# Patient Record
Sex: FEMALE | Race: WHITE | NOT HISPANIC OR LATINO | Employment: OTHER | URBAN - METROPOLITAN AREA
[De-identification: names, ages, dates, MRNs, and addresses within clinical notes are randomized per-mention and may not be internally consistent; named-entity substitution may affect disease eponyms.]

---

## 2017-07-12 ENCOUNTER — HOSPITAL ENCOUNTER (OUTPATIENT)
Dept: GENERAL RADIOLOGY | Facility: HOSPITAL | Age: 82
Discharge: HOME | End: 2017-07-12
Attending: INTERNAL MEDICINE | Admitting: INTERNAL MEDICINE

## 2017-07-12 ENCOUNTER — TRANSCRIBE ORDERS (OUTPATIENT)
Dept: ADMINISTRATIVE | Facility: HOSPITAL | Age: 82
End: 2017-07-12

## 2017-07-12 DIAGNOSIS — M70.61 TROCHANTERIC BURSITIS OF RIGHT HIP: Primary | ICD-10-CM

## 2017-07-12 DIAGNOSIS — M70.61 TROCHANTERIC BURSITIS OF RIGHT HIP: ICD-10-CM

## 2017-07-12 PROCEDURE — 73502 X-RAY EXAM HIP UNI 2-3 VIEWS: CPT

## 2017-11-09 ENCOUNTER — TRANSCRIBE ORDERS (OUTPATIENT)
Dept: ADMINISTRATIVE | Facility: HOSPITAL | Age: 82
End: 2017-11-09

## 2017-11-09 DIAGNOSIS — Z12.31 VISIT FOR SCREENING MAMMOGRAM: Primary | ICD-10-CM

## 2017-11-28 ENCOUNTER — OFFICE VISIT (OUTPATIENT)
Dept: NEUROLOGY | Facility: CLINIC | Age: 82
End: 2017-11-28

## 2017-11-28 VITALS
WEIGHT: 195 LBS | DIASTOLIC BLOOD PRESSURE: 78 MMHG | SYSTOLIC BLOOD PRESSURE: 124 MMHG | HEIGHT: 63 IN | BODY MASS INDEX: 34.55 KG/M2

## 2017-11-28 DIAGNOSIS — G25.0 BENIGN FAMILIAL TREMOR: Primary | ICD-10-CM

## 2017-11-28 DIAGNOSIS — G43.009 MIGRAINE WITHOUT AURA AND WITHOUT STATUS MIGRAINOSUS, NOT INTRACTABLE: ICD-10-CM

## 2017-11-28 PROCEDURE — 99213 OFFICE O/P EST LOW 20 MIN: CPT | Performed by: PSYCHIATRY & NEUROLOGY

## 2017-11-28 RX ORDER — SYRINGE-NEEDLE,INSULIN,0.5 ML 30 G X1/2"
SYRINGE, EMPTY DISPOSABLE MISCELLANEOUS
COMMUNITY
Start: 2017-09-29 | End: 2020-12-23

## 2017-11-28 RX ORDER — PROPRANOLOL HYDROCHLORIDE 40 MG/1
40 TABLET ORAL 2 TIMES DAILY
Qty: 180 TABLET | Refills: 3
Start: 2017-11-28 | End: 2018-11-20

## 2017-11-28 NOTE — PROGRESS NOTES
Subjective:     Patient ID: Chey Robertson is a 82 y.o. female.    CC:   Chief Complaint   Patient presents with   • Tremors       HPI:   History of Present Illness  The following portions of the patient's history were reviewed and updated as appropriate: allergies, current medications, past family history, past medical history, past social history, past surgical history and problem list.     Patient reports taking propranolol only once a day, feels that it does wear off, headaches still controlled, headaches on and off.    Past Medical History:   Diagnosis Date   • Tremors of nervous system        Past Surgical History:   Procedure Laterality Date   • HYSTERECTOMY     • NO PAST SURGERIES         Social History     Social History   • Marital status:      Spouse name: N/A   • Number of children: N/A   • Years of education: N/A     Occupational History   • Not on file.     Social History Main Topics   • Smoking status: Never Smoker   • Smokeless tobacco: Not on file   • Alcohol use No   • Drug use: No   • Sexual activity: Defer     Other Topics Concern   • Not on file     Social History Narrative       Family History   Problem Relation Age of Onset   • Breast cancer Sister 80   • Ovarian cancer Neg Hx         Review of Systems   Constitutional: Negative for chills, fatigue, fever and unexpected weight change.   HENT: Positive for rhinorrhea. Negative for ear pain, hearing loss and nosebleeds.    Eyes: Negative for photophobia, pain, discharge, itching and visual disturbance.   Respiratory: Positive for shortness of breath. Negative for cough, chest tightness and wheezing.    Cardiovascular: Positive for leg swelling. Negative for chest pain and palpitations.   Gastrointestinal: Positive for constipation. Negative for abdominal pain, blood in stool, diarrhea, nausea and vomiting.   Genitourinary: Negative for dysuria, frequency, hematuria and urgency.   Musculoskeletal: Negative for arthralgias, back pain, gait  problem, joint swelling, myalgias, neck pain and neck stiffness.   Skin: Negative for rash and wound.   Allergic/Immunologic: Negative for environmental allergies and food allergies.   Neurological: Positive for tremors. Negative for dizziness, seizures, syncope, speech difficulty, weakness, light-headedness, numbness and headaches.   Hematological: Negative for adenopathy. Does not bruise/bleed easily.   Psychiatric/Behavioral: Positive for sleep disturbance. Negative for agitation, confusion, decreased concentration, hallucinations and suicidal ideas. The patient is not nervous/anxious.         Objective:    Neurologic Exam     Mental Status   Oriented to person, place, and time.       Physical Exam   Constitutional: She is oriented to person, place, and time. She appears well-developed and well-nourished.   Cardiovascular: Normal rate and regular rhythm.    Pulmonary/Chest: Effort normal.   Neurological: She is alert and oriented to person, place, and time. She has normal reflexes.   Slight voice and kinetic hand tremor.   Psychiatric: She has a normal mood and affect. Her behavior is normal. Thought content normal.       Assessment/Plan:       Chey was seen today for tremors.    Diagnoses and all orders for this visit:    Benign familial tremor  -     propranolol (INDERAL) 40 MG tablet; Take 1 tablet by mouth 2 (Two) Times a Day.    Migraine without aura and without status migrainosus, not intractable  -     propranolol (INDERAL) 40 MG tablet; Take 1 tablet by mouth 2 (Two) Times a Day.           Abel Tejeda MD  11/28/2017

## 2018-04-10 ENCOUNTER — HOSPITAL ENCOUNTER (OUTPATIENT)
Dept: MAMMOGRAPHY | Facility: HOSPITAL | Age: 83
Discharge: HOME | End: 2018-04-10
Attending: FAMILY MEDICINE | Admitting: FAMILY MEDICINE

## 2018-04-10 DIAGNOSIS — Z12.31 VISIT FOR SCREENING MAMMOGRAM: ICD-10-CM

## 2018-04-10 PROCEDURE — 77067 SCR MAMMO BI INCL CAD: CPT

## 2018-04-10 PROCEDURE — 77063 BREAST TOMOSYNTHESIS BI: CPT

## 2018-04-10 PROCEDURE — 77067 SCR MAMMO BI INCL CAD: CPT | Performed by: RADIOLOGY

## 2018-04-10 PROCEDURE — 77063 BREAST TOMOSYNTHESIS BI: CPT | Performed by: RADIOLOGY

## 2018-11-20 ENCOUNTER — OFFICE VISIT (OUTPATIENT)
Dept: NEUROLOGY | Facility: CLINIC | Age: 83
End: 2018-11-20

## 2018-11-20 VITALS
HEIGHT: 63 IN | WEIGHT: 192 LBS | SYSTOLIC BLOOD PRESSURE: 134 MMHG | DIASTOLIC BLOOD PRESSURE: 80 MMHG | BODY MASS INDEX: 34.02 KG/M2

## 2018-11-20 DIAGNOSIS — G25.0 BENIGN FAMILIAL TREMOR: Primary | ICD-10-CM

## 2018-11-20 DIAGNOSIS — G43.009 MIGRAINE WITHOUT AURA AND WITHOUT STATUS MIGRAINOSUS, NOT INTRACTABLE: ICD-10-CM

## 2018-11-20 PROCEDURE — 99213 OFFICE O/P EST LOW 20 MIN: CPT | Performed by: NURSE PRACTITIONER

## 2018-11-20 RX ORDER — PROPRANOLOL HYDROCHLORIDE 40 MG/1
40 TABLET ORAL 3 TIMES DAILY
Qty: 270 TABLET | Refills: 3 | Status: SHIPPED | OUTPATIENT
Start: 2018-11-20 | End: 2019-11-19 | Stop reason: SDUPTHER

## 2019-03-07 ENCOUNTER — TRANSCRIBE ORDERS (OUTPATIENT)
Dept: ADMINISTRATIVE | Facility: HOSPITAL | Age: 84
End: 2019-03-07

## 2019-03-07 DIAGNOSIS — Z12.31 VISIT FOR SCREENING MAMMOGRAM: Primary | ICD-10-CM

## 2019-04-12 ENCOUNTER — HOSPITAL ENCOUNTER (OUTPATIENT)
Dept: MAMMOGRAPHY | Facility: HOSPITAL | Age: 84
Discharge: HOME | End: 2019-04-12
Admitting: FAMILY MEDICINE

## 2019-04-12 DIAGNOSIS — Z12.31 VISIT FOR SCREENING MAMMOGRAM: ICD-10-CM

## 2019-04-12 PROCEDURE — 77067 SCR MAMMO BI INCL CAD: CPT | Performed by: RADIOLOGY

## 2019-04-12 PROCEDURE — 77067 SCR MAMMO BI INCL CAD: CPT

## 2019-04-12 PROCEDURE — 77063 BREAST TOMOSYNTHESIS BI: CPT

## 2019-04-12 PROCEDURE — 77063 BREAST TOMOSYNTHESIS BI: CPT | Performed by: RADIOLOGY

## 2019-05-29 ENCOUNTER — TRANSCRIBE ORDERS (OUTPATIENT)
Dept: ADMINISTRATIVE | Facility: HOSPITAL | Age: 84
End: 2019-05-29

## 2019-05-29 DIAGNOSIS — R22.2 SUPRACLAVICULAR MASS: Primary | ICD-10-CM

## 2019-06-14 ENCOUNTER — HOSPITAL ENCOUNTER (OUTPATIENT)
Dept: ULTRASOUND IMAGING | Facility: HOSPITAL | Age: 84
Discharge: HOME | End: 2019-06-14
Admitting: NURSE PRACTITIONER

## 2019-06-14 DIAGNOSIS — R22.2 SUPRACLAVICULAR MASS: ICD-10-CM

## 2019-06-14 PROCEDURE — 76604 US EXAM CHEST: CPT

## 2019-11-19 ENCOUNTER — OFFICE VISIT (OUTPATIENT)
Dept: NEUROLOGY | Facility: CLINIC | Age: 84
End: 2019-11-19

## 2019-11-19 VITALS
BODY MASS INDEX: 34.38 KG/M2 | WEIGHT: 194 LBS | SYSTOLIC BLOOD PRESSURE: 150 MMHG | DIASTOLIC BLOOD PRESSURE: 60 MMHG | HEART RATE: 59 BPM | OXYGEN SATURATION: 96 % | HEIGHT: 63 IN

## 2019-11-19 DIAGNOSIS — G25.0 BENIGN FAMILIAL TREMOR: Primary | ICD-10-CM

## 2019-11-19 PROCEDURE — 99212 OFFICE O/P EST SF 10 MIN: CPT | Performed by: NURSE PRACTITIONER

## 2019-11-19 RX ORDER — PROPRANOLOL HYDROCHLORIDE 40 MG/1
40 TABLET ORAL 3 TIMES DAILY
Qty: 270 TABLET | Refills: 3 | Status: SHIPPED | OUTPATIENT
Start: 2019-11-19 | End: 2021-10-22 | Stop reason: SDUPTHER

## 2019-11-19 NOTE — PROGRESS NOTES
Subjective:     Patient ID: Chey Robertson is a 84 y.o. female.    CC:   Chief Complaint   Patient presents with   • Tremors       HPI:   History of Present Illness     Ms. Robertson is an 84-year-old patient here today for annual follow-up, refills on her propranolol.  She has a long history of benign essential tremor dating back many years.  She is been on current dose of propranolol since at least 2013.  She reports that tremors are stable at this time.  She continues to have voice tremor, mild kinetic hand tremor.  This does not seem to interfere too much with her daily activities.  She is having no adverse side effects from propranolol, blood pressure has been stable by report.  She denies any syncope, orthostatic dizziness etc.  She denies dysphasia, gait instability or any weakness in the lower extremities or falls.  Her blood sugars are reportedly doing well, she has had no problems with hypoglycemia.  She is been on insulin with her beta-blocker for many years.  The following portions of the patient's history were reviewed and updated as appropriate: allergies, current medications, past family history, past medical history, past social history, past surgical history and problem list.    Past Medical History:   Diagnosis Date   • Tremors of nervous system        Past Surgical History:   Procedure Laterality Date   • HYSTERECTOMY     • NO PAST SURGERIES         Social History     Socioeconomic History   • Marital status:      Spouse name: Not on file   • Number of children: Not on file   • Years of education: Not on file   • Highest education level: Not on file   Tobacco Use   • Smoking status: Never Smoker   Substance and Sexual Activity   • Alcohol use: No   • Drug use: No   • Sexual activity: Defer       Family History   Problem Relation Age of Onset   • Breast cancer Sister 84   • Ovarian cancer Neg Hx         Review of Systems   Constitutional: Negative.    HENT: Negative.    Eyes: Negative.   "  Respiratory: Negative.    Cardiovascular: Negative.    Gastrointestinal: Negative.    Endocrine: Negative.    Genitourinary: Negative.    Musculoskeletal: Negative.    Skin: Negative.    Allergic/Immunologic: Negative.    Neurological: Positive for tremors. Negative for dizziness, seizures, syncope, facial asymmetry, speech difficulty, weakness, light-headedness, numbness and headaches.   Hematological: Negative.    Psychiatric/Behavioral: Negative.         Objective:  /60   Pulse 59   Ht 160 cm (63\")   Wt 88 kg (194 lb)   LMP  (LMP Unknown)   SpO2 96%   BMI 34.37 kg/m²     Neurologic Exam     Mental Status   Oriented to person, place, and time.   Attention: normal. Concentration: normal.   Level of consciousness: alert  Knowledge: consistent with education.   Able to read. Able to write. Normal comprehension.     Cranial Nerves   Cranial nerves II through XII intact.     CN III, IV, VI   Extraocular motions are normal.     Motor Exam   Muscle bulk: normal  Right arm tone: normal  Left arm tone: normal  Right arm pronator drift: absent  Left arm pronator drift: absent  Right leg tone: normal  Left leg tone: normal    Strength   Strength 5/5 throughout.     Gait, Coordination, and Reflexes     Gait  Gait: normal    Coordination   Finger to nose coordination: normal    Reflexes   Right biceps: 1+  Left biceps: 1+  Right triceps: 1+  Left triceps: 1+  Right patellar: 1+  Left patellar: 1+  Right achilles: 1+  Left achilles: 1+Fluid gait with normal arm swing bilaterally, no resting tremor, no cogwheel rigidity.  She does have a slight voice tremor and very mild action tremor of the upper extremities       Physical Exam   Constitutional: She is oriented to person, place, and time. She appears well-developed and well-nourished. No distress.   HENT:   Head: Normocephalic and atraumatic.   Eyes: Conjunctivae and EOM are normal. No scleral icterus.   Neck: Normal range of motion. Neck supple. "   Pulmonary/Chest: Effort normal.   Neurological: She is alert and oriented to person, place, and time. She has normal strength. She has a normal Finger-Nose-Finger Test. Gait normal.   Reflex Scores:       Tricep reflexes are 1+ on the right side and 1+ on the left side.       Bicep reflexes are 1+ on the right side and 1+ on the left side.       Patellar reflexes are 1+ on the right side and 1+ on the left side.       Achilles reflexes are 1+ on the right side and 1+ on the left side.  Psychiatric: She has a normal mood and affect. Her behavior is normal. Judgment and thought content normal.   Vitals reviewed.      Assessment/Plan:       Chey was seen today for tremors.    Diagnoses and all orders for this visit:    Benign familial tremor  -     propranolol (INDERAL) 40 MG tablet; Take 1 tablet by mouth 3 (Three) Times a Day.    Ms. Robertson reports that she is doing well on current dose of propranolol.  She is having no adverse side effects from the medication, she is currently on insulin, has been for many years, she is had no problems with hypoglycemia or masking of hypoglycemic symptoms.  She has no new complaints today.  She remains very active, no problems with falls or weakness by report.  She would like to follow-up annually, I have advised her if any new symptoms or problems were to arise that we can work her in asap she can call our office anytime as well.  Reviewed medications, potential side effects and signs and symptoms to report. Discussed risk versus benefits of treatment plan with patient and/or family-including medications, labs and radiology that may be ordered. Addressed questions and concerns during visit. Patient and/or family verbalized understanding and agree with plan.  EMR Dragon/Transcription Disclaimer:  Much of this encounter note is an electronic transcription of spoken language to printed text. Electronic transcription of spoken language may permit erroneous words or phrases to be  inadvertently transcribed. Although I have reviewed the note for such errors, some may still exist in this documentation.           Samanta Quesada, APRN  11/19/2019

## 2020-05-20 ENCOUNTER — LAB REQUISITION (OUTPATIENT)
Dept: LAB | Facility: HOSPITAL | Age: 85
End: 2020-05-20

## 2020-05-20 ENCOUNTER — HOSPITAL ENCOUNTER (OUTPATIENT)
Dept: GENERAL RADIOLOGY | Facility: HOSPITAL | Age: 85
Discharge: HOME | End: 2020-05-20
Admitting: INTERNAL MEDICINE

## 2020-05-20 ENCOUNTER — TRANSCRIBE ORDERS (OUTPATIENT)
Dept: ADMINISTRATIVE | Facility: HOSPITAL | Age: 85
End: 2020-05-20

## 2020-05-20 DIAGNOSIS — R06.02 SOB (SHORTNESS OF BREATH) ON EXERTION: Primary | ICD-10-CM

## 2020-05-20 DIAGNOSIS — Z00.00 ROUTINE GENERAL MEDICAL EXAMINATION AT A HEALTH CARE FACILITY: ICD-10-CM

## 2020-05-20 LAB — D DIMER PPP FEU-MCNC: 0.44 MCGFEU/ML (ref 0–0.56)

## 2020-05-20 PROCEDURE — 71046 X-RAY EXAM CHEST 2 VIEWS: CPT

## 2020-05-20 PROCEDURE — 85379 FIBRIN DEGRADATION QUANT: CPT

## 2020-05-24 ENCOUNTER — APPOINTMENT (OUTPATIENT)
Dept: GENERAL RADIOLOGY | Facility: HOSPITAL | Age: 85
End: 2020-05-24

## 2020-05-24 ENCOUNTER — APPOINTMENT (OUTPATIENT)
Dept: CT IMAGING | Facility: HOSPITAL | Age: 85
End: 2020-05-24

## 2020-05-24 ENCOUNTER — HOSPITAL ENCOUNTER (INPATIENT)
Facility: HOSPITAL | Age: 85
LOS: 2 days | Discharge: HOME HEALTH | End: 2020-05-27
Attending: EMERGENCY MEDICINE | Admitting: INTERNAL MEDICINE

## 2020-05-24 DIAGNOSIS — R53.1 GENERALIZED WEAKNESS: ICD-10-CM

## 2020-05-24 DIAGNOSIS — R16.0 HEPATOMEGALY: ICD-10-CM

## 2020-05-24 DIAGNOSIS — D72.819 LEUKOPENIA, UNSPECIFIED TYPE: ICD-10-CM

## 2020-05-24 DIAGNOSIS — Z74.09 MOBILITY IMPAIRED: ICD-10-CM

## 2020-05-24 DIAGNOSIS — D69.6 THROMBOCYTOPENIA (HCC): ICD-10-CM

## 2020-05-24 DIAGNOSIS — R10.84 GENERALIZED ABDOMINAL PAIN: ICD-10-CM

## 2020-05-24 DIAGNOSIS — R16.1 SPLENOMEGALY: ICD-10-CM

## 2020-05-24 DIAGNOSIS — R41.0 CONFUSION: Primary | ICD-10-CM

## 2020-05-24 DIAGNOSIS — R06.02 SOB (SHORTNESS OF BREATH): ICD-10-CM

## 2020-05-24 PROBLEM — R41.82 AMS (ALTERED MENTAL STATUS): Status: ACTIVE | Noted: 2020-05-24

## 2020-05-24 PROBLEM — N17.9 ACUTE KIDNEY INJURY: Status: ACTIVE | Noted: 2020-05-24

## 2020-05-24 PROBLEM — E11.9 DIABETES MELLITUS: Status: ACTIVE | Noted: 2020-05-24

## 2020-05-24 PROBLEM — E03.9 HYPOTHYROIDISM (ACQUIRED): Status: ACTIVE | Noted: 2020-05-24

## 2020-05-24 LAB
ALBUMIN SERPL-MCNC: 4.3 G/DL (ref 3.5–5.2)
ALBUMIN/GLOB SERPL: 1.7 G/DL
ALP SERPL-CCNC: 76 U/L (ref 39–117)
ALT SERPL W P-5'-P-CCNC: 8 U/L (ref 1–33)
ANION GAP SERPL CALCULATED.3IONS-SCNC: 12 MMOL/L (ref 5–15)
AST SERPL-CCNC: 14 U/L (ref 1–32)
BASOPHILS # BLD AUTO: 0 10*3/MM3 (ref 0–0.2)
BASOPHILS NFR BLD AUTO: 0 % (ref 0–1.5)
BILIRUB SERPL-MCNC: 0.4 MG/DL (ref 0.2–1.2)
BILIRUB UR QL STRIP: NEGATIVE
BUN BLD-MCNC: 18 MG/DL (ref 8–23)
BUN/CREAT SERPL: 15.5 (ref 7–25)
CALCIUM SPEC-SCNC: 9 MG/DL (ref 8.6–10.5)
CHLORIDE SERPL-SCNC: 102 MMOL/L (ref 98–107)
CLARITY UR: CLEAR
CO2 SERPL-SCNC: 24 MMOL/L (ref 22–29)
COLOR UR: YELLOW
CREAT BLD-MCNC: 1.16 MG/DL (ref 0.57–1)
D-LACTATE SERPL-SCNC: 1.4 MMOL/L (ref 0.5–2)
DEPRECATED RDW RBC AUTO: 46.6 FL (ref 37–54)
EOSINOPHIL # BLD AUTO: 0 10*3/MM3 (ref 0–0.4)
EOSINOPHIL NFR BLD AUTO: 0 % (ref 0.3–6.2)
ERYTHROCYTE [DISTWIDTH] IN BLOOD BY AUTOMATED COUNT: 14.8 % (ref 12.3–15.4)
GFR SERPL CREATININE-BSD FRML MDRD: 45 ML/MIN/1.73
GLOBULIN UR ELPH-MCNC: 2.6 GM/DL
GLUCOSE BLD-MCNC: 86 MG/DL (ref 65–99)
GLUCOSE UR STRIP-MCNC: NEGATIVE MG/DL
HCT VFR BLD AUTO: 37.1 % (ref 34–46.6)
HGB BLD-MCNC: 12.1 G/DL (ref 12–15.9)
HGB UR QL STRIP.AUTO: NEGATIVE
HOLD SPECIMEN: NORMAL
HOLD SPECIMEN: NORMAL
IMM GRANULOCYTES # BLD AUTO: 0.01 10*3/MM3 (ref 0–0.05)
IMM GRANULOCYTES NFR BLD AUTO: 0.4 % (ref 0–0.5)
INR PPP: 1.6 (ref 0.85–1.16)
KETONES UR QL STRIP: NEGATIVE
LEUKOCYTE ESTERASE UR QL STRIP.AUTO: NEGATIVE
LIPASE SERPL-CCNC: 32 U/L (ref 13–60)
LYMPHOCYTES # BLD AUTO: 0.55 10*3/MM3 (ref 0.7–3.1)
LYMPHOCYTES NFR BLD AUTO: 21.7 % (ref 19.6–45.3)
MAGNESIUM SERPL-MCNC: 1.9 MG/DL (ref 1.6–2.4)
MCH RBC QN AUTO: 28.2 PG (ref 26.6–33)
MCHC RBC AUTO-ENTMCNC: 32.6 G/DL (ref 31.5–35.7)
MCV RBC AUTO: 86.5 FL (ref 79–97)
MONOCYTES # BLD AUTO: 0.29 10*3/MM3 (ref 0.1–0.9)
MONOCYTES NFR BLD AUTO: 11.4 % (ref 5–12)
NEUTROPHILS # BLD AUTO: 1.69 10*3/MM3 (ref 1.7–7)
NEUTROPHILS NFR BLD AUTO: 66.5 % (ref 42.7–76)
NITRITE UR QL STRIP: NEGATIVE
NRBC BLD AUTO-RTO: 0 /100 WBC (ref 0–0.2)
PH UR STRIP.AUTO: <=5 [PH] (ref 5–8)
PLAT MORPH BLD: NORMAL
PLATELET # BLD AUTO: 41 10*3/MM3 (ref 140–450)
PMV BLD AUTO: 9.1 FL (ref 6–12)
POTASSIUM BLD-SCNC: 4 MMOL/L (ref 3.5–5.2)
PROCALCITONIN SERPL-MCNC: 0.05 NG/ML (ref 0.1–0.25)
PROT SERPL-MCNC: 6.9 G/DL (ref 6–8.5)
PROT UR QL STRIP: NEGATIVE
PROTHROMBIN TIME: 18.7 SECONDS (ref 11.5–14)
RBC # BLD AUTO: 4.29 10*6/MM3 (ref 3.77–5.28)
RBC MORPH BLD: NORMAL
SODIUM BLD-SCNC: 138 MMOL/L (ref 136–145)
SP GR UR STRIP: 1.02 (ref 1–1.03)
T4 FREE SERPL-MCNC: 1.32 NG/DL (ref 0.93–1.7)
TROPONIN T SERPL-MCNC: <0.01 NG/ML (ref 0–0.03)
TSH SERPL DL<=0.05 MIU/L-ACNC: 5.27 UIU/ML (ref 0.27–4.2)
UROBILINOGEN UR QL STRIP: NORMAL
WBC MORPH BLD: NORMAL
WBC NRBC COR # BLD: 2.54 10*3/MM3 (ref 3.4–10.8)
WHOLE BLOOD HOLD SPECIMEN: NORMAL
WHOLE BLOOD HOLD SPECIMEN: NORMAL

## 2020-05-24 PROCEDURE — 82784 ASSAY IGA/IGD/IGG/IGM EACH: CPT | Performed by: INTERNAL MEDICINE

## 2020-05-24 PROCEDURE — 84443 ASSAY THYROID STIM HORMONE: CPT | Performed by: EMERGENCY MEDICINE

## 2020-05-24 PROCEDURE — 70450 CT HEAD/BRAIN W/O DYE: CPT

## 2020-05-24 PROCEDURE — 85007 BL SMEAR W/DIFF WBC COUNT: CPT | Performed by: EMERGENCY MEDICINE

## 2020-05-24 PROCEDURE — 99285 EMERGENCY DEPT VISIT HI MDM: CPT

## 2020-05-24 PROCEDURE — 85025 COMPLETE CBC W/AUTO DIFF WBC: CPT | Performed by: EMERGENCY MEDICINE

## 2020-05-24 PROCEDURE — 36415 COLL VENOUS BLD VENIPUNCTURE: CPT

## 2020-05-24 PROCEDURE — 0 IOPAMIDOL PER 1 ML: Performed by: EMERGENCY MEDICINE

## 2020-05-24 PROCEDURE — 83605 ASSAY OF LACTIC ACID: CPT | Performed by: PHYSICIAN ASSISTANT

## 2020-05-24 PROCEDURE — 85610 PROTHROMBIN TIME: CPT | Performed by: PHYSICIAN ASSISTANT

## 2020-05-24 PROCEDURE — 84439 ASSAY OF FREE THYROXINE: CPT | Performed by: EMERGENCY MEDICINE

## 2020-05-24 PROCEDURE — 84145 PROCALCITONIN (PCT): CPT | Performed by: PHYSICIAN ASSISTANT

## 2020-05-24 PROCEDURE — 83883 ASSAY NEPHELOMETRY NOT SPEC: CPT | Performed by: INTERNAL MEDICINE

## 2020-05-24 PROCEDURE — 81003 URINALYSIS AUTO W/O SCOPE: CPT | Performed by: PHYSICIAN ASSISTANT

## 2020-05-24 PROCEDURE — 93005 ELECTROCARDIOGRAM TRACING: CPT | Performed by: EMERGENCY MEDICINE

## 2020-05-24 PROCEDURE — 71275 CT ANGIOGRAPHY CHEST: CPT

## 2020-05-24 PROCEDURE — 84484 ASSAY OF TROPONIN QUANT: CPT | Performed by: EMERGENCY MEDICINE

## 2020-05-24 PROCEDURE — 80053 COMPREHEN METABOLIC PANEL: CPT | Performed by: EMERGENCY MEDICINE

## 2020-05-24 PROCEDURE — 83690 ASSAY OF LIPASE: CPT | Performed by: PHYSICIAN ASSISTANT

## 2020-05-24 PROCEDURE — 87040 BLOOD CULTURE FOR BACTERIA: CPT | Performed by: PHYSICIAN ASSISTANT

## 2020-05-24 PROCEDURE — 74176 CT ABD & PELVIS W/O CONTRAST: CPT

## 2020-05-24 PROCEDURE — 71045 X-RAY EXAM CHEST 1 VIEW: CPT

## 2020-05-24 PROCEDURE — 83735 ASSAY OF MAGNESIUM: CPT | Performed by: EMERGENCY MEDICINE

## 2020-05-24 PROCEDURE — 86334 IMMUNOFIX E-PHORESIS SERUM: CPT | Performed by: INTERNAL MEDICINE

## 2020-05-24 PROCEDURE — 84165 PROTEIN E-PHORESIS SERUM: CPT | Performed by: INTERNAL MEDICINE

## 2020-05-24 PROCEDURE — 25010000002 ONDANSETRON PER 1 MG: Performed by: PHYSICIAN ASSISTANT

## 2020-05-24 RX ORDER — ONDANSETRON 2 MG/ML
4 INJECTION INTRAMUSCULAR; INTRAVENOUS ONCE
Status: COMPLETED | OUTPATIENT
Start: 2020-05-24 | End: 2020-05-24

## 2020-05-24 RX ORDER — SODIUM CHLORIDE 0.9 % (FLUSH) 0.9 %
10 SYRINGE (ML) INJECTION AS NEEDED
Status: DISCONTINUED | OUTPATIENT
Start: 2020-05-24 | End: 2020-05-27 | Stop reason: HOSPADM

## 2020-05-24 RX ADMIN — IOPAMIDOL 70 ML: 755 INJECTION, SOLUTION INTRAVENOUS at 19:29

## 2020-05-24 RX ADMIN — SODIUM CHLORIDE 1000 ML: 9 INJECTION, SOLUTION INTRAVENOUS at 18:33

## 2020-05-24 RX ADMIN — ONDANSETRON 4 MG: 2 INJECTION INTRAMUSCULAR; INTRAVENOUS at 18:33

## 2020-05-25 ENCOUNTER — APPOINTMENT (OUTPATIENT)
Dept: MRI IMAGING | Facility: HOSPITAL | Age: 85
End: 2020-05-25

## 2020-05-25 PROBLEM — R41.0 CONFUSION: Status: ACTIVE | Noted: 2020-05-25

## 2020-05-25 LAB
ALBUMIN SERPL-MCNC: 3.9 G/DL (ref 3.5–5.2)
ALBUMIN/GLOB SERPL: 2 G/DL
ALP SERPL-CCNC: 61 U/L (ref 39–117)
ALT SERPL W P-5'-P-CCNC: 7 U/L (ref 1–33)
AMMONIA BLD-SCNC: 37 UMOL/L (ref 11–51)
ANION GAP SERPL CALCULATED.3IONS-SCNC: 13 MMOL/L (ref 5–15)
AST SERPL-CCNC: 13 U/L (ref 1–32)
BASOPHILS # BLD AUTO: 0 10*3/MM3 (ref 0–0.2)
BASOPHILS NFR BLD AUTO: 0 % (ref 0–1.5)
BILIRUB SERPL-MCNC: 0.6 MG/DL (ref 0.2–1.2)
BUN BLD-MCNC: 16 MG/DL (ref 8–23)
BUN/CREAT SERPL: 13.4 (ref 7–25)
CALCIUM SPEC-SCNC: 8.2 MG/DL (ref 8.6–10.5)
CHLORIDE SERPL-SCNC: 105 MMOL/L (ref 98–107)
CO2 SERPL-SCNC: 22 MMOL/L (ref 22–29)
CREAT BLD-MCNC: 1.19 MG/DL (ref 0.57–1)
DEPRECATED RDW RBC AUTO: 46.3 FL (ref 37–54)
EOSINOPHIL # BLD AUTO: 0 10*3/MM3 (ref 0–0.4)
EOSINOPHIL NFR BLD AUTO: 0 % (ref 0.3–6.2)
ERYTHROCYTE [DISTWIDTH] IN BLOOD BY AUTOMATED COUNT: 14.5 % (ref 12.3–15.4)
FERRITIN SERPL-MCNC: 20.14 NG/ML (ref 13–150)
FOLATE SERPL-MCNC: 9.72 NG/ML (ref 4.78–24.2)
GFR SERPL CREATININE-BSD FRML MDRD: 43 ML/MIN/1.73
GLOBULIN UR ELPH-MCNC: 2 GM/DL
GLUCOSE BLD-MCNC: 99 MG/DL (ref 65–99)
GLUCOSE BLDC GLUCOMTR-MCNC: 136 MG/DL (ref 70–130)
GLUCOSE BLDC GLUCOMTR-MCNC: 184 MG/DL (ref 70–130)
GLUCOSE BLDC GLUCOMTR-MCNC: 225 MG/DL (ref 70–130)
GLUCOSE BLDC GLUCOMTR-MCNC: 313 MG/DL (ref 70–130)
HAPTOGLOB SERPL-MCNC: 18 MG/DL (ref 30–200)
HBA1C MFR BLD: 5.7 % (ref 4.8–5.6)
HCT VFR BLD AUTO: 32.6 % (ref 34–46.6)
HGB BLD-MCNC: 10.6 G/DL (ref 12–15.9)
IMM GRANULOCYTES # BLD AUTO: 0 10*3/MM3 (ref 0–0.05)
IMM GRANULOCYTES NFR BLD AUTO: 0 % (ref 0–0.5)
IRON 24H UR-MRATE: 82 MCG/DL (ref 37–145)
IRON SATN MFR SERPL: 22 % (ref 20–50)
LDH SERPL-CCNC: 193 U/L (ref 135–214)
LYMPHOCYTES # BLD AUTO: 0.56 10*3/MM3 (ref 0.7–3.1)
LYMPHOCYTES NFR BLD AUTO: 22.7 % (ref 19.6–45.3)
MAGNESIUM SERPL-MCNC: 1.8 MG/DL (ref 1.6–2.4)
MCH RBC QN AUTO: 28.3 PG (ref 26.6–33)
MCHC RBC AUTO-ENTMCNC: 32.5 G/DL (ref 31.5–35.7)
MCV RBC AUTO: 87.2 FL (ref 79–97)
MONOCYTES # BLD AUTO: 0.24 10*3/MM3 (ref 0.1–0.9)
MONOCYTES NFR BLD AUTO: 9.7 % (ref 5–12)
NEUTROPHILS # BLD AUTO: 1.67 10*3/MM3 (ref 1.7–7)
NEUTROPHILS NFR BLD AUTO: 67.6 % (ref 42.7–76)
NRBC BLD AUTO-RTO: 0 /100 WBC (ref 0–0.2)
PLATELET # BLD AUTO: 36 10*3/MM3 (ref 140–450)
PMV BLD AUTO: 9.4 FL (ref 6–12)
POTASSIUM BLD-SCNC: 4 MMOL/L (ref 3.5–5.2)
PROT SERPL-MCNC: 5.9 G/DL (ref 6–8.5)
RBC # BLD AUTO: 3.74 10*6/MM3 (ref 3.77–5.28)
RETICS # AUTO: 0.08 10*6/MM3 (ref 0.02–0.13)
RETICS/RBC NFR AUTO: 2.22 % (ref 0.7–1.9)
SODIUM BLD-SCNC: 140 MMOL/L (ref 136–145)
TIBC SERPL-MCNC: 377 MCG/DL (ref 298–536)
TRANSFERRIN SERPL-MCNC: 253 MG/DL (ref 200–360)
VIT B12 BLD-MCNC: 155 PG/ML (ref 211–946)
WBC NRBC COR # BLD: 2.47 10*3/MM3 (ref 3.4–10.8)

## 2020-05-25 PROCEDURE — 83036 HEMOGLOBIN GLYCOSYLATED A1C: CPT | Performed by: NURSE PRACTITIONER

## 2020-05-25 PROCEDURE — 83540 ASSAY OF IRON: CPT | Performed by: NURSE PRACTITIONER

## 2020-05-25 PROCEDURE — 25010000002 MAGNESIUM SULFATE 2 GM/50ML SOLUTION: Performed by: INTERNAL MEDICINE

## 2020-05-25 PROCEDURE — 80053 COMPREHEN METABOLIC PANEL: CPT | Performed by: NURSE PRACTITIONER

## 2020-05-25 PROCEDURE — 85025 COMPLETE CBC W/AUTO DIFF WBC: CPT | Performed by: NURSE PRACTITIONER

## 2020-05-25 PROCEDURE — 83615 LACTATE (LD) (LDH) ENZYME: CPT | Performed by: INTERNAL MEDICINE

## 2020-05-25 PROCEDURE — 85045 AUTOMATED RETICULOCYTE COUNT: CPT | Performed by: NURSE PRACTITIONER

## 2020-05-25 PROCEDURE — 97162 PT EVAL MOD COMPLEX 30 MIN: CPT

## 2020-05-25 PROCEDURE — 82746 ASSAY OF FOLIC ACID SERUM: CPT | Performed by: NURSE PRACTITIONER

## 2020-05-25 PROCEDURE — 97166 OT EVAL MOD COMPLEX 45 MIN: CPT

## 2020-05-25 PROCEDURE — 82747 ASSAY OF FOLIC ACID RBC: CPT | Performed by: NURSE PRACTITIONER

## 2020-05-25 PROCEDURE — 85014 HEMATOCRIT: CPT | Performed by: NURSE PRACTITIONER

## 2020-05-25 PROCEDURE — 83010 ASSAY OF HAPTOGLOBIN QUANT: CPT | Performed by: NURSE PRACTITIONER

## 2020-05-25 PROCEDURE — 82962 GLUCOSE BLOOD TEST: CPT

## 2020-05-25 PROCEDURE — 82607 VITAMIN B-12: CPT | Performed by: NURSE PRACTITIONER

## 2020-05-25 PROCEDURE — 82728 ASSAY OF FERRITIN: CPT | Performed by: NURSE PRACTITIONER

## 2020-05-25 PROCEDURE — 99233 SBSQ HOSP IP/OBS HIGH 50: CPT | Performed by: INTERNAL MEDICINE

## 2020-05-25 PROCEDURE — 97535 SELF CARE MNGMENT TRAINING: CPT

## 2020-05-25 PROCEDURE — 25010000002 ONDANSETRON PER 1 MG: Performed by: PHYSICIAN ASSISTANT

## 2020-05-25 PROCEDURE — 63710000001 INSULIN LISPRO (HUMAN) PER 5 UNITS: Performed by: NURSE PRACTITIONER

## 2020-05-25 PROCEDURE — 82140 ASSAY OF AMMONIA: CPT | Performed by: NURSE PRACTITIONER

## 2020-05-25 PROCEDURE — 83735 ASSAY OF MAGNESIUM: CPT | Performed by: NURSE PRACTITIONER

## 2020-05-25 PROCEDURE — 84466 ASSAY OF TRANSFERRIN: CPT | Performed by: NURSE PRACTITIONER

## 2020-05-25 PROCEDURE — 99222 1ST HOSP IP/OBS MODERATE 55: CPT | Performed by: INTERNAL MEDICINE

## 2020-05-25 RX ORDER — HYDROXYZINE HYDROCHLORIDE 25 MG/1
25 TABLET, FILM COATED ORAL ONCE AS NEEDED
Status: COMPLETED | OUTPATIENT
Start: 2020-05-25 | End: 2020-05-26

## 2020-05-25 RX ORDER — ONDANSETRON 2 MG/ML
4 INJECTION INTRAMUSCULAR; INTRAVENOUS EVERY 6 HOURS PRN
Status: DISCONTINUED | OUTPATIENT
Start: 2020-05-25 | End: 2020-05-27 | Stop reason: HOSPADM

## 2020-05-25 RX ORDER — PROPRANOLOL HYDROCHLORIDE 20 MG/1
40 TABLET ORAL 3 TIMES DAILY
Status: DISCONTINUED | OUTPATIENT
Start: 2020-05-25 | End: 2020-05-27 | Stop reason: HOSPADM

## 2020-05-25 RX ORDER — MAGNESIUM SULFATE HEPTAHYDRATE 40 MG/ML
2 INJECTION, SOLUTION INTRAVENOUS ONCE
Status: COMPLETED | OUTPATIENT
Start: 2020-05-25 | End: 2020-05-25

## 2020-05-25 RX ORDER — LEVOTHYROXINE SODIUM 0.1 MG/1
100 TABLET ORAL
Status: DISCONTINUED | OUTPATIENT
Start: 2020-05-25 | End: 2020-05-27 | Stop reason: HOSPADM

## 2020-05-25 RX ORDER — DEXTROSE MONOHYDRATE 25 G/50ML
25 INJECTION, SOLUTION INTRAVENOUS
Status: DISCONTINUED | OUTPATIENT
Start: 2020-05-25 | End: 2020-05-27 | Stop reason: HOSPADM

## 2020-05-25 RX ORDER — SODIUM CHLORIDE 0.9 % (FLUSH) 0.9 %
10 SYRINGE (ML) INJECTION EVERY 12 HOURS SCHEDULED
Status: DISCONTINUED | OUTPATIENT
Start: 2020-05-25 | End: 2020-05-27 | Stop reason: HOSPADM

## 2020-05-25 RX ORDER — SODIUM CHLORIDE 9 MG/ML
75 INJECTION, SOLUTION INTRAVENOUS ONCE
Status: COMPLETED | OUTPATIENT
Start: 2020-05-25 | End: 2020-05-25

## 2020-05-25 RX ORDER — NICOTINE POLACRILEX 4 MG
15 LOZENGE BUCCAL
Status: DISCONTINUED | OUTPATIENT
Start: 2020-05-25 | End: 2020-05-27 | Stop reason: HOSPADM

## 2020-05-25 RX ORDER — SODIUM CHLORIDE 0.9 % (FLUSH) 0.9 %
10 SYRINGE (ML) INJECTION AS NEEDED
Status: DISCONTINUED | OUTPATIENT
Start: 2020-05-25 | End: 2020-05-27 | Stop reason: HOSPADM

## 2020-05-25 RX ORDER — DIAZEPAM 5 MG/ML
2.5 INJECTION, SOLUTION INTRAMUSCULAR; INTRAVENOUS ONCE
Status: DISCONTINUED | OUTPATIENT
Start: 2020-05-25 | End: 2020-05-26

## 2020-05-25 RX ADMIN — SODIUM CHLORIDE 75 ML/HR: 9 INJECTION, SOLUTION INTRAVENOUS at 01:31

## 2020-05-25 RX ADMIN — SODIUM CHLORIDE, PRESERVATIVE FREE 10 ML: 5 INJECTION INTRAVENOUS at 08:38

## 2020-05-25 RX ADMIN — INSULIN LISPRO 2 UNITS: 100 INJECTION, SOLUTION INTRAVENOUS; SUBCUTANEOUS at 17:47

## 2020-05-25 RX ADMIN — PROPRANOLOL HYDROCHLORIDE 40 MG: 20 TABLET ORAL at 20:15

## 2020-05-25 RX ADMIN — SODIUM CHLORIDE, PRESERVATIVE FREE 10 ML: 5 INJECTION INTRAVENOUS at 20:15

## 2020-05-25 RX ADMIN — LEVOTHYROXINE SODIUM 100 MCG: 100 TABLET ORAL at 05:46

## 2020-05-25 RX ADMIN — MAGNESIUM SULFATE 2 G: 2 INJECTION INTRAVENOUS at 12:36

## 2020-05-25 RX ADMIN — SODIUM CHLORIDE, PRESERVATIVE FREE 10 ML: 5 INJECTION INTRAVENOUS at 00:26

## 2020-05-25 RX ADMIN — ONDANSETRON 4 MG: 2 INJECTION INTRAMUSCULAR; INTRAVENOUS at 08:38

## 2020-05-25 RX ADMIN — ONDANSETRON 4 MG: 2 INJECTION INTRAMUSCULAR; INTRAVENOUS at 00:26

## 2020-05-25 RX ADMIN — PROPRANOLOL HYDROCHLORIDE 40 MG: 20 TABLET ORAL at 09:46

## 2020-05-25 RX ADMIN — INSULIN LISPRO 4 UNITS: 100 INJECTION, SOLUTION INTRAVENOUS; SUBCUTANEOUS at 12:36

## 2020-05-26 ENCOUNTER — APPOINTMENT (OUTPATIENT)
Dept: MRI IMAGING | Facility: HOSPITAL | Age: 85
End: 2020-05-26

## 2020-05-26 ENCOUNTER — TRANSCRIBE ORDERS (OUTPATIENT)
Dept: ADMINISTRATIVE | Facility: HOSPITAL | Age: 85
End: 2020-05-26

## 2020-05-26 DIAGNOSIS — J20.9 ACUTE BRONCHITIS, UNSPECIFIED ORGANISM: Primary | ICD-10-CM

## 2020-05-26 LAB
ANION GAP SERPL CALCULATED.3IONS-SCNC: 13 MMOL/L (ref 5–15)
BASOPHILS # BLD AUTO: 0 10*3/MM3 (ref 0–0.2)
BASOPHILS NFR BLD AUTO: 0 % (ref 0–1.5)
BUN BLD-MCNC: 15 MG/DL (ref 8–23)
BUN/CREAT SERPL: 13 (ref 7–25)
CALCIUM SPEC-SCNC: 8.2 MG/DL (ref 8.6–10.5)
CHLORIDE SERPL-SCNC: 104 MMOL/L (ref 98–107)
CO2 SERPL-SCNC: 22 MMOL/L (ref 22–29)
CREAT BLD-MCNC: 1.15 MG/DL (ref 0.57–1)
CYTOLOGIST CVX/VAG CYTO: NORMAL
DEPRECATED RDW RBC AUTO: 46.2 FL (ref 37–54)
EOSINOPHIL # BLD AUTO: 0 10*3/MM3 (ref 0–0.4)
EOSINOPHIL NFR BLD AUTO: 0 % (ref 0.3–6.2)
ERYTHROCYTE [DISTWIDTH] IN BLOOD BY AUTOMATED COUNT: 14.5 % (ref 12.3–15.4)
GFR SERPL CREATININE-BSD FRML MDRD: 45 ML/MIN/1.73
GLUCOSE BLD-MCNC: 205 MG/DL (ref 65–99)
GLUCOSE BLDC GLUCOMTR-MCNC: 197 MG/DL (ref 70–130)
GLUCOSE BLDC GLUCOMTR-MCNC: 234 MG/DL (ref 70–130)
GLUCOSE BLDC GLUCOMTR-MCNC: 253 MG/DL (ref 70–130)
HCT VFR BLD AUTO: 32.7 % (ref 34–46.6)
HGB BLD-MCNC: 10.5 G/DL (ref 12–15.9)
IMM GRANULOCYTES # BLD AUTO: 0.01 10*3/MM3 (ref 0–0.05)
IMM GRANULOCYTES NFR BLD AUTO: 0.5 % (ref 0–0.5)
LYMPHOCYTES # BLD AUTO: 0.45 10*3/MM3 (ref 0.7–3.1)
LYMPHOCYTES NFR BLD AUTO: 23.3 % (ref 19.6–45.3)
MAGNESIUM SERPL-MCNC: 2.1 MG/DL (ref 1.6–2.4)
MCH RBC QN AUTO: 28.5 PG (ref 26.6–33)
MCHC RBC AUTO-ENTMCNC: 32.1 G/DL (ref 31.5–35.7)
MCV RBC AUTO: 88.9 FL (ref 79–97)
MONOCYTES # BLD AUTO: 0.26 10*3/MM3 (ref 0.1–0.9)
MONOCYTES NFR BLD AUTO: 13.5 % (ref 5–12)
NEUTROPHILS # BLD AUTO: 1.21 10*3/MM3 (ref 1.7–7)
NEUTROPHILS NFR BLD AUTO: 62.7 % (ref 42.7–76)
NRBC BLD AUTO-RTO: 0 /100 WBC (ref 0–0.2)
PATH INTERP BLD-IMP: NORMAL
PLATELET # BLD AUTO: 35 10*3/MM3 (ref 140–450)
PMV BLD AUTO: 10.1 FL (ref 6–12)
POTASSIUM BLD-SCNC: 4.5 MMOL/L (ref 3.5–5.2)
RBC # BLD AUTO: 3.68 10*6/MM3 (ref 3.77–5.28)
SODIUM BLD-SCNC: 139 MMOL/L (ref 136–145)
WBC NRBC COR # BLD: 1.93 10*3/MM3 (ref 3.4–10.8)

## 2020-05-26 PROCEDURE — 97116 GAIT TRAINING THERAPY: CPT

## 2020-05-26 PROCEDURE — 83735 ASSAY OF MAGNESIUM: CPT | Performed by: INTERNAL MEDICINE

## 2020-05-26 PROCEDURE — 97110 THERAPEUTIC EXERCISES: CPT

## 2020-05-26 PROCEDURE — 85060 BLOOD SMEAR INTERPRETATION: CPT | Performed by: INTERNAL MEDICINE

## 2020-05-26 PROCEDURE — 63710000001 INSULIN DETEMIR PER 5 UNITS: Performed by: INTERNAL MEDICINE

## 2020-05-26 PROCEDURE — 99232 SBSQ HOSP IP/OBS MODERATE 35: CPT | Performed by: INTERNAL MEDICINE

## 2020-05-26 PROCEDURE — 88305 TISSUE EXAM BY PATHOLOGIST: CPT | Performed by: INTERNAL MEDICINE

## 2020-05-26 PROCEDURE — 88364 INSITU HYBRIDIZATION (FISH): CPT | Performed by: INTERNAL MEDICINE

## 2020-05-26 PROCEDURE — 80048 BASIC METABOLIC PNL TOTAL CA: CPT | Performed by: INTERNAL MEDICINE

## 2020-05-26 PROCEDURE — 63710000001 INSULIN LISPRO (HUMAN) PER 5 UNITS: Performed by: NURSE PRACTITIONER

## 2020-05-26 PROCEDURE — 82962 GLUCOSE BLOOD TEST: CPT

## 2020-05-26 PROCEDURE — 88184 FLOWCYTOMETRY/ TC 1 MARKER: CPT

## 2020-05-26 PROCEDURE — 25010000002 MORPHINE PER 10 MG: Performed by: INTERNAL MEDICINE

## 2020-05-26 PROCEDURE — 07DR3ZX EXTRACTION OF ILIAC BONE MARROW, PERCUTANEOUS APPROACH, DIAGNOSTIC: ICD-10-PCS | Performed by: INTERNAL MEDICINE

## 2020-05-26 PROCEDURE — 88237 TISSUE CULTURE BONE MARROW: CPT

## 2020-05-26 PROCEDURE — 88341 IMHCHEM/IMCYTCHM EA ADD ANTB: CPT | Performed by: INTERNAL MEDICINE

## 2020-05-26 PROCEDURE — 88264 CHROMOSOME ANALYSIS 20-25: CPT

## 2020-05-26 PROCEDURE — 88185 FLOWCYTOMETRY/TC ADD-ON: CPT

## 2020-05-26 PROCEDURE — 88313 SPECIAL STAINS GROUP 2: CPT | Performed by: INTERNAL MEDICINE

## 2020-05-26 PROCEDURE — 88342 IMHCHEM/IMCYTCHM 1ST ANTB: CPT | Performed by: INTERNAL MEDICINE

## 2020-05-26 PROCEDURE — 70551 MRI BRAIN STEM W/O DYE: CPT

## 2020-05-26 PROCEDURE — 88365 INSITU HYBRIDIZATION (FISH): CPT | Performed by: INTERNAL MEDICINE

## 2020-05-26 PROCEDURE — 25010000002 ONDANSETRON PER 1 MG: Performed by: PHYSICIAN ASSISTANT

## 2020-05-26 PROCEDURE — 85025 COMPLETE CBC W/AUTO DIFF WBC: CPT | Performed by: INTERNAL MEDICINE

## 2020-05-26 PROCEDURE — 88311 DECALCIFY TISSUE: CPT | Performed by: INTERNAL MEDICINE

## 2020-05-26 PROCEDURE — 85097 BONE MARROW INTERPRETATION: CPT | Performed by: INTERNAL MEDICINE

## 2020-05-26 RX ORDER — MORPHINE SULFATE 4 MG/ML
4 INJECTION, SOLUTION INTRAMUSCULAR; INTRAVENOUS
Status: COMPLETED | OUTPATIENT
Start: 2020-05-26 | End: 2020-05-26

## 2020-05-26 RX ADMIN — INSULIN LISPRO 6 UNITS: 100 INJECTION, SOLUTION INTRAVENOUS; SUBCUTANEOUS at 11:49

## 2020-05-26 RX ADMIN — PROPRANOLOL HYDROCHLORIDE 40 MG: 20 TABLET ORAL at 09:27

## 2020-05-26 RX ADMIN — INSULIN LISPRO 2 UNITS: 100 INJECTION, SOLUTION INTRAVENOUS; SUBCUTANEOUS at 09:27

## 2020-05-26 RX ADMIN — INSULIN DETEMIR 8 UNITS: 100 INJECTION, SOLUTION SUBCUTANEOUS at 11:48

## 2020-05-26 RX ADMIN — MORPHINE SULFATE 4 MG: 4 INJECTION, SOLUTION INTRAMUSCULAR; INTRAVENOUS at 10:26

## 2020-05-26 RX ADMIN — ONDANSETRON 4 MG: 2 INJECTION INTRAMUSCULAR; INTRAVENOUS at 02:45

## 2020-05-26 RX ADMIN — SODIUM CHLORIDE, PRESERVATIVE FREE 10 ML: 5 INJECTION INTRAVENOUS at 09:28

## 2020-05-26 RX ADMIN — SODIUM CHLORIDE, PRESERVATIVE FREE 10 ML: 5 INJECTION INTRAVENOUS at 20:36

## 2020-05-26 RX ADMIN — HYDROXYZINE HYDROCHLORIDE 25 MG: 25 TABLET, FILM COATED ORAL at 02:45

## 2020-05-26 RX ADMIN — INSULIN LISPRO 6 UNITS: 100 INJECTION, SOLUTION INTRAVENOUS; SUBCUTANEOUS at 18:12

## 2020-05-26 RX ADMIN — LEVOTHYROXINE SODIUM 100 MCG: 100 TABLET ORAL at 06:29

## 2020-05-27 VITALS
SYSTOLIC BLOOD PRESSURE: 162 MMHG | HEART RATE: 60 BPM | DIASTOLIC BLOOD PRESSURE: 73 MMHG | BODY MASS INDEX: 33.66 KG/M2 | TEMPERATURE: 97.8 F | HEIGHT: 63 IN | RESPIRATION RATE: 18 BRPM | WEIGHT: 190 LBS | OXYGEN SATURATION: 95 %

## 2020-05-27 LAB
ALBUMIN SERPL-MCNC: 3.9 G/DL (ref 2.9–4.4)
ALBUMIN/GLOB SERPL: 1.3 {RATIO} (ref 0.7–1.7)
ALPHA1 GLOB FLD ELPH-MCNC: 0.3 G/DL (ref 0–0.4)
ALPHA2 GLOB SERPL ELPH-MCNC: 0.7 G/DL (ref 0.4–1)
B-GLOBULIN SERPL ELPH-MCNC: 0.8 G/DL (ref 0.7–1.3)
BASOPHILS # BLD AUTO: 0 10*3/MM3 (ref 0–0.2)
BASOPHILS NFR BLD AUTO: 0 % (ref 0–1.5)
DEPRECATED RDW RBC AUTO: 46.9 FL (ref 37–54)
EOSINOPHIL # BLD AUTO: 0 10*3/MM3 (ref 0–0.4)
EOSINOPHIL NFR BLD AUTO: 0 % (ref 0.3–6.2)
ERYTHROCYTE [DISTWIDTH] IN BLOOD BY AUTOMATED COUNT: 14.6 % (ref 12.3–15.4)
FOLATE BLD-MCNC: 348 NG/ML
FOLATE RBC-MCNC: 1067 NG/ML
GAMMA GLOB SERPL ELPH-MCNC: 1.3 G/DL (ref 0.4–1.8)
GLOBULIN SER CALC-MCNC: 3.1 G/DL (ref 2.2–3.9)
GLUCOSE BLDC GLUCOMTR-MCNC: 201 MG/DL (ref 70–130)
GLUCOSE BLDC GLUCOMTR-MCNC: 236 MG/DL (ref 70–130)
GLUCOSE BLDC GLUCOMTR-MCNC: 259 MG/DL (ref 70–130)
HCT VFR BLD AUTO: 32.4 % (ref 34–46.6)
HCT VFR BLD AUTO: 32.6 % (ref 34–46.6)
HGB BLD-MCNC: 10.3 G/DL (ref 12–15.9)
IGA SERPL-MCNC: 111 MG/DL (ref 64–422)
IGG SERPL-MCNC: 1116 MG/DL (ref 586–1602)
IGM SERPL-MCNC: 274 MG/DL (ref 26–217)
IMM GRANULOCYTES # BLD AUTO: 0.02 10*3/MM3 (ref 0–0.05)
IMM GRANULOCYTES NFR BLD AUTO: 1 % (ref 0–0.5)
INTERPRETATION SERPL IEP-IMP: ABNORMAL
KAPPA LC SERPL-MCNC: 36.7 MG/L (ref 3.3–19.4)
KAPPA LC/LAMBDA SER: 1.37 {RATIO} (ref 0.26–1.65)
LAMBDA LC FREE SERPL-MCNC: 26.8 MG/L (ref 5.7–26.3)
LYMPHOCYTES # BLD AUTO: 0.6 10*3/MM3 (ref 0.7–3.1)
LYMPHOCYTES NFR BLD AUTO: 29.3 % (ref 19.6–45.3)
Lab: ABNORMAL
M-SPIKE: ABNORMAL G/DL
MCH RBC QN AUTO: 28.1 PG (ref 26.6–33)
MCHC RBC AUTO-ENTMCNC: 31.8 G/DL (ref 31.5–35.7)
MCV RBC AUTO: 88.3 FL (ref 79–97)
MONOCYTES # BLD AUTO: 0.27 10*3/MM3 (ref 0.1–0.9)
MONOCYTES NFR BLD AUTO: 13.2 % (ref 5–12)
NEUTROPHILS # BLD AUTO: 1.16 10*3/MM3 (ref 1.7–7)
NEUTROPHILS NFR BLD AUTO: 56.5 % (ref 42.7–76)
NRBC BLD AUTO-RTO: 0 /100 WBC (ref 0–0.2)
PLATELET # BLD AUTO: 33 10*3/MM3 (ref 140–450)
PMV BLD AUTO: 9.4 FL (ref 6–12)
PROT SERPL-MCNC: 7 G/DL (ref 6–8.5)
RBC # BLD AUTO: 3.67 10*6/MM3 (ref 3.77–5.28)
WBC NRBC COR # BLD: 2.05 10*3/MM3 (ref 3.4–10.8)

## 2020-05-27 PROCEDURE — 63710000001 INSULIN DETEMIR PER 5 UNITS: Performed by: INTERNAL MEDICINE

## 2020-05-27 PROCEDURE — 97116 GAIT TRAINING THERAPY: CPT

## 2020-05-27 PROCEDURE — 82962 GLUCOSE BLOOD TEST: CPT

## 2020-05-27 PROCEDURE — 99239 HOSP IP/OBS DSCHRG MGMT >30: CPT | Performed by: INTERNAL MEDICINE

## 2020-05-27 PROCEDURE — 85025 COMPLETE CBC W/AUTO DIFF WBC: CPT | Performed by: INTERNAL MEDICINE

## 2020-05-27 RX ADMIN — SODIUM CHLORIDE, PRESERVATIVE FREE 10 ML: 5 INJECTION INTRAVENOUS at 08:59

## 2020-05-27 RX ADMIN — INSULIN DETEMIR 8 UNITS: 100 INJECTION, SOLUTION SUBCUTANEOUS at 09:00

## 2020-05-27 RX ADMIN — INSULIN LISPRO 4 UNITS: 100 INJECTION, SOLUTION INTRAVENOUS; SUBCUTANEOUS at 08:59

## 2020-05-27 RX ADMIN — PROPRANOLOL HYDROCHLORIDE 40 MG: 20 TABLET ORAL at 08:59

## 2020-05-27 RX ADMIN — PROPRANOLOL HYDROCHLORIDE 40 MG: 20 TABLET ORAL at 17:16

## 2020-05-27 RX ADMIN — INSULIN LISPRO 6 UNITS: 100 INJECTION, SOLUTION INTRAVENOUS; SUBCUTANEOUS at 12:24

## 2020-05-27 RX ADMIN — LEVOTHYROXINE SODIUM 100 MCG: 100 TABLET ORAL at 06:49

## 2020-05-27 RX ADMIN — INSULIN LISPRO 4 UNITS: 100 INJECTION, SOLUTION INTRAVENOUS; SUBCUTANEOUS at 17:17

## 2020-05-28 ENCOUNTER — READMISSION MANAGEMENT (OUTPATIENT)
Dept: CALL CENTER | Facility: HOSPITAL | Age: 85
End: 2020-05-28

## 2020-05-28 NOTE — OUTREACH NOTE
Prep Survey      Responses   [facility] facility patient discharged from?  [place]   Is LACE score < 7 ?  No   Eligibility  Readm Mgmt   Discharge diagnosis  Thrombocytopenia leukopenia    COVID-19 Test Status  Not tested   Does the patient have one of the following disease processes/diagnoses(primary or secondary)?  Other   Does the patient have Home health ordered?  No   Is there a DME ordered?  No   Prep survey completed?  Yes          Frannie Barreto RN

## 2020-05-29 ENCOUNTER — READMISSION MANAGEMENT (OUTPATIENT)
Dept: CALL CENTER | Facility: HOSPITAL | Age: 85
End: 2020-05-29

## 2020-05-29 ENCOUNTER — HOSPITAL ENCOUNTER (OUTPATIENT)
Dept: CT IMAGING | Facility: HOSPITAL | Age: 85
Discharge: HOME | End: 2020-05-29
Admitting: INTERNAL MEDICINE

## 2020-05-29 DIAGNOSIS — J20.9 ACUTE BRONCHITIS, UNSPECIFIED ORGANISM: ICD-10-CM

## 2020-05-29 LAB
BACTERIA SPEC AEROBE CULT: NORMAL
BACTERIA SPEC AEROBE CULT: NORMAL

## 2020-05-29 PROCEDURE — 71250 CT THORAX DX C-: CPT

## 2020-05-29 NOTE — OUTREACH NOTE
Medical Week 1 Survey      Responses   [facility] [place] patient discharged from?  [place]   COVID-19 Test Status  Not tested   Does the patient have one of the following disease processes/diagnoses(primary or secondary)?  Other   Is there a successful TCM telephone encounter documented?  No   Week 1 attempt successful?  Yes   Call start time  1500   Call end time  1505   Discharge diagnosis  Thrombocytopenia leukopenia    Is patient permission given to speak with other caregiver?  Yes   List who call center can speak with  either [employer] or maria d   Meds reviewed with patient/caregiver?  Yes   Is the patient having any side effects they believe may be caused by any medication additions or changes?  No   Does the patient have all medications ordered at discharge?  N/A   Is the patient taking all medications as directed (includes completed medication regime)?  Yes   Does the patient have a primary care provider?   Yes   Does the patient have an appointment with their PCP within 7 days of discharge?  Yes   Has the patient kept scheduled appointments due by today?  N/A   Pulse Ox monitoring  None   Comments  She reports that she still feeling fatigue, decreased appetite and generally poorly. Having CT scan today.    Did the patient receive a copy of their discharge instructions?  Yes   Nursing interventions  Reviewed instructions with patient   What is the patient's perception of their health status since discharge?  Same   Is the patient/caregiver able to teach back signs and symptoms related to disease process for when to call PCP?  Yes   Is the patient/caregiver able to teach back signs and symptoms related to disease process for when to call 911?  Yes   Week 1 call completed?  Yes          Amirah Ocampo RN

## 2020-06-01 ENCOUNTER — TELEPHONE (OUTPATIENT)
Dept: ONCOLOGY | Facility: CLINIC | Age: 85
End: 2020-06-01

## 2020-06-02 ENCOUNTER — TELEPHONE (OUTPATIENT)
Dept: ONCOLOGY | Facility: CLINIC | Age: 85
End: 2020-06-02

## 2020-06-02 ENCOUNTER — HOSPITAL ENCOUNTER (OUTPATIENT)
Dept: ONCOLOGY | Facility: HOSPITAL | Age: 85
Setting detail: INFUSION SERIES
Discharge: HOME | End: 2020-06-02

## 2020-06-02 ENCOUNTER — LAB (OUTPATIENT)
Dept: LAB | Facility: HOSPITAL | Age: 85
End: 2020-06-02

## 2020-06-02 ENCOUNTER — CONSULT (OUTPATIENT)
Dept: ONCOLOGY | Facility: CLINIC | Age: 85
End: 2020-06-02

## 2020-06-02 VITALS
DIASTOLIC BLOOD PRESSURE: 63 MMHG | RESPIRATION RATE: 18 BRPM | TEMPERATURE: 97.7 F | BODY MASS INDEX: 37.11 KG/M2 | WEIGHT: 189 LBS | SYSTOLIC BLOOD PRESSURE: 173 MMHG | OXYGEN SATURATION: 99 % | HEIGHT: 60 IN | HEART RATE: 55 BPM

## 2020-06-02 DIAGNOSIS — R94.5 ABNORMAL RESULTS OF LIVER FUNCTION STUDIES: ICD-10-CM

## 2020-06-02 DIAGNOSIS — E53.8 B12 DEFICIENCY: Primary | ICD-10-CM

## 2020-06-02 DIAGNOSIS — E53.8 B12 DEFICIENCY: ICD-10-CM

## 2020-06-02 DIAGNOSIS — D61.818 PANCYTOPENIA (HCC): Primary | ICD-10-CM

## 2020-06-02 DIAGNOSIS — D61.818 PANCYTOPENIA (HCC): ICD-10-CM

## 2020-06-02 LAB
ERYTHROCYTE [SEDIMENTATION RATE] IN BLOOD: 4 MM/HR (ref 0–30)
HAV IGM SERPL QL IA: NORMAL
HBV CORE IGM SERPL QL IA: NORMAL
HBV SURFACE AG SERPL QL IA: NORMAL
HCV AB SER DONR QL: NORMAL

## 2020-06-02 PROCEDURE — 36415 COLL VENOUS BLD VENIPUNCTURE: CPT

## 2020-06-02 PROCEDURE — 85652 RBC SED RATE AUTOMATED: CPT

## 2020-06-02 PROCEDURE — 99214 OFFICE O/P EST MOD 30 MIN: CPT | Performed by: INTERNAL MEDICINE

## 2020-06-02 PROCEDURE — 25010000002 CYANOCOBALAMIN PER 1000 MCG: Performed by: INTERNAL MEDICINE

## 2020-06-02 PROCEDURE — 96372 THER/PROPH/DIAG INJ SC/IM: CPT

## 2020-06-02 PROCEDURE — 80074 ACUTE HEPATITIS PANEL: CPT

## 2020-06-02 RX ORDER — DOXYCYCLINE HYCLATE 100 MG/1
CAPSULE ORAL
COMMUNITY
Start: 2020-05-20 | End: 2020-06-02

## 2020-06-02 RX ORDER — CEPHALEXIN 500 MG/1
CAPSULE ORAL
COMMUNITY
Start: 2020-05-22 | End: 2020-12-23

## 2020-06-02 RX ORDER — BLOOD-GLUCOSE METER
EACH MISCELLANEOUS
COMMUNITY
End: 2020-12-23

## 2020-06-02 RX ORDER — CYANOCOBALAMIN 1000 UG/ML
1000 INJECTION, SOLUTION INTRAMUSCULAR; SUBCUTANEOUS ONCE
Status: COMPLETED | OUTPATIENT
Start: 2020-06-02 | End: 2020-06-02

## 2020-06-02 RX ORDER — WARFARIN SODIUM 6 MG/1
TABLET ORAL DAILY
COMMUNITY
Start: 2020-04-28 | End: 2020-12-23

## 2020-06-02 RX ORDER — SYRINGE W-NEEDLE,DISPOSAB,3 ML 25GX5/8"
1 SYRINGE, EMPTY DISPOSABLE MISCELLANEOUS TAKE AS DIRECTED
Qty: 10 EACH | Refills: 5 | Status: SHIPPED | OUTPATIENT
Start: 2020-06-02 | End: 2020-08-31 | Stop reason: SDUPTHER

## 2020-06-02 RX ORDER — CYANOCOBALAMIN 1000 UG/ML
1000 INJECTION, SOLUTION INTRAMUSCULAR; SUBCUTANEOUS TAKE AS DIRECTED
Qty: 10 ML | Refills: 2 | Status: SHIPPED | OUTPATIENT
Start: 2020-06-02 | End: 2020-08-31 | Stop reason: SDUPTHER

## 2020-06-02 RX ORDER — CYANOCOBALAMIN 1000 UG/ML
1000 INJECTION, SOLUTION INTRAMUSCULAR; SUBCUTANEOUS ONCE
Status: CANCELLED | OUTPATIENT
Start: 2020-06-02

## 2020-06-02 RX ORDER — ALBUTEROL SULFATE 90 UG/1
2 AEROSOL, METERED RESPIRATORY (INHALATION) EVERY 4 HOURS PRN
COMMUNITY
Start: 2020-05-20

## 2020-06-02 RX ADMIN — CYANOCOBALAMIN 1000 MCG: 1000 INJECTION, SOLUTION INTRAMUSCULAR; SUBCUTANEOUS at 15:08

## 2020-06-02 NOTE — TELEPHONE ENCOUNTER
On hold for extended period of time with PCP.  Order faxed to the number listed for Dr Loomis @ fax [phone]

## 2020-06-02 NOTE — TELEPHONE ENCOUNTER
----- Message from Demian Coughlin sent at 6/2/2020  3:11 PM EDT -----  Regarding: B-12  Contact: [phone]  Patient PCP stated that they can do B-12 tomorrow and forward as along as we send an order today asap so they can get her on the schedule. Can call them today with a verbal and send a standing order moving forward.     Dr Loomis is PCP    Thanks!

## 2020-06-02 NOTE — PROGRESS NOTES
DATE OF CONSULTATION: 6/2/2020    REFERRING PHYSICIAN: ADELE Loomis MD    Dear ADELE Abbasi MD  Thank you for asking for my medical advice on this patient. I saw her in the  [place] office on 6/2/2020    REASON FOR CONSULTATION: Pancytopenia    HISTORY OF PRESENT ILLNESS: The patient is a very pleasant 84 y.o.  female   who was in her usual state of health until May 2020.  Patient was admitted to [facility] on May 24, 2028 with complaint of fatigue weakness and confusion.  Her blood work revealed pancytopenia.  CT chest abdomen pelvis revealed mild splenomegaly spleen size 15 cm with mild hepatomegaly as well as pancytopenia.  Bone marrow biopsy done on May 26, 2020 revealed hypercellular marrow with mild dysplastic features could not rule out low-grade MDS.  Blood work confirmed vitamin B12 deficiency with serum B12 level of 150.  The patient was seen as an inpatient by Dr. Reeder.  She was referred to me for further recommendations.    SUBJECTIVE: When I saw the patient today she is here today with her daughter-in-law.  She is anxious with the bone marrow result.  She did not feel chills night sweats.  She has been complaining of mild fatigue.  Denies any neuropathy.    Review of Systems   Constitutional: Negative for activity change, appetite change, chills, fatigue, fever and unexpected weight change.   HENT: Negative for hearing loss, mouth sores, nosebleeds, sore throat and trouble swallowing.    Eyes: Negative for visual disturbance.   Respiratory: Negative for cough, chest tightness, shortness of breath and wheezing.    Cardiovascular: Negative for chest pain, palpitations and leg swelling.   Gastrointestinal: Negative for abdominal distention, abdominal pain, blood in stool, constipation, diarrhea, nausea, rectal pain and vomiting.   Endocrine: Negative for cold intolerance and heat intolerance.   Genitourinary: Negative for difficulty urinating, dysuria, frequency and urgency.  "  Musculoskeletal: Negative for arthralgias, back pain, gait problem, joint swelling and myalgias.   Skin: Negative for rash.   Neurological: Negative for dizziness, tremors, syncope, weakness, light-headedness, numbness and headaches.   Hematological: Negative for adenopathy. Does not bruise/bleed easily.   Psychiatric/Behavioral: Negative for confusion, sleep disturbance and suicidal ideas. The patient is not nervous/anxious.        Past Medical History:   Diagnosis Date   • Diabetes mellitus (CMS/[facility])    • Disease of thyroid gland    • Hypertension    • Migraine without aura and without status migrainosus, not intractable 12/30/2016   • Pulmonary emboli (CMS/[facility]) 2011    (after knee surgery)   • Tremors of nervous system        Social History     Socioeconomic History   • Marital status:      Spouse name: Not on file   • Number of children: Not on file   • Years of education: Not on file   • Highest education level: Not on file   Tobacco Use   • Smoking status: Never Smoker   • Smokeless tobacco: Never Used   Substance and Sexual Activity   • Alcohol use: No   • Drug use: No   • Sexual activity: Defer       Family History   Problem Relation Age of Onset   • Breast cancer Sister 84   • Stroke Mother    • Heart attack Father    • Ovarian cancer Neg Hx        Past Surgical History:   Procedure Laterality Date   • AUGMENTATION MAMMAPLASTY     • BLADDER SURGERY     • CATARACT EXTRACTION     • HYSTERECTOMY     • OOPHORECTOMY     • REDUCTION MAMMAPLASTY     • TONSILLECTOMY         Allergies   Allergen Reactions   • Aspirin           Current Outpatient Medications:   •  warfarin (Coumadin) 6 MG tablet, Take  by mouth Daily., Disp: , Rfl:   •  albuterol sulfate  (90 Base) MCG/ACT inhaler, INHALE 2 PUFFS PO QID PRN FOR SHORTNESS OF AIR, Disp: , Rfl:   •  B-D INS SYR ULTRAFINE .5CC/30G 30G X 1/2\" 0.5 ML misc, , Disp: , Rfl:   •  Blood Glucose Monitoring Suppl (TRUE METRIX AIR GLUCOSE METER) device, one, Disp: " ", Rfl:   •  cephalexin (KEFLEX) 500 MG capsule, TK 1 C PO BID, Disp: , Rfl:   •  furosemide (LASIX) 40 MG tablet, Take  by mouth Daily., Disp: , Rfl:   •  glucose blood (True Metrix Blood Glucose Test) test strip, one two times daily, Disp: , Rfl:   •  insulin NPH-insulin regular (HUMULIN 70/30) (70-30) 100 UNIT/ML injection, Inject  under the skin., Disp: , Rfl:   •  levothyroxine (SYNTHROID, LEVOTHROID) 100 MCG tablet, , Disp: , Rfl:   •  lisinopril (PRINIVIL,ZESTRIL) 20 MG tablet, Take  by mouth., Disp: , Rfl:   •  meclizine (ANTIVERT) 12.5 MG tablet, Take  by mouth., Disp: , Rfl:   •  metFORMIN (GLUCOPHAGE) 500 MG tablet, Take  by mouth Daily., Disp: , Rfl:   •  propranolol (INDERAL) 40 MG tablet, Take 1 tablet by mouth 3 (Three) Times a Day., Disp: 270 tablet, Rfl: 3    PHYSICAL EXAMINATION:   /63   Pulse 55   Temp 97.7 °F (36.5 °C) (Temporal)   Resp 18   Ht 152.4 cm (60\")   Wt 85.7 kg (189 lb)   LMP  (LMP Unknown)   SpO2 99%   BMI 36.91 kg/m²   Pain Score    06/02/20 1351   PainSc: 0-No pain       ECOG Performance Status: 1 - Symptomatic but completely ambulatory  General Appearance:  alert, cooperative, no apparent distress and appears stated age   Neurologic/Psychiatric: A&O x 3, gait steady, appropriate affect, strength 5/5 in all muscle groups   HEENT:  Normocephalic, without obvious abnormality, mucous membranes moist   Neck: Supple, symmetrical, trachea midline, no adenopathy;  No thyromegaly, masses, or tenderness   Lungs:   Clear to auscultation bilaterally; respirations regular, even, and unlabored bilaterally   Heart:  Regular rate and rhythm, no murmurs appreciated   Abdomen:   Soft, non-tender, non-distended and no organomegaly   Lymph nodes: No cervical, supraclavicular, inguinal or axillary adenopathy noted   Extremities: Normal, atraumatic; no clubbing, cyanosis, or edema    Skin: No rashes, ulcers, or suspicious lesions noted       No results displayed because visit has over 200 " results.      Lab Requisition on 05/20/2020   Component Date Value Ref Range Status   • D-Dimer, Quantitative 05/20/2020 0.44  0.00 - 0.56 MCGFEU/mL Final        Ct Abdomen Pelvis Without Contrast    Result Date: 5/24/2020  Narrative: CT Abdomen Pelvis WO INDICATION: ; abd pain, nausea, ams Additional Relevant clinical info: Confusion/delirium, altered LOC, nausea, SOB.  Pt unable to lay on back.;DB/TM/AE: n-95, sx mask, gloves, goggles, gown TECHNIQUE: CT of the abdomen and pelvis without IV contrast. Coronal and sagittal reconstructions were obtained.  Oral contrast not given.  Radiation dose reduction techniques included automated exposure control or exposure modulation based on body size. Count of known CT and cardiac nuc med studies performed in previous 12 months: 2. COMPARISON: No relevant comparison or correlation studies available at time of dictation. FINDINGS:  There is limited assessment of the solid viscera and the bowel wall without the use of any IV contrast. Limited assessment of bowel without enteric contrast. Study markedly limited as patient was also unable to lay supine and arms within the gantry. Anatomy is distorted. Liver suggested to be enlarged at just under 19 cm Significant splenomegaly noted at 15 cm craniocaudally and 16 cm maximal diameter transversely. Gallbladder absent. Left kidney displaced inferiorly due to the enlarged spleen. Uterus and adnexa are not seen. No gross bowel distention noted. Grossly, no worrisome contour abnormalities of solid viscera or gross low-attenuation lesion seen. No free air, free fluid or focal inflammatory change is present. Lung bases are clear.  Osseous structures are intact.     Impression: No acute intra-abdominal process. Incidental severe splenomegaly and mild hepatomegaly, correlate clinically to determine etiology.  Signer Name: Kiki Johnson MD  Signed: 5/24/2020 6:52 PM  Workstation Name: [id number]  [facility]  Head Without Contrast    Result Date: 5/24/2020  Narrative: CT Head WO HISTORY: Confusion and delirium with altered LOC. TECHNIQUE: Axial unenhanced head CT. Radiation dose reduction techniques included automated exposure control or exposure modulation based on body size. Count of known CT and cardiac nuc med studies performed in previous 12 months: 0. Time of scan: 1925 hours COMPARISON: None. FINDINGS: Wedge-shaped focus of decreased attenuation inferior right cerebellar hemisphere compatible with old infarct. Cortical-based focus of decreased attenuation inferior right occipital lobe also compatible with an old infarct. Generalized cerebral atrophy with decreased attenuation the periventricular white matter most likely reflecting chronic microvascular disease. No mass, mass effect or midline shift. No hemorrhage or abnormal extra-axial fluid collections. Bony calvarium, skull base and mastoids unremarkable.     Impression: Generalized cerebral atrophy with chronic white matter changes most likely reflecting microvascular disease. 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 Signer Name: SUSANA Burger MD  Signed: 5/24/2020 7:41 PM  Workstation Name: [facility]  [facility]    Ct Chest Without Contrast    Result Date: 6/1/2020  Narrative: EXAMINATION: CT CHEST WO CONTRAST-05/29/2020:  INDICATION: J20.9; J20.9-Acute bronchitis, unspecified.  TECHNIQUE: CT chest without intravenous contrast.  The radiation dose reduction device was turned on for each scan per the ALARA (As Low as Reasonably Achievable) protocol.  COMPARISON: CT angiogram chest dated 05/24/2020.  FINDINGS: The thyroid is asymmetrically enlarged left thyroid lobe may represent goitrous involvement without distinct nodule or calcification on limited evaluation. No bulky mediastinal adenopathy. Central airways are patent. Esophagus in normal course and caliber. Atherosclerotic  nonaneurysmal thoracic aorta. Cardiac size within normal limits and without pericardial effusion demonstrating coronary artery calcifications. Extended lung windows without focal opacification or consolidation. No dominant nodule or mass lesion with calcified right hilar lymph nodes and calcified granuloma from prior healed granulomatous involvement in the right lower lobe. No dominant nodule or mass lesion otherwise. No pleural effusion. Degenerative changes of the spine without aggressive osseous lesion. No soft tissue body wall findings of concern. The visualized portions of the upper abdomen are unremarkable status post cholecystectomy. The spleen is enlarged as seen on recent prior comparison.      Impression: No acute cardiopulmonary process specifically, no focal consolidation or overt edema. Incidental splenomegaly again noted.  D:  06/01/2020 E:  06/01/2020    This report was finalized on 6/1/2020 1:39 PM by Dr. Valentin Gar.      Mri Brain Without Contrast    Result Date: 5/26/2020  Narrative: EXAMINATION: MRI BRAIN WO CONTRAST- 05/26/2020  INDICATION: Confusion/delirium, altered LOC, unexplained; R41.0-Disorientation, unspecified; R53.1-Weakness; R06.02-Shortness of breath; R10.84-Generalized abdominal pain; D69.6-Thrombocytopenia, unspecified; R16.1-Splenomegaly, not elsewhere classified; R16.0-Hepatomegaly, not elsewhere classified; D72.819-Decreased white blood cell count, unspecified  TECHNIQUE: Multiplanar MRI of the brain without intravenous contrast  COMPARISON: CT head dated 05/24/2020  FINDINGS: No restriction on diffusion-weighted sequences. Midline structures are symmetric without evidence of mass, mass effect or midline shift. Ventricles and sulci within normal limits. Mild amount of increased signal findings in the periventricular and deep white matter suggesting chronic small vessel ischemic disease. Susceptibility weighted imaging performed without susceptibility artifact of hemorrhagic  component or irregularity identified. Pituitary and sella within normal limits. Cervicomedullary junction widely patent. Globes and orbits retain normal T2 signal characteristics. Visualized paranasal sinuses and mastoid air cells are grossly clear and well pneumatized with the exception of a small right mastoid effusion. No cerebellopontine angle mass lesion. Grossly normal signal flow voids of the distal internal carotid and vertebrobasilar arteries.      Impression: 1. No acute infarction. 2. Mild amount of increased signal findings in the periventricular and deep white matter suggesting chronic small vessel ischemic disease of minimal involvement. 3. Small right mastoid effusion.   D:  05/26/2020 E:  05/26/2020  This report was finalized on 5/26/2020 5:02 PM by Dr. Valentin Gar.      Xr Chest 1 View    Result Date: 5/24/2020  Narrative: EXAMINATION: XR CHEST 1 VW - 05/24/2020  INDICATION: Weakness, dizziness, altered mental status.  COMPARISON: 05/20/2020  FINDINGS: Heart is enlarged. Vasculature appears normal. Mild chronic appearing interstitial lung changes appear stable. No edema, effusion or pneumothorax is seen.      Impression: Mild heart shadow enlargement and chronic appearing interstitial lung changes. No clearly acute chest disease is identified.  DICTATED:   05/24/2020 EDITED/ls :   05/24/2020    This report was finalized on 5/24/2020 11:33 PM by Dr. Jason Li MD.      Ct Angiogram Chest    Result Date: 5/24/2020  Narrative: PROCEDURE: CTA Chest COMPARISON: No relevant comparison or correlation studies available at time of dictation. INDICATIONS: SOB, weakness, recent travel Confusion/delirium, altered LOC, nausea, SOB.  Pt unable to lay on back.;DB/TM/AE: n-95, sx mask, gloves, goggles, gown TECHNIQUE:   CTA of the chest is performed with IV contrast. Multiplanar MIP and 3-D reconstructions  images are performed on separate workstation under concurrent radiologist supervision per protocol and  reviewed. Radiation dose reduction techniques included automated exposure control or exposure modulation based on body size. Count of known CT and cardiac nuc med studies performed in previous 12 months: 0.  FINDINGS: Study optimized for evaluation of the pulmonary arteries for pulmonary embolus. However examination markedly hindered due to patient's condition and necessity to be scanned in the lateral position. Arms are in the gantry and extensive streak artifact present. No gross central pulmonary emboli seen. No gross peripheral filling defects seen. Lungs are grossly clear, no focal consolidation seen. No gross acute bony abnormality seen.      Impression:  No evidence of PE.  Signer Name: Kiki Johnson MD  Signed: 5/24/2020 6:58 PM  Workstation Name: [id number]  [facility]    Xr Chest Pa & Lateral    Result Date: 5/22/2020  Narrative: EXAMINATION: XR CHEST PA AND LATERAL- 05/20/2020  INDICATION: sob; R06.02-Shortness of breath  COMPARISON: 04/11/2011  FINDINGS: PA and lateral views of the chest reveal heart to be enlarged. Underlying chronic and emphysematous changes seen within the lung fields bilaterally. There are degenerative changes seen within the spine. Vascular calcifications seen within the thoracic aorta. There is elevation seen of the right hemidiaphragm. No pleural effusion or pneumothorax. Pulmonary vascularity is within normal limits. No pleural effusion or pneumothorax. Degenerative changes seen within the spine.         Impression: Changes seen throughout the lung fields bilaterally with no evidence of acute parenchymal disease.  D:  05/21/2020 E:  05/21/2020  This report was finalized on 5/22/2020 11:01 AM by Dr. Clare Wiley MD.          DIAGNOSTIC DATA:   1. Radiology: Above  2. Dr. Beckwith's note from May 25, 2020 reviewed by me and documented in the  chart.   3. Pathology report:   PERIPHERAL BLOOD SMEAR, BONE MARROW ASPIRATE, CLOT SECTION AND BIOPSY WITH FLOW  CYTOMETRY AND IMMUNOHISTOCHEMISTRY:  Pancytopenia without significant atypia.  Hypercellular marrow (66% cellularity) with expanded erythroid elements with mild megaloblastoid change and mildly atypical megakaryocytes with clustering (see Comment).  Numerous lymphoid aggregates appearing benign on immunohistochemical staining.  No flow cytometric evidence for aberrant T-cell population, clonal B-cell population or increased blasts.  Limited stainable iron present with no significant number of ring sideroblasts identified.     4. Laboratory data:    Results for ELISA VENEGAS (MRN [id number]) as of 6/2/2020 14:05   Ref. Range 5/27/2020 03:36   WBC Latest Ref Range: 3.40 - 10.80 10*3/mm3 2.05 (L)   RBC Latest Ref Range: 3.77 - 5.28 10*6/mm3 3.67 (L)   Hemoglobin Latest Ref Range: 12.0 - 15.9 g/dL 10.3 (L)   Hematocrit Latest Ref Range: 34.0 - 46.6 % 32.4 (L)   RDW Latest Ref Range: 12.3 - 15.4 % 14.6   MCV Latest Ref Range: 79.0 - 97.0 fL 88.3   MCH Latest Ref Range: 26.6 - 33.0 pg 28.1   MCHC Latest Ref Range: 31.5 - 35.7 g/dL 31.8   MPV Latest Ref Range: 6.0 - 12.0 fL 9.4   Platelets Latest Ref Range: 140 - 450 10*3/mm3 33 (C)       ASSESSMENT: The patient is a very pleasant 84 y.o.  female  with pancytopenia    PROBLEM LIST:   1.  Pancytopenia:  A.  Incidentally noted blood work done May 24, 2020  B.  CT abdomen pelvis revealed mild hepatosplenomegaly spleen size 15 cm with normal liver enzymes  C.  Bone marrow biopsy done on May 26, 2020 revealed hypercellular marrow 66% cellularity with mild megakaryocytes atypia suspicious for low-grade MDS  2.  Vitamin B12 deficiency:  A.  Vitamin B12 level 155 on May 25, 2020    PLAN:   1. I had a long discussion today with the patient and her daughter about her  new diagnosis of pancytopenia. I did go over the final pathology report in  detail with both of them. I reviewed the patient's documents including refereing provider's notes, lab results, imaging, and path report.    2.  I will start the patient on vitamin B12 replacement using 1000 mcg IM daily for 1 week then weekly for 1 month then monthly for lifelong.  3.  I will check peripheral blood flow cytometry rule out LGL.  4.  I will order liver and spleen ultrasound and check portal system pressures.  5.  The patient will follow-up with me in 3 months with repeat CBC.  Dinh Lyman MD  6/2/2020

## 2020-06-03 ENCOUNTER — TELEPHONE (OUTPATIENT)
Dept: ONCOLOGY | Facility: CLINIC | Age: 85
End: 2020-06-03

## 2020-06-03 ENCOUNTER — READMISSION MANAGEMENT (OUTPATIENT)
Dept: CALL CENTER | Facility: HOSPITAL | Age: 85
End: 2020-06-03

## 2020-06-03 ENCOUNTER — APPOINTMENT (OUTPATIENT)
Dept: ONCOLOGY | Facility: HOSPITAL | Age: 85
End: 2020-06-03

## 2020-06-03 DIAGNOSIS — E53.8 B12 DEFICIENCY: Primary | ICD-10-CM

## 2020-06-03 LAB — HOLD SPECIMEN: NORMAL

## 2020-06-03 NOTE — TELEPHONE ENCOUNTER
Order placed with orders to contact patient's daughter to have the injections done at home on the weekend

## 2020-06-03 NOTE — TELEPHONE ENCOUNTER
DR GARCÍA ORDERED THE PT TO HAVE B-12 INJECTIONS EVERY DAY THIS WEEK THEY ARE BEING DONE AT HER PCP'S OFFICE THEY ARE NOT THERE ON THE WEEKENDS TO GIVE HER INJECTIONS THE PT'S DAUGHTER HAS SPOKEN TO [facility] THEY CAN COME DO HER INJECTION ON Saturday AND Sunday THEY WILL NEED AN ORDER FROM DR GARCÍA   [facility]CT [facility] [phone]      DAUGHTER DORIS CONTACT # [phone]

## 2020-06-03 NOTE — OUTREACH NOTE
Medical Week 2 Survey      Responses   [facility] patient discharged from?  [place]   COVID-19 Test Status  Not tested   Does the patient have one of the following disease processes/diagnoses(primary or secondary)?  Other   Week 2 attempt successful?  Yes   Call start time  1314   Discharge diagnosis  Thrombocytopenia leukopenia    Call end time  1318   Meds reviewed with patient/caregiver?  Yes   Is the patient taking all medications as directed (includes completed medication regime)?  Yes   Medication comments  SN giving B12 shot   Has the patient kept scheduled appointments due by today?  Yes   Comments  PCP 06/01/2020   What is the Home health agency?   [facility]   Has home health visited the patient within 72 hours of discharge?  Yes   Pulse Ox monitoring  None   What is the patient's perception of their health status since discharge?  Improving   Week 2 Call Completed?  Yes          Audrey Childress RN

## 2020-06-04 ENCOUNTER — APPOINTMENT (OUTPATIENT)
Dept: ONCOLOGY | Facility: HOSPITAL | Age: 85
End: 2020-06-04

## 2020-06-05 ENCOUNTER — APPOINTMENT (OUTPATIENT)
Dept: ONCOLOGY | Facility: HOSPITAL | Age: 85
End: 2020-06-05

## 2020-06-05 ENCOUNTER — TRANSCRIBE ORDERS (OUTPATIENT)
Dept: ADMINISTRATIVE | Facility: HOSPITAL | Age: 85
End: 2020-06-05

## 2020-06-05 DIAGNOSIS — Z12.31 VISIT FOR SCREENING MAMMOGRAM: Primary | ICD-10-CM

## 2020-06-05 LAB — REF LAB TEST METHOD: NORMAL

## 2020-06-08 ENCOUNTER — APPOINTMENT (OUTPATIENT)
Dept: ONCOLOGY | Facility: HOSPITAL | Age: 85
End: 2020-06-08

## 2020-06-08 ENCOUNTER — NURSE TRIAGE (OUTPATIENT)
Dept: CALL CENTER | Facility: HOSPITAL | Age: 85
End: 2020-06-08

## 2020-06-08 NOTE — TELEPHONE ENCOUNTER
"She has been taking B-12 shots and is wanting to know when to go take weekly ones now, told to call PCP they can set them with times and dates.     Reason for Disposition  • [1] Caller requesting NON-URGENT health information AND [2] PCP's office is the best resource    Additional Information  • Negative: [1] Caller is not with the adult (patient) AND [2] reporting urgent symptoms  • Negative: Lab result questions  • Negative: Medication questions  • Negative: Caller can't be reached by phone  • Negative: Caller has already spoken to PCP or another triager  • Negative: RN needs further essential information from caller in order to complete triage  • Negative: Requesting regular office appointment  • Negative: Health Information question, no triage required and triager able to answer question  • Negative: General information question, no triage required and triager able to answer question  • Negative: Question about upcoming scheduled test, no triage required and triager able to answer question  • Negative: [1] Caller is not with the adult (patient) AND [2] probable NON-URGENT symptoms    Answer Assessment - Initial Assessment Questions  1. REASON FOR CALL or QUESTION: \"What is your reason for calling today?\" or \"How can I best help you?\" or \"What question do you have that I can help answer?\"      Injection questions    Protocols used: INFORMATION ONLY CALL-ADULT-      "

## 2020-06-09 ENCOUNTER — READMISSION MANAGEMENT (OUTPATIENT)
Dept: CALL CENTER | Facility: HOSPITAL | Age: 85
End: 2020-06-09

## 2020-06-09 LAB
LAB AP ASPIRATE SMEAR: NORMAL
LAB AP CASE REPORT: NORMAL
LAB AP CBC AND DIFFERENTIAL: NORMAL
LAB AP CLINICAL INFORMATION: NORMAL
LAB AP CLOT SECTION: NORMAL
LAB AP CORE BIOPSY: NORMAL
LAB AP CYTOGENETICS REPORT,ADDENDUM: NORMAL
LAB AP DIAGNOSIS COMMENT: NORMAL
LAB AP FLOW CYTOMETRY SUMMARY: NORMAL
PATH REPORT.FINAL DX SPEC: NORMAL

## 2020-06-09 NOTE — OUTREACH NOTE
Medical Week 3 Survey      Responses   [facility] patient discharged from?  [place]   COVID-19 Test Status  Not tested   Does the patient have one of the following disease processes/diagnoses(primary or secondary)?  Other   Week 3 attempt successful?  Yes   Call start time  1304   Call end time  1308   Discharge diagnosis  Thrombocytopenia leukopenia    Meds reviewed with patient/caregiver?  Yes   Is the patient taking all medications as directed (includes completed medication regime)?  Yes   Has the patient kept scheduled appointments due by today?  Yes   What is the Home health agency?   [facility]   Has home health visited the patient within 72 hours of discharge?  Yes   Home health comments  Nurse gives B12 injection   Pulse Ox monitoring  Intermittent   Pulse Ox device source  Other   O2 Sat comments  Sat stays in the high 90's   O2 Sat: education provided  Sat levels, When to seek care, Monitoring frequency   Psychosocial issues?  No   What is the patient's perception of their health status since discharge?  Improving   Week 3 Call Completed?  Yes   Wrap up additional comments  HH is giving her B12 injections, got one today.  Seeing cardiology today.  Slowly improving.            Hilda Borrero RN

## 2020-06-10 ENCOUNTER — TELEPHONE (OUTPATIENT)
Dept: ONCOLOGY | Facility: CLINIC | Age: 85
End: 2020-06-10

## 2020-06-11 ENCOUNTER — OUTSIDE FACILITY SERVICE (OUTPATIENT)
Dept: HOSPITALIST | Facility: HOSPITAL | Age: 85
End: 2020-06-11

## 2020-06-11 PROCEDURE — G0180 MD CERTIFICATION HHA PATIENT: HCPCS | Performed by: INTERNAL MEDICINE

## 2020-06-18 ENCOUNTER — READMISSION MANAGEMENT (OUTPATIENT)
Dept: CALL CENTER | Facility: HOSPITAL | Age: 85
End: 2020-06-18

## 2020-06-18 NOTE — OUTREACH NOTE
Medical Week 4 Survey      Responses   [facility] patient discharged from?  [place]   COVID-19 Test Status  Not tested   Does the patient have one of the following disease processes/diagnoses(primary or secondary)?  Other   Week 4 attempt successful?  Yes   Call start time  1417   Call end time  1420   Meds reviewed with patient/caregiver?  Yes   Is the patient having any side effects they believe may be caused by any medication additions or changes?  No   Is the patient taking all medications as directed (includes completed medication regime)?  Yes   Has the patient kept scheduled appointments due by today?  Yes   Is the patient still receiving Home Health Services?  No   Pulse Ox monitoring  None   Psychosocial issues?  No   What is the patient's perception of their health status since discharge?  Improving   Week 4 Call Completed?  Yes   Would the patient like one additional call?  No   Graduated  Yes   Did the patient feel the follow up calls were helpful during their recovery period?  Yes   Was the number of calls appropriate?  Yes   Does the patient have an Advance Directive or Living Will?  Yes   Is the patient/caregiver familiar with Advance Care Planning?  Yes   Would the patient like more information on Advance Care Planning?  No          Pau Miller RN

## 2020-08-17 ENCOUNTER — HOSPITAL ENCOUNTER (OUTPATIENT)
Dept: MAMMOGRAPHY | Facility: HOSPITAL | Age: 85
Discharge: HOME | End: 2020-08-17
Admitting: FAMILY MEDICINE

## 2020-08-17 DIAGNOSIS — Z12.31 VISIT FOR SCREENING MAMMOGRAM: ICD-10-CM

## 2020-08-17 PROCEDURE — 77063 BREAST TOMOSYNTHESIS BI: CPT | Performed by: RADIOLOGY

## 2020-08-17 PROCEDURE — 77067 SCR MAMMO BI INCL CAD: CPT

## 2020-08-17 PROCEDURE — 77067 SCR MAMMO BI INCL CAD: CPT | Performed by: RADIOLOGY

## 2020-08-17 PROCEDURE — 77063 BREAST TOMOSYNTHESIS BI: CPT

## 2020-08-21 ENCOUNTER — TELEPHONE (OUTPATIENT)
Dept: ONCOLOGY | Facility: CLINIC | Age: 85
End: 2020-08-21

## 2020-08-25 ENCOUNTER — HOSPITAL ENCOUNTER (OUTPATIENT)
Dept: ULTRASOUND IMAGING | Facility: HOSPITAL | Age: 85
Discharge: HOME | End: 2020-08-25

## 2020-08-25 ENCOUNTER — HOSPITAL ENCOUNTER (OUTPATIENT)
Dept: ULTRASOUND IMAGING | Facility: HOSPITAL | Age: 85
Discharge: HOME | End: 2020-08-25
Admitting: INTERNAL MEDICINE

## 2020-08-25 DIAGNOSIS — D61.818 PANCYTOPENIA (HCC): ICD-10-CM

## 2020-08-25 DIAGNOSIS — E53.8 B12 DEFICIENCY: ICD-10-CM

## 2020-08-25 PROCEDURE — 76705 ECHO EXAM OF ABDOMEN: CPT

## 2020-08-31 ENCOUNTER — OFFICE VISIT (OUTPATIENT)
Dept: ONCOLOGY | Facility: CLINIC | Age: 85
End: 2020-08-31

## 2020-08-31 ENCOUNTER — LAB (OUTPATIENT)
Dept: LAB | Facility: HOSPITAL | Age: 85
End: 2020-08-31

## 2020-08-31 VITALS
WEIGHT: 189 LBS | HEIGHT: 60 IN | TEMPERATURE: 97.7 F | BODY MASS INDEX: 37.11 KG/M2 | OXYGEN SATURATION: 94 % | DIASTOLIC BLOOD PRESSURE: 67 MMHG | SYSTOLIC BLOOD PRESSURE: 169 MMHG | RESPIRATION RATE: 18 BRPM | HEART RATE: 68 BPM

## 2020-08-31 DIAGNOSIS — D64.9 ANEMIA, UNSPECIFIED TYPE: ICD-10-CM

## 2020-08-31 DIAGNOSIS — D61.818 PANCYTOPENIA (HCC): ICD-10-CM

## 2020-08-31 DIAGNOSIS — D61.818 PANCYTOPENIA (HCC): Primary | ICD-10-CM

## 2020-08-31 LAB
ERYTHROCYTE [DISTWIDTH] IN BLOOD BY AUTOMATED COUNT: 14.6 % (ref 12.3–15.4)
FERRITIN SERPL-MCNC: 13.81 NG/ML (ref 13–150)
HCT VFR BLD AUTO: 33.9 % (ref 34–46.6)
HGB BLD-MCNC: 10.9 G/DL (ref 12–15.9)
IRON 24H UR-MRATE: 46 MCG/DL (ref 37–145)
IRON SATN MFR SERPL: 11 % (ref 20–50)
LYMPHOCYTES # BLD AUTO: 0.6 10*3/MM3 (ref 0.7–3.1)
LYMPHOCYTES NFR BLD AUTO: 21.3 % (ref 19.6–45.3)
MCH RBC QN AUTO: 27.1 PG (ref 26.6–33)
MCHC RBC AUTO-ENTMCNC: 32.3 G/DL (ref 31.5–35.7)
MCV RBC AUTO: 83.8 FL (ref 79–97)
MONOCYTES # BLD AUTO: 0.2 10*3/MM3 (ref 0.1–0.9)
MONOCYTES NFR BLD AUTO: 8.9 % (ref 5–12)
NEUTROPHILS NFR BLD AUTO: 1.8 10*3/MM3 (ref 1.7–7)
NEUTROPHILS NFR BLD AUTO: 69.8 % (ref 42.7–76)
PLATELET # BLD AUTO: 48 10*3/MM3 (ref 140–450)
PMV BLD AUTO: 6.1 FL (ref 6–12)
RBC # BLD AUTO: 4.04 10*6/MM3 (ref 3.77–5.28)
TIBC SERPL-MCNC: 431 MCG/DL (ref 298–536)
TRANSFERRIN SERPL-MCNC: 289 MG/DL (ref 200–360)
WBC # BLD AUTO: 2.6 10*3/MM3 (ref 3.4–10.8)

## 2020-08-31 PROCEDURE — 83540 ASSAY OF IRON: CPT

## 2020-08-31 PROCEDURE — 82728 ASSAY OF FERRITIN: CPT

## 2020-08-31 PROCEDURE — 82607 VITAMIN B-12: CPT

## 2020-08-31 PROCEDURE — 84466 ASSAY OF TRANSFERRIN: CPT

## 2020-08-31 PROCEDURE — 36415 COLL VENOUS BLD VENIPUNCTURE: CPT

## 2020-08-31 PROCEDURE — 99214 OFFICE O/P EST MOD 30 MIN: CPT | Performed by: NURSE PRACTITIONER

## 2020-08-31 PROCEDURE — 85025 COMPLETE CBC W/AUTO DIFF WBC: CPT

## 2020-08-31 RX ORDER — CIPROFLOXACIN 250 MG/1
250 TABLET, FILM COATED ORAL 2 TIMES DAILY
COMMUNITY
Start: 2020-08-07 | End: 2021-02-10

## 2020-08-31 RX ORDER — SYRINGE W-NEEDLE,DISPOSAB,3 ML 25GX5/8"
1 SYRINGE, EMPTY DISPOSABLE MISCELLANEOUS TAKE AS DIRECTED
Qty: 10 EACH | Refills: 5 | Status: SHIPPED | OUTPATIENT
Start: 2020-08-31 | End: 2020-12-23

## 2020-08-31 RX ORDER — CYANOCOBALAMIN 1000 UG/ML
1000 INJECTION, SOLUTION INTRAMUSCULAR; SUBCUTANEOUS TAKE AS DIRECTED
Qty: 10 ML | Refills: 2 | Status: SHIPPED | OUTPATIENT
Start: 2020-08-31 | End: 2020-12-23

## 2020-08-31 NOTE — PROGRESS NOTES
DATE OF VISIT: 8/31/2020    REASON FOR VISIT: Followup for pancytopenia.     HISTORY OF PRESENT ILLNESS: The patient is a very pleasant 85 y.o. female  with past medical history significant for pancytopenia diagnosed May 2020. Her blood work revealed pancytopenia.  CT chest abdomen pelvis revealed mild splenomegaly spleen size 15 cm with mild hepatomegaly as well as pancytopenia.  Bone marrow biopsy done on May 26, 2020 revealed hypercellular marrow with mild dysplastic features could not rule out low-grade MDS.  Blood work confirmed vitamin B12 deficiency with serum B12 level of 150.  She was started on vitamin B12 replacement monthly. The patient is here today for scheduled follow up visit.     SUBJECTIVE: The patient is here today with her son. She has been doing fairly well. She has continued to improve since being out of the hospital as well as since starting vitamin B12 injections. Her last dose was done at the end of July. She was not sure if she needed to continue this. She is eating and drinking well. She denies fevers, chills, or night sweats. She denies any bleeding issues. She has had no unexplained weight loss.     PAST MEDICAL HISTORY/SOCIAL HISTORY/FAMILY HISTORY: Reviewed by me and unchanged from my documentation done on 08/31/20.    Review of Systems   Constitutional: Positive for fatigue. Negative for activity change, appetite change, chills, fever and unexpected weight change.   HENT: Negative for hearing loss, mouth sores, nosebleeds, sore throat and trouble swallowing.    Eyes: Negative for visual disturbance.   Respiratory: Negative for cough, chest tightness, shortness of breath and wheezing.    Cardiovascular: Negative for chest pain, palpitations and leg swelling.   Gastrointestinal: Negative for abdominal distention, abdominal pain, blood in stool, constipation, diarrhea, nausea, rectal pain and vomiting.   Endocrine: Negative for cold intolerance and heat intolerance.   Genitourinary:  "Negative for difficulty urinating, dysuria, frequency and urgency.   Musculoskeletal: Positive for arthralgias. Negative for back pain, gait problem, joint swelling and myalgias.        Brursitis right hip   Skin: Negative for rash.   Neurological: Negative for dizziness, tremors, syncope, weakness, light-headedness, numbness and headaches.   Hematological: Negative for adenopathy. Does not bruise/bleed easily.   Psychiatric/Behavioral: Negative for confusion, sleep disturbance and suicidal ideas. The patient is not nervous/anxious.          Current Outpatient Medications:   •  albuterol sulfate  (90 Base) MCG/ACT inhaler, INHALE 2 PUFFS PO QID PRN FOR SHORTNESS OF AIR, Disp: , Rfl:   •  B-D INS SYR ULTRAFINE .5CC/30G 30G X 1/2\" 0.5 ML misc, , Disp: , Rfl:   •  Blood Glucose Monitoring Suppl (TRUE METRIX AIR GLUCOSE METER) device, one, Disp: , Rfl:   •  cephalexin (KEFLEX) 500 MG capsule, TK 1 C PO BID, Disp: , Rfl:   •  ciprofloxacin (CIPRO) 250 MG tablet, Take 250 mg by mouth 2 (Two) Times a Day., Disp: , Rfl:   •  cyanocobalamin 1000 MCG/ML injection, Inject 1 mL into the appropriate muscle as directed by prescriber Take As Directed. Daily starting on 6/3 x6 days, then weekly for 4 weeks, then once per month, Disp: 10 mL, Rfl: 2  •  furosemide (LASIX) 40 MG tablet, Take  by mouth Daily., Disp: , Rfl:   •  glucose blood (True Metrix Blood Glucose Test) test strip, one two times daily, Disp: , Rfl:   •  insulin NPH-insulin regular (HUMULIN 70/30) (70-30) 100 UNIT/ML injection, Inject  under the skin., Disp: , Rfl:   •  levothyroxine (SYNTHROID, LEVOTHROID) 100 MCG tablet, , Disp: , Rfl:   •  lisinopril (PRINIVIL,ZESTRIL) 20 MG tablet, Take  by mouth., Disp: , Rfl:   •  meclizine (ANTIVERT) 12.5 MG tablet, Take  by mouth., Disp: , Rfl:   •  metFORMIN (GLUCOPHAGE) 500 MG tablet, Take  by mouth Daily., Disp: , Rfl:   •  propranolol (INDERAL) 40 MG tablet, Take 1 tablet by mouth 3 (Three) Times a Day., Disp: 270 " "tablet, Rfl: 3  •  Syringe 25G X 1\" 3 ML misc, 1 syringe Take As Directed. AS DIRECTED FOR USE WITH CYANOCOBALAMIN, Disp: 10 each, Rfl: 5  •  warfarin (Coumadin) 6 MG tablet, Take  by mouth Daily., Disp: , Rfl:     PHYSICAL EXAMINATION:   /67   Pulse 68   Temp 97.7 °F (36.5 °C) (Temporal)   Resp 18   Ht 152.4 cm (60\")   Wt 85.7 kg (189 lb)   LMP  (LMP Unknown)   SpO2 94%   BMI 36.91 kg/m²    Pain Score    08/31/20 1434   PainSc: 0-No pain       ECOG Performance Status: 1 - Symptomatic but completely ambulatory  General Appearance:  alert, cooperative, no apparent distress and appears stated age   Neurologic/Psychiatric: A&O x 3, gait steady, appropriate affect, strength 5/5 in all muscle groups   HEENT:  Normocephalic, without obvious abnormality, mucous membranes moist   Neck: Supple, symmetrical, trachea midline, no adenopathy;  No thyromegaly, masses, or tenderness   Lungs:   Clear to auscultation bilaterally; respirations regular, even, and unlabored bilaterally   Heart:  Regular rate and rhythm, no murmurs appreciated   Abdomen:   Soft, non-tender, non-distended and no organomegaly   Lymph nodes: No cervical, supraclavicular, inguinal or axillary adenopathy noted   Extremities: Normal, atraumatic; no clubbing, cyanosis, or edema    Skin: No rashes, ulcers, or suspicious lesions noted     No visits with results within 2 Week(s) from this visit.   Latest known visit with results is:   Lab on 06/02/2020   Component Date Value Ref Range Status   • Sed Rate 06/02/2020 4  0 - 30 mm/hr Final   • Reference Lab Report 06/02/2020    Final    See scanned report     • Hepatitis B Surface Ag 06/02/2020 Non-Reactive  Non-Reactive Final   • Hep A IgM 06/02/2020 Non-Reactive  Non-Reactive Final   • Hep B C IgM 06/02/2020 Non-Reactive  Non-Reactive Final   • Hepatitis C Ab 06/02/2020 Non-Reactive  Non-Reactive Final   • Extra Tube 06/02/2020 Hold for add-ons.   Final    Auto resulted.        Us Spleen    Result " Date: 8/25/2020  Narrative: EXAMINATION: US SPLEEN- 08/25/2020  INDICATION: splenomegaly; D61.818-Other pancytopenia; E53.8-Deficiency of other specified B group vitamins  TECHNIQUE: Ultrasound left upper quadrant and spleen  COMPARISON: NONE  FINDINGS: Spleen is enlarged up to 20.3 cm in craniocaudal dimension without focal liver lesion other than echogenic foci with shadowing of prior healed granulomatous involvement. No perisplenic ascites.      Impression: 1. Splenomegaly measuring up to 20.3 cm. 2. No ascites.    D:  08/25/2020 E:  08/25/2020  This report was finalized on 8/25/2020 5:17 PM by Dr. Valentin Gar.      Us Liver    Result Date: 8/25/2020  Narrative: EXAMINATION: US LIVER-  INDICATION: Hepatomegaly; D61.818-Other pancytopenia; E53.8-Deficiency of other specified B group vitamins.  TECHNIQUE: Ultrasound right upper quadrant abdomen and liver.  COMPARISON: None.  FINDINGS: Visualized portions of the pancreas within normal limits.  Liver demonstrates no focal liver lesion with grossly homogeneous appearance of liver parenchyma signal.  Gallbladder is surgically absent without abnormality of the gallbladder fossa status post cholecystectomy.  Common bile duct measures 4 mm in diameter at the level of the jaime hepatis within normal limits.  Right kidney measures 9.7 cm in length without evidence of hydronephrosis, contour deforming mass or obvious calculi.      Impression: Liver is homogeneous and borderline enlarged without perihepatic ascites or focal liver lesion.   D:  08/25/2020 E:  08/25/2020  This report was finalized on 8/25/2020 5:17 PM by Dr. Valentin Gar.      Mammo Screening Digital Tomosynthesis Bilateral With Cad    Result Date: 8/19/2020  Narrative: ROUTINE DIGITAL SCREENING MAMMOGRAM WITH TOMOSYNTHESIS  HISTORY: Routine screening.  IMAGE COMPARISON:  4/12/2019, 4/10/2018, 12/22/2016.  TECHNIQUE:  Low dose full field digital breast tomosynthesis imaging was performed with 2D and 3D  acquisitions consisting of bilateral CC and MLO views.  FINDINGS: There are scattered areas of fibroglandular density. The fibroglandular pattern appears stable.  There is no mass, worrisome microcalcifications, or architectural distortion to suggest development of malignancy.      Impression: No findings suspicious for malignancy.  ACR BI-RADS CATEGORY:  1, NEGATIVE  RECOMMENDATION: Yearly mammogram, yearly clinical breast exam, and encourage self breast awareness.  CAD was used.  The standard false negative rate of mammography is between 10% and 25%. Complex patterns or increased breast density will markedly elevate the false negative rate of mammography.  A letter, in lay terminology, with the results of this exam will be mailed to the patient.  At our facility, a triangular marker is positioned over a palpable area of concern indicated by the patient. A [place] marker is placed over a visible skin lesion. A linear marker indicates a scar.   If there is a palpable area of concern, biopsy should be considered regardless of imaging findings.    This report was finalized on 8/19/2020 9:33 AM by Dr. Mariaelena Victoria MD.        ASSESSMENT: The patient is a very pleasant 85 y.o. female  with pancytopenia.     PROBLEM LIST:  1.  Pancytopenia:  A.  Incidentally noted blood work done May 24, 2020  B.  CT abdomen pelvis revealed mild hepatosplenomegaly spleen size 15 cm with normal liver enzymes  C.  Bone marrow biopsy done on May 26, 2020 revealed hypercellular marrow 66% cellularity with mild megakaryocytes atypia suspicious for low-grade MDS  D. Peripheral flow cytometry with nonspecific B-cell findings, no increase in blasts, and neutrophilic cells with decreased cellular integrity.   2.  Vitamin B12 deficiency:  A.  Vitamin B12 level 155 on May 25, 2020  3. Hypothyroidism  4. Diabetes  5. History of pulmonary embolism    PLAN:  1. I reviewed the lab results with the patient from 8/21/2020. I told the patient that her  blood counts are essentially stable with platelet count of 24,000 and WBC 2.2. Her hemoglobin has improved some to 11.2.   2. I reviewed the ultrasound results with the patient. She has splenomegaly without evidence of focal splenic or liver lesion and no ascites noted.   3. We will repeat vitamin B12 and iron levels today. I will notify her of results.   4. She will continue monthly vitamin B12 replacement 1000 mcg. This is being given through her primary care office, however we did refill her prescription today.   5. The patient had peripheral flow cytometry that was negative for LGL.   6. We will see the patient back in 3 months with repeat labs including CBC and vitamin  B12.   7. I asked her to call if she has any new symptoms such as evidence of bleeding or bruising, fevers, chills, or night sweats.   8. She will continue levothyroxine 100 mcg daily for hypothyroidism.       Jeanna Grande, APRN  8/31/2020

## 2020-09-01 LAB — VIT B12 BLD-MCNC: 629 PG/ML (ref 211–946)

## 2020-09-02 ENCOUNTER — TELEPHONE (OUTPATIENT)
Dept: ONCOLOGY | Facility: CLINIC | Age: 85
End: 2020-09-02

## 2020-09-02 DIAGNOSIS — K90.9 ABNORMAL INTESTINAL ABSORPTION: ICD-10-CM

## 2020-09-02 DIAGNOSIS — D50.0 IRON DEFICIENCY ANEMIA DUE TO CHRONIC BLOOD LOSS: ICD-10-CM

## 2020-09-02 RX ORDER — SODIUM CHLORIDE 9 MG/ML
250 INJECTION, SOLUTION INTRAVENOUS ONCE
Status: CANCELLED | OUTPATIENT
Start: 2020-09-09

## 2020-09-02 RX ORDER — SODIUM CHLORIDE 9 MG/ML
250 INJECTION, SOLUTION INTRAVENOUS ONCE
Status: CANCELLED | OUTPATIENT
Start: 2020-09-16

## 2020-09-02 NOTE — TELEPHONE ENCOUNTER
Called and informed patient of lab results. Her platelet count was some better to 48,000, hemoglobin of 10.9, WBC of 2.6 with normal ANC. Her iron levels showed deficiency. We will arrange for IV iron replacement as the patient has previously been intolerant to oral iron with worsening constipation and GI upset. We will get her scheduled for her first dose next week with plan to repeat dosing for day 8. We will also give her her B12 shot at the time of her infusion. Pt is agreeable to this plan. Message sent to Brennon regarding auth and scheduling.

## 2020-09-09 ENCOUNTER — HOSPITAL ENCOUNTER (OUTPATIENT)
Dept: ONCOLOGY | Facility: HOSPITAL | Age: 85
Setting detail: INFUSION SERIES
Discharge: HOME | End: 2020-09-09

## 2020-09-09 VITALS
WEIGHT: 188 LBS | RESPIRATION RATE: 18 BRPM | HEIGHT: 60 IN | HEART RATE: 68 BPM | SYSTOLIC BLOOD PRESSURE: 122 MMHG | TEMPERATURE: 97.2 F | DIASTOLIC BLOOD PRESSURE: 75 MMHG | BODY MASS INDEX: 36.91 KG/M2

## 2020-09-09 DIAGNOSIS — D50.0 IRON DEFICIENCY ANEMIA DUE TO CHRONIC BLOOD LOSS: ICD-10-CM

## 2020-09-09 DIAGNOSIS — K90.9 ABNORMAL INTESTINAL ABSORPTION: Primary | ICD-10-CM

## 2020-09-09 PROCEDURE — 25010000002 FERRIC CARBOXYMALTOSE 750 MG/15ML SOLUTION 15 ML VIAL: Performed by: NURSE PRACTITIONER

## 2020-09-09 PROCEDURE — 96375 TX/PRO/DX INJ NEW DRUG ADDON: CPT

## 2020-09-09 PROCEDURE — 96374 THER/PROPH/DIAG INJ IV PUSH: CPT

## 2020-09-09 RX ADMIN — FERRIC CARBOXYMALTOSE INJECTION 750 MG: 50 INJECTION, SOLUTION INTRAVENOUS at 13:56

## 2020-09-16 ENCOUNTER — HOSPITAL ENCOUNTER (OUTPATIENT)
Dept: ONCOLOGY | Facility: HOSPITAL | Age: 85
Setting detail: INFUSION SERIES
Discharge: HOME | End: 2020-09-16

## 2020-09-16 VITALS
HEIGHT: 60 IN | BODY MASS INDEX: 37.11 KG/M2 | SYSTOLIC BLOOD PRESSURE: 163 MMHG | DIASTOLIC BLOOD PRESSURE: 50 MMHG | RESPIRATION RATE: 18 BRPM | TEMPERATURE: 97.9 F | HEART RATE: 57 BPM | WEIGHT: 189 LBS

## 2020-09-16 DIAGNOSIS — D50.0 IRON DEFICIENCY ANEMIA DUE TO CHRONIC BLOOD LOSS: ICD-10-CM

## 2020-09-16 DIAGNOSIS — K90.9 ABNORMAL INTESTINAL ABSORPTION: Primary | ICD-10-CM

## 2020-09-16 PROCEDURE — 25010000002 FERRIC CARBOXYMALTOSE 750 MG/15ML SOLUTION 15 ML VIAL: Performed by: NURSE PRACTITIONER

## 2020-09-16 PROCEDURE — 96374 THER/PROPH/DIAG INJ IV PUSH: CPT

## 2020-09-16 RX ORDER — SODIUM CHLORIDE 9 MG/ML
250 INJECTION, SOLUTION INTRAVENOUS ONCE
Status: COMPLETED | OUTPATIENT
Start: 2020-09-16 | End: 2020-09-16

## 2020-09-16 RX ADMIN — FERRIC CARBOXYMALTOSE INJECTION 750 MG: 50 INJECTION, SOLUTION INTRAVENOUS at 14:33

## 2020-09-16 RX ADMIN — SODIUM CHLORIDE 250 ML: 9 INJECTION, SOLUTION INTRAVENOUS at 14:33

## 2020-11-10 ENCOUNTER — OFFICE VISIT (OUTPATIENT)
Dept: NEUROLOGY | Facility: CLINIC | Age: 85
End: 2020-11-10

## 2020-11-10 DIAGNOSIS — G25.0 BENIGN FAMILIAL TREMOR: ICD-10-CM

## 2020-11-10 PROCEDURE — 99441 PR PHYS/QHP TELEPHONE EVALUATION 5-10 MIN: CPT | Performed by: PSYCHIATRY & NEUROLOGY

## 2020-11-23 NOTE — PROGRESS NOTES
Subjective:     Patient ID: Chey Robertson is a 85 y.o. female.    CC:   You have chosen to receive care through a telephone visit. Do you consent to use a telephone visit for your medical care today? Yes    HPI:   History of Present Illness  The following portions of the patient's history were reviewed and updated as appropriate: allergies, current medications, past family history, past medical history, past social history, past surgical history and problem list.     Patient reports that her tremor is stable, continues on propranolol, denies new concerns.    Past Medical History:   Diagnosis Date   • Diabetes mellitus (CMS/[facility])    • Disease of thyroid gland    • Hypertension    • Migraine without aura and without status migrainosus, not intractable 12/30/2016   • Pulmonary emboli (CMS/[facility]) 2011    (after knee surgery)   • Tremors of nervous system        Past Surgical History:   Procedure Laterality Date   • AUGMENTATION MAMMAPLASTY     • BLADDER SURGERY     • CATARACT EXTRACTION     • HYSTERECTOMY     • OOPHORECTOMY     • REDUCTION MAMMAPLASTY     • TONSILLECTOMY         Social History     Socioeconomic History   • Marital status:      Spouse name: Not on file   • Number of children: Not on file   • Years of education: Not on file   • Highest education level: Not on file   Tobacco Use   • Smoking status: Never Smoker   • Smokeless tobacco: Never Used   Substance and Sexual Activity   • Alcohol use: No   • Drug use: No   • Sexual activity: Defer       Family History   Problem Relation Age of Onset   • Breast cancer Sister 84   • Stroke Mother    • Heart attack Father    • Ovarian cancer Neg Hx         Review of Systems   Neurological: Positive for tremors.   All other systems reviewed and are negative.       Objective:  LMP  (LMP Unknown)     Neurologic Exam    Physical Exam  Neurological:      Comments: Speech clear         Assessment/Plan:       Diagnoses and all orders for this visit:    Benign familial  tremor   - continue propranolol.       Time: 5 min.    Abel Tejeda MD  11/23/2020

## 2020-12-04 ENCOUNTER — TRANSCRIBE ORDERS (OUTPATIENT)
Dept: ADMINISTRATIVE | Facility: HOSPITAL | Age: 85
End: 2020-12-04

## 2020-12-04 ENCOUNTER — HOSPITAL ENCOUNTER (OUTPATIENT)
Dept: GENERAL RADIOLOGY | Facility: HOSPITAL | Age: 85
Discharge: HOME | End: 2020-12-04
Admitting: FAMILY MEDICINE

## 2020-12-04 DIAGNOSIS — M54.6 THORACIC BACK PAIN, UNSPECIFIED BACK PAIN LATERALITY, UNSPECIFIED CHRONICITY: Primary | ICD-10-CM

## 2020-12-04 DIAGNOSIS — M54.6 THORACIC BACK PAIN, UNSPECIFIED BACK PAIN LATERALITY, UNSPECIFIED CHRONICITY: ICD-10-CM

## 2020-12-04 PROCEDURE — 72072 X-RAY EXAM THORAC SPINE 3VWS: CPT

## 2020-12-23 ENCOUNTER — TELEPHONE (OUTPATIENT)
Dept: ONCOLOGY | Facility: CLINIC | Age: 85
End: 2020-12-23

## 2020-12-23 ENCOUNTER — APPOINTMENT (OUTPATIENT)
Dept: PREADMISSION TESTING | Facility: HOSPITAL | Age: 85
End: 2020-12-23

## 2020-12-23 VITALS — WEIGHT: 179.23 LBS | BODY MASS INDEX: 31.76 KG/M2 | HEIGHT: 63 IN

## 2020-12-23 LAB
DEPRECATED RDW RBC AUTO: 45.7 FL (ref 37–54)
ERYTHROCYTE [DISTWIDTH] IN BLOOD BY AUTOMATED COUNT: 14 % (ref 12.3–15.4)
HCT VFR BLD AUTO: 34.9 % (ref 34–46.6)
HGB BLD-MCNC: 11.4 G/DL (ref 12–15.9)
MCH RBC QN AUTO: 29.5 PG (ref 26.6–33)
MCHC RBC AUTO-ENTMCNC: 32.7 G/DL (ref 31.5–35.7)
MCV RBC AUTO: 90.2 FL (ref 79–97)
PLATELET # BLD AUTO: 26 10*3/MM3 (ref 140–450)
PMV BLD AUTO: 9.8 FL (ref 6–12)
RBC # BLD AUTO: 3.87 10*6/MM3 (ref 3.77–5.28)
WBC # BLD AUTO: 1.89 10*3/MM3 (ref 3.4–10.8)

## 2020-12-23 PROCEDURE — 93005 ELECTROCARDIOGRAM TRACING: CPT

## 2020-12-23 PROCEDURE — 36415 COLL VENOUS BLD VENIPUNCTURE: CPT

## 2020-12-23 PROCEDURE — 93010 ELECTROCARDIOGRAM REPORT: CPT | Performed by: INTERNAL MEDICINE

## 2020-12-23 PROCEDURE — 85027 COMPLETE CBC AUTOMATED: CPT

## 2020-12-23 NOTE — TELEPHONE ENCOUNTER
Called and spoke with Mary Kay Gleason and advised that Dr. Lyman suggested that she should get 2 units of platelets prior to surgery.  While speaking with Mary Kay she advised that Dr. baez requested patient have a f/u with us first since she missed her appt earlier this month with us.  Will send message to front office to get patient scheduled.

## 2020-12-23 NOTE — TELEPHONE ENCOUNTER
Mary Kay Clark APRN called to let us know that Dr. Hearn wants to do kyphoplasty on the pt (back pain) but her platelets are very low, sitting in the 20s. She wants to know what we need to do to be able to get the pt this procedure. Dr. Hearn can be reached at . Mary Kay Clark can be reached at , but pt typically sees Dr. Hadley at [facility].

## 2020-12-26 ENCOUNTER — APPOINTMENT (OUTPATIENT)
Dept: PREADMISSION TESTING | Facility: HOSPITAL | Age: 85
End: 2020-12-26

## 2021-01-04 ENCOUNTER — OFFICE VISIT (OUTPATIENT)
Dept: ONCOLOGY | Facility: CLINIC | Age: 86
End: 2021-01-04

## 2021-01-04 DIAGNOSIS — D61.818 PANCYTOPENIA (HCC): ICD-10-CM

## 2021-01-04 DIAGNOSIS — D61.818 PANCYTOPENIA (HCC): Primary | ICD-10-CM

## 2021-01-04 LAB
QT INTERVAL: 388 MS
QTC INTERVAL: 439 MS

## 2021-01-04 PROCEDURE — 99214 OFFICE O/P EST MOD 30 MIN: CPT | Performed by: INTERNAL MEDICINE

## 2021-01-04 RX ORDER — PREDNISONE 10 MG/1
TABLET ORAL
Qty: 70 TABLET | Refills: 0 | Status: SHIPPED | OUTPATIENT
Start: 2021-01-04 | End: 2021-02-05 | Stop reason: DRUGHIGH

## 2021-01-04 RX ORDER — OMEPRAZOLE 40 MG/1
40 CAPSULE, DELAYED RELEASE ORAL DAILY
Qty: 90 CAPSULE | OUTPATIENT
Start: 2021-01-04

## 2021-01-04 RX ORDER — OMEPRAZOLE 40 MG/1
40 CAPSULE, DELAYED RELEASE ORAL DAILY
Qty: 30 CAPSULE | Refills: 0 | Status: SHIPPED | OUTPATIENT
Start: 2021-01-04

## 2021-01-14 ENCOUNTER — TELEPHONE (OUTPATIENT)
Dept: ONCOLOGY | Facility: CLINIC | Age: 86
End: 2021-01-14

## 2021-01-14 NOTE — TELEPHONE ENCOUNTER
Pt called regarding some labs she recently had done w/ Dr. Loomis. She wants to know if it looks like her platelets are improving at all and get Dr. Lyman's opinion on her labs. A call back from the nurse or Dr. Lyman is requested.    Please reach out to pt at [phone]

## 2021-01-15 NOTE — TELEPHONE ENCOUNTER
Called and discussed with patient that I spoke with mauricio Faustin who advised that platelets are better right now after being on the steroids and are 97 as of last weeks labs from pcp.  Advised per Jeanna to keep getting labs checked and that Dr. Lyman would review her labs on the 25th and decide then about surgery/release, etc.

## 2021-01-22 ENCOUNTER — TRANSCRIBE ORDERS (OUTPATIENT)
Dept: ADMINISTRATIVE | Facility: HOSPITAL | Age: 86
End: 2021-01-22

## 2021-01-22 ENCOUNTER — HOSPITAL ENCOUNTER (OUTPATIENT)
Dept: GENERAL RADIOLOGY | Facility: HOSPITAL | Age: 86
Discharge: HOME | End: 2021-01-22
Admitting: FAMILY MEDICINE

## 2021-01-22 DIAGNOSIS — M54.40 BACK PAIN OF LUMBAR REGION WITH SCIATICA: Primary | ICD-10-CM

## 2021-01-22 DIAGNOSIS — M54.6 ACUTE BILATERAL THORACIC BACK PAIN: ICD-10-CM

## 2021-01-22 DIAGNOSIS — M54.40 BACK PAIN OF LUMBAR REGION WITH SCIATICA: ICD-10-CM

## 2021-01-22 PROCEDURE — 72072 X-RAY EXAM THORAC SPINE 3VWS: CPT

## 2021-01-22 PROCEDURE — 72114 X-RAY EXAM L-S SPINE BENDING: CPT

## 2021-01-23 NOTE — PROGRESS NOTES
This visit has been rescheduled as a phone visit to comply with patient safety concerns in accordance with CDC recommendations. Total time of discussion was 15 minutes.    You have chosen to receive care through a telephone visit. Do you consent to use a telephone visit for your medical care today? Yes    DATE OF VISIT: 1/25/2021    REASON FOR VISIT: Followup for pancytopenia.     HISTORY OF PRESENT ILLNESS: The patient is a very pleasant 85 y.o. female  with past medical history significant for pancytopenia diagnosed May 2020. Her blood work revealed pancytopenia.  CT chest abdomen pelvis revealed mild splenomegaly spleen size 15 cm with mild hepatomegaly as well as pancytopenia.  Bone marrow biopsy done on May 26, 2020 revealed hypercellular marrow with mild dysplastic features could not rule out low-grade MDS.  Blood work confirmed vitamin B12 deficiency with serum B12 level of 150.  She was started on vitamin B12 replacement monthly. The patient is here today for scheduled follow up visit.     SUBJECTIVE: The patient was interviewed today using telemedicine given the current pandemic.  She has been compliant with steroids.  Her  was sitting next to her.  She still complains of back pain.  She is scheduled to see her back surgeon on Wednesday.    PAST MEDICAL HISTORY/SOCIAL HISTORY/FAMILY HISTORY: Reviewed by me and unchanged from my documentation done on 01/25/21.    Review of Systems   Constitutional: Positive for fatigue. Negative for activity change, appetite change, chills, fever and unexpected weight change.   HENT: Negative for hearing loss, mouth sores, nosebleeds, sore throat and trouble swallowing.    Eyes: Negative for visual disturbance.   Respiratory: Negative for cough, chest tightness, shortness of breath and wheezing.    Cardiovascular: Negative for chest pain, palpitations and leg swelling.   Gastrointestinal: Negative for abdominal distention, abdominal pain, blood in stool, constipation,  diarrhea, nausea, rectal pain and vomiting.   Endocrine: Negative for cold intolerance and heat intolerance.   Genitourinary: Negative for difficulty urinating, dysuria, frequency and urgency.   Musculoskeletal: Positive for arthralgias. Negative for back pain, gait problem, joint swelling and myalgias.        Brursitis right hip   Skin: Negative for rash.   Neurological: Negative for dizziness, tremors, syncope, weakness, light-headedness, numbness and headaches.   Hematological: Negative for adenopathy. Does not bruise/bleed easily.   Psychiatric/Behavioral: Negative for confusion, sleep disturbance and suicidal ideas. The patient is not nervous/anxious.          Current Outpatient Medications:   •  albuterol sulfate  (90 Base) MCG/ACT inhaler, Every 4 (Four) Hours As Needed., Disp: , Rfl:   •  ciprofloxacin (CIPRO) 250 MG tablet, Take 250 mg by mouth 2 (Two) Times a Day., Disp: , Rfl:   •  furosemide (LASIX) 40 MG tablet, Take 40 mg by mouth Daily., Disp: , Rfl:   •  insulin NPH-insulin regular (HUMULIN 70/30) (70-30) 100 UNIT/ML injection, Inject  under the skin into the appropriate area as directed Every Morning., Disp: , Rfl:   •  levothyroxine (SYNTHROID, LEVOTHROID) 100 MCG tablet, Take 100 mcg by mouth Daily., Disp: , Rfl:   •  lisinopril (PRINIVIL,ZESTRIL) 20 MG tablet, Take 20 mg by mouth Daily., Disp: , Rfl:   •  meclizine (ANTIVERT) 12.5 MG tablet, Take 12.5 mg by mouth 3 (Three) Times a Day As Needed., Disp: , Rfl:   •  omeprazole (PrilOSEC) 40 MG capsule, Take 1 capsule by mouth Daily., Disp: 30 capsule, Rfl: 0  •  predniSONE (DELTASONE) 10 MG tablet, Take 40 mg daily for 1 week.  Taper by 10 mg every 7 days.  Take with breakfast., Disp: 70 tablet, Rfl: 0  •  propranolol (INDERAL) 40 MG tablet, Take 1 tablet by mouth 3 (Three) Times a Day., Disp: 270 tablet, Rfl: 3    PHYSICAL EXAMINATION:   LMP  (LMP Unknown)    There were no vitals filed for this visit.    ECOG Performance Status: 1 -  Symptomatic but completely ambulatory  General Appearance:  alert, cooperative, no apparent distress and appears stated age   Neurologic/Psychiatric: A&O x 3, gait steady, appropriate affect, strength 5/5 in all muscle groups   HEENT:     Neck:    Lungs:      Heart:     Abdomen:      Lymph nodes:    Extremities:    Skin:      No visits with results within 2 Week(s) from this visit.   Latest known visit with results is:   Appointment on 12/23/2020   Component Date Value Ref Range Status   • WBC 12/23/2020 1.89* 3.40 - 10.80 10*3/mm3 Final   • RBC 12/23/2020 3.87  3.77 - 5.28 10*6/mm3 Final   • Hemoglobin 12/23/2020 11.4* 12.0 - 15.9 g/dL Final   • Hematocrit 12/23/2020 34.9  34.0 - 46.6 % Final   • MCV 12/23/2020 90.2  79.0 - 97.0 fL Final   • MCH 12/23/2020 29.5  26.6 - 33.0 pg Final   • MCHC 12/23/2020 32.7  31.5 - 35.7 g/dL Final   • RDW 12/23/2020 14.0  12.3 - 15.4 % Final   • RDW-SD 12/23/2020 45.7  37.0 - 54.0 fl Final   • MPV 12/23/2020 9.8  6.0 - 12.0 fL Final   • Platelets 12/23/2020 26* 140 - 450 10*3/mm3 Final   • QT Interval 12/23/2020 388  ms Final   • QTC Interval 12/23/2020 439  ms Final        Xr Spine Thoracic 3 View    Result Date: 1/24/2021  Narrative: EXAMINATION: XR THORACIC SPINE, 3 VIEWS - 01/22/2021  INDICATION: M54.6-Pain in thoracic spine. Thoracic back pain.  COMPARISON: Thoracic spine series 12/04/2020  FINDINGS: Lower thoracic biconcave compression deformity at approximately T11 appears stable. Slight loss of height of T12 appears stable. Remaining vertebral body heights are well-maintained. There is moderate multilevel degenerative disc disease. No significant focal subluxation is seen. AP view again shows a mild s-shaped thoracolumbar scoliosis.      Impression: Stable apparently chronic compression deformity of T11 and minor loss of height of T12 compared to 12/04/2020 exam. No new or progressive thoracic spine pathology is seen.   DICTATED:   01/23/2021 EDITED/ls :   01/24/2021  This  report was finalized on 1/24/2021 11:34 PM by Dr. Jason Li MD.      Xr Spine Lumbar Complete With Flex & Ext    Result Date: 1/24/2021  Narrative: EXAMINATION: XR LUMBAR SPINE COMPLETE WITH FLEXION AND EXTENSION VIEWS - 01/22/2021  INDICATION: M54.40-Lumbago with sciatica, unspecified side. Low back pain.  COMPARISON: None.  FINDINGS: Lumbar vertebral body heights are generally well maintained. No significant abnormality of alignment is seen on the lateral view. Bones appear osteopenic but intact. Flexion and extension views show mild posterior subluxation of L2 on L3 and of L3 on L4, with up to 3-4 mm of movement at L3-4 and perhaps 2 mm at L2-3. No significant movement is seen elsewhere. There is a mild dextroconvex scoliosis. Oblique views are of limited detail due to patient's osteopenia but show no evidence of pars defect. Bony pelvis appears intact. Hip joints appear well-maintained with only minor degenerative changes.  T11 and mild T12 compression deformities are again noted as described on thoracic spine series.      Impression: L2-3 and L3-4 degenerative disc disease and mild associated subluxation, perhaps mild instability at L3-4. No evidence of acute or healing trauma is seen.   DICTATED:   01/23/2021 EDITED/ls :   01/24/2021  This report was finalized on 1/24/2021 11:34 PM by Dr. Jason Li MD.        ASSESSMENT: The patient is a very pleasant 85 y.o. female  with pancytopenia.     PROBLEM LIST:  1.  Pancytopenia:  A.  Incidentally noted blood work done May 24, 2020  B.  CT abdomen pelvis revealed mild hepatosplenomegaly spleen size 15 cm with normal liver enzymes  C.  Bone marrow biopsy done on May 26, 2020 revealed hypercellular marrow 66% cellularity with mild megakaryocytes atypia suspicious for low-grade MDS  D. Peripheral flow cytometry with nonspecific B-cell findings, no increase in blasts, and neutrophilic cells with decreased cellular integrity.  No evidence of PNH.  2.  Vitamin B12  deficiency:  A.  Vitamin B12 level 155 on May 25, 2020  3. Hypothyroidism  4. Diabetes  5. History of pulmonary embolism    PLAN:  1. I reviewed the lab results with the patient from January 22 , 2021 her platelets improved to 90 white blood cells 4.3 normal hemoglobin 13.3   2. I reviewed the ultrasound results with the patient. She has splenomegaly without evidence of focal splenic or liver lesion and no ascites noted.   3.  Her splenomegaly could be induced by chronic liver disease although her liver enzymes are perfectly normal and she never had documented liver disease.  This also could be a sign of splenic marginal zone lymphoma although her peripheral blood flow cytometry was essentially negative.  4.  I will wean her off prednisone completely.  She is currently on 10 mg daily which will continue for 1 week then will go down to 5 mg daily for 1 week then 5 mg every other week.  5.  The patient will follow-up with me in 3 months with repeated CBC.  6.  I will continue the patient on Prilosec 40 mg daily for GI prophylaxis.  7. I asked her to call if she has any new symptoms such as evidence of bleeding or bruising, fevers, chills, or night sweats.   8. She will continue levothyroxine 100 mcg daily for hypothyroidism.   9.  She will follow-up with her neurosurgeon Dr. Rodriguez next week.  I can increase her prednisone dose prior to procedure if needed.  10.  The patient platelet count is in a safe range and she can have her kyphoplasty done.    Dinh Lyman MD  1/25/2021

## 2021-01-25 ENCOUNTER — OFFICE VISIT (OUTPATIENT)
Dept: ONCOLOGY | Facility: CLINIC | Age: 86
End: 2021-01-25

## 2021-01-25 DIAGNOSIS — D69.6 THROMBOCYTOPENIA (HCC): Primary | ICD-10-CM

## 2021-01-25 PROCEDURE — 99442 PR PHYS/QHP TELEPHONE EVALUATION 11-20 MIN: CPT | Performed by: INTERNAL MEDICINE

## 2021-01-25 RX ORDER — PREDNISONE 1 MG/1
TABLET ORAL
Qty: 11 TABLET | Refills: 0 | Status: SHIPPED | OUTPATIENT
Start: 2021-01-25 | End: 2021-02-05 | Stop reason: DRUGHIGH

## 2021-02-03 ENCOUNTER — TELEPHONE (OUTPATIENT)
Dept: ONCOLOGY | Facility: CLINIC | Age: 86
End: 2021-02-03

## 2021-02-03 NOTE — TELEPHONE ENCOUNTER
Isabel from  office called to let  know patient is having surgery on 2/12/21. Wanted to make sure she did not need to see  prior to procedure.     [phone]

## 2021-02-03 NOTE — TELEPHONE ENCOUNTER
Discussed with mauricio Torres who advised we would have patient get her labs drawn again on Friday and see where her counts are and we may have to put her on higher dose of steroids.  Called and discussed with patient as well - advised her per Jeanna we need to get her cbc drawn again this Friday and patient is going to call me fRiday after its done so I can f/u on it.

## 2021-02-05 DIAGNOSIS — D61.818 PANCYTOPENIA (HCC): ICD-10-CM

## 2021-02-05 RX ORDER — PREDNISONE 10 MG/1
TABLET ORAL
Qty: 28 TABLET | Refills: 0 | Status: SHIPPED | OUTPATIENT
Start: 2021-02-05 | End: 2021-02-10 | Stop reason: SDUPTHER

## 2021-02-05 NOTE — TELEPHONE ENCOUNTER
Called and let patient know I spoke with mauricio Saucedo about her labs from today at PCP and that [place] at [id number].  Per mauricio Saucedo, put her back on prednisone 40mg daily (patient still has prilosec left as well) through next Thursday the 11th and have her recheck her labs that morning.   Gave patient instructions and will f/u with her next Thursday about labs, etc.

## 2021-02-05 NOTE — TELEPHONE ENCOUNTER
Isabel with Dr. Hearn called [facility] back to see what she found out about patient needing to be seen? Please call.

## 2021-02-08 NOTE — TELEPHONE ENCOUNTER
Discussed with Dr. Aldrich today what we instructed patient to do.  He agreed with our plan.  He advised starting the day after her surgery to decrease her prednisone dose by 10mg every 3 days.  Will discuss this with patient on Wednesday after we get her pre-op labs.

## 2021-02-10 ENCOUNTER — APPOINTMENT (OUTPATIENT)
Dept: PREADMISSION TESTING | Facility: HOSPITAL | Age: 86
End: 2021-02-10

## 2021-02-10 VITALS — BODY MASS INDEX: 30.39 KG/M2 | HEIGHT: 63 IN | WEIGHT: 171.52 LBS

## 2021-02-10 LAB
DEPRECATED RDW RBC AUTO: 50.6 FL (ref 37–54)
ERYTHROCYTE [DISTWIDTH] IN BLOOD BY AUTOMATED COUNT: 15.3 % (ref 12.3–15.4)
HCT VFR BLD AUTO: 36.5 % (ref 34–46.6)
HGB BLD-MCNC: 12.2 G/DL (ref 12–15.9)
MCH RBC QN AUTO: 30.5 PG (ref 26.6–33)
MCHC RBC AUTO-ENTMCNC: 33.4 G/DL (ref 31.5–35.7)
MCV RBC AUTO: 91.3 FL (ref 79–97)
PLATELET # BLD AUTO: 147 10*3/MM3 (ref 140–450)
PMV BLD AUTO: 9 FL (ref 6–12)
POTASSIUM SERPL-SCNC: 4.6 MMOL/L (ref 3.5–5.2)
RBC # BLD AUTO: 4 10*6/MM3 (ref 3.77–5.28)
WBC # BLD AUTO: 4.9 10*3/MM3 (ref 3.4–10.8)

## 2021-02-10 PROCEDURE — 84132 ASSAY OF SERUM POTASSIUM: CPT

## 2021-02-10 PROCEDURE — 85027 COMPLETE CBC AUTOMATED: CPT

## 2021-02-10 PROCEDURE — C9803 HOPD COVID-19 SPEC COLLECT: HCPCS

## 2021-02-10 PROCEDURE — U0004 COV-19 TEST NON-CDC HGH THRU: HCPCS

## 2021-02-10 PROCEDURE — 36415 COLL VENOUS BLD VENIPUNCTURE: CPT

## 2021-02-10 RX ORDER — PREDNISONE 10 MG/1
TABLET ORAL
Qty: 21 TABLET | Refills: 0 | Status: SHIPPED | OUTPATIENT
Start: 2021-02-10 | End: 2021-10-22

## 2021-02-10 NOTE — PAT
Informed julianna at dr baez office of cbc results and especially plt count. Julianna wants labs to be faxed- faxing at this time. No other new orders at this time.

## 2021-02-10 NOTE — TELEPHONE ENCOUNTER
Called and discussed with mauricio Saucedo patients platelets today were 147.  Called patient and gave her the steroid taper that Dr. Lyman had instructed me to have patient do starting Saturday.  Patient verbalized understanding and script sent in.

## 2021-02-11 ENCOUNTER — ANESTHESIA EVENT (OUTPATIENT)
Dept: PERIOP | Facility: HOSPITAL | Age: 86
End: 2021-02-11

## 2021-02-11 LAB — SARS-COV-2 RNA RESP QL NAA+PROBE: NOT DETECTED

## 2021-02-12 ENCOUNTER — HOSPITAL ENCOUNTER (OUTPATIENT)
Facility: HOSPITAL | Age: 86
Setting detail: HOSPITAL OUTPATIENT SURGERY
Discharge: HOME | End: 2021-02-12
Attending: ORTHOPAEDIC SURGERY | Admitting: ORTHOPAEDIC SURGERY

## 2021-02-12 ENCOUNTER — ANESTHESIA (OUTPATIENT)
Dept: PERIOP | Facility: HOSPITAL | Age: 86
End: 2021-02-12

## 2021-02-12 ENCOUNTER — APPOINTMENT (OUTPATIENT)
Dept: GENERAL RADIOLOGY | Facility: HOSPITAL | Age: 86
End: 2021-02-12

## 2021-02-12 VITALS
HEART RATE: 61 BPM | RESPIRATION RATE: 16 BRPM | DIASTOLIC BLOOD PRESSURE: 67 MMHG | WEIGHT: 171 LBS | SYSTOLIC BLOOD PRESSURE: 141 MMHG | OXYGEN SATURATION: 93 % | TEMPERATURE: 97.5 F | HEIGHT: 63 IN | BODY MASS INDEX: 30.3 KG/M2

## 2021-02-12 DIAGNOSIS — M80.08XA VERTEBRAL FRACTURE, OSTEOPOROTIC (HCC): ICD-10-CM

## 2021-02-12 LAB
GLUCOSE BLDC GLUCOMTR-MCNC: 105 MG/DL (ref 70–130)
GLUCOSE BLDC GLUCOMTR-MCNC: 192 MG/DL (ref 70–130)

## 2021-02-12 PROCEDURE — 88311 DECALCIFY TISSUE: CPT | Performed by: ORTHOPAEDIC SURGERY

## 2021-02-12 PROCEDURE — 25010000002 ONDANSETRON PER 1 MG: Performed by: NURSE ANESTHETIST, CERTIFIED REGISTERED

## 2021-02-12 PROCEDURE — 25010000002 HYDRALAZINE PER 20 MG: Performed by: ANESTHESIOLOGY

## 2021-02-12 PROCEDURE — 25010000002 PROMETHAZINE PER 50 MG: Performed by: ANESTHESIOLOGY

## 2021-02-12 PROCEDURE — 25010000003 CEFAZOLIN IN DEXTROSE 2-4 GM/100ML-% SOLUTION: Performed by: ORTHOPAEDIC SURGERY

## 2021-02-12 PROCEDURE — 82962 GLUCOSE BLOOD TEST: CPT

## 2021-02-12 PROCEDURE — C1713 ANCHOR/SCREW BN/BN,TIS/BN: HCPCS | Performed by: ORTHOPAEDIC SURGERY

## 2021-02-12 PROCEDURE — 0 IOPAMIDOL 41 % SOLUTION: Performed by: ORTHOPAEDIC SURGERY

## 2021-02-12 PROCEDURE — 25010000002 PROPOFOL 10 MG/ML EMULSION: Performed by: NURSE ANESTHETIST, CERTIFIED REGISTERED

## 2021-02-12 PROCEDURE — 25010000002 FENTANYL CITRATE (PF) 100 MCG/2ML SOLUTION: Performed by: NURSE ANESTHETIST, CERTIFIED REGISTERED

## 2021-02-12 PROCEDURE — 25010000002 NEOSTIGMINE 10 MG/10ML SOLUTION: Performed by: NURSE ANESTHETIST, CERTIFIED REGISTERED

## 2021-02-12 PROCEDURE — 25010000002 DEXAMETHASONE PER 1 MG: Performed by: NURSE ANESTHETIST, CERTIFIED REGISTERED

## 2021-02-12 PROCEDURE — 88307 TISSUE EXAM BY PATHOLOGIST: CPT | Performed by: ORTHOPAEDIC SURGERY

## 2021-02-12 PROCEDURE — 22510 PERQ CERVICOTHORACIC INJECT: CPT

## 2021-02-12 DEVICE — CMT BONE CONFIDENCE HI VISC: Type: IMPLANTABLE DEVICE | Site: SPINE THORACIC | Status: FUNCTIONAL

## 2021-02-12 DEVICE — CMT BONE CONFIDENCE/PLS DS KT 11CC: Type: IMPLANTABLE DEVICE | Site: SPINE THORACIC | Status: FUNCTIONAL

## 2021-02-12 RX ORDER — SODIUM CHLORIDE, SODIUM LACTATE, POTASSIUM CHLORIDE, CALCIUM CHLORIDE 600; 310; 30; 20 MG/100ML; MG/100ML; MG/100ML; MG/100ML
9 INJECTION, SOLUTION INTRAVENOUS CONTINUOUS
Status: DISCONTINUED | OUTPATIENT
Start: 2021-02-12 | End: 2021-02-12 | Stop reason: HOSPADM

## 2021-02-12 RX ORDER — ACETAMINOPHEN 500 MG
1000 TABLET ORAL ONCE
Status: COMPLETED | OUTPATIENT
Start: 2021-02-12 | End: 2021-02-12

## 2021-02-12 RX ORDER — HYDROCODONE BITARTRATE AND ACETAMINOPHEN 5; 325 MG/1; MG/1
1 TABLET ORAL ONCE AS NEEDED
Status: DISCONTINUED | OUTPATIENT
Start: 2021-02-12 | End: 2021-02-12 | Stop reason: HOSPADM

## 2021-02-12 RX ORDER — ROCURONIUM BROMIDE 10 MG/ML
INJECTION, SOLUTION INTRAVENOUS AS NEEDED
Status: DISCONTINUED | OUTPATIENT
Start: 2021-02-12 | End: 2021-02-12 | Stop reason: SURG

## 2021-02-12 RX ORDER — EPHEDRINE SULFATE 50 MG/ML
INJECTION, SOLUTION INTRAVENOUS AS NEEDED
Status: DISCONTINUED | OUTPATIENT
Start: 2021-02-12 | End: 2021-02-12 | Stop reason: SURG

## 2021-02-12 RX ORDER — LIDOCAINE HYDROCHLORIDE 10 MG/ML
0.5 INJECTION, SOLUTION EPIDURAL; INFILTRATION; INTRACAUDAL; PERINEURAL ONCE AS NEEDED
Status: DISCONTINUED | OUTPATIENT
Start: 2021-02-12 | End: 2021-02-12 | Stop reason: HOSPADM

## 2021-02-12 RX ORDER — SODIUM CHLORIDE, SODIUM LACTATE, POTASSIUM CHLORIDE, CALCIUM CHLORIDE 600; 310; 30; 20 MG/100ML; MG/100ML; MG/100ML; MG/100ML
9 INJECTION, SOLUTION INTRAVENOUS CONTINUOUS PRN
Status: DISCONTINUED | OUTPATIENT
Start: 2021-02-12 | End: 2021-02-12 | Stop reason: HOSPADM

## 2021-02-12 RX ORDER — ONDANSETRON 2 MG/ML
INJECTION INTRAMUSCULAR; INTRAVENOUS AS NEEDED
Status: DISCONTINUED | OUTPATIENT
Start: 2021-02-12 | End: 2021-02-12 | Stop reason: SURG

## 2021-02-12 RX ORDER — MIDAZOLAM HYDROCHLORIDE 1 MG/ML
0.5 INJECTION INTRAMUSCULAR; INTRAVENOUS
Status: DISCONTINUED | OUTPATIENT
Start: 2021-02-12 | End: 2021-02-12 | Stop reason: HOSPADM

## 2021-02-12 RX ORDER — SODIUM CHLORIDE 0.9 % (FLUSH) 0.9 %
10 SYRINGE (ML) INJECTION EVERY 12 HOURS SCHEDULED
Status: DISCONTINUED | OUTPATIENT
Start: 2021-02-12 | End: 2021-02-12 | Stop reason: HOSPADM

## 2021-02-12 RX ORDER — GLYCOPYRROLATE 0.2 MG/ML
INJECTION INTRAMUSCULAR; INTRAVENOUS AS NEEDED
Status: DISCONTINUED | OUTPATIENT
Start: 2021-02-12 | End: 2021-02-12 | Stop reason: SURG

## 2021-02-12 RX ORDER — MIDAZOLAM HYDROCHLORIDE 1 MG/ML
1 INJECTION INTRAMUSCULAR; INTRAVENOUS
Status: DISCONTINUED | OUTPATIENT
Start: 2021-02-12 | End: 2021-02-12 | Stop reason: HOSPADM

## 2021-02-12 RX ORDER — FAMOTIDINE 10 MG/ML
20 INJECTION, SOLUTION INTRAVENOUS ONCE
Status: DISCONTINUED | OUTPATIENT
Start: 2021-02-12 | End: 2021-02-12 | Stop reason: HOSPADM

## 2021-02-12 RX ORDER — FAMOTIDINE 20 MG/1
20 TABLET, FILM COATED ORAL
Status: COMPLETED | OUTPATIENT
Start: 2021-02-12 | End: 2021-02-12

## 2021-02-12 RX ORDER — HYDRALAZINE HYDROCHLORIDE 20 MG/ML
5 INJECTION INTRAMUSCULAR; INTRAVENOUS ONCE
Status: COMPLETED | OUTPATIENT
Start: 2021-02-12 | End: 2021-02-12

## 2021-02-12 RX ORDER — SODIUM CHLORIDE 0.9 % (FLUSH) 0.9 %
10 SYRINGE (ML) INJECTION AS NEEDED
Status: DISCONTINUED | OUTPATIENT
Start: 2021-02-12 | End: 2021-02-12 | Stop reason: HOSPADM

## 2021-02-12 RX ORDER — LIDOCAINE HYDROCHLORIDE 10 MG/ML
0.5 INJECTION, SOLUTION EPIDURAL; INFILTRATION; INTRACAUDAL; PERINEURAL ONCE AS NEEDED
Status: COMPLETED | OUTPATIENT
Start: 2021-02-12 | End: 2021-02-12

## 2021-02-12 RX ORDER — IPRATROPIUM BROMIDE AND ALBUTEROL SULFATE 2.5; .5 MG/3ML; MG/3ML
3 SOLUTION RESPIRATORY (INHALATION) ONCE AS NEEDED
Status: DISCONTINUED | OUTPATIENT
Start: 2021-02-12 | End: 2021-02-12 | Stop reason: HOSPADM

## 2021-02-12 RX ORDER — LIDOCAINE HYDROCHLORIDE 10 MG/ML
INJECTION, SOLUTION EPIDURAL; INFILTRATION; INTRACAUDAL; PERINEURAL AS NEEDED
Status: DISCONTINUED | OUTPATIENT
Start: 2021-02-12 | End: 2021-02-12 | Stop reason: SURG

## 2021-02-12 RX ORDER — FENTANYL CITRATE 50 UG/ML
50 INJECTION, SOLUTION INTRAMUSCULAR; INTRAVENOUS
Status: DISCONTINUED | OUTPATIENT
Start: 2021-02-12 | End: 2021-02-12 | Stop reason: HOSPADM

## 2021-02-12 RX ORDER — NEOSTIGMINE METHYLSULFATE 1 MG/ML
INJECTION, SOLUTION INTRAVENOUS AS NEEDED
Status: DISCONTINUED | OUTPATIENT
Start: 2021-02-12 | End: 2021-02-12 | Stop reason: SURG

## 2021-02-12 RX ORDER — FENTANYL CITRATE 50 UG/ML
INJECTION, SOLUTION INTRAMUSCULAR; INTRAVENOUS AS NEEDED
Status: DISCONTINUED | OUTPATIENT
Start: 2021-02-12 | End: 2021-02-12 | Stop reason: SURG

## 2021-02-12 RX ORDER — DEXAMETHASONE SODIUM PHOSPHATE 4 MG/ML
INJECTION, SOLUTION INTRA-ARTICULAR; INTRALESIONAL; INTRAMUSCULAR; INTRAVENOUS; SOFT TISSUE AS NEEDED
Status: DISCONTINUED | OUTPATIENT
Start: 2021-02-12 | End: 2021-02-12 | Stop reason: SURG

## 2021-02-12 RX ORDER — FAMOTIDINE 20 MG/1
20 TABLET, FILM COATED ORAL ONCE
Status: DISCONTINUED | OUTPATIENT
Start: 2021-02-12 | End: 2021-02-12 | Stop reason: HOSPADM

## 2021-02-12 RX ORDER — CEFAZOLIN SODIUM 2 G/100ML
2 INJECTION, SOLUTION INTRAVENOUS ONCE
Status: COMPLETED | OUTPATIENT
Start: 2021-02-12 | End: 2021-02-12

## 2021-02-12 RX ORDER — PROPOFOL 10 MG/ML
VIAL (ML) INTRAVENOUS AS NEEDED
Status: DISCONTINUED | OUTPATIENT
Start: 2021-02-12 | End: 2021-02-12 | Stop reason: SURG

## 2021-02-12 RX ORDER — ONDANSETRON 2 MG/ML
4 INJECTION INTRAMUSCULAR; INTRAVENOUS ONCE AS NEEDED
Status: COMPLETED | OUTPATIENT
Start: 2021-02-12 | End: 2021-02-12

## 2021-02-12 RX ADMIN — NEOSTIGMINE 3 MG: 1 INJECTION INTRAVENOUS at 09:04

## 2021-02-12 RX ADMIN — SODIUM CHLORIDE, POTASSIUM CHLORIDE, SODIUM LACTATE AND CALCIUM CHLORIDE 9 ML/HR: 600; 310; 30; 20 INJECTION, SOLUTION INTRAVENOUS at 06:21

## 2021-02-12 RX ADMIN — LIDOCAINE HYDROCHLORIDE 50 MG: 10 INJECTION, SOLUTION EPIDURAL; INFILTRATION; INTRACAUDAL; PERINEURAL at 07:49

## 2021-02-12 RX ADMIN — PROMETHAZINE HYDROCHLORIDE 12.5 MG: 25 INJECTION INTRAMUSCULAR; INTRAVENOUS at 10:24

## 2021-02-12 RX ADMIN — PROPOFOL 70 MG: 10 INJECTION, EMULSION INTRAVENOUS at 07:49

## 2021-02-12 RX ADMIN — DEXAMETHASONE SODIUM PHOSPHATE 4 MG: 4 INJECTION, SOLUTION INTRA-ARTICULAR; INTRALESIONAL; INTRAMUSCULAR; INTRAVENOUS; SOFT TISSUE at 07:55

## 2021-02-12 RX ADMIN — ONDANSETRON 4 MG: 2 INJECTION INTRAMUSCULAR; INTRAVENOUS at 09:32

## 2021-02-12 RX ADMIN — FENTANYL CITRATE 50 MCG: 0.05 INJECTION, SOLUTION INTRAMUSCULAR; INTRAVENOUS at 09:40

## 2021-02-12 RX ADMIN — ROCURONIUM BROMIDE 30 MG: 10 INJECTION INTRAVENOUS at 07:49

## 2021-02-12 RX ADMIN — MUPIROCIN: 20 OINTMENT TOPICAL at 06:21

## 2021-02-12 RX ADMIN — GLYCOPYRROLATE 0.4 MG: 0.4 INJECTION INTRAMUSCULAR; INTRAVENOUS at 09:04

## 2021-02-12 RX ADMIN — SODIUM CHLORIDE, POTASSIUM CHLORIDE, SODIUM LACTATE AND CALCIUM CHLORIDE: 600; 310; 30; 20 INJECTION, SOLUTION INTRAVENOUS at 07:44

## 2021-02-12 RX ADMIN — FAMOTIDINE 20 MG: 20 TABLET, FILM COATED ORAL at 06:21

## 2021-02-12 RX ADMIN — EPHEDRINE SULFATE 5 MG: 50 INJECTION, SOLUTION INTRAVENOUS at 07:56

## 2021-02-12 RX ADMIN — GLYCOPYRROLATE 0.1 MG: 0.4 INJECTION INTRAMUSCULAR; INTRAVENOUS at 08:07

## 2021-02-12 RX ADMIN — LIDOCAINE HYDROCHLORIDE 0.4 ML: 10 INJECTION, SOLUTION EPIDURAL; INFILTRATION; INTRACAUDAL; PERINEURAL at 06:21

## 2021-02-12 RX ADMIN — GLYCOPYRROLATE 0.1 MG: 0.4 INJECTION INTRAMUSCULAR; INTRAVENOUS at 07:59

## 2021-02-12 RX ADMIN — ONDANSETRON 4 MG: 2 INJECTION INTRAMUSCULAR; INTRAVENOUS at 09:04

## 2021-02-12 RX ADMIN — CEFAZOLIN SODIUM 2 G: 2 INJECTION, SOLUTION INTRAVENOUS at 07:55

## 2021-02-12 RX ADMIN — ACETAMINOPHEN 1000 MG: 500 TABLET ORAL at 06:21

## 2021-02-12 RX ADMIN — HYDRALAZINE HYDROCHLORIDE 5 MG: 20 INJECTION INTRAMUSCULAR; INTRAVENOUS at 10:07

## 2021-02-12 RX ADMIN — FENTANYL CITRATE 50 MCG: 50 INJECTION, SOLUTION INTRAMUSCULAR; INTRAVENOUS at 07:49

## 2021-02-12 RX ADMIN — FENTANYL CITRATE 50 MCG: 50 INJECTION, SOLUTION INTRAMUSCULAR; INTRAVENOUS at 09:11

## 2021-02-12 NOTE — ANESTHESIA PREPROCEDURE EVALUATION
Anesthesia Evaluation     Patient summary reviewed and Nursing notes reviewed   no history of anesthetic complications:  NPO Solid Status: > 8 hours  NPO Liquid Status: > 8 hours           Airway   Mallampati: II  TM distance: >3 FB  Neck ROM: full  No difficulty expected  Dental      Pulmonary - normal exam   (+) pulmonary embolism, shortness of breath,   Cardiovascular - normal exam    (+) hypertension,       Neuro/Psych  (+) headaches, dizziness/light headedness, tremors, psychiatric history,     GI/Hepatic/Renal/Endo    (+)   liver disease, diabetes mellitus,     Musculoskeletal     Abdominal    Substance History      OB/GYN          Other   arthritis,                      Anesthesia Plan    ASA 3     general     intravenous induction     Anesthetic plan, all risks, benefits, and alternatives have been provided, discussed and informed consent has been obtained with: patient.    Plan discussed with CRNA.

## 2021-02-12 NOTE — ANESTHESIA PROCEDURE NOTES
Airway  Urgency: elective    Date/Time: 2/12/2021 7:51 AM  Airway not difficult    General Information and Staff    Patient location during procedure: OR  CRNA: Royce Schofield CRNA    Indications and Patient Condition  Indications for airway management: airway protection    Preoxygenated: yes  MILS not maintained throughout  Mask difficulty assessment: 1 - vent by mask    Final Airway Details  Final airway type: endotracheal airway      Successful airway: ETT  Cuffed: yes   Successful intubation technique: direct laryngoscopy  Blade: Tamayo  Blade size: 2  ETT size (mm): 7.0  Cormack-Lehane Classification: grade I - full view of glottis  Placement verified by: chest auscultation and capnometry   Cuff volume (mL): 6  Measured from: lips  ETT/EBT  to lips (cm): 20  Number of attempts at approach: 1  Assessment: lips, teeth, and gum same as pre-op and atraumatic intubation    Additional Comments  Negative epigastric sounds, Breath sound equal bilaterally with symmetric chest rise and fall

## 2021-02-12 NOTE — H&P
Pre-Op H&P  Chey Robertson  [id number]  1935    Chief complaint: Back pain for the last 3 months.    HPI:    Patient is a 85 y.o.female who presents with 3-month history of low back pain.  She has failed conservative therapy.  X-rays show compression fracture at T11 with a superior endplate compression of T12. Recent MRIs show recent compression fractures T11, T12 and L4    Review of Systems:  General ROS: negative for chills, fever or skin lesions;  No changes since last office visit.  Neg for recent sick exposure  Cardiovascular ROS: no chest pain or dyspnea on exertion  Respiratory ROS: no cough, shortness of breath, or wheezing    Allergies:   Allergies   Allergen Reactions   • Aspirin Unknown - Low Severity     Mouth sores        Home Meds:    No current facility-administered medications on file prior to encounter.      Current Outpatient Medications on File Prior to Encounter   Medication Sig Dispense Refill   • insulin NPH-insulin regular (HUMULIN 70/30) (70-30) 100 UNIT/ML injection Inject 45 Units under the skin into the appropriate area as directed 2 (Two) Times a Day With Meals.     • levothyroxine (SYNTHROID, LEVOTHROID) 100 MCG tablet Take 100 mcg by mouth Daily.     • lisinopril (PRINIVIL,ZESTRIL) 20 MG tablet Take 20 mg by mouth Daily.     • meclizine (ANTIVERT) 12.5 MG tablet Take 12.5 mg by mouth 3 (Three) Times a Day As Needed (vertigo).     • omeprazole (PrilOSEC) 40 MG capsule Take 1 capsule by mouth Daily. 30 capsule 0   • propranolol (INDERAL) 40 MG tablet Take 1 tablet by mouth 3 (Three) Times a Day. 270 tablet 3   • albuterol sulfate  (90 Base) MCG/ACT inhaler Inhale 2 puffs Every 4 (Four) Hours As Needed for Shortness of Air.         PMH:   Past Medical History:   Diagnosis Date   • Anemia    • Arthritis    • Diabetes mellitus (CMS/HCC)     checks sugar only when pt wants to    • Disease of thyroid gland    • History of transfusion     with knee surgery-  no reaction recalled.   "  • [place] (hard of hearing)     no hearing aids   • Hypertension    • Migraine without aura and without status migrainosus, not intractable 12/30/2016   • Pulmonary emboli (CMS/HCC) 2011    (after knee surgery)   • SOBOE (shortness of breath on exertion)    • Tremors of nervous system    • Vertigo    • Wears dentures     upper    • Wears glasses      PSH:    Past Surgical History:   Procedure Laterality Date   • BLADDER SURGERY     • CATARACT EXTRACTION      bilat    • CHOLECYSTECTOMY     • COLONOSCOPY     • HYSTERECTOMY     • OOPHORECTOMY     • TONSILLECTOMY       Patient denies allergy to latex or contrast dye.  Immunization History:  Influenza: Yes  Pneumococcal: Yes  Tetanus: No    Social History:   Tobacco:   Social History     Tobacco Use   Smoking Status Never Smoker   Smokeless Tobacco Never Used      Alcohol:     Social History     Substance and Sexual Activity   Alcohol Use No       Vitals:           /67 (BP Location: Right arm, Patient Position: Lying)   Pulse 57   Temp 97.9 °F (36.6 °C) (Tympanic)   Resp 18   Ht 160 cm (63\")   Wt 77.6 kg (171 lb)   LMP  (LMP Unknown)   SpO2 97%   BMI 30.29 kg/m²     Physical Exam:  General Appearance:    Alert, cooperative, no distress, appears stated age   Head:    Normocephalic, without obvious abnormality, atraumatic   Lungs:     Clear to auscultation bilaterally, respirations unlabored    Heart:   Regular rate and rhythm, S1 and S2 normal, no murmur, rub    or gallop    Abdomen:    Soft, nontender.  +bowel sounds   Breast Exam:    deferred   Genitalia:    deferred   Extremities:   Extremities normal, atraumatic, no cyanosis or edema   Skin:   Skin color, texture, turgor normal, no rashes or lesions   Neurologic:   Grossly intact   Results Review  LABS:  Lab Results   Component Value Date    WBC 4.90 02/10/2021    HGB 12.2 02/10/2021    HCT 36.5 02/10/2021    MCV 91.3 02/10/2021     02/10/2021    NEUTROABS 1.80 08/31/2020    GLUCOSE 205 (H) " 05/26/2020    BUN 15 05/26/2020    CREATININE 1.15 (H) 05/26/2020    EGFRIFNONA 45 (L) 05/26/2020     05/26/2020    K 4.6 02/10/2021     05/26/2020    CO2 22.0 05/26/2020    MG 2.1 05/26/2020    CALCIUM 8.2 (L) 05/26/2020    ALBUMIN 3.90 05/25/2020    AST 13 05/25/2020    ALT 7 05/25/2020    BILITOT 0.6 05/25/2020    INR 1.60 (H) 05/24/2020       RADIOLOGY:  No radiology results for the last 3 days     I reviewed the patient's new clinical results.    Cancer Staging (if applicable)  Cancer Patient: __ yes __no __unknown; If yes, clinical stage T:__ N:__M:__, stage group or __N/A    Impression: Compression fracture T11, T12 and L4    Plan: Kyphoplasty T11, T12 and L4 and biopsy.    HUE Coronado   2/12/2021   06:42 EST

## 2021-02-12 NOTE — ANESTHESIA POSTPROCEDURE EVALUATION
Patient: Chey Robertson    Procedure Summary     Date: 02/12/21 Room / Location:  [facility] [address] [place] [address] [facility]    Anesthesia Start: 0744 Anesthesia Stop:     Procedure: KYPHOPLASTY T11, T12 AND L4 (N/A Spine Lumbar) Diagnosis:     Surgeon: Matty Hearn MD Provider: Jorje Majano MD    Anesthesia Type: general ASA Status: 3          Anesthesia Type: general    Vitals  Vitals Value Taken Time   BP     Temp     Pulse     Resp     SpO2 92 % 02/12/21 0929   Vitals shown include unvalidated device data.        Post Anesthesia Care and Evaluation    Patient location during evaluation: PACU  Patient participation: complete - patient participated  Level of consciousness: awake and alert  Pain management: adequate  Airway patency: patent  Anesthetic complications: No anesthetic complications  PONV Status: none  Cardiovascular status: hemodynamically stable and acceptable  Respiratory status: nonlabored ventilation, acceptable and nasal cannula  Hydration status: acceptable

## 2021-02-12 NOTE — OP NOTE
KYPHOPLASTY OPERATIVE NOTE    Pre-Operative Diagnosis:  Osteoporotic compression fractures T11, T12 and L4    Post Operative Diagnosis:  Same    Procedure:  Kyphoplasty of T11, T12 and L4    Surgeon:  Matty Hearn MD    Anesthesia:  General Endotracheal    EBL:  5cc    Indications:  Patient presents as a 84 yo female, initially presented with T11 fracture a few weeks ago. Surgery delayed due to low platelets. Will being treated by Heme-onc, had increased pain after initially improving. Further W/U showed T12 and L4 fractures in addition to T11 fracture. Due to persistent pain and after being treated to raise platelet count to a safe level, she presents for Kyphoplasty   Risks, benefits and both operative and non operative options were discussed with the patient and son preoperatively.  Specific risks including, but not limited to the risk of bleeding, infection, nerve injury, blood vessel injury, weakness, paralysis, possible need for additional surgery and possible worsening of pain were discussed.   After informed consent and antibiotic prophylaxis, patient brought to the operating room.  After general endotracheal anesthesia patient was positioned prone on padded rolls.  The bony prominences were well padded.  Back was prepped and draped in usual sterile fashion.    Description: C-arm was brought in and the fractured vertebras, T11 and T12, were visualized initially. The bed was slightly angled and the C-arm likewise was angled to put the spinous process in the midline between the pedicles and to line up the vertebral endplates.     Small stab incisions were made with a #11 scalpel approximately 1 cm lateral to the underlying pedicles both to the right and to the left for T11 and T12.    Trocars were placed through the stab incisions initially at T11, entering the bone at the lateral border of the pedicle, traversing down through the pedicle, entering the vertebral body at the medial border of the pedicle.  This was done under fluoroscopic guidance, initially on the right side, then on the left side. Next, I placed trocars to the right and to the left in T12 in similar fashion. Checked in lateral projections, they were appropriately placed at the posterior portion of the vertebral body. They were slightly further advanced. Biopsies were then taken and labeled separately through the outer cannulas after the inner sleeve was removed. Biopsies labeled separately and sent to pathology. Balloons were placed initially in T11. T11 was the older fracture. The bone was harder and partially healed. No significant correction was achieved with the balloons and very low volume 1 to 1.5 mL filling was achieved. Balloons were deflated, removed and placed into T12. Cement had been mixed on the back table and was placed sequentially into the T11 vertebral body to fill the small voids from the balloons. Again, this was the older fracture and no significant correction was achieved. When the small voids were filled, the cement introducer was removed and obturators placed to keep the cement from back out into the cannulas. Balloons were inflated in T12. Good correction of the fracture deformity was achieved to near-normal height. Balloons deflated and removed. Cement placed sequentially. Multiple images AP and lateral showing good cement fill. As the cement started to approach the posterior border of the vertebral body, this was taken as the endpoint. No significant extravasation occurred. The cement introducer was removed and obturators placed.     Attention turned to the L4 fracture. This was visualized laterally initially and then in the AP plane. C-arm was angled slightly to line up the nonfractured inferior endplate. Small stab incisions 1.5 to 2 cm lateral to the underlying pedicle were made. Trocars placed down through the incisions, entering the bone at the lateral border of the pedicle, traversing down through the pedicle,  entering the vertebral body at the medial border of the pedicle. This was done to the right and to the left under AP fluoroscopic image. Checked in the lateral projection, showed appropriate placement. Biopsies taken and sent to pathology labeled separately as L4. Balloons placed and sequentially inflated. There was minimal deformity in L4 and no significant correction was needed. Balloons were deflated. Cement had been mixed on the back table. It was placed sequentially with multiple AP and lateral images. She had very good cement fill in L4 that crossed the midline. As the cement started to go back towards the posterior border of the vertebra, this was taken as the endpoint. Cement introducer was removed. Obturators placed. When the cement was hard, all of the trocars were removed. Pressure was held on the small incisions temporarily and multiple AP and lateral images of all 3 fractures were taken and saved. The wounds were cleansed and then closed with Mastisol and Steri-Strip for each small incision and then Telfa and Tegaderm coverage. The patient was carefully rolled back on to the stretcher. There were no intraoperative complications.     POSTPROCEDURE PLAN: Discharge home after cleared by Anesthesia and ambulate in the recovery room. The patient already has hydrocodone at home and also tramadol if needed. No new prescriptions were written today. She was instructed to leave the Tegaderms in place until she returns to see me in 2 weeks. If they were to come off in the 1st week, family should place a Band-Aid on the sites as needed.       Complications: None    Plan:  Anticipate discharge home with family after cleared by anesthesia in the recovery room.  Recommend rapid mobilization. Leave Tagaderm in place. Pt has Hydrocodone already at home, no need for new prescription today.  Patient will need to return to see me, in the office in 2 weeks.    Matty Hearn MD  02/12/21  09:31 EST

## 2021-02-13 LAB
CYTO UR: NORMAL
LAB AP CASE REPORT: NORMAL
LAB AP CLINICAL INFORMATION: NORMAL
PATH REPORT.FINAL DX SPEC: NORMAL
PATH REPORT.GROSS SPEC: NORMAL

## 2021-02-19 ENCOUNTER — HOSPITAL ENCOUNTER (EMERGENCY)
Facility: HOSPITAL | Age: 86
Discharge: HOME | End: 2021-02-20
Attending: EMERGENCY MEDICINE | Admitting: EMERGENCY MEDICINE

## 2021-02-19 ENCOUNTER — APPOINTMENT (OUTPATIENT)
Dept: CT IMAGING | Facility: HOSPITAL | Age: 86
End: 2021-02-19

## 2021-02-19 DIAGNOSIS — N20.1 URETEROLITHIASIS: Primary | ICD-10-CM

## 2021-02-19 DIAGNOSIS — N39.0 ACUTE UTI: ICD-10-CM

## 2021-02-19 DIAGNOSIS — N13.39 OTHER HYDRONEPHROSIS: ICD-10-CM

## 2021-02-19 LAB
ALBUMIN SERPL-MCNC: 3.9 G/DL (ref 3.5–5.2)
ALBUMIN/GLOB SERPL: 1.7 G/DL
ALP SERPL-CCNC: 140 U/L (ref 39–117)
ALT SERPL W P-5'-P-CCNC: 17 U/L (ref 1–33)
ANION GAP SERPL CALCULATED.3IONS-SCNC: 10 MMOL/L (ref 5–15)
AST SERPL-CCNC: 13 U/L (ref 1–32)
BASOPHILS # BLD AUTO: 0 10*3/MM3 (ref 0–0.2)
BASOPHILS NFR BLD AUTO: 0 % (ref 0–1.5)
BILIRUB SERPL-MCNC: 0.7 MG/DL (ref 0–1.2)
BUN SERPL-MCNC: 30 MG/DL (ref 8–23)
BUN/CREAT SERPL: 27.3 (ref 7–25)
CALCIUM SPEC-SCNC: 8.9 MG/DL (ref 8.6–10.5)
CHLORIDE SERPL-SCNC: 105 MMOL/L (ref 98–107)
CO2 SERPL-SCNC: 24 MMOL/L (ref 22–29)
CREAT SERPL-MCNC: 1.1 MG/DL (ref 0.57–1)
D-LACTATE SERPL-SCNC: 2.1 MMOL/L (ref 0.5–2)
DEPRECATED RDW RBC AUTO: 50 FL (ref 37–54)
EOSINOPHIL # BLD AUTO: 0 10*3/MM3 (ref 0–0.4)
EOSINOPHIL NFR BLD AUTO: 0 % (ref 0.3–6.2)
ERYTHROCYTE [DISTWIDTH] IN BLOOD BY AUTOMATED COUNT: 15.2 % (ref 12.3–15.4)
GFR SERPL CREATININE-BSD FRML MDRD: 47 ML/MIN/1.73
GLOBULIN UR ELPH-MCNC: 2.3 GM/DL
GLUCOSE SERPL-MCNC: 179 MG/DL (ref 65–99)
HCT VFR BLD AUTO: 36.3 % (ref 34–46.6)
HGB BLD-MCNC: 12 G/DL (ref 12–15.9)
HOLD SPECIMEN: NORMAL
IMM GRANULOCYTES # BLD AUTO: 0.05 10*3/MM3 (ref 0–0.05)
IMM GRANULOCYTES NFR BLD AUTO: 0.9 % (ref 0–0.5)
LIPASE SERPL-CCNC: 39 U/L (ref 13–60)
LYMPHOCYTES # BLD AUTO: 0.49 10*3/MM3 (ref 0.7–3.1)
LYMPHOCYTES NFR BLD AUTO: 8.4 % (ref 19.6–45.3)
MCH RBC QN AUTO: 30.2 PG (ref 26.6–33)
MCHC RBC AUTO-ENTMCNC: 33.1 G/DL (ref 31.5–35.7)
MCV RBC AUTO: 91.2 FL (ref 79–97)
MONOCYTES # BLD AUTO: 0.53 10*3/MM3 (ref 0.1–0.9)
MONOCYTES NFR BLD AUTO: 9.1 % (ref 5–12)
NEUTROPHILS NFR BLD AUTO: 4.76 10*3/MM3 (ref 1.7–7)
NEUTROPHILS NFR BLD AUTO: 81.6 % (ref 42.7–76)
NRBC BLD AUTO-RTO: 0 /100 WBC (ref 0–0.2)
PLATELET # BLD AUTO: 145 10*3/MM3 (ref 140–450)
PMV BLD AUTO: 9.1 FL (ref 6–12)
POTASSIUM SERPL-SCNC: 4.4 MMOL/L (ref 3.5–5.2)
PROT SERPL-MCNC: 6.2 G/DL (ref 6–8.5)
RBC # BLD AUTO: 3.98 10*6/MM3 (ref 3.77–5.28)
SODIUM SERPL-SCNC: 139 MMOL/L (ref 136–145)
WBC # BLD AUTO: 5.83 10*3/MM3 (ref 3.4–10.8)
WHOLE BLOOD HOLD SPECIMEN: NORMAL
WHOLE BLOOD HOLD SPECIMEN: NORMAL

## 2021-02-19 PROCEDURE — 85025 COMPLETE CBC W/AUTO DIFF WBC: CPT

## 2021-02-19 PROCEDURE — 99284 EMERGENCY DEPT VISIT MOD MDM: CPT

## 2021-02-19 PROCEDURE — 83690 ASSAY OF LIPASE: CPT | Performed by: EMERGENCY MEDICINE

## 2021-02-19 PROCEDURE — 25010000002 HYDROMORPHONE PER 4 MG: Performed by: EMERGENCY MEDICINE

## 2021-02-19 PROCEDURE — 74177 CT ABD & PELVIS W/CONTRAST: CPT

## 2021-02-19 PROCEDURE — 25010000002 IOPAMIDOL 61 % SOLUTION: Performed by: EMERGENCY MEDICINE

## 2021-02-19 PROCEDURE — 80053 COMPREHEN METABOLIC PANEL: CPT | Performed by: EMERGENCY MEDICINE

## 2021-02-19 PROCEDURE — 96374 THER/PROPH/DIAG INJ IV PUSH: CPT

## 2021-02-19 PROCEDURE — 25010000002 ONDANSETRON PER 1 MG: Performed by: EMERGENCY MEDICINE

## 2021-02-19 PROCEDURE — 96375 TX/PRO/DX INJ NEW DRUG ADDON: CPT

## 2021-02-19 PROCEDURE — 83605 ASSAY OF LACTIC ACID: CPT | Performed by: EMERGENCY MEDICINE

## 2021-02-19 RX ORDER — HYDROMORPHONE HYDROCHLORIDE 1 MG/ML
0.5 INJECTION, SOLUTION INTRAMUSCULAR; INTRAVENOUS; SUBCUTANEOUS ONCE
Status: COMPLETED | OUTPATIENT
Start: 2021-02-19 | End: 2021-02-19

## 2021-02-19 RX ORDER — SODIUM CHLORIDE 9 MG/ML
10 INJECTION INTRAVENOUS AS NEEDED
Status: DISCONTINUED | OUTPATIENT
Start: 2021-02-19 | End: 2021-02-20 | Stop reason: HOSPADM

## 2021-02-19 RX ORDER — ONDANSETRON 2 MG/ML
4 INJECTION INTRAMUSCULAR; INTRAVENOUS ONCE
Status: COMPLETED | OUTPATIENT
Start: 2021-02-19 | End: 2021-02-19

## 2021-02-19 RX ADMIN — HYDROMORPHONE HYDROCHLORIDE 0.5 MG: 1 INJECTION, SOLUTION INTRAMUSCULAR; INTRAVENOUS; SUBCUTANEOUS at 23:10

## 2021-02-19 RX ADMIN — ONDANSETRON 4 MG: 2 INJECTION INTRAMUSCULAR; INTRAVENOUS at 23:10

## 2021-02-19 RX ADMIN — SODIUM CHLORIDE 500 ML: 9 INJECTION, SOLUTION INTRAVENOUS at 23:13

## 2021-02-19 RX ADMIN — IOPAMIDOL 90 ML: 612 INJECTION, SOLUTION INTRAVENOUS at 23:29

## 2021-02-20 VITALS
RESPIRATION RATE: 16 BRPM | OXYGEN SATURATION: 99 % | HEART RATE: 59 BPM | HEIGHT: 63 IN | TEMPERATURE: 98.6 F | DIASTOLIC BLOOD PRESSURE: 97 MMHG | WEIGHT: 170 LBS | BODY MASS INDEX: 30.12 KG/M2 | SYSTOLIC BLOOD PRESSURE: 188 MMHG

## 2021-02-20 LAB
BACTERIA UR QL AUTO: ABNORMAL /HPF
BILIRUB UR QL STRIP: NEGATIVE
CLARITY UR: CLEAR
COLOR UR: YELLOW
GLUCOSE UR STRIP-MCNC: NEGATIVE MG/DL
HGB UR QL STRIP.AUTO: ABNORMAL
HYALINE CASTS UR QL AUTO: ABNORMAL /LPF
KETONES UR QL STRIP: NEGATIVE
LACTATE HOLD SPECIMEN: NORMAL
LEUKOCYTE ESTERASE UR QL STRIP.AUTO: ABNORMAL
NITRITE UR QL STRIP: POSITIVE
PH UR STRIP.AUTO: <=5 [PH] (ref 5–8)
PROT UR QL STRIP: NEGATIVE
RBC # UR: ABNORMAL /HPF
REF LAB TEST METHOD: ABNORMAL
SP GR UR STRIP: 1.05 (ref 1–1.03)
SQUAMOUS #/AREA URNS HPF: ABNORMAL /HPF
UROBILINOGEN UR QL STRIP: ABNORMAL
WBC UR QL AUTO: ABNORMAL /HPF

## 2021-02-20 PROCEDURE — 96375 TX/PRO/DX INJ NEW DRUG ADDON: CPT

## 2021-02-20 PROCEDURE — 81001 URINALYSIS AUTO W/SCOPE: CPT | Performed by: EMERGENCY MEDICINE

## 2021-02-20 PROCEDURE — 25010000002 CEFTRIAXONE PER 250 MG: Performed by: EMERGENCY MEDICINE

## 2021-02-20 RX ORDER — HYDROCODONE BITARTRATE AND ACETAMINOPHEN 5; 325 MG/1; MG/1
1 TABLET ORAL EVERY 6 HOURS PRN
Qty: 12 TABLET | Refills: 0 | Status: SHIPPED | OUTPATIENT
Start: 2021-02-20

## 2021-02-20 RX ORDER — TAMSULOSIN HYDROCHLORIDE 0.4 MG/1
1 CAPSULE ORAL DAILY
Qty: 10 CAPSULE | Refills: 0 | Status: SHIPPED | OUTPATIENT
Start: 2021-02-20

## 2021-02-20 RX ORDER — ONDANSETRON 4 MG/1
4 TABLET, FILM COATED ORAL EVERY 8 HOURS PRN
Qty: 15 TABLET | Refills: 0 | Status: SHIPPED | OUTPATIENT
Start: 2021-02-20

## 2021-02-20 RX ORDER — CEFDINIR 300 MG/1
300 CAPSULE ORAL 2 TIMES DAILY
Qty: 14 CAPSULE | Refills: 0 | Status: SHIPPED | OUTPATIENT
Start: 2021-02-20 | End: 2023-03-31 | Stop reason: HOSPADM

## 2021-02-20 RX ADMIN — SODIUM CHLORIDE 1 G: 900 INJECTION INTRAVENOUS at 01:02

## 2021-02-20 NOTE — ED PROVIDER NOTES
"Subjective   85-year-old female presents for evaluation of lower abdominal pain and nausea.  She states that her symptoms started at approximately 5:30 PM.  She began experiencing lower abdominal pain that has persisted and worsened since that time.  She endorses accompanying nausea as well as \"dry heaves.\"  No fevers.  Pain is currently 8 out of 10 in severity and worse with palpation.  No sick contacts or recent dietary changes.  No urinary symptoms.          Review of Systems   Gastrointestinal: Positive for abdominal pain and nausea.   All other systems reviewed and are negative.      Past Medical History:   Diagnosis Date   • Anemia    • Arthritis    • Diabetes mellitus (CMS/HCC)     checks sugar only when pt wants to    • Disease of thyroid gland    • History of transfusion     with knee surgery-  no reaction recalled.    • [place] (hard of hearing)     no hearing aids   • Hypertension    • Migraine without aura and without status migrainosus, not intractable 12/30/2016   • Pulmonary emboli (CMS/HCC) 2011    (after knee surgery)   • SOBOE (shortness of breath on exertion)    • Tremors of nervous system    • Vertigo    • Wears dentures     upper    • Wears glasses        Allergies   Allergen Reactions   • Aspirin Unknown - Low Severity     Mouth sores        Past Surgical History:   Procedure Laterality Date   • BLADDER SURGERY     • CATARACT EXTRACTION      bilat    • CHOLECYSTECTOMY     • COLONOSCOPY     • HYSTERECTOMY     • KYPHOPLASTY N/A 2/12/2021    Procedure: KYPHOPLASTY T11, T12 AND L4;  Surgeon: Matty Hearn MD;  Location: [facility];  Service: Orthopedic Spine;  Laterality: N/A;   • OOPHORECTOMY     • TONSILLECTOMY         Family History   Problem Relation Age of Onset   • Breast cancer Sister 84   • Stroke Mother    • Heart attack Father    • Ovarian cancer Neg Hx        Social History     Socioeconomic History   • Marital status:      Spouse name: Not on file   • Number of children: Not on " file   • Years of education: Not on file   • Highest education level: Not on file   Tobacco Use   • Smoking status: Never Smoker   • Smokeless tobacco: Never Used   Substance and Sexual Activity   • Alcohol use: No   • Drug use: No   • Sexual activity: Defer           Objective   Physical Exam  Vitals signs and nursing note reviewed.   Constitutional:       General: She is not in acute distress.     Appearance: She is well-developed. She is not diaphoretic.      Comments: Nontoxic-appearing elderly female   HENT:      Head: Normocephalic and atraumatic.   Eyes:      Pupils: Pupils are equal, round, and reactive to light.   Neck:      Musculoskeletal: Normal range of motion and neck supple.   Cardiovascular:      Rate and Rhythm: Normal rate and regular rhythm.      Heart sounds: Normal heart sounds. No murmur. No friction rub. No gallop.    Pulmonary:      Effort: Pulmonary effort is normal. No respiratory distress.      Breath sounds: Normal breath sounds. No wheezing or rales.   Abdominal:      General: Abdomen is flat. Bowel sounds are normal. There is no distension.      Palpations: Abdomen is soft. There is no mass.      Tenderness: There is abdominal tenderness. There is guarding. There is no rebound.      Comments: Focal tenderness and voluntary guarding noted with palpation of right lower quadrant and suprapubic regions, no pain out of proportion to exam   Genitourinary:     Comments: No CVA tenderness noted  Musculoskeletal: Normal range of motion.   Skin:     General: Skin is warm and dry.      Findings: No erythema or rash.   Neurological:      General: No focal deficit present.      Mental Status: She is alert and oriented to person, place, and time.   Psychiatric:         Mood and Affect: Mood normal.         Thought Content: Thought content normal.         Judgment: Judgment normal.         Procedures           ED Course  ED Course as of Feb 20 0257 Fri Feb 19, 2021 2249 85-year-old female presents  "for evaluation of lower abdominal pain.  She states that she began experiencing pain in her lower abdomen tonight at approximately 5:30 PM that has worsened and persisted since that time.  She endorses accompanying nausea and \"dry heaves\" as well.  No fevers.  On arrival to the ED, the patient is nontoxic-appearing.  Exam remarkable for focal tenderness to suprapubic and right lower quadrant regions without pain on proportion to exam.  No CVA tenderness noted.  We will obtain labs and imaging, and we will reassess following initial interventions.    [DD]   Sat Feb 20, 2021   0001 Imaging revealed a 3 mm stone just proximal to the patient's right UVJ with accompanying hydronephrosis.    [DD]   0024 Upon reevaluation, the patient feels markedly improved.  Her pain is adequately controlled.  She is tolerating p.o.  She is requesting to go home.  I feel that the patient can be managed as an outpatient.  Scripts for Norco, Zofran, and Flomax.  She was referred to Dr. Martínez of urology and will follow-up within the next week.  Agreeable with plan and given appropriate strict return precautions.    [DD]   0057 In addition, urinalysis is suggestive of infection.  Urine culture obtained.  Rocephin given.  The patient is requesting to be treated as an outpatient (which seems reasonable given her well appearance, SIRS negative, etc.) so we will send her home with a prescription for Cefdinir as well.      [DD]      ED Course User Index  [DD] Yung Alfonso MD                                   Recent Results (from the past 24 hour(s))   Comprehensive Metabolic Panel    Collection Time: 02/19/21  9:42 PM    Specimen: Blood   Result Value Ref Range    Glucose 179 (H) 65 - 99 mg/dL    BUN 30 (H) 8 - 23 mg/dL    Creatinine 1.10 (H) 0.57 - 1.00 mg/dL    Sodium 139 136 - 145 mmol/L    Potassium 4.4 3.5 - 5.2 mmol/L    Chloride 105 98 - 107 mmol/L    CO2 24.0 22.0 - 29.0 mmol/L    Calcium 8.9 8.6 - 10.5 mg/dL    Total Protein " 6.2 6.0 - 8.5 g/dL    Albumin 3.90 3.50 - 5.20 g/dL    ALT (SGPT) 17 1 - 33 U/L    AST (SGOT) 13 1 - 32 U/L    Alkaline Phosphatase 140 (H) 39 - 117 U/L    Total Bilirubin 0.7 0.0 - 1.2 mg/dL    eGFR Non African Amer 47 (L) >60 mL/min/1.73    Globulin 2.3 gm/dL    A/G Ratio 1.7 g/dL    BUN/Creatinine Ratio 27.3 (H) 7.0 - 25.0    Anion Gap 10.0 5.0 - 15.0 mmol/L   Lipase    Collection Time: 02/19/21  9:42 PM    Specimen: Blood   Result Value Ref Range    Lipase 39 13 - 60 U/L   Lactic Acid, Plasma    Collection Time: 02/19/21  9:42 PM    Specimen: Blood   Result Value Ref Range    Lactate 2.1 (C) 0.5 - 2.0 mmol/L   Light Blue Top    Collection Time: 02/19/21  9:42 PM   Result Value Ref Range    Extra Tube hold for add-on    Green Top (Gel)    Collection Time: 02/19/21  9:42 PM   Result Value Ref Range    Extra Tube Hold for add-ons.    Lavender Top    Collection Time: 02/19/21  9:42 PM   Result Value Ref Range    Extra Tube hold for add-on    Gold Top - SST    Collection Time: 02/19/21  9:42 PM   Result Value Ref Range    Extra Tube Hold for add-ons.    Gray Top - Ice    Collection Time: 02/19/21  9:42 PM   Result Value Ref Range    Extra Tube Hold for add-ons.    CBC Auto Differential    Collection Time: 02/19/21  9:42 PM    Specimen: Blood   Result Value Ref Range    WBC 5.83 3.40 - 10.80 10*3/mm3    RBC 3.98 3.77 - 5.28 10*6/mm3    Hemoglobin 12.0 12.0 - 15.9 g/dL    Hematocrit 36.3 34.0 - 46.6 %    MCV 91.2 79.0 - 97.0 fL    MCH 30.2 26.6 - 33.0 pg    MCHC 33.1 31.5 - 35.7 g/dL    RDW 15.2 12.3 - 15.4 %    RDW-SD 50.0 37.0 - 54.0 fl    MPV 9.1 6.0 - 12.0 fL    Platelets 145 140 - 450 10*3/mm3    Neutrophil % 81.6 (H) 42.7 - 76.0 %    Lymphocyte % 8.4 (L) 19.6 - 45.3 %    Monocyte % 9.1 5.0 - 12.0 %    Eosinophil % 0.0 (L) 0.3 - 6.2 %    Basophil % 0.0 0.0 - 1.5 %    Immature Grans % 0.9 (H) 0.0 - 0.5 %    Neutrophils, Absolute 4.76 1.70 - 7.00 10*3/mm3    Lymphocytes, Absolute 0.49 (L) 0.70 - 3.10 10*3/mm3     Monocytes, Absolute 0.53 0.10 - 0.90 10*3/mm3    Eosinophils, Absolute 0.00 0.00 - 0.40 10*3/mm3    Basophils, Absolute 0.00 0.00 - 0.20 10*3/mm3    Immature Grans, Absolute 0.05 0.00 - 0.05 10*3/mm3    nRBC 0.0 0.0 - 0.2 /100 WBC   Lactic Acid, Reflex Timer (This will reflex a repeat order 3-3:15 hours after ordered.)    Collection Time: 02/19/21  9:42 PM    Specimen: Blood   Result Value Ref Range    Hold Tube Hold for add-ons.    Urinalysis With Microscopic If Indicated (No Culture) - Urine, Clean Catch    Collection Time: 02/20/21 12:35 AM    Specimen: Urine, Clean Catch   Result Value Ref Range    Color, UA Yellow Yellow, Straw    Appearance, UA Clear Clear    pH, UA <=5.0 5.0 - 8.0    Specific Gravity, UA 1.046 (H) 1.001 - 1.030    Glucose, UA Negative Negative    Ketones, UA Negative Negative    Bilirubin, UA Negative Negative    Blood, UA Large (3+) (A) Negative    Protein, UA Negative Negative    Leuk Esterase, UA Small (1+) (A) Negative    Nitrite, UA Positive (A) Negative    Urobilinogen, UA 0.2 E.U./dL 0.2 - 1.0 E.U./dL   Urinalysis, Microscopic Only - Urine, Clean Catch    Collection Time: 02/20/21 12:35 AM    Specimen: Urine, Clean Catch   Result Value Ref Range    RBC, UA 13-20 (A) None Seen, 0-2 /HPF    WBC, UA 13-20 (A) None Seen, 0-2 /HPF    Bacteria, UA 2+ (A) None Seen, Trace /HPF    Squamous Epithelial Cells, UA 0-2 None Seen, 0-2 /HPF    Hyaline Casts, UA 0-6 0 - 6 /LPF    Methodology Automated Microscopy      Note: In addition to lab results from this visit, the labs listed above may include labs taken at another facility or during a different encounter within the last 24 hours. Please correlate lab times with ED admission and discharge times for further clarification of the services performed during this visit.    CT Abdomen Pelvis With Contrast   Final Result      1. Mild right hydronephrosis and hydroureter due to a 3 mm stone just above the right UVJ.   2. Stable splenomegaly with chronic  changes suspected in the spleen.   3. Colonic diverticulosis without diverticulitis. No small bowel obstruction.   4. Thickening of the gastric antrum concerning for gastritis. This could be assessed with upper endoscopy if indicated.   5. Cholecystectomy and hysterectomy.   6. Additional findings as above.               Signer Name: Lul Soria MD    Signed: 2/19/2021 11:51 PM    Workstation Name: [facility]        Vitals:    02/20/21 0000 02/20/21 0015 02/20/21 0030 02/20/21 0045   BP: (!) 195/76  (!) 188/97    BP Location:       Patient Position:       Pulse:       Resp:       Temp:       TempSrc:       SpO2: 97% 97%  99%   Weight:       Height:         Medications   Sodium Chloride (PF) 0.9 % 10 mL (has no administration in time range)   sodium chloride 0.9 % bolus 500 mL (0 mL Intravenous Stopped 2/19/21 2343)   HYDROmorphone (DILAUDID) injection 0.5 mg (0.5 mg Intravenous Given 2/19/21 2310)   ondansetron (ZOFRAN) injection 4 mg (4 mg Intravenous Given 2/19/21 2310)   iopamidol (ISOVUE-300) 61 % injection 100 mL (90 mL Intravenous Given 2/19/21 2329)   cefTRIAXone (ROCEPHIN) 1 g/100 mL 0.9% NS (MBP) (0 g Intravenous Stopped 2/20/21 0111)     ECG/EMG Results (last 24 hours)     ** No results found for the last 24 hours. **        No orders to display               MDM    Final diagnoses:   Ureterolithiasis   Other hydronephrosis   Acute UTI            Yung Alfonso MD  02/20/21 5971

## 2021-02-23 ENCOUNTER — TRANSCRIBE ORDERS (OUTPATIENT)
Dept: ADMINISTRATIVE | Facility: HOSPITAL | Age: 86
End: 2021-02-23

## 2021-02-23 ENCOUNTER — HOSPITAL ENCOUNTER (OUTPATIENT)
Dept: GENERAL RADIOLOGY | Facility: HOSPITAL | Age: 86
Discharge: HOME | End: 2021-02-23
Admitting: FAMILY MEDICINE

## 2021-02-23 DIAGNOSIS — N20.0 RIGHT KIDNEY STONE: Primary | ICD-10-CM

## 2021-02-23 DIAGNOSIS — R07.81 RIB PAIN ON RIGHT SIDE: ICD-10-CM

## 2021-02-23 DIAGNOSIS — R07.89 RIGHT-SIDED CHEST WALL PAIN: ICD-10-CM

## 2021-02-23 PROCEDURE — 71100 X-RAY EXAM RIBS UNI 2 VIEWS: CPT

## 2021-02-23 PROCEDURE — 71046 X-RAY EXAM CHEST 2 VIEWS: CPT

## 2021-02-23 PROCEDURE — 74018 RADEX ABDOMEN 1 VIEW: CPT

## 2021-03-03 ENCOUNTER — TRANSCRIBE ORDERS (OUTPATIENT)
Dept: ADMINISTRATIVE | Facility: HOSPITAL | Age: 86
End: 2021-03-03

## 2021-03-03 DIAGNOSIS — N20.1 CALCULUS OF URETER: Primary | ICD-10-CM

## 2021-04-26 ENCOUNTER — OFFICE VISIT (OUTPATIENT)
Dept: ONCOLOGY | Facility: CLINIC | Age: 86
End: 2021-04-26

## 2021-04-26 ENCOUNTER — LAB (OUTPATIENT)
Dept: LAB | Facility: HOSPITAL | Age: 86
End: 2021-04-26

## 2021-04-26 VITALS
SYSTOLIC BLOOD PRESSURE: 184 MMHG | TEMPERATURE: 97.8 F | HEART RATE: 72 BPM | BODY MASS INDEX: 30.3 KG/M2 | HEIGHT: 63 IN | OXYGEN SATURATION: 97 % | RESPIRATION RATE: 16 BRPM | DIASTOLIC BLOOD PRESSURE: 79 MMHG | WEIGHT: 171 LBS

## 2021-04-26 DIAGNOSIS — D61.818 PANCYTOPENIA (HCC): ICD-10-CM

## 2021-04-26 DIAGNOSIS — D69.6 THROMBOCYTOPENIA (HCC): ICD-10-CM

## 2021-04-26 DIAGNOSIS — D61.818 PANCYTOPENIA (HCC): Primary | ICD-10-CM

## 2021-04-26 LAB
ERYTHROCYTE [DISTWIDTH] IN BLOOD BY AUTOMATED COUNT: 14.8 % (ref 12.3–15.4)
HCT VFR BLD AUTO: 32.7 % (ref 34–46.6)
HGB BLD-MCNC: 10.7 G/DL (ref 12–15.9)
LYMPHOCYTES # BLD AUTO: 0.8 10*3/MM3 (ref 0.7–3.1)
LYMPHOCYTES NFR BLD AUTO: 30.2 % (ref 19.6–45.3)
MCH RBC QN AUTO: 30 PG (ref 26.6–33)
MCHC RBC AUTO-ENTMCNC: 32.6 G/DL (ref 31.5–35.7)
MCV RBC AUTO: 92 FL (ref 79–97)
MONOCYTES # BLD AUTO: 0.2 10*3/MM3 (ref 0.1–0.9)
MONOCYTES NFR BLD AUTO: 7.3 % (ref 5–12)
NEUTROPHILS NFR BLD AUTO: 1.7 10*3/MM3 (ref 1.7–7)
NEUTROPHILS NFR BLD AUTO: 62.5 % (ref 42.7–76)
PLATELET # BLD AUTO: 89 10*3/MM3 (ref 140–450)
PMV BLD AUTO: 6.3 FL (ref 6–12)
RBC # BLD AUTO: 3.55 10*6/MM3 (ref 3.77–5.28)
WBC # BLD AUTO: 2.7 10*3/MM3 (ref 3.4–10.8)

## 2021-04-26 PROCEDURE — 82607 VITAMIN B-12: CPT

## 2021-04-26 PROCEDURE — 36415 COLL VENOUS BLD VENIPUNCTURE: CPT

## 2021-04-26 PROCEDURE — 85025 COMPLETE CBC W/AUTO DIFF WBC: CPT

## 2021-04-26 PROCEDURE — 99214 OFFICE O/P EST MOD 30 MIN: CPT | Performed by: INTERNAL MEDICINE

## 2021-04-26 RX ORDER — POTASSIUM CHLORIDE 750 MG/1
TABLET, FILM COATED, EXTENDED RELEASE ORAL
COMMUNITY
Start: 2021-02-27

## 2021-04-26 RX ORDER — CEFUROXIME AXETIL 500 MG/1
TABLET ORAL
COMMUNITY
Start: 2021-04-21 | End: 2023-03-31 | Stop reason: HOSPADM

## 2021-04-26 RX ORDER — WARFARIN SODIUM 2 MG/1
2 TABLET ORAL DAILY
COMMUNITY
Start: 2021-04-09

## 2021-04-26 RX ORDER — TRAMADOL HYDROCHLORIDE 50 MG/1
TABLET ORAL
COMMUNITY
Start: 2021-01-22

## 2021-04-26 RX ORDER — CIPROFLOXACIN 250 MG/1
250 TABLET, FILM COATED ORAL 2 TIMES DAILY
COMMUNITY
Start: 2021-02-27 | End: 2021-11-29

## 2021-04-26 RX ORDER — NYSTATIN 100000 U/G
CREAM TOPICAL 2 TIMES DAILY
COMMUNITY
Start: 2021-03-12

## 2021-04-26 RX ORDER — FUROSEMIDE 20 MG/1
TABLET ORAL
COMMUNITY
Start: 2021-02-27

## 2021-04-26 RX ORDER — SYRGE-NDL,INS 0.5 ML HALF MARK 30 G X1/2"
SYRINGE, EMPTY DISPOSABLE MISCELLANEOUS
COMMUNITY
Start: 2021-03-04

## 2021-04-26 NOTE — PROGRESS NOTES
DATE OF VISIT: 4/26/2021    REASON FOR VISIT: Followup for pancytopenia.     HISTORY OF PRESENT ILLNESS: The patient is a very pleasant 85 y.o. female  with past medical history significant for pancytopenia diagnosed May 2020. Her blood work revealed pancytopenia.  CT chest abdomen pelvis revealed mild splenomegaly spleen size 15 cm with mild hepatomegaly as well as pancytopenia.  Bone marrow biopsy done on May 26, 2020 revealed hypercellular marrow with mild dysplastic features could not rule out low-grade MDS.  Blood work confirmed vitamin B12 deficiency with serum B12 level of 150.  She was started on vitamin B12 replacement monthly. The patient is here today for scheduled follow up visit.     SUBJECTIVE: The patient is here today with her son.  She is doing fairly well.  She denies any fever chills night sweats or she had any bleeding no skin bruises.    PAST MEDICAL HISTORY/SOCIAL HISTORY/FAMILY HISTORY: Reviewed by me and unchanged from my documentation done on 04/26/21.    Review of Systems   Constitutional: Positive for fatigue. Negative for activity change, appetite change, chills, fever and unexpected weight change.   HENT: Negative for hearing loss, mouth sores, nosebleeds, sore throat and trouble swallowing.    Eyes: Negative for visual disturbance.   Respiratory: Negative for cough, chest tightness, shortness of breath and wheezing.    Cardiovascular: Negative for chest pain, palpitations and leg swelling.   Gastrointestinal: Negative for abdominal distention, abdominal pain, blood in stool, constipation, diarrhea, nausea, rectal pain and vomiting.   Endocrine: Negative for cold intolerance and heat intolerance.   Genitourinary: Negative for difficulty urinating, dysuria, frequency and urgency.   Musculoskeletal: Positive for arthralgias. Negative for back pain, gait problem, joint swelling and myalgias.        Brursitis right hip   Skin: Negative for rash.   Neurological: Negative for dizziness,  "tremors, syncope, weakness, light-headedness, numbness and headaches.   Hematological: Negative for adenopathy. Does not bruise/bleed easily.   Psychiatric/Behavioral: Negative for confusion, sleep disturbance and suicidal ideas. The patient is not nervous/anxious.          Current Outpatient Medications:   •  albuterol sulfate  (90 Base) MCG/ACT inhaler, Inhale 2 puffs Every 4 (Four) Hours As Needed for Shortness of Air., Disp: , Rfl:   •  cefdinir (OMNICEF) 300 MG capsule, Take 1 capsule by mouth 2 (Two) Times a Day., Disp: 14 capsule, Rfl: 0  •  cefuroxime (CEFTIN) 500 MG tablet, TAKE 1 TABLET BY MOUTH EVERY 12 HOURS FOR 4 DAYS, Disp: , Rfl:   •  ciprofloxacin (CIPRO) 250 MG tablet, Take 250 mg by mouth 2 (Two) Times a Day. for 7 days, Disp: , Rfl:   •  Droplet Insulin Syringe 30G X 1/2\" 0.5 ML misc, , Disp: , Rfl:   •  furosemide (LASIX) 20 MG tablet, TAKE 1 TABLET BY MOUTH DAILY FOR FLUID RETENTION, Disp: , Rfl:   •  HYDROcodone-acetaminophen (NORCO) 5-325 MG per tablet, Take 1 tablet by mouth Every 6 (Six) Hours As Needed for Moderate Pain ., Disp: 12 tablet, Rfl: 0  •  insulin NPH-insulin regular (HUMULIN 70/30) (70-30) 100 UNIT/ML injection, Inject 45 Units under the skin into the appropriate area as directed 2 (Two) Times a Day With Meals., Disp: , Rfl:   •  levothyroxine (SYNTHROID, LEVOTHROID) 100 MCG tablet, Take 100 mcg by mouth Daily., Disp: , Rfl:   •  lisinopril (PRINIVIL,ZESTRIL) 20 MG tablet, Take 20 mg by mouth Daily., Disp: , Rfl:   •  meclizine (ANTIVERT) 12.5 MG tablet, Take 12.5 mg by mouth 3 (Three) Times a Day As Needed (vertigo)., Disp: , Rfl:   •  nystatin (MYCOSTATIN) 452703 UNIT/GM cream, 2 (Two) Times a Day. to affected area, Disp: , Rfl:   •  omeprazole (PrilOSEC) 40 MG capsule, Take 1 capsule by mouth Daily., Disp: 30 capsule, Rfl: 0  •  ondansetron (ZOFRAN) 4 MG tablet, Take 1 tablet by mouth Every 8 (Eight) Hours As Needed for Nausea., Disp: 15 tablet, Rfl: 0  •  potassium " "chloride 10 MEQ CR tablet, TAKE 1 TABLET BY MOUTH EVERY DAY WITH FLUID PILL, Disp: , Rfl:   •  predniSONE (DELTASONE) 10 MG tablet, Take 30mg by mouth daily starting day after surgery, decrease by 10mg every 3 days until complete, then stop.  Take with breakfast., Disp: 21 tablet, Rfl: 0  •  propranolol (INDERAL) 40 MG tablet, Take 1 tablet by mouth 3 (Three) Times a Day., Disp: 270 tablet, Rfl: 3  •  tamsulosin (FLOMAX) 0.4 MG capsule 24 hr capsule, Take 1 capsule by mouth Daily., Disp: 10 capsule, Rfl: 0  •  traMADol (ULTRAM) 50 MG tablet, TAKE 1 TO 2 TABLETS BY MOUTH UP TO THREE TIMES DAILY AS NEEDED, Disp: , Rfl:     PHYSICAL EXAMINATION:   Ht 160 cm (62.99\")   LMP  (LMP Unknown) Comment: mammogram is up to date  BMI 30.12 kg/m²    There were no vitals filed for this visit.    ECOG Performance Status: 1 - Symptomatic but completely ambulatory  General Appearance:  alert, cooperative, no apparent distress and appears stated age   Neurologic/Psychiatric: A&O x 3, gait steady, appropriate affect, strength 5/5 in all muscle groups   HEENT:  Normocephalic, without obvious abnormality, mucous membranes moist   Neck: Supple, symmetrical, trachea midline, no adenopathy;  No thyromegaly, masses, or tenderness   Lungs:   Clear to auscultation bilaterally; respirations regular, even, and unlabored bilaterally   Heart:  Regular rate and rhythm, no murmurs appreciated   Abdomen:   Soft, non-tender, non-distended and no organomegaly   Lymph nodes: No cervical, supraclavicular, inguinal or axillary adenopathy noted   Extremities: Normal, atraumatic; no clubbing, cyanosis, or edema    Skin: No rashes, ulcers, or suspicious lesions noted     Lab on 04/26/2021   Component Date Value Ref Range Status   • WBC 04/26/2021 2.70* 3.40 - 10.80 10*3/mm3 Final   • RBC 04/26/2021 3.55* 3.77 - 5.28 10*6/mm3 Final   • Hemoglobin 04/26/2021 10.7* 12.0 - 15.9 g/dL Final   • Hematocrit 04/26/2021 32.7* 34.0 - 46.6 % Final   • RDW 04/26/2021 " 14.8  12.3 - 15.4 % Final   • MCV 04/26/2021 92.0  79.0 - 97.0 fL Final   • MCH 04/26/2021 30.0  26.6 - 33.0 pg Final   • MCHC 04/26/2021 32.6  31.5 - 35.7 g/dL Final   • MPV 04/26/2021 6.3  6.0 - 12.0 fL Final   • Platelets 04/26/2021 89* 140 - 450 10*3/mm3 Final    Verified by repeat analysis.    • Neutrophil % 04/26/2021 62.5  42.7 - 76.0 % Final   • Lymphocyte % 04/26/2021 30.2  19.6 - 45.3 % Final   • Monocyte % 04/26/2021 7.3  5.0 - 12.0 % Final   • Neutrophils, Absolute 04/26/2021 1.70  1.70 - 7.00 10*3/mm3 Final   • Lymphocytes, Absolute 04/26/2021 0.80  0.70 - 3.10 10*3/mm3 Final   • Monocytes, Absolute 04/26/2021 0.20  0.10 - 0.90 10*3/mm3 Final        No results found.    ASSESSMENT: The patient is a very pleasant 85 y.o. female  with pancytopenia.     PROBLEM LIST:  1.  Pancytopenia:  A.  Incidentally noted blood work done May 24, 2020  B.  CT abdomen pelvis revealed mild hepatosplenomegaly spleen size 15 cm with normal liver enzymes  C.  Bone marrow biopsy done on May 26, 2020 revealed hypercellular marrow 66% cellularity with mild megakaryocytes atypia suspicious for low-grade MDS  D. Peripheral flow cytometry with nonspecific B-cell findings, no increase in blasts, and neutrophilic cells with decreased cellular integrity.   2.  Vitamin B12 deficiency:  A.  Vitamin B12 level 155 on May 25, 2020  3. Hypothyroidism  4. Diabetes  5. History of pulmonary embolism    PLAN:  1. I reviewed the lab results with the patient from today.  Her white cells 2.7 hemoglobin 10.7 and platelet 89.  She can to have pancytopenia with mild progression since we stopped steroids.  2. I reviewed the ultrasound results with the patient. She has splenomegaly without evidence of focal splenic or liver lesion and no ascites noted.   3.  I will hold off on steroids at this point given the fact that her plates are in a safe range.  4. She will continue monthly vitamin B12 replacement 1000 mcg. This is being given through her  primary care office, however we did refill her prescription today.   5. The patient had peripheral flow cytometry that was negative for LGL.   6. We will see the patient back in 6 months with repeat labs including CBC and vitamin  B12.   7. I asked her to call if she has any new symptoms such as evidence of bleeding or bruising, fevers, chills, or night sweats.   8. She will continue levothyroxine 100 mcg daily for hypothyroidism.   9.  I asked her to get a CBC checked in 3 months with her primary care provider.    Dinh Lyman MD  4/26/2021

## 2021-04-27 LAB — VIT B12 BLD-MCNC: 532 PG/ML (ref 211–946)

## 2021-07-02 ENCOUNTER — TELEPHONE (OUTPATIENT)
Dept: ONCOLOGY | Facility: CLINIC | Age: 86
End: 2021-07-02

## 2021-07-02 NOTE — TELEPHONE ENCOUNTER
Caller: GRETCHEN    Relationship to patient: [facility] - DR. LAKESHA Hodges call back number: [phone]    Type of visit: FOLLOW UP     Requested date: ASAP    If rescheduling, when is the original appointment: 10/25    Additional notes: GRETCHEN SAYS THE PATIENT'S LABS CAME BACK ABNORMAL AND THEY THINK SHE NEEDS TO COME IN EARLIER. PLEASE CALL ONCE R/S.    .

## 2021-07-06 DIAGNOSIS — D69.6 THROMBOCYTOPENIA (HCC): ICD-10-CM

## 2021-07-06 DIAGNOSIS — D61.818 PANCYTOPENIA (HCC): Primary | ICD-10-CM

## 2021-07-06 RX ORDER — LORAZEPAM 2 MG/ML
1 INJECTION INTRAMUSCULAR ONCE
Status: CANCELLED | OUTPATIENT
Start: 2021-07-06

## 2021-07-06 RX ORDER — DIPHENHYDRAMINE HYDROCHLORIDE 50 MG/ML
25 INJECTION INTRAMUSCULAR; INTRAVENOUS EVERY 4 HOURS PRN
Status: CANCELLED | OUTPATIENT
Start: 2021-07-06

## 2021-07-06 NOTE — TELEPHONE ENCOUNTER
Spoke with patient, she is scheduled for Tuesday 7/13 for BMBX and Appt with Dr. Lyman on 7/20/21.

## 2021-07-06 NOTE — TELEPHONE ENCOUNTER
Received lab results from PCP office.  Had Dr. Lyman review.  Due to worsening cytopenias he wants to repeat BMB this Friday if possible and see her next week to go over results.  Called to discuss with patient.  She is in agreement with doing this.  Order entered.

## 2021-07-13 ENCOUNTER — HOSPITAL ENCOUNTER (OUTPATIENT)
Dept: ONCOLOGY | Facility: HOSPITAL | Age: 86
Discharge: HOME | End: 2021-07-13
Admitting: INTERNAL MEDICINE

## 2021-07-13 ENCOUNTER — PROCEDURE VISIT (OUTPATIENT)
Dept: ONCOLOGY | Facility: CLINIC | Age: 86
End: 2021-07-13

## 2021-07-13 ENCOUNTER — TELEPHONE (OUTPATIENT)
Dept: ONCOLOGY | Facility: CLINIC | Age: 86
End: 2021-07-13

## 2021-07-13 VITALS
BODY MASS INDEX: 27.68 KG/M2 | WEIGHT: 156.2 LBS | SYSTOLIC BLOOD PRESSURE: 130 MMHG | RESPIRATION RATE: 20 BRPM | TEMPERATURE: 97.7 F | HEART RATE: 74 BPM | OXYGEN SATURATION: 97 % | DIASTOLIC BLOOD PRESSURE: 48 MMHG

## 2021-07-13 DIAGNOSIS — D61.818 PANCYTOPENIA (HCC): ICD-10-CM

## 2021-07-13 DIAGNOSIS — D69.6 THROMBOCYTOPENIA (HCC): Primary | ICD-10-CM

## 2021-07-13 DIAGNOSIS — D69.6 THROMBOCYTOPENIA (HCC): ICD-10-CM

## 2021-07-13 LAB
BASOPHILS # BLD AUTO: 0 10*3/MM3 (ref 0–0.2)
BASOPHILS NFR BLD AUTO: 0 % (ref 0–1.5)
DEPRECATED RDW RBC AUTO: 46.6 FL (ref 37–54)
EOSINOPHIL # BLD AUTO: 0 10*3/MM3 (ref 0–0.4)
EOSINOPHIL NFR BLD AUTO: 0 % (ref 0.3–6.2)
ERYTHROCYTE [DISTWIDTH] IN BLOOD BY AUTOMATED COUNT: 14.4 % (ref 12.3–15.4)
HCT VFR BLD AUTO: 32 % (ref 34–46.6)
HGB BLD-MCNC: 10.5 G/DL (ref 12–15.9)
IMM GRANULOCYTES # BLD AUTO: 0.01 10*3/MM3 (ref 0–0.05)
IMM GRANULOCYTES NFR BLD AUTO: 0.7 % (ref 0–0.5)
LYMPHOCYTES # BLD AUTO: 0.41 10*3/MM3 (ref 0.7–3.1)
LYMPHOCYTES NFR BLD AUTO: 29.7 % (ref 19.6–45.3)
MCH RBC QN AUTO: 29.8 PG (ref 26.6–33)
MCHC RBC AUTO-ENTMCNC: 32.8 G/DL (ref 31.5–35.7)
MCV RBC AUTO: 90.9 FL (ref 79–97)
MONOCYTES # BLD AUTO: 0.16 10*3/MM3 (ref 0.1–0.9)
MONOCYTES NFR BLD AUTO: 11.6 % (ref 5–12)
NEUTROPHILS NFR BLD AUTO: 0.8 10*3/MM3 (ref 1.7–7)
NEUTROPHILS NFR BLD AUTO: 58 % (ref 42.7–76)
NRBC BLD AUTO-RTO: 0 /100 WBC (ref 0–0.2)
PLATELET # BLD AUTO: 47 10*3/MM3 (ref 140–450)
PMV BLD AUTO: 10.6 FL (ref 6–12)
RBC # BLD AUTO: 3.52 10*6/MM3 (ref 3.77–5.28)
WBC # BLD AUTO: 1.38 10*3/MM3 (ref 3.4–10.8)

## 2021-07-13 PROCEDURE — 88237 TISSUE CULTURE BONE MARROW: CPT

## 2021-07-13 PROCEDURE — 88341 IMHCHEM/IMCYTCHM EA ADD ANTB: CPT | Performed by: INTERNAL MEDICINE

## 2021-07-13 PROCEDURE — 25010000002 LORAZEPAM PER 2 MG: Performed by: INTERNAL MEDICINE

## 2021-07-13 PROCEDURE — 96375 TX/PRO/DX INJ NEW DRUG ADDON: CPT

## 2021-07-13 PROCEDURE — 88264 CHROMOSOME ANALYSIS 20-25: CPT

## 2021-07-13 PROCEDURE — 85097 BONE MARROW INTERPRETATION: CPT | Performed by: INTERNAL MEDICINE

## 2021-07-13 PROCEDURE — 88313 SPECIAL STAINS GROUP 2: CPT | Performed by: INTERNAL MEDICINE

## 2021-07-13 PROCEDURE — 38222 DX BONE MARROW BX & ASPIR: CPT | Performed by: INTERNAL MEDICINE

## 2021-07-13 PROCEDURE — 85025 COMPLETE CBC W/AUTO DIFF WBC: CPT | Performed by: INTERNAL MEDICINE

## 2021-07-13 PROCEDURE — 88342 IMHCHEM/IMCYTCHM 1ST ANTB: CPT | Performed by: INTERNAL MEDICINE

## 2021-07-13 PROCEDURE — 88311 DECALCIFY TISSUE: CPT | Performed by: INTERNAL MEDICINE

## 2021-07-13 PROCEDURE — 88185 FLOWCYTOMETRY/TC ADD-ON: CPT

## 2021-07-13 PROCEDURE — 88184 FLOWCYTOMETRY/ TC 1 MARKER: CPT

## 2021-07-13 PROCEDURE — 88305 TISSUE EXAM BY PATHOLOGIST: CPT | Performed by: INTERNAL MEDICINE

## 2021-07-13 PROCEDURE — 25010000002 DIPHENHYDRAMINE PER 50 MG: Performed by: INTERNAL MEDICINE

## 2021-07-13 RX ORDER — DIPHENHYDRAMINE HYDROCHLORIDE 50 MG/ML
25 INJECTION INTRAMUSCULAR; INTRAVENOUS EVERY 4 HOURS PRN
Status: DISCONTINUED | OUTPATIENT
Start: 2021-07-13 | End: 2021-07-14 | Stop reason: HOSPADM

## 2021-07-13 RX ORDER — LORAZEPAM 2 MG/ML
1 INJECTION INTRAMUSCULAR ONCE
Status: COMPLETED | OUTPATIENT
Start: 2021-07-13 | End: 2021-07-13

## 2021-07-13 RX ADMIN — LORAZEPAM 1 MG: 2 INJECTION INTRAMUSCULAR; INTRAVENOUS at 08:02

## 2021-07-13 RX ADMIN — DIPHENHYDRAMINE HYDROCHLORIDE 25 MG: 50 INJECTION INTRAMUSCULAR; INTRAVENOUS at 07:59

## 2021-07-13 NOTE — PROGRESS NOTES
============    BONE MARROW PROCEDURE      =========        INDICATIONS: Progressive pancytopenia    PROCEDURE:  A time out was performed Premedications was given with 1 mg of ativan IV and Phenergan 25 mg IV.  After informed consent was obtained, the skin overlying the right  posterior superior iliac spine was prepped and draped in sterile fashion.     1% lidocaine was infiltrated into the skin, subcutaneous tissue and periosteum overlying the right posterior iliac spine.   A  Jamshidi needle was introduced into the marrow cavity and suction was applied with a 20 cc syringe.  Aspirate material was obtained.  The needle was then reinserted into the marrow space and a core biopsy was obtained. The patient tolerated the procedure well.  No immediate complications.    The patient was observed in the procedure room for 30 minutes following the procedure and then discharged to home.        Dinh Lyman MD   7/13/2021

## 2021-07-13 NOTE — TELEPHONE ENCOUNTER
Critical Test Results      MD: Dinh Lyman MD    Date: 07/13/2021     Critical test result: WBC 1.38, PLT 47    Time results received:10:29          Name of Physician notified: Dinh Lyman MD    Time Physician Notified: 10:32    []  Orders received    []  Protocol/Standing orders followed    []  No new orders

## 2021-07-20 ENCOUNTER — OFFICE VISIT (OUTPATIENT)
Dept: ONCOLOGY | Facility: CLINIC | Age: 86
End: 2021-07-20

## 2021-07-20 VITALS
WEIGHT: 156 LBS | HEIGHT: 63 IN | BODY MASS INDEX: 27.64 KG/M2 | OXYGEN SATURATION: 93 % | SYSTOLIC BLOOD PRESSURE: 167 MMHG | RESPIRATION RATE: 18 BRPM | HEART RATE: 76 BPM | TEMPERATURE: 97.5 F | DIASTOLIC BLOOD PRESSURE: 83 MMHG

## 2021-07-20 DIAGNOSIS — D61.818 PANCYTOPENIA (HCC): Primary | ICD-10-CM

## 2021-07-20 PROCEDURE — 99214 OFFICE O/P EST MOD 30 MIN: CPT | Performed by: INTERNAL MEDICINE

## 2021-07-20 RX ORDER — PREDNISONE 10 MG/1
TABLET ORAL
Qty: 120 TABLET | Refills: 0 | Status: SHIPPED | OUTPATIENT
Start: 2021-07-20 | End: 2021-10-22

## 2021-07-20 RX ORDER — CLOTRIMAZOLE AND BETAMETHASONE DIPROPIONATE 10; .64 MG/G; MG/G
CREAM TOPICAL SEE ADMIN INSTRUCTIONS
COMMUNITY
Start: 2021-04-29

## 2021-07-20 NOTE — PROGRESS NOTES
DATE OF VISIT: 7/20/2021    REASON FOR VISIT: Followup for pancytopenia.     HISTORY OF PRESENT ILLNESS: The patient is a very pleasant 86 y.o. female  with past medical history significant for pancytopenia diagnosed May 2020. Her blood work revealed pancytopenia.  CT chest abdomen pelvis revealed mild splenomegaly spleen size 15 cm with mild hepatomegaly as well as pancytopenia.  Bone marrow biopsy done on May 26, 2020 revealed hypercellular marrow with mild dysplastic features could not rule out low-grade MDS.  Blood work confirmed vitamin B12 deficiency with serum B12 level of 150.  She was started on vitamin B12 replacement monthly. The patient is here today for scheduled follow up visit.     SUBJECTIVE: The patient is here today with her son.  She is complaining of mild fatigue.  She denies any fever chills night sweats.  She is anxious about the bone marrow biopsy.    PAST MEDICAL HISTORY/SOCIAL HISTORY/FAMILY HISTORY: Reviewed by me and unchanged from my documentation done on 07/20/21.    Review of Systems   Constitutional: Positive for fatigue. Negative for activity change, appetite change, chills, fever and unexpected weight change.   HENT: Negative for hearing loss, mouth sores, nosebleeds, sore throat and trouble swallowing.    Eyes: Negative for visual disturbance.   Respiratory: Negative for cough, chest tightness, shortness of breath and wheezing.    Cardiovascular: Negative for chest pain, palpitations and leg swelling.   Gastrointestinal: Negative for abdominal distention, abdominal pain, blood in stool, constipation, diarrhea, nausea, rectal pain and vomiting.   Endocrine: Negative for cold intolerance and heat intolerance.   Genitourinary: Negative for difficulty urinating, dysuria, frequency and urgency.   Musculoskeletal: Positive for arthralgias. Negative for back pain, gait problem, joint swelling and myalgias.        Brursitis right hip   Skin: Negative for rash.   Neurological: Negative for  "dizziness, tremors, syncope, weakness, light-headedness, numbness and headaches.   Hematological: Negative for adenopathy. Does not bruise/bleed easily.   Psychiatric/Behavioral: Negative for confusion, sleep disturbance and suicidal ideas. The patient is not nervous/anxious.          Current Outpatient Medications:   •  albuterol sulfate  (90 Base) MCG/ACT inhaler, Inhale 2 puffs Every 4 (Four) Hours As Needed for Shortness of Air., Disp: , Rfl:   •  cefdinir (OMNICEF) 300 MG capsule, Take 1 capsule by mouth 2 (Two) Times a Day., Disp: 14 capsule, Rfl: 0  •  cefuroxime (CEFTIN) 500 MG tablet, TAKE 1 TABLET BY MOUTH EVERY 12 HOURS FOR 4 DAYS, Disp: , Rfl:   •  ciprofloxacin (CIPRO) 250 MG tablet, Take 250 mg by mouth 2 (Two) Times a Day. for 7 days, Disp: , Rfl:   •  clotrimazole-betamethasone (LOTRISONE) 1-0.05 % cream, Apply  topically to the appropriate area as directed See Admin Instructions. Apply topically to the affected area twice daily, Disp: , Rfl:   •  Droplet Insulin Syringe 30G X 1/2\" 0.5 ML misc, , Disp: , Rfl:   •  furosemide (LASIX) 20 MG tablet, TAKE 1 TABLET BY MOUTH DAILY FOR FLUID RETENTION, Disp: , Rfl:   •  HYDROcodone-acetaminophen (NORCO) 5-325 MG per tablet, Take 1 tablet by mouth Every 6 (Six) Hours As Needed for Moderate Pain ., Disp: 12 tablet, Rfl: 0  •  insulin NPH-insulin regular (HUMULIN 70/30) (70-30) 100 UNIT/ML injection, Inject 45 Units under the skin into the appropriate area as directed 2 (Two) Times a Day With Meals., Disp: , Rfl:   •  levothyroxine (SYNTHROID, LEVOTHROID) 100 MCG tablet, Take 100 mcg by mouth Daily., Disp: , Rfl:   •  lisinopril (PRINIVIL,ZESTRIL) 20 MG tablet, Take 20 mg by mouth Daily., Disp: , Rfl:   •  meclizine (ANTIVERT) 12.5 MG tablet, Take 12.5 mg by mouth 3 (Three) Times a Day As Needed (vertigo)., Disp: , Rfl:   •  metFORMIN (GLUCOPHAGE) 500 MG tablet, Take 1,000 mg by mouth 2 (Two) Times a Day., Disp: , Rfl:   •  nystatin (MYCOSTATIN) 857404 " "UNIT/GM cream, 2 (Two) Times a Day. to affected area, Disp: , Rfl:   •  omeprazole (PrilOSEC) 40 MG capsule, Take 1 capsule by mouth Daily., Disp: 30 capsule, Rfl: 0  •  ondansetron (ZOFRAN) 4 MG tablet, Take 1 tablet by mouth Every 8 (Eight) Hours As Needed for Nausea., Disp: 15 tablet, Rfl: 0  •  potassium chloride 10 MEQ CR tablet, TAKE 1 TABLET BY MOUTH EVERY DAY WITH FLUID PILL, Disp: , Rfl:   •  predniSONE (DELTASONE) 10 MG tablet, Take 30mg by mouth daily starting day after surgery, decrease by 10mg every 3 days until complete, then stop.  Take with breakfast., Disp: 21 tablet, Rfl: 0  •  propranolol (INDERAL) 40 MG tablet, Take 1 tablet by mouth 3 (Three) Times a Day., Disp: 270 tablet, Rfl: 3  •  tamsulosin (FLOMAX) 0.4 MG capsule 24 hr capsule, Take 1 capsule by mouth Daily., Disp: 10 capsule, Rfl: 0  •  traMADol (ULTRAM) 50 MG tablet, TAKE 1 TO 2 TABLETS BY MOUTH UP TO THREE TIMES DAILY AS NEEDED, Disp: , Rfl:   •  warfarin (COUMADIN) 2 MG tablet, Take 2 mg by mouth Daily., Disp: , Rfl:     PHYSICAL EXAMINATION:   /83   Pulse 76   Temp 97.5 °F (36.4 °C) (Temporal)   Resp 18   Ht 160 cm (62.99\")   Wt 70.8 kg (156 lb)   LMP  (LMP Unknown) Comment: mammogram is up to date  SpO2 93%   BMI 27.64 kg/m²    Pain Score    07/20/21 1017   PainSc: 0-No pain       ECOG Performance Status: 1 - Symptomatic but completely ambulatory  General Appearance:  alert, cooperative, no apparent distress and appears stated age   Neurologic/Psychiatric: A&O x 3, gait steady, appropriate affect, strength 5/5 in all muscle groups   HEENT:  Normocephalic, without obvious abnormality, mucous membranes moist   Neck: Supple, symmetrical, trachea midline, no adenopathy;  No thyromegaly, masses, or tenderness   Lungs:   Clear to auscultation bilaterally; respirations regular, even, and unlabored bilaterally   Heart:  Regular rate and rhythm, no murmurs appreciated   Abdomen:   Soft, non-tender, non-distended and no " organomegaly   Lymph nodes: No cervical, supraclavicular, inguinal or axillary adenopathy noted   Extremities: Normal, atraumatic; no clubbing, cyanosis, or edema    Skin: No rashes, ulcers, or suspicious lesions noted     Hospital Outpatient Visit on 07/13/2021   Component Date Value Ref Range Status   • Case Report 07/13/2021    Final                    Value:Surgical Pathology Report                         Case: [id number]                                  Authorizing Provider:  Dinh Lyman MD         Collected:           07/13/2021 09:15 AM          Ordering Location:     [facility]     Received:            07/13/2021 10:41 AM                                 GROUP HEMATOLOGY AND                                                                                ONCOLOGY                                                                     Pathologist:           Kellie Hooks MD                                                        Specimens:   1) - Iliac Crest, Right - Aspirate                                                                  2) - Iliac Crest, Right - Biopsy                                                          • Clinical Information 07/13/2021    Final                    Value:This result contains rich text formatting which cannot be displayed here.   • Final Diagnosis 07/13/2021    Final                    Value:This result contains rich text formatting which cannot be displayed here.   • Comment 07/13/2021    Final                    Value:This result contains rich text formatting which cannot be displayed here.   • Gross Description 07/13/2021    Final                    Value:This result contains rich text formatting which cannot be displayed here.   • Microscopic Description 07/13/2021    Final                    Value:This result contains rich text formatting which cannot be displayed here.   • Flow Cytometry Summary 07/13/2021    Final                    Value:This  result contains rich text formatting which cannot be displayed here.   • WBC 07/13/2021 1.38* 3.40 - 10.80 10*3/mm3 Final   • RBC 07/13/2021 3.52* 3.77 - 5.28 10*6/mm3 Final   • Hemoglobin 07/13/2021 10.5* 12.0 - 15.9 g/dL Final   • Hematocrit 07/13/2021 32.0* 34.0 - 46.6 % Final   • MCV 07/13/2021 90.9  79.0 - 97.0 fL Final   • MCH 07/13/2021 29.8  26.6 - 33.0 pg Final   • MCHC 07/13/2021 32.8  31.5 - 35.7 g/dL Final   • RDW 07/13/2021 14.4  12.3 - 15.4 % Final   • RDW-SD 07/13/2021 46.6  37.0 - 54.0 fl Final   • MPV 07/13/2021 10.6  6.0 - 12.0 fL Final   • Platelets 07/13/2021 47* 140 - 450 10*3/mm3 Final   • Neutrophil % 07/13/2021 58.0  42.7 - 76.0 % Final   • Lymphocyte % 07/13/2021 29.7  19.6 - 45.3 % Final   • Monocyte % 07/13/2021 11.6  5.0 - 12.0 % Final   • Eosinophil % 07/13/2021 0.0* 0.3 - 6.2 % Final   • Basophil % 07/13/2021 0.0  0.0 - 1.5 % Final   • Immature Grans % 07/13/2021 0.7* 0.0 - 0.5 % Final   • Neutrophils, Absolute 07/13/2021 0.80* 1.70 - 7.00 10*3/mm3 Final   • Lymphocytes, Absolute 07/13/2021 0.41* 0.70 - 3.10 10*3/mm3 Final   • Monocytes, Absolute 07/13/2021 0.16  0.10 - 0.90 10*3/mm3 Final   • Eosinophils, Absolute 07/13/2021 0.00  0.00 - 0.40 10*3/mm3 Final   • Basophils, Absolute 07/13/2021 0.00  0.00 - 0.20 10*3/mm3 Final   • Immature Grans, Absolute 07/13/2021 0.01  0.00 - 0.05 10*3/mm3 Final   • nRBC 07/13/2021 0.0  0.0 - 0.2 /100 WBC Final        No results found.    ASSESSMENT: The patient is a very pleasant 86 y.o. female  with pancytopenia.     PROBLEM LIST:  1.  Pancytopenia:  A.  Incidentally noted blood work done May 24, 2020  B.  CT abdomen pelvis revealed mild hepatosplenomegaly spleen size 15 cm with normal liver enzymes  C.  Bone marrow biopsy done on May 26, 2020 revealed hypercellular marrow 66% cellularity with mild megakaryocytes atypia suspicious for low-grade MDS  D.  Repeat bone marrow biopsy done July 13, 2021 revealed hypercellular marrow with  dyserythropoiesis  2.  Vitamin B12 deficiency:  A.  Vitamin B12 level 155 on May 25, 2020  3. Hypothyroidism  4. Diabetes  5. History of pulmonary embolism    PLAN:  1. I reviewed the lab results with the patient from July 13, 2021.  She had progressive pancytopenia white cells down to 1.4 hemoglobin 10.5 and platelets 47.  2.  I did go over the bone marrow biopsy result.  I discussed the case with Dr. Diaz from pathology.  No evidence of leukemia or progressive dysplastic findings.  3.  I am still not clear about the etiology for pancytopenia although I am suspecting this induced by her splenomegaly and peripheral sequestration.  No clear etiology however for her splenomegaly.  The patient did respond nicely to steroids before.  3.  Given her progressive pancytopenia increased fatigue I restarted on prednisone we will do 60 mg daily with 10 mg taper every 3 days.  4. She will continue monthly vitamin B12 replacement 1000 mcg. This is being given through her primary care office, however we did refill her prescription today.   5. She will follow-up with me in 4 weeks with repeated CBC.   6. We will see the patient back in 6 months with repeat labs including CBC and vitamin  B12.   7. I asked her to call if she has any new symptoms such as evidence of bleeding or bruising, fevers, chills, or night sweats.   8. She will continue levothyroxine 100 mcg daily for hypothyroidism.       Dinh Lyman MD  7/20/2021

## 2021-07-29 ENCOUNTER — TELEPHONE (OUTPATIENT)
Dept: ONCOLOGY | Facility: CLINIC | Age: 86
End: 2021-07-29

## 2021-07-29 NOTE — TELEPHONE ENCOUNTER
Called and left message on patients mobile number for her to return call.  Home number was not working.

## 2021-07-29 NOTE — TELEPHONE ENCOUNTER
Patient called she said the prednisone is causing there blood sugar to go up. It is 345 today. Please call.

## 2021-07-29 NOTE — TELEPHONE ENCOUNTER
Called and let patient know she should ask her pcp if they should make any dose adjustments to her diabetes medications but that this should get better as she goes down on the steroids. Discussed with Dr. Lyman who agreed and she should continue taper as prescribed.

## 2021-08-09 ENCOUNTER — TELEPHONE (OUTPATIENT)
Dept: ONCOLOGY | Facility: CLINIC | Age: 86
End: 2021-08-09

## 2021-08-09 NOTE — TELEPHONE ENCOUNTER
Called to discuss with Cristela at [facility].  She advised she thinks patient son is confused about which medication as they had asked the pcp managing her diabetes about another medication and it was costly.  I advised her we have her on the steroid due to her pancytopenia and she is currently on a taper and we are seeing her on the 23rd.  Cristela advised she would make note of this.

## 2021-08-09 NOTE — TELEPHONE ENCOUNTER
Pt was admitted to Palliative services today. Patient ts son is asking for alternative medication instead of Prednisone. Pt was treated for a urinary infection today. [facility] number [phone].

## 2021-08-12 LAB
CYTO UR: NORMAL
LAB AP CASE REPORT: NORMAL
LAB AP CLINICAL INFORMATION: NORMAL
LAB AP CYTOGENETICS REPORT,ADDENDUM: NORMAL
LAB AP DIAGNOSIS COMMENT: NORMAL
LAB AP FLOW CYTOMETRY SUMMARY: NORMAL
LAB AP INTEGRATED ONCOLOGY, ADDENDUM: NORMAL
PATH REPORT.FINAL DX SPEC: NORMAL
PATH REPORT.GROSS SPEC: NORMAL

## 2021-08-23 ENCOUNTER — OFFICE VISIT (OUTPATIENT)
Dept: ONCOLOGY | Facility: CLINIC | Age: 86
End: 2021-08-23

## 2021-08-23 ENCOUNTER — LAB (OUTPATIENT)
Dept: LAB | Facility: HOSPITAL | Age: 86
End: 2021-08-23

## 2021-08-23 ENCOUNTER — TELEPHONE (OUTPATIENT)
Dept: ONCOLOGY | Facility: CLINIC | Age: 86
End: 2021-08-23

## 2021-08-23 VITALS
SYSTOLIC BLOOD PRESSURE: 139 MMHG | TEMPERATURE: 97.5 F | WEIGHT: 151 LBS | HEART RATE: 73 BPM | DIASTOLIC BLOOD PRESSURE: 63 MMHG | RESPIRATION RATE: 16 BRPM | OXYGEN SATURATION: 97 % | BODY MASS INDEX: 26.75 KG/M2 | HEIGHT: 63 IN

## 2021-08-23 DIAGNOSIS — D61.818 PANCYTOPENIA (HCC): ICD-10-CM

## 2021-08-23 DIAGNOSIS — D61.818 PANCYTOPENIA (HCC): Primary | ICD-10-CM

## 2021-08-23 LAB
ERYTHROCYTE [DISTWIDTH] IN BLOOD BY AUTOMATED COUNT: 14.3 % (ref 12.3–15.4)
HCT VFR BLD AUTO: 30.1 % (ref 34–46.6)
HGB BLD-MCNC: 9.7 G/DL (ref 12–15.9)
LYMPHOCYTES # BLD AUTO: 0.5 10*3/MM3 (ref 0.7–3.1)
LYMPHOCYTES NFR BLD AUTO: 34.8 % (ref 19.6–45.3)
MCH RBC QN AUTO: 28.8 PG (ref 26.6–33)
MCHC RBC AUTO-ENTMCNC: 32.3 G/DL (ref 31.5–35.7)
MCV RBC AUTO: 89.1 FL (ref 79–97)
MONOCYTES # BLD AUTO: 0.1 10*3/MM3 (ref 0.1–0.9)
MONOCYTES NFR BLD AUTO: 9.3 % (ref 5–12)
NEUTROPHILS NFR BLD AUTO: 0.8 10*3/MM3 (ref 1.7–7)
NEUTROPHILS NFR BLD AUTO: 55.9 % (ref 42.7–76)
PLATELET # BLD AUTO: 57 10*3/MM3 (ref 140–450)
PMV BLD AUTO: 5.9 FL (ref 6–12)
RBC # BLD AUTO: 3.38 10*6/MM3 (ref 3.77–5.28)
VIT B12 BLD-MCNC: 451 PG/ML (ref 211–946)
WBC # BLD AUTO: 1.4 10*3/MM3 (ref 3.4–10.8)

## 2021-08-23 PROCEDURE — 82607 VITAMIN B-12: CPT

## 2021-08-23 PROCEDURE — 85025 COMPLETE CBC W/AUTO DIFF WBC: CPT

## 2021-08-23 PROCEDURE — 36415 COLL VENOUS BLD VENIPUNCTURE: CPT

## 2021-08-23 PROCEDURE — 99214 OFFICE O/P EST MOD 30 MIN: CPT | Performed by: INTERNAL MEDICINE

## 2021-08-23 RX ORDER — GLIMEPIRIDE 2 MG/1
2 TABLET ORAL EVERY MORNING
COMMUNITY
Start: 2021-08-03

## 2021-08-23 NOTE — TELEPHONE ENCOUNTER
Name of Physician notified: Dinh Lyman MD    Time Physician Notified: 11:26      []  Orders received    []  Protocol/Standing orders followed    [x]  No new orders

## 2021-08-23 NOTE — TELEPHONE ENCOUNTER
----- Message from Mali Daley RN sent at 8/23/2021 10:33 AM EDT -----      Critical Test Results      MD: Nura    Date:  8/23/2021     Critical test result:  WBC 1.4    Time results received:  10:34

## 2021-08-23 NOTE — PROGRESS NOTES
DATE OF VISIT: 8/23/2021    REASON FOR VISIT: Followup for pancytopenia.     HISTORY OF PRESENT ILLNESS: The patient is a very pleasant 86 y.o. female  with past medical history significant for pancytopenia diagnosed May 2020. Her blood work revealed pancytopenia.  CT chest abdomen pelvis revealed mild splenomegaly spleen size 15 cm with mild hepatomegaly as well as pancytopenia.  Bone marrow biopsy done on May 26, 2020 revealed hypercellular marrow with mild dysplastic features could not rule out low-grade MDS.    Repeated bone marrow biopsy done July 13, 2021 revealed similar changes.  Blood work confirmed vitamin B12 deficiency with serum B12 level of 150.  She was started on vitamin B12 replacement monthly. The patient is here today for scheduled follow up visit.     SUBJECTIVE: The patient is here today by herself.  She could not tolerate prednisone secondary to multiple side effects including anxiety and palpitations.  No bleeding.    PAST MEDICAL HISTORY/SOCIAL HISTORY/FAMILY HISTORY: Reviewed by me and unchanged from my documentation done on 08/23/21.    Review of Systems   Constitutional: Positive for fatigue. Negative for activity change, appetite change, chills, fever and unexpected weight change.   HENT: Negative for hearing loss, mouth sores, nosebleeds, sore throat and trouble swallowing.    Eyes: Negative for visual disturbance.   Respiratory: Negative for cough, chest tightness, shortness of breath and wheezing.    Cardiovascular: Negative for chest pain, palpitations and leg swelling.   Gastrointestinal: Negative for abdominal distention, abdominal pain, blood in stool, constipation, diarrhea, nausea, rectal pain and vomiting.   Endocrine: Negative for cold intolerance and heat intolerance.   Genitourinary: Negative for difficulty urinating, dysuria, frequency and urgency.   Musculoskeletal: Positive for arthralgias. Negative for back pain, gait problem, joint swelling and myalgias.        Brursitis  "right hip   Skin: Negative for rash.   Neurological: Negative for dizziness, tremors, syncope, weakness, light-headedness, numbness and headaches.   Hematological: Negative for adenopathy. Does not bruise/bleed easily.   Psychiatric/Behavioral: Negative for confusion, sleep disturbance and suicidal ideas. The patient is not nervous/anxious.          Current Outpatient Medications:   •  albuterol sulfate  (90 Base) MCG/ACT inhaler, Inhale 2 puffs Every 4 (Four) Hours As Needed for Shortness of Air., Disp: , Rfl:   •  cefdinir (OMNICEF) 300 MG capsule, Take 1 capsule by mouth 2 (Two) Times a Day., Disp: 14 capsule, Rfl: 0  •  cefuroxime (CEFTIN) 500 MG tablet, TAKE 1 TABLET BY MOUTH EVERY 12 HOURS FOR 4 DAYS, Disp: , Rfl:   •  ciprofloxacin (CIPRO) 250 MG tablet, Take 250 mg by mouth 2 (Two) Times a Day. for 7 days, Disp: , Rfl:   •  clotrimazole-betamethasone (LOTRISONE) 1-0.05 % cream, Apply  topically to the appropriate area as directed See Admin Instructions. Apply topically to the affected area twice daily, Disp: , Rfl:   •  Droplet Insulin Syringe 30G X 1/2\" 0.5 ML misc, , Disp: , Rfl:   •  furosemide (LASIX) 20 MG tablet, TAKE 1 TABLET BY MOUTH DAILY FOR FLUID RETENTION, Disp: , Rfl:   •  glimepiride (AMARYL) 2 MG tablet, Take 2 mg by mouth Every Morning., Disp: , Rfl:   •  HYDROcodone-acetaminophen (NORCO) 5-325 MG per tablet, Take 1 tablet by mouth Every 6 (Six) Hours As Needed for Moderate Pain ., Disp: 12 tablet, Rfl: 0  •  insulin NPH-insulin regular (HUMULIN 70/30) (70-30) 100 UNIT/ML injection, Inject 45 Units under the skin into the appropriate area as directed 2 (Two) Times a Day With Meals., Disp: , Rfl:   •  levothyroxine (SYNTHROID, LEVOTHROID) 100 MCG tablet, Take 100 mcg by mouth Daily., Disp: , Rfl:   •  lisinopril (PRINIVIL,ZESTRIL) 20 MG tablet, Take 20 mg by mouth Daily., Disp: , Rfl:   •  meclizine (ANTIVERT) 12.5 MG tablet, Take 12.5 mg by mouth 3 (Three) Times a Day As Needed " "(vertigo)., Disp: , Rfl:   •  metFORMIN (GLUCOPHAGE) 500 MG tablet, Take 1,000 mg by mouth 2 (Two) Times a Day., Disp: , Rfl:   •  nystatin (MYCOSTATIN) 495178 UNIT/GM cream, 2 (Two) Times a Day. to affected area, Disp: , Rfl:   •  omeprazole (PrilOSEC) 40 MG capsule, Take 1 capsule by mouth Daily., Disp: 30 capsule, Rfl: 0  •  ondansetron (ZOFRAN) 4 MG tablet, Take 1 tablet by mouth Every 8 (Eight) Hours As Needed for Nausea., Disp: 15 tablet, Rfl: 0  •  potassium chloride 10 MEQ CR tablet, TAKE 1 TABLET BY MOUTH EVERY DAY WITH FLUID PILL, Disp: , Rfl:   •  predniSONE (DELTASONE) 10 MG tablet, Take 30mg by mouth daily starting day after surgery, decrease by 10mg every 3 days until complete, then stop.  Take with breakfast., Disp: 21 tablet, Rfl: 0  •  predniSONE (DELTASONE) 10 MG tablet, Take 60mg daily for 3 days.  Decrease by 10mg every 3 days until on 10mg daily., Disp: 120 tablet, Rfl: 0  •  propranolol (INDERAL) 40 MG tablet, Take 1 tablet by mouth 3 (Three) Times a Day., Disp: 270 tablet, Rfl: 3  •  tamsulosin (FLOMAX) 0.4 MG capsule 24 hr capsule, Take 1 capsule by mouth Daily., Disp: 10 capsule, Rfl: 0  •  traMADol (ULTRAM) 50 MG tablet, TAKE 1 TO 2 TABLETS BY MOUTH UP TO THREE TIMES DAILY AS NEEDED, Disp: , Rfl:   •  warfarin (COUMADIN) 2 MG tablet, Take 2 mg by mouth Daily., Disp: , Rfl:     PHYSICAL EXAMINATION:   /63   Pulse 73   Temp 97.5 °F (36.4 °C) (Temporal)   Resp 16   Ht 160 cm (62.99\")   Wt 68.5 kg (151 lb)   LMP  (LMP Unknown) Comment: mammogram is up to date  SpO2 97%   BMI 26.76 kg/m²    Pain Score    08/23/21 1118   PainSc: 0-No pain       ECOG Performance Status: 1 - Symptomatic but completely ambulatory  General Appearance:  alert, cooperative, no apparent distress and appears stated age   Neurologic/Psychiatric: A&O x 3, gait steady, appropriate affect, strength 5/5 in all muscle groups   HEENT:  Normocephalic, without obvious abnormality, mucous membranes moist   Neck: " Supple, symmetrical, trachea midline, no adenopathy;  No thyromegaly, masses, or tenderness   Lungs:   Clear to auscultation bilaterally; respirations regular, even, and unlabored bilaterally   Heart:  Regular rate and rhythm, no murmurs appreciated   Abdomen:   Soft, non-tender, non-distended and no organomegaly   Lymph nodes: No cervical, supraclavicular, inguinal or axillary adenopathy noted   Extremities: Normal, atraumatic; no clubbing, cyanosis, or edema    Skin: No rashes, ulcers, or suspicious lesions noted     Lab on 08/23/2021   Component Date Value Ref Range Status   • WBC 08/23/2021 1.40* 3.40 - 10.80 10*3/mm3 Final   • RBC 08/23/2021 3.38* 3.77 - 5.28 10*6/mm3 Final   • Hemoglobin 08/23/2021 9.7* 12.0 - 15.9 g/dL Final   • Hematocrit 08/23/2021 30.1* 34.0 - 46.6 % Final   • RDW 08/23/2021 14.3  12.3 - 15.4 % Final   • MCV 08/23/2021 89.1  79.0 - 97.0 fL Final   • MCH 08/23/2021 28.8  26.6 - 33.0 pg Final   • MCHC 08/23/2021 32.3  31.5 - 35.7 g/dL Final   • MPV 08/23/2021 5.9* 6.0 - 12.0 fL Final   • Platelets 08/23/2021 57* 140 - 450 10*3/mm3 Final   • Neutrophil % 08/23/2021 55.9  42.7 - 76.0 % Final   • Lymphocyte % 08/23/2021 34.8  19.6 - 45.3 % Final   • Monocyte % 08/23/2021 9.3  5.0 - 12.0 % Final   • Neutrophils, Absolute 08/23/2021 0.80* 1.70 - 7.00 10*3/mm3 Final   • Lymphocytes, Absolute 08/23/2021 0.50* 0.70 - 3.10 10*3/mm3 Final   • Monocytes, Absolute 08/23/2021 0.10  0.10 - 0.90 10*3/mm3 Final        No results found.    ASSESSMENT: The patient is a very pleasant 86 y.o. female  with pancytopenia.     PROBLEM LIST:  1.  Pancytopenia:  A.  Incidentally noted blood work done May 24, 2020  B.  CT abdomen pelvis revealed mild hepatosplenomegaly spleen size 15 cm with normal liver enzymes  C.  Bone marrow biopsy done on May 26, 2020 revealed hypercellular marrow 66% cellularity with mild megakaryocytes atypia suspicious for low-grade MDS  D.  Repeat bone marrow biopsy done July 13, 2021  revealed hypercellular marrow with dyserythropoiesis  2.  Vitamin B12 deficiency:  A.  Vitamin B12 level 155 on May 25, 2020  3. Hypothyroidism  4. Diabetes  5. History of pulmonary embolism    PLAN:  1. I reviewed the lab results with the patient from today.  Her pancytopenia is essentially stable white cells 1.4 hemoglobin 9.7 and platelets 57.  I explained the patient this is most likely induced by peripheral sequestration from splenomegaly.  2.  The patient has been off steroids for 3 weeks.  3. She will continue monthly vitamin B12 replacement 1000 mcg.  I gave her a new refill today.   4.  We will see the patient back in 3 months with repeat labs including CBC and vitamin  B12.   5. I asked her to call if she has any new symptoms such as evidence of bleeding or bruising, fevers, chills, or night sweats.   6. She will continue levothyroxine 100 mcg daily for hypothyroidism.       Dinh Lyman MD  8/23/2021

## 2021-10-19 ENCOUNTER — TRANSCRIBE ORDERS (OUTPATIENT)
Dept: ADMINISTRATIVE | Facility: HOSPITAL | Age: 86
End: 2021-10-19

## 2021-10-19 DIAGNOSIS — Z12.31 VISIT FOR SCREENING MAMMOGRAM: Primary | ICD-10-CM

## 2021-10-22 ENCOUNTER — TELEPHONE (OUTPATIENT)
Dept: ONCOLOGY | Facility: CLINIC | Age: 86
End: 2021-10-22

## 2021-10-22 ENCOUNTER — OFFICE VISIT (OUTPATIENT)
Dept: NEUROLOGY | Facility: CLINIC | Age: 86
End: 2021-10-22

## 2021-10-22 VITALS
BODY MASS INDEX: 27.79 KG/M2 | SYSTOLIC BLOOD PRESSURE: 130 MMHG | DIASTOLIC BLOOD PRESSURE: 60 MMHG | OXYGEN SATURATION: 98 % | HEIGHT: 62 IN | WEIGHT: 151 LBS | HEART RATE: 70 BPM | TEMPERATURE: 96.8 F

## 2021-10-22 DIAGNOSIS — G25.0 BENIGN FAMILIAL TREMOR: ICD-10-CM

## 2021-10-22 PROCEDURE — 99213 OFFICE O/P EST LOW 20 MIN: CPT | Performed by: NURSE PRACTITIONER

## 2021-10-22 RX ORDER — PROPRANOLOL HYDROCHLORIDE 40 MG/1
40 TABLET ORAL 3 TIMES DAILY
Qty: 270 TABLET | Refills: 3 | Status: ON HOLD | OUTPATIENT
Start: 2021-10-22 | End: 2023-03-31 | Stop reason: SDUPTHER

## 2021-10-22 NOTE — PROGRESS NOTES
Subjective:     Patient ID: Chey Robertson is a 86 y.o. female.    CC:   Chief Complaint   Patient presents with   • Tremors       HPI:   History of Present Illness     Ms. Robertson is here today for her annual follow-up.  She has been seeing Dr. Tejeda for many years for her benign essential tremor.  She reports her tremor is currently well controlled on propranolol 40 mg 3 times a day.  Her tremor is most notable in her hands and voice but she rates this is mild, she would like to continue current dose of medicines  She denies any new neurological symptoms.   Since she was here last she was found to have issues with low white blood cell count and she has been seeing hematology.  She is also had back surgery which she has done very well with  She has no complaints today  The following portions of the patient's history were reviewed and updated as appropriate: allergies, current medications, past family history, past medical history, past social history, past surgical history and problem list.    Past Medical History:   Diagnosis Date   • Anemia    • Arthritis    • Diabetes mellitus (HCC)     checks sugar only when pt wants to    • Disease of thyroid gland    • History of transfusion     with knee surgery-  no reaction recalled.    • [place] (hard of hearing)     no hearing aids   • Hypertension    • Migraine without aura and without status migrainosus, not intractable 12/30/2016   • Pulmonary emboli (HCC) 2011    (after knee surgery)   • SOBOE (shortness of breath on exertion)    • Tremors of nervous system    • Vertigo    • Wears dentures     upper    • Wears glasses        Past Surgical History:   Procedure Laterality Date   • BLADDER SURGERY     • CATARACT EXTRACTION      bilat    • CHOLECYSTECTOMY     • COLONOSCOPY     • HYSTERECTOMY     • KYPHOPLASTY N/A 2/12/2021    Procedure: KYPHOPLASTY T11, T12 AND L4;  Surgeon: Matty Hearn MD;  Location: [facility];  Service: Orthopedic Spine;  Laterality: N/A;   •  "OOPHORECTOMY     • TONSILLECTOMY         Social History     Socioeconomic History   • Marital status:    Tobacco Use   • Smoking status: Never Smoker   • Smokeless tobacco: Never Used   Vaping Use   • Vaping Use: Never used   Substance and Sexual Activity   • Alcohol use: No   • Drug use: No   • Sexual activity: Defer       Family History   Problem Relation Age of Onset   • Breast cancer Sister 84   • Stroke Mother    • Heart attack Father    • Ovarian cancer Neg Hx         Review of Systems   Constitutional: Negative.    HENT: Negative.    Eyes: Negative.    Respiratory: Negative.    Cardiovascular: Negative.    Gastrointestinal: Negative.    Endocrine: Negative.    Genitourinary: Negative.    Musculoskeletal: Negative.    Skin: Negative.    Allergic/Immunologic: Negative.    Neurological: Positive for tremors. Negative for dizziness, seizures, syncope, facial asymmetry, speech difficulty, weakness, light-headedness, numbness and headaches.   Hematological: Negative.    Psychiatric/Behavioral: Negative.         Objective:  /60   Pulse 70   Temp 96.8 °F (36 °C)   Ht 157.5 cm (62\")   Wt 68.5 kg (151 lb)   LMP  (LMP Unknown) Comment: mammogram is up to date  SpO2 98%   BMI 27.62 kg/m²     Neurologic Exam     Mental Status   Oriented to person, place, and time.   Attention: normal. Concentration: normal.   Speech: speech is normal   Level of consciousness: alert    Cranial Nerves   Cranial nerves II through XII intact.     CN III, IV, VI   Pupils are equal, round, and reactive to light.    Motor Exam   Muscle bulk: normal  Overall muscle tone: normal  Right arm pronator drift: absent  Left arm pronator drift: absent    Strength   Strength 5/5 throughout. She has a very slight kinetic upper extremity tremor, mild voice tremor     Gait, Coordination, and Reflexes     Gait  Gait: normal    Coordination   Finger to nose coordination: normal      Physical Exam  Vitals reviewed.   Constitutional:       " General: She is not in acute distress.     Appearance: Normal appearance. She is well-developed. She is not ill-appearing or toxic-appearing.   HENT:      Head: Normocephalic and atraumatic.      Mouth/Throat:      Mouth: Mucous membranes are moist.   Eyes:      General: No scleral icterus.     Extraocular Movements: Extraocular movements intact.      Conjunctiva/sclera: Conjunctivae normal.      Pupils: Pupils are equal, round, and reactive to light.   Pulmonary:      Effort: Pulmonary effort is normal. No respiratory distress.   Musculoskeletal:      Cervical back: Normal range of motion and neck supple.   Skin:     General: Skin is warm.      Capillary Refill: Capillary refill takes less than 2 seconds.   Neurological:      Mental Status: She is alert and oriented to person, place, and time.      Coordination: Finger-Nose-Finger Test normal.      Gait: Gait is intact.      Deep Tendon Reflexes: Strength normal.   Psychiatric:         Mood and Affect: Mood normal.         Speech: Speech normal.         Behavior: Behavior normal.         Thought Content: Thought content normal.         Judgment: Judgment normal.         Assessment/Plan:       Diagnoses and all orders for this visit:    1. Benign familial tremor  -     propranolol (INDERAL) 40 MG tablet; Take 1 tablet by mouth 3 (Three) Times a Day.  Dispense: 270 tablet; Refill: 3    She will continue current dose of propranolol as this has been working very well with her with no adverse side effects.  She will continue follow-up with her primary care as well as other specialist as scheduled  Follow-up here in 1 year or sooner if needed, she is encouraged to notify our office with any questions concerns or problems prior to follow-up appointment         Reviewed medications, potential side effects and signs and symptoms to report. Discussed risk versus benefits of treatment plan with patient and/or family-including medications, labs and radiology that may be  ordered. Addressed questions and concerns during visit. Patient and/or family verbalized understanding and agree with plan.    AS THE PROVIDER, I PERSONALLY WORE PPE DURING ENTIRE FACE TO FACE ENCOUNTER IN CLINIC WITH THE PATIENT. PATIENT ALSO WORE PPE DURING ENTIRE FACE TO FACE ENCOUNTER EXCEPT FOR A MAX OF 30 SECONDS DURING NEUROLOGICAL EVALUATION OF CRANIAL NERVES AND THEN MASK WAS PLACED BACK OVER PATIENT FACE FOR REMAINDER OF VISIT. I WASHED MY HANDS BEFORE AND AFTER VISIT.    During this visit the following were done:  Labs Reviewed []    Labs Ordered []    Radiology Reports Reviewed []    Radiology Ordered []    PCP Records Reviewed []    Referring Provider Records Reviewed []    ER Records Reviewed []    Hospital Records Reviewed []    History Obtained From Family []    Radiology Images Reviewed []    Other Reviewed []    Records Requested []      Samanta Quesada, CHANG  10/22/2021

## 2021-10-22 NOTE — TELEPHONE ENCOUNTER
Caller: Chey Robertson    Relationship: Self    Best call back number: [phone]    What is the best time to reach you:   IN AND OUT THROUGH THE DAY BUT WILL BE MOSTLY HOME LATER IN THE DAY     Who are you requesting to speak with (clinical staff, provider,  specific staff member):   DR GARCÍA OR NURSE    Do you know the name of the person who called: CHEY    What was the call regarding:   HAD CALLED REGARDING APPT FOR Monday 10/25 BUT IT WAS CANCELLED AND WAS NOT AWARE  WAS CANCELLED OR NEW APPT THAT WAS SCHEDULED FOR 03/14/2022 WOULD LIKE TO KNOW WHY WAS CANCELLED OR IF CANCELLED IN ERROR.     Do you require a callback: YES      CAN LEAVE VOICE MAIL IF MISSES THE CALL.

## 2021-11-23 ENCOUNTER — HOSPITAL ENCOUNTER (OUTPATIENT)
Dept: MAMMOGRAPHY | Facility: HOSPITAL | Age: 86
Discharge: HOME | End: 2021-11-23

## 2021-11-23 DIAGNOSIS — Z12.31 VISIT FOR SCREENING MAMMOGRAM: ICD-10-CM

## 2021-11-24 NOTE — PROGRESS NOTES
DATE OF VISIT: 11/29/2021    REASON FOR VISIT: Followup for pancytopenia.     HISTORY OF PRESENT ILLNESS: The patient is a very pleasant 86 y.o. female  with past medical history significant for pancytopenia diagnosed May 2020. Her blood work revealed pancytopenia.  CT chest abdomen pelvis revealed mild splenomegaly spleen size 15 cm with mild hepatomegaly as well as pancytopenia.  Bone marrow biopsy done on May 26, 2020 revealed hypercellular marrow with mild dysplastic features could not rule out low-grade MDS.    Repeated bone marrow biopsy done July 13, 2021 revealed similar changes.  Blood work confirmed vitamin B12 deficiency with serum B12 level of 150.  She was started on vitamin B12 replacement monthly. The patient is here today for scheduled follow up visit.     SUBJECTIVE: The patient is here today with her son.  She is doing fairly well no bleeding denies any fever chills night sweats.    PAST MEDICAL HISTORY/SOCIAL HISTORY/FAMILY HISTORY: Reviewed by me and unchanged from my documentation done on 11/29/21.    Review of Systems   Constitutional: Positive for fatigue. Negative for activity change, appetite change, chills, fever and unexpected weight change.   HENT: Negative for hearing loss, mouth sores, nosebleeds, sore throat and trouble swallowing.    Eyes: Negative for visual disturbance.   Respiratory: Negative for cough, chest tightness, shortness of breath and wheezing.    Cardiovascular: Negative for chest pain, palpitations and leg swelling.   Gastrointestinal: Negative for abdominal distention, abdominal pain, blood in stool, constipation, diarrhea, nausea, rectal pain and vomiting.   Endocrine: Negative for cold intolerance and heat intolerance.   Genitourinary: Negative for difficulty urinating, dysuria, frequency and urgency.   Musculoskeletal: Positive for arthralgias. Negative for back pain, gait problem, joint swelling and myalgias.        Brursitis right hip   Skin: Negative for rash.  "  Neurological: Negative for dizziness, tremors, syncope, weakness, light-headedness, numbness and headaches.   Hematological: Negative for adenopathy. Does not bruise/bleed easily.   Psychiatric/Behavioral: Negative for confusion, sleep disturbance and suicidal ideas. The patient is not nervous/anxious.          Current Outpatient Medications:   •  albuterol sulfate  (90 Base) MCG/ACT inhaler, Inhale 2 puffs Every 4 (Four) Hours As Needed for Shortness of Air., Disp: , Rfl:   •  cefdinir (OMNICEF) 300 MG capsule, Take 1 capsule by mouth 2 (Two) Times a Day., Disp: 14 capsule, Rfl: 0  •  cefuroxime (CEFTIN) 500 MG tablet, TAKE 1 TABLET BY MOUTH EVERY 12 HOURS FOR 4 DAYS, Disp: , Rfl:   •  clotrimazole-betamethasone (LOTRISONE) 1-0.05 % cream, Apply  topically to the appropriate area as directed See Admin Instructions. Apply topically to the affected area twice daily, Disp: , Rfl:   •  Droplet Insulin Syringe 30G X 1/2\" 0.5 ML misc, , Disp: , Rfl:   •  furosemide (LASIX) 20 MG tablet, TAKE 1 TABLET BY MOUTH DAILY FOR FLUID RETENTION, Disp: , Rfl:   •  glimepiride (AMARYL) 2 MG tablet, Take 2 mg by mouth Every Morning., Disp: , Rfl:   •  HYDROcodone-acetaminophen (NORCO) 5-325 MG per tablet, Take 1 tablet by mouth Every 6 (Six) Hours As Needed for Moderate Pain ., Disp: 12 tablet, Rfl: 0  •  insulin NPH-insulin regular (HUMULIN 70/30) (70-30) 100 UNIT/ML injection, Inject 45 Units under the skin into the appropriate area as directed 2 (Two) Times a Day With Meals., Disp: , Rfl:   •  levothyroxine (SYNTHROID, LEVOTHROID) 100 MCG tablet, Take 100 mcg by mouth Daily., Disp: , Rfl:   •  lisinopril (PRINIVIL,ZESTRIL) 20 MG tablet, Take 20 mg by mouth Daily., Disp: , Rfl:   •  meclizine (ANTIVERT) 12.5 MG tablet, Take 12.5 mg by mouth 3 (Three) Times a Day As Needed (vertigo)., Disp: , Rfl:   •  metFORMIN (GLUCOPHAGE) 500 MG tablet, Take 1,000 mg by mouth 2 (Two) Times a Day., Disp: , Rfl:   •  nystatin (MYCOSTATIN) " "490261 UNIT/GM cream, 2 (Two) Times a Day. to affected area, Disp: , Rfl:   •  omeprazole (PrilOSEC) 40 MG capsule, Take 1 capsule by mouth Daily., Disp: 30 capsule, Rfl: 0  •  ondansetron (ZOFRAN) 4 MG tablet, Take 1 tablet by mouth Every 8 (Eight) Hours As Needed for Nausea., Disp: 15 tablet, Rfl: 0  •  potassium chloride 10 MEQ CR tablet, TAKE 1 TABLET BY MOUTH EVERY DAY WITH FLUID PILL, Disp: , Rfl:   •  propranolol (INDERAL) 40 MG tablet, Take 1 tablet by mouth 3 (Three) Times a Day., Disp: 270 tablet, Rfl: 3  •  tamsulosin (FLOMAX) 0.4 MG capsule 24 hr capsule, Take 1 capsule by mouth Daily., Disp: 10 capsule, Rfl: 0  •  traMADol (ULTRAM) 50 MG tablet, TAKE 1 TO 2 TABLETS BY MOUTH UP TO THREE TIMES DAILY AS NEEDED, Disp: , Rfl:   •  warfarin (COUMADIN) 2 MG tablet, Take 2 mg by mouth Daily., Disp: , Rfl:     PHYSICAL EXAMINATION:   /74   Pulse 68   Temp 97.2 °F (36.2 °C) (Temporal)   Resp 16   Ht 157.5 cm (62.01\")   Wt 67.1 kg (148 lb)   LMP  (LMP Unknown) Comment: mammogram is up to date  SpO2 96%   BMI 27.06 kg/m²    Pain Score    11/29/21 1052   PainSc: 0-No pain       ECOG Performance Status: 1 - Symptomatic but completely ambulatory  General Appearance:  alert, cooperative, no apparent distress and appears stated age   Neurologic/Psychiatric: A&O x 3, gait steady, appropriate affect, strength 5/5 in all muscle groups   HEENT:  Normocephalic, without obvious abnormality, mucous membranes moist   Neck: Supple, symmetrical, trachea midline, no adenopathy;  No thyromegaly, masses, or tenderness   Lungs:   Clear to auscultation bilaterally; respirations regular, even, and unlabored bilaterally   Heart:  Regular rate and rhythm, no murmurs appreciated   Abdomen:   Soft, non-tender, non-distended and no organomegaly   Lymph nodes: No cervical, supraclavicular, inguinal or axillary adenopathy noted   Extremities: Normal, atraumatic; no clubbing, cyanosis, or edema    Skin: No rashes, ulcers, or " suspicious lesions noted     Lab on 11/29/2021   Component Date Value Ref Range Status   • WBC 11/29/2021 1.40* 3.40 - 10.80 10*3/mm3 Final    Verified by repeat analysis.    • RBC 11/29/2021 3.35* 3.77 - 5.28 10*6/mm3 Final   • Hemoglobin 11/29/2021 9.9* 12.0 - 15.9 g/dL Final   • Hematocrit 11/29/2021 29.6* 34.0 - 46.6 % Final   • RDW 11/29/2021 14.6  12.3 - 15.4 % Final   • MCV 11/29/2021 88.4  79.0 - 97.0 fL Final   • MCH 11/29/2021 29.5  26.6 - 33.0 pg Final   • MCHC 11/29/2021 33.3  31.5 - 35.7 g/dL Final   • MPV 11/29/2021 6.8  6.0 - 12.0 fL Final   • Platelets 11/29/2021 44* 140 - 450 10*3/mm3 Final    Verified by repeat analysis.    • Neutrophil % 11/29/2021 60.7  42.7 - 76.0 % Final   • Lymphocyte % 11/29/2021 34.4  19.6 - 45.3 % Final   • Monocyte % 11/29/2021 4.9* 5.0 - 12.0 % Final   • Neutrophils, Absolute 11/29/2021 0.80* 1.70 - 7.00 10*3/mm3 Final   • Lymphocytes, Absolute 11/29/2021 0.50* 0.70 - 3.10 10*3/mm3 Final   • Monocytes, Absolute 11/29/2021 0.10  0.10 - 0.90 10*3/mm3 Final        No results found.    ASSESSMENT: The patient is a very pleasant 86 y.o. female  with pancytopenia.     PROBLEM LIST:  1.  Pancytopenia:  A.  Incidentally noted blood work done May 24, 2020  B.  CT abdomen pelvis revealed mild hepatosplenomegaly spleen size 15 cm with normal liver enzymes  C.  Bone marrow biopsy done on May 26, 2020 revealed hypercellular marrow 66% cellularity with mild megakaryocytes atypia suspicious for low-grade MDS  D.  Repeat bone marrow biopsy done July 13, 2021 revealed hypercellular marrow with dyserythropoiesis  2.  Vitamin B12 deficiency:  A.  Vitamin B12 level 155 on May 25, 2020  3. Hypothyroidism  4. Diabetes  5. History of pulmonary embolism    PLAN:  1. I reviewed the lab results with the patient from today.  I explained the patient that her pancytopenia is essentially stable her white blood cells are down to 1.4 hemoglobin stable at 9.9 platelets stable at 44.  I explained the  patient this is most likely induced by peripheral sequestration from splenomegaly.  2.  The patient has been off steroids.  I would like to keep her off steroids at this point given the fact that her counts are essentially stable and try to avoid further immunosuppressive state during the pandemic.  3. She will continue monthly vitamin B12 replacement 1000 mcg.  I gave her a new refill today.   4.  We will see the patient back in 6 months with repeat labs including CBC and vitamin  B12.   5. I asked her to call if she has any new symptoms such as evidence of bleeding or bruising, fevers, chills, or night sweats.   6. She will continue levothyroxine 100 mcg daily for hypothyroidism.       Dinh Lyman MD  11/29/2021

## 2021-11-29 ENCOUNTER — LAB (OUTPATIENT)
Dept: LAB | Facility: HOSPITAL | Age: 86
End: 2021-11-29

## 2021-11-29 ENCOUNTER — OFFICE VISIT (OUTPATIENT)
Dept: ONCOLOGY | Facility: CLINIC | Age: 86
End: 2021-11-29

## 2021-11-29 ENCOUNTER — TELEPHONE (OUTPATIENT)
Dept: ONCOLOGY | Facility: CLINIC | Age: 86
End: 2021-11-29

## 2021-11-29 VITALS
WEIGHT: 148 LBS | BODY MASS INDEX: 27.23 KG/M2 | RESPIRATION RATE: 16 BRPM | HEART RATE: 68 BPM | OXYGEN SATURATION: 96 % | DIASTOLIC BLOOD PRESSURE: 74 MMHG | HEIGHT: 62 IN | SYSTOLIC BLOOD PRESSURE: 175 MMHG | TEMPERATURE: 97.2 F

## 2021-11-29 DIAGNOSIS — D61.818 PANCYTOPENIA (HCC): Primary | ICD-10-CM

## 2021-11-29 DIAGNOSIS — D61.818 PANCYTOPENIA (HCC): ICD-10-CM

## 2021-11-29 LAB
ERYTHROCYTE [DISTWIDTH] IN BLOOD BY AUTOMATED COUNT: 14.6 % (ref 12.3–15.4)
HCT VFR BLD AUTO: 29.6 % (ref 34–46.6)
HGB BLD-MCNC: 9.9 G/DL (ref 12–15.9)
LYMPHOCYTES # BLD AUTO: 0.5 10*3/MM3 (ref 0.7–3.1)
LYMPHOCYTES NFR BLD AUTO: 34.4 % (ref 19.6–45.3)
MCH RBC QN AUTO: 29.5 PG (ref 26.6–33)
MCHC RBC AUTO-ENTMCNC: 33.3 G/DL (ref 31.5–35.7)
MCV RBC AUTO: 88.4 FL (ref 79–97)
MONOCYTES # BLD AUTO: 0.1 10*3/MM3 (ref 0.1–0.9)
MONOCYTES NFR BLD AUTO: 4.9 % (ref 5–12)
NEUTROPHILS NFR BLD AUTO: 0.8 10*3/MM3 (ref 1.7–7)
NEUTROPHILS NFR BLD AUTO: 60.7 % (ref 42.7–76)
PLATELET # BLD AUTO: 44 10*3/MM3 (ref 140–450)
PMV BLD AUTO: 6.8 FL (ref 6–12)
RBC # BLD AUTO: 3.35 10*6/MM3 (ref 3.77–5.28)
VIT B12 BLD-MCNC: 465 PG/ML (ref 211–946)
WBC NRBC COR # BLD: 1.4 10*3/MM3 (ref 3.4–10.8)

## 2021-11-29 PROCEDURE — 85025 COMPLETE CBC W/AUTO DIFF WBC: CPT

## 2021-11-29 PROCEDURE — 99214 OFFICE O/P EST MOD 30 MIN: CPT | Performed by: INTERNAL MEDICINE

## 2021-11-29 PROCEDURE — 82607 VITAMIN B-12: CPT

## 2021-11-29 PROCEDURE — 36415 COLL VENOUS BLD VENIPUNCTURE: CPT

## 2021-11-29 NOTE — TELEPHONE ENCOUNTER
Name of Physician notified: Dinh Lyman    Time Physician Notified: 12:03      []  Orders received    []  Protocol/Standing orders followed    [x]  No new orders

## 2021-11-29 NOTE — TELEPHONE ENCOUNTER
----- Message from Mali Daley RN sent at 11/29/2021 11:00 AM EST -----      Critical Test Results      MD: Nura    Date: 11/29/2021     Critical test result:  WBC 1.4 they also notified that platelets are 44K - not critical    Time results received:  11:00

## 2022-03-01 ENCOUNTER — APPOINTMENT (OUTPATIENT)
Dept: MAMMOGRAPHY | Facility: HOSPITAL | Age: 87
End: 2022-03-01

## 2022-04-04 ENCOUNTER — HOSPITAL ENCOUNTER (OUTPATIENT)
Dept: MAMMOGRAPHY | Facility: HOSPITAL | Age: 87
Discharge: HOME | End: 2022-04-04
Admitting: INTERNAL MEDICINE

## 2022-04-04 PROCEDURE — 77067 SCR MAMMO BI INCL CAD: CPT | Performed by: RADIOLOGY

## 2022-04-04 PROCEDURE — 77067 SCR MAMMO BI INCL CAD: CPT

## 2022-04-04 PROCEDURE — 77063 BREAST TOMOSYNTHESIS BI: CPT | Performed by: RADIOLOGY

## 2022-04-04 PROCEDURE — 77063 BREAST TOMOSYNTHESIS BI: CPT

## 2022-06-06 DIAGNOSIS — D61.818 PANCYTOPENIA: Primary | ICD-10-CM

## 2022-06-14 ENCOUNTER — LAB (OUTPATIENT)
Dept: LAB | Facility: HOSPITAL | Age: 87
End: 2022-06-14

## 2022-06-14 ENCOUNTER — TELEPHONE (OUTPATIENT)
Dept: ONCOLOGY | Facility: CLINIC | Age: 87
End: 2022-06-14

## 2022-06-14 ENCOUNTER — OFFICE VISIT (OUTPATIENT)
Dept: ONCOLOGY | Facility: CLINIC | Age: 87
End: 2022-06-14

## 2022-06-14 VITALS
RESPIRATION RATE: 16 BRPM | SYSTOLIC BLOOD PRESSURE: 152 MMHG | WEIGHT: 137 LBS | TEMPERATURE: 97.1 F | HEIGHT: 62 IN | BODY MASS INDEX: 25.21 KG/M2 | HEART RATE: 63 BPM | OXYGEN SATURATION: 97 % | DIASTOLIC BLOOD PRESSURE: 69 MMHG

## 2022-06-14 DIAGNOSIS — D69.6 THROMBOCYTOPENIA: Primary | ICD-10-CM

## 2022-06-14 DIAGNOSIS — D61.818 PANCYTOPENIA: ICD-10-CM

## 2022-06-14 DIAGNOSIS — D50.0 IRON DEFICIENCY ANEMIA DUE TO CHRONIC BLOOD LOSS: ICD-10-CM

## 2022-06-14 LAB
ERYTHROCYTE [DISTWIDTH] IN BLOOD BY AUTOMATED COUNT: 15.8 % (ref 12.3–15.4)
HCT VFR BLD AUTO: 32 % (ref 34–46.6)
HGB BLD-MCNC: 10.2 G/DL (ref 12–15.9)
LYMPHOCYTES # BLD AUTO: 0.6 10*3/MM3 (ref 0.7–3.1)
LYMPHOCYTES NFR BLD AUTO: 36.7 % (ref 19.6–45.3)
MCH RBC QN AUTO: 28.3 PG (ref 26.6–33)
MCHC RBC AUTO-ENTMCNC: 32 G/DL (ref 31.5–35.7)
MCV RBC AUTO: 88.5 FL (ref 79–97)
MONOCYTES # BLD AUTO: 0.1 10*3/MM3 (ref 0.1–0.9)
MONOCYTES NFR BLD AUTO: 8 % (ref 5–12)
NEUTROPHILS NFR BLD AUTO: 0.9 10*3/MM3 (ref 1.7–7)
NEUTROPHILS NFR BLD AUTO: 55.3 % (ref 42.7–76)
PLATELET # BLD AUTO: 34 10*3/MM3 (ref 140–450)
PMV BLD AUTO: 6.1 FL (ref 6–12)
RBC # BLD AUTO: 3.61 10*6/MM3 (ref 3.77–5.28)
VIT B12 BLD-MCNC: 897 PG/ML (ref 211–946)
WBC NRBC COR # BLD: 1.6 10*3/MM3 (ref 3.4–10.8)

## 2022-06-14 PROCEDURE — 85025 COMPLETE CBC W/AUTO DIFF WBC: CPT

## 2022-06-14 PROCEDURE — 36415 COLL VENOUS BLD VENIPUNCTURE: CPT

## 2022-06-14 PROCEDURE — 82607 VITAMIN B-12: CPT

## 2022-06-14 PROCEDURE — 99213 OFFICE O/P EST LOW 20 MIN: CPT | Performed by: NURSE PRACTITIONER

## 2022-06-14 RX ORDER — DOXYCYCLINE HYCLATE 100 MG/1
CAPSULE ORAL
COMMUNITY
Start: 2022-03-15 | End: 2023-03-31 | Stop reason: HOSPADM

## 2022-06-14 RX ORDER — BENZONATATE 100 MG/1
100 CAPSULE ORAL 3 TIMES DAILY PRN
COMMUNITY
Start: 2022-05-14 | End: 2023-03-31 | Stop reason: HOSPADM

## 2022-06-14 RX ORDER — NITROFURANTOIN 25; 75 MG/1; MG/1
100 CAPSULE ORAL 2 TIMES DAILY
COMMUNITY
Start: 2022-06-04 | End: 2023-03-31 | Stop reason: HOSPADM

## 2022-06-14 RX ORDER — CALCIUM CITRATE/VITAMIN D3 200MG-6.25
TABLET ORAL SEE ADMIN INSTRUCTIONS
COMMUNITY
Start: 2022-04-25

## 2022-06-14 RX ORDER — CEPHALEXIN 500 MG/1
500 CAPSULE ORAL 2 TIMES DAILY
COMMUNITY
Start: 2022-05-14 | End: 2023-03-31 | Stop reason: HOSPADM

## 2022-06-14 NOTE — TELEPHONE ENCOUNTER
Critical Test Results      MD: Dinh Lyman MD    Date: 6/14/2022     Critical test result: WBC 1.6  Time results received: 13:35          Name of Physician notified: Jeanna Grande, APRN    Time Physician Notified: 13:37      []  Orders received    []  Protocol/Standing orders followed    [x]  No new orders

## 2022-06-14 NOTE — PROGRESS NOTES
DATE OF VISIT: 6/14/2022    REASON FOR VISIT: Followup for pancytopenia.    PROBLEM LIST:  1.  Pancytopenia:  A.  Incidentally noted blood work done May 24, 2020  B.  CT abdomen pelvis revealed mild hepatosplenomegaly spleen size 15 cm with normal liver enzymes  C.  Bone marrow biopsy done on May 26, 2020 revealed hypercellular marrow 66% cellularity with mild megakaryocytes atypia suspicious for low-grade MDS  D.  Repeat bone marrow biopsy done July 13, 2021 revealed hypercellular marrow with dyserythropoiesis  2.  Vitamin B12 deficiency:  A.  Vitamin B12 level 155 on May 25, 2020  3. Hypothyroidism  4. Diabetes  5. History of pulmonary embolism     HISTORY OF PRESENT ILLNESS: The patient is a very pleasant 86 y.o. female  with past medical history significant for pancytopenia diagnosed May 2020. Her blood work revealed pancytopenia.  CT chest abdomen pelvis revealed mild splenomegaly spleen size 15 cm with mild hepatomegaly as well as pancytopenia.  Bone marrow biopsy done on May 26, 2020 revealed hypercellular marrow with mild dysplastic features could not rule out low-grade MDS.    Repeated bone marrow biopsy done July 13, 2021 revealed similar changes.  Blood work confirmed vitamin B12 deficiency with serum B12 level of 150.  She was started on vitamin B12 replacement monthly. The patient is here today for scheduled follow up visit.     SUBJECTIVE: The patient is here today by herself. She has been doing fairly well since her last visit. She had COVID a few weeks ago. She was able to avoid hospitalization and feels like she has recovered completely. She lost quite a bit of weight with her illnesses, but is eating better now. She has not had any hospitalizations or surgeries since her last visit. She denies increased bruising or evidence of bleeding. She has had no fevers, chills, or night sweats. She still lives independently and is still driving.     Past History:  Medical History: has a past medical history  "of Anemia, Arthritis, Diabetes mellitus (HCC), Disease of thyroid gland, History of transfusion, [place] (hard of hearing), Hypertension, Migraine without aura and without status migrainosus, not intractable (12/30/2016), Pulmonary emboli (HCC) (2011), SOBOE (shortness of breath on exertion), Tremors of nervous system, Vertigo, Wears dentures, and Wears glasses.   Surgical History: has a past surgical history that includes Hysterectomy; Oophorectomy; Tonsillectomy; Bladder surgery; Cataract extraction; Cholecystectomy; Colonoscopy; and Kyphoplasty (N/A, 2/12/2021).   Family History: family history includes Breast cancer (age of onset: 84) in her sister; Heart attack in her father; Stroke in her mother.   Social History: reports that she has never smoked. She has never used smokeless tobacco. She reports that she does not drink alcohol and does not use drugs.    (Not in a hospital admission)     Allergies: Aspirin      Review of Systems   Constitutional: Positive for fatigue and unexpected weight change.   Musculoskeletal: Positive for arthralgias.   Neurological: Positive for weakness.         PHYSICAL EXAMINATION:   /69   Pulse 63   Temp 97.1 °F (36.2 °C) (Temporal)   Resp 16   Ht 157.5 cm (62.01\")   Wt 62.1 kg (137 lb)   LMP  (LMP Unknown) Comment: mammogram is up to date  SpO2 97%   BMI 25.05 kg/m²    Pain Score    06/14/22 1333   PainSc: 0-No pain       ECOG Performance Status: 2 - Symptomatic, <50% confined to bed  General Appearance:  alert, cooperative, no apparent distress, appears stated age and frail appearing   Lungs:   Clear to auscultation bilaterally; respirations regular, even, and unlabored bilaterally   Heart:  Regular rate and rhythm, no murmurs appreciated   Abdomen:   Soft, non-tender, non-distended and no organomegaly     Lab on 06/14/2022   Component Date Value Ref Range Status   • WBC 06/14/2022 1.60 (A) 3.40 - 10.80 10*3/mm3 Final   • RBC 06/14/2022 3.61 (A) 3.77 - 5.28 10*6/mm3 " Final   • Hemoglobin 06/14/2022 10.2 (A) 12.0 - 15.9 g/dL Final   • Hematocrit 06/14/2022 32.0 (A) 34.0 - 46.6 % Final   • RDW 06/14/2022 15.8 (A) 12.3 - 15.4 % Final   • MCV 06/14/2022 88.5  79.0 - 97.0 fL Final   • MCH 06/14/2022 28.3  26.6 - 33.0 pg Final   • MCHC 06/14/2022 32.0  31.5 - 35.7 g/dL Final   • MPV 06/14/2022 6.1  6.0 - 12.0 fL Final   • Platelets 06/14/2022 34 (A) 140 - 450 10*3/mm3 Final   • Neutrophil % 06/14/2022 55.3  42.7 - 76.0 % Final   • Lymphocyte % 06/14/2022 36.7  19.6 - 45.3 % Final   • Monocyte % 06/14/2022 8.0  5.0 - 12.0 % Final   • Neutrophils, Absolute 06/14/2022 0.90 (A) 1.70 - 7.00 10*3/mm3 Final   • Lymphocytes, Absolute 06/14/2022 0.60 (A) 0.70 - 3.10 10*3/mm3 Final   • Monocytes, Absolute 06/14/2022 0.10  0.10 - 0.90 10*3/mm3 Final        No results found.    ASSESSMENT: The patient is a very pleasant 86 y.o. female  with pancytopenia.       PLAN:  1. Pancytopenia:  A. I reviewed the lab results from today with the patient. Her WBC is stable at 1.6, with hemoglobin slightly improved to 10.2. Her MCV is stable at 88.5. Her platelet count is down slightly to 34,000.   B. We will consider adding steroids if her platelet count drops to 20,000 or below.   C. She will continue monthly vitamin B12 replacement orally. We will follow up on the vitamin B12 level from today.   D. I asked her to call if she has any new symptoms such as evidence of bleeding or bruising, fevers, chills, or night sweats.     2. Hypothyroidism:  A. She will continue levothyroxine 100 mcg daily for hypothyroidism.     3. Weight loss:  A. She will continue to monitor her weight at home. Her appetite has improved some since having COVID. We discussed using supplements such as Boost for adjunct to her oral intake.     4. Type 2 diabetes:  A. She has been taken off insulin due to weight loss and is on metformin and Amaryl. She only checks her blood sugars occasionally. This is being managed by her PCP Dr. Loomis.      FOLLOW UP: We will see her back in 6 months with repeat CBC and vitamin B12 level.     Jeanna Grande, APRN  6/14/2022

## 2022-07-26 ENCOUNTER — TRANSCRIBE ORDERS (OUTPATIENT)
Dept: ADMINISTRATIVE | Facility: HOSPITAL | Age: 87
End: 2022-07-26

## 2022-07-26 ENCOUNTER — HOSPITAL ENCOUNTER (OUTPATIENT)
Dept: GENERAL RADIOLOGY | Facility: HOSPITAL | Age: 87
Discharge: HOME | End: 2022-07-26
Admitting: INTERNAL MEDICINE

## 2022-07-26 DIAGNOSIS — R19.7 ACUTE DIARRHEA: ICD-10-CM

## 2022-07-26 DIAGNOSIS — R19.7 ACUTE DIARRHEA: Primary | ICD-10-CM

## 2022-07-26 PROCEDURE — 74018 RADEX ABDOMEN 1 VIEW: CPT

## 2022-10-07 ENCOUNTER — HOSPITAL ENCOUNTER (OUTPATIENT)
Dept: PHYSICAL THERAPY | Facility: HOSPITAL | Age: 87
Setting detail: THERAPIES SERIES
Discharge: HOME | End: 2022-10-07

## 2022-10-07 DIAGNOSIS — L89.312 PRESSURE INJURY OF RIGHT BUTTOCK, STAGE 2: Primary | ICD-10-CM

## 2022-10-07 DIAGNOSIS — L89.322 PRESSURE INJURY OF LEFT BUTTOCK, STAGE 2: ICD-10-CM

## 2022-10-07 PROCEDURE — 97161 PT EVAL LOW COMPLEX 20 MIN: CPT

## 2022-10-07 NOTE — THERAPY EVALUATION
Outpatient Rehabilitation - Wound/Debridement Initial Eval  [facility]     Patient Name: Chey Robertson  : 1935  MRN: [id number]  Today's Date: 10/7/2022                  Admit Date: 10/7/2022    Visit Dx:    ICD-10-CM ICD-9-CM   1. Pressure injury of right buttock, stage 2 (HCC)  L89.312 707.     707.22   2. Pressure injury of left buttock, stage 2 (HCC)  L89.322 707.     707.22       Patient Active Problem List   Diagnosis   • Benign familial tremor   • Diabetes mellitus (HCC)   • AMS (altered mental status)   • Thrombocytopenia (HCC)   • Pancytopenia (HCC)   • Shortness of breath   • Hypothyroidism (acquired)   • Acute kidney injury (HCC)   • Splenomegaly   • Hepatomegaly   • Confusion   • B12 deficiency   • Abnormal intestinal absorption   • Iron deficiency anemia due to chronic blood loss        Past Medical History:   Diagnosis Date   • Anemia    • Arthritis    • Diabetes mellitus (HCC)     checks sugar only when pt wants to    • Disease of thyroid gland    • History of transfusion     with knee surgery-  no reaction recalled.    • [place] (hard of hearing)     no hearing aids   • Hypertension    • Migraine without aura and without status migrainosus, not intractable 2016   • Pulmonary emboli (HCC)     (after knee surgery)   • SOBOE (shortness of breath on exertion)    • Tremors of nervous system    • Vertigo    • Wears dentures     upper    • Wears glasses         Past Surgical History:   Procedure Laterality Date   • BLADDER SURGERY     • CATARACT EXTRACTION      bilat    • CHOLECYSTECTOMY     • COLONOSCOPY     • HYSTERECTOMY     • KYPHOPLASTY N/A 2021    Procedure: KYPHOPLASTY T11, T12 AND L4;  Surgeon: Matty Hearn MD;  Location: [facility];  Service: Orthopedic Spine;  Laterality: N/A;   • OOPHORECTOMY     • TONSILLECTOMY          Patient History     Row Name 10/07/22 1000             History    Chief Complaint Ulcer, wound or other skin conditions  -      Brief  "Description of Current Complaint Pt reports several month history of pressure ulcers to the B buttocks. She lives alone, sleeps in a recliner due to discomfort, and has been applying a salve given to her by her PCP's office. She reports she does have a cushion for her recliner, but she's not sure if it's solid, egg crate, etc.  -      Patient/Caregiver Goals Relieve pain;Know what to do to help the symptoms;Heal wound  -      Patient seeing anyone else for problem(s)? PCP  -      How has patient tried to help current problem? \"salve\"  -      Related/Recent Hospitalizations No  -MC      Are you or can you be pregnant No  -              Pain     Pain Location Buttocks  -      [place] FACES Pain Rating  6  -              Services    Are you currently receiving Home Health services No  -      Do you plan to receive Home Health services in the near future No  -              Daily Activities    Primary Language English  -      Are you able to read Yes  -      Are you able to write Yes  -      How does patient learn best? Listening;Demonstration  -      Teaching needs identified Management of Condition  -      Patient is concerned about/has problems with Difficulty with self care (i.e. bathing, dressing, toileting:;Other (comment)  wound mgmt  -      Barriers to learning Hearing  -      Action taken for identified issues louder voice, lower tones  -      Pt Participated in POC and Goals Yes  -              Safety    Are you being hurt, hit, or frightened by anyone at home or in your life? No  -MC      Are you being neglected by a caregiver No  -            User Key  (r) = Recorded By, (t) = Taken By, (c) = Cosigned By    Initials Name Provider Type    Isabel Mcgarry PT Physical Therapist                EVALUATION   PT Ortho     Row Name 10/07/22 1000       Transfers    Sit-Stand [place] (Transfers) supervision  -    Stand-Sit [place] (Transfers) supervision  - " "   Transfers, Sit-Stand-Sit, Assist Device quad cane  -       Gait/Stairs (Locomotion)    [place] Level (Gait) modified independence  -    Assistive Device (Gait) cane, quad  -MC          User Key  (r) = Recorded By, (t) = Taken By, (c) = Cosigned By    Initials Name Provider Type     Isabel Pa PT Physical Therapist               LDA Wound     Row Name 10/07/22 1000             Wound 10/07/22 0950 Left medial gluteal Pressure Injury    Wound - Properties Group Placement Date: 10/07/22  - Placement Time: 0950  - Present on Hospital Admission: Y  -MC Side: Left  - Orientation: medial  -MC Location: gluteal  -MC Primary Wound Type: Pressure inj  -MC      Wound Image View All Images View Images  -      Dressing Appearance open to air  -      Base moist;yellow;pink  -      Periwound intact;dry;redness  -      Periwound Temperature warm  -      Periwound Skin Turgor soft  -      Edges irregular;open  -      Wound Length (cm) 1.1 cm  -      Wound Width (cm) 0.9 cm  -      Wound Depth (cm) 0.1 cm  -      Wound Surface Area (cm^2) 0.99 cm^2  -      Wound Volume (cm^3) 0.099 cm^3  -      Drainage Characteristics/Odor serous  -      Drainage Amount scant  -      Care, Wound cleansed with;wound cleanser  -      Dressing Care dressing applied;low-adherent;foam;border dressing  zguard, 6\" optifoam over both areas  -      Periwound Care cleansed with pH balanced cleanser;dry periwound area maintained  -      Retired Wound - Properties Group Placement Date: 10/07/22  - Placement Time: 0950  - Present on Hospital Admission: Y  -MC Side: Left  - Orientation: medial  -MC Location: gluteal  -MC Primary Wound Type: Pressure inj  -MC      Retired Wound - Properties Group Date first assessed: 10/07/22  - Time first assessed: 0950  - Present on Hospital Admission: Y  -MC Side: Left  - Location: gluteal  -MC Primary Wound Type: Pressure inj  -MC              Wound " "10/07/22 0950 Right medial gluteal Pressure Injury    Wound - Properties Group Placement Date: 10/07/22  - Placement Time: 0950  - Present on Hospital Admission: Y  -MC Side: Right  -MC Orientation: medial  -MC Location: gluteal  -MC Primary Wound Type: Pressure inj  -MC      Dressing Appearance open to air  -      Base moist;yellow;pink  -      Periwound intact;dry;redness  -      Periwound Temperature warm  -      Periwound Skin Turgor soft  -      Edges irregular;open  -      Wound Length (cm) 1 cm  measured at longest diagonal  -      Wound Width (cm) 0.6 cm  -      Wound Depth (cm) 0.1 cm  -      Wound Surface Area (cm^2) 0.6 cm^2  -      Wound Volume (cm^3) 0.06 cm^3  -      Drainage Characteristics/Odor serous  -      Drainage Amount scant  -      Care, Wound cleansed with;wound cleanser  -      Dressing Care dressing applied;low-adherent;foam;border dressing  zguard, 6\" optifoam over both areas  -      Periwound Care cleansed with pH balanced cleanser;barrier ointment applied  -      Retired Wound - Properties Group Placement Date: 10/07/22  - Placement Time: 0950  - Present on Hospital Admission: Y  -MC Side: Right  - Orientation: medial  - Location: gluteal  -MC Primary Wound Type: Pressure inj  -MC      Retired Wound - Properties Group Date first assessed: 10/07/22  - Time first assessed: 0950  - Present on Hospital Admission: Y  - Side: Right  - Location: gluteal  -MC Primary Wound Type: Pressure inj  -MC            User Key  (r) = Recorded By, (t) = Taken By, (c) = Cosigned By    Initials Name Provider Type    Isabel Mcgarry PT Physical Therapist                  WOUND DEBRIDEMENT                    Therapy Education     Row Name 10/07/22 1100             Therapy Education    Education Details Reviewed s/sx of infection and PT role in wound care. Use waffle cushion in recliner, but PT encouraged pt to try sleeping in her bed, switching from " side to side. Every 2-3 days or if soiled, change dressing with green wipes to clean, zguard to cover and optifoam to secure.  -      Given Bandaging/dressing change;Symptoms/condition management  -      Program New  -      How Provided Verbal;Demonstration  -      Provided to Patient  -      Level of Understanding Teach back education performed;Verbalized  -            User Key  (r) = Recorded By, (t) = Taken By, (c) = Cosigned By    Initials Name Provider Type    Isabel Mcgarry, PT Physical Therapist                Recommendation and Plan   PT Assessment/Plan     Row Name 10/07/22 1100          PT Assessment    Functional Limitations Performance in self-care ADL;Decreased safety during functional activities;Impaired gait;Other (comment)  wound mgmt  -     Impairments Integumentary integrity  -     Assessment Comments Pt presents with stage 2 pressure ulcers to the L and R medial buttocks, present for several months. The wound bases are moist, clean, and pink/yellow, with mild periwound redness. Pt lives alone and sleeps in a recliner, both situations that may complicate her course of care. Pt will benefit from skilled PT wound care to promote healing. Pt may benefit from MIST ultrasound to promote healing.  -     Rehab Potential Fair  -     Patient/caregiver participated in establishment of treatment plan and goals Yes  -     Patient would benefit from skilled therapy intervention Yes  -            PT Plan    PT Frequency 1x/week;2x/week  -     Predicted Duration of Therapy Intervention (PT) 20 visits  -     Planned CPT's? PT EVAL LOW COMPLEXITY: 21749;PT SELF CARE/MGMT/TRAIN 15 MIN: 85949;PT NONSELECT DEBRIDE 15 MIN: 87884;PT DANNIE DEBRIDE OPEN WOUND UP TO 20 CM: 74928;PT NLFU MIST: 84604  -     Physical Therapy Interventions (Optional Details) wound care;patient/family education  -     PT Plan Comments debridement, dressings, MIST if clinically warranted and pt able to  increase frequency  -           User Key  (r) = Recorded By, (t) = Taken By, (c) = Cosigned By    Initials Name Provider Type    Isabel Mcgarry PT Physical Therapist                  Goals   PT OP Goals     Row Name 10/07/22 1100          PT Short Term Goals    STG 1 Pt will verbalize s/sx of infection.  -     STG 2 Pt will demonstrate 25% reduction in wound areas to indicate healing progress.  -            Long Term Goals    LTG 1 Pt will demonstrate independence with clean home dressing changes.  -     LTG 2 Pt will demonstrate 75% reduction in wound areas to indicate healing progress.  -     LTG 3 Pt will verbalize understanding of appropriate offloading.  -            Time Calculation    PT Goal Re-Cert Due Date 01/05/23  -           User Key  (r) = Recorded By, (t) = Taken By, (c) = Cosigned By    Initials Name Provider Type    Isabel Mcgarry PT Physical Therapist                Time Calculation: Start Time: 0950  Untimed Charges  PT Eval/Re-eval Minutes: 45  Total Minutes  Untimed Charges Total Minutes: 45   Total Minutes: 45  Therapy Charges for Today     Code Description Service Date Service Provider Modifiers Qty    58630730673 HC PT EVAL LOW COMPLEXITY 3 10/7/2022 Isabel Pa PT GP 1                Isabel Pa PT  10/7/2022

## 2022-10-10 ENCOUNTER — APPOINTMENT (OUTPATIENT)
Dept: PHYSICAL THERAPY | Facility: HOSPITAL | Age: 87
End: 2022-10-10

## 2022-10-17 ENCOUNTER — TELEPHONE (OUTPATIENT)
Dept: NEUROLOGY | Facility: CLINIC | Age: 87
End: 2022-10-17

## 2022-10-17 NOTE — TELEPHONE ENCOUNTER
Caller: Chey Robertson    Relationship: Self    Best call back number: [phone]    What was the call regarding: PT CALLED TO CHECK THAT HER APPT W/ [facility] TOMORROW, 10/18/22, WAS CANCELLED ACCORDING TO HER DEPARTURE FROM THE PRACTICE. I ADVISED YES, APPT WAS CANCELLED AND ASKED PT IF SHE WOULD LIKE TO RESCHEDULE W/ A DIFFERENT PROVIDER. PT DECLINED RESCHEDULING W/ A DIFFERENT PROVIDER AND EXPRESSED THAT SHE DID NOT SEE THAT THE APPT IS NECESSARY.    Do you require a callback: NO    DOCUMENTING PER PROTOCOL.

## 2022-10-20 ENCOUNTER — HOSPITAL ENCOUNTER (OUTPATIENT)
Dept: PHYSICAL THERAPY | Facility: HOSPITAL | Age: 87
Setting detail: THERAPIES SERIES
Discharge: HOME | End: 2022-10-20

## 2022-10-20 DIAGNOSIS — L89.322 PRESSURE INJURY OF LEFT BUTTOCK, STAGE 2: ICD-10-CM

## 2022-10-20 DIAGNOSIS — L89.312 PRESSURE INJURY OF RIGHT BUTTOCK, STAGE 2: Primary | ICD-10-CM

## 2022-10-20 PROCEDURE — 97597 DBRDMT OPN WND 1ST 20 CM/<: CPT

## 2022-10-20 NOTE — THERAPY WOUND CARE TREATMENT
Outpatient Rehabilitation - Wound/Debridement Treatment Note   [place]     Patient Name: Chey Robertson  : 1935  MRN: [id number]  Today's Date: 10/20/2022                 Admit Date: 10/20/2022    Visit Dx:    ICD-10-CM ICD-9-CM   1. Pressure injury of right buttock, stage 2 (HCC)  L89.312 707.     707.22   2. Pressure injury of left buttock, stage 2 (HCC)  L89.322 707.     707.       Patient Active Problem List   Diagnosis   • Benign familial tremor   • Diabetes mellitus (HCC)   • AMS (altered mental status)   • Thrombocytopenia (HCC)   • Pancytopenia (HCC)   • Shortness of breath   • Hypothyroidism (acquired)   • Acute kidney injury (HCC)   • Splenomegaly   • Hepatomegaly   • Confusion   • B12 deficiency   • Abnormal intestinal absorption   • Iron deficiency anemia due to chronic blood loss        Past Medical History:   Diagnosis Date   • Anemia    • Arthritis    • Diabetes mellitus (HCC)     checks sugar only when pt wants to    • Disease of thyroid gland    • History of transfusion     with knee surgery-  no reaction recalled.    • [place] (hard of hearing)     no hearing aids   • Hypertension    • Migraine without aura and without status migrainosus, not intractable 2016   • Pulmonary emboli (HCC)     (after knee surgery)   • SOBOE (shortness of breath on exertion)    • Tremors of nervous system    • Vertigo    • Wears dentures     upper    • Wears glasses         Past Surgical History:   Procedure Laterality Date   • BLADDER SURGERY     • CATARACT EXTRACTION      bilat    • CHOLECYSTECTOMY     • COLONOSCOPY     • HYSTERECTOMY     • KYPHOPLASTY N/A 2021    Procedure: KYPHOPLASTY T11, T12 AND L4;  Surgeon: Matty Hearn MD;  Location: [facility];  Service: Orthopedic Spine;  Laterality: N/A;   • OOPHORECTOMY     • TONSILLECTOMY           EVALUATION   PT Ortho     Row Name 10/20/22 1030       Subjective Comments    Subjective Comments Pt states she felt like the wounds were  "much better the next day after coming here.  Has been using optifoams and waffle cusion, states she tried to sleep in her bed but hurts her hip to lay on her side.  -       Subjective Pain    Able to rate subjective pain? yes  -    Pre-Treatment Pain Level 0  -    Post-Treatment Pain Level 0  -       Transfers    Comment, (Transfers) stood with UE support on stretcher for tx  -       Gait/Stairs (Locomotion)    [place] Level (Gait) modified independence  -    Assistive Device (Gait) cane, quad  -JM          User Key  (r) = Recorded By, (t) = Taken By, (c) = Cosigned By    Initials Name Provider Type    Tara Kwong PT Physical Therapist                 [facility] Wound     Row Name 10/20/22 1030             Wound 10/07/22 0950 Left medial gluteal Pressure Injury    Wound - Properties Group Placement Date: 10/07/22  [facility] Placement Time: 0950  - Present on Hospital Admission: Y  - Side: Left  - Orientation: medial  - Location: gluteal  - Primary Wound Type: Pressure inj  -    Wound Image Images linked: 1  -      Dressing Appearance intact;dry;no drainage  -      Base yellow;pink;dry;epithelialization  -      Periwound intact;dry;redness  -      Periwound Temperature warm  -      Periwound Skin Turgor soft  -      Edges irregular;open  -      Wound Length (cm) 0.2 cm  -      Wound Width (cm) 0.2 cm  -      Wound Depth (cm) 0.1 cm  -      Wound Surface Area (cm^2) 0.04 cm^2  -      Wound Volume (cm^3) 0.004 cm^3  -      Drainage Characteristics/Odor serous  -      Drainage Amount scant  -      Care, Wound cleansed with;wound cleanser;debrided  -      Dressing Care dressing applied;foam;border dressing  1/2 4\" optifoam gentle  -      Periwound Care barrier ointment applied;cleansed with pH balanced cleanser;dry periwound area maintained  zguard  -      Retired Wound - Properties Group Placement Date: 10/07/22  - Placement Time: 0950  - Present on " "Hospital Admission: Y  -MC Side: Left  - Orientation: medial  - Location: gluteal  -MC Primary Wound Type: Pressure inj  -MC    Retired Wound - Properties Group Date first assessed: 10/07/22  - Time first assessed: 0950  - Present on Hospital Admission: Y  -MC Side: Left  - Location: gluteal  -MC Primary Wound Type: Pressure inj  -MC       Wound 10/07/22 0950 Right medial gluteal Pressure Injury    Wound - Properties Group Placement Date: 10/07/22  - Placement Time: 0950  - Present on Hospital Admission: Y  -MC Side: Right  - Orientation: medial  - Location: gluteal  -MC Primary Wound Type: Pressure inj  -MC    Dressing Appearance intact;dry  -      Base clean;closed/resurfaced;pink;scab;other (see comments)  small hypertrophic crust  -      Periwound intact;dry;redness;blanchable  -      Periwound Temperature warm  -      Periwound Skin Turgor soft  -      Edges irregular  -      Drainage Characteristics/Odor serous  -      Drainage Amount none  -      Care, Wound cleansed with;wound cleanser;debrided  -      Dressing Care dressing applied;foam;border dressing  1/2 4\" optifoam gentle  -      Periwound Care barrier ointment applied;cleansed with pH balanced cleanser;dry periwound area maintained  zguard  -      Retired Wound - Properties Group Placement Date: 10/07/22  - Placement Time: 0950  - Present on Hospital Admission: Y  -MC Side: Right  - Orientation: medial  - Location: gluteal  -MC Primary Wound Type: Pressure inj  -MC    Retired Wound - Properties Group Date first assessed: 10/07/22  - Time first assessed: 0950  - Present on Hospital Admission: Y  -MC Side: Right  - Location: gluteal  -MC Primary Wound Type: Pressure inj  -MC          User Key  (r) = Recorded By, (t) = Taken By, (c) = Cosigned By    Initials Name Provider Type    Isabel Mcgarry, PT Physical Therapist    Tara Kwong, PT Physical Therapist                  WOUND " "DEBRIDEMENT  Total area of Debridement: 1cmsq  Debridement Site 1  Location- Site 1: Buttock wounds  Selective Debridement- Site 1: Wound Surface <20cmsq  Instruments- Site 1: tweezers  Excised Tissue Description- Site 1: minimum, other (comment) (hypertrophic crusts)  Bleeding- Site 1: none              Therapy Education     Row Name 10/20/22 1030             Therapy Education    Education Details Continue using dressings for at least a week, may try using 4\" optifoam cut in half over each wound.  Continue using zguard on areas and waffle cushion when sitting in recliner.  Pt may call for next appt PRN.  -      Given Bandaging/dressing change;Symptoms/condition management  -      Program Reinforced;Modified  -      How Provided Verbal;Demonstration  -      Provided to Patient  -      Level of Understanding Teach back education performed;Verbalized  -            User Key  (r) = Recorded By, (t) = Taken By, (c) = Cosigned By    Initials Name Provider Type    Tara Kwong, PT Physical Therapist                Recommendation and Plan   PT Assessment/Plan     Row Name 10/20/22 1030          PT Assessment    Functional Limitations Performance in self-care ADL;Decreased safety during functional activities;Impaired gait;Other (comment)  wound mgmt  -     Impairments Integumentary integrity  -     Assessment Comments R buttock wound closed with small hypertrophic crust, L buttock area nearly closed with very small moist red area remaining.  Pt much improved since last tx, should have resolution of wounds with continued home dressing changes.  PT instructed pt to call for next appt PRN if areas do not resolve or if she feels wounds are worsening.  -        PT Plan    Physical Therapy Interventions (Optional Details) patient/family education;wound care  -     PT Plan Comments tentative d/c, pt to call PRN next couple weeks  -           User Key  (r) = Recorded By, (t) = Taken By, (c) = Cosigned " By    Initials Name Provider Type    Tara Kwong, PT Physical Therapist                Goals   PT OP Goals     Row Name 10/20/22 1030          Time Calculation    PT Goal Re-Cert Due Date 01/05/23  -EDWAR           User Key  (r) = Recorded By, (t) = Taken By, (c) = Cosigned By    Initials Name Provider Type    Tara Kwong, PT Physical Therapist                PT Goal Re-Cert Due Date: 01/05/23            Time Calculation: Start Time: 1030  Untimed Charges  Wound Care: 25991 Selective debridement  88612-Evyxoxfzz debridement: 15  Total Minutes  Untimed Charges Total Minutes: 15   Total Minutes: 15  Therapy Charges for Today     Code Description Service Date Service Provider Modifiers Qty    42820830325 HC DANNIE DEBRIDE OPEN WOUND UP TO 20CM 10/20/2022 Tara West, PT GP 1                  Tara West, PT  10/20/2022

## 2023-01-04 ENCOUNTER — DOCUMENTATION (OUTPATIENT)
Dept: PHYSICAL THERAPY | Facility: HOSPITAL | Age: 88
End: 2023-01-04
Payer: MEDICARE

## 2023-01-04 NOTE — THERAPY DISCHARGE NOTE
Outpatient Rehabilitation - Wound/Debridement Discharge Summary       Patient Name: Chey Robertson  : 1935  MRN: [id number]  Today's Date: 2023                  Admit Date: (Not on file)    Visit Dx:  No diagnosis found.    Patient Active Problem List   Diagnosis   • Benign familial tremor   • Diabetes mellitus (HCC)   • AMS (altered mental status)   • Thrombocytopenia (HCC)   • Pancytopenia (HCC)   • Shortness of breath   • Hypothyroidism (acquired)   • Acute kidney injury (HCC)   • Splenomegaly   • Hepatomegaly   • Confusion   • B12 deficiency   • Abnormal intestinal absorption   • Iron deficiency anemia due to chronic blood loss        Past Medical History:   Diagnosis Date   • Anemia    • Arthritis    • Diabetes mellitus (HCC)     checks sugar only when pt wants to    • Disease of thyroid gland    • History of transfusion     with knee surgery-  no reaction recalled.    • [place] (hard of hearing)     no hearing aids   • Hypertension    • Migraine without aura and without status migrainosus, not intractable 2016   • Pulmonary emboli (HCC)     (after knee surgery)   • SOBOE (shortness of breath on exertion)    • Tremors of nervous system    • Vertigo    • Wears dentures     upper    • Wears glasses         Past Surgical History:   Procedure Laterality Date   • BLADDER SURGERY     • CATARACT EXTRACTION      bilat    • CHOLECYSTECTOMY     • COLONOSCOPY     • HYSTERECTOMY     • KYPHOPLASTY N/A 2021    Procedure: KYPHOPLASTY T11, T12 AND L4;  Surgeon: Matty Hearn MD;  Location: [facility];  Service: Orthopedic Spine;  Laterality: N/A;   • OOPHORECTOMY     • TONSILLECTOMY           Goals   PT OP Goals     Row Name 23 0900          PT Short Term Goals    STG 1 Pt will verbalize s/sx of infection.  -     STG 1 Progress Met  -     STG 2 Pt will demonstrate 25% reduction in wound areas to indicate healing progress.  -     STG 2 Progress Met  -        Long Term Goals    LTG 1  Pt will demonstrate independence with clean home dressing changes.  -     LTG 1 Progress Met  -     LTG 2 Pt will demonstrate 75% reduction in wound areas to indicate healing progress.  -     LTG 2 Progress Met  -     LTG 3 Pt will verbalize understanding of appropriate offloading.  -     LTG 3 Progress Met  -           User Key  (r) = Recorded By, (t) = Taken By, (c) = Cosigned By    Initials Name Provider Type    Tara Kwong, PT Physical Therapist                 OP Discharge Summary     Row Name 01/04/23 0910             OP PT Discharge Summary    Date of Discharge 01/04/23  -      Reason for Discharge All goals achieved;Independent  -      Outcomes Achieved Able to achieve all goals within established timeline  -      Discharge Destination Home without follow-up  -            User Key  (r) = Recorded By, (t) = Taken By, (c) = Cosigned By    Initials Name Provider Type    Tara Kwong, PT Physical Therapist                Tara West, PT  1/4/2023

## 2023-01-18 ENCOUNTER — TRANSCRIBE ORDERS (OUTPATIENT)
Dept: PHYSICAL THERAPY | Facility: HOSPITAL | Age: 88
End: 2023-01-18
Payer: MEDICARE

## 2023-01-18 DIAGNOSIS — L89.302 PRESSURE INJURY OF BUTTOCK, STAGE 2, UNSPECIFIED LATERALITY: Primary | ICD-10-CM

## 2023-01-24 ENCOUNTER — HOSPITAL ENCOUNTER (OUTPATIENT)
Dept: PHYSICAL THERAPY | Facility: HOSPITAL | Age: 88
Setting detail: THERAPIES SERIES
Discharge: HOME | End: 2023-01-24
Payer: MEDICARE

## 2023-01-24 DIAGNOSIS — L89.322 PRESSURE INJURY OF LEFT BUTTOCK, STAGE 2: Primary | ICD-10-CM

## 2023-01-24 PROCEDURE — 97161 PT EVAL LOW COMPLEX 20 MIN: CPT

## 2023-01-24 NOTE — THERAPY EVALUATION
Outpatient Rehabilitation - Wound/Debridement Initial Eval  [facility]     Patient Name: Chey Robertson  : 1935  MRN: [id number]  Today's Date: 2023                  Admit Date: 2023    Visit Dx:    ICD-10-CM ICD-9-CM   1. Pressure injury of left buttock, stage 2 (HCC)  L89.322 707.05     707.22       Patient Active Problem List   Diagnosis   • Benign familial tremor   • Diabetes mellitus (HCC)   • AMS (altered mental status)   • Thrombocytopenia (HCC)   • Pancytopenia (HCC)   • Shortness of breath   • Hypothyroidism (acquired)   • Acute kidney injury (HCC)   • Splenomegaly   • Hepatomegaly   • Confusion   • B12 deficiency   • Abnormal intestinal absorption   • Iron deficiency anemia due to chronic blood loss        Past Medical History:   Diagnosis Date   • Anemia    • Arthritis    • Diabetes mellitus (HCC)     checks sugar only when pt wants to    • Disease of thyroid gland    • History of transfusion     with knee surgery-  no reaction recalled.    • [place] (hard of hearing)     no hearing aids   • Hypertension    • Migraine without aura and without status migrainosus, not intractable 2016   • Pulmonary emboli (HCC)     (after knee surgery)   • SOBOE (shortness of breath on exertion)    • Tremors of nervous system    • Vertigo    • Wears dentures     upper    • Wears glasses         Past Surgical History:   Procedure Laterality Date   • BLADDER SURGERY     • CATARACT EXTRACTION      bilat    • CHOLECYSTECTOMY     • COLONOSCOPY     • HYSTERECTOMY     • KYPHOPLASTY N/A 2021    Procedure: KYPHOPLASTY T11, T12 AND L4;  Surgeon: Matty Hearn MD;  Location: [facility];  Service: Orthopedic Spine;  Laterality: N/A;   • OOPHORECTOMY     • TONSILLECTOMY          Patient History     Row Name 23 0800             History    Chief Complaint Ulcer, wound or other skin conditions;Pain  -MC      Type of Pain Other pain  -MC      Other Type of Pain buttock  -MC      Brief Description  of Current Complaint Pt known from previous episode of care for bilateral stage 2 PIs to the B buttocks. Pt returns with recurrence of PI to the L buttock. She was seen at an OSH emergency room, where she was given foam bandages and encouraged to offload. The waffle cushion she was issued during her last episode of care has a leak, and has not been utilized in some time. She lives alone and does not have regular assistance with dressing changes, etc.  -      Patient/Caregiver Goals Heal wound  -      Occupation/sports/leisure activities retired  -      How has patient tried to help current problem? foam bandages only  -      Related/Recent Hospitalizations No  -MC      Are you or can you be pregnant No  -         Pain     Pain Location Buttocks  -      Pain at Present 0  -MC      What position do you sleep in? Other (comment)  reclined  -         Services    Are you currently receiving Home Health services No  -MC         Daily Activities    Primary Language English  -      Are you able to read Yes  -MC      Are you able to write Yes  -MC      How does patient learn best? Listening;Demonstration  -      Teaching needs identified Management of Condition  -      Patient is concerned about/has problems with Difficulty with self care (i.e. bathing, dressing, toileting:;Other (comment)  wound mgmt  -      Barriers to learning None  -MC      Pt Participated in POC and Goals Yes  -         Safety    Are you being hurt, hit, or frightened by anyone at home or in your life? No  -MC      Are you being neglected by a caregiver No  -            User Key  (r) = Recorded By, (t) = Taken By, (c) = Cosigned By    Initials Name Provider Type    Isabel Mcgarry PT Physical Therapist                EVALUATION   PT Ortho     Row Name 01/24/23 0800       Subjective Pain    Able to rate subjective pain? yes  -MC    Pre-Treatment Pain Level 0  -MC    Post-Treatment Pain Level 2  -MC       Transfers     "Sit-Stand [place] (Transfers) independent  -    Stand-Sit [place] (Transfers) independent  -    Comment, (Transfers) standing with UE support on elevated tx table  -       Gait/Stairs (Locomotion)    [place] Level (Gait) modified independence  -    Assistive Device (Gait) cane, quad  -          User Key  (r) = Recorded By, (t) = Taken By, (c) = Cosigned By    Initials Name Provider Type     Isabel Pa PT Physical Therapist               [facility] Wound     Row Name 01/24/23 0800             Wound 10/07/22 0950 Left medial gluteal Pressure Injury    Wound - Properties Group Placement Date: 10/07/22  - Placement Time: 0950  - Present on Hospital Admission: Y  - Side: Left  - Orientation: medial  -MC Location: gluteal  -MC Primary Wound Type: Pressure inj  -MC    Wound Image Images linked: 1  -MC      Pressure Injury Stage 2  -      Base moist;pink;red  -      Periwound intact;dry  -      Periwound Temperature warm  -      Periwound Skin Turgor soft  -      Edges irregular;open  -      Wound Length (cm) 1.1 cm  -      Wound Width (cm) 0.6 cm  -      Wound Depth (cm) 0.1 cm  -      Wound Surface Area (cm^2) 0.66 cm^2  -      Wound Volume (cm^3) 0.066 cm^3  -MC      Drainage Characteristics/Odor serosanguineous  -      Drainage Amount scant  -      Care, Wound cleansed with;wound cleanser  -      Dressing Care dressing applied;silver impregnated;collagen;low-adherent;foam;border dressing  mahesh, 4\" optifoam  -      Periwound Care cleansed with pH balanced cleanser;barrier ointment applied  zguard  -      Retired Wound - Properties Group Placement Date: 10/07/22  - Placement Time: 0950  - Present on Hospital Admission: Y  -MC Side: Left  - Orientation: medial  - Location: gluteal  -MC Primary Wound Type: Pressure inj  -MC    Retired Wound - Properties Group Date first assessed: 10/07/22  - Time first assessed: 0950  - Present on Hospital " Admission: Y  - Side: Left  - Location: gluteal  - Primary Wound Type: Pressure inj  -          User Key  (r) = Recorded By, (t) = Taken By, (c) = Cosigned By    Initials Name Provider Type    Isabel Mcgarry, PT Physical Therapist                  WOUND DEBRIDEMENT                    Therapy Education     Row Name 01/24/23 0800             Therapy Education    Education Details Reviewed s/sx of infection and PT role in wound care. Continue offloading with newly issued waffle cushion, and try to resume sleeping on your sides as much as able. Try to place mahesh over the wound area when changing the dressing.  -MC      Given Bandaging/dressing change;Symptoms/condition management  -      Program New  -      How Provided Verbal;Demonstration  -      Provided to Patient  -      Level of Understanding Teach back education performed;Verbalized  -            User Key  (r) = Recorded By, (t) = Taken By, (c) = Cosigned By    Initials Name Provider Type    Isabel Mcgarry PT Physical Therapist                Recommendation and Plan   PT Assessment/Plan     Row Name 01/24/23 0800          PT Assessment    Functional Limitations Performance in self-care ADL;Decreased safety during functional activities;Impaired gait;Other (comment)  wound mgmt  -     Impairments Integumentary integrity  -     Assessment Comments Pt presents with recurrence of stage 2 pressure injury to the L medial buttock. The wound is clean, moist, and red, with no debridement required today. Seated waffle cushion issued with education about proper offloading, including sleeping position. Added mahesh Ag and zguard to the foam dressings she was given at an OSH to promote further proliferation and epithelialization. Pt will benefit from skilled PT wound care for debridement prn, advanced dressings management, and appropriate education to promote healing and hopefully prevent recurrence in the future.  -     Rehab Potential  Fair  -     Patient/caregiver participated in establishment of treatment plan and goals Yes  -     Patient would benefit from skilled therapy intervention Yes  -        PT Plan    PT Frequency 1x/week  -     Predicted Duration of Therapy Intervention (PT) 4 visits  -     Planned CPT's? PT EVAL LOW COMPLEXITY: 23100;PT SELF CARE/MGMT/TRAIN 15 MIN: 69341;PT NONSELECT DEBRIDE 15 MIN: 10411;PT DANNIE DEBRIDE OPEN WOUND UP TO 20 CM: 95174  -     Physical Therapy Interventions (Optional Details) wound care;patient/family education  -     PT Plan Comments debridement prn, dressings  -           User Key  (r) = Recorded By, (t) = Taken By, (c) = Cosigned By    Initials Name Provider Type    Isabel Mcgarry, PT Physical Therapist                  Goals   PT OP Goals     Row Name 01/24/23 0800          PT Short Term Goals    STG 1 Pt will verbalize s/sx of infection.  -     STG 1 Progress New  -     STG 2 Pt will demonstrate 25% reduction in wound areas to indicate healing progress.  -     STG 2 Progress [facility]        Long Term Goals    LTG 1 Pt will demonstrate independence with clean home dressing changes.  -     LTG 1 Progress New  -     LTG 2 Pt will demonstrate 75% reduction in wound areas to indicate healing progress.  -     LTG 2 Progress New  [facility]     LTG 3 Pt will verbalize understanding of appropriate offloading.  -     LTG 3 Progress New  [facility]        Time Calculation    PT Goal Re-Cert Due Date 04/24/23  -           User Key  (r) = Recorded By, (t) = Taken By, (c) = Cosigned By    Initials Name Provider Type     Isabel Pa, PT Physical Therapist                Time Calculation: Start Time: 0815  Untimed Charges  PT Eval/Re-eval Minutes: 40  Total Minutes  Untimed Charges Total Minutes: 40   Total Minutes: 40  Therapy Charges for Today     Code Description Service Date Service Provider Modifiers Qty    44209305915  PT EVAL LOW COMPLEXITY 3 1/24/2023 Isabel Pa,  PT GP 1                Isabel Pa, PT  1/24/2023

## 2023-02-01 ENCOUNTER — HOSPITAL ENCOUNTER (OUTPATIENT)
Dept: PHYSICAL THERAPY | Facility: HOSPITAL | Age: 88
Setting detail: THERAPIES SERIES
Discharge: HOME | End: 2023-02-01
Payer: MEDICARE

## 2023-02-01 DIAGNOSIS — L89.322 PRESSURE INJURY OF LEFT BUTTOCK, STAGE 2: Primary | ICD-10-CM

## 2023-02-01 PROCEDURE — 97597 DBRDMT OPN WND 1ST 20 CM/<: CPT

## 2023-02-01 NOTE — THERAPY WOUND CARE TREATMENT
Outpatient Rehabilitation - Wound/Debridement Treatment Note   [facility]     Patient Name: Chey Robertson  : 1935  MRN: [id number]  Today's Date: 2023                 Admit Date: 2023    Visit Dx:    ICD-10-CM ICD-9-CM   1. Pressure injury of left buttock, stage 2 (HCC)  L89.322 707.05     707.22       Patient Active Problem List   Diagnosis   • Benign familial tremor   • Diabetes mellitus (HCC)   • AMS (altered mental status)   • Thrombocytopenia (HCC)   • Pancytopenia (HCC)   • Shortness of breath   • Hypothyroidism (acquired)   • Acute kidney injury (HCC)   • Splenomegaly   • Hepatomegaly   • Confusion   • B12 deficiency   • Abnormal intestinal absorption   • Iron deficiency anemia due to chronic blood loss        Past Medical History:   Diagnosis Date   • Anemia    • Arthritis    • Diabetes mellitus (HCC)     checks sugar only when pt wants to    • Disease of thyroid gland    • History of transfusion     with knee surgery-  no reaction recalled.    • [place] (hard of hearing)     no hearing aids   • Hypertension    • Migraine without aura and without status migrainosus, not intractable 2016   • Pulmonary emboli (HCC)     (after knee surgery)   • SOBOE (shortness of breath on exertion)    • Tremors of nervous system    • Vertigo    • Wears dentures     upper    • Wears glasses         Past Surgical History:   Procedure Laterality Date   • BLADDER SURGERY     • CATARACT EXTRACTION      bilat    • CHOLECYSTECTOMY     • COLONOSCOPY     • HYSTERECTOMY     • KYPHOPLASTY N/A 2021    Procedure: KYPHOPLASTY T11, T12 AND L4;  Surgeon: Matty Hearn MD;  Location: [facility];  Service: Orthopedic Spine;  Laterality: N/A;   • OOPHORECTOMY     • TONSILLECTOMY           EVALUATION   PT Ortho     Row Name 23 6875       Subjective Comments    Subjective Comments Pt states she thinks her wound is much better, did not bandage it today since it seemed closed.  -EDWAR       Subjective Pain     "Able to rate subjective pain? yes  -    Pre-Treatment Pain Level 0  -JM    Post-Treatment Pain Level 0  -JM       Transfers    Sit-Stand [place] (Transfers) independent  -    Stand-Sit [place] (Transfers) independent  -    Comment, (Transfers) standing with UE support on stretcher for tx  -JM       Gait/Stairs (Locomotion)    [place] Level (Gait) modified independence  -    Assistive Device (Gait) cane, quad  -JM          User Key  (r) = Recorded By, (t) = Taken By, (c) = Cosigned By    Initials Name Provider Type    Tara Kwong, PT Physical Therapist                 [facility]DA Wound     Row Name 02/01/23 1115             Wound 10/07/22 0950 Left medial gluteal Pressure Injury    Wound - Properties Group Placement Date: 10/07/22  - Placement Time: 0950  - Present on Hospital Admission: Y  - Side: Left  - Orientation: medial  - Location: gluteal  -MC Primary Wound Type: Pressure inj  -    Wound Image Images linked: 1  -      Base moist;pink;red;dry;yellow  dry hypertrohpic crust initially, pinpoint open area after debridement  -      Periwound intact;dry  -      Periwound Temperature warm  -      Periwound Skin Turgor soft  -      Edges irregular;open  -      Drainage Characteristics/Odor serosanguineous  -      Drainage Amount scant  -      Care, Wound cleansed with;wound cleanser;debrided  -      Dressing Care dressing applied;foam;border dressing  1/2 4\" optifoam gentle  -      Periwound Care cleansed with pH balanced cleanser;dry periwound area maintained  -      Retired Wound - Properties Group Placement Date: 10/07/22  - Placement Time: 0950  - Present on Hospital Admission: Y  - Side: Left  - Orientation: medial  - Location: gluteal  -MC Primary Wound Type: Pressure inj  -MC    Retired Wound - Properties Group Date first assessed: 10/07/22  - Time first assessed: 0950  - Present on Hospital Admission: Y  - Side: Left  - Location: " gluteal  - Primary Wound Type: Pressure inj  -          User Key  (r) = Recorded By, (t) = Taken By, (c) = Cosigned By    Initials Name Provider Type    Isabel Mcgarry PT Physical Therapist    Tara Kwong, PT Physical Therapist                  WOUND DEBRIDEMENT  Total area of Debridement: 1cmsq  Debridement Site 1  Location- Site 1: L buttock wound  Selective Debridement- Site 1: Wound Surface <20cmsq  Instruments- Site 1: tweezers, scissors  Excised Tissue Description- Site 1: minimum, other (comment) (hypertrophic crust)  Bleeding- Site 1: scant              Therapy Education     Row Name 02/01/23 1115             Therapy Education    Education Details Continue bandaging area until fully closed, plan to tentatively d/c today but may return PRN until 4/1/23.  -      Given Bandaging/dressing change;Symptoms/condition management  -      Program Progressed;Reinforced  -      How Provided Verbal;Demonstration  -      Provided to Patient  -      Level of Understanding Teach back education performed;Verbalized  -            User Key  (r) = Recorded By, (t) = Taken By, (c) = Cosigned By    Initials Name Provider Type    Tara Kwong, PT Physical Therapist                Recommendation and Plan   PT Assessment/Plan     Row Name 02/01/23 1115          PT Assessment    Functional Limitations Performance in self-care ADL;Decreased safety during functional activities;Impaired gait;Other (comment)  wound mgmt  -     Impairments Integumentary integrity  -     Assessment Comments Pt's L buttock wound nearly closed with only pinpoint open area after debridement today.  Pt has met all PT goals and will be tentatively d/c'd today with follow-up PRN due to high risk of skin breakdown.  -        PT Plan    PT Plan Comments tentative d/c until 4/1/23  -           User Key  (r) = Recorded By, (t) = Taken By, (c) = Cosigned By    Initials Name Provider Type    Tara Kwong, PT  Physical Therapist                Goals   PT OP Goals     Row Name 02/01/23 1115          PT Short Term Goals    STG 1 Pt will verbalize s/sx of infection.  -     STG 1 Progress Met  -     STG 2 Pt will demonstrate 25% reduction in wound areas to indicate healing progress.  -     STG 2 Progress Met  -        Long Term Goals    LTG 1 Pt will demonstrate independence with clean home dressing changes.  -     LTG 1 Progress Met  -     LTG 2 Pt will demonstrate 75% reduction in wound areas to indicate healing progress.  -     LTG 2 Progress Met  -     LTG 3 Pt will verbalize understanding of appropriate offloading.  -     LTG 3 Progress Met  -        Time Calculation    PT Goal Re-Cert Due Date 04/24/23  -           User Key  (r) = Recorded By, (t) = Taken By, (c) = Cosigned By    Initials Name Provider Type    Tara Kwong, PT Physical Therapist                PT Goal Re-Cert Due Date: 04/24/23  PT Short Term Goals  STG 1: Pt will verbalize s/sx of infection.  STG 1 Progress: Met  STG 2: Pt will demonstrate 25% reduction in wound areas to indicate healing progress.  STG 2 Progress: Met  Long Term Goals  LTG 1: Pt will demonstrate independence with clean home dressing changes.  LTG 1 Progress: Met  LTG 2: Pt will demonstrate 75% reduction in wound areas to indicate healing progress.  LTG 2 Progress: Met  LTG 3: Pt will verbalize understanding of appropriate offloading.  LTG 3 Progress: Met      Time Calculation: Start Time: 1115  Untimed Charges  Wound Care: 98601 Selective debridement  71202-Jodkhbkra debridement: 15  Total Minutes  Untimed Charges Total Minutes: 15   Total Minutes: 15  Therapy Charges for Today     Code Description Service Date Service Provider Modifiers Qty    06857501240  DANNIE DEBRIDE OPEN WOUND UP TO 20CM 2/1/2023 Tara West, PT GP 1                  Tara West, PT  2/1/2023

## 2023-02-06 ENCOUNTER — TRANSCRIBE ORDERS (OUTPATIENT)
Dept: ADMINISTRATIVE | Facility: HOSPITAL | Age: 88
End: 2023-02-06
Payer: MEDICARE

## 2023-02-06 ENCOUNTER — HOSPITAL ENCOUNTER (OUTPATIENT)
Dept: GENERAL RADIOLOGY | Facility: HOSPITAL | Age: 88
Discharge: HOME | End: 2023-02-06
Admitting: FAMILY MEDICINE
Payer: MEDICARE

## 2023-02-06 DIAGNOSIS — M25.511 RIGHT SHOULDER PAIN, UNSPECIFIED CHRONICITY: ICD-10-CM

## 2023-02-06 DIAGNOSIS — M25.511 RIGHT SHOULDER PAIN, UNSPECIFIED CHRONICITY: Primary | ICD-10-CM

## 2023-02-06 PROCEDURE — 73030 X-RAY EXAM OF SHOULDER: CPT

## 2023-02-13 ENCOUNTER — LAB (OUTPATIENT)
Dept: LAB | Facility: HOSPITAL | Age: 88
End: 2023-02-13
Payer: MEDICARE

## 2023-02-13 ENCOUNTER — OFFICE VISIT (OUTPATIENT)
Dept: ONCOLOGY | Facility: CLINIC | Age: 88
End: 2023-02-13
Payer: MEDICARE

## 2023-02-13 VITALS
TEMPERATURE: 97.1 F | BODY MASS INDEX: 25.03 KG/M2 | RESPIRATION RATE: 18 BRPM | WEIGHT: 136 LBS | OXYGEN SATURATION: 99 % | DIASTOLIC BLOOD PRESSURE: 74 MMHG | SYSTOLIC BLOOD PRESSURE: 187 MMHG | HEART RATE: 73 BPM | HEIGHT: 62 IN

## 2023-02-13 DIAGNOSIS — D50.0 IRON DEFICIENCY ANEMIA DUE TO CHRONIC BLOOD LOSS: ICD-10-CM

## 2023-02-13 DIAGNOSIS — D50.0 IRON DEFICIENCY ANEMIA DUE TO CHRONIC BLOOD LOSS: Primary | ICD-10-CM

## 2023-02-13 DIAGNOSIS — D69.6 THROMBOCYTOPENIA: ICD-10-CM

## 2023-02-13 LAB
BASOPHILS # BLD AUTO: 0 10*3/MM3 (ref 0–0.2)
BASOPHILS NFR BLD AUTO: 0 % (ref 0–1.5)
DEPRECATED RDW RBC AUTO: 47.1 FL (ref 37–54)
EOSINOPHIL # BLD AUTO: 0 10*3/MM3 (ref 0–0.4)
EOSINOPHIL NFR BLD AUTO: 0 % (ref 0.3–6.2)
ERYTHROCYTE [DISTWIDTH] IN BLOOD BY AUTOMATED COUNT: 14.5 % (ref 12.3–15.4)
FERRITIN SERPL-MCNC: 317.1 NG/ML (ref 13–150)
HCT VFR BLD AUTO: 32.1 % (ref 34–46.6)
HGB BLD-MCNC: 10.2 G/DL (ref 12–15.9)
IMM GRANULOCYTES # BLD AUTO: 0.03 10*3/MM3 (ref 0–0.05)
IMM GRANULOCYTES NFR BLD AUTO: 2.1 % (ref 0–0.5)
IRON 24H UR-MRATE: 42 MCG/DL (ref 37–145)
IRON SATN MFR SERPL: 14 % (ref 20–50)
LYMPHOCYTES # BLD AUTO: 0.33 10*3/MM3 (ref 0.7–3.1)
LYMPHOCYTES NFR BLD AUTO: 23.1 % (ref 19.6–45.3)
MCH RBC QN AUTO: 28.2 PG (ref 26.6–33)
MCHC RBC AUTO-ENTMCNC: 31.8 G/DL (ref 31.5–35.7)
MCV RBC AUTO: 88.7 FL (ref 79–97)
MONOCYTES # BLD AUTO: 0.23 10*3/MM3 (ref 0.1–0.9)
MONOCYTES NFR BLD AUTO: 16.1 % (ref 5–12)
NEUTROPHILS NFR BLD AUTO: 0.84 10*3/MM3 (ref 1.7–7)
NEUTROPHILS NFR BLD AUTO: 58.7 % (ref 42.7–76)
PLATELET # BLD AUTO: 77 10*3/MM3 (ref 140–450)
PMV BLD AUTO: 9 FL (ref 6–12)
RBC # BLD AUTO: 3.62 10*6/MM3 (ref 3.77–5.28)
TIBC SERPL-MCNC: 308 MCG/DL (ref 298–536)
TRANSFERRIN SERPL-MCNC: 207 MG/DL (ref 200–360)
WBC NRBC COR # BLD: 1.43 10*3/MM3 (ref 3.4–10.8)

## 2023-02-13 PROCEDURE — 84466 ASSAY OF TRANSFERRIN: CPT

## 2023-02-13 PROCEDURE — 83540 ASSAY OF IRON: CPT

## 2023-02-13 PROCEDURE — 36415 COLL VENOUS BLD VENIPUNCTURE: CPT

## 2023-02-13 PROCEDURE — 82607 VITAMIN B-12: CPT

## 2023-02-13 PROCEDURE — 99214 OFFICE O/P EST MOD 30 MIN: CPT | Performed by: INTERNAL MEDICINE

## 2023-02-13 PROCEDURE — 85025 COMPLETE CBC W/AUTO DIFF WBC: CPT

## 2023-02-13 PROCEDURE — 82728 ASSAY OF FERRITIN: CPT

## 2023-02-13 RX ORDER — ERGOCALCIFEROL 1.25 MG/1
CAPSULE ORAL
COMMUNITY
Start: 2023-02-10

## 2023-02-13 NOTE — PROGRESS NOTES
DATE OF VISIT: 2/13/2023    REASON FOR VISIT: Followup for pancytopenia.    PROBLEM LIST:  1.  Pancytopenia:  A.  Incidentally noted blood work done May 24, 2020  B.  CT abdomen pelvis revealed mild hepatosplenomegaly spleen size 15 cm with normal liver enzymes  C.  Bone marrow biopsy done on May 26, 2020 revealed hypercellular marrow 66% cellularity with mild megakaryocytes atypia suspicious for low-grade MDS  D.  Repeat bone marrow biopsy done July 13, 2021 revealed hypercellular marrow with dyserythropoiesis  2.  Vitamin B12 deficiency:  A.  Vitamin B12 level 155 on May 25, 2020  3. Hypothyroidism  4. Diabetes  5. History of pulmonary embolism     HISTORY OF PRESENT ILLNESS: The patient is a very pleasant 87 y.o. female  with past medical history significant for pancytopenia diagnosed May 2020. Her blood work revealed pancytopenia.  CT chest abdomen pelvis revealed mild splenomegaly spleen size 15 cm with mild hepatomegaly as well as pancytopenia.  Bone marrow biopsy done on May 26, 2020 revealed hypercellular marrow with mild dysplastic features could not rule out low-grade MDS.    Repeated bone marrow biopsy done July 13, 2021 revealed similar changes.  Blood work confirmed vitamin B12 deficiency with serum B12 level of 150.  She was started on vitamin B12 replacement monthly. The patient is here today for scheduled follow up visit.     SUBJECTIVE: The patient is here today with her son.  She denies any bleeding.  No fever chills night the sweats.    Past History:  Medical History: has a past medical history of Anemia, Arthritis, Diabetes mellitus (HCC), Disease of thyroid gland, History of transfusion, [place] (hard of hearing), Hypertension, Migraine without aura and without status migrainosus, not intractable (12/30/2016), Pulmonary emboli (HCC) (2011), SOBOE (shortness of breath on exertion), Tremors of nervous system, Vertigo, Wears dentures, and Wears glasses.   Surgical History: has a past surgical history  "that includes Hysterectomy; Oophorectomy; Tonsillectomy; Bladder surgery; Cataract extraction; Cholecystectomy; Colonoscopy; and Kyphoplasty (N/A, 2/12/2021).   Family History: family history includes Breast cancer (age of onset: 84) in her sister; Heart attack in her father; Stroke in her mother.   Social History: reports that she has never smoked. She has never used smokeless tobacco. She reports that she does not drink alcohol and does not use drugs.    (Not in a hospital admission)     Allergies: Aspirin      Review of Systems   Constitutional: Positive for fatigue.   Respiratory: Negative.    Musculoskeletal: Positive for arthralgias and back pain.   Hematological: Bruises/bleeds easily.         PHYSICAL EXAMINATION:   BP (!) 187/74 Comment: LUE  Pulse 73   Temp 97.1 °F (36.2 °C) (Infrared)   Resp 18   Ht 157.5 cm (62.01\")   Wt 61.7 kg (136 lb)   LMP  (LMP Unknown) Comment: mammogram is up to date  SpO2 99%   BMI 24.87 kg/m²    Pain Score    02/13/23 1307   PainSc: 0-No pain       ECOG Performance Status: 2 - Symptomatic, <50% confined to bed  General Appearance:  alert, cooperative, no apparent distress, appears stated age and frail appearing   Lungs:   Clear to auscultation bilaterally; respirations regular, even, and unlabored bilaterally   Heart:  Regular rate and rhythm, no murmurs appreciated   Abdomen:   Soft, non-tender, non-distended and no organomegaly     Lab on 02/13/2023   Component Date Value Ref Range Status   • WBC 02/13/2023 1.43 (L)  3.40 - 10.80 10*3/mm3 Final   • RBC 02/13/2023 3.62 (L)  3.77 - 5.28 10*6/mm3 Final   • Hemoglobin 02/13/2023 10.2 (L)  12.0 - 15.9 g/dL Final   • Hematocrit 02/13/2023 32.1 (L)  34.0 - 46.6 % Final   • MCV 02/13/2023 88.7  79.0 - 97.0 fL Final   • MCH 02/13/2023 28.2  26.6 - 33.0 pg Final   • MCHC 02/13/2023 31.8  31.5 - 35.7 g/dL Final   • RDW 02/13/2023 14.5  12.3 - 15.4 % Final   • RDW-SD 02/13/2023 47.1  37.0 - 54.0 fl Final   • MPV 02/13/2023 9.0  " 6.0 - 12.0 fL Final   • Platelets 02/13/2023 77 (L)  140 - 450 10*3/mm3 Final   • Neutrophil % 02/13/2023 58.7  42.7 - 76.0 % Final   • Lymphocyte % 02/13/2023 23.1  19.6 - 45.3 % Final   • Monocyte % 02/13/2023 16.1 (H)  5.0 - 12.0 % Final   • Eosinophil % 02/13/2023 0.0 (L)  0.3 - 6.2 % Final   • Basophil % 02/13/2023 0.0  0.0 - 1.5 % Final   • Immature Grans % 02/13/2023 2.1 (H)  0.0 - 0.5 % Final   • Neutrophils, Absolute 02/13/2023 0.84 (L)  1.70 - 7.00 10*3/mm3 Final   • Lymphocytes, Absolute 02/13/2023 0.33 (L)  0.70 - 3.10 10*3/mm3 Final   • Monocytes, Absolute 02/13/2023 0.23  0.10 - 0.90 10*3/mm3 Final   • Eosinophils, Absolute 02/13/2023 0.00  0.00 - 0.40 10*3/mm3 Final   • Basophils, Absolute 02/13/2023 0.00  0.00 - 0.20 10*3/mm3 Final   • Immature Grans, Absolute 02/13/2023 0.03  0.00 - 0.05 10*3/mm3 Final        XR Shoulder 2+ View Right    Result Date: 2/6/2023  Narrative: XR SHOULDER 2+ VW RIGHT Date of Exam: 2/6/2023 4:28 PM EST Indication: SHOULDER PAIN RIGHT. Comparison: None available. Findings: The cortical margins are intact without evidence of acute fracture or dislocation. Moderate to severe acromioclavicular and glenohumeral arthrosis changes are present with some narrowing, sclerosis and small osteophytes. Moderate irregularity is noted involving the humerus footprint, likely reflecting underlying rotator cuff pathology.     Impression: Impression: The cortical margins are intact without evidence of acute fracture or dislocation. Moderate to severe acromioclavicular and glenohumeral arthrosis changes are present with some narrowing, sclerosis and small osteophytes. Moderate irregularity is noted involving the humerus footprint, likely reflecting underlying rotator cuff pathology. Electronically Signed: Milton Bejarano  2/6/2023 4:57 PM EST  Workstation ID: [id number]      ASSESSMENT: The patient is a very pleasant 87 y.o. female  with pancytopenia.       PLAN:  1. Pancytopenia:  A.  I did go  over the blood work results with the patient from February 13, 2023.  The patient continued to have pancytopenia while the cells are 1.4 hemoglobin 10.2 and platelets 77,000.  B.  I explained to the patient while she continues to have pancytopenia her counts are essentially stable to slightly better from before.  C.  I am suspecting this induced by portal hypertension and liver and spleen sequestration.    2. Hypothyroidism:  A. She will continue levothyroxine 100 mcg daily for hypothyroidism.     3.  History of iron deficiency:  A.  I will repeat her labs and return.     4. Type 2 diabetes:  A. She has been taken off insulin due to weight loss and is on metformin and Amaryl. She only checks her blood sugars occasionally. This is being managed by her PCP Dr. Loomis.     FOLLOW UP: We will see her back in 6 months with repeat CBC, iron profile and vitamin B12 level.     Dinh Lyman MD  2/13/2023

## 2023-02-14 ENCOUNTER — TELEPHONE (OUTPATIENT)
Dept: ONCOLOGY | Facility: CLINIC | Age: 88
End: 2023-02-14
Payer: MEDICARE

## 2023-02-14 LAB — VIT B12 BLD-MCNC: 1161 PG/ML (ref 211–946)

## 2023-02-14 NOTE — TELEPHONE ENCOUNTER
Let patient know her iron storage came back ok and b12 levels adequate.  She verbalized understanding.

## 2023-02-27 ENCOUNTER — TRANSCRIBE ORDERS (OUTPATIENT)
Dept: HOME HEALTH SERVICES | Facility: HOME HEALTHCARE | Age: 88
End: 2023-02-27
Payer: MEDICARE

## 2023-02-27 ENCOUNTER — HOME HEALTH ADMISSION (OUTPATIENT)
Dept: HOME HEALTH SERVICES | Facility: HOME HEALTHCARE | Age: 88
End: 2023-02-27
Payer: MEDICARE

## 2023-02-27 DIAGNOSIS — Z51.89 ENCOUNTER FOR WOUND CARE: Primary | ICD-10-CM

## 2023-03-14 ENCOUNTER — DOCUMENTATION (OUTPATIENT)
Dept: FAMILY MEDICINE CLINIC | Facility: CLINIC | Age: 88
End: 2023-03-14
Payer: MEDICARE

## 2023-03-14 PROBLEM — D46.9 MYELODYSPLASIA (MYELODYSPLASTIC SYNDROME): Status: ACTIVE | Noted: 2023-03-14

## 2023-03-14 PROBLEM — R53.1 WEAKNESS: Status: ACTIVE | Noted: 2023-03-14

## 2023-03-14 PROBLEM — Z86.711 PERSONAL HISTORY OF PULMONARY EMBOLISM: Status: ACTIVE | Noted: 2023-03-14

## 2023-03-16 ENCOUNTER — NURSING HOME (OUTPATIENT)
Dept: INTERNAL MEDICINE | Facility: CLINIC | Age: 88
End: 2023-03-16
Payer: MEDICARE

## 2023-03-16 VITALS
WEIGHT: 134 LBS | OXYGEN SATURATION: 97 % | TEMPERATURE: 96.3 F | RESPIRATION RATE: 18 BRPM | BODY MASS INDEX: 24.5 KG/M2 | DIASTOLIC BLOOD PRESSURE: 59 MMHG | SYSTOLIC BLOOD PRESSURE: 168 MMHG | HEART RATE: 65 BPM

## 2023-03-16 DIAGNOSIS — G25.0 BENIGN FAMILIAL TREMOR: ICD-10-CM

## 2023-03-16 DIAGNOSIS — R16.1 SPLENOMEGALY: ICD-10-CM

## 2023-03-16 DIAGNOSIS — D61.818 PANCYTOPENIA: ICD-10-CM

## 2023-03-16 DIAGNOSIS — E03.9 HYPOTHYROIDISM (ACQUIRED): ICD-10-CM

## 2023-03-16 DIAGNOSIS — D46.9 MYELODYSPLASIA (MYELODYSPLASTIC SYNDROME): ICD-10-CM

## 2023-03-16 DIAGNOSIS — D69.6 THROMBOCYTOPENIA: ICD-10-CM

## 2023-03-16 DIAGNOSIS — Z86.711 PERSONAL HISTORY OF PULMONARY EMBOLISM: ICD-10-CM

## 2023-03-16 DIAGNOSIS — E11.65 TYPE 2 DIABETES MELLITUS WITH HYPERGLYCEMIA, WITHOUT LONG-TERM CURRENT USE OF INSULIN: Primary | ICD-10-CM

## 2023-03-16 PROCEDURE — 99305 1ST NF CARE MODERATE MDM 35: CPT | Performed by: INTERNAL MEDICINE

## 2023-03-16 NOTE — PROGRESS NOTES
Nursing Home History and Physical       Bboby Kelsey DO  [address] [place] [address] Phone: [phone]  Fax: [phone]     PATIENT NAME: Chey Robertson                                                                          YOB: 1935           DATE OF SERVICE: 03/16/2023  FACILITY:  [facility]    CHIEF COMPLAINT:  Nursing facility admission      HISTORY OF PRESENT ILLNESS:   Patient is an 87-year-old female with history of myelodysplastic syndrome, pancytopenia, history of PE on warfarin, and anemia recently admitted to [facility] for acute blood loss anemia.  Patient was transfused 1 unit PRBC and due to weakness and debility was then transferred to this facility for further rehab.    On exam today patient shared that she has been doing fairly well.  She has been working with therapy and from the amount of work she was doing yesterday, she felt exhausted today.  She otherwise feels that her strength is gradually improving.  Glucose levels in facility have been running a bit high between 215- 251 and patient has only been on sliding scale insulin since discharge from facility.  She recalls being on metformin prior to hospitalization.      PAST MEDICAL & SURGICAL HISTORY:   Past Medical History:   Diagnosis Date   • Anemia    • Arthritis    • Diabetes mellitus (HCC)     checks sugar only when pt wants to    • Disease of thyroid gland    • History of transfusion     with knee surgery-  no reaction recalled.    • [place] (hard of hearing)     no hearing aids   • Hypertension    • Migraine without aura and without status migrainosus, not intractable 12/30/2016   • Pulmonary emboli (HCC) 2011    (after knee surgery)   • SOBOE (shortness of breath on exertion)    • Tremors of nervous system    • Vertigo    • Wears dentures     upper    • Wears glasses       Past Surgical History:   Procedure Laterality Date   • BLADDER SURGERY     • CATARACT EXTRACTION      bilat    •  CHOLECYSTECTOMY     • COLONOSCOPY     • HYSTERECTOMY     • KYPHOPLASTY N/A 2/12/2021    Procedure: KYPHOPLASTY T11, T12 AND L4;  Surgeon: Matty Hearn MD;  Location: [facility];  Service: Orthopedic Spine;  Laterality: N/A;   • OOPHORECTOMY     • TONSILLECTOMY           MEDICATIONS:  I have reviewed and reconciled the patients medication list in the patients chart at the skilled nursing facility on 03/16/2023.      ALLERGIES:  Allergies   Allergen Reactions   • Aspirin Unknown - Low Severity     Mouth sores          SOCIAL HISTORY:  Social History     Socioeconomic History   • Marital status:    Tobacco Use   • Smoking status: Never   • Smokeless tobacco: Never   Vaping Use   • Vaping Use: Never used   Substance and Sexual Activity   • Alcohol use: No   • Drug use: No   • Sexual activity: Defer       FAMILY HISTORY:  Family History   Problem Relation Age of Onset   • Breast cancer Sister 84   • Stroke Mother    • Heart attack Father    • Ovarian cancer Neg Hx         REVIEW OF SYSTEMS:  Review of Systems   Constitutional: Positive for fatigue. Negative for chills and fever.   HENT: Negative for congestion, ear pain, rhinorrhea, sinus pressure and sore throat.    Eyes: Negative for visual disturbance.   Respiratory: Negative for cough, chest tightness, shortness of breath and wheezing.    Cardiovascular: Negative for chest pain, palpitations and leg swelling.   Gastrointestinal: Negative for abdominal pain, blood in stool, constipation, diarrhea, nausea and vomiting.   Endocrine: Negative for polydipsia and polyuria.   Genitourinary: Negative for dysuria and hematuria.   Musculoskeletal: Negative for arthralgias and back pain.   Skin: Negative for rash.   Neurological: Positive for weakness. Negative for dizziness, light-headedness, numbness and headaches.   Psychiatric/Behavioral: Negative for dysphoric mood and sleep disturbance. The patient is not nervous/anxious.          PHYSICAL EXAMINATION:   VITAL  SIGNS: /59   Pulse 65   Temp 96.3 °F (35.7 °C)   Resp 18   Wt 60.8 kg (134 lb)   LMP  (LMP Unknown) Comment: mammogram is up to date  SpO2 97%   BMI 24.50 kg/m²     Physical Exam  Vitals and nursing note reviewed.   Constitutional:       Appearance: Normal appearance. She is well-developed.      Comments: Frail elderly female laying comfortably in bed   HENT:      Head: Normocephalic and atraumatic.      Nose: Nose normal.      Mouth/Throat:      Mouth: Mucous membranes are moist.      Pharynx: No oropharyngeal exudate.   Eyes:      General: No scleral icterus.     Conjunctiva/sclera: Conjunctivae normal.      Pupils: Pupils are equal, round, and reactive to light.   Neck:      Thyroid: No thyromegaly.   Cardiovascular:      Rate and Rhythm: Normal rate and regular rhythm.      Heart sounds: Normal heart sounds. No murmur heard.    No friction rub. No gallop.   Pulmonary:      Effort: Pulmonary effort is normal. No respiratory distress.      Breath sounds: Normal breath sounds. No wheezing.   Abdominal:      General: Bowel sounds are normal. There is no distension.      Palpations: Abdomen is soft.      Tenderness: There is no abdominal tenderness.   Musculoskeletal:         General: No deformity or signs of injury.      Cervical back: Normal range of motion and neck supple.   Lymphadenopathy:      Cervical: No cervical adenopathy.   Skin:     General: Skin is warm and dry.      Findings: No rash.   Neurological:      Mental Status: She is alert and oriented to person, place, and time.   Psychiatric:         Mood and Affect: Mood normal.         Behavior: Behavior normal.         RECORDS REVIEW:   Discharge Summary from [facility] 3/13/2023    ASSESSMENT   Diagnoses and all orders for this visit:    1. Type 2 diabetes mellitus with hyperglycemia, without long-term current use of insulin (HCC) (Primary)    2. Thrombocytopenia ([facility])    3. Benign familial tremor    4. Pancytopenia ([facility])    5.  Hypothyroidism (acquired)    6. Myelodysplasia (myelodysplastic syndrome) (HCC)    7. Personal history of pulmonary embolism    8. Splenomegaly        PLAN  Blood loss anemia/chronic anemia  - Patient transfused 1 unit PRBC.  - Monitor CBC week from discharge.    Myelodysplastic syndrome  - Patient to follow-up with hematology (Dr. Lyman) as scheduled    History of pulmonary embolism  - Continue warfarin.  Monitor INR weekly.    Type 2 diabetes mellitus  - Start metformin 500 mg p.o. twice daily.  - Continue sliding scale insulin and consider weaning off of this by the time of discharge from this facility    Hypothyroidism  - Continue Synthroid        [x]  Discussed Patient in detail with nursing/staff, addressed all needs today.     [x]  Plan of Care Reviewed   [x]  PT/OT Reviewed   []  Order Changes  []  Discharge Plans Reviewed  [x]  Advance Directive on file with Nursing Home.   [x]  POA on file with Nursing Home.    [x]  Code Status listed and reviewed.     Bobby Kelsey DO.  3/16/2023      **Part of this note may be an electronic transcription/translation of spoken language to printed text using the Dragon Dictation System.**

## 2023-03-27 RX ORDER — WARFARIN SODIUM 3 MG/1
TABLET ORAL
Qty: 90 TABLET | OUTPATIENT
Start: 2023-03-27

## 2023-03-28 ENCOUNTER — APPOINTMENT (OUTPATIENT)
Dept: CT IMAGING | Facility: HOSPITAL | Age: 88
End: 2023-03-28
Payer: MEDICARE

## 2023-03-28 ENCOUNTER — HOSPITAL ENCOUNTER (OUTPATIENT)
Facility: HOSPITAL | Age: 88
Setting detail: OBSERVATION
Discharge: HOME | End: 2023-03-31
Attending: EMERGENCY MEDICINE | Admitting: INTERNAL MEDICINE
Payer: MEDICARE

## 2023-03-28 DIAGNOSIS — D46.9 MYELODYSPLASIA (MYELODYSPLASTIC SYNDROME): ICD-10-CM

## 2023-03-28 DIAGNOSIS — R53.1 WEAKNESS: ICD-10-CM

## 2023-03-28 DIAGNOSIS — E53.8 B12 DEFICIENCY: ICD-10-CM

## 2023-03-28 DIAGNOSIS — E86.0 ACUTE DEHYDRATION: ICD-10-CM

## 2023-03-28 DIAGNOSIS — E86.0 DEHYDRATION: ICD-10-CM

## 2023-03-28 DIAGNOSIS — R06.02 SHORTNESS OF BREATH: ICD-10-CM

## 2023-03-28 DIAGNOSIS — K90.9 ABNORMAL INTESTINAL ABSORPTION: ICD-10-CM

## 2023-03-28 DIAGNOSIS — N17.9 ACUTE KIDNEY INJURY: ICD-10-CM

## 2023-03-28 DIAGNOSIS — R16.1 SPLENOMEGALY: ICD-10-CM

## 2023-03-28 DIAGNOSIS — N39.0 ACUTE URINARY TRACT INFECTION: Primary | ICD-10-CM

## 2023-03-28 DIAGNOSIS — R16.0 HEPATOMEGALY: ICD-10-CM

## 2023-03-28 DIAGNOSIS — G25.0 BENIGN FAMILIAL TREMOR: ICD-10-CM

## 2023-03-28 DIAGNOSIS — E11.9 TYPE 2 DIABETES MELLITUS WITHOUT COMPLICATION, WITHOUT LONG-TERM CURRENT USE OF INSULIN: ICD-10-CM

## 2023-03-28 DIAGNOSIS — D50.0 IRON DEFICIENCY ANEMIA DUE TO CHRONIC BLOOD LOSS: ICD-10-CM

## 2023-03-28 DIAGNOSIS — D61.818 PANCYTOPENIA: ICD-10-CM

## 2023-03-28 DIAGNOSIS — D69.6 THROMBOCYTOPENIA: ICD-10-CM

## 2023-03-28 DIAGNOSIS — E03.9 HYPOTHYROIDISM (ACQUIRED): ICD-10-CM

## 2023-03-28 DIAGNOSIS — Z86.711 PERSONAL HISTORY OF PULMONARY EMBOLISM: ICD-10-CM

## 2023-03-28 DIAGNOSIS — R41.0 CONFUSION: ICD-10-CM

## 2023-03-28 LAB
ALBUMIN SERPL-MCNC: 4.4 G/DL (ref 3.5–5.2)
ALBUMIN/GLOB SERPL: 1.4 G/DL
ALP SERPL-CCNC: 93 U/L (ref 39–117)
ALT SERPL W P-5'-P-CCNC: 6 U/L (ref 1–33)
ANION GAP SERPL CALCULATED.3IONS-SCNC: 17 MMOL/L (ref 5–15)
AST SERPL-CCNC: 13 U/L (ref 1–32)
BACTERIA UR QL AUTO: ABNORMAL /HPF
BASOPHILS # BLD AUTO: 0 10*3/MM3 (ref 0–0.2)
BASOPHILS NFR BLD AUTO: 0 % (ref 0–1.5)
BILIRUB SERPL-MCNC: 0.5 MG/DL (ref 0–1.2)
BILIRUB UR QL STRIP: NEGATIVE
BUN SERPL-MCNC: 34 MG/DL (ref 8–23)
BUN/CREAT SERPL: 35.4 (ref 7–25)
CALCIUM SPEC-SCNC: 9.6 MG/DL (ref 8.6–10.5)
CHLORIDE SERPL-SCNC: 97 MMOL/L (ref 98–107)
CLARITY UR: CLEAR
CO2 SERPL-SCNC: 22 MMOL/L (ref 22–29)
COLOR UR: YELLOW
CREAT SERPL-MCNC: 0.96 MG/DL (ref 0.57–1)
D-LACTATE SERPL-SCNC: 2.6 MMOL/L (ref 0.5–2)
DEPRECATED RDW RBC AUTO: 47.7 FL (ref 37–54)
EGFRCR SERPLBLD CKD-EPI 2021: 57.7 ML/MIN/1.73
EOSINOPHIL # BLD AUTO: 0 10*3/MM3 (ref 0–0.4)
EOSINOPHIL NFR BLD AUTO: 0 % (ref 0.3–6.2)
ERYTHROCYTE [DISTWIDTH] IN BLOOD BY AUTOMATED COUNT: 15.4 % (ref 12.3–15.4)
GLOBULIN UR ELPH-MCNC: 3.2 GM/DL
GLUCOSE SERPL-MCNC: 189 MG/DL (ref 65–99)
GLUCOSE UR STRIP-MCNC: ABNORMAL MG/DL
HCT VFR BLD AUTO: 33.9 % (ref 34–46.6)
HGB BLD-MCNC: 10.6 G/DL (ref 12–15.9)
HGB UR QL STRIP.AUTO: NEGATIVE
HOLD SPECIMEN: NORMAL
HYALINE CASTS UR QL AUTO: ABNORMAL /LPF
IMM GRANULOCYTES # BLD AUTO: 0.03 10*3/MM3 (ref 0–0.05)
IMM GRANULOCYTES NFR BLD AUTO: 1.4 % (ref 0–0.5)
INR PPP: 1.51 (ref 0.84–1.13)
KETONES UR QL STRIP: ABNORMAL
LEUKOCYTE ESTERASE UR QL STRIP.AUTO: NEGATIVE
LIPASE SERPL-CCNC: 37 U/L (ref 13–60)
LYMPHOCYTES # BLD AUTO: 0.39 10*3/MM3 (ref 0.7–3.1)
LYMPHOCYTES NFR BLD AUTO: 18.1 % (ref 19.6–45.3)
MCH RBC QN AUTO: 26.8 PG (ref 26.6–33)
MCHC RBC AUTO-ENTMCNC: 31.3 G/DL (ref 31.5–35.7)
MCV RBC AUTO: 85.6 FL (ref 79–97)
MONOCYTES # BLD AUTO: 0.21 10*3/MM3 (ref 0.1–0.9)
MONOCYTES NFR BLD AUTO: 9.7 % (ref 5–12)
NEUTROPHILS NFR BLD AUTO: 1.53 10*3/MM3 (ref 1.7–7)
NEUTROPHILS NFR BLD AUTO: 70.8 % (ref 42.7–76)
NITRITE UR QL STRIP: NEGATIVE
NRBC BLD AUTO-RTO: 0 /100 WBC (ref 0–0.2)
PH UR STRIP.AUTO: <=5 [PH] (ref 5–8)
PLATELET # BLD AUTO: 77 10*3/MM3 (ref 140–450)
PMV BLD AUTO: 8.7 FL (ref 6–12)
POTASSIUM SERPL-SCNC: 4.3 MMOL/L (ref 3.5–5.2)
PROT SERPL-MCNC: 7.6 G/DL (ref 6–8.5)
PROT UR QL STRIP: ABNORMAL
PROTHROMBIN TIME: 18.2 SECONDS (ref 11.4–14.4)
RBC # BLD AUTO: 3.96 10*6/MM3 (ref 3.77–5.28)
RBC # UR STRIP: ABNORMAL /HPF
REF LAB TEST METHOD: ABNORMAL
SODIUM SERPL-SCNC: 136 MMOL/L (ref 136–145)
SP GR UR STRIP: 1.03 (ref 1–1.03)
SQUAMOUS #/AREA URNS HPF: ABNORMAL /HPF
UROBILINOGEN UR QL STRIP: ABNORMAL
WBC # UR STRIP: ABNORMAL /HPF
WBC NRBC COR # BLD: 2.16 10*3/MM3 (ref 3.4–10.8)
WHOLE BLOOD HOLD COAG: NORMAL
WHOLE BLOOD HOLD SPECIMEN: NORMAL

## 2023-03-28 PROCEDURE — 87186 SC STD MICRODIL/AGAR DIL: CPT | Performed by: EMERGENCY MEDICINE

## 2023-03-28 PROCEDURE — 85025 COMPLETE CBC W/AUTO DIFF WBC: CPT | Performed by: EMERGENCY MEDICINE

## 2023-03-28 PROCEDURE — 99284 EMERGENCY DEPT VISIT MOD MDM: CPT

## 2023-03-28 PROCEDURE — 87086 URINE CULTURE/COLONY COUNT: CPT | Performed by: EMERGENCY MEDICINE

## 2023-03-28 PROCEDURE — 87088 URINE BACTERIA CULTURE: CPT | Performed by: EMERGENCY MEDICINE

## 2023-03-28 PROCEDURE — 80053 COMPREHEN METABOLIC PANEL: CPT | Performed by: EMERGENCY MEDICINE

## 2023-03-28 PROCEDURE — 83690 ASSAY OF LIPASE: CPT | Performed by: EMERGENCY MEDICINE

## 2023-03-28 PROCEDURE — 83605 ASSAY OF LACTIC ACID: CPT | Performed by: EMERGENCY MEDICINE

## 2023-03-28 PROCEDURE — 25010000002 ONDANSETRON PER 1 MG: Performed by: EMERGENCY MEDICINE

## 2023-03-28 PROCEDURE — 36415 COLL VENOUS BLD VENIPUNCTURE: CPT

## 2023-03-28 PROCEDURE — 96375 TX/PRO/DX INJ NEW DRUG ADDON: CPT

## 2023-03-28 PROCEDURE — 81001 URINALYSIS AUTO W/SCOPE: CPT | Performed by: EMERGENCY MEDICINE

## 2023-03-28 PROCEDURE — 85610 PROTHROMBIN TIME: CPT | Performed by: EMERGENCY MEDICINE

## 2023-03-28 PROCEDURE — 74176 CT ABD & PELVIS W/O CONTRAST: CPT

## 2023-03-28 RX ORDER — SODIUM CHLORIDE 0.9 % (FLUSH) 0.9 %
10 SYRINGE (ML) INJECTION AS NEEDED
Status: DISCONTINUED | OUTPATIENT
Start: 2023-03-28 | End: 2023-03-28

## 2023-03-28 RX ORDER — ONDANSETRON 2 MG/ML
4 INJECTION INTRAMUSCULAR; INTRAVENOUS ONCE
Status: COMPLETED | OUTPATIENT
Start: 2023-03-28 | End: 2023-03-28

## 2023-03-28 RX ORDER — SODIUM CHLORIDE 9 MG/ML
10 INJECTION INTRAVENOUS AS NEEDED
Status: DISCONTINUED | OUTPATIENT
Start: 2023-03-28 | End: 2023-03-31 | Stop reason: HOSPADM

## 2023-03-28 RX ADMIN — SODIUM CHLORIDE 1000 ML: 9 INJECTION, SOLUTION INTRAVENOUS at 22:51

## 2023-03-28 RX ADMIN — ONDANSETRON 4 MG: 2 INJECTION INTRAMUSCULAR; INTRAVENOUS at 22:51

## 2023-03-28 NOTE — Clinical Note
Level of Care: Telemetry [5]   Diagnosis: Acute urinary tract infection [837460]   Admitting Physician: GISELE OQUENDO [[id number]]   Attending Physician: GISELE OQUENDO [[id number]]   Bed Request Comments: tele   4

## 2023-03-29 PROBLEM — E86.0 DEHYDRATION: Status: ACTIVE | Noted: 2023-03-29

## 2023-03-29 LAB
ANION GAP SERPL CALCULATED.3IONS-SCNC: 14 MMOL/L (ref 5–15)
BASOPHILS # BLD AUTO: 0 10*3/MM3 (ref 0–0.2)
BASOPHILS NFR BLD AUTO: 0 % (ref 0–1.5)
BUN SERPL-MCNC: 32 MG/DL (ref 8–23)
BUN/CREAT SERPL: 43.8 (ref 7–25)
CALCIUM SPEC-SCNC: 8.6 MG/DL (ref 8.6–10.5)
CHLORIDE SERPL-SCNC: 101 MMOL/L (ref 98–107)
CO2 SERPL-SCNC: 22 MMOL/L (ref 22–29)
CREAT SERPL-MCNC: 0.73 MG/DL (ref 0.57–1)
D-LACTATE SERPL-SCNC: 1.6 MMOL/L (ref 0.5–2)
DEPRECATED RDW RBC AUTO: 49.2 FL (ref 37–54)
EGFRCR SERPLBLD CKD-EPI 2021: 80.2 ML/MIN/1.73
EOSINOPHIL # BLD AUTO: 0 10*3/MM3 (ref 0–0.4)
EOSINOPHIL NFR BLD AUTO: 0 % (ref 0.3–6.2)
ERYTHROCYTE [DISTWIDTH] IN BLOOD BY AUTOMATED COUNT: 15.3 % (ref 12.3–15.4)
FLUAV RNA RESP QL NAA+PROBE: NOT DETECTED
FLUBV RNA RESP QL NAA+PROBE: NOT DETECTED
GLUCOSE BLDC GLUCOMTR-MCNC: 134 MG/DL (ref 70–130)
GLUCOSE BLDC GLUCOMTR-MCNC: 176 MG/DL (ref 70–130)
GLUCOSE BLDC GLUCOMTR-MCNC: 189 MG/DL (ref 70–130)
GLUCOSE SERPL-MCNC: 166 MG/DL (ref 65–99)
HBA1C MFR BLD: 6 % (ref 4.8–5.6)
HCT VFR BLD AUTO: 28.2 % (ref 34–46.6)
HGB BLD-MCNC: 8.7 G/DL (ref 12–15.9)
IMM GRANULOCYTES # BLD AUTO: 0.02 10*3/MM3 (ref 0–0.05)
IMM GRANULOCYTES NFR BLD AUTO: 1.4 % (ref 0–0.5)
INR PPP: 1.69 (ref 0.84–1.13)
LYMPHOCYTES # BLD AUTO: 0.4 10*3/MM3 (ref 0.7–3.1)
LYMPHOCYTES NFR BLD AUTO: 27.6 % (ref 19.6–45.3)
MAGNESIUM SERPL-MCNC: 1.7 MG/DL (ref 1.6–2.4)
MCH RBC QN AUTO: 27 PG (ref 26.6–33)
MCHC RBC AUTO-ENTMCNC: 30.9 G/DL (ref 31.5–35.7)
MCV RBC AUTO: 87.6 FL (ref 79–97)
MONOCYTES # BLD AUTO: 0.21 10*3/MM3 (ref 0.1–0.9)
MONOCYTES NFR BLD AUTO: 14.5 % (ref 5–12)
NEUTROPHILS NFR BLD AUTO: 0.82 10*3/MM3 (ref 1.7–7)
NEUTROPHILS NFR BLD AUTO: 56.5 % (ref 42.7–76)
NRBC BLD AUTO-RTO: 0 /100 WBC (ref 0–0.2)
PLATELET # BLD AUTO: 74 10*3/MM3 (ref 140–450)
PMV BLD AUTO: 10.3 FL (ref 6–12)
POTASSIUM SERPL-SCNC: 3.7 MMOL/L (ref 3.5–5.2)
PROTHROMBIN TIME: 19.8 SECONDS (ref 11.4–14.4)
RBC # BLD AUTO: 3.22 10*6/MM3 (ref 3.77–5.28)
SARS-COV-2 RNA RESP QL NAA+PROBE: NOT DETECTED
SODIUM SERPL-SCNC: 137 MMOL/L (ref 136–145)
WBC NRBC COR # BLD: 1.45 10*3/MM3 (ref 3.4–10.8)

## 2023-03-29 PROCEDURE — G0378 HOSPITAL OBSERVATION PER HR: HCPCS

## 2023-03-29 PROCEDURE — 96365 THER/PROPH/DIAG IV INF INIT: CPT

## 2023-03-29 PROCEDURE — 87040 BLOOD CULTURE FOR BACTERIA: CPT | Performed by: EMERGENCY MEDICINE

## 2023-03-29 PROCEDURE — 97162 PT EVAL MOD COMPLEX 30 MIN: CPT

## 2023-03-29 PROCEDURE — 83036 HEMOGLOBIN GLYCOSYLATED A1C: CPT | Performed by: NURSE PRACTITIONER

## 2023-03-29 PROCEDURE — 99222 1ST HOSP IP/OBS MODERATE 55: CPT | Performed by: NURSE PRACTITIONER

## 2023-03-29 PROCEDURE — 83735 ASSAY OF MAGNESIUM: CPT | Performed by: NURSE PRACTITIONER

## 2023-03-29 PROCEDURE — 96361 HYDRATE IV INFUSION ADD-ON: CPT

## 2023-03-29 PROCEDURE — 85610 PROTHROMBIN TIME: CPT | Performed by: NURSE PRACTITIONER

## 2023-03-29 PROCEDURE — 87076 CULTURE ANAEROBE IDENT EACH: CPT | Performed by: EMERGENCY MEDICINE

## 2023-03-29 PROCEDURE — 96366 THER/PROPH/DIAG IV INF ADDON: CPT

## 2023-03-29 PROCEDURE — 87636 SARSCOV2 & INF A&B AMP PRB: CPT | Performed by: NURSE PRACTITIONER

## 2023-03-29 PROCEDURE — 80048 BASIC METABOLIC PNL TOTAL CA: CPT | Performed by: NURSE PRACTITIONER

## 2023-03-29 PROCEDURE — 82962 GLUCOSE BLOOD TEST: CPT

## 2023-03-29 PROCEDURE — 83605 ASSAY OF LACTIC ACID: CPT | Performed by: EMERGENCY MEDICINE

## 2023-03-29 PROCEDURE — 25010000002 CEFTRIAXONE PER 250 MG: Performed by: EMERGENCY MEDICINE

## 2023-03-29 PROCEDURE — 25010000002 CEFTRIAXONE PER 250 MG: Performed by: NURSE PRACTITIONER

## 2023-03-29 PROCEDURE — 97165 OT EVAL LOW COMPLEX 30 MIN: CPT

## 2023-03-29 PROCEDURE — 85025 COMPLETE CBC W/AUTO DIFF WBC: CPT | Performed by: NURSE PRACTITIONER

## 2023-03-29 PROCEDURE — 63710000001 INSULIN LISPRO (HUMAN) PER 5 UNITS: Performed by: NURSE PRACTITIONER

## 2023-03-29 RX ORDER — WARFARIN SODIUM 5 MG/1
5 TABLET ORAL ONCE
Status: COMPLETED | OUTPATIENT
Start: 2023-03-29 | End: 2023-03-29

## 2023-03-29 RX ORDER — SODIUM CHLORIDE 9 MG/ML
75 INJECTION, SOLUTION INTRAVENOUS CONTINUOUS
Status: ACTIVE | OUTPATIENT
Start: 2023-03-29 | End: 2023-03-29

## 2023-03-29 RX ORDER — NICOTINE POLACRILEX 4 MG
15 LOZENGE BUCCAL
Status: DISCONTINUED | OUTPATIENT
Start: 2023-03-29 | End: 2023-03-31 | Stop reason: HOSPADM

## 2023-03-29 RX ORDER — ACETAMINOPHEN 160 MG/5ML
650 SOLUTION ORAL EVERY 4 HOURS PRN
Status: DISCONTINUED | OUTPATIENT
Start: 2023-03-29 | End: 2023-03-31 | Stop reason: HOSPADM

## 2023-03-29 RX ORDER — SODIUM CHLORIDE 0.9 % (FLUSH) 0.9 %
10 SYRINGE (ML) INJECTION EVERY 12 HOURS SCHEDULED
Status: DISCONTINUED | OUTPATIENT
Start: 2023-03-29 | End: 2023-03-31 | Stop reason: HOSPADM

## 2023-03-29 RX ORDER — DEXTROSE MONOHYDRATE 25 G/50ML
25 INJECTION, SOLUTION INTRAVENOUS
Status: DISCONTINUED | OUTPATIENT
Start: 2023-03-29 | End: 2023-03-31 | Stop reason: HOSPADM

## 2023-03-29 RX ORDER — ACETAMINOPHEN 650 MG/1
650 SUPPOSITORY RECTAL EVERY 4 HOURS PRN
Status: DISCONTINUED | OUTPATIENT
Start: 2023-03-29 | End: 2023-03-31 | Stop reason: HOSPADM

## 2023-03-29 RX ORDER — SODIUM CHLORIDE 9 MG/ML
40 INJECTION, SOLUTION INTRAVENOUS AS NEEDED
Status: DISCONTINUED | OUTPATIENT
Start: 2023-03-29 | End: 2023-03-31 | Stop reason: HOSPADM

## 2023-03-29 RX ORDER — ONDANSETRON 2 MG/ML
4 INJECTION INTRAMUSCULAR; INTRAVENOUS EVERY 6 HOURS PRN
Status: DISCONTINUED | OUTPATIENT
Start: 2023-03-29 | End: 2023-03-31 | Stop reason: HOSPADM

## 2023-03-29 RX ORDER — ACETAMINOPHEN 325 MG/1
650 TABLET ORAL EVERY 4 HOURS PRN
Status: DISCONTINUED | OUTPATIENT
Start: 2023-03-29 | End: 2023-03-31 | Stop reason: HOSPADM

## 2023-03-29 RX ORDER — WARFARIN SODIUM 2 MG/1
2 TABLET ORAL DAILY
Status: DISCONTINUED | OUTPATIENT
Start: 2023-03-29 | End: 2023-03-29

## 2023-03-29 RX ORDER — IBUPROFEN 600 MG/1
1 TABLET ORAL
Status: DISCONTINUED | OUTPATIENT
Start: 2023-03-29 | End: 2023-03-31 | Stop reason: HOSPADM

## 2023-03-29 RX ORDER — SODIUM CHLORIDE 0.9 % (FLUSH) 0.9 %
10 SYRINGE (ML) INJECTION AS NEEDED
Status: DISCONTINUED | OUTPATIENT
Start: 2023-03-29 | End: 2023-03-31 | Stop reason: HOSPADM

## 2023-03-29 RX ORDER — INSULIN LISPRO 100 [IU]/ML
0-9 INJECTION, SOLUTION INTRAVENOUS; SUBCUTANEOUS
Status: DISCONTINUED | OUTPATIENT
Start: 2023-03-29 | End: 2023-03-31 | Stop reason: HOSPADM

## 2023-03-29 RX ADMIN — SODIUM CHLORIDE 1 G: 900 INJECTION INTRAVENOUS at 20:16

## 2023-03-29 RX ADMIN — SODIUM CHLORIDE 2 G: 900 INJECTION INTRAVENOUS at 00:50

## 2023-03-29 RX ADMIN — INSULIN LISPRO 2 UNITS: 100 INJECTION, SOLUTION INTRAVENOUS; SUBCUTANEOUS at 16:47

## 2023-03-29 RX ADMIN — SODIUM CHLORIDE 75 ML/HR: 9 INJECTION, SOLUTION INTRAVENOUS at 03:00

## 2023-03-29 RX ADMIN — ACETAMINOPHEN 325MG 650 MG: 325 TABLET ORAL at 20:19

## 2023-03-29 RX ADMIN — INSULIN LISPRO 2 UNITS: 100 INJECTION, SOLUTION INTRAVENOUS; SUBCUTANEOUS at 11:37

## 2023-03-29 RX ADMIN — SODIUM CHLORIDE 75 ML/HR: 9 INJECTION, SOLUTION INTRAVENOUS at 16:49

## 2023-03-29 RX ADMIN — Medication 10 ML: at 20:17

## 2023-03-29 RX ADMIN — Medication 10 ML: at 03:00

## 2023-03-29 RX ADMIN — WARFARIN SODIUM 5 MG: 5 TABLET ORAL at 16:48

## 2023-03-29 NOTE — ED PROVIDER NOTES
Subjective   History of Present Illness  87-year-old female who was brought in for evaluation of weakness, nausea, decreased oral intake and suspected dehydration.  The patient was originally admitted to [facility] on the  of this month, roughly 19 days ago.  The patient then went to rehab and stayed in rehab until yesterday.  T Per the family's report the amount of time that she was allowed to stay in rehab had  and therefore she was discharged yesterday.  Since that given time the patient has had nausea, vomiting, decreased oral intake, and worsening fatigue.  She exhibits normal baseline mentation.  No definitive focal neurological deficits just generalized weakness.  She denies fever.  She does report some lower abdominal pain with associated dysuria and urinary frequency despite poor oral intake.  She denies chest pain, cough, shortness of breath.  No definitive reported sick contacts but the patient did just receive hospitalization and rehab.  No other acute complaints.        Review of Systems   Constitutional: Positive for activity change and appetite change. Negative for chills, fatigue and fever.   HENT: Negative for congestion, ear pain, postnasal drip, sinus pressure and sore throat.    Eyes: Negative for pain, redness and visual disturbance.   Respiratory: Negative for cough, chest tightness and shortness of breath.    Cardiovascular: Negative for chest pain, palpitations and leg swelling.   Gastrointestinal: Positive for abdominal pain, nausea and vomiting. Negative for anal bleeding, blood in stool and diarrhea.   Endocrine: Negative for polydipsia and polyuria.   Genitourinary: Positive for dysuria and frequency. Negative for difficulty urinating and urgency.   Musculoskeletal: Negative for arthralgias, back pain and neck pain.   Skin: Negative for pallor and rash.   Allergic/Immunologic: Negative for environmental allergies and immunocompromised state.   Neurological: Positive  for weakness (Generalized). Negative for dizziness and headaches.   Hematological: Negative for adenopathy.   Psychiatric/Behavioral: Negative for confusion, self-injury and suicidal ideas. The patient is not nervous/anxious.    All other systems reviewed and are negative.      Past Medical History:   Diagnosis Date   • Anemia    • Arthritis    • Diabetes mellitus (HCC)     checks sugar only when pt wants to    • Disease of thyroid gland    • History of transfusion     with knee surgery-  no reaction recalled.    • [place] (hard of hearing)     no hearing aids   • Hypertension    • Migraine without aura and without status migrainosus, not intractable 12/30/2016   • Pulmonary emboli (HCC) 2011    (after knee surgery)   • SOBOE (shortness of breath on exertion)    • Tremors of nervous system    • Vertigo    • Wears dentures     upper    • Wears glasses        Allergies   Allergen Reactions   • Aspirin Unknown - Low Severity     Mouth sores        Past Surgical History:   Procedure Laterality Date   • BLADDER SURGERY     • CATARACT EXTRACTION      bilat    • CHOLECYSTECTOMY     • COLONOSCOPY     • HYSTERECTOMY     • KYPHOPLASTY N/A 2/12/2021    Procedure: KYPHOPLASTY T11, T12 AND L4;  Surgeon: Matty Hearn MD;  [facility]: [facility];  Service: Orthopedic Spine;  Laterality: N/A;   • OOPHORECTOMY     • TONSILLECTOMY         Family History   Problem Relation Age of Onset   • Breast cancer Sister 84   • Stroke Mother    • Heart attack Father    • Ovarian cancer Neg Hx        Social History     Socioeconomic History   • Marital status:    Tobacco Use   • Smoking status: Never   • Smokeless tobacco: Never   Vaping Use   • Vaping Use: Never used   Substance and Sexual Activity   • Alcohol use: No   • Drug use: No   • Sexual activity: Defer           Objective   Physical Exam  Vitals and nursing note reviewed.   Constitutional:       General: She is not in acute distress.     Appearance: Normal appearance. She is  well-developed. She is ill-appearing. She is not toxic-appearing or diaphoretic.   HENT:      Head: Normocephalic and atraumatic.      Right Ear: External ear normal.      Left Ear: External ear normal.      Nose: Nose normal.   Eyes:      General: Lids are normal.      Pupils: Pupils are equal, round, and reactive to light.   Neck:      Trachea: No tracheal deviation.   Cardiovascular:      Rate and Rhythm: Normal rate and regular rhythm.      Pulses: No decreased pulses.      Heart sounds: Normal heart sounds. No murmur heard.    No friction rub. No gallop.   Pulmonary:      Effort: Pulmonary effort is normal. No respiratory distress.      Breath sounds: Normal breath sounds. No decreased breath sounds, wheezing, rhonchi or rales.   Abdominal:      General: Bowel sounds are normal.      Palpations: Abdomen is soft.      Tenderness: There is no abdominal tenderness. There is no guarding or rebound.   Musculoskeletal:         General: No deformity. Normal range of motion.      Cervical back: Normal range of motion and neck supple.      Comments: Generalized weakness, no focal weakness.   Lymphadenopathy:      Cervical: No cervical adenopathy.   Skin:     General: Skin is warm and dry.      Findings: No rash.   Neurological:      Mental Status: She is alert and oriented to person, place, and time.      Cranial Nerves: No cranial nerve deficit.      Sensory: No sensory deficit.   Psychiatric:         Speech: Speech normal.         Behavior: Behavior normal.         Thought Content: Thought content normal.         Judgment: Judgment normal.         Procedures           ED Course                                           Medical Decision Making  Differential diagnosis includes urinary tract infection, acute viral illness, pneumonia, dehydration, renal insufficiency, electrolyte abnormality, other unspecified etiology.    The patient does appear to be dehydrated with an elevated creatinine, elevated lactic acid level,  and elevated specific gravity on urinalysis.    Urine shows bacteria concerning for urinary tract infection which correlates well with the patient's current symptoms.    I have initiated IV fluids, blood cultures, lactic acid, and antibiotics.    I discussed the patient with the hospitalist.  The hospital service will consult the patient to determine status of admission    Acute dehydration: complicated acute illness or injury  Acute urinary tract infection: acute illness or injury  Amount and/or Complexity of Data Reviewed  Independent Historian: friend  External Data Reviewed: labs, radiology, ECG and notes.  Labs: ordered.  Radiology: ordered.      Risk  Prescription drug management.  Decision regarding hospitalization.          Final diagnoses:   Acute urinary tract infection   Acute dehydration       ED Disposition  ED Disposition     ED Disposition   Decision to Admit    Condition   --    Comment   Level of Care: Telemetry [5]   Diagnosis: Acute urinary tract infection [826688]   Admitting Physician: GISELE OQUENDO [[id number]]   Attending Physician: GISELE OQUENDO [[id number]]   Bed Request Comments: tele               No follow-up provider specified.       Medication List      No changes were made to your prescriptions during this visit.          Iva Pate MD  03/29/23 0150

## 2023-03-29 NOTE — CASE MANAGEMENT/SOCIAL WORK
Continued Stay Note  [facility]     Patient Name: Chey Robertson  MRN: [id number]  Today's Date: 3/29/2023    Admit Date: 3/28/2023    Plan: Home   Discharge Plan     Row Name 03/29/23 1319       Plan    Plan Home with [facility] transitions    Plan Comments I spoke with patient and her son Chava, present with her. She resides in [place], most recently out of [facility] after a 2 week stay under skilled care. KORT in home was arranged. I spoke with Temi from [facility] and they can pick her up. Patient has a cane, rolling walker, shower bench and grab bars. Her insurance is [employer]. She has no advanced directives.    Final Discharge Disposition Code 01 - home or self-care               Discharge Codes    No documentation.               Expected Discharge Date and Time     Expected Discharge Date Expected Discharge Time    Mar 31, 2023             Aminta William RN

## 2023-03-29 NOTE — PROGRESS NOTES
[facility] Medicine Services  ADMISSION FOLLOW-UP NOTE          Patient admitted after midnight, H&P by my partner performed earlier on today's date reviewed.  Interim findings, labs, and charting also reviewed.        The [facility] Problem List has been managed and updated to include any new diagnoses:  Active Hospital Problems    Diagnosis  POA   • **Acute urinary tract infection [N39.0]  Yes   • Dehydration [E86.0]  Yes   • Myelodysplasia (myelodysplastic syndrome) (HCC) [D46.9]  Yes   • Hypothyroidism (acquired) [E03.9]  Yes   • Type 2 diabetes mellitus without complication, without long-term current use of insulin (HCC) [E11.9]  Yes   • Splenomegaly [R16.1]  Yes   • Thrombocytopenia (HCC) [D69.6]  Yes   • Pancytopenia (HCC) [D61.818]  Yes      Resolved Hospital Problems   No resolved problems to display.     87-year-old female with history of type 2 diabetes, hypothyroidism, MDS, with splenomegaly, pancytopenia recent admission at [facility] due to pancytopenia who presented to [facility] with nausea vomiting and generalized weakness found to have UTI.  This morning she is resting comfortably.    Acute UTI  -Follow-up urine and blood cultures cultures  -Continue antibiotics with IV Rocephin    MDS with pancytopenia  -Counts remain low but stable  -Continue to closely monitor, follows with Dr. Lyman    History of PE  -Patient is on Coumadin-INR is subtherapeutic  -Pharmacy following    Type 2 diabetes, previously well controlled  -Follow-up repeat A1c, FSBG's reviewed and appropriate  -Continue SSI for now    Otherwise continue plan of care per admission H&P    Expected Discharge   Expected Discharge Date and Time     Expected Discharge Date Expected Discharge Time    Mar 31, 2023            Jess Duarte MD  03/29/23

## 2023-03-29 NOTE — PLAN OF CARE
Goal Outcome Evaluation:  Plan of Care Reviewed With: patient A&OX4 VSS SR on tele. Incontinent of bladder. Purewick placed. No C/O pain. Arrived with bilateral DTI to gluteus. Positioned for comfort using skin interventions per staff.

## 2023-03-29 NOTE — PROGRESS NOTES
"Pharmacy Consult  -  Warfarin  Chey Robertson is a  87 y.o. female   Height - 160 cm (63\")  Weight - 52.6 kg (116 lb)    Consulting Service - hospitalist  Indication - h/o PE  Goal INR - 2-3  Home Regimen:   -                - warfarin 3mg daily (recent change @ facility)     Bridge Therapy: No     Drug-Drug Interactions with current regimen:   No major drug-drug interactions    Warfarin Dosing During Admission:  Date  3/29           INR  1.69           Dose  5 mg              Education Provided:     Discharge Follow up:  Following Provider -  Follow up time range or appointment -    Labs:  Results from last 7 days   Lab Units 03/29/23  0316 03/28/23  1913   INR  1.69* 1.51*   HEMOGLOBIN g/dL 8.7* 10.6*   HEMATOCRIT % 28.2* 33.9*   PLATELETS 10*3/mm3 74* 77*     Results from last 7 days   Lab Units 03/29/23  0316 03/28/23  1913   SODIUM mmol/L 137 136   POTASSIUM mmol/L 3.7 4.3   CHLORIDE mmol/L 101 97*   CO2 mmol/L 22.0 22.0   BUN mg/dL 32* 34*   CREATININE mg/dL 0.73 0.96   CALCIUM mg/dL 8.6 9.6   BILIRUBIN mg/dL  --  0.5   ALK PHOS U/L  --  93   ALT (SGPT) U/L  --  6   AST (SGOT) U/L  --  13   GLUCOSE mg/dL 166* 189*     Current dietary intake:   Diet Order   Procedures    Diet: Cardiac Diets, Diabetic Diets; Healthy Heart (2-3 Na+); Consistent Carbohydrate; Texture: Regular Texture (IDDSI 7); Fluid Consistency: Thin (IDDSI 0)       Assessment/Plan:   Pharmacy to dose warfarin for hx PE, goal INR 2-3. Patient takes warfarin 2mg daily outpatient, with recent change to 3mg daily while at [facility].   INR today subtherapeutic at 1.69. Will give boosted dose of warfarin 5mg this evening.  Pharmacy will continue to monitor daily INR, signs/symptoms of bleeding, drug-drug interactions and dietary intake to adjust dose as necessary.    Rosa Rodríguez, [place]  Pharmacy Resident  03/29/23  13:53 EDT    "

## 2023-03-29 NOTE — THERAPY EVALUATION
Patient Name: Chey Robertson  : 1935    MRN: [id number]                              Today's Date: 3/29/2023       Admit Date: 3/28/2023    Visit Dx:     ICD-10-CM ICD-9-CM   1. Acute urinary tract infection  N39.0 599.0   2. Acute dehydration  E86.0 276.51     Patient Active Problem List   Diagnosis   • Benign familial tremor   • Type 2 diabetes mellitus without complication, without long-term current use of insulin (HCC)   • AMS (altered mental status)   • Thrombocytopenia (HCC)   • Pancytopenia (HCC)   • Shortness of breath   • Hypothyroidism (acquired)   • Acute kidney injury (HCC)   • Splenomegaly   • Hepatomegaly   • Confusion   • B12 deficiency   • Abnormal intestinal absorption   • Iron deficiency anemia due to chronic blood loss   • Myelodysplasia (myelodysplastic syndrome) (HCC)   • Weakness   • Personal history of pulmonary embolism   • Acute urinary tract infection   • Dehydration     Past Medical History:   Diagnosis Date   • Anemia    • Arthritis    • Diabetes mellitus (HCC)     checks sugar only when pt wants to    • Disease of thyroid gland    • History of transfusion     with knee surgery-  no reaction recalled.    • [place] (hard of hearing)     no hearing aids   • Hypertension    • Migraine without aura and without status migrainosus, not intractable 2016   • Pulmonary emboli (HCC)     (after knee surgery)   • SOBOE (shortness of breath on exertion)    • Tremors of nervous system    • Vertigo    • Wears dentures     upper    • Wears glasses      Past Surgical History:   Procedure Laterality Date   • BLADDER SURGERY     • CATARACT EXTRACTION      bilat    • CHOLECYSTECTOMY     • COLONOSCOPY     • HYSTERECTOMY     • KYPHOPLASTY N/A 2021    Procedure: KYPHOPLASTY T11, T12 AND L4;  Surgeon: Matty Hearn MD;  Location: [facility];  Service: Orthopedic Spine;  Laterality: N/A;   • OOPHORECTOMY     • TONSILLECTOMY        General Information     Row Name 23 1014          OT  Time and Intention    Document Type evaluation  -     Mode of Treatment occupational therapy  -     Row Name 03/29/23 1014          General Information    Patient Profile Reviewed yes  -     Prior Level of Function independent:;all household mobility;gait;transfer;bed mobility;ADL's  Pt just d/c from rehab reports she was home alone  -     Existing Precautions/Restrictions fall;other (see comments)  urinary incontinence. Brief on when up.  -     Barriers to Rehab medically complex;previous functional deficit  -     Row Name 03/29/23 1014          Occupational Profile    Environmental Supports and Barriers (Occupational Profile) Walk in shower  -     Row Name 03/29/23 1014          Living Environment    People in Home alone  -     Row Name 03/29/23 1014          Home Main Entrance    Number of Stairs, Main Entrance three  -     Stair Railings, Main Entrance railing on right side (ascending)  -     Row Name 03/29/23 1014          Stairs Within Home, Primary    Number of Stairs, Within Home, Primary none  -     Stair Railings, Within Home, Primary none  -     Row Name 03/29/23 1014          Cognition    Orientation Status (Cognition) oriented x 4  -     Row Name 03/29/23 1014          Safety Issues, Functional Mobility    Safety Issues Affecting Function (Mobility) safety precautions follow-through/compliance;safety precaution awareness;insight into deficits/self-awareness;awareness of need for assistance;judgment;problem-solving;sequencing abilities  -     Impairments Affecting Function (Mobility) balance;endurance/activity tolerance;postural/trunk control  -           User Key  (r) = Recorded By, (t) = Taken By, (c) = Cosigned By    Initials Name Provider Type     Niurka Guzman OT Occupational Therapist                 Mobility/ADL's     Row Name 03/29/23 1021          Bed Mobility    Bed Mobility scooting/bridging;supine-sit  -     Scooting/Bridging [place] (Bed Mobility)  supervision;verbal cues  -     Supine-Sit [place] (Bed Mobility) minimum assist (75% patient effort);1 person assist;verbal cues  -     Bed Mobility, Safety Issues decreased use of arms for pushing/pulling;decreased use of legs for bridging/pushing;impaired trunk control for bed mobility  -     Assistive Device (Bed Mobility) bed rails;head of bed elevated  -     Comment, (Bed Mobility) minAx1 to advance into sitting.  -     Row Name 03/29/23 1021          Transfers    Transfers sit-stand transfer;toilet transfer  -     Comment, (Transfers) Pt completed sit to stand ambulating to [facility] with CGA and RW. Noted urinary incontinence. Ambulated from BS to chair with CGA and RW.  -     Row Name 03/29/23 1021          Sit-Stand Transfer    Sit-Stand [place] (Transfers) contact guard;verbal cues  -     Assistive Device (Sit-Stand Transfers) walker, front-wheeled  -     Row Name 03/29/23 1021          Toilet Transfer    Type (Toilet Transfer) sit-stand;stand-sit  -     [place] Level (Toilet Transfer) contact guard;verbal cues  -     Assistive Device (Toilet Transfer) walker, front-wheeled  -HM     Row Name 03/29/23 1021          Functional Mobility    Functional Mobility- Ind. Level contact guard assist;verbal cues required  -     Functional Mobility- Device walker, front-wheeled  -HM     Functional Mobility-Distance (Feet) 15  -HM     Functional Mobility- Safety Issues sequencing ability decreased;step length decreased;weight-shifting ability decreased  -     Row Name 03/29/23 1021          Activities of Daily Living    BADL Assessment/Intervention lower body dressing;toileting  -     Row Name 03/29/23 1021          Lower Body Dressing Assessment/Training    [place] Level (Lower Body Dressing) doff;socks;supervision;dependent (less than 25% patient effort);don  -     Position (Lower Body Dressing) unsupported sitting  -     Comment, (Lower Body Dressing) Pt unable to don  socks this date. Reports no sock aid at home however reports she can normally complete.  -     Row Name 03/29/23 1021          Toileting Assessment/Training    [place] Level (Toileting) adjust/manage clothing;minimum assist (75% patient effort);perform perineal hygiene;supervision  -     Position (Toileting) unsupported sitting;supported standing  -           User Key  (r) = Recorded By, (t) = Taken By, (c) = Cosigned By    Initials Name Provider Type     Niurka Guzman OT Occupational Therapist               Obj/Interventions     Row Name 03/29/23 1028          Sensory Assessment (Somatosensory)    Sensory Assessment (Somatosensory) sensation intact  -     Row Name 03/29/23 1028          Vision Assessment/Intervention    Visual Impairment/Limitations WFL  -     Row Name 03/29/23 1028          Range of Motion Comprehensive    Comment, General Range of Motion Not formally assessed due to pts complaints of shoulder pain. WFL for eval.  -     Row Name 03/29/23 1028          Strength Comprehensive (MMT)    Comment, General Manual Muscle Testing (MMT) Assessment Not formally assessed due to pts complaints of shoulder pain. WFL for eval.  -     Row Name 03/29/23 1028          Motor Skills    Motor Skills coordination  -     Coordination WFL  -     Row Name 03/29/23 1028          Balance    Balance Assessment sitting static balance;sitting dynamic balance;standing static balance;standing dynamic balance  -     Static Sitting Balance supervision  -     Dynamic Sitting Balance supervision  -     Position, Sitting Balance unsupported;sitting edge of bed  -     Static Standing Balance contact guard  -     Dynamic Standing Balance contact guard  -     Position/Device Used, Standing Balance supported;walker, rolling  -     Balance Interventions sitting;standing;occupation based/functional task  -           User Key  (r) = Recorded By, (t) = Taken By, (c) = Cosigned By    Initials  Name Provider Type     Niurka Guzman, OT Occupational Therapist               Goals/Plan     Row Name 03/29/23 1031          Bed Mobility Goal 1 (OT)    Activity/Assistive Device (Bed Mobility Goal 1, OT) sit to supine/supine to sit;scooting  -     [place] Level/Cues Needed (Bed Mobility Goal 1, OT) contact guard required;verbal cues required  -HM     Time Frame (Bed Mobility Goal 1, OT) by discharge;long term goal (LTG)  -HM     Progress/Outcomes (Bed Mobility Goal 1, OT) goal ongoing  -     Row Name 03/29/23 1031          Transfer Goal 1 (OT)    Activity/Assistive Device (Transfer Goal 1, OT) sit-to-stand/stand-to-sit;toilet;bed-to-chair/chair-to-bed  -HM     [place] Level/Cues Needed (Transfer Goal 1, OT) contact guard required;verbal cues required  -HM     Time Frame (Transfer Goal 1, OT) by discharge;long term goal (LTG)  -HM     Progress/Outcome (Transfer Goal 1, OT) goal ongoing  -     Row Name 03/29/23 1031          Dressing Goal 1 (OT)    Activity/Device (Dressing Goal 1, OT) lower body dressing  -     [place]/Cues Needed (Dressing Goal 1, OT) moderate assist (50-74% patient effort);verbal cues required  -HM     Time Frame (Dressing Goal 1, OT) by discharge;long term goal (LTG)  -HM     Progress/Outcome (Dressing Goal 1, OT) goal ongoing  [facility]     Row Name 03/29/23 1031          Toileting Goal 1 (OT)    Activity/Device (Toileting Goal 1, OT) adjust/manage clothing;perform perineal hygiene  -HM     [place] Level/Cues Needed (Toileting Goal 1, OT) supervision required  -HM     Time Frame (Toileting Goal 1, OT) by discharge;long term goal (LTG)  -HM     Progress/Outcome (Toileting Goal 1, OT) goal ongoing  -     Row Name 03/29/23 1031          Grooming Goal 1 (OT)    Activity/Device (Grooming Goal 1, OT) hair care;oral care;wash face, hands  -     [place] (Grooming Goal 1, OT) minimum assist (75% or more patient effort);verbal cues required  -HM     Time Frame  (Grooming Goal 1, OT) by discharge;long term goal (LTG)  -     Progress/Outcome (Grooming Goal 1, OT) goal ongoing  -     Row Name 03/29/23 1031          Therapy Assessment/Plan (OT)    Planned Therapy Interventions (OT) adaptive equipment training;BADL retraining;functional balance retraining;occupation/activity based interventions;ROM/therapeutic exercise;transfer/mobility retraining  -           User Key  (r) = Recorded By, (t) = Taken By, (c) = Cosigned By    Initials Name Provider Type     Niurka Guzman, OT Occupational Therapist               Clinical Impression     Row Name 03/29/23 1029          Pain Assessment    Pretreatment Pain Rating 0/10 - no pain  -     Posttreatment Pain Rating 0/10 - no pain  -     Row Name 03/29/23 1029          Plan of Care Review    Plan of Care Reviewed With patient  -     Progress improving  -     Outcome Evaluation OT eval complete. Pt is pleasent and cooperative. Presents with decreased ROM and overall independence with ADLS. Dependent for all LBD this date. May benefit from AE if congition appropriate next session. Recommend d/c home with assist from family and HH OT.  -     Row Name 03/29/23 1029          Therapy Assessment/Plan (OT)    Patient/Family Therapy Goal Statement (OT) Pt would like to improve and return home.  -     Rehab Potential (OT) good, to achieve stated therapy goals  -     Criteria for Skilled Therapeutic Interventions Met (OT) yes;skilled treatment is necessary  -     Therapy Frequency (OT) daily  -     Row Name 03/29/23 1029          Therapy Plan Review/Discharge Plan (OT)    Anticipated Discharge Disposition (OT) home with assist;home with home health  -     Row Name 03/29/23 1029          Vital Signs    Pre Systolic BP Rehab --  RN cleared for tx; VSS  -     O2 Delivery Pre Treatment room air  -HM     O2 Delivery Intra Treatment room air  -HM     O2 Delivery Post Treatment room air  -HM     Pre Patient Position  Supine  -     Intra Patient Position Standing  -     Post Patient Position Sitting  -     Row Name 03/29/23 1029          Positioning and Restraints    Pre-Treatment Position in bed  -     Post Treatment Position chair  -HM     In Chair notified nsg;reclined;call light within reach;encouraged to call for assist;exit alarm on;waffle cushion  SCD pump donned  -           User Key  (r) = Recorded By, (t) = Taken By, (c) = Cosigned By    Initials Name Provider Type     Niurka Guzman OT Occupational Therapist               Outcome Measures     Row Name 03/29/23 1034          How much help from another is currently needed...    Putting on and taking off regular lower body clothing? 2  -HM     Bathing (including washing, rinsing, and drying) 3  -HM     Toileting (which includes using toilet bed pan or urinal) 4  -HM     Putting on and taking off regular upper body clothing 4  -HM     Taking care of personal grooming (such as brushing teeth) 3  -HM     Eating meals 4  -     AM-PAC 6 Clicks Score (OT) 20  -     Row Name 03/29/23 1034          Functional Assessment    Outcome Measure Options AM-PAC 6 Clicks Daily Activity (OT)  -           User Key  (r) = Recorded By, (t) = Taken By, (c) = Cosigned By    Initials Name Provider Type     Niurka Guzman OT Occupational Therapist                Occupational Therapy Education     Title: PT OT SLP Therapies (In Progress)     Topic: Occupational Therapy (In Progress)     Point: ADL training (Done)     Description:   Instruct learner(s) on proper safety adaptation and remediation techniques during self care or transfers.   Instruct in proper use of assistive devices.              Learning Progress Summary           Patient Acceptance, E,TB,D, VU,NR by  at 3/29/2023 1034                   Point: Home exercise program (Not Started)     Description:   Instruct learner(s) on appropriate technique for monitoring, assisting and/or progressing therapeutic  exercises/activities.              Learner Progress:  Not documented in this visit.          Point: Precautions (Done)     Description:   Instruct learner(s) on prescribed precautions during self-care and functional transfers.              Learning Progress Summary           Patient Acceptance, E,TB,D, VU,NR by  at 3/29/2023 1034                   Point: Body mechanics (Done)     Description:   Instruct learner(s) on proper positioning and spine alignment during self-care, functional mobility activities and/or exercises.              Learning Progress Summary           Patient Acceptance, E,TB,D, VU,NR by  at 3/29/2023 1034                               User Key     Initials Effective Dates Name Provider Type [facility] 10/25/22 -  Niurka Guzman OT Occupational Therapist OT              OT Recommendation and Plan  Planned Therapy Interventions (OT): adaptive equipment training, BADL retraining, functional balance retraining, occupation/activity based interventions, ROM/therapeutic exercise, transfer/mobility retraining  Therapy Frequency (OT): daily  Plan of Care Review  Plan of Care Reviewed With: patient  Progress: improving  Outcome Evaluation: OT eval complete. Pt is pleasent and cooperative. Presents with decreased ROM and overall independence with ADLS. Dependent for all LBD this date. May benefit from AE if congition appropriate next session. Recommend d/c home with assist from family and HH OT.     Time Calculation:    Time Calculation- OT     Row Name 03/29/23 0930             Time Calculation- OT    OT Start Time 0930  -      OT Received On 03/29/23  -      OT Goal Re-Cert Due Date 04/08/23  -         Untimed Charges    OT Eval/Re-eval Minutes 55  -HM         Total Minutes    Untimed Charges Total Minutes 55  -HM       Total Minutes 55  -HM            User Key  (r) = Recorded By, (t) = Taken By, (c) = Cosigned By    Initials Name Provider Type     Niurka Guzman OT Occupational  Therapist              Therapy Charges for Today     Code Description Service Date Service Provider Modifiers Qty    96691155149 HC OT EVAL LOW COMPLEXITY 4 3/29/2023 Niurka Guzman, OT GO 1               Niurka Guzman, JERRI  3/29/2023

## 2023-03-29 NOTE — PLAN OF CARE
Problem: Adult Inpatient Plan of Care  Goal: Plan of Care Review  Recent Flowsheet Documentation  Taken 3/29/2023 1120 by Lisa Cortes, PT  Progress: improving  Plan of Care Reviewed With: patient  Outcome Evaluation: Patient is close to her baseline function. She was able to control her walker with 200 feet. No c/o dizziness noted. Some irregular HR noted. Rn aware of this. Recommend home with home health at discharge   Goal Outcome Evaluation:  Plan of Care Reviewed With: patient        Progress: improving  Outcome Evaluation: Patient is close to her baseline function. She was able to control her walker with 200 feet. No c/o dizziness noted. Some irregular HR noted. Rn aware of this. Recommend home with home health at discharge

## 2023-03-29 NOTE — PROGRESS NOTES
"Pharmacy Consult  -  Warfarin  Chey KRISTI Robertson is a  87 y.o. female   Height - 160 cm (63\")  Weight - 52.6 kg (116 lb)    Consulting Service - hospitalist  Indication - h/o PE  Goal INR - 2-3  Home Regimen:   - warfarin 2 mg daily     Bridge Therapy: No     Drug-Drug Interactions with current regimen:   No major drug-drug interactions    Warfarin Dosing During Admission:  Date             INR             Dose                Education Provided:    Discharge Follow up:  Following Provider -  Follow up time range or appointment -    Labs:  Results from last 7 days   Lab Units 03/28/23 1913   INR  1.51*   HEMOGLOBIN g/dL 10.6*   HEMATOCRIT % 33.9*   PLATELETS 10*3/mm3 77*     Results from last 7 days   Lab Units 03/28/23 1913   SODIUM mmol/L 136   POTASSIUM mmol/L 4.3   CHLORIDE mmol/L 97*   CO2 mmol/L 22.0   BUN mg/dL 34*   CREATININE mg/dL 0.96   CALCIUM mg/dL 9.6   BILIRUBIN mg/dL 0.5   ALK PHOS U/L 93   ALT (SGPT) U/L 6   AST (SGOT) U/L 13   GLUCOSE mg/dL 189*     Current dietary intake:   Diet Order   Procedures    Diet: Cardiac Diets, Diabetic Diets; Healthy Heart (2-3 Na+); Consistent Carbohydrate; Texture: Regular Texture (IDDSI 7); Fluid Consistency: Thin (IDDSI 0)       Assessment/Plan:   1. Warfarin 2 mg daily ordered for now.  Ms. Robertson isn't sure of what dose of warfarin she takes at home.  We will attempt to clarify this with Ms. Robertson's son when he comes in.  2. Daily PT-INR labs have been ordered.  3. Pharmacy will adjust Ms. Robertson's warfarin dose as needed based on daily INR result.    Thank you  Horacio Alvarez, PharmD, BCPS  3/29/2023  04:30 EDT  "

## 2023-03-29 NOTE — PLAN OF CARE
Goal Outcome Evaluation:  Plan of Care Reviewed With: patient        Progress: improving  Outcome Evaluation: OT eval complete. Pt is pleasent and cooperative. Presents with decreased ROM and overall independence with ADLS. Dependent for all LBD this date. May benefit from AE if congition appropriate next session. Recommend d/c home with assist from family and HH OT.

## 2023-03-29 NOTE — H&P
[facility] Medicine Services  HISTORY AND PHYSICAL    Patient Name: Chey Robertson  : 1935  MRN: [id number]  Primary Care Physician: Corby Marie MD  Date of admission: 3/28/2023    Subjective   Subjective     Chief Complaint:  Weakness, vomiting    HPI:  Chey Robertson is a 87 y.o. female with PMH significant for chronic pancytopenia, DM 2, hypothyroid, HTN, PE on Coumadin, myelodysplasia, splenomegaly, who presents to the ED with complaint of weakness and vomiting.  She recently had admission to [facility] 3/10/2023 - 3/13/2023 secondary to pancytopenia.  During that admission she did have transfusion of 1 unit PRBC.  She was discharged to SNF for acute rehab.  She completed therapy and was discharged yesterday.  Per family, she has had decline with increased weakness and vomiting since arriving home yesterday.  She denies any fever, urinary symptoms, cough, shortness of breath.  Upon arrival to the ED, she is noted to have elevated lactic acid.  She appears to be dehydrated.  She continues to have pancytopenia which appears stable.  She has subtherapeutic INR.  CT abdomen/pelvis notes splenomegaly but otherwise no acute findings.  UA is concerning for UTI.  She was started on Rocephin while in the ED.  She will be admitted to hospital medicine for further evaluation.    Review of Systems   Constitutional: Positive for activity change and appetite change. Negative for chills, diaphoresis, fatigue, fever and unexpected weight change.   HENT: Negative.  Negative for congestion and sore throat.    Eyes: Negative.  Negative for visual disturbance.   Respiratory: Positive for cough and shortness of breath. Negative for chest tightness and wheezing.    Cardiovascular: Negative.  Negative for chest pain, palpitations and leg swelling.   Gastrointestinal: Positive for diarrhea, nausea and vomiting. Negative for abdominal distention, abdominal pain and constipation.    Endocrine: Negative.  Negative for polydipsia and polyuria.   Genitourinary: Negative.  Negative for difficulty urinating, dysuria, frequency and urgency.   Musculoskeletal: Negative.  Negative for arthralgias, back pain, gait problem and myalgias.   Skin: Negative.  Negative for color change, pallor, rash and wound.   Allergic/Immunologic: Negative.  Negative for immunocompromised state.   Neurological: Positive for weakness. Negative for dizziness, speech difficulty, light-headedness, numbness and headaches.   Hematological: Negative.  Does not bruise/bleed easily.   Psychiatric/Behavioral: Negative.  Negative for confusion. The patient is not nervous/anxious.         Personal History     Past Medical History:   Diagnosis Date   • Anemia    • Arthritis    • Diabetes mellitus (HCC)     checks sugar only when pt wants to    • Disease of thyroid gland    • History of transfusion     with knee surgery-  no reaction recalled.    • [place] (hard of hearing)     no hearing aids   • Hypertension    • Migraine without aura and without status migrainosus, not intractable 12/30/2016   • Pulmonary emboli (HCC) 2011    (after knee surgery)   • SOBOE (shortness of breath on exertion)    • Tremors of nervous system    • Vertigo    • Wears dentures     upper    • Wears glasses          Oncology Problem List:  Myelodysplasia (myelodysplastic syndrome) (HCC) (03/14/2023; Status:   Active)       Past Surgical History:   Procedure Laterality Date   • BLADDER SURGERY     • CATARACT EXTRACTION      bilat    • CHOLECYSTECTOMY     • COLONOSCOPY     • HYSTERECTOMY     • KYPHOPLASTY N/A 2/12/2021    Procedure: KYPHOPLASTY T11, T12 AND L4;  Surgeon: Matty Hearn MD;  [facility]: [facility];  Service: Orthopedic Spine;  Laterality: N/A;   • OOPHORECTOMY     • TONSILLECTOMY         Family History:  family history includes Breast cancer (age of onset: 84) in her sister; Heart attack in her father; Stroke in her mother.     Social History:   reports that she has never smoked. She has never used smokeless tobacco. She reports that she does not drink alcohol and does not use drugs.  Social History     Social History Narrative   • Not on file       Medications:  Diclofenac Sodium, HYDROcodone-acetaminophen, Insulin Syringe-Needle U-100, Molnupiravir, Nirmatrelvir&Ritonavir 300/100, albuterol sulfate HFA, benzonatate, cefdinir, cefuroxime, cephalexin, clotrimazole-betamethasone, doxycycline, furosemide, glimepiride, glucose blood, insulin NPH-insulin regular, levothyroxine, lisinopril, meclizine, metFORMIN, nitrofurantoin (macrocrystal-monohydrate), nystatin, omeprazole, ondansetron, potassium chloride, propranolol, tamsulosin, traMADol, vitamin D, and warfarin    Allergies   Allergen Reactions   • Aspirin Unknown - Low Severity     Mouth sores        Objective   Objective     Vital Signs:   Temp:  [98 °F (36.7 °C)] 98 °F (36.7 °C)  Heart Rate:  [81-97] 81  Resp:  [20] 20  BP: (128-141)/(67-76) 128/67    Physical Exam   Constitutional: Awake, alert, ill appearing   Eyes: PERRLA, sclerae anicteric, no conjunctival injection  HENT: NCAT, mucous membranes moist  Neck: Supple, no thyromegaly, no lymphadenopathy, trachea midline  Respiratory: Clear to auscultation bilaterally, nonlabored respirations   Cardiovascular: RRR, no murmurs, rubs, or gallops, palpable pedal pulses bilaterally  Gastrointestinal: Positive bowel sounds, soft, nontender, nondistended  Musculoskeletal: No bilateral ankle edema, no clubbing or cyanosis to extremities  Psychiatric: Appropriate affect, cooperative  Neurologic: Oriented x 3, strength symmetric in all extremities, Cranial Nerves grossly intact to confrontation, speech clear  Skin: No rashes      Result Review:  I have personally reviewed the results from the time of this admission to 3/29/2023 01:55 EDT and agree with these findings:  [x]  Laboratory list / accordion  []  Microbiology  [x]  Radiology  []  EKG/Telemetry   []   Cardiology/Vascular   []  Pathology  [x]  Old records  []  Other:  Most notable findings include:     LAB RESULTS:      Lab 03/29/23  0035 03/28/23 1913   WBC  --  2.16*   HEMOGLOBIN  --  10.6*   HEMATOCRIT  --  33.9*   PLATELETS  --  77*   NEUTROS ABS  --  1.53*   IMMATURE GRANS (ABS)  --  0.03   LYMPHS ABS  --  0.39*   MONOS ABS  --  0.21   EOS ABS  --  0.00   MCV  --  85.6   LACTATE 1.6 2.6*   PROTIME  --  18.2*         Lab 03/28/23 1913   SODIUM 136   POTASSIUM 4.3   CHLORIDE 97*   CO2 22.0   ANION GAP 17.0*   BUN 34*   CREATININE 0.96   EGFR 57.7*   GLUCOSE 189*   CALCIUM 9.6         Lab 03/28/23 1913   TOTAL PROTEIN 7.6   ALBUMIN 4.4   GLOBULIN 3.2   ALT (SGPT) 6   AST (SGOT) 13   BILIRUBIN 0.5   ALK PHOS 93   LIPASE 37         Lab 03/28/23 1913   PROTIME 18.2*   INR 1.51*                 Brief Urine Lab Results  (Last result in the past 365 days)      Color   Clarity   Blood   Leuk Est   Nitrite   Protein   CREAT   Urine HCG        03/28/23 2203 Yellow   Clear   Negative   Negative   Negative   30 mg/dL (1+)               Microbiology Results (last 10 days)     ** No results found for the last 240 hours. **          CT Abdomen Pelvis Without Contrast    Result Date: 3/28/2023  CT ABDOMEN PELVIS WO CONTRAST Date of Exam: 3/28/2023 10:18 PM EDT Indication: nausea/vomiting. Comparison: 7/26/2022, 2/19/2021 Technique: Axial CT images were obtained of the abdomen and pelvis without the administration of contrast. Reconstructed coronal and sagittal images were also obtained. Automated exposure control and iterative construction methods were used. Findings: Lung Bases:   The visualized lung bases and lower mediastinal structures are unremarkable. Limited evaluation of the solid organs due to lack of intravenous contrast. Liver: Liver is normal in size and CT density. No focal lesions. Biliary/Gallbladder:  The gallbladder has been resected. The biliary tree is nondilated. Spleen: There is significant enlargement  of the spleen. The spleen measures up to 17 cm in craniocaudal dimension, producing measuring up to 15 cm. No evidence of focal lesions. No evidence of significant surrounding inflammatory changes. There is mass effect on  the left kidney which otherwise appears unremarkable. Pancreas:  Pancreas is normal. There is no evidence of pancreatic mass or peripancreatic fluid. Kidneys:  Kidneys are normal in size. There are no stones or hydronephrosis. Adrenals:  Adrenal glands are unremarkable. Retroperitoneal/Lymph Nodes/Vasculature:  No retroperitoneal adenopathy is identified. Atherosclerotic calcifications are present. Gastrointestinal/Mesentery:  The bowel loops are non-dilated without wall thickening or mass. The appendix is not well-visualized. No secondary findings of appendicitis identified.. No evidence of obstruction. No free air. No mesenteric fluid collections identified. No significant stool burden identified. Bladder:  The bladder is normal. Genital:   Unremarkable      Bony Structures:   Visualized bony structures are consistent with the patient's age. Kyphoplasty changes present with remote compression deformities. No acute fracture or compression deformity. No significant spondylolisthesis. Moderate multilevel degenerative changes are present within the spine.     Impression: Impression: 1. Significant splenomegaly, progressed as compared to the previous CT from 2/19/2021. 2. Otherwise, no acute intra-abdominal or intrapelvic process. Ancillary findings as described above. Electronically Signed: Lorena Singer  3/28/2023 10:40 PM EDT  Workstation ID: [id number]          Assessment & Plan   Assessment & Plan       Acute urinary tract infection    Type 2 diabetes mellitus without complication, without long-term current use of insulin (HCC)    Thrombocytopenia (HCC)    Pancytopenia (HCC)    Hypothyroidism (acquired)    Splenomegaly    Myelodysplasia (myelodysplastic syndrome) (HCC)    Dehydration     87 y.o.  female with PMH significant for chronic pancytopenia, DM 2, hypothyroid, HTN, PE on Coumadin, myelodysplasia, splenomegaly, who presents to the ED with complaint of weakness and vomiting who was found to have concern for UTI.    UTI  - Rocephin started in the ED, continue for now  - Gentle IVF overnight  - BC x2 pending  - Urine culture pending  - CBC, BMP in the a.m.    Dehydration  - Likely secondary to above  - Gentle IVF overnight  - IVF bolus given in the ED    Pancytopenia  Myelodysplastic syndrome  - Currently stable  - Recent admission to [facility] requiring 1 unit PRBC  - Follows outpatient with Dr. Lyman, consider consult in the a.m.    Diabetes mellitus 2   - FSBG 3 times daily  - SS insulin  - Hemoglobin A1c in the a.m.    History of PE  - Subtherapeutic INR  - Pharmacy to dose Coumadin      DVT prophylaxis: On Coumadin    CODE STATUS:    Code Status (Patient has no pulse and is not breathing): CPR (Attempt to Resuscitate)  Medical Interventions (Patient has pulse or is breathing): Full Support      Expected Discharge*  Expected Discharge Date and Time     Expected Discharge Date Expected Discharge Time    Mar 31, 2023           Signature: Electronically signed by CHANG Tran, 03/29/23, 1:55 AM EDT.  Total time spent: 59 minutes   Time spent includes time reviewing chart, face-to-face time, counseling patient/family/caregiver, ordering medications/tests/procedures, communicating with other health care professionals, documenting clinical information in the electronic health record, and coordination of care.

## 2023-03-29 NOTE — THERAPY EVALUATION
Patient Name: Chey Robertson  : 1935    MRN: [id number]                              Today's Date: 3/29/2023       Admit Date: 3/28/2023    Visit Dx:     ICD-10-CM ICD-9-CM   1. Acute urinary tract infection  N39.0 599.0   2. Acute dehydration  E86.0 276.51     Patient Active Problem List   Diagnosis   • Benign familial tremor   • Type 2 diabetes mellitus without complication, without long-term current use of insulin (HCC)   • AMS (altered mental status)   • Thrombocytopenia (HCC)   • Pancytopenia (HCC)   • Shortness of breath   • Hypothyroidism (acquired)   • Acute kidney injury (HCC)   • Splenomegaly   • Hepatomegaly   • Confusion   • B12 deficiency   • Abnormal intestinal absorption   • Iron deficiency anemia due to chronic blood loss   • Myelodysplasia (myelodysplastic syndrome) (HCC)   • Weakness   • Personal history of pulmonary embolism   • Acute urinary tract infection   • Dehydration     Past Medical History:   Diagnosis Date   • Anemia    • Arthritis    • Diabetes mellitus (HCC)     checks sugar only when pt wants to    • Disease of thyroid gland    • History of transfusion     with knee surgery-  no reaction recalled.    • [place] (hard of hearing)     no hearing aids   • Hypertension    • Migraine without aura and without status migrainosus, not intractable 2016   • Pulmonary emboli (HCC)     (after knee surgery)   • SOBOE (shortness of breath on exertion)    • Tremors of nervous system    • Vertigo    • Wears dentures     upper    • Wears glasses      Past Surgical History:   Procedure Laterality Date   • BLADDER SURGERY     • CATARACT EXTRACTION      bilat    • CHOLECYSTECTOMY     • COLONOSCOPY     • HYSTERECTOMY     • KYPHOPLASTY N/A 2021    Procedure: KYPHOPLASTY T11, T12 AND L4;  Surgeon: Matty Hearn MD;  [facility];  Service: Orthopedic Spine;  Laterality: N/A;   • OOPHORECTOMY     • TONSILLECTOMY        General Information     Row Name 23 1117           Physical Therapy Time and Intention    Document Type evaluation  -SC     Mode of Treatment physical therapy  -SC     Row Name 03/29/23 1117          General Information    Patient Profile Reviewed yes  -SC     Existing Precautions/Restrictions fall;other (see comments)  some incontinence  -SC     Barriers to Rehab none identified  -[facility] Name 03/29/23 1117          Living Environment    People in Home alone  -[facility] Name 03/29/23 1117          Home Main Entrance    Number of Stairs, Main Entrance three  -SC     Stair Railings, Main Entrance railings safe and in good condition;railing on right side (ascending)  -[facility] Name 03/29/23 1117          Cognition    Orientation Status (Cognition) oriented x 4  -[facility] Name 03/29/23 1117          Safety Issues, Functional Mobility    Impairments Affecting Function (Mobility) balance;endurance/activity tolerance;postural/trunk control  -SC     Comment, Safety Issues/Impairments (Mobility) alert, following commands  -SC           User Key  (r) = Recorded By, (t) = Taken By, (c) = Cosigned By    Initials Name Provider Type    SC Lisa Cortes, PT Physical Therapist               Mobility     [facility] Name 03/29/23 1118          Bed Mobility    Comment, (Bed Mobility) UIC  -[facility] Name 03/29/23 1118          Transfers    Comment, (Transfers) cues for safety when sitting down to reach of hand rail  -[facility] Name 03/29/23 1118          Sit-Stand Transfer    Sit-Stand [place] (Transfers) contact guard;verbal cues  -SC     Assistive Device (Sit-Stand Transfers) walker, front-wheeled  -[facility] Name 03/29/23 1118          Gait/Stairs (Locomotion)    [place] Level (Gait) 1 person assist;contact guard;dependent (less than 25% patient effort)  -SC     Assistive Device (Gait) walker, front-wheeled  -SC     Distance in Feet (Gait) 200  -SC     Deviations/Abnormal Patterns (Gait) [place] decreased  -SC     Bilateral Gait Deviations forward flexed posture   -SC     Comment, (Gait/Stairs) Patient demonstrated good control of walker with ambulation. No sway or LOB noted. NO c/o dizziness noted. Some irrigular HR noted during ambulation.  -SC           User Key  (r) = Recorded By, (t) = Taken By, (c) = Cosigned By    Initials Name Provider Type    SC Lisa Cortes PT Physical Therapist               Obj/Interventions     Row Name 03/29/23 1120          Range of Motion Comprehensive    General Range of Motion no range of motion deficits identified  -[facility] 03/29/23 1120          Strength Comprehensive (MMT)    General Manual Muscle Testing (MMT) Assessment no strength deficits identified  -[facility] 03/29/23 [facility]0          Balance    Dynamic Standing Balance contact guard;verbal cues  -SC     Position/Device Used, Standing Balance supported;walker, rolling  -SC     Comment, Balance no lob  -SC     Row Name 03/29/23 1120          Sensory Assessment (Somatosensory)    Sensory Assessment (Somatosensory) sensation intact  -SC           User Key  (r) = Recorded By, (t) = Taken By, (c) = Cosigned By    Initials Name Provider Type    SC Lisa Cortes, PT Physical Therapist               Goals/Plan     Row Name 03/29/23 1123          Bed Mobility Goal 1 (PT)    Activity/Assistive Device (Bed Mobility Goal 1, PT) bed mobility activities, all  -SC     [place] Level/Cues Needed (Bed Mobility Goal 1, PT) independent  -SC     Time Frame (Bed Mobility Goal 1, PT) long term goal (LTG);10 days  -SC     Row Name 03/29/23 1123          Transfer Goal 1 (PT)    Activity/Assistive Device (Transfer Goal 1, PT) sit-to-stand/stand-to-sit;bed-to-chair/chair-to-bed  -SC     [place] Level/Cues Needed (Transfer Goal 1, PT) modified independence  -SC     Time Frame (Transfer Goal 1, PT) long term goal (LTG);10 days  -SC     Row Name 03/29/23 1123          Gait Training Goal 1 (PT)    Activity/Assistive Device (Gait Training Goal 1, PT) gait (walking locomotion);walker,  rolling  -SC     [place] Level (Gait Training Goal 1, PT) standby assist  -SC     Distance (Gait Training Goal 1, PT) 300  -SC     Time Frame (Gait Training Goal 1, PT) long term goal (LTG);10 days  -SC           User Key  (r) = Recorded By, (t) = Taken By, (c) = Cosigned By    Initials Name Provider Type    SC Lisa Cortes, PT Physical Therapist               Clinical Impression     Row Name 03/29/23 1120          Pain    Pretreatment Pain Rating 0/10 - no pain  -SC     Posttreatment Pain Rating 0/10 - no pain  -SC     Row Name 03/29/23 1120          Plan of Care Review    Plan of Care Reviewed With patient  -SC     Progress improving  -SC     Outcome Evaluation Patient is close to her baseline function. She was able to control her walker with 200 feet. No c/o dizziness noted. Some irregular HR noted. Rn aware of this. Recommend home with home health at discharge  -SC     Row Name 03/29/23 1120          Therapy Assessment/Plan (PT)    Patient/Family Therapy Goals Statement (PT) go home  -SC     Rehab Potential (PT) good, to achieve stated therapy goals  -SC     Criteria for Skilled Interventions Met (PT) yes;meets criteria  -SC     Therapy Frequency (PT) daily  -SC     Row Name 03/29/23 1120          Vital Signs    Pre Systolic BP Rehab 135  -SC     Pre Treatment Diastolic BP 89  -SC     Intra Systolic BP Rehab 124  -SC     Intra Treatment Diastolic BP 72  sitting - prior to walking  -SC     Intratreatment Heart Rate (beats/min) 135  135-113 during ambulation- irrigular  -SC     Posttreatment Heart Rate (beats/min) 89  -SC     Post SpO2 (%) 97  -SC     O2 Delivery Post Treatment room air  -SC     Row Name 03/29/23 1120          Positioning and Restraints    Pre-Treatment Position sitting in chair/recliner  -SC     Post Treatment Position chair  -SC     In Chair notified nsg;reclined;sitting;call light within reach;encouraged to call for assist;exit alarm on;with family/caregiver  -SC           User Key  (r)  = Recorded By, (t) = Taken By, (c) = Cosigned By    Initials Name Provider Type    SC Lisa Cortes, PT Physical Therapist               Outcome Measures     Row Name 03/29/23 1124          How much help from another person do you currently need...    Turning from your back to your side while in flat bed without using bedrails? 4  -SC     Moving from lying on back to sitting on the side of a flat bed without bedrails? 3  -SC     Moving to and from a bed to a chair (including a wheelchair)? 3  -SC     Standing up from a chair using your arms (e.g., wheelchair, bedside chair)? 3  -SC     Climbing 3-5 steps with a railing? 3  -SC     To walk in hospital room? 3  -SC     AM-PAC 6 Clicks Score (PT) 19  -SC     Highest level of mobility 6 --> Walked 10 steps or more  -SC     Row Name 03/29/23 1124 03/29/23 1034       Functional Assessment    Outcome Measure Options AM-PAC 6 Clicks Basic Mobility (PT)  -SC AM-PAC 6 Clicks Daily Activity (OT)  -          User Key  (r) = Recorded By, (t) = Taken By, (c) = Cosigned By    Initials Name Provider Type    SC Lisa Cortes, PT Physical Therapist     Niurka Guzman, OT Occupational Therapist                             Physical Therapy Education     Title: PT OT SLP Therapies (In Progress)     Topic: Physical Therapy (Done)     Point: Mobility training (Done)     Learning Progress Summary           Patient MIRELLA Hurtado VU by SC at 3/29/2023 1124    Comment: reviewed benefits of activity                   Point: Home exercise program (Done)     Learning Progress Summary           Patient MIRELLA Hurtado VU by SC at 3/29/2023 1124    Comment: reviewed benefits of activity                   Point: Body mechanics (Done)     Learning Progress Summary           Patient MIRELLA Hurtado VU by SC at 3/29/2023 1124    Comment: reviewed benefits of activity                   Point: Precautions (Done)     Learning Progress Summary           Patient MIRELLA Hurtado VU by SC at 3/29/2023 1124    Comment:  reviewed benefits of activity                               User Key     Initials Effective Dates Name Provider Type Discipline    SC 02/03/23 -  Lisa Cortes PT Physical Therapist PT              PT Recommendation and Plan     Plan of Care Reviewed With: patient  Progress: improving  Outcome Evaluation: Patient is close to her baseline function. She was able to control her walker with 200 feet. No c/o dizziness noted. Some irregular HR noted. Rn aware of this. Recommend home with home health at discharge     Time Calculation:    PT Charges     Row Name 03/29/23 1044             Time Calculation    Start Time 1044  -SC      PT Received On 03/29/23  -SC      PT Goal Re-Cert Due Date 04/08/23  -SC         Untimed Charges    PT Eval/Re-eval Minutes 47  -SC         Total Minutes    Untimed Charges Total Minutes 47  -SC       Total Minutes 47  -SC            User Key  (r) = Recorded By, (t) = Taken By, (c) = Cosigned By    Initials Name Provider Type    SC Lisa Cortes, PT Physical Therapist              Therapy Charges for Today     Code Description Service Date Service Provider Modifiers Qty    84695634998 HC PT EVAL MOD COMPLEXITY 4 3/29/2023 Lisa Cortes PT GP 1          PT G-Codes  Outcome Measure Options: AM-PAC 6 Clicks Basic Mobility (PT)  AM-PAC 6 Clicks Score (PT): 19  AM-PAC 6 Clicks Score (OT): 20  PT Discharge Summary  Anticipated Discharge Disposition (PT): home with home health    Lisa Cortes, PT  3/29/2023

## 2023-03-30 LAB
BASOPHILS # BLD AUTO: 0 10*3/MM3 (ref 0–0.2)
BASOPHILS NFR BLD AUTO: 0 % (ref 0–1.5)
DEPRECATED RDW RBC AUTO: 48.1 FL (ref 37–54)
EOSINOPHIL # BLD AUTO: 0 10*3/MM3 (ref 0–0.4)
EOSINOPHIL NFR BLD AUTO: 0 % (ref 0.3–6.2)
ERYTHROCYTE [DISTWIDTH] IN BLOOD BY AUTOMATED COUNT: 15.4 % (ref 12.3–15.4)
GLUCOSE BLDC GLUCOMTR-MCNC: 127 MG/DL (ref 70–130)
GLUCOSE BLDC GLUCOMTR-MCNC: 181 MG/DL (ref 70–130)
GLUCOSE BLDC GLUCOMTR-MCNC: 197 MG/DL (ref 70–130)
HCT VFR BLD AUTO: 25.3 % (ref 34–46.6)
HGB BLD-MCNC: 8 G/DL (ref 12–15.9)
IMM GRANULOCYTES # BLD AUTO: 0.01 10*3/MM3 (ref 0–0.05)
IMM GRANULOCYTES NFR BLD AUTO: 0.8 % (ref 0–0.5)
INR PPP: 1.68 (ref 0.84–1.13)
LYMPHOCYTES # BLD AUTO: 0.41 10*3/MM3 (ref 0.7–3.1)
LYMPHOCYTES NFR BLD AUTO: 32 % (ref 19.6–45.3)
MCH RBC QN AUTO: 27.4 PG (ref 26.6–33)
MCHC RBC AUTO-ENTMCNC: 31.6 G/DL (ref 31.5–35.7)
MCV RBC AUTO: 86.6 FL (ref 79–97)
MONOCYTES # BLD AUTO: 0.16 10*3/MM3 (ref 0.1–0.9)
MONOCYTES NFR BLD AUTO: 12.5 % (ref 5–12)
NEUTROPHILS NFR BLD AUTO: 0.7 10*3/MM3 (ref 1.7–7)
NEUTROPHILS NFR BLD AUTO: 54.7 % (ref 42.7–76)
NRBC BLD AUTO-RTO: 0 /100 WBC (ref 0–0.2)
PLATELET # BLD AUTO: 75 10*3/MM3 (ref 140–450)
PMV BLD AUTO: 9.1 FL (ref 6–12)
PROTHROMBIN TIME: 19.8 SECONDS (ref 11.4–14.4)
RBC # BLD AUTO: 2.92 10*6/MM3 (ref 3.77–5.28)
WBC NRBC COR # BLD: 1.28 10*3/MM3 (ref 3.4–10.8)

## 2023-03-30 PROCEDURE — 85610 PROTHROMBIN TIME: CPT | Performed by: NURSE PRACTITIONER

## 2023-03-30 PROCEDURE — 97610 LOW FREQUENCY NON-THERMAL US: CPT

## 2023-03-30 PROCEDURE — 25010000002 CEFTRIAXONE PER 250 MG: Performed by: NURSE PRACTITIONER

## 2023-03-30 PROCEDURE — G0378 HOSPITAL OBSERVATION PER HR: HCPCS

## 2023-03-30 PROCEDURE — 97164 PT RE-EVAL EST PLAN CARE: CPT

## 2023-03-30 PROCEDURE — 85025 COMPLETE CBC W/AUTO DIFF WBC: CPT | Performed by: INTERNAL MEDICINE

## 2023-03-30 PROCEDURE — 63710000001 INSULIN LISPRO (HUMAN) PER 5 UNITS: Performed by: NURSE PRACTITIONER

## 2023-03-30 PROCEDURE — 82962 GLUCOSE BLOOD TEST: CPT

## 2023-03-30 PROCEDURE — 99232 SBSQ HOSP IP/OBS MODERATE 35: CPT | Performed by: NURSE PRACTITIONER

## 2023-03-30 RX ORDER — WARFARIN SODIUM 5 MG/1
5 TABLET ORAL
Status: COMPLETED | OUTPATIENT
Start: 2023-03-30 | End: 2023-03-30

## 2023-03-30 RX ADMIN — WARFARIN SODIUM 5 MG: 5 TABLET ORAL at 17:04

## 2023-03-30 RX ADMIN — ACETAMINOPHEN 325MG 650 MG: 325 TABLET ORAL at 22:07

## 2023-03-30 RX ADMIN — INSULIN LISPRO 2 UNITS: 100 INJECTION, SOLUTION INTRAVENOUS; SUBCUTANEOUS at 17:04

## 2023-03-30 RX ADMIN — Medication 10 ML: at 22:07

## 2023-03-30 RX ADMIN — SODIUM CHLORIDE 1 G: 900 INJECTION INTRAVENOUS at 22:07

## 2023-03-30 RX ADMIN — INSULIN LISPRO 2 UNITS: 100 INJECTION, SOLUTION INTRAVENOUS; SUBCUTANEOUS at 11:30

## 2023-03-30 NOTE — PROGRESS NOTES
[facility] Medicine Services  PROGRESS NOTE    Patient Name: Chey Robertson  : 1935  MRN: [id number]    Date of Admission: 3/28/2023  Primary Care Physician: Corby Marie MD    Subjective   Subjective     CC:  UTI, weakness    HPI:  No issues overnight  Just complains of being weak/tired    ROS:  Gen- No fevers, chills  CV- No chest pain, palpitations  Resp- No cough, dyspnea  GI- No N/V/D, abd pain      Objective   Objective     Vital Signs:   Temp:  [97.8 °F (36.6 °C)-98.3 °F (36.8 °C)] 97.9 °F (36.6 °C)  Heart Rate:  [67-77] 77  Resp:  [16-17] 16  BP: (121-151)/(55-69) 128/62     Physical Exam:  Constitutional: No acute distress, awake, alert  HENT: NCAT, mucous membranes moist  Respiratory: Clear to auscultation bilaterally, respiratory effort normal   Cardiovascular: RRR, no murmurs, rubs, or gallops  Gastrointestinal: Positive bowel sounds, soft, nontender, nondistended  Musculoskeletal: No bilateral ankle edema  Psychiatric: Appropriate affect, cooperative  Neurologic: Oriented x 3, JOYCE, speech clear  Skin: No rashes noted      Results Reviewed:  LAB RESULTS:      Lab 23  0355 235 23   WBC 1.28* 1.45*  --  2.16*   HEMOGLOBIN 8.0* 8.7*  --  10.6*   HEMATOCRIT 25.3* 28.2*  --  33.9*   PLATELETS 75* 74*  --  77*   NEUTROS ABS 0.70* 0.82*  --  1.53*   IMMATURE GRANS (ABS) 0.01 0.02  --  0.03   LYMPHS ABS 0.41* 0.40*  --  0.39*   MONOS ABS 0.16 0.21  --  0.21   EOS ABS 0.00 0.00  --  0.00   MCV 86.6 87.6  --  85.6   LACTATE  --   --  1.6 2.6*   PROTIME 19.8* 19.8*  --  18.2*         Lab 23  0316 23   SODIUM 137 136   POTASSIUM 3.7 4.3   CHLORIDE 101 97*   CO2 22.0 22.0   ANION GAP 14.0 17.0*   BUN 32* 34*   CREATININE 0.73 0.96   EGFR 80.2 57.7*   GLUCOSE 166* 189*   CALCIUM 8.6 9.6   MAGNESIUM 1.7  --    HEMOGLOBIN A1C 6.00*  --          Lab 23   TOTAL PROTEIN 7.6   ALBUMIN 4.4   GLOBULIN 3.2    ALT (SGPT) 6   AST (SGOT) 13   BILIRUBIN 0.5   ALK PHOS 93   LIPASE 37         Lab 03/30/23  0355 03/29/23  0316 03/28/23  1913   PROTIME 19.8* 19.8* 18.2*   INR 1.68* 1.69* 1.51*                 Brief Urine Lab Results  (Last result in the past 365 days)      Color   Clarity   Blood   Leuk Est   Nitrite   Protein   CREAT   Urine HCG        03/28/23 2203 Yellow   Clear   Negative   Negative   Negative   30 mg/dL (1+)                 Microbiology Results Abnormal     Procedure Component Value - Date/Time    Blood Culture - Blood, Arm, Left [[id number]]  (Normal) Collected: 03/29/23 0035    Lab Status: Preliminary result Specimen: Blood from Arm, Left Updated: 03/30/23 0100     Blood Culture No growth at 24 hours    Blood Culture - Blood, Arm, Left [[id number]]  (Normal) Collected: 03/29/23 0035    Lab Status: Preliminary result Specimen: Blood from Arm, Left Updated: 03/30/23 0100     Blood Culture No growth at 24 hours    COVID PRE-OP / PRE-PROCEDURE SCREENING ORDER (NO ISOLATION) - Swab, Nasopharynx [[id number]]  (Normal) Collected: 03/29/23 0410    Lab Status: Final result Specimen: Swab from Nasopharynx Updated: 03/29/23 0430    Narrative:      The following orders were created for panel order COVID PRE-OP / PRE-PROCEDURE SCREENING ORDER (NO ISOLATION) - Swab, Nasopharynx.  Procedure                               Abnormality         Status                     ---------                               -----------         ------                     COVID-19 and FLU A/B PCR...[[id number]]  Normal              Final result                 Please view results for these tests on the individual orders.    COVID-19 and FLU A/B PCR - Swab, Nasopharynx [[id number]]  (Normal) Collected: 03/29/23 0410    Lab Status: Final result Specimen: Swab from Nasopharynx Updated: 03/29/23 0430     COVID19 Not Detected     Influenza A PCR Not Detected     Influenza B PCR Not Detected    Narrative:      Fact sheet for providers:  https://www.fda.gov/media/664250/download    Fact sheet for patients: https://www.fda.gov/media/147547/download    Test performed by [facility].          CT Abdomen Pelvis Without Contrast    Result Date: 3/28/2023  CT ABDOMEN PELVIS WO CONTRAST Date of Exam: 3/28/2023 10:18 PM EDT Indication: nausea/vomiting. Comparison: 7/26/2022, 2/19/2021 Technique: Axial CT images were obtained of the abdomen and pelvis without the administration of contrast. Reconstructed coronal and sagittal images were also obtained. Automated exposure control and iterative construction methods were used. Findings: Lung Bases:   The visualized lung bases and lower mediastinal structures are unremarkable. Limited evaluation of the solid organs due to lack of intravenous contrast. Liver: Liver is normal in size and CT density. No focal lesions. Biliary/Gallbladder:  The gallbladder has been resected. The biliary tree is nondilated. Spleen: There is significant enlargement of the spleen. The spleen measures up to 17 cm in craniocaudal dimension, producing measuring up to 15 cm. No evidence of focal lesions. No evidence of significant surrounding inflammatory changes. There is mass effect on  the left kidney which otherwise appears unremarkable. Pancreas:  Pancreas is normal. There is no evidence of pancreatic mass or peripancreatic fluid. Kidneys:  Kidneys are normal in size. There are no stones or hydronephrosis. Adrenals:  Adrenal glands are unremarkable. Retroperitoneal/Lymph Nodes/Vasculature:  No retroperitoneal adenopathy is identified. Atherosclerotic calcifications are present. Gastrointestinal/Mesentery:  The bowel loops are non-dilated without wall thickening or mass. The appendix is not well-visualized. No secondary findings of appendicitis identified.. No evidence of obstruction. No free air. No mesenteric fluid collections identified. No significant stool burden identified. Bladder:  The bladder is normal. Genital:   Unremarkable       Bony Structures:   Visualized bony structures are consistent with the patient's age. Kyphoplasty changes present with remote compression deformities. No acute fracture or compression deformity. No significant spondylolisthesis. Moderate multilevel degenerative changes are present within the spine.     Impression: Impression: 1. Significant splenomegaly, progressed as compared to the previous CT from 2/19/2021. 2. Otherwise, no acute intra-abdominal or intrapelvic process. Ancillary findings as described above. Electronically Signed: Lorena Bluntgan  3/28/2023 10:40 PM EDT  Workstation ID: [id number]          Current medications:  Scheduled Meds:cefTRIAXone, 1 g, Intravenous, Q24H  insulin lispro, 0-9 Units, Subcutaneous, TID AC  sodium chloride, 10 mL, Intravenous, Q12H  warfarin (COUMADIN) (dosing per levels), , Does not apply, Daily  warfarin, 5 mg, Oral, Once      Continuous Infusions:Pharmacy to dose warfarin,       PRN Meds:.•  acetaminophen **OR** acetaminophen **OR** acetaminophen  •  Calcium Replacement - Follow Nurse / BPA Driven Protocol  •  dextrose  •  dextrose  •  glucagon (human recombinant)  •  Magnesium Standard Dose Replacement - Follow Nurse / BPA Driven Protocol  •  ondansetron  •  Pharmacy to dose warfarin  •  Phosphorus Replacement - Follow Nurse / BPA Driven Protocol  •  Potassium Replacement - Follow Nurse / BPA Driven Protocol  •  Sodium Chloride (PF)  •  sodium chloride  •  sodium chloride    Assessment & Plan   Assessment & Plan     Active Hospital Problems    Diagnosis  POA   • **Acute urinary tract infection [N39.0]  Yes   • Dehydration [E86.0]  Yes   • Myelodysplasia (myelodysplastic syndrome) (HCC) [D46.9]  Yes   • Hypothyroidism (acquired) [E03.9]  Yes   • Type 2 diabetes mellitus without complication, without long-term current use of insulin (HCC) [E11.9]  Yes   • Splenomegaly [R16.1]  Yes   • Thrombocytopenia (HCC) [D69.6]  Yes   • Pancytopenia (HCC) [D61.818]  Yes      Resolved Hospital  Problems   No resolved problems to display.        Brief Hospital Course to date:  Chey Robertson is a 87 y.o. female with PMH of type 2 diabetes, hypothyroidism, MDS, with splenomegaly, pancytopenia recent admission at [facility] due to pancytopenia who presented to [facility] with nausea vomiting and generalized weakness found to have UTI.      E.coli UTI  -blood cultures NG x24h  -continue rocephin     MDS with pancytopenia  -Counts remain low but stable  -Continue to closely monitor, follows with Dr. Lyman     History of PE  -Patient is on Coumadin  -INR is subtherapeutic, pharmacy managing    Type 2 diabetes, previously well controlled  -A1c 6%  -Continue SSI  Component      Latest Ref Rng & Units 3/29/2023 3/29/2023 3/30/2023 3/30/2023          11:22 AM  4:38 PM  7:05 AM 11:20 AM   Glucose      70 - 130 mg/dL 176 (H) 189 (H) 127 197 (H)       Expected Discharge Location and Transportation: Home with outpatient PT  Expected Discharge   Expected Discharge Date and Time     Expected Discharge Date Expected Discharge Time    Mar 31, 2023            DVT prophylaxis:  Medical DVT prophylaxis orders are present.     AM-PAC 6 Clicks Score (PT): 19 (03/29/23 1124)    CODE STATUS:   Code Status and Medical Interventions:   Ordered at: 03/29/23 0107     Code Status (Patient has no pulse and is not breathing):    CPR (Attempt to Resuscitate)     Medical Interventions (Patient has pulse or is breathing):    Full Support       Lois Westfall, CHANG  03/30/23

## 2023-03-30 NOTE — PLAN OF CARE
Problem: Hypertension Comorbidity  Goal: Blood Pressure in Desired Range  Outcome: Ongoing, Progressing  Intervention: Maintain Blood Pressure Management  Description: Evaluate adherence to home antihypertensive regimen (e.g., exercise and activity, diet modification, medication).  Provide scheduled antihypertensive medication; consider administration time and effects (e.g., avoid giving diuretic prior to bedtime).  Monitor response to antihypertensive medication therapy (e.g., blood pressure, electrolyte levels, medication effects).  Minimize risk of orthostatic hypotension; encourage caution with position changes, particularly if elderly.     Problem: Skin Injury Risk Increased  Goal: Skin Health and Integrity  Outcome: Ongoing, Progressing  Intervention: Optimize Skin Protection  Description: Perform a full pressure injury risk assessment, as indicated by screening, upon admission to care unit.  Reassess skin (injury risk, full inspection) frequently (e.g., scheduled interval, with change in condition) to provide optimal early detection and prevention.  Maintain adequate tissue perfusion (e.g., encourage fluid balance; avoid crossing legs, constrictive clothing or devices) to promote tissue oxygenation.  Maintain head of bed at lowest degree of elevation tolerated, considering medical condition and other restrictions.  Avoid positioning onto an area that remains reddened.  Minimize incontinence and moisture (e.g., toileting schedule; moisture-wicking pad, diaper or incontinence collection device; skin moisture barrier).  Cleanse skin promptly and gently when soiled utilizing a pH-balanced cleanser.  Relieve and redistribute pressure (e.g., scheduled position changes, weight shifts, use of support surface, medical device repositioning, protective dressing application, use of positioning device, microclimate control, use of pressure-injury-monitor  Encourage increased activity, such as sitting in a chair at the  bedside or early mobilization, when able to tolerate.     Problem: Fall Injury Risk  Goal: Absence of Fall and Fall-Related Injury  Outcome: Ongoing, Progressing  Intervention: Identify and Manage Contributors  Description: Develop a fall prevention plan with the patient and caregiver/family.  Provide reorientation, appropriate sensory stimulation and routines with changes in mental status to decrease risk of fall.  Promote use of personal vision and auditory aids.  Assess assistance level required for safe and effective self-care; provide support as needed, such as toileting, mobilization. For age 65 and older, implement timed toileting with assistance.  Encourage physical activity, such as performance of mobility and self-care at highest level of patient ability, multicomponent exercise program and provision of appropriate assistive devices.  If fall occurs, assess the severity of injury; implement fall injury protocol. Determine the cause and revise fall injury prevention plan.  Regularly review medication contribution to fall risk; adjust medication administration times to minimize risk of falling.  Consider risk related to polypharmacy and age.  Balance adequate pain management with potential for oversedation.  Intervention: Promote Injury-Free Environment  Description: Provide a safe, barrier-free environment that encourages independent activity.  Keep care area uncluttered and well-lighted.  Determine need for increased observation or monitoring.  Avoid use of devices that minimize mobility, such as restraints or indwelling urinary catheter.   Goal Outcome Evaluation:      Repositioned, needs met no complaint voiced

## 2023-03-30 NOTE — THERAPY RE-EVALUATION
Acute Care - Wound/Debridement Initial Evaluation  [facility]     Patient Name: Chey Robertson  : 1935  MRN: [id number]  Today's Date: 3/30/2023                Admit Date: 3/28/2023    Visit Dx:    ICD-10-CM ICD-9-CM   1. Acute urinary tract infection  N39.0 599.0   2. Acute dehydration  E86.0 276.51     Bilateral glute        Patient Active Problem List   Diagnosis   • Benign familial tremor   • Type 2 diabetes mellitus without complication, without long-term current use of insulin (HCC)   • AMS (altered mental status)   • Thrombocytopenia (HCC)   • Pancytopenia (HCC)   • Shortness of breath   • Hypothyroidism (acquired)   • Acute kidney injury (HCC)   • Splenomegaly   • Hepatomegaly   • Confusion   • B12 deficiency   • Abnormal intestinal absorption   • Iron deficiency anemia due to chronic blood loss   • Myelodysplasia (myelodysplastic syndrome) (HCC)   • Weakness   • Personal history of pulmonary embolism   • Acute urinary tract infection   • Dehydration        Past Medical History:   Diagnosis Date   • Anemia    • Arthritis    • Diabetes mellitus (HCC)     checks sugar only when pt wants to    • Disease of thyroid gland    • History of transfusion     with knee surgery-  no reaction recalled.    • [place] (hard of hearing)     no hearing aids   • Hypertension    • Migraine without aura and without status migrainosus, not intractable 2016   • Pulmonary emboli (HCC)     (after knee surgery)   • SOBOE (shortness of breath on exertion)    • Tremors of nervous system    • Vertigo    • Wears dentures     upper    • Wears glasses         Past Surgical History:   Procedure Laterality Date   • BLADDER SURGERY     • CATARACT EXTRACTION      bilat    • CHOLECYSTECTOMY     • COLONOSCOPY     • HYSTERECTOMY     • KYPHOPLASTY N/A 2021    Procedure: KYPHOPLASTY T11, T12 AND L4;  Surgeon: Matty Hearn MD;  Location: [facility];  Service: Orthopedic Spine;  Laterality: N/A;   • OOPHORECTOMY     •  TONSILLECTOMY             Wound 10/07/22 0950 Left medial gluteal Pressure Injury (Active)   Wound Image   03/30/23 0819   Pressure Injury Stage 1 03/30/23 0819   Dressing Appearance open to air 03/30/23 0819   Closure None 03/30/23 0400   Base blanchable;red;purple;other (see comments) 03/30/23 0819   Periwound intact;blanchable;redness 03/30/23 0819   Periwound Temperature warm 03/30/23 0819   Periwound Skin Turgor soft 03/30/23 0819   Wound Length (cm) 3.5 cm 03/30/23 0819   Wound Width (cm) 2 cm 03/30/23 0819   Wound Depth (cm) 0 cm 03/30/23 0819   Wound Surface Area (cm^2) 7 cm^2 03/30/23 0819   Wound Volume (cm^3) 0 cm^3 03/30/23 0819   Drainage Amount none 03/30/23 0819   Care, Wound irrigated with;sterile normal saline;ultrasound therapy, non contact low frequency 03/30/23 0819   Dressing Care open to air 03/30/23 0819   Periwound Care cleansed with pH balanced cleanser;dry periwound area maintained;barrier ointment applied 03/30/23 0819       Wound 10/07/22 0950 Right medial gluteal Pressure Injury (Active)   Dressing Appearance dry 03/29/23 2017   Closure Open to air 03/30/23 0400         WOUND DEBRIDEMENT                     PT Assessment (last 12 hours)     PT Evaluation and Treatment     Row Name 03/30/23 0819          Physical Therapy Time and Intention    Subjective Information complains of;weakness;fatigue  -     Document Type evaluation;wound care  -     Mode of Treatment physical therapy;individual therapy  -     Row Name 03/30/23 0819          General Information    Patient Profile Reviewed yes  -     Patient Observations alert;cooperative;agree to therapy  -     Risks Reviewed patient:;increased discomfort  -     Benefits Reviewed patient:;increase knowledge;improve skin integrity  -     Barriers to Rehab none identified  -     Row Name 03/30/23 0819          Pain    Pretreatment Pain Rating 0/10 - no pain  -     Posttreatment Pain Rating 0/10 - no pain  -     Row Name  03/30/23 0819          Cognition    Affect/Mental Status (Cognition) WFL  -     Orientation Status (Cognition) oriented x 4  -     Row Name 03/30/23 0819          Wound 10/07/22 0950 Left medial gluteal Pressure Injury    Wound - Properties Group Placement Date: 10/07/22  - Placement Time: 0950  - Present on Hospital Admission: Y  - Side: Left  - Orientation: medial  -MC Location: gluteal  -MC Primary Wound Type: Pressure inj  -MC    Wound Image Images linked: 1  -LH     Pressure Injury Stage 1  Hyperpigmentation surrounded by likely stage 1 pressure injury  -     Dressing Appearance open to air  -     Base blanchable;red;purple;other (see comments)  Central purple hyperpigmentation with surrounding blanchable erythema  -     Periwound intact;blanchable;redness  -     Periwound Temperature warm  -     Periwound Skin Turgor soft  -     Wound Length (cm) 3.5 cm  hyperpigmented/purple area  -     Wound Width (cm) 2 cm  -     Wound Depth (cm) 0 cm  -     Wound Surface Area (cm^2) 7 cm^2  -     Wound Volume (cm^3) 0 cm^3  -     Drainage Amount none  -     Care, Wound irrigated with;sterile normal saline;ultrasound therapy, non contact low frequency  MIST 5min  -     Dressing Care open to air  -     Periwound Care cleansed with pH balanced cleanser;dry periwound area maintained;barrier ointment applied  zguard  -     Retired Wound - Properties Group Placement Date: 10/07/22  - Placement Time: 0950  - Present on Hospital Admission: Y  -MC Side: Left  - Orientation: medial  -MC Location: gluteal  -MC Primary Wound Type: Pressure inj  -MC    Retired Wound - Properties Group Date first assessed: 10/07/22  - Time first assessed: 0950  - Present on Hospital Admission: Y  -MC Side: Left  -MC Location: gluteal  -MC Primary Wound Type: Pressure inj  -MC    Row Name             Wound 10/07/22 0950 Right medial gluteal Pressure Injury    Wound - Properties Group Placement Date:  10/07/22  - Placement Time: 0950  -MC Present on Hospital Admission: Y  -MC Side: Right  -MC Orientation: medial  -MC Location: gluteal  -MC Primary Wound Type: Pressure inj  -MC    Retired Wound - Properties Group Placement Date: 10/07/22  - Placement Time: 0950  -MC Present on Hospital Admission: Y  -MC Side: Right  -MC Orientation: medial  -MC Location: gluteal  -MC Primary Wound Type: Pressure inj  -MC    Retired Wound - Properties Group Date first assessed: 10/07/22  - Time first assessed: 0950  -MC Present on Hospital Admission: Y  -MC Side: Right  -MC Location: gluteal  -MC Primary Wound Type: Pressure inj  -MC    Row Name 03/30/23 0819          Coping    Observed Emotional State calm;cooperative  -     Verbalized Emotional State acceptance  -     Trust Relationship/Rapport care explained;questions answered  -     Row Name 03/30/23 0819          Plan of Care Review    Plan of Care Reviewed With patient  -     Progress no change  -     Outcome Evaluation PT wound care initial evaluation complete. Pt is known to  OP wound care for recent treatment of her L medial gluteal pressure injury. Pt presents this session with full resurfacing of wound area. Pt with mild purple discoloration/hyperpigmentation of new epithelialization vs suspected DTPI along with mild surrounding blanchable erythema. PT performed MIST therapy to help promote improved blood flow and cellular activity in the area. PT plans to check back in 2-3 days to assess progress and determine if further MIST therapy is warranted as area is sluggishly blanchable and not likely an acute DTPI.  PT educated pt on importance of frequent turns/position changes to relieve pressure on the sacrum/bilateral gluteal area.  -     Row Name 03/30/23 0819          Positioning and Restraints    Pre-Treatment Position in bed  -     Post Treatment Position bed  -     In Bed supine;call light within reach;encouraged to call for assist  -     Adelina  Name 03/30/23 0819          Therapy Assessment/Plan (PT)    Patient/Family Therapy Goals Statement (PT) Heal wound  -     PT Diagnosis (PT) Suspected DTPI vs new epithelialization discoloration.  -     Rehab Potential (PT) good, to achieve stated therapy goals  -     Criteria for Skilled Interventions Met (PT) yes;meets criteria;skilled treatment is necessary  -     Row Name 03/30/23 0819          Therapy Plan Review/Discharge Plan (PT)    Therapy Plan Review (PT) evaluation/treatment results reviewed;care plan/treatment goals reviewed;risks/benefits reviewed;participants voiced agreement with care plan;participants included;patient;current/potential barriers reviewed  -     Row Name 03/30/23 0819          Physical Therapy Goals    Wound Care Goal Selection (PT) wound care, PT goal 1  -     Row Name 03/30/23 0819          Wound Care Goal 1 (PT)    Wound Care Goal 1 (PT) Demonstrate no remaining nonblanchable discoloration.  -     Time Frame (Wound Care Goal 1, PT) long term goal (LTG);10 days  -           User Key  (r) = Recorded By, (t) = Taken By, (c) = Cosigned By    Initials Name Provider Type    Isabel Mcgarry, PT Physical Therapist     Carl Weaver, PT Physical Therapist              Physical Therapy Education     Title: PT OT SLP Therapies (In Progress)     Topic: Physical Therapy (Done)     Point: Mobility training (Done)     Learning Progress Summary           Patient MIRELLA Hurtado VU by SC at 3/29/2023 1124    Comment: reviewed benefits of activity                   Point: Home exercise program (Done)     Learning Progress Summary           Patient Eager, E, VU by SC at 3/29/2023 1124    Comment: reviewed benefits of activity                   Point: Body mechanics (Done)     Learning Progress Summary           Patient Eager, E, VU by SC at 3/29/2023 1124    Comment: reviewed benefits of activity                   Point: Precautions (Done)     Learning Progress Summary            Patient MIRELLA Hurtado VU by SC at 3/29/2023 1124    Comment: reviewed benefits of activity                               User Key     Initials Effective Dates Name Provider Type Discipline    SC 02/03/23 -  Lisa Cortes PT Physical Therapist PT                Recommendation and Plan  Anticipated Discharge Disposition (PT): home with home health  Therapy Frequency (PT): daily  Plan of Care Reviewed With: patient   Progress: no change       Progress: no change  Outcome Evaluation: PT wound care initial evaluation complete. Pt is known to  OP wound care for recent treatment of her L medial gluteal pressure injury. Pt presents this session with full resurfacing of wound area. Pt with mild purple discoloration/hyperpigmentation of new epithelialization vs suspected DTPI along with mild surrounding blanchable erythema. PT performed MIST therapy to help promote improved blood flow and cellular activity in the area. PT plans to check back in 2-3 days to assess progress and determine if further MIST therapy is warranted as area is sluggishly blanchable and not likely an acute DTPI.  PT educated pt on importance of frequent turns/position changes to relieve pressure on the sacrum/bilateral gluteal area.  Plan of Care Reviewed With: patient            Time Calculation   PT Charges     Row Name 03/30/23 1042             Time Calculation    Start Time 0819  -      PT Goal Re-Cert Due Date 04/08/23  -         Untimed Charges    PT Eval/Re-eval Minutes 30  -LH      Wound Care 34095 NLFU Mist  -LH      71325-DXWX Mist 10  -LH         Total Minutes    Untimed Charges Total Minutes 40  -LH       Total Minutes 40  -LH            User Key  (r) = Recorded By, (t) = Taken By, (c) = Cosigned By    Initials Name Provider Type     Carl Weaver, PT Physical Therapist                  Therapy Charges for Today     Code Description Service Date Service Provider Modifiers Qty    [id number]  PT RE-EVAL ESTABLISHED PLAN 2  3/30/2023 Carl Weaver, PT GP 1    [id number] HC PT NLFU MIST 3/30/2023 Carl Weaver, PT GP 1            PT G-Codes  Outcome Measure Options: AM-PAC 6 Clicks Basic Mobility (PT)  AM-PAC 6 Clicks Score (PT): 19  AM-PAC 6 Clicks Score (OT): 20       Carl Weaver PT  3/30/2023

## 2023-03-30 NOTE — PROGRESS NOTES
"Pharmacy Consult  -  Warfarin  Chey Robertson is a  87 y.o. female   Height - 160 cm (63\")  Weight - 52.6 kg (116 lb)    Consulting Service - hospitalist  Indication - h/o PE  Goal INR - 2-3  Home Regimen:   -                - warfarin 3mg daily (recent change @ facility)     Bridge Therapy: No     Drug-Drug Interactions with current regimen:   No major drug-drug interactions    Warfarin Dosing During Admission:  Date  3/29 3/30          INR  1.69 1.68          Dose  5 mg (5 mg)             Education Provided:     Discharge Follow up:  Following Provider -  Follow up time range or appointment -    Labs:  Results from last 7 days   Lab Units 03/30/23  0355 03/29/23  0316 03/28/23 1913   INR  1.68* 1.69* 1.51*   HEMOGLOBIN g/dL 8.0* 8.7* 10.6*   HEMATOCRIT % 25.3* 28.2* 33.9*   PLATELETS 10*3/mm3 75* 74* 77*     Results from last 7 days   Lab Units 03/29/23 0316 03/28/23 1913   SODIUM mmol/L 137 136   POTASSIUM mmol/L 3.7 4.3   CHLORIDE mmol/L 101 97*   CO2 mmol/L 22.0 22.0   BUN mg/dL 32* 34*   CREATININE mg/dL 0.73 0.96   CALCIUM mg/dL 8.6 9.6   BILIRUBIN mg/dL  --  0.5   ALK PHOS U/L  --  93   ALT (SGPT) U/L  --  6   AST (SGOT) U/L  --  13   GLUCOSE mg/dL 166* 189*     Current dietary intake: 3/30 50% of meals documented in last 24 hrs  Diet Order   Procedures    Diet: Cardiac Diets, Diabetic Diets; Healthy Heart (2-3 Na+); Consistent Carbohydrate; Texture: Regular Texture (IDDSI 7); Fluid Consistency: Thin (IDDSI 0)       Assessment/Plan:   Pharmacy to dose warfarin for hx PE, goal INR 2-3. Patient takes warfarin 2mg daily outpatient, with recent change to 3mg daily while at [facility].   INR today subtherapeutic at 1.68. Will give another boosted dose of warfarin 5mg this evening.  Pharmacy will continue to monitor daily INR, signs/symptoms of bleeding, drug-drug interactions and dietary intake to adjust dose as necessary.    Rosa Rodríguez, PharmD  Pharmacy Resident  03/30/23  10:51 EDT    " PATIENT TO SEE DENTIST    PATIENT TO DO LABS    PATIENT TO CONTACT ME WITH ANY PROBLEMS    PATIENT TO FOLLOW UP IN 3 MONTHS

## 2023-03-30 NOTE — PLAN OF CARE
Goal Outcome Evaluation:  Plan of Care Reviewed With: patient        Progress: no change  Outcome Evaluation: PT wound care initial evaluation complete. Pt is known to  OP wound care for recent treatment of her L medial gluteal pressure injury. Pt presents this session with full resurfacing of wound area. Pt with mild purple discoloration/hyperpigmentation of new epithelialization vs suspected DTPI along with mild surrounding blanchable erythema. PT performed MIST therapy to help promote improved blood flow and cellular activity in the area. PT plans to check back in 2-3 days to assess progress and determine if further MIST therapy is warranted as area is sluggishly blanchable and not likely an acute DTPI.  PT educated pt on importance of frequent turns/position changes to relieve pressure on the sacrum/bilateral gluteal area.

## 2023-03-31 VITALS
TEMPERATURE: 98.3 F | BODY MASS INDEX: 20.55 KG/M2 | DIASTOLIC BLOOD PRESSURE: 57 MMHG | SYSTOLIC BLOOD PRESSURE: 135 MMHG | OXYGEN SATURATION: 95 % | HEIGHT: 63 IN | RESPIRATION RATE: 16 BRPM | HEART RATE: 73 BPM | WEIGHT: 116 LBS

## 2023-03-31 PROBLEM — E86.0 DEHYDRATION: Status: RESOLVED | Noted: 2023-03-29 | Resolved: 2023-03-31

## 2023-03-31 PROBLEM — N39.0 ACUTE URINARY TRACT INFECTION: Status: RESOLVED | Noted: 2023-03-28 | Resolved: 2023-03-31

## 2023-03-31 LAB
ANION GAP SERPL CALCULATED.3IONS-SCNC: 10 MMOL/L (ref 5–15)
BACTERIA SPEC AEROBE CULT: ABNORMAL
BASOPHILS # BLD AUTO: 0 10*3/MM3 (ref 0–0.2)
BASOPHILS NFR BLD AUTO: 0 % (ref 0–1.5)
BUN SERPL-MCNC: 12 MG/DL (ref 8–23)
BUN/CREAT SERPL: 20.7 (ref 7–25)
CALCIUM SPEC-SCNC: 8.4 MG/DL (ref 8.6–10.5)
CHLORIDE SERPL-SCNC: 106 MMOL/L (ref 98–107)
CO2 SERPL-SCNC: 25 MMOL/L (ref 22–29)
CREAT SERPL-MCNC: 0.58 MG/DL (ref 0.57–1)
DEPRECATED RDW RBC AUTO: 48.1 FL (ref 37–54)
EGFRCR SERPLBLD CKD-EPI 2021: 88.3 ML/MIN/1.73
EOSINOPHIL # BLD AUTO: 0 10*3/MM3 (ref 0–0.4)
EOSINOPHIL NFR BLD AUTO: 0 % (ref 0.3–6.2)
ERYTHROCYTE [DISTWIDTH] IN BLOOD BY AUTOMATED COUNT: 15.4 % (ref 12.3–15.4)
GLUCOSE BLDC GLUCOMTR-MCNC: 161 MG/DL (ref 70–130)
GLUCOSE SERPL-MCNC: 133 MG/DL (ref 65–99)
HCT VFR BLD AUTO: 26.6 % (ref 34–46.6)
HGB BLD-MCNC: 8.5 G/DL (ref 12–15.9)
IMM GRANULOCYTES # BLD AUTO: 0.01 10*3/MM3 (ref 0–0.05)
IMM GRANULOCYTES NFR BLD AUTO: 0.8 % (ref 0–0.5)
INR PPP: 1.87 (ref 0.84–1.13)
LYMPHOCYTES # BLD AUTO: 0.45 10*3/MM3 (ref 0.7–3.1)
LYMPHOCYTES NFR BLD AUTO: 37.8 % (ref 19.6–45.3)
MCH RBC QN AUTO: 27.7 PG (ref 26.6–33)
MCHC RBC AUTO-ENTMCNC: 32 G/DL (ref 31.5–35.7)
MCV RBC AUTO: 86.6 FL (ref 79–97)
MONOCYTES # BLD AUTO: 0.21 10*3/MM3 (ref 0.1–0.9)
MONOCYTES NFR BLD AUTO: 17.6 % (ref 5–12)
NEUTROPHILS NFR BLD AUTO: 0.52 10*3/MM3 (ref 1.7–7)
NEUTROPHILS NFR BLD AUTO: 43.8 % (ref 42.7–76)
NRBC BLD AUTO-RTO: 0 /100 WBC (ref 0–0.2)
PLATELET # BLD AUTO: 69 10*3/MM3 (ref 140–450)
PMV BLD AUTO: 9.1 FL (ref 6–12)
POTASSIUM SERPL-SCNC: 3.8 MMOL/L (ref 3.5–5.2)
PROTHROMBIN TIME: 21.6 SECONDS (ref 11.4–14.4)
RBC # BLD AUTO: 3.07 10*6/MM3 (ref 3.77–5.28)
SODIUM SERPL-SCNC: 141 MMOL/L (ref 136–145)
WBC NRBC COR # BLD: 1.19 10*3/MM3 (ref 3.4–10.8)

## 2023-03-31 PROCEDURE — 82962 GLUCOSE BLOOD TEST: CPT

## 2023-03-31 PROCEDURE — G0378 HOSPITAL OBSERVATION PER HR: HCPCS

## 2023-03-31 PROCEDURE — 85610 PROTHROMBIN TIME: CPT | Performed by: NURSE PRACTITIONER

## 2023-03-31 PROCEDURE — 80048 BASIC METABOLIC PNL TOTAL CA: CPT | Performed by: NURSE PRACTITIONER

## 2023-03-31 PROCEDURE — 85025 COMPLETE CBC W/AUTO DIFF WBC: CPT | Performed by: NURSE PRACTITIONER

## 2023-03-31 PROCEDURE — 99239 HOSP IP/OBS DSCHRG MGMT >30: CPT | Performed by: INTERNAL MEDICINE

## 2023-03-31 RX ORDER — PROPRANOLOL HYDROCHLORIDE 20 MG/1
20 TABLET ORAL 3 TIMES DAILY
Qty: 90 TABLET | Refills: 0 | Status: SHIPPED | OUTPATIENT
Start: 2023-03-31

## 2023-03-31 RX ORDER — LISINOPRIL 5 MG/1
5 TABLET ORAL DAILY
Qty: 30 TABLET | Refills: 0 | Status: SHIPPED | OUTPATIENT
Start: 2023-03-31

## 2023-03-31 RX ORDER — CEFUROXIME AXETIL 500 MG/1
500 TABLET ORAL 2 TIMES DAILY
Qty: 6 TABLET | Refills: 0 | Status: SHIPPED | OUTPATIENT
Start: 2023-03-31 | End: 2023-04-03

## 2023-03-31 NOTE — DISCHARGE SUMMARY
[facility] Medicine Services  DISCHARGE SUMMARY    Patient Name: Chey Robertson  : 1935  MRN: [id number]    Date of Admission: 3/28/2023 10:03 PM  Date of Discharge:  3/31/2023  Primary Care Physician: Corby Marie MD    Consults     No orders found from 2023 to 3/29/2023.          Hospital Course     Presenting Problem:   Acute urinary tract infection [N39.0]    Active Hospital Problems    Diagnosis  POA   • Myelodysplasia (myelodysplastic syndrome) (HCC) [D46.9]  Yes   • Hypothyroidism (acquired) [E03.9]  Yes   • Type 2 diabetes mellitus without complication, without long-term current use of insulin (HCC) [E11.9]  Yes   • Splenomegaly [R16.1]  Yes   • Thrombocytopenia (HCC) [D69.6]  Yes   • Pancytopenia (HCC) [D61.818]  Yes      Resolved Hospital Problems    Diagnosis Date Resolved POA   • **Acute urinary tract infection [N39.0] 2023 Yes   • Dehydration [E86.0] 2023 Yes          Hospital Course:  Chey Robertson is a 87 y.o. female with PMH of type 2 diabetes, hypothyroidism, MDS, with splenomegaly, pancytopenia recent admission at [facility] due to pancytopenia who presented to [facility] with nausea vomiting and generalized weakness found to have UTI.       E.coli UTI  -Patients blood cultures exhibited no growth. Urine culture with sensitivities as below. She was placed on Rocephin initially and will continue Ceftin x 3 days at d/c.    Discharge Follow Up Recommendations for outpatient labs/diagnostics:   PCP in 1-2 weeks    Day of Discharge     HPI: Up in bed with DIL at bedside. Overall feeling better. Wants to go home.    Review of Systems  Gen- No fevers, chills  CV- No chest pain, palpitations  Resp- No cough, dyspnea  GI- No N/V/D, abd pain    Vital Signs:   Temp:  [97.8 °F (36.6 °C)-98.5 °F (36.9 °C)] 98.3 °F (36.8 °C)  Heart Rate:  [70-81] 73  Resp:  [16] 16  BP: (119-140)/(57-71) 135/57  Flow (L/min):  [2] 2      Physical Exam:  Constitutional: No  acute distress, awake, alert, elderly female  HENT: NCAT, mucous membranes moist   Respiratory: Clear to auscultation bilaterally, respiratory effort normal   Cardiovascular: RRR, no murmurs, rubs, or gallops  Gastrointestinal: Positive bowel sounds, soft, nontender, nondistended  Musculoskeletal: No bilateral ankle edema  Psychiatric: Appropriate affect, cooperative  Neurologic: Oriented x 3, strength symmetric in all extremities, Cranial Nerves grossly intact to confrontation, speech clear  Skin: No rashes    Pertinent  and/or Most Recent Results     LAB RESULTS:      Lab 03/31/23  0315 03/30/23  0355 03/29/23 0316 03/29/23  0035 03/28/23 1913   WBC 1.19* 1.28* 1.45*  --  2.16*   HEMOGLOBIN 8.5* 8.0* 8.7*  --  10.6*   HEMATOCRIT 26.6* 25.3* 28.2*  --  33.9*   PLATELETS 69* 75* 74*  --  77*   NEUTROS ABS 0.52* 0.70* 0.82*  --  1.53*   IMMATURE GRANS (ABS) 0.01 0.01 0.02  --  0.03   LYMPHS ABS 0.45* 0.41* 0.40*  --  0.39*   MONOS ABS 0.21 0.16 0.21  --  0.21   EOS ABS 0.00 0.00 0.00  --  0.00   MCV 86.6 86.6 87.6  --  85.6   LACTATE  --   --   --  1.6 2.6*   PROTIME 21.6* 19.8* 19.8*  --  18.2*         Lab 03/31/23 0315 03/29/23 0316 03/28/23 1913   SODIUM 141 137 136   POTASSIUM 3.8 3.7 4.3   CHLORIDE 106 101 97*   CO2 25.0 22.0 22.0   ANION GAP 10.0 14.0 17.0*   BUN 12 32* 34*   CREATININE 0.58 0.73 0.96   EGFR 88.3 80.2 57.7*   GLUCOSE 133* 166* 189*   CALCIUM 8.4* 8.6 9.6   MAGNESIUM  --  1.7  --    HEMOGLOBIN A1C  --  6.00*  --          Lab 03/28/23 1913   TOTAL PROTEIN 7.6   ALBUMIN 4.4   GLOBULIN 3.2   ALT (SGPT) 6   AST (SGOT) 13   BILIRUBIN 0.5   ALK PHOS 93   LIPASE 37         Lab 03/31/23  0315 03/30/23  0355 03/29/23  0316 03/28/23  1913   PROTIME 21.6* 19.8* 19.8* 18.2*   INR 1.87* 1.68* 1.69* 1.51*                 Brief Urine Lab Results  (Last result in the past 365 days)      Color   Clarity   Blood   Leuk Est   Nitrite   Protein   CREAT   Urine HCG        03/28/23 2203 Yellow   Clear    Negative   Negative   Negative   30 mg/dL (1+)               Microbiology Results (last 10 days)     Procedure Component Value - Date/Time    COVID PRE-OP / PRE-PROCEDURE SCREENING ORDER (NO ISOLATION) - Swab, Nasopharynx [[id number]]  (Normal) Collected: 03/29/23 0410    Lab Status: Final result Specimen: Swab from Nasopharynx Updated: 03/29/23 0430    Narrative:      The following orders were created for panel order COVID PRE-OP / PRE-PROCEDURE SCREENING ORDER (NO ISOLATION) - Swab, Nasopharynx.  Procedure                               Abnormality         Status                     ---------                               -----------         ------                     COVID-19 and FLU A/B PCR...[[id number]]  Normal              Final result                 Please view results for these tests on the individual orders.    COVID-19 and FLU A/B PCR - Swab, Nasopharynx [[id number]]  (Normal) Collected: 03/29/23 0410    Lab Status: Final result Specimen: Swab from Nasopharynx Updated: 03/29/23 0430     COVID19 Not Detected     Influenza A PCR Not Detected     Influenza B PCR Not Detected    Narrative:      Fact sheet for providers: https://www.fda.gov/media/170157/download    Fact sheet for patients: https://www.fda.gov/media/455423/download    Test performed by PCR.    Blood Culture - Blood, Arm, Left [[id number]]  (Abnormal) Collected: 03/29/23 0035    Lab Status: Preliminary result Specimen: Blood from Arm, Left Updated: 03/30/23 2247     Blood Culture Abnormal Stain     Gram Stain Aerobic Bottle Gram positive bacilli    Narrative:      Blood culture does not meet the specified criteria for PCR identification.  If pregnant, immunocompromised, or clinical concern for meningitis, call lab to run BCID for Listeria monocytogenes.    Blood Culture - Blood, Arm, Left [[id number]]  (Abnormal) Collected: 03/29/23 0035    Lab Status: Preliminary result Specimen: Blood from Arm, Left Updated: 03/30/23 2247     Blood Culture Abnormal  Stain     Gram Stain Aerobic Bottle Gram positive bacilli    Narrative:      Blood culture does not meet the specified criteria for PCR identification.  If pregnant, immunocompromised, or clinical concern for meningitis, call lab to run BCID for Listeria monocytogenes.    Urine Culture - Urine, Urine, Clean Catch [[id number]]  (Abnormal)  (Susceptibility) Collected: 03/28/23 2203    Lab Status: Final result Specimen: Urine, Clean Catch Updated: 03/31/23 0229     Urine Culture >100,000 CFU/mL Escherichia coli    Narrative:      Colonization of the urinary tract without infection is common. Treatment is discouraged unless the patient is symptomatic, pregnant, or undergoing an invasive urologic procedure.    Susceptibility      Escherichia coli      JUAN F      Ampicillin Resistant     Ampicillin + Sulbactam Intermediate      Cefazolin Susceptible      Cefepime Susceptible      Ceftazidime Susceptible      Ceftriaxone Susceptible      Gentamicin Susceptible      Levofloxacin Intermediate      Nitrofurantoin Susceptible      Piperacillin + Tazobactam Susceptible      Trimethoprim + Sulfamethoxazole Resistant                                CT Abdomen Pelvis Without Contrast    Result Date: 3/28/2023  CT ABDOMEN PELVIS WO CONTRAST Date of Exam: 3/28/2023 10:18 PM EDT Indication: nausea/vomiting. Comparison: 7/26/2022, 2/19/2021 Technique: Axial CT images were obtained of the abdomen and pelvis without the administration of contrast. Reconstructed coronal and sagittal images were also obtained. Automated exposure control and iterative construction methods were used. Findings: Lung Bases:   The visualized lung bases and lower mediastinal structures are unremarkable. Limited evaluation of the solid organs due to lack of intravenous contrast. Liver: Liver is normal in size and CT density. No focal lesions. Biliary/Gallbladder:  The gallbladder has been resected. The biliary tree is nondilated. Spleen: There is significant  enlargement of the spleen. The spleen measures up to 17 cm in craniocaudal dimension, producing measuring up to 15 cm. No evidence of focal lesions. No evidence of significant surrounding inflammatory changes. There is mass effect on  the left kidney which otherwise appears unremarkable. Pancreas:  Pancreas is normal. There is no evidence of pancreatic mass or peripancreatic fluid. Kidneys:  Kidneys are normal in size. There are no stones or hydronephrosis. Adrenals:  Adrenal glands are unremarkable. Retroperitoneal/Lymph Nodes/Vasculature:  No retroperitoneal adenopathy is identified. Atherosclerotic calcifications are present. Gastrointestinal/Mesentery:  The bowel loops are non-dilated without wall thickening or mass. The appendix is not well-visualized. No secondary findings of appendicitis identified.. No evidence of obstruction. No free air. No mesenteric fluid collections identified. No significant stool burden identified. Bladder:  The bladder is normal. Genital:   Unremarkable      Bony Structures:   Visualized bony structures are consistent with the patient's age. Kyphoplasty changes present with remote compression deformities. No acute fracture or compression deformity. No significant spondylolisthesis. Moderate multilevel degenerative changes are present within the spine.     Impression: 1. Significant splenomegaly, progressed as compared to the previous CT from 2/19/2021. 2. Otherwise, no acute intra-abdominal or intrapelvic process. Ancillary findings as described above. Electronically Signed: Lorena Singer  3/28/2023 10:40 PM EDT  Workstation ID: [id number]                  Plan for Follow-up of Pending Labs/Results:   Pending Labs     Order Current Status    Blood Culture - Blood, Arm, Left Preliminary result    Blood Culture - Blood, Arm, Left Preliminary result        Discharge Details        Discharge Medications      Changes to Medications      Instructions Start Date   cefuroxime 500 MG  "tablet  Commonly known as: CEFTIN  What changed: See the new instructions.   500 mg, Oral, 2 Times Daily      lisinopril 5 MG tablet  Commonly known as: JAVIZESTRIL  What changed:   · medication strength  · how much to take   5 mg, Oral, Daily      propranolol 20 MG tablet  Commonly known as: INDERAL  What changed:   · medication strength  · how much to take   20 mg, Oral, 3 Times Daily         Continue These Medications      Instructions Start Date   albuterol sulfate  (90 Base) MCG/ACT inhaler  Commonly known as: PROVENTIL HFA;VENTOLIN HFA;PROAIR HFA   2 puffs, Inhalation, Every 4 Hours PRN      clotrimazole-betamethasone 1-0.05 % cream  Commonly known as: LOTRISONE   Topical, See Admin Instructions, Apply topically to the affected area twice daily      Diclofenac Sodium 1 % gel gel  Commonly known as: VOLTAREN   APPLY 4 GRAMS TO THE AFFECTED JOINT FOUR TIMES DAILY AS NEEDED.      Droplet Insulin Syringe 30G X 1/2\" 0.5 ML misc  Generic drug: Insulin Syringe-Needle U-100   No dose, route, or frequency recorded.      furosemide 20 MG tablet  Commonly known as: LASIX   TAKE 1 TABLET BY MOUTH DAILY FOR FLUID RETENTION      glimepiride 2 MG tablet  Commonly known as: AMARYL   2 mg, Oral, Every Morning      HumuLIN 70/30 (70-30) 100 UNIT/ML injection  Generic drug: insulin NPH-insulin regular   45 Units, Subcutaneous, 2 Times Daily With Meals      HYDROcodone-acetaminophen 5-325 MG per tablet  Commonly known as: NORCO   1 tablet, Oral, Every 6 Hours PRN      levothyroxine 100 MCG tablet  Commonly known as: SYNTHROID, LEVOTHROID   100 mcg, Oral, Daily      meclizine 12.5 MG tablet  Commonly known as: ANTIVERT   12.5 mg, Oral, 3 Times Daily PRN      metFORMIN 500 MG tablet  Commonly known as: GLUCOPHAGE   1,000 mg, Oral, 2 Times Daily      nystatin 182104 UNIT/GM cream  Commonly known as: MYCOSTATIN   2 Times Daily, to affected area       omeprazole 40 MG capsule  Commonly known as: PrilOSEC   40 mg, Oral, " Daily      ondansetron 4 MG tablet  Commonly known as: ZOFRAN   4 mg, Oral, Every 8 Hours PRN      potassium chloride 10 MEQ CR tablet   TAKE 1 TABLET BY MOUTH EVERY DAY WITH FLUID PILL      tamsulosin 0.4 MG capsule 24 hr capsule  Commonly known as: FLOMAX   0.4 mg, Oral, Daily      traMADol 50 MG tablet  Commonly known as: ULTRAM   TAKE 1 TO 2 TABLETS BY MOUTH UP TO THREE TIMES DAILY AS NEEDED      True Metrix Blood Glucose Test test strip  Generic drug: glucose blood   See Admin Instructions      vitamin D 1.25 MG (79139 UT) capsule capsule  Commonly known as: ERGOCALCIFEROL   No dose, route, or frequency recorded.      warfarin 2 MG tablet  Commonly known as: COUMADIN   2 mg, Oral, Daily         Stop These Medications    benzonatate 100 MG capsule  Commonly known as: TESSALON     cefdinir 300 MG capsule  Commonly known as: OMNICEF     cephalexin 500 MG capsule  Commonly known as: KEFLEX     doxycycline 100 MG capsule  Commonly known as: VIBRAMYCIN     Molnupiravir 200 MG capsule  Commonly known as: LAGEVRIO     nitrofurantoin (macrocrystal-monohydrate) 100 MG capsule  Commonly known as: MACROBID     Paxlovid 20 x 150 MG & 10 x 100MG tablet therapy pack tablet  Generic drug: Nirmatrelvir&Ritonavir 300/100            Allergies   Allergen Reactions   • Aspirin Unknown - Low Severity     Mouth sores          Discharge Disposition:  Home or Self Care    Diet:  Hospital:  Diet Order   Procedures   • Diet: Cardiac Diets, Diabetic Diets; Healthy Heart (2-3 Na+); Consistent Carbohydrate; Texture: Regular Texture (IDDSI 7); Fluid Consistency: Thin (IDDSI 0)       Activity:      Restrictions or Other Recommendations:         CODE STATUS:    Code Status and Medical Interventions:   Ordered at: 03/29/23 0107     Code Status (Patient has no pulse and is not breathing):    CPR (Attempt to Resuscitate)     Medical Interventions (Patient has pulse or is breathing):    Full Support       Future Appointments   Date Time Provider  Department Center   8/7/2023  1:00 PM Jeanna Grande APRN [facility]       Additional Instructions for the Follow-ups that You Need to Schedule     Ambulatory Referral to Physical Therapy Wound Care   As directed      Specialty needed: Wound Care    Follow-up needed: Yes         Discharge Follow-up with PCP   As directed       Currently Documented PCP:    Corby Marie MD    PCP Phone Number:    [phone]     Follow Up Details: 1-2 week hospital follow up                     Hilda Herrera II, DO  03/31/23      Time Spent on Discharge:  I spent  33  minutes on this discharge activity which included: face-to-face encounter with the patient, reviewing the data in the system, coordination of the care with the nursing staff as well as consultants, documentation, and entering orders.

## 2023-03-31 NOTE — CASE MANAGEMENT/SOCIAL WORK
Continued Stay Note  [facility]     Patient Name: Chey Robertson  MRN: [id number]  Today's Date: 3/31/2023    Admit Date: 3/28/2023    Plan: Home   Discharge Plan     Row Name 03/31/23 0926       Plan    Plan Home    Plan Comments Orders placed out outpatient at [facility] for wound and for [facility].   Plans are to home    Final Discharge Disposition Code 01 - home or self-care               Discharge Codes    No documentation.               Expected Discharge Date and Time     Expected Discharge Date Expected Discharge Time    Mar 31, 2023             Aminta William RN

## 2023-03-31 NOTE — PROGRESS NOTES
"Pharmacy Consult  -  Warfarin  Chey Robertson is a  87 y.o. female   Height - 160 cm (63\")  Weight - 52.6 kg (116 lb)    Consulting Service - hospitalist  Indication - h/o PE  Goal INR - 2-3  Home Regimen:   -                - warfarin 3mg daily (recent change @ facility)     Bridge Therapy: No     Drug-Drug Interactions with current regimen:   No major drug-drug interactions    Warfarin Dosing During Admission:  Date  3/29 3/30 3/31         INR  1.69 1.68 1.87         Dose  5 mg 5 mg 2 mg            Education Provided: Warfarin education provided on 3/31/2023 verbally and in writing.  Discussed effects of warfarin, importance of checking INR, drug-drug and drug-food interactions, and signs/symptoms of bleeding and clotting.  Patient verbalized understanding through teach back.  All pertinent questions were answered.        Discharge Follow up:  Following Provider - Corby Marie MD  Follow up time range or appointment - 1 week, per patient    Labs:  Results from last 7 days   Lab Units 03/31/23 0315 03/30/23 0355 03/29/23 0316 03/28/23  1913   INR  1.87* 1.68* 1.69* 1.51*   HEMOGLOBIN g/dL 8.5* 8.0* 8.7* 10.6*   HEMATOCRIT % 26.6* 25.3* 28.2* 33.9*   PLATELETS 10*3/mm3 69* 75* 74* 77*     Results from last 7 days   Lab Units 03/31/23 0315 03/29/23 0316 03/28/23 1913   SODIUM mmol/L 141 137 136   POTASSIUM mmol/L 3.8 3.7 4.3   CHLORIDE mmol/L 106 101 97*   CO2 mmol/L 25.0 22.0 22.0   BUN mg/dL 12 32* 34*   CREATININE mg/dL 0.58 0.73 0.96   CALCIUM mg/dL 8.4* 8.6 9.6   BILIRUBIN mg/dL  --   --  0.5   ALK PHOS U/L  --   --  93   ALT (SGPT) U/L  --   --  6   AST (SGOT) U/L  --   --  13   GLUCOSE mg/dL 133* 166* 189*     Current dietary intake: 3/30 50% of meals documented in last 24 hrs  Diet Order   Procedures    Diet: Cardiac Diets, Diabetic Diets; Healthy Heart (2-3 Na+); Consistent Carbohydrate; Texture: Regular Texture (IDDSI 7); Fluid Consistency: Thin (IDDSI 0)       Assessment/Plan:   Pharmacy to " dose warfarin for hx PE 2011, goal INR 2-3. Patient takes warfarin 2mg daily outpatient, with recent change to 3mg daily while at [facility].   INR today subtherapeutic at 1.87. Patient discharging today, will continue home dose of warfarin 3mg daily since patient has had x2 boosted doses and INR increasing.  Pharmacy will continue to monitor daily INR, signs/symptoms of bleeding, drug-drug interactions and dietary intake to adjust dose as necessary.    Rosa Rodríguez, PharmD  Pharmacy Resident  03/31/23  11:38 EDT

## 2023-03-31 NOTE — PLAN OF CARE
Problem: Hypertension Comorbidity  Goal: Blood Pressure in Desired Range  Outcome: Ongoing, Progressing  Intervention: Maintain Blood Pressure Management  Description: Evaluate adherence to home antihypertensive regimen (e.g., exercise and activity, diet modification, medication).  Provide scheduled antihypertensive medication; consider administration time and effects (e.g., avoid giving diuretic prior to bedtime).  Monitor response to antihypertensive medication therapy (e.g., blood pressure, electrolyte levels, medication effects).  Minimize risk of orthostatic hypotension; encourage caution with position changes, particularly if elderly.     Problem: Skin Injury Risk Increased  Goal: Skin Health and Integrity  Outcome: Ongoing, Progressing  Intervention: Optimize Skin Protection  Description: Perform a full pressure injury risk assessment, as indicated by screening, upon admission to care unit.  Reassess skin (injury risk, full inspection) frequently (e.g., scheduled interval, with change in condition) to provide optimal early detection and prevention.  Maintain adequate tissue perfusion (e.g., encourage fluid balance; avoid crossing legs, constrictive clothing or devices) to promote tissue oxygenation.  Maintain head of bed at lowest degree of elevation tolerated, considering medical condition and other restrictions.  Avoid positioning onto an area that remains reddened.  Minimize incontinence and moisture (e.g., toileting schedule; moisture-wicking pad, diaper or incontinence collection device; skin moisture barrier).  Cleanse skin promptly and gently when soiled utilizing a pH-balanced cleanser.  Relieve and redistribute pressure (e.g., scheduled position changes, weight shifts, use of support surface, medical device repositioning, protective dressing application, use of positioning device, microclimate control, use of pressure-injury-monitor  Encourage increased activity, such as sitting in a chair at the  bedside or early mobilization, when able to tolerate.     Problem: Fall Injury Risk  Goal: Absence of Fall and Fall-Related Injury  Outcome: Ongoing, Progressing  Intervention: Identify and Manage Contributors  Description: Develop a fall prevention plan with the patient and caregiver/family.  Provide reorientation, appropriate sensory stimulation and routines with changes in mental status to decrease risk of fall.  Promote use of personal vision and auditory aids.  Assess assistance level required for safe and effective self-care; provide support as needed, such as toileting, mobilization. For age 65 and older, implement timed toileting with assistance.  Encourage physical activity, such as performance of mobility and self-care at highest level of patient ability, multicomponent exercise program and provision of appropriate assistive devices.  If fall occurs, assess the severity of injury; implement fall injury protocol. Determine the cause and revise fall injury prevention plan.  Regularly review medication contribution to fall risk; adjust medication administration times to minimize risk of falling.  Consider risk related to polypharmacy and age.  Balance adequate pain management with potential for oversedation.  Intervention: Promote Injury-Free Environment  Description: Provide a safe, barrier-free environment that encourages independent activity.  Keep care area uncluttered and well-lighted.  Determine need for increased observation or monitoring.  Avoid use of devices that minimize mobility, such as restraints or indwelling urinary catheter.     Problem: UTI (Urinary Tract Infection)  Goal: Improved Infection Symptoms  Outcome: Ongoing, Progressing   Goal Outcome Evaluation:             Repositioned needs met, antibiotics given

## 2023-04-01 LAB
BACTERIA SPEC AEROBE CULT: ABNORMAL
BACTERIA SPEC AEROBE CULT: ABNORMAL
GRAM STN SPEC: ABNORMAL
GRAM STN SPEC: ABNORMAL
ISOLATED FROM: ABNORMAL
ISOLATED FROM: ABNORMAL

## 2023-04-03 ENCOUNTER — DOCUMENTATION (OUTPATIENT)
Dept: PHYSICAL THERAPY | Facility: HOSPITAL | Age: 88
End: 2023-04-03
Payer: MEDICARE

## 2023-04-03 NOTE — THERAPY DISCHARGE NOTE
Outpatient Rehabilitation - Wound/Debridement D/C Summary        Patient Name: Chey Robertson  : 1935  MRN: [id number]  Today's Date: 4/3/2023                  Admit Date: (Not on file)    Visit Dx:  No diagnosis found.    Patient Active Problem List   Diagnosis   • Benign familial tremor   • Type 2 diabetes mellitus without complication, without long-term current use of insulin   • AMS (altered mental status)   • Thrombocytopenia (HCC)   • Pancytopenia (HCC)   • Shortness of breath   • Hypothyroidism (acquired)   • Acute kidney injury   • Splenomegaly   • Hepatomegaly   • Confusion   • B12 deficiency   • Abnormal intestinal absorption   • Iron deficiency anemia due to chronic blood loss   • Myelodysplasia (myelodysplastic syndrome)   • Weakness   • Personal history of pulmonary embolism        Past Medical History:   Diagnosis Date   • Anemia    • Arthritis    • Diabetes mellitus     checks sugar only when pt wants to    • Disease of thyroid gland    • History of transfusion     with knee surgery-  no reaction recalled.    • [place] (hard of hearing)     no hearing aids   • Hypertension    • Migraine without aura and without status migrainosus, not intractable 2016   • Pulmonary emboli     (after knee surgery)   • SOBOE (shortness of breath on exertion)    • Tremors of nervous system    • Vertigo    • Wears dentures     upper    • Wears glasses         Past Surgical History:   Procedure Laterality Date   • BLADDER SURGERY     • CATARACT EXTRACTION      bilat    • CHOLECYSTECTOMY     • COLONOSCOPY     • HYSTERECTOMY     • KYPHOPLASTY N/A 2021    Procedure: KYPHOPLASTY T11, T12 AND L4;  Surgeon: Matty Hearn MD;  Location: [facility];  Service: Orthopedic Spine;  Laterality: N/A;   • OOPHORECTOMY     • TONSILLECTOMY           EVALUATION                WOUND DEBRIDEMENT                            Recommendation and Plan      Goals   PT OP Goals     Row Name 23 1000          PT Short  Term Goals    STG 1 Pt will verbalize s/sx of infection.  -     STG 1 Progress Met  -MC     STG 2 Pt will demonstrate 25% reduction in wound areas to indicate healing progress.  -     STG 2 Progress Met  -        Long Term Goals    LTG 1 Pt will demonstrate independence with clean home dressing changes.  -     LTG 1 Progress Met  -MC     LTG 2 Pt will demonstrate 75% reduction in wound areas to indicate healing progress.  -     LTG 2 Progress Met  -     LTG 3 Pt will verbalize understanding of appropriate offloading.  -     LTG 3 Progress Met  -           User Key  (r) = Recorded By, (t) = Taken By, (c) = Cosigned By    Initials Name Provider Type    Isabel Mcgarry, PT Physical Therapist                Time Calculation:               OP Discharge Summary     Row Name 04/03/23 1027             OP PT Discharge Summary    Date of Discharge 04/03/23  -      Reason for Discharge other (comment)  last seen 2/1/23, would now require new referral to resume OP wound care services if required  -      Outcomes Achieved Able to achieve all goals within established timeline  -      Discharge Destination Unknown  -            User Key  (r) = Recorded By, (t) = Taken By, (c) = Cosigned By    Initials Name Provider Type    Isabel Mcgarry, PT Physical Therapist                Isabel Pa, GURPREET  4/3/2023

## 2023-05-05 ENCOUNTER — APPOINTMENT (OUTPATIENT)
Dept: GENERAL RADIOLOGY | Facility: HOSPITAL | Age: 88
End: 2023-05-05
Payer: MEDICARE

## 2023-05-05 ENCOUNTER — APPOINTMENT (OUTPATIENT)
Dept: CT IMAGING | Facility: HOSPITAL | Age: 88
End: 2023-05-05
Payer: MEDICARE

## 2023-05-05 ENCOUNTER — HOSPITAL ENCOUNTER (INPATIENT)
Facility: HOSPITAL | Age: 88
LOS: 16 days | Discharge: NURSING, REHAB OR LONG-TERM CARE | End: 2023-05-24
Attending: EMERGENCY MEDICINE | Admitting: INTERNAL MEDICINE
Payer: MEDICARE

## 2023-05-05 DIAGNOSIS — E86.0 ACUTE DEHYDRATION: ICD-10-CM

## 2023-05-05 DIAGNOSIS — N39.0 URINARY TRACT INFECTION, ACUTE: Primary | ICD-10-CM

## 2023-05-05 PROBLEM — I10 ESSENTIAL HYPERTENSION: Status: ACTIVE | Noted: 2023-05-05

## 2023-05-05 PROBLEM — Z79.01 CHRONIC ANTICOAGULATION: Status: ACTIVE | Noted: 2023-05-05

## 2023-05-05 PROBLEM — Z79.4 TYPE 2 DIABETES MELLITUS WITHOUT COMPLICATION, WITH LONG-TERM CURRENT USE OF INSULIN: Status: ACTIVE | Noted: 2023-05-05

## 2023-05-05 PROBLEM — E11.9 TYPE 2 DIABETES MELLITUS WITHOUT COMPLICATION, WITH LONG-TERM CURRENT USE OF INSULIN: Status: ACTIVE | Noted: 2023-05-05

## 2023-05-05 LAB
ABO GROUP BLD: NORMAL
ABO GROUP BLD: NORMAL
ALBUMIN SERPL-MCNC: 4.5 G/DL (ref 3.5–5.2)
ALBUMIN/GLOB SERPL: 1.6 G/DL
ALP SERPL-CCNC: 75 U/L (ref 39–117)
ALT SERPL W P-5'-P-CCNC: 8 U/L (ref 1–33)
ANION GAP SERPL CALCULATED.3IONS-SCNC: 17 MMOL/L (ref 5–15)
ANTI-K: NORMAL
AST SERPL-CCNC: 16 U/L (ref 1–32)
BACTERIA UR QL AUTO: ABNORMAL /HPF
BASOPHILS # BLD AUTO: 0 10*3/MM3 (ref 0–0.2)
BASOPHILS NFR BLD AUTO: 0 % (ref 0–1.5)
BILIRUB SERPL-MCNC: 0.8 MG/DL (ref 0–1.2)
BILIRUB UR QL STRIP: NEGATIVE
BLD GP AB SCN SERPL QL: POSITIVE
BUN SERPL-MCNC: 15 MG/DL (ref 8–23)
BUN/CREAT SERPL: 20 (ref 7–25)
CALCIUM SPEC-SCNC: 9.1 MG/DL (ref 8.6–10.5)
CHLORIDE SERPL-SCNC: 98 MMOL/L (ref 98–107)
CLARITY UR: CLEAR
CO2 SERPL-SCNC: 26 MMOL/L (ref 22–29)
COLOR UR: YELLOW
CREAT SERPL-MCNC: 0.75 MG/DL (ref 0.57–1)
D-LACTATE SERPL-SCNC: 3.6 MMOL/L (ref 0.5–2)
D-LACTATE SERPL-SCNC: 4.2 MMOL/L (ref 0.5–2)
D-LACTATE SERPL-SCNC: 4.4 MMOL/L (ref 0.5–2)
DEPRECATED RDW RBC AUTO: 56.5 FL (ref 37–54)
EGFRCR SERPLBLD CKD-EPI 2021: 77.6 ML/MIN/1.73
EOSINOPHIL # BLD AUTO: 0 10*3/MM3 (ref 0–0.4)
EOSINOPHIL NFR BLD AUTO: 0 % (ref 0.3–6.2)
ERYTHROCYTE [DISTWIDTH] IN BLOOD BY AUTOMATED COUNT: 16.9 % (ref 12.3–15.4)
FLUAV RNA RESP QL NAA+PROBE: NOT DETECTED
FLUBV RNA RESP QL NAA+PROBE: NOT DETECTED
GEN 5 2HR TROPONIN T REFLEX: 12 NG/L
GLOBULIN UR ELPH-MCNC: 2.8 GM/DL
GLUCOSE SERPL-MCNC: 194 MG/DL (ref 65–99)
GLUCOSE UR STRIP-MCNC: NEGATIVE MG/DL
HCT VFR BLD AUTO: 33.3 % (ref 34–46.6)
HGB BLD-MCNC: 10.3 G/DL (ref 12–15.9)
HGB UR QL STRIP.AUTO: NEGATIVE
HYALINE CASTS UR QL AUTO: ABNORMAL /LPF
IMM GRANULOCYTES # BLD AUTO: 0.01 10*3/MM3 (ref 0–0.05)
IMM GRANULOCYTES NFR BLD AUTO: 0.6 % (ref 0–0.5)
INR PPP: 1.23 (ref 0.89–1.12)
KETONES UR QL STRIP: NEGATIVE
LEUKOCYTE ESTERASE UR QL STRIP.AUTO: NEGATIVE
LIPASE SERPL-CCNC: 42 U/L (ref 13–60)
LYMPHOCYTES # BLD AUTO: 0.29 10*3/MM3 (ref 0.7–3.1)
LYMPHOCYTES NFR BLD AUTO: 17.4 % (ref 19.6–45.3)
MCH RBC QN AUTO: 28.1 PG (ref 26.6–33)
MCHC RBC AUTO-ENTMCNC: 30.9 G/DL (ref 31.5–35.7)
MCV RBC AUTO: 90.7 FL (ref 79–97)
MONOCYTES # BLD AUTO: 0.23 10*3/MM3 (ref 0.1–0.9)
MONOCYTES NFR BLD AUTO: 13.8 % (ref 5–12)
NEUTROPHILS NFR BLD AUTO: 1.14 10*3/MM3 (ref 1.7–7)
NEUTROPHILS NFR BLD AUTO: 68.2 % (ref 42.7–76)
NITRITE UR QL STRIP: POSITIVE
NRBC BLD AUTO-RTO: 0 /100 WBC (ref 0–0.2)
PH UR STRIP.AUTO: <=5 [PH] (ref 5–8)
PLATELET # BLD AUTO: 72 10*3/MM3 (ref 140–450)
PMV BLD AUTO: 9.1 FL (ref 6–12)
POTASSIUM SERPL-SCNC: 3.6 MMOL/L (ref 3.5–5.2)
PROT SERPL-MCNC: 7.3 G/DL (ref 6–8.5)
PROT UR QL STRIP: NEGATIVE
PROTHROMBIN TIME: 15.6 SECONDS (ref 12.2–14.5)
RBC # BLD AUTO: 3.67 10*6/MM3 (ref 3.77–5.28)
RBC # UR STRIP: ABNORMAL /HPF
REF LAB TEST METHOD: ABNORMAL
RH BLD: NEGATIVE
RH BLD: NEGATIVE
SARS-COV-2 RNA RESP QL NAA+PROBE: NOT DETECTED
SODIUM SERPL-SCNC: 141 MMOL/L (ref 136–145)
SP GR UR STRIP: 1.01 (ref 1–1.03)
SQUAMOUS #/AREA URNS HPF: ABNORMAL /HPF
T&S EXPIRATION DATE: NORMAL
TROPONIN T DELTA: -3 NG/L
TROPONIN T SERPL HS-MCNC: 15 NG/L
UROBILINOGEN UR QL STRIP: ABNORMAL
WBC # UR STRIP: ABNORMAL /HPF
WBC NRBC COR # BLD: 1.67 10*3/MM3 (ref 3.4–10.8)

## 2023-05-05 PROCEDURE — 80053 COMPREHEN METABOLIC PANEL: CPT | Performed by: EMERGENCY MEDICINE

## 2023-05-05 PROCEDURE — 87636 SARSCOV2 & INF A&B AMP PRB: CPT | Performed by: EMERGENCY MEDICINE

## 2023-05-05 PROCEDURE — 83605 ASSAY OF LACTIC ACID: CPT | Performed by: EMERGENCY MEDICINE

## 2023-05-05 PROCEDURE — G0378 HOSPITAL OBSERVATION PER HR: HCPCS

## 2023-05-05 PROCEDURE — 87077 CULTURE AEROBIC IDENTIFY: CPT | Performed by: EMERGENCY MEDICINE

## 2023-05-05 PROCEDURE — 85025 COMPLETE CBC W/AUTO DIFF WBC: CPT | Performed by: EMERGENCY MEDICINE

## 2023-05-05 PROCEDURE — 84484 ASSAY OF TROPONIN QUANT: CPT | Performed by: EMERGENCY MEDICINE

## 2023-05-05 PROCEDURE — 83690 ASSAY OF LIPASE: CPT | Performed by: EMERGENCY MEDICINE

## 2023-05-05 PROCEDURE — 87186 SC STD MICRODIL/AGAR DIL: CPT | Performed by: EMERGENCY MEDICINE

## 2023-05-05 PROCEDURE — 36415 COLL VENOUS BLD VENIPUNCTURE: CPT

## 2023-05-05 PROCEDURE — 99284 EMERGENCY DEPT VISIT MOD MDM: CPT

## 2023-05-05 PROCEDURE — 86900 BLOOD TYPING SEROLOGIC ABO: CPT | Performed by: EMERGENCY MEDICINE

## 2023-05-05 PROCEDURE — 99222 1ST HOSP IP/OBS MODERATE 55: CPT | Performed by: INTERNAL MEDICINE

## 2023-05-05 PROCEDURE — 74176 CT ABD & PELVIS W/O CONTRAST: CPT

## 2023-05-05 PROCEDURE — 86901 BLOOD TYPING SEROLOGIC RH(D): CPT | Performed by: EMERGENCY MEDICINE

## 2023-05-05 PROCEDURE — 86900 BLOOD TYPING SEROLOGIC ABO: CPT

## 2023-05-05 PROCEDURE — 71045 X-RAY EXAM CHEST 1 VIEW: CPT

## 2023-05-05 PROCEDURE — 93005 ELECTROCARDIOGRAM TRACING: CPT | Performed by: EMERGENCY MEDICINE

## 2023-05-05 PROCEDURE — 86870 RBC ANTIBODY IDENTIFICATION: CPT | Performed by: EMERGENCY MEDICINE

## 2023-05-05 PROCEDURE — 85610 PROTHROMBIN TIME: CPT | Performed by: EMERGENCY MEDICINE

## 2023-05-05 PROCEDURE — 86850 RBC ANTIBODY SCREEN: CPT | Performed by: EMERGENCY MEDICINE

## 2023-05-05 PROCEDURE — 86901 BLOOD TYPING SEROLOGIC RH(D): CPT

## 2023-05-05 PROCEDURE — 25010000002 VANCOMYCIN PER 500 MG: Performed by: EMERGENCY MEDICINE

## 2023-05-05 PROCEDURE — 81001 URINALYSIS AUTO W/SCOPE: CPT | Performed by: EMERGENCY MEDICINE

## 2023-05-05 PROCEDURE — 25010000002 PIPERACILLIN SOD-TAZOBACTAM PER 1 G: Performed by: EMERGENCY MEDICINE

## 2023-05-05 PROCEDURE — 87086 URINE CULTURE/COLONY COUNT: CPT | Performed by: EMERGENCY MEDICINE

## 2023-05-05 PROCEDURE — 87040 BLOOD CULTURE FOR BACTERIA: CPT | Performed by: EMERGENCY MEDICINE

## 2023-05-05 RX ORDER — TAMSULOSIN HYDROCHLORIDE 0.4 MG/1
0.4 CAPSULE ORAL DAILY
Status: DISCONTINUED | OUTPATIENT
Start: 2023-05-06 | End: 2023-05-24 | Stop reason: HOSPADM

## 2023-05-05 RX ORDER — NICOTINE POLACRILEX 4 MG
15 LOZENGE BUCCAL
Status: DISCONTINUED | OUTPATIENT
Start: 2023-05-05 | End: 2023-05-24 | Stop reason: HOSPADM

## 2023-05-05 RX ORDER — SODIUM CHLORIDE 0.9 % (FLUSH) 0.9 %
10 SYRINGE (ML) INJECTION AS NEEDED
Status: DISCONTINUED | OUTPATIENT
Start: 2023-05-05 | End: 2023-05-18

## 2023-05-05 RX ORDER — ACETAMINOPHEN 325 MG/1
650 TABLET ORAL EVERY 4 HOURS PRN
Status: DISCONTINUED | OUTPATIENT
Start: 2023-05-05 | End: 2023-05-24 | Stop reason: HOSPADM

## 2023-05-05 RX ORDER — SODIUM CHLORIDE, SODIUM LACTATE, POTASSIUM CHLORIDE, CALCIUM CHLORIDE 600; 310; 30; 20 MG/100ML; MG/100ML; MG/100ML; MG/100ML
100 INJECTION, SOLUTION INTRAVENOUS CONTINUOUS
Status: DISCONTINUED | OUTPATIENT
Start: 2023-05-05 | End: 2023-05-14

## 2023-05-05 RX ORDER — IBUPROFEN 600 MG/1
1 TABLET ORAL
Status: DISCONTINUED | OUTPATIENT
Start: 2023-05-05 | End: 2023-05-24 | Stop reason: HOSPADM

## 2023-05-05 RX ORDER — PROPRANOLOL HYDROCHLORIDE 20 MG/1
20 TABLET ORAL 3 TIMES DAILY
Status: DISCONTINUED | OUTPATIENT
Start: 2023-05-05 | End: 2023-05-14

## 2023-05-05 RX ORDER — ONDANSETRON 2 MG/ML
4 INJECTION INTRAMUSCULAR; INTRAVENOUS EVERY 6 HOURS PRN
Status: DISCONTINUED | OUTPATIENT
Start: 2023-05-05 | End: 2023-05-24 | Stop reason: HOSPADM

## 2023-05-05 RX ORDER — CHOLECALCIFEROL (VITAMIN D3) 125 MCG
5 CAPSULE ORAL NIGHTLY PRN
Status: DISCONTINUED | OUTPATIENT
Start: 2023-05-05 | End: 2023-05-24 | Stop reason: HOSPADM

## 2023-05-05 RX ORDER — PANTOPRAZOLE SODIUM 40 MG/1
40 TABLET, DELAYED RELEASE ORAL
Status: DISCONTINUED | OUTPATIENT
Start: 2023-05-06 | End: 2023-05-07

## 2023-05-05 RX ORDER — DEXTROSE MONOHYDRATE 25 G/50ML
25 INJECTION, SOLUTION INTRAVENOUS
Status: DISCONTINUED | OUTPATIENT
Start: 2023-05-05 | End: 2023-05-24 | Stop reason: HOSPADM

## 2023-05-05 RX ORDER — INSULIN LISPRO 100 [IU]/ML
0-9 INJECTION, SOLUTION INTRAVENOUS; SUBCUTANEOUS
Status: DISCONTINUED | OUTPATIENT
Start: 2023-05-06 | End: 2023-05-24 | Stop reason: HOSPADM

## 2023-05-05 RX ORDER — LISINOPRIL 5 MG/1
5 TABLET ORAL DAILY
Status: DISCONTINUED | OUTPATIENT
Start: 2023-05-06 | End: 2023-05-15

## 2023-05-05 RX ORDER — ONDANSETRON 4 MG/1
4 TABLET, FILM COATED ORAL EVERY 6 HOURS PRN
Status: DISCONTINUED | OUTPATIENT
Start: 2023-05-05 | End: 2023-05-24 | Stop reason: HOSPADM

## 2023-05-05 RX ORDER — PANTOPRAZOLE SODIUM 40 MG/1
40 TABLET, DELAYED RELEASE ORAL
Status: DISCONTINUED | OUTPATIENT
Start: 2023-05-06 | End: 2023-05-05

## 2023-05-05 RX ORDER — VANCOMYCIN HYDROCHLORIDE 1 G/200ML
20 INJECTION, SOLUTION INTRAVENOUS ONCE
Status: COMPLETED | OUTPATIENT
Start: 2023-05-05 | End: 2023-05-05

## 2023-05-05 RX ORDER — SODIUM CHLORIDE 0.9 % (FLUSH) 0.9 %
10 SYRINGE (ML) INJECTION EVERY 12 HOURS SCHEDULED
Status: DISCONTINUED | OUTPATIENT
Start: 2023-05-05 | End: 2023-05-24 | Stop reason: HOSPADM

## 2023-05-05 RX ORDER — SODIUM CHLORIDE 0.9 % (FLUSH) 0.9 %
10 SYRINGE (ML) INJECTION AS NEEDED
Status: DISCONTINUED | OUTPATIENT
Start: 2023-05-05 | End: 2023-05-24 | Stop reason: HOSPADM

## 2023-05-05 RX ORDER — LEVOTHYROXINE SODIUM 0.1 MG/1
100 TABLET ORAL
Status: DISCONTINUED | OUTPATIENT
Start: 2023-05-06 | End: 2023-05-24 | Stop reason: HOSPADM

## 2023-05-05 RX ORDER — SODIUM CHLORIDE 9 MG/ML
40 INJECTION, SOLUTION INTRAVENOUS AS NEEDED
Status: DISCONTINUED | OUTPATIENT
Start: 2023-05-05 | End: 2023-05-24 | Stop reason: HOSPADM

## 2023-05-05 RX ADMIN — SODIUM CHLORIDE, POTASSIUM CHLORIDE, SODIUM LACTATE AND CALCIUM CHLORIDE 100 ML/HR: 600; 310; 30; 20 INJECTION, SOLUTION INTRAVENOUS at 22:26

## 2023-05-05 RX ADMIN — SODIUM CHLORIDE 1620 ML: 9 INJECTION, SOLUTION INTRAVENOUS at 19:34

## 2023-05-05 RX ADMIN — TAZOBACTAM SODIUM AND PIPERACILLIN SODIUM 3.38 G: 375; 3 INJECTION, SOLUTION INTRAVENOUS at 19:34

## 2023-05-05 RX ADMIN — Medication 10 ML: at 22:26

## 2023-05-05 RX ADMIN — VANCOMYCIN HYDROCHLORIDE 1000 MG: 1 INJECTION, SOLUTION INTRAVENOUS at 21:09

## 2023-05-05 RX ADMIN — SODIUM CHLORIDE 1000 ML: 9 INJECTION, SOLUTION INTRAVENOUS at 17:11

## 2023-05-05 NOTE — H&P
[facility] Medicine Services  HISTORY AND PHYSICAL    Patient Name: Chey Robertson  : 1935  MRN: [id number]  Primary Care Physician: Corby Marie MD  Date of admission: 2023    Subjective   Subjective     Chief Complaint:  Weakness, nausea/vomiting    HPI:  Chey Robertson is a 87 y.o. female with a history of Myelodysplasia (w/ splenomegaly), chronic pancytopenia, HTN, T2DM, hx PE (on Warfarin), and GERD who presents to [facility] ED for generalized weakness and nausea/vomiting. Daughter at bedside reports patient has been vomiting for a little over a week.    Of note, she was recently admitted here on 3/28/23 for similar complaint and was found to have a UTI, culture positive for E. Coli at that time. She was treated with IV abx and was discharged on 3/31 in stable condition.  She reports her symptoms tonight feel somewhat similar to her recent admission for UTI.  She denies fever, chills, chest pain, shortness of breath, cough, or diarrhea.   She has a history of Myelodysplastic syndrome and is followed by Dr. Lyman.She also has a history of PE, and as previously on Warfarin, however this was changed to Eliquis very recently.         Review of Systems   Constitutional: Positive for appetite change and fatigue. Negative for chills, fever and unexpected weight change.   HENT: Negative for nosebleeds, sore throat and trouble swallowing.    Eyes: Negative for photophobia and visual disturbance.   Respiratory: Negative for cough, shortness of breath and wheezing.    Cardiovascular: Negative for chest pain, palpitations and leg swelling.   Gastrointestinal: Positive for nausea and vomiting. Negative for abdominal pain and diarrhea.   Genitourinary: Negative for dysuria and hematuria.   Musculoskeletal: Negative.    Skin: Negative.    Neurological: Positive for weakness (generalized). Negative for tremors, syncope and speech difficulty.   Psychiatric/Behavioral: Negative  for confusion. The patient is not nervous/anxious.           Personal History     Past Medical History:   Diagnosis Date   • Anemia    • Arthritis    • Diabetes mellitus     checks sugar only when pt wants to    • Disease of thyroid gland    • History of transfusion     with knee surgery-  no reaction recalled.    • [place] (hard of hearing)     no hearing aids   • Hypertension    • Migraine without aura and without status migrainosus, not intractable 12/30/2016   • Pulmonary emboli 2011    (after knee surgery)   • SOBOE (shortness of breath on exertion)    • Tremors of nervous system    • Vertigo    • Wears dentures     upper    • Wears glasses          Oncology Problem List:  Myelodysplasia (myelodysplastic syndrome) (03/14/2023; Status: Active)       Past Surgical History:   Procedure Laterality Date   • BLADDER SURGERY     • CATARACT EXTRACTION      bilat    • CHOLECYSTECTOMY     • COLONOSCOPY     • HYSTERECTOMY     • KYPHOPLASTY N/A 2/12/2021    Procedure: KYPHOPLASTY T11, T12 AND L4;  Surgeon: Matty Hearn MD;  Location: [facility];  Service: Orthopedic Spine;  Laterality: N/A;   • OOPHORECTOMY     • TONSILLECTOMY         Family History:  family history includes Breast cancer (age of onset: 84) in her sister; Heart attack in her father; Stroke in her mother.     Social History:  reports that she has never smoked. She has never used smokeless tobacco. She reports that she does not drink alcohol and does not use drugs.  Social History     Social History Narrative   • Not on file       Medications:  Diclofenac Sodium, HYDROcodone-acetaminophen, Insulin Syringe-Needle U-100, albuterol sulfate HFA, clotrimazole-betamethasone, furosemide, glimepiride, glucose blood, insulin NPH-insulin regular, levothyroxine, lisinopril, meclizine, metFORMIN, nystatin, omeprazole, ondansetron, potassium chloride, propranolol, tamsulosin, traMADol, vitamin D, and warfarin    Allergies   Allergen Reactions   • Aspirin Unknown - Low  Severity     Mouth sores        Objective   Objective     Vital Signs:   Temp:  [98.8 °F (37.1 °C)] 98.8 °F (37.1 °C)  Heart Rate:  [58] 58  Resp:  [16] 16  BP: (177)/(70) 177/70    Physical Exam  Constitutional:       General: She is not in acute distress.     Appearance: Normal appearance.      Comments: Elderly appearing female in no acute distress   HENT:      Head: Atraumatic.      Right Ear: External ear normal.      Left Ear: External ear normal.      Nose: Nose normal.   Eyes:      Extraocular Movements: Extraocular movements intact.      Conjunctiva/sclera: Conjunctivae normal.      Pupils: Pupils are equal, round, and reactive to light.   Cardiovascular:      Rate and Rhythm: Normal rate and regular rhythm.      Pulses: Normal pulses.      Heart sounds: Normal heart sounds. No murmur heard.  Pulmonary:      Effort: Pulmonary effort is normal. No respiratory distress.      Breath sounds: Normal breath sounds. No wheezing, rhonchi or rales.   Abdominal:      General: Bowel sounds are normal. There is no distension.      Tenderness: There is no abdominal tenderness. There is no guarding or rebound.   Musculoskeletal:         General: Normal range of motion.      Cervical back: No rigidity.      Right lower leg: No edema.      Left lower leg: No edema.   Skin:     General: Skin is warm and dry.      Coloration: Skin is not jaundiced.      Findings: No lesion or rash.   Neurological:      General: No focal deficit present.      Mental Status: She is alert and oriented to person, place, and time.   Psychiatric:         Attention and Perception: Attention normal.         Mood and Affect: Mood normal.         Behavior: Behavior normal.         Thought Content: Thought content normal.          Result Review:  I have personally reviewed the results from the time of this admission to 5/5/2023 19:48 EDT and agree with these findings:  [x]  Laboratory list / accordion  [x]  Microbiology  [x]  Radiology  [x]   EKG/Telemetry   []  Cardiology/Vascular   []  Pathology  [x]  Old records  []  Other:    LAB RESULTS:      Lab 05/05/23  1705   WBC 1.67*   HEMOGLOBIN 10.3*   HEMATOCRIT 33.3*   PLATELETS 72*   NEUTROS ABS 1.14*   IMMATURE GRANS (ABS) 0.01   LYMPHS ABS 0.29*   MONOS ABS 0.23   EOS ABS 0.00   MCV 90.7   LACTATE 4.4*   PROTIME 15.6*         Lab 05/05/23  1705   SODIUM 141   POTASSIUM 3.6   CHLORIDE 98   CO2 26.0   ANION GAP 17.0*   BUN 15   CREATININE 0.75   EGFR 77.6   GLUCOSE 194*   CALCIUM 9.1         Lab 05/05/23  1705   TOTAL PROTEIN 7.3   ALBUMIN 4.5   GLOBULIN 2.8   ALT (SGPT) 8   AST (SGOT) 16   BILIRUBIN 0.8   ALK PHOS 75   LIPASE 42         Lab 05/05/23  1857 05/05/23  1705   HSTROP T 12* 15*   PROTIME  --  15.6*   INR  --  1.23*             Lab 05/05/23  1709   ABO TYPING O   RH TYPING Negative   ANTIBODY SCREEN Positive         Brief Urine Lab Results  (Last result in the past 365 days)      Color   Clarity   Blood   Leuk Est   Nitrite   Protein   CREAT   Urine HCG        05/05/23 1752 Yellow   Clear   Negative   Negative   Positive   Negative               Microbiology Results (last 10 days)     Procedure Component Value - Date/Time    COVID PRE-OP / PRE-PROCEDURE SCREENING ORDER (NO ISOLATION) - Swab, Nasopharynx [[id number]]  (Normal) Collected: 05/05/23 1704    Lab Status: Final result Specimen: Swab from Nasopharynx Updated: 05/05/23 1747    Narrative:      The following orders were created for panel order COVID PRE-OP / PRE-PROCEDURE SCREENING ORDER (NO ISOLATION) - Swab, Nasopharynx.  Procedure                               Abnormality         Status                     ---------                               -----------         ------                     COVID-19 and FLU A/B PCR...[[id number]]  Normal              Final result                 Please view results for these tests on the individual orders.    COVID-19 and FLU A/B PCR - Swab, Nasopharynx [[id number]]  (Normal) Collected: 05/05/23 1704     Lab Status: Final result Specimen: Swab from Nasopharynx Updated: 05/05/23 1747     COVID19 Not Detected     Influenza A PCR Not Detected     Influenza B PCR Not Detected    Narrative:      Fact sheet for providers: https://www.fda.gov/media/190024/download    Fact sheet for patients: https://www.fda.gov/media/648415/download    Test performed by PCR.          CT Abdomen Pelvis Without Contrast    Result Date: 5/5/2023  CT ABDOMEN PELVIS WO CONTRAST Date of Exam: 5/5/2023 7:08 PM EDT Indication: nausea/vomiting. Comparison: 3/28/2023 Technique: Axial CT images were obtained of the abdomen and pelvis without the administration of contrast. Reconstructed coronal and sagittal images were also obtained. Automated exposure control and iterative construction methods were used. Findings: Lower Chest: Scarring in the lung bases Organs: Prominent splenomegaly measuring 17 cm in length, similar to prior. Scarring of the upper and lower poles of the spleen. Inferomedial displacement of the left kidney by the enlarged spleen. Kidneys, adrenal glands, liver have an unremarkable noncontrast appearance. Pancreas is atrophic. Gallbladder is surgically absent Gastrointestinal: No acute abnormality. No intestinal distention or evident wall thickening. Colonic diverticula with no associated inflammation. Pelvis: No abnormal fluid collection. Uterus surgically absent. Bubble of gas in the urinary bladder with no bladder wall thickening, likely related to catheterization Peritoneum/Retroperitoneum: No ascites or pneumoperitoneum. Normal caliber aorta Bones/Soft Tissues: No acute bony abnormality. Stable T11, T12, and L4 compression fractures status post vertebroplasty     Impression: 1. No acute process demonstrated 2. Stable splenomegaly 3. Colonic diverticulosis Electronically Signed: Jorje Staples  5/5/2023 7:31 PM EDT  Workstation ID: [id number]    XR Chest 1 View    Result Date: 5/5/2023  XR CHEST 1 VW Date of Exam: 5/5/2023 6:16 PM  EDT Indication: nausea/vomiting Comparison: Chest x-ray 2/23/2021 Findings: Mildly elevated right diaphragm. Ectatic thoracic aorta. Scarring right lower lobe. Left lung is clear. No pneumothorax or pleural effusion. No focal pulmonary parenchymal opacity. Osteopenia.     Impression: Impression: No acute cardiopulmonary abnormality. Electronically Signed: Cristela Mckeon  5/5/2023 6:58 PM EDT  Workstation ID: [id number]          Assessment & Plan   Assessment & Plan       Urinary tract infection, acute    Type 2 diabetes mellitus without complication, without long-term current use of insulin    Pancytopenia (HCC)    Personal history of pulmonary embolism    Chronic anticoagulation    Essential hypertension    Hypothyroidism (acquired)      Chey Robertson is a 87 y.o. female with a history of Myelodysplasia (w/ splenomegaly), chronic pancytopenia, HTN, T2DM, hx PE (on Warfarin), and GERD who presents to [facility] ED for generalized weakness and nausea/vomiting.    Of note, she was recently admitted here on 3/28/23 for similar complaint and was found to have a UTI, culture positive for E. Coli at that time.    Acute UTI  Dehydration  N/V  -UA suspicious for UTI. Will send for culture  -Urine culture from recent admission was positive for E. Coli  -Received Vanco + Zosyn in the ED. Will change to Rocephin for now.  -Blood cultures pending  -IV fluids  -Zofran for nausea    History of PE  On Chronic Anticoagulation  -Was previously on Warfarin, however was reportedly changed to Eliquis very recently. Will continue.     Myelodysplastic syndrome  Pancytopenia  -Appears to be chronically pancytopenic. Is actually somewhat improved when compared to results from recent admission.   -Followed by Dr. Lyman, consider consult during admission.     T2DM  -Blood glucose 192.  -Last A1C on 3/29/23 was 6.00. Too soon to repeat.   -Fingerstick achs. Will cover with SSI for now. Will reassess in the am and then decide when to resume  basal insulin given poor PO intake from nausea/vomiting.     HTN  -Hold home Lasix for now given dehydration  -Continue home Lisinopril and propranolol    Hypothyroidism  -Continue home synthroid  -Check TSH, T4    GERD  -PPI        DVT prophylaxis:  Eliquis    CODE STATUS:  Full Code       Expected Discharge TBD    This note has been completed as part of a split-shared workflow.     Signature: Electronically signed by Chad Martins PA-C, 05/05/23, 8:26 PM EDT        Attending   Admission Attestation       I have performed an independent face-to-face diagnostic evaluation including performing an independent physical examination as documented here.  The documented plan of care above was reviewed and developed with the advanced practice clinician (APC).      Brief Summary Statement:   Chey Robertson is a 87 y.o. female with history of MDS, PE, DMII, hypothyroid and HTN presents with weakness and intermittent nausea and vomiting.  Denies fever or chills. No diarrhea, last BM yesterday.  Mild epigastric discomfort    Remainder of detailed HPI is as noted by APC and has been reviewed and/or edited by me for completeness.    Attending Physical Exam:  Temp:  [97.6 °F (36.4 °C)-98.8 °F (37.1 °C)] 97.6 °F (36.4 °C)  Heart Rate:  [52-59] 59  Resp:  [15-16] 15  BP: (137-177)/(57-70) 147/57  Flow (L/min):  [2] 2    Constitutional - no acute distress, nontoxic, in bed, sitting up and eating a turkey sandwich  HEENT-NCAT, mucous membranes moist  CV-RRR  Resp-CTAB  Abd-soft, nontender, nondistended, normoactive bowel sounds  Ext-No lower extremity cyanosis, clubbing or edema bilaterally  Neuro-alert, speech clear, moves all extremities   Psych-normal affect   Skin- No rash on exposed UE or LE bilaterally      Brief Assessment/Plan :    Nausea and vomiting  - no current nausea, eating without difficulty  - some epigastric discomfort, consider gastrititis  - PPI BID  - zofran PRN    UTI vs bacteriuria  - given complaints  of nausea and vomiting, will give 3 days rocephin     MDS    H/o PE  - family says patient now on eliquis rather than coumadin, however they are unsure of dosing -- family will check med bottle at home and call back with current dose.    See detailed assessment and plan developed with APC which I have reviewed and/or edited for completeness.            Maksim Freeman MD  05/05/23

## 2023-05-05 NOTE — ED PROVIDER NOTES
Subjective   History of Present Illness  87-year-old female who presents for evaluation of nausea vomiting and coffee-ground appearing emesis.  The patient has had vomiting intermittently for the last 2 weeks.  It seems to be more prominent in the day.  She also has past brown coffee-ground appearing emesis.  She does take a blood thinner for Eliquis.  She states that she used to be on Coumadin but she was changed off of Coumadin.  She denies any focal abdominal pain.  She denies chest pain, cough, shortness of breath.  No reported sick contacts.  No reported fever, body, chills.  She denies cough or shortness of breath.  No urinary changes include no dysuria, frequency, urgency.  She has had previous abdominal surgeries in form of cholecystectomy and hysterectomy.  She appears fatigued but provides appropriate answers and has intact baseline mentation.  No other acute complaints.        Review of Systems   Constitutional: Positive for fatigue. Negative for chills and fever.   HENT: Negative for congestion, ear pain, postnasal drip, sinus pressure and sore throat.    Eyes: Negative for pain, redness and visual disturbance.   Respiratory: Negative for cough, chest tightness and shortness of breath.    Cardiovascular: Negative for chest pain, palpitations and leg swelling.   Gastrointestinal: Positive for nausea and vomiting. Negative for abdominal pain, anal bleeding, blood in stool and diarrhea.   Endocrine: Negative for polydipsia and polyuria.   Genitourinary: Negative for difficulty urinating, dysuria, frequency and urgency.   Musculoskeletal: Negative for arthralgias, back pain and neck pain.   Skin: Negative for pallor and rash.   Allergic/Immunologic: Negative for environmental allergies and immunocompromised state.   Neurological: Negative for dizziness, weakness and headaches.   Hematological: Negative for adenopathy.   Psychiatric/Behavioral: Negative for confusion, self-injury and suicidal ideas. The  patient is not nervous/anxious.    All other systems reviewed and are negative.      Past Medical History:   Diagnosis Date   • Anemia    • Arthritis    • Diabetes mellitus     checks sugar only when pt wants to    • Disease of thyroid gland    • History of transfusion     with knee surgery-  no reaction recalled.    • [place] (hard of hearing)     no hearing aids   • Hypertension    • Migraine without aura and without status migrainosus, not intractable 12/30/2016   • Pulmonary emboli 2011    (after knee surgery)   • SOBOE (shortness of breath on exertion)    • Tremors of nervous system    • Vertigo    • Wears dentures     upper    • Wears glasses        Allergies   Allergen Reactions   • Aspirin Unknown - Low Severity     Mouth sores        Past Surgical History:   Procedure Laterality Date   • BLADDER SURGERY     • CATARACT EXTRACTION      bilat    • CHOLECYSTECTOMY     • COLONOSCOPY     • HYSTERECTOMY     • KYPHOPLASTY N/A 2/12/2021    Procedure: KYPHOPLASTY T11, T12 AND L4;  Surgeon: Matty Hearn MD;  Location: [facility];  Service: Orthopedic Spine;  Laterality: N/A;   • OOPHORECTOMY     • TONSILLECTOMY         Family History   Problem Relation Age of Onset   • Breast cancer Sister 84   • Stroke Mother    • Heart attack Father    • Ovarian cancer Neg Hx        Social History     Socioeconomic History   • Marital status:    Tobacco Use   • Smoking status: Never   • Smokeless tobacco: Never   Vaping Use   • Vaping Use: Never used   Substance and Sexual Activity   • Alcohol use: No   • Drug use: No   • Sexual activity: Defer           Objective   Physical Exam  Vitals and nursing note reviewed.   Constitutional:       General: She is not in acute distress.     Appearance: Normal appearance. She is well-developed. She is not toxic-appearing or diaphoretic.   HENT:      Head: Normocephalic and atraumatic.      Right Ear: External ear normal.      Left Ear: External ear normal.      Nose: Nose normal.   Eyes:       General: Lids are normal.      Pupils: Pupils are equal, round, and reactive to light.   Neck:      Trachea: No tracheal deviation.   Cardiovascular:      Rate and Rhythm: Regular rhythm. Bradycardia present.      Pulses: No decreased pulses.      Heart sounds: Normal heart sounds. No murmur heard.    No friction rub. No gallop.   Pulmonary:      Effort: Pulmonary effort is normal. No respiratory distress.      Breath sounds: Normal breath sounds. No decreased breath sounds, wheezing, rhonchi or rales.       Abdominal:      General: Bowel sounds are normal.      Palpations: Abdomen is soft.      Tenderness: There is no abdominal tenderness. There is no guarding or rebound.       Musculoskeletal:         General: No deformity. Normal range of motion.      Cervical back: Normal range of motion and neck supple.   Lymphadenopathy:      Cervical: No cervical adenopathy.   Skin:     General: Skin is warm and dry.      Findings: No rash.   Neurological:      Mental Status: She is alert and oriented to person, place, and time.      Cranial Nerves: No cranial nerve deficit.      Sensory: No sensory deficit.   Psychiatric:         Speech: Speech normal.         Behavior: Behavior normal.         Thought Content: Thought content normal.         Judgment: Judgment normal.         Procedures           ED Course                                           Medical Decision Making  Differential diagnosis includes dehydration, sepsis, urinary tract infection, viral illness, pneumonia, bowel obstruction, diverticulitis, other unspecified etiology.    Lab evaluation shows hyperglycemia, decreased white blood cell count consistent with baseline.  Urine shows positive nitrite and 4+ bacteria but no white cells.    Lab evaluation shows elevated lactic acid level.  COVID and influenza test are negative.    CT scan of the abdomen pelvis and chest x-ray show no acute abnormalities.    I have initiated IV fluids and broad-spectrum  antibiotics and desire to admit to the hospital for further evaluation.    Discussed the patient with the hospitalist, Dr. Freeman who will consult on the patient to determine status of admission.    Acute dehydration: acute illness or injury  Urinary tract infection, acute: acute illness or injury  Amount and/or Complexity of Data Reviewed  Independent Historian: friend  External Data Reviewed: labs, radiology, ECG and notes.  Labs: ordered. Decision-making details documented in ED Course.  Radiology: ordered and independent interpretation performed. Decision-making details documented in ED Course.  ECG/medicine tests: ordered and independent interpretation performed. Decision-making details documented in ED Course.      Risk  Prescription drug management.  Decision regarding hospitalization.          Final diagnoses:   Urinary tract infection, acute   Acute dehydration       ED Disposition  ED Disposition     ED Disposition   Decision to Admit    Condition   --    Comment   Level of Care: Telemetry [5]   Diagnosis: Urinary tract infection, acute [133764]               No follow-up provider specified.       Medication List      No changes were made to your prescriptions during this visit.          Iva Pate MD  05/05/23 9251

## 2023-05-06 LAB
ANION GAP SERPL CALCULATED.3IONS-SCNC: 13 MMOL/L (ref 5–15)
BASOPHILS # BLD AUTO: 0 10*3/MM3 (ref 0–0.2)
BASOPHILS NFR BLD AUTO: 0 % (ref 0–1.5)
BUN SERPL-MCNC: 13 MG/DL (ref 8–23)
BUN/CREAT SERPL: 19.4 (ref 7–25)
CALCIUM SPEC-SCNC: 8 MG/DL (ref 8.6–10.5)
CHLORIDE SERPL-SCNC: 102 MMOL/L (ref 98–107)
CO2 SERPL-SCNC: 25 MMOL/L (ref 22–29)
CREAT SERPL-MCNC: 0.67 MG/DL (ref 0.57–1)
D-LACTATE SERPL-SCNC: 2.6 MMOL/L (ref 0.5–2)
D-LACTATE SERPL-SCNC: 2.6 MMOL/L (ref 0.5–2)
D-LACTATE SERPL-SCNC: 3.4 MMOL/L (ref 0.5–2)
DEPRECATED RDW RBC AUTO: 55.6 FL (ref 37–54)
EGFRCR SERPLBLD CKD-EPI 2021: 85.2 ML/MIN/1.73
EOSINOPHIL # BLD AUTO: 0 10*3/MM3 (ref 0–0.4)
EOSINOPHIL NFR BLD AUTO: 0 % (ref 0.3–6.2)
ERYTHROCYTE [DISTWIDTH] IN BLOOD BY AUTOMATED COUNT: 16.9 % (ref 12.3–15.4)
GLUCOSE BLDC GLUCOMTR-MCNC: 123 MG/DL (ref 70–130)
GLUCOSE BLDC GLUCOMTR-MCNC: 168 MG/DL (ref 70–130)
GLUCOSE BLDC GLUCOMTR-MCNC: 243 MG/DL (ref 70–130)
GLUCOSE SERPL-MCNC: 122 MG/DL (ref 65–99)
HCT VFR BLD AUTO: 26.3 % (ref 34–46.6)
HGB BLD-MCNC: 8.3 G/DL (ref 12–15.9)
IMM GRANULOCYTES # BLD AUTO: 0.01 10*3/MM3 (ref 0–0.05)
IMM GRANULOCYTES NFR BLD AUTO: 1 % (ref 0–0.5)
LYMPHOCYTES # BLD AUTO: 0.4 10*3/MM3 (ref 0.7–3.1)
LYMPHOCYTES NFR BLD AUTO: 39.6 % (ref 19.6–45.3)
MCH RBC QN AUTO: 28.7 PG (ref 26.6–33)
MCHC RBC AUTO-ENTMCNC: 31.6 G/DL (ref 31.5–35.7)
MCV RBC AUTO: 91 FL (ref 79–97)
MONOCYTES # BLD AUTO: 0.17 10*3/MM3 (ref 0.1–0.9)
MONOCYTES NFR BLD AUTO: 16.8 % (ref 5–12)
NEUTROPHILS NFR BLD AUTO: 0.43 10*3/MM3 (ref 1.7–7)
NEUTROPHILS NFR BLD AUTO: 42.6 % (ref 42.7–76)
NRBC BLD AUTO-RTO: 0 /100 WBC (ref 0–0.2)
PLATELET # BLD AUTO: 57 10*3/MM3 (ref 140–450)
PMV BLD AUTO: 9.8 FL (ref 6–12)
POTASSIUM SERPL-SCNC: 3.2 MMOL/L (ref 3.5–5.2)
RBC # BLD AUTO: 2.89 10*6/MM3 (ref 3.77–5.28)
SODIUM SERPL-SCNC: 140 MMOL/L (ref 136–145)
WBC NRBC COR # BLD: 1.01 10*3/MM3 (ref 3.4–10.8)

## 2023-05-06 PROCEDURE — 63710000001 INSULIN LISPRO (HUMAN) PER 5 UNITS: Performed by: PHYSICIAN ASSISTANT

## 2023-05-06 PROCEDURE — 99232 SBSQ HOSP IP/OBS MODERATE 35: CPT | Performed by: INTERNAL MEDICINE

## 2023-05-06 PROCEDURE — G0378 HOSPITAL OBSERVATION PER HR: HCPCS

## 2023-05-06 PROCEDURE — 83605 ASSAY OF LACTIC ACID: CPT | Performed by: EMERGENCY MEDICINE

## 2023-05-06 PROCEDURE — 25010000002 CEFTRIAXONE PER 250 MG: Performed by: INTERNAL MEDICINE

## 2023-05-06 PROCEDURE — 85025 COMPLETE CBC W/AUTO DIFF WBC: CPT | Performed by: PHYSICIAN ASSISTANT

## 2023-05-06 PROCEDURE — 82948 REAGENT STRIP/BLOOD GLUCOSE: CPT

## 2023-05-06 PROCEDURE — 80048 BASIC METABOLIC PNL TOTAL CA: CPT | Performed by: PHYSICIAN ASSISTANT

## 2023-05-06 RX ADMIN — TAMSULOSIN HYDROCHLORIDE 0.4 MG: 0.4 CAPSULE ORAL at 08:41

## 2023-05-06 RX ADMIN — PROPRANOLOL HYDROCHLORIDE 20 MG: 20 TABLET ORAL at 00:53

## 2023-05-06 RX ADMIN — SODIUM CHLORIDE, POTASSIUM CHLORIDE, SODIUM LACTATE AND CALCIUM CHLORIDE 100 ML/HR: 600; 310; 30; 20 INJECTION, SOLUTION INTRAVENOUS at 08:45

## 2023-05-06 RX ADMIN — APIXABAN 5 MG: 5 TABLET, FILM COATED ORAL at 19:57

## 2023-05-06 RX ADMIN — PANTOPRAZOLE SODIUM 40 MG: 40 TABLET, DELAYED RELEASE ORAL at 08:41

## 2023-05-06 RX ADMIN — Medication 10 ML: at 08:42

## 2023-05-06 RX ADMIN — PROPRANOLOL HYDROCHLORIDE 20 MG: 20 TABLET ORAL at 17:06

## 2023-05-06 RX ADMIN — Medication 10 ML: at 19:59

## 2023-05-06 RX ADMIN — PANTOPRAZOLE SODIUM 40 MG: 40 TABLET, DELAYED RELEASE ORAL at 17:06

## 2023-05-06 RX ADMIN — INSULIN LISPRO 4 UNITS: 100 INJECTION, SOLUTION INTRAVENOUS; SUBCUTANEOUS at 12:50

## 2023-05-06 RX ADMIN — CEFTRIAXONE 1 G: 1 INJECTION, POWDER, FOR SOLUTION INTRAMUSCULAR; INTRAVENOUS at 05:34

## 2023-05-06 RX ADMIN — PROPRANOLOL HYDROCHLORIDE 20 MG: 20 TABLET ORAL at 08:40

## 2023-05-06 RX ADMIN — PROPRANOLOL HYDROCHLORIDE 20 MG: 20 TABLET ORAL at 21:18

## 2023-05-06 RX ADMIN — LISINOPRIL 5 MG: 5 TABLET ORAL at 08:41

## 2023-05-06 RX ADMIN — Medication 5 MG: at 19:57

## 2023-05-06 RX ADMIN — LEVOTHYROXINE SODIUM 100 MCG: 0.1 TABLET ORAL at 05:34

## 2023-05-06 RX ADMIN — APIXABAN 5 MG: 5 TABLET, FILM COATED ORAL at 12:50

## 2023-05-06 RX ADMIN — INSULIN LISPRO 2 UNITS: 100 INJECTION, SOLUTION INTRAVENOUS; SUBCUTANEOUS at 17:06

## 2023-05-06 NOTE — SIGNIFICANT NOTE
Significant Note    Patient and daughter both report patient being changed from warfarin to Eliquis, though patient is unsure of dose, and daughter has left before telling ED nursing staff of current home medications. Son, whom is listed as emergency contact, is unsure of dose, and will look in the morning. Will need to obtain this to be restarted in the am.

## 2023-05-06 NOTE — PLAN OF CARE
Goal Outcome Evaluation:         Patient A&Ox4, on RA stating at 96%. SB on tele monitor. Denies sob. No complaints of pain. Up with one assist. Fall and safety precautions in place and maintained, bed in lowest position, call light in reach.

## 2023-05-06 NOTE — PROGRESS NOTES
[facility] Medicine Services  PROGRESS NOTE    Patient Name: Chey Robertson  : 1935  MRN: [id number]    Date of Admission: 2023  Primary Care Physician: Corby Marie MD    Subjective   Subjective     CC:  F/U for UTI    HPI:   I have seen and evaluated the patient this morning.  Comfortable in bed.  Still feeling very weak and tired.  Slightly confused.  Denied any chest pain or shortness of breath    ROS:  General : no fevers, chills  CVS: No chest pain, palpitations  Respiratory: No cough, dyspnea  GI: No N/V/D, abd pain  10 point review of systems is negative except for what is mentioned in HPI    Objective   Objective     Vital Signs:   Temp:  [97.5 °F (36.4 °C)-98.8 °F (37.1 °C)] 97.5 °F (36.4 °C)  Heart Rate:  [52-64] 59  Resp:  [15-16] 16  BP: (126-177)/(57-96) 126/59  Flow (L/min):  [2] 2     Physical Exam:  General: Chronically ill looking, not in distress, conversant and cooperative  Head: Atraumatic and normocephalic  Eyes: No Icterus. No pallor  Ears:  Ears appear intact with no abnormalities noted  Throat: No oral lesions, no thrush  Neck: Supple, trachea midline  Lungs: Clear to auscultation bilaterally, equal air entry, no wheezing or crackles  Heart:  Normal S1 and S2, no murmur, no gallop, No JVD, no lower extremity swelling  Abdomen:  Soft, no tenderness, no organomegaly, normal bowel sounds, no organomegaly  Extremities: pulses equal bilaterally  Skin: No bleeding, bruising or rash, normal skin turgor and elasticity  Neurologic: Cranial nerves appear intact with no evidence of facial asymmetry, normal motor and sensory functions in all 4 extremities  Psych: Alert and oriented x 3, normal mood    Results Reviewed:  LAB RESULTS:      Lab 23  0840 23  0141 23  2231 23  1942 23  1705   WBC 1.01*  --   --   --  1.67*   HEMOGLOBIN 8.3*  --   --   --  10.3*   HEMATOCRIT 26.3*  --   --   --  33.3*   PLATELETS 57*  --   --   --   72*   NEUTROS ABS 0.43*  --   --   --  1.14*   IMMATURE GRANS (ABS) 0.01  --   --   --  0.01   LYMPHS ABS 0.40*  --   --   --  0.29*   MONOS ABS 0.17  --   --   --  0.23   EOS ABS 0.00  --   --   --  0.00   MCV 91.0  --   --   --  90.7   LACTATE 2.6* 3.4* 3.6* 4.2* 4.4*   PROTIME  --   --   --   --  15.6*         Lab 05/06/23  0840 05/05/23  1705   SODIUM 140 141   POTASSIUM 3.2* 3.6   CHLORIDE 102 98   CO2 25.0 26.0   ANION GAP 13.0 17.0*   BUN 13 15   CREATININE 0.67 0.75   EGFR 85.2 77.6   GLUCOSE 122* 194*   CALCIUM 8.0* 9.1         Lab 05/05/23  1705   TOTAL PROTEIN 7.3   ALBUMIN 4.5   GLOBULIN 2.8   ALT (SGPT) 8   AST (SGOT) 16   BILIRUBIN 0.8   ALK PHOS 75   LIPASE 42         Lab 05/05/23  1857 05/05/23  1705   HSTROP T 12* 15*   PROTIME  --  15.6*   INR  --  1.23*             Lab 05/05/23 2021 05/05/23  1709   ABO TYPING O O   RH TYPING Negative Negative   ANTIBODY SCREEN  --  Positive         Brief Urine Lab Results  (Last result in the past 365 days)      Color   Clarity   Blood   Leuk Est   Nitrite   Protein   CREAT   Urine HCG        05/05/23 1752 Yellow   Clear   Negative   Negative   Positive   Negative                 Microbiology Results Abnormal     Procedure Component Value - Date/Time    COVID PRE-OP / PRE-PROCEDURE SCREENING ORDER (NO ISOLATION) - Swab, Nasopharynx [[id number]]  (Normal) Collected: 05/05/23 1704    Lab Status: Final result Specimen: Swab from Nasopharynx Updated: 05/05/23 1747    Narrative:      The following orders were created for panel order COVID PRE-OP / PRE-PROCEDURE SCREENING ORDER (NO ISOLATION) - Swab, Nasopharynx.  Procedure                               Abnormality         Status                     ---------                               -----------         ------                     COVID-19 and FLU A/B PCR...[[id number]]  Normal              Final result                 Please view results for these tests on the individual orders.    COVID-19 and FLU A/B PCR - Swab,  Nasopharynx [[id number]]  (Normal) Collected: 05/05/23 1704    Lab Status: Final result Specimen: Swab from Nasopharynx Updated: 05/05/23 1747     COVID19 Not Detected     Influenza A PCR Not Detected     Influenza B PCR Not Detected    Narrative:      Fact sheet for providers: https://www.fda.gov/media/750692/download    Fact sheet for patients: https://www.fda.gov/media/760519/download    Test performed by PCR.          CT Abdomen Pelvis Without Contrast    Result Date: 5/5/2023  CT ABDOMEN PELVIS WO CONTRAST Date of Exam: 5/5/2023 7:08 PM EDT Indication: nausea/vomiting. Comparison: 3/28/2023 Technique: Axial CT images were obtained of the abdomen and pelvis without the administration of contrast. Reconstructed coronal and sagittal images were also obtained. Automated exposure control and iterative construction methods were used. Findings: Lower Chest: Scarring in the lung bases Organs: Prominent splenomegaly measuring 17 cm in length, similar to prior. Scarring of the upper and lower poles of the spleen. Inferomedial displacement of the left kidney by the enlarged spleen. Kidneys, adrenal glands, liver have an unremarkable noncontrast appearance. Pancreas is atrophic. Gallbladder is surgically absent Gastrointestinal: No acute abnormality. No intestinal distention or evident wall thickening. Colonic diverticula with no associated inflammation. Pelvis: No abnormal fluid collection. Uterus surgically absent. Bubble of gas in the urinary bladder with no bladder wall thickening, likely related to catheterization Peritoneum/Retroperitoneum: No ascites or pneumoperitoneum. Normal caliber aorta Bones/Soft Tissues: No acute bony abnormality. Stable T11, T12, and L4 compression fractures status post vertebroplasty     Impression: 1. No acute process demonstrated 2. Stable splenomegaly 3. Colonic diverticulosis Electronically Signed: Jorje Staples  5/5/2023 7:31 PM EDT  Workstation ID: [id number]    XR Chest 1 View    Result  Date: 5/5/2023  XR CHEST 1 VW Date of Exam: 5/5/2023 6:16 PM EDT Indication: nausea/vomiting Comparison: Chest x-ray 2/23/2021 Findings: Mildly elevated right diaphragm. Ectatic thoracic aorta. Scarring right lower lobe. Left lung is clear. No pneumothorax or pleural effusion. No focal pulmonary parenchymal opacity. Osteopenia.     Impression: Impression: No acute cardiopulmonary abnormality. Electronically Signed: Cristela Mike  5/5/2023 6:58 PM EDT  Workstation ID: [id number]          Current medications:  Scheduled Meds:apixaban, 5 mg, Oral, Q12H  cefTRIAXone, 1 g, Intravenous, Q24H  insulin lispro, 0-9 Units, Subcutaneous, TID AC  levothyroxine, 100 mcg, Oral, Q AM  lisinopril, 5 mg, Oral, Daily  pantoprazole, 40 mg, Oral, BID AC  propranolol, 20 mg, Oral, TID  sodium chloride, 10 mL, Intravenous, Q12H  tamsulosin, 0.4 mg, Oral, Daily      Continuous Infusions:lactated ringers, 100 mL/hr, Last Rate: 100 mL/hr (05/06/23 0845)      PRN Meds:.•  acetaminophen  •  dextrose  •  dextrose  •  glucagon (human recombinant)  •  melatonin  •  ondansetron **OR** ondansetron  •  [COMPLETED] Insert Peripheral IV **AND** sodium chloride  •  sodium chloride  •  sodium chloride    Assessment & Plan   Assessment & Plan     Active Hospital Problems    Diagnosis  POA   • **Urinary tract infection, acute [N39.0]  Yes   • Chronic anticoagulation [Z79.01]  Not Applicable   • Essential hypertension [I10]  Yes   • Type 2 diabetes mellitus without complication, with long-term current use of insulin [E11.9, Z79.4]  Not Applicable   • Personal history of pulmonary embolism [Z86.711]  Yes   • Hypothyroidism (acquired) [E03.9]  Yes   • Pancytopenia (HCC) [D61.818]  Yes      Resolved Hospital Problems   No resolved problems to display.        Brief Hospital Course to date:  Chey Robertson is a 87 y.o. female with past medical history of myelodysplasia, chronic pancytopenia, essential hypertension, type 2 diabetes, history of PE (recently switched  from warfarin to Eliquis per her cardiologist because of inability to achieve therapeutic INR), GERD who presented to the hospital with generalized weakness, nausea and vomiting found to have urinary tract infection    Assessment and plan:  Acute urinary tract infection, POA  Dehydration volume depletion  Acute on chronic debility  · Recent hospitalization end of March 2023 for acute UTI with pansensitive E. Coli  · Urinalysis positive for UTI, awaiting culture  · Continue IV Rocephin  · Continue gentle IV fluids  · Continue as needed Zofran  · Might need suppressive therapy with Macrobid upon discharge given her recurrent UTI  · PT/OT     History of PE  · Was  on warfarin but unable to achieve therapeutic INR  · Recently switched to Eliquis 2.5 mg twice daily per her cardiologist  · Eliquis 2.5 mg twice daily is acceptable for A-fib with her age and body weight, but would not be adequate for PE.  We will increase the dose to 5 mg twice daily    Myelodysplastic syndrome  Pancytopenia  · Chronic pancytopenia  · Neutropenic precautions   · Follows with Dr. Lyman    Type 2 diabetes  · A1c from end of March 2023 is 6%  · This is appropriate for age  · Continue sliding scale insulin     Essential hypertension  · Continue to hold home Lasix for now given dehydration  · Continue home Lisinopril and propranolol     Hypothyroidism  · Continue home synthroid    GERD  · PPI    Expected Discharge Location and Transportation: Home  Expected Discharge   Expected discharge date/ time has not been documented.     DVT prophylaxis:  Medical DVT prophylaxis orders are present.            Is good  CODE STATUS:   Code Status and Medical Interventions:   Ordered at: 05/05/23 2031     Code Status (Patient has no pulse and is not breathing):    CPR (Attempt to Resuscitate)     Medical Interventions (Patient has pulse or is breathing):    Full Support     Copied text in this note has been reviewed and is accurate as of 05/06/23.     Selena  Rosario Pulliam MD  05/06/23

## 2023-05-06 NOTE — CASE MANAGEMENT/SOCIAL WORK
Discharge Planning Assessment  [facility]     Patient Name: Chey Robertson  MRN: [id number]  Today's Date: 5/6/2023    Admit Date: 5/5/2023    Plan: Home   Discharge Needs Assessment     Row Name 05/06/23 1442       Living Environment    People in Home alone    Current Living Arrangements home    Potentially Unsafe Housing Conditions none    Primary Care Provided by self    Provides Primary Care For no one, unable/limited ability to care for self    Family Caregiver if Needed child(michael), adult    Family Caregiver Names Albin Robertson (Son)   [phone] (Home Phone)    Quality of Family Relationships helpful;involved;supportive    Able to Return to Prior Arrangements yes       Resource/Environmental Concerns    Resource/Environmental Concerns none    Transportation Concerns none       Food Insecurity    Within the past 12 months, you worried that your food would run out before you got the money to buy more. Never true    Within the past 12 months, the food you bought just didn't last and you didn't have money to get more. Never true       Transition Planning    Patient/Family Anticipates Transition to home    Patient/Family Anticipated Services at Transition none    Transportation Anticipated family or friend will provide       Discharge Needs Assessment    Readmission Within the Last 30 Days no previous admission in last 30 days    Equipment Currently Used at Home walker, rolling;cane, straight;shower chair;grab bar    Concerns to be Addressed denies needs/concerns at this time    Anticipated Changes Related to Illness none    Equipment Needed After Discharge none    Current Discharge Risk lives alone               Discharge Plan     Row Name 05/06/23 1444       Plan    Plan Home    Patient/Family in Agreement with Plan yes    Plan Comments I spoke with the patient at the bedside and her son, Albin, on the phone. She lives in [place] in a house alone. No safety concerns in the home noted by patient or her  son. She has a PCP, insurance, and access to food and medication. She has private caregivers that assist her 4 hours a day Monday through Friday. Per Albin, she is able to care for herself at home with assistance but that ability is slowly fading away. Albin and his brother are looking into [facility], but the patient is resistant. At this time, the patient  uses a cane, walker, shower chair, and safety bars around the house. She does not drive. Albin plans for her to discharge home and he will continue working on placement at [place]. Albin also plans to transport her home at discharge. Per chart notes, the patient was last discharged with outpatient PT through [facility]. This was not known to me at the time of interview and the patient did not mention any services. CM to follow and assist as needed.    Final Discharge Disposition Code 01 - home or self-care              Continued Care and Services - Admitted Since 5/5/2023    Coordination has not been started for this encounter.          Demographic Summary    No documentation.                Functional Status     Row Name 05/06/23 1432       Functional Status    Usual Activity Tolerance moderate    Current Activity Tolerance moderate       Physical Activity    On average, how many days per week do you engage in moderate to strenuous exercise (like a brisk walk)? 0 days    On average, how many minutes do you engage in exercise at this level? 0 min    Number of minutes of exercise per week 0       Assessment of Health Literacy    How often do you have someone help you read hospital materials? Occasionally    How often do you have problems learning about your medical condition because of difficulty understanding written information? Occasionally    How often do you have a problem understanding what is told to you about your medical condition? Occasionally    How confident are you filling out medical forms by yourself? Quite a bit    Health Literacy Good        Functional Status, IADL    Medications assistive person    Meal Preparation assistive person    Housekeeping assistive person    Laundry assistive person    Shopping assistive equipment and person       Mental Status    General Appearance WDL WDL       Mental Status Summary    Recent Changes in Mental Status/Cognitive Functioning no changes       Employment/    Employment Status retired               Psychosocial    No documentation.                Abuse/Neglect    No documentation.                Legal    No documentation.                Substance Abuse    No documentation.                Patient Forms    No documentation.                   Patricia Vora RN

## 2023-05-06 NOTE — CONSULTS
"                    Clinical Nutrition     Patient Name: Chey Robertson  YOB: 1935  MRN: [id number]  Date of Encounter: 05/06/23 10:16 EDT  Admission date: 5/5/2023    Reason for Visit   MST score 2+, \"Unsure\" unintentional weight loss, Reduced oral intake    EMR Reviewed: Yes     Diet Nutrition Related History:      Pt reports eating only \"bites\" at meals since [place]. She is unsure on UBW but reports having 60# wt loss over 2 years ago. Pt declines any difficulty chewing/swallowing. NKFA. Observed breakfast tray >50% consumed. Spoke with pt's son on the phone. Son is unsure on wt's but estimates pt has had a 5# wt loss over the last week d/t intakes and n/v 1 week. Son estimates that pt has been eating <50% of usual intakes over the last year.     Anthropometrics   Height: Height: 160 cm (63\")  Admission Weight:   Flowsheet Rows    Flowsheet Row First Filed Value   Admission Height 160 cm (63\") Documented at 05/05/2023 1645   Admission Weight 54 kg (119 lb) Documented at 05/05/2023 1645        Last Filed Weight:Weight: 56 kg (123 lb 6.4 oz) (05/05/23 2151)  Weight Method: Bed scale  BMI: BMI (Calculated): 21.9  BMI classification: Normal: 18.5-24.9kg/m2   IBW: Ideal Body Weight (IBW) (kg): 52.72  EMR wts:   Weight      Weight (kg) Weight (lbs) Weight Method   7/13/2021 70.852 kg  156 lb 3.2 oz     7/20/2021 70.761 kg  156 lb     8/23/2021 68.493 kg  151 lb     10/22/2021 68.493 kg  151 lb     11/29/2021 67.132 kg  148 lb     6/14/2022 62.143 kg  137 lb     2/13/2023 61.689 kg  136 lb     3/16/2023 60.782 kg  134 lb     3/28/2023 52.617 kg  116 lb  Stated    5/5/2023 53.978 kg  119 lb  Stated     55.974 kg  123 lb 6.4 oz  Bed scale    Care Everywhere wts:130# (3/10/23), 135# (1/19/23)  Weight change:  7# wt loss per EMR (3/10/23)  % change:  5.3%   Time frame:  ~ 2 months      Current Nutrition Prescription     Diet: Cardiac Diets, Diabetic Diets; Healthy Heart (2-3 Na+); Consistent Carbohydrate; " Texture: Regular Texture (IDDSI 7); Fluid Consistency: Thin (IDDSI 0)    ONS: Will order Boost Glucose Control BID     Average Intake from Charting: No intakes recorded at this time      Intake History:     Intake Category  <=50%    >= 1 MONTH    Actions   Appropriateness for MSA:  Per review patient may qualify for malnutrition diagnosis, RDN to assess/evaluate per protocol.     Interventions:   Follow treatment progress, Care plan reviewed, Interview for preferences, Encourage intake, Supplement provided    Aminta Aviles,   Time Spent: 25 min

## 2023-05-07 LAB
ALBUMIN SERPL-MCNC: 3.1 G/DL (ref 3.5–5.2)
ALBUMIN/GLOB SERPL: 1.8 G/DL
ALP SERPL-CCNC: 54 U/L (ref 39–117)
ALT SERPL W P-5'-P-CCNC: <5 U/L (ref 1–33)
ANION GAP SERPL CALCULATED.3IONS-SCNC: 9 MMOL/L (ref 5–15)
APTT PPP: 31.8 SECONDS (ref 22–39)
AST SERPL-CCNC: 7 U/L (ref 1–32)
BACTERIA SPEC AEROBE CULT: ABNORMAL
BASOPHILS # BLD AUTO: 0 10*3/MM3 (ref 0–0.2)
BASOPHILS # BLD AUTO: 0 10*3/MM3 (ref 0–0.2)
BASOPHILS NFR BLD AUTO: 0 % (ref 0–1.5)
BASOPHILS NFR BLD AUTO: 0 % (ref 0–1.5)
BILIRUB SERPL-MCNC: 0.5 MG/DL (ref 0–1.2)
BUN SERPL-MCNC: 10 MG/DL (ref 8–23)
BUN/CREAT SERPL: 12 (ref 7–25)
CALCIUM SPEC-SCNC: 7.8 MG/DL (ref 8.6–10.5)
CHLORIDE SERPL-SCNC: 105 MMOL/L (ref 98–107)
CO2 SERPL-SCNC: 27 MMOL/L (ref 22–29)
CREAT SERPL-MCNC: 0.83 MG/DL (ref 0.57–1)
D DIMER PPP FEU-MCNC: 0.83 MCGFEU/ML (ref 0–0.86)
D-LACTATE SERPL-SCNC: 2.7 MMOL/L (ref 0.5–2)
DEPRECATED RDW RBC AUTO: 55.8 FL (ref 37–54)
DEPRECATED RDW RBC AUTO: 56.5 FL (ref 37–54)
EGFRCR SERPLBLD CKD-EPI 2021: 68.8 ML/MIN/1.73
EOSINOPHIL # BLD AUTO: 0 10*3/MM3 (ref 0–0.4)
EOSINOPHIL # BLD AUTO: 0 10*3/MM3 (ref 0–0.4)
EOSINOPHIL NFR BLD AUTO: 0 % (ref 0.3–6.2)
EOSINOPHIL NFR BLD AUTO: 0 % (ref 0.3–6.2)
ERYTHROCYTE [DISTWIDTH] IN BLOOD BY AUTOMATED COUNT: 16.9 % (ref 12.3–15.4)
ERYTHROCYTE [DISTWIDTH] IN BLOOD BY AUTOMATED COUNT: 17.2 % (ref 12.3–15.4)
FIBRINOGEN PPP-MCNC: 233 MG/DL (ref 203–470)
FSP PPP LA-ACNC: NORMAL
GLOBULIN UR ELPH-MCNC: 1.7 GM/DL
GLUCOSE BLDC GLUCOMTR-MCNC: 119 MG/DL (ref 70–130)
GLUCOSE BLDC GLUCOMTR-MCNC: 183 MG/DL (ref 70–130)
GLUCOSE BLDC GLUCOMTR-MCNC: 213 MG/DL (ref 70–130)
GLUCOSE SERPL-MCNC: 124 MG/DL (ref 65–99)
HCT VFR BLD AUTO: 22.8 % (ref 34–46.6)
HCT VFR BLD AUTO: 24.8 % (ref 34–46.6)
HCT VFR BLD AUTO: 26.6 % (ref 34–46.6)
HGB BLD-MCNC: 7.2 G/DL (ref 12–15.9)
HGB BLD-MCNC: 7.7 G/DL (ref 12–15.9)
HGB BLD-MCNC: 8.4 G/DL (ref 12–15.9)
IMM GRANULOCYTES # BLD AUTO: 0.01 10*3/MM3 (ref 0–0.05)
IMM GRANULOCYTES # BLD AUTO: 0.01 10*3/MM3 (ref 0–0.05)
IMM GRANULOCYTES NFR BLD AUTO: 1.1 % (ref 0–0.5)
IMM GRANULOCYTES NFR BLD AUTO: 1.3 % (ref 0–0.5)
INR PPP: 1.22 (ref 0.89–1.12)
IRON 24H UR-MRATE: 32 MCG/DL (ref 37–145)
IRON SATN MFR SERPL: 16 % (ref 20–50)
LYMPHOCYTES # BLD AUTO: 0.24 10*3/MM3 (ref 0.7–3.1)
LYMPHOCYTES # BLD AUTO: 0.33 10*3/MM3 (ref 0.7–3.1)
LYMPHOCYTES NFR BLD AUTO: 27 % (ref 19.6–45.3)
LYMPHOCYTES NFR BLD AUTO: 41.3 % (ref 19.6–45.3)
MAGNESIUM SERPL-MCNC: 1 MG/DL (ref 1.6–2.4)
MCH RBC QN AUTO: 28.2 PG (ref 26.6–33)
MCH RBC QN AUTO: 28.7 PG (ref 26.6–33)
MCHC RBC AUTO-ENTMCNC: 31 G/DL (ref 31.5–35.7)
MCHC RBC AUTO-ENTMCNC: 31.6 G/DL (ref 31.5–35.7)
MCV RBC AUTO: 90.8 FL (ref 79–97)
MCV RBC AUTO: 90.8 FL (ref 79–97)
MONOCYTES # BLD AUTO: 0.11 10*3/MM3 (ref 0.1–0.9)
MONOCYTES # BLD AUTO: 0.15 10*3/MM3 (ref 0.1–0.9)
MONOCYTES NFR BLD AUTO: 13.8 % (ref 5–12)
MONOCYTES NFR BLD AUTO: 16.9 % (ref 5–12)
NEUTROPHILS NFR BLD AUTO: 0.35 10*3/MM3 (ref 1.7–7)
NEUTROPHILS NFR BLD AUTO: 0.49 10*3/MM3 (ref 1.7–7)
NEUTROPHILS NFR BLD AUTO: 43.6 % (ref 42.7–76)
NEUTROPHILS NFR BLD AUTO: 55 % (ref 42.7–76)
NRBC BLD AUTO-RTO: 0 /100 WBC (ref 0–0.2)
NRBC BLD AUTO-RTO: 0 /100 WBC (ref 0–0.2)
PHOSPHATE SERPL-MCNC: 3.5 MG/DL (ref 2.5–4.5)
PLATELET # BLD AUTO: 48 10*3/MM3 (ref 140–450)
PLATELET # BLD AUTO: 48 10*3/MM3 (ref 140–450)
PMV BLD AUTO: 9.4 FL (ref 6–12)
PMV BLD AUTO: 9.4 FL (ref 6–12)
POTASSIUM SERPL-SCNC: 3.4 MMOL/L (ref 3.5–5.2)
POTASSIUM SERPL-SCNC: 3.9 MMOL/L (ref 3.5–5.2)
PROT SERPL-MCNC: 4.8 G/DL (ref 6–8.5)
PROTHROMBIN TIME: 15.5 SECONDS (ref 12.2–14.5)
RBC # BLD AUTO: 2.51 10*6/MM3 (ref 3.77–5.28)
RBC # BLD AUTO: 2.73 10*6/MM3 (ref 3.77–5.28)
SODIUM SERPL-SCNC: 141 MMOL/L (ref 136–145)
TIBC SERPL-MCNC: 201 MCG/DL (ref 298–536)
TRANSFERRIN SERPL-MCNC: 135 MG/DL (ref 200–360)
VIT B12 BLD-MCNC: 493 PG/ML (ref 211–946)
WBC NRBC COR # BLD: 0.8 10*3/MM3 (ref 3.4–10.8)
WBC NRBC COR # BLD: 0.89 10*3/MM3 (ref 3.4–10.8)

## 2023-05-07 PROCEDURE — 85384 FIBRINOGEN ACTIVITY: CPT | Performed by: INTERNAL MEDICINE

## 2023-05-07 PROCEDURE — 82948 REAGENT STRIP/BLOOD GLUCOSE: CPT

## 2023-05-07 PROCEDURE — 0 CEFEPIME PER 500 MG: Performed by: INTERNAL MEDICINE

## 2023-05-07 PROCEDURE — 83540 ASSAY OF IRON: CPT | Performed by: INTERNAL MEDICINE

## 2023-05-07 PROCEDURE — 85014 HEMATOCRIT: CPT | Performed by: INTERNAL MEDICINE

## 2023-05-07 PROCEDURE — 80053 COMPREHEN METABOLIC PANEL: CPT | Performed by: INTERNAL MEDICINE

## 2023-05-07 PROCEDURE — 25010000002 CEFEPIME PER 500 MG: Performed by: INTERNAL MEDICINE

## 2023-05-07 PROCEDURE — 99232 SBSQ HOSP IP/OBS MODERATE 35: CPT | Performed by: INTERNAL MEDICINE

## 2023-05-07 PROCEDURE — 86022 PLATELET ANTIBODIES: CPT | Performed by: INTERNAL MEDICINE

## 2023-05-07 PROCEDURE — 85018 HEMOGLOBIN: CPT | Performed by: INTERNAL MEDICINE

## 2023-05-07 PROCEDURE — 85730 THROMBOPLASTIN TIME PARTIAL: CPT | Performed by: INTERNAL MEDICINE

## 2023-05-07 PROCEDURE — 85060 BLOOD SMEAR INTERPRETATION: CPT | Performed by: INTERNAL MEDICINE

## 2023-05-07 PROCEDURE — 83605 ASSAY OF LACTIC ACID: CPT | Performed by: INTERNAL MEDICINE

## 2023-05-07 PROCEDURE — 25010000002 MAGNESIUM SULFATE 2 GM/50ML SOLUTION: Performed by: INTERNAL MEDICINE

## 2023-05-07 PROCEDURE — 82607 VITAMIN B-12: CPT | Performed by: INTERNAL MEDICINE

## 2023-05-07 PROCEDURE — 84466 ASSAY OF TRANSFERRIN: CPT | Performed by: INTERNAL MEDICINE

## 2023-05-07 PROCEDURE — 85362 FIBRIN DEGRADATION PRODUCTS: CPT | Performed by: INTERNAL MEDICINE

## 2023-05-07 PROCEDURE — 85025 COMPLETE CBC W/AUTO DIFF WBC: CPT | Performed by: INTERNAL MEDICINE

## 2023-05-07 PROCEDURE — 25010000002 FILGRASTIM PER 300 MCG: Performed by: INTERNAL MEDICINE

## 2023-05-07 PROCEDURE — 84100 ASSAY OF PHOSPHORUS: CPT | Performed by: INTERNAL MEDICINE

## 2023-05-07 PROCEDURE — 85025 COMPLETE CBC W/AUTO DIFF WBC: CPT | Performed by: PHYSICIAN ASSISTANT

## 2023-05-07 PROCEDURE — G0378 HOSPITAL OBSERVATION PER HR: HCPCS

## 2023-05-07 PROCEDURE — 85610 PROTHROMBIN TIME: CPT | Performed by: INTERNAL MEDICINE

## 2023-05-07 PROCEDURE — 25010000002 CEFTRIAXONE PER 250 MG: Performed by: INTERNAL MEDICINE

## 2023-05-07 PROCEDURE — 85379 FIBRIN DEGRADATION QUANT: CPT | Performed by: INTERNAL MEDICINE

## 2023-05-07 PROCEDURE — 83735 ASSAY OF MAGNESIUM: CPT | Performed by: INTERNAL MEDICINE

## 2023-05-07 PROCEDURE — 84132 ASSAY OF SERUM POTASSIUM: CPT | Performed by: INTERNAL MEDICINE

## 2023-05-07 PROCEDURE — 63710000001 INSULIN LISPRO (HUMAN) PER 5 UNITS: Performed by: PHYSICIAN ASSISTANT

## 2023-05-07 RX ORDER — PANTOPRAZOLE SODIUM 40 MG/10ML
40 INJECTION, POWDER, LYOPHILIZED, FOR SOLUTION INTRAVENOUS EVERY 12 HOURS SCHEDULED
Status: DISCONTINUED | OUTPATIENT
Start: 2023-05-07 | End: 2023-05-09

## 2023-05-07 RX ORDER — MAGNESIUM SULFATE HEPTAHYDRATE 40 MG/ML
2 INJECTION, SOLUTION INTRAVENOUS
Status: COMPLETED | OUTPATIENT
Start: 2023-05-07 | End: 2023-05-07

## 2023-05-07 RX ORDER — POTASSIUM CHLORIDE 750 MG/1
40 CAPSULE, EXTENDED RELEASE ORAL EVERY 4 HOURS
Status: COMPLETED | OUTPATIENT
Start: 2023-05-07 | End: 2023-05-07

## 2023-05-07 RX ORDER — MAGNESIUM SULFATE HEPTAHYDRATE 40 MG/ML
2 INJECTION, SOLUTION INTRAVENOUS
Status: DISCONTINUED | OUTPATIENT
Start: 2023-05-07 | End: 2023-05-07

## 2023-05-07 RX ADMIN — TAMSULOSIN HYDROCHLORIDE 0.4 MG: 0.4 CAPSULE ORAL at 10:34

## 2023-05-07 RX ADMIN — MAGNESIUM SULFATE HEPTAHYDRATE 2 G: 2 INJECTION, SOLUTION INTRAVENOUS at 10:32

## 2023-05-07 RX ADMIN — MAGNESIUM SULFATE HEPTAHYDRATE 2 G: 2 INJECTION, SOLUTION INTRAVENOUS at 13:49

## 2023-05-07 RX ADMIN — LEVOTHYROXINE SODIUM 100 MCG: 0.1 TABLET ORAL at 05:37

## 2023-05-07 RX ADMIN — Medication 10 ML: at 10:35

## 2023-05-07 RX ADMIN — Medication 10 ML: at 21:23

## 2023-05-07 RX ADMIN — MAGNESIUM SULFATE HEPTAHYDRATE 2 G: 2 INJECTION, SOLUTION INTRAVENOUS at 15:47

## 2023-05-07 RX ADMIN — FILGRASTIM 300 MCG: 300 INJECTION, SOLUTION INTRAVENOUS; SUBCUTANEOUS at 13:51

## 2023-05-07 RX ADMIN — INSULIN LISPRO 2 UNITS: 100 INJECTION, SOLUTION INTRAVENOUS; SUBCUTANEOUS at 17:35

## 2023-05-07 RX ADMIN — CEFTRIAXONE 1 G: 1 INJECTION, POWDER, FOR SOLUTION INTRAMUSCULAR; INTRAVENOUS at 05:36

## 2023-05-07 RX ADMIN — SODIUM CHLORIDE, POTASSIUM CHLORIDE, SODIUM LACTATE AND CALCIUM CHLORIDE 100 ML/HR: 600; 310; 30; 20 INJECTION, SOLUTION INTRAVENOUS at 04:29

## 2023-05-07 RX ADMIN — CEFEPIME 2 G: 2 INJECTION, POWDER, FOR SOLUTION INTRAVENOUS at 10:31

## 2023-05-07 RX ADMIN — POTASSIUM CHLORIDE 40 MEQ: 750 CAPSULE, EXTENDED RELEASE ORAL at 07:15

## 2023-05-07 RX ADMIN — CEFEPIME HYDROCHLORIDE 1 G: 1 INJECTION, POWDER, FOR SOLUTION INTRAMUSCULAR; INTRAVENOUS at 21:18

## 2023-05-07 RX ADMIN — INSULIN LISPRO 4 UNITS: 100 INJECTION, SOLUTION INTRAVENOUS; SUBCUTANEOUS at 12:21

## 2023-05-07 RX ADMIN — PROPRANOLOL HYDROCHLORIDE 20 MG: 20 TABLET ORAL at 21:23

## 2023-05-07 RX ADMIN — MAGNESIUM SULFATE HEPTAHYDRATE 2 G: 2 INJECTION, SOLUTION INTRAVENOUS at 07:14

## 2023-05-07 RX ADMIN — Medication 5 MG: at 21:23

## 2023-05-07 RX ADMIN — ACETAMINOPHEN 325MG 650 MG: 325 TABLET ORAL at 21:30

## 2023-05-07 RX ADMIN — PANTOPRAZOLE SODIUM 40 MG: 40 TABLET, DELAYED RELEASE ORAL at 10:34

## 2023-05-07 RX ADMIN — SODIUM CHLORIDE, POTASSIUM CHLORIDE, SODIUM LACTATE AND CALCIUM CHLORIDE 100 ML/HR: 600; 310; 30; 20 INJECTION, SOLUTION INTRAVENOUS at 16:17

## 2023-05-07 RX ADMIN — POTASSIUM CHLORIDE 40 MEQ: 750 CAPSULE, EXTENDED RELEASE ORAL at 11:21

## 2023-05-07 RX ADMIN — PROPRANOLOL HYDROCHLORIDE 20 MG: 20 TABLET ORAL at 15:46

## 2023-05-07 RX ADMIN — PANTOPRAZOLE SODIUM 40 MG: 40 INJECTION, POWDER, LYOPHILIZED, FOR SOLUTION INTRAVENOUS at 21:23

## 2023-05-07 RX ADMIN — LISINOPRIL 5 MG: 5 TABLET ORAL at 10:34

## 2023-05-07 RX ADMIN — PROPRANOLOL HYDROCHLORIDE 20 MG: 20 TABLET ORAL at 10:32

## 2023-05-07 NOTE — CONSULTS
INFECTIOUS DISEASE CONSULT/INITIAL HOSPITAL VISIT    Chey Robertson  1935  [id number]    Date of Consult: 5/7/2023    Admission Date: 5/5/2023      Requesting Provider: No ref. provider found  Evaluating Physician: Kev Araya MD    Reason for Consultation: Sepsis/absolute neutropenia/UTI/Gastroenteritis    History of present illness:    Patient is a 87 y.o. female With myelodysplasia/chronic pancytopenia, type 2 diabetes mellitus, pulmonary emboli, and UTIs who is seen today for evaluation of nausea, vomiting, and pyuria/bacteriuria in the setting of absolute neutropenia. She was admitted approximately 1 month ago with a pansensitive E. Coli presenting with nausea and vomiting.  She complains of recurrent nausea and vomiting prior to her admission on 5/5.  She did not experience any fevers.  She noticed some urinary urgency and incontinence but denies dysuria. He remained afebrile. Presentation she had a white blood cell count of 1.67 with an absolute neutrophil count of 1140. She has 0-2 white blood cells on urinalysis and grew greater than 100,000 colonies of pansensitive E. Coli UTI on urine culture.  Blood cultures have been negative.She had lactic acidosis on presentation with a lactic acid level of 4.4. She has been treated with intravenous ceftriaxone but was switched to cefepime today. Today her white blood cell count is down 2.89  with an absolute neutrophil count of 490 and a platelet count of 48,000. An abdominal/pelvic CT scan revealed stable splenomegaly with colonic diverticulosis.  Her nausea and vomiting are better.    Past Medical History:   Diagnosis Date   • Anemia    • Arthritis    • Diabetes mellitus     checks sugar only when pt wants to    • Disease of thyroid gland    • History of transfusion     with knee surgery-  no reaction recalled.    • [place] (hard of hearing)     no hearing aids   • Hypertension    • Migraine without aura and without status migrainosus, not intractable  12/30/2016   • Pulmonary emboli 2011    (after knee surgery)   • SOBOE (shortness of breath on exertion)    • Tremors of nervous system    • Vertigo    • Wears dentures     upper    • Wears glasses        Past Surgical History:   Procedure Laterality Date   • BLADDER SURGERY     • CATARACT EXTRACTION      bilat    • CHOLECYSTECTOMY     • COLONOSCOPY     • HYSTERECTOMY     • KYPHOPLASTY N/A 2/12/2021    Procedure: KYPHOPLASTY T11, T12 AND L4;  Surgeon: Matty Hearn MD;  Location: [facility]ending sale to [facility];  Service: Orthopedic Spine;  Laterality: N/A;   • OOPHORECTOMY     • TONSILLECTOMY         Family History   Problem Relation Age of Onset   • Breast cancer Sister 84   • Stroke Mother    • Heart attack Father    • Ovarian cancer Neg Hx        Social History     Socioeconomic History   • Marital status:    Tobacco Use   • Smoking status: Never   • Smokeless tobacco: Never   Vaping Use   • Vaping Use: Never used   Substance and Sexual Activity   • Alcohol use: No   • Drug use: No   • Sexual activity: Defer       Allergies   Allergen Reactions   • Aspirin Unknown - Low Severity     Mouth sores          Medication:    Current Facility-Administered Medications:   •  acetaminophen (TYLENOL) tablet 650 mg, 650 mg, Oral, Q4H PRN, Chad Martins PA-C  •  Calcium Replacement - Follow Nurse / BPA Driven Protocol, , Does not apply, PRN, Raissa Saenz, APRN  •  cefepime (MAXIPIME) 1 g/100 mL 0.9% NS IVPB (mbp), 1 g, Intravenous, Q12H, Selena Pulliam MD  •  cefepime (MAXIPIME) 2 g/100 mL 0.9% NS (mbp), 2 g, Intravenous, Once, Selena Pulliam MD  •  dextrose (D50W) (25 g/50 mL) IV injection 25 g, 25 g, Intravenous, Q15 Min PRN, Chad Martins PA-C  •  dextrose (GLUTOSE) oral gel 15 g, 15 g, Oral, Q15 Min PRN, Chad Martins PA-C  •  glucagon (GLUCAGEN) injection 1 mg, 1 mg, Intramuscular, Q15 Min PRN, Chad Martins PA-C  •  Insulin Lispro (humaLOG) injection 0-9 Units, 0-9  Units, Subcutaneous, TID AC, Chad Martins PA-C, 2 Units at 05/06/23 1706  •  lactated ringers infusion, 100 mL/hr, Intravenous, Continuous, Chad Martins PA-C, Last Rate: 100 mL/hr at 05/07/23 0429, 100 mL/hr at 05/07/23 0429  •  levothyroxine (SYNTHROID, LEVOTHROID) tablet 100 mcg, 100 mcg, Oral, Q AM, Chad Martins PA-C, 100 mcg at 05/07/23 0537  •  lisinopril (PRINIVIL,ZESTRIL) tablet 5 mg, 5 mg, Oral, Daily, Chad Martins PA-C, 5 mg at 05/06/23 0841  •  Magnesium Standard Dose Replacement - Follow Nurse / BPA Driven Protocol, , Does not apply, PRN, Raissa Saenz, APRN  •  [COMPLETED] magnesium sulfate 2g/50 mL (PREMIX) infusion, 2 g, Intravenous, Q30 Min, 2 g at 05/07/23 0714 **FOLLOWED BY** magnesium sulfate 2g/50 mL (PREMIX) infusion, 2 g, Intravenous, Q2H, Selena Pulliam MD  •  melatonin tablet 5 mg, 5 mg, Oral, Nightly PRN, Chad Martins PA-C, 5 mg at 05/06/23 1957  •  ondansetron (ZOFRAN) tablet 4 mg, 4 mg, Oral, Q6H PRN **OR** ondansetron (ZOFRAN) injection 4 mg, 4 mg, Intravenous, Q6H PRN, Chad Martins PA-C  •  pantoprazole (PROTONIX) EC tablet 40 mg, 40 mg, Oral, BID AC, Maksim Freeman MD, 40 mg at 05/06/23 1706  •  Phosphorus Replacement - Follow Nurse / BPA Driven Protocol, , Does not apply, PRN, Raissa Saenz, APRN  •  potassium chloride (MICRO-K) CR capsule 40 mEq, 40 mEq, Oral, Q4H, Selena Pulliam MD, 40 mEq at 05/07/23 0715  •  Potassium Replacement - Follow Nurse / BPA Driven Protocol, , Does not apply, PRN, Raissa Saenz, APRN  •  propranolol (INDERAL) tablet 20 mg, 20 mg, Oral, TID, Chad Martins PA-C, 20 mg at 05/06/23 2118  •  [COMPLETED] Insert Peripheral IV, , , Once **AND** sodium chloride 0.9 % flush 10 mL, 10 mL, Intravenous, PRN, Iva Pate MD  •  sodium chloride 0.9 % flush 10 mL, 10 mL, Intravenous, Q12H, Chad Martins PA-C, 10 mL at 05/06/23 1959  •  sodium  chloride 0.9 % flush 10 mL, 10 mL, Intravenous, PRN, Chad Martins PA-C  •  sodium chloride 0.9 % infusion 40 mL, 40 mL, Intravenous, PRN, Chad Martins PA-C  •  tamsulosin (FLOMAX) 24 hr capsule 0.4 mg, 0.4 mg, Oral, Daily, Chad Martins PA-C, 0.4 mg at 05/06/23 0841    Antibiotics:  Anti-Infectives (From admission, onward)    Ordered     Dose/Rate Route Frequency Start Stop    05/07/23 0807  cefepime (MAXIPIME) 1 g/100 mL 0.9% NS IVPB (mbp)        Ordering Provider: Selena Pulliam MD    1 g  over 4 Hours Intravenous Every 12 Hours 05/07/23 2100 05/14/23 2059 05/07/23 0807  cefepime (MAXIPIME) 2 g/100 mL 0.9% NS (mbp)        Ordering Provider: Selena Pulliam MD    2 g  200 mL/hr over 30 Minutes Intravenous Once 05/07/23 0900      05/05/23 1840  piperacillin-tazobactam (ZOSYN) 3.375 g in iso-osmotic dextrose 50 ml (premix)        Ordering Provider: Iva Pate MD    3.375 g  over 30 Minutes Intravenous Once 05/05/23 1842 05/05/23 2037 05/05/23 1840  vancomycin (VANCOCIN) 1000 mg/200 mL dextrose 5% IVPB        Ordering Provider: Iva Pate MD    20 mg/kg × 54 kg Intravenous Once 05/05/23 1842 05/05/23 2109            Review of Systems:  Constitutional-- No Fever, chills or sweats.   HEENT-- No new vision, hearing or throat complaints.  No epistaxis or oral sores.  Denies odynophagia or dysphagia. No headache, photophobia or neck stiffness.  CV-- No chest pain, palpitation   Resp-- No SOB/cough/Hemoptysis  GI- Nausea and vomiting without diarrhea prior to admission    -- Urinary incontinence and urgency.  Heme- Myelodysplastic syndrome with pancytopenia. History of pulmonary emboli.  MS-- no swelling or pain in the bones or joints of arms/legs.   Neuro-- No acute focal weakness or numbness in the arms or legs.    Skin--No rashes or lesions  Endocrine:  history of type 2 diabetes mellitus      Physical Exam:   Vital Signs  Temp (24hrs),  Av °F (36.7 °C), Min:97.5 °F (36.4 °C), Max:98.5 °F (36.9 °C)    Temp  Min: 97.5 °F (36.4 °C)  Max: 98.5 °F (36.9 °C)  BP  Min: 126/59  Max: 144/60  Pulse  Min: 57  Max: 64  Resp  Min: 16  Max: 16  SpO2  Min: 95 %  Max: 98 %    GENERAL: Alert and in no acute stress  HEENT: Normocephalic, atraumatic.  PERRL. EOMI. No conjunctival injection. No icterus. Oropharynx clear without evidence of thrush or exudate.   NECK: Supple .  LYMPH: No cervical, axillary or inguinal lymphadenopathy.  HEART: RRR; No murmur, rubs, gallops.   LUNGS: Clear to auscultation bilaterally without wheezing, rales, rhonchi. Normal respiratory effort. Nonlabored. No dullness.  ABDOMEN: Soft, nontender, nondistended. No rebound or guarding. NO mass or HSM.  EXT:  No cyanosis, clubbing or edema. .  :  Without Hurd catheter.  MSK: No joint effusions or erythema  SKIN: Warm and dry without cutaneous eruptions on Inspection/palpation.    NEURO: Oriented to PPT.  Motor 5/5 strength  PSYCHIATRIC: Normal insight and judgment. Cooperative with PE    Laboratory Data    Results from last 7 days   Lab Units 23  0453 23  0840 23  1705   WBC 10*3/mm3 0.80* 1.01* 1.67*   HEMOGLOBIN g/dL 7.2* 8.3* 10.3*   HEMATOCRIT % 22.8* 26.3* 33.3*   PLATELETS 10*3/mm3 48* 57* 72*     Results from last 7 days   Lab Units 23  0453   SODIUM mmol/L 141   POTASSIUM mmol/L 3.4*   CHLORIDE mmol/L 105   CO2 mmol/L 27.0   BUN mg/dL 10   CREATININE mg/dL 0.83   GLUCOSE mg/dL 124*   CALCIUM mg/dL 7.8*     Results from last 7 days   Lab Units 23  0453   ALK PHOS U/L 54   BILIRUBIN mg/dL 0.5   ALT (SGPT) U/L <5   AST (SGOT) U/L 7             Results from last 7 days   Lab Units 23  1520   LACTATE mmol/L 2.6*             Estimated Creatinine Clearance: 43 mL/min (by C-G formula based on SCr of 0.83 mg/dL).      Microbiology:  Blood Culture   Date Value Ref Range Status   2023 No growth at 24 hours  Preliminary     No results found for:  BCIDPCR, CXREFLEX, CSFCX, CULTURETIS  No results found for: CULTURES, HSVCX, URCX  No results found for: EYECULTURE, GCCX, HSVCULTURE, LABHSV  No results found for: LEGIONELLA, MRSACX, MUMPSCX, MYCOPLASCX  No results found for: NOCARDIACX, STOOLCX  Urine Culture   Date Value Ref Range Status   05/05/2023 >100,000 CFU/mL Escherichia coli (A)  Final     No results found for: VIRALCULTU, WOUNDCX        Radiology:  Imaging Results (Last 72 Hours)     Procedure Component Value Units Date/Time    CT Abdomen Pelvis Without Contrast [[id number]] Collected: 05/05/23 1924     Updated: 05/05/23 1934    Narrative:      CT ABDOMEN PELVIS WO CONTRAST    Date of Exam: 5/5/2023 7:08 PM EDT    Indication: nausea/vomiting.    Comparison: 3/28/2023    Technique: Axial CT images were obtained of the abdomen and pelvis without the administration of contrast. Reconstructed coronal and sagittal images were also obtained. Automated exposure control and iterative construction methods were used.      Findings:    Lower Chest: Scarring in the lung bases    Organs: Prominent splenomegaly measuring 17 cm in length, similar to prior. Scarring of the upper and lower poles of the spleen. Inferomedial displacement of the left kidney by the enlarged spleen. Kidneys, adrenal glands, liver have an unremarkable   noncontrast appearance. Pancreas is atrophic. Gallbladder is surgically absent    Gastrointestinal: No acute abnormality. No intestinal distention or evident wall thickening. Colonic diverticula with no associated inflammation.    Pelvis: No abnormal fluid collection. Uterus surgically absent. Bubble of gas in the urinary bladder with no bladder wall thickening, likely related to catheterization    Peritoneum/Retroperitoneum: No ascites or pneumoperitoneum. Normal caliber aorta    Bones/Soft Tissues: No acute bony abnormality. Stable T11, T12, and L4 compression fractures status post vertebroplasty      Impression:        1. No acute process  demonstrated  2. Stable splenomegaly  3. Colonic diverticulosis              Electronically Signed: Jorje Staples    2023 7:31 PM EDT    Workstation ID: [id number]    XR Chest 1 View [[id number]] Collected: 23     Updated: 23    Narrative:      XR CHEST 1 VW    Date of Exam: 2023 6:16 PM EDT    Indication: nausea/vomiting    Comparison: Chest x-ray 2021    Findings:  Mildly elevated right diaphragm. Ectatic thoracic aorta. Scarring right lower lobe. Left lung is clear. No pneumothorax or pleural effusion. No focal pulmonary parenchymal opacity. Osteopenia.      Impression:      Impression:  No acute cardiopulmonary abnormality.      Electronically Signed: Cristela Mckeon    2023 6:58 PM EDT    Workstation ID: [id number]            Impression:   1.  Nausea/vomiting-consistent with gastroenteritis, now improved  2.  E. Coli bacteriuria-without pyuria but she does have absolute neutropenia.  This is difficult to assess since she has urinary incontinence/frequency without dysuria.  We have treated her bacteriuria aggressively with intravenous ceftriaxone and now cefepime.  3.  Myelodysplasia-with absolute neutropenia.  This places her at increased risk for recurrent infections  4.  Thrombocytopenia-this places her at increased risk for bleeding  5.  History of pulmonary embolus    PLAN/RECOMMENDATIONS:   Thank you for asking us to see Chey Robertson, I recommend the followin.  Blood cultures-pending  2.  Continue intravenous cefepime for the time being  3.  Status post Neupogen ×1    I discussed her complex situation with Dr. Pulliam today.       Kev Araya MD  2023  08:15 EDT

## 2023-05-07 NOTE — PROGRESS NOTES
[facility] Medicine Services  PROGRESS NOTE    Patient Name: Chey Robertson  : 1935  MRN: [id number]    Date of Admission: 2023  Primary Care Physician: Corby Marie MD    Subjective   Subjective     CC:  F/U for UTI    HPI:   I have seen and evaluated the patient this morning.  She still complaining of weakness but able to take few steps to the bathroom with the help of her nurse.  Denied any cough.  Denied chest pain.  No fever spike over the last 24 hours.  Hemodynamics has been stable.  Spoke to her daughter-in-law at bedside and explained to her the current medical condition in details    ROS:  General : no fevers, chills  CVS: No chest pain, palpitations  Respiratory: No cough, dyspnea  GI: No N/V/D, abd pain  10 point review of systems is negative except for what is mentioned in HPI    Objective   Objective     Vital Signs:   Temp:  [97.5 °F (36.4 °C)-98 °F (36.7 °C)] 97.8 °F (36.6 °C)  Heart Rate:  [59-64] 59  Resp:  [16] 16  BP: (126-137)/(52-75) 136/52     Physical Exam:  General: Chronically ill looking, not in distress, conversant and cooperative  Head: Atraumatic and normocephalic  Eyes: No Icterus. No pallor  Ears:  Ears appear intact with no abnormalities noted  Throat: No oral lesions, no thrush  Neck: Supple, trachea midline  Lungs: Clear to auscultation bilaterally, equal air entry, no wheezing or crackles  Heart:  Normal S1 and S2, no murmur, no gallop, No JVD, no lower extremity swelling  Abdomen:  Soft, no tenderness, no organomegaly, normal bowel sounds, no organomegaly  Extremities: pulses equal bilaterally  Skin: No bleeding, bruising or rash, normal skin turgor and elasticity  Neurologic: Cranial nerves appear intact with no evidence of facial asymmetry, normal motor and sensory functions in all 4 extremities  Psych: Alert and oriented x 3, normal mood    Results Reviewed:  LAB RESULTS:      Lab 23  0453 23  1520 23  0840  05/06/23  0141 05/05/23  2231 05/05/23  1942 05/05/23  1705   WBC 0.80*  --  1.01*  --   --   --  1.67*   HEMOGLOBIN 7.2*  --  8.3*  --   --   --  10.3*   HEMATOCRIT 22.8*  --  26.3*  --   --   --  33.3*   PLATELETS 48*  --  57*  --   --   --  72*   NEUTROS ABS 0.35*  --  0.43*  --   --   --  1.14*   IMMATURE GRANS (ABS) 0.01  --  0.01  --   --   --  0.01   LYMPHS ABS 0.33*  --  0.40*  --   --   --  0.29*   MONOS ABS 0.11  --  0.17  --   --   --  0.23   EOS ABS 0.00  --  0.00  --   --   --  0.00   MCV 90.8  --  91.0  --   --   --  90.7   LACTATE  --  2.6* 2.6* 3.4* 3.6* 4.2* 4.4*   PROTIME  --   --   --   --   --   --  15.6*         Lab 05/07/23  0453 05/06/23  0840 05/05/23  1705   SODIUM 141 140 141   POTASSIUM 3.4* 3.2* 3.6   CHLORIDE 105 102 98   CO2 27.0 25.0 26.0   ANION GAP 9.0 13.0 17.0*   BUN 10 13 15   CREATININE 0.83 0.67 0.75   EGFR 68.8 85.2 77.6   GLUCOSE 124* 122* 194*   CALCIUM 7.8* 8.0* 9.1   MAGNESIUM 1.0*  --   --    PHOSPHORUS 3.5  --   --          Lab 05/07/23  0453 05/05/23  1705   TOTAL PROTEIN 4.8* 7.3   ALBUMIN 3.1* 4.5   GLOBULIN 1.7 2.8   ALT (SGPT) <5 8   AST (SGOT) 7 16   BILIRUBIN 0.5 0.8   ALK PHOS 54 75   LIPASE  --  42         Lab 05/05/23  1857 05/05/23  1705   HSTROP T 12* 15*   PROTIME  --  15.6*   INR  --  1.23*             Lab 05/05/23  2021 05/05/23  1709   ABO TYPING O O   RH TYPING Negative Negative   ANTIBODY SCREEN  --  Positive         Brief Urine Lab Results  (Last result in the past 365 days)      Color   Clarity   Blood   Leuk Est   Nitrite   Protein   CREAT   Urine HCG        05/05/23 1752 Yellow   Clear   Negative   Negative   Positive   Negative                 Microbiology Results Abnormal     Procedure Component Value - Date/Time    Blood Culture - Blood, Wrist, Right [[id number]]  (Normal) Collected: 05/05/23 1855    Lab Status: Preliminary result Specimen: Blood from Wrist, Right Updated: 05/06/23 2016     Blood Culture No growth at 24 hours    Blood Culture -  Blood, Arm, Right [[id number]]  (Normal) Collected: 05/05/23 1850    Lab Status: Preliminary result Specimen: Blood from Arm, Right Updated: 05/06/23 2016     Blood Culture No growth at 24 hours    COVID PRE-OP / PRE-PROCEDURE SCREENING ORDER (NO ISOLATION) - Swab, Nasopharynx [[id number]]  (Normal) Collected: 05/05/23 1704    Lab Status: Final result Specimen: Swab from Nasopharynx Updated: 05/05/23 1747    Narrative:      The following orders were created for panel order COVID PRE-OP / PRE-PROCEDURE SCREENING ORDER (NO ISOLATION) - Swab, Nasopharynx.  Procedure                               Abnormality         Status                     ---------                               -----------         ------                     COVID-19 and FLU A/B PCR...[[id number]]  Normal              Final result                 Please view results for these tests on the individual orders.    COVID-19 and FLU A/B PCR - Swab, Nasopharynx [[id number]]  (Normal) Collected: 05/05/23 1704    Lab Status: Final result Specimen: Swab from Nasopharynx Updated: 05/05/23 1747     COVID19 Not Detected     Influenza A PCR Not Detected     Influenza B PCR Not Detected    Narrative:      Fact sheet for providers: https://www.fda.gov/media/300976/download    Fact sheet for patients: https://www.fda.gov/media/592571/download    Test performed by PCR.          CT Abdomen Pelvis Without Contrast    Result Date: 5/5/2023  CT ABDOMEN PELVIS WO CONTRAST Date of Exam: 5/5/2023 7:08 PM EDT Indication: nausea/vomiting. Comparison: 3/28/2023 Technique: Axial CT images were obtained of the abdomen and pelvis without the administration of contrast. Reconstructed coronal and sagittal images were also obtained. Automated exposure control and iterative construction methods were used. Findings: Lower Chest: Scarring in the lung bases Organs: Prominent splenomegaly measuring 17 cm in length, similar to prior. Scarring of the upper and lower poles of the spleen.  Inferomedial displacement of the left kidney by the enlarged spleen. Kidneys, adrenal glands, liver have an unremarkable noncontrast appearance. Pancreas is atrophic. Gallbladder is surgically absent Gastrointestinal: No acute abnormality. No intestinal distention or evident wall thickening. Colonic diverticula with no associated inflammation. Pelvis: No abnormal fluid collection. Uterus surgically absent. Bubble of gas in the urinary bladder with no bladder wall thickening, likely related to catheterization Peritoneum/Retroperitoneum: No ascites or pneumoperitoneum. Normal caliber aorta Bones/Soft Tissues: No acute bony abnormality. Stable T11, T12, and L4 compression fractures status post vertebroplasty     Impression: 1. No acute process demonstrated 2. Stable splenomegaly 3. Colonic diverticulosis Electronically Signed: Jorje Staples  5/5/2023 7:31 PM EDT  Workstation ID: [id number]    XR Chest 1 View    Result Date: 5/5/2023  XR CHEST 1 VW Date of Exam: 5/5/2023 6:16 PM EDT Indication: nausea/vomiting Comparison: Chest x-ray 2/23/2021 Findings: Mildly elevated right diaphragm. Ectatic thoracic aorta. Scarring right lower lobe. Left lung is clear. No pneumothorax or pleural effusion. No focal pulmonary parenchymal opacity. Osteopenia.     Impression: Impression: No acute cardiopulmonary abnormality. Electronically Signed: Cristela Mckeon  5/5/2023 6:58 PM EDT  Workstation ID: [id number]          Current medications:  Scheduled Meds:apixaban, 5 mg, Oral, Q12H  cefTRIAXone, 1 g, Intravenous, Q24H  insulin lispro, 0-9 Units, Subcutaneous, TID AC  levothyroxine, 100 mcg, Oral, Q AM  lisinopril, 5 mg, Oral, Daily  magnesium sulfate, 2 g, Intravenous, Q2H  pantoprazole, 40 mg, Oral, BID AC  potassium chloride, 40 mEq, Oral, Q4H  propranolol, 20 mg, Oral, TID  sodium chloride, 10 mL, Intravenous, Q12H  tamsulosin, 0.4 mg, Oral, Daily      Continuous Infusions:lactated ringers, 100 mL/hr, Last Rate: 100 mL/hr (05/07/23  0429)      PRN Meds:.•  acetaminophen  •  Calcium Replacement - Follow Nurse / BPA Driven Protocol  •  dextrose  •  dextrose  •  glucagon (human recombinant)  •  Magnesium Standard Dose Replacement - Follow Nurse / BPA Driven Protocol  •  melatonin  •  ondansetron **OR** ondansetron  •  Phosphorus Replacement - Follow Nurse / BPA Driven Protocol  •  Potassium Replacement - Follow Nurse / BPA Driven Protocol  •  [COMPLETED] Insert Peripheral IV **AND** sodium chloride  •  sodium chloride  •  sodium chloride    Assessment & Plan   Assessment & Plan     Active Hospital Problems    Diagnosis  POA   • **Urinary tract infection, acute [N39.0]  Yes   • Chronic anticoagulation [Z79.01]  Not Applicable   • Essential hypertension [I10]  Yes   • Type 2 diabetes mellitus without complication, with long-term current use of insulin [E11.9, Z79.4]  Not Applicable   • Personal history of pulmonary embolism [Z86.711]  Yes   • Hypothyroidism (acquired) [E03.9]  Yes   • Pancytopenia (HCC) [D61.818]  Yes      Resolved Hospital Problems   No resolved problems to display.        Brief Hospital Course to date:  Chey Robertson is a 87 y.o. female with past medical history of myelodysplasia, chronic pancytopenia, essential hypertension, type 2 diabetes, history of PE (recently switched from warfarin to Eliquis per her cardiologist because of inability to achieve therapeutic INR), GERD who presented to the hospital with generalized weakness, nausea and vomiting found to have urinary tract infection    Assessment and plan:  Acute urinary tract infection, POA  Dehydration volume depletion  Acute on chronic debility  · Recent hospitalization end of March 2023 for acute UTI with pansensitive E. Coli  · Urinalysis positive for UTI, urine culture growing pansensitive E. coli  · Started on IV Rocephin on admission.  Given her persistent and worsening neutropenia with ANC of 350 today, will implement neutropenic precautions and switch antibiotic  therapy to IV cefepime  · Consult ID  · Continue as needed Zofran  · Might need suppressive therapy with Macrobid upon discharge given her recurrent UTI  · PT/OT      Myelodysplastic syndrome  Worsening pancytopenia, likely secondary to sepsis  Worsening anemia, likely anemia of chronic disease  · Chronic pancytopenia  · Hemoglobin is trending down as well as platelet count.  No obvious source of bleeding to explain her downtrending hemoglobin down to 7.2 this morning  · DIC panel checked and negative but fibrinogen is borderline low  · Neutropenia is getting worse with ANC of 350.  Currently on neutropenic precautions, will give 1 dose of Neupogen 300 efe today  · Given her worsening anemia, will hold Eliquis for now and continue to monitor H&H closely with low threshold to transfuse blood to H&H less than 7 and 21  · Eliquis was being administered to prevent PE.  Okay to hold for few days.  Really not candidate for heparin or Lovenox because of her thrombocytopenia  · Start IV Protonix  · Follows with Dr. Lyman as outpatient.  Consider consulting him in the a.m.    History of PE  · Remote history of PE  · Was  on warfarin but unable to achieve therapeutic INR  · Recently switched to Eliquis 2.5 mg twice daily per her cardiologist  · Eliquis 2.5 mg twice daily is acceptable for A-fib with her age and body weight, but would not be adequate for PE.  Will need to be on Eliquis 5 mg twice daily instead    Type 2 diabetes  · A1c from end of March 2023 is 6%  · This is appropriate for age  · Continue sliding scale insulin     Essential hypertension  · Continue to hold home Lasix for now given dehydration  · Continue home Lisinopril and propranolol     Hypothyroidism  · Continue home synthroid    GERD  · PPI    Expected Discharge Location and Transportation: Home  Expected Discharge   Expected Discharge Date: 5/8/2023; Expected Discharge Time:      DVT prophylaxis:  Medical DVT prophylaxis orders are present.             CODE STATUS:   Code Status and Medical Interventions:   Ordered at: 05/05/23 2031     Code Status (Patient has no pulse and is not breathing):    CPR (Attempt to Resuscitate)     Medical Interventions (Patient has pulse or is breathing):    Full Support     Copied text in this note has been reviewed and is accurate as of 05/07/23.     Selena Pulliam MD  05/07/23

## 2023-05-08 LAB
ALBUMIN SERPL-MCNC: 3.2 G/DL (ref 3.5–5.2)
ALBUMIN/GLOB SERPL: 1.9 G/DL
ALP SERPL-CCNC: 62 U/L (ref 39–117)
ALT SERPL W P-5'-P-CCNC: 5 U/L (ref 1–33)
ANION GAP SERPL CALCULATED.3IONS-SCNC: 10 MMOL/L (ref 5–15)
AST SERPL-CCNC: 10 U/L (ref 1–32)
B PARAPERT DNA SPEC QL NAA+PROBE: NOT DETECTED
B PERT DNA SPEC QL NAA+PROBE: NOT DETECTED
BASOPHILS # BLD AUTO: 0 10*3/MM3 (ref 0–0.2)
BASOPHILS NFR BLD AUTO: 0 % (ref 0–1.5)
BILIRUB SERPL-MCNC: 0.8 MG/DL (ref 0–1.2)
BUN SERPL-MCNC: 8 MG/DL (ref 8–23)
BUN/CREAT SERPL: 10.3 (ref 7–25)
C PNEUM DNA NPH QL NAA+NON-PROBE: NOT DETECTED
CALCIUM SPEC-SCNC: 8.1 MG/DL (ref 8.6–10.5)
CHLORIDE SERPL-SCNC: 106 MMOL/L (ref 98–107)
CO2 SERPL-SCNC: 24 MMOL/L (ref 22–29)
CREAT SERPL-MCNC: 0.78 MG/DL (ref 0.57–1)
CYTOLOGIST CVX/VAG CYTO: NORMAL
DEPRECATED RDW RBC AUTO: 56.2 FL (ref 37–54)
EGFRCR SERPLBLD CKD-EPI 2021: 74.1 ML/MIN/1.73
EOSINOPHIL # BLD AUTO: 0 10*3/MM3 (ref 0–0.4)
EOSINOPHIL NFR BLD AUTO: 0 % (ref 0.3–6.2)
ERYTHROCYTE [DISTWIDTH] IN BLOOD BY AUTOMATED COUNT: 17.2 % (ref 12.3–15.4)
FLUAV SUBTYP SPEC NAA+PROBE: NOT DETECTED
FLUBV RNA ISLT QL NAA+PROBE: NOT DETECTED
GLOBULIN UR ELPH-MCNC: 1.7 GM/DL
GLUCOSE BLDC GLUCOMTR-MCNC: 135 MG/DL (ref 70–130)
GLUCOSE BLDC GLUCOMTR-MCNC: 175 MG/DL (ref 70–130)
GLUCOSE BLDC GLUCOMTR-MCNC: 224 MG/DL (ref 70–130)
GLUCOSE SERPL-MCNC: 126 MG/DL (ref 65–99)
HADV DNA SPEC NAA+PROBE: NOT DETECTED
HCOV 229E RNA SPEC QL NAA+PROBE: NOT DETECTED
HCOV HKU1 RNA SPEC QL NAA+PROBE: NOT DETECTED
HCOV NL63 RNA SPEC QL NAA+PROBE: NOT DETECTED
HCOV OC43 RNA SPEC QL NAA+PROBE: NOT DETECTED
HCT VFR BLD AUTO: 24.2 % (ref 34–46.6)
HCT VFR BLD AUTO: 24.9 % (ref 34–46.6)
HGB BLD-MCNC: 7.6 G/DL (ref 12–15.9)
HGB BLD-MCNC: 7.9 G/DL (ref 12–15.9)
HMPV RNA NPH QL NAA+NON-PROBE: NOT DETECTED
HPIV1 RNA ISLT QL NAA+PROBE: NOT DETECTED
HPIV2 RNA SPEC QL NAA+PROBE: NOT DETECTED
HPIV3 RNA NPH QL NAA+PROBE: NOT DETECTED
HPIV4 P GENE NPH QL NAA+PROBE: NOT DETECTED
IMM GRANULOCYTES # BLD AUTO: 0.14 10*3/MM3 (ref 0–0.05)
IMM GRANULOCYTES NFR BLD AUTO: 3.3 % (ref 0–0.5)
LYMPHOCYTES # BLD AUTO: 0.32 10*3/MM3 (ref 0.7–3.1)
LYMPHOCYTES NFR BLD AUTO: 7.7 % (ref 19.6–45.3)
M PNEUMO IGG SER IA-ACNC: NOT DETECTED
MAGNESIUM SERPL-MCNC: 2.1 MG/DL (ref 1.6–2.4)
MCH RBC QN AUTO: 28.6 PG (ref 26.6–33)
MCHC RBC AUTO-ENTMCNC: 31.7 G/DL (ref 31.5–35.7)
MCV RBC AUTO: 90.2 FL (ref 79–97)
MONOCYTES # BLD AUTO: 0.26 10*3/MM3 (ref 0.1–0.9)
MONOCYTES NFR BLD AUTO: 6.2 % (ref 5–12)
NEUTROPHILS NFR BLD AUTO: 3.46 10*3/MM3 (ref 1.7–7)
NEUTROPHILS NFR BLD AUTO: 82.8 % (ref 42.7–76)
NRBC BLD AUTO-RTO: 0 /100 WBC (ref 0–0.2)
PATH INTERP BLD-IMP: NORMAL
PLATELET # BLD AUTO: 40 10*3/MM3 (ref 140–450)
PMV BLD AUTO: 9.7 FL (ref 6–12)
POTASSIUM SERPL-SCNC: 4.3 MMOL/L (ref 3.5–5.2)
PROT SERPL-MCNC: 4.9 G/DL (ref 6–8.5)
QT INTERVAL: 498 MS
QTC INTERVAL: 476 MS
RBC # BLD AUTO: 2.76 10*6/MM3 (ref 3.77–5.28)
RHINOVIRUS RNA SPEC NAA+PROBE: NOT DETECTED
RSV RNA NPH QL NAA+NON-PROBE: NOT DETECTED
SARS-COV-2 RNA NPH QL NAA+NON-PROBE: DETECTED
SODIUM SERPL-SCNC: 140 MMOL/L (ref 136–145)
WBC NRBC COR # BLD: 4.18 10*3/MM3 (ref 3.4–10.8)

## 2023-05-08 PROCEDURE — XW033E5 INTRODUCTION OF REMDESIVIR ANTI-INFECTIVE INTO PERIPHERAL VEIN, PERCUTANEOUS APPROACH, NEW TECHNOLOGY GROUP 5: ICD-10-PCS | Performed by: INTERNAL MEDICINE

## 2023-05-08 PROCEDURE — 99222 1ST HOSP IP/OBS MODERATE 55: CPT | Performed by: INTERNAL MEDICINE

## 2023-05-08 PROCEDURE — 82948 REAGENT STRIP/BLOOD GLUCOSE: CPT

## 2023-05-08 PROCEDURE — 85014 HEMATOCRIT: CPT | Performed by: INTERNAL MEDICINE

## 2023-05-08 PROCEDURE — 83735 ASSAY OF MAGNESIUM: CPT | Performed by: INTERNAL MEDICINE

## 2023-05-08 PROCEDURE — 97165 OT EVAL LOW COMPLEX 30 MIN: CPT

## 2023-05-08 PROCEDURE — 97162 PT EVAL MOD COMPLEX 30 MIN: CPT

## 2023-05-08 PROCEDURE — 25010000002 CEFEPIME PER 500 MG: Performed by: INTERNAL MEDICINE

## 2023-05-08 PROCEDURE — 0202U NFCT DS 22 TRGT SARS-COV-2: CPT | Performed by: INTERNAL MEDICINE

## 2023-05-08 PROCEDURE — 99233 SBSQ HOSP IP/OBS HIGH 50: CPT | Performed by: INTERNAL MEDICINE

## 2023-05-08 PROCEDURE — 94799 UNLISTED PULMONARY SVC/PX: CPT

## 2023-05-08 PROCEDURE — 85025 COMPLETE CBC W/AUTO DIFF WBC: CPT | Performed by: PHYSICIAN ASSISTANT

## 2023-05-08 PROCEDURE — 80053 COMPREHEN METABOLIC PANEL: CPT | Performed by: INTERNAL MEDICINE

## 2023-05-08 PROCEDURE — 63710000001 INSULIN LISPRO (HUMAN) PER 5 UNITS: Performed by: PHYSICIAN ASSISTANT

## 2023-05-08 PROCEDURE — 25010000002 REMDESIVIR 100 MG/20ML SOLUTION 1 EACH VIAL

## 2023-05-08 PROCEDURE — 85018 HEMOGLOBIN: CPT | Performed by: INTERNAL MEDICINE

## 2023-05-08 PROCEDURE — 63710000001 DEXAMETHASONE PER 0.25 MG: Performed by: INTERNAL MEDICINE

## 2023-05-08 RX ORDER — L.ACID,PARA/B.BIFIDUM/S.THERM 8B CELL
1 CAPSULE ORAL DAILY
Status: DISCONTINUED | OUTPATIENT
Start: 2023-05-08 | End: 2023-05-24 | Stop reason: HOSPADM

## 2023-05-08 RX ORDER — HYDROXYZINE HYDROCHLORIDE 25 MG/1
25 TABLET, FILM COATED ORAL 3 TIMES DAILY PRN
Status: DISCONTINUED | OUTPATIENT
Start: 2023-05-08 | End: 2023-05-24 | Stop reason: HOSPADM

## 2023-05-08 RX ORDER — BENZONATATE 100 MG/1
200 CAPSULE ORAL 3 TIMES DAILY PRN
Status: DISCONTINUED | OUTPATIENT
Start: 2023-05-08 | End: 2023-05-24 | Stop reason: HOSPADM

## 2023-05-08 RX ORDER — GUAIFENESIN 200 MG/10ML
200 LIQUID ORAL EVERY 4 HOURS PRN
Status: DISCONTINUED | OUTPATIENT
Start: 2023-05-08 | End: 2023-05-24 | Stop reason: HOSPADM

## 2023-05-08 RX ORDER — EMPAGLIFLOZIN 10 MG/1
TABLET, FILM COATED ORAL
Qty: 30 TABLET | OUTPATIENT
Start: 2023-05-08

## 2023-05-08 RX ADMIN — ACETAMINOPHEN 325MG 650 MG: 325 TABLET ORAL at 12:05

## 2023-05-08 RX ADMIN — GUAIFENESIN 200 MG: 200 SOLUTION ORAL at 21:01

## 2023-05-08 RX ADMIN — PANTOPRAZOLE SODIUM 40 MG: 40 INJECTION, POWDER, LYOPHILIZED, FOR SOLUTION INTRAVENOUS at 08:01

## 2023-05-08 RX ADMIN — BENZONATATE 200 MG: 100 CAPSULE ORAL at 21:01

## 2023-05-08 RX ADMIN — SODIUM CHLORIDE, POTASSIUM CHLORIDE, SODIUM LACTATE AND CALCIUM CHLORIDE 100 ML/HR: 600; 310; 30; 20 INJECTION, SOLUTION INTRAVENOUS at 00:46

## 2023-05-08 RX ADMIN — Medication 10 ML: at 21:01

## 2023-05-08 RX ADMIN — GUAIFENESIN 200 MG: 200 SOLUTION ORAL at 13:50

## 2023-05-08 RX ADMIN — INSULIN LISPRO 2 UNITS: 100 INJECTION, SOLUTION INTRAVENOUS; SUBCUTANEOUS at 12:05

## 2023-05-08 RX ADMIN — Medication 1 CAPSULE: at 10:45

## 2023-05-08 RX ADMIN — TAMSULOSIN HYDROCHLORIDE 0.4 MG: 0.4 CAPSULE ORAL at 08:03

## 2023-05-08 RX ADMIN — Medication 5 MG: at 21:01

## 2023-05-08 RX ADMIN — PROPRANOLOL HYDROCHLORIDE 20 MG: 20 TABLET ORAL at 21:01

## 2023-05-08 RX ADMIN — DEXAMETHASONE 6 MG: 4 TABLET ORAL at 17:04

## 2023-05-08 RX ADMIN — PROPRANOLOL HYDROCHLORIDE 20 MG: 20 TABLET ORAL at 08:01

## 2023-05-08 RX ADMIN — PANTOPRAZOLE SODIUM 40 MG: 40 INJECTION, POWDER, LYOPHILIZED, FOR SOLUTION INTRAVENOUS at 21:01

## 2023-05-08 RX ADMIN — REMDESIVIR 200 MG: 100 INJECTION, POWDER, LYOPHILIZED, FOR SOLUTION INTRAVENOUS at 18:35

## 2023-05-08 RX ADMIN — LEVOTHYROXINE SODIUM 100 MCG: 0.1 TABLET ORAL at 05:37

## 2023-05-08 RX ADMIN — BENZONATATE 200 MG: 100 CAPSULE ORAL at 13:50

## 2023-05-08 RX ADMIN — HYDROXYZINE HYDROCHLORIDE 25 MG: 25 TABLET, FILM COATED ORAL at 21:09

## 2023-05-08 RX ADMIN — LISINOPRIL 5 MG: 5 TABLET ORAL at 08:03

## 2023-05-08 RX ADMIN — Medication 10 ML: at 08:02

## 2023-05-08 RX ADMIN — PROPRANOLOL HYDROCHLORIDE 20 MG: 20 TABLET ORAL at 17:04

## 2023-05-08 RX ADMIN — CEFEPIME HYDROCHLORIDE 1 G: 1 INJECTION, POWDER, FOR SOLUTION INTRAMUSCULAR; INTRAVENOUS at 08:01

## 2023-05-08 RX ADMIN — INSULIN LISPRO 4 UNITS: 100 INJECTION, SOLUTION INTRAVENOUS; SUBCUTANEOUS at 17:12

## 2023-05-08 NOTE — PLAN OF CARE
Goal Outcome Evaluation:  Plan of Care Reviewed With: patient        Progress: no change  Outcome Evaluation: Patient presents w/ generalized weakness, impaired balance, gait instability, decreased functional endurance, and is below her functional baseline. Pt. ambulated short household distances in room w/ RW and min A. Skilled IPPT indicated to promote return to PLOF. Recommend SNF rehab at D/C for best functional outcome.

## 2023-05-08 NOTE — PROGRESS NOTES
[facility] Medicine Services  PROGRESS NOTE    Patient Name: Chey Robertson  : 1935  MRN: [id number]    Date of Admission: 2023  Primary Care Physician: Corby Marie MD    Subjective   Subjective     CC:  F/U for UTI    HPI:  Pt complaining of cough and congestion this am. Denies any other issues overnight.     ROS:  General : no fevers, chills  CVS: No chest pain, palpitations  Respiratory: + cough, dyspnea only when coughing  GI: No N/V/D, abd pain  10 point review of systems is negative except for what is mentioned in HPI    Objective   Objective     Vital Signs:   Temp:  [98.2 °F (36.8 °C)-98.6 °F (37 °C)] 98.6 °F (37 °C)  Heart Rate:  [65-77] 76  Resp:  [12-18] 16  BP: (129-188)/(56-79) 188/79     Physical Exam:  General: Chronically ill looking, not in distress, conversant and cooperative  Head: Atraumatic and normocephalic  Eyes: No Icterus. No pallor  Ears:  Ears appear intact with no abnormalities noted  Throat: No oral lesions, no thrush  Neck: Supple, trachea midline  Lungs: Clear to auscultation bilaterally, equal air entry, no wheezing or crackles  Heart:  Normal S1 and S2, no murmur, no gallop, No JVD, no lower extremity swelling  Abdomen:  Soft, no tenderness, no organomegaly, normal bowel sounds, no organomegaly  Extremities: pulses equal bilaterally  Skin: No bleeding, bruising or rash, normal skin turgor and elasticity  Neurologic: Cranial nerves appear intact with no evidence of facial asymmetry, normal motor and sensory functions in all 4 extremities  Psych: Alert and oriented x 3, normal mood    Results Reviewed:  LAB RESULTS:      Lab 23  0616 23  0012 23  1728 23  1032 23  0453 23  1520 23  0840 23  0141 23  2231 23  1942 23  1705   WBC 4.18  --   --  0.89* 0.80*  --  1.01*  --   --   --  1.67*   HEMOGLOBIN 7.9* 7.6* 8.4* 7.7* 7.2*  --  8.3*  --   --   --  10.3*   HEMATOCRIT 24.9*  24.2* 26.6* 24.8* 22.8*  --  26.3*  --   --   --  33.3*   PLATELETS 40*  --   --  48* 48*  --  57*  --   --   --  72*   NEUTROS ABS 3.46  --   --  0.49* 0.35*  --  0.43*  --   --   --  1.14*   IMMATURE GRANS (ABS) 0.14*  --   --  0.01 0.01  --  0.01  --   --   --  0.01   LYMPHS ABS 0.32*  --   --  0.24* 0.33*  --  0.40*  --   --   --  0.29*   MONOS ABS 0.26  --   --  0.15 0.11  --  0.17  --   --   --  0.23   EOS ABS 0.00  --   --  0.00 0.00  --  0.00  --   --   --  0.00   MCV 90.2  --   --  90.8 90.8  --  91.0  --   --   --  90.7   LACTATE  --   --   --  2.7*  --  2.6* 2.6* 3.4* 3.6*   < > 4.4*   PROTIME  --   --   --  15.5*  --   --   --   --   --   --  15.6*   APTT  --   --   --  31.8  --   --   --   --   --   --   --    D DIMER QUANT  --   --   --  0.83  --   --   --   --   --   --   --     < > = values in this interval not displayed.         Lab 05/08/23  0616 05/07/23  1032 05/07/23  0453 05/06/23  0840 05/05/23  1705   SODIUM 140  --  141 140 141   POTASSIUM 4.3 3.9 3.4* 3.2* 3.6   CHLORIDE 106  --  105 102 98   CO2 24.0  --  27.0 25.0 26.0   ANION GAP 10.0  --  9.0 13.0 17.0*   BUN 8  --  10 13 15   CREATININE 0.78  --  0.83 0.67 0.75   EGFR 74.1  --  68.8 85.2 77.6   GLUCOSE 126*  --  124* 122* 194*   CALCIUM 8.1*  --  7.8* 8.0* 9.1   MAGNESIUM 2.1  --  1.0*  --   --    PHOSPHORUS  --   --  3.5  --   --          Lab 05/08/23  0616 05/07/23 0453 05/05/23  1705   TOTAL PROTEIN 4.9* 4.8* 7.3   ALBUMIN 3.2* 3.1* 4.5   GLOBULIN 1.7 1.7 2.8   ALT (SGPT) 5 <5 8   AST (SGOT) 10 7 16   BILIRUBIN 0.8 0.5 0.8   ALK PHOS 62 54 75   LIPASE  --   --  42         Lab 05/07/23  1032 05/05/23  1857 05/05/23  1705   HSTROP T  --  12* 15*   PROTIME 15.5*  --  15.6*   INR 1.22*  --  1.23*             Lab 05/07/23 0453 05/05/23 2021 05/05/23  1709   IRON 32*  --   --    IRON SATURATION 16*  --   --    TIBC 201*  --   --    TRANSFERRIN 135*  --   --    VITAMIN B 12 493  --   --    ABO TYPING  --  O O   RH TYPING  --  Negative  Negative   ANTIBODY SCREEN  --   --  Positive         Brief Urine Lab Results  (Last result in the past 365 days)      Color   Clarity   Blood   Leuk Est   Nitrite   Protein   CREAT   Urine HCG        05/05/23 1752 Yellow   Clear   Negative   Negative   Positive   Negative                 Microbiology Results Abnormal     Procedure Component Value - Date/Time    Blood Culture - Blood, Wrist, Right [[id number]]  (Normal) Collected: 05/05/23 1855    Lab Status: Preliminary result Specimen: Blood from Wrist, Right Updated: 05/07/23 2015     Blood Culture No growth at 2 days    Blood Culture - Blood, Arm, Right [[id number]]  (Normal) Collected: 05/05/23 1850    Lab Status: Preliminary result Specimen: Blood from Arm, Right Updated: 05/07/23 2015     Blood Culture No growth at 2 days    COVID PRE-OP / PRE-PROCEDURE SCREENING ORDER (NO ISOLATION) - Swab, Nasopharynx [[id number]]  (Normal) Collected: 05/05/23 1704    Lab Status: Final result Specimen: Swab from Nasopharynx Updated: 05/05/23 1747    Narrative:      The following orders were created for panel order COVID PRE-OP / PRE-PROCEDURE SCREENING ORDER (NO ISOLATION) - Swab, Nasopharynx.  Procedure                               Abnormality         Status                     ---------                               -----------         ------                     COVID-19 and FLU A/B PCR...[[id number]]  Normal              Final result                 Please view results for these tests on the individual orders.    COVID-19 and FLU A/B PCR - Swab, Nasopharynx [[id number]]  (Normal) Collected: 05/05/23 1704    Lab Status: Final result Specimen: Swab from Nasopharynx Updated: 05/05/23 1747     COVID19 Not Detected     Influenza A PCR Not Detected     Influenza B PCR Not Detected    Narrative:      Fact sheet for providers: https://www.fda.gov/media/686413/download    Fact sheet for patients: https://www.fda.gov/media/876414/download    Test performed by PCR.          No  radiology results from the last 24 hrs        Current medications:  Scheduled Meds:cefepime, 1 g, Intravenous, Q12H  insulin lispro, 0-9 Units, Subcutaneous, TID AC  lactobacillus acidophilus, 1 capsule, Oral, Daily  levothyroxine, 100 mcg, Oral, Q AM  lisinopril, 5 mg, Oral, Daily  pantoprazole, 40 mg, Intravenous, Q12H  propranolol, 20 mg, Oral, TID  sodium chloride, 10 mL, Intravenous, Q12H  tamsulosin, 0.4 mg, Oral, Daily      Continuous Infusions:lactated ringers, 100 mL/hr, Last Rate: 100 mL/hr (05/08/23 0537)      PRN Meds:.•  acetaminophen  •  benzonatate  •  Calcium Replacement - Follow Nurse / BPA Driven Protocol  •  dextrose  •  dextrose  •  glucagon (human recombinant)  •  guaifenesin  •  Magnesium Standard Dose Replacement - Follow Nurse / BPA Driven Protocol  •  melatonin  •  ondansetron **OR** ondansetron  •  Phosphorus Replacement - Follow Nurse / BPA Driven Protocol  •  Potassium Replacement - Follow Nurse / BPA Driven Protocol  •  [COMPLETED] Insert Peripheral IV **AND** sodium chloride  •  sodium chloride  •  sodium chloride    Assessment & Plan   Assessment & Plan     Active Hospital Problems    Diagnosis  POA   • **Urinary tract infection, acute [N39.0]  Yes   • Chronic anticoagulation [Z79.01]  Not Applicable   • Essential hypertension [I10]  Yes   • Type 2 diabetes mellitus without complication, with long-term current use of insulin [E11.9, Z79.4]  Not Applicable   • Personal history of pulmonary embolism [Z86.711]  Yes   • Hypothyroidism (acquired) [E03.9]  Yes   • Pancytopenia (HCC) [D61.818]  Yes      Resolved Hospital Problems   No resolved problems to display.        Brief Hospital Course to date:  Chey Robertson is a 87 y.o. female with past medical history of myelodysplasia, chronic pancytopenia, essential hypertension, type 2 diabetes, history of PE (recently switched from warfarin to Eliquis per her cardiologist because of inability to achieve therapeutic INR), GERD who presented to  the hospital with generalized weakness, nausea and vomiting found to have urinary tract infection    Assessment and plan:  Acute urinary tract infection, POA  Dehydration volume depletion  Acute on chronic debility  · Recent hospitalization end of March 2023 for acute UTI with pansensitive E. Coli  · Urinalysis positive for UTI, urine culture growing pansensitive E. coli  · Started on IV Rocephin on admission.  Given her persistent and worsening neutropenia with ANC of 350 on 5/7,  implemented neutropenic precautions and switched antibiotic therapy to IV cefepime  · ANC improved today to 3500  · Consulted ID  · Continue as needed Zofran  · Might need suppressive therapy with Macrobid upon discharge given her recurrent UTI  · PT/OT      Myelodysplastic syndrome  Worsening pancytopenia, likely secondary to sepsis  Worsening anemia, likely anemia of chronic disease  · Chronic pancytopenia  · Hemoglobin 7.9 this am, stable when compared to CBC HgB from yesterday (7.2)  · DIC panel checked and negative but fibrinogen is borderline low  · Neutropenia was getting worse with ANC of 350.  Currently on neutropenic precautions, s/p 1 dose of Neupogen 300 efe 5/7. ANC now 3500  · Given her worsening anemia, will hold Eliquis for now and continue to monitor H&H closely with low threshold to transfuse blood to H&H less than 7 and 21  · Eliquis was being administered to prevent PE.  Okay to hold for few days.  Really not candidate for heparin or Lovenox because of her thrombocytopenia  · Started IV Protonix  · Follows with Dr. Lyman as outpatient.  Courtesy consult today.     History of PE  · Remote history of PE  · Was  on warfarin but unable to achieve therapeutic INR  · Recently switched to Eliquis 2.5 mg twice daily per her cardiologist  · Eliquis 2.5 mg twice daily is acceptable for A-fib with her age and body weight, but would not be adequate for PE.  Will need to be on Eliquis 5 mg twice daily instead    Type 2  diabetes  · A1c from end of March 2023 is 6%  · This is appropriate for age  · Continue sliding scale insulin     Essential hypertension  · Continue to hold home Lasix for now given dehydration  · Continue home Lisinopril and propranolol     Hypothyroidism  · Continue home synthroid    GERD  · PPI    Cough  -- start PRNs  -- repeat respiratory viral panel  -- supportive care for now    Expected Discharge Location and Transportation: Home  Expected Discharge   Expected Discharge Date: 5/10/2023; Expected Discharge Time:      DVT prophylaxis:  Mechanical DVT prophylaxis orders are present.            CODE STATUS:   Code Status and Medical Interventions:   Ordered at: 05/05/23 2031     Code Status (Patient has no pulse and is not breathing):    CPR (Attempt to Resuscitate)     Medical Interventions (Patient has pulse or is breathing):    Full Support     Copied text in this note has been reviewed and is accurate as of 05/08/23.     Teetee Cesar MD  05/08/23

## 2023-05-08 NOTE — PROGRESS NOTES
INFECTIOUS DISEASE Progress Note    Chey Robertson  1935  [id number]      Admission Date: 5/5/2023      Requesting Provider: No ref. provider found  Evaluating Physician: Kev Araya MD    Reason for Consultation: Sepsis/absolute neutropenia/UTI/Gastroenteritis    History of present illness:    5/7/23: Patient is a 87 y.o. female With myelodysplasia/chronic pancytopenia, type 2 diabetes mellitus, pulmonary emboli, and UTIs who is seen today for evaluation of nausea, vomiting, and pyuria/bacteriuria in the setting of absolute neutropenia. She was admitted approximately 1 month ago with a pansensitive E. Coli presenting with nausea and vomiting.  She complains of recurrent nausea and vomiting prior to her admission on 5/5.  She did not experience any fevers.  She noticed some urinary urgency and incontinence but denies dysuria. He remained afebrile. Presentation she had a white blood cell count of 1.67 with an absolute neutrophil count of 1140. She has 0-2 white blood cells on urinalysis and grew greater than 100,000 colonies of pansensitive E. Coli UTI on urine culture.  Blood cultures have been negative.She had lactic acidosis on presentation with a lactic acid level of 4.4. She has been treated with intravenous ceftriaxone but was switched to cefepime today. Today her white blood cell count is down 2.89  with an absolute neutrophil count of 490 and a platelet count of 48,000. An abdominal/pelvic CT scan revealed stable splenomegaly with colonic diverticulosis.  Her nausea and vomiting are better.    5/8/23: She has remained afebrile. Her hemoglobin this morning is 7.6. Blood cultures were 5/5.  Urine culture from 5/5 grew greater than 100,000 E. Coli. Her nausea and vomiting is better.  She would like to go home. Her white blood cell count is now up to  4.2.  She complains of cough and coryza.She does not think she has Covid 19 infection. We ordered a respiratory virus panel PCR in the panel has now  turned positive for Covid 19.She is now requiring 2 L of oxygen with an O2 saturation of 91%.      Past Medical History:   Diagnosis Date   • Anemia    • Arthritis    • Diabetes mellitus     checks sugar only when pt wants to    • Disease of thyroid gland    • History of transfusion     with knee surgery-  no reaction recalled.    • [place] (hard of hearing)     no hearing aids   • Hypertension    • Migraine without aura and without status migrainosus, not intractable 12/30/2016   • Pulmonary emboli 2011    (after knee surgery)   • SOBOE (shortness of breath on exertion)    • Tremors of nervous system    • Vertigo    • Wears dentures     upper    • Wears glasses        Past Surgical History:   Procedure Laterality Date   • BLADDER SURGERY     • CATARACT EXTRACTION      bilat    • CHOLECYSTECTOMY     • COLONOSCOPY     • HYSTERECTOMY     • KYPHOPLASTY N/A 2/12/2021    Procedure: KYPHOPLASTY T11, T12 AND L4;  Surgeon: Matty Hearn MD;  Location: [facility];  Service: Orthopedic Spine;  Laterality: N/A;   • OOPHORECTOMY     • TONSILLECTOMY         Family History   Problem Relation Age of Onset   • Breast cancer Sister 84   • Stroke Mother    • Heart attack Father    • Ovarian cancer Neg Hx        Social History     Socioeconomic History   • Marital status:    Tobacco Use   • Smoking status: Never   • Smokeless tobacco: Never   Vaping Use   • Vaping Use: Never used   Substance and Sexual Activity   • Alcohol use: No   • Drug use: No   • Sexual activity: Defer       Allergies   Allergen Reactions   • Aspirin Unknown - Low Severity     Mouth sores          Medication:    Current Facility-Administered Medications:   •  acetaminophen (TYLENOL) tablet 650 mg, 650 mg, Oral, Q4H PRN, Chad Martins PA-C, 650 mg at 05/07/23 2130  •  Calcium Replacement - Follow Nurse / BPA Driven Protocol, , Does not apply, PRN, Raissa Saenz, APRN  •  cefepime (MAXIPIME) 1 g/100 mL 0.9% NS IVPB (mbp), 1 g, Intravenous, Q12H,  Selena Pulliam MD, 1 g at 05/07/23 2118  •  dextrose (D50W) (25 g/50 mL) IV injection 25 g, 25 g, Intravenous, Q15 Min PRN, Chad Martins PA-C  •  dextrose (GLUTOSE) oral gel 15 g, 15 g, Oral, Q15 Min PRN, Chad Martins PA-C  •  glucagon (GLUCAGEN) injection 1 mg, 1 mg, Intramuscular, Q15 Min PRN, Chad Martins PA-C  •  Insulin Lispro (humaLOG) injection 0-9 Units, 0-9 Units, Subcutaneous, TID AC, Chad Martins PA-C, 2 Units at 05/07/23 1735  •  lactated ringers infusion, 100 mL/hr, Intravenous, Continuous, Chad Martins PA-C, Last Rate: 100 mL/hr at 05/08/23 0537, 100 mL/hr at 05/08/23 0537  •  levothyroxine (SYNTHROID, LEVOTHROID) tablet 100 mcg, 100 mcg, Oral, Q AM, Chad Martins PA-C, 100 mcg at 05/08/23 0537  •  lisinopril (PRINIVIL,ZESTRIL) tablet 5 mg, 5 mg, Oral, Daily, Chad Martins PA-C, 5 mg at 05/07/23 1034  •  Magnesium Standard Dose Replacement - Follow Nurse / BPA Driven Protocol, , Does not apply, PRN, Raissa Saenz, APRN  •  melatonin tablet 5 mg, 5 mg, Oral, Nightly PRN, Chad Martins PA-C, 5 mg at 05/07/23 2123  •  ondansetron (ZOFRAN) tablet 4 mg, 4 mg, Oral, Q6H PRN **OR** ondansetron (ZOFRAN) injection 4 mg, 4 mg, Intravenous, Q6H PRN, Chad Martins PA-C  •  pantoprazole (PROTONIX) injection 40 mg, 40 mg, Intravenous, Q12H, Selena Pulliam MD, 40 mg at 05/07/23 2123  •  Phosphorus Replacement - Follow Nurse / BPA Driven Protocol, , Does not apply, PRN, Raissa Saenz, APRAUDREY  •  Potassium Replacement - Follow Nurse / BPA Driven Protocol, , Does not apply, PRN, Raissa Saenz, CHANG  •  propranolol (INDERAL) tablet 20 mg, 20 mg, Oral, TID, Chad Martins PA-C, 20 mg at 05/07/23 2123  •  [COMPLETED] Insert Peripheral IV, , , Once **AND** sodium chloride 0.9 % flush 10 mL, 10 mL, Intravenous, PRN, Iva Pate MD  •  sodium chloride 0.9 % flush 10 mL, 10 mL,  Intravenous, Q12H, Chad Martins PA-C, 10 mL at 233  •  sodium chloride 0.9 % flush 10 mL, 10 mL, Intravenous, PRN, Chad Martins PA-C  •  sodium chloride 0.9 % infusion 40 mL, 40 mL, Intravenous, PRN, Chad Martins PA-C  •  tamsulosin (FLOMAX) 24 hr capsule 0.4 mg, 0.4 mg, Oral, Daily, Chad Martins PA-C, 0.4 mg at 23 1034    Antibiotics:  Anti-Infectives (From admission, onward)    Ordered     Dose/Rate Route Frequency Start Stop    23 0807  cefepime (MAXIPIME) 1 g/100 mL 0.9% NS IVPB (mbp)        Ordering Provider: Selena Pulliam MD    1 g  over 4 Hours Intravenous Every 12 Hours 23 2100 23 0807  cefepime (MAXIPIME) 2 g/100 mL 0.9% NS (mbp)        Ordering Provider: Selena Pulliam MD    2 g  200 mL/hr over 30 Minutes Intravenous Once 23 0900 23 1101    23 1840  piperacillin-tazobactam (ZOSYN) 3.375 g in iso-osmotic dextrose 50 ml (premix)        Ordering Provider: Iva Pate MD    3.375 g  over 30 Minutes Intravenous Once 23 18423 1840  vancomycin (VANCOCIN) 1000 mg/200 mL dextrose 5% IVPB        Ordering Provider: Iva Pate MD    20 mg/kg × 54 kg Intravenous Once 23 18423            Review of Systems:  See HPI      Physical Exam:   Vital Signs  Temp (24hrs), Av.2 °F (36.8 °C), Min:97.5 °F (36.4 °C), Max:98.5 °F (36.9 °C)    Temp  Min: 97.5 °F (36.4 °C)  Max: 98.5 °F (36.9 °C)  BP  Min: 129/56  Max: 153/65  Pulse  Min: 57  Max: 71  Resp  Min: 12  Max: 18  SpO2  Min: 92 %  Max: 94 %    GENERAL: Alert and in no acute stress  HEENT: Normocephalic, atraumatic.  PERRL. EOMI. No conjunctival injection. No icterus. Oropharynx clear without evidence of thrush or exudate.   NECK: Supple   HEART: RRR; No murmur, rubs, gallops.   LUNGS: Clear to auscultation bilaterally without wheezing, rales, rhonchi. Normal respiratory  effort. Nonlabored.   ABDOMEN: Soft, nontender, nondistended. No rebound or guarding. NO mass or HSM.  EXT:  No cyanosis, clubbing or edema. .  :  Without Hurd catheter.  MSK: No joint effusions or erythema  SKIN: Warm and dry without cutaneous eruptions on Inspection/palpation.    NEURO: Oriented to PPT.  Motor 5/5 strength  PSYCHIATRIC: Normal insight and judgment. Cooperative with PE    Laboratory Data    Results from last 7 days   Lab Units 05/08/23  0012 05/07/23  1728 05/07/23  1032 05/07/23  0453 05/06/23  0840   WBC 10*3/mm3  --   --  0.89* 0.80* 1.01*   HEMOGLOBIN g/dL 7.6* 8.4* 7.7* 7.2* 8.3*   HEMATOCRIT % 24.2* 26.6* 24.8* 22.8* 26.3*   PLATELETS 10*3/mm3  --   --  48* 48* 57*     Results from last 7 days   Lab Units 05/07/23  1032 05/07/23  0453   SODIUM mmol/L  --  141   POTASSIUM mmol/L 3.9 3.4*   CHLORIDE mmol/L  --  105   CO2 mmol/L  --  27.0   BUN mg/dL  --  10   CREATININE mg/dL  --  0.83   GLUCOSE mg/dL  --  124*   CALCIUM mg/dL  --  7.8*     Results from last 7 days   Lab Units 05/07/23  0453   ALK PHOS U/L 54   BILIRUBIN mg/dL 0.5   ALT (SGPT) U/L <5   AST (SGOT) U/L 7             Results from last 7 days   Lab Units 05/07/23  1032   LACTATE mmol/L 2.7*             Estimated Creatinine Clearance: 43 mL/min (by C-G formula based on SCr of 0.83 mg/dL).      Microbiology:  Blood Culture   Date Value Ref Range Status   05/05/2023 No growth at 24 hours  Preliminary     No results found for: BCIDPCR, CXREFLEX, CSFCX, CULTURETIS  No results found for: CULTURES, HSVCX, URCX  No results found for: EYECULTURE, GCCX, HSVCULTURE, LABHSV  No results found for: LEGIONELLA, MRSACX, MUMPSCX, MYCOPLASCX  No results found for: NOCARDIACX, STOOLCX  Urine Culture   Date Value Ref Range Status   05/05/2023 >100,000 CFU/mL Escherichia coli (A)  Final     No results found for: VIRALCULTU, WOUNDCX        Radiology:  Imaging Results (Last 72 Hours)     Procedure Component Value Units Date/Time    CT Abdomen Pelvis  Without Contrast [[id number]] Collected: 05/05/23 1924     Updated: 05/05/23 1934    Narrative:      CT ABDOMEN PELVIS WO CONTRAST    Date of Exam: 5/5/2023 7:08 PM EDT    Indication: nausea/vomiting.    Comparison: 3/28/2023    Technique: Axial CT images were obtained of the abdomen and pelvis without the administration of contrast. Reconstructed coronal and sagittal images were also obtained. Automated exposure control and iterative construction methods were used.      Findings:    Lower Chest: Scarring in the lung bases    Organs: Prominent splenomegaly measuring 17 cm in length, similar to prior. Scarring of the upper and lower poles of the spleen. Inferomedial displacement of the left kidney by the enlarged spleen. Kidneys, adrenal glands, liver have an unremarkable   noncontrast appearance. Pancreas is atrophic. Gallbladder is surgically absent    Gastrointestinal: No acute abnormality. No intestinal distention or evident wall thickening. Colonic diverticula with no associated inflammation.    Pelvis: No abnormal fluid collection. Uterus surgically absent. Bubble of gas in the urinary bladder with no bladder wall thickening, likely related to catheterization    Peritoneum/Retroperitoneum: No ascites or pneumoperitoneum. Normal caliber aorta    Bones/Soft Tissues: No acute bony abnormality. Stable T11, T12, and L4 compression fractures status post vertebroplasty      Impression:        1. No acute process demonstrated  2. Stable splenomegaly  3. Colonic diverticulosis              Electronically Signed: Jorje Staples    5/5/2023 7:31 PM EDT    Workstation ID: [id number]    XR Chest 1 View [[id number]] Collected: 05/05/23 1857     Updated: 05/05/23 1901    Narrative:      XR CHEST 1 VW    Date of Exam: 5/5/2023 6:16 PM EDT    Indication: nausea/vomiting    Comparison: Chest x-ray 2/23/2021    Findings:  Mildly elevated right diaphragm. Ectatic thoracic aorta. Scarring right lower lobe. Left lung is clear. No  pneumothorax or pleural effusion. No focal pulmonary parenchymal opacity. Osteopenia.      Impression:      Impression:  No acute cardiopulmonary abnormality.      Electronically Signed: Cristela Mckeon    5/5/2023 6:58 PM EDT    Workstation ID: [id number]            Impression:   1. Covid 19 infection-her initial PCR on 5/5 was negative and she then developed respiratory symptoms with a cough and coryza yesterday.  Her PCR panel is now positive for Covid 19 infection. She is requiring supplemental.  She has been started on intravenous remdesivir along with Decadron.  2.  E. Coli bacteriuria-without pyuria but she does have absolute neutropenia.  This is difficult to assess since she has urinary incontinence/frequency without dysuria.  We have treated her bacteriuria aggressively with intravenous ceftriaxone and now cefepime.  3.  Myelodysplasia-with absolute neutropenia.  This places her at increased risk for recurrent infections  4.  Thrombocytopenia-this places her at increased risk for bleeding  5.  History of pulmonary embolus  6.  Nausea/vomiting-improved    PLAN/RECOMMENDATIONS:   1.  Discontinue cefepime  2.  Airborne/contact precautions for Covid 19 infection-She will need to isolate for 20 days from yesterday since she is an immunocompromised host.  3.  Intravenous remdesivir ×5 days  4.  Decadron 6 mg daily    I discussed her complex situation with clinical pharmacy today.    Kev Araya MD  5/8/2023  06:29 EDT

## 2023-05-08 NOTE — THERAPY EVALUATION
Patient Name: Chey Robertson  : 1935    MRN: [id number]                              Today's Date: 2023       Admit Date: 2023    Visit Dx:     ICD-10-CM ICD-9-CM   1. Urinary tract infection, acute  N39.0 599.0   2. Acute dehydration  E86.0 276.51     Patient Active Problem List   Diagnosis   • Benign familial tremor   • AMS (altered mental status)   • Thrombocytopenia (HCC)   • Pancytopenia (HCC)   • Shortness of breath   • Hypothyroidism (acquired)   • Acute kidney injury   • Splenomegaly   • Hepatomegaly   • Confusion   • B12 deficiency   • Abnormal intestinal absorption   • Iron deficiency anemia due to chronic blood loss   • Myelodysplasia (myelodysplastic syndrome)   • Weakness   • Personal history of pulmonary embolism   • Urinary tract infection, acute   • Chronic anticoagulation   • Essential hypertension   • Type 2 diabetes mellitus without complication, with long-term current use of insulin     Past Medical History:   Diagnosis Date   • Anemia    • Arthritis    • Diabetes mellitus     checks sugar only when pt wants to    • Disease of thyroid gland    • History of transfusion     with knee surgery-  no reaction recalled.    • [place] (hard of hearing)     no hearing aids   • Hypertension    • Migraine without aura and without status migrainosus, not intractable 2016   • Pulmonary emboli     (after knee surgery)   • SOBOE (shortness of breath on exertion)    • Tremors of nervous system    • Vertigo    • Wears dentures     upper    • Wears glasses      Past Surgical History:   Procedure Laterality Date   • BLADDER SURGERY     • CATARACT EXTRACTION      bilat    • CHOLECYSTECTOMY     • COLONOSCOPY     • HYSTERECTOMY     • KYPHOPLASTY N/A 2021    Procedure: KYPHOPLASTY T11, T12 AND L4;  Surgeon: Matty Hearn MD;  Location: [facility];  Service: Orthopedic Spine;  Laterality: N/A;   • OOPHORECTOMY     • TONSILLECTOMY        General Information     Row Name 23 1022           Physical Therapy Time and Intention    Document Type evaluation  -MB     Mode of Treatment physical therapy  -MB     Row Name 05/08/23 1022          General Information    Patient Profile Reviewed yes  -MB     Prior Level of Function independent:;bed mobility;transfer;gait;all household mobility;min assist:;ADL's;dependent:;driving;home management  -MB     Existing Precautions/Restrictions fall;oxygen therapy device and L/min  -MB     Barriers to Rehab medically complex;previous functional deficit  -MB     Row Name 05/08/23 1022          Living Environment    People in Home alone;other (see comments)  caregivers 4 hrs a day M-F  -MB     Row Name 05/08/23 1022          Home Main Entrance    Number of Stairs, Main Entrance one  -MB     Stair Railings, Main Entrance none  -MB     Row Name 05/08/23 1022          Stairs Within Home, Primary    Number of Stairs, Within Home, Primary none  -MB     Row Name 05/08/23 1022          Cognition    Orientation Status (Cognition) oriented x 4  -MB     Row Name 05/08/23 1022          Safety Issues, Functional Mobility    Safety Issues Affecting Function (Mobility) awareness of need for assistance;safety precaution awareness;safety precautions follow-through/compliance  -MB     Impairments Affecting Function (Mobility) balance;endurance/activity tolerance;shortness of breath;strength  -MB           User Key  (r) = Recorded By, (t) = Taken By, (c) = Cosigned By    Initials Name Provider Type    MB Brooke Marshall, PT Physical Therapist               Mobility     Row Name 05/08/23 1431          Bed Mobility    Bed Mobility supine-sit  -MB     Supine-Sit Hall (Bed Mobility) standby assist  -MB     Assistive Device (Bed Mobility) bed rails;head of bed elevated  -MB     Comment, (Bed Mobility) Pt. advanced to EOB w/ brief assist to support trunk and increased time/effort to complete.  -MB     Row Name 05/08/23 1431          Transfers    Comment, (Transfers) VCs for safe hand  placement (tends to attempt to pull up on RW).  -MB     Row Name 05/08/23 1431          Sit-Stand Transfer    Sit-Stand Gonzales (Transfers) contact guard;verbal cues  -MB     Assistive Device (Sit-Stand Transfers) walker, front-wheeled  -MB     Row Name 05/08/23 1431          Gait/Stairs (Locomotion)    Gonzales Level (Gait) minimum assist (75% patient effort);verbal cues  -MB     Assistive Device (Gait) walker, front-wheeled  -MB     Distance in Feet (Gait) 45  -MB     Deviations/Abnormal Patterns (Gait) carlton decreased;gait speed decreased;stride length decreased  -MB     Bilateral Gait Deviations forward flexed posture;heel strike decreased  -MB     Gonzales Level (Stairs) not tested  -MB     Comment, (Gait/Stairs) Pt. ambulated w/ step through gait pattern w/ slow carlton and increased forward flexion. Pt. required VCs/tactile cues for upright posture/forward gaze and increased B heelstrike. Gait distance limited by fatigue.  -MB           User Key  (r) = Recorded By, (t) = Taken By, (c) = Cosigned By    Initials Name Provider Type    Brooke Graves, PT Physical Therapist               Obj/Interventions     Row Name 05/08/23 1435          Range of Motion Comprehensive    General Range of Motion bilateral lower extremity ROM WFL  -MB     Comment, General Range of Motion BLE ROM WFL (B hamstring tightness noted)  -MB     Row Name 05/08/23 1435          Strength Comprehensive (MMT)    General Manual Muscle Testing (MMT) Assessment lower extremity strength deficits identified  -MB     Comment, General Manual Muscle Testing (MMT) Assessment BLEs grossly 4-/5  -MB     Row Name 05/08/23 1435          Balance    Balance Assessment sitting static balance;standing static balance;standing dynamic balance  -MB     Static Standing Balance contact guard  -MB     Dynamic Standing Balance minimal assist  -MB     Position/Device Used, Standing Balance supported;walker, rolling  -MB     Balance  Interventions sit to stand;weight shifting activity;dynamic reaching  -MB     Row Name 05/08/23 1435          Sensory Assessment (Somatosensory)    Sensory Assessment (Somatosensory) LE sensation intact  -MB           User Key  (r) = Recorded By, (t) = Taken By, (c) = Cosigned By    Initials Name Provider Type    Brooke Graves, PT Physical Therapist               Goals/Plan     Row Name 05/08/23 1439          Bed Mobility Goal 1 (PT)    Activity/Assistive Device (Bed Mobility Goal 1, PT) sit to supine/supine to sit  -MB     Blanchard Level/Cues Needed (Bed Mobility Goal 1, PT) independent  -MB     Time Frame (Bed Mobility Goal 1, PT) 10 days  -MB     Row Name 05/08/23 1439          Transfer Goal 1 (PT)    Activity/Assistive Device (Transfer Goal 1, PT) sit-to-stand/stand-to-sit;bed-to-chair/chair-to-bed;walker, rolling  -MB     Blanchard Level/Cues Needed (Transfer Goal 1, PT) modified independence  -MB     Time Frame (Transfer Goal 1, PT) 10 days  -MB     Row Name 05/08/23 1439          Gait Training Goal 1 (PT)    Activity/Assistive Device (Gait Training Goal 1, PT) gait (walking locomotion);walker, rolling  -MB     Blanchard Level (Gait Training Goal 1, PT) modified independence  -MB     Distance (Gait Training Goal 1, PT) 100  -MB     Time Frame (Gait Training Goal 1, PT) 10 days  -MB     Row Name 05/08/23 1439          Patient Education Goal (PT)    Activity (Patient Education Goal, PT) HEP  -MB     Blanchard/Cues/Accuracy (Memory Goal 2, PT) demonstrates adequately  -MB     Time Frame (Patient Education Goal, PT) 10 days  -MB     Row Name 05/08/23 1439          Therapy Assessment/Plan (PT)    Planned Therapy Interventions (PT) balance training;bed mobility training;gait training;home exercise program;patient/family education;postural re-education;stair training;strengthening;transfer training  -MB           User Key  (r) = Recorded By, (t) = Taken By, (c) = Cosigned By    Initials Name  Provider Type    Brooke Graves, PT Physical Therapist               Clinical Impression     Row Name 05/08/23 1436          Pain    Pretreatment Pain Rating 0/10 - no pain  -MB     Posttreatment Pain Rating 0/10 - no pain  -MB     Row Name 05/08/23 1436          Plan of Care Review    Plan of Care Reviewed With patient  -MB     Progress no change  -MB     Outcome Evaluation Patient presents w/ generalized weakness, impaired balance, gait instability, decreased functional endurance, and is below her functional baseline. Pt. ambulated short household distances in room w/ RW and min A. Skilled IPPT indicated to promote return to PLOF. Recommend SNF rehab at D/C for best functional outcome.  -MB     Row Name 05/08/23 1436          Therapy Assessment/Plan (PT)    Patient/Family Therapy Goals Statement (PT) Return to home w/ CG and PLOF.  -MB     Rehab Potential (PT) good, to achieve stated therapy goals  -MB     Criteria for Skilled Interventions Met (PT) yes;meets criteria;skilled treatment is necessary  -MB     Therapy Frequency (PT) daily  -MB     Row Name 05/08/23 1436          Vital Signs    Pre Systolic BP Rehab 188  -MB     Pre Treatment Diastolic BP 79  -MB     Intra Systolic BP Rehab 171  -MB     Intra Treatment Diastolic   -MB     Post Systolic BP Rehab 181  -MB     Post Treatment Diastolic BP 82  -MB     Pre SpO2 (%) 92  -MB     O2 Delivery Pre Treatment nasal cannula  -MB     O2 Delivery Intra Treatment nasal cannula  -MB     Post SpO2 (%) 92  -MB     O2 Delivery Post Treatment nasal cannula  -MB     Pre Patient Position Supine  -MB     Intra Patient Position Standing  -MB     Post Patient Position Sitting  -MB     Row Name 05/08/23 1436          Positioning and Restraints    Pre-Treatment Position in bed  -MB     Post Treatment Position chair  -MB     In Chair notified nsg;reclined;call light within reach;encouraged to call for assist;exit alarm on;waffle cushion;heels elevated  -MB            User Key  (r) = Recorded By, (t) = Taken By, (c) = Cosigned By    Initials Name Provider Type    Brooke Graves, PT Physical Therapist               Outcome Measures     Row Name 05/08/23 1440          How much help from another person do you currently need...    Turning from your back to your side while in flat bed without using bedrails? 3  -MB     Moving from lying on back to sitting on the side of a flat bed without bedrails? 3  -MB     Moving to and from a bed to a chair (including a wheelchair)? 3  -MB     Standing up from a chair using your arms (e.g., wheelchair, bedside chair)? 3  -MB     Climbing 3-5 steps with a railing? 2  -MB     To walk in hospital room? 3  -MB     AM-PAC 6 Clicks Score (PT) 17  -MB     Highest level of mobility 5 --> Static standing  -MB     Row Name 05/08/23 1440 05/08/23 1201       Functional Assessment    Outcome Measure Options AM-PAC 6 Clicks Basic Mobility (PT)  -MB AM-PAC 6 Clicks Daily Activity (OT)  -CHELSEA          User Key  (r) = Recorded By, (t) = Taken By, (c) = Cosigned By    Initials Name Provider Type    Kanika Bishop, OT Occupational Therapist    Brooke Graves, PT Physical Therapist                             Physical Therapy Education     Title: PT OT SLP Therapies (In Progress)     Topic: Physical Therapy (Done)     Point: Mobility training (Done)     Learning Progress Summary           Patient Acceptance, E,D, VU,NR by MB at 5/8/2023 1441                   Point: Home exercise program (Done)     Learning Progress Summary           Patient Acceptance, E,D, VU,NR by MB at 5/8/2023 1441                   Point: Body mechanics (Done)     Learning Progress Summary           Patient Acceptance, E,D, VU,NR by MB at 5/8/2023 1441                   Point: Precautions (Done)     Learning Progress Summary           Patient Acceptance, E,D, VU,NR by MB at 5/8/2023 1441                               User Key     Initials Effective Dates Name Provider Type  Discipline    MB 06/16/21 -  Brooke Marshall, PT Physical Therapist PT              PT Recommendation and Plan  Planned Therapy Interventions (PT): balance training, bed mobility training, gait training, home exercise program, patient/family education, postural re-education, stair training, strengthening, transfer training  Plan of Care Reviewed With: patient  Progress: no change  Outcome Evaluation: Patient presents w/ generalized weakness, impaired balance, gait instability, decreased functional endurance, and is below her functional baseline. Pt. ambulated short household distances in room w/ RW and min A. Skilled IPPT indicated to promote return to PLOF. Recommend SNF rehab at D/C for best functional outcome.     Time Calculation:    PT Charges     Row Name 05/08/23 1441             Time Calculation    Start Time 1022  -MB      PT Received On 05/08/23  -MB      PT Goal Re-Cert Due Date 05/18/23  -MB         Untimed Charges    PT Eval/Re-eval Minutes 48  -MB         Total Minutes    Untimed Charges Total Minutes 48  -MB       Total Minutes 48  -MB            User Key  (r) = Recorded By, (t) = Taken By, (c) = Cosigned By    Initials Name Provider Type    Brooke Graves, PT Physical Therapist              Therapy Charges for Today     Code Description Service Date Service Provider Modifiers Qty    34276762089 HC PT EVAL MOD COMPLEXITY 4 5/8/2023 Brooke Marshall, PT GP 1          PT G-Codes  Outcome Measure Options: AM-PAC 6 Clicks Basic Mobility (PT)  AM-PAC 6 Clicks Score (PT): 17  AM-PAC 6 Clicks Score (OT): 15  PT Discharge Summary  Anticipated Discharge Disposition (PT): skilled nursing facility    Brooke Marshall, GURPREET  5/8/2023

## 2023-05-08 NOTE — CASE MANAGEMENT/SOCIAL WORK
Continued Stay Note  [facility]     Patient Name: Chey Robertson  MRN: [id number]  Today's Date: 5/8/2023    Admit Date: 5/5/2023    Plan: TBD   Discharge Plan     Row Name 05/08/23 1552       Plan    Plan TBD    Patient/Family in Agreement with Plan yes    Plan Comments I have met with Ms. Robertson at the bedside today to discuss the discharge plan which includes PT/OT recommendations for skilled nursing/rehab at discharge.  Ms. Robertson states that she has already been to rehab and may not be interested at this time, however she also acknowledges that she is not able to be independent at home.  She is open to further discussion with her sons regarding possibility of rehab at [facility] [place].  I have called and left a message with Albin to discus this further.  CM will cont to follow the plan of care, assist with referrals to [facility] if needed and cont to assist with discharge needs as recommendations become available.    Final Discharge Disposition Code 30 - still a patient               Discharge Codes    No documentation.               Expected Discharge Date and Time     Expected Discharge Date Expected Discharge Time    May 10, 2023             Kellie He, RN

## 2023-05-08 NOTE — PLAN OF CARE
Goal Outcome Evaluation:  Plan of Care Reviewed With: patient        Progress: no change  Outcome Evaluation: Pt. present with impaired activity tolerance/endurance, strength, ROM and high level balance impacting prior ADL independence level.  Skilled OT services appropriate to address deficit areas and promote return to PLOF.  Recommend SNF at this time due to home alone much of time, but will follow progess and update recommendation as needed.

## 2023-05-08 NOTE — THERAPY EVALUATION
Patient Name: Chey Robertson  : 1935    MRN: [id number]                              Today's Date: 2023       Admit Date: 2023    Visit Dx:     ICD-10-CM ICD-9-CM   1. Urinary tract infection, acute  N39.0 599.0   2. Acute dehydration  E86.0 276.51     Patient Active Problem List   Diagnosis   • Benign familial tremor   • AMS (altered mental status)   • Thrombocytopenia (HCC)   • Pancytopenia (HCC)   • Shortness of breath   • Hypothyroidism (acquired)   • Acute kidney injury   • Splenomegaly   • Hepatomegaly   • Confusion   • B12 deficiency   • Abnormal intestinal absorption   • Iron deficiency anemia due to chronic blood loss   • Myelodysplasia (myelodysplastic syndrome)   • Weakness   • Personal history of pulmonary embolism   • Urinary tract infection, acute   • Chronic anticoagulation   • Essential hypertension   • Type 2 diabetes mellitus without complication, with long-term current use of insulin     Past Medical History:   Diagnosis Date   • Anemia    • Arthritis    • Diabetes mellitus     checks sugar only when pt wants to    • Disease of thyroid gland    • History of transfusion     with knee surgery-  no reaction recalled.    • [place] (hard of hearing)     no hearing aids   • Hypertension    • Migraine without aura and without status migrainosus, not intractable 2016   • Pulmonary emboli     (after knee surgery)   • SOBOE (shortness of breath on exertion)    • Tremors of nervous system    • Vertigo    • Wears dentures     upper    • Wears glasses      Past Surgical History:   Procedure Laterality Date   • BLADDER SURGERY     • CATARACT EXTRACTION      bilat    • CHOLECYSTECTOMY     • COLONOSCOPY     • HYSTERECTOMY     • KYPHOPLASTY N/A 2021    Procedure: KYPHOPLASTY T11, T12 AND L4;  Surgeon: Matty Hearn MD;  Location: [facility];  Service: Orthopedic Spine;  Laterality: N/A;   • OOPHORECTOMY     • TONSILLECTOMY        General Information     Row Name 23 1147           OT Time and Intention    Document Type evaluation  -CHELSEA     Mode of Treatment occupational therapy  -CHELSEA     Row Name 05/08/23 1147          General Information    Patient Profile Reviewed yes  -CHELSEA     Prior Level of Function independent:;all household mobility;gait;transfer;bed mobility;feeding;grooming;dressing;min assist:;bathing;max assist:;dependent:;cooking;cleaning;driving;shopping  pt. with caregivers 4 hrs a day M-F, who assist with meals and cleaning, per pt. stood in bedroom while she bathed, per pt. she could get a small meal or family would bring meals on WE  -CHELSEA     Existing Precautions/Restrictions fall;oxygen therapy device and L/min  -CHELSEA     Barriers to Rehab medically complex;previous functional deficit  -CHELSEA     Row Name 05/08/23 1147          Occupational Profile    Environmental Supports and Barriers (Occupational Profile) wx in shower with shower seat, but no safety bars, all toilets comfort height toilets, rollator use  -CHELSEA     Row Name 05/08/23 1147          Living Environment    People in Home alone;other (see comments)  caregivers 4 hrs a day M-F  -CHELSEA     Row Name 05/08/23 1147          Stairs Within Home, Primary    Stairs, Within Home, Primary see PT note for steps  -CHELSEA     Row Name 05/08/23 1147          Safety Issues, Functional Mobility    Safety Issues Affecting Function (Mobility) awareness of need for assistance;safety precaution awareness  -CHELSEA     Impairments Affecting Function (Mobility) balance;endurance/activity tolerance;shortness of breath;strength  -CHELSEA           User Key  (r) = Recorded By, (t) = Taken By, (c) = Cosigned By    Initials Name Provider Type    Kanika Bishop, JERRI Occupational Therapist                 Mobility/ADL's     Row Name 05/08/23 1151          Bed Mobility    Bed Mobility supine-sit  -CHELSEA     Supine-Sit [place] (Bed Mobility) standby assist  -CHELSEA     Assistive Device (Bed Mobility) bed rails;head of bed elevated  -CHELSEA     Comment, (Bed Mobility) per  pt. she sleeps in a standard bed  -     Row Name 05/08/23 1151          Transfers    Transfers sit-stand transfer;stand-sit transfer  -     Row Name 05/08/23 1151          Sit-Stand Transfer    Sit-Stand [place] (Transfers) standby assist;verbal cues  -     Assistive Device (Sit-Stand Transfers) walker, front-wheeled  -     Comment, (Sit-Stand Transfer) cues to wait for wx and gait belt  -     Row Name 05/08/23 1151          Stand-Sit Transfer    Stand-Sit [place] (Transfers) contact guard  -     Assistive Device (Stand-Sit Transfers) walker, front-wheeled  -CHELSEA     Row Name 05/08/23 1151          Functional Mobility    Functional Mobility- Ind. Level contact guard assist  -     Functional Mobility- Device walker, front-wheeled  -     Functional Mobility-Distance (Feet) --  in room movement  -     Functional Mobility- Safety Issues supplemental O2  -     Row Name 05/08/23 1151          Activities of Daily Living    BADL Assessment/Intervention lower body dressing  -[facility] Name 05/08/23 1151          Lower Body Dressing Assessment/Training    [place] Level (Lower Body Dressing) socks;doff;independent;don;maximum assist (25% patient effort)  -     Position (Lower Body Dressing) supported sitting  -           User Key  (r) = Recorded By, (t) = Taken By, (c) = Cosigned By    Initials Name Provider Type    Kanika Bishop, OT Occupational Therapist               Obj/Interventions     Row Name 05/08/23 1153          Sensory Assessment (Somatosensory)    Sensory Assessment (Somatosensory) UE sensation intact  -[facility] Name 05/08/23 1153          Vision Assessment/Intervention    Visual Impairment/Limitations corrective lenses full-time  -     Row Name 05/08/23 1153          Range of Motion Comprehensive    General Range of Motion upper extremity range of motion deficits identified  -     Comment, General Range of Motion B shoulder flexion and abd PROM limited grossly 50%   -CHELSEA     Row Name 05/08/23 1153          Strength Comprehensive (MMT)    General Manual Muscle Testing (MMT) Assessment upper extremity strength deficits identified  -CHELSEA     Comment, General Manual Muscle Testing (MMT) Assessment BUE grossly 4/5  -     Row Name 05/08/23 1153          Shoulder (Therapeutic Exercise)    Shoulder (Therapeutic Exercise) AROM (active range of motion)  -CHELSEA     Shoulder AROM (Therapeutic Exercise) bilateral;aBduction;aDduction;scapular retraction;3 repetitions;5 repetitions  cues for PLB, encouraged to complete throughout the day  -CHELSEA     Row Name 05/08/23 1153          Motor Skills    Motor Skills functional endurance  -     Functional Endurance low to mid 90 on 02 NC  -CHELSEA     Therapeutic Exercise shoulder  -CHELSEA     Row Name 05/08/23 1153          Balance    Balance Assessment sitting static balance;sitting dynamic balance;standing static balance;standing dynamic balance  -CHELSEA     Static Sitting Balance independent  -CHELSEA     Dynamic Sitting Balance standby assist  -CHELSEA     Position, Sitting Balance unsupported;sitting in chair;sitting edge of bed  -CHELSEA     Static Standing Balance standby assist  -CHELSEA     Dynamic Standing Balance contact guard  -CHELSEA     Position/Device Used, Standing Balance supported;unsupported;walker, rolling  -CHELSEA     Balance Interventions sit to stand;occupation based/functional task  -CHELSEA     Comment, Balance LBD  -CHELSEA           User Key  (r) = Recorded By, (t) = Taken By, (c) = Cosigned By    Initials Name Provider Type    Kanika Bishop, OT Occupational Therapist               Goals/Plan     Row Name 05/08/23 1200          Bed Mobility Goal 1 (OT)    Activity/Assistive Device (Bed Mobility Goal 1, OT) bed mobility activities, all  -CHELSEA     [place] Level/Cues Needed (Bed Mobility Goal 1, OT) independent  -CHELSEA     Time Frame (Bed Mobility Goal 1, OT) long term goal (LTG);10 days  -CHELSEA     Progress/Outcomes (Bed Mobility Goal 1, OT) new goal  -     Row Name 05/08/23  1200          Transfer Goal 1 (OT)    Activity/Assistive Device (Transfer Goal 1, OT) toilet;walker, rolling  -CHELSEA     [place] Level/Cues Needed (Transfer Goal 1, OT) supervision required  -CHELSEA     Time Frame (Transfer Goal 1, OT) long term goal (LTG);10 days  -CHELSEA     Progress/Outcome (Transfer Goal 1, OT) new goal  -CHELSEA     Row Name 05/08/23 1200          Dressing Goal 1 (OT)    Activity/Device (Dressing Goal 1, OT) upper body dressing;lower body dressing  -CHELSEA     [place]/Cues Needed (Dressing Goal 1, OT) standby assist;set-up required  -CHELSEA     Time Frame (Dressing Goal 1, OT) long term goal (LTG);10 days  -CHELSEA     Strategies/Barriers (Dressing Goal 1, OT) robe, socks, undergarment, AE prn  -CHELSEA     Progress/Outcome (Dressing Goal 1, OT) new goal  -CHELSEA     Row Name 05/08/23 1200          Toileting Goal 1 (OT)    Activity/Device (Toileting Goal 1, OT) toileting skills, all  -CHELSEA     [place] Level/Cues Needed (Toileting Goal 1, OT) supervision required  -CHELSEA     Time Frame (Toileting Goal 1, OT) long term goal (LTG);10 days  -CHELSEA     Progress/Outcome (Toileting Goal 1, OT) new goal  -CHELSEA     Row Name 05/08/23 1200          Grooming Goal 1 (OT)    Activity/Device (Grooming Goal 1, OT) hair care;oral care;wash face, hands  -CHELSEA     [place] (Grooming Goal 1, OT) supervision required  -CHELSEA     Time Frame (Grooming Goal 1, OT) long term goal (LTG);10 days  -CHELSEA     Strategies/Barriers (Grooming Goal 1, OT) sinkside  -CHELSEA     Progress/Outcome (Grooming Goal 1, OT) new goal  -CHELSEA     Row Name 05/08/23 1200          Therapy Assessment/Plan (OT)    Planned Therapy Interventions (OT) activity tolerance training;adaptive equipment training;BADL retraining;functional balance retraining;occupation/activity based interventions;ROM/therapeutic exercise;strengthening exercise;patient/caregiver education/training;transfer/mobility retraining  -CHELSEA           User Key  (r) = Recorded By, (t) = Taken By, (c) = Cosigned By     Initials Name Provider Type    Kanika Bishop, OT Occupational Therapist               Clinical Impression     Row Name 05/08/23 1156          Pain Assessment    Pretreatment Pain Rating 0/10 - no pain  -CHELSEA     Posttreatment Pain Rating 0/10 - no pain  -CHELSEA     Pre/Posttreatment Pain Comment per pt. no pain just very congested  -CHELSEA     Pain Intervention(s) Ambulation/increased activity;Repositioned  -     Row Name 05/08/23 1156          Plan of Care Review    Plan of Care Reviewed With patient  -CHELSEA     Progress no change  -     Outcome Evaluation Pt. present with impaired activity tolerance/endurance, strength, ROM and high level balance impacting prior ADL independence level.  Skilled OT services appropriate to address deficit areas and promote return to PLOF.  Recommend SNF at this time due to home alone much of time, but will follow progess and update recommendation as needed.  -     Row Name 05/08/23 1156          Therapy Assessment/Plan (OT)    Patient/Family Therapy Goal Statement (OT) return to PLOF to be able to return home  -     Rehab Potential (OT) good, to achieve stated therapy goals  -     Criteria for Skilled Therapeutic Interventions Met (OT) yes;meets criteria;skilled treatment is necessary  -     Therapy Frequency (OT) daily  -     Row Name 05/08/23 1156          Therapy Plan Review/Discharge Plan (OT)    Equipment Needs Upon Discharge (OT) --  grab bars shower, possible 02 NC need  -CHELSEA     Anticipated Discharge Disposition (OT) skilled nursing facility  -     Row Name 05/08/23 4054          Vital Signs    Pre Systolic BP Rehab --  /115, IGB702/100, question accuracy of BP reads, nurse cleared for tx  -CHELSEA     Post Systolic BP Rehab --  181/82 LUE, /124  -CHELSEA     Pretreatment Heart Rate (beats/min) 80  -CHELSEA     Posttreatment Heart Rate (beats/min) 76  -CHELSEA     Pre SpO2 (%) 91  -CHELSEA     O2 Delivery Pre Treatment nasal cannula  -CHELSEA     O2 Delivery Intra Treatment nasal  cannula  -CHELSEA     Post SpO2 (%) 92  -CHELSEA     O2 Delivery Post Treatment nasal cannula  -CHELSEA     Pre Patient Position Supine  -CHELSEA     Intra Patient Position Standing  -CHELSEA     Post Patient Position Sitting  -CHELSEA     Row Name 05/08/23 1156          Positioning and Restraints    Pre-Treatment Position in bed  -CHELSEA     Post Treatment Position chair  -CHELSEA     In Chair reclined;call light within reach;encouraged to call for assist;exit alarm on;notified nsg;waffle cushion;legs elevated  -CHELSEA           User Key  (r) = Recorded By, (t) = Taken By, (c) = Cosigned By    Initials Name Provider Type    Kanika Bishop OT Occupational Therapist               Outcome Measures     Row Name 05/08/23 1201          How much help from another is currently needed...    Putting on and taking off regular lower body clothing? 2  -CHELSEA     Bathing (including washing, rinsing, and drying) 2  -CHELSEA     Toileting (which includes using toilet bed pan or urinal) 2  -CHELSEA     Putting on and taking off regular upper body clothing 2  -CHELSEA     Taking care of personal grooming (such as brushing teeth) 3  -CHELSEA     Eating meals 4  -CHELSEA     AM-PAC 6 Clicks Score (OT) 15  -CHELSEA     Row Name 05/08/23 1201          Functional Assessment    Outcome Measure Options AM-PAC 6 Clicks Daily Activity (OT)  -CHELSEA           User Key  (r) = Recorded By, (t) = Taken By, (c) = Cosigned By    Initials Name Provider Type    Kanika Bishop OT Occupational Therapist                Occupational Therapy Education     Title: PT OT SLP Therapies (In Progress)     Topic: Occupational Therapy (In Progress)     Point: ADL training (Done)     Description:   Instruct learner(s) on proper safety adaptation and remediation techniques during self care or transfers.   Instruct in proper use of assistive devices.              Learning Progress Summary           Patient Acceptance, E, VU,NR by CHELSEA at 5/8/2023 1202    Comment: reason for consult, waiting for wx and gait belt before up for safety, UE  TE/PLB                   Point: Home exercise program (Not Started)     Description:   Instruct learner(s) on appropriate technique for monitoring, assisting and/or progressing therapeutic exercises/activities.              Learner Progress:  Not documented in this visit.          Point: Precautions (Done)     Description:   Instruct learner(s) on prescribed precautions during self-care and functional transfers.              Learning Progress Summary           Patient Acceptance, E, VU,NR by CHELSEA at 5/8/2023 1202    Comment: reason for consult, waiting for wx and gait belt before up for safety, UE TE/PLB                   Point: Body mechanics (Not Started)     Description:   Instruct learner(s) on proper positioning and spine alignment during self-care, functional mobility activities and/or exercises.              Learner Progress:  Not documented in this visit.                      User Key     Initials Effective Dates Name Provider Type Discipline    CHELSEA 02/03/23 -  Kanika Bowers, OT Occupational Therapist OT              OT Recommendation and Plan  Planned Therapy Interventions (OT): activity tolerance training, adaptive equipment training, BADL retraining, functional balance retraining, occupation/activity based interventions, ROM/therapeutic exercise, strengthening exercise, patient/caregiver education/training, transfer/mobility retraining  Therapy Frequency (OT): daily  Plan of Care Review  Plan of Care Reviewed With: patient  Progress: no change  Outcome Evaluation: Pt. present with impaired activity tolerance/endurance, strength, ROM and high level balance impacting prior ADL independence level.  Skilled OT services appropriate to address deficit areas and promote return to PLOF.  Recommend SNF at this time due to home alone much of time, but will follow progess and update recommendation as needed.     Time Calculation:    Time Calculation- OT     Row Name 05/08/23 1203             Time Calculation- OT     OT Start Time 1120  -CHELSEA      OT Received On 05/08/23  -CHELSEA      OT Goal Re-Cert Due Date 05/18/23  -CHELSEA         Untimed Charges    OT Eval/Re-eval Minutes 46  -CHELSEA         Total Minutes    Untimed Charges Total Minutes 46  -CHELSEA       Total Minutes 46  -CHELSEA            User Key  (r) = Recorded By, (t) = Taken By, (c) = Cosigned By    Initials Name Provider Type    Kanika Bishop, OT Occupational Therapist              Therapy Charges for Today     Code Description Service Date Service Provider Modifiers Qty    82538234526 HC OT EVAL LOW COMPLEXITY 4 5/8/2023 Kanika Bowers, OT GO 1               Kanika Bowers OT  5/8/2023

## 2023-05-08 NOTE — CONSULTS
"Subjective     CHIEF COMPLAINT: Nausea vomiting and weakness    HISTORY OF PRESENT ILLNESS:  The patient is a 87 y.o. female, referred by Teetee Cesar MD for MDS.  The patient was admitted to hospitalist service on May 5, 2023 with nausea vomiting.  She found to have urosepsis.  She has better with IV antibiotics.  Given her severe neutropenia, she was started on Neupogen.  I was consulted to further assist in her care.  When I saw the patient today, she is laying comfortable in bed.  She was just diagnosed with COVID.  She would like to go home.  She has mild shortness of breath.      REVIEW OF SYSTEMS:  A 14 point review of systems was performed and is negative except as noted above.    Past Medical History:   Diagnosis Date   • Anemia    • Arthritis    • Diabetes mellitus     checks sugar only when pt wants to    • Disease of thyroid gland    • History of transfusion     with knee surgery-  no reaction recalled.    • [place] (hard of hearing)     no hearing aids   • Hypertension    • Migraine without aura and without status migrainosus, not intractable 12/30/2016   • Pulmonary emboli 2011    (after knee surgery)   • SOBOE (shortness of breath on exertion)    • Tremors of nervous system    • Vertigo    • Wears dentures     upper    • Wears glasses        No current facility-administered medications on file prior to encounter.     Current Outpatient Medications on File Prior to Encounter   Medication Sig Dispense Refill   • Diclofenac Sodium (VOLTAREN) 1 % gel gel APPLY 4 GRAMS TO THE AFFECTED JOINT FOUR TIMES DAILY AS NEEDED.     • Droplet Insulin Syringe 30G X 1/2\" 0.5 ML misc      • furosemide (LASIX) 20 MG tablet TAKE 1 TABLET BY MOUTH DAILY FOR FLUID RETENTION     • glimepiride (AMARYL) 2 MG tablet Take 1 tablet by mouth Every Morning.     • HYDROcodone-acetaminophen (NORCO) 5-325 MG per tablet Take 1 tablet by mouth Every 6 (Six) Hours As Needed for Moderate Pain . 12 tablet 0   • levothyroxine " (SYNTHROID, LEVOTHROID) 100 MCG tablet Take 1 tablet by mouth Daily.     • lisinopril (PRINIVIL,ZESTRIL) 5 MG tablet Take 1 tablet by mouth Daily. 30 tablet 0   • metFORMIN (GLUCOPHAGE) 500 MG tablet Take 2 tablets by mouth 2 (Two) Times a Day.     • ondansetron (ZOFRAN) 4 MG tablet Take 1 tablet by mouth Every 8 (Eight) Hours As Needed for Nausea. 15 tablet 0   • propranolol (INDERAL) 20 MG tablet Take 1 tablet by mouth 3 (Three) Times a Day. 90 tablet 0   • tamsulosin (FLOMAX) 0.4 MG capsule 24 hr capsule Take 1 capsule by mouth Daily. 10 capsule 0   • True Metrix Blood Glucose Test test strip See Admin Instructions.     • vitamin D (ERGOCALCIFEROL) 1.25 MG (19138 UT) capsule capsule      • warfarin (COUMADIN) 2 MG tablet Take 1 tablet by mouth Daily.     • albuterol sulfate  (90 Base) MCG/ACT inhaler Inhale 2 puffs Every 4 (Four) Hours As Needed for Shortness of Air.     • clotrimazole-betamethasone (LOTRISONE) 1-0.05 % cream Apply  topically to the appropriate area as directed See Admin Instructions. Apply topically to the affected area twice daily     • insulin NPH-insulin regular (HUMULIN 70/30) (70-30) 100 UNIT/ML injection Inject 45 Units under the skin into the appropriate area as directed 2 (Two) Times a Day With Meals.     • meclizine (ANTIVERT) 12.5 MG tablet Take 12.5 mg by mouth 3 (Three) Times a Day As Needed (vertigo).     • nystatin (MYCOSTATIN) 326439 UNIT/GM cream 2 (Two) Times a Day. to affected area     • omeprazole (PrilOSEC) 40 MG capsule Take 1 capsule by mouth Daily. 30 capsule 0   • potassium chloride 10 MEQ CR tablet TAKE 1 TABLET BY MOUTH EVERY DAY WITH FLUID PILL     • traMADol (ULTRAM) 50 MG tablet TAKE 1 TO 2 TABLETS BY MOUTH UP TO THREE TIMES DAILY AS NEEDED         Allergies   Allergen Reactions   • Aspirin Unknown - Low Severity     Mouth sores        Past Surgical History:   Procedure Laterality Date   • BLADDER SURGERY     • CATARACT EXTRACTION      bilat    •  CHOLECYSTECTOMY     • COLONOSCOPY     • HYSTERECTOMY     • KYPHOPLASTY N/A 2021    Procedure: KYPHOPLASTY T11, T12 AND L4;  Surgeon: Matty Hearn MD;  Location: [facility];  Service: Orthopedic Spine;  Laterality: N/A;   • OOPHORECTOMY     • TONSILLECTOMY         OB History    Para Term  AB Living   2 2 2         SAB IAB Ectopic Molar Multiple Live Births                    # Outcome Date GA Lbr [place]/2nd Weight Sex Delivery Anes PTL Lv   2 Term            1 Term                Social History     Socioeconomic History   • Marital status:    Tobacco Use   • Smoking status: Never   • Smokeless tobacco: Never   Vaping Use   • Vaping Use: Never used   Substance and Sexual Activity   • Alcohol use: No   • Drug use: No   • Sexual activity: Defer       Family History   Problem Relation Age of Onset   • Breast cancer Sister 84   • Stroke Mother    • Heart attack Father    • Ovarian cancer Neg Hx        Objective     Vitals:    23 0535 23 0700 23 0803 23 1105   BP: 153/65 143/64  (!) 188/79   BP Location: Right arm Right arm     Patient Position: Lying Lying     Pulse: 71 67 77 76   Resp: 12 14  16   Temp: 98.2 °F (36.8 °C) 98.4 °F (36.9 °C)  98.6 °F (37 °C)   TempSrc: Oral Oral  Oral   SpO2: 94% 94%     Weight:       Height:                            ECOG Performance Status: 2 - Symptomatic, <50% confined to bed  General: well appearing female in no acute distress  Neuro/Psych: A&O x 3, gait steady, appropriate affect, strength 5/5 in all muscle groups  HEENT: sclerae anicteric, oropharynx clear  Lymphatics: no cervical, supraclavicular, or axillary adenopathy  Cardiovascular: regular rate and rhythm, no murmurs  Lungs: clear to auscultation bilaterally  Abdomen: soft, nontender, nondistended.  No palpable organomegaly  Extremities: no lower extremity edema  Skin: no rashes, lesions, bruising, or petechiae      No results displayed because visit has over 200 results.            No results found.    ASSESSMENT 87-year-old female with thrombocytopenia      PLAN  1.  Thrombocytopenia:  A.  I reviewed the patient's chart including admission note blood work results and imaging report  B.  I did go over her CBC from today and reassured the patient her platelets are stable at 40,000.    2.  Severe neutropenia:  A.  ANC has improved to 3460.  B.  I will stop the patient at this point.    3.  Normocytic anemia:  A.  Hemoglobin stable at 7.9.    4.  Atrial fibrillation:  A.  Continue anticoagulation with Eliquis 2.5 mg twice daily as long as platelets are above 40,000.    5.  COVID:  A.  Supportive care.  B.  Patient is on steroids daily.    Dinh Lyman MD    5/8/2023

## 2023-05-09 PROBLEM — U07.1 COVID-19 VIRUS INFECTION: Status: ACTIVE | Noted: 2023-05-09

## 2023-05-09 LAB
ALBUMIN SERPL-MCNC: 3.1 G/DL (ref 3.5–5.2)
ALBUMIN/GLOB SERPL: 1.5 G/DL
ALP SERPL-CCNC: 71 U/L (ref 39–117)
ALT SERPL W P-5'-P-CCNC: 12 U/L (ref 1–33)
ANION GAP SERPL CALCULATED.3IONS-SCNC: 9 MMOL/L (ref 5–15)
AST SERPL-CCNC: 18 U/L (ref 1–32)
BASOPHILS # BLD AUTO: 0 10*3/MM3 (ref 0–0.2)
BASOPHILS NFR BLD AUTO: 0 % (ref 0–1.5)
BILIRUB SERPL-MCNC: 0.4 MG/DL (ref 0–1.2)
BUN SERPL-MCNC: 11 MG/DL (ref 8–23)
BUN/CREAT SERPL: 14.9 (ref 7–25)
CALCIUM SPEC-SCNC: 8.2 MG/DL (ref 8.6–10.5)
CHLORIDE SERPL-SCNC: 105 MMOL/L (ref 98–107)
CO2 SERPL-SCNC: 26 MMOL/L (ref 22–29)
CREAT SERPL-MCNC: 0.74 MG/DL (ref 0.57–1)
DEPRECATED RDW RBC AUTO: 54.7 FL (ref 37–54)
EGFRCR SERPLBLD CKD-EPI 2021: 78.9 ML/MIN/1.73
EOSINOPHIL # BLD AUTO: 0 10*3/MM3 (ref 0–0.4)
EOSINOPHIL NFR BLD AUTO: 0 % (ref 0.3–6.2)
ERYTHROCYTE [DISTWIDTH] IN BLOOD BY AUTOMATED COUNT: 17 % (ref 12.3–15.4)
GLOBULIN UR ELPH-MCNC: 2.1 GM/DL
GLUCOSE BLDC GLUCOMTR-MCNC: 185 MG/DL (ref 70–130)
GLUCOSE BLDC GLUCOMTR-MCNC: 187 MG/DL (ref 70–130)
GLUCOSE BLDC GLUCOMTR-MCNC: 360 MG/DL (ref 70–130)
GLUCOSE SERPL-MCNC: 158 MG/DL (ref 65–99)
HCT VFR BLD AUTO: 24.1 % (ref 34–46.6)
HGB BLD-MCNC: 7.9 G/DL (ref 12–15.9)
IMM GRANULOCYTES # BLD AUTO: 0.05 10*3/MM3 (ref 0–0.05)
IMM GRANULOCYTES NFR BLD AUTO: 1.8 % (ref 0–0.5)
LYMPHOCYTES # BLD AUTO: 0.3 10*3/MM3 (ref 0.7–3.1)
LYMPHOCYTES NFR BLD AUTO: 11.1 % (ref 19.6–45.3)
MCH RBC QN AUTO: 29 PG (ref 26.6–33)
MCHC RBC AUTO-ENTMCNC: 32.8 G/DL (ref 31.5–35.7)
MCV RBC AUTO: 88.6 FL (ref 79–97)
MONOCYTES # BLD AUTO: 0.24 10*3/MM3 (ref 0.1–0.9)
MONOCYTES NFR BLD AUTO: 8.9 % (ref 5–12)
NEUTROPHILS NFR BLD AUTO: 2.12 10*3/MM3 (ref 1.7–7)
NEUTROPHILS NFR BLD AUTO: 78.2 % (ref 42.7–76)
NRBC BLD AUTO-RTO: 0 /100 WBC (ref 0–0.2)
PF4 HEPARIN CMPLX IGG SERPL IA: 0.09 OD (ref 0–0.4)
PLATELET # BLD AUTO: 32 10*3/MM3 (ref 140–450)
PMV BLD AUTO: 10.2 FL (ref 6–12)
POTASSIUM SERPL-SCNC: 4.2 MMOL/L (ref 3.5–5.2)
PROT SERPL-MCNC: 5.2 G/DL (ref 6–8.5)
RBC # BLD AUTO: 2.72 10*6/MM3 (ref 3.77–5.28)
SODIUM SERPL-SCNC: 140 MMOL/L (ref 136–145)
WBC NRBC COR # BLD: 2.71 10*3/MM3 (ref 3.4–10.8)

## 2023-05-09 PROCEDURE — 80053 COMPREHEN METABOLIC PANEL: CPT | Performed by: INTERNAL MEDICINE

## 2023-05-09 PROCEDURE — 82948 REAGENT STRIP/BLOOD GLUCOSE: CPT

## 2023-05-09 PROCEDURE — 63710000001 DEXAMETHASONE PER 0.25 MG: Performed by: INTERNAL MEDICINE

## 2023-05-09 PROCEDURE — 85025 COMPLETE CBC W/AUTO DIFF WBC: CPT | Performed by: INTERNAL MEDICINE

## 2023-05-09 PROCEDURE — 25010000002 REMDESIVIR 100 MG/20ML SOLUTION 1 EACH VIAL

## 2023-05-09 PROCEDURE — 63710000001 INSULIN LISPRO (HUMAN) PER 5 UNITS: Performed by: PHYSICIAN ASSISTANT

## 2023-05-09 PROCEDURE — 99233 SBSQ HOSP IP/OBS HIGH 50: CPT | Performed by: INTERNAL MEDICINE

## 2023-05-09 RX ADMIN — INSULIN LISPRO 2 UNITS: 100 INJECTION, SOLUTION INTRAVENOUS; SUBCUTANEOUS at 09:41

## 2023-05-09 RX ADMIN — INSULIN LISPRO 8 UNITS: 100 INJECTION, SOLUTION INTRAVENOUS; SUBCUTANEOUS at 17:21

## 2023-05-09 RX ADMIN — INSULIN LISPRO 2 UNITS: 100 INJECTION, SOLUTION INTRAVENOUS; SUBCUTANEOUS at 13:12

## 2023-05-09 RX ADMIN — Medication 10 ML: at 09:42

## 2023-05-09 RX ADMIN — REMDESIVIR 100 MG: 100 INJECTION, POWDER, LYOPHILIZED, FOR SOLUTION INTRAVENOUS at 17:21

## 2023-05-09 RX ADMIN — SODIUM CHLORIDE, POTASSIUM CHLORIDE, SODIUM LACTATE AND CALCIUM CHLORIDE 100 ML/HR: 600; 310; 30; 20 INJECTION, SOLUTION INTRAVENOUS at 21:20

## 2023-05-09 RX ADMIN — DEXAMETHASONE 6 MG: 4 TABLET ORAL at 09:35

## 2023-05-09 RX ADMIN — LEVOTHYROXINE SODIUM 100 MCG: 0.1 TABLET ORAL at 06:55

## 2023-05-09 RX ADMIN — PROPRANOLOL HYDROCHLORIDE 20 MG: 20 TABLET ORAL at 09:42

## 2023-05-09 RX ADMIN — Medication 10 ML: at 21:21

## 2023-05-09 RX ADMIN — HYDROXYZINE HYDROCHLORIDE 25 MG: 25 TABLET, FILM COATED ORAL at 06:54

## 2023-05-09 RX ADMIN — LISINOPRIL 5 MG: 5 TABLET ORAL at 09:35

## 2023-05-09 RX ADMIN — PROPRANOLOL HYDROCHLORIDE 20 MG: 20 TABLET ORAL at 21:20

## 2023-05-09 RX ADMIN — PANTOPRAZOLE SODIUM 40 MG: 40 INJECTION, POWDER, LYOPHILIZED, FOR SOLUTION INTRAVENOUS at 09:35

## 2023-05-09 RX ADMIN — GUAIFENESIN 200 MG: 200 SOLUTION ORAL at 21:20

## 2023-05-09 RX ADMIN — Medication 5 MG: at 21:21

## 2023-05-09 RX ADMIN — SODIUM CHLORIDE, POTASSIUM CHLORIDE, SODIUM LACTATE AND CALCIUM CHLORIDE 100 ML/HR: 600; 310; 30; 20 INJECTION, SOLUTION INTRAVENOUS at 09:46

## 2023-05-09 RX ADMIN — TAMSULOSIN HYDROCHLORIDE 0.4 MG: 0.4 CAPSULE ORAL at 09:35

## 2023-05-09 RX ADMIN — Medication 1 CAPSULE: at 09:35

## 2023-05-09 RX ADMIN — ACETAMINOPHEN 325MG 650 MG: 325 TABLET ORAL at 21:20

## 2023-05-09 NOTE — PROGRESS NOTES
[facility] Medicine Services  PROGRESS NOTE    Patient Name: Chey Robertson  : 1935  MRN: [id number]    Date of Admission: 2023  Primary Care Physician: Corby Marie MD    Subjective   Subjective     CC:  F/U for UTI    HPI:  Pt complaining of cough and congestion this am. Denies any other issues overnight.     ROS:  General : no fevers, chills  CVS: No chest pain, palpitations  Respiratory: + cough, dyspnea only when coughing  GI: No N/V/D, abd pain  10 point review of systems is negative except for what is mentioned in HPI    Objective   Objective     Vital Signs:   Temp:  [98 °F (36.7 °C)] 98 °F (36.7 °C)  Heart Rate:  [] 66  Resp:  [18-22] 18  BP: (107-188)/() 107/39  Flow (L/min):  [2-8] 2     Physical Exam:  General: Chronically ill looking, not in distress, conversant and cooperative  Head: Atraumatic and normocephalic  Eyes: No Icterus. No pallor  Ears:  Ears appear intact with no abnormalities noted  Throat: No oral lesions, no thrush  Neck: Supple, trachea midline  Lungs: Clear to auscultation bilaterally, equal air entry, no wheezing or crackles  Heart:  Normal S1 and S2, no murmur, no gallop, No JVD, no lower extremity swelling  Abdomen:  Soft, no tenderness, no organomegaly, normal bowel sounds, no organomegaly  Extremities: pulses equal bilaterally  Skin: No bleeding, bruising or rash, normal skin turgor and elasticity  Neurologic: Cranial nerves appear intact with no evidence of facial asymmetry, normal motor and sensory functions in all 4 extremities  Psych: Alert and oriented x 3, normal mood    Results Reviewed:  LAB RESULTS:      Lab 23  0816 23  0616 23  0012 23  1728 23  1032 23  0453 23  1520 23  0840 23  0141 23  2231 23  1942 23  1705   WBC 2.71* 4.18  --   --  0.89* 0.80*  --  1.01*  --   --   --  1.67*   HEMOGLOBIN 7.9* 7.9* 7.6* 8.4* 7.7* 7.2*  --  8.3*  --    --   --  10.3*   HEMATOCRIT 24.1* 24.9* 24.2* 26.6* 24.8* 22.8*  --  26.3*  --   --   --  33.3*   PLATELETS 32* 40*  --   --  48* 48*  --  57*  --   --   --  72*   NEUTROS ABS 2.12 3.46  --   --  0.49* 0.35*  --  0.43*  --   --   --  1.14*   IMMATURE GRANS (ABS) 0.05 0.14*  --   --  0.01 0.01  --  0.01  --   --   --  0.01   LYMPHS ABS 0.30* 0.32*  --   --  0.24* 0.33*  --  0.40*  --   --   --  0.29*   MONOS ABS 0.24 0.26  --   --  0.15 0.11  --  0.17  --   --   --  0.23   EOS ABS 0.00 0.00  --   --  0.00 0.00  --  0.00  --   --   --  0.00   MCV 88.6 90.2  --   --  90.8 90.8  --  91.0  --   --   --  90.7   LACTATE  --   --   --   --  2.7*  --  2.6* 2.6* 3.4* 3.6*   < > 4.4*   PROTIME  --   --   --   --  15.5*  --   --   --   --   --   --  15.6*   APTT  --   --   --   --  31.8  --   --   --   --   --   --   --    D DIMER QUANT  --   --   --   --  0.83  --   --   --   --   --   --   --     < > = values in this interval not displayed.         Lab 05/09/23  0816 05/08/23  0616 05/07/23  1032 05/07/23  0453 05/06/23  0840 05/05/23  1705   SODIUM 140 140  --  141 140 141   POTASSIUM 4.2 4.3 3.9 3.4* 3.2* 3.6   CHLORIDE 105 106  --  105 102 98   CO2 26.0 24.0  --  27.0 25.0 26.0   ANION GAP 9.0 10.0  --  9.0 13.0 17.0*   BUN 11 8  --  10 13 15   CREATININE 0.74 0.78  --  0.83 0.67 0.75   EGFR 78.9 74.1  --  68.8 85.2 77.6   GLUCOSE 158* 126*  --  124* 122* 194*   CALCIUM 8.2* 8.1*  --  7.8* 8.0* 9.1   MAGNESIUM  --  2.1  --  1.0*  --   --    PHOSPHORUS  --   --   --  3.5  --   --          Lab 05/09/23  0816 05/08/23  0616 05/07/23  0453 05/05/23  1705   TOTAL PROTEIN 5.2* 4.9* 4.8* 7.3   ALBUMIN 3.1* 3.2* 3.1* 4.5   GLOBULIN 2.1 1.7 1.7 2.8   ALT (SGPT) 12 5 <5 8   AST (SGOT) 18 10 7 16   BILIRUBIN 0.4 0.8 0.5 0.8   ALK PHOS 71 62 54 75   LIPASE  --   --   --  42         Lab 05/07/23  1032 05/05/23  1857 05/05/23  1705   HSTROP T  --  12* 15*   PROTIME 15.5*  --  15.6*   INR 1.22*  --  1.23*             Lab 05/07/23  0453  05/05/23 2021 05/05/23 1709   IRON 32*  --   --    IRON SATURATION 16*  --   --    TIBC 201*  --   --    TRANSFERRIN 135*  --   --    VITAMIN B 12 493  --   --    ABO TYPING  --  O O   RH TYPING  --  Negative Negative   ANTIBODY SCREEN  --   --  Positive         Brief Urine Lab Results  (Last result in the past 365 days)      Color   Clarity   Blood   Leuk Est   Nitrite   Protein   CREAT   Urine HCG        05/05/23 1752 Yellow   Clear   Negative   Negative   Positive   Negative                 Microbiology Results Abnormal     Procedure Component Value - Date/Time    Blood Culture - Blood, Wrist, Right [[id number]]  (Normal) Collected: 05/05/23 1855    Lab Status: Preliminary result Specimen: Blood from Wrist, Right Updated: 05/08/23 2015     Blood Culture No growth at 3 days    Blood Culture - Blood, Arm, Right [[id number]]  (Normal) Collected: 05/05/23 1850    Lab Status: Preliminary result Specimen: Blood from Arm, Right Updated: 05/08/23 2015     Blood Culture No growth at 3 days    COVID PRE-OP / PRE-PROCEDURE SCREENING ORDER (NO ISOLATION) - Swab, Nasopharynx [[id number]]  (Normal) Collected: 05/05/23 1704    Lab Status: Final result Specimen: Swab from Nasopharynx Updated: 05/05/23 1747    Narrative:      The following orders were created for panel order COVID PRE-OP / PRE-PROCEDURE SCREENING ORDER (NO ISOLATION) - Swab, Nasopharynx.  Procedure                               Abnormality         Status                     ---------                               -----------         ------                     COVID-19 and FLU A/B PCR...[[id number]]  Normal              Final result                 Please view results for these tests on the individual orders.    COVID-19 and FLU A/B PCR - Swab, Nasopharynx [[id number]]  (Normal) Collected: 05/05/23 1704    Lab Status: Final result Specimen: Swab from Nasopharynx Updated: 05/05/23 1747     COVID19 Not Detected     Influenza A PCR Not Detected     Influenza B PCR Not  Detected    Narrative:      Fact sheet for providers: https://www.fda.gov/media/956807/download    Fact sheet for patients: https://www.fda.gov/media/326900/download    Test performed by PCR.          No radiology results from the last 24 hrs        Current medications:  Scheduled Meds:dexamethasone, 6 mg, Oral, Daily With Breakfast  insulin lispro, 0-9 Units, Subcutaneous, TID AC  lactobacillus acidophilus, 1 capsule, Oral, Daily  levothyroxine, 100 mcg, Oral, Q AM  lisinopril, 5 mg, Oral, Daily  propranolol, 20 mg, Oral, TID  remdesivir, 100 mg, Intravenous, Q24H  sodium chloride, 10 mL, Intravenous, Q12H  tamsulosin, 0.4 mg, Oral, Daily      Continuous Infusions:lactated ringers, 100 mL/hr, Last Rate: 100 mL/hr (05/09/23 0946)  Pharmacy Consult - Pharmacy to dose,       PRN Meds:.•  acetaminophen  •  benzonatate  •  Calcium Replacement - Follow Nurse / BPA Driven Protocol  •  dextrose  •  dextrose  •  glucagon (human recombinant)  •  guaifenesin  •  hydrOXYzine  •  Magnesium Standard Dose Replacement - Follow Nurse / BPA Driven Protocol  •  melatonin  •  ondansetron **OR** ondansetron  •  Pharmacy Consult - Pharmacy to dose  •  Phosphorus Replacement - Follow Nurse / BPA Driven Protocol  •  Potassium Replacement - Follow Nurse / BPA Driven Protocol  •  [COMPLETED] Insert Peripheral IV **AND** sodium chloride  •  sodium chloride  •  sodium chloride    Assessment & Plan   Assessment & Plan     Active Hospital Problems    Diagnosis  POA   • **Urinary tract infection, acute [N39.0]  Yes     Priority: High   • COVID-19 virus infection [U07.1]  Unknown     Priority: High   • Chronic anticoagulation [Z79.01]  Not Applicable   • Essential hypertension [I10]  Yes   • Type 2 diabetes mellitus without complication, with long-term current use of insulin [E11.9, Z79.4]  Not Applicable   • Personal history of pulmonary embolism [Z86.711]  Yes   • Hypothyroidism (acquired) [E03.9]  Yes   • Pancytopenia (HCC) [D61.818]  Yes       Resolved Hospital Problems   No resolved problems to display.        Brief Hospital Course to date:  Chey Robertson is a 87 y.o. female with past medical history of myelodysplasia, chronic pancytopenia, essential hypertension, type 2 diabetes, history of PE (recently switched from warfarin to Eliquis per her cardiologist because of inability to achieve therapeutic INR), GERD who presented to the hospital with generalized weakness, nausea and vomiting found to have urinary tract infection. Since admission, pt now found to have COVID19.     Assessment and plan:    Acute urinary tract infection, POA  Dehydration volume depletion  Acute on chronic debility  · Recent hospitalization end of March 2023 for acute UTI with pansensitive E. Coli  · Urinalysis positive for UTI, urine culture growing pansensitive E. coli  · Started on IV Rocephin on admission.  Given her persistent and worsening neutropenia with ANC of 350 on 5/7,  implemented neutropenic precautions and switched antibiotic therapy to IV cefepime  · Completed ABX per ID  · ANC improved  · Consulted ID  · Continue as needed Zofran  · Might need suppressive therapy with Macrobid upon discharge given her recurrent UTI  · PT/OT     COVID19 infection  -- pt complained of SOA, cough on 5/8, checked RVP and was positive for COVID 19  -- started on Decadron 6 mg daily D2/10, IV Remdesivir D2/5  -- monitor CMP, CRP, CBC with diff daily  -- not hypoxic currently  -- resume Eliquis tomorrow if PLTs >40K     Myelodysplastic syndrome  Worsening pancytopenia, likely secondary to sepsis  Worsening anemia, likely anemia of chronic disease  · Chronic pancytopenia  · Hemoglobin 7.9 this am, stable   · DIC panel checked and negative but fibrinogen is borderline low  · Neutropenia was getting worse with ANC of 350.  Currently on neutropenic precautions, s/p 1 dose of Neupogen 300 efe 5/7. ANC now 2120  · Given her worsening anemia, will hold Eliquis for now and continue to  monitor H&H closely with low threshold to transfuse blood to H&H less than 7 and 21  · Eliquis was being administered to prevent PE.  Okay to hold for few days.  Really not candidate for heparin or Lovenox because of her thrombocytopenia  · PO PPI  · Follows with Dr. Lyman as outpatient.  Courtesy consulted    History of PE  · Remote history of PE  · Was  on warfarin but unable to achieve therapeutic INR  · Recently switched to Eliquis 2.5 mg twice daily per her cardiologist  · Eliquis 2.5 mg twice daily is acceptable for A-fib with her age and body weight, but would not be adequate for PE.  Will need to be on Eliquis 5 mg twice daily instead  · Holding Eliquis for today due to PLTs of 32K (okay to continue if PLTs >40K per Dr. Lyman)    Type 2 diabetes  · A1c from end of March 2023 is 6%  · This is appropriate for age  · Continue sliding scale insulin     Essential hypertension  · Continue to hold home Lasix for now given dehydration  · Continue home Lisinopril and propranolol     Hypothyroidism  · Continue home synthroid    GERD  · PPI      Expected Discharge Location and Transportation: Home  Expected Discharge   Expected Discharge Date: 5/15/2023; Expected Discharge Time:      DVT prophylaxis:  Mechanical DVT prophylaxis orders are present.     AM-PAC 6 Clicks Score (PT): 17 (05/08/23 2109)      CODE STATUS:   Code Status and Medical Interventions:   Ordered at: 05/05/23 2031     Code Status (Patient has no pulse and is not breathing):    CPR (Attempt to Resuscitate)     Medical Interventions (Patient has pulse or is breathing):    Full Support     Copied text in this note has been reviewed and is accurate as of 05/09/23.     Teetee Cesar MD  05/09/23

## 2023-05-09 NOTE — PROGRESS NOTES
INFECTIOUS DISEASE Progress Note    Chey Robertson  1935  [id number]      Admission Date: 5/5/2023      Requesting Provider: No ref. provider found  Evaluating Physician: Kev Araya MD    Reason for Consultation: Sepsis/absolute neutropenia/UTI/Gastroenteritis    History of present illness:    5/7/23: Patient is a 87 y.o. female With myelodysplasia/chronic pancytopenia, type 2 diabetes mellitus, pulmonary emboli, and UTIs who is seen today for evaluation of nausea, vomiting, and pyuria/bacteriuria in the setting of absolute neutropenia. She was admitted approximately 1 month ago with a pansensitive E. Coli presenting with nausea and vomiting.  She complains of recurrent nausea and vomiting prior to her admission on 5/5.  She did not experience any fevers.  She noticed some urinary urgency and incontinence but denies dysuria. He remained afebrile. Presentation she had a white blood cell count of 1.67 with an absolute neutrophil count of 1140. She has 0-2 white blood cells on urinalysis and grew greater than 100,000 colonies of pansensitive E. Coli UTI on urine culture.  Blood cultures have been negative.She had lactic acidosis on presentation with a lactic acid level of 4.4. She has been treated with intravenous ceftriaxone but was switched to cefepime today. Today her white blood cell count is down 2.89  with an absolute neutrophil count of 490 and a platelet count of 48,000. An abdominal/pelvic CT scan revealed stable splenomegaly with colonic diverticulosis.  Her nausea and vomiting are better.    5/8/23: She has remained afebrile. Her hemoglobin this morning is 7.6. Blood cultures were 5/5.  Urine culture from 5/5 grew greater than 100,000 E. Coli. Her nausea and vomiting is better.  She would like to go home. Her white blood cell count is now up to  4.2.  She complains of cough and coryza.She does not think she has Covid 19 infection. We ordered a respiratory virus panel PCR in the panel has now  turned positive for Covid 19.She is now requiring 2 L of oxygen with an O2 saturation of 91%.    5/9/23: She has remained afebrile overnight. She was requiring up to 6 L of oxygen overnight. She is on remdesivir and Decadron.  A supplemental oxygen has been decreased to 2 L this evening with an O2 sat of 96%.  She denies increased cough and dyspnea.  She has some residual coryza.    Past Medical History:   Diagnosis Date   • Anemia    • Arthritis    • Diabetes mellitus     checks sugar only when pt wants to    • Disease of thyroid gland    • History of transfusion     with knee surgery-  no reaction recalled.    • [place] (hard of hearing)     no hearing aids   • Hypertension    • Migraine without aura and without status migrainosus, not intractable 12/30/2016   • Pulmonary emboli 2011    (after knee surgery)   • SOBOE (shortness of breath on exertion)    • Tremors of nervous system    • Vertigo    • Wears dentures     upper    • Wears glasses        Past Surgical History:   Procedure Laterality Date   • BLADDER SURGERY     • CATARACT EXTRACTION      bilat    • CHOLECYSTECTOMY     • COLONOSCOPY     • HYSTERECTOMY     • KYPHOPLASTY N/A 2/12/2021    Procedure: KYPHOPLASTY T11, T12 AND L4;  Surgeon: Matty Hearn MD;  Location: [facility];  Service: Orthopedic Spine;  Laterality: N/A;   • OOPHORECTOMY     • TONSILLECTOMY         Family History   Problem Relation Age of Onset   • Breast cancer Sister 84   • Stroke Mother    • Heart attack Father    • Ovarian cancer Neg Hx        Social History     Socioeconomic History   • Marital status:    Tobacco Use   • Smoking status: Never     Passive exposure: Never   • Smokeless tobacco: Never   Vaping Use   • Vaping Use: Never used   Substance and Sexual Activity   • Alcohol use: No   • Drug use: No   • Sexual activity: Defer       Allergies   Allergen Reactions   • Aspirin Unknown - Low Severity     Mouth sores          Medication:    Current Facility-Administered  Medications:   •  acetaminophen (TYLENOL) tablet 650 mg, 650 mg, Oral, Q4H PRN, Chad Martins PA-C, 650 mg at 05/08/23 1205  •  benzonatate (TESSALON) capsule 200 mg, 200 mg, Oral, TID PRN, Teetee Cesar MD, 200 mg at 05/08/23 2101  •  Calcium Replacement - Follow Nurse / BPA Driven Protocol, , Does not apply, PRN, Raissa Saenz, APRN  •  dexamethasone (DECADRON) tablet 6 mg, 6 mg, Oral, Daily With Breakfast, Teetee Cesar MD, 6 mg at 05/08/23 1704  •  dextrose (D50W) (25 g/50 mL) IV injection 25 g, 25 g, Intravenous, Q15 Min PRN, Chad Martins PA-C  •  dextrose (GLUTOSE) oral gel 15 g, 15 g, Oral, Q15 Min PRN, Chad Martins PA-C  •  glucagon (GLUCAGEN) injection 1 mg, 1 mg, Intramuscular, Q15 Min PRN, Chad Martins PA-C  •  guaifenesin (ROBITUSSIN) 100 MG/5ML liquid 200 mg, 200 mg, Oral, Q4H PRN, Teetee Cesar MD, 200 mg at 05/08/23 2101  •  hydrOXYzine (ATARAX) tablet 25 mg, 25 mg, Oral, TID PRN, Raissa Saenz, APRN, 25 mg at 05/08/23 2109  •  Insulin Lispro (humaLOG) injection 0-9 Units, 0-9 Units, Subcutaneous, TID AC, Chad Martins PA-C, 4 Units at 05/08/23 1712  •  lactated ringers infusion, 100 mL/hr, Intravenous, Continuous, Chad Martins PA-C, Last Rate: 100 mL/hr at 05/08/23 0537, 100 mL/hr at 05/08/23 0537  •  lactobacillus acidophilus (RISAQUAD) capsule 1 capsule, 1 capsule, Oral, Daily, Teetee Cesar MD, 1 capsule at 05/08/23 1045  •  levothyroxine (SYNTHROID, LEVOTHROID) tablet 100 mcg, 100 mcg, Oral, Q AM, Chad Martins PA-C, 100 mcg at 05/08/23 0537  •  lisinopril (PRINIVIL,ZESTRIL) tablet 5 mg, 5 mg, Oral, Daily, Chad Martins PA-C, 5 mg at 05/08/23 0803  •  Magnesium Standard Dose Replacement - Follow Nurse / BPA Driven Protocol, , Does not apply, PRN, Raissa Saenz, APRN  •  melatonin tablet 5 mg, 5 mg, Oral, Nightly PRN, Chad Martins PA-C, 5 mg at 05/08/23  2101  •  ondansetron (ZOFRAN) tablet 4 mg, 4 mg, Oral, Q6H PRN **OR** ondansetron (ZOFRAN) injection 4 mg, 4 mg, Intravenous, Q6H PRN, Chad Martins PA-C  •  pantoprazole (PROTONIX) injection 40 mg, 40 mg, Intravenous, Q12H, Sleena Pulliam MD, 40 mg at 05/08/23 2101  •  Pharmacy Consult - Pharmacy to dose, , Does not apply, Continuous PRN, Teetee Cesar MD  •  Phosphorus Replacement - Follow Nurse / BPA Driven Protocol, , Does not apply, PRN, Raissa Saenz, APRN  •  Potassium Replacement - Follow Nurse / BPA Driven Protocol, , Does not apply, PRN, Raissa Saenz APRN  •  propranolol (INDERAL) tablet 20 mg, 20 mg, Oral, TID, Chad Martins PA-C, 20 mg at 05/08/23 2101  •  [COMPLETED] remdesivir 200 mg in sodium chloride 0.9 % 290 mL IVPB (powder vial), 200 mg, Intravenous, Once, 200 mg at 05/08/23 1835 **FOLLOWED BY** remdesivir 100 mg in sodium chloride 0.9 % 250 mL IVPB (powder vial), 100 mg, Intravenous, Q24H, Rosa Rodríguez, [facility]  •  [COMPLETED] Insert Peripheral IV, , , Once **AND** sodium chloride 0.9 % flush 10 mL, 10 mL, Intravenous, PRN, Iva Pate MD  •  sodium chloride 0.9 % flush 10 mL, 10 mL, Intravenous, Q12H, Chad Martins PA-C, 10 mL at 05/08/23 2101  •  sodium chloride 0.9 % flush 10 mL, 10 mL, Intravenous, PRN, Chad Martins PA-C  •  sodium chloride 0.9 % infusion 40 mL, 40 mL, Intravenous, PRN, Chad Martins PA-C  •  tamsulosin (FLOMAX) 24 hr capsule 0.4 mg, 0.4 mg, Oral, Daily, Chad Martins PA-C, 0.4 mg at 05/08/23 0803    Antibiotics:  Anti-Infectives (From admission, onward)    Ordered     Dose/Rate Route Frequency Start Stop    05/08/23 1614  remdesivir 100 mg in sodium chloride 0.9 % 250 mL IVPB (powder vial)        Ordering Provider: Rosa Rodríguez RPH   See Jerry for full Linked Orders Report.    100 mg  over 60 Minutes Intravenous Every 24 Hours 05/09/23 1700 05/13/23 1659    05/08/23 1614   remdesivir 200 mg in sodium chloride 0.9 % 290 mL IVPB (powder vial)        Ordering Provider: Rosa Rodríguez RPH   See [facility] for full Linked Orders Report.    200 mg  over 60 Minutes Intravenous Once 23 1700 23 1935    23 0807  cefepime (MAXIPIME) 2 g/100 mL 0.9% NS (mbp)        Ordering Provider: Selena Pulliam MD    2 g  200 mL/hr over 30 Minutes Intravenous Once 23 0900 23 1101    23 1840  piperacillin-tazobactam (ZOSYN) 3.375 g in iso-osmotic dextrose 50 ml (premix)        Ordering Provider: Iva Pate MD    3.375 g  over 30 Minutes Intravenous Once 23 1842 23 2037    23 1840  vancomycin (VANCOCIN) 1000 mg/200 mL dextrose 5% IVPB        Ordering Provider: Iva Pate MD    20 mg/kg × 54 kg Intravenous Once 23 18423 2109            Review of Systems:  See HPI      Physical Exam:   Vital Signs  Temp (24hrs), Av.3 °F (36.8 °C), Min:98 °F (36.7 °C), Max:98.6 °F (37 °C)    Temp  Min: 98 °F (36.7 °C)  Max: 98.6 °F (37 °C)  BP  Min: 124/57  Max: 188/102  Pulse  Min: 67  Max: 111  Resp  Min: 14  Max: 22  SpO2  Min: 82 %  Max: 99 %    GENERAL: Alert and in no acute stress  HEENT: Normocephalic, atraumatic.  PERRL. EOMI. No conjunctival injection. No icterus. Oropharynx clear without evidence of thrush or exudate.   NECK: Supple   HEART: RRR; No murmur  LUNGS: Clear to auscultation bilaterally without wheezing, rales, rhonchi. Normal respiratory effort.   ABDOMEN: Soft, nontender, nondistended. No rebound or guarding. NO mass or HSM.  EXT:  No cyanosis, clubbing or edema. .  :  Without Hurd catheter.  MSK: No joint effusions or erythema  SKIN: Warm and dry without cutaneous eruptions on Inspection/palpation.    NEURO: Oriented to PPT.  Motor 5/5 strength  PSYCHIATRIC: Normal insight and judgment. Cooperative with PE    Laboratory Data    Results from last 7 days   Lab Units 23  0616 23  0012  05/07/23  1728 05/07/23  1032 05/07/23  0453   WBC 10*3/mm3 4.18  --   --  0.89* 0.80*   HEMOGLOBIN g/dL 7.9* 7.6* 8.4* 7.7* 7.2*   HEMATOCRIT % 24.9* 24.2* 26.6* 24.8* 22.8*   PLATELETS 10*3/mm3 40*  --   --  48* 48*     Results from last 7 days   Lab Units 05/08/23  0616   SODIUM mmol/L 140   POTASSIUM mmol/L 4.3   CHLORIDE mmol/L 106   CO2 mmol/L 24.0   BUN mg/dL 8   CREATININE mg/dL 0.78   GLUCOSE mg/dL 126*   CALCIUM mg/dL 8.1*     Results from last 7 days   Lab Units 05/08/23  0616   ALK PHOS U/L 62   BILIRUBIN mg/dL 0.8   ALT (SGPT) U/L 5   AST (SGOT) U/L 10             Results from last 7 days   Lab Units 05/07/23  1032   LACTATE mmol/L 2.7*             Estimated Creatinine Clearance: 45.8 mL/min (by C-G formula based on SCr of 0.78 mg/dL).      Microbiology:  Blood Culture   Date Value Ref Range Status   05/05/2023 No growth at 24 hours  Preliminary     No results found for: BCIDPCR, CXREFLEX, CSFCX, CULTURETIS  No results found for: CULTURES, HSVCX, URCX  No results found for: EYECULTURE, GCCX, HSVCULTURE, LABHSV  No results found for: LEGIONELLA, MRSACX, MUMPSCX, MYCOPLASCX  No results found for: NOCARDIACX, STOOLCX  Urine Culture   Date Value Ref Range Status   05/05/2023 >100,000 CFU/mL Escherichia coli (A)  Final     No results found for: VIRALCULTU, WOUNDCX        Radiology:  Imaging Results (Last 72 Hours)     ** No results found for the last 72 hours. **            Impression:   1. Covid 19 infection-her initial PCR on 5/5 was negative and she then developed respiratory symptoms with a cough and coryza yesterday.  Her PCR panel is now positive for Covid 19 infection. She is requiring supplemental.  She has been started on intravenous remdesivir along with Decadron. She has developed some hypoxia associated with her Covid 19 infection.  Her time course suggests that she may have had Covid 19 infection at the time of her admission with a negative screening PCR test.  2.  E. Coli bacteriuria-without  pyuria but she does have absolute neutropenia.  This is difficult to assess since she has urinary incontinence/frequency without dysuria. I will stop her antibacterial antibiotic therapy.  3.  Myelodysplasia-with absolute neutropenia.  This places her at increased risk for recurrent infections  4.  Thrombocytopenia-this places her at increased risk for bleeding  5.  History of pulmonary embolus  6.  Nausea/vomiting-improved    PLAN/RECOMMENDATIONS:   1.  Continue Decadron  2.  Airborne/contact precautions for Covid 19 infection-She will need to isolate for 20 days since she is an immunocompromised host.  This will end on 5/27  3.  Intravenous remdesivir ×5 days  4.  Continue off of antibacterial antibiotic therapy      Kev Araya MD  5/9/2023  06:20 EDT

## 2023-05-09 NOTE — PLAN OF CARE
Goal Outcome Evaluation:         Patient is NSR on the monitor and between 2L and room air.  Patient will desat to 88% when asleep.  Alert and oriented times four.  Purewick in use.  I&D seen patient at bedside.  Plan of care, continue to monitor.  Bed in lowest position, phone and call light in reach.

## 2023-05-10 LAB
ALBUMIN SERPL-MCNC: 3.2 G/DL (ref 3.5–5.2)
ALBUMIN/GLOB SERPL: 1.7 G/DL
ALP SERPL-CCNC: 69 U/L (ref 39–117)
ALT SERPL W P-5'-P-CCNC: 10 U/L (ref 1–33)
ANION GAP SERPL CALCULATED.3IONS-SCNC: 8 MMOL/L (ref 5–15)
AST SERPL-CCNC: 13 U/L (ref 1–32)
BACTERIA SPEC AEROBE CULT: NORMAL
BACTERIA SPEC AEROBE CULT: NORMAL
BASOPHILS # BLD AUTO: 0 10*3/MM3 (ref 0–0.2)
BASOPHILS NFR BLD AUTO: 0 % (ref 0–1.5)
BILIRUB SERPL-MCNC: 0.3 MG/DL (ref 0–1.2)
BUN SERPL-MCNC: 18 MG/DL (ref 8–23)
BUN/CREAT SERPL: 23.4 (ref 7–25)
CALCIUM SPEC-SCNC: 8.3 MG/DL (ref 8.6–10.5)
CHLORIDE SERPL-SCNC: 105 MMOL/L (ref 98–107)
CO2 SERPL-SCNC: 26 MMOL/L (ref 22–29)
CREAT SERPL-MCNC: 0.77 MG/DL (ref 0.57–1)
CRP SERPL-MCNC: 3.76 MG/DL (ref 0–0.5)
DEPRECATED RDW RBC AUTO: 57.3 FL (ref 37–54)
EGFRCR SERPLBLD CKD-EPI 2021: 75.2 ML/MIN/1.73
EOSINOPHIL # BLD AUTO: 0 10*3/MM3 (ref 0–0.4)
EOSINOPHIL NFR BLD AUTO: 0 % (ref 0.3–6.2)
ERYTHROCYTE [DISTWIDTH] IN BLOOD BY AUTOMATED COUNT: 17 % (ref 12.3–15.4)
GLOBULIN UR ELPH-MCNC: 1.9 GM/DL
GLUCOSE BLDC GLUCOMTR-MCNC: 235 MG/DL (ref 70–130)
GLUCOSE BLDC GLUCOMTR-MCNC: 296 MG/DL (ref 70–130)
GLUCOSE BLDC GLUCOMTR-MCNC: 387 MG/DL (ref 70–130)
GLUCOSE BLDC GLUCOMTR-MCNC: 407 MG/DL (ref 70–130)
GLUCOSE SERPL-MCNC: 247 MG/DL (ref 65–99)
HCT VFR BLD AUTO: 25.5 % (ref 34–46.6)
HGB BLD-MCNC: 7.8 G/DL (ref 12–15.9)
IMM GRANULOCYTES # BLD AUTO: 0.02 10*3/MM3 (ref 0–0.05)
IMM GRANULOCYTES NFR BLD AUTO: 0.9 % (ref 0–0.5)
LYMPHOCYTES # BLD AUTO: 0.37 10*3/MM3 (ref 0.7–3.1)
LYMPHOCYTES NFR BLD AUTO: 17.2 % (ref 19.6–45.3)
MCH RBC QN AUTO: 28.4 PG (ref 26.6–33)
MCHC RBC AUTO-ENTMCNC: 30.6 G/DL (ref 31.5–35.7)
MCV RBC AUTO: 92.7 FL (ref 79–97)
MONOCYTES # BLD AUTO: 0.18 10*3/MM3 (ref 0.1–0.9)
MONOCYTES NFR BLD AUTO: 8.4 % (ref 5–12)
NEUTROPHILS NFR BLD AUTO: 1.58 10*3/MM3 (ref 1.7–7)
NEUTROPHILS NFR BLD AUTO: 73.5 % (ref 42.7–76)
NRBC BLD AUTO-RTO: 0 /100 WBC (ref 0–0.2)
PLATELET # BLD AUTO: 46 10*3/MM3 (ref 140–450)
PMV BLD AUTO: 10.3 FL (ref 6–12)
POTASSIUM SERPL-SCNC: 4.2 MMOL/L (ref 3.5–5.2)
PROT SERPL-MCNC: 5.1 G/DL (ref 6–8.5)
RBC # BLD AUTO: 2.75 10*6/MM3 (ref 3.77–5.28)
SODIUM SERPL-SCNC: 139 MMOL/L (ref 136–145)
WBC NRBC COR # BLD: 2.15 10*3/MM3 (ref 3.4–10.8)

## 2023-05-10 PROCEDURE — 63710000001 DEXAMETHASONE PER 0.25 MG: Performed by: INTERNAL MEDICINE

## 2023-05-10 PROCEDURE — 86140 C-REACTIVE PROTEIN: CPT | Performed by: INTERNAL MEDICINE

## 2023-05-10 PROCEDURE — 25010000002 REMDESIVIR 100 MG/20ML SOLUTION 1 EACH VIAL

## 2023-05-10 PROCEDURE — 99233 SBSQ HOSP IP/OBS HIGH 50: CPT | Performed by: INTERNAL MEDICINE

## 2023-05-10 PROCEDURE — 97530 THERAPEUTIC ACTIVITIES: CPT

## 2023-05-10 PROCEDURE — 63710000001 INSULIN LISPRO (HUMAN) PER 5 UNITS: Performed by: PHYSICIAN ASSISTANT

## 2023-05-10 PROCEDURE — 85025 COMPLETE CBC W/AUTO DIFF WBC: CPT | Performed by: INTERNAL MEDICINE

## 2023-05-10 PROCEDURE — 82948 REAGENT STRIP/BLOOD GLUCOSE: CPT

## 2023-05-10 PROCEDURE — 63710000001 INSULIN LISPRO (HUMAN) PER 5 UNITS: Performed by: INTERNAL MEDICINE

## 2023-05-10 PROCEDURE — 63710000001 INSULIN DETEMIR PER 5 UNITS: Performed by: INTERNAL MEDICINE

## 2023-05-10 PROCEDURE — 80053 COMPREHEN METABOLIC PANEL: CPT | Performed by: INTERNAL MEDICINE

## 2023-05-10 RX ORDER — INSULIN LISPRO 100 [IU]/ML
5 INJECTION, SOLUTION INTRAVENOUS; SUBCUTANEOUS
Status: DISCONTINUED | OUTPATIENT
Start: 2023-05-10 | End: 2023-05-12

## 2023-05-10 RX ADMIN — INSULIN LISPRO 5 UNITS: 100 INJECTION, SOLUTION INTRAVENOUS; SUBCUTANEOUS at 17:09

## 2023-05-10 RX ADMIN — APIXABAN 5 MG: 5 TABLET, FILM COATED ORAL at 21:03

## 2023-05-10 RX ADMIN — DEXAMETHASONE 6 MG: 4 TABLET ORAL at 08:53

## 2023-05-10 RX ADMIN — Medication 10 ML: at 08:54

## 2023-05-10 RX ADMIN — Medication 1 CAPSULE: at 08:50

## 2023-05-10 RX ADMIN — INSULIN LISPRO 5 UNITS: 100 INJECTION, SOLUTION INTRAVENOUS; SUBCUTANEOUS at 12:28

## 2023-05-10 RX ADMIN — APIXABAN 5 MG: 5 TABLET, FILM COATED ORAL at 09:08

## 2023-05-10 RX ADMIN — ACETAMINOPHEN 325MG 650 MG: 325 TABLET ORAL at 21:11

## 2023-05-10 RX ADMIN — REMDESIVIR 100 MG: 100 INJECTION, POWDER, LYOPHILIZED, FOR SOLUTION INTRAVENOUS at 16:25

## 2023-05-10 RX ADMIN — PROPRANOLOL HYDROCHLORIDE 20 MG: 20 TABLET ORAL at 08:50

## 2023-05-10 RX ADMIN — LISINOPRIL 5 MG: 5 TABLET ORAL at 08:53

## 2023-05-10 RX ADMIN — TAMSULOSIN HYDROCHLORIDE 0.4 MG: 0.4 CAPSULE ORAL at 08:53

## 2023-05-10 RX ADMIN — Medication 5 MG: at 21:11

## 2023-05-10 RX ADMIN — LEVOTHYROXINE SODIUM 100 MCG: 0.1 TABLET ORAL at 05:45

## 2023-05-10 RX ADMIN — INSULIN LISPRO 4 UNITS: 100 INJECTION, SOLUTION INTRAVENOUS; SUBCUTANEOUS at 12:28

## 2023-05-10 RX ADMIN — SODIUM CHLORIDE, POTASSIUM CHLORIDE, SODIUM LACTATE AND CALCIUM CHLORIDE 100 ML/HR: 600; 310; 30; 20 INJECTION, SOLUTION INTRAVENOUS at 21:15

## 2023-05-10 RX ADMIN — Medication 10 ML: at 21:03

## 2023-05-10 RX ADMIN — INSULIN LISPRO 6 UNITS: 100 INJECTION, SOLUTION INTRAVENOUS; SUBCUTANEOUS at 17:09

## 2023-05-10 RX ADMIN — INSULIN DETEMIR 10 UNITS: 100 INJECTION, SOLUTION SUBCUTANEOUS at 21:03

## 2023-05-10 RX ADMIN — INSULIN LISPRO 3 UNITS: 100 INJECTION, SOLUTION INTRAVENOUS; SUBCUTANEOUS at 08:52

## 2023-05-10 RX ADMIN — PROPRANOLOL HYDROCHLORIDE 20 MG: 20 TABLET ORAL at 16:19

## 2023-05-10 NOTE — THERAPY TREATMENT NOTE
Patient Name: Chey Robertson  : 1935    MRN: [id number]                              Today's Date: 5/10/2023       Admit Date: 2023    Visit Dx:     ICD-10-CM ICD-9-CM   1. Urinary tract infection, acute  N39.0 599.0   2. Acute dehydration  E86.0 276.51     Patient Active Problem List   Diagnosis   • Benign familial tremor   • AMS (altered mental status)   • Thrombocytopenia (HCC)   • Pancytopenia (HCC)   • Shortness of breath   • Hypothyroidism (acquired)   • Acute kidney injury   • Splenomegaly   • Hepatomegaly   • Confusion   • B12 deficiency   • Abnormal intestinal absorption   • Iron deficiency anemia due to chronic blood loss   • Myelodysplasia (myelodysplastic syndrome)   • Weakness   • Personal history of pulmonary embolism   • Urinary tract infection, acute   • Chronic anticoagulation   • Essential hypertension   • Type 2 diabetes mellitus without complication, with long-term current use of insulin   • COVID-19 virus infection     Past Medical History:   Diagnosis Date   • Anemia    • Arthritis    • Diabetes mellitus     checks sugar only when pt wants to    • Disease of thyroid gland    • History of transfusion     with knee surgery-  no reaction recalled.    • [place] (hard of hearing)     no hearing aids   • Hypertension    • Migraine without aura and without status migrainosus, not intractable 2016   • Pulmonary emboli     (after knee surgery)   • SOBOE (shortness of breath on exertion)    • Tremors of nervous system    • Vertigo    • Wears dentures     upper    • Wears glasses      Past Surgical History:   Procedure Laterality Date   • BLADDER SURGERY     • CATARACT EXTRACTION      bilat    • CHOLECYSTECTOMY     • COLONOSCOPY     • HYSTERECTOMY     • KYPHOPLASTY N/A 2021    Procedure: KYPHOPLASTY T11, T12 AND L4;  Surgeon: Matty Hearn MD;  Location: [facility];  Service: Orthopedic Spine;  Laterality: N/A;   • OOPHORECTOMY     • TONSILLECTOMY        General Information      Row Name 05/10/23 143          Physical Therapy Time and Intention    Document Type therapy note (daily note)  -ML     Mode of Treatment physical therapy  -ML     Row Name 05/10/23 1431          General Information    Patient Profile Reviewed yes  -ML     Existing Precautions/Restrictions fall;oxygen therapy device and L/min  -ML     Barriers to Rehab medically complex;previous functional deficit  -ML     Row Name 05/10/23 1431          Cognition    Orientation Status (Cognition) oriented x 4  -ML     Row Name 05/10/23 1431          Safety Issues, Functional Mobility    Safety Issues Affecting Function (Mobility) awareness of need for assistance;insight into deficits/self-awareness;safety precaution awareness;safety precautions follow-through/compliance  -ML     Impairments Affecting Function (Mobility) balance;endurance/activity tolerance;strength  -ML           User Key  (r) = Recorded By, (t) = Taken By, (c) = Cosigned By    Initials Name Provider Type    ML Marybeth Arias Physical Therapist               Mobility     Row Name 05/10/23 1431          Bed Mobility    Bed Mobility supine-sit  -ML     Supine-Sit [place] (Bed Mobility) standby assist  -ML     Assistive Device (Bed Mobility) bed rails;head of bed elevated  -ML     Row Name 05/10/23 1431          Sit-Stand Transfer    Sit-Stand [place] (Transfers) verbal cues;supervision  -ML     Assistive Device (Sit-Stand Transfers) walker, front-wheeled  -ML     Comment, (Sit-Stand Transfer) Cues for hand placement prior to sit to stand transfer  -ML     Row Name 05/10/23 1431          Gait/Stairs (Locomotion)    [place] Level (Gait) contact guard  -ML     Assistive Device (Gait) walker, front-wheeled  -ML     Distance in Feet (Gait) 60  -ML     Deviations/Abnormal Patterns (Gait) bilateral deviations;gait speed decreased  -ML     Bilateral Gait Deviations forward flexed posture;heel strike decreased  -ML     Comment, (Gait/Stairs) Patient ambulated  without rest breaks and no increased work of breathing.  -ML           User Key  (r) = Recorded By, (t) = Taken By, (c) = Cosigned By    Initials Name Provider Type    Marybeth Nichols Physical Therapist               Obj/Interventions     Row Name 05/10/23 1504          Motor Skills    Therapeutic Exercise hip;knee  -ML     Row Name 05/10/23 1504          Hip (Therapeutic Exercise)    Hip (Therapeutic Exercise) strengthening exercise  -ML     Hip Strengthening (Therapeutic Exercise) bilateral;aBduction;aDduction;marching while seated;20 repititions  -ML     Row Name 05/10/23 1504          Knee (Therapeutic Exercise)    Knee (Therapeutic Exercise) strengthening exercise  -ML     Knee Strengthening (Therapeutic Exercise) bilateral;LAQ (long arc quad);10 repetitions;2 sets  -ML     Row Name 05/10/23 1504          Balance    Balance Assessment sitting static balance;sitting dynamic balance;sit to stand dynamic balance;standing static balance;standing dynamic balance  -ML     Static Sitting Balance independent  -ML     Dynamic Sitting Balance standby assist  -ML     Position, Sitting Balance unsupported;sitting edge of bed;supported;sitting in chair  -ML     Sit to Stand Dynamic Balance supervision;verbal cues  -ML     Static Standing Balance supervision  -ML     Dynamic Standing Balance contact guard  -ML     Position/Device Used, Standing Balance supported;walker, rolling  -ML     Balance Interventions sitting;standing;sit to stand;supported;static;dynamic;occupation based/functional task  -ML           User Key  (r) = Recorded By, (t) = Taken By, (c) = Cosigned By    Initials Name Provider Type    Marybeth Nichols Physical Therapist               Goals/Plan    No documentation.                Clinical Impression     Row Name 05/10/23 1505          Pain    Pretreatment Pain Rating 0/10 - no pain  -ML     Posttreatment Pain Rating 0/10 - no pain  -ML     Row Name 05/10/23 1505          Plan of Care Review    Plan of Care  Reviewed With patient  -ML     Progress improving  -ML     Outcome Evaluation Physical therapy treatment complete. The patient increased ambulation distance compared to previous treatment session. Patient ambulated on room air without increased work of breathing or rest breaks. The patient continues to present below baseline for mobility and would benefit from skilled PT to address strength, balance and activity tolerance deficits. Continue to recommend SNF at discharge.  -ML     Row Name 05/10/23 1505          Vital Signs    Pre Patient Position Supine  -ML     Intra Patient Position Standing  -ML     Post Patient Position Sitting  -ML     Row Name 05/10/23 1505          Positioning and Restraints    Pre-Treatment Position in bed  -ML     Post Treatment Position chair  -ML     In Chair notified nsg;reclined;call light within reach;encouraged to call for assist;exit alarm on;waffle cushion;legs elevated  -ML           User Key  (r) = Recorded By, (t) = Taken By, (c) = Cosigned By    Initials Name Provider Type     Marybeth Arias Physical Therapist               Outcome Measures     Row Name 05/10/23 1508          How much help from another person do you currently need...    Turning from your back to your side while in flat bed without using bedrails? 4  -ML     Moving from lying on back to sitting on the side of a flat bed without bedrails? 3  -ML     Moving to and from a bed to a chair (including a wheelchair)? 3  -ML     Standing up from a chair using your arms (e.g., wheelchair, bedside chair)? 3  -ML     Climbing 3-5 steps with a railing? 3  -ML     To walk in hospital room? 3  -ML     AM-PAC 6 Clicks Score (PT) 19  -ML     Highest level of mobility 6 --> Walked 10 steps or more  -ML     Row Name 05/10/23 1508          Functional Assessment    Outcome Measure Options AM-PAC 6 Clicks Basic Mobility (PT)  -ML           User Key  (r) = Recorded By, (t) = Taken By, (c) = Cosigned By    Initials Name Provider Type     Marybeth Nichols Physical Therapist                             Physical Therapy Education     Title: PT OT SLP Therapies (Done)     Topic: Physical Therapy (Done)     Point: Mobility training (Done)     Learning Progress Summary           Patient Acceptance, E, VU,NR by ML at 5/10/2023 1508    Acceptance, E,TB, VU by BT at 5/9/2023 1542    Acceptance, E,D, VU,NR by MB at 5/8/2023 1441                   Point: Home exercise program (Done)     Learning Progress Summary           Patient Acceptance, E, VU,NR by ML at 5/10/2023 1508    Acceptance, E,TB, VU by BT at 5/9/2023 1542    Acceptance, E,D, VU,NR by MB at 5/8/2023 1441                   Point: Body mechanics (Done)     Learning Progress Summary           Patient Acceptance, E,TB, VU by BT at 5/9/2023 1542    Acceptance, E,D, VU,NR by MB at 5/8/2023 1441                   Point: Precautions (Done)     Learning Progress Summary           Patient Acceptance, E, VU,NR by  at 5/10/2023 1508    Acceptance, E,TB, VU by BT at 5/9/2023 1542    Acceptance, E,D, VU,NR by MB at 5/8/2023 1441                               User Key     Initials Effective Dates Name Provider Type Discipline    MB 06/16/21 -  Brooke Marshall, PT Physical Therapist PT     12/30/20 -  Marly Cordero, RN Registered Nurse Nurse     04/22/21 -  Marybeth Arias Physical Therapist PT              PT Recommendation and Plan     Plan of Care Reviewed With: patient  Progress: improving  Outcome Evaluation: Physical therapy treatment complete. The patient increased ambulation distance compared to previous treatment session. Patient ambulated on room air without increased work of breathing or rest breaks. The patient continues to present below baseline for mobility and would benefit from skilled PT to address strength, balance and activity tolerance deficits. Continue to recommend SNF at discharge.     Time Calculation:    PT Charges     Row Name 05/10/23 1509             Time Calculation    Start  Time 1114  -ML      Stop Time 1142  -ML      Time Calculation (min) 28 min  -ML      PT Received On 05/10/23  -ML         Timed Charges    40771 - PT Therapeutic Activity Minutes 28  -ML         Total Minutes    Timed Charges Total Minutes 28  -ML       Total Minutes 28  -ML            User Key  (r) = Recorded By, (t) = Taken By, (c) = Cosigned By    Initials Name Provider Type    Marybeth Nichols Physical Therapist              Therapy Charges for Today     Code Description Service Date Service Provider Modifiers Qty    62998748304 HC PT THERAPEUTIC ACT EA 15 MIN 5/10/2023 Marybeth Arias GP 2          PT G-Codes  Outcome Measure Options: AM-PAC 6 Clicks Basic Mobility (PT)  AM-PAC 6 Clicks Score (PT): 19  AM-PAC 6 Clicks Score (OT): 15       Marybeth Arias  5/10/2023

## 2023-05-10 NOTE — PROGRESS NOTES
[facility] Medicine Services  PROGRESS NOTE    Patient Name: Chey Robertson  : 1935  MRN: [id number]    Date of Admission: 2023  Primary Care Physician: Corby Marie MD    Subjective   Subjective     CC:  F/U for UTI    HPI:  Pt feeling much better today, denies any issues overnight. Still with some cough but much improved compared to two days ago.     ROS:  General : no fevers, chills  CVS: No chest pain, palpitations  Respiratory: + cough, no dyspnea  GI: No N/V/D, abd pain  10 point review of systems is negative except for what is mentioned in HPI    Objective   Objective     Vital Signs:   Temp:  [97.6 °F (36.4 °C)-97.9 °F (36.6 °C)] 97.8 °F (36.6 °C)  Heart Rate:  [53-73] 73  Resp:  [16-18] 16  BP: (107-130)/(39-69) 130/65  Flow (L/min):  [2] 2     Physical Exam:  General: Chronically ill looking, not in distress, conversant and cooperative  Head: Atraumatic and normocephalic  Eyes: No Icterus. No pallor  Ears:  Ears appear intact with no abnormalities noted  Throat: No oral lesions, no thrush  Neck: Supple, trachea midline  Lungs: Clear to auscultation bilaterally, equal air entry, no wheezing or crackles  Heart:  Normal S1 and S2, no murmur, no gallop, No JVD, no lower extremity swelling  Abdomen:  Soft, no tenderness, no organomegaly, normal bowel sounds, no organomegaly  Extremities: pulses equal bilaterally  Skin: No bleeding, bruising or rash, normal skin turgor and elasticity  Neurologic: Cranial nerves appear intact with no evidence of facial asymmetry, normal motor and sensory functions in all 4 extremities  Psych: Alert and oriented x 3, normal mood    Results Reviewed:  LAB RESULTS:      Lab 05/10/23  0556 23  0816 23  0616 23  0012 23  1728 23  1032 23  0453 23  1520 23  0840 23  0141 23  2231 23  1942 23  1705   WBC 2.15* 2.71* 4.18  --   --  0.89* 0.80*  --  1.01*  --   --   --   1.67*   HEMOGLOBIN 7.8* 7.9* 7.9* 7.6* 8.4* 7.7* 7.2*  --  8.3*  --   --   --  10.3*   HEMATOCRIT 25.5* 24.1* 24.9* 24.2* 26.6* 24.8* 22.8*  --  26.3*  --   --   --  33.3*   PLATELETS 46* 32* 40*  --   --  48* 48*  --  57*  --   --   --  72*   NEUTROS ABS 1.58* 2.12 3.46  --   --  0.49* 0.35*  --  0.43*  --   --   --  1.14*   IMMATURE GRANS (ABS) 0.02 0.05 0.14*  --   --  0.01 0.01  --  0.01  --   --   --  0.01   LYMPHS ABS 0.37* 0.30* 0.32*  --   --  0.24* 0.33*  --  0.40*  --   --   --  0.29*   MONOS ABS 0.18 0.24 0.26  --   --  0.15 0.11  --  0.17  --   --   --  0.23   EOS ABS 0.00 0.00 0.00  --   --  0.00 0.00  --  0.00  --   --   --  0.00   MCV 92.7 88.6 90.2  --   --  90.8 90.8  --  91.0  --   --   --  90.7   CRP 3.76*  --   --   --   --   --   --   --   --   --   --   --   --    LACTATE  --   --   --   --   --  2.7*  --  2.6* 2.6* 3.4* 3.6*   < > 4.4*   PROTIME  --   --   --   --   --  15.5*  --   --   --   --   --   --  15.6*   APTT  --   --   --   --   --  31.8  --   --   --   --   --   --   --    D DIMER QUANT  --   --   --   --   --  0.83  --   --   --   --   --   --   --     < > = values in this interval not displayed.         Lab 05/10/23  0556 05/09/23  0816 05/08/23  0616 05/07/23  1032 05/07/23  0453 05/06/23  0840   SODIUM 139 140 140  --  141 140   POTASSIUM 4.2 4.2 4.3 3.9 3.4* 3.2*   CHLORIDE 105 105 106  --  105 102   CO2 26.0 26.0 24.0  --  27.0 25.0   ANION GAP 8.0 9.0 10.0  --  9.0 13.0   BUN 18 11 8  --  10 13   CREATININE 0.77 0.74 0.78  --  0.83 0.67   EGFR 75.2 78.9 74.1  --  68.8 85.2   GLUCOSE 247* 158* 126*  --  124* 122*   CALCIUM 8.3* 8.2* 8.1*  --  7.8* 8.0*   MAGNESIUM  --   --  2.1  --  1.0*  --    PHOSPHORUS  --   --   --   --  3.5  --          Lab 05/10/23  0556 05/09/23  0816 05/08/23  0616 05/07/23  0453 05/05/23  1705   TOTAL PROTEIN 5.1* 5.2* 4.9* 4.8* 7.3   ALBUMIN 3.2* 3.1* 3.2* 3.1* 4.5   GLOBULIN 1.9 2.1 1.7 1.7 2.8   ALT (SGPT) 10 12 5 <5 8   AST (SGOT) 13 18 10 7 16    BILIRUBIN 0.3 0.4 0.8 0.5 0.8   ALK PHOS 69 71 62 54 75   LIPASE  --   --   --   --  42         Lab 05/07/23  1032 05/05/23 1857 05/05/23 1705   HSTROP T  --  12* 15*   PROTIME 15.5*  --  15.6*   INR 1.22*  --  1.23*             Lab 05/07/23  0453 05/05/23 2021 05/05/23 1709   IRON 32*  --   --    IRON SATURATION 16*  --   --    TIBC 201*  --   --    TRANSFERRIN 135*  --   --    VITAMIN B 12 493  --   --    ABO TYPING  --  O O   RH TYPING  --  Negative Negative   ANTIBODY SCREEN  --   --  Positive         Brief Urine Lab Results  (Last result in the past 365 days)      Color   Clarity   Blood   Leuk Est   Nitrite   Protein   CREAT   Urine HCG        05/05/23 1752 Yellow   Clear   Negative   Negative   Positive   Negative                 Microbiology Results Abnormal     Procedure Component Value - Date/Time    Blood Culture - Blood, Wrist, Right [[id number]]  (Normal) Collected: 05/05/23 1855    Lab Status: Preliminary result Specimen: Blood from Wrist, Right Updated: 05/09/23 2015     Blood Culture No growth at 4 days    Blood Culture - Blood, Arm, Right [[id number]]  (Normal) Collected: 05/05/23 1850    Lab Status: Preliminary result Specimen: Blood from Arm, Right Updated: 05/09/23 2015     Blood Culture No growth at 4 days    COVID PRE-OP / PRE-PROCEDURE SCREENING ORDER (NO ISOLATION) - Swab, Nasopharynx [[id number]]  (Normal) Collected: 05/05/23 1704    Lab Status: Final result Specimen: Swab from Nasopharynx Updated: 05/05/23 1017    Narrative:      The following orders were created for panel order COVID PRE-OP / PRE-PROCEDURE SCREENING ORDER (NO ISOLATION) - Swab, Nasopharynx.  Procedure                               Abnormality         Status                     ---------                               -----------         ------                     COVID-19 and FLU A/B PCR...[[id number]]  Normal              Final result                 Please view results for these tests on the individual orders.     COVID-19 and FLU A/B PCR - Swab, Nasopharynx [[id number]]  (Normal) Collected: 05/05/23 1704    Lab Status: Final result Specimen: Swab from Nasopharynx Updated: 05/05/23 1747     COVID19 Not Detected     Influenza A PCR Not Detected     Influenza B PCR Not Detected    Narrative:      Fact sheet for providers: https://www.fda.gov/media/592100/download    Fact sheet for patients: https://www.fda.gov/media/255828/download    Test performed by PCR.          No radiology results from the last 24 hrs        Current medications:  Scheduled Meds:apixaban, 5 mg, Oral, Q12H  dexamethasone, 6 mg, Oral, Daily With Breakfast  insulin detemir, 10 Units, Subcutaneous, Nightly  insulin lispro, 0-9 Units, Subcutaneous, TID AC  Insulin Lispro, 5 Units, Subcutaneous, TID With Meals  lactobacillus acidophilus, 1 capsule, Oral, Daily  levothyroxine, 100 mcg, Oral, Q AM  lisinopril, 5 mg, Oral, Daily  propranolol, 20 mg, Oral, TID  remdesivir, 100 mg, Intravenous, Q24H  sodium chloride, 10 mL, Intravenous, Q12H  tamsulosin, 0.4 mg, Oral, Daily      Continuous Infusions:lactated ringers, 100 mL/hr, Last Rate: 100 mL/hr (05/09/23 2120)  Pharmacy Consult - Pharmacy to dose,       PRN Meds:.•  acetaminophen  •  benzonatate  •  Calcium Replacement - Follow Nurse / BPA Driven Protocol  •  dextrose  •  dextrose  •  glucagon (human recombinant)  •  guaifenesin  •  hydrOXYzine  •  Magnesium Standard Dose Replacement - Follow Nurse / BPA Driven Protocol  •  melatonin  •  ondansetron **OR** ondansetron  •  Pharmacy Consult - Pharmacy to dose  •  Phosphorus Replacement - Follow Nurse / BPA Driven Protocol  •  Potassium Replacement - Follow Nurse / BPA Driven Protocol  •  [COMPLETED] Insert Peripheral IV **AND** sodium chloride  •  sodium chloride  •  sodium chloride    Assessment & Plan   Assessment & Plan     Active Hospital Problems    Diagnosis  POA   • **Urinary tract infection, acute [N39.0]  Yes     Priority: High   • COVID-19 virus infection  [U07.1]  Unknown     Priority: High   • Chronic anticoagulation [Z79.01]  Not Applicable   • Essential hypertension [I10]  Yes   • Type 2 diabetes mellitus without complication, with long-term current use of insulin [E11.9, Z79.4]  Not Applicable   • Personal history of pulmonary embolism [Z86.711]  Yes   • Hypothyroidism (acquired) [E03.9]  Yes   • Pancytopenia (HCC) [D61.818]  Yes      Resolved Hospital Problems   No resolved problems to display.        Brief Hospital Course to date:  Chey Robertson is a 87 y.o. female with past medical history of myelodysplasia, chronic pancytopenia, essential hypertension, type 2 diabetes, history of PE (recently switched from warfarin to Eliquis per her cardiologist because of inability to achieve therapeutic INR), GERD who presented to the hospital with generalized weakness, nausea and vomiting found to have urinary tract infection. Since admission, pt now found to have COVID19.     Assessment and plan:    Acute urinary tract infection, POA  Dehydration volume depletion  Acute on chronic debility  · Recent hospitalization end of March 2023 for acute UTI with pansensitive E. Coli  · Urinalysis positive for UTI, urine culture growing pansensitive E. coli  · Started on IV Rocephin on admission.  Given her persistent and worsening neutropenia with ANC of 350 on 5/7,  implemented neutropenic precautions and switched antibiotic therapy to IV cefepime  · Completed ABX per ID  · ANC improved  · Consulted ID  · Continue as needed Zofran  · PT/OT recommended rehab    COVID19 infection  -- pt complained of SOA, cough on 5/8, checked RVP and was positive for COVID 19  -- started on Decadron 6 mg daily D3/10, IV Remdesivir D3/5  -- monitor CMP, CRP, CBC with diff daily  -- not hypoxic currently  -- resume Eliquis today as PLTs >40K     Myelodysplastic syndrome  Worsening pancytopenia, likely secondary to sepsis  Worsening anemia, likely anemia of chronic disease  · Chronic  pancytopenia  · Hemoglobin 7.9 this am, stable   · DIC panel checked and negative but fibrinogen is borderline low  · Neutropenia was getting worse with ANC of 350.  Currently on neutropenic precautions, s/p 1 dose of Neupogen 300 efe 5/7. ANC improving  · Given her worsening anemia, held Eliquis, H&H relatively stable. Maintain low threshold to transfuse blood to H&H less than 7 and 21  · PO PPI  · Follows with Dr. Lyman as outpatient.  Courtesy consulted    History of PE  · Remote history of PE  · Was  on warfarin but unable to achieve therapeutic INR  · Recently switched to Eliquis 2.5 mg twice daily per her cardiologist  · Eliquis 2.5 mg twice daily is acceptable for A-fib with her age and body weight, but would not be adequate for PE.  Will need to be on Eliquis 5 mg twice daily instead  · Resume Eliquis today as PLTs >40K (okay per Dr. Lyman's note)    Type 2 diabetes  · A1c from end of March 2023 is 6%  · Hyperglycemia the last two days likely due to steroids.   · Add low dose basal/bolus insulin  · Continue sliding scale insulin     Essential hypertension  · Continue to hold home Lasix for now given dehydration  · Continue home Lisinopril and propranolol     Hypothyroidism  · Continue home synthroid    GERD  · PPI      Expected Discharge Location and Transportation:  rehab  Expected Discharge (if ten day COVID antigen test is negative)  Expected Discharge Date: 5/17/2023; Expected Discharge Time:      DVT prophylaxis:  Medical and mechanical DVT prophylaxis orders are present.     AM-PAC 6 Clicks Score (PT): 17 (05/08/23 2109)      CODE STATUS:   Code Status and Medical Interventions:   Ordered at: 05/05/23 2031     Code Status (Patient has no pulse and is not breathing):    CPR (Attempt to Resuscitate)     Medical Interventions (Patient has pulse or is breathing):    Full Support     Copied text in this note has been reviewed and is accurate as of 05/10/23.     Teetee Cesar MD  05/10/23

## 2023-05-10 NOTE — CASE MANAGEMENT/SOCIAL WORK
Continued Stay Note  [facility]     Patient Name: Chey Robertson  MRN: [id number]  Today's Date: 5/10/2023    Admit Date: 5/5/2023    Plan: discharge plan   Discharge Plan     Row Name 05/10/23 1655       Plan    Plan discharge plan    Plan Comments Pt is in covid isolation until 5/28 unless covid antigen on 5/18 is negative. Plan is to [facility] for short term rehab possibly transition to LTC.  I attempted to contact zackery Talamantes on phone to confirm that [facility] is till the discharge plan and had to leave a message. CM will make a referral to [facility] when closer to medical readiness. CM will cont to follow    Final Discharge Disposition Code 03 - skilled nursing facility (SNF)               Discharge Codes    No documentation.               Expected Discharge Date and Time     Expected Discharge Date Expected Discharge Time    May 17, 2023             Kellie Lauren RN

## 2023-05-10 NOTE — PLAN OF CARE
Goal Outcome Evaluation:              Outcome Evaluation: Patient still in isolation.  A & O x4.  Up in chair for the day today.  Platelets are improving and patient states that she is feeling jorge.

## 2023-05-10 NOTE — PROGRESS NOTES
INFECTIOUS DISEASE Progress Note    Chey Robertson  1935  [id number]      Admission Date: 5/5/2023      Requesting Provider: No ref. provider found  Evaluating Physician: Kev Araya MD    Reason for Consultation: Sepsis/absolute neutropenia/UTI/Gastroenteritis    History of present illness:    5/7/23: Patient is a 87 y.o. female With myelodysplasia/chronic pancytopenia, type 2 diabetes mellitus, pulmonary emboli, and UTIs who is seen today for evaluation of nausea, vomiting, and pyuria/bacteriuria in the setting of absolute neutropenia. She was admitted approximately 1 month ago with a pansensitive E. Coli presenting with nausea and vomiting.  She complains of recurrent nausea and vomiting prior to her admission on 5/5.  She did not experience any fevers.  She noticed some urinary urgency and incontinence but denies dysuria. He remained afebrile. Presentation she had a white blood cell count of 1.67 with an absolute neutrophil count of 1140. She has 0-2 white blood cells on urinalysis and grew greater than 100,000 colonies of pansensitive E. Coli UTI on urine culture.  Blood cultures have been negative.She had lactic acidosis on presentation with a lactic acid level of 4.4. She has been treated with intravenous ceftriaxone but was switched to cefepime today. Today her white blood cell count is down 2.89  with an absolute neutrophil count of 490 and a platelet count of 48,000. An abdominal/pelvic CT scan revealed stable splenomegaly with colonic diverticulosis.  Her nausea and vomiting are better.    5/8/23: She has remained afebrile. Her hemoglobin this morning is 7.6. Blood cultures were 5/5.  Urine culture from 5/5 grew greater than 100,000 E. Coli. Her nausea and vomiting is better.  She would like to go home. Her white blood cell count is now up to  4.2.  She complains of cough and coryza.She does not think she has Covid 19 infection. We ordered a respiratory virus panel PCR in the panel has now  turned positive for Covid 19.She is now requiring 2 L of oxygen with an O2 saturation of 91%.    5/9/23: She has remained afebrile overnight. She was requiring up to 6 L of oxygen overnight. She is on remdesivir and Decadron.  A supplemental oxygen has been decreased to 2 L this evening with an O2 sat of 96%.  She denies increased cough and dyspnea.  She has some residual coryza.    5/10/23: She remains afebrile. Her creatinine today is 0.77.  Her CRP is 3.76.  Her white blood cell count of strep down to 2.15. Her platelet count remains low at 46,000. She is on 2 L of oxygen with an O2 saturation of 93%.  She feels better today.  She has decreased cough and decreased coryza.      Past Medical History:   Diagnosis Date   • Anemia    • Arthritis    • Diabetes mellitus     checks sugar only when pt wants to    • Disease of thyroid gland    • History of transfusion     with knee surgery-  no reaction recalled.    • [place] (hard of hearing)     no hearing aids   • Hypertension    • Migraine without aura and without status migrainosus, not intractable 12/30/2016   • Pulmonary emboli 2011    (after knee surgery)   • SOBOE (shortness of breath on exertion)    • Tremors of nervous system    • Vertigo    • Wears dentures     upper    • Wears glasses        Past Surgical History:   Procedure Laterality Date   • BLADDER SURGERY     • CATARACT EXTRACTION      bilat    • CHOLECYSTECTOMY     • COLONOSCOPY     • HYSTERECTOMY     • KYPHOPLASTY N/A 2/12/2021    Procedure: KYPHOPLASTY T11, T12 AND L4;  Surgeon: Matty Hearn MD;  Location: [facility];  Service: Orthopedic Spine;  Laterality: N/A;   • OOPHORECTOMY     • TONSILLECTOMY         Family History   Problem Relation Age of Onset   • Breast cancer Sister 84   • Stroke Mother    • Heart attack Father    • Ovarian cancer Neg Hx        Social History     Socioeconomic History   • Marital status:    Tobacco Use   • Smoking status: Never     Passive exposure: Never   •  Smokeless tobacco: Never   Vaping Use   • Vaping Use: Never used   Substance and Sexual Activity   • Alcohol use: No   • Drug use: No   • Sexual activity: Defer       Allergies   Allergen Reactions   • Aspirin Unknown - Low Severity     Mouth sores          Medication:    Current Facility-Administered Medications:   •  acetaminophen (TYLENOL) tablet 650 mg, 650 mg, Oral, Q4H PRN, Chad Martins PA-C, 650 mg at 05/09/23 2120  •  benzonatate (TESSALON) capsule 200 mg, 200 mg, Oral, TID PRN, Teetee Cesar MD, 200 mg at 05/08/23 2101  •  Calcium Replacement - Follow Nurse / BPA Driven Protocol, , Does not apply, PRN, Raissa Saenz APRN  •  dexamethasone (DECADRON) tablet 6 mg, 6 mg, Oral, Daily With Breakfast, Teetee Cesar MD, 6 mg at 05/09/23 0935  •  dextrose (D50W) (25 g/50 mL) IV injection 25 g, 25 g, Intravenous, Q15 Min PRN, Chad Martins PA-C  •  dextrose (GLUTOSE) oral gel 15 g, 15 g, Oral, Q15 Min PRN, Chad Martins PA-C  •  glucagon (GLUCAGEN) injection 1 mg, 1 mg, Intramuscular, Q15 Min PRN, Chad Martins PA-C  •  guaifenesin (ROBITUSSIN) 100 MG/5ML liquid 200 mg, 200 mg, Oral, Q4H PRN, Teetee Cesar MD, 200 mg at 05/09/23 2120  •  hydrOXYzine (ATARAX) tablet 25 mg, 25 mg, Oral, TID PRN, Raissa Saenz APRN, 25 mg at 05/09/23 0654  •  Insulin Lispro (humaLOG) injection 0-9 Units, 0-9 Units, Subcutaneous, TID AC, Chad Martins PA-C, 8 Units at 05/09/23 1721  •  lactated ringers infusion, 100 mL/hr, Intravenous, Continuous, Chad Martins PA-C, Last Rate: 100 mL/hr at 05/09/23 2120, 100 mL/hr at 05/09/23 2120  •  lactobacillus acidophilus (RISAQUAD) capsule 1 capsule, 1 capsule, Oral, Daily, Teetee Cesar MD, 1 capsule at 05/09/23 0935  •  levothyroxine (SYNTHROID, LEVOTHROID) tablet 100 mcg, 100 mcg, Oral, Q AM, Chad Martins PA-C, 100 mcg at 05/10/23 0545  •  lisinopril (PRINIVIL,ZESTRIL) tablet  5 mg, 5 mg, Oral, Daily, Chad Martins PA-C, 5 mg at 05/09/23 0935  •  Magnesium Standard Dose Replacement - Follow Nurse / BPA Driven Protocol, , Does not apply, PRN, Raissa Saenz, APRN  •  melatonin tablet 5 mg, 5 mg, Oral, Nightly PRN, Chad Martins PA-C, 5 mg at 05/09/23 2121  •  ondansetron (ZOFRAN) tablet 4 mg, 4 mg, Oral, Q6H PRN **OR** ondansetron (ZOFRAN) injection 4 mg, 4 mg, Intravenous, Q6H PRN, Chad Martins PA-C  •  Pharmacy Consult - Pharmacy to dose, , Does not apply, Continuous PRN, Teetee Cesar MD  •  Phosphorus Replacement - Follow Nurse / BPA Driven Protocol, , Does not apply, PRN, Raissa Saenz, APRN  •  Potassium Replacement - Follow Nurse / BPA Driven Protocol, , Does not apply, PRN, Raissa Saenz, APRN  •  propranolol (INDERAL) tablet 20 mg, 20 mg, Oral, TID, Chad Martins PA-C, 20 mg at 05/09/23 2120  •  [COMPLETED] remdesivir 200 mg in sodium chloride 0.9 % 290 mL IVPB (powder vial), 200 mg, Intravenous, Once, 200 mg at 05/08/23 1835 **FOLLOWED BY** remdesivir 100 mg in sodium chloride 0.9 % 250 mL IVPB (powder vial), 100 mg, Intravenous, Q24H, Rosa Rodríguez, [facility], 100 mg at 05/09/23 1721  •  [COMPLETED] Insert Peripheral IV, , , Once **AND** sodium chloride 0.9 % flush 10 mL, 10 mL, Intravenous, PRN, Iva Pate MD  •  sodium chloride 0.9 % flush 10 mL, 10 mL, Intravenous, Q12H, Chad Martins PA-C, 10 mL at 05/09/23 2121  •  sodium chloride 0.9 % flush 10 mL, 10 mL, Intravenous, PRN, Chad Martins PA-C  •  sodium chloride 0.9 % infusion 40 mL, 40 mL, Intravenous, PRN, Chad Martins PA-C  •  tamsulosin (FLOMAX) 24 hr capsule 0.4 mg, 0.4 mg, Oral, Daily, Chad Martins PA-C, 0.4 mg at 05/09/23 0935    Antibiotics:  Anti-Infectives (From admission, onward)    Ordered     Dose/Rate Route Frequency Start Stop    05/08/23 1614  remdesivir 100 mg in sodium chloride 0.9 % 250 mL IVPB  (powder vial)        Ordering Provider: Rosa Rodríguez RPH   See Hyperspace for full Linked Orders Report.    100 mg  over 60 Minutes Intravenous Every 24 Hours 23 1700 23 1659    23 1614  remdesivir 200 mg in sodium chloride 0.9 % 290 mL IVPB (powder vial)        Ordering Provider: Rosa Rodríguez RPH   See Hyperspace for full Linked Orders Report.    200 mg  over 60 Minutes Intravenous Once 23 1700 23 1935    23 0807  cefepime (MAXIPIME) 2 g/100 mL 0.9% NS (mbp)        Ordering Provider: Selena Pulliam MD    2 g  200 mL/hr over 30 Minutes Intravenous Once 23 0900 23 1101    23 1840  piperacillin-tazobactam (ZOSYN) 3.375 g in iso-osmotic dextrose 50 ml (premix)        Ordering Provider: Iva Pate MD    3.375 g  over 30 Minutes Intravenous Once 23 1842 23 2037    23 1840  vancomycin (VANCOCIN) 1000 mg/200 mL dextrose 5% IVPB        Ordering Provider: Iva Pate MD    20 mg/kg × 54 kg Intravenous Once 23 1842 23 2109            Review of Systems:  See HPI      Physical Exam:   Vital Signs  Temp (24hrs), Av.8 °F (36.6 °C), Min:97.6 °F (36.4 °C), Max:97.9 °F (36.6 °C)    Temp  Min: 97.6 °F (36.4 °C)  Max: 97.9 °F (36.6 °C)  BP  Min: 107/39  Max: 126/61  Pulse  Min: 53  Max: 68  Resp  Min: 18  Max: 18  SpO2  Min: 95 %  Max: 97 %    GENERAL: Alert and in no acute stress  HEENT: Normocephalic, atraumatic.  PERRL. EOMI. No conjunctival injection. No icterus.  No pharyngeal ulcers  NECK: Supple   HEART: RRR; No murmur  LUNGS: Clear to auscultation bilaterally without wheezing, rales, rhonchi. Normal respiratory effort.   ABDOMEN: Soft, nontender, nondistended. No rebound or guarding.   EXT:  No cyanosis, clubbing or edema. .  :  Without Hurd catheter.  MSK: No joint effusions or erythema  SKIN: Warm and dry without cutaneous eruptions on Inspection/palpation.    NEURO: Oriented to PPT.  Motor 5/5  strength  PSYCHIATRIC: Normal insight and judgment.     Laboratory Data    Results from last 7 days   Lab Units 05/10/23  0556 05/09/23  0816 05/08/23  0616   WBC 10*3/mm3 2.15* 2.71* 4.18   HEMOGLOBIN g/dL 7.8* 7.9* 7.9*   HEMATOCRIT % 25.5* 24.1* 24.9*   PLATELETS 10*3/mm3 46* 32* 40*     Results from last 7 days   Lab Units 05/10/23  0556   SODIUM mmol/L 139   POTASSIUM mmol/L 4.2   CHLORIDE mmol/L 105   CO2 mmol/L 26.0   BUN mg/dL 18   CREATININE mg/dL 0.77   GLUCOSE mg/dL 247*   CALCIUM mg/dL 8.3*     Results from last 7 days   Lab Units 05/10/23  0556   ALK PHOS U/L 69   BILIRUBIN mg/dL 0.3   ALT (SGPT) U/L 10   AST (SGOT) U/L 13         Results from last 7 days   Lab Units 05/10/23  0556   CRP mg/dL 3.76*     Results from last 7 days   Lab Units 05/07/23  1032   LACTATE mmol/L 2.7*             Estimated Creatinine Clearance: 46.4 mL/min (by C-G formula based on SCr of 0.77 mg/dL).      Microbiology:  Blood Culture   Date Value Ref Range Status   05/05/2023 No growth at 24 hours  Preliminary     No results found for: BCIDPCR, CXREFLEX, CSFCX, CULTURETIS  No results found for: CULTURES, HSVCX, URCX  No results found for: EYECULTURE, GCCX, HSVCULTURE, LABHSV  No results found for: LEGIONELLA, MRSACX, MUMPSCX, MYCOPLASCX  No results found for: NOCARDIACX, STOOLCX  Urine Culture   Date Value Ref Range Status   05/05/2023 >100,000 CFU/mL Escherichia coli (A)  Final     No results found for: VIRALCULTU, WOUNDCX        Radiology:  Imaging Results (Last 72 Hours)     ** No results found for the last 72 hours. **            Impression:   1. Covid 19 infection-her initial PCR on 5/5 was negative and she then developed respiratory symptoms with a cough and coryza yesterday.  Her PCR panel is now positive for Covid 19 infection. She is requiring supplemental.  She has been started on intravenous remdesivir along with Decadron. She has developed some hypoxia associated with her Covid 19 infection.  Her time course suggests  that she may have had Covid 19 infection at the time of her admission with a negative screening PCR test.  2.  E. Coli bacteriuria-without pyuria but she does have absolute neutropenia.  This is difficult to assess since she has urinary incontinence/frequency without dysuria.  She is now off of antibacterial therapy  3.  Myelodysplasia-with absolute neutropenia.  This places her at increased risk for recurrent infections  4.  Thrombocytopenia  5.  History of pulmonary embolus  6.  Nausea/vomiting-improved    PLAN/RECOMMENDATIONS:   1.  Continue Decadron  2.  Airborne/contact precautions for Covid 19 infection-She will need to isolate for 20 days since she is an immunocompromised host.  This will end on 5/27  3.  Intravenous remdesivir ×5 days  4.  Continue off of antibacterial antibiotic therapy    I discussed her complex situation with Dr. Cesar today.      Kev Araya MD  5/10/2023  07:54 EDT

## 2023-05-10 NOTE — PLAN OF CARE
Goal Outcome Evaluation:  Plan of Care Reviewed With: patient        Progress: improving  Outcome Evaluation: Physical therapy treatment complete. The patient increased ambulation distance compared to previous treatment session. Patient ambulated on room air without increased work of breathing or rest breaks. The patient continues to present below baseline for mobility and would benefit from skilled PT to address strength, balance and activity tolerance deficits. Continue to recommend SNF at discharge.

## 2023-05-11 LAB
ALBUMIN SERPL-MCNC: 3.3 G/DL (ref 3.5–5.2)
ALBUMIN/GLOB SERPL: 1.8 G/DL
ALP SERPL-CCNC: 65 U/L (ref 39–117)
ALT SERPL W P-5'-P-CCNC: 9 U/L (ref 1–33)
ANION GAP SERPL CALCULATED.3IONS-SCNC: 8 MMOL/L (ref 5–15)
AST SERPL-CCNC: 11 U/L (ref 1–32)
BASOPHILS # BLD AUTO: 0 10*3/MM3 (ref 0–0.2)
BASOPHILS NFR BLD AUTO: 0 % (ref 0–1.5)
BILIRUB SERPL-MCNC: 0.3 MG/DL (ref 0–1.2)
BUN SERPL-MCNC: 17 MG/DL (ref 8–23)
BUN/CREAT SERPL: 21.5 (ref 7–25)
CALCIUM SPEC-SCNC: 8.7 MG/DL (ref 8.6–10.5)
CHLORIDE SERPL-SCNC: 104 MMOL/L (ref 98–107)
CO2 SERPL-SCNC: 27 MMOL/L (ref 22–29)
CREAT SERPL-MCNC: 0.79 MG/DL (ref 0.57–1)
CRP SERPL-MCNC: 1.96 MG/DL (ref 0–0.5)
DEPRECATED RDW RBC AUTO: 56.2 FL (ref 37–54)
EGFRCR SERPLBLD CKD-EPI 2021: 73 ML/MIN/1.73
EOSINOPHIL # BLD AUTO: 0 10*3/MM3 (ref 0–0.4)
EOSINOPHIL NFR BLD AUTO: 0 % (ref 0.3–6.2)
ERYTHROCYTE [DISTWIDTH] IN BLOOD BY AUTOMATED COUNT: 16.7 % (ref 12.3–15.4)
GLOBULIN UR ELPH-MCNC: 1.8 GM/DL
GLUCOSE BLDC GLUCOMTR-MCNC: 202 MG/DL (ref 70–130)
GLUCOSE BLDC GLUCOMTR-MCNC: 218 MG/DL (ref 70–130)
GLUCOSE BLDC GLUCOMTR-MCNC: 339 MG/DL (ref 70–130)
GLUCOSE BLDC GLUCOMTR-MCNC: 380 MG/DL (ref 70–130)
GLUCOSE SERPL-MCNC: 214 MG/DL (ref 65–99)
HCT VFR BLD AUTO: 25.7 % (ref 34–46.6)
HGB BLD-MCNC: 8 G/DL (ref 12–15.9)
IMM GRANULOCYTES # BLD AUTO: 0.05 10*3/MM3 (ref 0–0.05)
IMM GRANULOCYTES NFR BLD AUTO: 2.6 % (ref 0–0.5)
LYMPHOCYTES # BLD AUTO: 0.35 10*3/MM3 (ref 0.7–3.1)
LYMPHOCYTES NFR BLD AUTO: 18.5 % (ref 19.6–45.3)
MCH RBC QN AUTO: 28.3 PG (ref 26.6–33)
MCHC RBC AUTO-ENTMCNC: 31.1 G/DL (ref 31.5–35.7)
MCV RBC AUTO: 90.8 FL (ref 79–97)
MONOCYTES # BLD AUTO: 0.15 10*3/MM3 (ref 0.1–0.9)
MONOCYTES NFR BLD AUTO: 7.9 % (ref 5–12)
NEUTROPHILS NFR BLD AUTO: 1.34 10*3/MM3 (ref 1.7–7)
NEUTROPHILS NFR BLD AUTO: 71 % (ref 42.7–76)
NRBC BLD AUTO-RTO: 0 /100 WBC (ref 0–0.2)
PLATELET # BLD AUTO: 54 10*3/MM3 (ref 140–450)
PMV BLD AUTO: 10 FL (ref 6–12)
POTASSIUM SERPL-SCNC: 4.3 MMOL/L (ref 3.5–5.2)
PROT SERPL-MCNC: 5.1 G/DL (ref 6–8.5)
RBC # BLD AUTO: 2.83 10*6/MM3 (ref 3.77–5.28)
SODIUM SERPL-SCNC: 139 MMOL/L (ref 136–145)
WBC NRBC COR # BLD: 1.89 10*3/MM3 (ref 3.4–10.8)

## 2023-05-11 PROCEDURE — 63710000001 INSULIN LISPRO (HUMAN) PER 5 UNITS: Performed by: PHYSICIAN ASSISTANT

## 2023-05-11 PROCEDURE — 97535 SELF CARE MNGMENT TRAINING: CPT

## 2023-05-11 PROCEDURE — 82948 REAGENT STRIP/BLOOD GLUCOSE: CPT

## 2023-05-11 PROCEDURE — 86140 C-REACTIVE PROTEIN: CPT | Performed by: INTERNAL MEDICINE

## 2023-05-11 PROCEDURE — 63710000001 INSULIN LISPRO (HUMAN) PER 5 UNITS: Performed by: INTERNAL MEDICINE

## 2023-05-11 PROCEDURE — 25010000002 REMDESIVIR 100 MG/20ML SOLUTION 1 EACH VIAL

## 2023-05-11 PROCEDURE — 63710000001 DEXAMETHASONE PER 0.25 MG: Performed by: INTERNAL MEDICINE

## 2023-05-11 PROCEDURE — 97530 THERAPEUTIC ACTIVITIES: CPT

## 2023-05-11 PROCEDURE — 99232 SBSQ HOSP IP/OBS MODERATE 35: CPT | Performed by: INTERNAL MEDICINE

## 2023-05-11 PROCEDURE — 63710000001 INSULIN DETEMIR PER 5 UNITS: Performed by: INTERNAL MEDICINE

## 2023-05-11 PROCEDURE — 97116 GAIT TRAINING THERAPY: CPT

## 2023-05-11 PROCEDURE — 85025 COMPLETE CBC W/AUTO DIFF WBC: CPT | Performed by: INTERNAL MEDICINE

## 2023-05-11 PROCEDURE — 80053 COMPREHEN METABOLIC PANEL: CPT | Performed by: INTERNAL MEDICINE

## 2023-05-11 RX ADMIN — Medication 10 ML: at 09:00

## 2023-05-11 RX ADMIN — PROPRANOLOL HYDROCHLORIDE 20 MG: 20 TABLET ORAL at 21:26

## 2023-05-11 RX ADMIN — INSULIN LISPRO 4 UNITS: 100 INJECTION, SOLUTION INTRAVENOUS; SUBCUTANEOUS at 17:49

## 2023-05-11 RX ADMIN — LISINOPRIL 5 MG: 5 TABLET ORAL at 09:13

## 2023-05-11 RX ADMIN — PROPRANOLOL HYDROCHLORIDE 20 MG: 20 TABLET ORAL at 09:13

## 2023-05-11 RX ADMIN — LEVOTHYROXINE SODIUM 100 MCG: 0.1 TABLET ORAL at 05:46

## 2023-05-11 RX ADMIN — INSULIN LISPRO 5 UNITS: 100 INJECTION, SOLUTION INTRAVENOUS; SUBCUTANEOUS at 07:51

## 2023-05-11 RX ADMIN — Medication 10 ML: at 21:27

## 2023-05-11 RX ADMIN — INSULIN LISPRO 5 UNITS: 100 INJECTION, SOLUTION INTRAVENOUS; SUBCUTANEOUS at 12:33

## 2023-05-11 RX ADMIN — INSULIN LISPRO 4 UNITS: 100 INJECTION, SOLUTION INTRAVENOUS; SUBCUTANEOUS at 07:49

## 2023-05-11 RX ADMIN — SODIUM CHLORIDE, POTASSIUM CHLORIDE, SODIUM LACTATE AND CALCIUM CHLORIDE 100 ML/HR: 600; 310; 30; 20 INJECTION, SOLUTION INTRAVENOUS at 05:52

## 2023-05-11 RX ADMIN — INSULIN LISPRO 5 UNITS: 100 INJECTION, SOLUTION INTRAVENOUS; SUBCUTANEOUS at 17:49

## 2023-05-11 RX ADMIN — TAMSULOSIN HYDROCHLORIDE 0.4 MG: 0.4 CAPSULE ORAL at 09:12

## 2023-05-11 RX ADMIN — APIXABAN 5 MG: 5 TABLET, FILM COATED ORAL at 09:29

## 2023-05-11 RX ADMIN — Medication 5 MG: at 21:26

## 2023-05-11 RX ADMIN — INSULIN DETEMIR 10 UNITS: 100 INJECTION, SOLUTION SUBCUTANEOUS at 21:27

## 2023-05-11 RX ADMIN — APIXABAN 5 MG: 5 TABLET, FILM COATED ORAL at 21:26

## 2023-05-11 RX ADMIN — REMDESIVIR 100 MG: 100 INJECTION, POWDER, LYOPHILIZED, FOR SOLUTION INTRAVENOUS at 17:50

## 2023-05-11 RX ADMIN — DEXAMETHASONE 6 MG: 4 TABLET ORAL at 08:12

## 2023-05-11 RX ADMIN — Medication 1 CAPSULE: at 09:12

## 2023-05-11 RX ADMIN — INSULIN LISPRO 4 UNITS: 100 INJECTION, SOLUTION INTRAVENOUS; SUBCUTANEOUS at 11:30

## 2023-05-11 NOTE — PLAN OF CARE
Goal Outcome Evaluation:  Plan of Care Reviewed With: patient        Progress: improving  Outcome Evaluation: Pt demonstrates increased standing tolerance; able to perform oral care and face/hand washing standing sink side with SUP. Pt CGA to ambulate to bathroom using FWW, SUP for commode transfer, ModA for toileting hygiene. VSS on RA. Continue to recommend discharge to SNF at discharge to support return to PLOF.

## 2023-05-11 NOTE — PROGRESS NOTES
"                    Clinical Nutrition       Patient Name: Chey Robertson  YOB: 1935  MRN: [id number]  Date of Encounter: 05/11/23 11:20 EDT  Admission date: 5/5/2023    Pt noted to have adequate PO intake. RD will sign off at this time. If note that PO intake decrease to avg of <50%, please consult RD to re-evaluate interventions in place.     Reason for Visit   Follow-up protocol    EMR Reviewed     EMR Reviewed: yes     Admission Diagnosis:  Urinary tract infection, acute [N39.0]    Problem List:    Urinary tract infection, acute    Pancytopenia (HCC)    Hypothyroidism (acquired)    Personal history of pulmonary embolism    Chronic anticoagulation    Essential hypertension    Type 2 diabetes mellitus without complication, with long-term current use of insulin    COVID-19 virus infection    Anthropometric      Height: 160 cm (63\")  Last Filed Weight: Weight: 56 kg (123 lb 6.4 oz) (05/05/23 2151)  Weight Method: Bed scale  BMI: BMI (Calculated): 21.9  BMI classification: Normal: 18.5-24.9kg/m2   IBW:  115 lb    UBW:    Weight       Weight (kg) Weight (lbs) Weight Method Visit Report   6/14/2022 62.143 kg  137 lb   --    2/13/2023 61.689 kg  136 lb   --    3/16/2023 60.782 kg  134 lb      3/28/2023 52.617 kg  116 lb  Stated     5/5/2023 53.978 kg  119 lb  Stated      55.974 kg  123 lb 6.4 oz  Bed scale       Weight change: unable to determine due to lack of reliable previous weight data, all stated. Appears had significant loss in 2021 down from ~150 lb and again from ~135=>120 lb in 3/23, has remained stable since if those stated weights in 3/23 are correct.      Reported/Observed/Food/Nutrition Related - Comments     5/11  Per documentation pt eating 75% all meals documented. Pt now with COVID and in isolation, RD spoke with over phone. She tells me she is feeling better and confirms that she is eating well >50% all meals and drinking boost per her report. She denied further dietary " "needs/preferences or nutritional concerns at this time.     Per diet tech note :  Pt reports eating only \"bites\" at meals since [place]. She is unsure on UBW but reports having 60# wt loss over 2 years ago. Pt declines any difficulty chewing/swallowing. NKFA. Observed breakfast tray >50% consumed. Spoke with pt's son on the phone. Son is unsure on wt's but estimates pt has had a 5# wt loss over the last week d/t intakes and n/v 1 week. Son estimates that pt has been eating <50% of usual intakes over the last year.      Current Nutrition Prescription     Diet: Regular/House Diet; Neutropenic/Low Microbial; Texture: Regular Texture (IDDSI 7); Fluid Consistency: Thin (IDDSI 0)  Orders Placed This Encounter      Dietary Nutrition Supplements Boost Glucose Control      DIET MESSAGE Please send breakfast tray.  Patient did not receive.  Please tell nurse or tech so they can deliver to covid room. Thanks      Average Intake from [place]% X 6 meals documented    Nutrition Diagnosis     Problem No nutrition diagnosis at this time   Etiology    Signs/Symptoms    Status:    Actions     Follow treatment progress, Care plan reviewed, Advise alternate selection, Advised available snacks, Interview for preferences, Menu provided, Encourage intake   Continue Boost B/D    Monitor Per Protocol      Zonia Cui RD  Time Spent: 25m        "

## 2023-05-11 NOTE — THERAPY TREATMENT NOTE
Patient Name: Chey Robertson  : 1935    MRN: [id number]                              Today's Date: 2023       Admit Date: 2023    Visit Dx:     ICD-10-CM ICD-9-CM   1. Urinary tract infection, acute  N39.0 599.0   2. Acute dehydration  E86.0 276.51     Patient Active Problem List   Diagnosis   • Benign familial tremor   • AMS (altered mental status)   • Thrombocytopenia (HCC)   • Pancytopenia (HCC)   • Shortness of breath   • Hypothyroidism (acquired)   • Acute kidney injury   • Splenomegaly   • Hepatomegaly   • Confusion   • B12 deficiency   • Abnormal intestinal absorption   • Iron deficiency anemia due to chronic blood loss   • Myelodysplasia (myelodysplastic syndrome)   • Weakness   • Personal history of pulmonary embolism   • Urinary tract infection, acute   • Chronic anticoagulation   • Essential hypertension   • Type 2 diabetes mellitus without complication, with long-term current use of insulin   • COVID-19 virus infection     Past Medical History:   Diagnosis Date   • Anemia    • Arthritis    • Diabetes mellitus     checks sugar only when pt wants to    • Disease of thyroid gland    • History of transfusion     with knee surgery-  no reaction recalled.    • [place] (hard of hearing)     no hearing aids   • Hypertension    • Migraine without aura and without status migrainosus, not intractable 2016   • Pulmonary emboli     (after knee surgery)   • SOBOE (shortness of breath on exertion)    • Tremors of nervous system    • Vertigo    • Wears dentures     upper    • Wears glasses      Past Surgical History:   Procedure Laterality Date   • BLADDER SURGERY     • CATARACT EXTRACTION      bilat    • CHOLECYSTECTOMY     • COLONOSCOPY     • HYSTERECTOMY     • KYPHOPLASTY N/A 2021    Procedure: KYPHOPLASTY T11, T12 AND L4;  Surgeon: Matty Hearn MD;  Location: [facility];  Service: Orthopedic Spine;  Laterality: N/A;   • OOPHORECTOMY     • TONSILLECTOMY        General Information      Row Name 05/11/23 1313          Physical Therapy Time and Intention    Document Type therapy note (daily note)  -ES     Mode of Treatment physical therapy  -ES     Row Name 05/11/23 1313          General Information    Patient Profile Reviewed yes  -ES     Existing Precautions/Restrictions fall;oxygen therapy device and L/min  -ES     Barriers to Rehab medically complex;previous functional deficit  -ES     Row Name 05/11/23 1313          Cognition    Orientation Status (Cognition) oriented x 4  -ES     Row Name 05/11/23 1313          Safety Issues, Functional Mobility    Safety Issues Affecting Function (Mobility) awareness of need for assistance;safety precaution awareness;safety precautions follow-through/compliance  -ES     Impairments Affecting Function (Mobility) balance;endurance/activity tolerance;strength  -ES     Comment, Safety Issues/Impairments (Mobility) Up x1 assist  -ES           User Key  (r) = Recorded By, (t) = Taken By, (c) = Cosigned By    Initials Name Provider Type    ES Aviva Bashir PT Physical Therapist               Mobility     Row Name 05/11/23 1314          Bed Mobility    Bed Mobility supine-sit  -ES     Supine-Sit [place] (Bed Mobility) contact guard  -ES     Assistive Device (Bed Mobility) bed rails;head of bed elevated  -ES     Comment, (Bed Mobility) required increased time to complete mobility but demo'd good sequencing  -ES     Row Name 05/11/23 1314          Sit-Stand Transfer    Sit-Stand [place] (Transfers) verbal cues;supervision  -ES     Assistive Device (Sit-Stand Transfers) walker, front-wheeled  -ES     Comment, (Sit-Stand Transfer) v/c for hand placement  -ES     Row Name 05/11/23 1314          Gait/Stairs (Locomotion)    [place] Level (Gait) contact guard  progressed to SBA  -ES     Assistive Device (Gait) walker, front-wheeled  -ES     Distance in Feet (Gait) 85  -ES     Deviations/Abnormal Patterns (Gait) bilateral deviations;gait speed  decreased;stride length decreased  -ES     Bilateral Gait Deviations forward flexed posture;heel strike decreased  -ES     Comment, (Gait/Stairs) Pt amb 85' with CGA-SBA and FWW. Demo'd forward flexed posture with decreased stride length and carl[place] but demo'd good sequencing with AD. No LOB or c/o DUFFY. Further mobility limited by fatigue.  -ES           User Key  (r) = Recorded By, (t) = Taken By, (c) = Cosigned By    Initials Name Provider Type    Aviva Yuan PT Physical Therapist               Obj/Interventions     Row Name 05/11/23 1315          Balance    Balance Assessment sitting static balance;sitting dynamic balance;sit to stand dynamic balance;standing static balance;standing dynamic balance  -ES     Static Sitting Balance modified independence  -ES     Dynamic Sitting Balance supervision  -ES     Position, Sitting Balance unsupported;sitting in chair;sitting edge of bed  -ES     Static Standing Balance standby assist  -ES     Dynamic Standing Balance contact guard  -ES     Position/Device Used, Standing Balance supported;walker, front-wheeled  -ES     Balance Interventions sitting;standing;sit to stand;supported;static;dynamic;occupation based/functional task  -ES     Comment, Balance No LOB  -ES           User Key  (r) = Recorded By, (t) = Taken By, (c) = Cosigned By    Initials Name Provider Type    Aviva Yuan PT Physical Therapist               Goals/Plan    No documentation.                Clinical Impression     Row Name 05/11/23 1316          Pain    Pretreatment Pain Rating 0/10 - no pain  -ES     Posttreatment Pain Rating 0/10 - no pain  -ES     Pre/Posttreatment Pain Comment tolerated  -ES     Pain Intervention(s) Repositioned;Ambulation/increased activity  -ES     Row Name 05/11/23 1316          Plan of Care Review    Plan of Care Reviewed With patient  -ES     Progress improving  -ES     Outcome Evaluation Pt able to increase gait distance to 85' with CGA and FWW. Pt  continues to be limited by decreased activity tolerance and generalized weakness, preventing pt from performing at functional baseline. Will continue to progress as able. PT rec SNF at d/c but will monitor progress closely.  -ES     Row Name 05/11/23 1316          Therapy Assessment/Plan (PT)    Rehab Potential (PT) good, to achieve stated therapy goals  -ES     Criteria for Skilled Interventions Met (PT) yes;meets criteria;skilled treatment is necessary  -ES     Therapy Frequency (PT) daily  -ES     Row Name 05/11/23 1316          Vital Signs    Pre Systolic BP Rehab --  VSS  -ES     Pre Patient Position Supine  -ES     Intra Patient Position Standing  -ES     Post Patient Position Sitting  -ES     Row Name 05/11/23 1316          Positioning and Restraints    Pre-Treatment Position in bed  -ES     Post Treatment Position chair  -ES     In Chair notified nsg;reclined;sitting;call light within reach;encouraged to call for assist;exit alarm on;waffle cushion;legs elevated  -ES           User Key  (r) = Recorded By, (t) = Taken By, (c) = Cosigned By    Initials Name Provider Type    ES Aviva Bashir, PT Physical Therapist               Outcome Measures     Row Name 05/11/23 1318          How much help from another person do you currently need...    Turning from your back to your side while in flat bed without using bedrails? 4  -ES     Moving from lying on back to sitting on the side of a flat bed without bedrails? 3  -ES     Moving to and from a bed to a chair (including a wheelchair)? 3  -ES     Standing up from a chair using your arms (e.g., wheelchair, bedside chair)? 3  -ES     Climbing 3-5 steps with a railing? 3  -ES     To walk in hospital room? 3  -ES     AM-PAC 6 Clicks Score (PT) 19  -ES     Highest level of mobility 6 --> Walked 10 steps or more  -ES     Row Name 05/11/23 1318          Functional Assessment    Outcome Measure Options AM-PAC 6 Clicks Basic Mobility (PT)  -ES           User Key  (r) =  Recorded By, (t) = Taken By, (c) = Cosigned By    Initials Name Provider Type    ES Aviva Bashir, GURPREET Physical Therapist                             Physical Therapy Education     Title: PT OT SLP Therapies (Done)     Topic: Physical Therapy (Done)     Point: Mobility training (Done)     Learning Progress Summary           Patient Acceptance, E, VU,NR by ML at 5/10/2023 1508    Acceptance, E,TB, VU by BT at 5/9/2023 1542    Acceptance, E,D, VU,NR by MB at 5/8/2023 1441                   Point: Home exercise program (Done)     Learning Progress Summary           Patient Acceptance, E, VU,NR by ML at 5/10/2023 1508    Acceptance, E,TB, VU by BT at 5/9/2023 1542    Acceptance, E,D, VU,NR by MB at 5/8/2023 1441                   Point: Body mechanics (Done)     Learning Progress Summary           Patient Acceptance, E,TB, VU by BT at 5/9/2023 1542    Acceptance, E,D, VU,NR by MB at 5/8/2023 1441                   Point: Precautions (Done)     Learning Progress Summary           Patient Acceptance, E, VU,NR by ML at 5/10/2023 1508    Acceptance, E,TB, VU by BT at 5/9/2023 1542    Acceptance, E,D, VU,NR by MB at 5/8/2023 1441                               User Key     Initials Effective Dates Name Provider Type Discipline    MB 06/16/21 -  Brooke Marshall, PT Physical Therapist PT    BT 12/30/20 -  Marly Cordero, RN Registered Nurse Nurse     04/22/21 -  Marybeth Arias Physical Therapist PT              PT Recommendation and Plan     Plan of Care Reviewed With: patient  Progress: improving  Outcome Evaluation: Pt able to increase gait distance to 85' with CGA and FWW. Pt continues to be limited by decreased activity tolerance and generalized weakness, preventing pt from performing at functional baseline. Will continue to progress as able. PT rec SNF at d/c but will monitor progress closely.     Time Calculation:    PT Charges     Row Name 05/11/23 1318             Time Calculation    Start Time 1118  -      PT  Received On 05/11/23  -ES      PT Goal Re-Cert Due Date 05/18/23  -ES         Time Calculation- PT    Total Timed Code Minutes- PT 25 minute(s)  -ES         Timed Charges    40355 - Gait Training Minutes  10  -ES      83313 - PT Therapeutic Activity Minutes 15  -ES         Total Minutes    Timed Charges Total Minutes 25  -ES       Total Minutes 25  -ES            User Key  (r) = Recorded By, (t) = Taken By, (c) = Cosigned By    Initials Name Provider Type    Aviva Yuan PT Physical Therapist              Therapy Charges for Today     Code Description Service Date Service Provider Modifiers Qty    86583263136 HC GAIT TRAINING EA 15 MIN 5/11/2023 Aviva Bashir, PT GP 1    80975163260 HC PT THERAPEUTIC ACT EA 15 MIN 5/11/2023 Aviva Bashir, PT GP 1          PT G-Codes  Outcome Measure Options: AM-PAC 6 Clicks Basic Mobility (PT)  AM-PAC 6 Clicks Score (PT): 19  AM-PAC 6 Clicks Score (OT): 15  PT Discharge Summary  Anticipated Discharge Disposition (PT): skilled nursing facility    Aviva Bashir PT  5/11/2023

## 2023-05-11 NOTE — CASE MANAGEMENT/SOCIAL WORK
Continued Stay Note  [facility]     Patient Name: Chey Robertson  MRN: [id number]  Today's Date: 5/11/2023    Admit Date: 5/5/2023    Plan: discharge plan   Discharge Plan     Row Name 05/11/23 1641       Plan    Plan discharge plan    Plan Comments I spoke with Albin(son) on cell phone and he is aware that pt is in covid isolation until 5/28 unless antigen is neg on 5/18. Son's 1st choice for skilled nursing is [facility]. Son requests additonal referrals to be made to [facility]. CM will make referrals closer to medical readiness.    Final Discharge Disposition Code 03 - skilled nursing facility (SNF)               Discharge Codes    No documentation.               Expected Discharge Date and Time     Expected Discharge Date Expected Discharge Time    May 18, 2023             Kellie Lauren RN

## 2023-05-11 NOTE — THERAPY TREATMENT NOTE
Patient Name: Chey Robertson  : 1935    MRN: [id number]                              Today's Date: 2023       Admit Date: 2023    Visit Dx:     ICD-10-CM ICD-9-CM   1. Urinary tract infection, acute  N39.0 599.0   2. Acute dehydration  E86.0 276.51     Patient Active Problem List   Diagnosis   • Benign familial tremor   • AMS (altered mental status)   • Thrombocytopenia (HCC)   • Pancytopenia (HCC)   • Shortness of breath   • Hypothyroidism (acquired)   • Acute kidney injury   • Splenomegaly   • Hepatomegaly   • Confusion   • B12 deficiency   • Abnormal intestinal absorption   • Iron deficiency anemia due to chronic blood loss   • Myelodysplasia (myelodysplastic syndrome)   • Weakness   • Personal history of pulmonary embolism   • Urinary tract infection, acute   • Chronic anticoagulation   • Essential hypertension   • Type 2 diabetes mellitus without complication, with long-term current use of insulin   • COVID-19 virus infection     Past Medical History:   Diagnosis Date   • Anemia    • Arthritis    • Diabetes mellitus     checks sugar only when pt wants to    • Disease of thyroid gland    • History of transfusion     with knee surgery-  no reaction recalled.    • [place] (hard of hearing)     no hearing aids   • Hypertension    • Migraine without aura and without status migrainosus, not intractable 2016   • Pulmonary emboli     (after knee surgery)   • SOBOE (shortness of breath on exertion)    • Tremors of nervous system    • Vertigo    • Wears dentures     upper    • Wears glasses      Past Surgical History:   Procedure Laterality Date   • BLADDER SURGERY     • CATARACT EXTRACTION      bilat    • CHOLECYSTECTOMY     • COLONOSCOPY     • HYSTERECTOMY     • KYPHOPLASTY N/A 2021    Procedure: KYPHOPLASTY T11, T12 AND L4;  Surgeon: Matty Hearn MD;  [facility]: [facility];  Service: Orthopedic Spine;  Laterality: N/A;   • OOPHORECTOMY     • TONSILLECTOMY        General Information      Row Name 05/11/23 1545          OT Time and Intention    Document Type therapy note (daily note)  -     Mode of Treatment occupational therapy  -     Row Name 05/11/23 1545          General Information    Patient Profile Reviewed yes  -CHELSEA     Existing Precautions/Restrictions fall;other (see comments)  Contact and Airborne (COVID)  -CHELSEA     Barriers to Rehab medically complex;previous functional deficit  -CHELSEA     Row Name 05/11/23 1545          Cognition    Orientation Status (Cognition) oriented x 4  -     Row Name 05/11/23 1545          Safety Issues, Functional Mobility    Safety Issues Affecting Function (Mobility) awareness of need for assistance;insight into deficits/self-awareness;judgment;problem-solving;safety precaution awareness;safety precautions follow-through/compliance  -CHELSEA     Impairments Affecting Function (Mobility) balance;endurance/activity tolerance;strength  -CHELSEA           User Key  (r) = Recorded By, (t) = Taken By, (c) = Cosigned By    Initials Name Provider Type    CHELSEA Nicole Etienne, OT Occupational Therapist                 Mobility/ADL's     Row Name 05/11/23 1546          Bed Mobility    Bed Mobility supine-sit;sit-supine;scooting/bridging  -CHELSEA     Scooting/Bridging [place] (Bed Mobility) moderate assist (50% patient effort)  -CHELSEA     Supine-Sit [place] (Bed Mobility) contact guard  -CHELSEA     Sit-Supine [place] (Bed Mobility) contact guard  -CHELSEA     Assistive Device (Bed Mobility) bed rails;head of bed elevated  -CHELSEA     Comment, (Bed Mobility) ModA to reposition toward HOB in supine  -CHELSEA     Row Name 05/11/23 1546          Transfers    Transfers sit-stand transfer;toilet transfer;stand-sit transfer  -CHELSEA     Comment, (Transfers) cues for IV line awareness during transitions  -CHELSEA     Row Name 05/11/23 1546          Sit-Stand Transfer    Sit-Stand [place] (Transfers) supervision;verbal cues  -     Assistive Device (Sit-Stand Transfers) walker, front-wheeled  -CHELSEA     Row  Name 05/11/23 1546          Stand-Sit Transfer    Stand-Sit [place] (Transfers) supervision  -CHELSEA     Assistive Device (Stand-Sit Transfers) walker, front-wheeled  -CHELSEA     Row Name 05/11/23 1546          Toilet Transfer    Type (Toilet Transfer) stand pivot/stand step  -CHELSEA     [place] Level (Toilet Transfer) supervision;verbal cues  -     Assistive Device (Toilet Transfer) commode;walker, front-wheeled  -CHELSEA     Row Name 05/11/23 1546          Functional Mobility    Functional Mobility- Ind. Level contact guard assist  -CHELSEA     Functional Mobility- Device walker, 4-wheeled  -CHELSEA     Functional Mobility-Distance (Feet) 20  -CHELSEA     Functional Mobility- Comment Pt ambulated to/from bathroom using FWW with CGA.  -CHELSEA     Row Name 05/11/23 1546          Activities of Daily Living    BADL Assessment/Intervention grooming;toileting  -CHELSEA     Row Name 05/11/23 1546          Grooming Assessment/Training    [place] Level (Grooming) hair care, combing/brushing;oral care regimen;wash face, hands;supervision  -CHELSEA     Position (Grooming) sink side;supported standing;sitting up in bed  -CHELSEA     Comment, (Grooming) face/hand washing and oral care completed standing sink side; hair brushing completed upon return to bed d/t fatigue  -CHELSEA     Row Name 05/11/23 1546          Toileting Assessment/Training    [place] Level (Toileting) adjust/manage clothing;perform perineal hygiene;moderate assist (50% patient effort)  -CHELSEA     Assistive Devices (Toileting) commode  -CHELSEA     Position (Toileting) unsupported sitting  -CHELSEA     Comment, (Toileting) assist for hygiene following BM  -CHELSEA           User Key  (r) = Recorded By, (t) = Taken By, (c) = Cosigned By    Initials Name Provider Type    Nicole Major OT Occupational Therapist               Obj/Interventions     Row Name 05/11/23 1557          Balance    Balance Interventions standing;occupation based/functional task  -CHELSEA     Comment, Balance Pt performed face/hand washing  and oral care standing sink side with SUP, no LOB noted.  -CHELSEA           User Key  (r) = Recorded By, (t) = Taken By, (c) = Cosigned By    Initials Name Provider Type    Nicole Major OT Occupational Therapist               Goals/Plan    No documentation.                Clinical Impression     Row Name 05/11/23 1558          Pain Assessment    Pretreatment Pain Rating 0/10 - no pain  -CHELSEA     Posttreatment Pain Rating 0/10 - no pain  -CHELSEA     Row Name 05/11/23 155          Plan of Care Review    Plan of Care Reviewed With patient  -CHELSEA     Progress improving  -CHELSEA     Outcome Evaluation Pt demonstrates increased standing tolerance; able to perform oral care and face/hand washing standing sink side with SUP. Pt CGA to ambulate to bathroom using FWW, SUP for commode transfer, ModA for toileting hygiene. VSS on RA. Continue to recommend discharge to SNF at discharge to support return to PLOF.  -CHELSEA     Row Name 05/11/23 1557          Therapy Plan Review/Discharge Plan (OT)    Anticipated Discharge Disposition (OT) skilled nursing facility  -CHELSEA     Row Name 05/11/23 6865          Vital Signs    O2 Delivery Pre Treatment room air  -CHELSEA     O2 Delivery Intra Treatment room air  -CHELSEA     O2 Delivery Post Treatment room air  -CHELSEA     Pre Patient Position Supine  -CHELSEA     Intra Patient Position Standing  -CHELSEA     Post Patient Position Supine  -CHELSEA     Row Name 05/11/23 4826          Positioning and Restraints    Pre-Treatment Position in bed  -CHELSEA     Post Treatment Position bed  -CHELSEA     In Bed notified nsg;fowlers;call light within reach;encouraged to call for assist;exit alarm on  -CHELSEA           User Key  (r) = Recorded By, (t) = Taken By, (c) = Cosigned By    Initials Name Provider Type    Nicole Major OT Occupational Therapist               Outcome Measures     Row Name 05/11/23 1603          How much help from another is currently needed...    Putting on and taking off regular lower body clothing? 2  -CHELSEA     Bathing  (including washing, rinsing, and drying) 2  -CHELSEA     Toileting (which includes using toilet bed pan or urinal) 2  -CHELSEA     Putting on and taking off regular upper body clothing 3  -CHELSEA     Taking care of personal grooming (such as brushing teeth) 3  -CHELSEA     Eating meals 4  -CHELSEA     AM-PAC 6 Clicks Score (OT) 16  -CHELSEA     Row Name 05/11/23 1318          How much help from another person do you currently need...    Turning from your back to your side while in flat bed without using bedrails? 4  -ES     Moving from lying on back to sitting on the side of a flat bed without bedrails? 3  -ES     Moving to and from a bed to a chair (including a wheelchair)? 3  -ES     Standing up from a chair using your arms (e.g., wheelchair, bedside chair)? 3  -ES     Climbing 3-5 steps with a railing? 3  -ES     To walk in hospital room? 3  -ES     AM-PAC 6 Clicks Score (PT) 19  -ES     Highest level of mobility 6 --> Walked 10 steps or more  -ES     Row Name 05/11/23 1602 05/11/23 1318       Functional Assessment    Outcome Measure Options AM-PAC 6 Clicks Daily Activity (OT)  -CHELSEA AM-PAC 6 Clicks Basic Mobility (PT)  -ES          User Key  (r) = Recorded By, (t) = Taken By, (c) = Cosigned By    Initials Name Provider Type    Nicole Major OT Occupational Therapist    ES Aviva Bashir, PT Physical Therapist                Occupational Therapy Education     Title: PT OT SLP Therapies (Done)     Topic: Occupational Therapy (Done)     Point: ADL training (Done)     Description:   Instruct learner(s) on proper safety adaptation and remediation techniques during self care or transfers.   Instruct in proper use of assistive devices.              Learning Progress Summary           Patient Acceptance, E, VU,DU by CHELSEA at 5/11/2023 1602    Comment: OT POC; BADL's    Acceptance, E,TB, VU by BT at 5/9/2023 1542    Acceptance, E, VU,NR by JB1 at 5/8/2023 1202    Comment: reason for consult, waiting for wx and gait belt before up for safety, UE  TE/PLB                   Point: Home exercise program (Done)     Description:   Instruct learner(s) on appropriate technique for monitoring, assisting and/or progressing therapeutic exercises/activities.              Learning Progress Summary           Patient Acceptance, E,TB, VU by BT at 5/9/2023 1542                   Point: Precautions (Done)     Description:   Instruct learner(s) on prescribed precautions during self-care and functional transfers.              Learning Progress Summary           Patient Acceptance, E, VU,DU by CHELSEA at 5/11/2023 1602    Comment: OT POC; BADL's    Acceptance, E,TB, VU by BT at 5/9/2023 1542    Acceptance, E, VU,NR by JB1 at 5/8/2023 1202    Comment: reason for consult, waiting for wx and gait belt before up for safety, UE TE/PLB                   Point: Body mechanics (Done)     Description:   Instruct learner(s) on proper positioning and spine alignment during self-care, functional mobility activities and/or exercises.              Learning Progress Summary           Patient Acceptance, E,TB, VU by BT at 5/9/2023 1542                               User Key     Initials Effective Dates Name Provider Type Discipline    [facility] 02/03/23 -  Kanika Bowers, OT Occupational Therapist OT    BT 12/30/20 -  Marly Cordero, RN Registered Nurse Nurse    CHELSEA 06/16/21 -  Nicole Etienne OT Occupational Therapist OT              OT Recommendation and Plan     Plan of Care Review  Plan of Care Reviewed With: patient  Progress: improving  Outcome Evaluation: Pt demonstrates increased standing tolerance; able to perform oral care and face/hand washing standing sink side with SUP. Pt CGA to ambulate to bathroom using FWW, SUP for commode transfer, ModA for toileting hygiene. VSS on RA. Continue to recommend discharge to SNF at discharge to support return to PLOF.     Time Calculation:    Time Calculation- OT     Row Name 05/11/23 5954 05/11/23 2998          Time Calculation- OT    OT Start Time 8109   -CHELSEA --     OT Received On 05/11/23  -CHELSEA --        Timed Charges    06973 - Gait Training Minutes  -- 10  -ES     89864 - OT Self Care/Mgmt Minutes 49  -CHELSEA --        Total Minutes    Timed Charges Total Minutes 49  -CHELSEA 10  -ES      Total Minutes 49  -CHELSEA 10  -ES           User Key  (r) = Recorded By, (t) = Taken By, (c) = Cosigned By    Initials Name Provider Type    Nicole Major, OT Occupational Therapist    ES Aviva Bashir, GURPREET Physical Therapist              Therapy Charges for Today     Code Description Service Date Service Provider Modifiers Qty    [id number]  OT SELF CARE/MGMT/TRAIN EA 15 MIN 5/11/2023 Nicole Etienne OT GO 3               Nicole Etienne OT  5/11/2023

## 2023-05-11 NOTE — PLAN OF CARE
Goal Outcome Evaluation:  Plan of Care Reviewed With: patient        Progress: improving  Outcome Evaluation: Pt able to increase gait distance to 85' with CGA and FWW. Pt continues to be limited by decreased activity tolerance and generalized weakness, preventing pt from performing at functional baseline. Will continue to progress as able. PT rec SNF at d/c but will monitor progress closely.

## 2023-05-11 NOTE — PROGRESS NOTES
[facility] Medicine Services  PROGRESS NOTE    Patient Name: Chey Robertson  : 1935  MRN: [id number]    Date of Admission: 2023  Primary Care Physician: Corby Marie MD    Subjective   Subjective     CC:  F/U for UTI    HPI:  Patient resting in bed. Feels well. No complaints. Wants to know when she can go home.    ROS:  Gen- No fevers, chills  CV- No chest pain, palpitations  Resp- + cough, no dyspnea  GI- No N/V/D, abd pain        Objective   Objective     Vital Signs:   Temp:  [97.7 °F (36.5 °C)-98.4 °F (36.9 °C)] 98.3 °F (36.8 °C)  Heart Rate:  [51-90] 57  Resp:  [16-20] 16  BP: (137-161)/(66-91) 158/67  Flow (L/min):  [2] 2     Physical Exam:  Constitutional: No acute distress, awake, alert  HENT: NCAT, mucous membranes moist  Respiratory: Clear to auscultation bilaterally, respiratory effort normal on 2L, however maintained O2 sats on room air after removed O2.  Cardiovascular: RRR, no murmurs, rubs, or gallops  Gastrointestinal:  soft, nontender, nondistended  Musculoskeletal: No bilateral ankle edema  Psychiatric: Appropriate affect, cooperative  Neurologic: Oriented x 3, speech clear  Skin: No rashes      Results Reviewed:  LAB RESULTS:      Lab 23  0535 05/10/23  0556 23  0816 23  0616 23  0012 23  1728 23  1032 23  0453 23  1520 23  0840 23  0141 23  2231 23  1942 23  1705   WBC 1.89* 2.15* 2.71* 4.18  --   --  0.89*   < >  --  1.01*  --   --   --  1.67*   HEMOGLOBIN 8.0* 7.8* 7.9* 7.9* 7.6*   < > 7.7*   < >  --  8.3*  --   --   --  10.3*   HEMATOCRIT 25.7* 25.5* 24.1* 24.9* 24.2*   < > 24.8*   < >  --  26.3*  --   --   --  33.3*   PLATELETS 54* 46* 32* 40*  --   --  48*   < >  --  57*  --   --   --  72*   NEUTROS ABS 1.34* 1.58* 2.12 3.46  --   --  0.49*   < >  --  0.43*  --   --   --  1.14*   IMMATURE GRANS (ABS) 0.05 0.02 0.05 0.14*  --   --  0.01   < >  --  0.01  --   --   --   0.01   LYMPHS ABS 0.35* 0.37* 0.30* 0.32*  --   --  0.24*   < >  --  0.40*  --   --   --  0.29*   MONOS ABS 0.15 0.18 0.24 0.26  --   --  0.15   < >  --  0.17  --   --   --  0.23   EOS ABS 0.00 0.00 0.00 0.00  --   --  0.00   < >  --  0.00  --   --   --  0.00   MCV 90.8 92.7 88.6 90.2  --   --  90.8   < >  --  91.0  --   --   --  90.7   CRP 1.96* 3.76*  --   --   --   --   --   --   --   --   --   --   --   --    LACTATE  --   --   --   --   --   --  2.7*  --  2.6* 2.6* 3.4* 3.6*   < > 4.4*   PROTIME  --   --   --   --   --   --  15.5*  --   --   --   --   --   --  15.6*   APTT  --   --   --   --   --   --  31.8  --   --   --   --   --   --   --    D DIMER QUANT  --   --   --   --   --   --  0.83  --   --   --   --   --   --   --     < > = values in this interval not displayed.         Lab 05/11/23  0535 05/10/23  0556 05/09/23  0816 05/08/23  0616 05/07/23  1032 05/07/23  0453   SODIUM 139 139 140 140  --  141   POTASSIUM 4.3 4.2 4.2 4.3 3.9 3.4*   CHLORIDE 104 105 105 106  --  105   CO2 27.0 26.0 26.0 24.0  --  27.0   ANION GAP 8.0 8.0 9.0 10.0  --  9.0   BUN 17 18 11 8  --  10   CREATININE 0.79 0.77 0.74 0.78  --  0.83   EGFR 73.0 75.2 78.9 74.1  --  68.8   GLUCOSE 214* 247* 158* 126*  --  124*   CALCIUM 8.7 8.3* 8.2* 8.1*  --  7.8*   MAGNESIUM  --   --   --  2.1  --  1.0*   PHOSPHORUS  --   --   --   --   --  3.5         Lab 05/11/23  0535 05/10/23  0556 05/09/23  0816 05/08/23  0616 05/07/23 0453 05/05/23  1705   TOTAL PROTEIN 5.1* 5.1* 5.2* 4.9* 4.8* 7.3   ALBUMIN 3.3* 3.2* 3.1* 3.2* 3.1* 4.5   GLOBULIN 1.8 1.9 2.1 1.7 1.7 2.8   ALT (SGPT) 9 10 12 5 <5 8   AST (SGOT) 11 13 18 10 7 16   BILIRUBIN 0.3 0.3 0.4 0.8 0.5 0.8   ALK PHOS 65 69 71 62 54 75   LIPASE  --   --   --   --   --  42         Lab 05/07/23  1032 05/05/23  1857 05/05/23  1705   HSTROP T  --  12* 15*   PROTIME 15.5*  --  15.6*   INR 1.22*  --  1.23*             [facility] 05/07/23 0453 05/05/23 2021 05/05/23  1709   IRON 32*  --   --    IRON  SATURATION 16*  --   --    TIBC 201*  --   --    TRANSFERRIN 135*  --   --    VITAMIN B 12 493  --   --    ABO TYPING  --  O O   RH TYPING  --  Negative Negative   ANTIBODY SCREEN  --   --  Positive         Brief Urine Lab Results  (Last result in the past 365 days)      Color   Clarity   Blood   Leuk Est   Nitrite   Protein   CREAT   Urine HCG        05/05/23 1752 Yellow   Clear   Negative   Negative   Positive   Negative                 Microbiology Results Abnormal     Procedure Component Value - Date/Time    Blood Culture - Blood, Arm, Right [[id number]]  (Normal) Collected: 05/05/23 1850    Lab Status: Final result Specimen: Blood from Arm, Right Updated: 05/10/23 2015     Blood Culture No growth at 5 days    Blood Culture - Blood, Wrist, Right [[id number]]  (Normal) Collected: 05/05/23 1855    Lab Status: Final result Specimen: Blood from Wrist, Right Updated: 05/10/23 2015     Blood Culture No growth at 5 days    COVID PRE-OP / PRE-PROCEDURE SCREENING ORDER (NO ISOLATION) - Swab, Nasopharynx [[id number]]  (Normal) Collected: 05/05/23 1704    Lab Status: Final result Specimen: Swab from Nasopharynx Updated: 05/05/23 1747    Narrative:      The following orders were created for panel order COVID PRE-OP / PRE-PROCEDURE SCREENING ORDER (NO ISOLATION) - Swab, Nasopharynx.  Procedure                               Abnormality         Status                     ---------                               -----------         ------                     COVID-19 and FLU A/B PCR...[[id number]]  Normal              Final result                 Please view results for these tests on the individual orders.    COVID-19 and FLU A/B PCR - Swab, Nasopharynx [[id number]]  (Normal) Collected: 05/05/23 1704    Lab Status: Final result Specimen: Swab from Nasopharynx Updated: 05/05/23 1747     COVID19 Not Detected     Influenza A PCR Not Detected     Influenza B PCR Not Detected    Narrative:      Fact sheet for providers:  https://www.fda.gov/media/327831/download    Fact sheet for patients: https://www.fda.gov/media/817659/download    Test performed by PCR.          No radiology results from the last 24 hrs        Current medications:  Scheduled Meds:apixaban, 5 mg, Oral, Q12H  dexamethasone, 6 mg, Oral, Daily With Breakfast  insulin detemir, 10 Units, Subcutaneous, Nightly  insulin lispro, 0-9 Units, Subcutaneous, TID AC  Insulin Lispro, 5 Units, Subcutaneous, TID With Meals  lactobacillus acidophilus, 1 capsule, Oral, Daily  levothyroxine, 100 mcg, Oral, Q AM  lisinopril, 5 mg, Oral, Daily  propranolol, 20 mg, Oral, TID  remdesivir, 100 mg, Intravenous, Q24H  sodium chloride, 10 mL, Intravenous, Q12H  tamsulosin, 0.4 mg, Oral, Daily      Continuous Infusions:lactated ringers, 100 mL/hr, Last Rate: 100 mL/hr (05/11/23 0552)  Pharmacy Consult - Pharmacy to dose,       PRN Meds:.•  acetaminophen  •  benzonatate  •  Calcium Replacement - Follow Nurse / BPA Driven Protocol  •  dextrose  •  dextrose  •  glucagon (human recombinant)  •  guaifenesin  •  hydrOXYzine  •  Magnesium Standard Dose Replacement - Follow Nurse / BPA Driven Protocol  •  melatonin  •  ondansetron **OR** ondansetron  •  Pharmacy Consult - Pharmacy to dose  •  Phosphorus Replacement - Follow Nurse / BPA Driven Protocol  •  Potassium Replacement - Follow Nurse / BPA Driven Protocol  •  [COMPLETED] Insert Peripheral IV **AND** sodium chloride  •  sodium chloride  •  sodium chloride    Assessment & Plan   Assessment & Plan     Active Hospital Problems    Diagnosis  POA   • **Urinary tract infection, acute [N39.0]  Yes   • COVID-19 virus infection [U07.1]  Unknown   • Chronic anticoagulation [Z79.01]  Not Applicable   • Essential hypertension [I10]  Yes   • Type 2 diabetes mellitus without complication, with long-term current use of insulin [E11.9, Z79.4]  Not Applicable   • Personal history of pulmonary embolism [Z86.711]  Yes   • Hypothyroidism (acquired) [E03.9]  Yes    • Pancytopenia (HCC) [D61.818]  Yes      Resolved Hospital Problems   No resolved problems to display.        Brief Hospital Course to date:  Chey Robertson is a 87 y.o. female with past medical history of myelodysplasia, chronic pancytopenia, essential hypertension, type 2 diabetes, history of PE (recently switched from warfarin to Eliquis per her cardiologist because of inability to achieve therapeutic INR), GERD who presented to the hospital with generalized weakness, nausea and vomiting found to have urinary tract infection. Since admission, pt now found to have COVID19.       This patient's hospital course, problems, and plans entered by my partner were reviewed and updated as appropriate by me on 5/11/2023      Assessment and plan:    Acute urinary tract infection, POA  Dehydration volume depletion  Acute on chronic debility  - Urinalysis positive for UTI, urine culture growing pansensitive E. coli  - Started on IV Rocephin on admission.  Given her persistent and worsening neutropenia with ANC of 350 on 5/7,  implemented neutropenic precautions and switched antibiotic therapy to IV cefepime  - Completed ABX per ID  - ANC improved  - Continue as needed Zofran  - PT/OT recommended rehab    COVID19 infection  -- pt complained of SOA, cough on 5/8, checked RVP and was positive for COVID 19  -- started on Decadron 6 mg daily D4/10, IV Remdesivir D4/5  -- monitor CMP, CRP, CBC with diff daily  -- needing PRN O2, stable on room air this morning  -- resume Eliquis PLTs >40K     Myelodysplastic syndrome  Worsening pancytopenia, likely secondary to sepsis  Worsening anemia, likely anemia of chronic disease  - Chronic pancytopenia  - DIC panel checked and negative   - neutropenic precautions, s/p 1 dose of Neupogen with improvement in ANC  - Follows with Dr. Lyman     History of PE  - Remote history of PE  - Resume Eliquis as PLTs >40K (okay per Dr. Lyman's note)    Type 2 diabetes  - A1c from end of March 2023 is  6%  - Hyperglycemia the last two days likely due to steroids.   - Add low dose basal/bolus insulin  - Continue sliding scale insulin     Essential hypertension  - Continue to hold home Lasix for now given dehydration  - Continue home Lisinopril and propranolol     Hypothyroidism  - Continue home synthroid    GERD  - PPI    Expected Discharge Location and Transportation: Rehab?  Expected Discharge (if ten day COVID antigen test is negative)    Expected Discharge Date: 5/18/2023; Expected Discharge Time:      DVT prophylaxis:  Medical and mechanical DVT prophylaxis orders are present.     AM-PAC 6 Clicks Score (PT): 19 (05/10/23 1508)      CODE STATUS:   Code Status and Medical Interventions:   Ordered at: 05/05/23 2031     Code Status (Patient has no pulse and is not breathing):    CPR (Attempt to Resuscitate)     Medical Interventions (Patient has pulse or is breathing):    Full Support     Copied text in this note has been reviewed and is accurate as of 05/11/23.     Meena Younger, DO  05/11/23

## 2023-05-11 NOTE — PROGRESS NOTES
INFECTIOUS DISEASE Progress Note    Chey Robertson  1935  [id number]      Admission Date: 5/5/2023      Requesting Provider: No ref. provider found  Evaluating Physician: Kev Araya MD    Reason for Consultation: Sepsis/absolute neutropenia/UTI/Gastroenteritis    History of present illness:    5/7/23: Patient is a 87 y.o. female With myelodysplasia/chronic pancytopenia, type 2 diabetes mellitus, pulmonary emboli, and UTIs who is seen today for evaluation of nausea, vomiting, and pyuria/bacteriuria in the setting of absolute neutropenia. She was admitted approximately 1 month ago with a pansensitive E. Coli presenting with nausea and vomiting.  She complains of recurrent nausea and vomiting prior to her admission on 5/5.  She did not experience any fevers.  She noticed some urinary urgency and incontinence but denies dysuria. He remained afebrile. Presentation she had a white blood cell count of 1.67 with an absolute neutrophil count of 1140. She has 0-2 white blood cells on urinalysis and grew greater than 100,000 colonies of pansensitive E. Coli UTI on urine culture.  Blood cultures have been negative.She had lactic acidosis on presentation with a lactic acid level of 4.4. She has been treated with intravenous ceftriaxone but was switched to cefepime today. Today her white blood cell count is down 2.89  with an absolute neutrophil count of 490 and a platelet count of 48,000. An abdominal/pelvic CT scan revealed stable splenomegaly with colonic diverticulosis.  Her nausea and vomiting are better.    5/8/23: She has remained afebrile. Her hemoglobin this morning is 7.6. Blood cultures were 5/5.  Urine culture from 5/5 grew greater than 100,000 E. Coli. Her nausea and vomiting is better.  She would like to go home. Her white blood cell count is now up to  4.2.  She complains of cough and coryza.She does not think she has Covid 19 infection. We ordered a respiratory virus panel PCR in the panel has now  turned positive for Covid 19.She is now requiring 2 L of oxygen with an O2 saturation of 91%.    5/9/23: She has remained afebrile overnight. She was requiring up to 6 L of oxygen overnight. She is on remdesivir and Decadron.  A supplemental oxygen has been decreased to 2 L this evening with an O2 sat of 96%.  She denies increased cough and dyspnea.  She has some residual coryza.    5/10/23: She remains afebrile. Her creatinine today is 0.77.  Her CRP is 3.76.  Her white blood cell count of strep down to 2.15. Her platelet count remains low at 46,000. She is on 2 L of oxygen with an O2 saturation of 93%.  She feels better today.  She has decreased cough and decreased coryza.    5/11/23: She remains afebrile.Her heart rate dropped down to 51 transiently overnight. Her creatinine is 0.79.  C-reactive protein is 1.96. She is scheduled to complete a 5 day course of remdesivir tomorrow.She feels better today.  She denies cough and coryza.  She denies dyspnea.    Past Medical History:   Diagnosis Date   • Anemia    • Arthritis    • Diabetes mellitus     checks sugar only when pt wants to    • Disease of thyroid gland    • History of transfusion     with knee surgery-  no reaction recalled.    • [place] (hard of hearing)     no hearing aids   • Hypertension    • Migraine without aura and without status migrainosus, not intractable 12/30/2016   • Pulmonary emboli 2011    (after knee surgery)   • SOBOE (shortness of breath on exertion)    • Tremors of nervous system    • Vertigo    • Wears dentures     upper    • Wears glasses        Past Surgical History:   Procedure Laterality Date   • BLADDER SURGERY     • CATARACT EXTRACTION      bilat    • CHOLECYSTECTOMY     • COLONOSCOPY     • HYSTERECTOMY     • KYPHOPLASTY N/A 2/12/2021    Procedure: KYPHOPLASTY T11, T12 AND L4;  Surgeon: Matty Hearn MD;  Location: [facility];  Service: Orthopedic Spine;  Laterality: N/A;   • OOPHORECTOMY     • TONSILLECTOMY         Family History    Problem Relation Age of Onset   • Breast cancer Sister 84   • Stroke Mother    • Heart attack Father    • Ovarian cancer Neg Hx        Social History     Socioeconomic History   • Marital status:    Tobacco Use   • Smoking status: Never     Passive exposure: Never   • Smokeless tobacco: Never   Vaping Use   • Vaping Use: Never used   Substance and Sexual Activity   • Alcohol use: No   • Drug use: No   • Sexual activity: Defer       Allergies   Allergen Reactions   • Aspirin Unknown - Low Severity     Mouth sores          Medication:    Current Facility-Administered Medications:   •  acetaminophen (TYLENOL) tablet 650 mg, 650 mg, Oral, Q4H PRN, Chad Martins PA-C, 650 mg at 05/10/23 2111  •  apixaban (ELIQUIS) tablet 5 mg, 5 mg, Oral, Q12H, Teetee Cesar MD, 5 mg at 05/10/23 2103  •  benzonatate (TESSALON) capsule 200 mg, 200 mg, Oral, TID PRN, Teetee Cesar MD, 200 mg at 05/08/23 2101  •  Calcium Replacement - Follow Nurse / BPA Driven Protocol, , Does not apply, PRN, Raissa Saenz, APRN  •  dexamethasone (DECADRON) tablet 6 mg, 6 mg, Oral, Daily With Breakfast, Teetee Cesar MD, 6 mg at 05/10/23 0853  •  dextrose (D50W) (25 g/50 mL) IV injection 25 g, 25 g, Intravenous, Q15 Min PRN, Chad Martins PA-C  •  dextrose (GLUTOSE) oral gel 15 g, 15 g, Oral, Q15 Min PRN, Chad Martins PA-C  •  glucagon (GLUCAGEN) injection 1 mg, 1 mg, Intramuscular, Q15 Min PRN, Chad Martins PA-C  •  guaifenesin (ROBITUSSIN) 100 MG/5ML liquid 200 mg, 200 mg, Oral, Q4H PRN, Teetee Cesar MD, 200 mg at 05/09/23 2120  •  hydrOXYzine (ATARAX) tablet 25 mg, 25 mg, Oral, TID PRN, Raissa Saenz, APRN, 25 mg at 05/09/23 0654  •  insulin detemir (LEVEMIR) injection 10 Units, 10 Units, Subcutaneous, Nightly, Teetee Cesar MD, 10 Units at 05/10/23 2103  •  Insulin Lispro (humaLOG) injection 0-9 Units, 0-9 Units, Subcutaneous, TID Lakshmi CANCINO,  ULISES Bonilla, 6 Units at 05/10/23 1709  •  Insulin Lispro (humaLOG) injection 5 Units, 5 Units, Subcutaneous, TID With Meals, Teetee Cesar MD, 5 Units at 05/10/23 1709  •  lactated ringers infusion, 100 mL/hr, Intravenous, Continuous, Chad Martins PA-C, Last Rate: 100 mL/hr at 05/11/23 0552, 100 mL/hr at 05/11/23 0552  •  lactobacillus acidophilus (RISAQUAD) capsule 1 capsule, 1 capsule, Oral, Daily, Teetee Cesar MD, 1 capsule at 05/10/23 0850  •  levothyroxine (SYNTHROID, LEVOTHROID) tablet 100 mcg, 100 mcg, Oral, Q AM, Chad Martins PA-C, 100 mcg at 05/11/23 0546  •  lisinopril (PRINIVIL,ZESTRIL) tablet 5 mg, 5 mg, Oral, Daily, Chad Martins PA-C, 5 mg at 05/10/23 0853  •  Magnesium Standard Dose Replacement - Follow Nurse / BPA Driven Protocol, , Does not apply, PRN, Raissa Saenz APRN  •  melatonin tablet 5 mg, 5 mg, Oral, Nightly PRN, Chad Martins PA-C, 5 mg at 05/10/23 2111  •  ondansetron (ZOFRAN) tablet 4 mg, 4 mg, Oral, Q6H PRN **OR** ondansetron (ZOFRAN) injection 4 mg, 4 mg, Intravenous, Q6H PRN, Chad Martins PA-C  •  Pharmacy Consult - Pharmacy to dose, , Does not apply, Continuous PRN, Teetee Cesar MD  •  Phosphorus Replacement - Follow Nurse / BPA Driven Protocol, , Does not apply, PRN, Raissa Saenz, APRN  •  Potassium Replacement - Follow Nurse / BPA Driven Protocol, , Does not apply, PRN, Raissa Saenz, APRN  •  propranolol (INDERAL) tablet 20 mg, 20 mg, Oral, TID, Chad Martins PA-C, 20 mg at 05/10/23 1619  •  [COMPLETED] remdesivir 200 mg in sodium chloride 0.9 % 290 mL IVPB (powder vial), 200 mg, Intravenous, Once, 200 mg at 05/08/23 1835 **FOLLOWED BY** remdesivir 100 mg in sodium chloride 0.9 % 250 mL IVPB (powder vial), 100 mg, Intravenous, Q24H, Rosa Rodríguez, [facility], 100 mg at 05/10/23 1625  •  [COMPLETED] Insert Peripheral IV, , , Once **AND** sodium chloride 0.9 % flush 10 mL, 10 mL,  Intravenous, PRN, Iva Pate MD  •  sodium chloride 0.9 % flush 10 mL, 10 mL, Intravenous, Q12H, Chad Martins PA-C, 10 mL at 05/10/23 2103  •  sodium chloride 0.9 % flush 10 mL, 10 mL, Intravenous, PRN, Chad Martins PA-C  •  sodium chloride 0.9 % infusion 40 mL, 40 mL, Intravenous, PRN, Chad Martins PA-C  •  tamsulosin (FLOMAX) 24 hr capsule 0.4 mg, 0.4 mg, Oral, Daily, Chad Martins PA-C, 0.4 mg at 05/10/23 0853    Antibiotics:  Anti-Infectives (From admission, onward)    Ordered     Dose/Rate Route Frequency Start Stop    23 1614  remdesivir 100 mg in sodium chloride 0.9 % 250 mL IVPB (powder vial)        Ordering Provider: Rosa Rodríguez RPH   See Hyperspace for full Linked Orders Report.    100 mg  over 60 Minutes Intravenous Every 24 Hours 23 1700 23 1659    23 1614  remdesivir 200 mg in sodium chloride 0.9 % 290 mL IVPB (powder vial)        Ordering Provider: Rosa Rodríguez RPH   See Hyperspace for full Linked Orders Report.    200 mg  over 60 Minutes Intravenous Once 23 1700 23 1935    23 0807  cefepime (MAXIPIME) 2 g/100 mL 0.9% NS (mbp)        Ordering Provider: Selena Pulliam MD    2 g  200 mL/hr over 30 Minutes Intravenous Once 23 0900 23 1101    23 1840  piperacillin-tazobactam (ZOSYN) 3.375 g in iso-osmotic dextrose 50 ml (premix)        Ordering Provider: Iva Pate MD    3.375 g  over 30 Minutes Intravenous Once 23 1842 23 2037    23 1840  vancomycin (VANCOCIN) 1000 mg/200 mL dextrose 5% IVPB        Ordering Provider: Iva Pate MD    20 mg/kg × 54 kg Intravenous Once 23 18423            Review of Systems:  See HPI      Physical Exam:   Vital Signs  Temp (24hrs), Av.9 °F (36.6 °C), Min:97.7 °F (36.5 °C), Max:98.4 °F (36.9 °C)    Temp  Min: 97.7 °F (36.5 °C)  Max: 98.4 °F (36.9 °C)  BP  Min: 130/65  Max:  161/82  Pulse  Min: 51  Max: 90  Resp  Min: 16  Max: 20  SpO2  Min: 93 %  Max: 98 %    GENERAL: Alert and in no acute stress  HEENT: Normocephalic, atraumatic.  PERRL. EOMI. No conjunctival injection. No icterus.  No pharyngeal ulcers  NECK: Supple   HEART: RRR; No murmur  LUNGS: Clear to auscultation bilaterally without wheezing, rales, rhonchi. Normal respiratory effort.   ABDOMEN: Soft, nontender, nondistended. No rebound or guarding.   EXT:  No cyanosis, clubbing or edema. .  :  Without Hurd catheter.  MSK: No joint effusions or erythema  SKIN: Warm and dry without cutaneous eruptions on Inspection/palpation.    NEURO: Oriented to PPT.  Motor 5/5 strength  PSYCHIATRIC: Normal insight and judgment.     Laboratory Data    Results from last 7 days   Lab Units 05/10/23  0556 05/09/23  0816 05/08/23  0616   WBC 10*3/mm3 2.15* 2.71* 4.18   HEMOGLOBIN g/dL 7.8* 7.9* 7.9*   HEMATOCRIT % 25.5* 24.1* 24.9*   PLATELETS 10*3/mm3 46* 32* 40*     Results from last 7 days   Lab Units 05/11/23  0535   SODIUM mmol/L 139   POTASSIUM mmol/L 4.3   CHLORIDE mmol/L 104   CO2 mmol/L 27.0   BUN mg/dL 17   CREATININE mg/dL 0.79   GLUCOSE mg/dL 214*   CALCIUM mg/dL 8.7     Results from last 7 days   Lab Units 05/11/23  0535   ALK PHOS U/L 65   BILIRUBIN mg/dL 0.3   ALT (SGPT) U/L 9   AST (SGOT) U/L 11         Results from last 7 days   Lab Units 05/11/23  0535   CRP mg/dL 1.96*     Results from last 7 days   Lab Units 05/07/23  1032   LACTATE mmol/L 2.7*             Estimated Creatinine Clearance: 45.2 mL/min (by C-G formula based on SCr of 0.79 mg/dL).      Microbiology:  Blood Culture   Date Value Ref Range Status   05/05/2023 No growth at 24 hours  Preliminary     No results found for: BCIDPCR, CXREFLEX, CSFCX, CULTURETIS  No results found for: CULTURES, HSVCX, URCX  No results found for: EYECULTURE, GCCX, HSVCULTURE, LABHSV  No results found for: LEGIONELLA, MRSACX, MUMPSCX, MYCOPLASCX  No results found for: NOCARDIACX,  STOOLCX  Urine Culture   Date Value Ref Range Status   05/05/2023 >100,000 CFU/mL Escherichia coli (A)  Final     No results found for: VIRALCULTU, WOUNDCX        Radiology:  Imaging Results (Last 72 Hours)     ** No results found for the last 72 hours. **            Impression:   1. Covid 19 infection-her initial PCR on 5/5 was negative and she then developed respiratory symptoms with a cough and coryza yesterday.  Her PCR panel is now positive for Covid 19 infection. She is requiring supplemental.  She has been started on intravenous remdesivir along with Decadron. She has developed some hypoxia associated with her Covid 19 infection.  Her time course suggests that she may have had Covid 19 infection at the time of her admission with a negative screening PCR test.  She will complete her remdesivir therapy on 5/12.  I think we can discharge her tomorrow off of Decadron.  2.  E. Coli bacteriuria-without pyuria but she does have absolute neutropenia.  This is difficult to assess since she has urinary incontinence/frequency without dysuria.  She is now off of antibacterial therapy  3.  Myelodysplasia-with absolute neutropenia.  This places her at increased risk for recurrent infections  4.  Thrombocytopenia  5.  History of pulmonary embolus  6.  Nausea/vomiting-improved    PLAN/RECOMMENDATIONS:   1.  Discontinue Decadron after tomorrow's dose  2.  Airborne/contact precautions for Covid 19 infection-she will need to isolate for 20 days since she is an immunocompromised host.  This will end on 5/27. This could be ended earlier with a negative antigen test on 5/18.  3.  Intravenous remdesivir ×5 days-this will end on 5/12.  I have asked the nursing staff to administer her last dose in the morning instead of in the afternoon  4.  Continue off of antibacterial antibiotic therapy  5.  Discharge to home tomorrow    I will sign off        Kev Araya MD  5/11/2023  06:49 EDT

## 2023-05-12 LAB
GLUCOSE BLDC GLUCOMTR-MCNC: 162 MG/DL (ref 70–130)
GLUCOSE BLDC GLUCOMTR-MCNC: 200 MG/DL (ref 70–130)
GLUCOSE BLDC GLUCOMTR-MCNC: 290 MG/DL (ref 70–130)

## 2023-05-12 PROCEDURE — 82948 REAGENT STRIP/BLOOD GLUCOSE: CPT

## 2023-05-12 PROCEDURE — 63710000001 DEXAMETHASONE PER 0.25 MG: Performed by: INTERNAL MEDICINE

## 2023-05-12 PROCEDURE — 25010000002 REMDESIVIR 100 MG/20ML SOLUTION 1 EACH VIAL

## 2023-05-12 PROCEDURE — 63710000001 INSULIN LISPRO (HUMAN) PER 5 UNITS: Performed by: INTERNAL MEDICINE

## 2023-05-12 PROCEDURE — 63710000001 INSULIN DETEMIR PER 5 UNITS: Performed by: INTERNAL MEDICINE

## 2023-05-12 PROCEDURE — 63710000001 INSULIN LISPRO (HUMAN) PER 5 UNITS: Performed by: PHYSICIAN ASSISTANT

## 2023-05-12 PROCEDURE — 99232 SBSQ HOSP IP/OBS MODERATE 35: CPT | Performed by: INTERNAL MEDICINE

## 2023-05-12 RX ORDER — INSULIN LISPRO 100 [IU]/ML
7 INJECTION, SOLUTION INTRAVENOUS; SUBCUTANEOUS
Status: DISCONTINUED | OUTPATIENT
Start: 2023-05-12 | End: 2023-05-24 | Stop reason: HOSPADM

## 2023-05-12 RX ADMIN — LEVOTHYROXINE SODIUM 100 MCG: 0.1 TABLET ORAL at 05:45

## 2023-05-12 RX ADMIN — INSULIN LISPRO 6 UNITS: 100 INJECTION, SOLUTION INTRAVENOUS; SUBCUTANEOUS at 17:10

## 2023-05-12 RX ADMIN — DEXAMETHASONE 6 MG: 4 TABLET ORAL at 08:54

## 2023-05-12 RX ADMIN — Medication 10 ML: at 20:26

## 2023-05-12 RX ADMIN — INSULIN LISPRO 7 UNITS: 100 INJECTION, SOLUTION INTRAVENOUS; SUBCUTANEOUS at 13:25

## 2023-05-12 RX ADMIN — PROPRANOLOL HYDROCHLORIDE 20 MG: 20 TABLET ORAL at 08:54

## 2023-05-12 RX ADMIN — INSULIN LISPRO 7 UNITS: 100 INJECTION, SOLUTION INTRAVENOUS; SUBCUTANEOUS at 17:10

## 2023-05-12 RX ADMIN — INSULIN DETEMIR 10 UNITS: 100 INJECTION, SOLUTION SUBCUTANEOUS at 20:26

## 2023-05-12 RX ADMIN — Medication 1 CAPSULE: at 08:54

## 2023-05-12 RX ADMIN — INSULIN LISPRO 2 UNITS: 100 INJECTION, SOLUTION INTRAVENOUS; SUBCUTANEOUS at 13:24

## 2023-05-12 RX ADMIN — INSULIN LISPRO 4 UNITS: 100 INJECTION, SOLUTION INTRAVENOUS; SUBCUTANEOUS at 08:53

## 2023-05-12 RX ADMIN — APIXABAN 5 MG: 5 TABLET, FILM COATED ORAL at 08:54

## 2023-05-12 RX ADMIN — LISINOPRIL 5 MG: 5 TABLET ORAL at 08:54

## 2023-05-12 RX ADMIN — Medication 10 ML: at 08:54

## 2023-05-12 RX ADMIN — TAMSULOSIN HYDROCHLORIDE 0.4 MG: 0.4 CAPSULE ORAL at 08:54

## 2023-05-12 RX ADMIN — APIXABAN 5 MG: 5 TABLET, FILM COATED ORAL at 20:25

## 2023-05-12 RX ADMIN — REMDESIVIR 100 MG: 100 INJECTION, POWDER, LYOPHILIZED, FOR SOLUTION INTRAVENOUS at 17:10

## 2023-05-12 RX ADMIN — PROPRANOLOL HYDROCHLORIDE 20 MG: 20 TABLET ORAL at 17:09

## 2023-05-12 RX ADMIN — INSULIN LISPRO 5 UNITS: 100 INJECTION, SOLUTION INTRAVENOUS; SUBCUTANEOUS at 08:54

## 2023-05-12 RX ADMIN — Medication 5 MG: at 20:25

## 2023-05-12 NOTE — PLAN OF CARE
Goal Outcome Evaluation:  A&Ox4, VSS, O2 95% on RA, denies pain/discomfort. Appears to be resting comfortably in bed, no significant changes overnight. Eager to know if she will D/C today. Bed in lowest position, assistive devices within reach, hourly rounding preformed. Will continue to monitor.

## 2023-05-12 NOTE — PROGRESS NOTES
[facility] Medicine Services  PROGRESS NOTE    Patient Name: Chey Robertson  : 1935  MRN: [id number]    Date of Admission: 2023  Primary Care Physician: Corby Marie MD    Subjective   Subjective     CC:  F/U for UTI    HPI:  Patient resting in bed. No issues overnight. Wants to go home, although sons now prefer rehab.    ROS:  Gen- No fevers, chills  CV- No chest pain, palpitations  Resp- + cough, no dyspnea  GI- No N/V/D, abd pain    Objective   Objective     Vital Signs:   Temp:  [97.7 °F (36.5 °C)-98.7 °F (37.1 °C)] 98.7 °F (37.1 °C)  Heart Rate:  [60-71] 71  Resp:  [16-20] 20  BP: (137-162)/(70-87) 161/86  Flow (L/min):  [2] 2     Physical Exam:  Constitutional: No acute distress, awake, alert  HENT: NCAT, mucous membranes moist  Respiratory: Clear to auscultation bilaterally, respiratory effort normal on room air  Cardiovascular: RRR, no murmurs, rubs, or gallops  Gastrointestinal:  soft, nontender, nondistended  Musculoskeletal: No bilateral ankle edema  Psychiatric: Appropriate affect, cooperative  Neurologic: Oriented x 3, speech clear  Skin: No rashes      Results Reviewed:  LAB RESULTS:      Lab 23  0535 05/10/23  0556 23  0816 23  0616 23  0012 23  1728 23  1032 23  0453 23  1520 23  0840 23  0141 23  2231 23  1942 23  1705   WBC 1.89* 2.15* 2.71* 4.18  --   --  0.89*   < >  --  1.01*  --   --   --  1.67*   HEMOGLOBIN 8.0* 7.8* 7.9* 7.9* 7.6*   < > 7.7*   < >  --  8.3*  --   --   --  10.3*   HEMATOCRIT 25.7* 25.5* 24.1* 24.9* 24.2*   < > 24.8*   < >  --  26.3*  --   --   --  33.3*   PLATELETS 54* 46* 32* 40*  --   --  48*   < >  --  57*  --   --   --  72*   NEUTROS ABS 1.34* 1.58* 2.12 3.46  --   --  0.49*   < >  --  0.43*  --   --   --  1.14*   IMMATURE GRANS (ABS) 0.05 0.02 0.05 0.14*  --   --  0.01   < >  --  0.01  --   --   --  0.01   LYMPHS ABS 0.35* 0.37* 0.30* 0.32*  --    --  0.24*   < >  --  0.40*  --   --   --  0.29*   MONOS ABS 0.15 0.18 0.24 0.26  --   --  0.15   < >  --  0.17  --   --   --  0.23   EOS ABS 0.00 0.00 0.00 0.00  --   --  0.00   < >  --  0.00  --   --   --  0.00   MCV 90.8 92.7 88.6 90.2  --   --  90.8   < >  --  91.0  --   --   --  90.7   CRP 1.96* 3.76*  --   --   --   --   --   --   --   --   --   --   --   --    LACTATE  --   --   --   --   --   --  2.7*  --  2.6* 2.6* 3.4* 3.6*   < > 4.4*   PROTIME  --   --   --   --   --   --  15.5*  --   --   --   --   --   --  15.6*   APTT  --   --   --   --   --   --  31.8  --   --   --   --   --   --   --    D DIMER QUANT  --   --   --   --   --   --  0.83  --   --   --   --   --   --   --     < > = values in this interval not displayed.         Lab 05/11/23  0535 05/10/23  0556 05/09/23  0816 05/08/23  0616 05/07/23  1032 05/07/23  0453   SODIUM 139 139 140 140  --  141   POTASSIUM 4.3 4.2 4.2 4.3 3.9 3.4*   CHLORIDE 104 105 105 106  --  105   CO2 27.0 26.0 26.0 24.0  --  27.0   ANION GAP 8.0 8.0 9.0 10.0  --  9.0   BUN 17 18 11 8  --  10   CREATININE 0.79 0.77 0.74 0.78  --  0.83   EGFR 73.0 75.2 78.9 74.1  --  68.8   GLUCOSE 214* 247* 158* 126*  --  124*   CALCIUM 8.7 8.3* 8.2* 8.1*  --  7.8*   MAGNESIUM  --   --   --  2.1  --  1.0*   PHOSPHORUS  --   --   --   --   --  3.5         Lab 05/11/23  0535 05/10/23  0556 05/09/23  0816 05/08/23  0616 05/07/23 0453 05/05/23  1705   TOTAL PROTEIN 5.1* 5.1* 5.2* 4.9* 4.8* 7.3   ALBUMIN 3.3* 3.2* 3.1* 3.2* 3.1* 4.5   GLOBULIN 1.8 1.9 2.1 1.7 1.7 2.8   ALT (SGPT) 9 10 12 5 <5 8   AST (SGOT) 11 13 18 10 7 16   BILIRUBIN 0.3 0.3 0.4 0.8 0.5 0.8   ALK PHOS 65 69 71 62 54 75   LIPASE  --   --   --   --   --  42         Lab 05/07/23  1032 05/05/23  1857 05/05/23  1705   HSTROP T  --  12* 15*   PROTIME 15.5*  --  15.6*   INR 1.22*  --  1.23*             Lab 05/07/23 0453 05/05/23 2021 05/05/23  1709   IRON 32*  --   --    IRON SATURATION 16*  --   --    TIBC 201*  --   --     TRANSFERRIN 135*  --   --    VITAMIN B 12 493  --   --    ABO TYPING  --  O O   RH TYPING  --  Negative Negative   ANTIBODY SCREEN  --   --  Positive         Brief Urine Lab Results  (Last result in the past 365 days)      Color   Clarity   Blood   Leuk Est   Nitrite   Protein   CREAT   Urine HCG        05/05/23 1752 Yellow   Clear   Negative   Negative   Positive   Negative                 Microbiology Results Abnormal     Procedure Component Value - Date/Time    Blood Culture - Blood, Arm, Right [[id number]]  (Normal) Collected: 05/05/23 1850    Lab Status: Final result Specimen: Blood from Arm, Right Updated: 05/10/23 2015     Blood Culture No growth at 5 days    Blood Culture - Blood, Wrist, Right [[id number]]  (Normal) Collected: 05/05/23 1855    Lab Status: Final result Specimen: Blood from Wrist, Right Updated: 05/10/23 2015     Blood Culture No growth at 5 days    COVID PRE-OP / PRE-PROCEDURE SCREENING ORDER (NO ISOLATION) - Swab, Nasopharynx [[id number]]  (Normal) Collected: 05/05/23 1704    Lab Status: Final result Specimen: Swab from Nasopharynx Updated: 05/05/23 1747    Narrative:      The following orders were created for panel order COVID PRE-OP / PRE-PROCEDURE SCREENING ORDER (NO ISOLATION) - Swab, Nasopharynx.  Procedure                               Abnormality         Status                     ---------                               -----------         ------                     COVID-19 and FLU A/B PCR...[[id number]]  Normal              Final result                 Please view results for these tests on the individual orders.    COVID-19 and FLU A/B PCR - Swab, Nasopharynx [[id number]]  (Normal) Collected: 05/05/23 1704    Lab Status: Final result Specimen: Swab from Nasopharynx Updated: 05/05/23 1747     COVID19 Not Detected     Influenza A PCR Not Detected     Influenza B PCR Not Detected    Narrative:      Fact sheet for providers: https://www.fda.gov/media/981703/download    Fact sheet for  patients: https://www.[employer].gov/media/123895/download    Test performed by PCR.          No radiology results from the last 24 hrs        Current medications:  Scheduled Meds:apixaban, 5 mg, Oral, Q12H  dexamethasone, 6 mg, Oral, Daily With Breakfast  insulin detemir, 10 Units, Subcutaneous, Nightly  insulin lispro, 0-9 Units, Subcutaneous, TID AC  Insulin Lispro, 5 Units, Subcutaneous, TID With Meals  lactobacillus acidophilus, 1 capsule, Oral, Daily  levothyroxine, 100 mcg, Oral, Q AM  lisinopril, 5 mg, Oral, Daily  propranolol, 20 mg, Oral, TID  remdesivir, 100 mg, Intravenous, Q24H  sodium chloride, 10 mL, Intravenous, Q12H  tamsulosin, 0.4 mg, Oral, Daily      Continuous Infusions:lactated ringers, 100 mL/hr, Last Rate: 100 mL/hr (05/11/23 0552)  Pharmacy Consult - Pharmacy to dose,       PRN Meds:.•  acetaminophen  •  benzonatate  •  Calcium Replacement - Follow Nurse / BPA Driven Protocol  •  dextrose  •  dextrose  •  glucagon (human recombinant)  •  guaifenesin  •  hydrOXYzine  •  Magnesium Standard Dose Replacement - Follow Nurse / BPA Driven Protocol  •  melatonin  •  ondansetron **OR** ondansetron  •  Pharmacy Consult - Pharmacy to dose  •  Phosphorus Replacement - Follow Nurse / BPA Driven Protocol  •  Potassium Replacement - Follow Nurse / BPA Driven Protocol  •  [COMPLETED] Insert Peripheral IV **AND** sodium chloride  •  sodium chloride  •  sodium chloride    Assessment & Plan   Assessment & Plan     Active Hospital Problems    Diagnosis  POA   • **Urinary tract infection, acute [N39.0]  Yes   • COVID-19 virus infection [U07.1]  Unknown   • Chronic anticoagulation [Z79.01]  Not Applicable   • Essential hypertension [I10]  Yes   • Type 2 diabetes mellitus without complication, with long-term current use of insulin [E11.9, Z79.4]  Not Applicable   • Personal history of pulmonary embolism [Z86.711]  Yes   • Hypothyroidism (acquired) [E03.9]  Yes   • Pancytopenia (HCC) [D61.818]  Yes      Resolved Hospital  Problems   No resolved problems to display.        Brief Hospital Course to date:  Chey Robertson is a 87 y.o. female with past medical history of myelodysplasia, chronic pancytopenia, essential hypertension, type 2 diabetes, history of PE (recently switched from warfarin to Eliquis per her cardiologist because of inability to achieve therapeutic INR), GERD who presented to the hospital with generalized weakness, nausea and vomiting found to have urinary tract infection. Since admission, pt now found to have COVID19.     Assessment and plan:    Acute urinary tract infection, POA  Dehydration volume depletion  Acute on chronic debility  - Urinalysis positive for UTI, urine culture growing pansensitive E. coli  - Started on IV Rocephin on admission.  Given her persistent and worsening neutropenia with ANC of 350 on 5/7,  implemented neutropenic precautions and switched antibiotic therapy to IV cefepime  - Completed ABX per ID  - ANC improved  - Continue as needed Zofran  - PT/OT recommended rehab    COVID19 infection - on room air  -- pt complained of SOA, cough on 5/8, checked RVP and was positive for COVID 19  -- started on Decadron 6 mg daily D5/10, IV Remdesivir D5/5  -- monitor CMP, CRP, CBC with diff daily  -- needing PRN O2, stable on room air this morning  -- resume Eliquis PLTs >40K     Myelodysplastic syndrome  Worsening pancytopenia, likely secondary to sepsis  Worsening anemia, likely anemia of chronic disease  - Chronic pancytopenia  - DIC panel checked and negative   - neutropenic precautions, s/p 1 dose of Neupogen with improvement in ANC  - Follows with Dr. Lyman     History of PE  - Remote history of PE  - Resume Eliquis as PLTs >40K (okay per Dr. Lyman's note)    Type 2 diabetes  - A1c from end of March 2023 is 6%  - Hyperglycemia likely due to steroids.   - lncrease to mod dose basal/bolus insulin  - Continue sliding scale insulin     Essential hypertension  - Continue to hold home Lasix for now  given dehydration  - Continue home Lisinopril and propranolol     Hypothyroidism  - Continue home synthroid    GERD  - PPI    Expected Discharge Location and Transportation: Rehab, referrals pending  Expected Discharge (if ten day COVID antigen test is negative) on 5/18    Expected Discharge Date: 5/18/2023; Expected Discharge Time:      DVT prophylaxis:  Medical and mechanical DVT prophylaxis orders are present.     AM-PAC 6 Clicks Score (PT): 19 (05/11/23 2050)      CODE STATUS:   Code Status and Medical Interventions:   Ordered at: 05/05/23 2031     Code Status (Patient has no pulse and is not breathing):    CPR (Attempt to Resuscitate)     Medical Interventions (Patient has pulse or is breathing):    Full Support       Meena Younger,   05/12/23

## 2023-05-12 NOTE — CASE MANAGEMENT/SOCIAL WORK
Continued Stay Note  [facility]     Patient Name: Chey Robertson  MRN: [id number]  Today's Date: 5/12/2023    Admit Date: 5/5/2023    Plan: discharge plan   Discharge Plan     Row Name 05/12/23 1642       Plan    Plan discharge plan    Plan Comments Pt in covid isolation until 5/28 unless covid antigen negative on 5/18. Pt/son wanting referrals to [facility] term re[facility]. CM will make referrals next week when closer to being out of isolations.    Final Discharge Disposition Code 03 - skilled nursing facility (SNF)               Discharge Codes    No documentation.               Expected Discharge Date and Time     Expected Discharge Date Expected Discharge Time    May 18, 2023             Kellie Lauren RN

## 2023-05-13 LAB
ANION GAP SERPL CALCULATED.3IONS-SCNC: 8 MMOL/L (ref 5–15)
BASOPHILS # BLD AUTO: 0 10*3/MM3 (ref 0–0.2)
BASOPHILS NFR BLD AUTO: 0 % (ref 0–1.5)
BUN SERPL-MCNC: 14 MG/DL (ref 8–23)
BUN/CREAT SERPL: 19.7 (ref 7–25)
CALCIUM SPEC-SCNC: 8.8 MG/DL (ref 8.6–10.5)
CHLORIDE SERPL-SCNC: 106 MMOL/L (ref 98–107)
CO2 SERPL-SCNC: 31 MMOL/L (ref 22–29)
CREAT SERPL-MCNC: 0.71 MG/DL (ref 0.57–1)
DEPRECATED RDW RBC AUTO: 54.4 FL (ref 37–54)
EGFRCR SERPLBLD CKD-EPI 2021: 82.9 ML/MIN/1.73
EOSINOPHIL # BLD AUTO: 0 10*3/MM3 (ref 0–0.4)
EOSINOPHIL NFR BLD AUTO: 0 % (ref 0.3–6.2)
ERYTHROCYTE [DISTWIDTH] IN BLOOD BY AUTOMATED COUNT: 16.5 % (ref 12.3–15.4)
GLUCOSE BLDC GLUCOMTR-MCNC: 151 MG/DL (ref 70–130)
GLUCOSE BLDC GLUCOMTR-MCNC: 241 MG/DL (ref 70–130)
GLUCOSE BLDC GLUCOMTR-MCNC: 91 MG/DL (ref 70–130)
GLUCOSE SERPL-MCNC: 108 MG/DL (ref 65–99)
HCT VFR BLD AUTO: 28.2 % (ref 34–46.6)
HGB BLD-MCNC: 8.7 G/DL (ref 12–15.9)
IMM GRANULOCYTES # BLD AUTO: 0.1 10*3/MM3 (ref 0–0.05)
IMM GRANULOCYTES NFR BLD AUTO: 4.5 % (ref 0–0.5)
LYMPHOCYTES # BLD AUTO: 0.49 10*3/MM3 (ref 0.7–3.1)
LYMPHOCYTES NFR BLD AUTO: 22.2 % (ref 19.6–45.3)
MCH RBC QN AUTO: 27.9 PG (ref 26.6–33)
MCHC RBC AUTO-ENTMCNC: 30.9 G/DL (ref 31.5–35.7)
MCV RBC AUTO: 90.4 FL (ref 79–97)
MONOCYTES # BLD AUTO: 0.25 10*3/MM3 (ref 0.1–0.9)
MONOCYTES NFR BLD AUTO: 11.3 % (ref 5–12)
NEUTROPHILS NFR BLD AUTO: 1.37 10*3/MM3 (ref 1.7–7)
NEUTROPHILS NFR BLD AUTO: 62 % (ref 42.7–76)
NRBC BLD AUTO-RTO: 0 /100 WBC (ref 0–0.2)
PLATELET # BLD AUTO: 112 10*3/MM3 (ref 140–450)
PMV BLD AUTO: 9 FL (ref 6–12)
POTASSIUM SERPL-SCNC: 3.5 MMOL/L (ref 3.5–5.2)
POTASSIUM SERPL-SCNC: 5.3 MMOL/L (ref 3.5–5.2)
RBC # BLD AUTO: 3.12 10*6/MM3 (ref 3.77–5.28)
SODIUM SERPL-SCNC: 145 MMOL/L (ref 136–145)
WBC NRBC COR # BLD: 2.21 10*3/MM3 (ref 3.4–10.8)

## 2023-05-13 PROCEDURE — 80048 BASIC METABOLIC PNL TOTAL CA: CPT | Performed by: INTERNAL MEDICINE

## 2023-05-13 PROCEDURE — 63710000001 INSULIN DETEMIR PER 5 UNITS: Performed by: INTERNAL MEDICINE

## 2023-05-13 PROCEDURE — 63710000001 INSULIN LISPRO (HUMAN) PER 5 UNITS: Performed by: INTERNAL MEDICINE

## 2023-05-13 PROCEDURE — 85025 COMPLETE CBC W/AUTO DIFF WBC: CPT | Performed by: INTERNAL MEDICINE

## 2023-05-13 PROCEDURE — 97530 THERAPEUTIC ACTIVITIES: CPT

## 2023-05-13 PROCEDURE — 82948 REAGENT STRIP/BLOOD GLUCOSE: CPT

## 2023-05-13 PROCEDURE — 63710000001 DEXAMETHASONE PER 0.25 MG

## 2023-05-13 PROCEDURE — 97116 GAIT TRAINING THERAPY: CPT

## 2023-05-13 PROCEDURE — 99232 SBSQ HOSP IP/OBS MODERATE 35: CPT | Performed by: INTERNAL MEDICINE

## 2023-05-13 PROCEDURE — 84132 ASSAY OF SERUM POTASSIUM: CPT | Performed by: INTERNAL MEDICINE

## 2023-05-13 PROCEDURE — 63710000001 INSULIN LISPRO (HUMAN) PER 5 UNITS: Performed by: PHYSICIAN ASSISTANT

## 2023-05-13 RX ORDER — POTASSIUM CHLORIDE 750 MG/1
40 CAPSULE, EXTENDED RELEASE ORAL EVERY 4 HOURS
Status: COMPLETED | OUTPATIENT
Start: 2023-05-13 | End: 2023-05-13

## 2023-05-13 RX ADMIN — PROPRANOLOL HYDROCHLORIDE 20 MG: 20 TABLET ORAL at 09:18

## 2023-05-13 RX ADMIN — INSULIN LISPRO 4 UNITS: 100 INJECTION, SOLUTION INTRAVENOUS; SUBCUTANEOUS at 17:59

## 2023-05-13 RX ADMIN — APIXABAN 5 MG: 5 TABLET, FILM COATED ORAL at 09:18

## 2023-05-13 RX ADMIN — INSULIN DETEMIR 10 UNITS: 100 INJECTION, SOLUTION SUBCUTANEOUS at 21:21

## 2023-05-13 RX ADMIN — INSULIN LISPRO 7 UNITS: 100 INJECTION, SOLUTION INTRAVENOUS; SUBCUTANEOUS at 09:17

## 2023-05-13 RX ADMIN — INSULIN LISPRO 7 UNITS: 100 INJECTION, SOLUTION INTRAVENOUS; SUBCUTANEOUS at 17:59

## 2023-05-13 RX ADMIN — TAMSULOSIN HYDROCHLORIDE 0.4 MG: 0.4 CAPSULE ORAL at 09:19

## 2023-05-13 RX ADMIN — LEVOTHYROXINE SODIUM 100 MCG: 0.1 TABLET ORAL at 05:47

## 2023-05-13 RX ADMIN — LISINOPRIL 5 MG: 5 TABLET ORAL at 09:18

## 2023-05-13 RX ADMIN — Medication 5 MG: at 21:20

## 2023-05-13 RX ADMIN — DEXAMETHASONE 6 MG: 4 TABLET ORAL at 09:18

## 2023-05-13 RX ADMIN — Medication 10 ML: at 09:19

## 2023-05-13 RX ADMIN — Medication 10 ML: at 21:21

## 2023-05-13 RX ADMIN — INSULIN LISPRO 7 UNITS: 100 INJECTION, SOLUTION INTRAVENOUS; SUBCUTANEOUS at 12:56

## 2023-05-13 RX ADMIN — HYDROXYZINE HYDROCHLORIDE 25 MG: 25 TABLET, FILM COATED ORAL at 21:20

## 2023-05-13 RX ADMIN — Medication 1 CAPSULE: at 09:17

## 2023-05-13 RX ADMIN — POTASSIUM CHLORIDE 40 MEQ: 750 CAPSULE, EXTENDED RELEASE ORAL at 09:18

## 2023-05-13 RX ADMIN — PROPRANOLOL HYDROCHLORIDE 20 MG: 20 TABLET ORAL at 21:20

## 2023-05-13 RX ADMIN — APIXABAN 5 MG: 5 TABLET, FILM COATED ORAL at 21:20

## 2023-05-13 RX ADMIN — POTASSIUM CHLORIDE 40 MEQ: 750 CAPSULE, EXTENDED RELEASE ORAL at 12:56

## 2023-05-13 RX ADMIN — INSULIN LISPRO 2 UNITS: 100 INJECTION, SOLUTION INTRAVENOUS; SUBCUTANEOUS at 12:31

## 2023-05-13 RX ADMIN — PROPRANOLOL HYDROCHLORIDE 20 MG: 20 TABLET ORAL at 17:55

## 2023-05-13 NOTE — PLAN OF CARE
Goal Outcome Evaluation:  Plan of Care Reviewed With: patient        Progress: improving  Outcome Evaluation: Pt amb laps in room with RW and SBA. Pt dem good balance and safety during gait as well as ADL and balance activity at sink, no LOB or c/o DUFFY. VSS on RA. Continue POC.

## 2023-05-13 NOTE — PROGRESS NOTES
[facility] Medicine Services  PROGRESS NOTE    Patient Name: Chey Robertson  : 1935  MRN: [id number]    Date of Admission: 2023  Primary Care Physician: Corby Marie MD    Subjective   Subjective     CC:  F/U for UTI    HPI:  Patient resting in bed. No complaints other than wanted to get out of the hospital.     ROS:  Gen- No fevers, chills  CV- No chest pain, palpitations  Resp- + cough, no dyspnea  GI- No N/V/D, abd pain    Objective   Objective     Vital Signs:   Temp:  [97.8 °F (36.6 °C)-98.1 °F (36.7 °C)] 98.1 °F (36.7 °C)  Heart Rate:  [56-70] 70  Resp:  [18-22] 20  BP: (148-172)/(69-93) 164/69     Physical Exam:  Constitutional: No acute distress, awake, alert  HENT: NCAT, mucous membranes moist  Respiratory: Clear to auscultation bilaterally, respiratory effort normal on room air  Cardiovascular: RRR, no murmurs, rubs, or gallops  Gastrointestinal:  soft, nontender, nondistended  Musculoskeletal: No bilateral ankle edema  Psychiatric: Appropriate affect, cooperative  Neurologic: Oriented x 3, speech clear  Skin: No rashes      Results Reviewed:  LAB RESULTS:      Lab 23  0550 23  0535 05/10/23  0556 23  0816 23  0616 23  1728 23  1032 23  0453 23  1520   WBC 2.21* 1.89* 2.15* 2.71* 4.18  --  0.89*   < >  --    HEMOGLOBIN 8.7* 8.0* 7.8* 7.9* 7.9*   < > 7.7*   < >  --    HEMATOCRIT 28.2* 25.7* 25.5* 24.1* 24.9*   < > 24.8*   < >  --    PLATELETS 112* 54* 46* 32* 40*  --  48*   < >  --    NEUTROS ABS 1.37* 1.34* 1.58* 2.12 3.46  --  0.49*   < >  --    IMMATURE GRANS (ABS) 0.10* 0.05 0.02 0.05 0.14*  --  0.01   < >  --    LYMPHS ABS 0.49* 0.35* 0.37* 0.30* 0.32*  --  0.24*   < >  --    MONOS ABS 0.25 0.15 0.18 0.24 0.26  --  0.15   < >  --    EOS ABS 0.00 0.00 0.00 0.00 0.00  --  0.00   < >  --    MCV 90.4 90.8 92.7 88.6 90.2  --  90.8   < >  --    CRP  --  1.96* 3.76*  --   --   --   --   --   --    LACTATE  --   --    --   --   --   --  2.7*  --  2.6*   PROTIME  --   --   --   --   --   --  15.5*  --   --    APTT  --   --   --   --   --   --  31.8  --   --    D DIMER QUANT  --   --   --   --   --   --  0.83  --   --     < > = values in this interval not displayed.         Lab 05/13/23  0550 05/11/23  0535 05/10/23  0556 05/09/23  0816 05/08/23 0616 05/07/23  1032 05/07/23  0453   SODIUM 145 139 139 140 140  --  141   POTASSIUM 3.5 4.3 4.2 4.2 4.3   < > 3.4*   CHLORIDE 106 104 105 105 106  --  105   CO2 31.0* 27.0 26.0 26.0 24.0  --  27.0   ANION GAP 8.0 8.0 8.0 9.0 10.0  --  9.0   BUN 14 17 18 11 8  --  10   CREATININE 0.71 0.79 0.77 0.74 0.78  --  0.83   EGFR 82.9 73.0 75.2 78.9 74.1  --  68.8   GLUCOSE 108* 214* 247* 158* 126*  --  124*   CALCIUM 8.8 8.7 8.3* 8.2* 8.1*  --  7.8*   MAGNESIUM  --   --   --   --  2.1  --  1.0*   PHOSPHORUS  --   --   --   --   --   --  3.5    < > = values in this interval not displayed.         Lab 05/11/23  0535 05/10/23  0556 05/09/23  0816 05/08/23 06 05/07/23  0453   TOTAL PROTEIN 5.1* 5.1* 5.2* 4.9* 4.8*   ALBUMIN 3.3* 3.2* 3.1* 3.2* 3.1*   GLOBULIN 1.8 1.9 2.1 1.7 1.7   ALT (SGPT) 9 10 12 5 <5   AST (SGOT) 11 13 18 10 7   BILIRUBIN 0.3 0.3 0.4 0.8 0.5   ALK PHOS 65 69 71 62 54         Lab 05/07/23  1032   PROTIME 15.5*   INR 1.22*             Lab 05/07/23  0453   IRON 32*   IRON SATURATION 16*   TIBC 201*   TRANSFERRIN 135*   VITAMIN B 12 493         Brief Urine Lab Results  (Last result in the past 365 days)      Color   Clarity   Blood   Leuk Est   Nitrite   Protein   CREAT   Urine HCG        05/05/23 1752 Yellow   Clear   Negative   Negative   Positive   Negative                 Microbiology Results Abnormal     Procedure Component Value - Date/Time    Blood Culture - Blood, Arm, Right [[id number]]  (Normal) Collected: 05/05/23 1850    Lab Status: Final result Specimen: Blood from Arm, Right Updated: 05/10/23 2015     Blood Culture No growth at 5 days    Blood Culture - Blood, Wrist,  Right [[id number]]  (Normal) Collected: 05/05/23 1855    Lab Status: Final result Specimen: Blood from Wrist, Right Updated: 05/10/23 2015     Blood Culture No growth at 5 days    COVID PRE-OP / PRE-PROCEDURE SCREENING ORDER (NO ISOLATION) - Swab, Nasopharynx [[id number]]  (Normal) Collected: 05/05/23 1704    Lab Status: Final result Specimen: Swab from Nasopharynx Updated: 05/05/23 1747    Narrative:      The following orders were created for panel order COVID PRE-OP / PRE-PROCEDURE SCREENING ORDER (NO ISOLATION) - Swab, Nasopharynx.  Procedure                               Abnormality         Status                     ---------                               -----------         ------                     COVID-19 and FLU A/B PCR...[[id number]]  Normal              Final result                 Please view results for these tests on the individual orders.    COVID-19 and FLU A/B PCR - Swab, Nasopharynx [[id number]]  (Normal) Collected: 05/05/23 1704    Lab Status: Final result Specimen: Swab from Nasopharynx Updated: 05/05/23 1747     COVID19 Not Detected     Influenza A PCR Not Detected     Influenza B PCR Not Detected    Narrative:      Fact sheet for providers: https://www.fda.gov/media/707489/download    Fact sheet for patients: https://www.fda.gov/media/328488/download    Test performed by PCR.          No radiology results from the last 24 hrs        Current medications:  Scheduled Meds:apixaban, 5 mg, Oral, Q12H  dexamethasone, 6 mg, Oral, Daily With Breakfast  insulin detemir, 10 Units, Subcutaneous, Nightly  insulin lispro, 0-9 Units, Subcutaneous, TID AC  Insulin Lispro, 7 Units, Subcutaneous, TID With Meals  lactobacillus acidophilus, 1 capsule, Oral, Daily  levothyroxine, 100 mcg, Oral, Q AM  lisinopril, 5 mg, Oral, Daily  potassium chloride, 40 mEq, Oral, Q4H  propranolol, 20 mg, Oral, TID  sodium chloride, 10 mL, Intravenous, Q12H  tamsulosin, 0.4 mg, Oral, Daily      Continuous Infusions:lactated ringers,  100 mL/hr, Last Rate: 100 mL/hr (05/11/23 0552)      PRN Meds:.•  acetaminophen  •  benzonatate  •  Calcium Replacement - Follow Nurse / BPA Driven Protocol  •  dextrose  •  dextrose  •  glucagon (human recombinant)  •  guaifenesin  •  hydrOXYzine  •  Magnesium Standard Dose Replacement - Follow Nurse / BPA Driven Protocol  •  melatonin  •  ondansetron **OR** ondansetron  •  Phosphorus Replacement - Follow Nurse / BPA Driven Protocol  •  Potassium Replacement - Follow Nurse / BPA Driven Protocol  •  [COMPLETED] Insert Peripheral IV **AND** sodium chloride  •  sodium chloride  •  sodium chloride    Assessment & Plan   Assessment & Plan     Active Hospital Problems    Diagnosis  POA   • **Urinary tract infection, acute [N39.0]  Yes   • COVID-19 virus infection [U07.1]  Unknown   • Chronic anticoagulation [Z79.01]  Not Applicable   • Essential hypertension [I10]  Yes   • Type 2 diabetes mellitus without complication, with long-term current use of insulin [E11.9, Z79.4]  Not Applicable   • Personal history of pulmonary embolism [Z86.711]  Yes   • Hypothyroidism (acquired) [E03.9]  Yes   • Pancytopenia (HCC) [D61.818]  Yes      Resolved Hospital Problems   No resolved problems to display.        Brief Hospital Course to date:  Chey Robertson is a 87 y.o. female with past medical history of myelodysplasia, chronic pancytopenia, essential hypertension, type 2 diabetes, history of PE (recently switched from warfarin to Eliquis per her cardiologist because of inability to achieve therapeutic INR), GERD who presented to the hospital with generalized weakness, nausea and vomiting found to have urinary tract infection. Since admission, pt now found to have COVID19.     Assessment and plan:    Acute urinary tract infection, POA  Dehydration volume depletion  Acute on chronic debility  - Urinalysis positive for UTI, urine culture growing pansensitive E. coli  - Started on IV Rocephin on admission.  Given her persistent and  worsening neutropenia with ANC of 350 on 5/7,  implemented neutropenic precautions and switched antibiotic therapy to IV cefepime  - Completed ABX per ID  - ANC improved  - PT/OT recommended rehab    COVID19 infection - on room air  -- pt complained of SOA, cough on 5/8, checked RVP and was positive for COVID 19  -- started on Decadron 6 mg daily D6/10, completed full course of Remdesivir 5/12  -- needing PRN O2, stable on room air now  -- resume Eliquis PLTs >40K     Myelodysplastic syndrome  Worsening pancytopenia, likely secondary to sepsis  Worsening anemia, likely anemia of chronic disease  - Chronic pancytopenia  - DIC panel checked and negative   - neutropenic precautions, s/p 1 dose of Neupogen with improvement in ANC  - Follows with Dr. Lyman     History of PE  - Remote history of PE  - Resume Eliquis as PLTs >40K (okay per Dr. Lyman's note)    Type 2 diabetes  - A1c from end of March 2023 is 6%  - Hyperglycemia likely due to steroids.   - lncrease to mod dose basal/bolus insulin  - Continue sliding scale insulin     Essential hypertension  - Continue to hold home Lasix for now given dehydration  - Continue home Lisinopril and propranolol     Hypothyroidism  - Continue home synthroid    GERD  - PPI    Expected Discharge Location and Transportation: Rehab, referrals pending  Expected Discharge (if ten day COVID antigen test is negative) on 5/18    Expected Discharge Date: 5/18/2023; Expected Discharge Time:      DVT prophylaxis:  Medical and mechanical DVT prophylaxis orders are present.     AM-PAC 6 Clicks Score (PT): 19 (05/11/23 2050)      CODE STATUS:   Code Status and Medical Interventions:   Ordered at: 05/05/23 2031     Code Status (Patient has no pulse and is not breathing):    CPR (Attempt to Resuscitate)     Medical Interventions (Patient has pulse or is breathing):    Full Support       Meena Younger, DO  05/13/23

## 2023-05-13 NOTE — THERAPY TREATMENT NOTE
Patient Name: Chey Robertson  : 1935    MRN: [id number]                              Today's Date: 2023       Admit Date: 2023    Visit Dx:     ICD-10-CM ICD-9-CM   1. Urinary tract infection, acute  N39.0 599.0   2. Acute dehydration  E86.0 276.51     Patient Active Problem List   Diagnosis   • Benign familial tremor   • AMS (altered mental status)   • Thrombocytopenia (HCC)   • Pancytopenia (HCC)   • Shortness of breath   • Hypothyroidism (acquired)   • Acute kidney injury   • Splenomegaly   • Hepatomegaly   • Confusion   • B12 deficiency   • Abnormal intestinal absorption   • Iron deficiency anemia due to chronic blood loss   • Myelodysplasia (myelodysplastic syndrome)   • Weakness   • Personal history of pulmonary embolism   • Urinary tract infection, acute   • Chronic anticoagulation   • Essential hypertension   • Type 2 diabetes mellitus without complication, with long-term current use of insulin   • COVID-19 virus infection     Past Medical History:   Diagnosis Date   • Anemia    • Arthritis    • Diabetes mellitus     checks sugar only when pt wants to    • Disease of thyroid gland    • History of transfusion     with knee surgery-  no reaction recalled.    • [place] (hard of hearing)     no hearing aids   • Hypertension    • Migraine without aura and without status migrainosus, not intractable 2016   • Pulmonary emboli     (after knee surgery)   • SOBOE (shortness of breath on exertion)    • Tremors of nervous system    • Vertigo    • Wears dentures     upper    • Wears glasses      Past Surgical History:   Procedure Laterality Date   • BLADDER SURGERY     • CATARACT EXTRACTION      bilat    • CHOLECYSTECTOMY     • COLONOSCOPY     • HYSTERECTOMY     • KYPHOPLASTY N/A 2021    Procedure: KYPHOPLASTY T11, T12 AND L4;  Surgeon: Matty Hearn MD;  [facility]: [facility];  Service: Orthopedic Spine;  Laterality: N/A;   • OOPHORECTOMY     • TONSILLECTOMY        General Information      Row Name 05/13/23 1537          Physical Therapy Time and Intention    Document Type therapy note (daily note)  -SD     Mode of Treatment physical therapy  -SD     Row Name 05/13/23 1537          General Information    Existing Precautions/Restrictions fall;other (see comments)  airborne and contact  -SD     Row Name 05/13/23 1537          Cognition    Orientation Status (Cognition) oriented x 4  -SD     Row Name 05/13/23 1537          Safety Issues, Functional Mobility    Safety Issues Affecting Function (Mobility) awareness of need for assistance;insight into deficits/self-awareness;sequencing abilities;safety precaution awareness  -SD     Impairments Affecting Function (Mobility) balance;endurance/activity tolerance;strength  -SD           User Key  (r) = Recorded By, (t) = Taken By, (c) = Cosigned By    Initials Name Provider Type    SD Eloise Dutton, PT Physical Therapist               Mobility     Row Name 05/13/23 1630          Bed Mobility    Comment, (Bed Mobility) UIC  -SD     Row Name 05/13/23 1630          Transfers    Comment, (Transfers) pt dem good safety awareness with t/f's  -SD     Row Name 05/13/23 1630          Sit-Stand Transfer    Sit-Stand [place] (Transfers) supervision;verbal cues  -SD     Assistive Device (Sit-Stand Transfers) walker, front-wheeled  -SD     Row Name 05/13/23 1630          Gait/Stairs (Locomotion)    [place] Level (Gait) standby assist  -SD     Assistive Device (Gait) walker, front-wheeled  -SD     Distance in Feet (Gait) 90ft  -SD     Deviations/Abnormal Patterns (Gait) bilateral deviations;gait speed decreased;stride length decreased  -SD     Bilateral Gait Deviations forward flexed posture;heel strike decreased  -SD     Comment, (Gait/Stairs) Pt amb laps in room with RW and SBA. Pt dem good balance and safety, no LOB or c/o DUFFY. VSS on RA.  -SD           User Key  (r) = Recorded By, (t) = Taken By, (c) = Cosigned By    Initials Name Provider Type    SD  Eloise Dutton, GURPREET Physical Therapist               Obj/Interventions     Row Name 05/13/23 1632          Balance    Balance Assessment sitting static balance;standing static balance;standing dynamic balance  -SD     Static Sitting Balance independent  -SD     Position, Sitting Balance unsupported;sitting in chair  -SD     Static Standing Balance supervision  -SD     Dynamic Standing Balance contact guard  -SD     Position/Device Used, Standing Balance unsupported  -SD     Balance Interventions standing;static;dynamic;dynamic reaching;occupation based/functional task  -SD     Comment, Balance prolonged ADL at sink, no LOB, good activity xiomara  -SD           User Key  (r) = Recorded By, (t) = Taken By, (c) = Cosigned By    Initials Name Provider Type    SD Eloise Dutton, GURPREET Physical Therapist               Goals/Plan    No documentation.                Clinical Impression     Row Name 05/13/23 1633          Pain    Pretreatment Pain Rating 0/10 - no pain  -SD     Posttreatment Pain Rating 0/10 - no pain  -SD     Row Name 05/13/23 1633          Plan of Care Review    Plan of Care Reviewed With patient  -SD     Progress improving  -SD     Outcome Evaluation Pt amb laps in room with RW and SBA. Pt dem good balance and safety during gait as well as ADL and balance activity at sink, no LOB or c/o DUFFY. VSS on RA. Continue POC.  -SD     Row Name 05/13/23 1633          Vital Signs    Pre Systolic BP Rehab 174  -SD     Pre Treatment Diastolic BP 74  -SD     Posttreatment Heart Rate (beats/min) 70  -SD     Post SpO2 (%) 95  -SD     O2 Delivery Post Treatment room air  -SD     Pre Patient Position Sitting  -SD     Post Patient Position Sitting  -SD     Row Name 05/13/23 1633          Positioning and Restraints    Pre-Treatment Position sitting in chair/recliner  -SD     Post Treatment Position chair  -SD     In Chair notified nsg;reclined;call light within reach;encouraged to call for assist;exit alarm on;waffle  cushion;heels elevated  -SD           User Key  (r) = Recorded By, (t) = Taken By, (c) = Cosigned By    Initials Name Provider Type    Eloise Perez, GURPREET Physical Therapist               Outcome Measures     Row Name 05/13/23 1634 05/13/23 0800       How much help from another person do you currently need...    Turning from your back to your side while in flat bed without using bedrails? 4  -SD 4  -CR    Moving from lying on back to sitting on the side of a flat bed without bedrails? 4  -SD 3  -CR    Moving to and from a bed to a chair (including a wheelchair)? 4  -SD 3  -CR    Standing up from a chair using your arms (e.g., wheelchair, bedside chair)? 4  -SD 3  -CR    Climbing 3-5 steps with a railing? 3  -SD 3  -CR    To walk in hospital room? 4  -SD 3  -CR    AM-PAC 6 Clicks Score (PT) 23  -SD 19  -CR    Highest level of mobility 7 --> Walked 25 feet or more  -SD 6 --> Walked 10 steps or more  -CR    Row Name 05/13/23 1634          Functional Assessment    Outcome Measure Options AM-PAC 6 Clicks Basic Mobility (PT)  -SD           User Key  (r) = Recorded By, (t) = Taken By, (c) = Cosigned By    Initials Name Provider Type    Eloise Perez, GURPREET Physical Therapist    Keke Lo RN Registered Nurse                             Physical Therapy Education     Title: PT OT SLP Therapies (Done)     Topic: Physical Therapy (Done)     Point: Mobility training (Done)     Learning Progress Summary           Patient Eager, E, VU,DU by SD at 5/13/2023 1634    Acceptance, E, VU,NR by ML at 5/10/2023 1508    Acceptance, E,TB, VU by BT at 5/9/2023 1542    Acceptance, E,D, VU,NR by MB at 5/8/2023 1441                   Point: Home exercise program (Done)     Learning Progress Summary           Patient Eager, E, VU,DU by SD at 5/13/2023 1634    Acceptance, E, VU,NR by ML at 5/10/2023 1508    Acceptance, E,TB, VU by BT at 5/9/2023 1542    Acceptance, E,D, VU,NR by MB at 5/8/2023 3224                    Point: Body mechanics (Done)     Learning Progress Summary           Patient Eager, E, VU,DU by SD at 5/13/2023 1634    Acceptance, E,TB, VU by BT at 5/9/2023 1542    Acceptance, E,D, VU,NR by MB at 5/8/2023 1441                   Point: Precautions (Done)     Learning Progress Summary           Patient Eager, E, VU,DU by SD at 5/13/2023 1634    Acceptance, E, VU,NR by ML at 5/10/2023 1508    Acceptance, E,TB, VU by BT at 5/9/2023 1542    Acceptance, E,D, VU,NR by MB at 5/8/2023 1441                               User Key     Initials Effective Dates Name Provider Type Discipline    SD 03/13/23 -  Eloise Dutton, PT Physical Therapist PT    MB 06/16/21 -  Brooke Marshall PT Physical Therapist PT    BT 12/30/20 -  Marly Cordero, RN Registered Nurse Nurse     04/22/21 -  Marybeth Arias Physical Therapist PT              PT Recommendation and Plan     Plan of Care Reviewed With: patient  Progress: improving  Outcome Evaluation: Pt amb laps in room with RW and SBA. Pt dem good balance and safety during gait as well as ADL and balance activity at sink, no LOB or c/o DUFFY. VSS on RA. Continue POC.     Time Calculation:    PT Charges     Row Name 05/13/23 1634             Time Calculation    Start Time 1537  -SD      PT Received On 05/13/23  -SD      PT Goal Re-Cert Due Date 05/18/23  -SD         Time Calculation- PT    Total Timed Code Minutes- PT 24 minute(s)  -SD         Timed Charges    94828 - Gait Training Minutes  15  -SD      49930 - PT Therapeutic Activity Minutes 9  -SD         Total Minutes    Timed Charges Total Minutes 24  -SD       Total Minutes 24  -SD            User Key  (r) = Recorded By, (t) = Taken By, (c) = Cosigned By    Initials Name Provider Type    SD Eloise Dutton, PT Physical Therapist              Therapy Charges for Today     Code Description Service Date Service Provider Modifiers Qty    [id number] HC GAIT TRAINING EA 15 MIN 5/13/2023 Eloise Dutton, PT GP 1     [id number]  PT THERAPEUTIC ACT EA 15 MIN 5/13/2023 Eloise Dutton, PT GP 1          PT G-Codes  Outcome Measure Options: AM-PAC 6 Clicks Basic Mobility (PT)  AM-PAC 6 Clicks Score (PT): 23  AM-PAC 6 Clicks Score (OT): 16       Eloise Dutton, GURPREET  5/13/2023

## 2023-05-13 NOTE — PLAN OF CARE
Goal Outcome Evaluation:  Plan of Care Reviewed With: patient        Progress: no change   Patient vital signs stable and on room air. Patient denies pain, nausea, and shortness of air. Patient up with standby assist. Patient had a bowel movement today. Patient up to chair. No problems identified at this time. Fall and safety precautions maintained.

## 2023-05-14 PROBLEM — R07.9 CHEST PAIN: Status: ACTIVE | Noted: 2023-05-14

## 2023-05-14 PROBLEM — I48.91 ATRIAL FIBRILLATION WITH RVR: Status: ACTIVE | Noted: 2023-05-14

## 2023-05-14 PROBLEM — R77.8 ELEVATED TROPONIN: Status: ACTIVE | Noted: 2023-05-14

## 2023-05-14 PROBLEM — R79.89 ELEVATED TROPONIN: Status: ACTIVE | Noted: 2023-05-14

## 2023-05-14 LAB
GEN 5 2HR TROPONIN T REFLEX: 58 NG/L
GLUCOSE BLDC GLUCOMTR-MCNC: 217 MG/DL (ref 70–130)
GLUCOSE BLDC GLUCOMTR-MCNC: 346 MG/DL (ref 70–130)
GLUCOSE BLDC GLUCOMTR-MCNC: 99 MG/DL (ref 70–130)
QT INTERVAL: 308 MS
QTC INTERVAL: 466 MS
TROPONIN T DELTA: -14 NG/L
TROPONIN T SERPL HS-MCNC: 72 NG/L

## 2023-05-14 PROCEDURE — 93010 ELECTROCARDIOGRAM REPORT: CPT | Performed by: INTERNAL MEDICINE

## 2023-05-14 PROCEDURE — 25010000002 MORPHINE PER 10 MG: Performed by: NURSE PRACTITIONER

## 2023-05-14 PROCEDURE — 99233 SBSQ HOSP IP/OBS HIGH 50: CPT | Performed by: NURSE PRACTITIONER

## 2023-05-14 PROCEDURE — 93005 ELECTROCARDIOGRAM TRACING: CPT | Performed by: INTERNAL MEDICINE

## 2023-05-14 PROCEDURE — 63710000001 INSULIN LISPRO (HUMAN) PER 5 UNITS: Performed by: PHYSICIAN ASSISTANT

## 2023-05-14 PROCEDURE — 84484 ASSAY OF TROPONIN QUANT: CPT | Performed by: NURSE PRACTITIONER

## 2023-05-14 PROCEDURE — 63710000001 INSULIN LISPRO (HUMAN) PER 5 UNITS: Performed by: INTERNAL MEDICINE

## 2023-05-14 PROCEDURE — 63710000001 INSULIN DETEMIR PER 5 UNITS: Performed by: INTERNAL MEDICINE

## 2023-05-14 PROCEDURE — 63710000001 DEXAMETHASONE PER 0.25 MG

## 2023-05-14 PROCEDURE — 82948 REAGENT STRIP/BLOOD GLUCOSE: CPT

## 2023-05-14 RX ORDER — MORPHINE SULFATE 2 MG/ML
2 INJECTION, SOLUTION INTRAMUSCULAR; INTRAVENOUS ONCE
Status: COMPLETED | OUTPATIENT
Start: 2023-05-14 | End: 2023-05-14

## 2023-05-14 RX ORDER — DILTIAZEM HYDROCHLORIDE 5 MG/ML
5 INJECTION INTRAVENOUS ONCE
Status: COMPLETED | OUTPATIENT
Start: 2023-05-14 | End: 2023-05-14

## 2023-05-14 RX ORDER — OXYCODONE HYDROCHLORIDE 5 MG/1
5 TABLET ORAL EVERY 8 HOURS PRN
Status: DISPENSED | OUTPATIENT
Start: 2023-05-14 | End: 2023-05-17

## 2023-05-14 RX ORDER — METOPROLOL TARTRATE 5 MG/5ML
5 INJECTION INTRAVENOUS ONCE
Status: COMPLETED | OUTPATIENT
Start: 2023-05-14 | End: 2023-05-14

## 2023-05-14 RX ADMIN — DICLOFENAC SODIUM 2 G: 10 GEL TOPICAL at 12:55

## 2023-05-14 RX ADMIN — Medication 10 ML: at 09:25

## 2023-05-14 RX ADMIN — DICLOFENAC SODIUM 2 G: 10 GEL TOPICAL at 20:51

## 2023-05-14 RX ADMIN — LISINOPRIL 5 MG: 5 TABLET ORAL at 09:25

## 2023-05-14 RX ADMIN — INSULIN LISPRO 4 UNITS: 100 INJECTION, SOLUTION INTRAVENOUS; SUBCUTANEOUS at 12:55

## 2023-05-14 RX ADMIN — HYDROXYZINE HYDROCHLORIDE 25 MG: 25 TABLET, FILM COATED ORAL at 06:41

## 2023-05-14 RX ADMIN — LEVOTHYROXINE SODIUM 100 MCG: 0.1 TABLET ORAL at 06:41

## 2023-05-14 RX ADMIN — GUAIFENESIN 200 MG: 200 SOLUTION ORAL at 20:48

## 2023-05-14 RX ADMIN — INSULIN LISPRO 7 UNITS: 100 INJECTION, SOLUTION INTRAVENOUS; SUBCUTANEOUS at 12:54

## 2023-05-14 RX ADMIN — APIXABAN 5 MG: 5 TABLET, FILM COATED ORAL at 20:48

## 2023-05-14 RX ADMIN — APIXABAN 5 MG: 5 TABLET, FILM COATED ORAL at 09:25

## 2023-05-14 RX ADMIN — INSULIN DETEMIR 10 UNITS: 100 INJECTION, SOLUTION SUBCUTANEOUS at 20:50

## 2023-05-14 RX ADMIN — METOPROLOL TARTRATE 25 MG: 25 TABLET, FILM COATED ORAL at 14:33

## 2023-05-14 RX ADMIN — BENZONATATE 200 MG: 100 CAPSULE ORAL at 06:41

## 2023-05-14 RX ADMIN — DEXAMETHASONE 6 MG: 4 TABLET ORAL at 09:24

## 2023-05-14 RX ADMIN — Medication 5 MG: at 20:50

## 2023-05-14 RX ADMIN — INSULIN LISPRO 7 UNITS: 100 INJECTION, SOLUTION INTRAVENOUS; SUBCUTANEOUS at 18:12

## 2023-05-14 RX ADMIN — DILTIAZEM HYDROCHLORIDE 5 MG: 5 INJECTION INTRAVENOUS at 10:30

## 2023-05-14 RX ADMIN — METOPROLOL TARTRATE 5 MG: 5 INJECTION INTRAVENOUS at 10:19

## 2023-05-14 RX ADMIN — Medication 1 CAPSULE: at 09:25

## 2023-05-14 RX ADMIN — PROPRANOLOL HYDROCHLORIDE 20 MG: 20 TABLET ORAL at 09:26

## 2023-05-14 RX ADMIN — MORPHINE SULFATE 2 MG: 2 INJECTION, SOLUTION INTRAMUSCULAR; INTRAVENOUS at 10:13

## 2023-05-14 RX ADMIN — TAMSULOSIN HYDROCHLORIDE 0.4 MG: 0.4 CAPSULE ORAL at 09:24

## 2023-05-14 RX ADMIN — DICLOFENAC SODIUM 2 G: 10 GEL TOPICAL at 18:13

## 2023-05-14 RX ADMIN — BENZONATATE 200 MG: 100 CAPSULE ORAL at 14:55

## 2023-05-14 RX ADMIN — GUAIFENESIN 200 MG: 200 SOLUTION ORAL at 09:45

## 2023-05-14 RX ADMIN — ACETAMINOPHEN 325MG 650 MG: 325 TABLET ORAL at 09:44

## 2023-05-14 RX ADMIN — OXYCODONE HYDROCHLORIDE 5 MG: 5 TABLET ORAL at 14:55

## 2023-05-14 RX ADMIN — Medication 10 ML: at 20:50

## 2023-05-14 NOTE — PROGRESS NOTES
[facility] Medicine Services  PROGRESS NOTE    Patient Name: Chey Robertson  : 1935  MRN: [id number]    Date of Admission: 2023  Primary Care Physician: Corby Marie MD    Subjective   Subjective     CC:  F/U for UTI    HPI:  Paged this am (10am) for new onset afib rvr with rates up to 180s, elevated BP and chest pain  Patient EKG reviewed and noted afib RVR- rates maintained 130-170s. Metoprolol IV given with some improvement. IV cardizem and IV morphine given with improvement in symptoms. Reports isolated event of Afib years ago after knee surgery, but no other episodes known  Reports midsternal chest pain radiating to back, + palpitations and dyspnea- improved with medication    ROS:  Gen- No fevers, chills  CV- _+ chest pain, palpitations  Resp- nocough, + dyspnea  GI- No N/V/D, abd pain    Objective   Objective     Vital Signs:   Temp:  [98 °F (36.7 °C)-99 °F (37.2 °C)] 98.2 °F (36.8 °C)  Heart Rate:  [] 88  Resp:  [18-22] 18  BP: (126-181)/() 126/65  Flow (L/min):  [2] 2     Physical Exam:  Constitutional: No acute distress, awake, alert  HENT: NCAT, mucous membranes moist  Respiratory: Clear to auscultation bilaterally, respiratory effort normal on room air- applied 2Lnc with O2 sats 90% RA  Cardiovascular: rapid, irreg  Gastrointestinal:  soft, nontender, nondistended  Musculoskeletal: No bilateral ankle edema  Psychiatric: Appropriate affect, cooperative  Neurologic: Oriented x 3, speech clear  Skin: No rashes      Results Reviewed:  LAB RESULTS:      Lab 23  0550 23  0535 05/10/23  0556 23  0816 23  0616   WBC 2.21* 1.89* 2.15* 2.71* 4.18   HEMOGLOBIN 8.7* 8.0* 7.8* 7.9* 7.9*   HEMATOCRIT 28.2* 25.7* 25.5* 24.1* 24.9*   PLATELETS 112* 54* 46* 32* 40*   NEUTROS ABS 1.37* 1.34* 1.58* 2.12 3.46   IMMATURE GRANS (ABS) 0.10* 0.05 0.02 0.05 0.14*   LYMPHS ABS 0.49* 0.35* 0.37* 0.30* 0.32*   MONOS ABS 0.25 0.15 0.18 0.24 0.26    EOS ABS 0.00 0.00 0.00 0.00 0.00   MCV 90.4 90.8 92.7 88.6 90.2   CRP  --  1.96* 3.76*  --   --          Lab 05/13/23  1620 05/13/23  0550 05/11/23  0535 05/10/23  0556 05/09/23  0816 05/08/23  0616   SODIUM  --  145 139 139 140 140   POTASSIUM 5.3* 3.5 4.3 4.2 4.2 4.3   CHLORIDE  --  106 104 105 105 106   CO2  --  31.0* 27.0 26.0 26.0 24.0   ANION GAP  --  8.0 8.0 8.0 9.0 10.0   BUN  --  14 17 18 11 8   CREATININE  --  0.71 0.79 0.77 0.74 0.78   EGFR  --  82.9 73.0 75.2 78.9 74.1   GLUCOSE  --  108* 214* 247* 158* 126*   CALCIUM  --  8.8 8.7 8.3* 8.2* 8.1*   MAGNESIUM  --   --   --   --   --  2.1         Lab 05/11/23  0535 05/10/23  0556 05/09/23  0816 05/08/23  0616   TOTAL PROTEIN 5.1* 5.1* 5.2* 4.9*   ALBUMIN 3.3* 3.2* 3.1* 3.2*   GLOBULIN 1.8 1.9 2.1 1.7   ALT (SGPT) 9 10 12 5   AST (SGOT) 11 13 18 10   BILIRUBIN 0.3 0.3 0.4 0.8   ALK PHOS 65 69 71 62         Lab 05/14/23  1105   HSTROP T 72*                 Brief Urine Lab Results  (Last result in the past 365 days)      Color   Clarity   Blood   Leuk Est   Nitrite   Protein   CREAT   Urine HCG        05/05/23 1752 Yellow   Clear   Negative   Negative   Positive   Negative                 Microbiology Results Abnormal     Procedure Component Value - Date/Time    Blood Culture - Blood, Arm, Right [[id number]]  (Normal) Collected: 05/05/23 1850    Lab Status: Final result Specimen: Blood from Arm, Right Updated: 05/10/23 2015     Blood Culture No growth at 5 days    Blood Culture - Blood, Wrist, Right [[id number]]  (Normal) Collected: 05/05/23 1855    Lab Status: Final result Specimen: Blood from Wrist, Right Updated: 05/10/23 2015     Blood Culture No growth at 5 days    COVID PRE-OP / PRE-PROCEDURE SCREENING ORDER (NO ISOLATION) - Swab, Nasopharynx [[id number]]  (Normal) Collected: 05/05/23 1704    Lab Status: Final result Specimen: Swab from Nasopharynx Updated: 05/05/23 6575    Narrative:      The following orders were created for panel order COVID PRE-OP /  PRE-PROCEDURE SCREENING ORDER (NO ISOLATION) - Swab, Nasopharynx.  Procedure                               Abnormality         Status                     ---------                               -----------         ------                     COVID-19 and FLU A/B PCR...[[id number]]  Normal              Final result                 Please view results for these tests on the individual orders.    COVID-19 and FLU A/B PCR - Swab, Nasopharynx [[id number]]  (Normal) Collected: 05/05/23 1704    Lab Status: Final result Specimen: Swab from Nasopharynx Updated: 05/05/23 0386     COVID19 Not Detected     Influenza A PCR Not Detected     Influenza B PCR Not Detected    Narrative:      Fact sheet for providers: https://www.fda.gov/media/957490/download    Fact sheet for patients: https://www.fda.gov/media/166458/download    Test performed by PCR.          No radiology results from the last 24 hrs        Current medications:  Scheduled Meds:apixaban, 5 mg, Oral, Q12H  dexamethasone, 6 mg, Oral, Daily With Breakfast  Diclofenac Sodium, 2 g, Topical, 4x Daily  insulin detemir, 10 Units, Subcutaneous, Nightly  insulin lispro, 0-9 Units, Subcutaneous, TID AC  Insulin Lispro, 7 Units, Subcutaneous, TID With Meals  lactobacillus acidophilus, 1 capsule, Oral, Daily  levothyroxine, 100 mcg, Oral, Q AM  lisinopril, 5 mg, Oral, Daily  propranolol, 20 mg, Oral, TID  sodium chloride, 10 mL, Intravenous, Q12H  tamsulosin, 0.4 mg, Oral, Daily      Continuous Infusions:   PRN Meds:.•  acetaminophen  •  benzonatate  •  Calcium Replacement - Follow Nurse / BPA Driven Protocol  •  dextrose  •  dextrose  •  glucagon (human recombinant)  •  guaifenesin  •  hydrOXYzine  •  Magnesium Standard Dose Replacement - Follow Nurse / BPA Driven Protocol  •  melatonin  •  ondansetron **OR** ondansetron  •  oxyCODONE  •  phenol  •  Phosphorus Replacement - Follow Nurse / BPA Driven Protocol  •  Potassium Replacement - Follow Nurse / BPA Driven Protocol  •   [COMPLETED] Insert Peripheral IV **AND** sodium chloride  •  sodium chloride  •  sodium chloride    Assessment & Plan   Assessment & Plan     Active Hospital Problems    Diagnosis  POA   • **Urinary tract infection, acute [N39.0]  Yes   • Atrial fibrillation with RVR [I48.91]  Unknown   • Chest pain [R07.9]  Unknown   • Elevated troponin [R77.8]  Unknown   • COVID-19 virus infection [U07.1]  Unknown   • Chronic anticoagulation [Z79.01]  Not Applicable   • Essential hypertension [I10]  Yes   • Type 2 diabetes mellitus without complication, with long-term current use of insulin [E11.9, Z79.4]  Not Applicable   • Personal history of pulmonary embolism [Z86.711]  Yes   • Hypothyroidism (acquired) [E03.9]  Yes   • Pancytopenia (HCC) [D61.818]  Yes      Resolved Hospital Problems   No resolved problems to display.        Brief Hospital Course to date:  Chey Robertson is a 87 y.o. female with past medical history of myelodysplasia, chronic pancytopenia, essential hypertension, type 2 diabetes, history of PE (recently switched from warfarin to Eliquis per her cardiologist because of inability to achieve therapeutic INR), GERD who presented to the hospital with generalized weakness, nausea and vomiting found to have urinary tract infection. Since admission, pt found to have COVID19. She completed full course of remdesivir and continues on dexamethasone. 5/14 She had episode of chest pain with new A fib with RVR    Assessment and plan:    Afib RVR  Chest Pain  Elevated Troponin  -new onset CP this am. Troponin 72 (5 on admit). Repeat at 1300. EKG with AFib  -chest pain now resolved after Morphine 2mg IV and controlled rate Afib  -given IV metoprolol 5mg and cardizem IV 5mg with good response. HR controlled with rate . Still in Afib  -- cardiology consulted  --reports one known isolated episode of Afib years ago while hospitalized for knee replacement. Does not follow with cardiology    Acute urinary tract infection,  POA  Dehydration volume depletion  Acute on chronic debility  - Urinalysis positive for UTI, urine culture growing pansensitive E. coli  - Started on IV Rocephin on admission.  Given her persistent and worsening neutropenia with ANC of 350 on 5/7,  implemented neutropenic precautions and switched antibiotic therapy to IV cefepime for completion of therapy  - Completed ABX per ID  - ANC improved  - PT/OT recommended rehab    COVID19 infection - on room air  -- pt complained of SOA, cough on 5/8, checked RVP and was positive for COVID 19  -- started on Decadron 6 mg daily D7/10, completed full course of Remdesivir 5/12  -- has been stable on room air. Oxygen restarted this am  -- resume Eliquis PLTs >40K     Myelodysplastic syndrome  Worsening pancytopenia, likely secondary to sepsis  Worsening anemia, likely anemia of chronic disease  - Chronic pancytopenia  - DIC panel checked and negative   - neutropenic precautions, s/p 1 dose of Neupogen with improvement in ANC  - Follows with Dr. Lyman     History of PE  - Remote history of PE  - Restarted Eliquis as PLTs >40K on 5/10 (okay per Dr. Lyman's note)    Type 2 diabetes  - A1c from end of March 2023 is 6%  - Hyperglycemia likely due to steroids.   - continue basal/bolus insulin  - Continue sliding scale insulin     Essential hypertension  - Continue to hold home Lasix for now given dehydration  - Continue home Lisinopril and propranolol     Hypothyroidism  - Continue home synthroid    GERD  - PPI    Expected Discharge Location and Transportation: Rehab, referrals pending  Expected Discharge (if ten day COVID antigen test is negative) on 5/18    Expected Discharge Date: 5/18/2023; Expected Discharge Time:      DVT prophylaxis:  Medical and mechanical DVT prophylaxis orders are present.     AM-PAC 6 Clicks Score (PT): 23 (05/13/23 6954)      CODE STATUS:   Code Status and Medical Interventions:   Ordered at: 05/05/23 2031     Code Status (Patient has no pulse and is not  breathing):    CPR (Attempt to Resuscitate)     Medical Interventions (Patient has pulse or is breathing):    Full Support       Kimber March, APRN  05/14/23

## 2023-05-14 NOTE — CONSULTS
"          Cardiology Consult - [facility]    Chey Robertson     [id number]  1935  [address] [address]         Admission Date:  5/5/2023    Consultation Date:  05/14/23        PCP:  Corby Marie MD  Referring MD:  Hospitalist Service  Consulting MD:  Dr. Ball          CC:  Acute UTI    Reason for Consult: Atrial Fibrillation with RVR      Urinary tract infection, acute    Pancytopenia (HCC)    Hypothyroidism (acquired)    Personal history of pulmonary embolism    Chronic anticoagulation    Essential hypertension    Type 2 diabetes mellitus without complication, with long-term current use of insulin    COVID-19 virus infection    Atrial fibrillation with RVR    Chest pain    Elevated troponin      Allergies:  is allergic to aspirin.    Medications Prior to Admission   Medication Sig Dispense Refill Last Dose   • Diclofenac Sodium (VOLTAREN) 1 % gel gel APPLY 4 GRAMS TO THE AFFECTED JOINT FOUR TIMES DAILY AS NEEDED.   Past Month   • Droplet Insulin Syringe 30G X 1/2\" 0.5 ML misc    5/5/2023   • furosemide (LASIX) 20 MG tablet TAKE 1 TABLET BY MOUTH DAILY FOR FLUID RETENTION   Past Week   • glimepiride (AMARYL) 2 MG tablet Take 1 tablet by mouth Every Morning.   5/5/2023   • HYDROcodone-acetaminophen (NORCO) 5-325 MG per tablet Take 1 tablet by mouth Every 6 (Six) Hours As Needed for Moderate Pain . 12 tablet 0 Past Week   • levothyroxine (SYNTHROID, LEVOTHROID) 100 MCG tablet Take 1 tablet by mouth Daily.   5/5/2023   • lisinopril (PRINIVIL,ZESTRIL) 5 MG tablet Take 1 tablet by mouth Daily. 30 tablet 0 5/5/2023   • metFORMIN (GLUCOPHAGE) 500 MG tablet Take 2 tablets by mouth 2 (Two) Times a Day.   5/5/2023   • ondansetron (ZOFRAN) 4 MG tablet Take 1 tablet by mouth Every 8 (Eight) Hours As Needed for Nausea. 15 tablet 0 Past Week   • propranolol (INDERAL) 20 MG tablet Take 1 tablet by mouth 3 (Three) Times a Day. 90 tablet 0 5/5/2023   • tamsulosin (FLOMAX) 0.4 MG " capsule 24 hr capsule Take 1 capsule by mouth Daily. 10 capsule 0 5/5/2023   • True Metrix Blood Glucose Test test strip See Admin Instructions.   5/5/2023   • vitamin D (ERGOCALCIFEROL) 1.25 MG (52512 UT) capsule capsule    5/5/2023   • warfarin (COUMADIN) 2 MG tablet Take 1 tablet by mouth Daily.   5/5/2023   • albuterol sulfate  (90 Base) MCG/ACT inhaler Inhale 2 puffs Every 4 (Four) Hours As Needed for Shortness of Air.   Unknown   • clotrimazole-betamethasone (LOTRISONE) 1-0.05 % cream Apply  topically to the appropriate area as directed See Admin Instructions. Apply topically to the affected area twice daily      • insulin NPH-insulin regular (HUMULIN 70/30) (70-30) 100 UNIT/ML injection Inject 45 Units under the skin into the appropriate area as directed 2 (Two) Times a Day With Meals.      • meclizine (ANTIVERT) 12.5 MG tablet Take 12.5 mg by mouth 3 (Three) Times a Day As Needed (vertigo).      • nystatin (MYCOSTATIN) 139824 UNIT/GM cream 2 (Two) Times a Day. to affected area   Unknown   • omeprazole (PrilOSEC) 40 MG capsule Take 1 capsule by mouth Daily. 30 capsule 0 Unknown   • potassium chloride 10 MEQ CR tablet TAKE 1 TABLET BY MOUTH EVERY DAY WITH FLUID PILL   Unknown   • traMADol (ULTRAM) 50 MG tablet TAKE 1 TO 2 TABLETS BY MOUTH UP TO THREE TIMES DAILY AS NEEDED               HPI:  Chey Robertson is an 88 yo CF with PMHx Myelodysplasia (w/ splenomegaly), chronic pancytopenia, HTN, T2DM, hx PE (previously on Warfarin,recently switched to Eliquis), and GERD who [facility] ED for generalized weakness and nausea/vomiting.  She was hospitalized 3/2023 with EColi UTI and she reports her symptoms are similar to her presentation at that time.  Lab results indicate UTI and she was started on antibiotics .  She has also tested + COVID-19.  Today, she went into rapid AFib with associated chest pain and elevated BP.  She was started on IV Cardizem and given morphine for pain - both which  "have helped.    Cardiology has been asked to consult with patient for her AFib.  At time of consultation, patient is in isolation room and with neutropenic precautions.  She is sitting up in bed eating lunch, no family present.  RN just left the room.  She received a one-time dose of IV Cardizem and metoprolol this morning with improvement of her rates and resolution of symptoms.  She reports a transient event of AFib post knee surgery but denies further recurrence.  She states she is feeling much better at this time and overall feels quite well.  To her knowledge, she has had no palpitations or irregular heartbeats at home.  She is on Inderal at home.          Social History     Socioeconomic History   • Marital status:    Tobacco Use   • Smoking status: Never     Passive exposure: Never   • Smokeless tobacco: Never   Vaping Use   • Vaping Use: Never used   Substance and Sexual Activity   • Alcohol use: No   • Drug use: No   • Sexual activity: Defer     Family History   Problem Relation Age of Onset   • Breast cancer Sister 84   • Stroke Mother    • Heart attack Father    • Ovarian cancer Neg Hx      Past Surgical History:   Procedure Laterality Date   • BLADDER SURGERY     • CATARACT EXTRACTION      bilat    • CHOLECYSTECTOMY     • COLONOSCOPY     • HYSTERECTOMY     • KYPHOPLASTY N/A 2/12/2021    Procedure: KYPHOPLASTY T11, T12 AND L4;  Surgeon: Matty Hearn MD;  Location[facility];  Service: Orthopedic Spine;  Laterality: N/A;   • OOPHORECTOMY     • TONSILLECTOMY       ROS: Pertinent items are noted in HPI, all other systems reviewed and negative     Objective     height is 160 cm (63\") and weight is 56 kg (123 lb 6.4 oz). Her oral temperature is 98.4 °F (36.9 °C). Her blood pressure is 126/65 and her pulse is 88. Her respiration is 18 and oxygen saturation is 91%.      Intake/Output Summary (Last 24 hours) at 5/14/2023 1256  Last data filed at 5/14/2023 0400  Gross per 24 hour   Intake --   Output " 1500 ml   Net -1500 ml     Intake/Output                       05/12/23 0701 - 05/13/23 0700 05/13/23 0701 - 05/14/23 0700     [id number] Total [id number] 1108-7202 Total                 Intake    P.O.  600  -- 600  --  -- --    Total Intake 600 -- 600 -- -- --       Output    Urine  2100  -- 2100 2000  1000 3000    Total Output 2100 -- 2100 2000 1000 3000             05/05/23  1645 05/05/23  2151   Weight: 54 kg (119 lb) 56 kg (123 lb 6.4 oz)          Physical Exam:  General Appearance:    Alert, cooperative, in no acute distress   Head:    Normocephalic, without obvious abnormality, atraumatic   Eyes:            Lids and lashes normal, conjunctivae and sclerae normal, no   icterus, no pallor, corneas clear, PERRLA   Ears:    Ears appear intact with no abnormalities noted   Throat:   No oral lesions, no thrush, oral mucosa moist   Neck:   No adenopathy, supple, trachea midline, no thyromegaly, no carotid bruit, no JVD   Back:     No kyphosis present, no scoliosis present, no skin lesions,    erythema or scars, no tenderness to percussion or                   palpation, range of motion normal   Lungs:     Clear to auscultation,respirations regular, even and               unlabored    Heart:    Regular rhythm and normal rate, normal S1 and S2, no         murmur, no gallop, no rub, no click   Abdomen:     Normal bowel sounds, no masses, no organomegaly, soft     nontender, nondistended, no guarding, no rebound   tenderness   Extremities:   Moves all extremities well,  no cyanosis, no redness, no edema   Pulses:   Pulses palpable and equal bilaterally   Skin:   No bleeding, bruising or rash   Lymph nodes:   No palpable adenopathy   Neurologic:   Cranial nerves 2 - 12 grossly intact, sensation intact, DTR     present and equal bilaterally          Results Review:  I personally reviewed the patient's clinical results.  Results from last 7 days   Lab Units 05/13/23  0550   WBC 10*3/mm3 2.21*   HEMOGLOBIN g/dL  8.7*   HEMATOCRIT % 28.2*   PLATELETS 10*3/mm3 112*     Results from last 7 days   Lab Units 05/13/23  1620 05/13/23  0550 05/11/23  0535   SODIUM mmol/L  --  145 139   POTASSIUM mmol/L 5.3* 3.5 4.3   CHLORIDE mmol/L  --  106 104   CO2 mmol/L  --  31.0* 27.0   BUN mg/dL  --  14 17   CREATININE mg/dL  --  0.71 0.79   CALCIUM mg/dL  --  8.8 8.7   BILIRUBIN mg/dL  --   --  0.3   ALK PHOS U/L  --   --  65   ALT (SGPT) U/L  --   --  9   AST (SGOT) U/L  --   --  11   GLUCOSE mg/dL  --  108* 214*     Results from last 7 days   Lab Units 05/13/23  1620 05/13/23  0550   SODIUM mmol/L  --  145   POTASSIUM mmol/L 5.3* 3.5   CHLORIDE mmol/L  --  106   CO2 mmol/L  --  31.0*   BUN mg/dL  --  14   CREATININE mg/dL  --  0.71   GLUCOSE mg/dL  --  108*   CALCIUM mg/dL  --  8.8                             Radiology:  Imaging Results (Last 72 Hours)     ** No results found for the last 72 hours. **            Tele: Atrial fibrillation    Assessment:  -87-year-old  female admitted with 3 current/persistent E. coli UTI  -Atrial fibrillation with RVR, new onset.  Associated chest pain and elevated troponin, likely demand ischemia.  -COVID-19 infection  -Myelodysplastic syndrome with worsening pancytopenia  -History of PE.  Previously on warfarin now on Eliquis which can be restarted as platelets get above 40K      Plan:  -Admitted to hospitalist service.  Now getting IV cefepime  -Continue isolation per primary service.  Receiving Decadron, completed remdesivir.  -Eliquis restarted on 5/10  -She still needs better rate control.  We will add metoprolol tartrate 25 mg twice daily with parameters.  BP has been poorly controlled this admission.  We will titrate as necessary.  -Consider echocardiogram tomorrow once rates are better controlled.  -Cardiology will continue to follow.      I discussed the patient's findings and my recommendations with the patient, any present family members, and the nursing staff.  Charlie Ball MD  saw and examined patient, verified hx and PE, read all radiographic studies, reviewed labs and micro data, and formulated dx, plan for treatment and all medical decision making.      Lona May PA-C, working with Charlie Ball MD  05/14/23 12:56 EDT

## 2023-05-15 LAB
ALBUMIN SERPL-MCNC: 3.3 G/DL (ref 3.5–5.2)
ALBUMIN/GLOB SERPL: 1.4 G/DL
ALP SERPL-CCNC: 68 U/L (ref 39–117)
ALT SERPL W P-5'-P-CCNC: 7 U/L (ref 1–33)
ANION GAP SERPL CALCULATED.3IONS-SCNC: 8 MMOL/L (ref 5–15)
AST SERPL-CCNC: 8 U/L (ref 1–32)
BILIRUB SERPL-MCNC: 0.6 MG/DL (ref 0–1.2)
BUN SERPL-MCNC: 19 MG/DL (ref 8–23)
BUN/CREAT SERPL: 22.9 (ref 7–25)
CALCIUM SPEC-SCNC: 8.7 MG/DL (ref 8.6–10.5)
CHLORIDE SERPL-SCNC: 99 MMOL/L (ref 98–107)
CO2 SERPL-SCNC: 30 MMOL/L (ref 22–29)
CREAT SERPL-MCNC: 0.83 MG/DL (ref 0.57–1)
EGFRCR SERPLBLD CKD-EPI 2021: 68.8 ML/MIN/1.73
GLOBULIN UR ELPH-MCNC: 2.4 GM/DL
GLUCOSE BLDC GLUCOMTR-MCNC: 162 MG/DL (ref 70–130)
GLUCOSE BLDC GLUCOMTR-MCNC: 173 MG/DL (ref 70–130)
GLUCOSE BLDC GLUCOMTR-MCNC: 223 MG/DL (ref 70–130)
GLUCOSE SERPL-MCNC: 222 MG/DL (ref 65–99)
MAGNESIUM SERPL-MCNC: 1.6 MG/DL (ref 1.6–2.4)
POTASSIUM SERPL-SCNC: 4.7 MMOL/L (ref 3.5–5.2)
PROT SERPL-MCNC: 5.7 G/DL (ref 6–8.5)
QT INTERVAL: 260 MS
QT INTERVAL: 464 MS
QT INTERVAL: 466 MS
QTC INTERVAL: 391 MS
QTC INTERVAL: 469 MS
QTC INTERVAL: 486 MS
SODIUM SERPL-SCNC: 137 MMOL/L (ref 136–145)

## 2023-05-15 PROCEDURE — 93005 ELECTROCARDIOGRAM TRACING: CPT | Performed by: INTERNAL MEDICINE

## 2023-05-15 PROCEDURE — 80053 COMPREHEN METABOLIC PANEL: CPT | Performed by: NURSE PRACTITIONER

## 2023-05-15 PROCEDURE — 0 MAGNESIUM SULFATE 4 GM/100ML SOLUTION: Performed by: INTERNAL MEDICINE

## 2023-05-15 PROCEDURE — 63710000001 INSULIN LISPRO (HUMAN) PER 5 UNITS: Performed by: INTERNAL MEDICINE

## 2023-05-15 PROCEDURE — 83735 ASSAY OF MAGNESIUM: CPT | Performed by: NURSE PRACTITIONER

## 2023-05-15 PROCEDURE — 82948 REAGENT STRIP/BLOOD GLUCOSE: CPT

## 2023-05-15 PROCEDURE — 63710000001 INSULIN LISPRO (HUMAN) PER 5 UNITS: Performed by: PHYSICIAN ASSISTANT

## 2023-05-15 PROCEDURE — 93010 ELECTROCARDIOGRAM REPORT: CPT | Performed by: INTERNAL MEDICINE

## 2023-05-15 PROCEDURE — 99232 SBSQ HOSP IP/OBS MODERATE 35: CPT | Performed by: NURSE PRACTITIONER

## 2023-05-15 PROCEDURE — 93005 ELECTROCARDIOGRAM TRACING: CPT | Performed by: PHYSICIAN ASSISTANT

## 2023-05-15 PROCEDURE — 63710000001 INSULIN DETEMIR PER 5 UNITS: Performed by: INTERNAL MEDICINE

## 2023-05-15 PROCEDURE — 63710000001 DEXAMETHASONE PER 0.25 MG

## 2023-05-15 RX ORDER — METOPROLOL TARTRATE 50 MG/1
50 TABLET, FILM COATED ORAL EVERY 8 HOURS SCHEDULED
Status: DISCONTINUED | OUTPATIENT
Start: 2023-05-15 | End: 2023-05-24 | Stop reason: HOSPADM

## 2023-05-15 RX ORDER — MAGNESIUM SULFATE HEPTAHYDRATE 40 MG/ML
4 INJECTION, SOLUTION INTRAVENOUS ONCE
Status: COMPLETED | OUTPATIENT
Start: 2023-05-15 | End: 2023-05-15

## 2023-05-15 RX ADMIN — INSULIN LISPRO 4 UNITS: 100 INJECTION, SOLUTION INTRAVENOUS; SUBCUTANEOUS at 17:56

## 2023-05-15 RX ADMIN — DICLOFENAC SODIUM 2 G: 10 GEL TOPICAL at 21:17

## 2023-05-15 RX ADMIN — DILTIAZEM HYDROCHLORIDE 7.5 MG/HR: 5 INJECTION INTRAVENOUS at 19:40

## 2023-05-15 RX ADMIN — INSULIN LISPRO 7 UNITS: 100 INJECTION, SOLUTION INTRAVENOUS; SUBCUTANEOUS at 13:00

## 2023-05-15 RX ADMIN — INSULIN DETEMIR 10 UNITS: 100 INJECTION, SOLUTION SUBCUTANEOUS at 21:16

## 2023-05-15 RX ADMIN — INSULIN LISPRO 7 UNITS: 100 INJECTION, SOLUTION INTRAVENOUS; SUBCUTANEOUS at 08:55

## 2023-05-15 RX ADMIN — GUAIFENESIN 200 MG: 200 SOLUTION ORAL at 21:16

## 2023-05-15 RX ADMIN — APIXABAN 5 MG: 5 TABLET, FILM COATED ORAL at 08:51

## 2023-05-15 RX ADMIN — DICLOFENAC SODIUM 2 G: 10 GEL TOPICAL at 08:59

## 2023-05-15 RX ADMIN — METOPROLOL TARTRATE 25 MG: 25 TABLET, FILM COATED ORAL at 08:09

## 2023-05-15 RX ADMIN — OXYCODONE HYDROCHLORIDE 5 MG: 5 TABLET ORAL at 03:57

## 2023-05-15 RX ADMIN — Medication 10 ML: at 08:55

## 2023-05-15 RX ADMIN — DICLOFENAC SODIUM 2 G: 10 GEL TOPICAL at 17:56

## 2023-05-15 RX ADMIN — MAGNESIUM SULFATE HEPTAHYDRATE 4 G: 40 INJECTION, SOLUTION INTRAVENOUS at 15:47

## 2023-05-15 RX ADMIN — LISINOPRIL 5 MG: 5 TABLET ORAL at 08:52

## 2023-05-15 RX ADMIN — INSULIN LISPRO 2 UNITS: 100 INJECTION, SOLUTION INTRAVENOUS; SUBCUTANEOUS at 12:59

## 2023-05-15 RX ADMIN — Medication 1 CAPSULE: at 08:51

## 2023-05-15 RX ADMIN — APIXABAN 5 MG: 5 TABLET, FILM COATED ORAL at 21:16

## 2023-05-15 RX ADMIN — DILTIAZEM HYDROCHLORIDE 5 MG/HR: 5 INJECTION INTRAVENOUS at 08:37

## 2023-05-15 RX ADMIN — INSULIN LISPRO 2 UNITS: 100 INJECTION, SOLUTION INTRAVENOUS; SUBCUTANEOUS at 09:12

## 2023-05-15 RX ADMIN — LEVOTHYROXINE SODIUM 100 MCG: 0.1 TABLET ORAL at 06:28

## 2023-05-15 RX ADMIN — DEXAMETHASONE 6 MG: 4 TABLET ORAL at 08:51

## 2023-05-15 RX ADMIN — Medication 5 MG: at 21:15

## 2023-05-15 RX ADMIN — INSULIN LISPRO 7 UNITS: 100 INJECTION, SOLUTION INTRAVENOUS; SUBCUTANEOUS at 18:07

## 2023-05-15 RX ADMIN — METOPROLOL TARTRATE 50 MG: 50 TABLET ORAL at 21:15

## 2023-05-15 RX ADMIN — METOPROLOL TARTRATE 50 MG: 50 TABLET ORAL at 13:05

## 2023-05-15 RX ADMIN — OXYCODONE HYDROCHLORIDE 5 MG: 5 TABLET ORAL at 21:15

## 2023-05-15 RX ADMIN — DICLOFENAC SODIUM 2 G: 10 GEL TOPICAL at 13:00

## 2023-05-15 RX ADMIN — Medication 10 ML: at 21:17

## 2023-05-15 RX ADMIN — TAMSULOSIN HYDROCHLORIDE 0.4 MG: 0.4 CAPSULE ORAL at 08:51

## 2023-05-15 NOTE — PROGRESS NOTES
Psychiatric Medicine Services  PROGRESS NOTE    Patient Name: Chey Robertson  : 1935  MRN: [id number]    Date of Admission: 2023  Primary Care Physician: Corby Marie MD    Subjective   Subjective     CC:  F/U for UTI    HPI:  Had another episode of RVR this morning, started on Dilt drip. States she was having some chest pressure. Very tired.       ROS:  Gen- No fevers, chills  CV- No chest pain, palpitations; + pressure   Resp- No cough, dyspnea  GI- No N/V/D, abd pain       Objective   Objective     Vital Signs:   Temp:  [96.7 °F (35.9 °C)-98.5 °F (36.9 °C)] 97 °F (36.1 °C)  Heart Rate:  [] 141  Resp:  [16-22] 18  BP: (107-145)/() 115/86  Flow (L/min):  [2] 2     Physical Exam:  Constitutional: No acute distress, awake, alert   HENT: NCAT, mucous membranes moist  Respiratory: Clear to auscultation bilaterally, respiratory effort normal   Cardiovascular: Irreg/ irreg, no murmurs, rubs, or gallops  Gastrointestinal: Positive bowel sounds, soft, nontender, nondistended  Musculoskeletal: No bilateral ankle edema  Psychiatric: Appropriate affect, cooperative  Neurologic: Oriented x 3, strength symmetric in all extremities, Cranial Nerves grossly intact to confrontation, speech clear  Skin: No rashes       Results Reviewed:  LAB RESULTS:      Lab 23  0550 23  0535 05/10/23  0556 23  0816   WBC 2.21* 1.89* 2.15* 2.71*   HEMOGLOBIN 8.7* 8.0* 7.8* 7.9*   HEMATOCRIT 28.2* 25.7* 25.5* 24.1*   PLATELETS 112* 54* 46* 32*   NEUTROS ABS 1.37* 1.34* 1.58* 2.12   IMMATURE GRANS (ABS) 0.10* 0.05 0.02 0.05   LYMPHS ABS 0.49* 0.35* 0.37* 0.30*   MONOS ABS 0.25 0.15 0.18 0.24   EOS ABS 0.00 0.00 0.00 0.00   MCV 90.4 90.8 92.7 88.6   CRP  --  1.96* 3.76*  --          Lab 05/15/23  0402 23  1620 23  0550 23  0535 05/10/23  0556 23  0816   SODIUM 137  --  145 139 139 140   POTASSIUM 4.7 5.3* 3.5 4.3 4.2 4.2   CHLORIDE 99  --  106 104 105  105   CO2 30.0*  --  31.0* 27.0 26.0 26.0   ANION GAP 8.0  --  8.0 8.0 8.0 9.0   BUN 19  --  14 17 18 11   CREATININE 0.83  --  0.71 0.79 0.77 0.74   EGFR 68.8  --  82.9 73.0 75.2 78.9   GLUCOSE 222*  --  108* 214* 247* 158*   CALCIUM 8.7  --  8.8 8.7 8.3* 8.2*   MAGNESIUM 1.6  --   --   --   --   --          Lab 05/15/23  0402 05/11/23  0535 05/10/23  0556 05/09/23  0816   TOTAL PROTEIN 5.7* 5.1* 5.1* 5.2*   ALBUMIN 3.3* 3.3* 3.2* 3.1*   GLOBULIN 2.4 1.8 1.9 2.1   ALT (SGPT) 7 9 10 12   AST (SGOT) 8 11 13 18   BILIRUBIN 0.6 0.3 0.3 0.4   ALK PHOS 68 65 69 71         Lab 05/14/23  1411 05/14/23  1105   HSTROP T 58* 72*                 Brief Urine Lab Results  (Last result in the past 365 days)      Color   Clarity   Blood   Leuk Est   Nitrite   Protein   CREAT   Urine HCG        05/05/23 1752 Yellow   Clear   Negative   Negative   Positive   Negative                 Microbiology Results Abnormal     Procedure Component Value - Date/Time    Blood Culture - Blood, Arm, Right [[id number]]  (Normal) Collected: 05/05/23 1850    Lab Status: Final result Specimen: Blood from Arm, Right Updated: 05/10/23 2015     Blood Culture No growth at 5 days    Blood Culture - Blood, Wrist, Right [[id number]]  (Normal) Collected: 05/05/23 1855    Lab Status: Final result Specimen: Blood from Wrist, Right Updated: 05/10/23 2015     Blood Culture No growth at 5 days    COVID PRE-OP / PRE-PROCEDURE SCREENING ORDER (NO ISOLATION) - Swab, Nasopharynx [[id number]]  (Normal) Collected: 05/05/23 1704    Lab Status: Final result Specimen: Swab from Nasopharynx Updated: 05/05/23 0830    Narrative:      The following orders were created for panel order COVID PRE-OP / PRE-PROCEDURE SCREENING ORDER (NO ISOLATION) - Swab, Nasopharynx.  Procedure                               Abnormality         Status                     ---------                               -----------         ------                     COVID-19 and FLU A/B PCR...[[id number]]  Normal               Final result                 Please view results for these tests on the individual orders.    COVID-19 and FLU A/B PCR - Swab, Nasopharynx [[id number]]  (Normal) Collected: 05/05/23 1704    Lab Status: Final result Specimen: Swab from Nasopharynx Updated: 05/05/23 1747     COVID19 Not Detected     Influenza A PCR Not Detected     Influenza B PCR Not Detected    Narrative:      Fact sheet for providers: https://www.fda.gov/media/725285/download    Fact sheet for patients: https://www.fda.gov/media/621829/download    Test performed by PCR.          No radiology results from the last 24 hrs        Current medications:  Scheduled Meds:apixaban, 5 mg, Oral, Q12H  dexamethasone, 6 mg, Oral, Daily With Breakfast  Diclofenac Sodium, 2 g, Topical, 4x Daily  insulin detemir, 10 Units, Subcutaneous, Nightly  insulin lispro, 0-9 Units, Subcutaneous, TID AC  Insulin Lispro, 7 Units, Subcutaneous, TID With Meals  lactobacillus acidophilus, 1 capsule, Oral, Daily  levothyroxine, 100 mcg, Oral, Q AM  magnesium sulfate, 4 g, Intravenous, Once  metoprolol tartrate, 50 mg, Oral, Q8H  sodium chloride, 10 mL, Intravenous, Q12H  tamsulosin, 0.4 mg, Oral, Daily      Continuous Infusions:dilTIAZem, 5-15 mg/hr, Last Rate: 7 mg/hr (05/15/23 1306)      PRN Meds:.•  acetaminophen  •  benzonatate  •  Calcium Replacement - Follow Nurse / BPA Driven Protocol  •  dextrose  •  dextrose  •  glucagon (human recombinant)  •  guaifenesin  •  hydrOXYzine  •  Magnesium Standard Dose Replacement - Follow Nurse / BPA Driven Protocol  •  melatonin  •  ondansetron **OR** ondansetron  •  oxyCODONE  •  phenol  •  Phosphorus Replacement - Follow Nurse / BPA Driven Protocol  •  Potassium Replacement - Follow Nurse / BPA Driven Protocol  •  [COMPLETED] Insert Peripheral IV **AND** sodium chloride  •  sodium chloride  •  sodium chloride    Assessment & Plan   Assessment & Plan     Active Hospital Problems    Diagnosis  POA   • **Urinary tract  infection, acute [N39.0]  Yes   • Atrial fibrillation with RVR [I48.91]  Unknown   • Chest pain [R07.9]  Unknown   • Elevated troponin [R77.8]  Unknown   • COVID-19 virus infection [U07.1]  Unknown   • Chronic anticoagulation [Z79.01]  Not Applicable   • Essential hypertension [I10]  Yes   • Type 2 diabetes mellitus without complication, with long-term current use of insulin [E11.9, Z79.4]  Not Applicable   • Personal history of pulmonary embolism [Z86.711]  Yes   • Hypothyroidism (acquired) [E03.9]  Yes   • Pancytopenia (HCC) [D61.818]  Yes      Resolved Hospital Problems   No resolved problems to display.        Brief Hospital Course to date:  Chey Robertson is a 87 y.o. female with past medical history of myelodysplasia, chronic pancytopenia, essential hypertension, type 2 diabetes, history of PE (recently switched from warfarin to Eliquis per her cardiologist because of inability to achieve therapeutic INR), GERD who presented to the hospital with generalized weakness, nausea and vomiting found to have urinary tract infection. Since admission, pt found to have COVID19. She completed full course of remdesivir and continues on dexamethasone. 5/14 She had episode of chest pain with new A fib with RVR    Assessment and plan:    Afib RVR  Chest Pain  Elevated Troponin  -new onset CP this am. Troponin 72 (5 on admit). Repeat at 1300. EKG with AFib  -chest pain now resolved after Morphine 2mg IV and controlled rate Afib  -given IV metoprolol 5mg and cardizem IV 5mg with good response. HR controlled with rate . Still in Afib  -- cardiology consulted  --reports one known isolated episode of Afib years ago while hospitalized for knee replacement. Does not follow with cardiology  --started on Metoprolol BID. Started on IV dilt this am due to recurrent RVR, and increased BB dose     Acute urinary tract infection, POA  Dehydration volume depletion  Acute on chronic debility  - Urinalysis positive for UTI, urine  culture growing pansensitive E. coli  - Started on IV Rocephin on admission.  Given her persistent and worsening neutropenia with ANC of 350 on 5/7,  implemented neutropenic precautions and switched antibiotic therapy to IV cefepime for completion of therapy  - Completed ABX per ID  - ANC improved  - PT/OT recommended rehab    COVID19 infection - on room air  -- pt complained of SOA, cough on 5/8, checked RVP and was positive for COVID 19  -- started on Decadron 6 mg daily D7/10, completed full course of Remdesivir 5/12  -- has been stable on room air. Oxygen restarted this am  -- resume Eliquis PLTs >40K     Myelodysplastic syndrome  Worsening pancytopenia, likely secondary to sepsis  Worsening anemia, likely anemia of chronic disease  - Chronic pancytopenia  - DIC panel checked and negative   - neutropenic precautions, s/p 1 dose of Neupogen with improvement in ANC  - Follows with Dr. Lyman     History of PE  - Remote history of PE  - Restarted Eliquis as PLTs >40K on 5/10 (okay per Dr. Lyman's note)    Type 2 diabetes  - A1c from end of March 2023 is 6%  - Hyperglycemia likely due to steroids.   - continue basal/bolus insulin  - Continue sliding scale insulin     Essential hypertension  - Continue to hold home Lasix for now given dehydration  - Continue home Lisinopril and propranolol     Hypothyroidism  - Continue home synthroid    GERD  - PPI    Expected Discharge Location and Transportation: Rehab, referrals pending  Expected Discharge (if ten day COVID antigen test is negative) on 5/18    Expected Discharge Date: 5/18/2023; Expected Discharge Time:      DVT prophylaxis:  Medical and mechanical DVT prophylaxis orders are present.     AM-PAC 6 Clicks Score (PT): 23 (05/13/23 4264)      CODE STATUS:   Code Status and Medical Interventions:   Ordered at: 05/05/23 2031     Code Status (Patient has no pulse and is not breathing):    CPR (Attempt to Resuscitate)     Medical Interventions (Patient has pulse or is  breathing):    Full Support       Kenzie Mcguire, APRN  05/15/23

## 2023-05-15 NOTE — CASE MANAGEMENT/SOCIAL WORK
Continued Stay Note  [facility]     Patient Name: Chey Robertson  MRN: [id number]  Today's Date: 5/15/2023    Admit Date: 5/5/2023    Plan: discharge plan   Discharge Plan     Row Name 05/15/23 1629       Plan    Plan discharge plan    Plan Comments Discharge plan remains to a skilled nursing facility for short term rehab. Family wanting referrals made to [facility]. Pt is covid isolation until 5/28 unless covid antigen neg on 5/18.  CM will make referrals Wednesday.    Final Discharge Disposition Code 03 - skilled nursing facility (SNF)               Discharge Codes    No documentation.               Expected Discharge Date and Time     Expected Discharge Date Expected Discharge Time    May 18, 2023             Kellie Lauren RN

## 2023-05-15 NOTE — PROGRESS NOTES
Chey Robertson  1935  N612/1      Following for brief episode AFib in setting of UTI/COVID-19/neutropenia.    She received IV push Cardizem and beta blocker.  EKG reviewed today - NSR with PACs.    Cardiology recommends adding beta blocker to her daily medical regimen -  Lopressor 25mg BID.  We will continue to follow from periphery.    Vitals:    05/15/23 0837   BP: 129/96   Pulse: (!) 134   Resp:    Temp:    SpO2:      EKG:  NSR, PACs.    Lona May PA-C  Electronically signed by HUE Acevedo, 05/15/23, 9:49 AM EDT.

## 2023-05-15 NOTE — PLAN OF CARE
Goal Outcome Evaluation:      Pt A&O. VSS. Pt is on 2L NC. Pt requested pain medication. No more reports of CP. Pt rested well. Denied further needs at this time.

## 2023-05-16 LAB
GLUCOSE BLDC GLUCOMTR-MCNC: 283 MG/DL (ref 70–130)
GLUCOSE BLDC GLUCOMTR-MCNC: 343 MG/DL (ref 70–130)
GLUCOSE BLDC GLUCOMTR-MCNC: 362 MG/DL (ref 70–130)

## 2023-05-16 PROCEDURE — 97110 THERAPEUTIC EXERCISES: CPT

## 2023-05-16 PROCEDURE — 97116 GAIT TRAINING THERAPY: CPT

## 2023-05-16 PROCEDURE — 63710000001 INSULIN LISPRO (HUMAN) PER 5 UNITS: Performed by: INTERNAL MEDICINE

## 2023-05-16 PROCEDURE — 82948 REAGENT STRIP/BLOOD GLUCOSE: CPT

## 2023-05-16 PROCEDURE — 93005 ELECTROCARDIOGRAM TRACING: CPT | Performed by: PHYSICIAN ASSISTANT

## 2023-05-16 PROCEDURE — 99232 SBSQ HOSP IP/OBS MODERATE 35: CPT | Performed by: NURSE PRACTITIONER

## 2023-05-16 PROCEDURE — 63710000001 INSULIN DETEMIR PER 5 UNITS: Performed by: INTERNAL MEDICINE

## 2023-05-16 PROCEDURE — 63710000001 INSULIN LISPRO (HUMAN) PER 5 UNITS: Performed by: PHYSICIAN ASSISTANT

## 2023-05-16 PROCEDURE — 63710000001 DEXAMETHASONE PER 0.25 MG

## 2023-05-16 RX ADMIN — INSULIN LISPRO 7 UNITS: 100 INJECTION, SOLUTION INTRAVENOUS; SUBCUTANEOUS at 11:23

## 2023-05-16 RX ADMIN — APIXABAN 5 MG: 5 TABLET, FILM COATED ORAL at 20:44

## 2023-05-16 RX ADMIN — INSULIN DETEMIR 10 UNITS: 100 INJECTION, SOLUTION SUBCUTANEOUS at 20:43

## 2023-05-16 RX ADMIN — BENZONATATE 200 MG: 100 CAPSULE ORAL at 20:43

## 2023-05-16 RX ADMIN — INSULIN LISPRO 8 UNITS: 100 INJECTION, SOLUTION INTRAVENOUS; SUBCUTANEOUS at 16:57

## 2023-05-16 RX ADMIN — INSULIN LISPRO 7 UNITS: 100 INJECTION, SOLUTION INTRAVENOUS; SUBCUTANEOUS at 08:37

## 2023-05-16 RX ADMIN — DICLOFENAC SODIUM 2 G: 10 GEL TOPICAL at 20:45

## 2023-05-16 RX ADMIN — LEVOTHYROXINE SODIUM 100 MCG: 0.1 TABLET ORAL at 06:18

## 2023-05-16 RX ADMIN — GUAIFENESIN 200 MG: 200 SOLUTION ORAL at 06:18

## 2023-05-16 RX ADMIN — DEXAMETHASONE 6 MG: 4 TABLET ORAL at 08:36

## 2023-05-16 RX ADMIN — METOPROLOL TARTRATE 50 MG: 50 TABLET ORAL at 06:18

## 2023-05-16 RX ADMIN — Medication 1 CAPSULE: at 08:36

## 2023-05-16 RX ADMIN — Medication 10 ML: at 20:46

## 2023-05-16 RX ADMIN — METOPROLOL TARTRATE 50 MG: 50 TABLET ORAL at 20:45

## 2023-05-16 RX ADMIN — DICLOFENAC SODIUM 2 G: 10 GEL TOPICAL at 08:38

## 2023-05-16 RX ADMIN — TAMSULOSIN HYDROCHLORIDE 0.4 MG: 0.4 CAPSULE ORAL at 08:36

## 2023-05-16 RX ADMIN — INSULIN LISPRO 6 UNITS: 100 INJECTION, SOLUTION INTRAVENOUS; SUBCUTANEOUS at 08:37

## 2023-05-16 RX ADMIN — INSULIN LISPRO 7 UNITS: 100 INJECTION, SOLUTION INTRAVENOUS; SUBCUTANEOUS at 11:24

## 2023-05-16 RX ADMIN — Medication 5 MG: at 20:43

## 2023-05-16 RX ADMIN — METOPROLOL TARTRATE 50 MG: 50 TABLET ORAL at 15:08

## 2023-05-16 RX ADMIN — BENZONATATE 200 MG: 100 CAPSULE ORAL at 00:56

## 2023-05-16 RX ADMIN — INSULIN LISPRO 7 UNITS: 100 INJECTION, SOLUTION INTRAVENOUS; SUBCUTANEOUS at 17:00

## 2023-05-16 RX ADMIN — DICLOFENAC SODIUM 2 G: 10 GEL TOPICAL at 17:00

## 2023-05-16 RX ADMIN — APIXABAN 5 MG: 5 TABLET, FILM COATED ORAL at 08:36

## 2023-05-16 RX ADMIN — DICLOFENAC SODIUM 2 G: 10 GEL TOPICAL at 11:24

## 2023-05-16 RX ADMIN — Medication 10 ML: at 08:37

## 2023-05-16 NOTE — THERAPY TREATMENT NOTE
Patient Name: Chey Robertson  : 1935    MRN: [id number]                              Today's Date: 2023       Admit Date: 2023    Visit Dx:     ICD-10-CM ICD-9-CM   1. Urinary tract infection, acute  N39.0 599.0   2. Acute dehydration  E86.0 276.51     Patient Active Problem List   Diagnosis   • Benign familial tremor   • AMS (altered mental status)   • Thrombocytopenia (HCC)   • Pancytopenia (HCC)   • Shortness of breath   • Hypothyroidism (acquired)   • Acute kidney injury   • Splenomegaly   • Hepatomegaly   • Confusion   • B12 deficiency   • Abnormal intestinal absorption   • Iron deficiency anemia due to chronic blood loss   • Myelodysplasia (myelodysplastic syndrome)   • Weakness   • Personal history of pulmonary embolism   • Urinary tract infection, acute   • Chronic anticoagulation   • Essential hypertension   • Type 2 diabetes mellitus without complication, with long-term current use of insulin   • COVID-19 virus infection   • Atrial fibrillation with RVR   • Chest pain   • Elevated troponin     Past Medical History:   Diagnosis Date   • Anemia    • Arthritis    • Diabetes mellitus     checks sugar only when pt wants to    • Disease of thyroid gland    • History of transfusion     with knee surgery-  no reaction recalled.    • [place] (hard of hearing)     no hearing aids   • Hypertension    • Migraine without aura and without status migrainosus, not intractable 2016   • Pulmonary emboli     (after knee surgery)   • SOBOE (shortness of breath on exertion)    • Tremors of nervous system    • Vertigo    • Wears dentures     upper    • Wears glasses      Past Surgical History:   Procedure Laterality Date   • BLADDER SURGERY     • CATARACT EXTRACTION      bilat    • CHOLECYSTECTOMY     • COLONOSCOPY     • HYSTERECTOMY     • KYPHOPLASTY N/A 2021    Procedure: KYPHOPLASTY T11, T12 AND L4;  Surgeon: Matty Hearn MD;  Location: [facility];  Service: Orthopedic Spine;  Laterality: N/A;    • OOPHORECTOMY     • TONSILLECTOMY        General Information     Row Name 05/16/23 1606          Physical Therapy Time and Intention    Document Type therapy note (daily note)  -ES     Mode of Treatment physical therapy  -ES     Row Name 05/16/23 1606          General Information    Patient Profile Reviewed yes  -ES     Existing Precautions/Restrictions fall  -ES     Barriers to Rehab medically complex;previous functional deficit  -ES     Row Name 05/16/23 1606          Cognition    Orientation Status (Cognition) oriented x 4  -ES     Row Name 05/16/23 1606          Safety Issues, Functional Mobility    Safety Issues Affecting Function (Mobility) awareness of need for assistance;insight into deficits/self-awareness;safety precaution awareness;sequencing abilities  -ES     Impairments Affecting Function (Mobility) balance;endurance/activity tolerance;strength  -ES           User Key  (r) = Recorded By, (t) = Taken By, (c) = Cosigned By    Initials Name Provider Type    ES Aviva Bashir, PT Physical Therapist               Mobility     Row Name 05/16/23 1607          Bed Mobility    Bed Mobility supine-sit  -ES     Supine-Sit [place] (Bed Mobility) contact guard  -ES     Assistive Device (Bed Mobility) bed rails;head of bed elevated  -ES     Row Name 05/16/23 1607          Sit-Stand Transfer    Sit-Stand [place] (Transfers) verbal cues;standby assist  -ES     Assistive Device (Sit-Stand Transfers) walker, front-wheeled  -ES     Row Name 05/16/23 1607          Gait/Stairs (Locomotion)    [place] Level (Gait) contact guard  -ES     Assistive Device (Gait) walker, front-wheeled  -ES     Distance in Feet (Gait) 40+20+20  -ES     Deviations/Abnormal Patterns (Gait) bilateral deviations;gait speed decreased;stride length decreased  -ES     Bilateral Gait Deviations forward flexed posture;heel strike decreased  -ES     Comment, (Gait/Stairs) Pt amb 40+20+20 with CGA and FWW. Demo'd forward flexed posture  with decreased stride length and narrow CHARLEEN. No LOB. Further mobility limited by fatigue. SpO2 >90% on RA.  -ES           User Key  (r) = Recorded By, (t) = Taken By, (c) = Cosigned By    Initials Name Provider Type    ES Aviva Bashir PT Physical Therapist               Obj/Interventions     Row Name 05/16/23 1610          Motor Skills    Therapeutic Exercise hip;knee;ankle  -ES     Row Name 05/16/23 1610          Hip (Therapeutic Exercise)    Hip (Therapeutic Exercise) isometric exercises;strengthening exercise  -ES     Hip Isometrics (Therapeutic Exercise) bilateral;gluteal sets;10 repetitions  -ES     Hip Strengthening (Therapeutic Exercise) bilateral;aBduction;heel slides;marching while seated;10 repetitions  -ES     Row Name 05/16/23 1610          Knee (Therapeutic Exercise)    Knee (Therapeutic Exercise) strengthening exercise  -ES     Knee Strengthening (Therapeutic Exercise) bilateral;heel slides;marching while seated;SLR (straight leg raise);LAQ (long arc quad);10 repetitions  -ES     Row Name 05/16/23 1610          Ankle (Therapeutic Exercise)    Ankle (Therapeutic Exercise) AROM (active range of motion)  -ES     Ankle AROM (Therapeutic Exercise) bilateral;dorsiflexion;plantarflexion;10 repetitions  -ES     Row Name 05/16/23 1610          Balance    Balance Assessment sitting static balance;sitting dynamic balance;sit to stand dynamic balance;standing static balance;standing dynamic balance  -ES     Static Sitting Balance supervision  -ES     Dynamic Sitting Balance supervision  -ES     Position, Sitting Balance unsupported;sitting in chair;sitting edge of bed  -ES     Sit to Stand Dynamic Balance standby assist  -ES     Static Standing Balance standby assist  -ES     Dynamic Standing Balance contact guard;verbal cues  -ES     Position/Device Used, Standing Balance supported;walker, front-wheeled  -ES     Balance Interventions sitting;standing;sit to stand;supported;static;dynamic;occupation  based/functional task  -ES     Comment, Balance No LOB  -ES           User Key  (r) = Recorded By, (t) = Taken By, (c) = Cosigned By    Initials Name Provider Type    ES Aviva Bashir, PT Physical Therapist               Goals/Plan    No documentation.                Clinical Impression     Row Name 05/16/23 1611          Pain    Pretreatment Pain Rating 0/10 - no pain  -ES     Posttreatment Pain Rating 0/10 - no pain  -ES     Pre/Posttreatment Pain Comment tolerated  -ES     Pain Intervention(s) Repositioned;Ambulation/increased activity  -ES     Row Name 05/16/23 1611          Plan of Care Review    Plan of Care Reviewed With patient  -ES     Progress improving  -ES     Outcome Evaluation Pt continues to demonstrate good tolerance and participation throughout session. Able to ambulate multiple short bouts with CGA and FWW on RA with SpO2 >90%. Pt continues to be limited by generalized weakness and decreased functional activity tolerance, will continue to progress as able. PT rec SNF at d/c.  -ES     Row Name 05/16/23 1611          Therapy Assessment/Plan (PT)    Rehab Potential (PT) good, to achieve stated therapy goals  -ES     Criteria for Skilled Interventions Met (PT) yes;meets criteria;skilled treatment is necessary  -ES     Therapy Frequency (PT) daily  -ES     Row Name 05/16/23 1611          Vital Signs    Pre Systolic BP Rehab --  VSS  -ES     O2 Delivery Pre Treatment room air  -ES     O2 Delivery Intra Treatment room air  -ES     O2 Delivery Post Treatment room air  -ES     Pre Patient Position Supine  -ES     Intra Patient Position Standing  -ES     Post Patient Position Sitting  -ES     Row Name 05/16/23 1611          Positioning and Restraints    Pre-Treatment Position in bed  -ES     Post Treatment Position chair  -ES     In Chair notified nsg;reclined;sitting;call light within reach;exit alarm on;encouraged to call for assist;waffle cushion;legs elevated  -ES           User Key  (r) = Recorded  By, (t) = Taken By, (c) = Cosigned By    Initials Name Provider Type    Aviva Yuan PT Physical Therapist               Outcome Measures     Row Name 05/16/23 1613          How much help from another person do you currently need...    Turning from your back to your side while in flat bed without using bedrails? 4  -ES     Moving from lying on back to sitting on the side of a flat bed without bedrails? 4  -ES     Moving to and from a bed to a chair (including a wheelchair)? 3  -ES     Standing up from a chair using your arms (e.g., wheelchair, bedside chair)? 3  -ES     Climbing 3-5 steps with a railing? 3  -ES     To walk in hospital room? 3  -ES     AM-PAC 6 Clicks Score (PT) 20  -ES     Highest level of mobility 6 --> Walked 10 steps or more  -ES     Row Name 05/16/23 1613          Functional Assessment    Outcome Measure Options AM-PAC 6 Clicks Basic Mobility (PT)  -ES           User Key  (r) = Recorded By, (t) = Taken By, (c) = Cosigned By    Initials Name Provider Type    Aviva Yuan PT Physical Therapist                             Physical Therapy Education     Title: PT OT SLP Therapies (Done)     Topic: Physical Therapy (Done)     Point: Mobility training (Done)     Learning Progress Summary           Patient Eager, E, VU,DU by SD at 5/13/2023 1634    Acceptance, E, VU,NR by ML at 5/10/2023 1508    Acceptance, E,TB, VU by BT at 5/9/2023 1542    Acceptance, E,D, VU,NR by MB at 5/8/2023 1441                   Point: Home exercise program (Done)     Learning Progress Summary           Patient Eager, E, VU,DU by SD at 5/13/2023 1634    Acceptance, E, VU,NR by ML at 5/10/2023 1508    Acceptance, E,TB, VU by BT at 5/9/2023 1542    Acceptance, E,D, VU,NR by MB at 5/8/2023 1441                   Point: Body mechanics (Done)     Learning Progress Summary           Patient Eager, E, VU,DU by SD at 5/13/2023 1634    Acceptance, E,TB, VU by BT at 5/9/2023 1542    Acceptance, E,D, VU,NR by MB at  5/8/2023 1441                   Point: Precautions (Done)     Learning Progress Summary           Patient Eager, E, VU,DU by SD at 5/13/2023 1634    Acceptance, E, VU,NR by ML at 5/10/2023 1508    Acceptance, E,TB, VU by BT at 5/9/2023 1542    Acceptance, E,D, VU,NR by MB at 5/8/2023 1441                               User Key     Initials Effective Dates Name Provider Type Discipline    SD 03/13/23 -  Eloise Dutton, PT Physical Therapist PT    MB 06/16/21 -  Brooke Marshall PT Physical Therapist PT    BT 12/30/20 -  Marly Cordero, RN Registered Nurse Nurse    ML 04/22/21 -  Marybeth Arias Physical Therapist PT              PT Recommendation and Plan     Plan of Care Reviewed With: patient  Progress: improving  Outcome Evaluation: Pt continues to demonstrate good tolerance and participation throughout session. Able to ambulate multiple short bouts with CGA and FWW on RA with SpO2 >90%. Pt continues to be limited by generalized weakness and decreased functional activity tolerance, will continue to progress as able. PT rec SNF at d/c.     Time Calculation:    PT Charges     Row Name 05/16/23 1613             Time Calculation    Start Time 1417  -ES      PT Received On 05/16/23  -ES      PT Goal Re-Cert Due Date 05/18/23  -ES         Time Calculation- PT    Total Timed Code Minutes- PT 30 minute(s)  -ES         Timed Charges    29028 - PT Therapeutic Exercise Minutes 15  -ES      69474 - Gait Training Minutes  18  -ES         Total Minutes    Timed Charges Total Minutes 33  -ES       Total Minutes 33  -ES            User Key  (r) = Recorded By, (t) = Taken By, (c) = Cosigned By    Initials Name Provider Type    ES Aviva Bashir PT Physical Therapist              Therapy Charges for Today     Code Description Service Date Service Provider Modifiers Qty    89332233590 HC PT THER PROC EA 15 MIN 5/16/2023 Aviva Bashir PT GP 1    03896881683 HC GAIT TRAINING EA 15 MIN 5/16/2023 Aviva Bashir PT GP  1          PT G-Codes  Outcome Measure Options: AM-PAC 6 Clicks Basic Mobility (PT)  AM-PAC 6 Clicks Score (PT): 20  AM-PAC 6 Clicks Score (OT): 16  PT Discharge Summary  Anticipated Discharge Disposition (PT): skilled nursing facility    Aviva Bashir, PT  5/16/2023

## 2023-05-16 NOTE — PROGRESS NOTES
[facility] Medicine Services  PROGRESS NOTE    Patient Name: Chey Robertson  : 1935  MRN: [id number]    Date of Admission: 2023  Primary Care Physician: Corby Marie MD    Subjective   Subjective     CC:  F/U for UTI    HPI:  Feels better today, no pain. No palpitations or chest pain/ pressure. No soa.       ROS:  Gen- No fevers, chills  CV- No chest pain, palpitations  Resp- No cough, dyspnea  GI- No N/V/D, abd pain       Objective   Objective     Vital Signs:   Temp:  [97.2 °F (36.2 °C)-98.3 °F (36.8 °C)] 98.3 °F (36.8 °C)  Heart Rate:  [67-88] 67  Resp:  [14-18] 14  BP: ()/(52-58) 111/57  Flow (L/min):  [2] 2     Physical Exam:  Constitutional: No acute distress, awake, alert   HENT: NCAT, mucous membranes moist  Respiratory: Clear to auscultation bilaterally, respiratory effort normal   Cardiovascular: RRR, no murmurs, rubs, or gallops  Gastrointestinal: Positive bowel sounds, soft, nontender, nondistended  Musculoskeletal: No bilateral ankle edema  Psychiatric: Appropriate affect, cooperative  Neurologic: Oriented x 3, strength symmetric in all extremities, Cranial Nerves grossly intact to confrontation, speech clear  Skin: No rashes       Results Reviewed:  LAB RESULTS:      Lab 23  0550 23  0535 05/10/23  0556   WBC 2.21* 1.89* 2.15*   HEMOGLOBIN 8.7* 8.0* 7.8*   HEMATOCRIT 28.2* 25.7* 25.5*   PLATELETS 112* 54* 46*   NEUTROS ABS 1.37* 1.34* 1.58*   IMMATURE GRANS (ABS) 0.10* 0.05 0.02   LYMPHS ABS 0.49* 0.35* 0.37*   MONOS ABS 0.25 0.15 0.18   EOS ABS 0.00 0.00 0.00   MCV 90.4 90.8 92.7   CRP  --  1.96* 3.76*         Lab 05/15/23  0402 23  1620 23  0550 23  0535 05/10/23  0556   SODIUM 137  --  145 139 139   POTASSIUM 4.7 5.3* 3.5 4.3 4.2   CHLORIDE 99  --  106 104 105   CO2 30.0*  --  31.0* 27.0 26.0   ANION GAP 8.0  --  8.0 8.0 8.0   BUN 19  --  14 17 18   CREATININE 0.83  --  0.71 0.79 0.77   EGFR 68.8  --  82.9 73.0 75.2    GLUCOSE 222*  --  108* 214* 247*   CALCIUM 8.7  --  8.8 8.7 8.3*   MAGNESIUM 1.6  --   --   --   --          Lab 05/15/23  0402 05/11/23  0535 05/10/23  0556   TOTAL PROTEIN 5.7* 5.1* 5.1*   ALBUMIN 3.3* 3.3* 3.2*   GLOBULIN 2.4 1.8 1.9   ALT (SGPT) 7 9 10   AST (SGOT) 8 11 13   BILIRUBIN 0.6 0.3 0.3   ALK PHOS 68 65 69         Lab 05/14/23  1411 05/14/23  1105   HSTROP T 58* 72*                 Brief Urine Lab Results  (Last result in the past 365 days)      Color   Clarity   Blood   Leuk Est   Nitrite   Protein   CREAT   Urine HCG        05/05/23 1752 Yellow   Clear   Negative   Negative   Positive   Negative                 Microbiology Results Abnormal     Procedure Component Value - Date/Time    Blood Culture - Blood, Arm, Right [[id number]]  (Normal) Collected: 05/05/23 1850    Lab Status: Final result Specimen: Blood from Arm, Right Updated: 05/10/23 2015     Blood Culture No growth at 5 days    Blood Culture - Blood, Wrist, Right [[id number]]  (Normal) Collected: 05/05/23 1855    Lab Status: Final result Specimen: Blood from Wrist, Right Updated: 05/10/23 2015     Blood Culture No growth at 5 days    COVID PRE-OP / PRE-PROCEDURE SCREENING ORDER (NO ISOLATION) - Swab, Nasopharynx [[id number]]  (Normal) Collected: 05/05/23 1704    Lab Status: Final result Specimen: Swab from Nasopharynx Updated: 05/05/23 6353    Narrative:      The following orders were created for panel order COVID PRE-OP / PRE-PROCEDURE SCREENING ORDER (NO ISOLATION) - Swab, Nasopharynx.  Procedure                               Abnormality         Status                     ---------                               -----------         ------                     COVID-19 and FLU A/B PCR...[[id number]]  Normal              Final result                 Please view results for these tests on the individual orders.    COVID-19 and FLU A/B PCR - Swab, Nasopharynx [[id number]]  (Normal) Collected: 05/05/23 1704    Lab Status: Final result Specimen: Swab  from Nasopharynx Updated: 05/05/23 1747     COVID19 Not Detected     Influenza A PCR Not Detected     Influenza B PCR Not Detected    Narrative:      Fact sheet for providers: https://www.fda.gov/media/814315/download    Fact sheet for patients: https://www.fda.gov/media/862841/download    Test performed by PCR.          No radiology results from the last 24 hrs        Current medications:  Scheduled Meds:apixaban, 5 mg, Oral, Q12H  dexamethasone, 6 mg, Oral, Daily With Breakfast  Diclofenac Sodium, 2 g, Topical, 4x Daily  insulin detemir, 10 Units, Subcutaneous, Nightly  insulin lispro, 0-9 Units, Subcutaneous, TID AC  Insulin Lispro, 7 Units, Subcutaneous, TID With Meals  lactobacillus acidophilus, 1 capsule, Oral, Daily  levothyroxine, 100 mcg, Oral, Q AM  metoprolol tartrate, 50 mg, Oral, Q8H  sodium chloride, 10 mL, Intravenous, Q12H  tamsulosin, 0.4 mg, Oral, Daily      Continuous Infusions:   PRN Meds:.•  acetaminophen  •  benzonatate  •  Calcium Replacement - Follow Nurse / BPA Driven Protocol  •  dextrose  •  dextrose  •  glucagon (human recombinant)  •  guaifenesin  •  hydrOXYzine  •  Magnesium Standard Dose Replacement - Follow Nurse / BPA Driven Protocol  •  melatonin  •  ondansetron **OR** ondansetron  •  oxyCODONE  •  phenol  •  Phosphorus Replacement - Follow Nurse / BPA Driven Protocol  •  Potassium Replacement - Follow Nurse / BPA Driven Protocol  •  [COMPLETED] Insert Peripheral IV **AND** sodium chloride  •  sodium chloride  •  sodium chloride    Assessment & Plan   Assessment & Plan     Active Hospital Problems    Diagnosis  POA   • **Urinary tract infection, acute [N39.0]  Yes   • Atrial fibrillation with RVR [I48.91]  Unknown   • Chest pain [R07.9]  Unknown   • Elevated troponin [R77.8]  Unknown   • COVID-19 virus infection [U07.1]  Unknown   • Chronic anticoagulation [Z79.01]  Not Applicable   • Essential hypertension [I10]  Yes   • Type 2 diabetes mellitus without complication, with long-term  current use of insulin [E11.9, Z79.4]  Not Applicable   • Personal history of pulmonary embolism [Z86.711]  Yes   • Hypothyroidism (acquired) [E03.9]  Yes   • Pancytopenia (HCC) [D61.818]  Yes      Resolved Hospital Problems   No resolved problems to display.        Brief Hospital Course to date:  Chey Robertson is a 87 y.o. female with past medical history of myelodysplasia, chronic pancytopenia, essential hypertension, type 2 diabetes, history of PE (recently switched from warfarin to Eliquis per her cardiologist because of inability to achieve therapeutic INR), GERD who presented to the hospital with generalized weakness, nausea and vomiting found to have urinary tract infection. Since admission, pt found to have COVID19. She completed full course of remdesivir and continues on dexamethasone. 5/14 She had episode of chest pain with new A fib with RVR    Assessment and plan:    Afib RVR  Chest Pain  Elevated Troponin  -new onset CP this am. Troponin 72 (5 on admit). Repeat at 1300. EKG with AFib  -chest pain now resolved after Morphine 2mg IV and controlled rate Afib  -given IV metoprolol 5mg and cardizem IV 5mg with good response. HR controlled with rate . Still in Afib  -- cardiology consulted  --reports one known isolated episode of Afib years ago while hospitalized for knee replacement. Does not follow with cardiology  --started on Metoprolol BID. Started on IV dilt due to recurrent RVR, and increased BB dose to TID  --stopping IV Cardizem today and monitor rate, ECHO tomorrow once rates are controlled   --replacing magnesium today     Acute urinary tract infection, POA  Dehydration volume depletion  Acute on chronic debility  - Urinalysis positive for UTI, urine culture growing pansensitive E. coli  - Started on IV Rocephin on admission.  Given her persistent and worsening neutropenia with ANC of 350 on 5/7,  implemented neutropenic precautions and switched antibiotic therapy to IV cefepime for  completion of therapy  - Completed ABX per ID  - ANC improved  - PT/OT recommended rehab    COVID19 infection - on room air  -- pt complained of SOA, cough on 5/8, checked RVP and was positive for COVID 19  -- started on Decadron 6 mg daily D7/10, completed full course of Remdesivir 5/12  -- resumed Eliquis now that PLTs >40K; CBC in am to cont to monitor      Myelodysplastic syndrome  Worsening pancytopenia, likely secondary to sepsis  Worsening anemia, likely anemia of chronic disease  - Chronic pancytopenia  - DIC panel checked and negative   - neutropenic precautions, s/p 1 dose of Neupogen with improvement in ANC  - Follows with Dr. Lyman     History of PE  - Remote history of PE  - Restarted Eliquis as PLTs >40K on 5/10 (okay per Dr. Lyman's note)    Type 2 diabetes  - A1c from end of March 2023 is 6%  - Hyperglycemia likely due to steroids.   - continue basal/bolus insulin  - Continue sliding scale insulin     Essential hypertension  - Continue to hold home Lasix for now given dehydration  - Continue home Lisinopril and propranolol     Hypothyroidism  - Continue home synthroid    GERD  - PPI    Expected Discharge Location and Transportation: Rehab, referrals pending  Expected Discharge (if ten day COVID antigen test is negative) on 5/18    Expected Discharge Date: 5/18/2023; Expected Discharge Time:      DVT prophylaxis:  Medical and mechanical DVT prophylaxis orders are present.     AM-PAC 6 Clicks Score (PT): 19 (05/15/23 2000)      CODE STATUS:   Code Status and Medical Interventions:   Ordered at: 05/05/23 2031     Code Status (Patient has no pulse and is not breathing):    CPR (Attempt to Resuscitate)     Medical Interventions (Patient has pulse or is breathing):    Full Support       Kenzie Mcguire, APRN  05/16/23

## 2023-05-16 NOTE — PLAN OF CARE
Goal Outcome Evaluation:  Plan of Care Reviewed With: patient        Progress: improving  Outcome Evaluation: Pt continues to demonstrate good tolerance and participation throughout session. Able to ambulate multiple short bouts with CGA and FWW on RA with SpO2 >90%. Pt continues to be limited by generalized weakness and decreased functional activity tolerance, will continue to progress as able. PT rec SNF at d/c.

## 2023-05-16 NOTE — PROGRESS NOTES
" [facility] - Progress Note    Chey Robertson  1935  [id number]    05/16/23, 08:42 EDT      Chief Complaint: Following for AFib    Subjective:   Pt seen from joy window - in isolation for COVID19    Went into rapid Afib yesterday, IV Cardizem started.  We increased beta blocker.  Rates improved, remains on Cardizem gtt.  Still in AFib      Review of Systems:  Pertinent positives are listed above and in physical exam.  All others have been reviewed and are negative.    apixaban, 5 mg, Oral, Q12H  dexamethasone, 6 mg, Oral, Daily With Breakfast  Diclofenac Sodium, 2 g, Topical, 4x Daily  insulin detemir, 10 Units, Subcutaneous, Nightly  insulin lispro, 0-9 Units, Subcutaneous, TID AC  Insulin Lispro, 7 Units, Subcutaneous, TID With Meals  lactobacillus acidophilus, 1 capsule, Oral, Daily  levothyroxine, 100 mcg, Oral, Q AM  metoprolol tartrate, 50 mg, Oral, Q8H  sodium chloride, 10 mL, Intravenous, Q12H  tamsulosin, 0.4 mg, Oral, Daily        Objective:  Vitals:   height is 160 cm (63\") and weight is 56 kg (123 lb 6.4 oz). Her oral temperature is 97.6 °F (36.4 °C). Her blood pressure is 99/58 and her pulse is 67. Her respiration is 16 and oxygen saturation is 93%.       Intake/Output Summary (Last 24 hours) at 5/16/2023 0842  Last data filed at 5/16/2023 0447  Gross per 24 hour   Intake --   Output 1450 ml   Net -1450 ml       Physical Exam:  Gen:  Appears well nourished, sitting up in bed.  Cardio:  Appears to be in AFib by monitor, IV Cardizem hanging  Resp:  Breathing equal, non labored             Results from last 7 days   Lab Units 05/13/23  0550   WBC 10*3/mm3 2.21*   HEMOGLOBIN g/dL 8.7*   HEMATOCRIT % 28.2*   PLATELETS 10*3/mm3 112*     Results from last 7 days   Lab Units 05/15/23  0402   SODIUM mmol/L 137   POTASSIUM mmol/L 4.7   CHLORIDE mmol/L 99   CO2 mmol/L 30.0*   BUN mg/dL 19   CREATININE mg/dL 0.83   CALCIUM mg/dL 8.7   BILIRUBIN mg/dL 0.6   ALK PHOS U/L 68   ALT (SGPT) U/L 7   AST " (SGOT) U/L 8   GLUCOSE mg/dL 222*                       Tele:  Atrial Fibrillation    Assessment:  -87-year-old  female admitted with 3 current/persistent E. coli UTI  -Atrial fibrillation with RVR, new onset.  Associated chest pain and elevated troponin, likely demand ischemia.  -COVID-19 infection  -Myelodysplastic syndrome with worsening pancytopenia  -History of PE.  Previously on warfarin now on Eliquis which can be restarted as platelets get above 40K  -  DMII  -  HTN  -  Hypothyroidism  -  GERD        Plan:  -Admitted to hospitalist service.    -Continue isolation per primary service.  Receiving Decadron, completed remdesivir.  -Eliquis restarted on 5/10  -Continue Metoprolol 50mg TID with parameters.  DC IV Cardizem.  -Consider echocardiogram tomorrow once rates are better controlled.  -Cardiology will continue to follow from periphery.     I discussed the patient's findings and my recommendations with the patient, any present family members, and the nursing staff.  Jones Chand MD saw and examined patient, verified hx and PE, read all radiographic studies, reviewed labs and micro data, and formulated dx, plan for treatment and all medical decision making.      Lona May PA-C  05/16/23, 08:42 EDT

## 2023-05-16 NOTE — PLAN OF CARE
Goal Outcome Evaluation:         Pt is A&O. VSS. No reports of pain. Resting well, and in good mood. Cardizine drip titrated from 7mg down to 5mg. Pulse was . No further needs at this time.                 RE: Dr Cheko QUARLES, 11.17.20  Received: Today  Message Contents   Aimee Figueroa             Pt. cancelled for 11-17.  thanks    Previous Messages    ----- Message -----   From: Johnna Figueroa   Sent: 11/11/2020   2:47 PM CST   To: [facility] Scheduling Pool   Subject: Dr Cheko MONTES, 11.17.20                   Please cancel left cataract surgery for 11.17.20     Thank you.   Laure

## 2023-05-17 LAB
ANION GAP SERPL CALCULATED.3IONS-SCNC: 11 MMOL/L (ref 5–15)
BASOPHILS # BLD AUTO: 0 10*3/MM3 (ref 0–0.2)
BASOPHILS NFR BLD AUTO: 0 % (ref 0–1.5)
BUN SERPL-MCNC: 32 MG/DL (ref 8–23)
BUN/CREAT SERPL: 38.6 (ref 7–25)
CALCIUM SPEC-SCNC: 8.7 MG/DL (ref 8.6–10.5)
CHLORIDE SERPL-SCNC: 97 MMOL/L (ref 98–107)
CO2 SERPL-SCNC: 27 MMOL/L (ref 22–29)
CREAT SERPL-MCNC: 0.83 MG/DL (ref 0.57–1)
DEPRECATED RDW RBC AUTO: 55.9 FL (ref 37–54)
EGFRCR SERPLBLD CKD-EPI 2021: 68.8 ML/MIN/1.73
EOSINOPHIL # BLD AUTO: 0 10*3/MM3 (ref 0–0.4)
EOSINOPHIL NFR BLD AUTO: 0 % (ref 0.3–6.2)
ERYTHROCYTE [DISTWIDTH] IN BLOOD BY AUTOMATED COUNT: 16.4 % (ref 12.3–15.4)
GLUCOSE BLDC GLUCOMTR-MCNC: 296 MG/DL (ref 70–130)
GLUCOSE BLDC GLUCOMTR-MCNC: 345 MG/DL (ref 70–130)
GLUCOSE BLDC GLUCOMTR-MCNC: 387 MG/DL (ref 70–130)
GLUCOSE SERPL-MCNC: 308 MG/DL (ref 65–99)
HCT VFR BLD AUTO: 31.3 % (ref 34–46.6)
HGB BLD-MCNC: 9.5 G/DL (ref 12–15.9)
IMM GRANULOCYTES # BLD AUTO: 0.08 10*3/MM3 (ref 0–0.05)
IMM GRANULOCYTES NFR BLD AUTO: 2.5 % (ref 0–0.5)
LYMPHOCYTES # BLD AUTO: 0.2 10*3/MM3 (ref 0.7–3.1)
LYMPHOCYTES NFR BLD AUTO: 6.3 % (ref 19.6–45.3)
MAGNESIUM SERPL-MCNC: 2 MG/DL (ref 1.6–2.4)
MCH RBC QN AUTO: 28.3 PG (ref 26.6–33)
MCHC RBC AUTO-ENTMCNC: 30.4 G/DL (ref 31.5–35.7)
MCV RBC AUTO: 93.2 FL (ref 79–97)
MONOCYTES # BLD AUTO: 0.2 10*3/MM3 (ref 0.1–0.9)
MONOCYTES NFR BLD AUTO: 6.3 % (ref 5–12)
NEUTROPHILS NFR BLD AUTO: 2.7 10*3/MM3 (ref 1.7–7)
NEUTROPHILS NFR BLD AUTO: 84.9 % (ref 42.7–76)
NRBC BLD AUTO-RTO: 0 /100 WBC (ref 0–0.2)
PLATELET # BLD AUTO: 136 10*3/MM3 (ref 140–450)
PMV BLD AUTO: 9.3 FL (ref 6–12)
POTASSIUM SERPL-SCNC: 4.7 MMOL/L (ref 3.5–5.2)
QT INTERVAL: 366 MS
QT INTERVAL: 430 MS
QTC INTERVAL: 452 MS
QTC INTERVAL: 473 MS
RBC # BLD AUTO: 3.36 10*6/MM3 (ref 3.77–5.28)
SODIUM SERPL-SCNC: 135 MMOL/L (ref 136–145)
WBC NRBC COR # BLD: 3.18 10*3/MM3 (ref 3.4–10.8)

## 2023-05-17 PROCEDURE — 63710000001 INSULIN LISPRO (HUMAN) PER 5 UNITS: Performed by: PHYSICIAN ASSISTANT

## 2023-05-17 PROCEDURE — 63710000001 INSULIN LISPRO (HUMAN) PER 5 UNITS: Performed by: INTERNAL MEDICINE

## 2023-05-17 PROCEDURE — 63710000001 INSULIN DETEMIR PER 5 UNITS: Performed by: INTERNAL MEDICINE

## 2023-05-17 PROCEDURE — 63710000001 INSULIN ISOPHANE HUMAN PER 5 UNITS: Performed by: INTERNAL MEDICINE

## 2023-05-17 PROCEDURE — 99232 SBSQ HOSP IP/OBS MODERATE 35: CPT | Performed by: INTERNAL MEDICINE

## 2023-05-17 PROCEDURE — 82948 REAGENT STRIP/BLOOD GLUCOSE: CPT

## 2023-05-17 PROCEDURE — 25010000002 AMIODARONE IN DEXTROSE 5% 360-4.14 MG/200ML-% SOLUTION: Performed by: PHYSICIAN ASSISTANT

## 2023-05-17 PROCEDURE — 93005 ELECTROCARDIOGRAM TRACING: CPT | Performed by: PHYSICIAN ASSISTANT

## 2023-05-17 PROCEDURE — 25010000002 AMIODARONE IN DEXTROSE 5% 150-4.21 MG/100ML-% SOLUTION: Performed by: PHYSICIAN ASSISTANT

## 2023-05-17 PROCEDURE — 63710000001 DEXAMETHASONE PER 0.25 MG

## 2023-05-17 PROCEDURE — 97530 THERAPEUTIC ACTIVITIES: CPT

## 2023-05-17 PROCEDURE — 85025 COMPLETE CBC W/AUTO DIFF WBC: CPT | Performed by: NURSE PRACTITIONER

## 2023-05-17 PROCEDURE — 83735 ASSAY OF MAGNESIUM: CPT | Performed by: NURSE PRACTITIONER

## 2023-05-17 PROCEDURE — 97535 SELF CARE MNGMENT TRAINING: CPT

## 2023-05-17 PROCEDURE — 80048 BASIC METABOLIC PNL TOTAL CA: CPT | Performed by: NURSE PRACTITIONER

## 2023-05-17 RX ADMIN — METOPROLOL TARTRATE 50 MG: 50 TABLET ORAL at 20:49

## 2023-05-17 RX ADMIN — Medication 10 ML: at 09:04

## 2023-05-17 RX ADMIN — INSULIN HUMAN 12 UNITS: 100 INJECTION, SUSPENSION SUBCUTANEOUS at 14:57

## 2023-05-17 RX ADMIN — DEXAMETHASONE 6 MG: 4 TABLET ORAL at 09:03

## 2023-05-17 RX ADMIN — Medication 1 CAPSULE: at 09:03

## 2023-05-17 RX ADMIN — Medication 5 MG: at 20:57

## 2023-05-17 RX ADMIN — INSULIN LISPRO 7 UNITS: 100 INJECTION, SOLUTION INTRAVENOUS; SUBCUTANEOUS at 17:18

## 2023-05-17 RX ADMIN — METOPROLOL TARTRATE 50 MG: 50 TABLET ORAL at 14:40

## 2023-05-17 RX ADMIN — AMIODARONE HYDROCHLORIDE 150 MG: 1.5 INJECTION, SOLUTION INTRAVENOUS at 14:38

## 2023-05-17 RX ADMIN — INSULIN LISPRO 8 UNITS: 100 INJECTION, SOLUTION INTRAVENOUS; SUBCUTANEOUS at 09:04

## 2023-05-17 RX ADMIN — LEVOTHYROXINE SODIUM 100 MCG: 0.1 TABLET ORAL at 05:32

## 2023-05-17 RX ADMIN — DICLOFENAC SODIUM 2 G: 10 GEL TOPICAL at 12:29

## 2023-05-17 RX ADMIN — INSULIN LISPRO 7 UNITS: 100 INJECTION, SOLUTION INTRAVENOUS; SUBCUTANEOUS at 09:04

## 2023-05-17 RX ADMIN — AMIODARONE HYDROCHLORIDE 0.5 MG/MIN: 1.8 INJECTION, SOLUTION INTRAVENOUS at 14:59

## 2023-05-17 RX ADMIN — DICLOFENAC SODIUM 2 G: 10 GEL TOPICAL at 09:03

## 2023-05-17 RX ADMIN — Medication 10 ML: at 20:50

## 2023-05-17 RX ADMIN — INSULIN DETEMIR 10 UNITS: 100 INJECTION, SOLUTION SUBCUTANEOUS at 20:49

## 2023-05-17 RX ADMIN — INSULIN LISPRO 7 UNITS: 100 INJECTION, SOLUTION INTRAVENOUS; SUBCUTANEOUS at 12:29

## 2023-05-17 RX ADMIN — METOPROLOL TARTRATE 50 MG: 50 TABLET ORAL at 05:31

## 2023-05-17 RX ADMIN — APIXABAN 5 MG: 5 TABLET, FILM COATED ORAL at 09:04

## 2023-05-17 RX ADMIN — INSULIN LISPRO 6 UNITS: 100 INJECTION, SOLUTION INTRAVENOUS; SUBCUTANEOUS at 17:17

## 2023-05-17 RX ADMIN — DICLOFENAC SODIUM 2 G: 10 GEL TOPICAL at 20:49

## 2023-05-17 RX ADMIN — APIXABAN 5 MG: 5 TABLET, FILM COATED ORAL at 20:50

## 2023-05-17 RX ADMIN — TAMSULOSIN HYDROCHLORIDE 0.4 MG: 0.4 CAPSULE ORAL at 09:04

## 2023-05-17 NOTE — PLAN OF CARE
"Goal Outcome Evaluation:      Pt is Alert, was oriented x 4 until about 5 am. Pt then became confused. Does not know why she is at [facility]. She knows who, when, and where. Explained Plan of care but pt did not retain what was said and repeated again \"What are they doing with me.\" Pt is on RA - SATS remained above 90. Pt was cleaned and changed. Placed mepilex to protect healing coccyx.                   "

## 2023-05-17 NOTE — THERAPY TREATMENT NOTE
Patient Name: Chey Robertson  : 1935    MRN: [id number]                              Today's Date: 2023       Admit Date: 2023    Visit Dx:     ICD-10-CM ICD-9-CM   1. Urinary tract infection, acute  N39.0 599.0   2. Acute dehydration  E86.0 276.51     Patient Active Problem List   Diagnosis   • Benign familial tremor   • AMS (altered mental status)   • Thrombocytopenia (HCC)   • Pancytopenia (HCC)   • Shortness of breath   • Hypothyroidism (acquired)   • Acute kidney injury   • Splenomegaly   • Hepatomegaly   • Confusion   • B12 deficiency   • Abnormal intestinal absorption   • Iron deficiency anemia due to chronic blood loss   • Myelodysplasia (myelodysplastic syndrome)   • Weakness   • Personal history of pulmonary embolism   • Urinary tract infection, acute   • Chronic anticoagulation   • Essential hypertension   • Type 2 diabetes mellitus without complication, with long-term current use of insulin   • COVID-19 virus infection   • Atrial fibrillation with RVR   • Chest pain   • Elevated troponin     Past Medical History:   Diagnosis Date   • Anemia    • Arthritis    • Diabetes mellitus     checks sugar only when pt wants to    • Disease of thyroid gland    • History of transfusion     with knee surgery-  no reaction recalled.    • [place] (hard of hearing)     no hearing aids   • Hypertension    • Migraine without aura and without status migrainosus, not intractable 2016   • Pulmonary emboli     (after knee surgery)   • SOBOE (shortness of breath on exertion)    • Tremors of nervous system    • Vertigo    • Wears dentures     upper    • Wears glasses      Past Surgical History:   Procedure Laterality Date   • BLADDER SURGERY     • CATARACT EXTRACTION      bilat    • CHOLECYSTECTOMY     • COLONOSCOPY     • HYSTERECTOMY     • KYPHOPLASTY N/A 2021    Procedure: KYPHOPLASTY T11, T12 AND L4;  Surgeon: Matty Hearn MD;  Location: [facility];  Service: Orthopedic Spine;  Laterality: N/A;    • OOPHORECTOMY     • TONSILLECTOMY        General Information     Row Name 05/17/23 0926          OT Time and Intention    Document Type progress note/recertification  -     Mode of Treatment occupational therapy;individual therapy  -     Row Name 05/17/23 0926          General Information    Patient Profile Reviewed yes  -     Existing Precautions/Restrictions fall  -     Barriers to Rehab medically complex;previous functional deficit  -     Row Name 05/17/23 0926          Cognition    Orientation Status (Cognition) oriented x 3  -     Row Name 05/17/23 0926          Safety Issues, Functional Mobility    Safety Issues Affecting Function (Mobility) awareness of need for assistance;insight into deficits/self-awareness;safety precaution awareness;safety precautions follow-through/compliance;sequencing abilities  -     Impairments Affecting Function (Mobility) balance;endurance/activity tolerance;strength  -           User Key  (r) = Recorded By, (t) = Taken By, (c) = Cosigned By    Initials Name Provider Type     Yue Moses, JERRI Occupational Therapist                 Mobility/ADL's     Row Name 05/17/23 0927          Bed Mobility    Bed Mobility supine-sit  -     Supine-Sit [place] (Bed Mobility) contact guard  -     Assistive Device (Bed Mobility) bed rails;head of bed elevated  -     Row Name 05/17/23 0927          Transfers    Transfers sit-stand transfer;stand-sit transfer;toilet transfer  -     Row Name 05/17/23 0927          Sit-Stand Transfer    Sit-Stand [place] (Transfers) contact guard;verbal cues  -     Assistive Device (Sit-Stand Transfers) walker, front-wheeled  -     Row Name 05/17/23 0927          Stand-Sit Transfer    Stand-Sit [place] (Transfers) contact guard;verbal cues  -     Assistive Device (Stand-Sit Transfers) walker, front-wheeled  -[facility] Name 05/17/23 0927          Toilet Transfer    Type (Toilet Transfer) sit-stand;stand-sit  -      [place] Level (Toilet Transfer) contact guard;verbal cues  -     Assistive Device (Toilet Transfer) commode;grab bars/safety frame;walker, front-wheeled  -     Row Name 05/17/23 0927          Functional Mobility    Functional Mobility- Ind. Level contact guard assist;verbal cues required  -     Functional Mobility- Device walker, front-wheeled  -     Functional Mobility-Distance (Feet) 24  8+16 (to/from bathroom)  -     Functional Mobility- Safety Issues balance decreased during turns;sequencing ability decreased;step length decreased  -[facility] Name 05/17/23 0927          Activities of Daily Living    BADL Assessment/Intervention toileting;grooming  -[facility] Name 05/17/23 0927          Grooming Assessment/Training    [place] Level (Grooming) wash face, hands;hair care, combing/brushing;oral care regimen;standby assist  -     Position (Grooming) sink side;supported standing  -[facility] Name 05/17/23 0927          Toileting Assessment/Training    [place] Level (Toileting) adjust/manage clothing;perform perineal hygiene;standby assist  -     Assistive Devices (Toileting) commode, bedside with drop arms;grab bar/safety frame  -     Position (Toileting) supported sitting  -           User Key  (r) = Recorded By, (t) = Taken By, (c) = Cosigned By    Initials Name Provider Type     Yue Moses OT Occupational Therapist               Obj/Interventions     [facility] Name 05/17/23 0954          Balance    Balance Assessment sitting static balance;sitting dynamic balance;sit to stand dynamic balance;standing static balance;standing dynamic balance  -     Static Sitting Balance standby assist  -     Dynamic Sitting Balance standby assist  -     Position, Sitting Balance unsupported;sitting in chair;sitting edge of bed;other (see comments)  commode  -     Sit to Stand Dynamic Balance contact guard;verbal cues  -     Static Standing Balance contact guard  -     Dynamic  Standing Balance contact guard;verbal cues  -     Position/Device Used, Standing Balance supported;walker, front-wheeled  -     Balance Interventions sitting;sit to stand;occupation based/functional task  -           User Key  (r) = Recorded By, (t) = Taken By, (c) = Cosigned By    Initials Name Provider Type     Yue Moses OT Occupational Therapist               Goals/Plan     Row Name 05/17/23 0955          Bed Mobility Goal 1 (OT)    Progress/Outcomes (Bed Mobility Goal 1, OT) good progress toward goal  -     Row Name 05/17/23 0955          Transfer Goal 1 (OT)    Progress/Outcome (Transfer Goal 1, OT) good progress toward goal  -[facility] Name 05/17/23 0955          Dressing Goal 1 (OT)    Progress/Outcome (Dressing Goal 1, OT) goal ongoing  -     Row Name 05/17/23 0955          Toileting Goal 1 (OT)    Progress/Outcome (Toileting Goal 1, OT) good progress toward goal  -[facility] Name 05/17/23 0955          Grooming Goal 1 (OT)    Progress/Outcome (Grooming Goal 1, OT) good progress toward goal  -           User Key  (r) = Recorded By, (t) = Taken By, (c) = Cosigned By    Initials Name Provider Type     Yue Moses OT Occupational Therapist               Clinical Impression     Row Name 05/17/23 0954          Pain Assessment    Pretreatment Pain Rating 0/10 - no pain  -     Posttreatment Pain Rating 0/10 - no pain  -[facility] Name 05/17/23 0954          Plan of Care Review    Plan of Care Reviewed With patient  -     Progress improving  -     Outcome Evaluation Pt's ADL independence limited d/t strength, endurance, and balance deficits. Will continue to progress pt as tolerated per OT POC. Rec SNF at d/c.  -     Row Name 05/17/23 0954          Therapy Assessment/Plan (OT)    Rehab Potential (OT) good, to achieve stated therapy goals  -     Criteria for Skilled Therapeutic Interventions Met (OT) yes;meets criteria;skilled treatment is necessary  -     Therapy  Frequency (OT) daily  -     Row Name 05/17/23 0954          Therapy Plan Review/Discharge Plan (OT)    Anticipated Discharge Disposition (OT) skilled nursing facility  -     Row Name 05/17/23 0954          Vital Signs    Pre Systolic BP Rehab --  VSS - RN cleared OT  -     O2 Delivery Pre Treatment room air  -     O2 Delivery Intra Treatment room air  -     O2 Delivery Post Treatment room air  -     Pre Patient Position Supine  -     Intra Patient Position Standing  -     Post Patient Position Sitting  -     Row Name 05/17/23 0954          Positioning and Restraints    Pre-Treatment Position in bed  -     Post Treatment Position chair  -     In Chair reclined;call light within reach;encouraged to call for assist;exit alarm on;with nsg;waffle cushion;legs elevated;heels elevated  -           User Key  (r) = Recorded By, (t) = Taken By, (c) = Cosigned By    Initials Name Provider Type    Yue Murrell OT Occupational Therapist               Outcome Measures     Row Name 05/17/23 0955          How much help from another is currently needed...    Putting on and taking off regular lower body clothing? 3  -MC     Bathing (including washing, rinsing, and drying) 3  -MC     Toileting (which includes using toilet bed pan or urinal) 3  -MC     Putting on and taking off regular upper body clothing 4  -MC     Taking care of personal grooming (such as brushing teeth) 4  -MC     Eating meals 4  -     AM-PAC 6 Clicks Score (OT) 21  -     Row Name 05/17/23 0955          Functional Assessment    Outcome Measure Options AM-PAC 6 Clicks Daily Activity (OT)  -           User Key  (r) = Recorded By, (t) = Taken By, (c) = Cosigned By    Initials Name Provider Type    Yue Murrell OT Occupational Therapist                Occupational Therapy Education     Title: PT OT SLP Therapies (In Progress)     Topic: Occupational Therapy (In Progress)     Point: ADL training (In Progress)      Description:   Instruct learner(s) on proper safety adaptation and remediation techniques during self care or transfers.   Instruct in proper use of assistive devices.              Learning Progress Summary           Patient Acceptance, E, NR by  at 5/17/2023 0956    Acceptance, E, VU,DU by CHELSEA at 5/11/2023 1602    Comment: OT POC; BADL's    Acceptance, E,TB, VU by BT at 5/9/2023 1542    Acceptance, E, VU,NR by JB1 at 5/8/2023 1202    Comment: reason for consult, waiting for wx and gait belt before up for safety, UE TE/PLB                   Point: Home exercise program (Done)     Description:   Instruct learner(s) on appropriate technique for monitoring, assisting and/or progressing therapeutic exercises/activities.              Learning Progress Summary           Patient Acceptance, E,TB, VU by  at 5/9/2023 1542                   Point: Precautions (In Progress)     Description:   Instruct learner(s) on prescribed precautions during self-care and functional transfers.              Learning Progress Summary           Patient Acceptance, E, NR by  at 5/17/2023 0956    Acceptance, E, VU,DU by CHELSEA at 5/11/2023 1602    Comment: OT POC; BADL's    Acceptance, E,TB, VU by  at 5/9/2023 1542    Acceptance, E, VU,NR by JB at 5/8/2023 1202    Comment: reason for consult, waiting for wx and gait belt before up for safety, UE TE/PLB                   Point: Body mechanics (In Progress)     Description:   Instruct learner(s) on proper positioning and spine alignment during self-care, functional mobility activities and/or exercises.              Learning Progress Summary           Patient Acceptance, E, NR by  at 5/17/2023 0956    Acceptance, E,TB, VU by  at 5/9/2023 1542                               User Key     Initials Effective Dates Name Provider Type Discipline    [facility] 02/03/23 -  Kanika Bowers OT Occupational Therapist OT    BT 12/30/20 -  Marly Cordero, RN Registered Nurse Nurse    CHELSEA 06/16/21 -  [place]  Nicole OT Occupational Therapist OT     10/14/22 -  Yue Moses OT Occupational Therapist OT              OT Recommendation and Plan  Therapy Frequency (OT): daily  Plan of Care Review  Plan of Care Reviewed With: patient  Progress: improving  Outcome Evaluation: Pt's ADL independence limited d/t strength, endurance, and balance deficits. Will continue to progress pt as tolerated per OT POC. Rec SNF at d/c.     Time Calculation:    Time Calculation- OT     Row Name 05/17/23 0956             Time Calculation- OT    OT Start Time 0839  -      OT Received On 05/17/23  -      OT Goal Re-Cert Due Date 05/27/23  -         Timed Charges    88518 - OT Therapeutic Activity Minutes 8  -      05153 - OT Self Care/Mgmt Minutes 19  -         Total Minutes    Timed Charges Total Minutes 27  -       Total Minutes 27  -            User Key  (r) = Recorded By, (t) = Taken By, (c) = Cosigned By    Initials Name Provider Type     Yue Moses OT Occupational Therapist              Therapy Charges for Today     Code Description Service Date Service Provider Modifiers Qty    56301233885 HC OT THERAPEUTIC ACT EA 15 MIN 5/17/2023 Yue Moses OT GO 1    22607950961 HC OT SELF CARE/MGMT/TRAIN EA 15 MIN 5/17/2023 Yue Moses OT GO 1               Yue Moses OT  5/17/2023

## 2023-05-17 NOTE — CASE MANAGEMENT/SOCIAL WORK
Continued Stay Note  [facility]     Patient Name: Chey Robertson  MRN: [id number]  Today's Date: 5/17/2023    Admit Date: 5/5/2023    Plan: discharge plan   Discharge Plan     Row Name 05/17/23 1645       Plan    Plan discharge plan    Plan Comments Pt in covid isolation until 5/28 unless covid antigen on 5/18 neg. If antigen negative, CM will make referrals to [facility]. CM will follow up tomorrow    Final Discharge Disposition Code 03 - skilled nursing facility (SNF)               Discharge Codes    No documentation.               Expected Discharge Date and Time     Expected Discharge Date Expected Discharge Time    May 19, 2023             Kellie Lauren RN

## 2023-05-17 NOTE — PROGRESS NOTES
[facility] Medicine Services  PROGRESS NOTE    Patient Name: Chey Robertson  : 1935  MRN: [id number]    Date of Admission: 2023  Primary Care Physician: Corby Marie MD    Subjective   Subjective     CC:  F/U for UTI    HPI:  I have seen and evaluated the patient this morning.  Comfortable in bed.  Denied any chest pain or shortness of breath.  Heart rate still not well controlled ranging between 1 10-1 30.  On amiodarone drip currently      ROS:  Gen- No fevers, chills  CV- No chest pain, palpitations  Resp- No cough, dyspnea  GI- No N/V/D, abd pain       Objective   Objective     Vital Signs:   Temp:  [97.7 °F (36.5 °C)-98.5 °F (36.9 °C)] 97.7 °F (36.5 °C)  Heart Rate:  [67-92] 92  Resp:  [14-16] 16  BP: (111-139)/(57-87) 135/87  Flow (L/min):  [2] 2     Physical Exam:  Constitutional: No acute distress, awake, alert   HENT: NCAT, mucous membranes moist  Respiratory: Clear to auscultation bilaterally, respiratory effort normal   Cardiovascular: RRR, no murmurs, rubs, or gallops  Gastrointestinal: Positive bowel sounds, soft, nontender, nondistended  Musculoskeletal: No bilateral ankle edema  Psychiatric: Appropriate affect, cooperative  Neurologic: Oriented x 3, strength symmetric in all extremities, Cranial Nerves grossly intact to confrontation, speech clear  Skin: No rashes       Results Reviewed:  LAB RESULTS:      Lab 23  0404 23  0550 23  0535   WBC 3.18* 2.21* 1.89*   HEMOGLOBIN 9.5* 8.7* 8.0*   HEMATOCRIT 31.3* 28.2* 25.7*   PLATELETS 136* 112* 54*   NEUTROS ABS 2.70 1.37* 1.34*   IMMATURE GRANS (ABS) 0.08* 0.10* 0.05   LYMPHS ABS 0.20* 0.49* 0.35*   MONOS ABS 0.20 0.25 0.15   EOS ABS 0.00 0.00 0.00   MCV 93.2 90.4 90.8   CRP  --   --  1.96*         Lab 23  0404 05/15/23  0402 23  1620 23  0550 23  0535   SODIUM 135* 137  --  145 139   POTASSIUM 4.7 4.7 5.3* 3.5 4.3   CHLORIDE 97* 99  --  106 104   CO2 27.0 30.0*  --   31.0* 27.0   ANION GAP 11.0 8.0  --  8.0 8.0   BUN 32* 19  --  14 17   CREATININE 0.83 0.83  --  0.71 0.79   EGFR 68.8 68.8  --  82.9 73.0   GLUCOSE 308* 222*  --  108* 214*   CALCIUM 8.7 8.7  --  8.8 8.7   MAGNESIUM 2.0 1.6  --   --   --          Lab 05/15/23  0402 05/11/23  0535   TOTAL PROTEIN 5.7* 5.1*   ALBUMIN 3.3* 3.3*   GLOBULIN 2.4 1.8   ALT (SGPT) 7 9   AST (SGOT) 8 11   BILIRUBIN 0.6 0.3   ALK PHOS 68 65         Lab 05/14/23  1411 05/14/23  1105   HSTROP T 58* 72*                 Brief Urine Lab Results  (Last result in the past 365 days)      Color   Clarity   Blood   Leuk Est   Nitrite   Protein   CREAT   Urine HCG        05/05/23 1752 Yellow   Clear   Negative   Negative   Positive   Negative                 Microbiology Results Abnormal     Procedure Component Value - Date/Time    Blood Culture - Blood, Arm, Right [[id number]]  (Normal) Collected: 05/05/23 1850    Lab Status: Final result Specimen: Blood from Arm, Right Updated: 05/10/23 2015     Blood Culture No growth at 5 days    Blood Culture - Blood, Wrist, Right [[id number]]  (Normal) Collected: 05/05/23 1855    Lab Status: Final result Specimen: Blood from Wrist, Right Updated: 05/10/23 2015     Blood Culture No growth at 5 days    COVID PRE-OP / PRE-PROCEDURE SCREENING ORDER (NO ISOLATION) - Swab, Nasopharynx [[id number]]  (Normal) Collected: 05/05/23 1704    Lab Status: Final result Specimen: Swab from Nasopharynx Updated: 05/05/23 5853    Narrative:      The following orders were created for panel order COVID PRE-OP / PRE-PROCEDURE SCREENING ORDER (NO ISOLATION) - Swab, Nasopharynx.  Procedure                               Abnormality         Status                     ---------                               -----------         ------                     COVID-19 and FLU A/B PCR...[[id number]]  Normal              Final result                 Please view results for these tests on the individual orders.    COVID-19 and FLU A/B PCR - Swab,  Nasopharynx [[id number]]  (Normal) Collected: 05/05/23 1704    Lab Status: Final result Specimen: Swab from Nasopharynx Updated: 05/05/23 9807     COVID19 Not Detected     Influenza A PCR Not Detected     Influenza B PCR Not Detected    Narrative:      Fact sheet for providers: https://www.fda.gov/media/271748/download    Fact sheet for patients: https://www.fda.gov/media/648889/download    Test performed by PCR.          No radiology results from the last 24 hrs        Current medications:  Scheduled Meds:apixaban, 5 mg, Oral, Q12H  dexamethasone, 6 mg, Oral, Daily With Breakfast  Diclofenac Sodium, 2 g, Topical, 4x Daily  insulin detemir, 10 Units, Subcutaneous, Nightly  insulin lispro, 0-9 Units, Subcutaneous, TID AC  Insulin Lispro, 7 Units, Subcutaneous, TID With Meals  lactobacillus acidophilus, 1 capsule, Oral, Daily  levothyroxine, 100 mcg, Oral, Q AM  metoprolol tartrate, 50 mg, Oral, Q8H  sodium chloride, 10 mL, Intravenous, Q12H  tamsulosin, 0.4 mg, Oral, Daily      Continuous Infusions:   PRN Meds:.•  acetaminophen  •  benzonatate  •  Calcium Replacement - Follow Nurse / BPA Driven Protocol  •  dextrose  •  dextrose  •  glucagon (human recombinant)  •  guaifenesin  •  hydrOXYzine  •  Magnesium Standard Dose Replacement - Follow Nurse / BPA Driven Protocol  •  melatonin  •  ondansetron **OR** ondansetron  •  oxyCODONE  •  phenol  •  Phosphorus Replacement - Follow Nurse / BPA Driven Protocol  •  Potassium Replacement - Follow Nurse / BPA Driven Protocol  •  [COMPLETED] Insert Peripheral IV **AND** sodium chloride  •  sodium chloride  •  sodium chloride    Assessment & Plan   Assessment & Plan     Active Hospital Problems    Diagnosis  POA   • **Urinary tract infection, acute [N39.0]  Yes   • Atrial fibrillation with RVR [I48.91]  Unknown   • Chest pain [R07.9]  Unknown   • Elevated troponin [R77.8]  Unknown   • COVID-19 virus infection [U07.1]  Unknown   • Chronic anticoagulation [Z79.01]  Not Applicable    • Essential hypertension [I10]  Yes   • Type 2 diabetes mellitus without complication, with long-term current use of insulin [E11.9, Z79.4]  Not Applicable   • Personal history of pulmonary embolism [Z86.711]  Yes   • Hypothyroidism (acquired) [E03.9]  Yes   • Pancytopenia (HCC) [D61.818]  Yes      Resolved Hospital Problems   No resolved problems to display.        Brief Hospital Course to date:  Chey Robertson is a 87 y.o. female with past medical history of myelodysplasia, chronic pancytopenia, essential hypertension, type 2 diabetes, history of PE (recently switched from warfarin to Eliquis per her cardiologist because of inability to achieve therapeutic INR), GERD who presented to the hospital with generalized weakness, nausea and vomiting found to have urinary tract infection. Since admission, pt found to have COVID19. She completed full course of remdesivir and continues on dexamethasone. 5/14 She had episode of chest pain with new A fib with RVR    Assessment and plan:  Afib RVR  Chest Pain  Elevated Troponin  · Patient developed A-fib with RVR early morning 5/16/2023.  Associated chest pain and mildly elevated troponin at 72  · She is known with intermittent episodes of A-fib during previous hospitalization for knee replacement years ago  · Follow echo  · Currently on metoprolol and amiodarone drip   · Already on Eliquis  · Dr. Chand/cardiology is following.    Acute urinary tract infection, POA  Dehydration volume depletion  Acute on chronic debility  · Urinalysis positive for UTI, urine culture growing pansensitive E. Coli  · Initially started on IV Rocephin and with her worsening neutropenia, she was switched to IV cefepime and finish full course of treatment  · ID followed the patient during this hospitalization  · Neutropenia and ANC improved  · PT/OT recommended rehab    COVID19 infection - on room air  History of PE  · Differential diagnosis 5/8/2023  · Continue airborne isolation precautions  per hospital policy  · Status post full course of IV remdesivir and Decadron  · Continue Eliquis for her chemotherapy     Myelodysplastic syndrome  Worsening pancytopenia, likely secondary to sepsis  Worsening anemia, likely anemia of chronic disease  · Chronic pancytopenia  · DIC panel checked and negative   · Neutropenic precautions, s/p 1 dose of Neupogen with improvement in ANC  · Follows with Dr. Lyman     Type 2 diabetes  · A1c from end of March 2023 is 6%  · Continue     Essential hypertension  · Continue to hold home Lasix for now given dehydration  · Continue home Lisinopril and propranolol     Hypothyroidism  · Continue home synthroid    GERD  · PPI    Expected Discharge Location and Transportation: Rehab, referrals pending  Expected Discharge (if ten day COVID antigen test is negative) on 5/18    Expected Discharge Date: 5/18/2023; Expected Discharge Time:      DVT prophylaxis:  Medical and mechanical DVT prophylaxis orders are present.     AM-PAC 6 Clicks Score (PT): 20 (05/16/23 2000)      CODE STATUS:   Code Status and Medical Interventions:   Ordered at: 05/05/23 2031     Code Status (Patient has no pulse and is not breathing):    CPR (Attempt to Resuscitate)     Medical Interventions (Patient has pulse or is breathing):    Full Support       Selena Pulliam MD  05/17/23

## 2023-05-17 NOTE — PLAN OF CARE
Goal Outcome Evaluation:  Plan of Care Reviewed With: patient        Progress: improving  Outcome Evaluation: Pt's ADL independence limited d/t strength, endurance, and balance deficits. Will continue to progress pt as tolerated per OT POC. Rec SNF at d/c.

## 2023-05-18 LAB
ALBUMIN SERPL-MCNC: 3.1 G/DL (ref 3.5–5.2)
ALBUMIN/GLOB SERPL: 1.4 G/DL
ALP SERPL-CCNC: 65 U/L (ref 39–117)
ALT SERPL W P-5'-P-CCNC: 6 U/L (ref 1–33)
ANION GAP SERPL CALCULATED.3IONS-SCNC: 8 MMOL/L (ref 5–15)
AST SERPL-CCNC: 8 U/L (ref 1–32)
BASOPHILS # BLD AUTO: 0 10*3/MM3 (ref 0–0.2)
BASOPHILS NFR BLD AUTO: 0 % (ref 0–1.5)
BILIRUB SERPL-MCNC: 0.3 MG/DL (ref 0–1.2)
BUN SERPL-MCNC: 29 MG/DL (ref 8–23)
BUN/CREAT SERPL: 40.3 (ref 7–25)
CALCIUM SPEC-SCNC: 8.7 MG/DL (ref 8.6–10.5)
CHLORIDE SERPL-SCNC: 100 MMOL/L (ref 98–107)
CO2 SERPL-SCNC: 30 MMOL/L (ref 22–29)
CREAT SERPL-MCNC: 0.72 MG/DL (ref 0.57–1)
DEPRECATED RDW RBC AUTO: 53.9 FL (ref 37–54)
EGFRCR SERPLBLD CKD-EPI 2021: 81.5 ML/MIN/1.73
EOSINOPHIL # BLD AUTO: 0 10*3/MM3 (ref 0–0.4)
EOSINOPHIL NFR BLD AUTO: 0 % (ref 0.3–6.2)
ERYTHROCYTE [DISTWIDTH] IN BLOOD BY AUTOMATED COUNT: 16.3 % (ref 12.3–15.4)
GLOBULIN UR ELPH-MCNC: 2.2 GM/DL
GLUCOSE BLDC GLUCOMTR-MCNC: 146 MG/DL (ref 70–130)
GLUCOSE BLDC GLUCOMTR-MCNC: 152 MG/DL (ref 70–130)
GLUCOSE BLDC GLUCOMTR-MCNC: 152 MG/DL (ref 70–130)
GLUCOSE BLDC GLUCOMTR-MCNC: 188 MG/DL (ref 70–130)
GLUCOSE SERPL-MCNC: 176 MG/DL (ref 65–99)
HCT VFR BLD AUTO: 30.3 % (ref 34–46.6)
HGB BLD-MCNC: 9.4 G/DL (ref 12–15.9)
IMM GRANULOCYTES # BLD AUTO: 0.05 10*3/MM3 (ref 0–0.05)
IMM GRANULOCYTES NFR BLD AUTO: 1.4 % (ref 0–0.5)
LYMPHOCYTES # BLD AUTO: 0.36 10*3/MM3 (ref 0.7–3.1)
LYMPHOCYTES NFR BLD AUTO: 9.8 % (ref 19.6–45.3)
MAGNESIUM SERPL-MCNC: 1.7 MG/DL (ref 1.6–2.4)
MCH RBC QN AUTO: 28.3 PG (ref 26.6–33)
MCHC RBC AUTO-ENTMCNC: 31 G/DL (ref 31.5–35.7)
MCV RBC AUTO: 91.3 FL (ref 79–97)
MONOCYTES # BLD AUTO: 0.22 10*3/MM3 (ref 0.1–0.9)
MONOCYTES NFR BLD AUTO: 6 % (ref 5–12)
NEUTROPHILS NFR BLD AUTO: 3.04 10*3/MM3 (ref 1.7–7)
NEUTROPHILS NFR BLD AUTO: 82.8 % (ref 42.7–76)
NRBC BLD AUTO-RTO: 0 /100 WBC (ref 0–0.2)
PHOSPHATE SERPL-MCNC: 4.3 MG/DL (ref 2.5–4.5)
PLATELET # BLD AUTO: 156 10*3/MM3 (ref 140–450)
PMV BLD AUTO: 9.5 FL (ref 6–12)
POTASSIUM SERPL-SCNC: 4.6 MMOL/L (ref 3.5–5.2)
PROT SERPL-MCNC: 5.3 G/DL (ref 6–8.5)
RBC # BLD AUTO: 3.32 10*6/MM3 (ref 3.77–5.28)
SARS-COV-2 AG RESP QL IA.RAPID: NORMAL
SODIUM SERPL-SCNC: 138 MMOL/L (ref 136–145)
WBC NRBC COR # BLD: 3.67 10*3/MM3 (ref 3.4–10.8)

## 2023-05-18 PROCEDURE — 63710000001 INSULIN DETEMIR PER 5 UNITS: Performed by: INTERNAL MEDICINE

## 2023-05-18 PROCEDURE — 85025 COMPLETE CBC W/AUTO DIFF WBC: CPT | Performed by: INTERNAL MEDICINE

## 2023-05-18 PROCEDURE — 97530 THERAPEUTIC ACTIVITIES: CPT | Performed by: OCCUPATIONAL THERAPIST

## 2023-05-18 PROCEDURE — 97535 SELF CARE MNGMENT TRAINING: CPT | Performed by: OCCUPATIONAL THERAPIST

## 2023-05-18 PROCEDURE — 84100 ASSAY OF PHOSPHORUS: CPT | Performed by: INTERNAL MEDICINE

## 2023-05-18 PROCEDURE — 63710000001 INSULIN LISPRO (HUMAN) PER 5 UNITS: Performed by: INTERNAL MEDICINE

## 2023-05-18 PROCEDURE — 93005 ELECTROCARDIOGRAM TRACING: CPT | Performed by: PHYSICIAN ASSISTANT

## 2023-05-18 PROCEDURE — 80053 COMPREHEN METABOLIC PANEL: CPT | Performed by: INTERNAL MEDICINE

## 2023-05-18 PROCEDURE — 63710000001 INSULIN LISPRO (HUMAN) PER 5 UNITS: Performed by: PHYSICIAN ASSISTANT

## 2023-05-18 PROCEDURE — 25010000002 AMIODARONE IN DEXTROSE 5% 360-4.14 MG/200ML-% SOLUTION: Performed by: PHYSICIAN ASSISTANT

## 2023-05-18 PROCEDURE — 99232 SBSQ HOSP IP/OBS MODERATE 35: CPT | Performed by: INTERNAL MEDICINE

## 2023-05-18 PROCEDURE — 83735 ASSAY OF MAGNESIUM: CPT | Performed by: INTERNAL MEDICINE

## 2023-05-18 PROCEDURE — 87426 SARSCOV CORONAVIRUS AG IA: CPT | Performed by: INTERNAL MEDICINE

## 2023-05-18 PROCEDURE — 97116 GAIT TRAINING THERAPY: CPT

## 2023-05-18 PROCEDURE — 82948 REAGENT STRIP/BLOOD GLUCOSE: CPT

## 2023-05-18 RX ORDER — AMIODARONE HYDROCHLORIDE 200 MG/1
200 TABLET ORAL EVERY 12 HOURS SCHEDULED
Status: DISCONTINUED | OUTPATIENT
Start: 2023-05-18 | End: 2023-05-24 | Stop reason: HOSPADM

## 2023-05-18 RX ORDER — POVIDONE-IODINE 10 MG/ML
1 SOLUTION TOPICAL DAILY
Status: DISCONTINUED | OUTPATIENT
Start: 2023-05-18 | End: 2023-05-24 | Stop reason: HOSPADM

## 2023-05-18 RX ADMIN — INSULIN DETEMIR 10 UNITS: 100 INJECTION, SOLUTION SUBCUTANEOUS at 20:26

## 2023-05-18 RX ADMIN — HYDROXYZINE HYDROCHLORIDE 25 MG: 25 TABLET, FILM COATED ORAL at 02:25

## 2023-05-18 RX ADMIN — INSULIN LISPRO 7 UNITS: 100 INJECTION, SOLUTION INTRAVENOUS; SUBCUTANEOUS at 17:22

## 2023-05-18 RX ADMIN — INSULIN LISPRO 2 UNITS: 100 INJECTION, SOLUTION INTRAVENOUS; SUBCUTANEOUS at 12:37

## 2023-05-18 RX ADMIN — METOPROLOL TARTRATE 50 MG: 50 TABLET ORAL at 05:26

## 2023-05-18 RX ADMIN — AMIODARONE HYDROCHLORIDE 200 MG: 200 TABLET ORAL at 08:42

## 2023-05-18 RX ADMIN — AMIODARONE HYDROCHLORIDE 0.5 MG/MIN: 1.8 INJECTION, SOLUTION INTRAVENOUS at 02:05

## 2023-05-18 RX ADMIN — DICLOFENAC SODIUM 2 G: 10 GEL TOPICAL at 12:40

## 2023-05-18 RX ADMIN — POVIDONE-IODINE 1 EACH: 10 SOLUTION TOPICAL at 15:33

## 2023-05-18 RX ADMIN — METOPROLOL TARTRATE 50 MG: 50 TABLET ORAL at 20:26

## 2023-05-18 RX ADMIN — METOPROLOL TARTRATE 50 MG: 50 TABLET ORAL at 13:21

## 2023-05-18 RX ADMIN — APIXABAN 5 MG: 5 TABLET, FILM COATED ORAL at 20:26

## 2023-05-18 RX ADMIN — DICLOFENAC SODIUM 2 G: 10 GEL TOPICAL at 20:26

## 2023-05-18 RX ADMIN — INSULIN LISPRO 2 UNITS: 100 INJECTION, SOLUTION INTRAVENOUS; SUBCUTANEOUS at 17:21

## 2023-05-18 RX ADMIN — AMIODARONE HYDROCHLORIDE 200 MG: 200 TABLET ORAL at 20:26

## 2023-05-18 RX ADMIN — INSULIN LISPRO 7 UNITS: 100 INJECTION, SOLUTION INTRAVENOUS; SUBCUTANEOUS at 12:37

## 2023-05-18 RX ADMIN — ACETAMINOPHEN 325MG 650 MG: 325 TABLET ORAL at 20:29

## 2023-05-18 RX ADMIN — APIXABAN 5 MG: 5 TABLET, FILM COATED ORAL at 08:42

## 2023-05-18 RX ADMIN — Medication 1 CAPSULE: at 08:42

## 2023-05-18 RX ADMIN — Medication 10 ML: at 08:45

## 2023-05-18 RX ADMIN — Medication 5 MG: at 20:26

## 2023-05-18 RX ADMIN — LEVOTHYROXINE SODIUM 100 MCG: 0.1 TABLET ORAL at 05:26

## 2023-05-18 RX ADMIN — DICLOFENAC SODIUM 2 G: 10 GEL TOPICAL at 08:44

## 2023-05-18 RX ADMIN — INSULIN LISPRO 7 UNITS: 100 INJECTION, SOLUTION INTRAVENOUS; SUBCUTANEOUS at 08:43

## 2023-05-18 RX ADMIN — TAMSULOSIN HYDROCHLORIDE 0.4 MG: 0.4 CAPSULE ORAL at 08:42

## 2023-05-18 NOTE — PLAN OF CARE
Goal Outcome Evaluation:  Plan of Care Reviewed With: patient        Progress: improving  Outcome Evaluation: Patient continues to be limited by weakness and decreased activity tolerance affecting functional mobility. She will benefit from continued skilled IP PT services to support return to independence. Recommend SNF at discharge.

## 2023-05-18 NOTE — NURSING NOTE
Reason for Wound, Ostomy and Continence (WOC) Nursing Consult: Bilateral gluteal pressure injury    Patient in bed, awake, alert and oriented.  RN at bedside to assist with turning    Identified moisture associated skin damage to patient's bilateral gluteals and sacrococcygeal region.  Skin is red/maroon/hyperpigmented but blanchable.  Periwound skin is intact, blanchable.  Wound bed is dry, no drainage noted.  Cleansed with pH balance foaming cleanser and barrier wipes and applied barrier cream.    Bilateral gluteal        Identified healing abrasion versus pressure injury versus diabetic ulcer to patient's right heel.  Wound is closed/resurfaced with red, yellowish-brown crusty wound bed.  No drainage noted.  Wound measures 0.5 cm in diameter.  Patient confirmed that wound was present on admission.  Periwound skin is intact blanchable as is other heel.      Right heel        Sensory Perception: 4-->no impairment  Moisture: 3-->occasionally moist  Activity: 3-->walks occasionally  Mobility: 3-->slightly limited  Nutrition: 3-->adequate  Friction and Shear: 3-->no apparent problem  Rahul Score: 19 (05/18/23 0845)       Please implement pressure injury prevention interventions per protocol for patients with a Rauhl scale of 18 or less including inflating waffle topper.    See orders for recommendations.    Thank you for consulting the WOC Nurse.  WOC Team will sign off. If alteration to skin integrity or change in wound bed presentation, please consult WOC team.    Please note that parts of this note were completed with a voice recognition program. Review of the dictation was done, however, occasionally words are mistranscribed.

## 2023-05-18 NOTE — THERAPY PROGRESS REPORT/RE-CERT
Patient Name: Chey Robertson  : 1935    MRN: [id number]                              Today's Date: 2023       Admit Date: 2023    Visit Dx:     ICD-10-CM ICD-9-CM   1. Urinary tract infection, acute  N39.0 599.0   2. Acute dehydration  E86.0 276.51     Patient Active Problem List   Diagnosis   • Benign familial tremor   • AMS (altered mental status)   • Thrombocytopenia (HCC)   • Pancytopenia (HCC)   • Shortness of breath   • Hypothyroidism (acquired)   • Acute kidney injury   • Splenomegaly   • Hepatomegaly   • Confusion   • B12 deficiency   • Abnormal intestinal absorption   • Iron deficiency anemia due to chronic blood loss   • Myelodysplasia (myelodysplastic syndrome)   • Weakness   • Personal history of pulmonary embolism   • Urinary tract infection, acute   • Chronic anticoagulation   • Essential hypertension   • Type 2 diabetes mellitus without complication, with long-term current use of insulin   • COVID-19 virus infection   • Atrial fibrillation with RVR   • Chest pain   • Elevated troponin     Past Medical History:   Diagnosis Date   • Anemia    • Arthritis    • Diabetes mellitus     checks sugar only when pt wants to    • Disease of thyroid gland    • History of transfusion     with knee surgery-  no reaction recalled.    • [place] (hard of hearing)     no hearing aids   • Hypertension    • Migraine without aura and without status migrainosus, not intractable 2016   • Pulmonary emboli     (after knee surgery)   • SOBOE (shortness of breath on exertion)    • Tremors of nervous system    • Vertigo    • Wears dentures     upper    • Wears glasses      Past Surgical History:   Procedure Laterality Date   • BLADDER SURGERY     • CATARACT EXTRACTION      bilat    • CHOLECYSTECTOMY     • COLONOSCOPY     • HYSTERECTOMY     • KYPHOPLASTY N/A 2021    Procedure: KYPHOPLASTY T11, T12 AND L4;  Surgeon: Matty Hearn MD;  [facility];  Service: Orthopedic Spine;  Laterality: N/A;    • OOPHORECTOMY     • TONSILLECTOMY        General Information     Row Name 05/18/23 0839          Physical Therapy Time and Intention    Document Type progress note/recertification  -NS     Mode of Treatment individual therapy;physical therapy  -NS     Row Name 05/18/23 0839          General Information    Patient Profile Reviewed yes  -NS     Existing Precautions/Restrictions fall  -NS     Row Name 05/18/23 0839          Cognition    Orientation Status (Cognition) oriented x 3  -NS     Row Name 05/18/23 0839          Safety Issues, Functional Mobility    Safety Issues Affecting Function (Mobility) safety precaution awareness;safety precautions follow-through/compliance;sequencing abilities  -NS     Impairments Affecting Function (Mobility) balance;endurance/activity tolerance;strength  -NS           User Key  (r) = Recorded By, (t) = Taken By, (c) = Cosigned By    Initials Name Provider Type    NS Gianna Burger PT Physical Therapist               Mobility     Row Name 05/18/23 0839          Bed Mobility    Bed Mobility supine-sit  -NS     Supine-Sit Bee (Bed Mobility) minimum assist (75% patient effort);verbal cues  -NS     Assistive Device (Bed Mobility) bed rails;head of bed elevated  -NS     Comment, (Bed Mobility) Increased time and effort required  -NS     Row Name 05/18/23 0839          Sit-Stand Transfer    Sit-Stand Bee (Transfers) contact guard  -NS     Assistive Device (Sit-Stand Transfers) walker, front-wheeled  -NS     Row Name 05/18/23 0839          Gait/Stairs (Locomotion)    Bee Level (Gait) contact guard  -NS     Assistive Device (Gait) walker, front-wheeled  -NS     Distance in Feet (Gait) 75  -NS     Deviations/Abnormal Patterns (Gait) [place] decreased;gait speed decreased;stride length decreased  -NS     Bilateral Gait Deviations forward flexed posture;heel strike decreased  -NS     Comment, (Gait/Stairs) Patient ambulated at slow pace, demonstrating flexed posture  and step-through pattern. She requires cues for uprigth posture. Distance limited by fatigue and weakness.  -NS           User Key  (r) = Recorded By, (t) = Taken By, (c) = Cosigned By    Initials Name Provider Type    Gianna Cheek PT Physical Therapist               Obj/Interventions     Row Name 05/18/23 0839          Range of Motion Comprehensive    General Range of Motion bilateral lower extremity ROM WFL  -NS     [facility] Name 05/18/23 0839          Strength Comprehensive (MMT)    General Manual Muscle Testing (MMT) Assessment lower extremity strength deficits identified  -NS     Comment, General Manual Muscle Testing (MMT) Assessment BLEs: grossly 4-/5  -NS     Row Name 05/18/23 0839          Balance    Balance Assessment sitting static balance;sitting dynamic balance;standing static balance;standing dynamic balance  -NS     Static Sitting Balance standby assist  -NS     Dynamic Sitting Balance standby assist  -NS     Position, Sitting Balance unsupported;sitting edge of bed  -NS     Static Standing Balance contact guard  -NS     Dynamic Standing Balance contact guard  -NS     Position/Device Used, Standing Balance supported;walker, front-wheeled  -NS           User Key  (r) = Recorded By, (t) = Taken By, (c) = Cosigned By    Initials Name Provider Type    Gianna Cheek, GURPREET Physical Therapist               Goals/Plan     Row Name 05/18/23 0839          Bed Mobility Goal 1 (PT)    Activity/Assistive Device (Bed Mobility Goal 1, PT) sit to supine/supine to sit  -NS     [place] Level/Cues Needed (Bed Mobility Goal 1, PT) independent  -NS     Time Frame (Bed Mobility Goal 1, PT) 10 days  -NS     Progress/Outcomes (Bed Mobility Goal 1, PT) continuing progress toward goal  -NS     [facility] Name 05/18/23 0839          Transfer Goal 1 (PT)    Activity/Assistive Device (Transfer Goal 1, PT) sit-to-stand/stand-to-sit;bed-to-chair/chair-to-bed;walker, rolling  -NS     [place] Level/Cues Needed (Transfer Goal 1,  PT) modified independence  -NS     Time Frame (Transfer Goal 1, PT) 10 days  -NS     Progress/Outcome (Transfer Goal 1, PT) continuing progress toward goal  -NS     Row Name 05/18/23 0839          Gait Training Goal 1 (PT)    Activity/Assistive Device (Gait Training Goal 1, PT) gait (walking locomotion);walker, rolling  -NS     [place] Level (Gait Training Goal 1, PT) modified independence  -NS     Distance (Gait Training Goal 1, PT) 150  -NS     Time Frame (Gait Training Goal 1, PT) 10 days  -NS     Progress/Outcome (Gait Training Goal 1, PT) goal revised this date;continuing progress toward goal  -NS     Row Name 05/18/23 0839          Patient Education Goal (PT)    Activity (Patient Education Goal, PT) HEP  -NS     [place]/Cues/Accuracy (Memory Goal 2, PT) demonstrates adequately  -NS     Time Frame (Patient Education Goal, PT) 10 days  -NS     Progress/Outcome (Patient Education Goal, PT) continuing progress toward goal  -NS     Row Name 05/18/23 0839          Therapy Assessment/Plan (PT)    Planned Therapy Interventions (PT) balance training;bed mobility training;gait training;home exercise program;neuromuscular re-education;patient/family education;strengthening;transfer training  -NS           User Key  (r) = Recorded By, (t) = Taken By, (c) = Cosigned By    Initials Name Provider Type    Gianna Cheek, PT Physical Therapist               Clinical Impression     Row Name 05/18/23 0839          Pain    Pretreatment Pain Rating 2/10  -NS     Posttreatment Pain Rating 2/10  -NS     Pain Location generalized  -NS     Pre/Posttreatment Pain Comment RN present and aware of pt's generalized pain  -NS     Pain Intervention(s) Repositioned;Ambulation/increased activity  -NS     Row Name 05/18/23 0839          Plan of Care Review    Plan of Care Reviewed With patient  -NS     Progress improving  -NS     Outcome Evaluation Patient continues to be limited by weakness and decreased activity tolerance  affecting functional mobility. She will benefit from continued skilled IP PT services to support return to independence. Recommend SNF at discharge.  -NS     Row Name 05/18/23 0839          Vital Signs    Pre Systolic BP Rehab 130  -NS     Pre Treatment Diastolic BP 77  -NS     Post Systolic BP Rehab 166  -NS     Post Treatment Diastolic BP 92  -NS     Pretreatment Heart Rate (beats/min) 70  -NS     Posttreatment Heart Rate (beats/min) 71  -NS     Pre SpO2 (%) 93  -NS     O2 Delivery Pre Treatment room air  -NS     Post SpO2 (%) 98  -NS     O2 Delivery Post Treatment room air  -NS     Pre Patient Position Supine  -NS     Intra Patient Position Standing  -NS     Post Patient Position Sitting  -NS     Row Name 05/18/23 0839          Positioning and Restraints    Pre-Treatment Position in bed  -NS     Post Treatment Position chair  -NS     In Chair notified nsg;reclined;encouraged to call for assist;call light within reach;exit alarm on;legs elevated;waffle cushion  -NS           User Key  (r) = Recorded By, (t) = Taken By, (c) = Cosigned By    Initials Name Provider Type    Gianna Cheek, PT Physical Therapist               Outcome Measures     Row Name 05/18/23 0839          How much help from another person do you currently need...    Turning from your back to your side while in flat bed without using bedrails? 3  -NS     Moving from lying on back to sitting on the side of a flat bed without bedrails? 3  -NS     Moving to and from a bed to a chair (including a wheelchair)? 3  -NS     Standing up from a chair using your arms (e.g., wheelchair, bedside chair)? 3  -NS     Climbing 3-5 steps with a railing? 2  -NS     To walk in hospital room? 3  -NS     AM-PAC 6 Clicks Score (PT) 17  -NS     Highest level of mobility 5 --> Static standing  -NS     Row Name 05/18/23 0839          Functional Assessment    Outcome Measure Options AM-PAC 6 Clicks Basic Mobility (PT)  -NS           User Key  (r) = Recorded By, (t) =  Taken By, (c) = Cosigned By    Initials Name Provider Type    Gianna Cheek, PT Physical Therapist                             Physical Therapy Education     Title: PT OT SLP Therapies (In Progress)     Topic: Physical Therapy (Done)     Point: Mobility training (Done)     Learning Progress Summary           Patient Acceptance, E, VU,NR by NS at 5/18/2023 1009    Comment: benefits of OOB activity    Eager, E, VU,DU by SD at 5/13/2023 1634    Acceptance, E, VU,NR by ML at 5/10/2023 1508    Acceptance, E,TB, VU by BT at 5/9/2023 1542    Acceptance, E,D, VU,NR by MB at 5/8/2023 1441                   Point: Home exercise program (Done)     Learning Progress Summary           Patient Eager, E, VU,DU by SD at 5/13/2023 1634    Acceptance, E, VU,NR by ML at 5/10/2023 1508    Acceptance, E,TB, VU by BT at 5/9/2023 1542    Acceptance, E,D, VU,NR by MB at 5/8/2023 1441                   Point: Body mechanics (Done)     Learning Progress Summary           Patient Acceptance, E, VU,NR by NS at 5/18/2023 1009    Comment: benefits of OOB activity    Eager, E, VU,DU by SD at 5/13/2023 1634    Acceptance, E,TB, VU by BT at 5/9/2023 1542    Acceptance, E,D, VU,NR by MB at 5/8/2023 1441                   Point: Precautions (Done)     Learning Progress Summary           Patient Acceptance, E, VU,NR by NS at 5/18/2023 1009    Comment: benefits of OOB activity    Eager, E, VU,DU by SD at 5/13/2023 1634    Acceptance, E, VU,NR by ML at 5/10/2023 1508    Acceptance, E,TB, VU by BT at 5/9/2023 1542    Acceptance, E,D, VU,NR by MB at 5/8/2023 1441                               User Key     Initials Effective Dates Name Provider Type Discipline    SD 03/13/23 -  Eloise Dutton, PT Physical Therapist PT    MB 06/16/21 -  Brooke Marshall PT Physical Therapist PT    BT 12/30/20 -  Marly Cordero, RN Registered Nurse Nurse    NS 06/16/21 -  Gianna Burger, PT Physical Therapist PT     04/22/21 -  Marybeth Arias Physical Therapist  PT              PT Recommendation and Plan  Planned Therapy Interventions (PT): balance training, bed mobility training, gait training, home exercise program, neuromuscular re-education, patient/family education, strengthening, transfer training  Plan of Care Reviewed With: patient  Progress: improving  Outcome Evaluation: Patient continues to be limited by weakness and decreased activity tolerance affecting functional mobility. She will benefit from continued skilled IP PT services to support return to independence. Recommend SNF at discharge.     Time Calculation:    PT Charges     Row Name 05/18/23 0839             Time Calculation    Start Time 0839  -NS      PT Received On 05/18/23  -NS      PT Goal Re-Cert Due Date 05/28/23  -NS         Timed Charges    38342 - Gait Training Minutes  30  -NS         Total Minutes    Timed Charges Total Minutes 30  -NS       Total Minutes 30  -NS            User Key  (r) = Recorded By, (t) = Taken By, (c) = Cosigned By    Initials Name Provider Type    Gianna Cheek, PT Physical Therapist              Therapy Charges for Today     Code Description Service Date Service Provider Modifiers Qty    [id number] HC GAIT TRAINING EA 15 MIN 5/18/2023 Gianna Burger, PT GP 2          PT G-Codes  Outcome Measure Options: AM-PAC 6 Clicks Basic Mobility (PT)  AM-PAC 6 Clicks Score (PT): 17  AM-PAC 6 Clicks Score (OT): 21  PT Discharge Summary  Anticipated Discharge Disposition (PT): skilled nursing facility    Gianna Burger PT  5/18/2023

## 2023-05-18 NOTE — DISCHARGE PLACEMENT REQUEST
"Elisa Venegas (87 y.o. Female)     Diana Navarro, RN  [phone]      Date of Birth   1935    Social Security Number       Address   [address] [place], [place] [address]    Home Phone   [phone]    MRN   [id number]       Mu-ism   Sabianism    Marital Status                               Admission Date   23    Admission Type   Emergency    Admitting Provider   Diya Garcia DO    Attending Provider   Diya Garcia DO    Department, Room/Bed   [facility] 6B, N626/1       Discharge Date       Discharge Disposition       Discharge Destination                               Attending Provider: Diya Garcia DO    Allergies: Aspirin    Isolation: Contact Air   Infection: COVID (confirmed) (23)   Code Status: CPR    Ht: 160 cm (63\")   Wt: 56.2 kg (123 lb 12.8 oz)    Admission Cmt: None   Principal Problem: Urinary tract infection, acute [N39.0]                 Active Insurance as of 2023     Primary Coverage     Payor Plan Insurance Group Employer/Plan Group    HUMANA MEDICARE REPLACEMENT HUMANA MEDICARE REPLACEMENT [id number]     Payor Plan Address Payor Plan Phone Number Payor Plan Fax Number Effective Dates    [address] [address] [phone]  2023 - None Entered    [facility] [id number]       Subscriber Name Subscriber Birth Date Member ID       ELISA VENEGAS 1935 [id number]                 Emergency Contacts      (Rel.) Home Phone Work Phone Mobile Phone    Albin Venegas (Son) [phone] -- --    Chava Venegas (Son) -- -- [phone]               History & Physical      Maksim Freeman MD at 23 1946              [facility]  HISTORY AND PHYSICAL    Patient Name: Elisa Venegas  : 1935  MRN: [id number]  Primary Care Physician: Corby Marie MD  Date of admission: 2023    Subjective   Subjective     Chief Complaint:  Weakness, " nausea/vomiting    HPI:  Chey Robertson is a 87 y.o. female with a history of Myelodysplasia (w/ splenomegaly), chronic pancytopenia, HTN, T2DM, hx PE (on Warfarin), and GERD who presents to [facility] ED for generalized weakness and nausea/vomiting. Daughter at bedside reports patient has been vomiting for a little over a week.    Of note, she was recently admitted here on 3/28/23 for similar complaint and was found to have a UTI, culture positive for E. Coli at that time. She was treated with IV abx and was discharged on 3/31 in stable condition.  She reports her symptoms tonight feel somewhat similar to her recent admission for UTI.  She denies fever, chills, chest pain, shortness of breath, cough, or diarrhea.   She has a history of Myelodysplastic syndrome and is followed by Dr. Lyman.She also has a history of PE, and as previously on Warfarin, however this was changed to Eliquis very recently.         Review of Systems   Constitutional: Positive for appetite change and fatigue. Negative for chills, fever and unexpected weight change.   HENT: Negative for nosebleeds, sore throat and trouble swallowing.    Eyes: Negative for photophobia and visual disturbance.   Respiratory: Negative for cough, shortness of breath and wheezing.    Cardiovascular: Negative for chest pain, palpitations and leg swelling.   Gastrointestinal: Positive for nausea and vomiting. Negative for abdominal pain and diarrhea.   Genitourinary: Negative for dysuria and hematuria.   Musculoskeletal: Negative.    Skin: Negative.    Neurological: Positive for weakness (generalized). Negative for tremors, syncope and speech difficulty.   Psychiatric/Behavioral: Negative for confusion. The patient is not nervous/anxious.           Personal History     Past Medical History:   Diagnosis Date   • Anemia    • Arthritis    • Diabetes mellitus     checks sugar only when pt wants to    • Disease of thyroid gland    • History of transfusion     with knee  surgery-  no reaction recalled.    • [place] (hard of hearing)     no hearing aids   • Hypertension    • Migraine without aura and without status migrainosus, not intractable 12/30/2016   • Pulmonary emboli 2011    (after knee surgery)   • SOBOE (shortness of breath on exertion)    • Tremors of nervous system    • Vertigo    • Wears dentures     upper    • Wears glasses          Oncology Problem List:  Myelodysplasia (myelodysplastic syndrome) (03/14/2023; Status: Active)       Past Surgical History:   Procedure Laterality Date   • BLADDER SURGERY     • CATARACT EXTRACTION      bilat    • CHOLECYSTECTOMY     • COLONOSCOPY     • HYSTERECTOMY     • KYPHOPLASTY N/A 2/12/2021    Procedure: KYPHOPLASTY T11, T12 AND L4;  Surgeon: Matty Hearn MD;  Location: [facility];  Service: Orthopedic Spine;  Laterality: N/A;   • OOPHORECTOMY     • TONSILLECTOMY         Family History:  family history includes Breast cancer (age of onset: 84) in her sister; Heart attack in her father; Stroke in her mother.     Social History:  reports that she has never smoked. She has never used smokeless tobacco. She reports that she does not drink alcohol and does not use drugs.  Social History     Social History Narrative   • Not on file       Medications:  Diclofenac Sodium, HYDROcodone-acetaminophen, Insulin Syringe-Needle U-100, albuterol sulfate HFA, clotrimazole-betamethasone, furosemide, glimepiride, glucose blood, insulin NPH-insulin regular, levothyroxine, lisinopril, meclizine, metFORMIN, nystatin, omeprazole, ondansetron, potassium chloride, propranolol, tamsulosin, traMADol, vitamin D, and warfarin    Allergies   Allergen Reactions   • Aspirin Unknown - Low Severity     Mouth sores        Objective   Objective     Vital Signs:   Temp:  [98.8 °F (37.1 °C)] 98.8 °F (37.1 °C)  Heart Rate:  [58] 58  Resp:  [16] 16  BP: (177)/(70) 177/70    Physical Exam  Constitutional:       General: She is not in acute distress.     Appearance: Normal  appearance.      Comments: Elderly appearing female in no acute distress   HENT:      Head: Atraumatic.      Right Ear: External ear normal.      Left Ear: External ear normal.      Nose: Nose normal.   Eyes:      Extraocular Movements: Extraocular movements intact.      Conjunctiva/sclera: Conjunctivae normal.      Pupils: Pupils are equal, round, and reactive to light.   Cardiovascular:      Rate and Rhythm: Normal rate and regular rhythm.      Pulses: Normal pulses.      Heart sounds: Normal heart sounds. No murmur heard.  Pulmonary:      Effort: Pulmonary effort is normal. No respiratory distress.      Breath sounds: Normal breath sounds. No wheezing, rhonchi or rales.   Abdominal:      General: Bowel sounds are normal. There is no distension.      Tenderness: There is no abdominal tenderness. There is no guarding or rebound.   Musculoskeletal:         General: Normal range of motion.      Cervical back: No rigidity.      Right lower leg: No edema.      Left lower leg: No edema.   Skin:     General: Skin is warm and dry.      Coloration: Skin is not jaundiced.      Findings: No lesion or rash.   Neurological:      General: No focal deficit present.      Mental Status: She is alert and oriented to person, place, and time.   Psychiatric:         Attention and Perception: Attention normal.         Mood and Affect: Mood normal.         Behavior: Behavior normal.         Thought Content: Thought content normal.          Result Review:  I have personally reviewed the results from the time of this admission to 5/5/2023 19:48 EDT and agree with these findings:  [x]  Laboratory list / accordion  [x]  Microbiology  [x]  Radiology  [x]  EKG/Telemetry   []  Cardiology/Vascular   []  Pathology  [x]  Old records  []  Other:    LAB RESULTS:      Lab 05/05/23  1705   WBC 1.67*   HEMOGLOBIN 10.3*   HEMATOCRIT 33.3*   PLATELETS 72*   NEUTROS ABS 1.14*   IMMATURE GRANS (ABS) 0.01   LYMPHS ABS 0.29*   MONOS ABS 0.23   EOS ABS 0.00    MCV 90.7   LACTATE 4.4*   PROTIME 15.6*         Lab 05/05/23  1705   SODIUM 141   POTASSIUM 3.6   CHLORIDE 98   CO2 26.0   ANION GAP 17.0*   BUN 15   CREATININE 0.75   EGFR 77.6   GLUCOSE 194*   CALCIUM 9.1         Lab 05/05/23  1705   TOTAL PROTEIN 7.3   ALBUMIN 4.5   GLOBULIN 2.8   ALT (SGPT) 8   AST (SGOT) 16   BILIRUBIN 0.8   ALK PHOS 75   LIPASE 42         Lab 05/05/23  1857 05/05/23  1705   HSTROP T 12* 15*   PROTIME  --  15.6*   INR  --  1.23*             Lab 05/05/23  1709   ABO TYPING O   RH TYPING Negative   ANTIBODY SCREEN Positive         Brief Urine Lab Results  (Last result in the past 365 days)      Color   Clarity   Blood   Leuk Est   Nitrite   Protein   CREAT   Urine HCG        05/05/23 1752 Yellow   Clear   Negative   Negative   Positive   Negative               Microbiology Results (last 10 days)     Procedure Component Value - Date/Time    COVID PRE-OP / PRE-PROCEDURE SCREENING ORDER (NO ISOLATION) - Swab, Nasopharynx [[id number]]  (Normal) Collected: 05/05/23 1704    Lab Status: Final result Specimen: Swab from Nasopharynx Updated: 05/05/23 1747    Narrative:      The following orders were created for panel order COVID PRE-OP / PRE-PROCEDURE SCREENING ORDER (NO ISOLATION) - Swab, Nasopharynx.  Procedure                               Abnormality         Status                     ---------                               -----------         ------                     COVID-19 and FLU A/B PCR...[[id number]]  Normal              Final result                 Please view results for these tests on the individual orders.    COVID-19 and FLU A/B PCR - Swab, Nasopharynx [[id number]]  (Normal) Collected: 05/05/23 1704    Lab Status: Final result Specimen: Swab from Nasopharynx Updated: 05/05/23 1747     COVID19 Not Detected     Influenza A PCR Not Detected     Influenza B PCR Not Detected    Narrative:      Fact sheet for providers: https://www.fda.gov/media/572140/download    Fact sheet for patients:  https://www.fda.gov/media/844663/download    Test performed by [facility].          CT Abdomen Pelvis Without Contrast    Result Date: 5/5/2023  CT ABDOMEN PELVIS WO CONTRAST Date of Exam: 5/5/2023 7:08 PM EDT Indication: nausea/vomiting. Comparison: 3/28/2023 Technique: Axial CT images were obtained of the abdomen and pelvis without the administration of contrast. Reconstructed coronal and sagittal images were also obtained. Automated exposure control and iterative construction methods were used. Findings: Lower Chest: Scarring in the lung bases Organs: Prominent splenomegaly measuring 17 cm in length, similar to prior. Scarring of the upper and lower poles of the spleen. Inferomedial displacement of the left kidney by the enlarged spleen. Kidneys, adrenal glands, liver have an unremarkable noncontrast appearance. Pancreas is atrophic. Gallbladder is surgically absent Gastrointestinal: No acute abnormality. No intestinal distention or evident wall thickening. Colonic diverticula with no associated inflammation. Pelvis: No abnormal fluid collection. Uterus surgically absent. Bubble of gas in the urinary bladder with no bladder wall thickening, likely related to catheterization Peritoneum/Retroperitoneum: No ascites or pneumoperitoneum. Normal caliber aorta Bones/Soft Tissues: No acute bony abnormality. Stable T11, T12, and L4 compression fractures status post vertebroplasty     Impression: 1. No acute process demonstrated 2. Stable splenomegaly 3. Colonic diverticulosis Electronically Signed: Jorje Staples  5/5/2023 7:31 PM EDT  Workstation ID: [id number]    XR Chest 1 View    Result Date: 5/5/2023  XR CHEST 1 VW Date of Exam: 5/5/2023 6:16 PM EDT Indication: nausea/vomiting Comparison: Chest x-ray 2/23/2021 Findings: Mildly elevated right diaphragm. Ectatic thoracic aorta. Scarring right lower lobe. Left lung is clear. No pneumothorax or pleural effusion. No focal pulmonary parenchymal opacity. Osteopenia.     Impression:  Impression: No acute cardiopulmonary abnormality. Electronically Signed: Cristela Mckeon  5/5/2023 6:58 PM EDT  Workstation ID: [id number]          Assessment & Plan   Assessment & Plan       Urinary tract infection, acute    Type 2 diabetes mellitus without complication, without long-term current use of insulin    Pancytopenia (HCC)    Personal history of pulmonary embolism    Chronic anticoagulation    Essential hypertension    Hypothyroidism (acquired)      Chey Robertson is a 87 y.o. female with a history of Myelodysplasia (w/ splenomegaly), chronic pancytopenia, HTN, T2DM, hx PE (on Warfarin), and GERD who presents to [facility] ED for generalized weakness and nausea/vomiting.    Of note, she was recently admitted here on 3/28/23 for similar complaint and was found to have a UTI, culture positive for E. Coli at that time.    Acute UTI  Dehydration  N/V  -UA suspicious for UTI. Will send for culture  -Urine culture from recent admission was positive for E. Coli  -Received Vanco + Zosyn in the ED. Will change to Rocephin for now.  -Blood cultures pending  -IV fluids  -Zofran for nausea    History of PE  On Chronic Anticoagulation  -Was previously on Warfarin, however was reportedly changed to Eliquis very recently. Will continue.     Myelodysplastic syndrome  Pancytopenia  -Appears to be chronically pancytopenic. Is actually somewhat improved when compared to results from recent admission.   -Followed by Dr. Lyman, consider consult during admission.     T2DM  -Blood glucose 192.  -Last A1C on 3/29/23 was 6.00. Too soon to repeat.   -Fingerstick achs. Will cover with SSI for now. Will reassess in the am and then decide when to resume basal insulin given poor PO intake from nausea/vomiting.     HTN  -Hold home Lasix for now given dehydration  -Continue home Lisinopril and propranolol    Hypothyroidism  -Continue home synthroid  -Check TSH, T4    GERD  -PPI        DVT prophylaxis:  Eliquis    CODE STATUS:  Full Code        Expected Discharge TBD    This note has been completed as part of a split-shared workflow.     Signature: Electronically signed by Chad Martins PA-C, 05/05/23, 8:26 PM EDT        Attending   Admission Attestation       I have performed an independent face-to-face diagnostic evaluation including performing an independent physical examination as documented here.  The documented plan of care above was reviewed and developed with the advanced practice clinician (APC).      Brief Summary Statement:   Chey Robertson is a 87 y.o. female with history of MDS, PE, DMII, hypothyroid and HTN presents with weakness and intermittent nausea and vomiting.  Denies fever or chills. No diarrhea, last BM yesterday.  Mild epigastric discomfort    Remainder of detailed HPI is as noted by APC and has been reviewed and/or edited by me for completeness.    Attending Physical Exam:  Temp:  [97.6 °F (36.4 °C)-98.8 °F (37.1 °C)] 97.6 °F (36.4 °C)  Heart Rate:  [52-59] 59  Resp:  [15-16] 15  BP: (137-177)/(57-70) 147/57  Flow (L/min):  [2] 2    Constitutional - no acute distress, nontoxic, in bed, sitting up and eating a turkey sandwich  HEENT-NCAT, mucous membranes moist  CV-RRR  Resp-CTAB  Abd-soft, nontender, nondistended, normoactive bowel sounds  Ext-No lower extremity cyanosis, clubbing or edema bilaterally  Neuro-alert, speech clear, moves all extremities   Psych-normal affect   Skin- No rash on exposed UE or LE bilaterally      Brief Assessment/Plan :    Nausea and vomiting  - no current nausea, eating without difficulty  - some epigastric discomfort, consider gastrititis  - PPI BID  - zofran PRN    UTI vs bacteriuria  - given complaints of nausea and vomiting, will give 3 days rocephin     MDS    H/o PE  - family says patient now on eliquis rather than coumadin, however they are unsure of dosing -- family will check med bottle at home and call back with current dose.    See detailed assessment and plan developed with  APC which I have reviewed and/or edited for completeness.            Maksim Freeman MD  05/05/23                  Electronically signed by Maksim Freeman MD at 05/05/23 2303         Current Facility-Administered Medications   Medication Dose Route Frequency Provider Last Rate Last Admin   • acetaminophen (TYLENOL) tablet 650 mg  650 mg Oral Q4H PRN Chad Martins PA-C   650 mg at 05/14/23 0944   • amiodarone (PACERONE) tablet 200 mg  200 mg Oral Q12H Jones Chand MD   200 mg at 05/18/23 0842   • amiodarone 360 mg in 200 mL D5W infusion  0.5 mg/min Intravenous Continuous Lona May PA 16.67 mL/hr at 05/18/23 0205 0.5 mg/min at 05/18/23 0205   • apixaban (ELIQUIS) tablet 5 mg  5 mg Oral Q12H Teetee Cesar MD   5 mg at 05/18/23 0842   • benzonatate (TESSALON) capsule 200 mg  200 mg Oral TID PRN Teetee Cesar MD   200 mg at 05/16/23 2043   • Calcium Replacement - Follow Nurse / BPA Driven Protocol   Does not apply PRN Raissa Saenz APRN       • dextrose (D50W) (25 g/50 mL) IV injection 25 g  25 g Intravenous Q15 Min PRN Chad Martins PA-C       • dextrose (GLUTOSE) oral gel 15 g  15 g Oral Q15 Min PRN Chad Martins PA-C       • Diclofenac Sodium (VOLTAREN) 1 % gel 2 g  2 g Topical 4x Daily Kimber March APRN   2 g at 05/18/23 1240   • glucagon (GLUCAGEN) injection 1 mg  1 mg Intramuscular Q15 Min PRN Chad Martins PA-C       • guaifenesin (ROBITUSSIN) 100 MG/5ML liquid 200 mg  200 mg Oral Q4H PRN Teetee Cesar MD   200 mg at 05/16/23 0618   • hydrOXYzine (ATARAX) tablet 25 mg  25 mg Oral TID PRN Raissa Saenz APRN   25 mg at 05/18/23 0225   • insulin detemir (LEVEMIR) injection 10 Units  10 Units Subcutaneous Nightly Teetee Cesar MD   10 Units at 05/17/23 2049   • Insulin Lispro (humaLOG) injection 0-9 Units  0-9 Units Subcutaneous TID AC Chad Martins PA-C   2 Units at 05/18/23 1237   • Insulin Lispro  (humaLOG) injection 7 Units  7 Units Subcutaneous TID With Meals Meena Younger DO   7 Units at 05/18/23 1237   • lactobacillus acidophilus (RISAQUAD) capsule 1 capsule  1 capsule Oral Daily Teetee Cesar MD   1 capsule at 05/18/23 0842   • levothyroxine (SYNTHROID, LEVOTHROID) tablet 100 mcg  100 mcg Oral Q AM Chad Martins PA-C   100 mcg at 05/18/23 0526   • Magnesium Standard Dose Replacement - Follow Nurse / BPA Driven Protocol   Does not apply PRN Raissa Saenz APRN       • melatonin tablet 5 mg  5 mg Oral Nightly PRN Chad Martins PA-C   5 mg at 05/17/23 2057   • metoprolol tartrate (LOPRESSOR) tablet 50 mg  50 mg Oral Q8H Lona May PA   50 mg at 05/18/23 1321   • ondansetron (ZOFRAN) tablet 4 mg  4 mg Oral Q6H PRN Chad Martins PA-C        Or   • ondansetron (ZOFRAN) injection 4 mg  4 mg Intravenous Q6H PRN Chad Martins PA-C       • phenol (CHLORASEPTIC) 1.4 % liquid 1 spray  1 spray Mouth/Throat Q2H PRN Chad Martins PA-C       • Phosphorus Replacement - Follow Nurse / BPA Driven Protocol   Does not apply PRN Raissa Saenz APRN       • Potassium Replacement - Follow Nurse / BPA Driven Protocol   Does not apply PRN Raissa Saenz APRN       • povidone-iodine 10 % swab 1 each  1 application Topical Daily Diya Garcia,        • sodium chloride 0.9 % flush 10 mL  10 mL Intravenous Q12H Chad Martins PA-C   10 mL at 05/18/23 0845   • sodium chloride 0.9 % flush 10 mL  10 mL Intravenous PRN Chad Martins PA-C       • sodium chloride 0.9 % infusion 40 mL  40 mL Intravenous PRN Chad Martins PA-C       • tamsulosin (FLOMAX) 24 hr capsule 0.4 mg  0.4 mg Oral Daily Chad Martins PA-C   0.4 mg at 05/18/23 0842        Physician Progress Notes (most recent note)      Jones Chand MD at 05/18/23 0805           [facility] - Progress Note    Chey BERRY  "Ailyn  1935  N626/1    05/18/23, 08:42 EDT      Chief Complaint: Following for AFib    Subjective: Weak but no complaints of chest pain or dyspnea      Went into rapid Afib yesterday, IV Cardizem started.  We increased beta blocker.  Rates improved, remains on Cardizem gtt.  Still in AFib      Review of Systems:  Pertinent positives are listed above and in physical exam.  All others have been reviewed and are negative.    apixaban, 5 mg, Oral, Q12H  Diclofenac Sodium, 2 g, Topical, 4x Daily  insulin detemir, 10 Units, Subcutaneous, Nightly  insulin lispro, 0-9 Units, Subcutaneous, TID AC  Insulin Lispro, 7 Units, Subcutaneous, TID With Meals  lactobacillus acidophilus, 1 capsule, Oral, Daily  levothyroxine, 100 mcg, Oral, Q AM  metoprolol tartrate, 50 mg, Oral, Q8H  sodium chloride, 10 mL, Intravenous, Q12H  tamsulosin, 0.4 mg, Oral, Daily        Objective:  Vitals:   height is 160 cm (63\") and weight is 56.2 kg (123 lb 12.8 oz). Her oral temperature is 96.7 °F (35.9 °C). Her blood pressure is 121/70 and her pulse is 59. Her respiration is 18 and oxygen saturation is 91%.       Intake/Output Summary (Last 24 hours) at 5/18/2023 0805  Last data filed at 5/17/2023 2356  Gross per 24 hour   Intake --   Output 1450 ml   Net -1450 ml       Physical Exam:  Gen:  Appears well nourished, sitting up in bed.  Cardio:  Appears to be in AFib by monitor, IV Cardizem hanging  Resp:  Breathing equal, non labored             Results from last 7 days   Lab Units 05/18/23  0505   WBC 10*3/mm3 3.67   HEMOGLOBIN g/dL 9.4*   HEMATOCRIT % 30.3*   PLATELETS 10*3/mm3 156     Results from last 7 days   Lab Units 05/18/23  0505   SODIUM mmol/L 138   POTASSIUM mmol/L 4.6   CHLORIDE mmol/L 100   CO2 mmol/L 30.0*   BUN mg/dL 29*   CREATININE mg/dL 0.72   CALCIUM mg/dL 8.7   BILIRUBIN mg/dL 0.3   ALK PHOS U/L 65   ALT (SGPT) U/L 6   AST (SGOT) U/L 8   GLUCOSE mg/dL 176*                       Tele:  Atrial " Fibrillation    Assessment:  -87-year-old  female admitted with 3 current/persistent E. coli UTI  -Atrial fibrillation with RVR, new onset.  Associated chest pain and elevated troponin, likely demand ischemia.  -COVID-19 infection  -Myelodysplastic syndrome with worsening pancytopenia  -History of PE.  Previously on warfarin now on Eliquis which can be restarted as platelets get above 40K  -  DMII  -  HTN  -  Hypothyroidism  -  GERD    A-fib rate with improved rate control on IV amiodarone        Plan: We will discontinue IV amiodarone drip and start oral amiodarone      Jones Chand MD                        Electronically signed by Jones Chand MD at 23 0809          Physical Therapy Notes (most recent note)      Gianna Burger, PT at 2339  Version 1 of          Patient Name: Chey Robertson  : 1935    MRN: [id number]                              Today's Date: 2023       Admit Date: 2023    Visit Dx:     ICD-10-CM ICD-9-CM   1. Urinary tract infection, acute  N39.0 599.0   2. Acute dehydration  E86.0 276.51     Patient Active Problem List   Diagnosis   • Benign familial tremor   • AMS (altered mental status)   • Thrombocytopenia (HCC)   • Pancytopenia (HCC)   • Shortness of breath   • Hypothyroidism (acquired)   • Acute kidney injury   • Splenomegaly   • Hepatomegaly   • Confusion   • B12 deficiency   • Abnormal intestinal absorption   • Iron deficiency anemia due to chronic blood loss   • Myelodysplasia (myelodysplastic syndrome)   • Weakness   • Personal history of pulmonary embolism   • Urinary tract infection, acute   • Chronic anticoagulation   • Essential hypertension   • Type 2 diabetes mellitus without complication, with long-term current use of insulin   • COVID-19 virus infection   • Atrial fibrillation with RVR   • Chest pain   • Elevated troponin     Past Medical History:   Diagnosis Date   • Anemia    • Arthritis    • Diabetes mellitus     checks  sugar only when pt wants to    • Disease of thyroid gland    • History of transfusion     with knee surgery-  no reaction recalled.    • [place] (hard of hearing)     no hearing aids   • Hypertension    • Migraine without aura and without status migrainosus, not intractable 12/30/2016   • Pulmonary emboli 2011    (after knee surgery)   • SOBOE (shortness of breath on exertion)    • Tremors of nervous system    • Vertigo    • Wears dentures     upper    • Wears glasses      Past Surgical History:   Procedure Laterality Date   • BLADDER SURGERY     • CATARACT EXTRACTION      bilat    • CHOLECYSTECTOMY     • COLONOSCOPY     • HYSTERECTOMY     • KYPHOPLASTY N/A 2/12/2021    Procedure: KYPHOPLASTY T11, T12 AND L4;  Surgeon: Matty Hearn MD;  Location: [facility];  Service: Orthopedic Spine;  Laterality: N/A;   • OOPHORECTOMY     • TONSILLECTOMY        General Information     Row Name 05/18/23 0839          Physical Therapy Time and Intention    Document Type progress note/recertification  -NS     Mode of Treatment individual therapy;physical therapy  -NS     Row Name 05/18/23 0839          General Information    Patient Profile Reviewed yes  -NS     Existing Precautions/Restrictions fall  -NS     Row Name 05/18/23 0839          Cognition    Orientation Status (Cognition) oriented x 3  -NS     Row Name 05/18/23 0839          Safety Issues, Functional Mobility    Safety Issues Affecting Function (Mobility) safety precaution awareness;safety precautions follow-through/compliance;sequencing abilities  -NS     Impairments Affecting Function (Mobility) balance;endurance/activity tolerance;strength  -NS           User Key  (r) = Recorded By, (t) = Taken By, (c) = Cosigned By    Initials Name Provider Type    NS Gianna Burger PT Physical Therapist               Mobility     Row Name 05/18/23 0839          Bed Mobility    Bed Mobility supine-sit  -NS     Supine-Sit [place] (Bed Mobility) minimum assist (75% patient  effort);verbal cues  -NS     Assistive Device (Bed Mobility) bed rails;head of bed elevated  -NS     Comment, (Bed Mobility) Increased time and effort required  -NS     Row Name 05/18/23 0839          Sit-Stand Transfer    Sit-Stand [place] (Transfers) contact guard  -NS     Assistive Device (Sit-Stand Transfers) walker, front-wheeled  -NS     Row Name 05/18/23 0839          Gait/Stairs (Locomotion)    [place] Level (Gait) contact guard  -NS     Assistive Device (Gait) walker, front-wheeled  -NS     Distance in Feet (Gait) 75  -NS     Deviations/Abnormal Patterns (Gait) [place] decreased;gait speed decreased;stride length decreased  -NS     Bilateral Gait Deviations forward flexed posture;heel strike decreased  -NS     Comment, (Gait/Stairs) Patient ambulated at slow pace, demonstrating flexed posture and step-through pattern. She requires cues for uprigth posture. Distance limited by fatigue and weakness.  -NS           User Key  (r) = Recorded By, (t) = Taken By, (c) = Cosigned By    Initials Name Provider Type    NS Gianna Burger PT Physical Therapist               Obj/Interventions     Row Name 05/18/23 0839          Range of Motion Comprehensive    General Range of Motion bilateral lower extremity ROM WFL  -NS     Row Name 05/18/23 0839          Strength Comprehensive (MMT)    General Manual Muscle Testing (MMT) Assessment lower extremity strength deficits identified  -NS     Comment, General Manual Muscle Testing (MMT) Assessment BLEs: grossly 4-/5  -NS     Row Name 05/18/23 0839          Balance    Balance Assessment sitting static balance;sitting dynamic balance;standing static balance;standing dynamic balance  -NS     Static Sitting Balance standby assist  -NS     Dynamic Sitting Balance standby assist  -NS     Position, Sitting Balance unsupported;sitting edge of bed  -NS     Static Standing Balance contact guard  -NS     Dynamic Standing Balance contact guard  -NS     Position/Device Used,  Standing Balance supported;walker, front-wheeled  -NS           User Key  (r) = Recorded By, (t) = Taken By, (c) = Cosigned By    Initials Name Provider Type    Gianna Cheek, PT Physical Therapist               Goals/Plan     Row Name 05/18/23 0839          Bed Mobility Goal 1 (PT)    Activity/Assistive Device (Bed Mobility Goal 1, PT) sit to supine/supine to sit  -NS     [place] Level/Cues Needed (Bed Mobility Goal 1, PT) independent  -NS     Time Frame (Bed Mobility Goal 1, PT) 10 days  -NS     Progress/Outcomes (Bed Mobility Goal 1, PT) continuing progress toward goal  -NS     Row Name 05/18/23 0839          Transfer Goal 1 (PT)    Activity/Assistive Device (Transfer Goal 1, PT) sit-to-stand/stand-to-sit;bed-to-chair/chair-to-bed;walker, rolling  -NS     [place] Level/Cues Needed (Transfer Goal 1, PT) modified independence  -NS     Time Frame (Transfer Goal 1, PT) 10 days  -NS     Progress/Outcome (Transfer Goal 1, PT) continuing progress toward goal  -NS     Row Name 05/18/23 0839          Gait Training Goal 1 (PT)    Activity/Assistive Device (Gait Training Goal 1, PT) gait (walking locomotion);walker, rolling  -NS     [place] Level (Gait Training Goal 1, PT) modified independence  -NS     Distance (Gait Training Goal 1, PT) 150  -NS     Time Frame (Gait Training Goal 1, PT) 10 days  -NS     Progress/Outcome (Gait Training Goal 1, PT) goal revised this date;continuing progress toward goal  -NS     Row Name 05/18/23 0839          Patient Education Goal (PT)    Activity (Patient Education Goal, PT) HEP  -NS     [place]/Cues/Accuracy (Memory Goal 2, PT) demonstrates adequately  -NS     Time Frame (Patient Education Goal, PT) 10 days  -NS     Progress/Outcome (Patient Education Goal, PT) continuing progress toward goal  -NS     Row Name 05/18/23 0839          Therapy Assessment/Plan (PT)    Planned Therapy Interventions (PT) balance training;bed mobility training;gait training;home exercise  program;neuromuscular re-education;patient/family education;strengthening;transfer training  -NS           User Key  (r) = Recorded By, (t) = Taken By, (c) = Cosigned By    Initials Name Provider Type    Gianna Cheek PT Physical Therapist               Clinical Impression     Row Name 05/18/23 0839          Pain    Pretreatment Pain Rating 2/10  -NS     Posttreatment Pain Rating 2/10  -NS     Pain Location generalized  -NS     Pre/Posttreatment Pain Comment RN present and aware of pt's generalized pain  -NS     Pain Intervention(s) Repositioned;Ambulation/increased activity  -NS     Row Name 05/18/23 0839          Plan of Care Review    Plan of Care Reviewed With patient  -NS     Progress improving  -NS     Outcome Evaluation Patient continues to be limited by weakness and decreased activity tolerance affecting functional mobility. She will benefit from continued skilled IP PT services to support return to independence. Recommend SNF at discharge.  -NS     Row Name 05/18/23 0839          Vital Signs    Pre Systolic BP Rehab 130  -NS     Pre Treatment Diastolic BP 77  -NS     Post Systolic BP Rehab 166  -NS     Post Treatment Diastolic BP 92  -NS     Pretreatment Heart Rate (beats/min) 70  -NS     Posttreatment Heart Rate (beats/min) 71  -NS     Pre SpO2 (%) 93  -NS     O2 Delivery Pre Treatment room air  -NS     Post SpO2 (%) 98  -NS     O2 Delivery Post Treatment room air  -NS     Pre Patient Position Supine  -NS     Intra Patient Position Standing  -NS     Post Patient Position Sitting  -NS     Row Name 05/18/23 0839          Positioning and Restraints    Pre-Treatment Position in bed  -NS     Post Treatment Position chair  -NS     In Chair notified nsg;reclined;encouraged to call for assist;call light within reach;exit alarm on;legs elevated;waffle cushion  -NS           User Key  (r) = Recorded By, (t) = Taken By, (c) = Cosigned By    Initials Name Provider Type    Gianna Cheek PT Physical Therapist                Outcome Measures     Row Name 05/18/23 0839          How much help from another person do you currently need...    Turning from your back to your side while in flat bed without using bedrails? 3  -NS     Moving from lying on back to sitting on the side of a flat bed without bedrails? 3  -NS     Moving to and from a bed to a chair (including a wheelchair)? 3  -NS     Standing up from a chair using your arms (e.g., wheelchair, bedside chair)? 3  -NS     Climbing 3-5 steps with a railing? 2  -NS     To walk in hospital room? 3  -NS     AM-PAC 6 Clicks Score (PT) 17  -NS     Highest level of mobility 5 --> Static standing  -NS     Row Name 05/18/23 0839          Functional Assessment    Outcome Measure Options AM-PAC 6 Clicks Basic Mobility (PT)  -NS           User Key  (r) = Recorded By, (t) = Taken By, (c) = Cosigned By    Initials Name Provider Type    Gianna Cheek PT Physical Therapist                             Physical Therapy Education     Title: PT OT SLP Therapies (In Progress)     Topic: Physical Therapy (Done)     Point: Mobility training (Done)     Learning Progress Summary           Patient Acceptance, E, VU,NR by NS at 5/18/2023 1009    Comment: benefits of OOB activity    Eager, E, VU,DU by SD at 5/13/2023 1634    Acceptance, E, VU,NR by ML at 5/10/2023 1508    Acceptance, E,TB, VU by BT at 5/9/2023 1542    Acceptance, E,D, VU,NR by MB at 5/8/2023 1441                   Point: Home exercise program (Done)     Learning Progress Summary           Patient Eager, E, VU,DU by SD at 5/13/2023 1634    Acceptance, E, VU,NR by ML at 5/10/2023 1508    Acceptance, E,TB, VU by BT at 5/9/2023 1542    Acceptance, E,D, VU,NR by MB at 5/8/2023 1441                   Point: Body mechanics (Done)     Learning Progress Summary           Patient Acceptance, E, VU,NR by NS at 5/18/2023 1009    Comment: benefits of OOB activity    Eager, E, VU,DU by SD at 5/13/2023 1634    Acceptance, E,TB, VU by BT at  5/9/2023 1542    Acceptance, E,D, VU,NR by MB at 5/8/2023 1441                   Point: Precautions (Done)     Learning Progress Summary           Patient Acceptance, E, VU,NR by NS at 5/18/2023 1009    Comment: benefits of OOB activity    Eager, E, VU,DU by SD at 5/13/2023 1634    Acceptance, E, VU,NR by ML at 5/10/2023 1508    Acceptance, E,TB, VU by BT at 5/9/2023 1542    Acceptance, E,D, VU,NR by MB at 5/8/2023 1441                               User Key     Initials Effective Dates Name Provider Type Discipline    SD 03/13/23 -  Eloise Dutton, PT Physical Therapist PT    MB 06/16/21 -  Brooke Marshall, PT Physical Therapist PT    BT 12/30/20 -  Marly Cordero, RN Registered Nurse Nurse    NS 06/16/21 -  Gianna Burger, PT Physical Therapist PT    ML 04/22/21 -  Marybeth Arias Physical Therapist PT              PT Recommendation and Plan  Planned Therapy Interventions (PT): balance training, bed mobility training, gait training, home exercise program, neuromuscular re-education, patient/family education, strengthening, transfer training  Plan of Care Reviewed With: patient  Progress: improving  Outcome Evaluation: Patient continues to be limited by weakness and decreased activity tolerance affecting functional mobility. She will benefit from continued skilled IP PT services to support return to independence. Recommend SNF at discharge.     Time Calculation:    PT Charges     Row Name 05/18/23 0839             Time Calculation    Start Time 0839  -NS      PT Received On 05/18/23  -NS      PT Goal Re-Cert Due Date 05/28/23  -NS         Timed Charges    07211 - Gait Training Minutes  30  -NS         Total Minutes    Timed Charges Total Minutes 30  -NS       Total Minutes 30  -NS            User Key  (r) = Recorded By, (t) = Taken By, (c) = Cosigned By    Initials Name Provider Type    Gianna Cheek, PT Physical Therapist              Therapy Charges for Today     Code Description Service Date Service  Provider Modifiers Qty    [id number]  GAIT TRAINING EA 15 MIN 2023 Gianna Burger, PT GP 2          PT G-Codes  Outcome Measure Options: AM-PAC 6 Clicks Basic Mobility (PT)  AM-PAC 6 Clicks Score (PT): 17  AM-PAC 6 Clicks Score (OT): 21  PT Discharge Summary  Anticipated Discharge Disposition (PT): skilled nursing facility    Gianna Burger, PT  2023      Electronically signed by Gianna Burger, PT at 23 1010          Occupational Therapy Notes (most recent note)      Ginger Renee, OT at 23 1158          Patient Name: Chey Robertson  : 1935    MRN: [id number]                              Today's Date: 2023       Admit Date: 2023    Visit Dx:     ICD-10-CM ICD-9-CM   1. Urinary tract infection, acute  N39.0 599.0   2. Acute dehydration  E86.0 276.51     Patient Active Problem List   Diagnosis   • Benign familial tremor   • AMS (altered mental status)   • Thrombocytopenia (HCC)   • Pancytopenia (HCC)   • Shortness of breath   • Hypothyroidism (acquired)   • Acute kidney injury   • Splenomegaly   • Hepatomegaly   • Confusion   • B12 deficiency   • Abnormal intestinal absorption   • Iron deficiency anemia due to chronic blood loss   • Myelodysplasia (myelodysplastic syndrome)   • Weakness   • Personal history of pulmonary embolism   • Urinary tract infection, acute   • Chronic anticoagulation   • Essential hypertension   • Type 2 diabetes mellitus without complication, with long-term current use of insulin   • COVID-19 virus infection   • Atrial fibrillation with RVR   • Chest pain   • Elevated troponin     Past Medical History:   Diagnosis Date   • Anemia    • Arthritis    • Diabetes mellitus     checks sugar only when pt wants to    • Disease of thyroid gland    • History of transfusion     with knee surgery-  no reaction recalled.    • [place] (hard of hearing)     no hearing aids   • Hypertension    • Migraine without aura and without status migrainosus, not intractable  12/30/2016   • Pulmonary emboli 2011    (after knee surgery)   • SOBOE (shortness of breath on exertion)    • Tremors of nervous system    • Vertigo    • Wears dentures     upper    • Wears glasses      Past Surgical History:   Procedure Laterality Date   • BLADDER SURGERY     • CATARACT EXTRACTION      bilat    • CHOLECYSTECTOMY     • COLONOSCOPY     • HYSTERECTOMY     • KYPHOPLASTY N/A 2/12/2021    Procedure: KYPHOPLASTY T11, T12 AND L4;  Surgeon: Matty Hearn MD;  Location: [facility];  Service: Orthopedic Spine;  Laterality: N/A;   • OOPHORECTOMY     • TONSILLECTOMY        General Information     Row Name 05/18/23 1119          OT Time and Intention    Document Type therapy note (daily note)  -SD     Mode of Treatment occupational therapy  -SD     Row Name 05/18/23 1119          General Information    Patient Profile Reviewed yes  -SD     Existing Precautions/Restrictions fall;other (see comments)  Covid  -SD     Barriers to Rehab medically complex;previous functional deficit  -SD     Row Name 05/18/23 1119          Cognition    Orientation Status (Cognition) oriented x 3  -SD     Row Name 05/18/23 1119          Safety Issues, Functional Mobility    Safety Issues Affecting Function (Mobility) safety precaution awareness;safety precautions follow-through/compliance  -SD     Impairments Affecting Function (Mobility) balance;endurance/activity tolerance;strength;shortness of breath;pain;postural/trunk control  -SD           User Key  (r) = Recorded By, (t) = Taken By, (c) = Cosigned By    Initials Name Provider Type    SD Ginger Renee, OT Occupational Therapist                 Mobility/ADL's     Row Name 05/18/23 1151          Bed Mobility    Bed Mobility rolling right;scooting/bridging;sit-supine  -SD     Rolling Right [place] (Bed Mobility) supervision  -SD     Scooting/Bridging [place] (Bed Mobility) moderate assist (50% patient effort);2 person assist  -SD     Sit-Supine [place] (Bed  Mobility) contact guard  -SD     Assistive Device (Bed Mobility) bed rails;head of bed elevated;draw sheet  -SD     Row Name 05/18/23 1151          Transfers    Transfers toilet transfer;sit-stand transfer;stand-sit transfer  -SD     Row Name 05/18/23 1151          Sit-Stand Transfer    Sit-Stand [place] (Transfers) contact guard  -SD     Assistive Device (Sit-Stand Transfers) walker, front-wheeled  -SD     Row Name 05/18/23 1151          Stand-Sit Transfer    Stand-Sit [place] (Transfers) contact guard  -SD     Assistive Device (Stand-Sit Transfers) walker, front-wheeled  -SD     Row Name 05/18/23 1151          Toilet Transfer    Type (Toilet Transfer) sit-stand;stand-sit  -SD     [place] Level (Toilet Transfer) contact guard;verbal cues  -SD     Assistive Device (Toilet Transfer) commode;grab bars/safety frame;walker, front-wheeled  -SD     Row Name 05/18/23 1151          Functional Mobility    Functional Mobility- Ind. Level contact guard assist;verbal cues required  -SD     Functional Mobility- Device walker, front-wheeled  -SD     Functional Mobility-Distance (Feet) 30  -SD     Functional Mobility- Safety Issues balance decreased during turns  -SD     Row Name 05/18/23 1151          Activities of Daily Living    BADL Assessment/Intervention toileting;grooming  -SD     Row Name 05/18/23 1151          Grooming Assessment/Training    [place] Level (Grooming) hair care, combing/brushing;minimum assist (75% patient effort)  -SD     Position (Grooming) supported sitting  -SD     Row Name 05/18/23 1151          Toileting Assessment/Training    [place] Level (Toileting) adjust/manage clothing;perform perineal hygiene;maximum assist (25% patient effort)  -SD     Assistive Devices (Toileting) commode;grab bar/safety frame  -SD     Position (Toileting) supported standing  -SD     Comment, (Toileting) pt. fatigued from sitting UIC & IV disconnected with blood actively exiting IV site, requiring  added assist from nsg.  -SD     Row Name 05/18/23 1151          Chair-Bed Transfer    Chair-Bed [place] (Transfers) contact guard  -SD     Assistive Device (Chair-Bed Transfers) walker, front-wheeled  -SD           User Key  (r) = Recorded By, (t) = Taken By, (c) = Cosigned By    Initials Name Provider Type    Ginger Moctezuma OT Occupational Therapist               Obj/Interventions     Row Name 05/18/23 1154          Balance    Static Sitting Balance supervision  -SD     Dynamic Sitting Balance contact guard  -SD     Position, Sitting Balance unsupported;sitting edge of bed  -SD     Sit to Stand [place] contact guard  -SD     Static Standing Balance contact guard  -SD     Dynamic Standing Balance contact guard  -SD     Position/Device Used, Standing Balance supported;walker, front-wheeled  -SD           User Key  (r) = Recorded By, (t) = Taken By, (c) = Cosigned By    Initials Name Provider Type    Ginger Moctezuma, JERRI Occupational Therapist               Goals/Plan    No documentation.                Clinical Impression     Row Name 05/18/23 1155          Pain Assessment    Pretreatment Pain Rating 3/10  -SD     Posttreatment Pain Rating 2/10  -SD     Pain Location generalized  -SD     Pain Intervention(s) Ambulation/increased activity;Elevated;Emotional support;Nursing Notified;Repositioned  -SD     Row Name 05/18/23 1157          Plan of Care Review    Plan of Care Reviewed With patient  -SD     Progress no change  -SD     Outcome Evaluation Pt. continues to be limited by decreased occupational endurance & decreased strength. Pt. required added assist during this session due to issue with IV. Pt. continues to warrant OT skilled services to return to OF. Recommend SNF @d/c.  -SD     Row Name 05/18/23 1154          Therapy Assessment/Plan (OT)    Rehab Potential (OT) good, to achieve stated therapy goals  -SD     Criteria for Skilled Therapeutic Interventions Met (OT) yes;meets  criteria;skilled treatment is necessary  -SD     Therapy Frequency (OT) daily  -SD     Row Name 05/18/23 1155          Therapy Plan Review/Discharge Plan (OT)    Anticipated Discharge Disposition (OT) skilled nursing facility  -SD     Row Name 05/18/23 1155          Vital Signs    Pre Systolic BP Rehab 142  -SD     Pre Treatment Diastolic BP 72  -SD     Posttreatment Heart Rate (beats/min) 56  -SD     O2 Delivery Pre Treatment room air  -SD     O2 Delivery Intra Treatment room air  -SD     O2 Delivery Post Treatment room air  -SD     Pre Patient Position Sitting  -SD     Intra Patient Position Standing  -SD     Post Patient Position Supine  -SD     Row Name 05/18/23 1155          Positioning and Restraints    Pre-Treatment Position sitting in chair/recliner  -SD     Post Treatment Position bed  -SD     In Bed notified nsg;fowlers;call light within reach;encouraged to call for assist;exit alarm on;with nsg  -SD           User Key  (r) = Recorded By, (t) = Taken By, (c) = Cosigned By    Initials Name Provider Type    Ginger Moctezuma, OT Occupational Therapist               Outcome Measures     Row Name 05/18/23 1119          How much help from another is currently needed...    Putting on and taking off regular lower body clothing? 3  -SD     Bathing (including washing, rinsing, and drying) 3  -SD     Toileting (which includes using toilet bed pan or urinal) 2  -SD     Putting on and taking off regular upper body clothing 3  -SD     Taking care of personal grooming (such as brushing teeth) 3  -SD     Eating meals 4  -SD     AM-PAC 6 Clicks Score (OT) 18  -SD     Row Name 05/18/23 0839          How much help from another person do you currently need...    Turning from your back to your side while in flat bed without using bedrails? 3  -NS     Moving from lying on back to sitting on the side of a flat bed without bedrails? 3  -NS     Moving to and from a bed to a chair (including a wheelchair)? 3  -NS      Standing up from a chair using your arms (e.g., wheelchair, bedside chair)? 3  -NS     Climbing 3-5 steps with a railing? 2  -NS     To walk in hospital room? 3  -NS     AM-PAC 6 Clicks Score (PT) 17  -NS     Highest level of mobility 5 --> Static standing  -NS     Row Name 05/18/23 1119 05/18/23 0839       Functional Assessment    Outcome Measure Options AM-PAC 6 Clicks Daily Activity (OT)  -SD AM-PAC 6 Clicks Basic Mobility (PT)  -NS          User Key  (r) = Recorded By, (t) = Taken By, (c) = Cosigned By    Initials Name Provider Type    Ginger Moctezuma, OT Occupational Therapist    Gianna Cheek PT Physical Therapist                Occupational Therapy Education     Title: PT OT SLP Therapies (In Progress)     Topic: Occupational Therapy (In Progress)     Point: ADL training (In Progress)     Description:   Instruct learner(s) on proper safety adaptation and remediation techniques during self care or transfers.   Instruct in proper use of assistive devices.              Learning Progress Summary           Patient Acceptance, E, NR by  at 5/17/2023 0956    Acceptance, E, VU,DU by CHELSEA at 5/11/2023 1602    Comment: OT POC; BADL's    Acceptance, E,TB, VU by BT at 5/9/2023 1542    Acceptance, E, VU,NR by JB1 at 5/8/2023 1202    Comment: reason for consult, waiting for wx and gait belt before up for safety, UE TE/PLB                   Point: Home exercise program (Done)     Description:   Instruct learner(s) on appropriate technique for monitoring, assisting and/or progressing therapeutic exercises/activities.              Learning Progress Summary           Patient Acceptance, E,TB, VU by BT at 5/9/2023 1542                   Point: Precautions (In Progress)     Description:   Instruct learner(s) on prescribed precautions during self-care and functional transfers.              Learning Progress Summary           Patient Acceptance, E, NR by  at 5/17/2023 0956    Acceptance, E, VU,DU by CHELSEA at 5/11/2023  1602    Comment: OT POC; BADL's    Acceptance, E,TB, VU by  at 5/9/2023 1542    Acceptance, E, VU,NR by [facility] at 5/8/2023 1202    Comment: reason for consult, waiting for wx and gait belt before up for safety, UE TE/PLB                   Point: Body mechanics (In Progress)     Description:   Instruct learner(s) on proper positioning and spine alignment during self-care, functional mobility activities and/or exercises.              Learning Progress Summary           Patient Acceptance, E, NR by  at 5/17/2023 0956    Acceptance, E,TB, VU by  at 5/9/2023 1542                               User Key     Initials Effective Dates Name Provider Type Discipline    [facility] 02/03/23 -  Kanika Bowers, OT Occupational Therapist OT     12/30/20 -  Marly Cordero RN Registered Nurse Nurse     06/16/21 -  Nicole Etienne OT Occupational Therapist OT     10/14/22 -  Yue Moses OT Occupational Therapist OT              OT Recommendation and Plan  Therapy Frequency (OT): daily  Plan of Care Review  Plan of Care Reviewed With: patient  Progress: no change  Outcome Evaluation: Pt. continues to be limited by decreased occupational endurance & decreased strength. Pt. required added assist during this session due to issue with IV. Pt. continues to warrant OT skilled services to return to PLOF. Recommend SNF @d/c.     Time Calculation:    Time Calculation- OT     Row Name 05/18/23 1157 05/18/23 0839          Time Calculation- OT    OT Received On 05/18/23  -SD --     OT Goal Re-Cert Due Date 05/27/23  -SD --        Timed Charges    36368 - Gait Training Minutes  -- 30  -NS     97989 - OT Therapeutic Activity Minutes 11  -SD --     72851 - OT Self Care/Mgmt Minutes 20  -SD --        Total Minutes    Timed Charges Total Minutes 31  -SD 30  -NS      Total Minutes 31  -SD 30  -NS           User Key  (r) = Recorded By, (t) = Taken By, (c) = Cosigned By    Initials Name Provider Type    SD Ginger Renee, OT  Occupational Therapist    Gianna Cheek, PT Physical Therapist              Therapy Charges for Today     Code Description Service Date Service Provider Modifiers Qty    28512111372 HC OT THERAPEUTIC ACT EA 15 MIN 5/18/2023 Gigner Renee OT GO 1    93735378334 HC OT SELF CARE/MGMT/TRAIN EA 15 MIN 5/18/2023 Ginger Renee OT GO 1               Ginger Renee OT  5/18/2023    Electronically signed by Ginger Renee OT at 05/18/23 1158       Speech Language Pathology Notes (most recent note)    No notes exist for this encounter.

## 2023-05-18 NOTE — PLAN OF CARE
Problem: Adult Inpatient Plan of Care  Goal: Plan of Care Review  Recent Flowsheet Documentation  Taken 5/18/2023 1155 by Ginger Renee OT  Progress: no change  Plan of Care Reviewed With: patient  Outcome Evaluation: Pt. continues to be limited by decreased occupational endurance & decreased strength. Pt. required added assist during this session due to issue with IV. Pt. continues to warrant OT skilled services to return to PLOF. Recommend SNF @d/c.   Goal Outcome Evaluation:  Plan of Care Reviewed With: patient        Progress: no change  Outcome Evaluation: Pt. continues to be limited by decreased occupational endurance & decreased strength. Pt. required added assist during this session due to issue with IV. Pt. continues to warrant OT skilled services to return to PLOF. Recommend SNF @d/c.

## 2023-05-18 NOTE — THERAPY TREATMENT NOTE
Patient Name: Chey Robertson  : 1935    MRN: [id number]                              Today's Date: 2023       Admit Date: 2023    Visit Dx:     ICD-10-CM ICD-9-CM   1. Urinary tract infection, acute  N39.0 599.0   2. Acute dehydration  E86.0 276.51     Patient Active Problem List   Diagnosis   • Benign familial tremor   • AMS (altered mental status)   • Thrombocytopenia (HCC)   • Pancytopenia (HCC)   • Shortness of breath   • Hypothyroidism (acquired)   • Acute kidney injury   • Splenomegaly   • Hepatomegaly   • Confusion   • B12 deficiency   • Abnormal intestinal absorption   • Iron deficiency anemia due to chronic blood loss   • Myelodysplasia (myelodysplastic syndrome)   • Weakness   • Personal history of pulmonary embolism   • Urinary tract infection, acute   • Chronic anticoagulation   • Essential hypertension   • Type 2 diabetes mellitus without complication, with long-term current use of insulin   • COVID-19 virus infection   • Atrial fibrillation with RVR   • Chest pain   • Elevated troponin     Past Medical History:   Diagnosis Date   • Anemia    • Arthritis    • Diabetes mellitus     checks sugar only when pt wants to    • Disease of thyroid gland    • History of transfusion     with knee surgery-  no reaction recalled.    • [place] (hard of hearing)     no hearing aids   • Hypertension    • Migraine without aura and without status migrainosus, not intractable 2016   • Pulmonary emboli     (after knee surgery)   • SOBOE (shortness of breath on exertion)    • Tremors of nervous system    • Vertigo    • Wears dentures     upper    • Wears glasses      Past Surgical History:   Procedure Laterality Date   • BLADDER SURGERY     • CATARACT EXTRACTION      bilat    • CHOLECYSTECTOMY     • COLONOSCOPY     • HYSTERECTOMY     • KYPHOPLASTY N/A 2021    Procedure: KYPHOPLASTY T11, T12 AND L4;  Surgeon: Matty Hearn MD;  Location: [facility];  Service: Orthopedic Spine;  Laterality: N/A;    • OOPHORECTOMY     • TONSILLECTOMY        General Information     Row Name 05/18/23 1119          OT Time and Intention    Document Type therapy note (daily note)  -SD     Mode of Treatment occupational therapy  -SD     Row Name 05/18/23 1119          General Information    Patient Profile Reviewed yes  -SD     Existing Precautions/Restrictions fall;other (see comments)  Covid  -SD     Barriers to Rehab medically complex;previous functional deficit  -SD     Row Name 05/18/23 1119          Cognition    Orientation Status (Cognition) oriented x 3  -SD     Row Name 05/18/23 1119          Safety Issues, Functional Mobility    Safety Issues Affecting Function (Mobility) safety precaution awareness;safety precautions follow-through/compliance  -SD     Impairments Affecting Function (Mobility) balance;endurance/activity tolerance;strength;shortness of breath;pain;postural/trunk control  -SD           User Key  (r) = Recorded By, (t) = Taken By, (c) = Cosigned By    Initials Name Provider Type    SD Ginger Renee, OT Occupational Therapist                 Mobility/ADL's     Row Name 05/18/23 1151          Bed Mobility    Bed Mobility rolling right;scooting/bridging;sit-supine  -SD     Rolling Right [place] (Bed Mobility) supervision  -SD     Scooting/Bridging [place] (Bed Mobility) moderate assist (50% patient effort);2 person assist  -SD     Sit-Supine [place] (Bed Mobility) contact guard  -SD     Assistive Device (Bed Mobility) bed rails;head of bed elevated;draw sheet  -SD     Row Name 05/18/23 1151          Transfers    Transfers toilet transfer;sit-stand transfer;stand-sit transfer  -SD     Row Name 05/18/23 1151          Sit-Stand Transfer    Sit-Stand [place] (Transfers) contact guard  -SD     Assistive Device (Sit-Stand Transfers) walker, front-wheeled  -SD     Row Name 05/18/23 1151          Stand-Sit Transfer    Stand-Sit [place] (Transfers) contact guard  -SD     Assistive  Device (Stand-Sit Transfers) walker, front-wheeled  -SD     Row Name 05/18/23 1151          Toilet Transfer    Type (Toilet Transfer) sit-stand;stand-sit  -SD     Stow Level (Toilet Transfer) contact guard;verbal cues  -SD     Assistive Device (Toilet Transfer) commode;grab bars/safety frame;walker, front-wheeled  -SD     Row Name 05/18/23 1151          Functional Mobility    Functional Mobility- Ind. Level contact guard assist;verbal cues required  -SD     Functional Mobility- Device walker, front-wheeled  -SD     Functional Mobility-Distance (Feet) 30  -SD     Functional Mobility- Safety Issues balance decreased during turns  -SD     Row Name 05/18/23 1151          Activities of Daily Living    BADL Assessment/Intervention toileting;grooming  -SD     Row Name 05/18/23 1151          Grooming Assessment/Training    Stow Level (Grooming) hair care, combing/brushing;minimum assist (75% patient effort)  -SD     Position (Grooming) supported sitting  -SD     Row Name 05/18/23 1151          Toileting Assessment/Training    Stow Level (Toileting) adjust/manage clothing;perform perineal hygiene;maximum assist (25% patient effort)  -SD     Assistive Devices (Toileting) commode;grab bar/safety frame  -SD     Position (Toileting) supported standing  -SD     Comment, (Toileting) pt. fatigued from sitting UIC & IV disconnected with blood actively exiting IV site, requiring added assist from nsg.  -SD     Row Name 05/18/23 1151          Chair-Bed Transfer    Chair-Bed Stow (Transfers) contact guard  -SD     Assistive Device (Chair-Bed Transfers) walker, front-wheeled  -SD           User Key  (r) = Recorded By, (t) = Taken By, (c) = Cosigned By    Initials Name Provider Type    Ginger Moctezuma OT Occupational Therapist               Obj/Interventions     Row Name 05/18/23 1154          Balance    Static Sitting Balance supervision  -SD     Dynamic Sitting Balance contact guard  -SD      Position, Sitting Balance unsupported;sitting edge of bed  -SD     Sit to Stand Dynamic Balance contact guard  -SD     Static Standing Balance contact guard  -SD     Dynamic Standing Balance contact guard  -SD     Position/Device Used, Standing Balance supported;walker, front-wheeled  -SD           User Key  (r) = Recorded By, (t) = Taken By, (c) = Cosigned By    Initials Name Provider Type    SD Ginger Renee, OT Occupational Therapist               Goals/Plan    No documentation.                Clinical Impression     Row Name 05/18/23 1151          Pain Assessment    Pretreatment Pain Rating 3/10  -SD     Posttreatment Pain Rating 2/10  -SD     Pain Location generalized  -SD     Pain Intervention(s) Ambulation/increased activity;Elevated;Emotional support;Nursing Notified;Repositioned  -SD     Row Name 05/18/23 1152          Plan of Care Review    Plan of Care Reviewed With patient  -SD     Progress no change  -SD     Outcome Evaluation Pt. continues to be limited by decreased occupational endurance & decreased strength. Pt. required added assist during this session due to issue with IV. Pt. continues to warrant OT skilled services to return to [facility]. Recommend SNF @d/c.  -SD     Row Name 05/18/23 1157          Therapy Assessment/Plan (OT)    Rehab Potential (OT) good, to achieve stated therapy goals  -SD     Criteria for Skilled Therapeutic Interventions Met (OT) yes;meets criteria;skilled treatment is necessary  -SD     Therapy Frequency (OT) daily  -SD     Row Name 05/18/23 4149          Therapy Plan Review/Discharge Plan (OT)    Anticipated Discharge Disposition (OT) skilled nursing facility  -SD     Row Name 05/18/23 1159          Vital Signs    Pre Systolic BP Rehab 142  -SD     Pre Treatment Diastolic BP 72  -SD     Posttreatment Heart Rate (beats/min) 56  -SD     O2 Delivery Pre Treatment room air  -SD     O2 Delivery Intra Treatment room air  -SD     O2 Delivery Post Treatment room air  -SD      Pre Patient Position Sitting  -SD     Intra Patient Position Standing  -SD     Post Patient Position Supine  -SD     Row Name 05/18/23 1155          Positioning and Restraints    Pre-Treatment Position sitting in chair/recliner  -SD     Post Treatment Position bed  -SD     In Bed notified nsg;fowlers;call light within reach;encouraged to call for assist;exit alarm on;with nsg  -SD           User Key  (r) = Recorded By, (t) = Taken By, (c) = Cosigned By    Initials Name Provider Type    Ginger Moctezuma, OT Occupational Therapist               Outcome Measures     Row Name 05/18/23 1119          How much help from another is currently needed...    Putting on and taking off regular lower body clothing? 3  -SD     Bathing (including washing, rinsing, and drying) 3  -SD     Toileting (which includes using toilet bed pan or urinal) 2  -SD     Putting on and taking off regular upper body clothing 3  -SD     Taking care of personal grooming (such as brushing teeth) 3  -SD     Eating meals 4  -SD     AM-PAC 6 Clicks Score (OT) 18  -SD     Row Name 05/18/23 0839          How much help from another person do you currently need...    Turning from your back to your side while in flat bed without using bedrails? 3  -NS     Moving from lying on back to sitting on the side of a flat bed without bedrails? 3  -NS     Moving to and from a bed to a chair (including a wheelchair)? 3  -NS     Standing up from a chair using your arms (e.g., wheelchair, bedside chair)? 3  -NS     Climbing 3-5 steps with a railing? 2  -NS     To walk in hospital room? 3  -NS     AM-PAC 6 Clicks Score (PT) 17  -NS     Highest level of mobility 5 --> Static standing  -NS     Row Name 05/18/23 1119 05/18/23 0839       Functional Assessment    Outcome Measure Options AM-PAC 6 Clicks Daily Activity (OT)  -SD AM-PAC 6 Clicks Basic Mobility (PT)  -NS          User Key  (r) = Recorded By, (t) = Taken By, (c) = Cosigned By    Initials Name Provider Type     Ginger Moctezuma, OT Occupational Therapist    Gianna Cheek, PT Physical Therapist                Occupational Therapy Education     Title: PT OT SLP Therapies (In Progress)     Topic: Occupational Therapy (In Progress)     Point: ADL training (In Progress)     Description:   Instruct learner(s) on proper safety adaptation and remediation techniques during self care or transfers.   Instruct in proper use of assistive devices.              Learning Progress Summary           Patient Acceptance, E, NR by MC at 5/17/2023 0956    Acceptance, E, VU,DU by CHELSEA at 5/11/2023 1602    Comment: OT POC; BADL's    Acceptance, E,TB, VU by BT at 5/9/2023 1542    Acceptance, E, VU,NR by JB1 at 5/8/2023 1202    Comment: reason for consult, waiting for wx and gait belt before up for safety, UE TE/PLB                   Point: Home exercise program (Done)     Description:   Instruct learner(s) on appropriate technique for monitoring, assisting and/or progressing therapeutic exercises/activities.              Learning Progress Summary           Patient Acceptance, E,TB, VU by BT at 5/9/2023 1542                   Point: Precautions (In Progress)     Description:   Instruct learner(s) on prescribed precautions during self-care and functional transfers.              Learning Progress Summary           Patient Acceptance, E, NR by  at 5/17/2023 0956    Acceptance, E, VU,DU by CHELSEA at 5/11/2023 1602    Comment: OT POC; BADL's    Acceptance, E,TB, VU by BT at 5/9/2023 1542    Acceptance, E, VU,NR by JB1 at 5/8/2023 1202    Comment: reason for consult, waiting for wx and gait belt before up for safety, UE TE/PLB                   Point: Body mechanics (In Progress)     Description:   Instruct learner(s) on proper positioning and spine alignment during self-care, functional mobility activities and/or exercises.              Learning Progress Summary           Patient Acceptance, E, NR by  at 5/17/2023 0956    Acceptance, E,TB, VU  by BT at 5/9/2023 1542                               User Key     Initials Effective Dates Name Provider Type Discipline    [facility] 02/03/23 -  Kanika Bowers, OT Occupational Therapist OT     12/30/20 -  Marly Cordero, RN Registered Nurse Nurse     06/16/21 -  Nicole Etienne OT Occupational Therapist OT     10/14/22 -  Yue Moses OT Occupational Therapist OT              OT Recommendation and Plan  Therapy Frequency (OT): daily  Plan of Care Review  Plan of Care Reviewed With: patient  Progress: no change  Outcome Evaluation: Pt. continues to be limited by decreased occupational endurance & decreased strength. Pt. required added assist during this session due to issue with IV. Pt. continues to warrant OT skilled services to return to PLOF. Recommend SNF @d/c.     Time Calculation:    Time Calculation- OT     Row Name 05/18/23 1157 05/18/23 0839          Time Calculation- OT    OT Received On 05/18/23  -SD --     OT Goal Re-Cert Due Date 05/27/23  -SD --        Timed Charges    11155 - Gait Training Minutes  -- 30  -NS     45971 - OT Therapeutic Activity Minutes 11  -SD --     53783 - OT Self Care/Mgmt Minutes 20  -SD --        Total Minutes    Timed Charges Total Minutes 31  -SD 30  -NS      Total Minutes 31  -SD 30  -NS           User Key  (r) = Recorded By, (t) = Taken By, (c) = Cosigned By    Initials Name Provider Type    Ginger Moctezuma OT Occupational Therapist    Gianna Cheek, PT Physical Therapist              Therapy Charges for Today     Code Description Service Date Service Provider Modifiers Qty    12780538561 HC OT THERAPEUTIC ACT EA 15 MIN 5/18/2023 Ginger Renee OT GO 1    05613001759 HC OT SELF CARE/MGMT/TRAIN EA 15 MIN 5/18/2023 Ginger Renee OT GO 1               Ginger Renee OT  5/18/2023

## 2023-05-18 NOTE — PROGRESS NOTES
[facility] Medicine Services  PROGRESS NOTE    Patient Name: Chey Robertson  : 1935  MRN: [id number]    Date of Admission: 2023  Primary Care Physician: Corby Marie MD    Subjective   Subjective     CC:  COVID    HPI:  Sitting up in the chair but sleepy.  Got dose of hydroxyzine earlier this morning and suspect may be related to her lethargy    ROS:  Falls asleep, no overnight events per nursing    Objective   Objective     Vital Signs:   Temp:  [95.9 °F (35.5 °C)-98.2 °F (36.8 °C)] 96 °F (35.6 °C)  Heart Rate:  [] 73  Resp:  [18-20] 18  BP: (120-151)/() 132/89     Physical Exam:  Constitutional: No acute distress, sleeping in the chair during breakfast  HENT: NCAT, mucous membranes moist  Respiratory: Respiratory effort normal   Cardiovascular: RRR, no murmurs, rubs, or gallops  Gastrointestinal: Soft, nontender, nondistended  Musculoskeletal: No bilateral ankle edema  Psychiatric: Appropriate affect, cooperative  Neurologic: Difficult to assess due to lethargy  Skin: No rashes      Results Reviewed:  LAB RESULTS:      Lab 23  0505 23  0404 23  0550   WBC 3.67 3.18* 2.21*   HEMOGLOBIN 9.4* 9.5* 8.7*   HEMATOCRIT 30.3* 31.3* 28.2*   PLATELETS 156 136* 112*   NEUTROS ABS 3.04 2.70 1.37*   IMMATURE GRANS (ABS) 0.05 0.08* 0.10*   LYMPHS ABS 0.36* 0.20* 0.49*   MONOS ABS 0.22 0.20 0.25   EOS ABS 0.00 0.00 0.00   MCV 91.3 93.2 90.4         Lab 23  0505 23  0404 05/15/23  0402 23  1620 23  0550   SODIUM 138 135* 137  --  145   POTASSIUM 4.6 4.7 4.7 5.3* 3.5   CHLORIDE 100 97* 99  --  106   CO2 30.0* 27.0 30.0*  --  31.0*   ANION GAP 8.0 11.0 8.0  --  8.0   BUN 29* 32* 19  --  14   CREATININE 0.72 0.83 0.83  --  0.71   EGFR 81.5 68.8 68.8  --  82.9   GLUCOSE 176* 308* 222*  --  108*   CALCIUM 8.7 8.7 8.7  --  8.8   MAGNESIUM 1.7 2.0 1.6  --   --    PHOSPHORUS 4.3  --   --   --   --          Lab 23  0500  05/15/23  0402   TOTAL PROTEIN 5.3* 5.7*   ALBUMIN 3.1* 3.3*   GLOBULIN 2.2 2.4   ALT (SGPT) 6 7   AST (SGOT) 8 8   BILIRUBIN 0.3 0.6   ALK PHOS 65 68         Lab 05/14/23  1411 05/14/23  1105   HSTROP T 58* 72*                 Brief Urine Lab Results  (Last result in the past 365 days)      Color   Clarity   Blood   Leuk Est   Nitrite   Protein   CREAT   Urine HCG        05/05/23 1752 Yellow   Clear   Negative   Negative   Positive   Negative                 Microbiology Results Abnormal     Procedure Component Value - Date/Time    Blood Culture - Blood, Arm, Right [[id number]]  (Normal) Collected: 05/05/23 1850    Lab Status: Final result Specimen: Blood from Arm, Right Updated: 05/10/23 2015     Blood Culture No growth at 5 days    Blood Culture - Blood, Wrist, Right [[id number]]  (Normal) Collected: 05/05/23 1855    Lab Status: Final result Specimen: Blood from Wrist, Right Updated: 05/10/23 2015     Blood Culture No growth at 5 days    COVID PRE-OP / PRE-PROCEDURE SCREENING ORDER (NO ISOLATION) - Swab, Nasopharynx [[id number]]  (Normal) Collected: 05/05/23 1704    Lab Status: Final result Specimen: Swab from Nasopharynx Updated: 05/05/23 1747    Narrative:      The following orders were created for panel order COVID PRE-OP / PRE-PROCEDURE SCREENING ORDER (NO ISOLATION) - Swab, Nasopharynx.  Procedure                               Abnormality         Status                     ---------                               -----------         ------                     COVID-19 and FLU A/B PCR...[[id number]]  Normal              Final result                 Please view results for these tests on the individual orders.    COVID-19 and FLU A/B PCR - Swab, Nasopharynx [[id number]]  (Normal) Collected: 05/05/23 1704    Lab Status: Final result Specimen: Swab from Nasopharynx Updated: 05/05/23 1747     COVID19 Not Detected     Influenza A PCR Not Detected     Influenza B PCR Not Detected    Narrative:      Fact sheet for  providers: https://www.fda.gov/media/560323/download    Fact sheet for patients: https://www.fda.gov/media/313793/download    Test performed by PCR.          No radiology results from the last 24 hrs        Current medications:  Scheduled Meds:apixaban, 5 mg, Oral, Q12H  Diclofenac Sodium, 2 g, Topical, 4x Daily  insulin detemir, 10 Units, Subcutaneous, Nightly  insulin lispro, 0-9 Units, Subcutaneous, TID AC  Insulin Lispro, 7 Units, Subcutaneous, TID With Meals  lactobacillus acidophilus, 1 capsule, Oral, Daily  levothyroxine, 100 mcg, Oral, Q AM  metoprolol tartrate, 50 mg, Oral, Q8H  sodium chloride, 10 mL, Intravenous, Q12H  tamsulosin, 0.4 mg, Oral, Daily      Continuous Infusions:amiodarone, 0.5 mg/min, Last Rate: 0.5 mg/min (05/18/23 0205)      PRN Meds:.•  acetaminophen  •  benzonatate  •  Calcium Replacement - Follow Nurse / BPA Driven Protocol  •  dextrose  •  dextrose  •  glucagon (human recombinant)  •  guaifenesin  •  hydrOXYzine  •  Magnesium Standard Dose Replacement - Follow Nurse / BPA Driven Protocol  •  melatonin  •  ondansetron **OR** ondansetron  •  phenol  •  Phosphorus Replacement - Follow Nurse / BPA Driven Protocol  •  Potassium Replacement - Follow Nurse / BPA Driven Protocol  •  [COMPLETED] Insert Peripheral IV **AND** sodium chloride  •  sodium chloride  •  sodium chloride    Assessment & Plan   Assessment & Plan     Active Hospital Problems    Diagnosis  POA   • **Urinary tract infection, acute [N39.0]  Yes   • Atrial fibrillation with RVR [I48.91]  Unknown   • Chest pain [R07.9]  Unknown   • Elevated troponin [R77.8]  Unknown   • COVID-19 virus infection [U07.1]  Unknown   • Chronic anticoagulation [Z79.01]  Not Applicable   • Essential hypertension [I10]  Yes   • Type 2 diabetes mellitus without complication, with long-term current use of insulin [E11.9, Z79.4]  Not Applicable   • Personal history of pulmonary embolism [Z86.711]  Yes   • Hypothyroidism (acquired) [E03.9]  Yes   •  Pancytopenia (HCC) [D61.818]  Yes      Resolved Hospital Problems   No resolved problems to display.        Brief Hospital Course to date:  Chey Robertson is a 87 y.o. female with past medical history of myelodysplasia, chronic pancytopenia, essential hypertension, type 2 diabetes, history of PE (recently switched from warfarin to Eliquis per her cardiologist because of inability to achieve therapeutic INR), GERD who presented to the hospital with generalized weakness, nausea and vomiting found to have urinary tract infection. Since admission, pt found to have COVID19. She completed full course of remdesivir and continues on dexamethasone. 5/14 She had episode of chest pain with new A fib with RVR    This patient's problems and plans were partially entered by my partner and updated as appropriate by me 05/18/23.        Afib RVR, improved rate  Chest Pain  Elevated Troponin  • Patient developed A-fib with RVR early morning 5/16/2023.  Associated chest pain and mildly elevated troponin at 72  • She is known with intermittent episodes of A-fib during previous hospitalization for knee replacement years ago  • Continue metoprolol, eliquis  • Already on Eliquis  • Cardio evaluated     Acute urinary tract infection, POA  Dehydration volume depletion  Acute on chronic debility  • Urine culture growing pansensitive E. Coli  • Initially started on IV Rocephin and with her worsening neutropenia, she was switched to IV cefepime and finished full course of treatment  • ID followed the patient during this hospitalization  • Neutropenia and ANC improved  • PT/OT recommended rehab     COVID19 infection - on room air  History of PE  • Differential diagnosis 5/8/2023  • Continue airborne isolation precautions per hospital policy  • Status post full course of IV remdesivir and Decadron  • Continue Eliquis  • Rapid antigen test ordered, pending    Myelodysplastic syndrome  Pancytopenia, likely secondary to sepsis  Anemia, likely  anemia of chronic disease  • Chronic pancytopenia  • DIC panel checked and negative   • Neutropenic precautions, s/p 1 dose of Neupogen with improvement in ANC  • Follows with Dr. Lyman      Type 2 diabetes  • A1c from end of March 2023 is 6%  • Continue     Essential hypertension  • Continue to hold home Lasix for now given dehydration  • Continue home Lisinopril and propranolol     Hypothyroidism  • Continue home synthroid     GERD  PPI      Expected Discharge Location and Transportation: Rapid antigen test pending, case management will start pre-CERT for SNF  Expected Discharge  Expected Discharge Date: 5/19/2023; Expected Discharge Time:      DVT prophylaxis:  Medical and mechanical DVT prophylaxis orders are present.     AM-PAC 6 Clicks Score (PT): 20 (05/16/23 2000)    CODE STATUS:   Code Status and Medical Interventions:   Ordered at: 05/05/23 2031     Code Status (Patient has no pulse and is not breathing):    CPR (Attempt to Resuscitate)     Medical Interventions (Patient has pulse or is breathing):    Full Support       Diya Garcia, DO  05/18/23

## 2023-05-18 NOTE — PROGRESS NOTES
" [facility] - Progress Note    Chey Robertson  1935  N626/1    05/18/23, 08:42 EDT      Chief Complaint: Following for AFib    Subjective: Weak but no complaints of chest pain or dyspnea      Went into rapid Afib yesterday, IV Cardizem started.  We increased beta blocker.  Rates improved, remains on Cardizem gtt.  Still in AFib      Review of Systems:  Pertinent positives are listed above and in physical exam.  All others have been reviewed and are negative.    apixaban, 5 mg, Oral, Q12H  Diclofenac Sodium, 2 g, Topical, 4x Daily  insulin detemir, 10 Units, Subcutaneous, Nightly  insulin lispro, 0-9 Units, Subcutaneous, TID AC  Insulin Lispro, 7 Units, Subcutaneous, TID With Meals  lactobacillus acidophilus, 1 capsule, Oral, Daily  levothyroxine, 100 mcg, Oral, Q AM  metoprolol tartrate, 50 mg, Oral, Q8H  sodium chloride, 10 mL, Intravenous, Q12H  tamsulosin, 0.4 mg, Oral, Daily        Objective:  Vitals:   height is 160 cm (63\") and weight is 56.2 kg (123 lb 12.8 oz). Her oral temperature is 96.7 °F (35.9 °C). Her blood pressure is 121/70 and her pulse is 59. Her respiration is 18 and oxygen saturation is 91%.       Intake/Output Summary (Last 24 hours) at 5/18/2023 0805  Last data filed at 5/17/2023 2356  Gross per 24 hour   Intake --   Output 1450 ml   Net -1450 ml       Physical Exam:  Gen:  Appears well nourished, sitting up in bed.  Cardio:  Appears to be in AFib by monitor, IV Cardizem hanging  Resp:  Breathing equal, non labored             Results from last 7 days   Lab Units 05/18/23  0505   WBC 10*3/mm3 3.67   HEMOGLOBIN g/dL 9.4*   HEMATOCRIT % 30.3*   PLATELETS 10*3/mm3 156     Results from last 7 days   Lab Units 05/18/23  0505   SODIUM mmol/L 138   POTASSIUM mmol/L 4.6   CHLORIDE mmol/L 100   CO2 mmol/L 30.0*   BUN mg/dL 29*   CREATININE mg/dL 0.72   CALCIUM mg/dL 8.7   BILIRUBIN mg/dL 0.3   ALK PHOS U/L 65   ALT (SGPT) U/L 6   AST (SGOT) U/L 8   GLUCOSE mg/dL 176*               "         Tele:  Atrial Fibrillation    Assessment:  -87-year-old  female admitted with 3 current/persistent E. coli UTI  -Atrial fibrillation with RVR, new onset.  Associated chest pain and elevated troponin, likely demand ischemia.  -COVID-19 infection  -Myelodysplastic syndrome with worsening pancytopenia  -History of PE.  Previously on warfarin now on Eliquis which can be restarted as platelets get above 40K  -  DMII  -  HTN  -  Hypothyroidism  -  GERD    A-fib rate with improved rate control on IV amiodarone        Plan: We will discontinue IV amiodarone drip and start oral amiodarone      Jones Chand MD

## 2023-05-18 NOTE — PLAN OF CARE
Goal Outcome Evaluation:  Plan of Care Reviewed With: patient        Progress: no change  Outcome Evaluation: Patient alert and oriented x 4 at the start of the shift. Patient on Amiodarone gtt throughout the night. Blood return noted in iv throughout the shift. Patient confused to place at 0200, easily reoriented to placed. Patient repeatedly asking about POC and pending discharge. Patient anxious and prn atarax given. Patient BP stable throughout the shift.

## 2023-05-19 LAB
ALBUMIN SERPL-MCNC: 2.9 G/DL (ref 3.5–5.2)
ANION GAP SERPL CALCULATED.3IONS-SCNC: 7 MMOL/L (ref 5–15)
BASOPHILS # BLD AUTO: 0 10*3/MM3 (ref 0–0.2)
BASOPHILS NFR BLD AUTO: 0 % (ref 0–1.5)
BUN SERPL-MCNC: 28 MG/DL (ref 8–23)
BUN/CREAT SERPL: 35.9 (ref 7–25)
CALCIUM SPEC-SCNC: 9 MG/DL (ref 8.6–10.5)
CHLORIDE SERPL-SCNC: 101 MMOL/L (ref 98–107)
CO2 SERPL-SCNC: 30 MMOL/L (ref 22–29)
CREAT SERPL-MCNC: 0.78 MG/DL (ref 0.57–1)
DEPRECATED RDW RBC AUTO: 53.4 FL (ref 37–54)
EGFRCR SERPLBLD CKD-EPI 2021: 74.1 ML/MIN/1.73
EOSINOPHIL # BLD AUTO: 0 10*3/MM3 (ref 0–0.4)
EOSINOPHIL NFR BLD AUTO: 0 % (ref 0.3–6.2)
ERYTHROCYTE [DISTWIDTH] IN BLOOD BY AUTOMATED COUNT: 16.3 % (ref 12.3–15.4)
GLUCOSE BLDC GLUCOMTR-MCNC: 107 MG/DL (ref 70–130)
GLUCOSE BLDC GLUCOMTR-MCNC: 108 MG/DL (ref 70–130)
GLUCOSE BLDC GLUCOMTR-MCNC: 152 MG/DL (ref 70–130)
GLUCOSE BLDC GLUCOMTR-MCNC: 154 MG/DL (ref 70–130)
GLUCOSE SERPL-MCNC: 107 MG/DL (ref 65–99)
HCT VFR BLD AUTO: 31.4 % (ref 34–46.6)
HGB BLD-MCNC: 9.9 G/DL (ref 12–15.9)
IMM GRANULOCYTES # BLD AUTO: 0.04 10*3/MM3 (ref 0–0.05)
IMM GRANULOCYTES NFR BLD AUTO: 1.2 % (ref 0–0.5)
LYMPHOCYTES # BLD AUTO: 0.54 10*3/MM3 (ref 0.7–3.1)
LYMPHOCYTES NFR BLD AUTO: 15.9 % (ref 19.6–45.3)
MCH RBC QN AUTO: 28.5 PG (ref 26.6–33)
MCHC RBC AUTO-ENTMCNC: 31.5 G/DL (ref 31.5–35.7)
MCV RBC AUTO: 90.5 FL (ref 79–97)
MONOCYTES # BLD AUTO: 0.52 10*3/MM3 (ref 0.1–0.9)
MONOCYTES NFR BLD AUTO: 15.3 % (ref 5–12)
NEUTROPHILS NFR BLD AUTO: 2.29 10*3/MM3 (ref 1.7–7)
NEUTROPHILS NFR BLD AUTO: 67.6 % (ref 42.7–76)
NRBC BLD AUTO-RTO: 0 /100 WBC (ref 0–0.2)
PHOSPHATE SERPL-MCNC: 3.8 MG/DL (ref 2.5–4.5)
PLATELET # BLD AUTO: 150 10*3/MM3 (ref 140–450)
PMV BLD AUTO: 8.5 FL (ref 6–12)
POTASSIUM SERPL-SCNC: 4.6 MMOL/L (ref 3.5–5.2)
RBC # BLD AUTO: 3.47 10*6/MM3 (ref 3.77–5.28)
SODIUM SERPL-SCNC: 138 MMOL/L (ref 136–145)
WBC NRBC COR # BLD: 3.39 10*3/MM3 (ref 3.4–10.8)

## 2023-05-19 PROCEDURE — 63710000001 INSULIN DETEMIR PER 5 UNITS: Performed by: INTERNAL MEDICINE

## 2023-05-19 PROCEDURE — 63710000001 INSULIN LISPRO (HUMAN) PER 5 UNITS: Performed by: INTERNAL MEDICINE

## 2023-05-19 PROCEDURE — 85025 COMPLETE CBC W/AUTO DIFF WBC: CPT | Performed by: INTERNAL MEDICINE

## 2023-05-19 PROCEDURE — 80069 RENAL FUNCTION PANEL: CPT | Performed by: INTERNAL MEDICINE

## 2023-05-19 PROCEDURE — 82948 REAGENT STRIP/BLOOD GLUCOSE: CPT

## 2023-05-19 PROCEDURE — 99232 SBSQ HOSP IP/OBS MODERATE 35: CPT | Performed by: INTERNAL MEDICINE

## 2023-05-19 PROCEDURE — 63710000001 INSULIN LISPRO (HUMAN) PER 5 UNITS: Performed by: PHYSICIAN ASSISTANT

## 2023-05-19 RX ADMIN — POVIDONE-IODINE 1 EACH: 10 SOLUTION TOPICAL at 15:11

## 2023-05-19 RX ADMIN — AMIODARONE HYDROCHLORIDE 200 MG: 200 TABLET ORAL at 08:17

## 2023-05-19 RX ADMIN — TAMSULOSIN HYDROCHLORIDE 0.4 MG: 0.4 CAPSULE ORAL at 08:17

## 2023-05-19 RX ADMIN — INSULIN LISPRO 7 UNITS: 100 INJECTION, SOLUTION INTRAVENOUS; SUBCUTANEOUS at 12:32

## 2023-05-19 RX ADMIN — INSULIN LISPRO 2 UNITS: 100 INJECTION, SOLUTION INTRAVENOUS; SUBCUTANEOUS at 17:47

## 2023-05-19 RX ADMIN — INSULIN DETEMIR 10 UNITS: 100 INJECTION, SOLUTION SUBCUTANEOUS at 21:23

## 2023-05-19 RX ADMIN — Medication 10 ML: at 08:18

## 2023-05-19 RX ADMIN — INSULIN LISPRO 7 UNITS: 100 INJECTION, SOLUTION INTRAVENOUS; SUBCUTANEOUS at 17:46

## 2023-05-19 RX ADMIN — METOPROLOL TARTRATE 50 MG: 50 TABLET ORAL at 15:11

## 2023-05-19 RX ADMIN — DICLOFENAC SODIUM 2 G: 10 GEL TOPICAL at 17:47

## 2023-05-19 RX ADMIN — DICLOFENAC SODIUM 2 G: 10 GEL TOPICAL at 08:17

## 2023-05-19 RX ADMIN — APIXABAN 5 MG: 5 TABLET, FILM COATED ORAL at 08:17

## 2023-05-19 RX ADMIN — INSULIN LISPRO 7 UNITS: 100 INJECTION, SOLUTION INTRAVENOUS; SUBCUTANEOUS at 08:16

## 2023-05-19 RX ADMIN — AMIODARONE HYDROCHLORIDE 200 MG: 200 TABLET ORAL at 21:23

## 2023-05-19 RX ADMIN — LEVOTHYROXINE SODIUM 100 MCG: 0.1 TABLET ORAL at 05:10

## 2023-05-19 RX ADMIN — METOPROLOL TARTRATE 50 MG: 50 TABLET ORAL at 21:23

## 2023-05-19 RX ADMIN — Medication 10 ML: at 21:24

## 2023-05-19 RX ADMIN — Medication 1 CAPSULE: at 08:17

## 2023-05-19 RX ADMIN — METOPROLOL TARTRATE 50 MG: 50 TABLET ORAL at 05:10

## 2023-05-19 RX ADMIN — Medication 5 MG: at 21:23

## 2023-05-19 RX ADMIN — APIXABAN 5 MG: 5 TABLET, FILM COATED ORAL at 21:23

## 2023-05-19 NOTE — PLAN OF CARE
Goal Outcome Evaluation:      Had a good day. Ambulated in room with walker. Up in chair for a couple hours. No concerns or complaints voiced. Plan to discharge tomorrow.

## 2023-05-19 NOTE — PLAN OF CARE
Goal Outcome Evaluation:  Plan of Care Reviewed With: patient        Progress: no change  Outcome Evaluation: Patient rested this shift. Denies pain and soa. Vitals stable.

## 2023-05-19 NOTE — PROGRESS NOTES
" [facility] - Progress Note    Chey Robertson  1935  N626/1    05/19/23, 08:33 EDT      Chief Complaint: Following for AFib    Subjective:   Awake in bed, states we are \"trying to kill her.\"  She wants to get out of here.  Appears to be in NSR with PACs.  No other complaints.  Out of isolation.      Review of Systems:  Pertinent positives are listed above and in physical exam.  All others have been reviewed and are negative.    amiodarone, 200 mg, Oral, Q12H  apixaban, 5 mg, Oral, Q12H  Diclofenac Sodium, 2 g, Topical, 4x Daily  insulin detemir, 10 Units, Subcutaneous, Nightly  insulin lispro, 0-9 Units, Subcutaneous, TID AC  Insulin Lispro, 7 Units, Subcutaneous, TID With Meals  lactobacillus acidophilus, 1 capsule, Oral, Daily  levothyroxine, 100 mcg, Oral, Q AM  metoprolol tartrate, 50 mg, Oral, Q8H  povidone-iodine, 1 application, Topical, Daily  sodium chloride, 10 mL, Intravenous, Q12H  tamsulosin, 0.4 mg, Oral, Daily        Objective:  Vitals:   height is 160 cm (63\") and weight is 56.2 kg (123 lb 12.8 oz). Her oral temperature is 97.1 °F (36.2 °C). Her blood pressure is 133/74 and her pulse is 56. Her respiration is 18 and oxygen saturation is 92%.       Intake/Output Summary (Last 24 hours) at 5/19/2023 0833  Last data filed at 5/19/2023 0500  Gross per 24 hour   Intake 1046.41 ml   Output 1200 ml   Net -153.59 ml       Physical Exam:  Gen:  Appears well nourished, sitting up in bed.  Cardio: Normal S1, S2.  No murmur, rub, gallop  Resp:  Breathing equal, non labored  Abd:  Soft, + BS  Extrem:  No edema.             Results from last 7 days   Lab Units 05/19/23  0506   WBC 10*3/mm3 3.39*   HEMOGLOBIN g/dL 9.9*   HEMATOCRIT % 31.4*   PLATELETS 10*3/mm3 150     Results from last 7 days   Lab Units 05/19/23  0506 05/18/23  0505   SODIUM mmol/L 138 138   POTASSIUM mmol/L 4.6 4.6   CHLORIDE mmol/L 101 100   CO2 mmol/L 30.0* 30.0*   BUN mg/dL 28* 29*   CREATININE mg/dL 0.78 0.72   CALCIUM " mg/dL 9.0 8.7   BILIRUBIN mg/dL  --  0.3   ALK PHOS U/L  --  65   ALT (SGPT) U/L  --  6   AST (SGOT) U/L  --  8   GLUCOSE mg/dL 107* 176*                       Tele: NSR    Assessment:  -87-year-old  female admitted with 3 current/persistent E. coli UTI  -Atrial fibrillation with RVR, new onset.  Associated chest pain and elevated troponin, likely demand ischemia.  -COVID-19 infection  -Myelodysplastic syndrome with worsening pancytopenia  -History of PE.  Previously on warfarin now on Eliquis which can be restarted as platelets get above 40K  -  DMII  -  HTN  -  Hypothyroidism  -  GERD       Plan:   -  Continue oral amiodarone.  Continue eliquis  -  Continue Metoprolol  -  Cardiology will see again on Monday if still in hospital.      Lona May PA-C working with Jones Chand MD  05/19/2023 7576 EST

## 2023-05-19 NOTE — PROGRESS NOTES
[facility] Medicine Services  PROGRESS NOTE    Patient Name: Chye Robertson  : 1935  MRN: [id number]    Date of Admission: 2023  Primary Care Physician: Corby Marie MD    Subjective   Subjective     CC:  Covid, debility    HPI:  Daughter in law at bedside, resting comfortably. No cough or feves    ROS:  Gen- No fevers, chills  CV- No chest pain, palpitations  Resp- No cough, dyspnea  GI- No N/V/D, abd pain    Objective   Objective     Vital Signs:   Temp:  [96.5 °F (35.8 °C)-97.7 °F (36.5 °C)] 97.1 °F (36.2 °C)  Heart Rate:  [54-68] 62  Resp:  [16-18] 18  BP: (119-143)/(56-90) 141/63     Physical Exam:  Constitutional: No acute distress, awake, elderly appearing  HENT: NCAT, mucous membranes moist  Respiratory: Respiratory effort normal   Cardiovascular: IR pancho  Gastrointestinal: Positive bowel sounds, soft, nontender, nondistended  Musculoskeletal: No bilateral ankle edema  Psychiatric: Appropriate affect, cooperative  Neurologic: Oriented x 2-3,  speech clear  Skin: No rashes      Results Reviewed:  LAB RESULTS:      Lab 23  0506 23  0505 23  0404 23  0550   WBC 3.39* 3.67 3.18* 2.21*   HEMOGLOBIN 9.9* 9.4* 9.5* 8.7*   HEMATOCRIT 31.4* 30.3* 31.3* 28.2*   PLATELETS 150 156 136* 112*   NEUTROS ABS 2.29 3.04 2.70 1.37*   IMMATURE GRANS (ABS) 0.04 0.05 0.08* 0.10*   LYMPHS ABS 0.54* 0.36* 0.20* 0.49*   MONOS ABS 0.52 0.22 0.20 0.25   EOS ABS 0.00 0.00 0.00 0.00   MCV 90.5 91.3 93.2 90.4         Lab 23  0506 23  0505 23  0404 05/15/23  0402 23  1620 23  0550   SODIUM 138 138 135* 137  --  145   POTASSIUM 4.6 4.6 4.7 4.7 5.3* 3.5   CHLORIDE 101 100 97* 99  --  106   CO2 30.0* 30.0* 27.0 30.0*  --  31.0*   ANION GAP 7.0 8.0 11.0 8.0  --  8.0   BUN 28* 29* 32* 19  --  14   CREATININE 0.78 0.72 0.83 0.83  --  0.71   EGFR 74.1 81.5 68.8 68.8  --  82.9   GLUCOSE 107* 176* 308* 222*  --  108*   CALCIUM 9.0 8.7 8.7 8.7  --   8.8   MAGNESIUM  --  1.7 2.0 1.6  --   --    PHOSPHORUS 3.8 4.3  --   --   --   --          Lab 05/19/23  0506 05/18/23  0505 05/15/23  0402   TOTAL PROTEIN  --  5.3* 5.7*   ALBUMIN 2.9* 3.1* 3.3*   GLOBULIN  --  2.2 2.4   ALT (SGPT)  --  6 7   AST (SGOT)  --  8 8   BILIRUBIN  --  0.3 0.6   ALK PHOS  --  65 68         Lab 05/14/23  1411 05/14/23  1105   HSTROP T 58* 72*                 Brief Urine Lab Results  (Last result in the past 365 days)      Color   Clarity   Blood   Leuk Est   Nitrite   Protein   CREAT   Urine HCG        05/05/23 1752 Yellow   Clear   Negative   Negative   Positive   Negative                 Microbiology Results Abnormal     Procedure Component Value - Date/Time    COVID-19 RAPID AG,VERITOR,COR/FAUSTINO/PAD/BRETT/MAD/ANDREW/LAG/KHRIS/ IN-HOUSE,DRY SWAB, 1-2 HR TAT - Swab, Nasal Cavity [[id number]]  (Normal) Collected: 05/18/23 1239    Lab Status: Final result Specimen: Swab from Nasal Cavity Updated: 05/18/23 1353     COVID19 Presumptive Negative    Narrative:      Fact sheets for providers: https://www.fda.gov/media/965481/download    Fact sheets for patients: https://www.fda.gov/media/473030/download    Blood Culture - Blood, Arm, Right [[id number]]  (Normal) Collected: 05/05/23 1850    Lab Status: Final result Specimen: Blood from Arm, Right Updated: 05/10/23 2015     Blood Culture No growth at 5 days    Blood Culture - Blood, Wrist, Right [[id number]]  (Normal) Collected: 05/05/23 1855    Lab Status: Final result Specimen: Blood from Wrist, Right Updated: 05/10/23 2015     Blood Culture No growth at 5 days    COVID PRE-OP / PRE-PROCEDURE SCREENING ORDER (NO ISOLATION) - Swab, Nasopharynx [[id number]]  (Normal) Collected: 05/05/23 1704    Lab Status: Final result Specimen: Swab from Nasopharynx Updated: 05/05/23 5907    Narrative:      The following orders were created for panel order COVID PRE-OP / PRE-PROCEDURE SCREENING ORDER (NO ISOLATION) - Swab, Nasopharynx.  Procedure                                Abnormality         Status                     ---------                               -----------         ------                     COVID-19 and FLU A/B PCR...[[id number]]  Normal              Final result                 Please view results for these tests on the individual orders.    COVID-19 and FLU A/B PCR - Swab, Nasopharynx [[id number]]  (Normal) Collected: 05/05/23 1704    Lab Status: Final result Specimen: Swab from Nasopharynx Updated: 05/05/23 1747     COVID19 Not Detected     Influenza A PCR Not Detected     Influenza B PCR Not Detected    Narrative:      Fact sheet for providers: https://www.fda.gov/media/443754/download    Fact sheet for patients: https://www.fda.gov/media/917169/download    Test performed by PCR.          No radiology results from the last 24 hrs        Current medications:  Scheduled Meds:amiodarone, 200 mg, Oral, Q12H  apixaban, 5 mg, Oral, Q12H  Diclofenac Sodium, 2 g, Topical, 4x Daily  insulin detemir, 10 Units, Subcutaneous, Nightly  insulin lispro, 0-9 Units, Subcutaneous, TID AC  Insulin Lispro, 7 Units, Subcutaneous, TID With Meals  lactobacillus acidophilus, 1 capsule, Oral, Daily  levothyroxine, 100 mcg, Oral, Q AM  metoprolol tartrate, 50 mg, Oral, Q8H  povidone-iodine, 1 application, Topical, Daily  sodium chloride, 10 mL, Intravenous, Q12H  tamsulosin, 0.4 mg, Oral, Daily      Continuous Infusions:   PRN Meds:.•  acetaminophen  •  benzonatate  •  Calcium Replacement - Follow Nurse / BPA Driven Protocol  •  dextrose  •  dextrose  •  glucagon (human recombinant)  •  guaifenesin  •  hydrOXYzine  •  Magnesium Standard Dose Replacement - Follow Nurse / BPA Driven Protocol  •  melatonin  •  ondansetron **OR** ondansetron  •  phenol  •  Phosphorus Replacement - Follow Nurse / BPA Driven Protocol  •  Potassium Replacement - Follow Nurse / BPA Driven Protocol  •  sodium chloride  •  sodium chloride    Assessment & Plan   Assessment & Plan     Active Hospital Problems     Diagnosis  POA   • **Urinary tract infection, acute [N39.0]  Yes   • Atrial fibrillation with RVR [I48.91]  Unknown   • Chest pain [R07.9]  Unknown   • Elevated troponin [R77.8]  Unknown   • COVID-19 virus infection [U07.1]  Unknown   • Chronic anticoagulation [Z79.01]  Not Applicable   • Essential hypertension [I10]  Yes   • Type 2 diabetes mellitus without complication, with long-term current use of insulin [E11.9, Z79.4]  Not Applicable   • Personal history of pulmonary embolism [Z86.711]  Yes   • Hypothyroidism (acquired) [E03.9]  Yes   • Pancytopenia (HCC) [D61.818]  Yes      Resolved Hospital Problems   No resolved problems to display.        Brief Hospital Course to date:  Chey Robertson is a 87 y.o. female with past medical history of myelodysplasia, chronic pancytopenia, essential hypertension, type 2 diabetes, history of PE (recently switched from warfarin to Eliquis per her cardiologist because of inability to achieve therapeutic INR), GERD who presented to the hospital with generalized weakness, nausea and vomiting found to have urinary tract infection. Since admission, pt found to have COVID19. She completed full course of remdesivir and dexamethasone. 5/14 She had episode of chest pain with new A fib with RVR     This patient's problems and plans were partially entered by my partner and updated as appropriate by me 05/19/23.       Afib RVR, improved rate  Chest Pain secondary to above, resolved  Elevated Troponin  • Patient developed A-fib with RVR 5/16/2023.    • She is known with intermittent episodes of A-fib during previous hospitalization for knee replacement years ago  • Continue metoprolol, eliquis  • Cardio evaluated     Acute urinary tract infection, POA  Dehydration volume depletion  Acute on chronic debility  • Urine culture growing pansensitive E. Coli  • Initially started on IV Rocephin and with her worsening neutropenia, she was switched to IV cefepime and finished full course  • ID  followed the patient during this hospitalization  • Neutropenia and ANC improved  • PT/OT recommended rehab     COVID19 infection - on room air  History of PE  • Status post full course of IV remdesivir and Decadron  • Rapid antigen test negative, isolation dced 5/18     Myelodysplastic syndrome  Pancytopenia, likely secondary to sepsis  Anemia, likely anemia of chronic disease  • Chronic pancytopenia  • DIC panel negative   • Neutropenic precautions, s/p 1 dose of Neupogen with improvement in ANC  • Follows with Dr. Lyman      Type 2 diabetes  • A1c from end of March 2023 is 6%  •   Essential hypertension  • Continue to hold home Lasix for now, poor po intake  • Continue home Lisinopril and propranolol     Hypothyroidism  • Continue home synthroid     GERD  PPI     Expected Discharge Location and Transportation: working on placement  Expected Discharge   Expected Discharge Date: 5/22/2023; Expected Discharge Time:      DVT prophylaxis:  Medical and mechanical DVT prophylaxis orders are present.     AM-PAC 6 Clicks Score (PT): 17 (05/18/23 0839)    CODE STATUS:   Code Status and Medical Interventions:   Ordered at: 05/05/23 2031     Code Status (Patient has no pulse and is not breathing):    CPR (Attempt to Resuscitate)     Medical Interventions (Patient has pulse or is breathing):    Full Support       Diya Garcia, DO  05/19/23

## 2023-05-19 NOTE — CASE MANAGEMENT/SOCIAL WORK
Continued Stay Note  [facility]     Patient Name: Chey Robertson  MRN: [id number]  Today's Date: 5/19/2023    Admit Date: 5/5/2023    Plan: [facility]   Discharge Plan     Row Name 05/19/23 1332       Plan    Plan [facility]    Plan Comments Received a call from Dahiana.  She can offer patient a skilled bed at [facility].  Spoke with patient in room.   ewould like to accept bed.  Updated Dahiana.  She will initiate precert with patient's insurance today.  Updated patient's son by phone.  Patient's plan is a skilled bed at [facility] PENDING insurance approval.  Family will transport.  CM will continue to follow.    Final Discharge Disposition Code 03 - skilled nursing facility (SNF)               Discharge Codes    No documentation.               Expected Discharge Date and Time     Expected Discharge Date Expected Discharge Time    May 22, 2023             Diana Navarro RN

## 2023-05-20 LAB
ALBUMIN SERPL-MCNC: 2.9 G/DL (ref 3.5–5.2)
ANION GAP SERPL CALCULATED.3IONS-SCNC: 8 MMOL/L (ref 5–15)
BASOPHILS # BLD AUTO: 0 10*3/MM3 (ref 0–0.2)
BASOPHILS NFR BLD AUTO: 0 % (ref 0–1.5)
BUN SERPL-MCNC: 23 MG/DL (ref 8–23)
BUN/CREAT SERPL: 26.1 (ref 7–25)
CALCIUM SPEC-SCNC: 8.5 MG/DL (ref 8.6–10.5)
CHLORIDE SERPL-SCNC: 102 MMOL/L (ref 98–107)
CO2 SERPL-SCNC: 29 MMOL/L (ref 22–29)
CREAT SERPL-MCNC: 0.88 MG/DL (ref 0.57–1)
DEPRECATED RDW RBC AUTO: 52.9 FL (ref 37–54)
EGFRCR SERPLBLD CKD-EPI 2021: 64.1 ML/MIN/1.73
EOSINOPHIL # BLD AUTO: 0 10*3/MM3 (ref 0–0.4)
EOSINOPHIL NFR BLD AUTO: 0 % (ref 0.3–6.2)
ERYTHROCYTE [DISTWIDTH] IN BLOOD BY AUTOMATED COUNT: 15.8 % (ref 12.3–15.4)
GLUCOSE BLDC GLUCOMTR-MCNC: 135 MG/DL (ref 70–130)
GLUCOSE BLDC GLUCOMTR-MCNC: 203 MG/DL (ref 70–130)
GLUCOSE BLDC GLUCOMTR-MCNC: 231 MG/DL (ref 70–130)
GLUCOSE SERPL-MCNC: 94 MG/DL (ref 65–99)
HCT VFR BLD AUTO: 30.6 % (ref 34–46.6)
HGB BLD-MCNC: 9.7 G/DL (ref 12–15.9)
IMM GRANULOCYTES # BLD AUTO: 0.06 10*3/MM3 (ref 0–0.05)
IMM GRANULOCYTES NFR BLD AUTO: 2.3 % (ref 0–0.5)
LYMPHOCYTES # BLD AUTO: 0.56 10*3/MM3 (ref 0.7–3.1)
LYMPHOCYTES NFR BLD AUTO: 21.8 % (ref 19.6–45.3)
MCH RBC QN AUTO: 29.3 PG (ref 26.6–33)
MCHC RBC AUTO-ENTMCNC: 31.7 G/DL (ref 31.5–35.7)
MCV RBC AUTO: 92.4 FL (ref 79–97)
MONOCYTES # BLD AUTO: 0.43 10*3/MM3 (ref 0.1–0.9)
MONOCYTES NFR BLD AUTO: 16.7 % (ref 5–12)
NEUTROPHILS NFR BLD AUTO: 1.52 10*3/MM3 (ref 1.7–7)
NEUTROPHILS NFR BLD AUTO: 59.2 % (ref 42.7–76)
NRBC BLD AUTO-RTO: 0 /100 WBC (ref 0–0.2)
PHOSPHATE SERPL-MCNC: 3.5 MG/DL (ref 2.5–4.5)
PLATELET # BLD AUTO: 123 10*3/MM3 (ref 140–450)
PMV BLD AUTO: 9.2 FL (ref 6–12)
POTASSIUM SERPL-SCNC: 4.3 MMOL/L (ref 3.5–5.2)
RBC # BLD AUTO: 3.31 10*6/MM3 (ref 3.77–5.28)
SODIUM SERPL-SCNC: 139 MMOL/L (ref 136–145)
WBC NRBC COR # BLD: 2.57 10*3/MM3 (ref 3.4–10.8)

## 2023-05-20 PROCEDURE — 80069 RENAL FUNCTION PANEL: CPT | Performed by: INTERNAL MEDICINE

## 2023-05-20 PROCEDURE — 63710000001 INSULIN DETEMIR PER 5 UNITS: Performed by: INTERNAL MEDICINE

## 2023-05-20 PROCEDURE — 63710000001 INSULIN LISPRO (HUMAN) PER 5 UNITS: Performed by: INTERNAL MEDICINE

## 2023-05-20 PROCEDURE — 99232 SBSQ HOSP IP/OBS MODERATE 35: CPT | Performed by: INTERNAL MEDICINE

## 2023-05-20 PROCEDURE — 85025 COMPLETE CBC W/AUTO DIFF WBC: CPT | Performed by: INTERNAL MEDICINE

## 2023-05-20 PROCEDURE — 63710000001 INSULIN LISPRO (HUMAN) PER 5 UNITS: Performed by: PHYSICIAN ASSISTANT

## 2023-05-20 PROCEDURE — 82948 REAGENT STRIP/BLOOD GLUCOSE: CPT

## 2023-05-20 RX ADMIN — APIXABAN 5 MG: 5 TABLET, FILM COATED ORAL at 08:22

## 2023-05-20 RX ADMIN — METOPROLOL TARTRATE 50 MG: 50 TABLET ORAL at 14:14

## 2023-05-20 RX ADMIN — INSULIN LISPRO 7 UNITS: 100 INJECTION, SOLUTION INTRAVENOUS; SUBCUTANEOUS at 11:44

## 2023-05-20 RX ADMIN — DICLOFENAC SODIUM 2 G: 10 GEL TOPICAL at 08:22

## 2023-05-20 RX ADMIN — DICLOFENAC SODIUM 2 G: 10 GEL TOPICAL at 20:04

## 2023-05-20 RX ADMIN — APIXABAN 5 MG: 5 TABLET, FILM COATED ORAL at 20:01

## 2023-05-20 RX ADMIN — INSULIN LISPRO 7 UNITS: 100 INJECTION, SOLUTION INTRAVENOUS; SUBCUTANEOUS at 17:38

## 2023-05-20 RX ADMIN — LEVOTHYROXINE SODIUM 100 MCG: 0.1 TABLET ORAL at 05:58

## 2023-05-20 RX ADMIN — AMIODARONE HYDROCHLORIDE 200 MG: 200 TABLET ORAL at 08:22

## 2023-05-20 RX ADMIN — TAMSULOSIN HYDROCHLORIDE 0.4 MG: 0.4 CAPSULE ORAL at 08:22

## 2023-05-20 RX ADMIN — INSULIN LISPRO 4 UNITS: 100 INJECTION, SOLUTION INTRAVENOUS; SUBCUTANEOUS at 08:23

## 2023-05-20 RX ADMIN — Medication 1 CAPSULE: at 08:22

## 2023-05-20 RX ADMIN — METOPROLOL TARTRATE 50 MG: 50 TABLET ORAL at 21:35

## 2023-05-20 RX ADMIN — INSULIN DETEMIR 10 UNITS: 100 INJECTION, SOLUTION SUBCUTANEOUS at 20:01

## 2023-05-20 RX ADMIN — METOPROLOL TARTRATE 50 MG: 50 TABLET ORAL at 05:58

## 2023-05-20 RX ADMIN — DICLOFENAC SODIUM 2 G: 10 GEL TOPICAL at 11:44

## 2023-05-20 RX ADMIN — POVIDONE-IODINE 1 EACH: 10 SOLUTION TOPICAL at 09:42

## 2023-05-20 RX ADMIN — DICLOFENAC SODIUM 2 G: 10 GEL TOPICAL at 17:38

## 2023-05-20 RX ADMIN — Medication 10 ML: at 20:00

## 2023-05-20 RX ADMIN — Medication 10 ML: at 08:23

## 2023-05-20 RX ADMIN — Medication 5 MG: at 20:05

## 2023-05-20 RX ADMIN — INSULIN LISPRO 7 UNITS: 100 INJECTION, SOLUTION INTRAVENOUS; SUBCUTANEOUS at 08:22

## 2023-05-20 RX ADMIN — INSULIN LISPRO 4 UNITS: 100 INJECTION, SOLUTION INTRAVENOUS; SUBCUTANEOUS at 11:44

## 2023-05-20 RX ADMIN — AMIODARONE HYDROCHLORIDE 200 MG: 200 TABLET ORAL at 20:01

## 2023-05-20 NOTE — CASE MANAGEMENT/SOCIAL WORK
Continued Stay Note  [facility]     Patient Name: Chey Robertson  MRN: [id number]  Today's Date: 5/20/2023    Admit Date: 5/5/2023    Plan: [facility]   Discharge Plan     Row Name 05/20/23 1459       Plan    Plan [facility]    Patient/Family in Agreement with Plan yes    Plan Comments Peer to peer has been requested from [employer] for [facility]. CM notifed MD and filled out the Phys Advisor tab. Peer to Peer to be done by 1200 on Monday. CM will follow.    Final Discharge Disposition Code 03 - skilled nursing facility (SNF)               Discharge Codes    No documentation.               Expected Discharge Date and Time     Expected Discharge Date Expected Discharge Time    May 22, 2023             Tracey Saba RN

## 2023-05-20 NOTE — PLAN OF CARE
Problem: Adult Inpatient Plan of Care  Goal: Plan of Care Review  Flowsheets (Taken 5/20/2023 1812)  Progress: improving  Plan of Care Reviewed With: patient  Outcome Evaluation: Pt had no c/o pain or SOA this shift. Remains on RA, VSS, NSR on tele. continue with POC.   Goal Outcome Evaluation:  Plan of Care Reviewed With: patient        Progress: improving  Outcome Evaluation: Pt had no c/o pain or SOA this shift. Remains on RA, VSS, NSR on tele. continue with POC.

## 2023-05-20 NOTE — PROGRESS NOTES
[facility] Medicine Services  PROGRESS NOTE    Patient Name: Chey Robertson  : 1935  MRN: [id number]    Date of Admission: 2023  Primary Care Physician: Corby Marie MD    Subjective   Subjective     CC:  Covid, debility    HPI:  No cough, denies fever    ROS:  Gen: no fever  Pulm: no cough    Objective   Objective     Vital Signs:   Temp:  [97.8 °F (36.6 °C)-98.2 °F (36.8 °C)] 97.9 °F (36.6 °C)  Heart Rate:  [53-69] 64  Resp:  [18-19] 19  BP: (113-138)/(46-85) 122/85     Physical Exam:  Constitutional - no acute distress, nontoxic, in bed  HEENT-NCAT  CV-RRR  Resp-grossly clear bilaterally  Abd-soft, nontender, nondistended, normoactive bowel sounds  Ext-No lower extremity cyanosis, clubbing or edema bilaterally  Neuro-alert, speech clear   Psych-normal affect   Skin- No rash on exposed UE or LE bilaterally        Results Reviewed:  LAB RESULTS:      Lab 23  0454 23  0506 23  0505 23  0404   WBC 2.57* 3.39* 3.67 3.18*   HEMOGLOBIN 9.7* 9.9* 9.4* 9.5*   HEMATOCRIT 30.6* 31.4* 30.3* 31.3*   PLATELETS 123* 150 156 136*   NEUTROS ABS 1.52* 2.29 3.04 2.70   IMMATURE GRANS (ABS) 0.06* 0.04 0.05 0.08*   LYMPHS ABS 0.56* 0.54* 0.36* 0.20*   MONOS ABS 0.43 0.52 0.22 0.20   EOS ABS 0.00 0.00 0.00 0.00   MCV 92.4 90.5 91.3 93.2         Lab 23  0454 23  0506 23  0505 23  0404 05/15/23  0402   SODIUM 139 138 138 135* 137   POTASSIUM 4.3 4.6 4.6 4.7 4.7   CHLORIDE 102 101 100 97* 99   CO2 29.0 30.0* 30.0* 27.0 30.0*   ANION GAP 8.0 7.0 8.0 11.0 8.0   BUN 23 28* 29* 32* 19   CREATININE 0.88 0.78 0.72 0.83 0.83   EGFR 64.1 74.1 81.5 68.8 68.8   GLUCOSE 94 107* 176* 308* 222*   CALCIUM 8.5* 9.0 8.7 8.7 8.7   MAGNESIUM  --   --  1.7 2.0 1.6   PHOSPHORUS 3.5 3.8 4.3  --   --          Lab 23  0454 23  0506 23  0505 05/15/23  0402   TOTAL PROTEIN  --   --  5.3* 5.7*   ALBUMIN 2.9* 2.9* 3.1* 3.3*   GLOBULIN  --   --  2.2 2.4    ALT (SGPT)  --   --  6 7   AST (SGOT)  --   --  8 8   BILIRUBIN  --   --  0.3 0.6   ALK PHOS  --   --  65 68         Lab 05/14/23  1411 05/14/23  1105   HSTROP T 58* 72*                 Brief Urine Lab Results  (Last result in the past 365 days)      Color   Clarity   Blood   Leuk Est   Nitrite   Protein   CREAT   Urine HCG        05/05/23 1752 Yellow   Clear   Negative   Negative   Positive   Negative                 Microbiology Results Abnormal     Procedure Component Value - Date/Time    COVID-19 RAPID AG,VERITOR,COR/FAUSTINO/PAD/BRETT/MAD/ANDREW/LAG/KHRIS/ IN-HOUSE,DRY SWAB, 1-2 HR TAT - Swab, Nasal Cavity [[id number]]  (Normal) Collected: 05/18/23 1239    Lab Status: Final result Specimen: Swab from Nasal Cavity Updated: 05/18/23 1353     COVID19 Presumptive Negative    Narrative:      Fact sheets for providers: https://www.fda.gov/media/468284/download    Fact sheets for patients: https://www.fda.gov/media/167204/download    Blood Culture - Blood, Arm, Right [[id number]]  (Normal) Collected: 05/05/23 1850    Lab Status: Final result Specimen: Blood from Arm, Right Updated: 05/10/23 2015     Blood Culture No growth at 5 days    Blood Culture - Blood, Wrist, Right [[id number]]  (Normal) Collected: 05/05/23 1855    Lab Status: Final result Specimen: Blood from Wrist, Right Updated: 05/10/23 2015     Blood Culture No growth at 5 days    COVID PRE-OP / PRE-PROCEDURE SCREENING ORDER (NO ISOLATION) - Swab, Nasopharynx [[id number]]  (Normal) Collected: 05/05/23 1704    Lab Status: Final result Specimen: Swab from Nasopharynx Updated: 05/05/23 9557    Narrative:      The following orders were created for panel order COVID PRE-OP / PRE-PROCEDURE SCREENING ORDER (NO ISOLATION) - Swab, Nasopharynx.  Procedure                               Abnormality         Status                     ---------                               -----------         ------                     COVID-19 and FLU A/B PCR...[[id number]]  Normal              Final  result                 Please view results for these tests on the individual orders.    COVID-19 and FLU A/B PCR - Swab, Nasopharynx [[id number]]  (Normal) Collected: 05/05/23 1704    Lab Status: Final result Specimen: Swab from Nasopharynx Updated: 05/05/23 1747     COVID19 Not Detected     Influenza A PCR Not Detected     Influenza B PCR Not Detected    Narrative:      Fact sheet for providers: https://www.fda.gov/media/845738/download    Fact sheet for patients: https://www.fda.gov/media/882551/download    Test performed by PCR.          No radiology results from the last 24 hrs        Current medications:  Scheduled Meds:amiodarone, 200 mg, Oral, Q12H  apixaban, 5 mg, Oral, Q12H  Diclofenac Sodium, 2 g, Topical, 4x Daily  insulin detemir, 10 Units, Subcutaneous, Nightly  insulin lispro, 0-9 Units, Subcutaneous, TID AC  Insulin Lispro, 7 Units, Subcutaneous, TID With Meals  lactobacillus acidophilus, 1 capsule, Oral, Daily  levothyroxine, 100 mcg, Oral, Q AM  metoprolol tartrate, 50 mg, Oral, Q8H  povidone-iodine, 1 application, Topical, Daily  sodium chloride, 10 mL, Intravenous, Q12H  tamsulosin, 0.4 mg, Oral, Daily      Continuous Infusions:   PRN Meds:.•  acetaminophen  •  benzonatate  •  Calcium Replacement - Follow Nurse / BPA Driven Protocol  •  dextrose  •  dextrose  •  glucagon (human recombinant)  •  guaifenesin  •  hydrOXYzine  •  Magnesium Standard Dose Replacement - Follow Nurse / BPA Driven Protocol  •  melatonin  •  ondansetron **OR** ondansetron  •  phenol  •  Phosphorus Replacement - Follow Nurse / BPA Driven Protocol  •  Potassium Replacement - Follow Nurse / BPA Driven Protocol  •  sodium chloride  •  sodium chloride    Assessment & Plan   Assessment & Plan     Active Hospital Problems    Diagnosis  POA   • **Urinary tract infection, acute [N39.0]  Yes   • Atrial fibrillation with RVR [I48.91]  Unknown   • Chest pain [R07.9]  Unknown   • Elevated troponin [R77.8]  Unknown   • COVID-19 virus  infection [U07.1]  Unknown   • Chronic anticoagulation [Z79.01]  Not Applicable   • Essential hypertension [I10]  Yes   • Type 2 diabetes mellitus without complication, with long-term current use of insulin [E11.9, Z79.4]  Not Applicable   • Personal history of pulmonary embolism [Z86.711]  Yes   • Hypothyroidism (acquired) [E03.9]  Yes   • Pancytopenia (HCC) [D61.818]  Yes      Resolved Hospital Problems   No resolved problems to display.        Brief Hospital Course to date:  Chey Robertson is a 87 y.o. female with past medical history of myelodysplasia, chronic pancytopenia, essential hypertension, type 2 diabetes, history of PE (recently switched from warfarin to Eliquis per her cardiologist because of inability to achieve therapeutic INR), GERD who presented to the hospital with generalized weakness, nausea and vomiting found to have urinary tract infection. Since admission, pt found to have COVID19. She completed full course of remdesivir and dexamethasone. 5/14 She had episode of chest pain with new A fib with RVR     This patient's problems and plans were partially entered by my partner and updated as appropriate by me 05/19/23.       Afib RVR, improved rate  Chest Pain secondary to above, resolved  Elevated Troponin  • Patient developed A-fib with RVR 5/16/2023.    • She is known with intermittent episodes of A-fib during previous hospitalization for knee replacement years ago  • Continue metoprolol, eliquis     Acute urinary tract infection, POA  Dehydration volume depletion  Acute on chronic debility  • Urine culture growing pansensitive E. Coli  • Initially started on IV Rocephin and with her worsening neutropenia, she was switched to IV cefepime and finished full course  • ID followed the patient during this hospitalization  • Neutropenia and ANC improved  • PT/OT recommended rehab     COVID19 infection - on room air  History of PE  • Status post full course of IV remdesivir and Decadron  • Rapid  antigen test negative, isolation dced 5/18     Myelodysplastic syndrome  Pancytopenia, likely secondary to sepsis  Anemia, likely anemia of chronic disease  • Chronic pancytopenia  • DIC panel negative   • Neutropenic precautions, s/p 1 dose of Neupogen with improvement in ANC  • Follows with Dr. Lyman      Type 2 diabetes  • A1c from end of March 2023 is 6%  •   Essential hypertension  • Continue to hold home Lasix for now, poor po intake  • Continue home Lisinopril and propranolol     Hypothyroidism  • Continue home synthroid     GERD  PPI     Expected Discharge Location and Transportation: working on placement  Expected Discharge   Expected Discharge Date: 5/22/2023; Expected Discharge Time:      DVT prophylaxis:  Medical and mechanical DVT prophylaxis orders are present.     AM-PAC 6 Clicks Score (PT): 17 (05/20/23 0810)    CODE STATUS:   Code Status and Medical Interventions:   Ordered at: 05/05/23 2031     Code Status (Patient has no pulse and is not breathing):    CPR (Attempt to Resuscitate)     Medical Interventions (Patient has pulse or is breathing):    Full Support       Maksim Freeman MD  05/20/23

## 2023-05-21 LAB
ANION GAP SERPL CALCULATED.3IONS-SCNC: 10 MMOL/L (ref 5–15)
BASOPHILS # BLD AUTO: 0 10*3/MM3 (ref 0–0.2)
BASOPHILS NFR BLD AUTO: 0 % (ref 0–1.5)
BUN SERPL-MCNC: 24 MG/DL (ref 8–23)
BUN/CREAT SERPL: 34.3 (ref 7–25)
CALCIUM SPEC-SCNC: 8.2 MG/DL (ref 8.6–10.5)
CHLORIDE SERPL-SCNC: 99 MMOL/L (ref 98–107)
CO2 SERPL-SCNC: 26 MMOL/L (ref 22–29)
CREAT SERPL-MCNC: 0.7 MG/DL (ref 0.57–1)
DEPRECATED RDW RBC AUTO: 52.2 FL (ref 37–54)
EGFRCR SERPLBLD CKD-EPI 2021: 84.3 ML/MIN/1.73
EOSINOPHIL # BLD AUTO: 0 10*3/MM3 (ref 0–0.4)
EOSINOPHIL NFR BLD AUTO: 0 % (ref 0.3–6.2)
ERYTHROCYTE [DISTWIDTH] IN BLOOD BY AUTOMATED COUNT: 15.6 % (ref 12.3–15.4)
GLUCOSE BLDC GLUCOMTR-MCNC: 110 MG/DL (ref 70–130)
GLUCOSE BLDC GLUCOMTR-MCNC: 137 MG/DL (ref 70–130)
GLUCOSE BLDC GLUCOMTR-MCNC: 158 MG/DL (ref 70–130)
GLUCOSE BLDC GLUCOMTR-MCNC: 244 MG/DL (ref 70–130)
GLUCOSE SERPL-MCNC: 135 MG/DL (ref 65–99)
HCT VFR BLD AUTO: 32.7 % (ref 34–46.6)
HGB BLD-MCNC: 10 G/DL (ref 12–15.9)
IMM GRANULOCYTES # BLD AUTO: 0.06 10*3/MM3 (ref 0–0.05)
IMM GRANULOCYTES NFR BLD AUTO: 2.4 % (ref 0–0.5)
LYMPHOCYTES # BLD AUTO: 0.41 10*3/MM3 (ref 0.7–3.1)
LYMPHOCYTES NFR BLD AUTO: 16.5 % (ref 19.6–45.3)
MCH RBC QN AUTO: 27.9 PG (ref 26.6–33)
MCHC RBC AUTO-ENTMCNC: 30.6 G/DL (ref 31.5–35.7)
MCV RBC AUTO: 91.1 FL (ref 79–97)
MONOCYTES # BLD AUTO: 0.29 10*3/MM3 (ref 0.1–0.9)
MONOCYTES NFR BLD AUTO: 11.6 % (ref 5–12)
NEUTROPHILS NFR BLD AUTO: 1.73 10*3/MM3 (ref 1.7–7)
NEUTROPHILS NFR BLD AUTO: 69.5 % (ref 42.7–76)
NRBC BLD AUTO-RTO: 0 /100 WBC (ref 0–0.2)
PLATELET # BLD AUTO: 134 10*3/MM3 (ref 140–450)
PMV BLD AUTO: 9.2 FL (ref 6–12)
POTASSIUM SERPL-SCNC: 4.4 MMOL/L (ref 3.5–5.2)
QT INTERVAL: 440 MS
QTC INTERVAL: 484 MS
RBC # BLD AUTO: 3.59 10*6/MM3 (ref 3.77–5.28)
SODIUM SERPL-SCNC: 135 MMOL/L (ref 136–145)
WBC NRBC COR # BLD: 2.49 10*3/MM3 (ref 3.4–10.8)

## 2023-05-21 PROCEDURE — 63710000001 INSULIN LISPRO (HUMAN) PER 5 UNITS: Performed by: PHYSICIAN ASSISTANT

## 2023-05-21 PROCEDURE — 63710000001 INSULIN LISPRO (HUMAN) PER 5 UNITS: Performed by: INTERNAL MEDICINE

## 2023-05-21 PROCEDURE — 85025 COMPLETE CBC W/AUTO DIFF WBC: CPT | Performed by: INTERNAL MEDICINE

## 2023-05-21 PROCEDURE — 63710000001 INSULIN DETEMIR PER 5 UNITS: Performed by: INTERNAL MEDICINE

## 2023-05-21 PROCEDURE — 99232 SBSQ HOSP IP/OBS MODERATE 35: CPT | Performed by: INTERNAL MEDICINE

## 2023-05-21 PROCEDURE — 80048 BASIC METABOLIC PNL TOTAL CA: CPT | Performed by: INTERNAL MEDICINE

## 2023-05-21 PROCEDURE — 82948 REAGENT STRIP/BLOOD GLUCOSE: CPT

## 2023-05-21 RX ADMIN — POVIDONE-IODINE 1 EACH: 10 SOLUTION TOPICAL at 08:42

## 2023-05-21 RX ADMIN — INSULIN LISPRO 4 UNITS: 100 INJECTION, SOLUTION INTRAVENOUS; SUBCUTANEOUS at 12:51

## 2023-05-21 RX ADMIN — DICLOFENAC SODIUM 2 G: 10 GEL TOPICAL at 20:26

## 2023-05-21 RX ADMIN — APIXABAN 5 MG: 5 TABLET, FILM COATED ORAL at 08:42

## 2023-05-21 RX ADMIN — INSULIN LISPRO 7 UNITS: 100 INJECTION, SOLUTION INTRAVENOUS; SUBCUTANEOUS at 17:12

## 2023-05-21 RX ADMIN — INSULIN DETEMIR 10 UNITS: 100 INJECTION, SOLUTION SUBCUTANEOUS at 20:24

## 2023-05-21 RX ADMIN — AMIODARONE HYDROCHLORIDE 200 MG: 200 TABLET ORAL at 20:24

## 2023-05-21 RX ADMIN — Medication 5 MG: at 20:24

## 2023-05-21 RX ADMIN — Medication 10 ML: at 08:42

## 2023-05-21 RX ADMIN — Medication 10 ML: at 20:27

## 2023-05-21 RX ADMIN — Medication 1 CAPSULE: at 08:42

## 2023-05-21 RX ADMIN — INSULIN LISPRO 2 UNITS: 100 INJECTION, SOLUTION INTRAVENOUS; SUBCUTANEOUS at 17:13

## 2023-05-21 RX ADMIN — LEVOTHYROXINE SODIUM 100 MCG: 0.1 TABLET ORAL at 06:00

## 2023-05-21 RX ADMIN — AMIODARONE HYDROCHLORIDE 200 MG: 200 TABLET ORAL at 08:41

## 2023-05-21 RX ADMIN — INSULIN LISPRO 7 UNITS: 100 INJECTION, SOLUTION INTRAVENOUS; SUBCUTANEOUS at 08:42

## 2023-05-21 RX ADMIN — DICLOFENAC SODIUM 2 G: 10 GEL TOPICAL at 12:52

## 2023-05-21 RX ADMIN — HYDROXYZINE HYDROCHLORIDE 25 MG: 25 TABLET, FILM COATED ORAL at 20:24

## 2023-05-21 RX ADMIN — METOPROLOL TARTRATE 50 MG: 50 TABLET ORAL at 13:52

## 2023-05-21 RX ADMIN — APIXABAN 5 MG: 5 TABLET, FILM COATED ORAL at 20:24

## 2023-05-21 RX ADMIN — DICLOFENAC SODIUM 2 G: 10 GEL TOPICAL at 08:42

## 2023-05-21 RX ADMIN — INSULIN LISPRO 7 UNITS: 100 INJECTION, SOLUTION INTRAVENOUS; SUBCUTANEOUS at 12:52

## 2023-05-21 RX ADMIN — TAMSULOSIN HYDROCHLORIDE 0.4 MG: 0.4 CAPSULE ORAL at 08:42

## 2023-05-21 RX ADMIN — DICLOFENAC SODIUM 2 G: 10 GEL TOPICAL at 17:13

## 2023-05-21 RX ADMIN — METOPROLOL TARTRATE 50 MG: 50 TABLET ORAL at 20:24

## 2023-05-21 NOTE — PLAN OF CARE
Goal Outcome Evaluation:  Plan of Care Reviewed With: patient        Progress: no change  Outcome Evaluation: Patient rested this shift with no c/o discomfort. VSS. SB/SR. RA.  Appetite/PO intake fair.

## 2023-05-21 NOTE — PLAN OF CARE
Goal Outcome Evaluation:  Plan of Care Reviewed With: (P) patient        Progress: (P) no change  Outcome Evaluation: (P) patient bradycardic in upper 40s occasionally with quick return to mid 50s, asymptomatic with bradycardia. No complaints this shift.

## 2023-05-21 NOTE — PROGRESS NOTES
[facility] Medicine Services  PROGRESS NOTE    Patient Name: Chey Robertson  : 1935  MRN: [id number]    Date of Admission: 2023  Primary Care Physician: Corby Marie MD    Subjective   Subjective     CC:  Covid, debility    HPI:  Feels OK today, denies cough    ROS:  Gen: no fever  Pulm: no cough  GI: no nausea  Objective   Objective     Vital Signs:   Temp:  [95.5 °F (35.3 °C)-97.2 °F (36.2 °C)] 96.5 °F (35.8 °C)  Heart Rate:  [53-65] 63  Resp:  [16-18] 18  BP: (136-156)/(58-98) 156/59     Physical Exam:  Constitutional - non toxic, in bed, frail  HEENT-NCAT  CV-RRR  Resp-grossly clear bilaterally  Abd-soft, nontender, nondistended, normoactive bowel sounds  Ext-No lower extremity cyanosis, clubbing or edema bilaterally  Neuro-alert, speech clear  Psych-flat affect   Skin- No rash on exposed UE or LE bilaterally    Results Reviewed:  LAB RESULTS:      Lab 23  0746 23  0454 23  0506 23  0505 23  0404   WBC 2.49* 2.57* 3.39* 3.67 3.18*   HEMOGLOBIN 10.0* 9.7* 9.9* 9.4* 9.5*   HEMATOCRIT 32.7* 30.6* 31.4* 30.3* 31.3*   PLATELETS 134* 123* 150 156 136*   NEUTROS ABS 1.73 1.52* 2.29 3.04 2.70   IMMATURE GRANS (ABS) 0.06* 0.06* 0.04 0.05 0.08*   LYMPHS ABS 0.41* 0.56* 0.54* 0.36* 0.20*   MONOS ABS 0.29 0.43 0.52 0.22 0.20   EOS ABS 0.00 0.00 0.00 0.00 0.00   MCV 91.1 92.4 90.5 91.3 93.2         Lab 23  0746 23  0454 23  0506 23  0505 23  0404 05/15/23  0402   SODIUM 135* 139 138 138 135* 137   POTASSIUM 4.4 4.3 4.6 4.6 4.7 4.7   CHLORIDE 99 102 101 100 97* 99   CO2 26.0 29.0 30.0* 30.0* 27.0 30.0*   ANION GAP 10.0 8.0 7.0 8.0 11.0 8.0   BUN 24* 23 28* 29* 32* 19   CREATININE 0.70 0.88 0.78 0.72 0.83 0.83   EGFR 84.3 64.1 74.1 81.5 68.8 68.8   GLUCOSE 135* 94 107* 176* 308* 222*   CALCIUM 8.2* 8.5* 9.0 8.7 8.7 8.7   MAGNESIUM  --   --   --  1.7 2.0 1.6   PHOSPHORUS  --  3.5 3.8 4.3  --   --          Lab  05/20/23  0454 05/19/23  0506 05/18/23  0505 05/15/23  0402   TOTAL PROTEIN  --   --  5.3* 5.7*   ALBUMIN 2.9* 2.9* 3.1* 3.3*   GLOBULIN  --   --  2.2 2.4   ALT (SGPT)  --   --  6 7   AST (SGOT)  --   --  8 8   BILIRUBIN  --   --  0.3 0.6   ALK PHOS  --   --  65 68                     Brief Urine Lab Results  (Last result in the past 365 days)      Color   Clarity   Blood   Leuk Est   Nitrite   Protein   CREAT   Urine HCG        05/05/23 1752 Yellow   Clear   Negative   Negative   Positive   Negative                 Microbiology Results Abnormal     Procedure Component Value - Date/Time    COVID-19 RAPID AG,VERITOR,COR/FAUSTINO/PAD/BRETT/MAD/ANDREW/LAG/KHRIS/ IN-HOUSE,DRY SWAB, 1-2 HR TAT - Swab, Nasal Cavity [[id number]]  (Normal) Collected: 05/18/23 1239    Lab Status: Final result Specimen: Swab from Nasal Cavity Updated: 05/18/23 1353     COVID19 Presumptive Negative    Narrative:      Fact sheets for providers: https://www.fda.gov/media/621856/download    Fact sheets for patients: https://www.fda.gov/media/825006/download    Blood Culture - Blood, Arm, Right [[id number]]  (Normal) Collected: 05/05/23 1850    Lab Status: Final result Specimen: Blood from Arm, Right Updated: 05/10/23 2015     Blood Culture No growth at 5 days    Blood Culture - Blood, Wrist, Right [[id number]]  (Normal) Collected: 05/05/23 1855    Lab Status: Final result Specimen: Blood from Wrist, Right Updated: 05/10/23 2015     Blood Culture No growth at 5 days    COVID PRE-OP / PRE-PROCEDURE SCREENING ORDER (NO ISOLATION) - Swab, Nasopharynx [[id number]]  (Normal) Collected: 05/05/23 1704    Lab Status: Final result Specimen: Swab from Nasopharynx Updated: 05/05/23 1747    Narrative:      The following orders were created for panel order COVID PRE-OP / PRE-PROCEDURE SCREENING ORDER (NO ISOLATION) - Swab, Nasopharynx.  Procedure                               Abnormality         Status                     ---------                               -----------          ------                     COVID-19 and FLU A/B PCR...[[id number]]  Normal              Final result                 Please view results for these tests on the individual orders.    COVID-19 and FLU A/B PCR - Swab, Nasopharynx [[id number]]  (Normal) Collected: 05/05/23 1704    Lab Status: Final result Specimen: Swab from Nasopharynx Updated: 05/05/23 3108     COVID19 Not Detected     Influenza A PCR Not Detected     Influenza B PCR Not Detected    Narrative:      Fact sheet for providers: https://www.fda.gov/media/653674/download    Fact sheet for patients: https://www.fda.gov/media/817531/download    Test performed by PCR.          No radiology results from the last 24 hrs        Current medications:  Scheduled Meds:amiodarone, 200 mg, Oral, Q12H  apixaban, 5 mg, Oral, Q12H  Diclofenac Sodium, 2 g, Topical, 4x Daily  insulin detemir, 10 Units, Subcutaneous, Nightly  insulin lispro, 0-9 Units, Subcutaneous, TID AC  Insulin Lispro, 7 Units, Subcutaneous, TID With Meals  lactobacillus acidophilus, 1 capsule, Oral, Daily  levothyroxine, 100 mcg, Oral, Q AM  metoprolol tartrate, 50 mg, Oral, Q8H  povidone-iodine, 1 application, Topical, Daily  sodium chloride, 10 mL, Intravenous, Q12H  tamsulosin, 0.4 mg, Oral, Daily      Continuous Infusions:   PRN Meds:.•  acetaminophen  •  benzonatate  •  Calcium Replacement - Follow Nurse / BPA Driven Protocol  •  dextrose  •  dextrose  •  glucagon (human recombinant)  •  guaifenesin  •  hydrOXYzine  •  Magnesium Standard Dose Replacement - Follow Nurse / BPA Driven Protocol  •  melatonin  •  ondansetron **OR** ondansetron  •  phenol  •  Phosphorus Replacement - Follow Nurse / BPA Driven Protocol  •  Potassium Replacement - Follow Nurse / BPA Driven Protocol  •  sodium chloride  •  sodium chloride    Assessment & Plan   Assessment & Plan     Active Hospital Problems    Diagnosis  POA   • **Urinary tract infection, acute [N39.0]  Yes   • Atrial fibrillation with RVR [I48.91]   Unknown   • Chest pain [R07.9]  Unknown   • Elevated troponin [R77.8]  Unknown   • COVID-19 virus infection [U07.1]  Unknown   • Chronic anticoagulation [Z79.01]  Not Applicable   • Essential hypertension [I10]  Yes   • Type 2 diabetes mellitus without complication, with long-term current use of insulin [E11.9, Z79.4]  Not Applicable   • Personal history of pulmonary embolism [Z86.711]  Yes   • Hypothyroidism (acquired) [E03.9]  Yes   • Pancytopenia (HCC) [D61.818]  Yes      Resolved Hospital Problems   No resolved problems to display.        Brief Hospital Course to date:  Chey Robertson is a 87 y.o. female with past medical history of myelodysplasia, chronic pancytopenia, essential hypertension, type 2 diabetes, history of PE (recently switched from warfarin to Eliquis per her cardiologist because of inability to achieve therapeutic INR), GERD who presented to the hospital with generalized weakness, nausea and vomiting found to have urinary tract infection. Since admission, pt found to have COVID19. She completed full course of remdesivir and dexamethasone. 5/14 She had episode of chest pain with new A fib with RVR       Afib RVR, improved rate  Chest Pain secondary to above, resolved  Elevated Troponin  • Patient developed A-fib with RVR 5/16/2023.    • She is known with intermittent episodes of A-fib during previous hospitalization for knee replacement years ago  • Continue metoprolol, eliquis     Acute urinary tract infection, POA  Dehydration volume depletion  Acute on chronic debility  • Urine culture growing pansensitive E. Coli  • Initially started on IV Rocephin and with her worsening neutropenia, she was switched to IV cefepime and finished full course  • ID followed the patient during this hospitalization  • Monitor neutropenia -- ANC 1.7 today  • PT/OT recommended rehab     COVID19 infection - on room air  History of PE  • Status post full course of IV remdesivir and Decadron  • Rapid antigen test  negative, isolation dced 5/18     Myelodysplastic syndrome  Pancytopenia, likely secondary to sepsis  Anemia, likely anemia of chronic disease  • Chronic pancytopenia  • DIC panel negative   • Neutropenic precautions, s/p 1 dose of Neupogen with improvement in ANC  • Follows with Dr. Lyman      Type 2 diabetes  • A1c from end of March 2023 is 6%  •   Essential hypertension  • Continue to hold home Lasix for now, poor po intake  • Continue home Lisinopril and propranolol     Hypothyroidism  • Continue home synthroid     GERD  PPI     Expected Discharge Location and Transportation: working on placement  Expected Discharge   Expected Discharge Date: 5/22/2023; Expected Discharge Time:      DVT prophylaxis:  Medical and mechanical DVT prophylaxis orders are present.     AM-PAC 6 Clicks Score (PT): 17 (05/21/23 0800)    CODE STATUS:   Code Status and Medical Interventions:   Ordered at: 05/05/23 2031     Code Status (Patient has no pulse and is not breathing):    CPR (Attempt to Resuscitate)     Medical Interventions (Patient has pulse or is breathing):    Full Support       Maksim Freeman MD  05/21/23

## 2023-05-22 LAB
GLUCOSE BLDC GLUCOMTR-MCNC: 123 MG/DL (ref 70–130)
GLUCOSE BLDC GLUCOMTR-MCNC: 152 MG/DL (ref 70–130)
GLUCOSE BLDC GLUCOMTR-MCNC: 183 MG/DL (ref 70–130)
GLUCOSE BLDC GLUCOMTR-MCNC: 259 MG/DL (ref 70–130)

## 2023-05-22 PROCEDURE — 63710000001 INSULIN LISPRO (HUMAN) PER 5 UNITS: Performed by: INTERNAL MEDICINE

## 2023-05-22 PROCEDURE — 99232 SBSQ HOSP IP/OBS MODERATE 35: CPT | Performed by: INTERNAL MEDICINE

## 2023-05-22 PROCEDURE — 63710000001 INSULIN DETEMIR PER 5 UNITS: Performed by: INTERNAL MEDICINE

## 2023-05-22 PROCEDURE — 82948 REAGENT STRIP/BLOOD GLUCOSE: CPT

## 2023-05-22 PROCEDURE — 97110 THERAPEUTIC EXERCISES: CPT

## 2023-05-22 PROCEDURE — 63710000001 INSULIN LISPRO (HUMAN) PER 5 UNITS: Performed by: PHYSICIAN ASSISTANT

## 2023-05-22 PROCEDURE — 97116 GAIT TRAINING THERAPY: CPT

## 2023-05-22 RX ORDER — APIXABAN 2.5 MG/1
2.5 TABLET, FILM COATED ORAL 2 TIMES DAILY
Status: ON HOLD | COMMUNITY
Start: 2023-04-06

## 2023-05-22 RX ADMIN — INSULIN LISPRO 7 UNITS: 100 INJECTION, SOLUTION INTRAVENOUS; SUBCUTANEOUS at 08:46

## 2023-05-22 RX ADMIN — AMIODARONE HYDROCHLORIDE 200 MG: 200 TABLET ORAL at 08:45

## 2023-05-22 RX ADMIN — METOPROLOL TARTRATE 50 MG: 50 TABLET ORAL at 20:24

## 2023-05-22 RX ADMIN — AMIODARONE HYDROCHLORIDE 200 MG: 200 TABLET ORAL at 20:24

## 2023-05-22 RX ADMIN — INSULIN DETEMIR 10 UNITS: 100 INJECTION, SOLUTION SUBCUTANEOUS at 20:25

## 2023-05-22 RX ADMIN — METOPROLOL TARTRATE 50 MG: 50 TABLET ORAL at 14:33

## 2023-05-22 RX ADMIN — TAMSULOSIN HYDROCHLORIDE 0.4 MG: 0.4 CAPSULE ORAL at 08:45

## 2023-05-22 RX ADMIN — Medication 1 CAPSULE: at 08:45

## 2023-05-22 RX ADMIN — APIXABAN 5 MG: 5 TABLET, FILM COATED ORAL at 08:45

## 2023-05-22 RX ADMIN — HYDROXYZINE HYDROCHLORIDE 25 MG: 25 TABLET, FILM COATED ORAL at 20:34

## 2023-05-22 RX ADMIN — Medication 5 MG: at 20:34

## 2023-05-22 RX ADMIN — APIXABAN 5 MG: 5 TABLET, FILM COATED ORAL at 20:24

## 2023-05-22 RX ADMIN — INSULIN LISPRO 7 UNITS: 100 INJECTION, SOLUTION INTRAVENOUS; SUBCUTANEOUS at 11:53

## 2023-05-22 RX ADMIN — INSULIN LISPRO 6 UNITS: 100 INJECTION, SOLUTION INTRAVENOUS; SUBCUTANEOUS at 11:53

## 2023-05-22 RX ADMIN — INSULIN LISPRO 2 UNITS: 100 INJECTION, SOLUTION INTRAVENOUS; SUBCUTANEOUS at 08:46

## 2023-05-22 RX ADMIN — DICLOFENAC SODIUM 2 G: 10 GEL TOPICAL at 08:46

## 2023-05-22 RX ADMIN — Medication 10 ML: at 20:25

## 2023-05-22 RX ADMIN — LEVOTHYROXINE SODIUM 100 MCG: 0.1 TABLET ORAL at 05:07

## 2023-05-22 RX ADMIN — POVIDONE-IODINE 1 EACH: 10 SOLUTION TOPICAL at 08:46

## 2023-05-22 NOTE — PLAN OF CARE
Goal Outcome Evaluation:  Plan of Care Reviewed With: patient        Progress: improving  Outcome Evaluation: Patient continues to progress well with PT, limited today by tachycardia with ambulation. She will continue to benefit from skilled IP PT to address weakness, balance deficits, and decreased activity tolerance to support return to independence.

## 2023-05-22 NOTE — THERAPY TREATMENT NOTE
Patient Name: Chey Robertson  : 1935    MRN: [id number]                              Today's Date: 2023       Admit Date: 2023    Visit Dx:     ICD-10-CM ICD-9-CM   1. Urinary tract infection, acute  N39.0 599.0   2. Acute dehydration  E86.0 276.51     Patient Active Problem List   Diagnosis   • Benign familial tremor   • AMS (altered mental status)   • Thrombocytopenia (HCC)   • Pancytopenia (HCC)   • Shortness of breath   • Hypothyroidism (acquired)   • Acute kidney injury   • Splenomegaly   • Hepatomegaly   • Confusion   • B12 deficiency   • Abnormal intestinal absorption   • Iron deficiency anemia due to chronic blood loss   • Myelodysplasia (myelodysplastic syndrome)   • Weakness   • Personal history of pulmonary embolism   • Urinary tract infection, acute   • Chronic anticoagulation   • Essential hypertension   • Type 2 diabetes mellitus without complication, with long-term current use of insulin   • COVID-19 virus infection   • Atrial fibrillation with RVR   • Chest pain   • Elevated troponin     Past Medical History:   Diagnosis Date   • Anemia    • Arthritis    • Diabetes mellitus     checks sugar only when pt wants to    • Disease of thyroid gland    • History of transfusion     with knee surgery-  no reaction recalled.    • Koi (hard of hearing)     no hearing aids   • Hypertension    • Migraine without aura and without status migrainosus, not intractable 2016   • Pulmonary emboli     (after knee surgery)   • SOBOE (shortness of breath on exertion)    • Tremors of nervous system    • Vertigo    • Wears dentures     upper    • Wears glasses      Past Surgical History:   Procedure Laterality Date   • BLADDER SURGERY     • CATARACT EXTRACTION      bilat    • CHOLECYSTECTOMY     • COLONOSCOPY     • HYSTERECTOMY     • KYPHOPLASTY N/A 2021    Procedure: KYPHOPLASTY T11, T12 AND L4;  Surgeon: Matty Hearn MD;  Location: [facility];  Service: Orthopedic Spine;  Laterality: N/A;    • OOPHORECTOMY     • TONSILLECTOMY        General Information     Row Name 05/22/23 1416          Physical Therapy Time and Intention    Document Type therapy note (daily note)  -NS     Mode of Treatment individual therapy;physical therapy  -NS     [facility] Name 05/22/23 [facility]6          General Information    Patient Profile Reviewed yes  -NS     Existing Precautions/Restrictions fall  -NS     Row Name 05/22/23 1416          Cognition    Orientation Status (Cognition) oriented x 3  -NS     [facility] Name 05/22/23 1416          Safety Issues, Functional Mobility    Safety Issues Affecting Function (Mobility) sequencing abilities  -NS     Impairments Affecting Function (Mobility) balance;endurance/activity tolerance;strength;shortness of breath  -NS           User Key  (r) = Recorded By, (t) = Taken By, (c) = Cosigned By    Initials Name Provider Type    NS Gianna Burger PT Physical Therapist               Mobility     [facility] Name 05/22/23 1416          Bed Mobility    Bed Mobility supine-sit  -NS     Supine-Sit [place] (Bed Mobility) verbal cues  -NS     Assistive Device (Bed Mobility) bed rails;head of bed elevated  -NS     Row Name 05/22/23 1416          Transfers    Comment, (Transfers) Appropriate hand placement.  -NS     [facility] Name 05/22/23 1416          Sit-Stand Transfer    Sit-Stand [place] (Transfers) contact guard  -NS     Assistive Device (Sit-Stand Transfers) walker, front-wheeled  -NS     [facility] Name 05/22/23 1416          Gait/Stairs (Locomotion)    [place] Level (Gait) contact guard  -NS     Assistive Device (Gait) walker, front-wheeled  -NS     Distance in Feet (Gait) 96  -NS     Deviations/Abnormal Patterns (Gait) carlton decreased;gait speed decreased;stride length decreased  -NS     Bilateral Gait Deviations forward flexed posture;heel strike decreased  -NS     Comment, (Gait/Stairs) Patient motivated to increase her ambulation distance, requiring cues for staying close to RW for safety. Note HR  increase with ambulation- highest noted to be 156bpm. Pt remained with elevated HR upon rest, ranging between 130-141bpm. RN present and provided pt with medication.  -NS           User Key  (r) = Recorded By, (t) = Taken By, (c) = Cosigned By    Initials Name Provider Type    Gianna Cheek PT Physical Therapist               Obj/Interventions     Row Name 05/22/23 [facility]6          Motor Skills    Therapeutic Exercise hip;knee;ankle  -NS     Row Name 05/22/23 [facility]6          Hip (Therapeutic Exercise)    Hip (Therapeutic Exercise) strengthening exercise  -NS     Hip Strengthening (Therapeutic Exercise) bilateral;external rotation;internal rotation;heel slides;10 repetitions  -NS     Row Name 05/22/23 [facility]6          Knee (Therapeutic Exercise)    Knee (Therapeutic Exercise) strengthening exercise  -NS     Knee Strengthening (Therapeutic Exercise) bilateral;SLR (straight leg raise);10 repetitions  -NS     Row Name 05/22/23 [facility]6          Ankle (Therapeutic Exercise)    Ankle (Therapeutic Exercise) AROM (active range of motion)  -NS     Ankle AROM (Therapeutic Exercise) bilateral;dorsiflexion;plantarflexion;10 repetitions  -NS     Row Name 05/22/23 [facility]6          Balance    Balance Assessment sitting static balance;sitting dynamic balance;standing static balance;standing dynamic balance  -NS     Static Sitting Balance standby assist  -NS     Dynamic Sitting Balance standby assist  -NS     Position, Sitting Balance unsupported;sitting edge of bed  -NS     Static Standing Balance contact guard  -NS     Dynamic Standing Balance contact guard  -NS     Position/Device Used, Standing Balance supported;walker, front-wheeled  -NS     Comment, Balance Plan to complete additional standing balance challenges but deferred due to tachycardia.  -NS           User Key  (r) = Recorded By, (t) = Taken By, (c) = Cosigned By    Initials Name Provider Type    Gianna Cheek PT Physical Therapist               Goals/Plan    No  documentation.                Clinical Impression     [facility] Name 05/22/23 1416          Pain    Pretreatment Pain Rating 0/10 - no pain  -NS     Posttreatment Pain Rating 0/10 - no pain  -NS     Pre/Posttreatment Pain Comment No pain reported with any activity.  -NS     Row Name 05/22/23 1416          Plan of Care Review    Plan of Care Reviewed With patient  -NS     Progress improving  -NS     Outcome Evaluation Patient continues to progress well with PT, limited today by tachycardia with ambulation. She will continue to benefit from skilled IP PT to address weakness, balance deficits, and decreased activity tolerance to support return to independence.  -NS     [facility] Name 05/22/23 1416          Vital Signs    Post Systolic BP Rehab 150  -NS     Post Treatment Diastolic BP 80  -NS     Pretreatment Heart Rate (beats/min) 74  -NS     Intratreatment Heart Rate (beats/min) 156   -NS     Posttreatment Heart Rate (beats/min) 133  -NS     Pre SpO2 (%) 95  -NS     O2 Delivery Pre Treatment room air  -NS     Post SpO2 (%) 97  -NS     O2 Delivery Post Treatment room air  -NS     Pre Patient Position Supine  -NS     Intra Patient Position Standing  -NS     Post Patient Position Sitting  -NS     Row Name 05/22/23 1416          Positioning and Restraints    Pre-Treatment Position in bed  -NS     Post Treatment Position chair  -NS     In Chair notified nsg;reclined;call light within reach;encouraged to call for assist;exit alarm on;legs elevated;waffle cushion;heels elevated  -NS           User Key  (r) = Recorded By, (t) = Taken By, (c) = Cosigned By    Initials Name Provider Type    Gianna Cheek, PT Physical Therapist               Outcome Measures     [facility] Name 05/22/23 1416          How much help from another person do you currently need...    Turning from your back to your side while in flat bed without using bedrails? 3  -NS     Moving from lying on back to sitting on the side of a flat bed without bedrails? 3  -NS      Moving to and from a bed to a chair (including a wheelchair)? 3  -NS     Standing up from a chair using your arms (e.g., wheelchair, bedside chair)? 3  -NS     Climbing 3-5 steps with a railing? 2  -NS     To walk in hospital room? 3  -NS     AM-PAC 6 Clicks Score (PT) 17  -NS     Highest level of mobility 5 --> Static standing  -NS     Row Name 05/22/23 1416          Functional Assessment    Outcome Measure Options AM-PAC 6 Clicks Basic Mobility (PT)  -NS           User Key  (r) = Recorded By, (t) = Taken By, (c) = Cosigned By    Initials Name Provider Type    Gianna Cheek PT Physical Therapist                             Physical Therapy Education     Title: PT OT SLP Therapies (In Progress)     Topic: Physical Therapy (Done)     Point: Mobility training (Done)     Learning Progress Summary           Patient Acceptance, E, VU by NS at 5/22/2023 1514    Acceptance, E, VU,NR by NS at 5/18/2023 1009    Comment: benefits of OOB activity    Eager, E, VU,DU by SD at 5/13/2023 1634    Acceptance, E, VU,NR by ML at 5/10/2023 1508    Acceptance, E,TB, VU by BT at 5/9/2023 1542    Acceptance, E,D, VU,NR by MB at 5/8/2023 1441                   Point: Home exercise program (Done)     Learning Progress Summary           Patient Acceptance, E, VU by NS at 5/22/2023 1514    Eager, E, VU,DU by SD at 5/13/2023 1634    Acceptance, E, VU,NR by ML at 5/10/2023 1508    Acceptance, E,TB, VU by BT at 5/9/2023 1542    Acceptance, E,D, VU,NR by MB at 5/8/2023 1441                   Point: Body mechanics (Done)     Learning Progress Summary           Patient Acceptance, E, VU by NS at 5/22/2023 1514    Acceptance, E, VU,NR by NS at 5/18/2023 1009    Comment: benefits of OOB activity    Eager, E, VU,DU by SD at 5/13/2023 1634    Acceptance, E,TB, VU by BT at 5/9/2023 1542    Acceptance, E,D, VU,NR by MB at 5/8/2023 1449                   Point: Precautions (Done)     Learning Progress Summary           Patient Acceptance, E, VU by NS  at 5/22/2023 1514    Acceptance, E, VU,NR by NS at 5/18/2023 1009    Comment: benefits of OOB activity    Eager, E, VU,DU by SD at 5/13/2023 1634    Acceptance, E, VU,NR by ML at 5/10/2023 1508    Acceptance, E,TB, VU by BT at 5/9/2023 1542    Acceptance, E,D, VU,NR by MB at 5/8/2023 1441                               User Key     Initials Effective Dates Name Provider Type Discipline    SD 03/13/23 -  Eloise Dutton, PT Physical Therapist PT    MB 06/16/21 -  Brooke Marshall PT Physical Therapist PT    BT 12/30/20 -  Marly Cordero, RN Registered Nurse Nurse    NS 06/16/21 -  Gianna Burger, PT Physical Therapist PT     04/22/21 -  Marybeth Arias Physical Therapist PT              PT Recommendation and Plan  Planned Therapy Interventions (PT): balance training, bed mobility training, gait training, home exercise program, neuromuscular re-education, patient/family education, strengthening, transfer training  Plan of Care Reviewed With: patient  Progress: improving  Outcome Evaluation: Patient continues to progress well with PT, limited today by tachycardia with ambulation. She will continue to benefit from skilled IP PT to address weakness, balance deficits, and decreased activity tolerance to support return to independence.     Time Calculation:    PT Charges     Row Name 05/22/23 1416             Time Calculation    Start Time 1416  -NS      PT Received On 05/22/23  -NS      PT Goal Re-Cert Due Date 05/28/23  -NS         Timed Charges    90564 - PT Therapeutic Exercise Minutes 8  -NS      92571 - Gait Training Minutes  17  -NS         Total Minutes    Timed Charges Total Minutes 25  -NS       Total Minutes 25  -NS            User Key  (r) = Recorded By, (t) = Taken By, (c) = Cosigned By    Initials Name Provider Type    Gianna Cheek, PT Physical Therapist              Therapy Charges for Today     Code Description Service Date Service Provider Modifiers Qty    59974857652 HC GAIT TRAINING EA 15 MIN  5/22/2023 Gianna Burger, PT GP 1    88697050467 HC PT THER PROC EA 15 MIN 5/22/2023 Gianna Burger, PT GP 1          PT G-Codes  Outcome Measure Options: AM-PAC 6 Clicks Basic Mobility (PT)  AM-PAC 6 Clicks Score (PT): 17  AM-PAC 6 Clicks Score (OT): 18  PT Discharge Summary  Anticipated Discharge Disposition (PT): skilled nursing facility    Gianna Burger PT  5/22/2023

## 2023-05-22 NOTE — PROGRESS NOTES
[facility] Medicine Services  PROGRESS NOTE    Patient Name: Chey Robertson  : 1935  MRN: [id number]    Date of Admission: 2023  Primary Care Physician: Corby Marie MD    Subjective   Subjective     CC:  Covid, debility    HPI:  No complaints, denies cough or fever    ROS:  Gen: no fever  Objective   Objective     Vital Signs:   Temp:  [96 °F (35.6 °C)-97.4 °F (36.3 °C)] 97.4 °F (36.3 °C)  Heart Rate:  [52-94] 94  Resp:  [16-18] 16  BP: (117-138)/(54-78) 117/78     Physical Exam:  Constitutional - frail  HEENT-NCAT, mucous membranes moist  CV-RRR  Resp-CTAB  Abd-soft, nontender, nondistended, normoactive bowel sounds  Ext-No lower extremity cyanosis, clubbing or edema bilaterally  Neuro-alert    Psych-normal affect   Skin- No rash on exposed UE or LE bilaterally      Results Reviewed:  LAB RESULTS:      Lab 23  0746 23  0454 23  0506 23  0505 23  0404   WBC 2.49* 2.57* 3.39* 3.67 3.18*   HEMOGLOBIN 10.0* 9.7* 9.9* 9.4* 9.5*   HEMATOCRIT 32.7* 30.6* 31.4* 30.3* 31.3*   PLATELETS 134* 123* 150 156 136*   NEUTROS ABS 1.73 1.52* 2.29 3.04 2.70   IMMATURE GRANS (ABS) 0.06* 0.06* 0.04 0.05 0.08*   LYMPHS ABS 0.41* 0.56* 0.54* 0.36* 0.20*   MONOS ABS 0.29 0.43 0.52 0.22 0.20   EOS ABS 0.00 0.00 0.00 0.00 0.00   MCV 91.1 92.4 90.5 91.3 93.2         Lab 23  0746 23  0454 23  0506 23  0505 23  0404   SODIUM 135* 139 138 138 135*   POTASSIUM 4.4 4.3 4.6 4.6 4.7   CHLORIDE 99 102 101 100 97*   CO2 26.0 29.0 30.0* 30.0* 27.0   ANION GAP 10.0 8.0 7.0 8.0 11.0   BUN 24* 23 28* 29* 32*   CREATININE 0.70 0.88 0.78 0.72 0.83   EGFR 84.3 64.1 74.1 81.5 68.8   GLUCOSE 135* 94 107* 176* 308*   CALCIUM 8.2* 8.5* 9.0 8.7 8.7   MAGNESIUM  --   --   --  1.7 2.0   PHOSPHORUS  --  3.5 3.8 4.3  --          Lab 23  0454 23  0506 23  0505   TOTAL PROTEIN  --   --  5.3*   ALBUMIN 2.9* 2.9* 3.1*   GLOBULIN  --   --  2.2    ALT (SGPT)  --   --  6   AST (SGOT)  --   --  8   BILIRUBIN  --   --  0.3   ALK PHOS  --   --  65                     Brief Urine Lab Results  (Last result in the past 365 days)      Color   Clarity   Blood   Leuk Est   Nitrite   Protein   CREAT   Urine HCG        05/05/23 1752 Yellow   Clear   Negative   Negative   Positive   Negative                 Microbiology Results Abnormal     Procedure Component Value - Date/Time    COVID-19 RAPID AG,VERITOR,COR/FAUSTINO/PAD/BRETT/MAD/ANDREW/LAG/KHRIS/ IN-HOUSE,DRY SWAB, 1-2 HR TAT - Swab, Nasal Cavity [[id number]]  (Normal) Collected: 05/18/23 1239    Lab Status: Final result Specimen: Swab from Nasal Cavity Updated: 05/18/23 1353     COVID19 Presumptive Negative    Narrative:      Fact sheets for providers: https://www.fda.gov/media/710334/download    Fact sheets for patients: https://www.fda.gov/media/591515/download    Blood Culture - Blood, Arm, Right [[id number]]  (Normal) Collected: 05/05/23 1850    Lab Status: Final result Specimen: Blood from Arm, Right Updated: 05/10/23 2015     Blood Culture No growth at 5 days    Blood Culture - Blood, Wrist, Right [[id number]]  (Normal) Collected: 05/05/23 1855    Lab Status: Final result Specimen: Blood from Wrist, Right Updated: 05/10/23 2015     Blood Culture No growth at 5 days    COVID PRE-OP / PRE-PROCEDURE SCREENING ORDER (NO ISOLATION) - Swab, Nasopharynx [[id number]]  (Normal) Collected: 05/05/23 1704    Lab Status: Final result Specimen: Swab from Nasopharynx Updated: 05/05/23 1747    Narrative:      The following orders were created for panel order COVID PRE-OP / PRE-PROCEDURE SCREENING ORDER (NO ISOLATION) - Swab, Nasopharynx.  Procedure                               Abnormality         Status                     ---------                               -----------         ------                     COVID-19 and FLU A/B PCR...[[id number]]  Normal              Final result                 Please view results for these tests on the  individual orders.    COVID-19 and FLU A/B PCR - Swab, Nasopharynx [[id number]]  (Normal) Collected: 05/05/23 1704    Lab Status: Final result Specimen: Swab from Nasopharynx Updated: 05/05/23 1747     COVID19 Not Detected     Influenza A PCR Not Detected     Influenza B PCR Not Detected    Narrative:      Fact sheet for providers: https://www.fda.gov/media/056144/download    Fact sheet for patients: https://www.fda.gov/media/562267/download    Test performed by PCR.          No radiology results from the last 24 hrs        Current medications:  Scheduled Meds:amiodarone, 200 mg, Oral, Q12H  apixaban, 5 mg, Oral, Q12H  Diclofenac Sodium, 2 g, Topical, 4x Daily  insulin detemir, 10 Units, Subcutaneous, Nightly  insulin lispro, 0-9 Units, Subcutaneous, TID AC  Insulin Lispro, 7 Units, Subcutaneous, TID With Meals  lactobacillus acidophilus, 1 capsule, Oral, Daily  levothyroxine, 100 mcg, Oral, Q AM  metoprolol tartrate, 50 mg, Oral, Q8H  povidone-iodine, 1 application, Topical, Daily  sodium chloride, 10 mL, Intravenous, Q12H  tamsulosin, 0.4 mg, Oral, Daily      Continuous Infusions:   PRN Meds:.•  acetaminophen  •  benzonatate  •  Calcium Replacement - Follow Nurse / BPA Driven Protocol  •  dextrose  •  dextrose  •  glucagon (human recombinant)  •  guaifenesin  •  hydrOXYzine  •  Magnesium Standard Dose Replacement - Follow Nurse / BPA Driven Protocol  •  melatonin  •  ondansetron **OR** ondansetron  •  phenol  •  Phosphorus Replacement - Follow Nurse / BPA Driven Protocol  •  Potassium Replacement - Follow Nurse / BPA Driven Protocol  •  sodium chloride  •  sodium chloride    Assessment & Plan   Assessment & Plan     Active Hospital Problems    Diagnosis  POA   • **Urinary tract infection, acute [N39.0]  Yes   • Atrial fibrillation with RVR [I48.91]  Unknown   • Chest pain [R07.9]  Unknown   • Elevated troponin [R77.8]  Unknown   • COVID-19 virus infection [U07.1]  Unknown   • Chronic anticoagulation [Z79.01]  Not  Applicable   • Essential hypertension [I10]  Yes   • Type 2 diabetes mellitus without complication, with long-term current use of insulin [E11.9, Z79.4]  Not Applicable   • Personal history of pulmonary embolism [Z86.711]  Yes   • Hypothyroidism (acquired) [E03.9]  Yes   • Pancytopenia (HCC) [D61.818]  Yes      Resolved Hospital Problems   No resolved problems to display.        Brief Hospital Course to date:  Chey Robertson is a 87 y.o. female with past medical history of myelodysplasia, chronic pancytopenia, essential hypertension, type 2 diabetes, history of PE (recently switched from warfarin to Eliquis per her cardiologist because of inability to achieve therapeutic INR), GERD who presented to the hospital with generalized weakness, nausea and vomiting found to have urinary tract infection. Since admission, pt found to have COVID19. She completed full course of remdesivir and dexamethasone. 5/14 She had episode of chest pain with new A fib with RVR       Afib RVR, improved rate  Chest Pain secondary to above, resolved  Elevated Troponin  • Patient developed A-fib with RVR 5/16/2023.    • She is known with intermittent episodes of A-fib during previous hospitalization for knee replacement years ago  • Continue metoprolol, eliquis     Acute urinary tract infection, POA  Dehydration volume depletion  Acute on chronic debility  • Urine culture growing pansensitive E. Coli  • Initially started on IV Rocephin and with her worsening neutropenia, she was switched to IV cefepime and finished full course  • ID followed the patient during this hospitalization  • Monitor neutropenia - cbc am  • PT/OT recommended rehab     COVID19 infection - on room air  History of PE  • Status post full course of IV remdesivir and Decadron  • Rapid antigen test negative, isolation dced 5/18     Myelodysplastic syndrome  Pancytopenia, likely secondary to sepsis  Anemia, likely anemia of chronic disease  • Chronic pancytopenia  • DIC panel  negative   • Neutropenic precautions, s/p 1 dose of Neupogen with improvement in ANC  • Follows with Dr. Lyman      Type 2 diabetes  • A1c from end of March 2023 is 6%  •   Essential hypertension  • Continue to hold home Lasix for now, poor po intake  • Continue home Lisinopril and propranolol     Hypothyroidism  • Continue home synthroid     GERD  PPI     Expected Discharge Location and Transportation: working on placement  Expected Discharge   Expected Discharge Date: 5/22/2023; Expected Discharge Time:      DVT prophylaxis:  Medical and mechanical DVT prophylaxis orders are present.     AM-PAC 6 Clicks Score (PT): 17 (05/22/23 1416)    CODE STATUS:   Code Status and Medical Interventions:   Ordered at: 05/05/23 2031     Code Status (Patient has no pulse and is not breathing):    CPR (Attempt to Resuscitate)     Medical Interventions (Patient has pulse or is breathing):    Full Support       Maksim Freeamn MD  05/22/23

## 2023-05-22 NOTE — CASE MANAGEMENT/SOCIAL WORK
Continued Stay Note  [facility]     Patient Name: Chey Robertson  MRN: [id number]  Today's Date: 5/22/2023    Admit Date: 5/5/2023    Plan: [facility]   Discharge Plan     Row Name 05/22/23 1536       Plan    Plan [facility]    Plan Comments PA declined to do P2P.  Spoke with patient in room and her sons by phone.  They would like to do patient appeal.  Updated [place] at [facility].  AOR form completed.  Son completed appeal by phone and clinicals faxed to [phone].  Ref# charted in handoff.  Patient's plan is a skilled bed at [facility] PENDING outcome of appeal.  CM will continue to follow.    Final Discharge Disposition Code 03 - skilled nursing facility (SNF)               Discharge Codes    No documentation.               Expected Discharge Date and Time     Expected Discharge Date Expected Discharge Time    May 22, 2023             Diana Navarro RN

## 2023-05-22 NOTE — PLAN OF CARE
Problem: Adult Inpatient Plan of Care  Goal: Plan of Care Review  Outcome: Ongoing, Progressing  Goal: Patient-Specific Goal (Individualized)  Outcome: Ongoing, Progressing  Goal: Absence of Hospital-Acquired Illness or Injury  Outcome: Ongoing, Progressing  Intervention: Identify and Manage Fall Risk  Recent Flowsheet Documentation  Taken 5/22/2023 0400 by Raissa Johnson, RN  Safety Promotion/Fall Prevention:   safety round/check completed   room organization consistent   fall prevention program maintained   clutter free environment maintained   assistive device/personal items within reach  Taken 5/22/2023 0200 by Raissa Johnson, RN  Safety Promotion/Fall Prevention:   safety round/check completed   room organization consistent   nonskid shoes/slippers when out of bed   assistive device/personal items within reach   clutter free environment maintained  Taken 5/22/2023 0000 by Raissa Johnson, RN  Safety Promotion/Fall Prevention:   safety round/check completed   room organization consistent   nonskid shoes/slippers when out of bed   lighting adjusted   fall prevention program maintained   clutter free environment maintained   assistive device/personal items within reach   activity supervised  Taken 5/21/2023 2200 by Raissa Johnson, RN  Safety Promotion/Fall Prevention:   safety round/check completed   room organization consistent   nonskid shoes/slippers when out of bed   lighting adjusted   fall prevention program maintained   clutter free environment maintained   assistive device/personal items within reach   activity supervised  Taken 5/21/2023 2000 by Raissa Johnson, RN  Safety Promotion/Fall Prevention:   safety round/check completed   room organization consistent   nonskid shoes/slippers when out of bed   lighting adjusted   fall prevention program maintained   clutter free environment maintained   assistive device/personal items within reach   activity supervised  Intervention: Prevent Skin Injury  Recent Flowsheet  Documentation  Taken 5/22/2023 0400 by Raissa Johnson RN  Body Position: position changed independently  Skin Protection: adhesive use limited  Taken 5/22/2023 0200 by Raissa Johnson RN  Body Position: position changed independently  Skin Protection: adhesive use limited  Taken 5/22/2023 0000 by Raissa Johnson RN  Body Position: position changed independently  Skin Protection: adhesive use limited  Taken 5/21/2023 2200 by Raissa Johnson RN  Body Position: position changed independently  Skin Protection: adhesive use limited  Taken 5/21/2023 2000 by Raissa Johnson RN  Body Position: position changed independently  Skin Protection:   adhesive use limited   incontinence pads utilized  Intervention: Prevent and Manage VTE (Venous Thromboembolism) Risk  Recent Flowsheet Documentation  Taken 5/22/2023 0400 by Raissa Johnson RN  Activity Management: activity encouraged  Taken 5/22/2023 0200 by Raissa Johnson RN  Activity Management: activity encouraged  Taken 5/22/2023 0000 by Raissa Johnson RN  Activity Management: activity encouraged  Taken 5/21/2023 2200 by Raissa Johnson RN  Activity Management: activity encouraged  Taken 5/21/2023 2000 by Raissa Johnson RN  Activity Management: activity encouraged  Intervention: Prevent Infection  Recent Flowsheet Documentation  Taken 5/22/2023 0400 by Raissa Johnson RN  Infection Prevention: environmental surveillance performed  Taken 5/22/2023 0200 by Raissa Johnson RN  Infection Prevention: environmental surveillance performed  Taken 5/22/2023 0000 by Raissa Johnson RN  Infection Prevention: environmental surveillance performed  Taken 5/21/2023 2200 by Raissa Johnson RN  Infection Prevention: environmental surveillance performed  Taken 5/21/2023 2000 by Raissa Johnson RN  Infection Prevention: environmental surveillance performed  Goal: Optimal Comfort and Wellbeing  Outcome: Ongoing, Progressing  Intervention: Provide Person-Centered Care  Recent Flowsheet Documentation  Taken 5/21/2023 2000 by  Raissa Johnson, RN  Trust Relationship/Rapport:   care explained   choices provided   emotional support provided   questions answered   questions encouraged   reassurance provided   thoughts/feelings acknowledged  Goal: Readiness for Transition of Care  Outcome: Ongoing, Progressing   Goal Outcome Evaluation:                 Pt anticipates discharge to [facility] today.  AM metoprolol dose held for HR less than 55. No c/o overnight.

## 2023-05-22 NOTE — PLAN OF CARE
Goal Outcome Evaluation:     Had a good day. Up in chair awhile. Ambulated with assist in room. No complaints/concerns voiced by pt. Eager to discharge to SNF asap.

## 2023-05-23 LAB
ANION GAP SERPL CALCULATED.3IONS-SCNC: 5 MMOL/L (ref 5–15)
BASOPHILS # BLD MANUAL: 0 10*3/MM3 (ref 0–0.2)
BASOPHILS NFR BLD MANUAL: 0 % (ref 0–1.5)
BUN SERPL-MCNC: 22 MG/DL (ref 8–23)
BUN/CREAT SERPL: 30.6 (ref 7–25)
CALCIUM SPEC-SCNC: 8.7 MG/DL (ref 8.6–10.5)
CHLORIDE SERPL-SCNC: 103 MMOL/L (ref 98–107)
CO2 SERPL-SCNC: 30 MMOL/L (ref 22–29)
CREAT SERPL-MCNC: 0.72 MG/DL (ref 0.57–1)
DEPRECATED RDW RBC AUTO: 50.5 FL (ref 37–54)
EGFRCR SERPLBLD CKD-EPI 2021: 81.5 ML/MIN/1.73
EOSINOPHIL # BLD MANUAL: 0 10*3/MM3 (ref 0–0.4)
EOSINOPHIL NFR BLD MANUAL: 0 % (ref 0.3–6.2)
ERYTHROCYTE [DISTWIDTH] IN BLOOD BY AUTOMATED COUNT: 15.4 % (ref 12.3–15.4)
GLUCOSE BLDC GLUCOMTR-MCNC: 112 MG/DL (ref 70–130)
GLUCOSE BLDC GLUCOMTR-MCNC: 125 MG/DL (ref 70–130)
GLUCOSE BLDC GLUCOMTR-MCNC: 126 MG/DL (ref 70–130)
GLUCOSE BLDC GLUCOMTR-MCNC: 235 MG/DL (ref 70–130)
GLUCOSE SERPL-MCNC: 105 MG/DL (ref 65–99)
HCT VFR BLD AUTO: 31 % (ref 34–46.6)
HGB BLD-MCNC: 10 G/DL (ref 12–15.9)
LYMPHOCYTES # BLD MANUAL: 0.66 10*3/MM3 (ref 0.7–3.1)
LYMPHOCYTES NFR BLD MANUAL: 16 % (ref 5–12)
MCH RBC QN AUTO: 29.2 PG (ref 26.6–33)
MCHC RBC AUTO-ENTMCNC: 32.3 G/DL (ref 31.5–35.7)
MCV RBC AUTO: 90.6 FL (ref 79–97)
MONOCYTES # BLD: 0.35 10*3/MM3 (ref 0.1–0.9)
NEUTROPHILS # BLD AUTO: 1.19 10*3/MM3 (ref 1.7–7)
NEUTROPHILS NFR BLD MANUAL: 52 % (ref 42.7–76)
NEUTS BAND NFR BLD MANUAL: 2 % (ref 0–5)
PLAT MORPH BLD: NORMAL
PLATELET # BLD AUTO: 99 10*3/MM3 (ref 140–450)
PMV BLD AUTO: 9.4 FL (ref 6–12)
POTASSIUM SERPL-SCNC: 4.4 MMOL/L (ref 3.5–5.2)
RBC # BLD AUTO: 3.42 10*6/MM3 (ref 3.77–5.28)
RBC MORPH BLD: NORMAL
SODIUM SERPL-SCNC: 138 MMOL/L (ref 136–145)
VARIANT LYMPHS NFR BLD MANUAL: 1 % (ref 0–5)
VARIANT LYMPHS NFR BLD MANUAL: 29 % (ref 19.6–45.3)
WBC MORPH BLD: NORMAL
WBC NRBC COR # BLD: 2.21 10*3/MM3 (ref 3.4–10.8)

## 2023-05-23 PROCEDURE — 82948 REAGENT STRIP/BLOOD GLUCOSE: CPT

## 2023-05-23 PROCEDURE — 99232 SBSQ HOSP IP/OBS MODERATE 35: CPT | Performed by: INTERNAL MEDICINE

## 2023-05-23 PROCEDURE — 63710000001 INSULIN LISPRO (HUMAN) PER 5 UNITS: Performed by: INTERNAL MEDICINE

## 2023-05-23 PROCEDURE — 85007 BL SMEAR W/DIFF WBC COUNT: CPT | Performed by: INTERNAL MEDICINE

## 2023-05-23 PROCEDURE — 97530 THERAPEUTIC ACTIVITIES: CPT

## 2023-05-23 PROCEDURE — 80048 BASIC METABOLIC PNL TOTAL CA: CPT | Performed by: INTERNAL MEDICINE

## 2023-05-23 PROCEDURE — 85025 COMPLETE CBC W/AUTO DIFF WBC: CPT | Performed by: INTERNAL MEDICINE

## 2023-05-23 PROCEDURE — 63710000001 INSULIN DETEMIR PER 5 UNITS: Performed by: INTERNAL MEDICINE

## 2023-05-23 PROCEDURE — 97535 SELF CARE MNGMENT TRAINING: CPT

## 2023-05-23 PROCEDURE — 63710000001 INSULIN LISPRO (HUMAN) PER 5 UNITS: Performed by: PHYSICIAN ASSISTANT

## 2023-05-23 RX ADMIN — DICLOFENAC SODIUM 2 G: 10 GEL TOPICAL at 17:47

## 2023-05-23 RX ADMIN — INSULIN LISPRO 7 UNITS: 100 INJECTION, SOLUTION INTRAVENOUS; SUBCUTANEOUS at 08:05

## 2023-05-23 RX ADMIN — APIXABAN 5 MG: 5 TABLET, FILM COATED ORAL at 08:05

## 2023-05-23 RX ADMIN — METOPROLOL TARTRATE 50 MG: 50 TABLET ORAL at 21:32

## 2023-05-23 RX ADMIN — Medication 10 ML: at 08:42

## 2023-05-23 RX ADMIN — DICLOFENAC SODIUM 2 G: 10 GEL TOPICAL at 12:10

## 2023-05-23 RX ADMIN — DICLOFENAC SODIUM 2 G: 10 GEL TOPICAL at 21:32

## 2023-05-23 RX ADMIN — Medication 1 CAPSULE: at 08:05

## 2023-05-23 RX ADMIN — INSULIN DETEMIR 10 UNITS: 100 INJECTION, SOLUTION SUBCUTANEOUS at 21:33

## 2023-05-23 RX ADMIN — INSULIN LISPRO 7 UNITS: 100 INJECTION, SOLUTION INTRAVENOUS; SUBCUTANEOUS at 17:47

## 2023-05-23 RX ADMIN — LEVOTHYROXINE SODIUM 100 MCG: 0.1 TABLET ORAL at 05:19

## 2023-05-23 RX ADMIN — DICLOFENAC SODIUM 2 G: 10 GEL TOPICAL at 08:06

## 2023-05-23 RX ADMIN — Medication 5 MG: at 21:32

## 2023-05-23 RX ADMIN — INSULIN LISPRO 4 UNITS: 100 INJECTION, SOLUTION INTRAVENOUS; SUBCUTANEOUS at 17:47

## 2023-05-23 RX ADMIN — AMIODARONE HYDROCHLORIDE 200 MG: 200 TABLET ORAL at 21:32

## 2023-05-23 RX ADMIN — AMIODARONE HYDROCHLORIDE 200 MG: 200 TABLET ORAL at 08:05

## 2023-05-23 RX ADMIN — METOPROLOL TARTRATE 50 MG: 50 TABLET ORAL at 14:30

## 2023-05-23 RX ADMIN — POVIDONE-IODINE 1 EACH: 10 SOLUTION TOPICAL at 08:05

## 2023-05-23 RX ADMIN — HYDROXYZINE HYDROCHLORIDE 25 MG: 25 TABLET, FILM COATED ORAL at 21:32

## 2023-05-23 RX ADMIN — TAMSULOSIN HYDROCHLORIDE 0.4 MG: 0.4 CAPSULE ORAL at 08:06

## 2023-05-23 RX ADMIN — INSULIN LISPRO 7 UNITS: 100 INJECTION, SOLUTION INTRAVENOUS; SUBCUTANEOUS at 12:10

## 2023-05-23 RX ADMIN — APIXABAN 5 MG: 5 TABLET, FILM COATED ORAL at 21:32

## 2023-05-23 NOTE — PROGRESS NOTES
[facility] Medicine Services  PROGRESS NOTE    Patient Name: Chey Robertson  : 1935  MRN: [id number]    Date of Admission: 2023  Primary Care Physician: Corby Marie MD    Subjective   Subjective     CC:  Covid, debility    HPI:  No cough or fever    ROS:  Gen: no fever  Pulm: no cough  GI: no nausea  Objective   Objective     Vital Signs:   Temp:  [96.5 °F (35.8 °C)-98.3 °F (36.8 °C)] 97.4 °F (36.3 °C)  Heart Rate:  [53-75] 67  Resp:  [16-18] 18  BP: (122-163)/(55-66) 135/61     Physical Exam:  Constitutional - frail, up in chair  HEENT-NCAT, mucous membranes moist  CV-RRR  Resp-CTAB  Abd-soft, nontender, nondistended, normoactive bowel sounds  Ext-No lower extremity cyanosis, clubbing or edema bilaterally  Neuro-alert, speech clear  Psych-normal affect   Skin- No rash on exposed UE or LE bilaterally      Results Reviewed:  LAB RESULTS:      Lab 23  0529 23  0746 23  0454 23  0506 23  0505 23  0404   WBC 2.21* 2.49* 2.57* 3.39* 3.67 3.18*   HEMOGLOBIN 10.0* 10.0* 9.7* 9.9* 9.4* 9.5*   HEMATOCRIT 31.0* 32.7* 30.6* 31.4* 30.3* 31.3*   PLATELETS 99* 134* 123* 150 156 136*   NEUTROS ABS 1.19* 1.73 1.52* 2.29 3.04 2.70   IMMATURE GRANS (ABS)  --  0.06* 0.06* 0.04 0.05 0.08*   LYMPHS ABS  --  0.41* 0.56* 0.54* 0.36* 0.20*   MONOS ABS  --  0.29 0.43 0.52 0.22 0.20   EOS ABS 0.00 0.00 0.00 0.00 0.00 0.00   MCV 90.6 91.1 92.4 90.5 91.3 93.2         Lab 23  0529 23  0746 23  0454 23  0506 23  0505 23  0404   SODIUM 138 135* 139 138 138 135*   POTASSIUM 4.4 4.4 4.3 4.6 4.6 4.7   CHLORIDE 103 99 102 101 100 97*   CO2 30.0* 26.0 29.0 30.0* 30.0* 27.0   ANION GAP 5.0 10.0 8.0 7.0 8.0 11.0   BUN 22 24* 23 28* 29* 32*   CREATININE 0.72 0.70 0.88 0.78 0.72 0.83   EGFR 81.5 84.3 64.1 74.1 81.5 68.8   GLUCOSE 105* 135* 94 107* 176* 308*   CALCIUM 8.7 8.2* 8.5* 9.0 8.7 8.7   MAGNESIUM  --   --   --   --  1.7 2.0    PHOSPHORUS  --   --  3.5 3.8 4.3  --          Lab 05/20/23  0454 05/19/23  0506 05/18/23  0505   TOTAL PROTEIN  --   --  5.3*   ALBUMIN 2.9* 2.9* 3.1*   GLOBULIN  --   --  2.2   ALT (SGPT)  --   --  6   AST (SGOT)  --   --  8   BILIRUBIN  --   --  0.3   ALK PHOS  --   --  65                     Brief Urine Lab Results  (Last result in the past 365 days)      Color   Clarity   Blood   Leuk Est   Nitrite   Protein   CREAT   Urine HCG        05/05/23 1752 Yellow   Clear   Negative   Negative   Positive   Negative                 Microbiology Results Abnormal     Procedure Component Value - Date/Time    COVID-19 RAPID AG,VERITOR,COR/FAUSTINO/PAD/BRETT/MAD/ANDREW/LAG/KHRIS/ IN-HOUSE,DRY SWAB, 1-2 HR TAT - Swab, Nasal Cavity [[id number]]  (Normal) Collected: 05/18/23 1239    Lab Status: Final result Specimen: Swab from Nasal Cavity Updated: 05/18/23 1353     COVID19 Presumptive Negative    Narrative:      Fact sheets for providers: https://www.fda.gov/media/669977/download    Fact sheets for patients: https://www.fda.gov/media/469721/download    Blood Culture - Blood, Arm, Right [[id number]]  (Normal) Collected: 05/05/23 1850    Lab Status: Final result Specimen: Blood from Arm, Right Updated: 05/10/23 2015     Blood Culture No growth at 5 days    Blood Culture - Blood, Wrist, Right [[id number]]  (Normal) Collected: 05/05/23 1855    Lab Status: Final result Specimen: Blood from Wrist, Right Updated: 05/10/23 2015     Blood Culture No growth at 5 days    COVID PRE-OP / PRE-PROCEDURE SCREENING ORDER (NO ISOLATION) - Swab, Nasopharynx [[id number]]  (Normal) Collected: 05/05/23 1704    Lab Status: Final result Specimen: Swab from Nasopharynx Updated: 05/05/23 1747    Narrative:      The following orders were created for panel order COVID PRE-OP / PRE-PROCEDURE SCREENING ORDER (NO ISOLATION) - Swab, Nasopharynx.  Procedure                               Abnormality         Status                     ---------                                -----------         ------                     COVID-19 and FLU A/B PCR...[[id number]]  Normal              Final result                 Please view results for these tests on the individual orders.    COVID-19 and FLU A/B PCR - Swab, Nasopharynx [[id number]]  (Normal) Collected: 05/05/23 1704    Lab Status: Final result Specimen: Swab from Nasopharynx Updated: 05/05/23 2527     COVID19 Not Detected     Influenza A PCR Not Detected     Influenza B PCR Not Detected    Narrative:      Fact sheet for providers: https://www.fda.gov/media/025196/download    Fact sheet for patients: https://www.fda.gov/media/508923/download    Test performed by PCR.          No radiology results from the last 24 hrs        Current medications:  Scheduled Meds:amiodarone, 200 mg, Oral, Q12H  apixaban, 5 mg, Oral, Q12H  Diclofenac Sodium, 2 g, Topical, 4x Daily  insulin detemir, 10 Units, Subcutaneous, Nightly  insulin lispro, 0-9 Units, Subcutaneous, TID AC  Insulin Lispro, 7 Units, Subcutaneous, TID With Meals  lactobacillus acidophilus, 1 capsule, Oral, Daily  levothyroxine, 100 mcg, Oral, Q AM  metoprolol tartrate, 50 mg, Oral, Q8H  povidone-iodine, 1 application, Topical, Daily  sodium chloride, 10 mL, Intravenous, Q12H  tamsulosin, 0.4 mg, Oral, Daily      Continuous Infusions:   PRN Meds:.•  acetaminophen  •  benzonatate  •  Calcium Replacement - Follow Nurse / BPA Driven Protocol  •  dextrose  •  dextrose  •  glucagon (human recombinant)  •  guaifenesin  •  hydrOXYzine  •  Magnesium Standard Dose Replacement - Follow Nurse / BPA Driven Protocol  •  melatonin  •  ondansetron **OR** ondansetron  •  phenol  •  Phosphorus Replacement - Follow Nurse / BPA Driven Protocol  •  Potassium Replacement - Follow Nurse / BPA Driven Protocol  •  sodium chloride  •  sodium chloride    Assessment & Plan   Assessment & Plan     Active Hospital Problems    Diagnosis  POA   • **Urinary tract infection, acute [N39.0]  Yes   • Atrial fibrillation with RVR  [I48.91]  Unknown   • Chest pain [R07.9]  Unknown   • Elevated troponin [R77.8]  Unknown   • COVID-19 virus infection [U07.1]  Unknown   • Chronic anticoagulation [Z79.01]  Not Applicable   • Essential hypertension [I10]  Yes   • Type 2 diabetes mellitus without complication, with long-term current use of insulin [E11.9, Z79.4]  Not Applicable   • Personal history of pulmonary embolism [Z86.711]  Yes   • Hypothyroidism (acquired) [E03.9]  Yes   • Pancytopenia (HCC) [D61.818]  Yes      Resolved Hospital Problems   No resolved problems to display.        Brief Hospital Course to date:  Chey Robertson is a 87 y.o. female with past medical history of myelodysplasia, chronic pancytopenia, essential hypertension, type 2 diabetes, history of PE (recently switched from warfarin to Eliquis per her cardiologist because of inability to achieve therapeutic INR), GERD who presented to the hospital with generalized weakness, nausea and vomiting found to have urinary tract infection. Since admission, pt found to have COVID19. She completed full course of remdesivir and dexamethasone. 5/14 She had episode of chest pain with new A fib with RVR       Afib RVR, improved rate  Chest Pain secondary to above, resolved  Elevated Troponin  • Patient developed A-fib with RVR 5/16/2023.    • She is known with intermittent episodes of A-fib during previous hospitalization for knee replacement years ago  • Continue metoprolol, eliquis     Acute urinary tract infection, POA  Dehydration volume depletion  Acute on chronic debility  • Urine culture growing pansensitive E. Coli  • Initially started on IV Rocephin and with her worsening neutropenia, she was switched to IV cefepime and finished full course  • ID followed the patient during this hospitalization  • Monitor neutropenia - cbc am  • PT/OT recommended rehab     COVID19 infection - on room air  History of PE  • Status post full course of IV remdesivir and Decadron  • Rapid antigen test  negative, isolation dced 5/18     Myelodysplastic syndrome  Pancytopenia, likely secondary to sepsis  Anemia, likely anemia of chronic disease  • Chronic pancytopenia  • DIC panel negative   • Neutropenic precautions, s/p 1 dose of Neupogen with improvement in ANC  • Follows with Dr. Lyman   • Most recent platelets 99     Type 2 diabetes  • A1c from end of March 2023 is 6%  •   Essential hypertension  • Continue to hold home Lasix for now, poor po intake  • Continue home Lisinopril and propranolol     Hypothyroidism  • Continue home synthroid     GERD  PPI     Expected Discharge Location and Transportation: working on placement  Expected Discharge   Expected Discharge Date: 5/25/2023; Expected Discharge Time:      DVT prophylaxis:  Medical and mechanical DVT prophylaxis orders are present.     AM-PAC 6 Clicks Score (PT): 17 (05/23/23 0800)    CODE STATUS:   Code Status and Medical Interventions:   Ordered at: 05/05/23 2031     Code Status (Patient has no pulse and is not breathing):    CPR (Attempt to Resuscitate)     Medical Interventions (Patient has pulse or is breathing):    Full Support       Maksim Freeman MD  05/23/23

## 2023-05-23 NOTE — PLAN OF CARE
Goal Outcome Evaluation:  Plan of Care Reviewed With: patient        Progress: improving  Outcome Evaluation: Pt. with good progress to all BADL goals and related transfers, but continues to fatigue needing sitting rest periods and as fatigues pt. with increased unsteadiness.  Some intermittent cues needed for sequencing or safety.  Pt. appropriate for continued skilled OT services to address deficit areas and promote return to higher PLOF.  Continue to recommend short SNF placement.      Evaluation Complexity (OT)  Review Occupational Profile/Medical/Therapy History Complexity: brief/low complexity  Assessment, Occupational Performance/Identification of Deficit Complexity: 1-3 performance deficits  Clinical Decision Making Complexity (OT): problem focused assessment/low complexity  Overall Complexity of Evaluation (OT): low complexity

## 2023-05-23 NOTE — PLAN OF CARE
Goal Outcome Evaluation:  Plan of Care Reviewed With: patient        Progress: no change  Outcome Evaluation: No acute overnight events. VSS. Continue to await placement.

## 2023-05-23 NOTE — THERAPY TREATMENT NOTE
Patient Name: Chey Robertson  : 1935    MRN: [id number]                              Today's Date: 2023       Admit Date: 2023    Visit Dx:     ICD-10-CM ICD-9-CM   1. Urinary tract infection, acute  N39.0 599.0   2. Acute dehydration  E86.0 276.51     Patient Active Problem List   Diagnosis   • Benign familial tremor   • AMS (altered mental status)   • Thrombocytopenia (HCC)   • Pancytopenia (HCC)   • Shortness of breath   • Hypothyroidism (acquired)   • Acute kidney injury   • Splenomegaly   • Hepatomegaly   • Confusion   • B12 deficiency   • Abnormal intestinal absorption   • Iron deficiency anemia due to chronic blood loss   • Myelodysplasia (myelodysplastic syndrome)   • Weakness   • Personal history of pulmonary embolism   • Urinary tract infection, acute   • Chronic anticoagulation   • Essential hypertension   • Type 2 diabetes mellitus without complication, with long-term current use of insulin   • COVID-19 virus infection   • Atrial fibrillation with RVR   • Chest pain   • Elevated troponin     Past Medical History:   Diagnosis Date   • Anemia    • Arthritis    • Diabetes mellitus     checks sugar only when pt wants to    • Disease of thyroid gland    • History of transfusion     with knee surgery-  no reaction recalled.    • [place] (hard of hearing)     no hearing aids   • Hypertension    • Migraine without aura and without status migrainosus, not intractable 2016   • Pulmonary emboli     (after knee surgery)   • SOBOE (shortness of breath on exertion)    • Tremors of nervous system    • Vertigo    • Wears dentures     upper    • Wears glasses      Past Surgical History:   Procedure Laterality Date   • BLADDER SURGERY     • CATARACT EXTRACTION      bilat    • CHOLECYSTECTOMY     • COLONOSCOPY     • HYSTERECTOMY     • KYPHOPLASTY N/A 2021    Procedure: KYPHOPLASTY T11, T12 AND L4;  Surgeon: Matty Hearn MD;  Location: [facility];  Service: Orthopedic Spine;  Laterality: N/A;    • OOPHORECTOMY     • TONSILLECTOMY        General Information     Row Name 05/23/23 1139          OT Time and Intention    Document Type therapy note (daily note)  -CHELSEA     Mode of Treatment individual therapy;occupational therapy  -CHELSEA     Row Name 05/23/23 1139          General Information    Patient Profile Reviewed yes  -CHELSEA     Existing Precautions/Restrictions fall  -CHELSEA     Barriers to Rehab medically complex;previous functional deficit;cognitive status  -CHELSEA     Row Name 05/23/23 1139          Cognition    Orientation Status (Cognition) oriented x 3  -CHELSEA     Row Name 05/23/23 1139          Safety Issues, Functional Mobility    Safety Issues Affecting Function (Mobility) sequencing abilities;safety precaution awareness  -CHELSEA     Impairments Affecting Function (Mobility) balance;endurance/activity tolerance;strength  -CHELSEA           User Key  (r) = Recorded By, (t) = Taken By, (c) = Cosigned By    Initials Name Provider Type    Kanika Bishop, OT Occupational Therapist                 Mobility/ADL's     Row Name 05/23/23 1140          Bed Mobility    Supine-Sit [place] (Bed Mobility) standby assist;verbal cues  -CHELSEA     Assistive Device (Bed Mobility) bed rails;head of bed elevated  -CHELSEA     Comment, (Bed Mobility) pt. needed cues to use bed rail instead of reaching for OT to assist her to increase independence level  -CHELSEA     Row Name 05/23/23 1140          Transfers    Transfers sit-stand transfer;stand-sit transfer;toilet transfer  -CHELSEA     Row Name 05/23/23 1140          Sit-Stand Transfer    Sit-Stand [place] (Transfers) contact guard  -CHELSEA     Assistive Device (Sit-Stand Transfers) walker, front-wheeled  -CHELSEA     Comment, (Sit-Stand Transfer) good hand placement  -CHELSEA     Row Name 05/23/23 1140          Stand-Sit Transfer    Stand-Sit [place] (Transfers) contact guard  -CHELSEA     Assistive Device (Stand-Sit Transfers) walker, front-wheeled  -CHELSEA     Row Name 05/23/23 1140          Toilet Transfer     Type (Toilet Transfer) sit-stand;stand-sit  -CHELSEA     [place] Level (Toilet Transfer) contact guard;verbal cues  -CHELSEA     Assistive Device (Toilet Transfer) grab bars/safety frame;commode  -CHELSEA     Comment, (Toilet Transfer) cues to use grab bars, pt. sat partially sideways on toilet instead of lining up fully  -CHELSEA     Row Name 05/23/23 1140          Functional Mobility    Functional Mobility- Ind. Level contact guard assist  -CHELSEA     Functional Mobility- Device walker, front-wheeled  -CHELSEA     Functional Mobility-Distance (Feet) --  8 + 4 + 16  -CHELSEA     Functional Mobility- Safety Issues step length decreased  -CHELSEA     Functional Mobility- Comment flexed posture, as fatigued pt. more unsteady, but did not loose balance  -CHELSEA     Row Name 05/23/23 1140          Activities of Daily Living    BADL Assessment/Intervention lower body dressing;upper body dressing;grooming;toileting  -CHELSEA     Row Name 05/23/23 1140          Lower Body Dressing Assessment/Training    [place] Level (Lower Body Dressing) doff;don;standby assist  -CHELSEA     Position (Lower Body Dressing) edge of bed sitting  -CHELSEA     Comment, (Lower Body Dressing) heel protectors had to be removed or pt. to emmy socks on own, OT redonned end of session  -CHELSEA     Row Name 05/23/23 1140          Grooming Assessment/Training    [place] Level (Grooming) hair care, combing/brushing;oral care regimen;wash face, hands;standby assist;verbal cues  -CHELSEA     Position (Grooming) supported standing;unsupported standing  pt. using sink intermittently for balance and support  -CHELSEA     Comment, (Grooming) one cues needed when dispensed soap in hand and pt. asked what she was supposed to do with it, as fatigued pt. more unsteady, pt. had to sit on BSC to rest grossly 2 minutes post grooming before could go to recliner  -CHELSEA     Row Name 05/23/23 1140          Toileting Assessment/Training    [place] Level (Toileting) adjust/manage clothing;minimum assist (75% patient  effort);perform perineal hygiene;standby assist  -CHELSEA     Position (Toileting) supported sitting  -CHELSEA     Comment, (Toileting) pt. performed hygiene anterior perineal only  -CHELSEA     Row Name 05/23/23 1140          Upper Body Dressing Assessment/Training    [place] Level (Upper Body Dressing) don;pajama/robe;moderate assist (50% patient effort);verbal cues  -CHELSEA     Position (Upper Body Dressing) unsupported standing  -CHELSEA     Comment, (Upper Body Dressing) cues to emmy UE's in correct sleeves  -CHELSEA           User Key  (r) = Recorded By, (t) = Taken By, (c) = Cosigned By    Initials Name Provider Type    Kanika Bishop OT Occupational Therapist               Obj/Interventions     Row Name 05/23/23 1147          Motor Skills    Therapeutic Exercise --  pt. declined due to fatigue  -CHELSEA     Row Name 05/23/23 1147          Balance    Static Sitting Balance supervision  -CHELSEA     Dynamic Sitting Balance standby assist  LBD  -CHELSEA     Position, Sitting Balance unsupported;sitting edge of bed  -CHELSEA     Static Standing Balance standby assist  -CHELSEA     Dynamic Standing Balance standby assist  -CHELSEA     Position/Device Used, Standing Balance supported;unsupported;walker, rolling  -CHELSEA     Balance Interventions sit to stand;occupation based/functional task  -CHELSEA     Comment, Balance UBD, toileting and grooming tasks.  -CHELSEA           User Key  (r) = Recorded By, (t) = Taken By, (c) = Cosigned By    Initials Name Provider Type    Kanika Bishop OT Occupational Therapist               Goals/Plan     Row Name 05/23/23 1150          Bed Mobility Goal 1 (OT)    Progress/Outcomes (Bed Mobility Goal 1, OT) good progress toward goal  can perform with cues and bed railing and HOB elevation, needs to progress to flat bed positioning  -CHELSEA     Row Name 05/23/23 1150          Transfer Goal 1 (OT)    Progress/Outcome (Transfer Goal 1, OT) good progress toward goal  -CHELSEA     Row Name 05/23/23 1150          Dressing Goal 1 (OT)     Progress/Outcome (Dressing Goal 1, OT) good progress toward goal  -CHELSEA     Row Name 05/23/23 1150          Toileting Goal 1 (OT)    Progress/Outcome (Toileting Goal 1, OT) good progress toward goal  -CHELSEA     Row Name 05/23/23 1150          Grooming Goal 1 (OT)    Progress/Outcome (Grooming Goal 1, OT) good progress toward goal  -CHELSEA           User Key  (r) = Recorded By, (t) = Taken By, (c) = Cosigned By    Initials Name Provider Type    Kanika Bishop, OT Occupational Therapist               Clinical Impression     Row Name 05/23/23 1147          Pain Assessment    Pretreatment Pain Rating 0/10 - no pain  -CHELSEA     Posttreatment Pain Rating 0/10 - no pain  -CHELSEA     Row Name 05/23/23 1147          Plan of Care Review    Plan of Care Reviewed With patient  -CHELSEA     Progress improving  -CHELSEA     Outcome Evaluation Pt. with good progress to all BADL goals and related transfers, but continues to fatigue needing sitting rest periods and as fatigues pt. with increased unsteadiness.  Some intermittent cues needed for sequencing or safety.  Pt. appropriate for continued skilled OT services to address deficit areas and promote return to higher PLOF.  Continue to recommend short SNF placement.  -CHELSEA     Row Name 05/23/23 1147          Therapy Plan Review/Discharge Plan (OT)    Anticipated Discharge Disposition (OT) skilled nursing facility  -CHELSEA     Row Name 05/23/23 1147          Vital Signs    Pre Systolic BP Rehab 130  -CHELSEA     Pre Treatment Diastolic BP 63  -CHELSEA     Post Systolic BP Rehab 153   -CHELSEA     Post Treatment Diastolic BP 66  -CHELSEA     Pretreatment Heart Rate (beats/min) 69  -CHELSEA     Posttreatment Heart Rate (beats/min) 66  -CHELSEA     Pre SpO2 (%) 98  -CHELSEA     O2 Delivery Pre Treatment room air  -CHELSEA     O2 Delivery Intra Treatment room air  -CHELSEA     Post SpO2 (%) 100  -CHELSEA     O2 Delivery Post Treatment room air  -CHELSEA     Pre Patient Position Supine  -CHELSEA     Intra Patient Position Standing  -CHELSEA     Post Patient Position Sitting  -CHELSEA      Row Name 05/23/23 1147          Positioning and Restraints    Pre-Treatment Position in bed  -CHELSEA     Post Treatment Position chair  -CHELSEA     In Chair notified nsg;reclined;call light within reach;encouraged to call for assist;exit alarm on;waffle cushion;heels elevated  -CHELSEA           User Key  (r) = Recorded By, (t) = Taken By, (c) = Cosigned By    Initials Name Provider Type    Kanika Bishop, OT Occupational Therapist               Outcome Measures     Row Name 05/23/23 1151          How much help from another is currently needed...    Putting on and taking off regular lower body clothing? 3  progressing from min/mod level with all BADL tasks to min A to SBA levels  -CHELSEA     Bathing (including washing, rinsing, and drying) 3  -CHELSEA     Toileting (which includes using toilet bed pan or urinal) 3  -CHELSEA     Putting on and taking off regular upper body clothing 3  -CHELSEA     Taking care of personal grooming (such as brushing teeth) 3  -CHELSEA     Eating meals 4  -CHELSEA     AM-PAC 6 Clicks Score (OT) 19  -CHELSEA     Row Name 05/23/23 0800          How much help from another person do you currently need...    Turning from your back to your side while in flat bed without using bedrails? 3  -JW     Moving from lying on back to sitting on the side of a flat bed without bedrails? 3  -JW     Moving to and from a bed to a chair (including a wheelchair)? 3  -JW     Standing up from a chair using your arms (e.g., wheelchair, bedside chair)? 3  -JW     Climbing 3-5 steps with a railing? 2  -JW     To walk in hospital room? 3  -JW     AM-PAC 6 Clicks Score (PT) 17  -JW     Highest level of mobility 5 --> Static standing  -     Row Name 05/23/23 1151          Functional Assessment    Outcome Measure Options AM-PAC 6 Clicks Daily Activity (OT)  -CHELSEA           User Key  (r) = Recorded By, (t) = Taken By, (c) = Cosigned By    Initials Name Provider Type    Kanika Bishop, OT Occupational Therapist    Manjula Coronel RN  Registered Nurse                Occupational Therapy Education     Title: PT OT SLP Therapies (In Progress)     Topic: Occupational Therapy (In Progress)     Point: ADL training (Done)     Description:   Instruct learner(s) on proper safety adaptation and remediation techniques during self care or transfers.   Instruct in proper use of assistive devices.              Learning Progress Summary           Patient Acceptance, E,D, VU,NR by CHELSEA at 5/23/2023 1152    Comment: bed mobility technique, toilet transfer safety, pacing self and sitting for rest period, decreased balance as fatigues, BADL sequencing    Acceptance, E, NR by  at 5/17/2023 0956    Acceptance, E, VU,DU by JB1 at 5/11/2023 1602    Comment: OT POC; BADL's    Acceptance, E,TB, VU by  at 5/9/2023 1542    Acceptance, E, VU,NR by CHELSEA at 5/8/2023 1202    Comment: reason for consult, waiting for wx and gait belt before up for safety, UE TE/PLB                   Point: Home exercise program (Done)     Description:   Instruct learner(s) on appropriate technique for monitoring, assisting and/or progressing therapeutic exercises/activities.              Learning Progress Summary           Patient Acceptance, E,TB, VU by  at 5/9/2023 1542                   Point: Precautions (Done)     Description:   Instruct learner(s) on prescribed precautions during self-care and functional transfers.              Learning Progress Summary           Patient Acceptance, E,D, VU,NR by CHELSEA at 5/23/2023 1152    Comment: bed mobility technique, toilet transfer safety, pacing self and sitting for rest period, decreased balance as fatigues, BADL sequencing    Acceptance, E, NR by  at 5/17/2023 0956    Acceptance, E, VU,DU by JB1 at 5/11/2023 1602    Comment: OT POC; BADL's    Acceptance, E,TB, VU by BT at 5/9/2023 1542    Acceptance, E, VU,NR by CHELSEA at 5/8/2023 1202    Comment: reason for consult, waiting for wx and gait belt before up for safety, UE TE/PLB                    Point: Body mechanics (In Progress)     Description:   Instruct learner(s) on proper positioning and spine alignment during self-care, functional mobility activities and/or exercises.              Learning Progress Summary           Patient Acceptance, E, NR by  at 5/17/2023 0956    Acceptance, E,TB, VU by  at 5/9/2023 1542                               User Key     Initials Effective Dates Name Provider Type Discipline     02/03/23 -  Kanika Bowers, OT Occupational Therapist OT     12/30/20 -  Marly Cordero RN Registered Nurse Nurse    [facility] 06/16/21 -  Nicole Etienne OT Occupational Therapist OT     10/14/22 -  Yue Moses OT Occupational Therapist OT              OT Recommendation and Plan  Planned Therapy Interventions (OT): activity tolerance training, adaptive equipment training, BADL retraining, functional balance retraining, occupation/activity based interventions, ROM/therapeutic exercise, strengthening exercise, patient/caregiver education/training, transfer/mobility retraining  Therapy Frequency (OT): daily  Plan of Care Review  Plan of Care Reviewed With: patient  Progress: improving  Outcome Evaluation: Pt. with good progress to all BADL goals and related transfers, but continues to fatigue needing sitting rest periods and as fatigues pt. with increased unsteadiness.  Some intermittent cues needed for sequencing or safety.  Pt. appropriate for continued skilled OT services to address deficit areas and promote return to higher PLOF.  Continue to recommend short SNF placement.     Time Calculation:    Time Calculation- OT     Row Name 05/23/23 1153             Time Calculation- OT    OT Start Time 1108  -CHELSEA      OT Received On 05/23/23  -CHELSEA      OT Goal Re-Cert Due Date 05/27/23  -CHELSEA         Timed Charges    81827 - OT Therapeutic Activity Minutes 8  -CHELSEA      33783 - OT Self Care/Mgmt Minutes 20  -CHELSEA         Total Minutes    Timed Charges Total Minutes 28  -CHELSEA       Total Minutes 28   -CHELSAE            User Key  (r) = Recorded By, (t) = Taken By, (c) = Cosigned By    Initials Name Provider Type    Kanika Bishop, OT Occupational Therapist              Therapy Charges for Today     Code Description Service Date Service Provider Modifiers Qty    [id number]  OT THERAPEUTIC ACT EA 15 MIN 5/23/2023 Kanika Bowers, OT GO 1    [id number]  OT SELF CARE/MGMT/TRAIN EA 15 MIN 5/23/2023 Kanika Bowers OT GO 1               Kanika Bowers OT  5/23/2023

## 2023-05-23 NOTE — PLAN OF CARE
Problem: Adult Inpatient Plan of Care  Goal: Plan of Care Review  Flowsheets (Taken 5/23/2023 1942)  Progress: improving  Plan of Care Reviewed With: patient  Outcome Evaluation: No acute events this shift. Pt on RA, VSS, NSR on tele. Awaiting placement.   Goal Outcome Evaluation:  Plan of Care Reviewed With: patient        Progress: improving  Outcome Evaluation: No acute events this shift. Pt on RA, VSS, NSR on tele. Awaiting placement.

## 2023-05-24 VITALS
DIASTOLIC BLOOD PRESSURE: 62 MMHG | HEART RATE: 62 BPM | SYSTOLIC BLOOD PRESSURE: 138 MMHG | HEIGHT: 63 IN | RESPIRATION RATE: 18 BRPM | TEMPERATURE: 97.9 F | OXYGEN SATURATION: 98 % | WEIGHT: 123.8 LBS | BODY MASS INDEX: 21.93 KG/M2

## 2023-05-24 LAB
GLUCOSE BLDC GLUCOMTR-MCNC: 103 MG/DL (ref 70–130)
GLUCOSE BLDC GLUCOMTR-MCNC: 113 MG/DL (ref 70–130)

## 2023-05-24 PROCEDURE — 97116 GAIT TRAINING THERAPY: CPT

## 2023-05-24 PROCEDURE — 99239 HOSP IP/OBS DSCHRG MGMT >30: CPT | Performed by: INTERNAL MEDICINE

## 2023-05-24 PROCEDURE — 82948 REAGENT STRIP/BLOOD GLUCOSE: CPT

## 2023-05-24 PROCEDURE — 63710000001 INSULIN LISPRO (HUMAN) PER 5 UNITS: Performed by: INTERNAL MEDICINE

## 2023-05-24 PROCEDURE — 97110 THERAPEUTIC EXERCISES: CPT

## 2023-05-24 RX ORDER — METOPROLOL TARTRATE 50 MG/1
50 TABLET, FILM COATED ORAL 2 TIMES DAILY
Status: ON HOLD
Start: 2023-05-24

## 2023-05-24 RX ORDER — HYDROXYZINE HYDROCHLORIDE 25 MG/1
25 TABLET, FILM COATED ORAL 3 TIMES DAILY PRN
Start: 2023-05-24 | End: 2023-06-05

## 2023-05-24 RX ORDER — AMIODARONE HYDROCHLORIDE 200 MG/1
200 TABLET ORAL DAILY
Status: ON HOLD
Start: 2023-05-24

## 2023-05-24 RX ORDER — INSULIN LISPRO 100 [IU]/ML
7 INJECTION, SOLUTION INTRAVENOUS; SUBCUTANEOUS
Refills: 12 | Status: ON HOLD
Start: 2023-05-24

## 2023-05-24 RX ORDER — L.ACID,PARA/B.BIFIDUM/S.THERM 8B CELL
1 CAPSULE ORAL DAILY
Start: 2023-05-25 | End: 2023-06-05

## 2023-05-24 RX ORDER — INSULIN LISPRO 100 [IU]/ML
0-9 INJECTION, SOLUTION INTRAVENOUS; SUBCUTANEOUS
Refills: 12
Start: 2023-05-24 | End: 2023-06-05

## 2023-05-24 RX ORDER — POVIDONE-IODINE 10 MG/ML
1 SOLUTION TOPICAL DAILY
Qty: 50 EACH | Refills: 12
Start: 2023-05-25 | End: 2023-06-05

## 2023-05-24 RX ORDER — CHOLECALCIFEROL (VITAMIN D3) 125 MCG
5 CAPSULE ORAL NIGHTLY PRN
Status: ON HOLD
Start: 2023-05-24

## 2023-05-24 RX ADMIN — INSULIN LISPRO 7 UNITS: 100 INJECTION, SOLUTION INTRAVENOUS; SUBCUTANEOUS at 08:28

## 2023-05-24 RX ADMIN — APIXABAN 5 MG: 5 TABLET, FILM COATED ORAL at 08:28

## 2023-05-24 RX ADMIN — DICLOFENAC SODIUM 2 G: 10 GEL TOPICAL at 13:34

## 2023-05-24 RX ADMIN — TAMSULOSIN HYDROCHLORIDE 0.4 MG: 0.4 CAPSULE ORAL at 08:28

## 2023-05-24 RX ADMIN — DICLOFENAC SODIUM 2 G: 10 GEL TOPICAL at 08:28

## 2023-05-24 RX ADMIN — Medication 1 CAPSULE: at 08:28

## 2023-05-24 RX ADMIN — LEVOTHYROXINE SODIUM 100 MCG: 0.1 TABLET ORAL at 06:22

## 2023-05-24 RX ADMIN — POVIDONE-IODINE 1 EACH: 10 SOLUTION TOPICAL at 08:28

## 2023-05-24 RX ADMIN — METOPROLOL TARTRATE 50 MG: 50 TABLET ORAL at 13:38

## 2023-05-24 RX ADMIN — METOPROLOL TARTRATE 50 MG: 50 TABLET ORAL at 06:22

## 2023-05-24 RX ADMIN — INSULIN LISPRO 7 UNITS: 100 INJECTION, SOLUTION INTRAVENOUS; SUBCUTANEOUS at 13:34

## 2023-05-24 RX ADMIN — AMIODARONE HYDROCHLORIDE 200 MG: 200 TABLET ORAL at 08:28

## 2023-05-24 NOTE — DISCHARGE SUMMARY
[facility] Medicine Services  DISCHARGE SUMMARY    Patient Name: Chey Robertson  : 1935  MRN: [id number]    Date of Admission: 2023  6:17 PM  Date of Discharge:  23  Primary Care Physician: Corby Marie MD    Consults     Date and Time Order Name Status Description    2023 10:36 AM Inpatient Cardiology Consult Completed     2023 12:49 PM Inpatient Hematology & Oncology Consult Completed     2023  8:06 AM Inpatient Infectious Diseases Consult Completed           Hospital Course     Presenting Problem:   Urinary tract infection, acute [N39.0]    Active Hospital Problems    Diagnosis  POA   • **Urinary tract infection, acute [N39.0]  Yes   • Atrial fibrillation with RVR [I48.91]  Unknown   • Chest pain [R07.9]  Unknown   • Elevated troponin [R77.8]  Unknown   • COVID-19 virus infection [U07.1]  Unknown   • Chronic anticoagulation [Z79.01]  Not Applicable   • Essential hypertension [I10]  Yes   • Type 2 diabetes mellitus without complication, with long-term current use of insulin [E11.9, Z79.4]  Not Applicable   • Personal history of pulmonary embolism [Z86.711]  Yes   • Hypothyroidism (acquired) [E03.9]  Yes   • Pancytopenia (HCC) [D61.818]  Yes      Resolved Hospital Problems   No resolved problems to display.          Hospital Course:  Chey Robertson is a 87 y.o. female with past medical history of myelodysplasia, chronic pancytopenia, essential hypertension, type 2 diabetes, history of PE (recently switched from warfarin to Eliquis per her cardiologist because of inability to achieve therapeutic INR), GERD who presented to the hospital with generalized weakness, nausea and vomiting found to have urinary tract infection. Since admission, pt found to have COVID19. She completed full course of remdesivir and dexamethasone.  She had episode of chest pain with new A fib with RVR        Afib RVR, improved rate  Chest Pain secondary to above,  resolved  Elevated Troponin  Patient developed A-fib with RVR 5/16/2023.   Evaluated by Cardiology Dr Chand.   Amiodarone started   Continue metoprolol, amiodarone and age/weight dosed eliquis  Follow up with Cardiology in 6-8 weeks     Acute urinary tract infection, POA  Dehydration volume depletion  Acute on chronic debility  Urine culture growing pansensitive E. Coli  Initially started on IV Rocephin and with her worsening neutropenia, she was switched to IV cefepime and finished full course  ID followed the patient during this hospitalization    COVID19 infection  History of PE  Status post full course of IV remdesivir and Decadron  Rapid antigen test negative, isolation dced 5/18     Myelodysplastic syndrome  Pancytopenia, likely secondary to sepsis  Anemia, likely anemia of chronic disease  Chronic pancytopenia  DIC panel negative   Neutropenic precautions, s/p 1 dose of Neupogen with improvement in ANC  Follows with Dr. Lyman   Most recent platelets 99  Recommend CBC weekly at rehab  Follow up with Oncology as scheduled in August     Type 2 diabetes  A1c from end of March 2023 is 6%  Continue insulin therapy     Essential hypertension  Continue to hold home Lasix for now, poor po intake, consider resumption of low dose lasix as needed for edema     Hypothyroidism  Continue home synthroid     GERD  - PPI      Discharge Follow Up Recommendations for outpatient labs/diagnostics:  CBC weekly at rehab    Day of Discharge     HPI:   Feels fine today. No fever or cough. Looking forward to rehab    Review of Systems  Gen: no fever  Pulm: no cough  GI: no nausea  CV: no chest pain or palpitations    Vital Signs:   Temp:  [97.1 °F (36.2 °C)-98.2 °F (36.8 °C)] 97.9 °F (36.6 °C)  Heart Rate:  [55-75] 62  Resp:  [18] 18  BP: (100-156)/(44-66) 138/62      Physical Exam:  Constitutional - no acute distress, somewhat frail, in bed  HEENT-NCAT, mucous membranes moist  CV-RRR  Resp-CTAB  Abd-soft, nontender, nondistended,  normoactive bowel sounds  Ext-No lower extremity cyanosis, clubbing or edema bilaterally  Neuro-alert, speech clear, moves all extremities   Psych-normal affect   Skin- No rash on exposed UE or LE bilaterally      Pertinent  and/or Most Recent Results     LAB RESULTS:      Lab 05/23/23  0529 05/21/23  0746 05/20/23  0454 05/19/23  0506 05/18/23  0505   WBC 2.21* 2.49* 2.57* 3.39* 3.67   HEMOGLOBIN 10.0* 10.0* 9.7* 9.9* 9.4*   HEMATOCRIT 31.0* 32.7* 30.6* 31.4* 30.3*   PLATELETS 99* 134* 123* 150 156   NEUTROS ABS 1.19* 1.73 1.52* 2.29 3.04   IMMATURE GRANS (ABS)  --  0.06* 0.06* 0.04 0.05   LYMPHS ABS  --  0.41* 0.56* 0.54* 0.36*   MONOS ABS  --  0.29 0.43 0.52 0.22   EOS ABS 0.00 0.00 0.00 0.00 0.00   MCV 90.6 91.1 92.4 90.5 91.3         Lab 05/23/23  0529 05/21/23  0746 05/20/23 0454 05/19/23  0506 05/18/23  0505   SODIUM 138 135* 139 138 138   POTASSIUM 4.4 4.4 4.3 4.6 4.6   CHLORIDE 103 99 102 101 100   CO2 30.0* 26.0 29.0 30.0* 30.0*   ANION GAP 5.0 10.0 8.0 7.0 8.0   BUN 22 24* 23 28* 29*   CREATININE 0.72 0.70 0.88 0.78 0.72   EGFR 81.5 84.3 64.1 74.1 81.5   GLUCOSE 105* 135* 94 107* 176*   CALCIUM 8.7 8.2* 8.5* 9.0 8.7   MAGNESIUM  --   --   --   --  1.7   PHOSPHORUS  --   --  3.5 3.8 4.3         Lab 05/20/23  0454 05/19/23  0506 05/18/23  0505   TOTAL PROTEIN  --   --  5.3*   ALBUMIN 2.9* 2.9* 3.1*   GLOBULIN  --   --  2.2   ALT (SGPT)  --   --  6   AST (SGOT)  --   --  8   BILIRUBIN  --   --  0.3   ALK PHOS  --   --  65                     Brief Urine Lab Results  (Last result in the past 365 days)      Color   Clarity   Blood   Leuk Est   Nitrite   Protein   CREAT   Urine HCG        05/05/23 1752 Yellow   Clear   Negative   Negative   Positive   Negative               Microbiology Results (last 10 days)     Procedure Component Value - Date/Time    COVID-19 RAPID AG,VERITOR,COR/FAUSTINO/PAD/BRETT/MAD/ANDREW/LAG/KHRIS/ IN-HOUSE,DRY SWAB, 1-2 HR TAT - Swab, Nasal Cavity [[id number]]  (Normal) Collected: 05/18/23 1984     "Lab Status: Final result Specimen: Swab from Nasal Cavity Updated: 05/18/23 1353     COVID19 Presumptive Negative    Narrative:      Fact sheets for providers: https://www.fda.gov/media/726750/download    Fact sheets for patients: https://www.fda.gov/media/389992/download          No radiology results for the last 10 days                Plan for Follow-up of Pending Labs/Results:   Discharge Details        Discharge Medications      New Medications      Instructions Start Date   amiodarone 200 MG tablet  Commonly known as: PACERONE   200 mg, Oral, Daily      hydrOXYzine 25 MG tablet  Commonly known as: ATARAX   25 mg, Oral, 3 Times Daily PRN      insulin detemir 100 UNIT/ML injection  Commonly known as: LEVEMIR   10 Units, Subcutaneous, Nightly      Insulin Lispro 100 UNIT/ML injection  Commonly known as: humaLOG   0-9 Units, Subcutaneous, 3 Times Daily Before Meals      Insulin Lispro 100 UNIT/ML injection  Commonly known as: humaLOG   7 Units, Subcutaneous, 3 Times Daily With Meals      lactobacillus acidophilus capsule capsule   1 capsule, Oral, Daily   Start Date: May 25, 2023     melatonin 5 MG tablet tablet   5 mg, Oral, Nightly PRN      metoprolol tartrate 50 MG tablet  Commonly known as: LOPRESSOR   50 mg, Oral, 2 Times Daily      povidone-iodine 10 % swab   1 each, Topical, Daily   Start Date: May 25, 2023        Continue These Medications      Instructions Start Date   albuterol sulfate  (90 Base) MCG/ACT inhaler  Commonly known as: PROVENTIL HFA;VENTOLIN HFA;PROAIR HFA   2 puffs, Inhalation, Every 4 Hours PRN      Diclofenac Sodium 1 % gel gel  Commonly known as: VOLTAREN   APPLY 4 GRAMS TO THE AFFECTED JOINT FOUR TIMES DAILY AS NEEDED.      Droplet Insulin Syringe 30G X 1/2\" 0.5 ML misc  Generic drug: Insulin Syringe-Needle U-100   No dose, route, or frequency recorded.      Eliquis 2.5 MG tablet tablet  Generic drug: apixaban   2.5 mg, Oral, 2 Times Daily      levothyroxine 100 MCG " tablet  Commonly known as: SYNTHROID, LEVOTHROID   100 mcg, Oral, Daily      nystatin 872149 UNIT/GM cream  Commonly known as: MYCOSTATIN   2 Times Daily, to affected area       omeprazole 40 MG capsule  Commonly known as: PrilOSEC   40 mg, Oral, Daily      ondansetron 4 MG tablet  Commonly known as: ZOFRAN   4 mg, Oral, Every 8 Hours PRN      tamsulosin 0.4 MG capsule 24 hr capsule  Commonly known as: FLOMAX   0.4 mg, Oral, Daily         Stop These Medications    clotrimazole-betamethasone 1-0.05 % cream  Commonly known as: LOTRISONE     furosemide 20 MG tablet  Commonly known as: LASIX     glimepiride 2 MG tablet  Commonly known as: AMARYL     HumuLIN 70/30 (70-30) 100 UNIT/ML injection  Generic drug: insulin NPH-insulin regular     HYDROcodone-acetaminophen 5-325 MG per tablet  Commonly known as: NORCO     lisinopril 5 MG tablet  Commonly known as: PRINIVIL,ZESTRIL     meclizine 12.5 MG tablet  Commonly known as: ANTIVERT     metFORMIN 500 MG tablet  Commonly known as: GLUCOPHAGE     potassium chloride 10 MEQ CR tablet     propranolol 20 MG tablet  Commonly known as: INDERAL     traMADol 50 MG tablet  Commonly known as: ULTRAM     True Metrix Blood Glucose Test test strip  Generic drug: glucose blood     vitamin D 1.25 MG (58932 UT) capsule capsule  Commonly known as: ERGOCALCIFEROL            Allergies   Allergen Reactions   • Aspirin Unknown - Low Severity     Mouth sores          Discharge Disposition:  Rehab Facility or Unit (DC - External)    Diet:  Hospital:  Diet Order   Procedures   • Diet: Regular/House Diet, Diabetic Diets; Consistent Carbohydrate; Neutropenic/Low Microbial; Texture: Regular Texture (IDDSI 7); Fluid Consistency: Thin (IDDSI 0)       Activity:      Restrictions or Other Recommendations:         CODE STATUS:    Code Status and Medical Interventions:   Ordered at: 05/05/23 2031     Code Status (Patient has no pulse and is not breathing):    CPR (Attempt to Resuscitate)     Medical  Interventions (Patient has pulse or is breathing):    Full Support       Future Appointments   Date Time Provider Department Center   8/7/2023  1:00 PM Jeanna Grande APRN [facility]       Additional Instructions for the Follow-ups that You Need to Schedule     Discharge Follow-up with Specialty: follow up with Cardiology Dr Chand in 6-8 weeks   As directed      Specialty: follow up with Cardiology Dr Chand in 6-8 weeks                     Maksim Freeman MD  05/24/23      Time Spent on Discharge:  I spent  40  minutes on this discharge activity which included: face-to-face encounter with the patient, reviewing the data in the system, coordination of the care with the nursing staff as well as consultants, documentation, and entering orders.

## 2023-05-24 NOTE — DISCHARGE PLACEMENT REQUEST
"Venegas, Elisa BERRY (87 y.o. Female)     Diana Navarro, RN  [phone]      Date of Birth   1935    Social Security Number       Address   [address] [place], [place] [address]    Home Phone   [phone]    MRN   [id number]       [employer]   [place]    Marital Status                               Admission Date   23    Admission Type   Emergency    Admitting Provider   Maksim Freeman MD    Attending Provider   Maksim Freeman MD    Department, Room/Bed   [facility] 6B, N626/1       Discharge Date       Discharge Disposition   Rehab Facility or Unit (DC - External)    Discharge Destination                               Attending Provider: Maksim Freeman MD    Allergies: Aspirin    Isolation: None   Infection: COVID (History) (23)   Code Status: CPR    Ht: 160 cm (63\")   Wt: 56.2 kg (123 lb 12.8 oz)    Admission Cmt: None   Principal Problem: Urinary tract infection, acute [N39.0]                 Active Insurance as of 2023     Primary Coverage     Payor Plan Insurance Group Employer/Plan Group    HUMANA MEDICARE REPLACEMENT HUMANA MEDICARE REPLACEMENT [id number]     Payor Plan Address Payor Plan Phone Number Payor Plan Fax Number Effective Dates    [address] [address] [phone]  2023 - None Entered    [facility] [address]       Subscriber Name Subscriber Birth Date Member ID       ELISA VENEGAS 1935 [id number]                 Emergency Contacts      (Rel.) Home Phone Work Phone Mobile Phone    Albin Venegas (Son) [phone] -- --    Chava Venegas (Son) -- -- [phone]                 Discharge Summary      Maksim Freeman MD at [facility]              [facility] Medicine Services  DISCHARGE SUMMARY    Patient Name: Elisa Venegas  : 1935  MRN: [id number]    Date of Admission: 2023  6:17 PM  Date of Discharge:  23  Primary Care Physician: oCrby Marie MD    Consults     " Date and Time Order Name Status Description    5/14/2023 10:36 AM Inpatient Cardiology Consult Completed     5/8/2023 12:49 PM Inpatient Hematology & Oncology Consult Completed     5/7/2023  8:06 AM Inpatient Infectious Diseases Consult Completed           Hospital Course     Presenting Problem:   Urinary tract infection, acute [N39.0]    Active Hospital Problems    Diagnosis  POA   • **Urinary tract infection, acute [N39.0]  Yes   • Atrial fibrillation with RVR [I48.91]  Unknown   • Chest pain [R07.9]  Unknown   • Elevated troponin [R77.8]  Unknown   • COVID-19 virus infection [U07.1]  Unknown   • Chronic anticoagulation [Z79.01]  Not Applicable   • Essential hypertension [I10]  Yes   • Type 2 diabetes mellitus without complication, with long-term current use of insulin [E11.9, Z79.4]  Not Applicable   • Personal history of pulmonary embolism [Z86.711]  Yes   • Hypothyroidism (acquired) [E03.9]  Yes   • Pancytopenia (HCC) [D61.818]  Yes      Resolved Hospital Problems   No resolved problems to display.          Hospital Course:  Chey Robertson is a 87 y.o. female with past medical history of myelodysplasia, chronic pancytopenia, essential hypertension, type 2 diabetes, history of PE (recently switched from warfarin to Eliquis per her cardiologist because of inability to achieve therapeutic INR), GERD who presented to the hospital with generalized weakness, nausea and vomiting found to have urinary tract infection. Since admission, pt found to have COVID19. She completed full course of remdesivir and dexamethasone. 5/14 She had episode of chest pain with new A fib with RVR        Afib RVR, improved rate  Chest Pain secondary to above, resolved  Elevated Troponin  • Patient developed A-fib with RVR 5/16/2023.   • Evaluated by Cardiology Dr Chand.   • Amiodarone started   • Continue metoprolol, amiodarone and age/weight dosed eliquis  • Follow up with Cardiology in 6-8 weeks     Acute urinary tract infection,  POA  Dehydration volume depletion  Acute on chronic debility  • Urine culture growing pansensitive E. Coli  • Initially started on IV Rocephin and with her worsening neutropenia, she was switched to IV cefepime and finished full course  • ID followed the patient during this hospitalization    COVID19 infection  History of PE  • Status post full course of IV remdesivir and Decadron  • Rapid antigen test negative, isolation dced 5/18     Myelodysplastic syndrome  Pancytopenia, likely secondary to sepsis  Anemia, likely anemia of chronic disease  • Chronic pancytopenia  • DIC panel negative   • Neutropenic precautions, s/p 1 dose of Neupogen with improvement in ANC  • Follows with Dr. Lyman   • Most recent platelets 99  • Recommend CBC weekly at rehab  • Follow up with Oncology as scheduled in August     Type 2 diabetes  • A1c from end of March 2023 is 6%  • Continue insulin therapy     Essential hypertension  • Continue to hold home Lasix for now, poor po intake, consider resumption of low dose lasix as needed for edema     Hypothyroidism  • Continue home synthroid     GERD  - PPI      Discharge Follow Up Recommendations for outpatient labs/diagnostics:  CBC weekly at rehab    Day of Discharge     HPI:   Feels fine today. No fever or cough. Looking forward to rehab    Review of Systems  Gen: no fever  Pulm: no cough  GI: no nausea  CV: no chest pain or palpitations    Vital Signs:   Temp:  [97.1 °F (36.2 °C)-98.2 °F (36.8 °C)] 97.9 °F (36.6 °C)  Heart Rate:  [55-75] 62  Resp:  [18] 18  BP: (100-156)/(44-66) 138/62      Physical Exam:  Constitutional - no acute distress, somewhat frail, in bed  HEENT-NCAT, mucous membranes moist  CV-RRR  Resp-CTAB  Abd-soft, nontender, nondistended, normoactive bowel sounds  Ext-No lower extremity cyanosis, clubbing or edema bilaterally  Neuro-alert, speech clear, moves all extremities   Psych-normal affect   Skin- No rash on exposed UE or LE bilaterally      Pertinent  and/or Most  Recent Results     LAB RESULTS:      Lab 05/23/23  0529 05/21/23  0746 05/20/23  0454 05/19/23  0506 05/18/23  0505   WBC 2.21* 2.49* 2.57* 3.39* 3.67   HEMOGLOBIN 10.0* 10.0* 9.7* 9.9* 9.4*   HEMATOCRIT 31.0* 32.7* 30.6* 31.4* 30.3*   PLATELETS 99* 134* 123* 150 156   NEUTROS ABS 1.19* 1.73 1.52* 2.29 3.04   IMMATURE GRANS (ABS)  --  0.06* 0.06* 0.04 0.05   LYMPHS ABS  --  0.41* 0.56* 0.54* 0.36*   MONOS ABS  --  0.29 0.43 0.52 0.22   EOS ABS 0.00 0.00 0.00 0.00 0.00   MCV 90.6 91.1 92.4 90.5 91.3         Lab 05/23/23  0529 05/21/23  0746 05/20/23 0454 05/19/23  0506 05/18/23  0505   SODIUM 138 135* 139 138 138   POTASSIUM 4.4 4.4 4.3 4.6 4.6   CHLORIDE 103 99 102 101 100   CO2 30.0* 26.0 29.0 30.0* 30.0*   ANION GAP 5.0 10.0 8.0 7.0 8.0   BUN 22 24* 23 28* 29*   CREATININE 0.72 0.70 0.88 0.78 0.72   EGFR 81.5 84.3 64.1 74.1 81.5   GLUCOSE 105* 135* 94 107* 176*   CALCIUM 8.7 8.2* 8.5* 9.0 8.7   MAGNESIUM  --   --   --   --  1.7   PHOSPHORUS  --   --  3.5 3.8 4.3         Lab 05/20/23  0454 05/19/23  0506 05/18/23  0505   TOTAL PROTEIN  --   --  5.3*   ALBUMIN 2.9* 2.9* 3.1*   GLOBULIN  --   --  2.2   ALT (SGPT)  --   --  6   AST (SGOT)  --   --  8   BILIRUBIN  --   --  0.3   ALK PHOS  --   --  65                     Brief Urine Lab Results  (Last result in the past 365 days)      Color   Clarity   Blood   Leuk Est   Nitrite   Protein   CREAT   Urine HCG        05/05/23 1752 Yellow   Clear   Negative   Negative   Positive   Negative               Microbiology Results (last 10 days)     Procedure Component Value - Date/Time    COVID-19 RAPID AG,VERITOR,COR/FAUSTINO/PAD/BRETT/MAD/ANDREW/LAG/KHRIS/ IN-HOUSE,DRY SWAB, 1-2 HR TAT - Swab, Nasal Cavity [[id number]]  (Normal) Collected: 05/18/23 1239    Lab Status: Final result Specimen: Swab from Nasal Cavity Updated: 05/18/23 1353     COVID19 Presumptive Negative    Narrative:      Fact sheets for providers: https://www.fda.gov/media/275717/download    Fact sheets for patients:  "https://www.fda.gov/media/109819/download          No radiology results for the last 10 days                Plan for Follow-up of Pending Labs/Results:   Discharge Details        Discharge Medications      New Medications      Instructions Start Date   amiodarone 200 MG tablet  Commonly known as: PACERONE   200 mg, Oral, Daily      hydrOXYzine 25 MG tablet  Commonly known as: ATARAX   25 mg, Oral, 3 Times Daily PRN      insulin detemir 100 UNIT/ML injection  Commonly known as: LEVEMIR   10 Units, Subcutaneous, Nightly      Insulin Lispro 100 UNIT/ML injection  Commonly known as: humaLOG   0-9 Units, Subcutaneous, 3 Times Daily Before Meals      Insulin Lispro 100 UNIT/ML injection  Commonly known as: humaLOG   7 Units, Subcutaneous, 3 Times Daily With Meals      lactobacillus acidophilus capsule capsule   1 capsule, Oral, Daily   Start Date: May 25, 2023     melatonin 5 MG tablet tablet   5 mg, Oral, Nightly PRN      metoprolol tartrate 50 MG tablet  Commonly known as: LOPRESSOR   50 mg, Oral, 2 Times Daily      povidone-iodine 10 % swab   1 each, Topical, Daily   Start Date: May 25, 2023        Continue These Medications      Instructions Start Date   albuterol sulfate  (90 Base) MCG/ACT inhaler  Commonly known as: PROVENTIL HFA;VENTOLIN HFA;PROAIR HFA   2 puffs, Inhalation, Every 4 Hours PRN      Diclofenac Sodium 1 % gel gel  Commonly known as: VOLTAREN   APPLY 4 GRAMS TO THE AFFECTED JOINT FOUR TIMES DAILY AS NEEDED.      Droplet Insulin Syringe 30G X 1/2\" 0.5 ML misc  Generic drug: Insulin Syringe-Needle U-100   No dose, route, or frequency recorded.      Eliquis 2.5 MG tablet tablet  Generic drug: apixaban   2.5 mg, Oral, 2 Times Daily      levothyroxine 100 MCG tablet  Commonly known as: SYNTHROID, LEVOTHROID   100 mcg, Oral, Daily      nystatin 188030 UNIT/GM cream  Commonly known as: MYCOSTATIN   2 Times Daily, to affected area       omeprazole 40 MG capsule  Commonly known as: PrilOSEC   40 mg, " Oral, Daily      ondansetron 4 MG tablet  Commonly known as: ZOFRAN   4 mg, Oral, Every 8 Hours PRN      tamsulosin 0.4 MG capsule 24 hr capsule  Commonly known as: FLOMAX   0.4 mg, Oral, Daily         Stop These Medications    clotrimazole-betamethasone 1-0.05 % cream  Commonly known as: LOTRISONE     furosemide 20 MG tablet  Commonly known as: LASIX     glimepiride 2 MG tablet  Commonly known as: AMARYL     HumuLIN 70/30 (70-30) 100 UNIT/ML injection  Generic drug: insulin NPH-insulin regular     HYDROcodone-acetaminophen 5-325 MG per tablet  Commonly known as: NORCO     lisinopril 5 MG tablet  Commonly known as: PRINIVIL,ZESTRIL     meclizine 12.5 MG tablet  Commonly known as: ANTIVERT     metFORMIN 500 MG tablet  Commonly known as: GLUCOPHAGE     potassium chloride 10 MEQ CR tablet     propranolol 20 MG tablet  Commonly known as: INDERAL     traMADol 50 MG tablet  Commonly known as: ULTRAM     True Metrix Blood Glucose Test test strip  Generic drug: glucose blood     vitamin D 1.25 MG (39566 UT) capsule capsule  Commonly known as: ERGOCALCIFEROL            Allergies   Allergen Reactions   • Aspirin Unknown - Low Severity     Mouth sores          Discharge Disposition:  Rehab Facility or Unit (DC - External)    Diet:  Hospital:  Diet Order   Procedures   • Diet: Regular/House Diet, Diabetic Diets; Consistent Carbohydrate; Neutropenic/Low Microbial; Texture: Regular Texture (IDDSI 7); Fluid Consistency: Thin (IDDSI 0)       Activity:      Restrictions or Other Recommendations:         CODE STATUS:    Code Status and Medical Interventions:   Ordered at: 05/05/23 2031     Code Status (Patient has no pulse and is not breathing):    CPR (Attempt to Resuscitate)     Medical Interventions (Patient has pulse or is breathing):    Full Support       Future Appointments   Date Time Provider Department Center   8/7/2023  1:00 PM Jeanna Grande APRN [facility]       Additional Instructions for the Follow-ups that You  Need to Schedule     Discharge Follow-up with Specialty: follow up with Cardiology Dr Chand in 6-8 weeks   As directed      Specialty: follow up with Cardiology Dr Chand in 6-8 weeks                     Maksim Freeman MD  05/24/23      Time Spent on Discharge:  I spent  40  minutes on this discharge activity which included: face-to-face encounter with the patient, reviewing the data in the system, coordination of the care with the nursing staff as well as consultants, documentation, and entering orders.            Electronically signed by Maksim Freeman MD at 05/24/23 5572

## 2023-05-24 NOTE — CASE MANAGEMENT/SOCIAL WORK
Case Management Discharge Note      Final Note: Patient's plan is a skilled bed at [facility] today, 5/25.  Son will transport.  Nurse to call report to [phone].  Facility will get discharge summary out of EPIC.         Selected Continued Care - Admitted Since 5/5/2023     Destination Coordination complete.    Service Provider Selected Services Address Phone Fax Patient Preferred    [facility] [address] [place] [id number] [phone] --          Durable Medical Equipment    No services have been selected for the patient.              Dialysis/Infusion    No services have been selected for the patient.              Home Medical Care    No services have been selected for the patient.              Therapy    No services have been selected for the patient.              Community Resources    No services have been selected for the patient.              Community & DME    No services have been selected for the patient.                       Final Discharge Disposition Code: 03 - skilled nursing facility (SNF)

## 2023-05-24 NOTE — PLAN OF CARE
Goal Outcome Evaluation:  Plan of Care Reviewed With: patient        Progress: no change  Outcome Evaluation: Patient requires cues for safety with turns during ambulation. She gave good effort towards standing balance challenges but fatigues quickly. HR stable throughout visit. Will continue to progress as tolerated. Recommend SNF at discharge to support return to independence.

## 2023-05-24 NOTE — THERAPY TREATMENT NOTE
Patient Name: Chey Robertson  : 1935    MRN: [id number]                              Today's Date: 2023       Admit Date: 2023    Visit Dx:     ICD-10-CM ICD-9-CM   1. Urinary tract infection, acute  N39.0 599.0   2. Acute dehydration  E86.0 276.51     Patient Active Problem List   Diagnosis   • Benign familial tremor   • AMS (altered mental status)   • Thrombocytopenia (HCC)   • Pancytopenia (HCC)   • Shortness of breath   • Hypothyroidism (acquired)   • Acute kidney injury   • Splenomegaly   • Hepatomegaly   • Confusion   • B12 deficiency   • Abnormal intestinal absorption   • Iron deficiency anemia due to chronic blood loss   • Myelodysplasia (myelodysplastic syndrome)   • Weakness   • Personal history of pulmonary embolism   • Urinary tract infection, acute   • Chronic anticoagulation   • Essential hypertension   • Type 2 diabetes mellitus without complication, with long-term current use of insulin   • COVID-19 virus infection   • Atrial fibrillation with RVR   • Chest pain   • Elevated troponin     Past Medical History:   Diagnosis Date   • Anemia    • Arthritis    • Diabetes mellitus     checks sugar only when pt wants to    • Disease of thyroid gland    • History of transfusion     with knee surgery-  no reaction recalled.    • [place] (hard of hearing)     no hearing aids   • Hypertension    • Migraine without aura and without status migrainosus, not intractable 2016   • Pulmonary emboli     (after knee surgery)   • SOBOE (shortness of breath on exertion)    • Tremors of nervous system    • Vertigo    • Wears dentures     upper    • Wears glasses      Past Surgical History:   Procedure Laterality Date   • BLADDER SURGERY     • CATARACT EXTRACTION      bilat    • CHOLECYSTECTOMY     • COLONOSCOPY     • HYSTERECTOMY     • KYPHOPLASTY N/A 2021    Procedure: KYPHOPLASTY T11, T12 AND L4;  Surgeon: Matty Hearn MD;  Location: [facility];  Service: Orthopedic Spine;  Laterality: N/A;    • OOPHORECTOMY     • TONSILLECTOMY        General Information     [facility] Name 05/24/23 0758          Physical Therapy Time and Intention    Document Type therapy note (daily note)  -NS     Mode of Treatment individual therapy;physical therapy  -[facility] Name 05/24/23 0758          General Information    Patient Profile Reviewed yes  -NS     Existing Precautions/Restrictions fall  -NS     [facility] Name 05/24/23 0758          Cognition    Orientation Status (Cognition) oriented x 3  -NS     [facility] Name 05/24/23 0758          Safety Issues, Functional Mobility    Safety Issues Affecting Function (Mobility) sequencing abilities;safety precautions follow-through/compliance  -NS     Impairments Affecting Function (Mobility) balance;endurance/activity tolerance;strength;coordination  -NS           User Key  (r) = Recorded By, (t) = Taken By, (c) = Cosigned By    Initials Name Provider Type    Gianna Cheek PT Physical Therapist               Mobility     [facility] Name 05/24/23 0758          Bed Mobility    Bed Mobility supine-sit  -NS     Supine-Sit [place] (Bed Mobility) standby assist;verbal cues  -NS     Assistive Device (Bed Mobility) bed rails;head of bed elevated  -NS     Comment, (Bed Mobility) Cues for using bed rail for assistance  -NS     Row Name 05/24/23 0758          Sit-Stand Transfer    Sit-Stand [place] (Transfers) standby assist  -NS     Assistive Device (Sit-Stand Transfers) walker, front-wheeled  -NS     [facility] Name 05/24/23 0758          Gait/Stairs (Locomotion)    [place] Level (Gait) contact guard  -NS     Assistive Device (Gait) walker, front-wheeled  -NS     Distance in Feet (Gait) 90  -NS     Deviations/Abnormal Patterns (Gait) [place] decreased;gait speed decreased;stride length decreased  -NS     Bilateral Gait Deviations forward flexed posture;heel strike decreased  -NS     Comment, (Gait/Stairs) Cues for maintaining adequate CHARLEEN during turns to decrease risk of falls. HR stable with  ambulation.  -NS           User Key  (r) = Recorded By, (t) = Taken By, (c) = Cosigned By    Initials Name Provider Type    Gianna Cheek PT Physical Therapist               Obj/Interventions     Row Name 05/24/23 0758          Motor Skills    Therapeutic Exercise hip;knee;ankle  -NS     Row Name 05/24/23 0758          Hip (Therapeutic Exercise)    Hip (Therapeutic Exercise) strengthening exercise  -NS     Hip Strengthening (Therapeutic Exercise) bilateral;flexion;standing;marching while standing;10 repetitions  -NS     Row Name 05/24/23 0758          Knee (Therapeutic Exercise)    Knee (Therapeutic Exercise) strengthening exercise  -NS     Knee Strengthening (Therapeutic Exercise) bilateral;SLR (straight leg raise);LAQ (long arc quad);10 repetitions  -NS     Row Name 05/24/23 0758          Ankle (Therapeutic Exercise)    Ankle (Therapeutic Exercise) AROM (active range of motion)  -NS     Ankle AROM (Therapeutic Exercise) bilateral;dorsiflexion;plantarflexion;10 repetitions  -NS     Row Name 05/24/23 0758          Balance    Balance Assessment sitting static balance;sitting dynamic balance;standing static balance;standing dynamic balance  -NS     Static Sitting Balance standby assist  -NS     Dynamic Sitting Balance standby assist  -NS     Position, Sitting Balance unsupported;sitting in chair  -NS     Static Standing Balance standby assist  -NS     Dynamic Standing Balance contact guard  -NS     Position/Device Used, Standing Balance supported;walker, front-wheeled  -NS     Comment, Balance Pt practiced reaching outside CHARLEEN with single UE support on RW to challenge balance. Planned to complete additional standing ther ex for balance challenges but pt fatigued quickly following 2 exercises.  -NS           User Key  (r) = Recorded By, (t) = Taken By, (c) = Cosigned By    Initials Name Provider Type    Gianna Cheek PT Physical Therapist               Goals/Plan    No documentation.                Clinical  Impression     Row Name 05/24/23 0758          Pain    Pretreatment Pain Rating 0/10 - no pain  -NS     Posttreatment Pain Rating 0/10 - no pain  -NS     Row Name 05/24/23 0758          Plan of Care Review    Plan of Care Reviewed With patient  -NS     Progress no change  -NS     Outcome Evaluation Patient requires cues for safety with turns during ambulation. She gave good effort towards standing balance challenges but fatigues quickly. HR stable throughout visit. Will continue to progress as tolerated. Recommend SNF at discharge to support return to independence.  -NS     Row Name 05/24/23 0758          Vital Signs    Pre Systolic BP Rehab 100  -NS     Pre Treatment Diastolic BP 55  -NS     Post Systolic BP Rehab 156  -NS     Post Treatment Diastolic BP 63  -NS     Pretreatment Heart Rate (beats/min) 53  -NS     Posttreatment Heart Rate (beats/min) 55  -NS     Pre SpO2 (%) 96  -NS     O2 Delivery Pre Treatment room air  -NS     Post SpO2 (%) 99  -NS     O2 Delivery Post Treatment room air  -NS     Pre Patient Position Supine  -NS     Intra Patient Position Standing  -NS     Post Patient Position Sitting  -NS     [facility] Name 05/24/23 0758          Positioning and Restraints    Pre-Treatment Position in bed  -NS     Post Treatment Position chair  -NS     In Chair notified nsg;reclined;call light within reach;encouraged to call for assist;exit alarm on;legs elevated;waffle cushion;heels elevated  -NS           User Key  (r) = Recorded By, (t) = Taken By, (c) = Cosigned By    Initials Name Provider Type    Gianna Cheek PT Physical Therapist               Outcome Measures     Row Name 05/24/23 0758          How much help from another person do you currently need...    Turning from your back to your side while in flat bed without using bedrails? 3  -NS     Moving from lying on back to sitting on the side of a flat bed without bedrails? 3  -NS     Moving to and from a bed to a chair (including a wheelchair)? 3  -NS      Standing up from a chair using your arms (e.g., wheelchair, bedside chair)? 3  -NS     Climbing 3-5 steps with a railing? 3  -NS     To walk in hospital room? 3  -NS     AM-PAC 6 Clicks Score (PT) 18  -NS     Highest level of mobility 6 --> Walked 10 steps or more  -NS     Row Name 05/24/23 0758          Functional Assessment    Outcome Measure Options AM-PAC 6 Clicks Basic Mobility (PT)  -NS           User Key  (r) = Recorded By, (t) = Taken By, (c) = Cosigned By    Initials Name Provider Type    Gianna Cheek PT Physical Therapist                             Physical Therapy Education     Title: PT OT SLP Therapies (In Progress)     Topic: Physical Therapy (Done)     Point: Mobility training (Done)     Learning Progress Summary           Patient Acceptance, E, VU by NS at 5/24/2023 0842    Comment: importance of OOB activity    Acceptance, E, VU by NS at 5/22/2023 1514    Acceptance, E, VU,NR by NS at 5/18/2023 1009    Comment: benefits of OOB activity    Eager, E, VU,DU by SD at 5/13/2023 1634    Acceptance, E, VU,NR by ML at 5/10/2023 1508    Acceptance, E,TB, VU by BT at 5/9/2023 1542    Acceptance, E,D, VU,NR by MB at 5/8/2023 1441                   Point: Home exercise program (Done)     Learning Progress Summary           Patient Acceptance, E, VU by NS at 5/24/2023 0842    Comment: importance of OOB activity    Acceptance, E, VU by NS at 5/22/2023 1514    Eager, E, VU,DU by SD at 5/13/2023 1634    Acceptance, E, VU,NR by ML at 5/10/2023 1508    Acceptance, E,TB, VU by BT at 5/9/2023 1542    Acceptance, E,D, VU,NR by MB at 5/8/2023 1441                   Point: Body mechanics (Done)     Learning Progress Summary           Patient Acceptance, E, VU by NS at 5/24/2023 0842    Comment: importance of OOB activity    Acceptance, E, VU by NS at 5/22/2023 1514    Acceptance, E, VU,NR by NS at 5/18/2023 1009    Comment: benefits of OOB activity    MIRELLA Hurtado VU, DU by SD at 5/13/2023 1634    Jackie, GIANNA VU VU  by BT at 5/9/2023 1542    Acceptance, E,D, VU,NR by MB at 5/8/2023 1441                   Point: Precautions (Done)     Learning Progress Summary           Patient Acceptance, E, VU by NS at 5/24/2023 0842    Comment: importance of OOB activity    Acceptance, E, VU by NS at 5/22/2023 1514    Acceptance, E, VU,NR by NS at 5/18/2023 1009    Comment: benefits of OOB activity    Eager, E, VU,DU by SD at 5/13/2023 1634    Acceptance, E, VU,NR by ML at 5/10/2023 1508    Acceptance, E,TB, VU by BT at 5/9/2023 1542    Acceptance, E,D, VU,NR by MB at 5/8/2023 1441                               User Key     Initials Effective Dates Name Provider Type Discipline    SD 03/13/23 -  Eloise Dutton, PT Physical Therapist PT    MB 06/16/21 -  Brooke Marshall, PT Physical Therapist PT    BT 12/30/20 -  Marly Cordero, RN Registered Nurse Nurse    NS 06/16/21 -  Gianna Burger, PT Physical Therapist PT     04/22/21 -  Marybeth Arias Physical Therapist PT              PT Recommendation and Plan  Planned Therapy Interventions (PT): balance training, bed mobility training, gait training, home exercise program, neuromuscular re-education, patient/family education, strengthening, transfer training  Plan of Care Reviewed With: patient  Progress: no change  Outcome Evaluation: Patient requires cues for safety with turns during ambulation. She gave good effort towards standing balance challenges but fatigues quickly. HR stable throughout visit. Will continue to progress as tolerated. Recommend SNF at discharge to support return to independence.     Time Calculation:    PT Charges     Row Name 05/24/23 0758             Time Calculation    Start Time 0758  -NS      PT Received On 05/24/23  -NS      PT Goal Re-Cert Due Date 05/28/23  -NS         Timed Charges    65975 - PT Therapeutic Exercise Minutes 13  -NS      85596 - Gait Training Minutes  10  -NS         Total Minutes    Timed Charges Total Minutes 23  -NS       Total Minutes  23  -NS            User Key  (r) = Recorded By, (t) = Taken By, (c) = Cosigned By    Initials Name Provider Type    Gianna Cheek, PT Physical Therapist              Therapy Charges for Today     Code Description Service Date Service Provider Modifiers Qty    93909004119 HC GAIT TRAINING EA 15 MIN 5/24/2023 Gianna Burger, PT GP 1    81878184041 HC PT THER PROC EA 15 MIN 5/24/2023 Gianna Burger, PT GP 1          PT G-Codes  Outcome Measure Options: AM-PAC 6 Clicks Basic Mobility (PT)  AM-PAC 6 Clicks Score (PT): 18  AM-PAC 6 Clicks Score (OT): 19  PT Discharge Summary  Anticipated Discharge Disposition (PT): skilled nursing facility    Gianna Burger PT  5/24/2023

## 2023-05-24 NOTE — PLAN OF CARE
Goal Outcome Evaluation:  Plan of Care Reviewed With: patient  Progress: no change  Outcome Evaluation: VSS. NSR to sinus pancho on tele. On room air. No complaints of pain overnight. PRN melatonin and atarax given. Continue plan of care.

## 2023-05-25 ENCOUNTER — NURSING HOME (OUTPATIENT)
Dept: INTERNAL MEDICINE | Facility: CLINIC | Age: 88
End: 2023-05-25
Payer: MEDICARE

## 2023-05-25 VITALS
OXYGEN SATURATION: 97 % | TEMPERATURE: 97.7 F | WEIGHT: 117.4 LBS | SYSTOLIC BLOOD PRESSURE: 116 MMHG | HEART RATE: 68 BPM | DIASTOLIC BLOOD PRESSURE: 56 MMHG | RESPIRATION RATE: 18 BRPM | BODY MASS INDEX: 20.8 KG/M2

## 2023-05-25 DIAGNOSIS — B96.20 E. COLI UTI: ICD-10-CM

## 2023-05-25 DIAGNOSIS — E03.9 HYPOTHYROIDISM (ACQUIRED): ICD-10-CM

## 2023-05-25 DIAGNOSIS — N39.0 E. COLI UTI: ICD-10-CM

## 2023-05-25 DIAGNOSIS — R16.1 SPLENOMEGALY: ICD-10-CM

## 2023-05-25 DIAGNOSIS — Z86.711 PERSONAL HISTORY OF PULMONARY EMBOLISM: ICD-10-CM

## 2023-05-25 DIAGNOSIS — E11.65 TYPE 2 DIABETES MELLITUS WITH HYPERGLYCEMIA, WITHOUT LONG-TERM CURRENT USE OF INSULIN: ICD-10-CM

## 2023-05-25 DIAGNOSIS — I10 ESSENTIAL HYPERTENSION: ICD-10-CM

## 2023-05-25 DIAGNOSIS — D46.9 MYELODYSPLASIA (MYELODYSPLASTIC SYNDROME): ICD-10-CM

## 2023-05-25 DIAGNOSIS — D61.818 PANCYTOPENIA: ICD-10-CM

## 2023-05-25 DIAGNOSIS — I48.91 ATRIAL FIBRILLATION WITH RVR: Primary | ICD-10-CM

## 2023-05-25 NOTE — LETTER
Nursing Home History and Physical       Bobby Kelsey DO  [address] [place] [address] Phone: [phone]  Fax: [phone]     PATIENT NAME: Chey Robertson                                                                          YOB: 1935           DATE OF SERVICE: 05/25/2023  FACILITY:  [facility]    CHIEF COMPLAINT:  Nursing facility admission      HISTORY OF PRESENT ILLNESS:   Patient is an 87-year-old female with a history of myelodysplasia, chronic pancytopenia, essential hypertension, type 2 diabetes mellitus, history of PE, GERD, and generalized weakness recently admitted to [facility] for UTI.  Patient was also noted to be positive for COVID-19.  She was treated with remdesivir and dexamethasone.  During her admission, she also developed chest pain with new onset A-fib with RVR.  She was started on amiodarone in addition to metoprolol.    On exam today, patient was sitting up comfortably in her bed she seemed very content and pleasant.  No reported incidence since admission to facility (recently admitted).  She has been appreciative and motivated to work with physical therapy.          PAST MEDICAL & SURGICAL HISTORY:   Past Medical History:   Diagnosis Date    Anemia     Arthritis     Diabetes mellitus     checks sugar only when pt wants to     Disease of thyroid gland     History of transfusion     with knee surgery-  no reaction recalled.     [place] (hard of hearing)     no hearing aids    Hypertension     Migraine without aura and without status migrainosus, not intractable 12/30/2016    Pulmonary emboli 2011    (after knee surgery)    SOBOE (shortness of breath on exertion)     Tremors of nervous system     Vertigo     Wears dentures     upper     Wears glasses       Past Surgical History:   Procedure Laterality Date    BLADDER SURGERY      CATARACT EXTRACTION      bilat     CHOLECYSTECTOMY      COLONOSCOPY      HYSTERECTOMY      KYPHOPLASTY N/A 2/12/2021     Procedure: KYPHOPLASTY T11, T12 AND L4;  Surgeon: Matty Hearn MD;  [facility]: [facility];  Service: Orthopedic Spine;  Laterality: N/A;    OOPHORECTOMY      TONSILLECTOMY           MEDICATIONS:  I have reviewed and reconciled the patients medication list in the patients chart at the skilled nursing facility on 05/25/2023.      ALLERGIES:  Allergies   Allergen Reactions    Aspirin Unknown - Low Severity     Mouth sores          SOCIAL HISTORY:  Social History     Socioeconomic History    Marital status:    Tobacco Use    Smoking status: Never     Passive exposure: Never    Smokeless tobacco: Never   Vaping Use    Vaping Use: Never used   Substance and Sexual Activity    Alcohol use: No    Drug use: No    Sexual activity: Defer       FAMILY HISTORY:  Family History   Problem Relation Age of Onset    Breast cancer Sister 84    Stroke Mother     Heart attack Father     Ovarian cancer Neg Hx         REVIEW OF SYSTEMS:  Review of Systems   Constitutional:  Negative for chills, fatigue and fever.   HENT:  Negative for congestion, ear pain, rhinorrhea, sinus pressure and sore throat.    Eyes:  Negative for visual disturbance.   Respiratory:  Negative for cough, chest tightness, shortness of breath and wheezing.    Cardiovascular:  Negative for chest pain, palpitations and leg swelling.   Gastrointestinal:  Negative for abdominal pain, blood in stool, constipation, diarrhea, nausea and vomiting.   Endocrine: Negative for polydipsia and polyuria.   Genitourinary:  Negative for dysuria and hematuria.   Musculoskeletal:  Negative for arthralgias and back pain.   Skin:  Negative for rash.   Neurological:  Negative for dizziness, light-headedness, numbness and headaches.   Psychiatric/Behavioral:  Negative for dysphoric mood and sleep disturbance. The patient is not nervous/anxious.        PHYSICAL EXAMINATION:   VITAL SIGNS: /56   Pulse 68   Temp 97.7 °F (36.5 °C)   Resp 18   Wt 53.3 kg (117 lb 6.4 oz)   LMP   (LMP Unknown) Comment: mammogram is up to date  SpO2 97%   BMI 20.80 kg/m²     Physical Exam  Vitals and nursing note reviewed.   Constitutional:       Appearance: Normal appearance. She is well-developed.   HENT:      Head: Normocephalic and atraumatic.      Nose: Nose normal.      Mouth/Throat:      Mouth: Mucous membranes are moist.      Pharynx: No oropharyngeal exudate.   Eyes:      General: No scleral icterus.     Conjunctiva/sclera: Conjunctivae normal.      Pupils: Pupils are equal, round, and reactive to light.   Neck:      Thyroid: No thyromegaly.   Cardiovascular:      Rate and Rhythm: Normal rate and regular rhythm.      Heart sounds: Normal heart sounds. No murmur heard.    No friction rub. No gallop.   Pulmonary:      Effort: Pulmonary effort is normal. No respiratory distress.      Breath sounds: Normal breath sounds. No wheezing.   Abdominal:      General: Bowel sounds are normal. There is no distension.      Palpations: Abdomen is soft.      Tenderness: There is no abdominal tenderness.   Musculoskeletal:         General: No deformity or signs of injury.      Cervical back: Normal range of motion and neck supple.   Lymphadenopathy:      Cervical: No cervical adenopathy.   Skin:     General: Skin is warm and dry.      Findings: No rash.   Neurological:      Mental Status: She is alert and oriented to person, place, and time.   Psychiatric:         Mood and Affect: Mood normal.         Behavior: Behavior normal.       RECORDS REVIEW:   Discharge Summary from [facility] 5/24/2023    ASSESSMENT   Diagnoses and all orders for this visit:    1. Atrial fibrillation with RVR (Primary)    2. Type 2 diabetes mellitus with hyperglycemia, without long-term current use of insulin    3. Pancytopenia    4. Hypothyroidism (acquired)    5. Myelodysplasia (myelodysplastic syndrome)    6. Personal history of pulmonary embolism    7. Splenomegaly    8. Essential hypertension    9. E. coli  UTI        PLAN  A-fib with RVR  - New diagnosis since hospitalization.  Patient has been started on amiodarone in addition to her prior regimen of metoprolol and Eliquis.  - Follow-up with Dr. Chand in 6 to 8 weeks.    UTI  - Pansensitive E. coli.  Patient completed course of IV cefepime during hospitalization.    COVID-19 infection  - Patient was treated with complete course of remdesivir and Decadron during hospitalization.    MDS/pancytopenia/anemia  - DIC panel was negative in hospital.  Patient was treated with Neupogen.  - Follow-up with hematology/oncology as outpatient in August.  - Monitor labs while in facility.    Essential hypertension  - Blood pressure has been stable with current medications.  Diuretics were discontinued during hospitalization as edema did not seem significant.    Hypothyroidism  - Continue Synthroid.    GERD  - Stable on PPI.        [x]  Discussed Patient in detail with nursing/staff, addressed all needs today.     [x]  Plan of Care Reviewed   [x]  PT/OT Reviewed   [x]  Order Changes  []  Discharge Plans Reviewed  [x]  Advance Directive on file with Nursing Home.   [x]  POA on file with Nursing Home.    [x]  Code Status listed and reviewed.       Bobby Kelsey DO.  6/6/2023      **Part of this note may be an electronic transcription/translation of spoken language to printed text using the Dragon Dictation System.**

## 2023-05-27 NOTE — TELEPHONE ENCOUNTER
----- Message from Raissa Bekcford RN sent at 8/21/2020  4:00 PM EDT -----  Regarding: Critical Lab       Critical Test Results      MD: Dr. Lyman     Date: 8/21     Critical test result:Platelets 24, WBC 2.2    Time results received:4:01         see MD note

## 2023-06-03 DIAGNOSIS — D61.818 PANCYTOPENIA: ICD-10-CM

## 2023-06-05 ENCOUNTER — HOSPITAL ENCOUNTER (OUTPATIENT)
Facility: HOSPITAL | Age: 88
Setting detail: OBSERVATION
LOS: 1 days | Discharge: HOME | End: 2023-06-07
Attending: EMERGENCY MEDICINE | Admitting: INTERNAL MEDICINE
Payer: MEDICARE

## 2023-06-05 ENCOUNTER — APPOINTMENT (OUTPATIENT)
Dept: CT IMAGING | Facility: HOSPITAL | Age: 88
End: 2023-06-05
Payer: MEDICARE

## 2023-06-05 DIAGNOSIS — R10.84 GENERALIZED ABDOMINAL PAIN: Primary | ICD-10-CM

## 2023-06-05 DIAGNOSIS — R94.31 PROLONGED Q-T INTERVAL ON ECG: ICD-10-CM

## 2023-06-05 DIAGNOSIS — I31.39 PERICARDIAL EFFUSION: ICD-10-CM

## 2023-06-05 DIAGNOSIS — E86.0 DEHYDRATION: ICD-10-CM

## 2023-06-05 DIAGNOSIS — R13.12 OROPHARYNGEAL DYSPHAGIA: ICD-10-CM

## 2023-06-05 DIAGNOSIS — D64.9 ANEMIA, UNSPECIFIED TYPE: ICD-10-CM

## 2023-06-05 DIAGNOSIS — R11.2 NAUSEA AND VOMITING, UNSPECIFIED VOMITING TYPE: ICD-10-CM

## 2023-06-05 DIAGNOSIS — R73.9 HYPERGLYCEMIA: ICD-10-CM

## 2023-06-05 DIAGNOSIS — E83.42 HYPOMAGNESEMIA: ICD-10-CM

## 2023-06-05 PROBLEM — E87.1 HYPONATREMIA: Status: ACTIVE | Noted: 2023-06-05

## 2023-06-05 LAB
ALBUMIN SERPL-MCNC: 3.6 G/DL (ref 3.5–5.2)
ALBUMIN/GLOB SERPL: 1.5 G/DL
ALP SERPL-CCNC: 84 U/L (ref 39–117)
ALT SERPL W P-5'-P-CCNC: 6 U/L (ref 1–33)
ANION GAP SERPL CALCULATED.3IONS-SCNC: 16 MMOL/L (ref 5–15)
AST SERPL-CCNC: 11 U/L (ref 1–32)
BASOPHILS # BLD AUTO: 0 10*3/MM3 (ref 0–0.2)
BASOPHILS NFR BLD AUTO: 0 % (ref 0–1.5)
BILIRUB SERPL-MCNC: 0.6 MG/DL (ref 0–1.2)
BILIRUB UR QL STRIP: NEGATIVE
BUN SERPL-MCNC: 20 MG/DL (ref 8–23)
BUN/CREAT SERPL: 26.7 (ref 7–25)
CALCIUM SPEC-SCNC: 8.6 MG/DL (ref 8.6–10.5)
CHLORIDE SERPL-SCNC: 94 MMOL/L (ref 98–107)
CLARITY UR: CLEAR
CO2 SERPL-SCNC: 22 MMOL/L (ref 22–29)
COLOR UR: YELLOW
CREAT BLDA-MCNC: 0.8 MG/DL
CREAT BLDA-MCNC: 0.8 MG/DL (ref 0.6–1.3)
CREAT SERPL-MCNC: 0.75 MG/DL (ref 0.57–1)
D-LACTATE SERPL-SCNC: 1.6 MMOL/L (ref 0.5–2)
D-LACTATE SERPL-SCNC: 2.2 MMOL/L (ref 0.5–2)
D-LACTATE SERPL-SCNC: 3.2 MMOL/L (ref 0.5–2)
DEPRECATED RDW RBC AUTO: 49.6 FL (ref 37–54)
EGFRCR SERPLBLD CKD-EPI 2021: 77.6 ML/MIN/1.73
EOSINOPHIL # BLD AUTO: 0 10*3/MM3 (ref 0–0.4)
EOSINOPHIL NFR BLD AUTO: 0 % (ref 0.3–6.2)
ERYTHROCYTE [DISTWIDTH] IN BLOOD BY AUTOMATED COUNT: 15.3 % (ref 12.3–15.4)
GEN 5 2HR TROPONIN T REFLEX: 20 NG/L
GLOBULIN UR ELPH-MCNC: 2.4 GM/DL
GLUCOSE BLDC GLUCOMTR-MCNC: 278 MG/DL (ref 70–130)
GLUCOSE SERPL-MCNC: 252 MG/DL (ref 65–99)
GLUCOSE UR STRIP-MCNC: NEGATIVE MG/DL
HCT VFR BLD AUTO: 31 % (ref 34–46.6)
HGB BLD-MCNC: 10.1 G/DL (ref 12–15.9)
HGB UR QL STRIP.AUTO: NEGATIVE
HOLD SPECIMEN: NORMAL
IMM GRANULOCYTES # BLD AUTO: 0.02 10*3/MM3 (ref 0–0.05)
IMM GRANULOCYTES NFR BLD AUTO: 0.9 % (ref 0–0.5)
KETONES UR QL STRIP: NEGATIVE
LEUKOCYTE ESTERASE UR QL STRIP.AUTO: NEGATIVE
LIPASE SERPL-CCNC: 20 U/L (ref 13–60)
LYMPHOCYTES # BLD AUTO: 0.3 10*3/MM3 (ref 0.7–3.1)
LYMPHOCYTES NFR BLD AUTO: 13.3 % (ref 19.6–45.3)
MAGNESIUM SERPL-MCNC: 1.4 MG/DL (ref 1.6–2.4)
MCH RBC QN AUTO: 28.4 PG (ref 26.6–33)
MCHC RBC AUTO-ENTMCNC: 32.6 G/DL (ref 31.5–35.7)
MCV RBC AUTO: 87.1 FL (ref 79–97)
MONOCYTES # BLD AUTO: 0.14 10*3/MM3 (ref 0.1–0.9)
MONOCYTES NFR BLD AUTO: 6.2 % (ref 5–12)
NEUTROPHILS NFR BLD AUTO: 1.79 10*3/MM3 (ref 1.7–7)
NEUTROPHILS NFR BLD AUTO: 79.6 % (ref 42.7–76)
NITRITE UR QL STRIP: NEGATIVE
NRBC BLD AUTO-RTO: 0 /100 WBC (ref 0–0.2)
PH UR STRIP.AUTO: <=5 [PH] (ref 5–8)
PLATELET # BLD AUTO: 58 10*3/MM3 (ref 140–450)
PMV BLD AUTO: 9.6 FL (ref 6–12)
POTASSIUM SERPL-SCNC: 4.3 MMOL/L (ref 3.5–5.2)
PROT SERPL-MCNC: 6 G/DL (ref 6–8.5)
PROT UR QL STRIP: NEGATIVE
RBC # BLD AUTO: 3.56 10*6/MM3 (ref 3.77–5.28)
SODIUM SERPL-SCNC: 132 MMOL/L (ref 136–145)
SP GR UR STRIP: 1.01 (ref 1–1.03)
TROPONIN T DELTA: 3 NG/L
TROPONIN T SERPL HS-MCNC: 17 NG/L
UROBILINOGEN UR QL STRIP: NORMAL
WBC NRBC COR # BLD: 2.25 10*3/MM3 (ref 3.4–10.8)
WHOLE BLOOD HOLD COAG: NORMAL
WHOLE BLOOD HOLD SPECIMEN: NORMAL

## 2023-06-05 PROCEDURE — 96361 HYDRATE IV INFUSION ADD-ON: CPT

## 2023-06-05 PROCEDURE — 84484 ASSAY OF TROPONIN QUANT: CPT | Performed by: EMERGENCY MEDICINE

## 2023-06-05 PROCEDURE — 85025 COMPLETE CBC W/AUTO DIFF WBC: CPT | Performed by: EMERGENCY MEDICINE

## 2023-06-05 PROCEDURE — 99222 1ST HOSP IP/OBS MODERATE 55: CPT | Performed by: HOSPITALIST

## 2023-06-05 PROCEDURE — 83605 ASSAY OF LACTIC ACID: CPT | Performed by: EMERGENCY MEDICINE

## 2023-06-05 PROCEDURE — 83735 ASSAY OF MAGNESIUM: CPT | Performed by: EMERGENCY MEDICINE

## 2023-06-05 PROCEDURE — 83690 ASSAY OF LIPASE: CPT | Performed by: EMERGENCY MEDICINE

## 2023-06-05 PROCEDURE — 93005 ELECTROCARDIOGRAM TRACING: CPT | Performed by: EMERGENCY MEDICINE

## 2023-06-05 PROCEDURE — 99285 EMERGENCY DEPT VISIT HI MDM: CPT

## 2023-06-05 PROCEDURE — 25010000002 MAGNESIUM SULFATE IN D5W 1G/100ML (PREMIX) 1-5 GM/100ML-% SOLUTION: Performed by: EMERGENCY MEDICINE

## 2023-06-05 PROCEDURE — 82565 ASSAY OF CREATININE: CPT

## 2023-06-05 PROCEDURE — 96365 THER/PROPH/DIAG IV INF INIT: CPT

## 2023-06-05 PROCEDURE — 81003 URINALYSIS AUTO W/O SCOPE: CPT | Performed by: EMERGENCY MEDICINE

## 2023-06-05 PROCEDURE — 25510000001 IOPAMIDOL 61 % SOLUTION: Performed by: EMERGENCY MEDICINE

## 2023-06-05 PROCEDURE — 74177 CT ABD & PELVIS W/CONTRAST: CPT

## 2023-06-05 PROCEDURE — 96375 TX/PRO/DX INJ NEW DRUG ADDON: CPT

## 2023-06-05 PROCEDURE — 36415 COLL VENOUS BLD VENIPUNCTURE: CPT

## 2023-06-05 PROCEDURE — 82948 REAGENT STRIP/BLOOD GLUCOSE: CPT

## 2023-06-05 PROCEDURE — 25010000002 METOCLOPRAMIDE PER 10 MG: Performed by: EMERGENCY MEDICINE

## 2023-06-05 PROCEDURE — 80053 COMPREHEN METABOLIC PANEL: CPT | Performed by: EMERGENCY MEDICINE

## 2023-06-05 RX ORDER — AMIODARONE HYDROCHLORIDE 200 MG/1
TABLET ORAL
Qty: 90 TABLET | OUTPATIENT
Start: 2023-06-05

## 2023-06-05 RX ORDER — SENNOSIDES 8.6 MG
1300 CAPSULE ORAL EVERY 8 HOURS PRN
Status: ON HOLD | COMMUNITY

## 2023-06-05 RX ORDER — POLYETHYLENE GLYCOL 3350 17 G/17G
17 POWDER, FOR SOLUTION ORAL DAILY PRN
Status: DISCONTINUED | OUTPATIENT
Start: 2023-06-05 | End: 2023-06-07 | Stop reason: HOSPADM

## 2023-06-05 RX ORDER — MAGNESIUM SULFATE 1 G/100ML
1 INJECTION INTRAVENOUS ONCE
Status: COMPLETED | OUTPATIENT
Start: 2023-06-05 | End: 2023-06-05

## 2023-06-05 RX ORDER — ACETAMINOPHEN 325 MG/1
650 TABLET ORAL EVERY 4 HOURS PRN
Status: DISCONTINUED | OUTPATIENT
Start: 2023-06-05 | End: 2023-06-07 | Stop reason: HOSPADM

## 2023-06-05 RX ORDER — SODIUM CHLORIDE, SODIUM LACTATE, POTASSIUM CHLORIDE, CALCIUM CHLORIDE 600; 310; 30; 20 MG/100ML; MG/100ML; MG/100ML; MG/100ML
75 INJECTION, SOLUTION INTRAVENOUS CONTINUOUS
Status: ACTIVE | OUTPATIENT
Start: 2023-06-05 | End: 2023-06-06

## 2023-06-05 RX ORDER — METOCLOPRAMIDE HYDROCHLORIDE 5 MG/ML
10 INJECTION INTRAMUSCULAR; INTRAVENOUS ONCE
Status: COMPLETED | OUTPATIENT
Start: 2023-06-05 | End: 2023-06-05

## 2023-06-05 RX ORDER — NICOTINE POLACRILEX 4 MG
15 LOZENGE BUCCAL
Status: DISCONTINUED | OUTPATIENT
Start: 2023-06-05 | End: 2023-06-07 | Stop reason: HOSPADM

## 2023-06-05 RX ORDER — LEVOTHYROXINE SODIUM 0.1 MG/1
TABLET ORAL
Qty: 90 TABLET | OUTPATIENT
Start: 2023-06-05

## 2023-06-05 RX ORDER — INSULIN LISPRO 100 [IU]/ML
2-7 INJECTION, SOLUTION INTRAVENOUS; SUBCUTANEOUS
Status: DISCONTINUED | OUTPATIENT
Start: 2023-06-05 | End: 2023-06-07 | Stop reason: HOSPADM

## 2023-06-05 RX ORDER — METOPROLOL TARTRATE 50 MG/1
TABLET, FILM COATED ORAL
Qty: 180 TABLET | OUTPATIENT
Start: 2023-06-05

## 2023-06-05 RX ORDER — TAMSULOSIN HYDROCHLORIDE 0.4 MG/1
CAPSULE ORAL
Qty: 90 CAPSULE | OUTPATIENT
Start: 2023-06-05

## 2023-06-05 RX ORDER — SODIUM CHLORIDE 0.9 % (FLUSH) 0.9 %
10 SYRINGE (ML) INJECTION AS NEEDED
Status: DISCONTINUED | OUTPATIENT
Start: 2023-06-05 | End: 2023-06-07 | Stop reason: HOSPADM

## 2023-06-05 RX ORDER — SODIUM CHLORIDE 9 MG/ML
10 INJECTION INTRAVENOUS AS NEEDED
Status: DISCONTINUED | OUTPATIENT
Start: 2023-06-05 | End: 2023-06-07 | Stop reason: HOSPADM

## 2023-06-05 RX ORDER — PROCHLORPERAZINE EDISYLATE 5 MG/ML
2.5 INJECTION INTRAMUSCULAR; INTRAVENOUS EVERY 6 HOURS PRN
Status: DISCONTINUED | OUTPATIENT
Start: 2023-06-05 | End: 2023-06-07 | Stop reason: HOSPADM

## 2023-06-05 RX ORDER — IBUPROFEN 600 MG/1
1 TABLET ORAL
Status: DISCONTINUED | OUTPATIENT
Start: 2023-06-05 | End: 2023-06-07 | Stop reason: HOSPADM

## 2023-06-05 RX ORDER — LEVOTHYROXINE SODIUM 0.1 MG/1
100 TABLET ORAL
Status: DISCONTINUED | OUTPATIENT
Start: 2023-06-06 | End: 2023-06-07 | Stop reason: HOSPADM

## 2023-06-05 RX ORDER — DEXTROSE MONOHYDRATE 25 G/50ML
25 INJECTION, SOLUTION INTRAVENOUS
Status: DISCONTINUED | OUTPATIENT
Start: 2023-06-05 | End: 2023-06-07 | Stop reason: HOSPADM

## 2023-06-05 RX ORDER — SODIUM CHLORIDE 9 MG/ML
40 INJECTION, SOLUTION INTRAVENOUS AS NEEDED
Status: DISCONTINUED | OUTPATIENT
Start: 2023-06-05 | End: 2023-06-07 | Stop reason: HOSPADM

## 2023-06-05 RX ORDER — AMIODARONE HYDROCHLORIDE 200 MG/1
200 TABLET ORAL DAILY
Status: DISCONTINUED | OUTPATIENT
Start: 2023-06-06 | End: 2023-06-07 | Stop reason: HOSPADM

## 2023-06-05 RX ORDER — BISACODYL 5 MG/1
5 TABLET, DELAYED RELEASE ORAL DAILY PRN
Status: DISCONTINUED | OUTPATIENT
Start: 2023-06-05 | End: 2023-06-07 | Stop reason: HOSPADM

## 2023-06-05 RX ORDER — OMEPRAZOLE 40 MG/1
CAPSULE, DELAYED RELEASE ORAL
Qty: 90 CAPSULE | OUTPATIENT
Start: 2023-06-05

## 2023-06-05 RX ORDER — ACETAMINOPHEN 160 MG/5ML
650 SOLUTION ORAL EVERY 4 HOURS PRN
Status: DISCONTINUED | OUTPATIENT
Start: 2023-06-05 | End: 2023-06-07 | Stop reason: HOSPADM

## 2023-06-05 RX ORDER — BISACODYL 10 MG
10 SUPPOSITORY, RECTAL RECTAL DAILY PRN
Status: DISCONTINUED | OUTPATIENT
Start: 2023-06-05 | End: 2023-06-07 | Stop reason: HOSPADM

## 2023-06-05 RX ORDER — TAMSULOSIN HYDROCHLORIDE 0.4 MG/1
0.4 CAPSULE ORAL DAILY
Status: DISCONTINUED | OUTPATIENT
Start: 2023-06-06 | End: 2023-06-07 | Stop reason: HOSPADM

## 2023-06-05 RX ORDER — AMOXICILLIN 250 MG
2 CAPSULE ORAL 2 TIMES DAILY
Status: DISCONTINUED | OUTPATIENT
Start: 2023-06-05 | End: 2023-06-07 | Stop reason: HOSPADM

## 2023-06-05 RX ORDER — SODIUM CHLORIDE 0.9 % (FLUSH) 0.9 %
10 SYRINGE (ML) INJECTION EVERY 12 HOURS SCHEDULED
Status: DISCONTINUED | OUTPATIENT
Start: 2023-06-05 | End: 2023-06-07 | Stop reason: HOSPADM

## 2023-06-05 RX ORDER — CHOLECALCIFEROL (VITAMIN D3) 125 MCG
5 CAPSULE ORAL NIGHTLY PRN
Status: DISCONTINUED | OUTPATIENT
Start: 2023-06-05 | End: 2023-06-07 | Stop reason: HOSPADM

## 2023-06-05 RX ORDER — NITROGLYCERIN 0.4 MG/1
0.4 TABLET SUBLINGUAL
Status: DISCONTINUED | OUTPATIENT
Start: 2023-06-05 | End: 2023-06-07 | Stop reason: HOSPADM

## 2023-06-05 RX ORDER — ACETAMINOPHEN 650 MG/1
650 SUPPOSITORY RECTAL EVERY 4 HOURS PRN
Status: DISCONTINUED | OUTPATIENT
Start: 2023-06-05 | End: 2023-06-07 | Stop reason: HOSPADM

## 2023-06-05 RX ORDER — PANTOPRAZOLE SODIUM 40 MG/1
40 TABLET, DELAYED RELEASE ORAL EVERY MORNING
Status: DISCONTINUED | OUTPATIENT
Start: 2023-06-06 | End: 2023-06-07 | Stop reason: HOSPADM

## 2023-06-05 RX ADMIN — SODIUM CHLORIDE, POTASSIUM CHLORIDE, SODIUM LACTATE AND CALCIUM CHLORIDE 75 ML/HR: 600; 310; 30; 20 INJECTION, SOLUTION INTRAVENOUS at 21:47

## 2023-06-05 RX ADMIN — MAGNESIUM SULFATE HEPTAHYDRATE 1 G: 1 INJECTION, SOLUTION INTRAVENOUS at 18:08

## 2023-06-05 RX ADMIN — IOPAMIDOL 80 ML: 612 INJECTION, SOLUTION INTRAVENOUS at 17:59

## 2023-06-05 RX ADMIN — APIXABAN 2.5 MG: 2.5 TABLET, FILM COATED ORAL at 21:48

## 2023-06-05 RX ADMIN — Medication 5 MG: at 21:48

## 2023-06-05 RX ADMIN — METOCLOPRAMIDE 10 MG: 5 INJECTION, SOLUTION INTRAMUSCULAR; INTRAVENOUS at 18:51

## 2023-06-05 NOTE — Clinical Note
Level of Care: Telemetry [5]   Diagnosis: Nausea and vomiting [829719]   Admitting Physician: CONNIE MEDINA [[id number]]   Certification: I Certify That Inpatient Hospital Services Are Medically Necessary For Greater Than 2 Midnights

## 2023-06-05 NOTE — ED PROVIDER NOTES
"Subjective   History of Present Illness  87-year-old female presents for evaluation of nausea and vomiting.  Of note, the patient was recently admitted to our facility from May 5 through May 24.  She presents from the nursing home today to be evaluated for nausea and vomiting accompanied by abdominal pain.  She notes that she has not been able to \"keep anything down\" for the past 3 days.  She denies any accompanying diarrhea.  She endorses mild accompanying abdominal pain that she currently rates at 5 out of 10 in severity.  She denies any known dietary triggers for her symptoms.  She states that she has had a very poor appetite as a result of her ongoing nausea and vomiting.  No urinary symptoms.  No headaches.  She saw her primary care physician regarding her symptoms today and was subsequently sent to the emergency department via EMS to be evaluated.    Review of Systems   Constitutional:  Positive for appetite change and fatigue.   Gastrointestinal:  Positive for abdominal pain, nausea and vomiting.   Neurological:  Positive for weakness.   All other systems reviewed and are negative.    Past Medical History:   Diagnosis Date    Anemia     Arthritis     Diabetes mellitus     checks sugar only when pt wants to     Disease of thyroid gland     History of transfusion     with knee surgery-  no reaction recalled.     [place] (hard of hearing)     no hearing aids    Hypertension     Migraine without aura and without status migrainosus, not intractable 12/30/2016    Pulmonary emboli 2011    (after knee surgery)    SOBOE (shortness of breath on exertion)     Tremors of nervous system     Vertigo     Wears dentures     upper     Wears glasses        Allergies   Allergen Reactions    Aspirin Unknown - Low Severity     Mouth sores        Past Surgical History:   Procedure Laterality Date    BLADDER SURGERY      CATARACT EXTRACTION      bilat     CHOLECYSTECTOMY      COLONOSCOPY      HYSTERECTOMY      KYPHOPLASTY N/A 2/12/2021 "    Procedure: KYPHOPLASTY T11, T12 AND L4;  Surgeon: Matty Hearn MD;  Location: [facility];  Service: Orthopedic Spine;  Laterality: N/A;    OOPHORECTOMY      TONSILLECTOMY         Family History   Problem Relation Age of Onset    Breast cancer Sister 84    Stroke Mother     Heart attack Father     Ovarian cancer Neg Hx        Social History     Socioeconomic History    Marital status:    Tobacco Use    Smoking status: Never     Passive exposure: Never    Smokeless tobacco: Never   Vaping Use    Vaping Use: Never used   Substance and Sexual Activity    Alcohol use: No    Drug use: No    Sexual activity: Defer           Objective   Physical Exam  Vitals and nursing note reviewed.   Constitutional:       General: She is not in acute distress.     Appearance: She is well-developed. She is not diaphoretic.      Comments: Chronically ill-appearing elderly female   HENT:      Head: Normocephalic and atraumatic.   Eyes:      Pupils: Pupils are equal, round, and reactive to light.   Cardiovascular:      Rate and Rhythm: Normal rate and regular rhythm.      Heart sounds: Normal heart sounds. No murmur heard.    No friction rub. No gallop.   Pulmonary:      Effort: Pulmonary effort is normal. No respiratory distress.      Breath sounds: Normal breath sounds. No wheezing or rales.   Abdominal:      General: Bowel sounds are normal. There is no distension.      Palpations: Abdomen is soft. There is no mass.      Tenderness: There is abdominal tenderness. There is guarding. There is no rebound.      Comments: Mild generalized abdominal tenderness noted with guarding present, no pain out of proportion to exam   Genitourinary:     Comments: No CVA tenderness noted  Musculoskeletal:         General: Normal range of motion.      Cervical back: Neck supple.   Skin:     General: Skin is warm and dry.      Findings: No erythema or rash.   Neurological:      Mental Status: She is alert and oriented to person, place, and time.  "  Psychiatric:         Mood and Affect: Mood normal.         Thought Content: Thought content normal.         Judgment: Judgment normal.       Procedures           ED Course  ED Course as of 06/05/23 1952 Mon Jun 05, 2023 1649 87-year-old female presents for evaluation of nausea and vomiting.  Of note, the patient was recently admitted to our facility from May 5 through May 24.  She presents from the nursing home today to be evaluated for nausea and vomiting accompanied by abdominal pain.  She notes that she has \"been unable to keep anything down\" for the past 3 days.  No diarrhea.  On arrival, the patient is nontoxic-appearing.  Exam remarkable for mild generalized abdominal tenderness with guarding noted.  No pain out of proportion to exam.  EKG revealed sinus bradycardia with a heart rate of 59 and mildly prolonged QTc interval.  Differential diagnosis is quite broad.  We will obtain labs and imaging, and we will reassess following initial interventions. [DD]   1715 High-sensitivity troponin is minimally elevated.  Magnesium replacement initiated intravenously. [DD]   1745 Lactate is elevated at 3.2. [DD]   1847 After reviewing the patient's labs and imaging results, I went and reevaluated her.  She is unable to tolerate p.o.  Her QTc is prolonged, and at this point, I feel that she warrants readmission to the hospital for IV fluids and electrolyte replacement as she is unable to tolerate p.o.  I discussed the patient's case with Dr. Chin, and the patient will be admitted under his care for further evaluation and treatment.  The patient is aware/agreeable with the plan at this time. [DD]      ED Course User Index  [DD] Yung Alfonso MD                                      Recent Results (from the past 24 hour(s))   ECG 12 Lead Syncope    Collection Time: 06/05/23  4:23 PM   Result Value Ref Range    QT Interval 516 ms    QTC Interval 510 ms   Comprehensive Metabolic Panel    Collection Time: " 06/05/23  4:29 PM    Specimen: Blood   Result Value Ref Range    Glucose 252 (H) 65 - 99 mg/dL    BUN 20 8 - 23 mg/dL    Creatinine 0.75 0.57 - 1.00 mg/dL    Sodium 132 (L) 136 - 145 mmol/L    Potassium 4.3 3.5 - 5.2 mmol/L    Chloride 94 (L) 98 - 107 mmol/L    CO2 22.0 22.0 - 29.0 mmol/L    Calcium 8.6 8.6 - 10.5 mg/dL    Total Protein 6.0 6.0 - 8.5 g/dL    Albumin 3.6 3.5 - 5.2 g/dL    ALT (SGPT) 6 1 - 33 U/L    AST (SGOT) 11 1 - 32 U/L    Alkaline Phosphatase 84 39 - 117 U/L    Total Bilirubin 0.6 0.0 - 1.2 mg/dL    Globulin 2.4 gm/dL    A/G Ratio 1.5 g/dL    BUN/Creatinine Ratio 26.7 (H) 7.0 - 25.0    Anion Gap 16.0 (H) 5.0 - 15.0 mmol/L    eGFR 77.6 >60.0 mL/min/1.73   Lipase    Collection Time: 06/05/23  4:29 PM    Specimen: Blood   Result Value Ref Range    Lipase 20 13 - 60 U/L   Lactic Acid, Plasma    Collection Time: 06/05/23  4:29 PM    Specimen: Blood   Result Value Ref Range    Lactate 3.2 (C) 0.5 - 2.0 mmol/L   Green Top (Gel)    Collection Time: 06/05/23  4:29 PM   Result Value Ref Range    Extra Tube Hold for add-ons.    Lavender Top    Collection Time: 06/05/23  4:29 PM   Result Value Ref Range    Extra Tube hold for add-on    Gold Top - SST    Collection Time: 06/05/23  4:29 PM   Result Value Ref Range    Extra Tube Hold for add-ons.    Light Blue Top    Collection Time: 06/05/23  4:29 PM   Result Value Ref Range    Extra Tube Hold for add-ons.    CBC Auto Differential    Collection Time: 06/05/23  4:29 PM    Specimen: Blood   Result Value Ref Range    WBC 2.25 (L) 3.40 - 10.80 10*3/mm3    RBC 3.56 (L) 3.77 - 5.28 10*6/mm3    Hemoglobin 10.1 (L) 12.0 - 15.9 g/dL    Hematocrit 31.0 (L) 34.0 - 46.6 %    MCV 87.1 79.0 - 97.0 fL    MCH 28.4 26.6 - 33.0 pg    MCHC 32.6 31.5 - 35.7 g/dL    RDW 15.3 12.3 - 15.4 %    RDW-SD 49.6 37.0 - 54.0 fl    MPV 9.6 6.0 - 12.0 fL    Platelets 58 (L) 140 - 450 10*3/mm3    Neutrophil % 79.6 (H) 42.7 - 76.0 %    Lymphocyte % 13.3 (L) 19.6 - 45.3 %    Monocyte % 6.2 5.0 -  12.0 %    Eosinophil % 0.0 (L) 0.3 - 6.2 %    Basophil % 0.0 0.0 - 1.5 %    Immature Grans % 0.9 (H) 0.0 - 0.5 %    Neutrophils, Absolute 1.79 1.70 - 7.00 10*3/mm3    Lymphocytes, Absolute 0.30 (L) 0.70 - 3.10 10*3/mm3    Monocytes, Absolute 0.14 0.10 - 0.90 10*3/mm3    Eosinophils, Absolute 0.00 0.00 - 0.40 10*3/mm3    Basophils, Absolute 0.00 0.00 - 0.20 10*3/mm3    Immature Grans, Absolute 0.02 0.00 - 0.05 10*3/mm3    nRBC 0.0 0.0 - 0.2 /100 WBC   High Sensitivity Troponin T    Collection Time: 06/05/23  4:29 PM    Specimen: Blood   Result Value Ref Range    HS Troponin T 17 (H) <10 ng/L   Magnesium    Collection Time: 06/05/23  4:29 PM    Specimen: Blood   Result Value Ref Range    Magnesium 1.4 (L) 1.6 - 2.4 mg/dL   POC Creatinine    Collection Time: 06/05/23  4:31 PM    Specimen: Blood   Result Value Ref Range    Creatinine 0.80 0.60 - 1.30 mg/dL   POC Creatinine    Collection Time: 06/05/23  4:32 PM    Specimen: Blood   Result Value Ref Range    Creatinine 0.80 mg/dL   Urinalysis With Culture If Indicated - Urine, Clean Catch    Collection Time: 06/05/23  6:11 PM    Specimen: Urine, Clean Catch   Result Value Ref Range    Color, UA Yellow Yellow, Straw    Appearance, UA Clear Clear    pH, UA <=5.0 5.0 - 8.0    Specific Gravity, UA 1.015 1.001 - 1.030    Glucose, UA Negative Negative    Ketones, UA Negative Negative    Bilirubin, UA Negative Negative    Blood, UA Negative Negative    Protein, UA Negative Negative    Leuk Esterase, UA Negative Negative    Nitrite, UA Negative Negative    Urobilinogen, UA 0.2 E.U./dL 0.2 - 1.0 E.U./dL   High Sensitivity Troponin T 2Hr    Collection Time: 06/05/23  7:03 PM    Specimen: Arm, Right; Blood   Result Value Ref Range    HS Troponin T 20 (H) <10 ng/L    Troponin T Delta 3 >=-4 - <+4 ng/L     Note: In addition to lab results from this visit, the labs listed above may include labs taken at another facility or during a different encounter within the last 24 hours. Please  "correlate lab times with ED admission and discharge times for further clarification of the services performed during this visit.    CT Abdomen Pelvis With Contrast   Final Result   Impression:   1.No acute abnormality identified within the abdomen or pelvis.   2.Scattered colonic diverticulosis.   3.Moderate splenomegaly. Several small splenic hypodensities, too small to fully characterize. Similar finding noted on the study dated 2/19/2021.   4.Small pericardial effusion. Small left pleural effusion with left basilar atelectasis. These findings are new since 5/5/2023.   5.Unchanged 1.3 cm left adrenal nodule.   6.Additional findings as detailed above.      Electronically Signed: Venkat Meyer     6/5/2023 6:27 PM EDT     Workstation ID: [id number]        Vitals:    06/05/23 1616 06/05/23 1900   BP: 149/64 116/76   Pulse: 59 61   Resp: 15    Temp: 97.7 °F (36.5 °C)    TempSrc: Oral    SpO2: 96% 95%   Weight: 51.7 kg (114 lb)    Height: 160 cm (63\")      Medications   Sodium Chloride (PF) 0.9 % 10 mL (has no administration in time range)   Potassium Replacement - Follow Nurse / BPA Driven Protocol (has no administration in time range)   Magnesium Standard Dose Replacement - Follow Nurse / BPA Driven Protocol (has no administration in time range)   Phosphorus Replacement - Follow Nurse / BPA Driven Protocol (has no administration in time range)   Calcium Replacement - Follow Nurse / BPA Driven Protocol (has no administration in time range)   magnesium sulfate in D5W 1g/100mL (PREMIX) (0 g Intravenous Stopped 6/5/23 1926)   iopamidol (ISOVUE-300) 61 % injection 100 mL (80 mL Intravenous Given 6/5/23 1759)   metoclopramide (REGLAN) injection 10 mg (10 mg Intravenous Given 6/5/23 1851)     ECG/EMG Results (last 24 hours)       Procedure Component Value Units Date/Time    ECG 12 Lead Syncope [[id number]] Collected: 06/05/23 1623     Updated: 06/05/23 1624     QT Interval 516 ms      QTC Interval 510 ms     Narrative:      " Test Reason : NAUSEA  Blood Pressure :   */*   mmHG  Vent. Rate :  59 BPM     Atrial Rate :  59 BPM     P-R Int : 206 ms          QRS Dur :  92 ms      QT Int : 516 ms       P-R-T Axes :  97  -2  18 degrees     QTc Int : 510 ms    Sinus bradycardia  Prolonged QT  Abnormal ECG  When compared with ECG of 18-MAY-2023 05:23,  Sinus rhythm has replaced Atrial fibrillation  Criteria for Septal infarct are no longer present    Referred By: EDMD           Confirmed By:           ECG 12 Lead Syncope   Preliminary Result   Test Reason : NAUSEA   Blood Pressure :   */*   mmHG   Vent. Rate :  59 BPM     Atrial Rate :  59 BPM      P-R Int : 206 ms          QRS Dur :  92 ms       QT Int : 516 ms       P-R-T Axes :  97  -2  18 degrees      QTc Int : 510 ms      Sinus bradycardia   Prolonged QT   Abnormal ECG   When compared with ECG of 18-MAY-2023 05:23,   Sinus rhythm has replaced Atrial fibrillation   Criteria for Septal infarct are no longer present      Referred By: EDMD           Confirmed By:                  Medical Decision Making  Amount and/or Complexity of Data Reviewed  Labs: ordered.  Radiology: ordered.  ECG/medicine tests: ordered.    Risk  OTC drugs.  Prescription drug management.  Decision regarding hospitalization.        Final diagnoses:   Generalized abdominal pain   Nausea and vomiting, unspecified vomiting type   Dehydration   Prolonged Q-T interval on ECG   Hypomagnesemia   Hyperglycemia   Pericardial effusion   Anemia, unspecified type       ED Disposition  ED Disposition       ED Disposition   Decision to Admit    Condition   --    Comment   Level of Care: Telemetry [5]   Diagnosis: Nausea and vomiting [815515]   Admitting Physician: CONNIE MEDINA [[id number]]                 No follow-up provider specified.       Medication List        ASK your doctor about these medications      Insulin Lispro 100 UNIT/ML injection  Commonly known as: humaLOG  Inject 7 Units under the skin into the appropriate area as  directed 3 (Three) Times a Day With Meals.  Ask about: Which instructions should I use?                 Yung Alfonso MD  06/05/23 1956

## 2023-06-05 NOTE — H&P
[facility] Medicine Services  HISTORY AND PHYSICAL    Patient Name: Chey Robertson  : 1935  MRN: [id number]  Primary Care Physician: Corby Marie MD  Date of admission: 2023    Subjective   Subjective     Chief Complaint:  Nausea vomiting    HPI:  Chey Robertson is a 87 y.o. female with a history of myelodysplasia with chronic pancytopenia, hypertension, T2DM, PE, GERD, A-fib, COVID-19, was discharged from here on 2023 with A-fib RVR, dehydration and UTI.  Patient presents the ED today with complaints of nausea and vomiting.  Patient reports that she developed epigastric abdominal pain that started 4 to 5 days ago that she describes as constant and aching.  Her last bowel movement was yesterday.  Yesterday she developed nausea and vomiting that has continued throughout the day today.  Family describes her emesis as bile and clear.  She also endorses a loss of appetite and generalized weakness.  No fevers, chills, shortness of air, cough, chest pain, diarrhea, headaches, dizziness, syncope, or any other complaints at this time.  CT abdomen and pelvis shows no acute abnormalities.  Patient was started on magnesium replacement and given Reglan in the ED.  Patient is being admitted to the hospital medicine service for further evaluation and management.    Chart reviewed and patient examined. I have reviewed the information above and agree with the assessment. Currently she is without nausea. Just feels continued weakness.    Review of Systems   Constitutional:  Positive for appetite change. Negative for chills, fatigue and fever.   HENT: Negative.     Eyes: Negative.    Respiratory: Negative.     Cardiovascular: Negative.    Gastrointestinal:  Positive for abdominal pain, nausea and vomiting. Negative for abdominal distention, blood in stool and diarrhea.   Endocrine: Negative.    Genitourinary: Negative.    Musculoskeletal: Negative.    Skin: Negative.     Allergic/Immunologic: Negative.    Neurological:  Positive for weakness (generalized). Negative for dizziness, syncope, light-headedness and headaches.   Hematological: Negative.    Psychiatric/Behavioral: Negative.            Personal History     Past Medical History:   Diagnosis Date    Anemia     Arthritis     Diabetes mellitus     checks sugar only when pt wants to     Disease of thyroid gland     History of transfusion     with knee surgery-  no reaction recalled.     [place] (hard of hearing)     no hearing aids    Hypertension     Migraine without aura and without status migrainosus, not intractable 12/30/2016    Pulmonary emboli 2011    (after knee surgery)    SOBOE (shortness of breath on exertion)     Tremors of nervous system     Vertigo     Wears dentures     upper     Wears glasses          Oncology Problem List:  Myelodysplasia (myelodysplastic syndrome) (03/14/2023; Status: Active)       Past Surgical History:   Procedure Laterality Date    BLADDER SURGERY      CATARACT EXTRACTION      bilat     CHOLECYSTECTOMY      COLONOSCOPY      HYSTERECTOMY      KYPHOPLASTY N/A 2/12/2021    Procedure: KYPHOPLASTY T11, T12 AND L4;  Surgeon: Matty Hearn MD;  Location: [facility];  Service: Orthopedic Spine;  Laterality: N/A;    OOPHORECTOMY      TONSILLECTOMY         Family History: family history includes Breast cancer (age of onset: 84) in her sister; Heart attack in her father; Stroke in her mother.     Social History:  reports that she has never smoked. She has never been exposed to tobacco smoke. She has never used smokeless tobacco. She reports that she does not drink alcohol and does not use drugs.  Social History     Social History Narrative    Not on file       Medications:  Diclofenac Sodium, Insulin Lispro, acetaminophen, amiodarone, apixaban, insulin detemir, levothyroxine, melatonin, metoprolol tartrate, omeprazole, silver sulfadiazine, and tamsulosin    Allergies   Allergen Reactions    Aspirin  Unknown - Low Severity     Mouth sores        Objective   Objective     Vital Signs:   Temp:  [97.7 °F (36.5 °C)] 97.7 °F (36.5 °C)  Heart Rate:  [59-61] 61  Resp:  [15] 15  BP: (116-149)/(64-76) 116/76    Physical Exam   Constitutional: Awake, alert, resting in bed  Eyes: PERRLA, sclerae anicteric, no conjunctival injection  HENT: NCAT, mucous membranes moist  Neck: Supple, no thyromegaly, no lymphadenopathy, trachea midline  Respiratory: Clear to auscultation bilaterally, nonlabored respirations   Cardiovascular: RRR, no murmurs, rubs, or gallops, palpable pedal pulses bilaterally  Gastrointestinal: Positive bowel sounds, soft, mild diffuse tenderness, nondistended  Musculoskeletal: No bilateral ankle edema, no clubbing or cyanosis to extremities  Psychiatric: Appropriate affect, cooperative  Neurologic: Oriented x 3, strength symmetric in all extremities, Cranial Nerves grossly intact to confrontation, speech clear  Skin: No rashes       Result Review:  I have personally reviewed the results from the time of this admission to 6/5/2023 20:12 EDT and agree with these findings:  [x]  Laboratory list / accordion  []  Microbiology  [x]  Radiology  []  EKG/Telemetry   []  Cardiology/Vascular   []  Pathology  [x]  Old records  []  Other:  Most notable findings include: Mg 1.4, wbc 2.25, hgn 10.1, hct 31    LAB RESULTS:      Lab 06/05/23  1629   WBC 2.25*   HEMOGLOBIN 10.1*   HEMATOCRIT 31.0*   PLATELETS 58*   NEUTROS ABS 1.79   IMMATURE GRANS (ABS) 0.02   LYMPHS ABS 0.30*   MONOS ABS 0.14   EOS ABS 0.00   MCV 87.1   LACTATE 3.2*         Lab 06/05/23  1632 06/05/23  1631 06/05/23  1629   SODIUM  --   --  132*   POTASSIUM  --   --  4.3   CHLORIDE  --   --  94*   CO2  --   --  22.0   ANION GAP  --   --  16.0*   BUN  --   --  20   CREATININE 0.80 0.80 0.75   EGFR  --   --  77.6   GLUCOSE  --   --  252*   CALCIUM  --   --  8.6   MAGNESIUM  --   --  1.4*         Lab 06/05/23  1629   TOTAL PROTEIN 6.0   ALBUMIN 3.6   GLOBULIN  2.4   ALT (SGPT) 6   AST (SGOT) 11   BILIRUBIN 0.6   ALK PHOS 84   LIPASE 20         Lab 06/05/23  1903 06/05/23  1629   HSTROP T 20* 17*                 Brief Urine Lab Results  (Last result in the past 365 days)        Color   Clarity   Blood   Leuk Est   Nitrite   Protein   CREAT   Urine HCG        06/05/23 1811 Yellow   Clear   Negative   Negative   Negative   Negative                 Microbiology Results (last 10 days)       ** No results found for the last 240 hours. **            CT Abdomen Pelvis With Contrast    Result Date: 6/5/2023  CT ABDOMEN PELVIS W CONTRAST Date of Exam: 6/5/2023 5:56 PM EDT Indication: abd pain and N/V. Comparison: 2/19/2021, 5/5/2023. Technique: Axial CT images were obtained of the abdomen and pelvis following the uneventful intravenous administration of 80 mL Isovue-300. Reconstructed coronal and sagittal images were also obtained. Automated exposure control and iterative construction methods were used. Findings: Liver: The liver is unremarkable in morphology. Subcentimeter hypodensity within the caudal right hepatic lobe is too small to fully characterize. No biliary dilation is seen. Gallbladder: Surgically absent Pancreas: The pancreas appears atrophic. Spleen: The spleen is enlarged, measuring approximately 18 cm in length. Several small splenic hypodensities are too small to fully characterize. Several tiny splenic granulomas. Adrenal glands: 1.3 cm low-density nodule at the apex of the left adrenal gland, incompletely characterized on this contrast-enhanced study. The right adrenal gland is unremarkable. Genitourinary tract: Kidneys appear unremarkable. No hydronephrosis is seen. Small calcifications along the course of the left ureter are thought to represent phleboliths. Urinary bladder is unremarkable. Patient is status post hysterectomy. Gastrointestinal tract: Scattered colonic diverticulosis. The hollow viscera appears otherwise unremarkable. There is no evidence of  bowel obstruction. Appendix: The appendix is not identified. Other findings: No free air or free fluid is identified. No pathologically enlarged lymph nodes are seen. Vascular calcifications are present. The IVC is grossly unremarkable. Bones and soft tissues: Bones are demineralized. Patient is status post kyphoplasty of compression fractures of T11, T12, and L4. There are degenerative changes within the spine. Superficial soft tissues demonstrate no acute abnormality. Lung bases: Small left pleural effusion with bibasilar atelectasis. Small pericardial effusion noted.     Impression: Impression: 1.No acute abnormality identified within the abdomen or pelvis. 2.Scattered colonic diverticulosis. 3.Moderate splenomegaly. Several small splenic hypodensities, too small to fully characterize. Similar finding noted on the study dated 2/19/2021. 4.Small pericardial effusion. Small left pleural effusion with left basilar atelectasis. These findings are new since 5/5/2023. 5.Unchanged 1.3 cm left adrenal nodule. 6.Additional findings as detailed above. Electronically Signed: Venkat Meyer  6/5/2023 6:27 PM EDT  Workstation ID: [id number]         Assessment & Plan   Assessment & Plan       Nausea and vomiting    Anemia    Hypothyroidism (acquired)    Personal history of pulmonary embolism    Chronic anticoagulation    Essential hypertension    Type 2 diabetes mellitus without complication, with long-term current use of insulin    Atrial fibrillation    Hyponatremia    Hypomagnesemia    QT prolongation    Pericardial effusion    Cheykushal Robertson is a 87 y.o. female with a history of myelodysplasia with chronic pancytopenia, hypertension, T2DM, PE, GERD, A-fib, COVID-19, was discharged from here on 5/24/2023 with A-fib RVR, dehydration and UTI.  Patient presents the ED today with complaints of nausea and vomiting.       Assessment and Plan:    Intractable nausea and vomiting  Lactic acidosis  -- CT abdomen pelvis shows no acute  abnormality within the abdomen or pelvis, scattered colonic diverticulosis, moderate splenomegaly, small pericardial effusion  -- Lactate 3.2  -- Reflex lactate  -- IV fluids  -- compazine   -- am labs    Hypomagnesemia  -- Mg 1.4  -- Sliding-scale replacement  -- Recheck MG in a.m.    Elevated troponin  Pericardial effusion  Prolonged QT  -- CT abdomen pelvis shows a pericardial effusion new since 5/5/2023  -- HS troponin 17 and 20  -- patient denies chest pain  -- avoid medications that will prolong QT interval  -- echo in the am  -- trend troponin    Pleural effusion  --CT abdomen pelvis shows small left pleural effusion with left basilar atelectasis.  New since 5/5/2023  -- monitor    A-fib  Hypertension  -- Follows with Dr. Chand  -- Continue amiodarone, metoprolol, Eliquis    Myelodysplastic syndrome  Anemia of chronic disease  Pancytopenia  -- Hemoglobin 10.1, hematocrit 31, wbc 2.25, plt 58  -- Follows with Dr. Lyman--follow-up appointment scheduled in August    T2DM  -- FSBG with SSI  -- A1c 6% 03/2023    Hypothyroidism  -- Continue Synthroid    GERD  -- PPI    Acute on chronic debility  -- Consult case management  -- PT/OT consult  -- fall precautions        DVT prophylaxis:  On Eliquis    CODE STATUS:    Level Of Support Discussed With: Patient  Code Status (Patient has no pulse and is not breathing): CPR (Attempt to Resuscitate)  Medical Interventions (Patient has pulse or is breathing): Full Support    Attending   Admission Attestation       I have performed an independent face-to-face diagnostic evaluation including performing an independent physical examination.  The documented plan of care above was reviewed and developed with the advanced practice clinician (APC).  I have updated the HPI as appropriate.    Attending Physical Exam:  Temp:  [97.7 °F (36.5 °C)] 97.7 °F (36.5 °C)  Heart Rate:  [59-61] 61  Resp:  [15] 15  BP: (116-149)/(64-76) 116/76      NAD, alert and oriented  OP clear, dry  MM  Neck supple  No LAD  RRR  CTAB  +BS, ND< NT, soft  JOYCE  Normal affect  Family at bedside    Intractable n/v, recurrent  -IVF/nausea meds    Elevated troponin  Pericardial effusion  -ECHO in AM    DM  -SSI    Jason Chin MD  06/05/23                 This note has been completed as part of a split-shared workflow.     Signature: Electronically signed by CHANG Muhammad, 06/05/23, 8:12 PM EDT.   Total time spent: 60 minutes  Time spent includes time reviewing chart, face-to-face time, counseling patient/family/caregiver, ordering medications/tests/procedures, communicating with other health care professionals, documenting clinical information in the electronic health record, and coordination of care.

## 2023-06-06 ENCOUNTER — APPOINTMENT (OUTPATIENT)
Dept: GENERAL RADIOLOGY | Facility: HOSPITAL | Age: 88
End: 2023-06-06
Payer: MEDICARE

## 2023-06-06 ENCOUNTER — APPOINTMENT (OUTPATIENT)
Dept: CARDIOLOGY | Facility: HOSPITAL | Age: 88
End: 2023-06-06
Payer: MEDICARE

## 2023-06-06 LAB
ANION GAP SERPL CALCULATED.3IONS-SCNC: 12 MMOL/L (ref 5–15)
BASOPHILS # BLD AUTO: 0 10*3/MM3 (ref 0–0.2)
BASOPHILS NFR BLD AUTO: 0 % (ref 0–1.5)
BH CV ECHO MEAS - AO ROOT DIAM: 3.4 CM
BH CV ECHO MEAS - EDV(CUBED): 103.8 ML
BH CV ECHO MEAS - EDV(MOD-SP2): 96.9 ML
BH CV ECHO MEAS - EDV(MOD-SP4): 141 ML
BH CV ECHO MEAS - EF(MOD-BP): 60.5 %
BH CV ECHO MEAS - EF(MOD-SP2): 55.2 %
BH CV ECHO MEAS - EF(MOD-SP4): 65 %
BH CV ECHO MEAS - ESV(CUBED): 46.7 ML
BH CV ECHO MEAS - ESV(MOD-SP2): 43.4 ML
BH CV ECHO MEAS - ESV(MOD-SP4): 49.3 ML
BH CV ECHO MEAS - FS: 23.4 %
BH CV ECHO MEAS - IVS/LVPW: 0.71 CM
BH CV ECHO MEAS - IVSD: 1 CM
BH CV ECHO MEAS - LA DIMENSION: 4 CM
BH CV ECHO MEAS - LAT PEAK E' VEL: 6 CM/SEC
BH CV ECHO MEAS - LV DIASTOLIC VOL/BSA (35-75): 91.9 CM2
BH CV ECHO MEAS - LV MASS(C)D: 212 GRAMS
BH CV ECHO MEAS - LV MAX PG: 4.3 MMHG
BH CV ECHO MEAS - LV MEAN PG: 2 MMHG
BH CV ECHO MEAS - LV SYSTOLIC VOL/BSA (12-30): 32.1 CM2
BH CV ECHO MEAS - LV V1 MAX: 104 CM/SEC
BH CV ECHO MEAS - LV V1 VTI: 28.5 CM
BH CV ECHO MEAS - LVIDD: 4.7 CM
BH CV ECHO MEAS - LVIDS: 3.6 CM
BH CV ECHO MEAS - LVOT AREA: 3.1 CM2
BH CV ECHO MEAS - LVOT DIAM: 2 CM
BH CV ECHO MEAS - LVPWD: 1.4 CM
BH CV ECHO MEAS - MED PEAK E' VEL: 3.7 CM/SEC
BH CV ECHO MEAS - MV A MAX VEL: 75.5 CM/SEC
BH CV ECHO MEAS - MV DEC SLOPE: 923 CM/SEC2
BH CV ECHO MEAS - MV E MAX VEL: 87 CM/SEC
BH CV ECHO MEAS - MV E/A: 1.15
BH CV ECHO MEAS - MV MAX PG: 6.7 MMHG
BH CV ECHO MEAS - MV MEAN PG: 2 MMHG
BH CV ECHO MEAS - MV P1/2T: 40.6 MSEC
BH CV ECHO MEAS - MV V2 VTI: 41.4 CM
BH CV ECHO MEAS - MVA(P1/2T): 5.4 CM2
BH CV ECHO MEAS - MVA(VTI): 2.16 CM2
BH CV ECHO MEAS - PA ACC TIME: 0.1 SEC
BH CV ECHO MEAS - PA PR(ACCEL): 36.3 MMHG
BH CV ECHO MEAS - RAP SYSTOLE: 8 MMHG
BH CV ECHO MEAS - RVSP: 23 MMHG
BH CV ECHO MEAS - SI(MOD-SP2): 34.9 ML/M2
BH CV ECHO MEAS - SI(MOD-SP4): 59.8 ML/M2
BH CV ECHO MEAS - SV(LVOT): 89.5 ML
BH CV ECHO MEAS - SV(MOD-SP2): 53.5 ML
BH CV ECHO MEAS - SV(MOD-SP4): 91.7 ML
BH CV ECHO MEAS - TAPSE (>1.6): 0.98 CM
BH CV ECHO MEAS - TR MAX PG: 15.8 MMHG
BH CV ECHO MEAS - TR MAX VEL: 199 CM/SEC
BH CV ECHO MEASUREMENTS AVERAGE E/E' RATIO: 17.94
BH CV XLRA - RV BASE: 5.1 CM
BH CV XLRA - RV LENGTH: 5.4 CM
BH CV XLRA - RV MID: 3.5 CM
BH CV XLRA - TDI S': 9.6 CM/SEC
BUN SERPL-MCNC: 20 MG/DL (ref 8–23)
BUN/CREAT SERPL: 29 (ref 7–25)
CALCIUM SPEC-SCNC: 8.5 MG/DL (ref 8.6–10.5)
CHLORIDE SERPL-SCNC: 93 MMOL/L (ref 98–107)
CO2 SERPL-SCNC: 29 MMOL/L (ref 22–29)
CREAT SERPL-MCNC: 0.69 MG/DL (ref 0.57–1)
DEPRECATED RDW RBC AUTO: 49.9 FL (ref 37–54)
EGFRCR SERPLBLD CKD-EPI 2021: 84.6 ML/MIN/1.73
EOSINOPHIL # BLD AUTO: 0 10*3/MM3 (ref 0–0.4)
EOSINOPHIL NFR BLD AUTO: 0 % (ref 0.3–6.2)
ERYTHROCYTE [DISTWIDTH] IN BLOOD BY AUTOMATED COUNT: 15.3 % (ref 12.3–15.4)
GLUCOSE BLDC GLUCOMTR-MCNC: 134 MG/DL (ref 70–130)
GLUCOSE BLDC GLUCOMTR-MCNC: 169 MG/DL (ref 70–130)
GLUCOSE BLDC GLUCOMTR-MCNC: 196 MG/DL (ref 70–130)
GLUCOSE BLDC GLUCOMTR-MCNC: 222 MG/DL (ref 70–130)
GLUCOSE SERPL-MCNC: 112 MG/DL (ref 65–99)
HCT VFR BLD AUTO: 27.6 % (ref 34–46.6)
HGB BLD-MCNC: 8.7 G/DL (ref 12–15.9)
IMM GRANULOCYTES # BLD AUTO: 0.01 10*3/MM3 (ref 0–0.05)
IMM GRANULOCYTES NFR BLD AUTO: 0.7 % (ref 0–0.5)
LEFT ATRIUM VOLUME INDEX: 41.8 ML/M2
LYMPHOCYTES # BLD AUTO: 0.52 10*3/MM3 (ref 0.7–3.1)
LYMPHOCYTES NFR BLD AUTO: 34.9 % (ref 19.6–45.3)
MAGNESIUM SERPL-MCNC: 1.8 MG/DL (ref 1.6–2.4)
MCH RBC QN AUTO: 28.3 PG (ref 26.6–33)
MCHC RBC AUTO-ENTMCNC: 31.5 G/DL (ref 31.5–35.7)
MCV RBC AUTO: 89.9 FL (ref 79–97)
MONOCYTES # BLD AUTO: 0.22 10*3/MM3 (ref 0.1–0.9)
MONOCYTES NFR BLD AUTO: 14.8 % (ref 5–12)
NEUTROPHILS NFR BLD AUTO: 0.74 10*3/MM3 (ref 1.7–7)
NEUTROPHILS NFR BLD AUTO: 49.6 % (ref 42.7–76)
NRBC BLD AUTO-RTO: 0 /100 WBC (ref 0–0.2)
PLATELET # BLD AUTO: 58 10*3/MM3 (ref 140–450)
PMV BLD AUTO: 9.9 FL (ref 6–12)
POTASSIUM SERPL-SCNC: 3.8 MMOL/L (ref 3.5–5.2)
RBC # BLD AUTO: 3.07 10*6/MM3 (ref 3.77–5.28)
SODIUM SERPL-SCNC: 134 MMOL/L (ref 136–145)
TROPONIN T SERPL HS-MCNC: 19 NG/L
TROPONIN T SERPL HS-MCNC: 26 NG/L
TSH SERPL DL<=0.05 MIU/L-ACNC: 1.95 UIU/ML (ref 0.27–4.2)
WBC NRBC COR # BLD: 1.49 10*3/MM3 (ref 3.4–10.8)

## 2023-06-06 PROCEDURE — 99232 SBSQ HOSP IP/OBS MODERATE 35: CPT | Performed by: INTERNAL MEDICINE

## 2023-06-06 PROCEDURE — 97161 PT EVAL LOW COMPLEX 20 MIN: CPT

## 2023-06-06 PROCEDURE — 63710000001 INSULIN LISPRO (HUMAN) PER 5 UNITS: Performed by: NURSE PRACTITIONER

## 2023-06-06 PROCEDURE — G0378 HOSPITAL OBSERVATION PER HR: HCPCS

## 2023-06-06 PROCEDURE — 80048 BASIC METABOLIC PNL TOTAL CA: CPT | Performed by: NURSE PRACTITIONER

## 2023-06-06 PROCEDURE — 93306 TTE W/DOPPLER COMPLETE: CPT

## 2023-06-06 PROCEDURE — 96361 HYDRATE IV INFUSION ADD-ON: CPT

## 2023-06-06 PROCEDURE — 74230 X-RAY XM SWLNG FUNCJ C+: CPT

## 2023-06-06 PROCEDURE — 84484 ASSAY OF TROPONIN QUANT: CPT | Performed by: NURSE PRACTITIONER

## 2023-06-06 PROCEDURE — 83735 ASSAY OF MAGNESIUM: CPT | Performed by: NURSE PRACTITIONER

## 2023-06-06 PROCEDURE — 85025 COMPLETE CBC W/AUTO DIFF WBC: CPT | Performed by: NURSE PRACTITIONER

## 2023-06-06 PROCEDURE — 97165 OT EVAL LOW COMPLEX 30 MIN: CPT

## 2023-06-06 PROCEDURE — 82948 REAGENT STRIP/BLOOD GLUCOSE: CPT

## 2023-06-06 PROCEDURE — 92610 EVALUATE SWALLOWING FUNCTION: CPT

## 2023-06-06 PROCEDURE — 84443 ASSAY THYROID STIM HORMONE: CPT | Performed by: NURSE PRACTITIONER

## 2023-06-06 PROCEDURE — 84484 ASSAY OF TROPONIN QUANT: CPT | Performed by: INTERNAL MEDICINE

## 2023-06-06 PROCEDURE — 92611 MOTION FLUOROSCOPY/SWALLOW: CPT

## 2023-06-06 RX ADMIN — Medication 10 ML: at 09:37

## 2023-06-06 RX ADMIN — Medication 10 ML: at 20:38

## 2023-06-06 RX ADMIN — Medication 5 MG: at 20:38

## 2023-06-06 RX ADMIN — INSULIN LISPRO 2 UNITS: 100 INJECTION, SOLUTION INTRAVENOUS; SUBCUTANEOUS at 13:27

## 2023-06-06 RX ADMIN — APIXABAN 2.5 MG: 2.5 TABLET, FILM COATED ORAL at 09:38

## 2023-06-06 RX ADMIN — METOPROLOL TARTRATE 50 MG: 25 TABLET, FILM COATED ORAL at 20:38

## 2023-06-06 RX ADMIN — TAMSULOSIN HYDROCHLORIDE 0.4 MG: 0.4 CAPSULE ORAL at 09:38

## 2023-06-06 RX ADMIN — BARIUM SULFATE 100 ML: 0.81 POWDER, FOR SUSPENSION ORAL at 13:56

## 2023-06-06 RX ADMIN — APIXABAN 2.5 MG: 2.5 TABLET, FILM COATED ORAL at 20:38

## 2023-06-06 RX ADMIN — METOPROLOL TARTRATE 50 MG: 25 TABLET, FILM COATED ORAL at 09:38

## 2023-06-06 RX ADMIN — BARIUM SULFATE 50 ML: 400 SUSPENSION ORAL at 13:56

## 2023-06-06 RX ADMIN — AMIODARONE HYDROCHLORIDE 200 MG: 200 TABLET ORAL at 09:38

## 2023-06-06 RX ADMIN — BARIUM SULFATE 20 ML: 400 PASTE ORAL at 13:56

## 2023-06-06 RX ADMIN — LEVOTHYROXINE SODIUM 100 MCG: 0.1 TABLET ORAL at 05:15

## 2023-06-06 RX ADMIN — INSULIN LISPRO 2 UNITS: 100 INJECTION, SOLUTION INTRAVENOUS; SUBCUTANEOUS at 20:37

## 2023-06-06 RX ADMIN — PANTOPRAZOLE SODIUM 40 MG: 40 TABLET, DELAYED RELEASE ORAL at 05:15

## 2023-06-06 RX ADMIN — INSULIN LISPRO 3 UNITS: 100 INJECTION, SOLUTION INTRAVENOUS; SUBCUTANEOUS at 16:55

## 2023-06-06 NOTE — PLAN OF CARE
Goal Outcome Evaluation:  Plan of Care Reviewed With: patient        Progress: no change       SLP evaluation completed. Will address swallowing concerns w/ MBS. Please see note for further details and recommendations.

## 2023-06-06 NOTE — CASE MANAGEMENT/SOCIAL WORK
Discharge Planning Assessment  [facility]     Patient Name: Chey Robertson  MRN: [id number]  Today's Date: 6/6/2023    Admit Date: 6/5/2023    Plan: Home   Discharge Needs Assessment       Row Name 06/06/23 0806       Living Environment    People in Home alone;other (see comments)    Unique Family Situation at [facility] with caregivers 4 hours/day M-F    Current Living Arrangements assisted living facility    Potentially Unsafe Housing Conditions none    Primary Care Provided by self    Provides Primary Care For no one    Family Caregiver if Needed child(michael), adult    Family Caregiver Names Albin Robertson (son) [phone]    Able to Return to Prior Arrangements yes       Resource/Environmental Concerns    Resource/Environmental Concerns none    Transportation Concerns none       Food Insecurity    Within the past 12 months, you worried that your food would run out before you got the money to buy more. Never true    Within the past 12 months, the food you bought just didn't last and you didn't have money to get more. Never true       Transition Planning    Patient/Family Anticipates Transition to home    Patient/Family Anticipated Services at Transition none    Transportation Anticipated family or friend will provide       Discharge Needs Assessment    Readmission Within the Last 30 Days no previous admission in last 30 days    Current Outpatient/Agency/Support Group assisted living facility    Equipment Currently Used at Home cane, straight;rollator    Concerns to be Addressed denies needs/concerns at this time    Anticipated Changes Related to Illness none    Equipment Needed After Discharge none    Outpatient/Agency/Support Group Needs assisted living facility    Discharge Facility/Level of Care Needs assisted living facility                   Discharge Plan       Row Name 06/06/23 0808       Plan    Plan Home    Patient/Family in Agreement with Plan yes    Plan Comments Spoke with patient at  bedside. Lives at [facility] with caregivers 4 hours/day M-F in [place]. Contact is Albin Robertson (son) [phone]. Is independent with ADL's. No problems with [employer] Medicare or medications. Uses a straight cane and rollator at home. Has advanced directives. PCP is Corby Marie MD. Plan is home with caregivers. CM will continue to follow.    Final Discharge Disposition Code 01 - home or self-care                  Continued Care and Services - Admitted Since 6/5/2023    Coordination has not been started for this encounter.       Selected Continued Care - Prior Encounters Includes continued care and service providers with selected services from prior encounters from 3/7/2023 to 6/6/2023      Discharged on 5/24/2023 Admission date: 5/5/2023 - Discharge disposition: Skilled Nursing Facility (DC - External)      Destination       Service Provider Selected Services Address Phone Fax Patient Preferred    [facility]  Skilled Nursing [address] [place] [id number] [phone] --                          Expected Discharge Date and Time       Expected Discharge Date Expected Discharge Time    Jun 7, 2023            Demographic Summary       Row Name 06/06/23 0805       General Information    Admission Type inpatient    Arrived From emergency department    Referral Source admission list    Reason for Consult discharge planning    Preferred Language English       Contact Information    Permission Granted to Share Info With     Contact Information Obtained for                    Functional Status       Row Name 06/06/23 0806       Functional Status    Usual Activity Tolerance moderate    Current Activity Tolerance moderate       Functional Status, IADL    Medications independent    Meal Preparation independent    Housekeeping independent    Laundry independent    Shopping independent       Mental Status    General  Appearance WDL WDL       Mental Status Summary    Recent Changes in Mental Status/Cognitive Functioning no changes       Employment/    Employment Status retired                   Psychosocial    No documentation.                  Abuse/Neglect    No documentation.                  Legal    No documentation.                  Substance Abuse    No documentation.                  Patient Forms    No documentation.                     Bry Bowling RN

## 2023-06-06 NOTE — THERAPY EVALUATION
Acute Care - Speech Language Pathology   Swallow Initial Evaluation [facility]  Clinical Swallow Evaluation     Patient Name: Chey Robertson  : 1935  MRN: [id number]  Today's Date: 2023               Admit Date: 2023    Visit Dx:     ICD-10-CM ICD-9-CM   1. Generalized abdominal pain  R10.84 789.07   2. Nausea and vomiting, unspecified vomiting type  R11.2 787.01   3. Dehydration  E86.0 276.51   4. Prolonged Q-T interval on ECG  R94.31 794.31   5. Hypomagnesemia  E83.42 275.2   6. Hyperglycemia  R73.9 790.29   7. Pericardial effusion  I31.39 423.9   8. Anemia, unspecified type  D64.9 285.9   9. Dysphagia, unspecified type  R13.10 787.20     Patient Active Problem List   Diagnosis    Benign familial tremor    AMS (altered mental status)    Thrombocytopenia (HCC)    Anemia    Shortness of breath    Hypothyroidism (acquired)    Acute kidney injury    Splenomegaly    Hepatomegaly    Confusion    B12 deficiency    Abnormal intestinal absorption    Iron deficiency anemia due to chronic blood loss    Myelodysplasia (myelodysplastic syndrome)    Weakness    Personal history of pulmonary embolism    Urinary tract infection, acute    Chronic anticoagulation    Essential hypertension    Type 2 diabetes mellitus without complication, with long-term current use of insulin    COVID-19 virus infection    Atrial fibrillation    Chest pain    Elevated troponin    Nausea and vomiting    Hyponatremia    Hypomagnesemia    QT prolongation    Pericardial effusion     Past Medical History:   Diagnosis Date    Anemia     Arthritis     Diabetes mellitus     checks sugar only when pt wants to     Disease of thyroid gland     History of transfusion     with knee surgery-  no reaction recalled.     [place] (hard of hearing)     no hearing aids    Hypertension     Migraine without aura and without status migrainosus, not intractable 2016    Pulmonary emboli     (after knee surgery)    SOBOE (shortness of breath on  exertion)     Tremors of nervous system     Vertigo     Wears dentures     upper     Wears glasses      Past Surgical History:   Procedure Laterality Date    BLADDER SURGERY      CATARACT EXTRACTION      bilat     CHOLECYSTECTOMY      COLONOSCOPY      HYSTERECTOMY      KYPHOPLASTY N/A 2/12/2021    Procedure: KYPHOPLASTY T11, T12 AND L4;  Surgeon: Matty Hearn MD;  Location: [facility];  Service: Orthopedic Spine;  Laterality: N/A;    OOPHORECTOMY      TONSILLECTOMY         SLP Recommendation and Plan  SLP Swallowing Diagnosis: R/O pharyngeal dysphagia (06/06/23 1045)  SLP Diet Recommendation: soft to chew textures, whole, no mixed consistencies, nectar thick liquids (06/06/23 1045)  Recommended Precautions and Strategies: upright posture during/after eating, general aspiration precautions (06/06/23 1045)  SLP Rec. for Method of Medication Administration: meds whole, with thick liquids, with puree, as tolerated (06/06/23 1045)     Monitor for Signs of Aspiration: yes, notify SLP if any concerns (06/06/23 1045)  Recommended Diagnostics: VFSS (MBS) (06/06/23 1045)  Swallow Criteria for Skilled Therapeutic Interventions Met: demonstrates skilled criteria (06/06/23 1045)     Rehab Potential/Prognosis, Swallowing: good, to achieve stated therapy goals (06/06/23 1045)                                              Plan of Care Reviewed With: patient  Progress: no change      SWALLOW EVALUATION (last 72 hours)       SLP Adult Swallow Evaluation       Row Name 06/06/23 1045                   Rehab Evaluation    Document Type evaluation  -AC        Subjective Information no complaints  -AC        Patient Observations alert;cooperative  -AC        Patient/Family/Caregiver Comments/Observations No family present.  -AC        Patient Effort good  -AC           General Information    Patient Profile Reviewed yes  -AC        Pertinent History Of Current Problem [facility] Consulted due to report of SLP  swallowing intervention there. Pt reported she was told to take small bites/sips. Denied undergoing instrumental study or being recommended modified food/liquids. Adm 5/5-5/24 w/ afib, dehydration, UTI. Hx PEs, tremor, [place], arthritis, thrombocytopenia, myelodysplastic syndrome.  -AC        Current Method of Nutrition clear liquids;thin liquids  -AC        Precautions/Limitations, Vision WFL with corrective lenses;for purposes of eval  -AC        Precautions/Limitations, Hearing WFL;for purposes of eval  -AC        Prior Level of Function-Communication unknown  -AC        Prior Level of Function-Swallowing no diet consistency restrictions  -AC        Plans/Goals Discussed with patient;agreed upon  -AC        Barriers to Rehab none identified  -AC        Patient's Goals for Discharge patient did not state  -AC           Pain Scale: Numbers Pre/Post-Treatment    Pretreatment Pain Rating 0/10 - no pain  -AC        Posttreatment Pain Rating 0/10 - no pain  -AC           Oral Motor Structure and Function    Dentition Assessment upper dentures/partial in place  -AC        Secretion Management WNL/WFL  -AC        Mucosal Quality moist, healthy  -AC        Volitional Cough WFL  -AC           Oral Musculature and Cranial Nerve Assessment    Oral Motor General Assessment WFL  -AC           General Eating/Swallowing Observations    Eating/Swallowing Skills fed by SLP;self-fed;appropriate self-feeding skills observed  -AC        Positioning During Eating upright 90 degree;upright in chair  -AC        Utensils Used spoon;cup;straw  -AC        Consistencies Trialed thin liquids;nectar/syrup-thick liquids;pureed;regular textures  -AC           Clinical Swallow Eval    Oral Prep Phase impaired  -AC        Oral Transit WFL  -AC        Oral Residue impaired  -AC        Pharyngeal Phase suspected pharyngeal impairment  -AC           Oral Prep Concerns    Oral Prep Concerns increased prep time  -AC        Increased Prep Time regular  consistencies  -        Oral Prep Concerns, Comment Increased, but mastication was adequate.  -           Oral Residue Concerns    Oral Residue Concerns other (see comments)  -        Oral Residue Concerns, Comment Mild residue stuck to upper dentures w/ solids--cleared w/ liquid wash & did not appear to be r/t oral dysphagia.  -           Pharyngeal Phase Concerns    Pharyngeal Phase Concerns cough  -        Cough thin;other (see comments)  via liquid wash  -        Pharyngeal Phase Concerns, Comment No overt clinical s/sxs aspiration w/ nectar, puree, or solid.  -           SLP Evaluation Clinical Impression    SLP Swallowing Diagnosis R/O pharyngeal dysphagia  -        Functional Impact risk of aspiration/pneumonia  -        Rehab Potential/Prognosis, Swallowing good, to achieve stated therapy goals  -        Swallow Criteria for Skilled Therapeutic Interventions Met demonstrates skilled criteria  -           Recommendations    SLP Diet Recommendation soft to chew textures;whole;no mixed consistencies;nectar thick liquids  -        Recommended Diagnostics VFSS (MBS)  -        Recommended Precautions and Strategies upright posture during/after eating;general aspiration precautions  -        Oral Care Recommendations Oral Care BID/PRN  -        SLP Rec. for Method of Medication Administration meds whole;with thick liquids;with puree;as tolerated  -        Monitor for Signs of Aspiration yes;notify SLP if any concerns  -                  User Key  (r) = Recorded By, (t) = Taken By, (c) = Cosigned By      Initials Name Effective Dates     Kristy Pedersen, MS [facility]-SLP 02/03/23 -                     EDUCATION  The patient has been educated in the following areas:   Dysphagia (Swallowing Impairment) Modified Diet Instruction.              Time Calculation:    Time Calculation- SLP       Row Name 06/06/23 1325             Time Calculation- [facility]    SLP Start Time 1045  -      SLP  Received On 06/06/23  -         Untimed Charges    [id number]-XM Eval Oral Pharyng Swallow Minutes 42  -AC         Total Minutes    Untimed Charges Total Minutes 42  -AC       Total Minutes 42  -AC                User Key  (r) = Recorded By, (t) = Taken By, (c) = Cosigned By      Initials Name Provider Type     Kristy Pedersen MS CCC-SLP Speech and Language Pathologist                    Therapy Charges for Today       Code Description Service Date Service Provider Modifiers Qty    [id number]  ST EVAL ORAL PHARYNG SWALLOW 3 6/6/2023 Kristy Pedersen MS CCC-SLP GN 1                 Kristy Pedersen MS CCC-SLP  6/6/2023

## 2023-06-06 NOTE — PLAN OF CARE
Goal Outcome Evaluation:      Patient with no c/o's noted. BM's noted today, SR, speech eval noted today then a MBS which patient was then placed on nectar thick lliquids with no straws and mixed consistency. Will continue current plan of care

## 2023-06-06 NOTE — MBS/VFSS/FEES
Acute Care - Speech Language Pathology   Swallow Initial Evaluation  Jorge  Modified Barium Swallow Study (MBS)     Patient Name: Chey Robertson  : 1935  MRN: [id number]  Today's Date: 2023               Admit Date: 2023    Visit Dx:     ICD-10-CM ICD-9-CM   1. Generalized abdominal pain  R10.84 789.07   2. Nausea and vomiting, unspecified vomiting type  R11.2 787.01   3. Dehydration  E86.0 276.51   4. Prolonged Q-T interval on ECG  R94.31 794.31   5. Hypomagnesemia  E83.42 275.2   6. Hyperglycemia  R73.9 790.29   7. Pericardial effusion  I31.39 423.9   8. Anemia, unspecified type  D64.9 285.9   9. Oropharyngeal dysphagia  R13.12 787.22     Patient Active Problem List   Diagnosis    Benign familial tremor    AMS (altered mental status)    Thrombocytopenia (HCC)    Anemia    Shortness of breath    Hypothyroidism (acquired)    Acute kidney injury    Splenomegaly    Hepatomegaly    Confusion    B12 deficiency    Abnormal intestinal absorption    Iron deficiency anemia due to chronic blood loss    Myelodysplasia (myelodysplastic syndrome)    Weakness    Personal history of pulmonary embolism    Urinary tract infection, acute    Chronic anticoagulation    Essential hypertension    Type 2 diabetes mellitus without complication, with long-term current use of insulin    COVID-19 virus infection    Atrial fibrillation    Chest pain    Elevated troponin    Nausea and vomiting    Hyponatremia    Hypomagnesemia    QT prolongation    Pericardial effusion     Past Medical History:   Diagnosis Date    Anemia     Arthritis     Diabetes mellitus     checks sugar only when pt wants to     Disease of thyroid gland     History of transfusion     with knee surgery-  no reaction recalled.     [place] (hard of hearing)     no hearing aids    Hypertension     Migraine without aura and without status migrainosus, not intractable 2016    Pulmonary emboli     (after knee surgery)    SOBOE (shortness of breath on  exertion)     Tremors of nervous system     Vertigo     Wears dentures     upper     Wears glasses      Past Surgical History:   Procedure Laterality Date    BLADDER SURGERY      CATARACT EXTRACTION      bilat     CHOLECYSTECTOMY      COLONOSCOPY      HYSTERECTOMY      KYPHOPLASTY N/A 2/12/2021    Procedure: KYPHOPLASTY T11, T12 AND L4;  Surgeon: Matty Hearn MD;  Location: [facility];  Service: Orthopedic Spine;  Laterality: N/A;    OOPHORECTOMY      TONSILLECTOMY         SLP Recommendation and Plan  SLP Swallowing Diagnosis: mild, oral dysphagia, mild-moderate, pharyngeal dysphagia, suspect acute-on-chronic (06/06/23 1340)  SLP Diet Recommendation: soft to chew textures, whole, no mixed consistencies, nectar thick liquids (06/06/23 1340)  Recommended Precautions and Strategies: upright posture during/after eating, general aspiration precautions, no straw, small bites of food and sips of liquid (06/06/23 1340)  SLP Rec. for Method of Medication Administration: meds whole, with thick liquids, with puree, as tolerated (small cup sips only) (06/06/23 1340)     Monitor for Signs of Aspiration: yes, notify SLP if any concerns (06/06/23 1340)    Swallow Criteria for Skilled Therapeutic Interventions Met: demonstrates skilled criteria (06/06/23 1340)  Anticipated Discharge Disposition (SLP): anticipate therapy at next level of care, skilled nursing facility (06/06/23 1340)  Rehab Potential/Prognosis, Swallowing: good, to achieve stated therapy goals (06/06/23 1340)  Therapy Frequency (Swallow): 5 days per week (06/06/23 1340)  Predicted Duration Therapy Intervention (Days): until discharge (06/06/23 1340)                                        Plan of Care Reviewed With: patient  Progress: no change      SWALLOW EVALUATION (last 72 hours)       SLP Adult Swallow Evaluation       Row Name 06/06/23 1340 06/06/23 1045                Rehab Evaluation    Document Type evaluation  -AC evaluation  -AC       Subjective  Information no complaints  -AC no complaints  -AC       Patient Observations alert;cooperative  -AC alert;cooperative  -AC       Patient/Family/Caregiver Comments/Observations No family present  -AC No family present.  -AC       Patient Effort good  -AC good  -AC          General Information    Patient Profile Reviewed yes  -AC yes  -AC       Pertinent History Of Current Problem See previous eval  -[facility] Consulted due to report of SLP swallowing intervention there. Pt reported she was told to take small bites/sips. Denied undergoing instrumental study or being recommended modified food/liquids. Adm 5/5-5/24 w/ afib, dehydration, UTI. Hx PEs, tremor, [place], arthritis, thrombocytopenia, myelodysplastic syndrome.  -AC       Current Method of Nutrition soft to chew textures;whole;no mixed consistencies;nectar/syrup-thick liquids  -AC clear liquids;thin liquids  -AC       Precautions/Limitations, Vision -- WFL with corrective lenses;for purposes of eval  -AC       Precautions/Limitations, Hearing -- WFL;for purposes of eval  -AC       Prior Level of Function-Communication -- unknown  -AC       Prior Level of Function-Swallowing -- no diet consistency restrictions  -AC       Plans/Goals Discussed with patient;agreed upon  -AC patient;agreed upon  -AC       Barriers to Rehab none identified  -AC none identified  -AC       Patient's Goals for Discharge patient did not state  -AC patient did not state  -          Pain Scale: Numbers Pre/Post-Treatment    Pretreatment Pain Rating 0/10 - no pain  -AC 0/10 - no pain  -AC       Posttreatment Pain Rating 0/10 - no pain  -AC 0/10 - no pain  -AC          Oral Motor Structure and Function    Dentition Assessment -- upper dentures/partial in place  -AC       Secretion Management -- WNL/WFL  -AC       Mucosal Quality -- moist, healthy  -AC       Volitional Cough -- WFL  -AC          Oral Musculature and Cranial Nerve Assessment    Oral Motor General  Assessment -- WFL  -AC          General Eating/Swallowing Observations    Respiratory Support Currently in Use room air  - --       Eating/Swallowing Skills fed by SLP;self-fed;appropriate self-feeding skills observed  -AC fed by SLP;self-fed;appropriate self-feeding skills observed  -AC       Positioning During Eating upright 90 degree;upright in chair  -AC upright 90 degree;upright in chair  -AC       Utensils Used -- spoon;cup;straw  -AC       Consistencies Trialed -- thin liquids;nectar/syrup-thick liquids;pureed;regular textures  -          Clinical Swallow Eval    Oral Prep Phase -- impaired  -AC       Oral Transit -- WFL  -AC       Oral Residue -- impaired  -AC       Pharyngeal Phase -- suspected pharyngeal impairment  -          Oral Prep Concerns    Oral Prep Concerns -- increased prep time  -AC       Increased Prep Time -- regular consistencies  -AC       Oral Prep Concerns, Comment -- Increased, but mastication was adequate.  -          Oral Residue Concerns    Oral Residue Concerns -- other (see comments)  -       Oral Residue Concerns, Comment -- Mild residue stuck to upper dentures w/ solids--cleared w/ liquid wash & did not appear to be r/t oral dysphagia.  -          Pharyngeal Phase Concerns    Pharyngeal Phase Concerns -- cough  -AC       Cough -- thin;other (see comments)  via liquid wash  -AC       Pharyngeal Phase Concerns, Comment -- No overt clinical s/sxs aspiration w/ nectar, puree, or solid.  -          MBS/VFSS    Utensils Used spoon;cup;straw  -AC --       Consistencies Trialed thin liquids;nectar/syrup-thick liquids;pudding thick;mixed consistency;regular textures  - --          MBS/VFSS Interpretation    Oral Prep Phase impaired oral phase of swallowing  - --       Oral Transit Phase impaired  -AC --       Oral Residue WFL  - --          Oral Preparatory Phase    Oral Preparatory Phase prolonged manipulation  - --       Prolonged Manipulation regular textures  -AC  --       Oral Preparatory Phase, Comment Increased, but adequate.  -AC --          Oral Transit Phase    Impaired Oral Transit Phase increased A-P transit time;premature spillage of liquids into pharynx  -AC --       Increased A-P Transit Time pudding/puree;regular textures;discoordination of lingual movement  -AC --       Premature Spillage of Liquids into Pharynx thin liquids;mixed consistency;secondary to reduced lingual control  -AC --          Initiation of Pharyngeal Swallow    Initiation of Pharyngeal Swallow bolus in pyriform sinuses  -AC --       Pharyngeal Phase impaired pharyngeal phase of swallowing  -AC --       Anatomical abnormalities noted hyperlordotic c-spine curvature  -AC --       Anatomical abnormalities functional impact functional impact on swallowing;other (see comments)  some effect on bolus clearance through pharynx/PES  -AC --       Penetration During the Swallow thin liquids;nectar-thick liquids;mixed consistency;secondary to delayed swallow initiation or mistiming;secondary to reduced laryngeal elevation;secondary to reduced vestibular closure;other (see comments)  thin via cup/straw, nectar via straw drinks/consecutive cup drinks only as liquid wash  -AC --       Aspiration After the Swallow --  Noted cough between videos--concern for aspiration of thin laryngeal residue after the swallow, though u/a to directly visualize during this study.  -AC --       Depth of Penetration deep  -AC --       Response to Penetration No  -AC --       No spontaneous response to penetration and other (see comments)  Did not expel spontaneously, deepened to TVFs. Partial vestibular clearance w/ cued cough, but some residue remained.  -AC --       Rosenbek's Scale thin:;5--->level 5;nectar:;mixed:;3--->level 3  -AC --       Pharyngeal Residue all consistencies tested;diffuse within pharynx;secondary to reduced base of tongue retraction;secondary to reduced posterior pharyngeal wall stripping;secondary to  reduced laryngeal elevation;secondary to reduced hyolaryngeal excursion;other (see comments)  Mild amount w/ all, except solids, which was moderate in amount. Greatest in valleculae.  -AC --       Response to Residue other (see comments)  partial clearance w/ cued swallow/liquid wash  -AC --       Attempted Compensatory Maneuvers bolus size;bolus presentation style;chin tuck;additional subsequent swallow;throat clear after swallow  -AC --       Response to Attempted Compensatory Maneuvers did not prevent penetration;other (see comments)  w/ thin liquids  -AC --       Successful Compensatory Maneuver Competency patient able to;demonstrate compensations;with cues;other (see comments)  small cup sips w/ nectar  -AC --       Pharyngeal Phase, Comment No penetration/aspiration appreciated w/ initial tsp thin liquid (prior to solid trials), nectar-thick liquid via cup/straw (prior to solid trials), nectar via small cup sips (as liquid wash), pudding, or solid.  -AC --          Fiberoptic Endoscopic Evaluation of Swallowing (FEES)    Risks/Benefits Reviewed risks/benefits explained  -AC --          SLP Evaluation Clinical Impression    SLP Swallowing Diagnosis mild;oral dysphagia;mild-moderate;pharyngeal dysphagia;suspect acute-on-chronic  -AC R/O pharyngeal dysphagia  -AC       Functional Impact risk of aspiration/pneumonia  - risk of aspiration/pneumonia  -AC       Rehab Potential/Prognosis, Swallowing good, to achieve stated therapy goals  -AC good, to achieve stated therapy goals  -       Swallow Criteria for Skilled Therapeutic Interventions Met demonstrates skilled criteria  - demonstrates skilled criteria  -          Recommendations    Therapy Frequency (Swallow) 5 days per week  -AC --       Predicted Duration Therapy Intervention (Days) until discharge  -AC --       SLP Diet Recommendation soft to chew textures;whole;no mixed consistencies;nectar thick liquids  - soft to chew textures;whole;no mixed  consistencies;nectar thick liquids  -AC       Recommended Diagnostics -- VFSS (MBS)  -AC       Recommended Precautions and Strategies upright posture during/after eating;general aspiration precautions;no straw;small bites of food and sips of liquid  -AC upright posture during/after eating;general aspiration precautions  -AC       Oral Care Recommendations Oral Care BID/PRN  -AC Oral Care BID/PRN  -AC       SLP Rec. for Method of Medication Administration meds whole;with thick liquids;with puree;as tolerated  small cup sips only  -AC meds whole;with thick liquids;with puree;as tolerated  -AC       Monitor for Signs of Aspiration yes;notify SLP if any concerns  -AC yes;notify SLP if any concerns  -AC       Anticipated Discharge Disposition (SLP) anticipate therapy at next level of care;skilled nursing facility  - --                 User Key  (r) = Recorded By, (t) = Taken By, (c) = Cosigned By      Initials Name Effective Dates    AC Kristy Pedersen, MS [facility]-SLP 02/03/23 -                     EDUCATION  The patient has been educated in the following areas:   Dysphagia (Swallowing Impairment) Modified Diet Instruction.        SLP GOALS       Row Name 06/06/23 1340             (LTG) Patient will demonstrate functional swallow for    Diet Texture (Demonstrate functional swallow) regular textures  -AC      Liquid viscosity (Demonstrate functional swallow) thin liquids  -AC      [place] (Demonstrate functional swallow) independently (over 90% accuracy)  -AC      Time Frame (Demonstrate functional swallow) by discharge  -AC         (STG) Patient will tolerate therapeutic trials of    Consistencies Trialed (Tolerate therapeutic trials) thin liquids;soft to chew (whole) textures  -AC      Desired Outcome (Tolerate therapeutic trials) without signs/symptoms of aspiration  check for cough, especially when thin administered as liquid wash  -AC      [place] (Tolerate therapeutic trials) with 1:1 assist/ supervision  -AC       Time Frame (Tolerate therapeutic trials) by discharge  -AC         (STG) Lingual Strengthening Goal 1 (SLP)    Activity (Lingual Strengthening Goal 1, SLP) increase tongue back strength  -AC      Increase Tongue Back Strength lingual resistance exercises  -AC      [place]/Accuracy (Lingual Strengthening Goal 1, SLP) with moderate cues (50-74% accuracy)  -AC      Time Frame (Lingual Strengthening Goal 1, SLP) short term goal (STG)  -AC         (STG) Pharyngeal Strengthening Exercise Goal 1 (SLP)    Activity (Pharyngeal Strengthening Goal 1, SLP) increase superior movement of the hyolaryngeal complex;increase anterior movement of the hyolaryngeal complex;increase closure at entrance to airway/closure of airway at glottis;increase squeeze/positive pressure generation  -AC      Increase Superior Movement of the Hyolaryngeal Complex effortful pitch glide (falsetto + pharyngeal squeeze)  -AC      Increase Anterior Movement of the Hyolaryngeal Complex chin tuck against resistance (CTAR)  -AC      Increase Closure at Entrance to Airway/Closure of Airway at Glottis supraglottic swallow  -AC      Increase Squeeze/Positive Pressure Generation hard effortful swallow  -AC      [place]/Accuracy (Pharyngeal Strengthening Goal 1, SLP) with moderate cues (50-74% accuracy)  -AC      Time Frame (Pharyngeal Strengthening Goal 1, SLP) short term goal (STG)  -AC         (STG) Swallow Compensatory Strategies Goal 1 (SLP)    Activity (Swallow Compensatory Strategies/Techniques Goal 1, SLP) compensatory strategies;during meal intake;during p.o. trials;small cup sips  w/ nectar-thick liquids, especially when administered as liquid wash  -AC      [place]/Accuracy (Swallow Compensatory Strategies/Techniques Goal 1, SLP) independently (over 90% accuracy)  -AC      Time Frame (Swallow Compensatory Strategies/Techniques Goal 1, SLP) short term goal (STG)  -AC                User Key  (r) = Recorded By, (t) = Taken By, (c) =  Cosigned By      Initials Name Provider Type    Kristy Leroy MS CCC-SLP Speech and Language Pathologist                       Time Calculation:    Time Calculation- SLP       Row Name 06/06/23 1439 06/06/23 1322          Time Calculation- SLP    SLP Start Time 1340  -AC 1045  -AC     SLP Received On 06/06/23  -AC 06/06/23  -AC        Untimed Charges    [id number] Eval Oral Pharyng Swallow Minutes -- 42  -AC     [id number]-[id number] [employer] Fluoro Eval Swallow Minutes 60  -AC --        Total Minutes    Untimed Charges Total Minutes 60  -AC 42  -AC      Total Minutes 60  -AC 42  -AC               User Key  (r) = Recorded By, (t) = Taken By, (c) = Cosigned By      Initials Name Provider Type    Kristy Leroy MS CCC-SLP Speech and Language Pathologist                    Therapy Charges for Today       Code Description Service Date Service Provider Modifiers Qty    [id number] HC ST EVAL ORAL PHARYNG SWALLOW 3 6/6/2023 Kristy Pedersen MS CCC-SLP GN 1    [id number] HC ST MOTION FLUORO EVAL SWALLOW 4 6/6/2023 Kristy Pedersen MS CCC-SLP GN 1                 Kristy Pedersen MS CCC-SLP  6/6/2023

## 2023-06-06 NOTE — THERAPY EVALUATION
Patient Name: Chey Robertson  : 1935    MRN: [id number]                              Today's Date: 2023       Admit Date: 2023    Visit Dx:     ICD-10-CM ICD-9-CM   1. Generalized abdominal pain  R10.84 789.07   2. Nausea and vomiting, unspecified vomiting type  R11.2 787.01   3. Dehydration  E86.0 276.51   4. Prolonged Q-T interval on ECG  R94.31 794.31   5. Hypomagnesemia  E83.42 275.2   6. Hyperglycemia  R73.9 790.29   7. Pericardial effusion  I31.39 423.9   8. Anemia, unspecified type  D64.9 285.9     Patient Active Problem List   Diagnosis    Benign familial tremor    AMS (altered mental status)    Thrombocytopenia (HCC)    Anemia    Shortness of breath    Hypothyroidism (acquired)    Acute kidney injury    Splenomegaly    Hepatomegaly    Confusion    B12 deficiency    Abnormal intestinal absorption    Iron deficiency anemia due to chronic blood loss    Myelodysplasia (myelodysplastic syndrome)    Weakness    Personal history of pulmonary embolism    Urinary tract infection, acute    Chronic anticoagulation    Essential hypertension    Type 2 diabetes mellitus without complication, with long-term current use of insulin    COVID-19 virus infection    Atrial fibrillation    Chest pain    Elevated troponin    Nausea and vomiting    Hyponatremia    Hypomagnesemia    QT prolongation    Pericardial effusion     Past Medical History:   Diagnosis Date    Anemia     Arthritis     Diabetes mellitus     checks sugar only when pt wants to     Disease of thyroid gland     History of transfusion     with knee surgery-  no reaction recalled.     [place] (hard of hearing)     no hearing aids    Hypertension     Migraine without aura and without status migrainosus, not intractable 2016    Pulmonary emboli     (after knee surgery)    SOBOE (shortness of breath on exertion)     Tremors of nervous system     Vertigo     Wears dentures     upper     Wears glasses      Past Surgical History:   Procedure  Laterality Date    BLADDER SURGERY      CATARACT EXTRACTION      bilat     CHOLECYSTECTOMY      COLONOSCOPY      HYSTERECTOMY      KYPHOPLASTY N/A 2/12/2021    Procedure: KYPHOPLASTY T11, T12 AND L4;  Surgeon: Matty Hearn MD;  Location: [facility];  Service: Orthopedic Spine;  Laterality: N/A;    OOPHORECTOMY      TONSILLECTOMY        General Information       Row Name 06/06/23 0902          OT Time and Intention    Document Type evaluation  -KF (r) CH (t) KF (c)     Mode of Treatment occupational therapy  -KF (r) CH (t) KF (c)       Row Name 06/06/23 0902          General Information    Patient Profile Reviewed yes  -KF (r) CH (t) KF (c)     Prior Level of Function independent:;grooming;feeding;dressing;dependent:;cooking;cleaning  SPV bathing  -KF (r) CH (t) KF (c)     Existing Precautions/Restrictions fall  -KF (r) CH (t) KF (c)     Barriers to Rehab none identified  -KF (r) CH (t) KF (c)       Row Name 06/06/23 0902          Occupational Profile    Reason for Services/Referral (Occupational Profile) occupational decline  -KF (r) CH (t) KF (c)     Environmental Supports and Barriers (Occupational Profile) Pt. lives at [facility]. Pt. ambulates w/ rollator at baseline.  -KF (r) CH (t) KF (c)       Row Name 06/06/23 0902          Living Environment    People in Home facility resident  -KF (r) CH (t) KF (c)       Row Name 06/06/23 0902          Home Main Entrance    Number of Stairs, Main Entrance none  -KF (r) CH (t) KF (c)       Row Name 06/06/23 0902          Stairs Within Home, Primary    Number of Stairs, Within Home, Primary none  -KF (r) CH (t) KF (c)       Row Name 06/06/23 0902          Cognition    Orientation Status (Cognition) oriented x 4  -KF (r) CH (t) KF (c)       Row Name 06/06/23 0902          Safety Issues, Functional Mobility    Safety Issues Affecting Function (Mobility) insight into deficits/self-awareness  -KF (r) CH (t) KF (c)     Impairments Affecting Function (Mobility) strength  -KF (r)  CH (t) KF (c)               User Key  (r) = Recorded By, (t) = Taken By, (c) = Cosigned By      Initials Name Provider Type    KF Catherine Banegas, OT Occupational Therapist    Cari Krishnamurthy, OT Student OT Student                     Mobility/ADL's       Row Name 06/06/23 0907          Bed Mobility    Bed Mobility supine-sit  -KF (r) CH (t) KF (c)     Supine-Sit [place] (Bed Mobility) standby assist;verbal cues;nonverbal cues (demo/gesture)  -KF (r) CH (t) KF (c)     Bed Mobility, Safety Issues decreased use of arms for pushing/pulling;decreased use of legs for bridging/pushing  -KF (r) CH (t) KF (c)     Assistive Device (Bed Mobility) bed rails;head of bed elevated  -KF (r) CH (t) KF (c)       Row Name 06/06/23 0907          Transfers    Transfers sit-stand transfer  -KF (r) CH (t) KF (c)       Row Name 06/06/23 0907          Sit-Stand Transfer    Sit-Stand [place] (Transfers) contact guard;2 person assist  -KF (r) CH (t) KF (c)     Assistive Device (Sit-Stand Transfers) walker, front-wheeled  -KF (r) CH (t) KF (c)       Row Name 06/06/23 0907          Functional Mobility    Functional Mobility- Comment Defer to PT  -KF (r) CH (t) KF (c)       Row Name 06/06/23 0907          Activities of Daily Living    BADL Assessment/Intervention lower body dressing;grooming;upper body dressing  -KF (r) CH (t) KF (c)       Row Name 06/06/23 0907          Lower Body Dressing Assessment/Training    [place] Level (Lower Body Dressing) standby assist;don;socks  -KF (r) CH (t) KF (c)       Row Name 06/06/23 0907          Grooming Assessment/Training    [place] Level (Grooming) set up;hair care, combing/brushing  -KF (r) CH (t) KF (c)     Position (Grooming) supported sitting;other (see comments)  chair  -KF (r) CH (t) KF (c)       Row Name 06/06/23 0907          Upper Body Dressing Assessment/Training    [place] Level (Upper Body Dressing) moderate assist (50% patient effort);don;front opening garment   -KF (r) CH (t) KF (c)     Position (Upper Body Dressing) unsupported sitting;edge of bed sitting  -KF (r) CH (t) KF (c)               User Key  (r) = Recorded By, (t) = Taken By, (c) = Cosigned By      Initials Name Provider Type    KF Catherine Banegas, OT Occupational Therapist    Cari Krishnamurthy, OT Student OT Student                   Obj/Interventions       Row Name 06/06/23 0910          Sensory Assessment (Somatosensory)    Sensory Assessment (Somatosensory) UE sensation intact  -KF (r) CH (t) KF (c)       Row Name 06/06/23 0910          Vision Assessment/Intervention    Visual Impairment/Limitations WFL;corrective lenses full-time  -KF (r) CH (t) KF (c)       Row Name 06/06/23 0910          Range of Motion Comprehensive    General Range of Motion upper extremity range of motion deficits identified  -KF (r) CH (t) KF (c)     Comment, General Range of Motion decreased shoulder internal and external rotation  -KF (r) CH (t) KF (c)       Row Name 06/06/23 0910          Strength Comprehensive (MMT)    General Manual Muscle Testing (MMT) Assessment upper extremity strength deficits identified  -KF (r) CH (t) KF (c)     Comment, General Manual Muscle Testing (MMT) Assessment B UE grossly -4/5  -KF (r) CH (t) KF (c)       Row Name 06/06/23 0910          Balance    Balance Assessment sitting static balance;sitting dynamic balance;sit to stand dynamic balance  -KF (r) CH (t) KF (c)     Static Sitting Balance supervision  -KF (r) CH (t) KF (c)     Dynamic Sitting Balance standby assist  -KF (r) CH (t) KF (c)     Position, Sitting Balance unsupported;sitting edge of bed  -KF (r) CH (t) KF (c)     Sit to Stand Dynamic Balance contact guard;2-person assist  -KF (r) CH (t) KF (c)     Balance Interventions sitting;sit to stand;supported;static;dynamic;dynamic reaching;occupation based/functional task  -KF (r) CH (t) KF (c)               User Key  (r) = Recorded By, (t) = Taken By, (c) = Cosigned By      Initials Name  Provider Type    Catherine Yanez, OT Occupational Therapist    Cari Krishnamurthy, OT Student OT Student                   Goals/Plan       Row Name 06/06/23 0916          Transfer Goal 1 (OT)    Activity/Assistive Device (Transfer Goal 1, OT) bed-to-chair/chair-to-bed;toilet  -KF (r) CH (t) KF (c)     [place] Level/Cues Needed (Transfer Goal 1, OT) standby assist  -KF (r) CH (t) KF (c)     Time Frame (Transfer Goal 1, OT) long term goal (LTG);10 days  -KF (r) CH (t) KF (c)       Row Name 06/06/23 0916          Dressing Goal 1 (OT)    Activity/Device (Dressing Goal 1, OT) upper body dressing;lower body dressing  -KF (r) CH (t) KF (c)     Unity/Cues Needed (Dressing Goal 1, OT) standby assist  -KF (r) CH (t) KF (c)     Time Frame (Dressing Goal 1, OT) long term goal (LTG);10 days  -KF (r) CH (t) KF (c)       Row Name 06/06/23 0916          Toileting Goal 1 (OT)    Activity/Device (Toileting Goal 1, OT) adjust/manage clothing;perform perineal hygiene  -KF (r) CH (t) KF (c)     [place] Level/Cues Needed (Toileting Goal 1, OT) standby assist  -KF (r) CH (t) KF (c)     Time Frame (Toileting Goal 1, OT) long term goal (LTG);10 days  -KF (r) CH (t) KF (c)               User Key  (r) = Recorded By, (t) = Taken By, (c) = Cosigned By      Initials Name Provider Type    Catherine Yanez, OT Occupational Therapist    Cari Krishnamurthy, OT Student OT Student                   Clinical Impression       Row Name 06/06/23 0912          Pain Assessment    Pretreatment Pain Rating 0/10 - no pain  -KF (r) CH (t) KF (c)     Posttreatment Pain Rating 0/10 - no pain  -KF (r) CH (t) KF (c)       Row Name 06/06/23 0912          Plan of Care Review    Progress --  initial eval  -KF (r) CH (t) KF (c)     Outcome Evaluation Pt. presents below baseline for BADLs and other fxal tasks d/t generalized weakness and decreased activity tolerance. Pt. would benefit from skilled OT to remediate this deficits and promote  retun to PLOF. Continue IPOT per POC. OT recommends pt. return to [facility] w/ caregiver A upon d/c.  -KF (r) CH (t) KF (c)       Row Name 06/06/23 0912          Therapy Assessment/Plan (OT)    Patient/Family Therapy Goal Statement (OT) return to PLOF  -KF (r) CH (t) KF (c)     Rehab Potential (OT) good, to achieve stated therapy goals  -KF (r) CH (t) KF (c)     Criteria for Skilled Therapeutic Interventions Met (OT) meets criteria  -KF (r) CH (t) KF (c)     Therapy Frequency (OT) daily  -KF (r) CH (t) KF (c)       Row Name 06/06/23 0912          Therapy Plan Review/Discharge Plan (OT)    Anticipated Discharge Disposition (OT) home with assist;assisted living  -KF (r) CH (t) KF (c)       Row Name 06/06/23 0912          Vital Signs    Pre Systolic BP Rehab 142  -KF (r) CH (t) KF (c)     Pre Treatment Diastolic BP 86  -KF (r) CH (t) KF (c)     Post Systolic BP Rehab 149  -KF (r) CH (t) KF (c)     Post Treatment Diastolic BP 59  -KF (r) CH (t) KF (c)     Pretreatment Heart Rate (beats/min) 62  -KF (r) CH (t) KF (c)     Posttreatment Heart Rate (beats/min) 67  -KF (r) CH (t) KF (c)     Pre SpO2 (%) 100  -KF (r) CH (t) KF (c)     O2 Delivery Pre Treatment nasal cannula  -KF (r) CH (t) KF (c)     O2 Delivery Intra Treatment room air  -KF (r) CH (t) KF (c)     Post SpO2 (%) 97  -KF (r) CH (t) KF (c)     O2 Delivery Post Treatment room air  -KF (r) CH (t) KF (c)     Pre Patient Position Supine  -KF (r) CH (t) KF (c)     Intra Patient Position Sitting  -KF (r) CH (t) KF (c)     Post Patient Position Standing  -KF (r) CH (t) KF (c)       Row Name 06/06/23 0912          Positioning and Restraints    Pre-Treatment Position in bed  -KF (r) CH (t) KF (c)     Post Treatment Position other  -KF (r) CH (t) KF (c)     Other Position with PT  -KF (r) CH (t) KF (c)               User Key  (r) = Recorded By, (t) = Taken By, (c) = Cosigned By      Initials Name Provider Type    Catherine Yanez, OT Occupational Therapist    MILTON Boston,  Cari VENTURA, OT Student OT Student                   Outcome Measures       Row Name 06/06/23 0919          How much help from another is currently needed...    Putting on and taking off regular lower body clothing? 3  -KF (r) CH (t) KF (c)     Bathing (including washing, rinsing, and drying) 3  -KF (r) CH (t) KF (c)     Toileting (which includes using toilet bed pan or urinal) 3  -KF (r) CH (t) KF (c)     Putting on and taking off regular upper body clothing 3  -KF (r) CH (t) KF (c)     Taking care of personal grooming (such as brushing teeth) 3  -KF (r) CH (t) KF (c)     Eating meals 4  -KF (r) CH (t) KF (c)     AM-PAC 6 Clicks Score (OT) 19  -KF (r) CH (t)       Row Name 06/06/23 0949          How much help from another person do you currently need...    Turning from your back to your side while in flat bed without using bedrails? 3  -AB     Moving from lying on back to sitting on the side of a flat bed without bedrails? 3  -AB     Moving to and from a bed to a chair (including a wheelchair)? 3  -AB     Standing up from a chair using your arms (e.g., wheelchair, bedside chair)? 3  -AB     Climbing 3-5 steps with a railing? 3  -AB     To walk in hospital room? 3  -AB     AM-PAC 6 Clicks Score (PT) 18  -AB     Highest level of mobility 6 --> Walked 10 steps or more  -AB       Row Name 06/06/23 0949 06/06/23 0919       Functional Assessment    Outcome Measure Options AM-PAC 6 Clicks Basic Mobility (PT)  -AB AM-PAC 6 Clicks Daily Activity (OT)  -KF (r) CH (t) KF (c)              User Key  (r) = Recorded By, (t) = Taken By, (c) = Cosigned By      Initials Name Provider Type    KF Catherine Banegas, OT Occupational Therapist    Lili Nichols, PT Physical Therapist    Cari Krishnamurthy, OT Student OT Student                    Occupational Therapy Education       Title: PT OT SLP Therapies (In Progress)       Topic: Occupational Therapy (In Progress)       Point: ADL training (In Progress)       Description:    Instruct learner(s) on proper safety adaptation and remediation techniques during self care or transfers.   Instruct in proper use of assistive devices.                  Learning Progress Summary             Patient Acceptance, E, NR by  at 6/6/2023 0920                         Point: Home exercise program (Not Started)       Description:   Instruct learner(s) on appropriate technique for monitoring, assisting and/or progressing therapeutic exercises/activities.                  Learner Progress:  Not documented in this visit.              Point: Precautions (In Progress)       Description:   Instruct learner(s) on prescribed precautions during self-care and functional transfers.                  Learning Progress Summary             Patient Acceptance, E, NR by  at 6/6/2023 0920                         Point: Body mechanics (In Progress)       Description:   Instruct learner(s) on proper positioning and spine alignment during self-care, functional mobility activities and/or exercises.                  Learning Progress Summary             Patient Acceptance, E, NR by  at 6/6/2023 0920                                         User Key       Initials Effective Dates Name Provider Type Discipline     03/10/23 -  Cari Boston, OT Student OT Student OT                  OT Recommendation and Plan  Therapy Frequency (OT): daily  Plan of Care Review  Progress:  (initial eval)  Outcome Evaluation: Pt. presents below baseline for BADLs and other fxal tasks d/t generalized weakness and decreased activity tolerance. Pt. would benefit from skilled OT to remediate this deficits and promote retun to PLOF. Continue IPOT per POC. OT recommends pt. return to [facility] w/ caregiver A upon d/c.     Time Calculation:    Time Calculation- OT       Row Name 06/06/23 0921             Time Calculation- OT    OT Start Time 0838  -KF (r) CH (t) KF (c)      OT Received On 06/06/23  -KF (r) CH (t) KF (c)      OT Goal Re-Cert Due Date  06/16/23  -KF (r) CH (t) KF (c)         Untimed Charges    OT Eval/Re-eval Minutes 47  -KF (r) CH (t) KF (c)         Total Minutes    Untimed Charges Total Minutes 47  -KF (r) CH (t)       Total Minutes 47  -KF (r) CH (t)                User Key  (r) = Recorded By, (t) = Taken By, (c) = Cosigned By      Initials Name Provider Type    KF Catherine Banegas, OT Occupational Therapist    Cari Krishnamurthy, OT Student OT Student                  Therapy Charges for Today       Code Description Service Date Service Provider Modifiers Qty    [id number] HC OT EVAL LOW COMPLEXITY 4 6/6/2023 Cari Boston, OT Student GO 1                 Cari Boston OT Student  6/6/2023

## 2023-06-06 NOTE — PROGRESS NOTES
Nursing Home History and Physical       Bobby Kelsey DO  [address] [place] [address] Phone: [phone]  Fax: [phone]     PATIENT NAME: Chey Robertson                                                                          YOB: 1935           DATE OF SERVICE: 05/25/2023  FACILITY:  [facility]    CHIEF COMPLAINT:  Nursing facility admission      HISTORY OF PRESENT ILLNESS:   Patient is an 87-year-old female with a history of myelodysplasia, chronic pancytopenia, essential hypertension, type 2 diabetes mellitus, history of PE, GERD, and generalized weakness recently admitted to [facility] for UTI.  Patient was also noted to be positive for COVID-19.  She was treated with remdesivir and dexamethasone.  During her admission, she also developed chest pain with new onset A-fib with RVR.  She was started on amiodarone in addition to metoprolol.    On exam today, patient was sitting up comfortably in her bed she seemed very content and pleasant.  No reported incidence since admission to facility (recently admitted).  She has been appreciative and motivated to work with physical therapy.          PAST MEDICAL & SURGICAL HISTORY:   Past Medical History:   Diagnosis Date    Anemia     Arthritis     Diabetes mellitus     checks sugar only when pt wants to     Disease of thyroid gland     History of transfusion     with knee surgery-  no reaction recalled.     [place] (hard of hearing)     no hearing aids    Hypertension     Migraine without aura and without status migrainosus, not intractable 12/30/2016    Pulmonary emboli 2011    (after knee surgery)    SOBOE (shortness of breath on exertion)     Tremors of nervous system     Vertigo     Wears dentures     upper     Wears glasses       Past Surgical History:   Procedure Laterality Date    BLADDER SURGERY      CATARACT EXTRACTION      bilat     CHOLECYSTECTOMY      COLONOSCOPY      HYSTERECTOMY      KYPHOPLASTY N/A 2/12/2021     Procedure: KYPHOPLASTY T11, T12 AND L4;  Surgeon: Matty Hearn MD;  Location: [facility];  Service: Orthopedic Spine;  Laterality: N/A;    OOPHORECTOMY      TONSILLECTOMY           MEDICATIONS:  I have reviewed and reconciled the patients medication list in the patients chart at the skilled nursing facility on 05/25/2023.      ALLERGIES:  Allergies   Allergen Reactions    Aspirin Unknown - Low Severity     Mouth sores          SOCIAL HISTORY:  Social History     Socioeconomic History    Marital status:    Tobacco Use    Smoking status: Never     Passive exposure: Never    Smokeless tobacco: Never   Vaping Use    Vaping Use: Never used   Substance and Sexual Activity    Alcohol use: No    Drug use: No    Sexual activity: Defer       FAMILY HISTORY:  Family History   Problem Relation Age of Onset    Breast cancer Sister 84    Stroke Mother     Heart attack Father     Ovarian cancer Neg Hx         REVIEW OF SYSTEMS:  Review of Systems   Constitutional:  Negative for chills, fatigue and fever.   HENT:  Negative for congestion, ear pain, rhinorrhea, sinus pressure and sore throat.    Eyes:  Negative for visual disturbance.   Respiratory:  Negative for cough, chest tightness, shortness of breath and wheezing.    Cardiovascular:  Negative for chest pain, palpitations and leg swelling.   Gastrointestinal:  Negative for abdominal pain, blood in stool, constipation, diarrhea, nausea and vomiting.   Endocrine: Negative for polydipsia and polyuria.   Genitourinary:  Negative for dysuria and hematuria.   Musculoskeletal:  Negative for arthralgias and back pain.   Skin:  Negative for rash.   Neurological:  Negative for dizziness, light-headedness, numbness and headaches.   Psychiatric/Behavioral:  Negative for dysphoric mood and sleep disturbance. The patient is not nervous/anxious.        PHYSICAL EXAMINATION:   VITAL SIGNS: /56   Pulse 68   Temp 97.7 °F (36.5 °C)   Resp 18   Wt 53.3 kg (117 lb 6.4 oz)   LMP   (LMP Unknown) Comment: mammogram is up to date  SpO2 97%   BMI 20.80 kg/m²     Physical Exam  Vitals and nursing note reviewed.   Constitutional:       Appearance: Normal appearance. She is well-developed.   HENT:      Head: Normocephalic and atraumatic.      Nose: Nose normal.      Mouth/Throat:      Mouth: Mucous membranes are moist.      Pharynx: No oropharyngeal exudate.   Eyes:      General: No scleral icterus.     Conjunctiva/sclera: Conjunctivae normal.      Pupils: Pupils are equal, round, and reactive to light.   Neck:      Thyroid: No thyromegaly.   Cardiovascular:      Rate and Rhythm: Normal rate and regular rhythm.      Heart sounds: Normal heart sounds. No murmur heard.    No friction rub. No gallop.   Pulmonary:      Effort: Pulmonary effort is normal. No respiratory distress.      Breath sounds: Normal breath sounds. No wheezing.   Abdominal:      General: Bowel sounds are normal. There is no distension.      Palpations: Abdomen is soft.      Tenderness: There is no abdominal tenderness.   Musculoskeletal:         General: No deformity or signs of injury.      Cervical back: Normal range of motion and neck supple.   Lymphadenopathy:      Cervical: No cervical adenopathy.   Skin:     General: Skin is warm and dry.      Findings: No rash.   Neurological:      Mental Status: She is alert and oriented to person, place, and time.   Psychiatric:         Mood and Affect: Mood normal.         Behavior: Behavior normal.       RECORDS REVIEW:   Discharge Summary from [facility] 5/24/2023    ASSESSMENT   Diagnoses and all orders for this visit:    1. Atrial fibrillation with RVR (Primary)    2. Type 2 diabetes mellitus with hyperglycemia, without long-term current use of insulin    3. Pancytopenia    4. Hypothyroidism (acquired)    5. Myelodysplasia (myelodysplastic syndrome)    6. Personal history of pulmonary embolism    7. Splenomegaly    8. Essential hypertension    9. E. coli  UTI        PLAN  A-fib with RVR  - New diagnosis since hospitalization.  Patient has been started on amiodarone in addition to her prior regimen of metoprolol and Eliquis.  - Follow-up with Dr. Chand in 6 to 8 weeks.    UTI  - Pansensitive E. coli.  Patient completed course of IV cefepime during hospitalization.    COVID-19 infection  - Patient was treated with complete course of remdesivir and Decadron during hospitalization.    MDS/pancytopenia/anemia  - DIC panel was negative in hospital.  Patient was treated with Neupogen.  - Follow-up with hematology/oncology as outpatient in August.  - Monitor labs while in facility.    Essential hypertension  - Blood pressure has been stable with current medications.  Diuretics were discontinued during hospitalization as edema did not seem significant.    Hypothyroidism  - Continue Synthroid.    GERD  - Stable on PPI.        [x]  Discussed Patient in detail with nursing/staff, addressed all needs today.     [x]  Plan of Care Reviewed   [x]  PT/OT Reviewed   [x]  Order Changes  []  Discharge Plans Reviewed  [x]  Advance Directive on file with Nursing Home.   [x]  POA on file with Nursing Home.    [x]  Code Status listed and reviewed.       Bobby Kelsey DO.  6/6/2023      **Part of this note may be an electronic transcription/translation of spoken language to printed text using the Dragon Dictation System.**

## 2023-06-06 NOTE — DISCHARGE PLACEMENT REQUEST
"Chey Venegas (87 y.o. Female)   Joby Bowling, RN Case Manager  [phone]      Date of Birth   1935    Social Security Number       Address   [facility] [place] [id number]    Home Phone   [phone]    MRN   [id number]       Moravian   Zoroastrian    Marital Status                               Admission Date   6/5/23    Admission Type   Emergency    Admitting Provider   Teetee Cesar MD    Attending Provider   Teetee Cesar MD    Department, Room/Bed   [address], S[address]/1       Discharge Date       Discharge Disposition       Discharge Destination                                 Attending Provider: Teetee Cesar MD    Allergies: Aspirin    Isolation: None   Infection: COVID (History) (05/18/23)   Code Status: CPR    Ht: 160 cm (63\")   Wt: 52.7 kg (116 lb 3.2 oz)    Admission Cmt: None   Principal Problem: Nausea and vomiting [R11.2]                   Active Insurance as of 6/5/2023       Primary Coverage       Payor Plan Insurance Group Employer/Plan Group    HUMANA MEDICARE REPLACEMENT HUMANA MEDICARE REPLACEMENT [id number]       Payor Plan Address Payor Plan Phone Number Payor Plan Fax Number Effective Dates    [address] [address] [phone]  1/1/2023 - None Entered    [facility] [id number]         Subscriber Name Subscriber Birth Date Member ID       CHEY VENEGAS KRISTI 1935 [id number]                     Emergency Contacts        (Rel.) Home Phone Work Phone Mobile Phone    Albin Venegas (Son) [phone] -- --    Chava Venegas (Son) -- -- [phone]              Vital Signs (last day)       Date/Time Temp Temp src Pulse Resp BP Patient Position SpO2    06/06/23 1300 -- -- 58 -- -- -- --    06/06/23 1100 -- -- 63 16 137/58 Lying 99    06/06/23 0900 -- -- 66 -- -- -- 95    06/06/23 0705 97.7 (36.5) Oral 65 18 142/86 Lying 99    06/06/23 0500 -- -- 61 -- -- -- 96    06/06/23 0323 97.6 (36.4) Oral 59 16 125/57 Lying " 99    06/06/23 0300 -- -- 64 -- -- -- 98    06/06/23 0100 -- -- 61 -- -- -- 97    06/05/23 2336 97.7 (36.5) Oral 60 16 133/55 Lying 99    06/05/23 2300 -- -- 63 -- -- -- 92    06/05/23 2100 -- -- 60 -- -- -- 96    06/05/23 2004 97.7 (36.5) Oral 63 16 114/49 Lying 96    06/05/23 1900 -- -- 61 -- 116/76 -- 95    06/05/23 1616 97.7 (36.5) Oral 59 15 149/64 -- 96          Current Facility-Administered Medications   Medication Dose Route Frequency Provider Last Rate Last Admin    acetaminophen (TYLENOL) tablet 650 mg  650 mg Oral Q4H PRN Mariaelena Marie APRN        Or    acetaminophen (TYLENOL) 160 MG/5ML solution 650 mg  650 mg Oral Q4H PRN Mariaelena Marie APRN        Or    acetaminophen (TYLENOL) suppository 650 mg  650 mg Rectal Q4H PRN Mariaelena Marie, APRN        amiodarone (PACERONE) tablet 200 mg  200 mg Oral Daily Mariaelena Marie, APRN   200 mg at 06/06/23 0938    apixaban (ELIQUIS) tablet 2.5 mg  2.5 mg Oral BID Mariaelena Marie, APRN   2.5 mg at 06/06/23 0938    sennosides-docusate (PERICOLACE) 8.6-50 MG per tablet 2 tablet  2 tablet Oral BID Mariaelena Marie, APRN        And    polyethylene glycol (MIRALAX) packet 17 g  17 g Oral Daily PRN Mariaelena Marie W, APRN        And    bisacodyl (DULCOLAX) EC tablet 5 mg  5 mg Oral Daily PRN Mariaelena Marie, APRN        And    bisacodyl (DULCOLAX) suppository 10 mg  10 mg Rectal Daily PRN Mariaelena aMrie W, APRN        Calcium Replacement - Follow Nurse / BPA Driven Protocol   Does not apply PRN Mariaelena Marie APRN        dextrose (D50W) (25 g/50 mL) IV injection 25 g  25 g Intravenous Q15 Min PRN Mariaelena Marie APRN        dextrose (GLUTOSE) oral gel 15 g  15 g Oral Q15 Min PRN Mariaelena Marie APRN        glucagon (GLUCAGEN) injection 1 mg  1 mg Intramuscular Q15 Min PRN Mariaelena Marie APRN        Insulin Lispro (humaLOG) injection 2-7 Units  2-7 Units Subcutaneous 4x Daily AC & at Bedtime Cynthia  Mariaelena ANGULO APRN   2 Units at 23 1327    levothyroxine (SYNTHROID, LEVOTHROID) tablet 100 mcg  100 mcg Oral Q AM Mariaelena Marie, APRN   100 mcg at 23 0515    Magnesium Standard Dose Replacement - Follow Nurse / BPA Driven Protocol   Does not apply PRN Mariaelena Marie, APRN        melatonin tablet 5 mg  5 mg Oral Nightly PRN Mariaelena Marie APRN   5 mg at 23 2148    metoprolol tartrate (LOPRESSOR) tablet 50 mg  50 mg Oral BID Mariaelena Marie, APRN   50 mg at 23 0938    nitroglycerin (NITROSTAT) SL tablet 0.4 mg  0.4 mg Sublingual Q5 Min PRN Mariaelena Marie APRN        pantoprazole (PROTONIX) EC tablet 40 mg  40 mg Oral QAM Mariaelena Marie, APRN   40 mg at 23 0515    Phosphorus Replacement - Follow Nurse / BPA Driven Protocol   Does not apply PRN Mariaelena Marie, APRN        Potassium Replacement - Follow Nurse / BPA Driven Protocol   Does not apply PRN Mariaelena Marie, APRN        prochlorperazine (COMPAZINE) injection 2.5 mg  2.5 mg Intravenous Q6H PRN Mariaelena Marie, APRN        Sodium Chloride (PF) 0.9 % 10 mL  10 mL Intravenous PRN Mariaelena Marie, APRN        sodium chloride 0.9 % flush 10 mL  10 mL Intravenous Q12H Mariaelena Marie, APRN   10 mL at 23 0937    sodium chloride 0.9 % flush 10 mL  10 mL Intravenous PRN Mariaelena Marie, APRN        sodium chloride 0.9 % infusion 40 mL  40 mL Intravenous PRN Mariaelena Marie, APRN        tamsulosin (FLOMAX) 24 hr capsule 0.4 mg  0.4 mg Oral Daily Mariaelena Marie, APRN   0.4 mg at 23 0938        Physician Progress Notes (last 24 hours)        Teetee Cesar MD at 23 0537              [facility]  PROGRESS NOTE    Patient Name: Chey Robertson  : 1935  MRN: [id number]    Date of Admission: 2023  Primary Care Physician: Corby Marie MD    Subjective   Subjective     CC:  N/V    HPI:  No  new issues overnight. Feeling okay currently.  No further N/V    ROS:  Gen- No fevers, chills  CV- No chest pain, palpitations  Resp- No cough, dyspnea  GI- No N/V/D, abd pain      Objective   Objective     Vital Signs:   Temp:  [97.6 °F (36.4 °C)-97.7 °F (36.5 °C)] 97.6 °F (36.4 °C)  Heart Rate:  [59-64] 61  Resp:  [15-16] 16  BP: (114-149)/(49-76) 125/57  Flow (L/min):  [2] 2     Physical Exam:  Constitutional: No acute distress, awake, alert, sitting up in chair at bedside  HENT: NCAT, mucous membranes moist  Respiratory: Clear to auscultation bilaterally, respiratory effort normal   Cardiovascular: RRR, no murmurs, rubs, or gallops  Gastrointestinal: Positive bowel sounds, soft, nontender, nondistended  Musculoskeletal: No bilateral ankle edema  Psychiatric: flat affect, cooperative  Neurologic: Oriented x 3, strength symmetric in all extremities, Cranial Nerves grossly intact to confrontation, speech clear  Skin: No rashes     Results Reviewed:  LAB RESULTS:      Lab 06/06/23  0516 06/05/23  2321 06/05/23  1949 06/05/23  1629   WBC 1.49*  --   --  2.25*   HEMOGLOBIN 8.7*  --   --  10.1*   HEMATOCRIT 27.6*  --   --  31.0*   PLATELETS 58*  --   --  58*   NEUTROS ABS 0.74*  --   --  1.79   IMMATURE GRANS (ABS) 0.01  --   --  0.02   LYMPHS ABS 0.52*  --   --  0.30*   MONOS ABS 0.22  --   --  0.14   EOS ABS 0.00  --   --  0.00   MCV 89.9  --   --  87.1   LACTATE  --  1.6 2.2* 3.2*         Lab 06/05/23  1632 06/05/23  1631 06/05/23  1629   SODIUM  --   --  132*   POTASSIUM  --   --  4.3   CHLORIDE  --   --  94*   CO2  --   --  22.0   ANION GAP  --   --  16.0*   BUN  --   --  20   CREATININE 0.80 0.80 0.75   EGFR  --   --  77.6   GLUCOSE  --   --  252*   CALCIUM  --   --  8.6   MAGNESIUM  --   --  1.4*         Lab 06/05/23  1629   TOTAL PROTEIN 6.0   ALBUMIN 3.6   GLOBULIN 2.4   ALT (SGPT) 6   AST (SGOT) 11   BILIRUBIN 0.6   ALK PHOS 84   LIPASE 20         Lab 06/06/23  0140 06/05/23  1903 06/05/23  1629   HSTROP T  26* 20* 17*                 Brief Urine Lab Results  (Last result in the past 365 days)        Color   Clarity   Blood   Leuk Est   Nitrite   Protein   CREAT   Urine HCG        06/05/23 1811 Yellow   Clear   Negative   Negative   Negative   Negative                   Microbiology Results Abnormal       None            CT Abdomen Pelvis With Contrast    Result Date: 6/5/2023  CT ABDOMEN PELVIS W CONTRAST Date of Exam: 6/5/2023 5:56 PM EDT Indication: abd pain and N/V. Comparison: 2/19/2021, 5/5/2023. Technique: Axial CT images were obtained of the abdomen and pelvis following the uneventful intravenous administration of 80 mL Isovue-300. Reconstructed coronal and sagittal images were also obtained. Automated exposure control and iterative construction methods were used. Findings: Liver: The liver is unremarkable in morphology. Subcentimeter hypodensity within the caudal right hepatic lobe is too small to fully characterize. No biliary dilation is seen. Gallbladder: Surgically absent Pancreas: The pancreas appears atrophic. Spleen: The spleen is enlarged, measuring approximately 18 cm in length. Several small splenic hypodensities are too small to fully characterize. Several tiny splenic granulomas. Adrenal glands: 1.3 cm low-density nodule at the apex of the left adrenal gland, incompletely characterized on this contrast-enhanced study. The right adrenal gland is unremarkable. Genitourinary tract: Kidneys appear unremarkable. No hydronephrosis is seen. Small calcifications along the course of the left ureter are thought to represent phleboliths. Urinary bladder is unremarkable. Patient is status post hysterectomy. Gastrointestinal tract: Scattered colonic diverticulosis. The hollow viscera appears otherwise unremarkable. There is no evidence of bowel obstruction. Appendix: The appendix is not identified. Other findings: No free air or free fluid is identified. No pathologically enlarged lymph nodes are seen. Vascular  calcifications are present. The IVC is grossly unremarkable. Bones and soft tissues: Bones are demineralized. Patient is status post kyphoplasty of compression fractures of T11, T12, and L4. There are degenerative changes within the spine. Superficial soft tissues demonstrate no acute abnormality. Lung bases: Small left pleural effusion with bibasilar atelectasis. Small pericardial effusion noted.     Impression: Impression: 1.No acute abnormality identified within the abdomen or pelvis. 2.Scattered colonic diverticulosis. 3.Moderate splenomegaly. Several small splenic hypodensities, too small to fully characterize. Similar finding noted on the study dated 2/19/2021. 4.Small pericardial effusion. Small left pleural effusion with left basilar atelectasis. These findings are new since 5/5/2023. 5.Unchanged 1.3 cm left adrenal nodule. 6.Additional findings as detailed above. Electronically Signed: Venkat Meyer  6/5/2023 6:27 PM EDT  Workstation ID: [id number]         Current medications:  Scheduled Meds:amiodarone, 200 mg, Oral, Daily  apixaban, 2.5 mg, Oral, BID  insulin lispro, 2-7 Units, Subcutaneous, 4x Daily AC & at Bedtime  levothyroxine, 100 mcg, Oral, Q AM  metoprolol tartrate, 50 mg, Oral, BID  pantoprazole, 40 mg, Oral, QAM  senna-docusate sodium, 2 tablet, Oral, BID  sodium chloride, 10 mL, Intravenous, Q12H  tamsulosin, 0.4 mg, Oral, Daily      Continuous Infusions:lactated ringers, 75 mL/hr, Last Rate: 75 mL/hr (06/05/23 2147)      PRN Meds:.  acetaminophen **OR** acetaminophen **OR** acetaminophen    senna-docusate sodium **AND** polyethylene glycol **AND** bisacodyl **AND** bisacodyl    Calcium Replacement - Follow Nurse / BPA Driven Protocol    dextrose    dextrose    glucagon (human recombinant)    Magnesium Standard Dose Replacement - Follow Nurse / BPA Driven Protocol    melatonin    nitroglycerin    Phosphorus Replacement - Follow Nurse / BPA Driven Protocol    Potassium Replacement - Follow Nurse /  BPA Driven Protocol    prochlorperazine    Sodium Chloride (PF)    sodium chloride    sodium chloride    Assessment & Plan   Assessment & Plan     Active Hospital Problems    Diagnosis  POA    **Nausea and vomiting [R11.2]  Yes    Hyponatremia [E87.1]  Yes    Hypomagnesemia [E83.42]  Yes    QT prolongation [R94.31]  Yes    Pericardial effusion [I31.39]  Yes    Atrial fibrillation [I48.91]  Yes    Type 2 diabetes mellitus without complication, with long-term current use of insulin [E11.9, Z79.4]  Not Applicable    Essential hypertension [I10]  Yes    Chronic anticoagulation [Z79.01]  Not Applicable    Personal history of pulmonary embolism [Z86.711]  Yes    Hypothyroidism (acquired) [E03.9]  Yes    Anemia [D64.9]  Yes      Resolved Hospital Problems   No resolved problems to display.        Brief Hospital Course to date:  Chey Robertson is a 87 y.o. female with past medical history of myelodysplasia, chronic pancytopenia, essential hypertension, type 2 diabetes, history of PE (Eliquis), GERD, recent admission for UTI complicated by COVID19 infection and new onset Afib with RVR eventually discharged to rehab on 5/24 who presented on 6/5 with intractable N/V. Imaging on admission also showed a pericardial effusion.    Plan:    Intractable nausea and vomiting  Lactic acidosis  -- CT abdomen pelvis shows no acute abnormality within the abdomen or pelvis, scattered colonic diverticulosis, moderate splenomegaly, small pericardial effusion  -- Lactate 3.2 on admission, resolved on repeat   -- IV fluids  -- compazine   -- am labs     Hypomagnesemia  -- Mg 1.4 on admission   --  replacement protocol     Elevated troponin  Pericardial effusion  Prolonged QT  -- CT abdomen pelvis shows a pericardial effusion new since 5/5/2023  -- HS troponin still up trending to 26, will trend to peak  -- patient denies chest pain  -- avoid medications that will prolong QT interval  -- repeat TTE PENDING  -- Qtc of 510     Pleural  effusion  --CT abdomen pelvis shows small left pleural effusion with bibasilar atelectasis (although L appears worse than R to my read) New since 5/5/2023  -- monitor     A-fib  Hypertension  -- Follows with Dr. Chand  -- Continue amiodarone, metoprolol, Eliquis     Myelodysplastic syndrome  Anemia of chronic disease  Pancytopenia  -- Hemoglobin 10.1, hematocrit 31, wbc 2.25, plt 58 on admission.  Similar to prior  -- Follows with Dr. Lyman--follow-up appointment scheduled in August     T2DM  -- FSBG with SSI  -- A1c 6% 03/2023     Hypothyroidism  -- Continue Synthroid     GERD  -- PPI     Acute on chronic debility  -- Consult case management  -- PT/OT consult- recommend that pt return to assisted living facility ([place])  -- fall precautions    Dysphagia  -- SLP following, MBS tomorrow. Diet per their recs     Total time spent: Time Spent: Time Spent: 25 minutes  Time spent includes time reviewing chart, face-to-face time, counseling patient/family/caregiver, ordering medications/tests/procedures, communicating with other health care professionals, documenting clinical information in the electronic health record, and coordination of care.       Expected Discharge Location and Transportation: back to [facility]  Expected Discharge   Expected Discharge Date: 6/7/2023; Expected Discharge Time:      DVT prophylaxis:  Medical DVT prophylaxis orders are present.     AM-PAC 6 Clicks Score (PT): 13 (06/05/23 2131)    CODE STATUS:   Code Status and Medical Interventions:   Ordered at: 06/05/23 2012     Level Of Support Discussed With:    Patient     Code Status (Patient has no pulse and is not breathing):    CPR (Attempt to Resuscitate)     Medical Interventions (Patient has pulse or is breathing):    Full Support       Teetee Cesar MD  06/06/23       Electronically signed by Teetee Cesar MD at 06/06/23 1123          Physical Therapy Notes (most recent note)        Lili Kim, PT at 06/06/23 0827  Version  1 of 1         Patient Name: Chey Robertson  : 1935    MRN: [id number]                              Today's Date: 2023       Admit Date: 2023    Visit Dx:     ICD-10-CM ICD-9-CM   1. Generalized abdominal pain  R10.84 789.07   2. Nausea and vomiting, unspecified vomiting type  R11.2 787.01   3. Dehydration  E86.0 276.51   4. Prolonged Q-T interval on ECG  R94.31 794.31   5. Hypomagnesemia  E83.42 275.2   6. Hyperglycemia  R73.9 790.29   7. Pericardial effusion  I31.39 423.9   8. Anemia, unspecified type  D64.9 285.9     Patient Active Problem List   Diagnosis    Benign familial tremor    AMS (altered mental status)    Thrombocytopenia (HCC)    Anemia    Shortness of breath    Hypothyroidism (acquired)    Acute kidney injury    Splenomegaly    Hepatomegaly    Confusion    B12 deficiency    Abnormal intestinal absorption    Iron deficiency anemia due to chronic blood loss    Myelodysplasia (myelodysplastic syndrome)    Weakness    Personal history of pulmonary embolism    Urinary tract infection, acute    Chronic anticoagulation    Essential hypertension    Type 2 diabetes mellitus without complication, with long-term current use of insulin    COVID-19 virus infection    Atrial fibrillation    Chest pain    Elevated troponin    Nausea and vomiting    Hyponatremia    Hypomagnesemia    QT prolongation    Pericardial effusion     Past Medical History:   Diagnosis Date    Anemia     Arthritis     Diabetes mellitus     checks sugar only when pt wants to     Disease of thyroid gland     History of transfusion     with knee surgery-  no reaction recalled.     [place] (hard of hearing)     no hearing aids    Hypertension     Migraine without aura and without status migrainosus, not intractable 2016    Pulmonary emboli     (after knee surgery)    SOBOE (shortness of breath on exertion)     Tremors of nervous system     Vertigo     Wears dentures     upper     Wears glasses      Past Surgical History:    Procedure Laterality Date    BLADDER SURGERY      CATARACT EXTRACTION      bilat     CHOLECYSTECTOMY      COLONOSCOPY      HYSTERECTOMY      KYPHOPLASTY N/A 2/12/2021    Procedure: KYPHOPLASTY T11, T12 AND L4;  Surgeon: Mtaty Hearn MD;  Location: [facility];  Service: Orthopedic Spine;  Laterality: N/A;    OOPHORECTOMY      TONSILLECTOMY        General Information       Row Name 06/06/23 0938          Physical Therapy Time and Intention    Document Type evaluation  -AB     Mode of Treatment physical therapy  -AB       Row Name 06/06/23 0938          General Information    Patient Profile Reviewed yes  -AB     Prior Level of Function independent:;all household mobility;gait;transfer;bed mobility;min assist:;ADL's;dependent:;shopping;driving;cleaning  Using rollator and SPC.  -AB     Existing Precautions/Restrictions fall  -AB     Barriers to Rehab medically complex  -AB       Row Name 06/06/23 0938          Living Environment    People in Home facility resident;other (see comments)  [facility]  -AB       Row Name 06/06/23 0938          Home Main Entrance    Number of Stairs, Main Entrance none  -AB       Row Name 06/06/23 0938          Stairs Within Home, Primary    Number of Stairs, Within Home, Primary none  -AB       Row Name 06/06/23 0938          Cognition    Orientation Status (Cognition) oriented x 3  -AB       Row Name 06/06/23 0938          Safety Issues, Functional Mobility    Safety Issues Affecting Function (Mobility) awareness of need for assistance;insight into deficits/self-awareness;safety precaution awareness  -AB     Impairments Affecting Function (Mobility) balance;endurance/activity tolerance;strength  -AB     Comment, Safety Issues/Impairments (Mobility) Alert and following all commands  -AB               User Key  (r) = Recorded By, (t) = Taken By, (c) = Cosigned By      Initials Name Provider Type    AB Lili Kim PT Physical Therapist                   Mobility        Row Name 06/06/23 0940          Bed Mobility    Comment, (Bed Mobility) Pt received sitting EOB with OT.  -AB       Row Name 06/06/23 0940          Transfers    Comment, (Transfers) Cues for hand placement and sequencing.  -AB       Row Name 06/06/23 0940          Bed-Chair Transfer    Bed-Chair [place] (Transfers) contact guard;1 person assist  -AB     Assistive Device (Bed-Chair Transfers) walker, front-wheeled  -AB       Row Name 06/06/23 0940          Sit-Stand Transfer    Sit-Stand [place] (Transfers) contact guard;1 person assist;verbal cues  -AB     Assistive Device (Sit-Stand Transfers) walker, front-wheeled  -AB     Comment, (Sit-Stand Transfer) Cues to reach back prior to sitting  -AB       Row Name 06/06/23 0940          Gait/Stairs (Locomotion)    [place] Level (Gait) contact guard;1 person assist;verbal cues;1 person to manage equipment  -AB     Assistive Device (Gait) walker, front-wheeled  -AB     Distance in Feet (Gait) 200  -AB     Deviations/Abnormal Patterns (Gait) bilateral deviations;carl[place] decreased;stride length decreased;gait speed decreased  -AB     Bilateral Gait Deviations forward flexed posture  -AB     [place] Level (Stairs) not tested  -AB     Comment, (Gait/Stairs) Pt ambulated 200' with CGAx1 +1 and FWW. Pt demo's step through gait pattern with shortened stride length and forward flexed posture. Cues for decreased WB through UEs and improved stride length. No overt LOB or knee buckling. Gait mechanics improved with cues. Further gait limited by fatigue.  -AB               User Key  (r) = Recorded By, (t) = Taken By, (c) = Cosigned By      Initials Name Provider Type    AB Lili Kim, PT Physical Therapist                   Obj/Interventions       Row Name 06/06/23 0945          Range of Motion Comprehensive    General Range of Motion bilateral lower extremity ROM WFL  -AB       Row Name 06/06/23 0945          Strength Comprehensive (MMT)    General  Manual Muscle Testing (MMT) Assessment lower extremity strength deficits identified  -AB     Comment, General Manual Muscle Testing (MMT) Assessment BLE strength grossly 4/5  -AB       Row Name 06/06/23 0945          Balance    Balance Assessment sitting static balance;sitting dynamic balance;standing static balance;standing dynamic balance  -AB     Static Sitting Balance supervision  -AB     Dynamic Sitting Balance contact guard  -AB     Position, Sitting Balance unsupported;sitting edge of bed  -AB     Static Standing Balance contact guard;1-person assist  -AB     Dynamic Standing Balance contact guard;1-person assist;1 person to manage equipment;verbal cues  -AB     Position/Device Used, Standing Balance supported;walker, front-wheeled  -AB     Balance Interventions standing;sitting;sit to stand;static;supported;dynamic  -AB     Comment, Balance No overt LOB.  -AB       Row Name 06/06/23 0945          Sensory Assessment (Somatosensory)    Sensory Assessment (Somatosensory) LE sensation intact  -AB               User Key  (r) = Recorded By, (t) = Taken By, (c) = Cosigned By      Initials Name Provider Type    AB Lili Kim, PT Physical Therapist                   Goals/Plan       Row Name 06/06/23 0948          Bed Mobility Goal 1 (PT)    Activity/Assistive Device (Bed Mobility Goal 1, PT) sit to supine;supine to sit  -AB     [place] Level/Cues Needed (Bed Mobility Goal 1, PT) independent  -AB     Time Frame (Bed Mobility Goal 1, PT) long term goal (LTG);10 days  -AB       Row Name 06/06/23 0948          Transfer Goal 1 (PT)    Activity/Assistive Device (Transfer Goal 1, PT) sit-to-stand/stand-to-sit;bed-to-chair/chair-to-bed;walker, rolling  -AB     [place] Level/Cues Needed (Transfer Goal 1, PT) modified independence  -AB     Time Frame (Transfer Goal 1, PT) long term goal (LTG);10 days  -AB       Row Name 06/06/23 0948          Gait Training Goal 1 (PT)    Activity/Assistive Device (Gait Training  Goal 1, PT) gait (walking locomotion);assistive device use;walker, rolling  -AB     [place] Level (Gait Training Goal 1, PT) modified independence  -AB     Distance (Gait Training Goal 1, PT) 400  -AB     Time Frame (Gait Training Goal 1, PT) long term goal (LTG);10 days  -AB       Row Name 06/06/23 0948          Patient Education Goal (PT)    Activity (Patient Education Goal, PT) HEP  -AB     [place]/Cues/Accuracy (Memory Goal 2, PT) demonstrates adequately  -AB       Row Name 06/06/23 0948          Therapy Assessment/Plan (PT)    Planned Therapy Interventions (PT) balance training;bed mobility training;gait training;home exercise program;postural re-education;patient/family education;strengthening;stretching;transfer training;ROM (range of motion)  -AB               User Key  (r) = Recorded By, (t) = Taken By, (c) = Cosigned By      Initials Name Provider Type    AB Lili Kim, PT Physical Therapist                   Clinical Impression       Row Name 06/06/23 0946          Pain    Pretreatment Pain Rating 0/10 - no pain  -AB     Posttreatment Pain Rating 0/10 - no pain  -AB     Additional Documentation Pain Scale: Numbers Pre/Post-Treatment (Group)  -AB       Row Name 06/06/23 0946          Plan of Care Review    Plan of Care Reviewed With patient  -AB     Progress no change  -AB     Outcome Evaluation PT initial eval completed. Pt presents with decreased strength, impaired endurance, and mild balance deficits causing functional mobility below baseline. Ambulation of 200' with CGAx1+1 and FWW was well tolerated. No LOB. Pt will benefit from further IPPT for addressing deficits. PT rec return to [facility] at d/c.  -AB       Row Name 06/06/23 0946          Therapy Assessment/Plan (PT)    Rehab Potential (PT) good, to achieve stated therapy goals  -AB     Criteria for Skilled Interventions Met (PT) yes;meets criteria;skilled treatment is necessary  -AB     Therapy Frequency (PT) daily  -AB       Row Name  06/06/23 0946          Vital Signs    Pre Systolic BP Rehab 142  -AB     Pre Treatment Diastolic BP 86  -AB     Post Systolic BP Rehab 149  -AB     Post Treatment Diastolic BP 59  -AB     Pretreatment Heart Rate (beats/min) 92  -AB     Posttreatment Heart Rate (beats/min) 63  -AB     Pre SpO2 (%) 100  -AB     O2 Delivery Pre Treatment nasal cannula  -AB     Intra SpO2 (%) 98  -AB     O2 Delivery Intra Treatment room air  -AB     Post SpO2 (%) 97  -AB     O2 Delivery Post Treatment room air  -AB     Pre Patient Position Sitting  -AB     Intra Patient Position Standing  -AB     Post Patient Position Sitting  -AB       Row Name 06/06/23 0946          Positioning and Restraints    Pre-Treatment Position in bed  -AB     Post Treatment Position chair  -AB     In Chair notified nsg;reclined;sitting;call light within reach;encouraged to call for assist;exit alarm on;legs elevated;waffle cushion  -AB               User Key  (r) = Recorded By, (t) = Taken By, (c) = Cosigned By      Initials Name Provider Type    AB Lili Kim, PT Physical Therapist                   Outcome Measures       Row Name 06/06/23 0949          How much help from another person do you currently need...    Turning from your back to your side while in flat bed without using bedrails? 3  -AB     Moving from lying on back to sitting on the side of a flat bed without bedrails? 3  -AB     Moving to and from a bed to a chair (including a wheelchair)? 3  -AB     Standing up from a chair using your arms (e.g., wheelchair, bedside chair)? 3  -AB     Climbing 3-5 steps with a railing? 3  -AB     To walk in hospital room? 3  -AB     AM-PAC 6 Clicks Score (PT) 18  -AB     Highest level of mobility 6 --> Walked 10 steps or more  -AB       Row Name 06/06/23 0949 06/06/23 0919       Functional Assessment    Outcome Measure Options AM-PAC 6 Clicks Basic Mobility (PT)  -AB AM-PAC 6 Clicks Daily Activity (OT) (P)   -CH              User Key  (r) = Recorded By, (t)  = Taken By, (c) = Cosigned By      Initials Name Provider Type    AB Lili Kim, PT Physical Therapist    Cari Krishnamurthy, OT Student OT Student                                 Physical Therapy Education       Title: PT OT SLP Therapies (In Progress)       Topic: Physical Therapy (In Progress)       Point: Mobility training (Done)       Learning Progress Summary             Patient Acceptance, E,D, VU,DU by AB at 6/6/2023 0949                         Point: Home exercise program (Not Started)       Learner Progress:  Not documented in this visit.              Point: Body mechanics (Done)       Learning Progress Summary             Patient Acceptance, E,D, VU,DU by AB at 6/6/2023 0949                         Point: Precautions (Done)       Learning Progress Summary             Patient Acceptance, E,D, VU,DU by AB at 6/6/2023 0949                                         User Key       Initials Effective Dates Name Provider Type Discipline    AB 09/22/22 -  Lili Kim, PT Physical Therapist PT                  PT Recommendation and Plan  Planned Therapy Interventions (PT): balance training, bed mobility training, gait training, home exercise program, postural re-education, patient/family education, strengthening, stretching, transfer training, ROM (range of motion)  Plan of Care Reviewed With: patient  Progress: no change  Outcome Evaluation: PT initial eval completed. Pt presents with decreased strength, impaired endurance, and mild balance deficits causing functional mobility below baseline. Ambulation of 200' with CGAx1+1 and FWW was well tolerated. No LOB. Pt will benefit from further IPPT for addressing deficits. PT rec return to [facility] at d/c.     Time Calculation:    PT Charges       Row Name 06/06/23 0950             Time Calculation    Start Time 0836  -AB      PT Received On 06/06/23  -AB      PT Goal Re-Cert Due Date 06/16/23  -AB         Untimed Charges    PT Eval/Re-eval Minutes 46  -AB          Total Minutes    Untimed Charges Total Minutes 46  -AB       Total Minutes 46  -AB                User Key  (r) = Recorded By, (t) = Taken By, (c) = Cosigned By      Initials Name Provider Type    AB Lili Kim, PT Physical Therapist                  Therapy Charges for Today       Code Description Service Date Service Provider Modifiers Qty    12001386262 HC PT EVAL LOW COMPLEXITY 4 2023 Lili Kim, PT GP 1            PT G-Codes  Outcome Measure Options: AM-PAC 6 Clicks Basic Mobility (PT)  AM-PAC 6 Clicks Score (PT): 18  AM-PAC 6 Clicks Score (OT): (P) 19  PT Discharge Summary  Anticipated Discharge Disposition (PT): assisted living    Lili Kim PT  2023      Electronically signed by Lili Kim, PT at 23 0951       Occupational Therapy Notes (most recent note)    No notes exist for this encounter.          Speech Language Pathology Notes (most recent note)        Kristy Pedersen MS CCC-SLP at 23 1322          Acute Care - Speech Language Pathology   Swallow Initial Evaluation [facility]  Clinical Swallow Evaluation     Patient Name: Chey Robertson  : 1935  MRN: [id number]  Today's Date: 2023               Admit Date: 2023    Visit Dx:     ICD-10-CM ICD-9-CM   1. Generalized abdominal pain  R10.84 789.07   2. Nausea and vomiting, unspecified vomiting type  R11.2 787.01   3. Dehydration  E86.0 276.51   4. Prolonged Q-T interval on ECG  R94.31 794.31   5. Hypomagnesemia  E83.42 275.2   6. Hyperglycemia  R73.9 790.29   7. Pericardial effusion  I31.39 423.9   8. Anemia, unspecified type  D64.9 285.9   9. Dysphagia, unspecified type  R13.10 787.20     Patient Active Problem List   Diagnosis    Benign familial tremor    AMS (altered mental status)    Thrombocytopenia (HCC)    Anemia    Shortness of breath    Hypothyroidism (acquired)    Acute kidney injury    Splenomegaly    Hepatomegaly    Confusion    B12 deficiency    Abnormal intestinal absorption    Iron  deficiency anemia due to chronic blood loss    Myelodysplasia (myelodysplastic syndrome)    Weakness    Personal history of pulmonary embolism    Urinary tract infection, acute    Chronic anticoagulation    Essential hypertension    Type 2 diabetes mellitus without complication, with long-term current use of insulin    COVID-19 virus infection    Atrial fibrillation    Chest pain    Elevated troponin    Nausea and vomiting    Hyponatremia    Hypomagnesemia    QT prolongation    Pericardial effusion     Past Medical History:   Diagnosis Date    Anemia     Arthritis     Diabetes mellitus     checks sugar only when pt wants to     Disease of thyroid gland     History of transfusion     with knee surgery-  no reaction recalled.     [place] (hard of hearing)     no hearing aids    Hypertension     Migraine without aura and without status migrainosus, not intractable 12/30/2016    Pulmonary emboli 2011    (after knee surgery)    SOBOE (shortness of breath on exertion)     Tremors of nervous system     Vertigo     Wears dentures     upper     Wears glasses      Past Surgical History:   Procedure Laterality Date    BLADDER SURGERY      CATARACT EXTRACTION      bilat     CHOLECYSTECTOMY      COLONOSCOPY      HYSTERECTOMY      KYPHOPLASTY N/A 2/12/2021    Procedure: KYPHOPLASTY T11, T12 AND L4;  Surgeon: Matty Hearn MD;  Location: [facility];  Service: Orthopedic Spine;  Laterality: N/A;    OOPHORECTOMY      TONSILLECTOMY         SLP Recommendation and Plan  SLP Swallowing Diagnosis: R/O pharyngeal dysphagia (06/06/23 1045)  SLP Diet Recommendation: soft to chew textures, whole, no mixed consistencies, nectar thick liquids (06/06/23 1045)  Recommended Precautions and Strategies: upright posture during/after eating, general aspiration precautions (06/06/23 1045)  SLP Rec. for Method of Medication Administration: meds whole, with thick liquids, with puree, as tolerated (06/06/23 1045)     Monitor for Signs of Aspiration: yes,  notify SLP if any concerns (06/06/23 1045)  Recommended Diagnostics: VFSS (MBS) (06/06/23 1045)  Swallow Criteria for Skilled Therapeutic Interventions Met: demonstrates skilled criteria (06/06/23 1045)     Rehab Potential/Prognosis, Swallowing: good, to achieve stated therapy goals (06/06/23 1045)                                              Plan of Care Reviewed With: patient  Progress: no change      SWALLOW EVALUATION (last 72 hours)       SLP Adult Swallow Evaluation       Row Name 06/06/23 1045                   Rehab Evaluation    Document Type evaluation  -AC        Subjective Information no complaints  -AC        Patient Observations alert;cooperative  -AC        Patient/Family/Caregiver Comments/Observations No family present.  -AC        Patient Effort good  -AC           General Information    Patient Profile Reviewed yes  -AC        Pertinent History Of Current Problem Re-adm from [facility] w/ n/v. Consulted due to report of SLP swallowing intervention there. Pt reported she was told to take small bites/sips. Denied undergoing instrumental study or being recommended modified food/liquids. Adm 5/5-5/24 w/ afib, dehydration, UTI. Hx PEs, tremor, [place], arthritis, thrombocytopenia, myelodysplastic syndrome.  -AC        Current Method of Nutrition clear liquids;thin liquids  -AC        Precautions/Limitations, Vision WFL with corrective lenses;for purposes of eval  -AC        Precautions/Limitations, Hearing WFL;for purposes of eval  -AC        Prior Level of Function-Communication unknown  -AC        Prior Level of Function-Swallowing no diet consistency restrictions  -AC        Plans/Goals Discussed with patient;agreed upon  -AC        Barriers to Rehab none identified  -AC        Patient's Goals for Discharge patient did not state  -           Pain Scale: Numbers Pre/Post-Treatment    Pretreatment Pain Rating 0/10 - no pain  -AC        Posttreatment Pain Rating 0/10 - no pain  -AC           Oral  Motor Structure and Function    Dentition Assessment upper dentures/partial in place  -AC        Secretion Management WNL/WFL  -AC        Mucosal Quality moist, healthy  -AC        Volitional Cough WFL  -AC           Oral Musculature and Cranial Nerve Assessment    Oral Motor General Assessment [facility]  -AC           General Eating/Swallowing Observations    Eating/Swallowing Skills fed by SLP;self-fed;appropriate self-feeding skills observed  -AC        Positioning During Eating upright 90 degree;upright in chair  -        Utensils Used spoon;cup;straw  -AC        Consistencies Trialed thin liquids;nectar/syrup-thick liquids;pureed;regular textures  -           Clinical Swallow Eval    Oral Prep Phase impaired  -AC        Oral Transit WFL  -AC        Oral Residue impaired  -AC        Pharyngeal Phase suspected pharyngeal impairment  -AC           Oral Prep Concerns    Oral Prep Concerns increased prep time  -AC        Increased Prep Time regular consistencies  -AC        Oral Prep Concerns, Comment Increased, but mastication was adequate.  -AC           Oral Residue Concerns    Oral Residue Concerns other (see comments)  -AC        Oral Residue Concerns, Comment Mild residue stuck to upper dentures w/ solids--cleared w/ liquid wash & did not appear to be r/t oral dysphagia.  -AC           Pharyngeal Phase Concerns    Pharyngeal Phase Concerns cough  -AC        Cough thin;other (see comments)  via liquid wash  -AC        Pharyngeal Phase Concerns, Comment No overt clinical s/sxs aspiration w/ nectar, puree, or solid.  -           SLP Evaluation Clinical Impression    SLP Swallowing Diagnosis R/O pharyngeal dysphagia  -        Functional Impact risk of aspiration/pneumonia  -        Rehab Potential/Prognosis, Swallowing good, to achieve stated therapy goals  -        Swallow Criteria for Skilled Therapeutic Interventions Met demonstrates skilled criteria  -           Recommendations    SLP Diet  Recommendation soft to chew textures;whole;no mixed consistencies;nectar thick liquids  -        Recommended Diagnostics VFSS (MBS)  -        Recommended Precautions and Strategies upright posture during/after eating;general aspiration precautions  -        Oral Care Recommendations Oral Care BID/PRN  -        SLP Rec. for Method of Medication Administration meds whole;with thick liquids;with puree;as tolerated  -        Monitor for Signs of Aspiration yes;notify SLP if any concerns  -                  User Key  (r) = Recorded By, (t) = Taken By, (c) = Cosigned By      Initials Name Effective Dates     Kristy Pedersen MS CCC-SLP 02/03/23 -                     EDUCATION  The patient has been educated in the following areas:   Dysphagia (Swallowing Impairment) Modified Diet Instruction.              Time Calculation:    Time Calculation- SLP       Row Name 06/06/23 1322             Time Calculation- SLP    SLP Start Time 1045  -      SLP Received On 06/06/23  -         Untimed Charges    26031-LF Eval Oral Pharyng Swallow Minutes 42  -AC         Total Minutes    Untimed Charges Total Minutes 42  -AC       Total Minutes 42  -AC                User Key  (r) = Recorded By, (t) = Taken By, (c) = Cosigned By      Initials Name Provider Type     Kristy Pedersen MS CCC-SLP Speech and Language Pathologist                    Therapy Charges for Today       Code Description Service Date Service Provider Modifiers Qty    84629083725 HC ST EVAL ORAL PHARYNG SWALLOW 3 6/6/2023 Kristy Pedersen MS CCC-SLP GN 1                 Kristy Pedersen MS CCC-SLP  6/6/2023    Electronically signed by Kristy Pedersen MS CCC-SLP at 06/06/23 5069

## 2023-06-06 NOTE — NURSING NOTE
"                             Wound, Ostomy and Continence (WOC) Note    Reason for WOC Consultation: \"Pressure injury, possible DTI to bilateral buttock\"     Patient awake, alert.  Family/support person not present.   Subjective: Patient states,\" I have been sitting a lot lately.\"    Skin/Wound Assessment:     Wound Type:  Bruise  Location: Bilateral medial gluteals  Wound Bed: blanchable and maroon/purple  Wound Edges: Irregular  Periwound Skin: intact and blanchable   Drainage Characteristics/Odor: none  Drainage Amount: none  Pain: Yes   Image:     Summary and Recommendation(s):     -Practice pressure injury prevention protocol  - Refer to wound care instructions in the \"Orders\" tab  - Specialty support surface in place: Foam Mattress with Waffle Overlay       Pressure Injury Prevention Measures (initiate for a Rahul Scale Score of <18):     Most recent Rahul Scale score:  Sensory Perception: 3-->slightly limited  Moisture: 3-->occasionally moist  Activity: 3-->walks occasionally  Mobility: 3-->slightly limited  Nutrition: 2-->probably inadequate  Friction and Shear: 2-->potential problem  Rahul Score: 16 (06/05/23 6297)     -Turn q 2 hr. using an offloading foam wedge, keep heels elevated and offloaded with offloading heel boots.    -Raise knee-gatch before elevating HOB to reduce shearing   -Follow C.A.R.E protocol if medical devices (Bipap, elena, Ng tube, etc) are being used.  -Apply moisture barrier cream to bottom BID & PRN, if incontinent.  -If incontinent, consider applying an external catheter. External catheters are finicky and should be checked every few hours for placement.   -Clean skin gently with no-rinse PH-balanced foam cleanser and a soft, disposable cloth (barrier wipes-blue pack).   -Reduce layers under patient (one sheet as drawsheet and two incontinence pads)    Thank you for consulting the WOC Nurse.  WOC Team will sign off.  Please re consult if the wound(s) worsens.     Fermin " Brenda RN, BSN, CCRN, CWOCN  Wound, Ostomy and Continence (WOC) Department  [facility]

## 2023-06-06 NOTE — PLAN OF CARE
Goal Outcome Evaluation:  Plan of Care Reviewed With: patient        Progress: no change  Outcome Evaluation: PT initial eval completed. Pt presents with decreased strength, impaired endurance, and mild balance deficits causing functional mobility below baseline. Ambulation of 200' with CGAx1+1 and FWW was well tolerated. No LOB. Pt will benefit from further IPPT for addressing deficits. PT rec return to [facility] at d/c.

## 2023-06-06 NOTE — CONSULTS
Malnutrition Severity Assessment    Patient Name:  Chey Robertson  YOB: 1935  MRN: [id number]  Admit Date:  6/5/2023    Patient meets criteria for : Severe Malnutrition    Comments:  Pt meets criteria for acute, severe malnutrition with the indicators of <50% of EEN for >5d, significant weight loss of 13.4% x3 months & 5.7% x1 month, moderate muscle wasting, and subcutaneous fat loss.     Malnutrition Severity Assessment  Malnutrition Type: Acute Disease or Injury - Related Malnutrition  Malnutrition Type (last 8 hours)       Malnutrition Severity Assessment       Row Name 06/06/23 1450       Malnutrition Severity Assessment    Malnutrition Type Acute Disease or Injury - Related Malnutrition      Row Name 06/06/23 1450       Insufficient Energy Intake     Insufficient Energy Intake Findings Moderate    Insufficient Energy Intake  < or equal to 50% of est. energy requirement for > or equal to 5d)      Row Name 06/06/23 1450       Unintentional Weight Loss     Unintentional Weight Loss Findings Severe    Unintentional Weight Loss  Weight loss greater than 7.5% in three months  13.4% x 3month, 5.7% x1 month      Row Name 06/06/23 1450       Muscle Loss    Loss of Muscle Mass Findings Moderate    [place] Moderate - slight depression    Clavicle Bone Region Moderate - some protrusion in females, visible in males    Acromion Bone Region Severe - squared shoulders, bones, and acromion process protrusion prominent    Scapular Bone Region Moderate - mild depression, bones may show slightly    Dorsal Hand Region Moderate - slight depression    Patellar Region Moderate - patella more prominent, less muscle definition around patella    Anterior Thigh Region Moderate - mild depression on inner thigh    Posterior Calf Region Moderate - some roundness, slight firmness      Row Name 06/06/23 1450       Fat Loss    Subcutaneous Fat Loss Findings Moderate    Orbital Region  Moderate -  somewhat hollowness,  slightly dark circles    Upper Arm Region Moderate - some fat tissue, not ample      Row Name 06/06/23 1450       Criteria Met (Must meet criteria for severity in at least 2 of these categories: M Wasting, Fat Loss, Fluid, Secondary Signs, Wt. Status, Intake)    Patient meets criteria for  Severe Malnutrition                    Electronically signed by:  Zonia Pink MS,RD,LD  06/06/23 14:51 EDT

## 2023-06-06 NOTE — PROGRESS NOTES
[facility] Medicine Services  PROGRESS NOTE    Patient Name: Chey Robertson  : 1935  MRN: [id number]    Date of Admission: 2023  Primary Care Physician: Corby Marie MD    Subjective   Subjective     CC:  N/V    HPI:  No new issues overnight. Feeling okay currently.  No further N/V    ROS:  Gen- No fevers, chills  CV- No chest pain, palpitations  Resp- No cough, dyspnea  GI- No N/V/D, abd pain      Objective   Objective     Vital Signs:   Temp:  [97.6 °F (36.4 °C)-97.7 °F (36.5 °C)] 97.6 °F (36.4 °C)  Heart Rate:  [59-64] 61  Resp:  [15-16] 16  BP: (114-149)/(49-76) 125/57  Flow (L/min):  [2] 2     Physical Exam:  Constitutional: No acute distress, awake, alert, sitting up in chair at bedside  HENT: NCAT, mucous membranes moist  Respiratory: Clear to auscultation bilaterally, respiratory effort normal   Cardiovascular: RRR, no murmurs, rubs, or gallops  Gastrointestinal: Positive bowel sounds, soft, nontender, nondistended  Musculoskeletal: No bilateral ankle edema  Psychiatric: flat affect, cooperative  Neurologic: Oriented x 3, strength symmetric in all extremities, Cranial Nerves grossly intact to confrontation, speech clear  Skin: No rashes     Results Reviewed:  LAB RESULTS:      Lab 23  0516 23  2321 23  1949 23  1629   WBC 1.49*  --   --  2.25*   HEMOGLOBIN 8.7*  --   --  10.1*   HEMATOCRIT 27.6*  --   --  31.0*   PLATELETS 58*  --   --  58*   NEUTROS ABS 0.74*  --   --  1.79   IMMATURE GRANS (ABS) 0.01  --   --  0.02   LYMPHS ABS 0.52*  --   --  0.30*   MONOS ABS 0.22  --   --  0.14   EOS ABS 0.00  --   --  0.00   MCV 89.9  --   --  87.1   LACTATE  --  1.6 2.2* 3.2*         Lab 23  1632 23  1631 23  1629   SODIUM  --   --  132*   POTASSIUM  --   --  4.3   CHLORIDE  --   --  94*   CO2  --   --  22.0   ANION GAP  --   --  16.0*   BUN  --   --  20   CREATININE 0.80 0.80 0.75   EGFR  --   --  77.6   GLUCOSE  --   --  252*    CALCIUM  --   --  8.6   MAGNESIUM  --   --  1.4*         Lab 06/05/23  1629   TOTAL PROTEIN 6.0   ALBUMIN 3.6   GLOBULIN 2.4   ALT (SGPT) 6   AST (SGOT) 11   BILIRUBIN 0.6   ALK PHOS 84   LIPASE 20         Lab 06/06/23  0140 06/05/23  1903 06/05/23  1629   HSTROP T 26* 20* 17*                 Brief Urine Lab Results  (Last result in the past 365 days)        Color   Clarity   Blood   Leuk Est   Nitrite   Protein   CREAT   Urine HCG        06/05/23 1811 Yellow   Clear   Negative   Negative   Negative   Negative                   Microbiology Results Abnormal       None            CT Abdomen Pelvis With Contrast    Result Date: 6/5/2023  CT ABDOMEN PELVIS W CONTRAST Date of Exam: 6/5/2023 5:56 PM EDT Indication: abd pain and N/V. Comparison: 2/19/2021, 5/5/2023. Technique: Axial CT images were obtained of the abdomen and pelvis following the uneventful intravenous administration of 80 mL Isovue-300. Reconstructed coronal and sagittal images were also obtained. Automated exposure control and iterative construction methods were used. Findings: Liver: The liver is unremarkable in morphology. Subcentimeter hypodensity within the caudal right hepatic lobe is too small to fully characterize. No biliary dilation is seen. Gallbladder: Surgically absent Pancreas: The pancreas appears atrophic. Spleen: The spleen is enlarged, measuring approximately 18 cm in length. Several small splenic hypodensities are too small to fully characterize. Several tiny splenic granulomas. Adrenal glands: 1.3 cm low-density nodule at the apex of the left adrenal gland, incompletely characterized on this contrast-enhanced study. The right adrenal gland is unremarkable. Genitourinary tract: Kidneys appear unremarkable. No hydronephrosis is seen. Small calcifications along the course of the left ureter are thought to represent phleboliths. Urinary bladder is unremarkable. Patient is status post hysterectomy. Gastrointestinal tract: Scattered  colonic diverticulosis. The hollow viscera appears otherwise unremarkable. There is no evidence of bowel obstruction. Appendix: The appendix is not identified. Other findings: No free air or free fluid is identified. No pathologically enlarged lymph nodes are seen. Vascular calcifications are present. The IVC is grossly unremarkable. Bones and soft tissues: Bones are demineralized. Patient is status post kyphoplasty of compression fractures of T11, T12, and L4. There are degenerative changes within the spine. Superficial soft tissues demonstrate no acute abnormality. Lung bases: Small left pleural effusion with bibasilar atelectasis. Small pericardial effusion noted.     Impression: Impression: 1.No acute abnormality identified within the abdomen or pelvis. 2.Scattered colonic diverticulosis. 3.Moderate splenomegaly. Several small splenic hypodensities, too small to fully characterize. Similar finding noted on the study dated 2/19/2021. 4.Small pericardial effusion. Small left pleural effusion with left basilar atelectasis. These findings are new since 5/5/2023. 5.Unchanged 1.3 cm left adrenal nodule. 6.Additional findings as detailed above. Electronically Signed: Venkat Meyer  6/5/2023 6:27 PM EDT  Workstation ID: [id number]         Current medications:  Scheduled Meds:amiodarone, 200 mg, Oral, Daily  apixaban, 2.5 mg, Oral, BID  insulin lispro, 2-7 Units, Subcutaneous, 4x Daily AC & at Bedtime  levothyroxine, 100 mcg, Oral, Q AM  metoprolol tartrate, 50 mg, Oral, BID  pantoprazole, 40 mg, Oral, QAM  senna-docusate sodium, 2 tablet, Oral, BID  sodium chloride, 10 mL, Intravenous, Q12H  tamsulosin, 0.4 mg, Oral, Daily      Continuous Infusions:lactated ringers, 75 mL/hr, Last Rate: 75 mL/hr (06/05/23 2235)      PRN Meds:.  acetaminophen **OR** acetaminophen **OR** acetaminophen    senna-docusate sodium **AND** polyethylene glycol **AND** bisacodyl **AND** bisacodyl    Calcium Replacement - Follow Nurse / BPA Driven  Protocol    dextrose    dextrose    glucagon (human recombinant)    Magnesium Standard Dose Replacement - Follow Nurse / BPA Driven Protocol    melatonin    nitroglycerin    Phosphorus Replacement - Follow Nurse / BPA Driven Protocol    Potassium Replacement - Follow Nurse / BPA Driven Protocol    prochlorperazine    Sodium Chloride (PF)    sodium chloride    sodium chloride    Assessment & Plan   Assessment & Plan     Active Hospital Problems    Diagnosis  POA    **Nausea and vomiting [R11.2]  Yes    Hyponatremia [E87.1]  Yes    Hypomagnesemia [E83.42]  Yes    QT prolongation [R94.31]  Yes    Pericardial effusion [I31.39]  Yes    Atrial fibrillation [I48.91]  Yes    Type 2 diabetes mellitus without complication, with long-term current use of insulin [E11.9, Z79.4]  Not Applicable    Essential hypertension [I10]  Yes    Chronic anticoagulation [Z79.01]  Not Applicable    Personal history of pulmonary embolism [Z86.711]  Yes    Hypothyroidism (acquired) [E03.9]  Yes    Anemia [D64.9]  Yes      Resolved Hospital Problems   No resolved problems to display.        Brief Hospital Course to date:  Chey Robertson is a 87 y.o. female with past medical history of myelodysplasia, chronic pancytopenia, essential hypertension, type 2 diabetes, history of PE (Eliquis), GERD, recent admission for UTI complicated by COVID19 infection and new onset Afib with RVR eventually discharged to rehab on 5/24 who presented on 6/5 with intractable N/V. Imaging on admission also showed a pericardial effusion.    Plan:    Intractable nausea and vomiting  Lactic acidosis  -- CT abdomen pelvis shows no acute abnormality within the abdomen or pelvis, scattered colonic diverticulosis, moderate splenomegaly, small pericardial effusion  -- Lactate 3.2 on admission, resolved on repeat   -- IV fluids  -- compazine   -- am labs     Hypomagnesemia  -- Mg 1.4 on admission   --  replacement protocol     Elevated troponin  Pericardial effusion  Prolonged  QT  -- CT abdomen pelvis shows a pericardial effusion new since 5/5/2023  -- HS troponin still up trending to 26, will trend to peak  -- patient denies chest pain  -- avoid medications that will prolong QT interval  -- repeat TTE PENDING  -- Qtc of 510     Pleural effusion  --CT abdomen pelvis shows small left pleural effusion with bibasilar atelectasis (although L appears worse than R to my read) New since 5/5/2023  -- monitor     A-fib  Hypertension  -- Follows with Dr. Chand  -- Continue amiodarone, metoprolol, Eliquis     Myelodysplastic syndrome  Anemia of chronic disease  Pancytopenia  -- Hemoglobin 10.1, hematocrit 31, wbc 2.25, plt 58 on admission.  Similar to prior  -- Follows with Dr. Lyman--follow-up appointment scheduled in August     T2DM  -- FSBG with SSI  -- A1c 6% 03/2023     Hypothyroidism  -- Continue Synthroid     GERD  -- PPI     Acute on chronic debility  -- Consult case management  -- PT/OT consult- recommend that pt return to assisted living facility ([place])  -- fall precautions    Dysphagia  -- SLP following, MBS tomorrow. Diet per their recs     Total time spent: Time Spent: Time Spent: 25 minutes  Time spent includes time reviewing chart, face-to-face time, counseling patient/family/caregiver, ordering medications/tests/procedures, communicating with other health care professionals, documenting clinical information in the electronic health record, and coordination of care.       Expected Discharge Location and Transportation: back to [facility]  Expected Discharge   Expected Discharge Date: 6/7/2023; Expected Discharge Time:      DVT prophylaxis:  Medical DVT prophylaxis orders are present.     AM-PAC 6 Clicks Score (PT): 13 (06/05/23 2134)    CODE STATUS:   Code Status and Medical Interventions:   Ordered at: 06/05/23 2012     Level Of Support Discussed With:    Patient     Code Status (Patient has no pulse and is not breathing):    CPR (Attempt to Resuscitate)     Medical Interventions  (Patient has pulse or is breathing):    Full Support       Teetee Cesar MD  06/06/23

## 2023-06-06 NOTE — CONSULTS
"                    Clinical Nutrition       Patient Name: Chey Robertson  YOB: 1935  MRN: [id number]  Date of Encounter: 06/06/23 14:50 EDT  Admission date: 6/5/2023    Comment:    Pt meets criteria for acute, severe malnutrition with the indicators of <50% of EEN for >5d, significant weight loss of 13.4% x3 months & 5.7% x1 month, moderate muscle wasting, and subcutaneous fat loss.     Provide Boost Plus 2x daily.     Reason for Visit   MST score 2+, \"Unsure\" unintentional weight loss, Reduced oral intake, RN consult    EMR Reviewed     EMR Reviewed: yes   Meds Reviewed: yes  Labs Reviewed: yes    Admission Diagnosis:  Nausea and vomiting [R11.2]    Problem List:    Nausea and vomiting    Anemia    Hypothyroidism (acquired)    Personal history of pulmonary embolism    Chronic anticoagulation    Essential hypertension    Type 2 diabetes mellitus without complication, with long-term current use of insulin    Atrial fibrillation    Hyponatremia    Hypomagnesemia    QT prolongation    Pericardial effusion  Severe malnutrition        Anthropometric      Flowsheet Rows      Flowsheet Row First Filed Value   Admission Height 160 cm (63\") Documented at 06/05/2023 1616   Admission Weight 51.7 kg (114 lb) Documented at 06/05/2023 1616              Height: 160 cm (63\")  Last Filed Weight: Weight: 52.7 kg (116 lb 3.2 oz) (06/05/23 2004)  Weight Method: Bed scale  BMI: BMI (Calculated): 20.6  BMI classification: Underweight:<18.5kg/m2**  Using adjusted BMI for older adults   IBW:  115lbs    UBW:      Weight       Weight (kg) Weight (lbs) Weight Method Visit Report   6/14/2022 62.143 kg  137 lb   --    2/13/2023 61.689 kg  136 lb   --    3/16/2023 60.782 kg  134 lb      3/28/2023 52.617 kg  116 lb  Stated     5/5/2023 55.974 kg  123 lb 6.4 oz  Bed scale      53.978 kg  119 lb  Stated     5/17/2023 56.155 kg  123 lb 12.8 oz      5/25/2023 53.252 kg  117 lb 6.4 oz      6/5/2023 52.708 kg  116 lb 3.2 oz  Bed scale   "    51.71 kg  114 lb  Stated       Weight change:   Had a weight loss of 7#(5.7%) in 1 month, 18# (13.4%) x 3 months, & 21# (15.3%) x1 yr        Nutrition Focused Physical Exam     Date:     Patient meets criteria for malnutrition diagnosis, see MSA note.     Reported/Observed/Food/Nutrition Related - Comments     Pt laying in bed, able to provide weight/nutrition hx. Endorsed diminished appetite for several months eating <50% of meals. Reports drinking ONS ~1 daily. Denies hx of dysphagia - SLP following, completed MBS today. Partially edentulous - upper denture plate, well fitting. Denies N/V since admission - endorsed BM this AM - diarrhea. Pt with recent admission to  [facility] - noted to have had good intake however was in COVID isolation limited evaluation potential. NKFA     Current Nutrition Prescription     Diet: Diabetic Diets; Consistent Carbohydrate; No Mixed Consistencies, No Straw; Texture: Soft to Chew (NDD 3); Soft to Chew: Whole Meat; Fluid Consistency: Nectar Thick  No active supplement orders      Average Intake from [facility]: 50% x1 meal documented    Nutrition Diagnosis     Problem Malnutrition acute, severe   Etiology Per clinical status, dysphagia, recent COVID infection   Signs/Symptoms <50% of EEN for >5d, significant weight loss of 13.4% x3 months & 5.7% x1 month, moderate muscle wasting, and subcutaneous fat loss.    Status:    Actions     Follow treatment progress, Care plan reviewed, Encourage intake, Supplement provided    Monitor Per Protocol      Zonia Pink, MS,RD,LD,   Time Spent: 25min

## 2023-06-06 NOTE — THERAPY EVALUATION
Patient Name: Chey Robertson  : 1935    MRN: [id number]                              Today's Date: 2023       Admit Date: 2023    Visit Dx:     ICD-10-CM ICD-9-CM   1. Generalized abdominal pain  R10.84 789.07   2. Nausea and vomiting, unspecified vomiting type  R11.2 787.01   3. Dehydration  E86.0 276.51   4. Prolonged Q-T interval on ECG  R94.31 794.31   5. Hypomagnesemia  E83.42 275.2   6. Hyperglycemia  R73.9 790.29   7. Pericardial effusion  I31.39 423.9   8. Anemia, unspecified type  D64.9 285.9     Patient Active Problem List   Diagnosis    Benign familial tremor    AMS (altered mental status)    Thrombocytopenia (HCC)    Anemia    Shortness of breath    Hypothyroidism (acquired)    Acute kidney injury    Splenomegaly    Hepatomegaly    Confusion    B12 deficiency    Abnormal intestinal absorption    Iron deficiency anemia due to chronic blood loss    Myelodysplasia (myelodysplastic syndrome)    Weakness    Personal history of pulmonary embolism    Urinary tract infection, acute    Chronic anticoagulation    Essential hypertension    Type 2 diabetes mellitus without complication, with long-term current use of insulin    COVID-19 virus infection    Atrial fibrillation    Chest pain    Elevated troponin    Nausea and vomiting    Hyponatremia    Hypomagnesemia    QT prolongation    Pericardial effusion     Past Medical History:   Diagnosis Date    Anemia     Arthritis     Diabetes mellitus     checks sugar only when pt wants to     Disease of thyroid gland     History of transfusion     with knee surgery-  no reaction recalled.     [place] (hard of hearing)     no hearing aids    Hypertension     Migraine without aura and without status migrainosus, not intractable 2016    Pulmonary emboli     (after knee surgery)    SOBOE (shortness of breath on exertion)     Tremors of nervous system     Vertigo     Wears dentures     upper     Wears glasses      Past Surgical History:   Procedure  Laterality Date    BLADDER SURGERY      CATARACT EXTRACTION      bilat     CHOLECYSTECTOMY      COLONOSCOPY      HYSTERECTOMY      KYPHOPLASTY N/A 2/12/2021    Procedure: KYPHOPLASTY T11, T12 AND L4;  Surgeon: Matty Hearn MD;  Location: [facility];  Service: Orthopedic Spine;  Laterality: N/A;    OOPHORECTOMY      TONSILLECTOMY        General Information       Row Name 06/06/23 0938          Physical Therapy Time and Intention    Document Type evaluation  -AB     Mode of Treatment physical therapy  -AB       Row Name 06/06/23 0938          General Information    Patient Profile Reviewed yes  -AB     Prior Level of Function independent:;all household mobility;gait;transfer;bed mobility;min assist:;ADL's;dependent:;shopping;driving;cleaning  Using rollator and SPC.  -AB     Existing Precautions/Restrictions fall  -AB     Barriers to Rehab medically complex  -AB       Row Name 06/06/23 0938          Living Environment    People in Home facility resident;other (see comments)  [facility]  -AB       Row Name 06/06/23 0938          Home Main Entrance    Number of Stairs, Main Entrance none  -AB       Row Name 06/06/23 0938          Stairs Within Home, Primary    Number of Stairs, Within Home, Primary none  -AB       Row Name 06/06/23 0938          Cognition    Orientation Status (Cognition) oriented x 3  -AB       Row Name 06/06/23 0938          Safety Issues, Functional Mobility    Safety Issues Affecting Function (Mobility) awareness of need for assistance;insight into deficits/self-awareness;safety precaution awareness  -AB     Impairments Affecting Function (Mobility) balance;endurance/activity tolerance;strength  -AB     Comment, Safety Issues/Impairments (Mobility) Alert and following all commands  -AB               User Key  (r) = Recorded By, (t) = Taken By, (c) = Cosigned By      Initials Name Provider Type    AB Lili Kim PT Physical Therapist                   Mobility       Row  Name 06/06/23 0940          Bed Mobility    Comment, (Bed Mobility) Pt received sitting EOB with OT.  -AB       Row Name 06/06/23 0940          Transfers    Comment, (Transfers) Cues for hand placement and sequencing.  -AB       Row Name 06/06/23 0940          Bed-Chair Transfer    Bed-Chair [place] (Transfers) contact guard;1 person assist  -AB     Assistive Device (Bed-Chair Transfers) walker, front-wheeled  -AB       Row Name 06/06/23 0940          Sit-Stand Transfer    Sit-Stand [place] (Transfers) contact guard;1 person assist;verbal cues  -AB     Assistive Device (Sit-Stand Transfers) walker, front-wheeled  -AB     Comment, (Sit-Stand Transfer) Cues to reach back prior to sitting  -AB       Row Name 06/06/23 0940          Gait/Stairs (Locomotion)    [place] Level (Gait) contact guard;1 person assist;verbal cues;1 person to manage equipment  -AB     Assistive Device (Gait) walker, front-wheeled  -AB     Distance in Feet (Gait) 200  -AB     Deviations/Abnormal Patterns (Gait) bilateral deviations;[place] decreased;stride length decreased;gait speed decreased  -AB     Bilateral Gait Deviations forward flexed posture  -AB     [place] Level (Stairs) not tested  -AB     Comment, (Gait/Stairs) Pt ambulated 200' with CGAx1 +1 and FWW. Pt demo's step through gait pattern with shortened stride length and forward flexed posture. Cues for decreased WB through UEs and improved stride length. No overt LOB or knee buckling. Gait mechanics improved with cues. Further gait limited by fatigue.  -AB               User Key  (r) = Recorded By, (t) = Taken By, (c) = Cosigned By      Initials Name Provider Type    AB Lili Kim, PT Physical Therapist                   Obj/Interventions       Row Name 06/06/23 0945          Range of Motion Comprehensive    General Range of Motion bilateral lower extremity ROM WFL  -AB       Row Name 06/06/23 0945          Strength Comprehensive (MMT)    General Manual Muscle  Testing (MMT) Assessment lower extremity strength deficits identified  -AB     Comment, General Manual Muscle Testing (MMT) Assessment BLE strength grossly 4/5  -AB       Row Name 06/06/23 0945          Balance    Balance Assessment sitting static balance;sitting dynamic balance;standing static balance;standing dynamic balance  -AB     Static Sitting Balance supervision  -AB     Dynamic Sitting Balance contact guard  -AB     Position, Sitting Balance unsupported;sitting edge of bed  -AB     Static Standing Balance contact guard;1-person assist  -AB     Dynamic Standing Balance contact guard;1-person assist;1 person to manage equipment;verbal cues  -AB     Position/Device Used, Standing Balance supported;walker, front-wheeled  -AB     Balance Interventions standing;sitting;sit to stand;static;supported;dynamic  -AB     Comment, Balance No overt LOB.  -AB       Row Name 06/06/23 0945          Sensory Assessment (Somatosensory)    Sensory Assessment (Somatosensory) LE sensation intact  -AB               User Key  (r) = Recorded By, (t) = Taken By, (c) = Cosigned By      Initials Name Provider Type    AB Lili Kim, PT Physical Therapist                   Goals/Plan       Row Name 06/06/23 0948          Bed Mobility Goal 1 (PT)    Activity/Assistive Device (Bed Mobility Goal 1, PT) sit to supine;supine to sit  -AB     [place] Level/Cues Needed (Bed Mobility Goal 1, PT) independent  -AB     Time Frame (Bed Mobility Goal 1, PT) long term goal (LTG);10 days  -AB       Row Name 06/06/23 0948          Transfer Goal 1 (PT)    Activity/Assistive Device (Transfer Goal 1, PT) sit-to-stand/stand-to-sit;bed-to-chair/chair-to-bed;walker, rolling  -AB     [place] Level/Cues Needed (Transfer Goal 1, PT) modified independence  -AB     Time Frame (Transfer Goal 1, PT) long term goal (LTG);10 days  -AB       Row Name 06/06/23 0948          Gait Training Goal 1 (PT)    Activity/Assistive Device (Gait Training Goal 1, PT)  gait (walking locomotion);assistive device use;walker, rolling  -AB     [place] Level (Gait Training Goal 1, PT) modified independence  -AB     Distance (Gait Training Goal 1, PT) 400  -AB     Time Frame (Gait Training Goal 1, PT) long term goal (LTG);10 days  -AB       Row Name 06/06/23 0948          Patient Education Goal (PT)    Activity (Patient Education Goal, PT) HEP  -AB     [place]/Cues/Accuracy (Memory Goal 2, PT) demonstrates adequately  -AB       Row Name 06/06/23 0948          Therapy Assessment/Plan (PT)    Planned Therapy Interventions (PT) balance training;bed mobility training;gait training;home exercise program;postural re-education;patient/family education;strengthening;stretching;transfer training;ROM (range of motion)  -AB               User Key  (r) = Recorded By, (t) = Taken By, (c) = Cosigned By      Initials Name Provider Type    AB Lili Kim, PT Physical Therapist                   Clinical Impression       Row Name 06/06/23 0946          Pain    Pretreatment Pain Rating 0/10 - no pain  -AB     Posttreatment Pain Rating 0/10 - no pain  -AB     Additional Documentation Pain Scale: Numbers Pre/Post-Treatment (Group)  -AB       Row Name 06/06/23 0946          Plan of Care Review    Plan of Care Reviewed With patient  -AB     Progress no change  -AB     Outcome Evaluation PT initial eval completed. Pt presents with decreased strength, impaired endurance, and mild balance deficits causing functional mobility below baseline. Ambulation of 200' with CGAx1+1 and FWW was well tolerated. No LOB. Pt will benefit from further IPPT for addressing deficits. PT rec return to [facility] at d/c.  -AB       Row Name 06/06/23 0946          Therapy Assessment/Plan (PT)    Rehab Potential (PT) good, to achieve stated therapy goals  -AB     Criteria for Skilled Interventions Met (PT) yes;meets criteria;skilled treatment is necessary  -AB     Therapy Frequency (PT) daily  -AB       Row Name 06/06/23 0946           Vital Signs    Pre Systolic BP Rehab 142  -AB     Pre Treatment Diastolic BP 86  -AB     Post Systolic BP Rehab 149  -AB     Post Treatment Diastolic BP 59  -AB     Pretreatment Heart Rate (beats/min) 92  -AB     Posttreatment Heart Rate (beats/min) 63  -AB     Pre SpO2 (%) 100  -AB     O2 Delivery Pre Treatment nasal cannula  -AB     Intra SpO2 (%) 98  -AB     O2 Delivery Intra Treatment room air  -AB     Post SpO2 (%) 97  -AB     O2 Delivery Post Treatment room air  -AB     Pre Patient Position Sitting  -AB     Intra Patient Position Standing  -AB     Post Patient Position Sitting  -AB       Row Name 06/06/23 0946          Positioning and Restraints    Pre-Treatment Position in bed  -AB     Post Treatment Position chair  -AB     In Chair notified nsg;reclined;sitting;call light within reach;encouraged to call for assist;exit alarm on;legs elevated;waffle cushion  -AB               User Key  (r) = Recorded By, (t) = Taken By, (c) = Cosigned By      Initials Name Provider Type    AB Lili Kim, PT Physical Therapist                   Outcome Measures       Row Name 06/06/23 0949          How much help from another person do you currently need...    Turning from your back to your side while in flat bed without using bedrails? 3  -AB     Moving from lying on back to sitting on the side of a flat bed without bedrails? 3  -AB     Moving to and from a bed to a chair (including a wheelchair)? 3  -AB     Standing up from a chair using your arms (e.g., wheelchair, bedside chair)? 3  -AB     Climbing 3-5 steps with a railing? 3  -AB     To walk in hospital room? 3  -AB     AM-PAC 6 Clicks Score (PT) 18  -AB     Highest level of mobility 6 --> Walked 10 steps or more  -AB       Row Name 06/06/23 0949 06/06/23 0919       Functional Assessment    Outcome Measure Options AM-PAC 6 Clicks Basic Mobility (PT)  -AB AM-PAC 6 Clicks Daily Activity (OT) (P)   -CH              User Key  (r) = Recorded By, (t) = Taken By,  (c) = Cosigned By      Initials Name Provider Type    AB Lili Kim, PT Physical Therapist    Cari Krishnamurthy, OT Student OT Student                                 Physical Therapy Education       Title: PT OT SLP Therapies (In Progress)       Topic: Physical Therapy (In Progress)       Point: Mobility training (Done)       Learning Progress Summary             Patient Acceptance, E,D, VU,DU by AB at 6/6/2023 0949                         Point: Home exercise program (Not Started)       Learner Progress:  Not documented in this visit.              Point: Body mechanics (Done)       Learning Progress Summary             Patient Acceptance, E,D, VU,DU by AB at 6/6/2023 0949                         Point: Precautions (Done)       Learning Progress Summary             Patient Acceptance, E,D, VU,DU by AB at 6/6/2023 0949                                         User Key       Initials Effective Dates Name Provider Type Discipline    AB 09/22/22 -  Lili Kim, PT Physical Therapist PT                  PT Recommendation and Plan  Planned Therapy Interventions (PT): balance training, bed mobility training, gait training, home exercise program, postural re-education, patient/family education, strengthening, stretching, transfer training, ROM (range of motion)  Plan of Care Reviewed With: patient  Progress: no change  Outcome Evaluation: PT initial eval completed. Pt presents with decreased strength, impaired endurance, and mild balance deficits causing functional mobility below baseline. Ambulation of 200' with CGAx1+1 and FWW was well tolerated. No LOB. Pt will benefit from further IPPT for addressing deficits. PT rec return to [facility] at d/c.     Time Calculation:    PT Charges       Row Name 06/06/23 0950             Time Calculation    Start Time 0836  -AB      PT Received On 06/06/23  -AB      PT Goal Re-Cert Due Date 06/16/23  -AB         Untimed Charges    PT Eval/Re-eval Minutes 46  -AB         Total Minutes     Untimed Charges Total Minutes 46  -AB       Total Minutes 46  -AB                User Key  (r) = Recorded By, (t) = Taken By, (c) = Cosigned By      Initials Name Provider Type    AB Lili Kim, PT Physical Therapist                  Therapy Charges for Today       Code Description Service Date Service Provider Modifiers Qty    37111306566 HC PT EVAL LOW COMPLEXITY 4 6/6/2023 Lili Kim, PT GP 1            PT G-Codes  Outcome Measure Options: AM-PAC 6 Clicks Basic Mobility (PT)  AM-PAC 6 Clicks Score (PT): 18  AM-PAC 6 Clicks Score (OT): (P) 19  PT Discharge Summary  Anticipated Discharge Disposition (PT): assisted living    Lili Kim PT  6/6/2023

## 2023-06-06 NOTE — PLAN OF CARE
Goal Outcome Evaluation:           Progress: (P)  (initial eval)  Outcome Evaluation: (P) Pt. presents below baseline for BADLs and other fxal tasks d/t generalized weakness and decreased activity tolerance. Pt. would benefit from skilled OT to remediate this deficits and promote retun to PLOF. Continue IPOT per POC. OT recommends pt. return to halfway w/ caregiver A upon d/c.

## 2023-06-06 NOTE — PLAN OF CARE
Goal Outcome Evaluation:              Outcome Evaluation: Received patient from ED.  Patient lives at [place] and was at [facility] [place].  VSS.  On 2L O2 NC.  A&O x4.  No complaints of pain or nausea.  Fluids infusing.  Bruising on buttocks.  Referred to wound care for evaluation.  Mag replaced in ED.  SB to NSR with prolonged QT.  Purewick in place due to weakness.  Lactic now 1.6.  Echo in am.  Blood sugar 278 with patient refusing insulin due to not eating yesterday.  Will continue to follow plan of care.

## 2023-06-07 VITALS
SYSTOLIC BLOOD PRESSURE: 147 MMHG | RESPIRATION RATE: 18 BRPM | OXYGEN SATURATION: 100 % | TEMPERATURE: 97.7 F | BODY MASS INDEX: 20.59 KG/M2 | HEART RATE: 62 BPM | WEIGHT: 116.2 LBS | HEIGHT: 63 IN | DIASTOLIC BLOOD PRESSURE: 62 MMHG

## 2023-06-07 PROBLEM — R11.2 NAUSEA AND VOMITING: Status: RESOLVED | Noted: 2023-06-05 | Resolved: 2023-06-07

## 2023-06-07 PROBLEM — E43 SEVERE MALNUTRITION: Status: ACTIVE | Noted: 2023-06-07

## 2023-06-07 LAB
ANION GAP SERPL CALCULATED.3IONS-SCNC: 10 MMOL/L (ref 5–15)
BASOPHILS # BLD AUTO: 0 10*3/MM3 (ref 0–0.2)
BASOPHILS NFR BLD AUTO: 0 % (ref 0–1.5)
BUN SERPL-MCNC: 13 MG/DL (ref 8–23)
BUN/CREAT SERPL: 22.4 (ref 7–25)
CALCIUM SPEC-SCNC: 8.8 MG/DL (ref 8.6–10.5)
CHLORIDE SERPL-SCNC: 94 MMOL/L (ref 98–107)
CO2 SERPL-SCNC: 28 MMOL/L (ref 22–29)
CREAT SERPL-MCNC: 0.58 MG/DL (ref 0.57–1)
DEPRECATED RDW RBC AUTO: 48.2 FL (ref 37–54)
EGFRCR SERPLBLD CKD-EPI 2021: 88.3 ML/MIN/1.73
EOSINOPHIL # BLD AUTO: 0 10*3/MM3 (ref 0–0.4)
EOSINOPHIL NFR BLD AUTO: 0 % (ref 0.3–6.2)
ERYTHROCYTE [DISTWIDTH] IN BLOOD BY AUTOMATED COUNT: 15 % (ref 12.3–15.4)
GLUCOSE BLDC GLUCOMTR-MCNC: 159 MG/DL (ref 70–130)
GLUCOSE BLDC GLUCOMTR-MCNC: 201 MG/DL (ref 70–130)
GLUCOSE SERPL-MCNC: 137 MG/DL (ref 65–99)
HCT VFR BLD AUTO: 27.3 % (ref 34–46.6)
HGB BLD-MCNC: 9.2 G/DL (ref 12–15.9)
IMM GRANULOCYTES # BLD AUTO: 0.01 10*3/MM3 (ref 0–0.05)
IMM GRANULOCYTES NFR BLD AUTO: 0.9 % (ref 0–0.5)
LYMPHOCYTES # BLD AUTO: 0.34 10*3/MM3 (ref 0.7–3.1)
LYMPHOCYTES NFR BLD AUTO: 30.1 % (ref 19.6–45.3)
MCH RBC QN AUTO: 30 PG (ref 26.6–33)
MCHC RBC AUTO-ENTMCNC: 33.7 G/DL (ref 31.5–35.7)
MCV RBC AUTO: 88.9 FL (ref 79–97)
MONOCYTES # BLD AUTO: 0.14 10*3/MM3 (ref 0.1–0.9)
MONOCYTES NFR BLD AUTO: 12.4 % (ref 5–12)
NEUTROPHILS NFR BLD AUTO: 0.64 10*3/MM3 (ref 1.7–7)
NEUTROPHILS NFR BLD AUTO: 56.6 % (ref 42.7–76)
NRBC BLD AUTO-RTO: 0 /100 WBC (ref 0–0.2)
PLATELET # BLD AUTO: 52 10*3/MM3 (ref 140–450)
PMV BLD AUTO: 9.8 FL (ref 6–12)
POTASSIUM SERPL-SCNC: 3.9 MMOL/L (ref 3.5–5.2)
QT INTERVAL: 516 MS
QTC INTERVAL: 510 MS
RBC # BLD AUTO: 3.07 10*6/MM3 (ref 3.77–5.28)
SODIUM SERPL-SCNC: 132 MMOL/L (ref 136–145)
WBC NRBC COR # BLD: 1.13 10*3/MM3 (ref 3.4–10.8)

## 2023-06-07 PROCEDURE — 96372 THER/PROPH/DIAG INJ SC/IM: CPT

## 2023-06-07 PROCEDURE — 85025 COMPLETE CBC W/AUTO DIFF WBC: CPT | Performed by: INTERNAL MEDICINE

## 2023-06-07 PROCEDURE — G0378 HOSPITAL OBSERVATION PER HR: HCPCS

## 2023-06-07 PROCEDURE — 97535 SELF CARE MNGMENT TRAINING: CPT

## 2023-06-07 PROCEDURE — 97530 THERAPEUTIC ACTIVITIES: CPT

## 2023-06-07 PROCEDURE — 25010000002 FILGRASTIM PER 300 MCG: Performed by: INTERNAL MEDICINE

## 2023-06-07 PROCEDURE — 82948 REAGENT STRIP/BLOOD GLUCOSE: CPT

## 2023-06-07 PROCEDURE — 80048 BASIC METABOLIC PNL TOTAL CA: CPT | Performed by: INTERNAL MEDICINE

## 2023-06-07 PROCEDURE — 63710000001 INSULIN LISPRO (HUMAN) PER 5 UNITS: Performed by: NURSE PRACTITIONER

## 2023-06-07 RX ADMIN — INSULIN LISPRO 2 UNITS: 100 INJECTION, SOLUTION INTRAVENOUS; SUBCUTANEOUS at 08:28

## 2023-06-07 RX ADMIN — LEVOTHYROXINE SODIUM 100 MCG: 0.1 TABLET ORAL at 05:18

## 2023-06-07 RX ADMIN — AMIODARONE HYDROCHLORIDE 200 MG: 200 TABLET ORAL at 08:28

## 2023-06-07 RX ADMIN — TAMSULOSIN HYDROCHLORIDE 0.4 MG: 0.4 CAPSULE ORAL at 08:27

## 2023-06-07 RX ADMIN — APIXABAN 2.5 MG: 2.5 TABLET, FILM COATED ORAL at 08:27

## 2023-06-07 RX ADMIN — FILGRASTIM 300 MCG: 300 INJECTION, SOLUTION INTRAVENOUS; SUBCUTANEOUS at 11:33

## 2023-06-07 RX ADMIN — Medication 10 ML: at 08:28

## 2023-06-07 RX ADMIN — INSULIN LISPRO 3 UNITS: 100 INJECTION, SOLUTION INTRAVENOUS; SUBCUTANEOUS at 11:33

## 2023-06-07 RX ADMIN — METOPROLOL TARTRATE 50 MG: 25 TABLET, FILM COATED ORAL at 08:28

## 2023-06-07 RX ADMIN — PANTOPRAZOLE SODIUM 40 MG: 40 TABLET, DELAYED RELEASE ORAL at 08:27

## 2023-06-07 NOTE — CASE MANAGEMENT/SOCIAL WORK
Continued Stay Note  [facility]     Patient Name: Chey Robertson  MRN: [id number]  Today's Date: 6/7/2023    Admit Date: 6/5/2023    Plan: Home   Discharge Plan       Row Name 06/07/23 0839       Plan    Plan Home    Patient/Family in Agreement with Plan yes    Plan Comments Spoke to patient at bedside. Plan is home with caregivers. Patient denies any discharge needs at this time. CM will continue to follow.    Final Discharge Disposition Code 01 - home or self-care                   Discharge Codes    No documentation.                 Expected Discharge Date and Time       Expected Discharge Date Expected Discharge Time    Jun 7, 2023               Bry Bowling RN

## 2023-06-07 NOTE — DISCHARGE SUMMARY
[facility] Medicine Services  DISCHARGE SUMMARY    Patient Name: Chey Robertson  : 1935  MRN: [id number]    Date of Admission: 2023  4:10 PM  Date of Discharge:  2023  Primary Care Physician: Corby Marie MD    Consults       Date and Time Order Name Status Description    2023 10:36 AM Inpatient Cardiology Consult Completed     2023 12:49 PM Inpatient Hematology & Oncology Consult Completed     2023  8:06 AM Inpatient Infectious Diseases Consult Completed             Hospital Course     Presenting Problem: N/V    Active Hospital Problems    Diagnosis  POA    Severe Malnutrition (HCC) [E43]  Yes    Hyponatremia [E87.1]  Yes    Hypomagnesemia [E83.42]  Yes    QT prolongation [R94.31]  Yes    Pericardial effusion [I31.39]  Yes    Atrial fibrillation [I48.91]  Yes    Type 2 diabetes mellitus without complication, with long-term current use of insulin [E11.9, Z79.4]  Not Applicable    Essential hypertension [I10]  Yes    Chronic anticoagulation [Z79.01]  Not Applicable    Personal history of pulmonary embolism [Z86.711]  Yes    Hypothyroidism (acquired) [E03.9]  Yes    Anemia [D64.9]  Yes      Resolved Hospital Problems    Diagnosis Date Resolved POA    Nausea and vomiting [R11.2] 2023 Yes          Hospital Course:  Chey Robertson is a 87 y.o. female with past medical history of myelodysplasia, chronic pancytopenia, essential hypertension, type 2 diabetes, history of PE (Eliquis), GERD, recent admission for UTI complicated by COVID19 infection and new onset Afib with RVR eventually discharged to rehab on  who presented on  with intractable N/V. Imaging on admission also showed a pericardial effusion.       Intractable nausea and vomiting (resolved)  Lactic acidosis (resolved)  -- CT abdomen pelvis shows no acute abnormality within the abdomen or pelvis, scattered colonic diverticulosis, moderate splenomegaly, small pericardial effusion  --  Lactate 3.2 on admission, resolved on repeat   -- s/p IV fluids  -- compazine given PRN  -- symptoms have now resolved     Hypomagnesemia  -- Mg 1.4 on admission   --  replacement protocol     Elevated troponin  Pericardial effusion on imaging  Prolonged QT  -- CT abdomen pelvis shows a pericardial effusion new since 5/5/2023  -- HS troponin peaked at 26  -- patient denied chest pain  -- avoid medications that will prolong QT interval  -- repeat TTE this admission with no evidence of pericardial effusion, normal EF  -- Qtc of 510     Pleural effusion  --CT abdomen pelvis shows small left pleural effusion with bibasilar atelectasis (although L appears worse than R to my read) New since 5/5/2023  -- monitor, asymptomatic     A-fib  Hypertension  -- Follows with Dr. Chand  -- Continue amiodarone, metoprolol, Eliquis     Myelodysplastic syndrome  Anemia of chronic disease  Pancytopenia  -- Hemoglobin 10.1, hematocrit 31, wbc 2.25, plt 58 on admission.  Similar to prior  -- Neutropenia- ANC of 740 yesterday down from ~1100 on admission. Will give one dose of Neupogen prior to d/c. Follow up CBC with diff at her PCP's office  -- Follows with Dr. Lyman--follow-up appointment scheduled in August     T2DM  -- resume home regimen upon d/c  -- A1c 6% 03/2023     Hypothyroidism  -- Continue Synthroid     GERD  -- PPI     Acute on chronic debility  -- Consult case management  -- PT/OT consult- recommend that pt return to assisted living facility ([facility])       Dysphagia  -- SLP followed, MBS done 6/6, diet modified per their recs      Discharge Follow Up Recommendations for outpatient labs/diagnostics:   Follow up with PCP within one week with repeat CBC with diff    Day of Discharge     HPI:   Doing well this am, was eating breakfast when I saw her and needed some help with opening her drinks.  Denies any N/V. Agreeable to going home today.     Review of Systems  Gen- No fevers, chills  CV- No chest pain, palpitations  Resp-  No cough, dyspnea  GI- No N/V/D, abd pain      Vital Signs:   Temp:  [97.7 °F (36.5 °C)-98 °F (36.7 °C)] 97.7 °F (36.5 °C)  Heart Rate:  [55-63] 62  Resp:  [16-18] 18  BP: (115-137)/(55-70) 136/70  Flow (L/min):  [2] 2      Physical Exam:  Constitutional: No acute distress, awake, alert, sitting up in bed eating breakfast  HENT: NCAT, mucous membranes moist  Respiratory: Clear to auscultation bilaterally, respiratory effort normal   Cardiovascular: RRR, no murmurs, rubs, or gallops  Gastrointestinal: Positive bowel sounds, soft, nontender, nondistended  Musculoskeletal: No bilateral ankle edema  Psychiatric: flat affect, cooperative  Neurologic: Oriented x 3, strength symmetric in all extremities, Cranial Nerves grossly intact to confrontation, speech clear  Skin: No rashes    Pertinent  and/or Most Recent Results     LAB RESULTS:      Lab 06/07/23  0504 06/06/23  0516 06/05/23  2321 06/05/23  1949 06/05/23  1629   WBC 1.13* 1.49*  --   --  2.25*   HEMOGLOBIN 9.2* 8.7*  --   --  10.1*   HEMATOCRIT 27.3* 27.6*  --   --  31.0*   PLATELETS 52* 58*  --   --  58*   NEUTROS ABS 0.64* 0.74*  --   --  1.79   IMMATURE GRANS (ABS) 0.01 0.01  --   --  0.02   LYMPHS ABS 0.34* 0.52*  --   --  0.30*   MONOS ABS 0.14 0.22  --   --  0.14   EOS ABS 0.00 0.00  --   --  0.00   MCV 88.9 89.9  --   --  87.1   LACTATE  --   --  1.6 2.2* 3.2*         Lab 06/07/23  0504 06/06/23  0516 06/05/23  1632 06/05/23  1631 06/05/23  1629   SODIUM 132* 134*  --   --  132*   POTASSIUM 3.9 3.8  --   --  4.3   CHLORIDE 94* 93*  --   --  94*   CO2 28.0 29.0  --   --  22.0   ANION GAP 10.0 12.0  --   --  16.0*   BUN 13 20  --   --  20   CREATININE 0.58 0.69 0.80 0.80 0.75   EGFR 88.3 84.6  --   --  77.6   GLUCOSE 137* 112*  --   --  252*   CALCIUM 8.8 8.5*  --   --  8.6   MAGNESIUM  --  1.8  --   --  1.4*   TSH  --  1.950  --   --   --          Lab 06/05/23  1629   TOTAL PROTEIN 6.0   ALBUMIN 3.6   GLOBULIN 2.4   ALT (SGPT) 6   AST (SGOT) 11   BILIRUBIN  0.6   ALK PHOS 84   LIPASE 20         Lab 06/06/23  1206 06/06/23  0140 06/05/23  1903 06/05/23  1629   HSTROP T 19* 26* 20* 17*                 Brief Urine Lab Results  (Last result in the past 365 days)        Color   Clarity   Blood   Leuk Est   Nitrite   Protein   CREAT   Urine HCG        06/05/23 1811 Yellow   Clear   Negative   Negative   Negative   Negative                 Microbiology Results (last 10 days)       ** No results found for the last 240 hours. **            Adult Transthoracic Echo Complete w/ Color, Spectral and Contrast if necessary per protocol    Result Date: 6/6/2023    Left ventricular systolic function is normal. Left ventricular ejection fraction appears to be 56 - 60%.   Left ventricular diastolic function was normal.   Estimated right ventricular systolic pressure from tricuspid regurgitation is normal (<35 mmHg).     CT Abdomen Pelvis With Contrast    Result Date: 6/5/2023  CT ABDOMEN PELVIS W CONTRAST Date of Exam: 6/5/2023 5:56 PM EDT Indication: abd pain and N/V. Comparison: 2/19/2021, 5/5/2023. Technique: Axial CT images were obtained of the abdomen and pelvis following the uneventful intravenous administration of 80 mL Isovue-300. Reconstructed coronal and sagittal images were also obtained. Automated exposure control and iterative construction methods were used. Findings: Liver: The liver is unremarkable in morphology. Subcentimeter hypodensity within the caudal right hepatic lobe is too small to fully characterize. No biliary dilation is seen. Gallbladder: Surgically absent Pancreas: The pancreas appears atrophic. Spleen: The spleen is enlarged, measuring approximately 18 cm in length. Several small splenic hypodensities are too small to fully characterize. Several tiny splenic granulomas. Adrenal glands: 1.3 cm low-density nodule at the apex of the left adrenal gland, incompletely characterized on this contrast-enhanced study. The right adrenal gland is unremarkable.  Genitourinary tract: Kidneys appear unremarkable. No hydronephrosis is seen. Small calcifications along the course of the left ureter are thought to represent phleboliths. Urinary bladder is unremarkable. Patient is status post hysterectomy. Gastrointestinal tract: Scattered colonic diverticulosis. The hollow viscera appears otherwise unremarkable. There is no evidence of bowel obstruction. Appendix: The appendix is not identified. Other findings: No free air or free fluid is identified. No pathologically enlarged lymph nodes are seen. Vascular calcifications are present. The IVC is grossly unremarkable. Bones and soft tissues: Bones are demineralized. Patient is status post kyphoplasty of compression fractures of T11, T12, and L4. There are degenerative changes within the spine. Superficial soft tissues demonstrate no acute abnormality. Lung bases: Small left pleural effusion with bibasilar atelectasis. Small pericardial effusion noted.     Impression: 1.No acute abnormality identified within the abdomen or pelvis. 2.Scattered colonic diverticulosis. 3.Moderate splenomegaly. Several small splenic hypodensities, too small to fully characterize. Similar finding noted on the study dated 2/19/2021. 4.Small pericardial effusion. Small left pleural effusion with left basilar atelectasis. These findings are new since 5/5/2023. 5.Unchanged 1.3 cm left adrenal nodule. 6.Additional findings as detailed above. Electronically Signed: Venkat Meyer  6/5/2023 6:27 PM EDT  Workstation ID: [id number]             Results for orders placed during the hospital encounter of 06/05/23    Adult Transthoracic Echo Complete w/ Color, Spectral and Contrast if necessary per protocol    Interpretation Summary    Left ventricular systolic function is normal. Left ventricular ejection fraction appears to be 56 - 60%.    Left ventricular diastolic function was normal.    Estimated right ventricular systolic pressure from tricuspid regurgitation  is normal (<35 mmHg).      Plan for Follow-up of Pending Labs/Results:     Discharge Details        Discharge Medications        Continue These Medications        Instructions Start Date   acetaminophen 650 MG 8 hr tablet  Commonly known as: TYLENOL   1,300 mg, Oral, Every 8 Hours PRN, OTC      amiodarone 200 MG tablet  Commonly known as: PACERONE   200 mg, Oral, Daily      Diclofenac Sodium 1 % gel gel  Commonly known as: VOLTAREN   Apply 4 g topically to the appropriate area as directed 4 (Four) Times a Day As Needed. OTC      Eliquis 2.5 MG tablet tablet  Generic drug: apixaban   2.5 mg, Oral, 2 Times Daily      insulin detemir 100 UNIT/ML injection  Commonly known as: LEVEMIR   10 Units, Subcutaneous, Nightly      Insulin Lispro 100 UNIT/ML injection  Commonly known as: humaLOG   7 Units, Subcutaneous, 3 Times Daily With Meals      levothyroxine 100 MCG tablet  Commonly known as: SYNTHROID, LEVOTHROID   100 mcg, Oral, Every Early Morning      melatonin 5 MG tablet tablet   5 mg, Oral, Nightly PRN      metoprolol tartrate 50 MG tablet  Commonly known as: LOPRESSOR   50 mg, Oral, 2 Times Daily      omeprazole 40 MG capsule  Commonly known as: PrilOSEC   40 mg, Oral, Daily      silver sulfadiazine 1 % cream  Commonly known as: SILVADENE, SSD   1 application, Topical, 2 Times Daily      tamsulosin 0.4 MG capsule 24 hr capsule  Commonly known as: FLOMAX   0.4 mg, Oral, Daily               Allergies   Allergen Reactions    Aspirin Unknown - Low Severity     Mouth sores          Discharge Disposition:  Home or Self Care    Diet:  Hospital:  Diet Order   Procedures    Diet: Diabetic Diets; Consistent Carbohydrate; No Mixed Consistencies, No Straw; Texture: Soft to Chew (NDD 3); Soft to Chew: Whole Meat; Fluid Consistency: Nectar Thick       Activity:      Restrictions or Other Recommendations:    CODE STATUS:    Code Status and Medical Interventions:   Ordered at: 06/05/23 2012     Level Of Support Discussed With:     Patient     Code Status (Patient has no pulse and is not breathing):    CPR (Attempt to Resuscitate)     Medical Interventions (Patient has pulse or is breathing):    Full Support       Future Appointments   Date Time Provider Department Center   8/7/2023  1:00 PM Jeanna Grande APRN [facility]       Additional Instructions for the Follow-ups that You Need to Schedule       Discharge Follow-up with PCP   As directed       Currently Documented PCP:    Corby Marie MD    PCP Phone Number:    [phone]     Follow Up Details: in one week with PCP                       Teetee Cesar MD  06/07/23      Time Spent on Discharge:  I spent  35  minutes on this discharge activity which included: face-to-face encounter with the patient, reviewing the data in the system, coordination of the care with the nursing staff as well as consultants, documentation, and entering orders.

## 2023-06-07 NOTE — PLAN OF CARE
Goal Outcome Evaluation:  Plan of Care Reviewed With: patient           Outcome Evaluation: Patient alert and oriented . VSS, with nectar thick diet. Patient sleep most of the night.

## 2023-06-07 NOTE — CASE MANAGEMENT/SOCIAL WORK
Continued Stay Note  [facility]     Patient Name: Chey Robertson  MRN: [id number]  Today's Date: 6/7/2023    Admit Date: 6/5/2023    Plan: Home   Discharge Plan       Row Name 06/07/23 1021       Plan    Plan Home    Patient/Family in Agreement with Plan yes    Final Discharge Disposition Code 01 - home or self-care    Final Note Plan is home to [facility] with caregivers. Family will transport. No discharge needs idenified.      Row Name 06/07/23 0839       Plan    Plan Home    Patient/Family in Agreement with Plan yes    Plan Comments Spoke to patient at bedside. Plan is home with caregivers. Patient denies any discharge needs at this time. CM will continue to follow.    Final Discharge Disposition Code 01 - home or self-care                   Discharge Codes    No documentation.                 Expected Discharge Date and Time       Expected Discharge Date Expected Discharge Time    Jun 7, 2023               Bry Bowling RN

## 2023-06-07 NOTE — PLAN OF CARE
Goal Outcome Evaluation:  Plan of Care Reviewed With: patient        Progress: no change  Outcome Evaluation: Pt continues to be limited by generalized weakness, impaired balance, and impaired mobility and ADLs warranting skilled OT services. Pt continues to require assist x 1 and use of RW. Cont POC.

## 2023-06-07 NOTE — THERAPY TREATMENT NOTE
Patient Name: Chey Robertson  : 1935    MRN: [id number]                              Today's Date: 2023       Admit Date: 2023    Visit Dx:     ICD-10-CM ICD-9-CM   1. Generalized abdominal pain  R10.84 789.07   2. Nausea and vomiting, unspecified vomiting type  R11.2 787.01   3. Dehydration  E86.0 276.51   4. Prolonged Q-T interval on ECG  R94.31 794.31   5. Hypomagnesemia  E83.42 275.2   6. Hyperglycemia  R73.9 790.29   7. Pericardial effusion  I31.39 423.9   8. Anemia, unspecified type  D64.9 285.9   9. Oropharyngeal dysphagia  R13.12 787.22     Patient Active Problem List   Diagnosis    Benign familial tremor    AMS (altered mental status)    Thrombocytopenia (HCC)    Anemia    Shortness of breath    Hypothyroidism (acquired)    Acute kidney injury    Splenomegaly    Hepatomegaly    Confusion    B12 deficiency    Abnormal intestinal absorption    Iron deficiency anemia due to chronic blood loss    Myelodysplasia (myelodysplastic syndrome)    Weakness    Personal history of pulmonary embolism    Urinary tract infection, acute    Chronic anticoagulation    Essential hypertension    Type 2 diabetes mellitus without complication, with long-term current use of insulin    COVID-19 virus infection    Atrial fibrillation    Chest pain    Elevated troponin    Hyponatremia    Hypomagnesemia    QT prolongation    Pericardial effusion    Severe Malnutrition (HCC)     Past Medical History:   Diagnosis Date    Anemia     Arthritis     Diabetes mellitus     checks sugar only when pt wants to     Disease of thyroid gland     History of transfusion     with knee surgery-  no reaction recalled.     [place] (hard of hearing)     no hearing aids    Hypertension     Migraine without aura and without status migrainosus, not intractable 2016    Pulmonary emboli     (after knee surgery)    SOBOE (shortness of breath on exertion)     Tremors of nervous system     Vertigo     Wears dentures     upper     Wears  glasses      Past Surgical History:   Procedure Laterality Date    BLADDER SURGERY      CATARACT EXTRACTION      bilat     CHOLECYSTECTOMY      COLONOSCOPY      HYSTERECTOMY      KYPHOPLASTY N/A 2/12/2021    Procedure: KYPHOPLASTY T11, T12 AND L4;  Surgeon: Matty Hearn MD;  Location: [facility];  Service: Orthopedic Spine;  Laterality: N/A;    OOPHORECTOMY      TONSILLECTOMY        General Information       Row Name 06/07/23 1408          OT Time and Intention    Document Type therapy note (daily note)  -AN     Mode of Treatment occupational therapy  -AN       Row Name 06/07/23 1408          General Information    Patient Profile Reviewed yes  -AN     Existing Precautions/Restrictions fall  -AN     Barriers to Rehab medically complex;previous functional deficit  -AN       Row Name 06/07/23 1408          Cognition    Orientation Status (Cognition) oriented x 3  -AN       Row Name 06/07/23 1408          Safety Issues, Functional Mobility    Safety Issues Affecting Function (Mobility) safety precaution awareness;safety precautions follow-through/compliance;sequencing abilities;insight into deficits/self-awareness;judgment;awareness of need for assistance  -AN     Impairments Affecting Function (Mobility) balance;endurance/activity tolerance;strength;cognition  -AN     Cognitive Impairments, Mobility Safety/Performance awareness, need for assistance;insight into deficits/self-awareness;judgment;problem-solving/reasoning;safety precaution awareness;safety precaution follow-through;sequencing abilities  -AN               User Key  (r) = Recorded By, (t) = Taken By, (c) = Cosigned By      Initials Name Provider Type    AN Sonja Eller OT Occupational Therapist                     Mobility/ADL's       Row Name 06/07/23 1408          Bed Mobility    Bed Mobility supine-sit  -AN     Supine-Sit [place] (Bed Mobility) supervision  -AN     Bed Mobility, Safety Issues decreased use of arms for  pushing/pulling;decreased use of legs for bridging/pushing  -AN     Assistive Device (Bed Mobility) bed rails;head of bed elevated  -AN     Comment, (Bed Mobility) cues for sequencing of steps  -AN       Row Name 06/07/23 1408          Transfers    Transfers sit-stand transfer;stand-sit transfer;toilet transfer  -AN     Comment, (Transfers) cues for hand placement and transfer technique using RW  -AN       Row Name 06/07/23 1408          Sit-Stand Transfer    Sit-Stand [place] (Transfers) verbal cues;contact guard  -AN     Assistive Device (Sit-Stand Transfers) walker, front-wheeled  -AN       Row Name 06/07/23 1408          Stand-Sit Transfer    Stand-Sit [place] (Transfers) verbal cues;contact guard  -AN     Assistive Device (Stand-Sit Transfers) walker, front-wheeled  -AN       Row Name 06/07/23 1408          Toilet Transfer    Type (Toilet Transfer) sit-stand;stand-sit  -AN     [place] Level (Toilet Transfer) verbal cues;minimum assist (75% patient effort);1 person assist  -AN     Assistive Device (Toilet Transfer) commode;grab bars/safety frame  -AN     Comment, (Toilet Transfer) cues for grab bar use  -AN       Row Name 06/07/23 1408          Functional Mobility    Functional Mobility- Ind. Level verbal cues required;contact guard assist  -AN     Functional Mobility- Device walker, front-wheeled  -AN     Functional Mobility-Distance (Feet) --  >household distance  -AN     Functional Mobility- Safety Issues balance decreased during turns;step length decreased  -AN     Functional Mobility- Comment pt ambulated >household distance using the RW with CGA for balance and safety  -AN       Row Name 06/07/23 1408          Activities of Daily Living    BADL Assessment/Intervention lower body dressing;grooming;toileting  -AN       Row Name 06/07/23 1408          Lower Body Dressing Assessment/Training    [place] Level (Lower Body Dressing) don;socks;supervision  -AN     Position (Lower Body Dressing)  edge of bed sitting  -AN       Row Name 06/07/23 1408          Grooming Assessment/Training    [place] Level (Grooming) wash face, hands;hair care, combing/brushing;standby assist  -AN     Position (Grooming) sink side  -AN       Row Name 06/07/23 1408          Toileting Assessment/Training    [place] Level (Toileting) perform perineal hygiene;contact guard assist  -AN     Assistive Devices (Toileting) commode  -AN     Position (Toileting) unsupported sitting;supported standing  -AN               User Key  (r) = Recorded By, (t) = Taken By, (c) = Cosigned By      Initials Name Provider Type    Sonja Dumont OT Occupational Therapist                   Obj/Interventions       Row Name 06/07/23 1411          Balance    Balance Assessment sitting static balance;sitting dynamic balance;sit to stand dynamic balance;standing static balance;standing dynamic balance  -AN     Static Sitting Balance supervision  -AN     Dynamic Sitting Balance contact guard  -AN     Position, Sitting Balance unsupported;sitting edge of bed  -AN     Sit to Stand Dynamic Balance verbal cues;contact guard  -AN     Static Standing Balance contact guard;verbal cues  -AN     Dynamic Standing Balance contact guard;verbal cues  -AN     Position/Device Used, Standing Balance supported;walker, rolling  -AN     Balance Interventions standing;sit to stand;supported;dynamic;static;minimal challenge;occupation based/functional task  -AN               User Key  (r) = Recorded By, (t) = Taken By, (c) = Cosigned By      Initials Name Provider Type    Sonja Dumont OT Occupational Therapist                   Goals/Plan    No documentation.                  Clinical Impression       Row Name 06/07/23 1412          Pain Assessment    Pretreatment Pain Rating 0/10 - no pain  -AN     Posttreatment Pain Rating 0/10 - no pain  -AN     Pre/Posttreatment Pain Comment asymptomatic  -AN     Pain Intervention(s) Ambulation/increased  activity;Repositioned  -AN       Row Name 06/07/23 1412          Plan of Care Review    Plan of Care Reviewed With patient  -AN     Progress no change  -AN     Outcome Evaluation Pt continues to be limited by generalized weakness, impaired balance, and impaired mobility and ADLs warranting skilled OT services. Pt continues to require assist x 1 and use of RW. Cont POC.  -AN       Row Name 06/07/23 1412          Therapy Plan Review/Discharge Plan (OT)    Anticipated Discharge Disposition (OT) home with assist;assisted living  -AN       Row Name 06/07/23 1412          Vital Signs    Pre Systolic BP Rehab --  VSS  -AN     O2 Delivery Pre Treatment room air  -AN     O2 Delivery Intra Treatment room air  -AN     O2 Delivery Post Treatment room air  -AN     Pre Patient Position Supine  -AN     Intra Patient Position Standing  -AN     Post Patient Position Sitting  -AN       Row Name 06/07/23 1412          Positioning and Restraints    Pre-Treatment Position in bed  -AN     Post Treatment Position chair  -AN     In Chair notified nsg;reclined;call light within reach;encouraged to call for assist;exit alarm on  -AN               User Key  (r) = Recorded By, (t) = Taken By, (c) = Cosigned By      Initials Name Provider Type    AN Sonja Eller, OT Occupational Therapist                   Outcome Measures       Row Name 06/07/23 1414          How much help from another is currently needed...    Putting on and taking off regular lower body clothing? 3  -AN     Bathing (including washing, rinsing, and drying) 3  -AN     Toileting (which includes using toilet bed pan or urinal) 3  -AN     Putting on and taking off regular upper body clothing 3  -AN     Taking care of personal grooming (such as brushing teeth) 3  -AN     Eating meals 4  -AN     AM-PAC 6 Clicks Score (OT) 19  -AN       Row Name 06/07/23 0808          How much help from another person do you currently need...    Turning from your back to your side while in flat  bed without using bedrails? 3  -JS     Moving from lying on back to sitting on the side of a flat bed without bedrails? 3  -JS     Moving to and from a bed to a chair (including a wheelchair)? 3  -JS     Standing up from a chair using your arms (e.g., wheelchair, bedside chair)? 3  -JS     Climbing 3-5 steps with a railing? 3  -JS     To walk in hospital room? 3  -JS     AM-PAC 6 Clicks Score (PT) 18  -JS     Highest level of mobility 6 --> Walked 10 steps or more  -JS       Row Name 06/07/23 1414          Functional Assessment    Outcome Measure Options AM-PAC 6 Clicks Daily Activity (OT)  -AN               User Key  (r) = Recorded By, (t) = Taken By, (c) = Cosigned By      Initials Name Provider Type    Leonard Kuhn, RN Registered Nurse    Sonja Dumont OT Occupational Therapist                    Occupational Therapy Education       Title: PT OT SLP Therapies (In Progress)       Topic: Occupational Therapy (In Progress)       Point: ADL training (Done)       Description:   Instruct learner(s) on proper safety adaptation and remediation techniques during self care or transfers.   Instruct in proper use of assistive devices.                  Learning Progress Summary             Patient Acceptance, E, VU by AN at 6/7/2023 1415    Acceptance, E, NR by  at 6/6/2023 0920                         Point: Home exercise program (Not Started)       Description:   Instruct learner(s) on appropriate technique for monitoring, assisting and/or progressing therapeutic exercises/activities.                  Learner Progress:  Not documented in this visit.              Point: Precautions (Done)       Description:   Instruct learner(s) on prescribed precautions during self-care and functional transfers.                  Learning Progress Summary             Patient Acceptance, E, VU by AN at 6/7/2023 1415    Acceptance, E, NR by  at 6/6/2023 0920                         Point: Body mechanics (Done)        Description:   Instruct learner(s) on proper positioning and spine alignment during self-care, functional mobility activities and/or exercises.                  Learning Progress Summary             Patient Acceptance, E, VU by AN at 6/7/2023 1415    Acceptance, E, NR by  at 6/6/2023 0920                                         User Key       Initials Effective Dates Name Provider Type Discipline    AN 09/21/21 -  Sonja Eller OT Occupational Therapist OT     03/10/23 -  Cari Boston OT Student OT Student OT                  OT Recommendation and Plan     Plan of Care Review  Plan of Care Reviewed With: patient  Progress: no change  Outcome Evaluation: Pt continues to be limited by generalized weakness, impaired balance, and impaired mobility and ADLs warranting skilled OT services. Pt continues to require assist x 1 and use of RW. Cont POC.     Time Calculation:    Time Calculation- OT       Row Name 06/07/23 1415             Time Calculation- OT    OT Start Time 1011  -AN      OT Received On 06/07/23  -AN         Timed Charges    50514 - OT Therapeutic Activity Minutes 13  -AN      24570 - OT Self Care/Mgmt Minutes 10  -AN         Total Minutes    Timed Charges Total Minutes 23  -AN       Total Minutes 23  -AN                User Key  (r) = Recorded By, (t) = Taken By, (c) = Cosigned By      Initials Name Provider Type    AN Sonja Eller OT Occupational Therapist                  Therapy Charges for Today       Code Description Service Date Service Provider Modifiers Qty    33439866393 HC OT SELF CARE/MGMT/TRAIN EA 15 MIN 6/7/2023 Sonja Eller, OT GO 1    84878489835 HC OT THERAPEUTIC ACT EA 15 MIN 6/7/2023 Sonja Eller, OT GO 1                 Sonja Daphnie, OT  6/7/2023

## 2023-06-08 ENCOUNTER — HOSPITAL ENCOUNTER (OUTPATIENT)
Facility: HOSPITAL | Age: 88
Setting detail: OBSERVATION
LOS: 1 days | Discharge: HOME HEALTH | End: 2023-06-11
Attending: EMERGENCY MEDICINE | Admitting: INTERNAL MEDICINE
Payer: MEDICARE

## 2023-06-08 ENCOUNTER — APPOINTMENT (OUTPATIENT)
Dept: CT IMAGING | Facility: HOSPITAL | Age: 88
End: 2023-06-08
Payer: MEDICARE

## 2023-06-08 ENCOUNTER — APPOINTMENT (OUTPATIENT)
Dept: GENERAL RADIOLOGY | Facility: HOSPITAL | Age: 88
End: 2023-06-08
Payer: MEDICARE

## 2023-06-08 DIAGNOSIS — E83.42 HYPOMAGNESEMIA: ICD-10-CM

## 2023-06-08 DIAGNOSIS — R11.2 NAUSEA AND VOMITING, UNSPECIFIED VOMITING TYPE: Primary | ICD-10-CM

## 2023-06-08 PROBLEM — E87.20 LACTIC ACIDOSIS: Status: ACTIVE | Noted: 2023-06-08

## 2023-06-08 LAB
ALBUMIN SERPL-MCNC: 3.6 G/DL (ref 3.5–5.2)
ALBUMIN/GLOB SERPL: 1.4 G/DL
ALP SERPL-CCNC: 85 U/L (ref 39–117)
ALT SERPL W P-5'-P-CCNC: 5 U/L (ref 1–33)
ANION GAP SERPL CALCULATED.3IONS-SCNC: 11 MMOL/L (ref 5–15)
AST SERPL-CCNC: 15 U/L (ref 1–32)
BACTERIA UR QL AUTO: ABNORMAL /HPF
BASOPHILS # BLD AUTO: 0 10*3/MM3 (ref 0–0.2)
BASOPHILS NFR BLD AUTO: 0 % (ref 0–1.5)
BILIRUB SERPL-MCNC: 0.5 MG/DL (ref 0–1.2)
BILIRUB UR QL STRIP: NEGATIVE
BUN SERPL-MCNC: 11 MG/DL (ref 8–23)
BUN/CREAT SERPL: 22.4 (ref 7–25)
CALCIUM SPEC-SCNC: 9 MG/DL (ref 8.6–10.5)
CHLORIDE SERPL-SCNC: 93 MMOL/L (ref 98–107)
CLARITY UR: ABNORMAL
CO2 SERPL-SCNC: 27 MMOL/L (ref 22–29)
COLOR UR: YELLOW
CREAT SERPL-MCNC: 0.49 MG/DL (ref 0.57–1)
D-LACTATE SERPL-SCNC: 2 MMOL/L (ref 0.5–2)
D-LACTATE SERPL-SCNC: 2.6 MMOL/L (ref 0.5–2)
DEPRECATED RDW RBC AUTO: 49.1 FL (ref 37–54)
EGFRCR SERPLBLD CKD-EPI 2021: 91.9 ML/MIN/1.73
EOSINOPHIL # BLD AUTO: 0.01 10*3/MM3 (ref 0–0.4)
EOSINOPHIL NFR BLD AUTO: 0.1 % (ref 0.3–6.2)
ERYTHROCYTE [DISTWIDTH] IN BLOOD BY AUTOMATED COUNT: 15.3 % (ref 12.3–15.4)
GLOBULIN UR ELPH-MCNC: 2.5 GM/DL
GLUCOSE BLDC GLUCOMTR-MCNC: 183 MG/DL (ref 70–130)
GLUCOSE SERPL-MCNC: 162 MG/DL (ref 65–99)
GLUCOSE UR STRIP-MCNC: NEGATIVE MG/DL
HCT VFR BLD AUTO: 29.7 % (ref 34–46.6)
HGB BLD-MCNC: 9.6 G/DL (ref 12–15.9)
HGB UR QL STRIP.AUTO: NEGATIVE
HOLD SPECIMEN: NORMAL
HYALINE CASTS UR QL AUTO: ABNORMAL /LPF
IMM GRANULOCYTES # BLD AUTO: 0.09 10*3/MM3 (ref 0–0.05)
IMM GRANULOCYTES NFR BLD AUTO: 1.2 % (ref 0–0.5)
KETONES UR QL STRIP: ABNORMAL
LEUKOCYTE ESTERASE UR QL STRIP.AUTO: ABNORMAL
LYMPHOCYTES # BLD AUTO: 0.42 10*3/MM3 (ref 0.7–3.1)
LYMPHOCYTES NFR BLD AUTO: 5.5 % (ref 19.6–45.3)
MAGNESIUM SERPL-MCNC: 1.4 MG/DL (ref 1.6–2.4)
MCH RBC QN AUTO: 28.2 PG (ref 26.6–33)
MCHC RBC AUTO-ENTMCNC: 32.3 G/DL (ref 31.5–35.7)
MCV RBC AUTO: 87.1 FL (ref 79–97)
MONOCYTES # BLD AUTO: 0.36 10*3/MM3 (ref 0.1–0.9)
MONOCYTES NFR BLD AUTO: 4.7 % (ref 5–12)
NEUTROPHILS NFR BLD AUTO: 6.74 10*3/MM3 (ref 1.7–7)
NEUTROPHILS NFR BLD AUTO: 88.5 % (ref 42.7–76)
NITRITE UR QL STRIP: NEGATIVE
NRBC BLD AUTO-RTO: 0 /100 WBC (ref 0–0.2)
PH UR STRIP.AUTO: <=5 [PH] (ref 5–8)
PLATELET # BLD AUTO: 67 10*3/MM3 (ref 140–450)
PMV BLD AUTO: 10 FL (ref 6–12)
POTASSIUM SERPL-SCNC: 3.7 MMOL/L (ref 3.5–5.2)
PROT SERPL-MCNC: 6.1 G/DL (ref 6–8.5)
PROT UR QL STRIP: ABNORMAL
RBC # BLD AUTO: 3.41 10*6/MM3 (ref 3.77–5.28)
RBC # UR STRIP: ABNORMAL /HPF
REF LAB TEST METHOD: ABNORMAL
SODIUM SERPL-SCNC: 131 MMOL/L (ref 136–145)
SP GR UR STRIP: 1.02 (ref 1–1.03)
SQUAMOUS #/AREA URNS HPF: ABNORMAL /HPF
TROPONIN T SERPL HS-MCNC: 15 NG/L
TROPONIN T SERPL HS-MCNC: 15 NG/L
TROPONIN T SERPL HS-MCNC: 18 NG/L
UROBILINOGEN UR QL STRIP: ABNORMAL
WBC # UR STRIP: ABNORMAL /HPF
WBC NRBC COR # BLD: 7.62 10*3/MM3 (ref 3.4–10.8)
WHOLE BLOOD HOLD COAG: NORMAL
WHOLE BLOOD HOLD SPECIMEN: NORMAL

## 2023-06-08 PROCEDURE — 36415 COLL VENOUS BLD VENIPUNCTURE: CPT

## 2023-06-08 PROCEDURE — P9612 CATHETERIZE FOR URINE SPEC: HCPCS

## 2023-06-08 PROCEDURE — 87040 BLOOD CULTURE FOR BACTERIA: CPT | Performed by: EMERGENCY MEDICINE

## 2023-06-08 PROCEDURE — 25010000002 CEFTRIAXONE PER 250 MG: Performed by: NURSE PRACTITIONER

## 2023-06-08 PROCEDURE — 74176 CT ABD & PELVIS W/O CONTRAST: CPT

## 2023-06-08 PROCEDURE — 84484 ASSAY OF TROPONIN QUANT: CPT | Performed by: NURSE PRACTITIONER

## 2023-06-08 PROCEDURE — 25010000002 ONDANSETRON PER 1 MG: Performed by: EMERGENCY MEDICINE

## 2023-06-08 PROCEDURE — 81001 URINALYSIS AUTO W/SCOPE: CPT | Performed by: EMERGENCY MEDICINE

## 2023-06-08 PROCEDURE — 84484 ASSAY OF TROPONIN QUANT: CPT | Performed by: EMERGENCY MEDICINE

## 2023-06-08 PROCEDURE — 83605 ASSAY OF LACTIC ACID: CPT | Performed by: EMERGENCY MEDICINE

## 2023-06-08 PROCEDURE — 25010000002 MAGNESIUM SULFATE IN D5W 1G/100ML (PREMIX) 1-5 GM/100ML-% SOLUTION: Performed by: EMERGENCY MEDICINE

## 2023-06-08 PROCEDURE — 93005 ELECTROCARDIOGRAM TRACING: CPT

## 2023-06-08 PROCEDURE — 87086 URINE CULTURE/COLONY COUNT: CPT | Performed by: EMERGENCY MEDICINE

## 2023-06-08 PROCEDURE — 96365 THER/PROPH/DIAG IV INF INIT: CPT

## 2023-06-08 PROCEDURE — 87186 SC STD MICRODIL/AGAR DIL: CPT | Performed by: EMERGENCY MEDICINE

## 2023-06-08 PROCEDURE — 83735 ASSAY OF MAGNESIUM: CPT | Performed by: EMERGENCY MEDICINE

## 2023-06-08 PROCEDURE — 71045 X-RAY EXAM CHEST 1 VIEW: CPT

## 2023-06-08 PROCEDURE — 87077 CULTURE AEROBIC IDENTIFY: CPT | Performed by: EMERGENCY MEDICINE

## 2023-06-08 PROCEDURE — 80053 COMPREHEN METABOLIC PANEL: CPT | Performed by: EMERGENCY MEDICINE

## 2023-06-08 PROCEDURE — 99285 EMERGENCY DEPT VISIT HI MDM: CPT

## 2023-06-08 PROCEDURE — 96375 TX/PRO/DX INJ NEW DRUG ADDON: CPT

## 2023-06-08 PROCEDURE — 85025 COMPLETE CBC W/AUTO DIFF WBC: CPT | Performed by: EMERGENCY MEDICINE

## 2023-06-08 PROCEDURE — 63710000001 INSULIN LISPRO (HUMAN) PER 5 UNITS: Performed by: NURSE PRACTITIONER

## 2023-06-08 PROCEDURE — 96366 THER/PROPH/DIAG IV INF ADDON: CPT

## 2023-06-08 PROCEDURE — 93005 ELECTROCARDIOGRAM TRACING: CPT | Performed by: EMERGENCY MEDICINE

## 2023-06-08 PROCEDURE — 82948 REAGENT STRIP/BLOOD GLUCOSE: CPT

## 2023-06-08 RX ORDER — MAGNESIUM SULFATE 1 G/100ML
1 INJECTION INTRAVENOUS ONCE
Status: COMPLETED | OUTPATIENT
Start: 2023-06-08 | End: 2023-06-08

## 2023-06-08 RX ORDER — PROCHLORPERAZINE EDISYLATE 5 MG/ML
2.5 INJECTION INTRAMUSCULAR; INTRAVENOUS EVERY 8 HOURS PRN
Status: DISCONTINUED | OUTPATIENT
Start: 2023-06-08 | End: 2023-06-11 | Stop reason: HOSPADM

## 2023-06-08 RX ORDER — SODIUM CHLORIDE 0.9 % (FLUSH) 0.9 %
10 SYRINGE (ML) INJECTION AS NEEDED
Status: DISCONTINUED | OUTPATIENT
Start: 2023-06-08 | End: 2023-06-11 | Stop reason: HOSPADM

## 2023-06-08 RX ORDER — NICOTINE POLACRILEX 4 MG
15 LOZENGE BUCCAL
Status: DISCONTINUED | OUTPATIENT
Start: 2023-06-08 | End: 2023-06-11 | Stop reason: HOSPADM

## 2023-06-08 RX ORDER — INSULIN LISPRO 100 [IU]/ML
2-7 INJECTION, SOLUTION INTRAVENOUS; SUBCUTANEOUS
Status: DISCONTINUED | OUTPATIENT
Start: 2023-06-08 | End: 2023-06-11 | Stop reason: HOSPADM

## 2023-06-08 RX ORDER — ACETAMINOPHEN 325 MG/1
650 TABLET ORAL EVERY 4 HOURS PRN
Status: DISCONTINUED | OUTPATIENT
Start: 2023-06-08 | End: 2023-06-11 | Stop reason: HOSPADM

## 2023-06-08 RX ORDER — ACETAMINOPHEN 650 MG/1
650 SUPPOSITORY RECTAL EVERY 4 HOURS PRN
Status: DISCONTINUED | OUTPATIENT
Start: 2023-06-08 | End: 2023-06-11 | Stop reason: HOSPADM

## 2023-06-08 RX ORDER — ONDANSETRON 2 MG/ML
4 INJECTION INTRAMUSCULAR; INTRAVENOUS ONCE
Status: COMPLETED | OUTPATIENT
Start: 2023-06-08 | End: 2023-06-08

## 2023-06-08 RX ORDER — DEXTROSE MONOHYDRATE 25 G/50ML
25 INJECTION, SOLUTION INTRAVENOUS
Status: DISCONTINUED | OUTPATIENT
Start: 2023-06-08 | End: 2023-06-11 | Stop reason: HOSPADM

## 2023-06-08 RX ORDER — ACETAMINOPHEN 160 MG/5ML
650 SOLUTION ORAL EVERY 4 HOURS PRN
Status: DISCONTINUED | OUTPATIENT
Start: 2023-06-08 | End: 2023-06-11 | Stop reason: HOSPADM

## 2023-06-08 RX ORDER — AMIODARONE HYDROCHLORIDE 200 MG/1
200 TABLET ORAL DAILY
Status: DISCONTINUED | OUTPATIENT
Start: 2023-06-09 | End: 2023-06-11 | Stop reason: HOSPADM

## 2023-06-08 RX ORDER — SODIUM CHLORIDE 9 MG/ML
75 INJECTION, SOLUTION INTRAVENOUS CONTINUOUS
Status: ACTIVE | OUTPATIENT
Start: 2023-06-08 | End: 2023-06-09

## 2023-06-08 RX ORDER — SODIUM CHLORIDE 0.9 % (FLUSH) 0.9 %
10 SYRINGE (ML) INJECTION EVERY 12 HOURS SCHEDULED
Status: DISCONTINUED | OUTPATIENT
Start: 2023-06-08 | End: 2023-06-11 | Stop reason: HOSPADM

## 2023-06-08 RX ORDER — SODIUM CHLORIDE 9 MG/ML
40 INJECTION, SOLUTION INTRAVENOUS AS NEEDED
Status: DISCONTINUED | OUTPATIENT
Start: 2023-06-08 | End: 2023-06-11 | Stop reason: HOSPADM

## 2023-06-08 RX ORDER — LISINOPRIL 5 MG/1
5 TABLET ORAL DAILY
Status: DISCONTINUED | OUTPATIENT
Start: 2023-06-09 | End: 2023-06-11 | Stop reason: HOSPADM

## 2023-06-08 RX ORDER — POLYETHYLENE GLYCOL 3350 17 G/17G
17 POWDER, FOR SOLUTION ORAL DAILY PRN
Status: DISCONTINUED | OUTPATIENT
Start: 2023-06-08 | End: 2023-06-11 | Stop reason: HOSPADM

## 2023-06-08 RX ORDER — LISINOPRIL 5 MG/1
5 TABLET ORAL DAILY
Status: ON HOLD | COMMUNITY

## 2023-06-08 RX ORDER — AMOXICILLIN 250 MG
2 CAPSULE ORAL 2 TIMES DAILY
Status: DISCONTINUED | OUTPATIENT
Start: 2023-06-08 | End: 2023-06-11 | Stop reason: HOSPADM

## 2023-06-08 RX ORDER — PANTOPRAZOLE SODIUM 40 MG/1
40 TABLET, DELAYED RELEASE ORAL
Status: DISCONTINUED | OUTPATIENT
Start: 2023-06-09 | End: 2023-06-09

## 2023-06-08 RX ORDER — BISACODYL 10 MG
10 SUPPOSITORY, RECTAL RECTAL DAILY PRN
Status: DISCONTINUED | OUTPATIENT
Start: 2023-06-08 | End: 2023-06-11 | Stop reason: HOSPADM

## 2023-06-08 RX ORDER — BISACODYL 5 MG/1
5 TABLET, DELAYED RELEASE ORAL DAILY PRN
Status: DISCONTINUED | OUTPATIENT
Start: 2023-06-08 | End: 2023-06-11 | Stop reason: HOSPADM

## 2023-06-08 RX ORDER — IBUPROFEN 600 MG/1
1 TABLET ORAL
Status: DISCONTINUED | OUTPATIENT
Start: 2023-06-08 | End: 2023-06-11 | Stop reason: HOSPADM

## 2023-06-08 RX ORDER — PROPRANOLOL HYDROCHLORIDE 20 MG/1
20 TABLET ORAL 3 TIMES DAILY
COMMUNITY
End: 2023-06-11 | Stop reason: HOSPADM

## 2023-06-08 RX ORDER — TRAMADOL HYDROCHLORIDE 50 MG/1
50 TABLET ORAL 2 TIMES DAILY PRN
Status: ON HOLD | COMMUNITY

## 2023-06-08 RX ORDER — LEVOTHYROXINE SODIUM 0.1 MG/1
100 TABLET ORAL
Status: DISCONTINUED | OUTPATIENT
Start: 2023-06-09 | End: 2023-06-11 | Stop reason: HOSPADM

## 2023-06-08 RX ADMIN — SODIUM CHLORIDE 1 G: 900 INJECTION INTRAVENOUS at 22:30

## 2023-06-08 RX ADMIN — INSULIN LISPRO 2 UNITS: 100 INJECTION, SOLUTION INTRAVENOUS; SUBCUTANEOUS at 22:29

## 2023-06-08 RX ADMIN — SODIUM CHLORIDE 75 ML/HR: 9 INJECTION, SOLUTION INTRAVENOUS at 22:29

## 2023-06-08 RX ADMIN — MAGNESIUM SULFATE HEPTAHYDRATE 1 G: 1 INJECTION, SOLUTION INTRAVENOUS at 19:18

## 2023-06-08 RX ADMIN — SODIUM CHLORIDE 1000 ML: 9 INJECTION, SOLUTION INTRAVENOUS at 16:03

## 2023-06-08 RX ADMIN — ONDANSETRON 4 MG: 2 INJECTION INTRAMUSCULAR; INTRAVENOUS at 16:03

## 2023-06-08 RX ADMIN — Medication 10 ML: at 22:29

## 2023-06-08 RX ADMIN — METOPROLOL TARTRATE 50 MG: 25 TABLET, FILM COATED ORAL at 22:29

## 2023-06-08 RX ADMIN — APIXABAN 2.5 MG: 2.5 TABLET, FILM COATED ORAL at 22:29

## 2023-06-08 RX ADMIN — MAGNESIUM SULFATE HEPTAHYDRATE 1 G: 1 INJECTION, SOLUTION INTRAVENOUS at 18:00

## 2023-06-09 LAB
ANION GAP SERPL CALCULATED.3IONS-SCNC: 11 MMOL/L (ref 5–15)
BASOPHILS # BLD AUTO: 0 10*3/MM3 (ref 0–0.2)
BASOPHILS NFR BLD AUTO: 0 % (ref 0–1.5)
BUN SERPL-MCNC: 8 MG/DL (ref 8–23)
BUN/CREAT SERPL: 13.3 (ref 7–25)
CALCIUM SPEC-SCNC: 8.3 MG/DL (ref 8.6–10.5)
CHLORIDE SERPL-SCNC: 101 MMOL/L (ref 98–107)
CO2 SERPL-SCNC: 26 MMOL/L (ref 22–29)
CREAT SERPL-MCNC: 0.6 MG/DL (ref 0.57–1)
DEPRECATED RDW RBC AUTO: 51.4 FL (ref 37–54)
EGFRCR SERPLBLD CKD-EPI 2021: 87.5 ML/MIN/1.73
EOSINOPHIL # BLD AUTO: 0 10*3/MM3 (ref 0–0.4)
EOSINOPHIL NFR BLD AUTO: 0 % (ref 0.3–6.2)
ERYTHROCYTE [DISTWIDTH] IN BLOOD BY AUTOMATED COUNT: 15.5 % (ref 12.3–15.4)
GEN 5 2HR TROPONIN T REFLEX: 18 NG/L
GLUCOSE BLDC GLUCOMTR-MCNC: 146 MG/DL (ref 70–130)
GLUCOSE BLDC GLUCOMTR-MCNC: 148 MG/DL (ref 70–130)
GLUCOSE BLDC GLUCOMTR-MCNC: 183 MG/DL (ref 70–130)
GLUCOSE BLDC GLUCOMTR-MCNC: 190 MG/DL (ref 70–130)
GLUCOSE SERPL-MCNC: 173 MG/DL (ref 65–99)
HBA1C MFR BLD: 5.9 % (ref 4.8–5.6)
HCT VFR BLD AUTO: 27.9 % (ref 34–46.6)
HGB BLD-MCNC: 9 G/DL (ref 12–15.9)
IMM GRANULOCYTES # BLD AUTO: 0.02 10*3/MM3 (ref 0–0.05)
IMM GRANULOCYTES NFR BLD AUTO: 0.5 % (ref 0–0.5)
LIPASE SERPL-CCNC: 22 U/L (ref 13–60)
LYMPHOCYTES # BLD AUTO: 0.54 10*3/MM3 (ref 0.7–3.1)
LYMPHOCYTES NFR BLD AUTO: 13.6 % (ref 19.6–45.3)
MAGNESIUM SERPL-MCNC: 1.8 MG/DL (ref 1.6–2.4)
MCH RBC QN AUTO: 29.1 PG (ref 26.6–33)
MCHC RBC AUTO-ENTMCNC: 32.3 G/DL (ref 31.5–35.7)
MCV RBC AUTO: 90.3 FL (ref 79–97)
MONOCYTES # BLD AUTO: 0.2 10*3/MM3 (ref 0.1–0.9)
MONOCYTES NFR BLD AUTO: 5 % (ref 5–12)
NEUTROPHILS NFR BLD AUTO: 3.21 10*3/MM3 (ref 1.7–7)
NEUTROPHILS NFR BLD AUTO: 80.9 % (ref 42.7–76)
NRBC BLD AUTO-RTO: 0 /100 WBC (ref 0–0.2)
PLATELET # BLD AUTO: 67 10*3/MM3 (ref 140–450)
PMV BLD AUTO: 10.4 FL (ref 6–12)
POTASSIUM SERPL-SCNC: 3.6 MMOL/L (ref 3.5–5.2)
POTASSIUM SERPL-SCNC: 4.4 MMOL/L (ref 3.5–5.2)
RBC # BLD AUTO: 3.09 10*6/MM3 (ref 3.77–5.28)
SODIUM SERPL-SCNC: 138 MMOL/L (ref 136–145)
TROPONIN T DELTA: 0 NG/L
WBC NRBC COR # BLD: 3.97 10*3/MM3 (ref 3.4–10.8)

## 2023-06-09 PROCEDURE — G0378 HOSPITAL OBSERVATION PER HR: HCPCS

## 2023-06-09 PROCEDURE — 83735 ASSAY OF MAGNESIUM: CPT | Performed by: NURSE PRACTITIONER

## 2023-06-09 PROCEDURE — 80048 BASIC METABOLIC PNL TOTAL CA: CPT | Performed by: NURSE PRACTITIONER

## 2023-06-09 PROCEDURE — 25010000002 CEFTRIAXONE PER 250 MG: Performed by: NURSE PRACTITIONER

## 2023-06-09 PROCEDURE — 83690 ASSAY OF LIPASE: CPT | Performed by: INTERNAL MEDICINE

## 2023-06-09 PROCEDURE — 84132 ASSAY OF SERUM POTASSIUM: CPT | Performed by: INTERNAL MEDICINE

## 2023-06-09 PROCEDURE — 82948 REAGENT STRIP/BLOOD GLUCOSE: CPT

## 2023-06-09 PROCEDURE — 63710000001 INSULIN LISPRO (HUMAN) PER 5 UNITS: Performed by: NURSE PRACTITIONER

## 2023-06-09 PROCEDURE — 85025 COMPLETE CBC W/AUTO DIFF WBC: CPT | Performed by: NURSE PRACTITIONER

## 2023-06-09 PROCEDURE — 99221 1ST HOSP IP/OBS SF/LOW 40: CPT | Performed by: PHYSICIAN ASSISTANT

## 2023-06-09 PROCEDURE — 96367 TX/PROPH/DG ADDL SEQ IV INF: CPT

## 2023-06-09 PROCEDURE — 83036 HEMOGLOBIN GLYCOSYLATED A1C: CPT | Performed by: NURSE PRACTITIONER

## 2023-06-09 PROCEDURE — 84484 ASSAY OF TROPONIN QUANT: CPT | Performed by: NURSE PRACTITIONER

## 2023-06-09 RX ORDER — PANTOPRAZOLE SODIUM 40 MG/1
40 TABLET, DELAYED RELEASE ORAL
Status: DISCONTINUED | OUTPATIENT
Start: 2023-06-09 | End: 2023-06-11 | Stop reason: HOSPADM

## 2023-06-09 RX ORDER — POTASSIUM CHLORIDE 20 MEQ/1
40 TABLET, EXTENDED RELEASE ORAL EVERY 4 HOURS
Status: COMPLETED | OUTPATIENT
Start: 2023-06-09 | End: 2023-06-09

## 2023-06-09 RX ORDER — FUROSEMIDE 40 MG/1
40 TABLET ORAL DAILY
COMMUNITY
End: 2023-06-11 | Stop reason: HOSPADM

## 2023-06-09 RX ADMIN — PANTOPRAZOLE SODIUM 40 MG: 40 TABLET, DELAYED RELEASE ORAL at 05:01

## 2023-06-09 RX ADMIN — Medication 10 ML: at 21:18

## 2023-06-09 RX ADMIN — AMIODARONE HYDROCHLORIDE 200 MG: 200 TABLET ORAL at 09:42

## 2023-06-09 RX ADMIN — INSULIN LISPRO 2 UNITS: 100 INJECTION, SOLUTION INTRAVENOUS; SUBCUTANEOUS at 17:11

## 2023-06-09 RX ADMIN — SODIUM CHLORIDE 1 G: 900 INJECTION INTRAVENOUS at 21:18

## 2023-06-09 RX ADMIN — POTASSIUM CHLORIDE 40 MEQ: 1500 TABLET, EXTENDED RELEASE ORAL at 17:11

## 2023-06-09 RX ADMIN — APIXABAN 2.5 MG: 2.5 TABLET, FILM COATED ORAL at 09:42

## 2023-06-09 RX ADMIN — ACETAMINOPHEN 650 MG: 325 TABLET ORAL at 21:18

## 2023-06-09 RX ADMIN — DOCUSATE SODIUM 50 MG AND SENNOSIDES 8.6 MG 2 TABLET: 8.6; 5 TABLET, FILM COATED ORAL at 21:18

## 2023-06-09 RX ADMIN — PANTOPRAZOLE SODIUM 40 MG: 40 TABLET, DELAYED RELEASE ORAL at 17:12

## 2023-06-09 RX ADMIN — POTASSIUM CHLORIDE 40 MEQ: 1500 TABLET, EXTENDED RELEASE ORAL at 15:10

## 2023-06-09 RX ADMIN — LEVOTHYROXINE SODIUM 100 MCG: 0.1 TABLET ORAL at 05:01

## 2023-06-09 RX ADMIN — APIXABAN 2.5 MG: 2.5 TABLET, FILM COATED ORAL at 21:18

## 2023-06-09 RX ADMIN — LISINOPRIL 5 MG: 5 TABLET ORAL at 09:42

## 2023-06-09 RX ADMIN — INSULIN LISPRO 2 UNITS: 100 INJECTION, SOLUTION INTRAVENOUS; SUBCUTANEOUS at 12:11

## 2023-06-09 RX ADMIN — METOPROLOL TARTRATE 50 MG: 25 TABLET, FILM COATED ORAL at 09:42

## 2023-06-09 NOTE — CASE MANAGEMENT/SOCIAL WORK
Discharge Planning Assessment  [facility]     Patient Name: Chey Robertson  MRN: [id number]  Today's Date: 6/9/2023    Admit Date: 6/8/2023    Plan: home with home health services   Discharge Needs Assessment       Row Name 06/09/23 1146       Living Environment    People in Home alone    Unique Family Situation Pt lives in assisted living at [facility]    Current Living Arrangements assisted living facility    Potentially Unsafe Housing Conditions none    Primary Care Provided by self    Provides Primary Care For no one    Family Caregiver if Needed child(michael), adult    Family Caregiver Names Son- Albin    Quality of Family Relationships helpful;supportive    Able to Return to Prior Arrangements yes       Resource/Environmental Concerns    Resource/Environmental Concerns none    Transportation Concerns no car       Transition Planning    Patient/Family Anticipates Transition to home;home with help/services    Patient/Family Anticipated Services at Transition home health care    Transportation Anticipated family or friend will provide       Discharge Needs Assessment    Readmission Within the Last 30 Days previous discharge plan unsuccessful    Current Outpatient/Agency/Support Group homecare agency    Equipment Currently Used at Home rollator;cane, straight    Concerns to be Addressed discharge planning    Concerns Comments Home Health services    Equipment Needed After Discharge none    Outpatient/Agency/Support Group Needs homecare agency    Discharge Facility/Level of Care Needs home with home health    Patient's Choice of Community Agency(s) [facility] ([facility]    Discharge Coordination/Progress Pt was recently discharged from [facility] and returned with continued GI issues.  Pt very recently moved into assisted living at [facility].  Pt previously lived independently.  Pt uses a cane and rollator.  Pt had an accepted referral with [facility] but services had  not started due to pt being readmitted to the hospital.  ANTWON spoke with Michael at [facility] ([facility]) and confirmed they have accepted pt's referral and can begin services once pt is discharged.  Michael is aware pt resides at [facility].  A new  order is not needed, per Michael.  Pt's family will provide transportation at discharge.                   Discharge Plan       Row Name 06/09/23 1140       Plan    Plan home with home health services    Plan Comments ANTWON spoke with pt at bedside and also talked with pt's son Albin via phone about discharge plan needs.  Pt will return to [facility] living (home) at discharge.  She will have home health services with [facility].  The agency has the order and referral is accepted per Michael with [facility].  On day of discharge, they will need phone notifcation to [phone] and discharge summary should be faxed to [phone].  Family will provide transportation.  No further needs are reported.    Final Discharge Disposition Code 06 - home with home health care                  Continued Care and Services - Admitted Since 6/8/2023       Home Medical Care Coordination complete.      Service Provider Request Status Selected Services Address Phone Fax Patient Preferred    [facility]  Selected [facility]  [address] [facility], [address] [address] [phone] -- --                  Selected Continued Care - Prior Encounters Includes continued care and service providers with selected services from prior encounters from 3/10/2023 to 6/9/2023      Discharged on 5/24/2023 Admission date: 5/5/2023 - Discharge disposition: Skilled Nursing Facility (DC - External)      Destination       Service Provider Selected Services Address Phone Fax Patient Preferred    [facility] [address] [place] [id number] [phone]  [phone] --                          Expected Discharge Date and Time       Expected Discharge Date Expected Discharge Time    Jun 12, 2023            Demographic Summary       Row Name 06/09/23 1145       General Information    Arrived From home    Reason for Consult discharge planning    Preferred Language English    General Information Comments PCP is Bridget Marie                   Functional Status       Row Name 06/09/23 1145       Employment/    Employment/ Comments Pt has iunsurance and prescription coverage through [employer].                   Psychosocial    No documentation.                  Abuse/Neglect    No documentation.                  Legal    No documentation.                  Substance Abuse    No documentation.                  Patient Forms    No documentation.                     Afshan Mcfadden MSW

## 2023-06-09 NOTE — CONSULTS
"                    Clinical Nutrition       Patient Name: Chey Robertson  YOB: 1935  MRN: [id number]  Date of Encounter: 06/09/23 13:17 EDT  Admission date: 6/8/2023    Comment:    From recent admission (6/6) and met criteria and continued to meet for malnutrition:   Pt meets criteria for acute, severe malnutrition with the indicators of <50% of EEN for >5d, significant weight loss of 13.4% x3 months & 5.7% x1 month, moderate muscle wasting, and subcutaneous fat loss.      Provide Boost Breeze 2x daily - once diet advanced, will provide Boost Plus Vanilla    Reason for Visit   MST score 2+, Unintended wt loss, Reduced oral intake    EMR Reviewed     EMR Reviewed: yes   Meds Reviewed: yes  Labs Reviewed: yes    Admission Diagnosis:  Nausea and vomiting, unspecified vomiting type [R11.2]    Problem List:    Nausea and vomiting, unspecified vomiting type    Hypothyroidism (acquired)    Myelodysplasia (myelodysplastic syndrome)    Weakness    Dehydration    Urinary tract infection, acute    Chronic anticoagulation    Essential hypertension    Type 2 diabetes mellitus without complication, with long-term current use of insulin    Atrial fibrillation    Hypomagnesemia    Lactic acidosis  Severe malnutrition        Anthropometric      Flowsheet Rows      Flowsheet Row First Filed Value   Admission Height 160 cm (63\") Documented at 06/08/2023 1524   Admission Weight 52.6 kg (116 lb) Documented at 06/08/2023 1524              Height: 160 cm (62.99\")  Last Filed Weight: Weight: 55.8 kg (123 lb 1.6 oz) (06/08/23 2130)  Weight Method: Bed scale  BMI: BMI (Calculated): 21.8  BMI classification: Underweight:<18.5kg/m2**  **using adjusted BMI for older adults   IBW:  115lbs    UBW:      Weight       Weight (kg) Weight (lbs) Weight Method Visit Report   6/14/2022 62.143 kg  137 lb   --    2/13/2023 61.689 kg  136 lb   --    3/16/2023 60.782 kg  134 lb      3/28/2023 52.617 kg  116 lb  Stated     5/5/2023 55.974 kg  " 123 lb 6.4 oz  Bed scale      53.978 kg  119 lb  Stated     5/17/2023 56.155 kg  123 lb 12.8 oz      5/25/2023 53.252 kg  117 lb 6.4 oz      6/5/2023 52.708 kg  116 lb 3.2 oz  Bed scale      51.71 kg  114 lb  Stated     6/8/2023 55.838 kg  123 lb 1.6 oz  Bed scale      52.617 kg  116 lb  Stated       Weight change:   13.4% x3 months & 5.7% x1 month     Reported/Observed/Food/Nutrition Related - Comments     Pt laying in bed - pt recently evaluated on admission from 6/6. Reports no changes except recent episodes of emesis. Tolerated CL diet for breakfast - agreeable to ONS; requesting diet advance to more substance than clears. GI following. NKFA     Current Nutrition Prescription     Diet: Liquid Diets; Clear Liquid; Texture: Regular Texture (IDDSI 7); Fluid Consistency: Thin (IDDSI 0)  No active supplement orders      Average Intake from Charting: Insufficient data     Nutrition Diagnosis     Problem Malnutrition acute, severe   Etiology Per clinical status, dysphagia, recent COVID infection   Signs/Symptoms <50% of EEN for >5d, significant weight loss of 13.4% x3 months & 5.7% x1 month, moderate muscle wasting, and subcutaneous fat loss   Status:    Actions     Follow treatment progress, Care plan reviewed, Encourage intake, Supplement provided    Monitor Per Protocol      Zonia Pink, MS,RD,LD,   Time Spent: 20min

## 2023-06-09 NOTE — ED PROVIDER NOTES
"Subjective   History of Present Illness  87-year-old female brought by EMS from [facility] presents to the emergency department with concerns about recurrent vomiting and \"lethargy.\"  Patient reports \"I was up all night throwing up.\"  She was discharged from our facility yesterday.  She started to have some weakness yesterday and fell twice, sustaining a abrasion to her left forearm.  She denies other injuries.  She denies recent diarrhea.  She gets some epigastric discomfort when her symptoms come back.  She reports she started Jardiance about a month ago, denies other new medications.  She has a recent urinary tract infection and low magnesium.  She was hospitalized from 6/5 to 6/7/2023.  She complains of feeling tired.  She has had a decreased p.o. intake recently.  She just got moved in at [facility].  Her son is at bedside and helps provide history.  No recent fever reported.  Denies acute urinary symptoms.  Denies acute changes in bowel habits.        Past Medical History:   Diagnosis Date    Anemia     Arthritis     Diabetes mellitus     checks sugar only when pt wants to     Disease of thyroid gland     History of transfusion     with knee surgery-  no reaction recalled.     [place] (hard of hearing)     no hearing aids    Hypertension     Migraine without aura and without status migrainosus, not intractable 12/30/2016    Pulmonary emboli 2011    (after knee surgery)    SOBOE (shortness of breath on exertion)     Tremors of nervous system     Vertigo     Wears dentures     upper     Wears glasses        Allergies   Allergen Reactions    Aspirin Unknown - Low Severity     Mouth sores        Past Surgical History:   Procedure Laterality Date    BLADDER SURGERY      CATARACT EXTRACTION      bilat     CHOLECYSTECTOMY      COLONOSCOPY      HYSTERECTOMY      KYPHOPLASTY N/A 2/12/2021    Procedure: KYPHOPLASTY T11, T12 AND L4;  Surgeon: Matty Hearn MD;  " [facility];  Service: Orthopedic Spine;  Laterality: N/A;    OOPHORECTOMY      TONSILLECTOMY         Family History   Problem Relation Age of Onset    Breast cancer Sister 84    Stroke Mother     Heart attack Father     Ovarian cancer Neg Hx        Social History     Socioeconomic History    Marital status:    Tobacco Use    Smoking status: Never     Passive exposure: Never    Smokeless tobacco: Never   Vaping Use    Vaping Use: Never used   Substance and Sexual Activity    Alcohol use: No    Drug use: No    Sexual activity: Defer           Objective   Physical Exam  Vitals and nursing note reviewed.   Constitutional:       Appearance: She is not diaphoretic.      Comments: Sleepy, wakes up with verbal stimulus, answers questions, follows commands, falls back asleep easily.     HENT:      Mouth/Throat:      Comments: Tacky mucosa with pallor.  Eyes:      General: No scleral icterus.     Comments: Conjunctival pallor.   Neck:      Comments: No meningismus.  Cardiovascular:      Rate and Rhythm: Normal rate and regular rhythm.      Heart sounds: No murmur heard.    No friction rub. No gallop.      Comments: S1, S2  Pulmonary:      Effort: Pulmonary effort is normal.      Breath sounds: Normal breath sounds. No stridor. No wheezing, rhonchi or rales.   Abdominal:      General: There is no distension.      Palpations: Abdomen is soft.      Tenderness: There is no abdominal tenderness. There is no guarding or rebound.   Musculoskeletal:         General: No swelling.      Cervical back: Neck supple.   Skin:     General: Skin is warm and dry.      Coloration: Skin is pale. Skin is not cyanotic.   Neurological:      Comments: No facial droop.  Moves all extremities.                  ED Course           Patient is more alert upon reevaluation at approximately 1740.  Results and plan discussed with patient and son at bedside.  All questions addressed.  Pallor improved but not resolved.                                 Medical Decision Making  Differential diagnosis includes electrolyte abnormality, recurrent urinary tract infection, asymptomatic bacteriuria, gastritis, peptic ulcer disease, duodenitis, esophagitis, medication side effect from Jardiance, dehydration, small bowel obstruction.    Problems Addressed:  Hypomagnesemia: complicated acute illness or injury  Nausea and vomiting, unspecified vomiting type: complicated acute illness or injury    Amount and/or Complexity of Data Reviewed  Independent Historian: EMS     Details: Eldest son at bedside  External Data Reviewed: labs and notes.     Details: Discharge summary from yesterday reviewed.  Prior magnesium levels reviewed.  Labs: ordered. Decision-making details documented in ED Course.  Radiology: ordered. Decision-making details documented in ED Course.  ECG/medicine tests: ordered and independent interpretation performed. Decision-making details documented in ED Course.     Details: EKG at 1757 shows normal sinus rhythm at a rate of 64 bpm, normal intervals, nonspecific ST-T wave changes.  Discussion of management or test interpretation with external provider(s): Case discussed with hospitalist and hospitalist accepts admission at approximately 2019, Dr. Cespedes    Risk  Prescription drug management.  Decision regarding hospitalization.      Recent Results (from the past 24 hour(s))   ECG 12 Lead ED Triage Standing Order; Weak / Dizzy / AMS    Collection Time: 06/08/23  3:29 PM   Result Value Ref Range    QT Interval 492 ms    QTC Interval 499 ms   Comprehensive Metabolic Panel    Collection Time: 06/08/23  3:33 PM    Specimen: Blood   Result Value Ref Range    Glucose 162 (H) 65 - 99 mg/dL    BUN 11 8 - 23 mg/dL    Creatinine 0.49 (L) 0.57 - 1.00 mg/dL    Sodium 131 (L) 136 - 145 mmol/L    Potassium 3.7 3.5 - 5.2 mmol/L    Chloride 93 (L) 98 - 107 mmol/L    CO2 27.0 22.0 - 29.0 mmol/L    Calcium 9.0 8.6 - 10.5 mg/dL    Total Protein 6.1 6.0 - 8.5 g/dL     Albumin 3.6 3.5 - 5.2 g/dL    ALT (SGPT) 5 1 - 33 U/L    AST (SGOT) 15 1 - 32 U/L    Alkaline Phosphatase 85 39 - 117 U/L    Total Bilirubin 0.5 0.0 - 1.2 mg/dL    Globulin 2.5 gm/dL    A/G Ratio 1.4 g/dL    BUN/Creatinine Ratio 22.4 7.0 - 25.0    Anion Gap 11.0 5.0 - 15.0 mmol/L    eGFR 91.9 >60.0 mL/min/1.73   Single High Sensitivity Troponin T    Collection Time: 06/08/23  3:33 PM    Specimen: Blood   Result Value Ref Range    HS Troponin T 15 (H) <10 ng/L   Magnesium    Collection Time: 06/08/23  3:33 PM    Specimen: Blood   Result Value Ref Range    Magnesium 1.4 (L) 1.6 - 2.4 mg/dL   Green Top (Gel)    Collection Time: 06/08/23  3:33 PM   Result Value Ref Range    Extra Tube Hold for add-ons.    Lavender Top    Collection Time: 06/08/23  3:33 PM   Result Value Ref Range    Extra Tube hold for add-on    Gold Top - SST    Collection Time: 06/08/23  3:33 PM   Result Value Ref Range    Extra Tube Hold for add-ons.    Gray Top    Collection Time: 06/08/23  3:33 PM   Result Value Ref Range    Extra Tube Hold for add-ons.    Light Blue Top    Collection Time: 06/08/23  3:33 PM   Result Value Ref Range    Extra Tube Hold for add-ons.    CBC Auto Differential    Collection Time: 06/08/23  3:33 PM    Specimen: Blood   Result Value Ref Range    WBC 7.62 3.40 - 10.80 10*3/mm3    RBC 3.41 (L) 3.77 - 5.28 10*6/mm3    Hemoglobin 9.6 (L) 12.0 - 15.9 g/dL    Hematocrit 29.7 (L) 34.0 - 46.6 %    MCV 87.1 79.0 - 97.0 fL    MCH 28.2 26.6 - 33.0 pg    MCHC 32.3 31.5 - 35.7 g/dL    RDW 15.3 12.3 - 15.4 %    RDW-SD 49.1 37.0 - 54.0 fl    MPV 10.0 6.0 - 12.0 fL    Platelets 67 (L) 140 - 450 10*3/mm3    Neutrophil % 88.5 (H) 42.7 - 76.0 %    Lymphocyte % 5.5 (L) 19.6 - 45.3 %    Monocyte % 4.7 (L) 5.0 - 12.0 %    Eosinophil % 0.1 (L) 0.3 - 6.2 %    Basophil % 0.0 0.0 - 1.5 %    Immature Grans % 1.2 (H) 0.0 - 0.5 %    Neutrophils, Absolute 6.74 1.70 - 7.00 10*3/mm3    Lymphocytes, Absolute 0.42 (L) 0.70 - 3.10 10*3/mm3    Monocytes,  Absolute 0.36 0.10 - 0.90 10*3/mm3    Eosinophils, Absolute 0.01 0.00 - 0.40 10*3/mm3    Basophils, Absolute 0.00 0.00 - 0.20 10*3/mm3    Immature Grans, Absolute 0.09 (H) 0.00 - 0.05 10*3/mm3    nRBC 0.0 0.0 - 0.2 /100 WBC   Lactic Acid, Plasma    Collection Time: 06/08/23  3:33 PM    Specimen: Blood   Result Value Ref Range    Lactate 2.6 (C) 0.5 - 2.0 mmol/L   Urinalysis With Microscopic If Indicated (No Culture) - Straight Cath    Collection Time: 06/08/23  4:21 PM    Specimen: Straight Cath; Urine   Result Value Ref Range    Color, UA Yellow Yellow, Straw    Appearance, UA Cloudy (A) Clear    pH, UA <=5.0 5.0 - 8.0    Specific Gravity, UA 1.021 1.001 - 1.030    Glucose, UA Negative Negative    Ketones, UA Trace (A) Negative    Bilirubin, UA Negative Negative    Blood, UA Negative Negative    Protein, UA Trace (A) Negative    Leuk Esterase, UA Trace (A) Negative    Nitrite, UA Negative Negative    Urobilinogen, UA 1.0 E.U./dL 0.2 - 1.0 E.U./dL   Urinalysis, Microscopic Only - Straight Cath    Collection Time: 06/08/23  4:21 PM    Specimen: Straight Cath; Urine   Result Value Ref Range    RBC, UA 0-2 None Seen, 0-2 /HPF    WBC, UA 0-2 None Seen, 0-2 /HPF    Bacteria, UA 4+ (A) None Seen, Trace /HPF    Squamous Epithelial Cells, UA 0-2 None Seen, 0-2 /HPF    Hyaline Casts, UA 0-6 0 - 6 /LPF    Methodology Automated Microscopy    Single High Sensitivity Troponin T    Collection Time: 06/08/23  5:40 PM    Specimen: Blood   Result Value Ref Range    HS Troponin T 15 (H) <10 ng/L   ECG 12 Lead Altered Mental Status    Collection Time: 06/08/23  5:57 PM   Result Value Ref Range    QT Interval 420 ms    QTC Interval 433 ms   STAT Lactic Acid, Reflex    Collection Time: 06/08/23  7:29 PM    Specimen: Blood   Result Value Ref Range    Lactate 2.0 0.5 - 2.0 mmol/L     Note: In addition to lab results from this visit, the labs listed above may include labs taken at another facility or during a different encounter within the  last 24 hours. Please correlate lab times with ED admission and discharge times for further clarification of the services performed during this visit.    CT Abdomen Pelvis Without Contrast   Final Result   Impression:      1. Small left pleural effusion with left basilar atelectasis      2. Colon diverticulosis      3. Splenomegaly      4. Stable small left adrenal nodule      5. No definite acute CT abnormalities in the abdomen or pelvis                  Electronically Signed: Trenton Rendoni     6/8/2023 6:19 PM EDT     Workstation ID: [id number]      XR Chest 1 View   Final Result   Impression:   1.Suggested cardiomegaly. Some of this could relate to magnification as this seems like a change from prior chest x-ray that was more of a PA view.   2.Atelectasis or scarring right lower chest.         Electronically Signed: Portillo Thao     6/8/2023 3:55 PM EDT     Workstation ID: [id number]        Vitals:    06/08/23 1600 06/08/23 1740 06/08/23 1840 06/08/23 1940   BP: 151/61 131/57 134/53 134/55   Patient Position:       Pulse: 61 61 64 63   Resp:       Temp:       TempSrc:       SpO2: 95% 98% 94% 98%   Weight:       Height:         Medications   sodium chloride 0.9 % flush 10 mL (has no administration in time range)   sodium chloride 0.9 % bolus 1,000 mL (0 mL Intravenous Stopped 6/8/23 1750)   ondansetron (ZOFRAN) injection 4 mg (4 mg Intravenous Given 6/8/23 1603)   magnesium sulfate in D5W 1g/100mL (PREMIX) (0 g Intravenous Stopped 6/8/23 1903)   magnesium sulfate in D5W 1g/100mL (PREMIX) (1 g Intravenous New Bag 6/8/23 1918)     ECG/EMG Results (last 24 hours)       Procedure Component Value Units Date/Time    ECG 12 Lead ED Triage Standing Order; Weak / Dizzy / AMS [[id number]] Collected: 06/08/23 1529     Updated: 06/08/23 1554     QT Interval 492 ms      QTC Interval 499 ms     Narrative:      Test Reason : ED Triage Standing Order~  Blood Pressure :   */*   mmHG  Vent. Rate :  62 BPM     Atrial Rate :  62 BPM      P-R Int : 188 ms          QRS Dur :  88 ms      QT Int : 492 ms       P-R-T Axes :  86  -8  27 degrees     QTc Int : 499 ms    Normal sinus rhythm  Prolonged QT  Abnormal ECG  When compared with ECG of 05-JUN-2023 16:23,  No significant change was found    Referred By: KALIN           Confirmed By:     ECG 12 Lead Altered Mental Status [[id number]] Collected: 06/08/23 1757     Updated: 06/08/23 1759     QT Interval 420 ms      QTC Interval 433 ms     Narrative:      Test Reason : Altered Mental Status  Blood Pressure :   */*   mmHG  Vent. Rate :  64 BPM     Atrial Rate :  64 BPM     P-R Int : 190 ms          QRS Dur :  90 ms      QT Int : 420 ms       P-R-T Axes :  88  -7  33 degrees     QTc Int : 433 ms    Normal sinus rhythm  Nonspecific T wave abnormality  Abnormal ECG  When compared with ECG of 08-JUN-2023 15:29, (Unconfirmed)  Nonspecific T wave abnormality now evident in Lateral leads  QT has shortened    Referred By: QUENTIN           Confirmed By:           ECG 12 Lead Altered Mental Status   Preliminary Result   Test Reason : Altered Mental Status   Blood Pressure :   */*   mmHG   Vent. Rate :  64 BPM     Atrial Rate :  64 BPM      P-R Int : 190 ms          QRS Dur :  90 ms       QT Int : 420 ms       P-R-T Axes :  88  -7  33 degrees      QTc Int : 433 ms      Normal sinus rhythm   Nonspecific T wave abnormality   Abnormal ECG   When compared with ECG of 08-JUN-2023 15:29, (Unconfirmed)   Nonspecific T wave abnormality now evident in Lateral leads   QT has shortened      Referred By: QUENTIN           Confirmed By:       ECG 12 Lead ED Triage Standing Order; Weak / Dizzy / AMS   Preliminary Result   Test Reason : ED Triage Standing Order~   Blood Pressure :   */*   mmHG   Vent. Rate :  62 BPM     Atrial Rate :  62 BPM      P-R Int : 188 ms          QRS Dur :  88 ms       QT Int : 492 ms       P-R-T Axes :  86  -8  27 degrees      QTc Int : 499 ms      Normal sinus rhythm   Prolonged QT   Abnormal ECG   When  compared with ECG of 05-JUN-2023 16:23,   No significant change was found      Referred By: EDMD           Confirmed By:             Final diagnoses:   Nausea and vomiting, unspecified vomiting type   Hypomagnesemia       ED Disposition  ED Disposition       ED Disposition   Decision to Admit    Condition   --    Comment   Level of Care: Telemetry [5]   Diagnosis: Nausea and vomiting, unspecified vomiting type [[id number]]   Admitting Physician: LALA JEWELL [[id number]]   Attending Physician: LALA JEWELL [[id number]]   Certification: I Certify That Inpatient Hospital Services Are Medically Necessary For Greater Than 2 Midnights                 No follow-up provider specified.       Medication List      No changes were made to your prescriptions during this visit.            Leena De Leon MD  06/08/23 2020

## 2023-06-09 NOTE — CONSULTS
"[facility]enterology Consult    Referring Provider: Julio Leiva MD     PCP: Corby Marie MD    Reason for Consultation:  Nausea and vomiting     Chief complaint: \"I keep throwing up\"     History of present illness:    Chye Robertson is a 87 y.o. female who is admitted with nausea and vomiting.  Symptoms began 1-2 weeks ago.  She states she was recently discharged after Covid-19 infection and was treated with remdesivir and Dexamethasone.   She states her symptoms began approximately one week after that.   She denies abdominal pain, diarrhea, change in bowel habits or dysphagia.  She is feeling some better overnight and tolerated a clear liquid breakfast tray this morning.     She is on Prilosec 40 mg once daily at home for GERD.      Allergies:  Aspirin    Scheduled Meds:  amiodarone, 200 mg, Oral, Daily  apixaban, 2.5 mg, Oral, BID  cefTRIAXone, 1 g, Intravenous, Q24H  insulin lispro, 2-7 Units, Subcutaneous, 4x Daily AC & at Bedtime  levothyroxine, 100 mcg, Oral, Q AM  lisinopril, 5 mg, Oral, Daily  metoprolol tartrate, 50 mg, Oral, BID  pantoprazole, 40 mg, Oral, Q AM  senna-docusate sodium, 2 tablet, Oral, BID  sodium chloride, 10 mL, Intravenous, Q12H         Infusions:       PRN Meds:    acetaminophen **OR** acetaminophen **OR** acetaminophen    senna-docusate sodium **AND** polyethylene glycol **AND** bisacodyl **AND** bisacodyl    Calcium Replacement - Follow Nurse / BPA Driven Protocol    dextrose    dextrose    glucagon (human recombinant)    Magnesium Standard Dose Replacement - Follow Nurse / BPA Driven Protocol    Phosphorus Replacement - Follow Nurse / BPA Driven Protocol    Potassium Replacement - Follow Nurse / BPA Driven Protocol    prochlorperazine    sodium chloride    sodium chloride    sodium chloride    Home Meds:  Medications Prior to Admission   Medication Sig Dispense Refill Last Dose    acetaminophen (TYLENOL) 650 MG 8 hr tablet Take 2 tablets by mouth Every 8 (Eight) Hours " As Needed for Mild Pain, Headache or Moderate Pain. OTC   Past Week    amiodarone (PACERONE) 200 MG tablet Take 1 tablet by mouth Daily.   6/7/2023    Diclofenac Sodium (VOLTAREN) 1 % gel gel Apply 4 g topically to the appropriate area as directed 4 (Four) Times a Day As Needed. OTC   6/7/2023    Eliquis 2.5 MG tablet tablet Take 1 tablet by mouth 2 (Two) Times a Day.   6/7/2023    Insulin Lispro (humaLOG) 100 UNIT/ML injection Inject 7 Units under the skin into the appropriate area as directed 3 (Three) Times a Day With Meals.  12 6/7/2023    levothyroxine (SYNTHROID, LEVOTHROID) 100 MCG tablet Take 1 tablet by mouth Every Morning.   6/7/2023    lisinopril (PRINIVIL,ZESTRIL) 5 MG tablet Take 1 tablet by mouth Daily.   6/7/2023    metFORMIN (GLUCOPHAGE) 500 MG tablet Take 1 tablet by mouth 2 (Two) Times a Day With Meals.   6/7/2023    metoprolol tartrate (LOPRESSOR) 50 MG tablet Take 1 tablet by mouth 2 (Two) Times a Day.   6/7/2023    omeprazole (PrilOSEC) 40 MG capsule Take 1 capsule by mouth Daily. 30 capsule 0 6/7/2023    propranolol (INDERAL) 20 MG tablet Take 1 tablet by mouth 3 (Three) Times a Day.   6/7/2023    silver sulfadiazine (SILVADENE, SSD) 1 % cream Apply 1 application topically to the appropriate area as directed 2 (Two) Times a Day.   6/7/2023    tamsulosin (FLOMAX) 0.4 MG capsule 24 hr capsule Take 1 capsule by mouth Daily. 10 capsule 0 6/7/2023    traMADol (ULTRAM) 50 MG tablet Take 1 tablet by mouth 2 (Two) Times a Day.   6/7/2023    insulin detemir (LEVEMIR) 100 UNIT/ML injection Inject 10 Units under the skin into the appropriate area as directed Every Night.  12 6/6/2023    melatonin 5 MG tablet tablet Take 1 tablet by mouth At Night As Needed (insomnia).   6/6/2023 at 2000     ROS: Review of Systems   Constitutional:  Positive for fatigue.   HENT: Negative.     Eyes: Negative.    Respiratory: Negative.     Cardiovascular: Negative.    Gastrointestinal:  Positive for nausea and vomiting.  "Negative for abdominal pain and diarrhea.   Genitourinary: Negative.    Musculoskeletal: Negative.    Skin: Negative.    Allergic/Immunologic: Negative.    Neurological: Negative.    Hematological: Negative.    Psychiatric/Behavioral: Negative.       PAST MED HX:  Past Medical History:   Diagnosis Date    Anemia     Arthritis     Diabetes mellitus     checks sugar only when pt wants to     Disease of thyroid gland     History of transfusion     with knee surgery-  no reaction recalled.     [place] (hard of hearing)     no hearing aids    Hypertension     Migraine without aura and without status migrainosus, not intractable 12/30/2016    Pulmonary emboli 2011    (after knee surgery)    SOBOE (shortness of breath on exertion)     Tremors of nervous system     Vertigo     Wears dentures     upper     Wears glasses      PAST SURG HX:  Past Surgical History:   Procedure Laterality Date    BLADDER SURGERY      CATARACT EXTRACTION      bilat     CHOLECYSTECTOMY      COLONOSCOPY      HYSTERECTOMY      KYPHOPLASTY N/A 2/12/2021    Procedure: KYPHOPLASTY T11, T12 AND L4;  Surgeon: Matty Hearn MD;  Location: [facility];  Service: Orthopedic Spine;  Laterality: N/A;    OOPHORECTOMY      TONSILLECTOMY       FAM HX:  Family History   Problem Relation Age of Onset    Breast cancer Sister 84    Stroke Mother     Heart attack Father     Ovarian cancer Neg Hx      SOC HX:  Social History     Socioeconomic History    Marital status:    Tobacco Use    Smoking status: Never     Passive exposure: Never    Smokeless tobacco: Never   Vaping Use    Vaping Use: Never used   Substance and Sexual Activity    Alcohol use: No    Drug use: No    Sexual activity: Defer     PHYSICAL EXAM  /58   Pulse 57   Temp 98.8 °F (37.1 °C) (Oral)   Resp 18   Ht 160 cm (62.99\")   Wt 55.8 kg (123 lb 1.6 oz)   LMP  (LMP Unknown) Comment: mammogram is up to date  SpO2 98%   BMI 21.81 kg/m²   Wt Readings from Last 3 Encounters:   06/08/23 55.8 " kg (123 lb 1.6 oz)   06/05/23 52.7 kg (116 lb 3.2 oz)   05/25/23 53.3 kg (117 lb 6.4 oz)   ,body mass index is 21.81 kg/m².  Physical Exam  Constitutional:       General: She is not in acute distress.     Comments: Elderly female in no acute distress,  appears globally weak and fatigued resting supine in bed   HENT:      Head: Normocephalic and atraumatic.      Mouth/Throat:      Mouth: Mucous membranes are moist.      Pharynx: Oropharynx is clear.   Eyes:      General: No scleral icterus.  Cardiovascular:      Rate and Rhythm: Normal rate and regular rhythm.   Pulmonary:      Effort: Pulmonary effort is normal. No respiratory distress.      Comments: On 2 L O2  via nasal cannula  Abdominal:      General: Bowel sounds are normal. There is no distension.      Palpations: Abdomen is soft.      Tenderness: There is no abdominal tenderness.   Skin:     General: Skin is warm and dry.   Neurological:      Mental Status: She is alert and oriented to person, place, and time.   Psychiatric:         Behavior: Behavior normal.       Results Review:   I reviewed the patient's new clinical results.    Lab Results   Component Value Date    WBC 7.62 06/08/2023    HGB 9.6 (L) 06/08/2023    HGB 9.2 (L) 06/07/2023    HGB 8.7 (L) 06/06/2023    HCT 29.7 (L) 06/08/2023    MCV 87.1 06/08/2023    PLT 67 (L) 06/08/2023       Lab Results   Component Value Date    INR 1.22 (H) 05/07/2023    INR 1.23 (H) 05/05/2023    INR 1.87 (H) 03/31/2023       Lab Results   Component Value Date    GLUCOSE 162 (H) 06/08/2023    BUN 11 06/08/2023    CREATININE 0.49 (L) 06/08/2023    EGFRIFNONA 47 (L) 02/19/2021    BCR 22.4 06/08/2023     (L) 06/08/2023    K 3.7 06/08/2023    CO2 27.0 06/08/2023    CALCIUM 9.0 06/08/2023    PROTENTOTREF 7.0 05/24/2020    ALBUMIN 3.6 06/08/2023    ALKPHOS 85 06/08/2023    BILITOT 0.5 06/08/2023    ALT 5 06/08/2023    AST 15 06/08/2023     CT Abdomen/Pelvis with and without contrast: (as interpreted by  radiologist)  Findings:  Liver: The liver is unremarkable in morphology. Subcentimeter hypodensity within the caudal right hepatic lobe is too small to fully characterize. No biliary dilation is seen.   Gallbladder: Surgically absent   Pancreas: The pancreas appears atrophic.   Spleen: The spleen is enlarged, measuring approximately 18 cm in length. Several small splenic hypodensities are too small to fully characterize. Several tiny splenic granulomas.   Adrenal glands: 1.3 cm low-density nodule at the apex of the left adrenal gland, incompletely characterized on this contrast-enhanced study. The right adrenal gland is unremarkable.   Genitourinary tract: Kidneys appear unremarkable. No hydronephrosis is seen. Small calcifications along the course of the left ureter are thought to represent phleboliths. Urinary bladder is unremarkable. Patient is status post hysterectomy.   Gastrointestinal tract: Scattered colonic diverticulosis. The hollow viscera appears otherwise unremarkable. There is no evidence of bowel obstruction.   Appendix: The appendix is not identified.   Other findings: No free air or free fluid is identified. No pathologically enlarged lymph nodes are seen. Vascular calcifications are present. The IVC is grossly unremarkable.   Bones and soft tissues: Bones are demineralized. Patient is status post kyphoplasty of compression fractures of T11, T12, and L4. There are degenerative changes within the spine. Superficial soft tissues demonstrate no acute abnormality.   Lung bases: Small left pleural effusion with bibasilar atelectasis. Small pericardial effusion noted.   IMPRESSION:  Impression:  1.No acute abnormality identified within the abdomen or pelvis.  2.Scattered colonic diverticulosis.  3.Moderate splenomegaly. Several small splenic hypodensities, too small to fully characterize. Similar finding noted on the study dated 2/19/2021.  4.Small pericardial effusion. Small left pleural effusion with  left basilar atelectasis. These findings are new since 5/5/2023.  5.Unchanged 1.3 cm left adrenal nodule.  6.Additional findings as detailed above.    ASSESSMENTS/PLANS    Nausea and vomiting  GERD     Nausea and vomiting are improved overnight.  She has risk factors for peptic ulcer disease in the setting of recent COVID-19 virus infection with dexamethasone use as well as chronic use of Diclofenac gel.    Differential would include post viral gastroparesis.      >> Increase PPI from daily to BID  >> Add IV Zofran 4 mg q6h PRN   >> If symptoms persist, recommend EGD.     >> Discontinue home NSAIDs (Diclofenac gel)    I discussed the patient's findings and my recommendations with patient    HUE Gonzalez  06/09/23  10:13 EDT

## 2023-06-09 NOTE — PROGRESS NOTES
[facility] Medicine Services  PROGRESS NOTE    Patient Name: Chey Robertson  : 1935  MRN: [id number]    Date of Admission: 2023  Primary Care Physician: Corby Marie MD    Subjective   Subjective     CC:  N/v    HPI:  Tolerating clears. No abd pain.     ROS:  No f/c  No cp    Objective   Objective     Vital Signs:   Temp:  [97.7 °F (36.5 °C)-98.8 °F (37.1 °C)] 97.7 °F (36.5 °C)  Heart Rate:  [55-66] 58  Resp:  [16-18] 18  BP: (129-151)/(53-74) 130/56     Physical Exam:  Constitutional:Alert, elderly and frail but nontoxic appearing, oriented x 3  Psych:Normal/appropriate affect  HEENT:NCAT, oropharynx clear  Neck: neck supple, full range of motion  Neuro: Face symmetric, speech clear, equal , moves all extremities  Cardiac: RRR; No pretibial pitting edema  Resp: CTAB, normal effort  GI: abd soft, nontender to palpation  Skin: No extremity rash  Musculoskeletal/extremities: no cyanosis of extremities; no significant ankle edema      Results Reviewed:  LAB RESULTS:      Lab 23  0855 23  1929 23  1533 23  0504 23  0516 23  2321 23  1949 23  1629   WBC 3.97  --  7.62 1.13* 1.49*  --   --  2.25*   HEMOGLOBIN 9.0*  --  9.6* 9.2* 8.7*  --   --  10.1*   HEMATOCRIT 27.9*  --  29.7* 27.3* 27.6*  --   --  31.0*   PLATELETS 67*  --  67* 52* 58*  --   --  58*   NEUTROS ABS 3.21  --  6.74 0.64* 0.74*  --   --  1.79   IMMATURE GRANS (ABS) 0.02  --  0.09* 0.01 0.01  --   --  0.02   LYMPHS ABS 0.54*  --  0.42* 0.34* 0.52*  --   --  0.30*   MONOS ABS 0.20  --  0.36 0.14 0.22  --   --  0.14   EOS ABS 0.00  --  0.01 0.00 0.00  --   --  0.00   MCV 90.3  --  87.1 88.9 89.9  --   --  87.1   LACTATE  --  2.0 2.6*  --   --  1.6 2.2* 3.2*         Lab 23  0855 23  1533 23  0504 23  0516 23  1632 23  1631 23  1629   SODIUM 138 131* 132* 134*  --   --  132*   POTASSIUM 3.6 3.7 3.9 3.8  --   --  4.3    CHLORIDE 101 93* 94* 93*  --   --  94*   CO2 26.0 27.0 28.0 29.0  --   --  22.0   ANION GAP 11.0 11.0 10.0 12.0  --   --  16.0*   BUN 8 11 13 20  --   --  20   CREATININE 0.60 0.49* 0.58 0.69 0.80   < > 0.75   EGFR 87.5 91.9 88.3 84.6  --   --  77.6   GLUCOSE 173* 162* 137* 112*  --   --  252*   CALCIUM 8.3* 9.0 8.8 8.5*  --   --  8.6   MAGNESIUM 1.8 1.4*  --  1.8  --   --  1.4*   HEMOGLOBIN A1C 5.90*  --   --   --   --   --   --    TSH  --   --   --  1.950  --   --   --     < > = values in this interval not displayed.         Lab 06/09/23  0855 06/08/23  1533 06/05/23  1629   TOTAL PROTEIN  --  6.1 6.0   ALBUMIN  --  3.6 3.6   GLOBULIN  --  2.5 2.4   ALT (SGPT)  --  5 6   AST (SGOT)  --  15 11   BILIRUBIN  --  0.5 0.6   ALK PHOS  --  85 84   LIPASE 22  --  20         Lab 06/09/23  0008 06/08/23  2209 06/08/23  1740 06/08/23  1533 06/06/23  1206   HSTROP T 18* 18* 15* 15* 19*                 Brief Urine Lab Results  (Last result in the past 365 days)        Color   Clarity   Blood   Leuk Est   Nitrite   Protein   CREAT   Urine HCG        06/08/23 1621 Yellow   Cloudy   Negative   Trace   Negative   Trace                   Microbiology Results Abnormal       Procedure Component Value - Date/Time    Urine Culture - Urine, Straight Cath [[id number]]  (Normal) Collected: 06/08/23 1621    Lab Status: Preliminary result Specimen: Urine from Straight Cath Updated: 06/09/23 0917     Urine Culture Culture in progress            CT Abdomen Pelvis Without Contrast    Result Date: 6/8/2023  CT ABDOMEN PELVIS WO CONTRAST Date of Exam: 6/8/2023 5:44 PM EDT Indication: Nausea/vomiting. Comparison: CT abdomen pelvis dated 6/5/2023 Technique: Axial CT images were obtained of the abdomen and pelvis without the administration of contrast. Reconstructed coronal and sagittal images were also obtained. Automated exposure control and iterative construction methods were used. Findings: Lung Bases:  Small left pleural effusion and left  basilar atelectasis. Mild pericardial effusion Liver:Liver is normal in size and CT density. No focal lesions. Biliary/Gallbladder: Cholecystectomy changes Spleen: There is splenomegaly and punctate calcifications in the spleen. Small hiatal hernia Pancreas: Pancreas shows homogeneous density. There is no evidence of pancreatic mass or peripancreatic fluid. Kidneys: Kidneys are normal in size. There are no stones or hydronephrosis. Adrenals: Stable left adrenal nodule. Right adrenal gland is unremarkable Retroperitoneal/Lymph Nodes/Vasculature: No retroperitoneal adenopathy is identified by size criteria. Gastrointestinal/Mesentery: The bowel loops are non-dilated without definite wall thickening or mass. The appendix appears within normal limits. No evidence of obstruction. No free air. Retained contrast in the large bowel from the previous exam. There is colon diverticulosis with no evidence of acute diverticulitis Bladder: The bladder is unremarkable. Intraluminal air in the bladder likely from instrumentation No acute abnormalities in the pelvis   Bony Structures: Chronic lumbar fracture deformities with kyphoplasty changes again seen     Impression: Impression: 1. Small left pleural effusion with left basilar atelectasis 2. Colon diverticulosis 3. Splenomegaly 4. Stable small left adrenal nodule 5. No definite acute CT abnormalities in the abdomen or pelvis Electronically Signed: Trenton Elizabeth  6/8/2023 6:19 PM EDT  Workstation ID: [id number]    XR Chest 1 View    Result Date: 6/8/2023  XR CHEST 1 VW Date of Exam: 6/8/2023 3:40 PM EDT Indication: Weak/Dizzy/AMS triage protocol Comparison: May 5, 2023 Findings: The heart looks enlarged. The lungs seem relatively clear. There are no pleural effusions. There is some atelectasis or scarring in the right lower chest.     Impression: Impression: 1.Suggested cardiomegaly. Some of this could relate to magnification as this seems like a change from prior chest x-ray that  was more of a PA view. 2.Atelectasis or scarring right lower chest. Electronically Signed: Portillo Thao  6/8/2023 3:55 PM EDT  Workstation ID: [id number]     Results for orders placed during the hospital encounter of 06/05/23    Adult Transthoracic Echo Complete w/ Color, Spectral and Contrast if necessary per protocol    Interpretation Summary    Left ventricular systolic function is normal. Left ventricular ejection fraction appears to be 56 - 60%.    Left ventricular diastolic function was normal.    Estimated right ventricular systolic pressure from tricuspid regurgitation is normal (<35 mmHg).      Current medications:  Scheduled Meds:amiodarone, 200 mg, Oral, Daily  apixaban, 2.5 mg, Oral, BID  cefTRIAXone, 1 g, Intravenous, Q24H  insulin lispro, 2-7 Units, Subcutaneous, 4x Daily AC & at Bedtime  levothyroxine, 100 mcg, Oral, Q AM  lisinopril, 5 mg, Oral, Daily  metoprolol tartrate, 50 mg, Oral, BID  pantoprazole, 40 mg, Oral, BID AC  potassium chloride ER, 40 mEq, Oral, Q4H  senna-docusate sodium, 2 tablet, Oral, BID  sodium chloride, 10 mL, Intravenous, Q12H      Continuous Infusions:   PRN Meds:.  acetaminophen **OR** acetaminophen **OR** acetaminophen    senna-docusate sodium **AND** polyethylene glycol **AND** bisacodyl **AND** bisacodyl    Calcium Replacement - Follow Nurse / BPA Driven Protocol    dextrose    dextrose    glucagon (human recombinant)    Magnesium Standard Dose Replacement - Follow Nurse / BPA Driven Protocol    Phosphorus Replacement - Follow Nurse / BPA Driven Protocol    Potassium Replacement - Follow Nurse / BPA Driven Protocol    prochlorperazine    sodium chloride    sodium chloride    sodium chloride    Assessment & Plan   Assessment & Plan     Active Hospital Problems    Diagnosis  POA    **Nausea and vomiting, unspecified vomiting type [R11.2]  Yes    Lactic acidosis [E87.20]  Yes    Hypomagnesemia [E83.42]  Yes    Atrial fibrillation [I48.91]  Yes    Essential hypertension [I10]   Yes    Type 2 diabetes mellitus without complication, with long-term current use of insulin [E11.9, Z79.4]  Not Applicable    Chronic anticoagulation [Z79.01]  Not Applicable    Urinary tract infection, acute [N39.0]  Yes    Dehydration [E86.0]  Yes    Myelodysplasia (myelodysplastic syndrome) [D46.9]  Yes    Weakness [R53.1]  Yes    Hypothyroidism (acquired) [E03.9]  Yes      Resolved Hospital Problems   No resolved problems to display.        Brief Hospital Course to date:  Chey Robertson is a 87 y.o. female w/ hx myelodysplastic syndrome (follows w/ Dr. Lyman), dm2, htn, gerd, afib (on eliquis) who was recently admitted 6/5-6/7/23 with abdominal pain w/ n/v and electrolyte abnormalities. Was treated w/ iv fluids and symptomatic care and discharged to [facility] feeling improved.   Developed recurrent refractory n/v after return to [facility] prompting visit back to [facility] ED.     Intractable N/V  -ct a/p negative for acute process (diverticulosis, moderate splenomegaly, small pericardial effusion, unchanged left adreanl 1.3cm nodule  -GI has evaluated; recommends bid ppi, avoiding nsaids, prn zofran ; if sx's recur/persist may require EGD  -liquid die t for now, advance as tolerates  -? Metformin contributing to sx's?. Pharmacy to update home list    Bacteria in urine in immunocompromised host; cannot r/o uti  -day #2 rocephin, follow culture     Chronically elevated troponin  -stable, no chest pain, EKG no ischemic changes    DM2  -A1c 5.9  -holding metformin in case contributing; on ssi for now    Myelodysplastic syndrome  Chronic pancytopenia  Follows w/ Dr. Lyman  -labs stable currently    Afib  Htn  -continue amiodarone and metoprolol, eliquis bid  -lisinopril 5mg    Hypothyroidism  -continue levothyrosine (tsh was 1.9 on 6/6)    Gen weakness  -pt/ot evals pending    Am labs: cbc,bmp,mag        Expected Discharge Location and Transportation:     ? back to [facility] w/ home  health (pt/ot evals pending)  Expected Discharge ? 6/11/23 depending on clinical course      DVT prophylaxis:  Medical and mechanical DVT prophylaxis orders are present.     AM-PAC 6 Clicks Score (PT): 18 (06/08/23 2000)    CODE STATUS:   Code Status and Medical Interventions:   Ordered at: 06/08/23 2138     Code Status (Patient has no pulse and is not breathing):    CPR (Attempt to Resuscitate)     Medical Interventions (Patient has pulse or is breathing):    Full Support       Julio Leiva MD  06/09/23

## 2023-06-09 NOTE — CONSULTS
Malnutrition Severity Assessment    Patient Name:  Chey Robertson  YOB: 1935  MRN: [id number]  Admit Date:  6/8/2023    Patient meets criteria for : Severe Malnutrition    Comments:  Pt meets criteria for acute, severe malnutrition with the indicators of <50% of EEN for >5d, significant weight loss of 13.4% x3 months & 5.7% x1 month, moderate muscle wasting, and subcutaneous fat loss.      Malnutrition Severity Assessment  Malnutrition Type: Acute Disease or Injury - Related Malnutrition  Malnutrition Type (last 8 hours)       Malnutrition Severity Assessment       Row Name 06/09/23 1316       Malnutrition Severity Assessment    Malnutrition Type Acute Disease or Injury - Related Malnutrition      Row Name 06/09/23 1316       Insufficient Energy Intake     Insufficient Energy Intake Findings Severe    Insufficient Energy Intake  < or equal to 50% of est. energy requirement for > or equal to 5d)      Row Name 06/09/23 1316       Unintentional Weight Loss     Unintentional Weight Loss Findings Severe    Unintentional Weight Loss  Weight loss greater than 7.5% in three months  13.4% x3 months, 5.7% x1 month      Row Name 06/09/23 1316       Muscle Loss    Loss of Muscle Mass Findings Moderate    [place] Region Moderate - slight depression    Clavicle Bone Region Moderate - some protrusion in females, visible in males    Acromion Bone Region Moderate - acromion may slightly protrude    Scapular Bone Region Moderate - mild depression, bones may show slightly    Dorsal Hand Region Moderate - slight depression    Patellar Region Moderate - patella more prominent, less muscle definition around patella    Anterior Thigh Region Moderate - mild depression on inner thigh    Posterior Calf Region Moderate - some roundness, slight firmness      Row Name 06/09/23 1316       Fat Loss    Subcutaneous Fat Loss Findings Moderate    Orbital Region  Moderate -  somewhat hollowness, slightly dark circles    Upper Arm  Region Moderate - some fat tissue, not ample      Row Name 06/09/23 1316       Criteria Met (Must meet criteria for severity in at least 2 of these categories: M Wasting, Fat Loss, Fluid, Secondary Signs, Wt. Status, Intake)    Patient meets criteria for  Severe Malnutrition                    Electronically signed by:  Zonia Pink MS,RD,LD  06/09/23 13:26 EDT

## 2023-06-09 NOTE — H&P
[facility] Medicine Services  HISTORY AND PHYSICAL    Patient Name: Chey Robertson  : 1935  MRN: [id number]  Primary Care Physician: Corby Marie MD  Date of admission: 2023    Subjective   Subjective     Chief Complaint:  Nausea, vomiting, weakness     HPI:  Chey Robertson is a 87 y.o. female with PMH significant for myelodysplasia with chronic pancytopenia, HTN, T2DM, PE, GERD, A-fib (on Eliquis), who presents to the ED with complaint of nausea, vomiting, and weakness.  She recently was admitted to [facility] 2023 - 2023 secondary to abdominal pain with nausea and vomiting dehydration as well as hypomagnesemia.  She was given symptomatic management and electrolyte replacement and ultimately was discharged to [facility].  Family who is at bedside helps provide HPI.  He notes that she had dinner yesterday evening and then began to have vomiting prior to bed.  She states that she was up vomiting all night and then vomited again this morning.  She has not had any vomiting since arrival to the ED.  Of note, patient family has updated medication list and patient is no longer taking multiple medications that she had been on previously.  Upon arrival to the ED, she is noted to have elevated lactate and appears dehydrated.  UA is concerning for UTI.  She also has hypomagnesemia.  CXR is negative for acute findings.  CT abdomen/pelvis notes small left pleural effusion with left basilar atelectasis and splenomegaly.  She will be admitted to hospital medicine for further evaluation.      Review of Systems   Constitutional:  Positive for activity change, appetite change and fatigue. Negative for chills, diaphoresis, fever and unexpected weight change.   HENT: Negative.  Negative for congestion, sore throat and trouble swallowing.    Eyes: Negative.  Negative for visual disturbance.   Respiratory: Negative.  Negative for cough, chest tightness, shortness of  breath and wheezing.    Cardiovascular: Negative.  Negative for chest pain, palpitations and leg swelling.   Gastrointestinal:  Positive for abdominal pain, nausea and vomiting. Negative for abdominal distention, blood in stool, constipation and diarrhea.   Endocrine: Negative.  Negative for polydipsia and polyphagia.   Genitourinary:  Positive for decreased urine volume. Negative for difficulty urinating, dysuria, frequency and urgency.   Musculoskeletal:  Positive for gait problem. Negative for arthralgias, back pain, myalgias and neck pain.   Skin: Negative.  Negative for color change, pallor, rash and wound.   Allergic/Immunologic: Negative.  Negative for immunocompromised state.   Neurological:  Positive for weakness. Negative for dizziness, syncope, facial asymmetry, speech difficulty, light-headedness, numbness and headaches.   Hematological: Negative.  Does not bruise/bleed easily.   Psychiatric/Behavioral: Negative.  Negative for confusion. The patient is not nervous/anxious.           Personal History     Past Medical History:   Diagnosis Date    Anemia     Arthritis     Diabetes mellitus     checks sugar only when pt wants to     Disease of thyroid gland     History of transfusion     with knee surgery-  no reaction recalled.     [place] (hard of hearing)     no hearing aids    Hypertension     Migraine without aura and without status migrainosus, not intractable 12/30/2016    Pulmonary emboli 2011    (after knee surgery)    SOBOE (shortness of breath on exertion)     Tremors of nervous system     Vertigo     Wears dentures     upper     Wears glasses          Oncology Problem List:  Myelodysplasia (myelodysplastic syndrome) (03/14/2023; Status: Active)       Past Surgical History:   Procedure Laterality Date    BLADDER SURGERY      CATARACT EXTRACTION      bilat     CHOLECYSTECTOMY      COLONOSCOPY      HYSTERECTOMY      KYPHOPLASTY N/A 2/12/2021    Procedure: KYPHOPLASTY T11, T12 AND L4;  Surgeon: Nadiya  Matty VU MD;  Location: [facility];  Service: Orthopedic Spine;  Laterality: N/A;    OOPHORECTOMY      TONSILLECTOMY         Family History: family history includes Breast cancer (age of onset: 84) in her sister; Heart attack in her father; Stroke in her mother.     Social History:  reports that she has never smoked. She has never been exposed to tobacco smoke. She has never used smokeless tobacco. She reports that she does not drink alcohol and does not use drugs.  Social History     Social History Narrative    Not on file       Medications:  Diclofenac Sodium, Insulin Lispro, acetaminophen, amiodarone, apixaban, insulin detemir, levothyroxine, lisinopril, melatonin, metFORMIN, metoprolol tartrate, omeprazole, propranolol, silver sulfadiazine, tamsulosin, and traMADol    Allergies   Allergen Reactions    Aspirin Unknown - Low Severity     Mouth sores        Objective   Objective     Vital Signs:   Temp:  [97.8 °F (36.6 °C)] 97.8 °F (36.6 °C)  Heart Rate:  [60-64] 61  Resp:  [16] 16  BP: (131-151)/(53-71) 133/56    Physical Exam   Constitutional: Awake, alert, ill appearing   Eyes: PERRLA, sclerae anicteric, no conjunctival injection  HENT: NCAT, mucous membranes dry  Neck: Supple, no thyromegaly, no lymphadenopathy, trachea midline  Respiratory: Clear to auscultation bilaterally, nonlabored respirations   Cardiovascular: RRR, no murmurs, rubs, or gallops, palpable pedal pulses bilaterally  Gastrointestinal: Positive bowel sounds, soft, mildly TTP epigastric, nondistended  Musculoskeletal: No bilateral ankle edema, no clubbing or cyanosis to extremities  Psychiatric: Appropriate affect, cooperative  Neurologic: Oriented x 3, strength symmetric in all extremities, Cranial Nerves grossly intact to confrontation, speech clear  Skin: No rashes      Result Review:  I have personally reviewed the results from the time of this admission to 6/8/2023 21:40 EDT and agree with these findings:  [x]  Laboratory list /  accordion  []  Microbiology  [x]  Radiology  [x]  EKG/Telemetry   []  Cardiology/Vascular   []  Pathology  [x]  Old records  []  Other:  Most notable findings include:     LAB RESULTS:      Lab 06/08/23  1929 06/08/23  1533 06/07/23  0504 06/06/23  0516 06/05/23  2321 06/05/23  1949 06/05/23  1629   WBC  --  7.62 1.13* 1.49*  --   --  2.25*   HEMOGLOBIN  --  9.6* 9.2* 8.7*  --   --  10.1*   HEMATOCRIT  --  29.7* 27.3* 27.6*  --   --  31.0*   PLATELETS  --  67* 52* 58*  --   --  58*   NEUTROS ABS  --  6.74 0.64* 0.74*  --   --  1.79   IMMATURE GRANS (ABS)  --  0.09* 0.01 0.01  --   --  0.02   LYMPHS ABS  --  0.42* 0.34* 0.52*  --   --  0.30*   MONOS ABS  --  0.36 0.14 0.22  --   --  0.14   EOS ABS  --  0.01 0.00 0.00  --   --  0.00   MCV  --  87.1 88.9 89.9  --   --  87.1   LACTATE 2.0 2.6*  --   --  1.6 2.2* 3.2*         Lab 06/08/23 1533 06/07/23 0504 06/06/23 0516 06/05/23  1632 06/05/23  1631 06/05/23  1629   SODIUM 131* 132* 134*  --   --  132*   POTASSIUM 3.7 3.9 3.8  --   --  4.3   CHLORIDE 93* 94* 93*  --   --  94*   CO2 27.0 28.0 29.0  --   --  22.0   ANION GAP 11.0 10.0 12.0  --   --  16.0*   BUN 11 13 20  --   --  20   CREATININE 0.49* 0.58 0.69 0.80 0.80 0.75   EGFR 91.9 88.3 84.6  --   --  77.6   GLUCOSE 162* 137* 112*  --   --  252*   CALCIUM 9.0 8.8 8.5*  --   --  8.6   MAGNESIUM 1.4*  --  1.8  --   --  1.4*   TSH  --   --  1.950  --   --   --          Lab 06/08/23  1533 06/05/23  1629   TOTAL PROTEIN 6.1 6.0   ALBUMIN 3.6 3.6   GLOBULIN 2.5 2.4   ALT (SGPT) 5 6   AST (SGOT) 15 11   BILIRUBIN 0.5 0.6   ALK PHOS 85 84   LIPASE  --  20         Lab 06/08/23  1740 06/08/23  1533 06/06/23  1206 06/06/23  0140 06/05/23  1903   HSTROP T 15* 15* 19* 26* 20*                 Brief Urine Lab Results  (Last result in the past 365 days)        Color   Clarity   Blood   Leuk Est   Nitrite   Protein   CREAT   Urine HCG        06/08/23 1621 Yellow   Cloudy   Negative   Trace   Negative   Trace                  Microbiology Results (last 10 days)       ** No results found for the last 240 hours. **            CT Abdomen Pelvis Without Contrast    Result Date: 6/8/2023  CT ABDOMEN PELVIS WO CONTRAST Date of Exam: 6/8/2023 5:44 PM EDT Indication: Nausea/vomiting. Comparison: CT abdomen pelvis dated 6/5/2023 Technique: Axial CT images were obtained of the abdomen and pelvis without the administration of contrast. Reconstructed coronal and sagittal images were also obtained. Automated exposure control and iterative construction methods were used. Findings: Lung Bases:  Small left pleural effusion and left basilar atelectasis. Mild pericardial effusion Liver:Liver is normal in size and CT density. No focal lesions. Biliary/Gallbladder: Cholecystectomy changes Spleen: There is splenomegaly and punctate calcifications in the spleen. Small hiatal hernia Pancreas: Pancreas shows homogeneous density. There is no evidence of pancreatic mass or peripancreatic fluid. Kidneys: Kidneys are normal in size. There are no stones or hydronephrosis. Adrenals: Stable left adrenal nodule. Right adrenal gland is unremarkable Retroperitoneal/Lymph Nodes/Vasculature: No retroperitoneal adenopathy is identified by size criteria. Gastrointestinal/Mesentery: The bowel loops are non-dilated without definite wall thickening or mass. The appendix appears within normal limits. No evidence of obstruction. No free air. Retained contrast in the large bowel from the previous exam. There is colon diverticulosis with no evidence of acute diverticulitis Bladder: The bladder is unremarkable. Intraluminal air in the bladder likely from instrumentation No acute abnormalities in the pelvis   Bony Structures: Chronic lumbar fracture deformities with kyphoplasty changes again seen     Impression: Impression: 1. Small left pleural effusion with left basilar atelectasis 2. Colon diverticulosis 3. Splenomegaly 4. Stable small left adrenal nodule 5. No definite acute  CT abnormalities in the abdomen or pelvis Electronically Signed: Trenton Rendoni  6/8/2023 6:19 PM EDT  Workstation ID: [id number]    XR Chest 1 View    Result Date: 6/8/2023  XR CHEST 1 VW Date of Exam: 6/8/2023 3:40 PM EDT Indication: Weak/Dizzy/AMS triage protocol Comparison: May 5, 2023 Findings: The heart looks enlarged. The lungs seem relatively clear. There are no pleural effusions. There is some atelectasis or scarring in the right lower chest.     Impression: Impression: 1.Suggested cardiomegaly. Some of this could relate to magnification as this seems like a change from prior chest x-ray that was more of a PA view. 2.Atelectasis or scarring right lower chest. Electronically Signed: Portillo Thao  6/8/2023 3:55 PM EDT  Workstation ID: [id number]     Results for orders placed during the hospital encounter of 06/05/23    Adult Transthoracic Echo Complete w/ Color, Spectral and Contrast if necessary per protocol    Interpretation Summary    Left ventricular systolic function is normal. Left ventricular ejection fraction appears to be 56 - 60%.    Left ventricular diastolic function was normal.    Estimated right ventricular systolic pressure from tricuspid regurgitation is normal (<35 mmHg).      Assessment & Plan   Assessment & Plan       Nausea and vomiting, unspecified vomiting type    Hypothyroidism (acquired)    Myelodysplasia (myelodysplastic syndrome)    Weakness    Dehydration    Urinary tract infection, acute    Chronic anticoagulation    Essential hypertension    Type 2 diabetes mellitus without complication, with long-term current use of insulin    Atrial fibrillation    Hypomagnesemia    Lactic acidosis    87 y.o. female with PMH significant for myelodysplasia with chronic pancytopenia, HTN, T2DM, PE, GERD, A-fib (on Eliquis), who presents to the ED with complaint of nausea, vomiting, and weakness who is found to have concern for dehydration and UTI.     Intractable nausea and vomiting  Abdominal  pain  Dehydration  Elevated lactic acid  - Recent admission with similar presentation  - GI consult in the a.m.  - IVF bolus x1 given in the ED, continue gentle IVF  - As needed antiemetics  - CBC, BMP in the a.m.  - Reflex lactic improved  - Concern medication may be playing a role    UTI  - Recurrent  - Most recent urine culture 5/5/2023 with E. coli  - Urine culture pending  - Rocephin  - CBC in the a.m.    Weakness  - Reports fall x2  - PT/OT consult in the a.m.  - Fall precautions    Hypomagnesemia  - Electrolyte replacement  - BMP, mag in the a.m.    Elevated troponin  - Mildly elevated  - No complaint of chest pain  - EKG negative for acute ischemia  - Repeat troponin pending    Diabetes mellitus 2  - Family reports patient on metformin 500 mg twice daily, hold for now  - FSBG ACHS  - SS insulin  - Metformin could be contributing to nausea and vomiting    Myelodysplasia  Anemia of chronic disease  Pancytopenia (chronic)  - Stable  - Follows outpatient with Dr. Lyman    Atrial fibrillation  Hypertension  - Follows with Dr. Chand  - On amiodarone and metoprolol on previous admission, family states only on propanolol  - Was discharged yesterday on metoprolol and amiodarone, will continue those for now  - Pharmacy needs to be contacted for updated medication list  - Continue Eliquis twice daily    Hypothyroid  - Patient family reports not on levothyroxine  - Patient was discharged yesterday on levothyroxine  - We will continue for now, pharmacy needs to be contacted for updated med list  - TSH 1.950 on 6/6/2023    DVT prophylaxis:  On Eliquis     CODE STATUS:    Code Status (Patient has no pulse and is not breathing): CPR (Attempt to Resuscitate)  Medical Interventions (Patient has pulse or is breathing): Full Support      Expected Discharge  Expected Discharge Date: 6/10/2023; Expected Discharge Time:     Signature: Electronically signed by CHANG Tran, 06/08/23, 9:40 PM EDT.  Total time spent: 63  minutes   Time spent includes time reviewing chart, face-to-face time, counseling patient/family/caregiver, ordering medications/tests/procedures, communicating with other health care professionals, documenting clinical information in the electronic health record, and coordination of care.

## 2023-06-09 NOTE — PLAN OF CARE
Goal Outcome Evaluation:  Plan of Care Reviewed With: patient           Outcome Evaluation: VSS, alert and oriented. Patient came from [facility], after fell twice and being having a sries of events of nausea and vomitng. Patient blood sugar elevated and on clear liquid diet. Patient in a room air while awake and in 2 LNC when sleeping.

## 2023-06-10 LAB
ANION GAP SERPL CALCULATED.3IONS-SCNC: 8 MMOL/L (ref 5–15)
BACTERIA SPEC AEROBE CULT: ABNORMAL
BUN SERPL-MCNC: 5 MG/DL (ref 8–23)
BUN/CREAT SERPL: 8.9 (ref 7–25)
CALCIUM SPEC-SCNC: 8.5 MG/DL (ref 8.6–10.5)
CHLORIDE SERPL-SCNC: 106 MMOL/L (ref 98–107)
CO2 SERPL-SCNC: 26 MMOL/L (ref 22–29)
CREAT SERPL-MCNC: 0.56 MG/DL (ref 0.57–1)
DEPRECATED RDW RBC AUTO: 51.8 FL (ref 37–54)
EGFRCR SERPLBLD CKD-EPI 2021: 89 ML/MIN/1.73
ERYTHROCYTE [DISTWIDTH] IN BLOOD BY AUTOMATED COUNT: 15.6 % (ref 12.3–15.4)
GLUCOSE BLDC GLUCOMTR-MCNC: 115 MG/DL (ref 70–130)
GLUCOSE BLDC GLUCOMTR-MCNC: 117 MG/DL (ref 70–130)
GLUCOSE BLDC GLUCOMTR-MCNC: 254 MG/DL (ref 70–130)
GLUCOSE BLDC GLUCOMTR-MCNC: 295 MG/DL (ref 70–130)
GLUCOSE SERPL-MCNC: 109 MG/DL (ref 65–99)
HCT VFR BLD AUTO: 28 % (ref 34–46.6)
HGB BLD-MCNC: 8.7 G/DL (ref 12–15.9)
IRON 24H UR-MRATE: 61 MCG/DL (ref 37–145)
IRON SATN MFR SERPL: 28 % (ref 20–50)
MAGNESIUM SERPL-MCNC: 1.7 MG/DL (ref 1.6–2.4)
MCH RBC QN AUTO: 28.3 PG (ref 26.6–33)
MCHC RBC AUTO-ENTMCNC: 31.1 G/DL (ref 31.5–35.7)
MCV RBC AUTO: 91.2 FL (ref 79–97)
PLATELET # BLD AUTO: 81 10*3/MM3 (ref 140–450)
PMV BLD AUTO: 9.3 FL (ref 6–12)
POTASSIUM SERPL-SCNC: 4.6 MMOL/L (ref 3.5–5.2)
RBC # BLD AUTO: 3.07 10*6/MM3 (ref 3.77–5.28)
SODIUM SERPL-SCNC: 140 MMOL/L (ref 136–145)
TIBC SERPL-MCNC: 215 MCG/DL (ref 298–536)
TRANSFERRIN SERPL-MCNC: 144 MG/DL (ref 200–360)
WBC NRBC COR # BLD: 2.54 10*3/MM3 (ref 3.4–10.8)

## 2023-06-10 PROCEDURE — 97535 SELF CARE MNGMENT TRAINING: CPT

## 2023-06-10 PROCEDURE — 83540 ASSAY OF IRON: CPT | Performed by: INTERNAL MEDICINE

## 2023-06-10 PROCEDURE — 97166 OT EVAL MOD COMPLEX 45 MIN: CPT

## 2023-06-10 PROCEDURE — 84466 ASSAY OF TRANSFERRIN: CPT | Performed by: INTERNAL MEDICINE

## 2023-06-10 PROCEDURE — 82948 REAGENT STRIP/BLOOD GLUCOSE: CPT

## 2023-06-10 PROCEDURE — 80048 BASIC METABOLIC PNL TOTAL CA: CPT | Performed by: INTERNAL MEDICINE

## 2023-06-10 PROCEDURE — G0378 HOSPITAL OBSERVATION PER HR: HCPCS

## 2023-06-10 PROCEDURE — 99232 SBSQ HOSP IP/OBS MODERATE 35: CPT | Performed by: INTERNAL MEDICINE

## 2023-06-10 PROCEDURE — 97162 PT EVAL MOD COMPLEX 30 MIN: CPT

## 2023-06-10 PROCEDURE — 25010000002 CEFTRIAXONE PER 250 MG: Performed by: INTERNAL MEDICINE

## 2023-06-10 PROCEDURE — 85027 COMPLETE CBC AUTOMATED: CPT | Performed by: INTERNAL MEDICINE

## 2023-06-10 PROCEDURE — 83735 ASSAY OF MAGNESIUM: CPT | Performed by: INTERNAL MEDICINE

## 2023-06-10 PROCEDURE — 63710000001 INSULIN LISPRO (HUMAN) PER 5 UNITS: Performed by: NURSE PRACTITIONER

## 2023-06-10 RX ADMIN — APIXABAN 2.5 MG: 2.5 TABLET, FILM COATED ORAL at 08:37

## 2023-06-10 RX ADMIN — PANTOPRAZOLE SODIUM 40 MG: 40 TABLET, DELAYED RELEASE ORAL at 08:37

## 2023-06-10 RX ADMIN — LISINOPRIL 5 MG: 5 TABLET ORAL at 08:37

## 2023-06-10 RX ADMIN — METOPROLOL TARTRATE 50 MG: 25 TABLET, FILM COATED ORAL at 21:53

## 2023-06-10 RX ADMIN — METOPROLOL TARTRATE 50 MG: 25 TABLET, FILM COATED ORAL at 08:37

## 2023-06-10 RX ADMIN — Medication 10 ML: at 21:54

## 2023-06-10 RX ADMIN — AMIODARONE HYDROCHLORIDE 200 MG: 200 TABLET ORAL at 08:37

## 2023-06-10 RX ADMIN — LEVOTHYROXINE SODIUM 100 MCG: 0.1 TABLET ORAL at 06:22

## 2023-06-10 RX ADMIN — INSULIN LISPRO 4 UNITS: 100 INJECTION, SOLUTION INTRAVENOUS; SUBCUTANEOUS at 11:36

## 2023-06-10 RX ADMIN — INSULIN LISPRO 4 UNITS: 100 INJECTION, SOLUTION INTRAVENOUS; SUBCUTANEOUS at 21:54

## 2023-06-10 RX ADMIN — APIXABAN 2.5 MG: 2.5 TABLET, FILM COATED ORAL at 21:53

## 2023-06-10 RX ADMIN — PANTOPRAZOLE SODIUM 40 MG: 40 TABLET, DELAYED RELEASE ORAL at 18:04

## 2023-06-10 RX ADMIN — SODIUM CHLORIDE 1 G: 900 INJECTION INTRAVENOUS at 08:37

## 2023-06-10 NOTE — PLAN OF CARE
Goal Outcome Evaluation:  Plan of Care Reviewed With: patient           Outcome Evaluation: Pt presents w/ generalized weakness, decreased functional endurance, and mild balance deficits. Pt req increased assistance for ADLs this date, pt would benefit from continued skilled IPOT services to address current functional deficits. Rec trial of AE next session for improved safety and independence w/ LB ADLs. Rec return to [facility] HHOT/PT if pt can receive assist for ADLs.

## 2023-06-10 NOTE — THERAPY EVALUATION
Patient Name: Chey Robertson  : 1935    MRN: [id number]                              Today's Date: 6/10/2023       Admit Date: 2023    Visit Dx:     ICD-10-CM ICD-9-CM   1. Nausea and vomiting, unspecified vomiting type  R11.2 787.01   2. Hypomagnesemia  E83.42 275.2     Patient Active Problem List   Diagnosis   • Benign familial tremor   • AMS (altered mental status)   • Thrombocytopenia   • Anemia   • Shortness of breath   • Hypothyroidism (acquired)   • Acute kidney injury   • Splenomegaly   • Hepatomegaly   • Confusion   • B12 deficiency   • Abnormal intestinal absorption   • Iron deficiency anemia due to chronic blood loss   • Myelodysplasia (myelodysplastic syndrome)   • Weakness   • Personal history of pulmonary embolism   • Dehydration   • Urinary tract infection, acute   • Chronic anticoagulation   • Essential hypertension   • Type 2 diabetes mellitus without complication, with long-term current use of insulin   • COVID-19 virus infection   • Atrial fibrillation   • Chest pain   • Elevated troponin   • Hyponatremia   • Hypomagnesemia   • QT prolongation   • Pericardial effusion   • Severe Malnutrition (HCC)   • Nausea and vomiting, unspecified vomiting type   • Lactic acidosis     Past Medical History:   Diagnosis Date   • Anemia    • Arthritis    • Diabetes mellitus     checks sugar only when pt wants to    • Disease of thyroid gland    • History of transfusion     with knee surgery-  no reaction recalled.    • [place] (hard of hearing)     no hearing aids   • Hypertension    • Migraine without aura and without status migrainosus, not intractable 2016   • Pulmonary emboli     (after knee surgery)   • SOBOE (shortness of breath on exertion)    • Tremors of nervous system    • Vertigo    • Wears dentures     upper    • Wears glasses      Past Surgical History:   Procedure Laterality Date   • BLADDER SURGERY     • CATARACT EXTRACTION      bilat    • CHOLECYSTECTOMY     • COLONOSCOPY     •  HYSTERECTOMY     • KYPHOPLASTY N/A 2/12/2021    Procedure: KYPHOPLASTY T11, T12 AND L4;  Surgeon: Matty Hearn MD;  Location: [facility];  Service: Orthopedic Spine;  Laterality: N/A;   • OOPHORECTOMY     • TONSILLECTOMY        General Information     Row Name 06/10/23 1402          OT Time and Intention    Document Type evaluation  -     Mode of Treatment occupational therapy  -     Row Name 06/10/23 1402          General Information    Patient Profile Reviewed yes  -     Prior Level of Function independent:;bed mobility;transfer;all household mobility;ADL's  Pt uses rollator for functional mobility at baseline, pt w/ recent functional decline and recent move to [facility]     Existing Precautions/Restrictions fall  -     Barriers to Rehab medically complex  -     Row Name 06/10/23 1402          Living Environment    People in Home alone;facility resident  Pt lives at [facility]     Row Name 06/10/23 1402          Home Main Entrance    Number of Stairs, Main Entrance none  -     Row Name 06/10/23 1402          Stairs Within Home, Primary    Number of Stairs, Within Home, Primary none  -     Row Name 06/10/23 1402          Cognition    Orientation Status (Cognition) oriented x 3  -     Row Name 06/10/23 1402          Safety Issues, Functional Mobility    Safety Issues Affecting Function (Mobility) awareness of need for assistance;insight into deficits/self-awareness;safety precaution awareness;safety precautions follow-through/compliance;sequencing abilities  -     Impairments Affecting Function (Mobility) balance;endurance/activity tolerance;strength  -           User Key  (r) = Recorded By, (t) = Taken By, (c) = Cosigned By    Initials Name Provider Type     Yue Moses OT Occupational Therapist                 Mobility/ADL's     Row Name 06/10/23 1405          Bed Mobility    Bed Mobility supine-sit  -     Supine-Sit [place] (Bed Mobility) minimum assist (75% patient  effort);1 person assist;verbal cues  -     Bed Mobility, Safety Issues decreased use of arms for pushing/pulling  -     Assistive Device (Bed Mobility) bed rails  -     Row Name 06/10/23 1405          Transfers    Transfers sit-stand transfer;stand-sit transfer;bed-chair transfer  -     Row Name 06/10/23 1405          Bed-Chair Transfer    Bed-Chair [place] (Transfers) contact guard;verbal cues  -     Assistive Device (Bed-Chair Transfers) walker, front-wheeled  -     Row Name 06/10/23 1405          Sit-Stand Transfer    Sit-Stand [place] (Transfers) contact guard;verbal cues  -     Assistive Device (Sit-Stand Transfers) walker, front-wheeled  -     Row Name 06/10/23 1405          Stand-Sit Transfer    Stand-Sit [place] (Transfers) contact guard;verbal cues  -     Assistive Device (Stand-Sit Transfers) walker, front-wheeled  -     Row Name 06/10/23 1405          Functional Mobility    Functional Mobility- Comment Deferred to PT  -     Row Name 06/10/23 1405          Activities of Daily Living    BADL Assessment/Intervention lower body dressing;toileting;upper body dressing  -     Row Name 06/10/23 1405          Lower Body Dressing Assessment/Training    [place] Level (Lower Body Dressing) doff;don;socks;dependent (less than 25% patient effort)  -     Position (Lower Body Dressing) supported sitting;edge of bed sitting  -     Comment, (Lower Body Dressing) Pt attempted to don socks sitting EOB, however, unable to reach feet. Rec trial of AE next session for increased independence and safety w/ LB ADLs.  -     Row Name 06/10/23 1405          Toileting Assessment/Training    [place] Level (Toileting) adjust/manage clothing;change pad/brief;perform perineal hygiene;dependent (less than 25% patient effort);verbal cues  -     Position (Toileting) supine  -     Comment, (Toileting) Pt w/ large incontinent BM in supine upon entry req extended time for toilet  hygiene.  -     Row Name 06/10/23 1405          Upper Body Dressing Assessment/Training    [place] Level (Upper Body Dressing) doff;don;other (see comments);minimum assist (75% patient effort);verbal cues  hospital gown  -     Position (Upper Body Dressing) edge of bed sitting  -           User Key  (r) = Recorded By, (t) = Taken By, (c) = Cosigned By    Initials Name Provider Type     Yue Moses OT Occupational Therapist               Obj/Interventions     [facility] Name 06/10/23 1407          Sensory Assessment (Somatosensory)    Sensory Assessment (Somatosensory) UE sensation intact  -[facility] Name 06/10/23 1407          Vision Assessment/Intervention    Visual Impairment/Limitations WFL  -[facility] Name 06/10/23 1407          Range of Motion Comprehensive    General Range of Motion bilateral upper extremity ROM WFL  -[facility] Name 06/10/23 1407          Strength Comprehensive (MMT)    General Manual Muscle Testing (MMT) Assessment upper extremity strength deficits identified  -     Comment, General Manual Muscle Testing (MMT) Assessment BUE grossly 4/5  -[facility] Name 06/10/23 1407          Balance    Balance Assessment sitting static balance;sitting dynamic balance;sit to stand dynamic balance;standing static balance;standing dynamic balance  -     Static Sitting Balance standby assist  -     Dynamic Sitting Balance standby assist  -     Position, Sitting Balance unsupported;sitting in chair;sitting edge of bed  -     Sit to Stand Dynamic Balance contact guard;verbal cues  -     Static Standing Balance contact guard;verbal cues  -     Dynamic Standing Balance contact guard;verbal cues  -     Position/Device Used, Standing Balance supported;walker, front-wheeled  -     Balance Interventions sitting;sit to stand;occupation based/functional task  -           User Key  (r) = Recorded By, (t) = Taken By, (c) = Cosigned By    Initials Name Provider Type     Aurelia  JERRI Turner Occupational Therapist               Goals/Plan     Row Name 06/10/23 1410          Bed Mobility Goal 1 (OT)    Activity/Assistive Device (Bed Mobility Goal 1, OT) sit to supine;supine to sit  -     [place] Level/Cues Needed (Bed Mobility Goal 1, OT) standby assist  -MC     Time Frame (Bed Mobility Goal 1, OT) long term goal (LTG);10 days  -     Progress/Outcomes (Bed Mobility Goal 1, OT) goal ongoing  -     Row Name 06/10/23 1410          Transfer Goal 1 (OT)    Activity/Assistive Device (Transfer Goal 1, OT) sit-to-stand/stand-to-sit;bed-to-chair/chair-to-bed;toilet;walker, rolling  -     [place] Level/Cues Needed (Transfer Goal 1, OT) standby assist  -MC     Time Frame (Transfer Goal 1, OT) long term goal (LTG);10 days  -MC     Progress/Outcome (Transfer Goal 1, OT) goal ongoing  -     Row Name 06/10/23 1410          Dressing Goal 1 (OT)    Activity/Device (Dressing Goal 1, OT) lower body dressing  don/doff socks, undergarment w/ AAD  -     [place]/Cues Needed (Dressing Goal 1, OT) moderate assist (50-74% patient effort);verbal cues required  -     Time Frame (Dressing Goal 1, OT) long term goal (LTG);10 days  -     Progress/Outcome (Dressing Goal 1, OT) goal ongoing  -     Row Name 06/10/23 1410          Grooming Goal 1 (OT)    Activity/Device (Grooming Goal 1, OT) hair care;oral care;wash face, hands  standing sinkside  -     [place] (Grooming Goal 1, OT) standby assist  -MC     Time Frame (Grooming Goal 1, OT) long term goal (LTG);10 days  -     Progress/Outcome (Grooming Goal 1, OT) goal ongoing  -     Row Name 06/10/23 1410          Therapy Assessment/Plan (OT)    Planned Therapy Interventions (OT) activity tolerance training;adaptive equipment training;BADL retraining;IADL retraining;functional balance retraining;occupation/activity based interventions;patient/caregiver education/training;ROM/therapeutic exercise;strengthening  exercise;transfer/mobility retraining  -           User Key  (r) = Recorded By, (t) = Taken By, (c) = Cosigned By    Initials Name Provider Type     Yue Moses, JERRI Occupational Therapist               Clinical Impression     Row Name 06/10/23 1407          Pain Assessment    Pretreatment Pain Rating 0/10 - no pain  -     Posttreatment Pain Rating 0/10 - no pain  -     Row Name 06/10/23 1407          Plan of Care Review    Plan of Care Reviewed With patient  -     Outcome Evaluation Pt presents w/ generalized weakness, decreased functional endurance, and mild balance deficits. Pt req increased assistance for ADLs this date, pt would benefit from continued skilled IPOT services to address current functional deficits. Rec trial of AE next session for improved safety and independence w/ LB ADLs. Rec return to [facility] & HHOT/PT if pt can receive assist for ADLs.  -[facility] Name 06/10/23 1407          Therapy Assessment/Plan (OT)    Rehab Potential (OT) good, to achieve stated therapy goals  -     Criteria for Skilled Therapeutic Interventions Met (OT) yes;skilled treatment is necessary  -     Therapy Frequency (OT) daily  -     Row Name 06/10/23 1407          Therapy Plan Review/Discharge Plan (OT)    Anticipated Discharge Disposition (OT) assisted living;home with home health  -     Row Name 06/10/23 1407          Vital Signs    Pre Systolic BP Rehab --  VSS - RN cleared OT  -     O2 Delivery Pre Treatment room air  -     O2 Delivery Intra Treatment room air  -     O2 Delivery Post Treatment room air  -     Pre Patient Position Supine  -     Intra Patient Position Standing  -     Post Patient Position Sitting  -[facility] Name 06/10/23 1407          Positioning and Restraints    Pre-Treatment Position in bed  -     Post Treatment Position chair  -     In Chair reclined;call light within reach;encouraged to call for assist;exit alarm on;with nsg;waffle cushion;legs elevated  -            User Key  (r) = Recorded By, (t) = Taken By, (c) = Cosigned By    Initials Name Provider Type    Yue Murrell OT Occupational Therapist               Outcome Measures     Row Name 06/10/23 1411          How much help from another is currently needed...    Putting on and taking off regular lower body clothing? 2  -MC     Bathing (including washing, rinsing, and drying) 2  -MC     Toileting (which includes using toilet bed pan or urinal) 2  -MC     Putting on and taking off regular upper body clothing 3  -MC     Taking care of personal grooming (such as brushing teeth) 3  -MC     Eating meals 3  -MC     AM-PAC 6 Clicks Score (OT) 15  -MC     Row Name 06/10/23 0800          How much help from another person do you currently need...    Turning from your back to your side while in flat bed without using bedrails? 4  -AN     Moving from lying on back to sitting on the side of a flat bed without bedrails? 3  -AN     Moving to and from a bed to a chair (including a wheelchair)? 3  -AN     Standing up from a chair using your arms (e.g., wheelchair, bedside chair)? 3  -AN     Climbing 3-5 steps with a railing? 3  -AN     To walk in hospital room? 3  -AN     AM-PAC 6 Clicks Score (PT) 19  -AN     Highest level of mobility 6 --> Walked 10 steps or more  -AN     Row Name 06/10/23 1411          Functional Assessment    Outcome Measure Options AM-PAC 6 Clicks Daily Activity (OT)  -           User Key  (r) = Recorded By, (t) = Taken By, (c) = Cosigned By    Initials Name Provider Type    Nevin Branch RN Registered Nurse    Yue Murrell OT Occupational Therapist                Occupational Therapy Education     Title: PT OT SLP Therapies (In Progress)     Topic: Occupational Therapy (In Progress)     Point: ADL training (In Progress)     Description:   Instruct learner(s) on proper safety adaptation and remediation techniques during self care or transfers.   Instruct in proper use of assistive  devices.              Learning Progress Summary           Patient Acceptance, E, NR by  at 6/10/2023 1412                   Point: Home exercise program (Not Started)     Description:   Instruct learner(s) on appropriate technique for monitoring, assisting and/or progressing therapeutic exercises/activities.              Learner Progress:  Not documented in this visit.          Point: Precautions (In Progress)     Description:   Instruct learner(s) on prescribed precautions during self-care and functional transfers.              Learning Progress Summary           Patient Acceptance, E, NR by  at 6/10/2023 1412                   Point: Body mechanics (In Progress)     Description:   Instruct learner(s) on proper positioning and spine alignment during self-care, functional mobility activities and/or exercises.              Learning Progress Summary           Patient Acceptance, E, NR by  at 6/10/2023 1412                               User Key     Initials Effective Dates Name Provider Type Discipline     10/14/22 -  Yue Moses OT Occupational Therapist OT              OT Recommendation and Plan  Planned Therapy Interventions (OT): activity tolerance training, adaptive equipment training, BADL retraining, IADL retraining, functional balance retraining, occupation/activity based interventions, patient/caregiver education/training, ROM/therapeutic exercise, strengthening exercise, transfer/mobility retraining  Therapy Frequency (OT): daily  Plan of Care Review  Plan of Care Reviewed With: patient  Outcome Evaluation: Pt presents w/ generalized weakness, decreased functional endurance, and mild balance deficits. Pt req increased assistance for ADLs this date, pt would benefit from continued skilled IPOT services to address current functional deficits. Rec trial of AE next session for improved safety and independence w/ LB ADLs. Rec return to [facility] & HHOT/PT if pt can receive assist for ADLs.     Time  Calculation:    Time Calculation- OT     Row Name 06/10/23 1412             Time Calculation- OT    OT Start Time 1301  -MC      OT Received On 06/10/23  -      OT Goal Re-Cert Due Date 06/20/23  -         Timed Charges    05305 - OT Therapeutic Activity Minutes 5  -MC      36209 - OT Self Care/Mgmt Minutes 13  -MC         Untimed Charges    OT Eval/Re-eval Minutes 31  -MC         Total Minutes    Timed Charges Total Minutes 18  -MC      Untimed Charges Total Minutes 31  -MC       Total Minutes 49  -MC            User Key  (r) = Recorded By, (t) = Taken By, (c) = Cosigned By    Initials Name Provider Type     Yue Moses, OT Occupational Therapist              Therapy Charges for Today     Code Description Service Date Service Provider Modifiers Qty    43898328413 HC OT SELF CARE/MGMT/TRAIN EA 15 MIN 6/10/2023 Yue Moses, OT GO 1    [id number] HC OT EVAL MOD COMPLEXITY 3 6/10/2023 Yue Moses OT GO 1               Yue Moses OT  6/10/2023

## 2023-06-10 NOTE — THERAPY EVALUATION
Patient Name: Chey Robertson  : 1935    MRN: [id number]                              Today's Date: 6/10/2023       Admit Date: 2023    Visit Dx:     ICD-10-CM ICD-9-CM   1. Nausea and vomiting, unspecified vomiting type  R11.2 787.01   2. Hypomagnesemia  E83.42 275.2     Patient Active Problem List   Diagnosis   • Benign familial tremor   • AMS (altered mental status)   • Thrombocytopenia   • Anemia   • Shortness of breath   • Hypothyroidism (acquired)   • Acute kidney injury   • Splenomegaly   • Hepatomegaly   • Confusion   • B12 deficiency   • Abnormal intestinal absorption   • Iron deficiency anemia due to chronic blood loss   • Myelodysplasia (myelodysplastic syndrome)   • Weakness   • Personal history of pulmonary embolism   • Dehydration   • Urinary tract infection, acute   • Chronic anticoagulation   • Essential hypertension   • Type 2 diabetes mellitus without complication, with long-term current use of insulin   • COVID-19 virus infection   • Atrial fibrillation   • Chest pain   • Elevated troponin   • Hyponatremia   • Hypomagnesemia   • QT prolongation   • Pericardial effusion   • Severe Malnutrition (HCC)   • Nausea and vomiting, unspecified vomiting type   • Lactic acidosis     Past Medical History:   Diagnosis Date   • Anemia    • Arthritis    • Diabetes mellitus     checks sugar only when pt wants to    • Disease of thyroid gland    • History of transfusion     with knee surgery-  no reaction recalled.    • [place] (hard of hearing)     no hearing aids   • Hypertension    • Migraine without aura and without status migrainosus, not intractable 2016   • Pulmonary emboli     (after knee surgery)   • SOBOE (shortness of breath on exertion)    • Tremors of nervous system    • Vertigo    • Wears dentures     upper    • Wears glasses      Past Surgical History:   Procedure Laterality Date   • BLADDER SURGERY     • CATARACT EXTRACTION      bilat    • CHOLECYSTECTOMY     • COLONOSCOPY     •  HYSTERECTOMY     • KYPHOPLASTY N/A 2/12/2021    Procedure: KYPHOPLASTY T11, T12 AND L4;  Surgeon: Matty Hearn MD;  Location: [facility];  Service: Orthopedic Spine;  Laterality: N/A;   • OOPHORECTOMY     • TONSILLECTOMY        General Information     Row Name 06/10/23 1412          Physical Therapy Time and Intention    Document Type evaluation  -     Mode of Treatment physical therapy  -     Row Name 06/10/23 1412          General Information    Patient Profile Reviewed yes  -     Prior Level of Function independent:;all household mobility;gait;transfer;bed mobility  Pt uses rollator for functional mobility at baseline, pt w/ recent functional decline and recent move to [facility]     Existing Precautions/Restrictions fall  -     Barriers to Rehab medically complex  -     Row Name 06/10/23 1412          Living Environment    People in Home alone;facility resident  Pt lives at [facility]     Row Name 06/10/23 1412          Home Main Entrance    Number of Stairs, Main Entrance none  -     Row Name 06/10/23 1412          Cognition    Orientation Status (Cognition) oriented x 4  -     Row Name 06/10/23 1412          Safety Issues, Functional Mobility    Safety Issues Affecting Function (Mobility) awareness of need for assistance;insight into deficits/self-awareness;safety precaution awareness;safety precautions follow-through/compliance;sequencing abilities  -     Impairments Affecting Function (Mobility) balance;endurance/activity tolerance;strength  -           User Key  (r) = Recorded By, (t) = Taken By, (c) = Cosigned By    Initials Name Provider Type     Mary Kay Moulton, PT Physical Therapist               Mobility     Row Name 06/10/23 1413          Bed Mobility    Comment, (Bed Mobility) Pt UIC upon arrival  -     Row Name 06/10/23 1413          Sit-Stand Transfer    Sit-Stand [place] (Transfers) contact guard;verbal cues  -     Assistive Device (Sit-Stand Transfers) walker,  front-wheeled  -HM     Row Name 06/10/23 1413          Gait/Stairs (Locomotion)    [place] Level (Gait) contact guard;1 person assist  -     Assistive Device (Gait) walker, front-wheeled  -     Distance in Feet (Gait) 350  -     Deviations/Abnormal Patterns (Gait) bilateral deviations;gait speed decreased;[place] decreased;stride length decreased  -     Bilateral Gait Deviations forward flexed posture  -     Comment, (Gait/Stairs) Pt ambulated with a step through pattern with a decreased stride length, decreased gait speed, and slight forward flexed posture. Pt cued to maintain within close proximity to FWx throughout ambulation.  -           User Key  (r) = Recorded By, (t) = Taken By, (c) = Cosigned By    Initials Name Provider Type     Mary Kay Moulton PT Physical Therapist               Obj/Interventions     Row Name 06/10/23 1415          Range of Motion Comprehensive    General Range of Motion bilateral lower extremity ROM WFL  -     Row Name 06/10/23 1415          Strength Comprehensive (MMT)    General Manual Muscle Testing (MMT) Assessment lower extremity strength deficits identified  -     Comment, General Manual Muscle Testing (MMT) Assessment BLE grossly 4/5  -     Row Name 06/10/23 1415          Balance    Balance Assessment sitting static balance;sitting dynamic balance;sit to stand dynamic balance;standing dynamic balance;standing static balance  -     Static Sitting Balance standby assist  -     Dynamic Sitting Balance standby assist  -     Position, Sitting Balance unsupported;sitting in chair  -     Sit to Stand Dynamic Balance contact guard;verbal cues  -     Static Standing Balance contact guard;verbal cues  -     Dynamic Standing Balance contact guard;verbal cues  -     Position/Device Used, Standing Balance supported;walker, front-wheeled  -     Balance Interventions standing;dynamic;occupation based/functional task  -     Comment, Balance no LOB  -      Row Name 06/10/23 1415          Sensory Assessment (Somatosensory)    Sensory Assessment (Somatosensory) LE sensation intact  -           User Key  (r) = Recorded By, (t) = Taken By, (c) = Cosigned By    Initials Name Provider Type     Mary Kay Moulton PT Physical Therapist               Goals/Plan     Row Name 06/10/23 1417          Bed Mobility Goal 1 (PT)    Activity/Assistive Device (Bed Mobility Goal 1, PT) bed mobility activities, all  -HM     [place] Level/Cues Needed (Bed Mobility Goal 1, PT) independent  -HM     Time Frame (Bed Mobility Goal 1, PT) long term goal (LTG);10 days  -HM     Progress/Outcomes (Bed Mobility Goal 1, PT) new goal  -     Row Name 06/10/23 1417          Transfer Goal 1 (PT)    Activity/Assistive Device (Transfer Goal 1, PT) sit-to-stand/stand-to-sit;bed-to-chair/chair-to-bed  -HM     [place] Level/Cues Needed (Transfer Goal 1, PT) standby assist  -HM     Time Frame (Transfer Goal 1, PT) long term goal (LTG);10 days  -HM     Progress/Outcome (Transfer Goal 1, PT) new goal  -     Row Name 06/10/23 1417          Gait Training Goal 1 (PT)    Activity/Assistive Device (Gait Training Goal 1, PT) gait (walking locomotion)  -HM     [place] Level (Gait Training Goal 1, PT) standby assist  -HM     Distance (Gait Training Goal 1, PT) 450  -HM     Time Frame (Gait Training Goal 1, PT) long term goal (LTG);10 days  -HM     Progress/Outcome (Gait Training Goal 1, PT) new goal  -     Row Name 06/10/23 1417          Therapy Assessment/Plan (PT)    Planned Therapy Interventions (PT) balance training;bed mobility training;home exercise program;gait training;postural re-education;patient/family education;neuromuscular re-education;ROM (range of motion);strengthening;transfer training  -           User Key  (r) = Recorded By, (t) = Taken By, (c) = Cosigned By    Initials Name Provider Type     Mary Kay Moulton PT Physical Therapist               Clinical Impression      Row Name 06/10/23 1415          Pain    Pretreatment Pain Rating 0/10 - no pain  -     Posttreatment Pain Rating 0/10 - no pain  -     Row Name 06/10/23 1415          Plan of Care Review    Plan of Care Reviewed With patient  -     Progress no change  -     Outcome Evaluation PT eval completed. PT ambulated 350 feet CGA with FWx with cueing for maintaining close proximity to walker for increased stability. Pt demonstrated generalized weakness, mild decreased balance, and decreased functional endurance warranting IPPT POC. d/c rec return to [facility] and HHPT/OT.  -     Row Name 06/10/23 1415          Therapy Assessment/Plan (PT)    Rehab Potential (PT) good, to achieve stated therapy goals  -     Criteria for Skilled Interventions Met (PT) yes;meets criteria;skilled treatment is necessary  -     Therapy Frequency (PT) daily  -[facility] Name 06/10/23 1415          Vital Signs    Pre Systolic BP Rehab 132  -HM     Pre Treatment Diastolic BP 61  -HM     Pretreatment Heart Rate (beats/min) 62  -     Pre SpO2 (%) 95  -     O2 Delivery Pre Treatment room air  -     O2 Delivery Intra Treatment room air  -     O2 Delivery Post Treatment room air  -     Pre Patient Position Sitting  -     Intra Patient Position Standing  -     Post Patient Position Sitting  -[facility] Name 06/10/23 1415          Positioning and Restraints    Pre-Treatment Position sitting in chair/recliner  -     Post Treatment Position chair  -HM     In Chair notified nsg;reclined;sitting;call light within reach;encouraged to call for assist;exit alarm on;waffle cushion;legs elevated;heels elevated  -           User Key  (r) = Recorded By, (t) = Taken By, (c) = Cosigned By    Initials Name Provider Type     Mary Kay Moulton, PT Physical Therapist               Outcome Measures     Row Name 06/10/23 1419 06/10/23 0800       How much help from another person do you currently need...    Turning from your back to your side while in  flat bed without using bedrails? 4  - 4  -AN    Moving from lying on back to sitting on the side of a flat bed without bedrails? 3  - 3  -AN    Moving to and from a bed to a chair (including a wheelchair)? 3  - 3  -AN    Standing up from a chair using your arms (e.g., wheelchair, bedside chair)? 3  - 3  -AN    Climbing 3-5 steps with a railing? 3  - 3  -AN    To walk in hospital room? 3  - 3  -AN    AM-PAC 6 Clicks Score (PT) 19  - 19  -AN    Highest level of mobility 6 --> Walked 10 steps or more  - 6 --> Walked 10 steps or more  -AN    Row Name 06/10/23 1419 06/10/23 1411       Functional Assessment    Outcome Measure Options AM-PAC 6 Clicks Basic Mobility (PT)  - AM-PAC 6 Clicks Daily Activity (OT)  -          User Key  (r) = Recorded By, (t) = Taken By, (c) = Cosigned By    Initials Name Provider Type    AN Nevin Ackerman RN Registered Nurse    Yue Murrell, OT Occupational Therapist     Mary Kay Moulton, PT Physical Therapist                             Physical Therapy Education     Title: PT OT SLP Therapies (In Progress)     Topic: Physical Therapy (In Progress)     Point: Mobility training (Done)     Learning Progress Summary           Patient Acceptance, E,TB, VU,NR by  at 6/10/2023 1419                   Point: Home exercise program (Not Started)     Learner Progress:  Not documented in this visit.          Point: Body mechanics (Done)     Learning Progress Summary           Patient Acceptance, E,TB, VU,NR by  at 6/10/2023 1419                   Point: Precautions (Done)     Learning Progress Summary           Patient Acceptance, E,TB, VU,NR by  at 6/10/2023 1419                               User Key     Initials Effective Dates Name Provider Type Discipline     09/22/22 -  Mary Kay Moulton, PT Physical Therapist PT              PT Recommendation and Plan  Planned Therapy Interventions (PT): balance training, bed mobility training, home exercise program, gait  training, postural re-education, patient/family education, neuromuscular re-education, ROM (range of motion), strengthening, transfer training  Plan of Care Reviewed With: patient  Progress: no change  Outcome Evaluation: PT eval completed. PT ambulated 350 feet CGA with FWx with cueing for maintaining close proximity to walker for increased stability. Pt demonstrated generalized weakness, mild decreased balance, and decreased functional endurance warranting IPPT POC. d/c rec return to senior care and HHPT/OT.     Time Calculation:    PT Charges     Row Name 06/10/23 1421             Time Calculation    Start Time 1345  -HM      PT Received On 06/10/23  -HM      PT Goal Re-Cert Due Date 06/20/23  -HM         Untimed Charges    PT Eval/Re-eval Minutes 46  -HM         Total Minutes    Untimed Charges Total Minutes 46  -HM       Total Minutes 46  -HM            User Key  (r) = Recorded By, (t) = Taken By, (c) = Cosigned By    Initials Name Provider Type     Mary Kay Moulton, GURPREET Physical Therapist              Therapy Charges for Today     Code Description Service Date Service Provider Modifiers Qty    [id number]  PT EVAL MOD COMPLEXITY 4 6/10/2023 Mary Kay Moulton, GURPREET GP 1          PT G-Codes  Outcome Measure Options: AM-PAC 6 Clicks Basic Mobility (PT)  AM-PAC 6 Clicks Score (PT): 19  AM-PAC 6 Clicks Score (OT): 15  PT Discharge Summary  Anticipated Discharge Disposition (PT): assisted living, home with home health    Mary Kay Moulton PT  6/10/2023

## 2023-06-10 NOTE — PROGRESS NOTES
[facility] Medicine Services  PROGRESS NOTE    Patient Name: Chey Robertson  : 1935  MRN: [id number]    Date of Admission: 2023  Primary Care Physician: Corby Marie MD    Subjective   Subjective     CC:  N/v    HPI:  Tolerating clears. No abd pain. No n/v. Feeling ok. Hasn't been out of bed yet    ROS:  No f/c  No cp    Objective   Objective     Vital Signs:   Temp:  [97.6 °F (36.4 °C)-98.6 °F (37 °C)] 98.5 °F (36.9 °C)  Heart Rate:  [58-74] 62  Resp:  [18] 18  BP: (120-154)/(50-70) 132/61     Physical Exam:  Constitutional:Alert, elderly and frail but nontoxic appearing, oriented x 3, no distress  Psych:Normal/appropriate affect  HEENT:NCAT, oropharynx clear  Neck: neck supple, full range of motion  Neuro: Face symmetric, speech clear, equal , moves all extremities  Cardiac: RRR; No pretibial pitting edema  Resp: CTAB, normal effort  GI: abd soft, nontender to palpation  Skin: No extremity rash  Musculoskeletal/extremities: no cyanosis of extremities; no significant ankle edema      Results Reviewed:  LAB RESULTS:      Lab 06/10/23  0347 23  0855 23  1929 23  1533 23  0504 23  0516 23  2321 23  1949 23  1629   WBC 2.54* 3.97  --  7.62 1.13* 1.49*  --   --  2.25*   HEMOGLOBIN 8.7* 9.0*  --  9.6* 9.2* 8.7*  --   --  10.1*   HEMATOCRIT 28.0* 27.9*  --  29.7* 27.3* 27.6*  --   --  31.0*   PLATELETS 81* 67*  --  67* 52* 58*  --   --  58*   NEUTROS ABS  --  3.21  --  6.74 0.64* 0.74*  --   --  1.79   IMMATURE GRANS (ABS)  --  0.02  --  0.09* 0.01 0.01  --   --  0.02   LYMPHS ABS  --  0.54*  --  0.42* 0.34* 0.52*  --   --  0.30*   MONOS ABS  --  0.20  --  0.36 0.14 0.22  --   --  0.14   EOS ABS  --  0.00  --  0.01 0.00 0.00  --   --  0.00   MCV 91.2 90.3  --  87.1 88.9 89.9  --   --  87.1   LACTATE  --   --  2.0 2.6*  --   --  1.6 2.2* 3.2*         Lab 06/10/23  0347 23  2056 23  0855 23  1532  06/07/23  0504 06/06/23  0516 06/05/23  1631 06/05/23  1629   SODIUM 140  --  138 131* 132* 134*  --  132*   POTASSIUM 4.6 4.4 3.6 3.7 3.9 3.8  --  4.3   CHLORIDE 106  --  101 93* 94* 93*  --  94*   CO2 26.0  --  26.0 27.0 28.0 29.0  --  22.0   ANION GAP 8.0  --  11.0 11.0 10.0 12.0  --  16.0*   BUN 5*  --  8 11 13 20  --  20   CREATININE 0.56*  --  0.60 0.49* 0.58 0.69   < > 0.75   EGFR 89.0  --  87.5 91.9 88.3 84.6  --  77.6   GLUCOSE 109*  --  173* 162* 137* 112*  --  252*   CALCIUM 8.5*  --  8.3* 9.0 8.8 8.5*  --  8.6   MAGNESIUM 1.7  --  1.8 1.4*  --  1.8  --  1.4*   HEMOGLOBIN A1C  --   --  5.90*  --   --   --   --   --    TSH  --   --   --   --   --  1.950  --   --     < > = values in this interval not displayed.         Lab 06/09/23  0855 06/08/23  1533 06/05/23  1629   TOTAL PROTEIN  --  6.1 6.0   ALBUMIN  --  3.6 3.6   GLOBULIN  --  2.5 2.4   ALT (SGPT)  --  5 6   AST (SGOT)  --  15 11   BILIRUBIN  --  0.5 0.6   ALK PHOS  --  85 84   LIPASE 22  --  20         Lab 06/09/23  0008 06/08/23  2209 06/08/23  1740 06/08/23  1533 06/06/23  1206   HSTROP T 18* 18* 15* 15* 19*                 Brief Urine Lab Results  (Last result in the past 365 days)        Color   Clarity   Blood   Leuk Est   Nitrite   Protein   CREAT   Urine HCG        06/08/23 1621 Yellow   Cloudy   Negative   Trace   Negative   Trace                   Microbiology Results Abnormal       Procedure Component Value - Date/Time    Blood Culture - Blood, Wrist, Right [[id number]]  (Normal) Collected: 06/08/23 1730    Lab Status: Preliminary result Specimen: Blood from Wrist, Right Updated: 06/09/23 1815     Blood Culture No growth at 24 hours    Blood Culture - Blood, Arm, Left [[id number]]  (Normal) Collected: 06/08/23 1741    Lab Status: Preliminary result Specimen: Blood from Arm, Left Updated: 06/09/23 1815     Blood Culture No growth at 24 hours            CT Abdomen Pelvis Without Contrast    Result Date: 6/8/2023  CT ABDOMEN PELVIS WO CONTRAST  Date of Exam: 6/8/2023 5:44 PM EDT Indication: Nausea/vomiting. Comparison: CT abdomen pelvis dated 6/5/2023 Technique: Axial CT images were obtained of the abdomen and pelvis without the administration of contrast. Reconstructed coronal and sagittal images were also obtained. Automated exposure control and iterative construction methods were used. Findings: Lung Bases:  Small left pleural effusion and left basilar atelectasis. Mild pericardial effusion Liver:Liver is normal in size and CT density. No focal lesions. Biliary/Gallbladder: Cholecystectomy changes Spleen: There is splenomegaly and punctate calcifications in the spleen. Small hiatal hernia Pancreas: Pancreas shows homogeneous density. There is no evidence of pancreatic mass or peripancreatic fluid. Kidneys: Kidneys are normal in size. There are no stones or hydronephrosis. Adrenals: Stable left adrenal nodule. Right adrenal gland is unremarkable Retroperitoneal/Lymph Nodes/Vasculature: No retroperitoneal adenopathy is identified by size criteria. Gastrointestinal/Mesentery: The bowel loops are non-dilated without definite wall thickening or mass. The appendix appears within normal limits. No evidence of obstruction. No free air. Retained contrast in the large bowel from the previous exam. There is colon diverticulosis with no evidence of acute diverticulitis Bladder: The bladder is unremarkable. Intraluminal air in the bladder likely from instrumentation No acute abnormalities in the pelvis   Bony Structures: Chronic lumbar fracture deformities with kyphoplasty changes again seen     Impression: Impression: 1. Small left pleural effusion with left basilar atelectasis 2. Colon diverticulosis 3. Splenomegaly 4. Stable small left adrenal nodule 5. No definite acute CT abnormalities in the abdomen or pelvis Electronically Signed: Trenton Elizabeth  6/8/2023 6:19 PM EDT  Workstation ID: [id number]    XR Chest 1 View    Result Date: 6/8/2023  XR CHEST 1 VW Date of  Exam: 6/8/2023 3:40 PM EDT Indication: Weak/Dizzy/AMS triage protocol Comparison: May 5, 2023 Findings: The heart looks enlarged. The lungs seem relatively clear. There are no pleural effusions. There is some atelectasis or scarring in the right lower chest.     Impression: Impression: 1.Suggested cardiomegaly. Some of this could relate to magnification as this seems like a change from prior chest x-ray that was more of a PA view. 2.Atelectasis or scarring right lower chest. Electronically Signed: Portillo Master  6/8/2023 3:55 PM EDT  Workstation ID: [id number]     Results for orders placed during the hospital encounter of 06/05/23    Adult Transthoracic Echo Complete w/ Color, Spectral and Contrast if necessary per protocol    Interpretation Summary    Left ventricular systolic function is normal. Left ventricular ejection fraction appears to be 56 - 60%.    Left ventricular diastolic function was normal.    Estimated right ventricular systolic pressure from tricuspid regurgitation is normal (<35 mmHg).      Current medications:  Scheduled Meds:amiodarone, 200 mg, Oral, Daily  apixaban, 2.5 mg, Oral, BID  insulin lispro, 2-7 Units, Subcutaneous, 4x Daily AC & at Bedtime  levothyroxine, 100 mcg, Oral, Q AM  lisinopril, 5 mg, Oral, Daily  metoprolol tartrate, 50 mg, Oral, BID  pantoprazole, 40 mg, Oral, BID AC  senna-docusate sodium, 2 tablet, Oral, BID  sodium chloride, 10 mL, Intravenous, Q12H      Continuous Infusions:   PRN Meds:.  acetaminophen **OR** acetaminophen **OR** acetaminophen    senna-docusate sodium **AND** polyethylene glycol **AND** bisacodyl **AND** bisacodyl    Calcium Replacement - Follow Nurse / BPA Driven Protocol    dextrose    dextrose    glucagon (human recombinant)    Magnesium Standard Dose Replacement - Follow Nurse / BPA Driven Protocol    Phosphorus Replacement - Follow Nurse / BPA Driven Protocol    Potassium Replacement - Follow Nurse / BPA Driven Protocol    prochlorperazine    sodium  chloride    sodium chloride    sodium chloride    Assessment & Plan   Assessment & Plan     Active Hospital Problems    Diagnosis  POA    **Nausea and vomiting, unspecified vomiting type [R11.2]  Yes    Lactic acidosis [E87.20]  Yes    Hypomagnesemia [E83.42]  Yes    Atrial fibrillation [I48.91]  Yes    Essential hypertension [I10]  Yes    Type 2 diabetes mellitus without complication, with long-term current use of insulin [E11.9, Z79.4]  Not Applicable    Chronic anticoagulation [Z79.01]  Not Applicable    Urinary tract infection, acute [N39.0]  Yes    Dehydration [E86.0]  Yes    Myelodysplasia (myelodysplastic syndrome) [D46.9]  Yes    Weakness [R53.1]  Yes    Hypothyroidism (acquired) [E03.9]  Yes      Resolved Hospital Problems   No resolved problems to display.        Brief Hospital Course to date:  Chey Robertson is a 87 y.o. female w/ hx myelodysplastic syndrome (follows w/ Dr. Lyman), dm2, htn, gerd, afib (on eliquis) who was recently admitted 6/5-6/7/23 with abdominal pain w/ n/v and electrolyte abnormalities. Was treated w/ iv fluids and symptomatic care and discharged to [facility] feeling improved.   Developed recurrent refractory n/v after return to long-term prompting visit back to [facility] ED.     Intractable N/V, resolved  -ct a/p negative for acute process (diverticulosis, moderate splenomegaly, small pericardial effusion, unchanged left adreanl 1.3cm nodule  -GI has evaluated; recommended bid ppi, avoiding nsaids, prn zofran overall doing better tolerating clears today, advancing to gi soft diet today      E.coli in urine, cannot rule out uti  -day #3/3 rocephin    Chronically elevated troponin  -stable, no chest pain, EKG no ischemic changes    DM2  -A1c 5.9  -holding metformin in case contributing; on ssi for now  -stopped metformin. Add different oral agent at discharge    Myelodysplastic syndrome  Chronic pancytopenia  anemia  Follows w/ Dr. Lyman  -labs stable currently  -check iron  labs    Afib  Htn  -continue amiodarone and metoprolol, eliquis bid  -lisinopril 5mg    Hypothyroidism  -continue levothyrosine (tsh was 1.9 on 6/6)    Gen weakness  -pt/ot evals pending; currently at [facility]      *I updated son Albin via phone on 6/10/23, he appreciated the call and agreeable to plan. See how tolerates gi soft diet today, see how mobilizes w/ pt/ot. If does well with both then possible d/c back to [facility] living tomorrow w/ home health    4 Am labs: bmp        Expected Discharge Location and Transportation:     ? back to [facility] w/ home health (pt/ot evals pending)  Expected Discharge ? 6/11/23 depending on clinical course and mobility (pt/ot evals pending)      DVT prophylaxis:  Medical and mechanical DVT prophylaxis orders are present.     AM-PAC 6 Clicks Score (PT): 18 (06/09/23 0800)    CODE STATUS:   Code Status and Medical Interventions:   Ordered at: 06/08/23 9128     Code Status (Patient has no pulse and is not breathing):    CPR (Attempt to Resuscitate)     Medical Interventions (Patient has pulse or is breathing):    Full Support       Julio Leiva MD  06/10/23

## 2023-06-10 NOTE — PLAN OF CARE
Goal Outcome Evaluation:  Plan of Care Reviewed With: patient        Progress: no change  Outcome Evaluation: PT eval completed. PT ambulated 350 feet CGA with FWx with cueing for maintaining close proximity to walker for increased stability. Pt demonstrated generalized weakness, mild decreased balance, and decreased functional endurance warranting IPPT POC. d/c rec return to [facility] and HHPT/OT.

## 2023-06-10 NOTE — PROGRESS NOTES
"GI Daily Progress Note  Subjective:    Chief Complaint:  \"I am fine\"    Patient tolerated clears.  NO nausea;  NO vomiting;  Daughter at bedside and reports wants her mother to have more than just jello    Objective:    /50   Pulse 74   Temp 97.6 °F (36.4 °C) (Oral)   Resp 18   Ht 160 cm (62.99\")   Wt 55.8 kg (123 lb 1.6 oz)   LMP  (LMP Unknown) Comment: mammogram is up to date  SpO2 99%   BMI 21.81 kg/m²     Physical Exam  Constitutional:       Appearance: Normal appearance.   HENT:      Head: Normocephalic.      Nose: Nose normal.   Pulmonary:      Effort: Pulmonary effort is normal.   Abdominal:      General: Abdomen is flat.      Palpations: Abdomen is soft.   Skin:     General: Skin is warm.   Neurological:      General: No focal deficit present.      Mental Status: She is alert.   Psychiatric:         Mood and Affect: Mood normal.       Lab  Lab Results   Component Value Date    WBC 2.54 (L) 06/10/2023    HGB 8.7 (L) 06/10/2023    HGB 9.0 (L) 06/09/2023    HGB 9.6 (L) 06/08/2023    MCV 91.2 06/10/2023    PLT 81 (L) 06/10/2023    INR 1.22 (H) 05/07/2023    INR 1.23 (H) 05/05/2023    INR 1.87 (H) 03/31/2023    INR 1.68 (H) 03/30/2023    INR 1.69 (H) 03/29/2023       Lab Results   Component Value Date    GLUCOSE 109 (H) 06/10/2023    BUN 5 (L) 06/10/2023    CREATININE 0.56 (L) 06/10/2023    EGFRIFNONA 47 (L) 02/19/2021    BCR 8.9 06/10/2023     06/10/2023    K 4.6 06/10/2023    CO2 26.0 06/10/2023    CALCIUM 8.5 (L) 06/10/2023    PROTENTOTREF 7.0 05/24/2020    ALBUMIN 3.6 06/08/2023    ALKPHOS 85 06/08/2023    BILITOT 0.5 06/08/2023    ALT 5 06/08/2023    AST 15 06/08/2023       Assessment:     Nausea and vomiting  GERD    Patient may have side effects from previous UTI antibiotics or even a post viral gastroparesis.  She is improved with high dose PPI and supportive care.  Reviewed with daughter patient's known chronic anemia    Plan:    Hold off on EGD (patient and family agree)  Consider " wean down oxygen  Advance diet to GI soft  Stop NSAIDS  Prn zofran    Will sign off for now  Daughter and patient comfortable with this plan and would like to know if she could try to get off antibiotics and go home.     Raissa Richey MD  06/10/23  11:06 EDT

## 2023-06-11 VITALS
SYSTOLIC BLOOD PRESSURE: 148 MMHG | OXYGEN SATURATION: 95 % | HEART RATE: 61 BPM | DIASTOLIC BLOOD PRESSURE: 62 MMHG | HEIGHT: 63 IN | TEMPERATURE: 97.9 F | RESPIRATION RATE: 18 BRPM | BODY MASS INDEX: 21.81 KG/M2 | WEIGHT: 123.1 LBS

## 2023-06-11 LAB
ANION GAP SERPL CALCULATED.3IONS-SCNC: 8 MMOL/L (ref 5–15)
BUN SERPL-MCNC: 5 MG/DL (ref 8–23)
BUN/CREAT SERPL: 9.3 (ref 7–25)
CALCIUM SPEC-SCNC: 8.3 MG/DL (ref 8.6–10.5)
CHLORIDE SERPL-SCNC: 103 MMOL/L (ref 98–107)
CO2 SERPL-SCNC: 27 MMOL/L (ref 22–29)
CREAT SERPL-MCNC: 0.54 MG/DL (ref 0.57–1)
EGFRCR SERPLBLD CKD-EPI 2021: 89.8 ML/MIN/1.73
GLUCOSE BLDC GLUCOMTR-MCNC: 118 MG/DL (ref 70–130)
GLUCOSE BLDC GLUCOMTR-MCNC: 234 MG/DL (ref 70–130)
GLUCOSE SERPL-MCNC: 100 MG/DL (ref 65–99)
POTASSIUM SERPL-SCNC: 4.1 MMOL/L (ref 3.5–5.2)
SODIUM SERPL-SCNC: 138 MMOL/L (ref 136–145)

## 2023-06-11 PROCEDURE — G0378 HOSPITAL OBSERVATION PER HR: HCPCS

## 2023-06-11 PROCEDURE — 80048 BASIC METABOLIC PNL TOTAL CA: CPT | Performed by: INTERNAL MEDICINE

## 2023-06-11 PROCEDURE — 63710000001 INSULIN LISPRO (HUMAN) PER 5 UNITS: Performed by: NURSE PRACTITIONER

## 2023-06-11 PROCEDURE — 82948 REAGENT STRIP/BLOOD GLUCOSE: CPT

## 2023-06-11 RX ORDER — METOPROLOL TARTRATE 50 MG/1
50 TABLET, FILM COATED ORAL 2 TIMES DAILY
Qty: 60 TABLET | Refills: 0 | Status: ON HOLD | OUTPATIENT
Start: 2023-06-11

## 2023-06-11 RX ORDER — AMIODARONE HYDROCHLORIDE 200 MG/1
200 TABLET ORAL DAILY
Status: ON HOLD
Start: 2023-06-11

## 2023-06-11 RX ORDER — LEVOTHYROXINE SODIUM 0.1 MG/1
100 TABLET ORAL
Qty: 30 TABLET | Refills: 0 | Status: ON HOLD | OUTPATIENT
Start: 2023-06-11

## 2023-06-11 RX ORDER — PANTOPRAZOLE SODIUM 40 MG/1
40 TABLET, DELAYED RELEASE ORAL
Qty: 60 TABLET | Refills: 0 | Status: SHIPPED | OUTPATIENT
Start: 2023-06-11 | End: 2023-06-11 | Stop reason: SDUPTHER

## 2023-06-11 RX ORDER — PANTOPRAZOLE SODIUM 40 MG/1
40 TABLET, DELAYED RELEASE ORAL
Qty: 60 TABLET | Refills: 0 | Status: ON HOLD | OUTPATIENT
Start: 2023-06-11

## 2023-06-11 RX ADMIN — AMIODARONE HYDROCHLORIDE 200 MG: 200 TABLET ORAL at 08:42

## 2023-06-11 RX ADMIN — INSULIN LISPRO 3 UNITS: 100 INJECTION, SOLUTION INTRAVENOUS; SUBCUTANEOUS at 11:24

## 2023-06-11 RX ADMIN — ACETAMINOPHEN 650 MG: 325 TABLET ORAL at 01:05

## 2023-06-11 RX ADMIN — APIXABAN 2.5 MG: 2.5 TABLET, FILM COATED ORAL at 08:42

## 2023-06-11 RX ADMIN — METOPROLOL TARTRATE 50 MG: 25 TABLET, FILM COATED ORAL at 08:42

## 2023-06-11 RX ADMIN — LISINOPRIL 5 MG: 5 TABLET ORAL at 08:42

## 2023-06-11 RX ADMIN — LEVOTHYROXINE SODIUM 100 MCG: 0.1 TABLET ORAL at 06:27

## 2023-06-11 RX ADMIN — PANTOPRAZOLE SODIUM 40 MG: 40 TABLET, DELAYED RELEASE ORAL at 06:27

## 2023-06-11 NOTE — CASE MANAGEMENT/SOCIAL WORK
Continued Stay Note  [facility]     Patient Name: Chey Robertson  MRN: [id number]  Today's Date: 6/11/2023    Admit Date: 6/8/2023    Plan: Home w/Home Health   Discharge Plan       Row Name 06/11/23 0851       Plan    Plan Home w/Home Health    Final Discharge Disposition Code 06 - home with home health care    Final Note Patient's plan is home with [facility].  Notified them of patient's discharge and discharge summary faxed to [phone].                   Discharge Codes    No documentation.                 Expected Discharge Date and Time       Expected Discharge Date Expected Discharge Time    Jun 11, 2023               Diana Navarro RN

## 2023-06-11 NOTE — DISCHARGE SUMMARY
[facility] Medicine Services  DISCHARGE SUMMARY    Patient Name: Chey Robertson  : 1935  MRN: [id number]    Date of Admission: 2023  3:21 PM  Date of Discharge:  2023  Primary Care Physician: Corby Marie MD    Consults       Date and Time Order Name Status Description    2023 12:34 AM Inpatient Gastroenterology Consult Completed     2023 10:36 AM Inpatient Cardiology Consult Completed     2023 12:49 PM Inpatient Hematology & Oncology Consult Completed     2023  8:06 AM Inpatient Infectious Diseases Consult Completed             Hospital Course     Presenting Problem: n/v    Active Hospital Problems    Diagnosis  POA    **Nausea and vomiting, unspecified vomiting type [R11.2]  Yes    Lactic acidosis [E87.20]  Yes    Hypomagnesemia [E83.42]  Yes    Atrial fibrillation [I48.91]  Yes    Essential hypertension [I10]  Yes    Type 2 diabetes mellitus without complication, with long-term current use of insulin [E11.9, Z79.4]  Not Applicable    Chronic anticoagulation [Z79.01]  Not Applicable    Urinary tract infection, acute [N39.0]  Yes    Dehydration [E86.0]  Yes    Myelodysplasia (myelodysplastic syndrome) [D46.9]  Yes    Weakness [R53.1]  Yes    Hypothyroidism (acquired) [E03.9]  Yes      Resolved Hospital Problems   No resolved problems to display.          Hospital Course:  Chey Robertson is a 87 y.o. female w/ hx myelodysplastic syndrome (follows w/ Dr. Lyman), dm2, htn, gerd, afib (on eliquis) who was recently admitted -23 with abdominal pain w/ n/v and electrolyte abnormalities. Was treated w/ iv fluids and symptomatic care and discharged to [facility] feeling improved.             Developed recurrent refractory n/v after return to [facility] prompting visit back to [facility] ED.  Found to have uti and completed course of rocephin for e.coli uti. Metformin was held, patient hydrated and gi consulted. Gi did not felt conservative  treatment with twice daily ppi  was appropriate and patient responded to conservative measures now tolerating po. Will stop metformin and instead give low dose januvia. Patient eager to go back to [facility] today so discharging back home w/ home health.      Discharge Follow Up Recommendations for outpatient labs/diagnostics:  Pcp 1 week    Day of Discharge     HPI:   Tolerating diet. No dyspena. No chest pain. No palpitations. Wants to go home today    Review of Systems  No f/c    Vital Signs:   Temp:  [97.9 °F (36.6 °C)-98.5 °F (36.9 °C)] 97.9 °F (36.6 °C)  Heart Rate:  [55-74] 60  Resp:  [18] 18  BP: (123-149)/(50-83) 140/66      Physical Exam:  Constitutional:Alert, oriented x 3, nontoxic appearing  Psych:Normal/appropriate affect  HEENT:NCAT, oropharynx clear  Neck: neck supple, full range of motion  Neuro: Face symmetric, speech clear, equal , moves all extremities  Cardiac: RRR; No pretibial pitting edema  Resp: CTAB, normal effort  GI: abd soft, nontender  Skin: No extremity rash  Musculoskeletal/extremities: no cyanosis of extremities; no significant ankle edema          Pertinent  and/or Most Recent Results     LAB RESULTS:      Lab 06/10/23  0347 06/09/23  0855 06/08/23  1929 06/08/23  1533 06/07/23  0504 06/06/23  0516 06/05/23  2321 06/05/23  1949 06/05/23  1629   WBC 2.54* 3.97  --  7.62 1.13* 1.49*  --   --  2.25*   HEMOGLOBIN 8.7* 9.0*  --  9.6* 9.2* 8.7*  --   --  10.1*   HEMATOCRIT 28.0* 27.9*  --  29.7* 27.3* 27.6*  --   --  31.0*   PLATELETS 81* 67*  --  67* 52* 58*  --   --  58*   NEUTROS ABS  --  3.21  --  6.74 0.64* 0.74*  --   --  1.79   IMMATURE GRANS (ABS)  --  0.02  --  0.09* 0.01 0.01  --   --  0.02   LYMPHS ABS  --  0.54*  --  0.42* 0.34* 0.52*  --   --  0.30*   MONOS ABS  --  0.20  --  0.36 0.14 0.22  --   --  0.14   EOS ABS  --  0.00  --  0.01 0.00 0.00  --   --  0.00   MCV 91.2 90.3  --  87.1 88.9 89.9  --   --  87.1   LACTATE  --   --  2.0 2.6*  --   --  1.6 2.2* 3.2*         Lab  06/11/23  0434 06/10/23  0347 06/09/23  2056 06/09/23  0855 06/08/23  1533 06/07/23  0504 06/06/23  0516 06/05/23  1631 06/05/23  1629   SODIUM 138 140  --  138 131* 132* 134*  --  132*   POTASSIUM 4.1 4.6 4.4 3.6 3.7 3.9 3.8  --  4.3   CHLORIDE 103 106  --  101 93* 94* 93*  --  94*   CO2 27.0 26.0  --  26.0 27.0 28.0 29.0  --  22.0   ANION GAP 8.0 8.0  --  11.0 11.0 10.0 12.0  --  16.0*   BUN 5* 5*  --  8 11 13 20  --  20   CREATININE 0.54* 0.56*  --  0.60 0.49* 0.58 0.69   < > 0.75   EGFR 89.8 89.0  --  87.5 91.9 88.3 84.6  --  77.6   GLUCOSE 100* 109*  --  173* 162* 137* 112*  --  252*   CALCIUM 8.3* 8.5*  --  8.3* 9.0 8.8 8.5*  --  8.6   MAGNESIUM  --  1.7  --  1.8 1.4*  --  1.8  --  1.4*   HEMOGLOBIN A1C  --   --   --  5.90*  --   --   --   --   --    TSH  --   --   --   --   --   --  1.950  --   --     < > = values in this interval not displayed.         Lab 06/09/23  0855 06/08/23  1533 06/05/23  1629   TOTAL PROTEIN  --  6.1 6.0   ALBUMIN  --  3.6 3.6   GLOBULIN  --  2.5 2.4   ALT (SGPT)  --  5 6   AST (SGOT)  --  15 11   BILIRUBIN  --  0.5 0.6   ALK PHOS  --  85 84   LIPASE 22  --  20         Lab 06/09/23  0008 06/08/23  2209 06/08/23  1740 06/08/23  1533 06/06/23  1206   HSTROP T 18* 18* 15* 15* 19*             Lab 06/10/23  0347   IRON 61   IRON SATURATION (TSAT) 28   TIBC 215*   TRANSFERRIN 144*         Brief Urine Lab Results  (Last result in the past 365 days)        Color   Clarity   Blood   Leuk Est   Nitrite   Protein   CREAT   Urine HCG        06/08/23 1621 Yellow   Cloudy   Negative   Trace   Negative   Trace                 Microbiology Results (last 10 days)       Procedure Component Value - Date/Time    Blood Culture - Blood, Arm, Left [[id number]]  (Normal) Collected: 06/08/23 1741    Lab Status: Preliminary result Specimen: Blood from Arm, Left Updated: 06/10/23 1815     Blood Culture No growth at 2 days    Blood Culture - Blood, Wrist, Right [[id number]]  (Normal) Collected: 06/08/23 1730     Lab Status: Preliminary result Specimen: Blood from Wrist, Right Updated: 06/10/23 1815     Blood Culture No growth at 2 days    Urine Culture - Urine, Straight Cath [[id number]]  (Abnormal)  (Susceptibility) Collected: 06/08/23 1621    Lab Status: Final result Specimen: Urine from Straight Cath Updated: 06/10/23 1038     Urine Culture >100,000 CFU/mL Escherichia coli    Narrative:      Colonization of the urinary tract without infection is common. Treatment is discouraged unless the patient is symptomatic, pregnant, or undergoing an invasive urologic procedure.    Susceptibility        Escherichia coli      JUAN F      Ampicillin Susceptible      Ampicillin + Sulbactam Susceptible      Cefazolin Susceptible      Cefepime Susceptible      Ceftazidime Susceptible      Ceftriaxone Susceptible      Gentamicin Susceptible      Levofloxacin Susceptible      Nitrofurantoin Susceptible      Piperacillin + Tazobactam Susceptible      Trimethoprim + Sulfamethoxazole Susceptible                                   Adult Transthoracic Echo Complete w/ Color, Spectral and Contrast if necessary per protocol    Result Date: 6/6/2023    Left ventricular systolic function is normal. Left ventricular ejection fraction appears to be 56 - 60%.   Left ventricular diastolic function was normal.   Estimated right ventricular systolic pressure from tricuspid regurgitation is normal (<35 mmHg).     CT Abdomen Pelvis Without Contrast    Result Date: 6/8/2023  CT ABDOMEN PELVIS WO CONTRAST Date of Exam: 6/8/2023 5:44 PM EDT Indication: Nausea/vomiting. Comparison: CT abdomen pelvis dated 6/5/2023 Technique: Axial CT images were obtained of the abdomen and pelvis without the administration of contrast. Reconstructed coronal and sagittal images were also obtained. Automated exposure control and iterative construction methods were used. Findings: Lung Bases:  Small left pleural effusion and left basilar atelectasis. Mild pericardial effusion  Liver:Liver is normal in size and CT density. No focal lesions. Biliary/Gallbladder: Cholecystectomy changes Spleen: There is splenomegaly and punctate calcifications in the spleen. Small hiatal hernia Pancreas: Pancreas shows homogeneous density. There is no evidence of pancreatic mass or peripancreatic fluid. Kidneys: Kidneys are normal in size. There are no stones or hydronephrosis. Adrenals: Stable left adrenal nodule. Right adrenal gland is unremarkable Retroperitoneal/Lymph Nodes/Vasculature: No retroperitoneal adenopathy is identified by size criteria. Gastrointestinal/Mesentery: The bowel loops are non-dilated without definite wall thickening or mass. The appendix appears within normal limits. No evidence of obstruction. No free air. Retained contrast in the large bowel from the previous exam. There is colon diverticulosis with no evidence of acute diverticulitis Bladder: The bladder is unremarkable. Intraluminal air in the bladder likely from instrumentation No acute abnormalities in the pelvis   Bony Structures: Chronic lumbar fracture deformities with kyphoplasty changes again seen     Impression: 1. Small left pleural effusion with left basilar atelectasis 2. Colon diverticulosis 3. Splenomegaly 4. Stable small left adrenal nodule 5. No definite acute CT abnormalities in the abdomen or pelvis Electronically Signed: Trenton Elizabeth  6/8/2023 6:19 PM EDT  Workstation ID: [id number]    CT Abdomen Pelvis With Contrast    Result Date: 6/5/2023  CT ABDOMEN PELVIS W CONTRAST Date of Exam: 6/5/2023 5:56 PM EDT Indication: abd pain and N/V. Comparison: 2/19/2021, 5/5/2023. Technique: Axial CT images were obtained of the abdomen and pelvis following the uneventful intravenous administration of 80 mL Isovue-300. Reconstructed coronal and sagittal images were also obtained. Automated exposure control and iterative construction methods were used. Findings: Liver: The liver is unremarkable in morphology. Subcentimeter  hypodensity within the caudal right hepatic lobe is too small to fully characterize. No biliary dilation is seen. Gallbladder: Surgically absent Pancreas: The pancreas appears atrophic. Spleen: The spleen is enlarged, measuring approximately 18 cm in length. Several small splenic hypodensities are too small to fully characterize. Several tiny splenic granulomas. Adrenal glands: 1.3 cm low-density nodule at the apex of the left adrenal gland, incompletely characterized on this contrast-enhanced study. The right adrenal gland is unremarkable. Genitourinary tract: Kidneys appear unremarkable. No hydronephrosis is seen. Small calcifications along the course of the left ureter are thought to represent phleboliths. Urinary bladder is unremarkable. Patient is status post hysterectomy. Gastrointestinal tract: Scattered colonic diverticulosis. The hollow viscera appears otherwise unremarkable. There is no evidence of bowel obstruction. Appendix: The appendix is not identified. Other findings: No free air or free fluid is identified. No pathologically enlarged lymph nodes are seen. Vascular calcifications are present. The IVC is grossly unremarkable. Bones and soft tissues: Bones are demineralized. Patient is status post kyphoplasty of compression fractures of T11, T12, and L4. There are degenerative changes within the spine. Superficial soft tissues demonstrate no acute abnormality. Lung bases: Small left pleural effusion with bibasilar atelectasis. Small pericardial effusion noted.     Impression: 1.No acute abnormality identified within the abdomen or pelvis. 2.Scattered colonic diverticulosis. 3.Moderate splenomegaly. Several small splenic hypodensities, too small to fully characterize. Similar finding noted on the study dated 2/19/2021. 4.Small pericardial effusion. Small left pleural effusion with left basilar atelectasis. These findings are new since 5/5/2023. 5.Unchanged 1.3 cm left adrenal nodule. 6.Additional  findings as detailed above. Electronically Signed: Venkat Meyer  6/5/2023 6:27 PM EDT  Workstation ID: [id number]    XR Chest 1 View    Result Date: 6/8/2023  XR CHEST 1 VW Date of Exam: 6/8/2023 3:40 PM EDT Indication: Weak/Dizzy/AMS triage protocol Comparison: May 5, 2023 Findings: The heart looks enlarged. The lungs seem relatively clear. There are no pleural effusions. There is some atelectasis or scarring in the right lower chest.     Impression: 1.Suggested cardiomegaly. Some of this could relate to magnification as this seems like a change from prior chest x-ray that was more of a PA view. 2.Atelectasis or scarring right lower chest. Electronically Signed: Portillo Thao  6/8/2023 3:55 PM EDT  Workstation ID: [id number]             Results for orders placed during the hospital encounter of 06/05/23    Adult Transthoracic Echo Complete w/ Color, Spectral and Contrast if necessary per protocol    Interpretation Summary    Left ventricular systolic function is normal. Left ventricular ejection fraction appears to be 56 - 60%.    Left ventricular diastolic function was normal.    Estimated right ventricular systolic pressure from tricuspid regurgitation is normal (<35 mmHg).      Plan for Follow-up of Pending Labs/Results: pcp  Pending Labs       Order Current Status    Blood Culture - Blood, Arm, Left Preliminary result    Blood Culture - Blood, Wrist, Right Preliminary result          Discharge Details        Discharge Medications        New Medications        Instructions Start Date   pantoprazole 40 MG EC tablet  Commonly known as: PROTONIX  Replaces: omeprazole 40 MG capsule   40 mg, Oral, 2 Times Daily Before Meals      SITagliptin 25 MG tablet  Commonly known as: Januvia   25 mg, Oral, Daily             Continue These Medications        Instructions Start Date   acetaminophen 650 MG 8 hr tablet  Commonly known as: TYLENOL   1,300 mg, Oral, Every 8 Hours PRN, OTC      amiodarone 200 MG tablet  Commonly known as:  PACERONE   200 mg, Oral, Daily      Eliquis 2.5 MG tablet tablet  Generic drug: apixaban   2.5 mg, Oral, 2 Times Daily      levothyroxine 100 MCG tablet  Commonly known as: SYNTHROID, LEVOTHROID   100 mcg, Oral, Every Early Morning      lisinopril 5 MG tablet  Commonly known as: PRINIVIL,ZESTRIL   5 mg, Oral, Daily      metoprolol tartrate 50 MG tablet  Commonly known as: LOPRESSOR   50 mg, Oral, 2 Times Daily      traMADol 50 MG tablet  Commonly known as: ULTRAM   50 mg, Oral, 2 Times Daily PRN             Stop These Medications      furosemide 40 MG tablet  Commonly known as: LASIX     metFORMIN 500 MG tablet  Commonly known as: GLUCOPHAGE     omeprazole 40 MG capsule  Commonly known as: PrilOSEC  Replaced by: pantoprazole 40 MG EC tablet     propranolol 20 MG tablet  Commonly known as: INDERAL              Allergies   Allergen Reactions    Aspirin Unknown - Low Severity     Mouth sores          Discharge Disposition:  Home-Health Care Sv    Diet:  Hospital:  Diet Order   Procedures    Diet: Gastrointestinal Diets; Fiber-Restricted; Texture: Regular Texture (IDDSI 7); Fluid Consistency: Thin (IDDSI 0)       Activity:             CODE STATUS:    Code Status and Medical Interventions:   Ordered at: 06/08/23 2138     Code Status (Patient has no pulse and is not breathing):    CPR (Attempt to Resuscitate)     Medical Interventions (Patient has pulse or is breathing):    Full Support       Future Appointments   Date Time Provider Department Center   8/7/2023  1:00 PM Jeanna Grande APRN [facility]       Additional Instructions for the Follow-ups that You Need to Schedule       Discharge Follow-up with PCP   As directed       Currently Documented PCP:    Corby Marie MD    PCP Phone Number:    [phone]     Follow Up Details: 1 week                       Julio Leiva MD  06/11/23      Time Spent on Discharge:  I spent  35  minutes on this discharge activity which included: face-to-face  encounter with the patient, reviewing the data in the system, coordination of the care with the nursing staff as well as consultants, documentation, and entering orders.

## 2023-06-11 NOTE — DISCHARGE PLACEMENT REQUEST
"VenegasElisa nj (87 y.o. Female)     Diana Navarro, RN  [phone]        Date of Birth   1935    Social Security Number       Address   [address] [place], [place] [address]    Home Phone   [phone]    MRN   [id number]       Rastafari   Sikhism    Marital Status                               Admission Date   23    Admission Type   Emergency    Admitting Provider   Julio Leiva MD    Attending Provider   Julio Leiva MD    Department, Room/Bed   [facility] 2F, S203/       Discharge Date       Discharge Disposition   [place] [facility] [place]    [place]charge Destination                                 Attending Provider: Julio Leiva MD    Allergies: Aspirin    Isolation: None   Infection: COVID (History) (23)   Code Status: CPR    Ht: 160 cm (62.99\")   Wt: 55.8 kg (123 lb 1.6 oz)    Admission Cmt: None   Principal Problem: Nausea and vomiting, unspecified vomiting type [R11.2]                   Active Insurance as of 2023       Primary Coverage       Payor Plan Insurance Group Employer/Plan Group    HUMANA MEDICARE REPLACEMENT HUMANA MEDICARE REPLACEMENT [id number]       Payor Plan Address Payor Plan Phone Number Payor Plan Fax Number Effective Dates    [address] [address] [phone]  2023 - None Entered    Formerly [facility] [address]         Subscriber Name Subscriber Birth Date Member ID       ELISA VENEGAS 1935 [id number]                     Emergency Contacts        (Rel.) Home Phone Work Phone Mobile Phone    Albin Venegas (Son) [phone] -- --    Chava Venegas (Son) -- -- [phone]                 Discharge Summary        Julio Leiva MD at 23 0753              [facility] Medicine Services  DISCHARGE SUMMARY    Patient Name: Elisa Venegas  : 1935  MRN: [id number]    Date of Admission: 2023  3:21 PM  Date of Discharge:  2023  Primary Care Physician: " Corby Marie MD    Consults       Date and Time Order Name Status Description    6/9/2023 12:34 AM Inpatient Gastroenterology Consult Completed     5/14/2023 10:36 AM Inpatient Cardiology Consult Completed     5/8/2023 12:49 PM Inpatient Hematology & Oncology Consult Completed     5/7/2023  8:06 AM Inpatient Infectious Diseases Consult Completed             Hospital Course     Presenting Problem: n/v    Active Hospital Problems    Diagnosis  POA    **Nausea and vomiting, unspecified vomiting type [R11.2]  Yes    Lactic acidosis [E87.20]  Yes    Hypomagnesemia [E83.42]  Yes    Atrial fibrillation [I48.91]  Yes    Essential hypertension [I10]  Yes    Type 2 diabetes mellitus without complication, with long-term current use of insulin [E11.9, Z79.4]  Not Applicable    Chronic anticoagulation [Z79.01]  Not Applicable    Urinary tract infection, acute [N39.0]  Yes    Dehydration [E86.0]  Yes    Myelodysplasia (myelodysplastic syndrome) [D46.9]  Yes    Weakness [R53.1]  Yes    Hypothyroidism (acquired) [E03.9]  Yes      Resolved Hospital Problems   No resolved problems to display.          Hospital Course:  Chey Robertson is a 87 y.o. female w/ hx myelodysplastic syndrome (follows w/ Dr. Lyman), dm2, htn, gerd, afib (on eliquis) who was recently admitted 6/5-6/7/23 with abdominal pain w/ n/v and electrolyte abnormalities. Was treated w/ iv fluids and symptomatic care and discharged to [facility] feeling improved.             Developed recurrent refractory n/v after return to [facility] prompting visit back to [facility] ED.  Found to have uti and completed course of rocephin for e.coli uti. Metformin was held, patient hydrated and gi consulted. Gi did not felt conservative treatment with twice daily ppi  was appropriate and patient responded to conservative measures now tolerating po. Will stop metformin and instead give low dose januvia. Patient eager to go back to [facility] today so discharging back home w/  home health.      Discharge Follow Up Recommendations for outpatient labs/diagnostics:  Pcp 1 week    Day of Discharge     HPI:   Tolerating diet. No dyspena. No chest pain. No palpitations. Wants to go home today    Review of Systems  No f/c    Vital Signs:   Temp:  [97.9 °F (36.6 °C)-98.5 °F (36.9 °C)] 97.9 °F (36.6 °C)  Heart Rate:  [55-74] 60  Resp:  [18] 18  BP: (123-149)/(50-83) 140/66      Physical Exam:  Constitutional:Alert, oriented x 3, nontoxic appearing  Psych:Normal/appropriate affect  HEENT:NCAT, oropharynx clear  Neck: neck supple, full range of motion  Neuro: Face symmetric, speech clear, equal , moves all extremities  Cardiac: RRR; No pretibial pitting edema  Resp: CTAB, normal effort  GI: abd soft, nontender  Skin: No extremity rash  Musculoskeletal/extremities: no cyanosis of extremities; no significant ankle edema          Pertinent  and/or Most Recent Results     LAB RESULTS:      Lab 06/10/23  0347 06/09/23  0855 06/08/23  1929 06/08/23  1533 06/07/23  0504 06/06/23  0516 06/05/23  2321 06/05/23  1949 06/05/23  1629   WBC 2.54* 3.97  --  7.62 1.13* 1.49*  --   --  2.25*   HEMOGLOBIN 8.7* 9.0*  --  9.6* 9.2* 8.7*  --   --  10.1*   HEMATOCRIT 28.0* 27.9*  --  29.7* 27.3* 27.6*  --   --  31.0*   PLATELETS 81* 67*  --  67* 52* 58*  --   --  58*   NEUTROS ABS  --  3.21  --  6.74 0.64* 0.74*  --   --  1.79   IMMATURE GRANS (ABS)  --  0.02  --  0.09* 0.01 0.01  --   --  0.02   LYMPHS ABS  --  0.54*  --  0.42* 0.34* 0.52*  --   --  0.30*   MONOS ABS  --  0.20  --  0.36 0.14 0.22  --   --  0.14   EOS ABS  --  0.00  --  0.01 0.00 0.00  --   --  0.00   MCV 91.2 90.3  --  87.1 88.9 89.9  --   --  87.1   LACTATE  --   --  2.0 2.6*  --   --  1.6 2.2* 3.2*         Lab 06/11/23  0434 06/10/23  0347 06/09/23  2056 06/09/23  0855 06/08/23  1533 06/07/23  0504 06/06/23  0516 06/05/23  1631 06/05/23  1629   SODIUM 138 140  --  138 131* 132* 134*  --  132*   POTASSIUM 4.1 4.6 4.4 3.6 3.7 3.9 3.8  --  4.3    CHLORIDE 103 106  --  101 93* 94* 93*  --  94*   CO2 27.0 26.0  --  26.0 27.0 28.0 29.0  --  22.0   ANION GAP 8.0 8.0  --  11.0 11.0 10.0 12.0  --  16.0*   BUN 5* 5*  --  8 11 13 20  --  20   CREATININE 0.54* 0.56*  --  0.60 0.49* 0.58 0.69   < > 0.75   EGFR 89.8 89.0  --  87.5 91.9 88.3 84.6  --  77.6   GLUCOSE 100* 109*  --  173* 162* 137* 112*  --  252*   CALCIUM 8.3* 8.5*  --  8.3* 9.0 8.8 8.5*  --  8.6   MAGNESIUM  --  1.7  --  1.8 1.4*  --  1.8  --  1.4*   HEMOGLOBIN A1C  --   --   --  5.90*  --   --   --   --   --    TSH  --   --   --   --   --   --  1.950  --   --     < > = values in this interval not displayed.         Lab 06/09/23  0855 06/08/23  1533 06/05/23  1629   TOTAL PROTEIN  --  6.1 6.0   ALBUMIN  --  3.6 3.6   GLOBULIN  --  2.5 2.4   ALT (SGPT)  --  5 6   AST (SGOT)  --  15 11   BILIRUBIN  --  0.5 0.6   ALK PHOS  --  85 84   LIPASE 22  --  20         Lab 06/09/23  0008 06/08/23  2209 06/08/23  1740 06/08/23  1533 06/06/23  1206   HSTROP T 18* 18* 15* 15* 19*             Lab 06/10/23  0347   IRON 61   IRON SATURATION (TSAT) 28   TIBC 215*   TRANSFERRIN 144*         Brief Urine Lab Results  (Last result in the past 365 days)        Color   Clarity   Blood   Leuk Est   Nitrite   Protein   CREAT   Urine HCG        06/08/23 1621 Yellow   Cloudy   Negative   Trace   Negative   Trace                 Microbiology Results (last 10 days)       Procedure Component Value - Date/Time    Blood Culture - Blood, Arm, Left [[id number]]  (Normal) Collected: 06/08/23 1741    Lab Status: Preliminary result Specimen: Blood from Arm, Left Updated: 06/10/23 1815     Blood Culture No growth at 2 days    Blood Culture - Blood, Wrist, Right [[id number]]  (Normal) Collected: 06/08/23 1730    Lab Status: Preliminary result Specimen: Blood from Wrist, Right Updated: 06/10/23 1815     Blood Culture No growth at 2 days    Urine Culture - Urine, Straight Cath [[id number]]  (Abnormal)  (Susceptibility) Collected: 06/08/23 1621     Lab Status: Final result Specimen: Urine from Straight Cath Updated: 06/10/23 1038     Urine Culture >100,000 CFU/mL Escherichia coli    Narrative:      Colonization of the urinary tract without infection is common. Treatment is discouraged unless the patient is symptomatic, pregnant, or undergoing an invasive urologic procedure.    Susceptibility        Escherichia coli      JUAN F      Ampicillin Susceptible      Ampicillin + Sulbactam Susceptible      Cefazolin Susceptible      Cefepime Susceptible      Ceftazidime Susceptible      Ceftriaxone Susceptible      Gentamicin Susceptible      Levofloxacin Susceptible      Nitrofurantoin Susceptible      Piperacillin + Tazobactam Susceptible      Trimethoprim + Sulfamethoxazole Susceptible                                   Adult Transthoracic Echo Complete w/ Color, Spectral and Contrast if necessary per protocol    Result Date: 6/6/2023    Left ventricular systolic function is normal. Left ventricular ejection fraction appears to be 56 - 60%.   Left ventricular diastolic function was normal.   Estimated right ventricular systolic pressure from tricuspid regurgitation is normal (<35 mmHg).     CT Abdomen Pelvis Without Contrast    Result Date: 6/8/2023  CT ABDOMEN PELVIS WO CONTRAST Date of Exam: 6/8/2023 5:44 PM EDT Indication: Nausea/vomiting. Comparison: CT abdomen pelvis dated 6/5/2023 Technique: Axial CT images were obtained of the abdomen and pelvis without the administration of contrast. Reconstructed coronal and sagittal images were also obtained. Automated exposure control and iterative construction methods were used. Findings: Lung Bases:  Small left pleural effusion and left basilar atelectasis. Mild pericardial effusion Liver:Liver is normal in size and CT density. No focal lesions. Biliary/Gallbladder: Cholecystectomy changes Spleen: There is splenomegaly and punctate calcifications in the spleen. Small hiatal hernia Pancreas: Pancreas shows homogeneous  density. There is no evidence of pancreatic mass or peripancreatic fluid. Kidneys: Kidneys are normal in size. There are no stones or hydronephrosis. Adrenals: Stable left adrenal nodule. Right adrenal gland is unremarkable Retroperitoneal/Lymph Nodes/Vasculature: No retroperitoneal adenopathy is identified by size criteria. Gastrointestinal/Mesentery: The bowel loops are non-dilated without definite wall thickening or mass. The appendix appears within normal limits. No evidence of obstruction. No free air. Retained contrast in the large bowel from the previous exam. There is colon diverticulosis with no evidence of acute diverticulitis Bladder: The bladder is unremarkable. Intraluminal air in the bladder likely from instrumentation No acute abnormalities in the pelvis   Bony Structures: Chronic lumbar fracture deformities with kyphoplasty changes again seen     Impression: 1. Small left pleural effusion with left basilar atelectasis 2. Colon diverticulosis 3. Splenomegaly 4. Stable small left adrenal nodule 5. No definite acute CT abnormalities in the abdomen or pelvis Electronically Signed: Trenton Elizabeth  6/8/2023 6:19 PM EDT  Workstation ID: [id number]    CT Abdomen Pelvis With Contrast    Result Date: 6/5/2023  CT ABDOMEN PELVIS W CONTRAST Date of Exam: 6/5/2023 5:56 PM EDT Indication: abd pain and N/V. Comparison: 2/19/2021, 5/5/2023. Technique: Axial CT images were obtained of the abdomen and pelvis following the uneventful intravenous administration of 80 mL Isovue-300. Reconstructed coronal and sagittal images were also obtained. Automated exposure control and iterative construction methods were used. Findings: Liver: The liver is unremarkable in morphology. Subcentimeter hypodensity within the caudal right hepatic lobe is too small to fully characterize. No biliary dilation is seen. Gallbladder: Surgically absent Pancreas: The pancreas appears atrophic. Spleen: The spleen is enlarged, measuring approximately  18 cm in length. Several small splenic hypodensities are too small to fully characterize. Several tiny splenic granulomas. Adrenal glands: 1.3 cm low-density nodule at the apex of the left adrenal gland, incompletely characterized on this contrast-enhanced study. The right adrenal gland is unremarkable. Genitourinary tract: Kidneys appear unremarkable. No hydronephrosis is seen. Small calcifications along the course of the left ureter are thought to represent phleboliths. Urinary bladder is unremarkable. Patient is status post hysterectomy. Gastrointestinal tract: Scattered colonic diverticulosis. The hollow viscera appears otherwise unremarkable. There is no evidence of bowel obstruction. Appendix: The appendix is not identified. Other findings: No free air or free fluid is identified. No pathologically enlarged lymph nodes are seen. Vascular calcifications are present. The IVC is grossly unremarkable. Bones and soft tissues: Bones are demineralized. Patient is status post kyphoplasty of compression fractures of T11, T12, and L4. There are degenerative changes within the spine. Superficial soft tissues demonstrate no acute abnormality. Lung bases: Small left pleural effusion with bibasilar atelectasis. Small pericardial effusion noted.     Impression: 1.No acute abnormality identified within the abdomen or pelvis. 2.Scattered colonic diverticulosis. 3.Moderate splenomegaly. Several small splenic hypodensities, too small to fully characterize. Similar finding noted on the study dated 2/19/2021. 4.Small pericardial effusion. Small left pleural effusion with left basilar atelectasis. These findings are new since 5/5/2023. 5.Unchanged 1.3 cm left adrenal nodule. 6.Additional findings as detailed above. Electronically Signed: Venkat Meyer  6/5/2023 6:27 PM EDT  Workstation ID: [id number]    XR Chest 1 View    Result Date: 6/8/2023  XR CHEST 1 VW Date of Exam: 6/8/2023 3:40 PM EDT Indication: Weak/Dizzy/AMS triage  protocol Comparison: May 5, 2023 Findings: The heart looks enlarged. The lungs seem relatively clear. There are no pleural effusions. There is some atelectasis or scarring in the right lower chest.     Impression: 1.Suggested cardiomegaly. Some of this could relate to magnification as this seems like a change from prior chest x-ray that was more of a PA view. 2.Atelectasis or scarring right lower chest. Electronically Signed: Portillo Thao  6/8/2023 3:55 PM EDT  Workstation ID: [id number]             Results for orders placed during the hospital encounter of 06/05/23    Adult Transthoracic Echo Complete w/ Color, Spectral and Contrast if necessary per protocol    Interpretation Summary    Left ventricular systolic function is normal. Left ventricular ejection fraction appears to be 56 - 60%.    Left ventricular diastolic function was normal.    Estimated right ventricular systolic pressure from tricuspid regurgitation is normal (<35 mmHg).      Plan for Follow-up of Pending Labs/Results: pcp  Pending Labs       Order Current Status    Blood Culture - Blood, Arm, Left Preliminary result    Blood Culture - Blood, Wrist, Right Preliminary result          Discharge Details        Discharge Medications        New Medications        Instructions Start Date   pantoprazole 40 MG EC tablet  Commonly known as: PROTONIX  Replaces: omeprazole 40 MG capsule   40 mg, Oral, 2 Times Daily Before Meals      SITagliptin 25 MG tablet  Commonly known as: Januvia   25 mg, Oral, Daily             Continue These Medications        Instructions Start Date   acetaminophen 650 MG 8 hr tablet  Commonly known as: TYLENOL   1,300 mg, Oral, Every 8 Hours PRN, OTC      amiodarone 200 MG tablet  Commonly known as: PACERONE   200 mg, Oral, Daily      Eliquis 2.5 MG tablet tablet  Generic drug: apixaban   2.5 mg, Oral, 2 Times Daily      levothyroxine 100 MCG tablet  Commonly known as: SYNTHROID, LEVOTHROID   100 mcg, Oral, Every Early Morning       lisinopril 5 MG tablet  Commonly known as: PRINIVIL,ZESTRIL   5 mg, Oral, Daily      metoprolol tartrate 50 MG tablet  Commonly known as: LOPRESSOR   50 mg, Oral, 2 Times Daily      traMADol 50 MG tablet  Commonly known as: ULTRAM   50 mg, Oral, 2 Times Daily PRN             Stop These Medications      furosemide 40 MG tablet  Commonly known as: LASIX     metFORMIN 500 MG tablet  Commonly known as: GLUCOPHAGE     omeprazole 40 MG capsule  Commonly known as: PrilOSEC  Replaced by: pantoprazole 40 MG EC tablet     propranolol 20 MG tablet  Commonly known as: INDERAL              Allergies   Allergen Reactions    Aspirin Unknown - Low Severity     Mouth sores          Discharge Disposition:  [place]-Health Care [facility] [place]    Diet:  Hospital:  Diet Order   Procedures    Diet: Gastrointestinal Diets; Fiber-Restricted; Texture: Regular Texture (IDDSI 7); Fluid Consistency: Thin (IDDSI 0)       Activity:             CODE STATUS:    Code Status and Medical Interventions:   Ordered at: 06/08/23 2138     Code Status (Patient has no pulse and is not breathing):    CPR (Attempt to Resuscitate)     Medical Interventions (Patient has pulse or is breathing):    Full Support       Future Appointments   Date Time Provider Department Center   8/7/2023  1:00 PM Jeanna Grande APRN [facility]       Additional Instructions for the Follow-ups that You Need to Schedule       Discharge Follow-up with PCP   As directed       Currently Documented PCP:    Corby Marie MD    PCP Phone Number:    [phone]     Follow Up Details: 1 week                       Julio Leiva MD  06/11/23      Time Spent on Discharge:  I spent  35  minutes on this discharge activity which included: face-to-face encounter with the patient, reviewing the data in the system, coordination of the care with the nursing staff as well as consultants, documentation, and entering orders.           Electronically signed by Julio Leiva MD at 06/11/23  3454

## 2023-06-11 NOTE — PLAN OF CARE
Goal Outcome Evaluation:  Plan of Care Reviewed With: patient        Progress: improving  Outcome Evaluation: Pt  AOx4. NSR-SB on monitor. VSS. RA. No complaints of N/V. PRN given for pain. Will continue to monitor closely and provide care as appropriate per provider orders.

## 2023-06-12 ENCOUNTER — READMISSION MANAGEMENT (OUTPATIENT)
Dept: CALL CENTER | Facility: HOSPITAL | Age: 88
End: 2023-06-12
Payer: MEDICARE

## 2023-06-12 NOTE — OUTREACH NOTE
Prep Survey      Flowsheet Row Responses   [facility] facility patient discharged from? [place]   Is LACE score < 7 ? No   Eligibility Readm Mgmt   Discharge diagnosis Lactic acidosis   Does the patient have one of the following disease processes/diagnoses(primary or secondary)? Other   Does the patient have Home health ordered? Yes   What is the Home health agency?  [facility]   Is there a DME ordered? No   Medication alerts for this patient Eliquis,amiodarone   Prep survey completed? Yes            Magdalena ANGULO - Registered Nurse

## 2023-06-13 LAB
BACTERIA SPEC AEROBE CULT: NORMAL
BACTERIA SPEC AEROBE CULT: NORMAL

## 2023-06-16 LAB
QT INTERVAL: 420 MS
QTC INTERVAL: 433 MS

## 2023-06-17 LAB
QT INTERVAL: 492 MS
QTC INTERVAL: 499 MS

## 2023-06-18 PROBLEM — S72.141A CLOSED DISPLACED INTERTROCHANTERIC FRACTURE OF RIGHT FEMUR, INITIAL ENCOUNTER: Status: ACTIVE | Noted: 2023-06-18
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
## 2023-06-19 ENCOUNTER — READMISSION MANAGEMENT (OUTPATIENT)
Dept: CALL CENTER | Facility: HOSPITAL | Age: 88
End: 2023-06-19
Payer: MEDICARE

## 2023-06-19 NOTE — OUTREACH NOTE
Medical Week 1 Survey      Flowsheet Row Responses   [facility] patient discharged from? [place]   Does the patient have one of the following disease processes/diagnoses(primary or secondary)? Other   Week 1 attempt successful? No   Unsuccessful attempts Attempt 1   Revoke Readmitted            Julia MALDONADO - Registered Nurse

## 2023-06-26 PROBLEM — R77.8 ELEVATED TROPONIN: Status: RESOLVED | Noted: 2023-05-14 | Resolved: 2023-06-26
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
## 2023-06-26 PROBLEM — Z79.4 TYPE 2 DIABETES MELLITUS WITHOUT COMPLICATION, WITH LONG-TERM CURRENT USE OF INSULIN: Chronic | Status: ACTIVE | Noted: 2023-05-05

## 2023-06-26 PROBLEM — N17.9 ACUTE KIDNEY INJURY: Status: RESOLVED | Noted: 2020-05-24 | Resolved: 2023-06-26

## 2023-06-26 PROBLEM — U07.1 COVID-19 VIRUS INFECTION: Status: RESOLVED | Noted: 2023-05-09 | Resolved: 2023-06-26

## 2023-06-26 PROBLEM — Z79.01 CHRONIC ANTICOAGULATION: Chronic | Status: ACTIVE | Noted: 2023-05-05

## 2023-06-26 PROBLEM — E11.9 TYPE 2 DIABETES MELLITUS WITHOUT COMPLICATION, WITH LONG-TERM CURRENT USE OF INSULIN: Chronic | Status: ACTIVE | Noted: 2023-05-05

## 2023-06-26 PROBLEM — D69.6 THROMBOCYTOPENIA: Status: RESOLVED | Noted: 2020-05-24 | Resolved: 2023-06-26

## 2023-06-26 PROBLEM — R07.9 CHEST PAIN: Status: RESOLVED | Noted: 2023-05-14 | Resolved: 2023-06-26

## 2023-06-26 PROBLEM — R53.1 WEAKNESS: Status: RESOLVED | Noted: 2023-03-14 | Resolved: 2023-06-26

## 2023-06-26 PROBLEM — I48.91 ATRIAL FIBRILLATION: Chronic | Status: ACTIVE | Noted: 2023-05-14

## 2023-06-26 PROBLEM — E83.42 HYPOMAGNESEMIA: Status: RESOLVED | Noted: 2023-06-05 | Resolved: 2023-06-26

## 2023-06-26 PROBLEM — R16.0 HEPATOMEGALY: Status: RESOLVED | Noted: 2020-05-24 | Resolved: 2023-06-26

## 2023-06-26 PROBLEM — R06.02 SHORTNESS OF BREATH: Status: RESOLVED | Noted: 2020-05-24 | Resolved: 2023-06-26

## 2023-06-26 PROBLEM — E87.20 LACTIC ACIDOSIS: Status: RESOLVED | Noted: 2023-06-08 | Resolved: 2023-06-26

## 2023-06-26 PROBLEM — N39.0 URINARY TRACT INFECTION, ACUTE: Status: RESOLVED | Noted: 2023-05-05 | Resolved: 2023-06-26

## 2023-06-26 PROBLEM — D46.9 MYELODYSPLASIA (MYELODYSPLASTIC SYNDROME): Chronic | Status: ACTIVE | Noted: 2023-03-14

## 2023-06-26 PROBLEM — Z86.711 PERSONAL HISTORY OF PULMONARY EMBOLISM: Chronic | Status: ACTIVE | Noted: 2023-03-14

## 2023-06-26 PROBLEM — I10 ESSENTIAL HYPERTENSION: Chronic | Status: ACTIVE | Noted: 2023-05-05

## 2023-06-26 PROBLEM — R79.89 ELEVATED TROPONIN: Status: RESOLVED | Noted: 2023-05-14 | Resolved: 2023-06-26

## 2023-06-26 PROBLEM — E86.0 DEHYDRATION: Status: RESOLVED | Noted: 2023-03-29 | Resolved: 2023-06-26

## 2023-06-26 PROBLEM — R16.1 SPLENOMEGALY: Status: RESOLVED | Noted: 2020-05-24 | Resolved: 2023-06-26

## 2023-06-26 PROBLEM — R41.0 CONFUSION: Status: RESOLVED | Noted: 2020-05-25 | Resolved: 2023-06-26

## 2023-06-26 PROBLEM — E87.1 HYPONATREMIA: Status: RESOLVED | Noted: 2023-06-05 | Resolved: 2023-06-26

## 2023-06-26 PROBLEM — R11.2 NAUSEA AND VOMITING, UNSPECIFIED VOMITING TYPE: Status: RESOLVED | Noted: 2023-06-08 | Resolved: 2023-06-26

## 2023-07-11 NOTE — PROGRESS NOTES
Returned pt call, pt calling on status on forms. Informed pt we received her forms 7/7 and informed pt of 10-15 business day turn around. Pt stts her company kept faxing forms to Drs office but drs office would not get her forms. Pt stts her forms were due 7/10 but got it extended to 7/17. Pt stts she needs her forms soon. 1st day off 06/18/23 and has RTW date 07/17/23. Subjective:     Patient ID: Chey Robertson is a 83 y.o. female.    CC:   Chief Complaint   Patient presents with   • Tremors       HPI:   History of Present Illness     ms Robertson is here today for follow-up on her tremor.  She has reported history of migraines as well however she states she has not had problems with headaches in quite some time.  She has been treated for a while with Inderal 40 mg, she is prescribed this to take 3 times a day for her tremor.  She reports it's at times hard for her to remember to take the medicine 3 times a day so she is taking 3 pills in the morning.  She reports to me that in the past Dr. Tejeda has given her extended release medication however her insurance co-pay was too high.  She has no changes in her status, she reports her tremor is stable.    The following portions of the patient's history were reviewed and updated as appropriate: allergies, current medications, past family history, past medical history, past social history, past surgical history and problem list.    Past Medical History:   Diagnosis Date   • Tremors of nervous system        Past Surgical History:   Procedure Laterality Date   • HYSTERECTOMY     • NO PAST SURGERIES         Social History     Socioeconomic History   • Marital status:      Spouse name: Not on file   • Number of children: Not on file   • Years of education: Not on file   • Highest education level: Not on file   Social Needs   • Financial resource strain: Not on file   • Food insecurity - worry: Not on file   • Food insecurity - inability: Not on file   • Transportation needs - medical: Not on file   • Transportation needs - non-medical: Not on file   Occupational History   • Not on file   Tobacco Use   • Smoking status: Never Smoker   Substance and Sexual Activity   • Alcohol use: No   • Drug use: No   • Sexual activity: Defer   Other Topics Concern   • Not on file   Social History Narrative   • Not on file       Family History    Problem Relation Age of Onset   • Breast cancer Sister 84   • Ovarian cancer Neg Hx         Review of Systems   Constitutional: Negative for chills, fatigue, fever and unexpected weight change.   HENT: Positive for rhinorrhea. Negative for ear pain, hearing loss, nosebleeds and sore throat.    Eyes: Negative for photophobia, pain, discharge, itching and visual disturbance.   Respiratory: Negative for cough, chest tightness, shortness of breath and wheezing.    Cardiovascular: Negative for chest pain, palpitations and leg swelling.   Gastrointestinal: Negative for abdominal pain, blood in stool, constipation, diarrhea, nausea and vomiting.   Genitourinary: Positive for frequency. Negative for dysuria, hematuria and urgency.   Musculoskeletal: Positive for arthralgias, back pain, joint swelling and myalgias. Negative for gait problem, neck pain and neck stiffness.   Skin: Negative for rash and wound.   Allergic/Immunologic: Negative for environmental allergies and food allergies.   Neurological: Positive for tremors. Negative for dizziness, seizures, syncope, facial asymmetry, speech difficulty, weakness, light-headedness, numbness and headaches.   Hematological: Negative for adenopathy. Does not bruise/bleed easily.   Psychiatric/Behavioral: Positive for sleep disturbance. Negative for agitation, confusion, decreased concentration, hallucinations and suicidal ideas. The patient is not nervous/anxious.         Objective:    Neurologic Exam     Mental Status   Oriented to person, place, and time.   Attention: normal. Concentration: normal.   Speech: speech is normal   Level of consciousness: alert  Knowledge: consistent with education.   Able to read. Able to write. Normal comprehension.     Cranial Nerves   Cranial nerves II through XII intact.     CN III, IV, VI   Pupils are equal, round, and reactive to light.  Extraocular motions are normal.     Motor Exam   Muscle bulk: normal  Overall muscle tone:  normal    Strength   Strength 5/5 throughout.     Gait, Coordination, and Reflexes     Gait  Gait: normal    Coordination   Romberg: negative  Finger to nose coordination: normal  Heel to shin coordination: normal    Reflexes   Reflexes 2+ except as noted. Mild voice and kinetic tremor of the upper extremities       Physical Exam   Constitutional: She is oriented to person, place, and time. She appears well-developed. No distress.   HENT:   Head: Normocephalic and atraumatic.   Eyes: EOM are normal. Pupils are equal, round, and reactive to light.   Neck: Normal range of motion. Neck supple.   Cardiovascular: Normal rate.   Pulmonary/Chest: Effort normal. No respiratory distress.   Neurological: She is alert and oriented to person, place, and time. She has normal strength. She displays normal reflexes. No cranial nerve deficit. She exhibits normal muscle tone. She has a normal Finger-Nose-Finger Test, a normal Heel to Tapia Test and a normal Romberg Test. Gait normal. Coordination normal.   Skin: Skin is warm. Capillary refill takes less than 2 seconds.   Psychiatric: She has a normal mood and affect. Her speech is normal and behavior is normal. Judgment and thought content normal.   Vitals reviewed.      Assessment/Plan:       Chey was seen today for tremors.    Diagnoses and all orders for this visit:    Benign familial tremor  Comments:  Stable, recommend that she try the propranolol 3 times a day versus all at once in the morning given the short half-life of the medication.    Migraine without aura and without status migrainosus, not intractable  Comments:  Stable    Other orders  -     propranolol (INDERAL) 40 MG tablet; Take 1 tablet by mouth 3 (Three) Times a Day.    Ms Robertson is doing well, she reports her tremor is stable.  She is taking her Inderal 40 mg all at once in the morning instead of 3 times a day dosing.  I suggested that she try to break this apartment to 3 times a day dosing given the short  half-life of the medication to further benefit her tremor, she will try this.  She declines trial of Inderal LA or change in medication at this time.  She'll let me know if she changes her mind.  She is doing well otherwise with no new complaints.  She will follow-up in one year or sooner if needed, she will contact our office with any problems prior to follow-up appointment  Reviewed medications, potential side effects and signs and symptoms to report. Discussed risk versus benefits of treatment plan with patient and/or family-including medications, labs and radiology that may be ordered. Addressed questions and concerns during visit. Patient and/or family verbalized understanding and agree with plan.  EMR Dragon/Transcription Disclaimer:  Much of this encounter note is an electronic transcription of spoken language to printed text. Electronic transcription of spoken language may permit erroneous words or phrases to be inadvertently transcribed. Although I have reviewed the note for such errors, some may still exist in this documentation.           Samanta Quesada, APRN  11/20/2018

## 2023-07-24 RX ORDER — AMIODARONE HYDROCHLORIDE 200 MG/1
TABLET ORAL
Qty: 30 TABLET | OUTPATIENT
Start: 2023-07-24

## 2023-07-24 RX ORDER — METOPROLOL TARTRATE 50 MG/1
TABLET, FILM COATED ORAL
Qty: 60 TABLET | Refills: 0 | OUTPATIENT
Start: 2023-07-24

## 2023-08-06 ENCOUNTER — APPOINTMENT (OUTPATIENT)
Dept: CT IMAGING | Facility: HOSPITAL | Age: 88
End: 2023-08-06
Payer: MEDICARE

## 2023-08-06 ENCOUNTER — HOSPITAL ENCOUNTER (EMERGENCY)
Facility: HOSPITAL | Age: 88
Discharge: HOME | End: 2023-08-06
Attending: EMERGENCY MEDICINE | Admitting: EMERGENCY MEDICINE
Payer: MEDICARE

## 2023-08-06 ENCOUNTER — APPOINTMENT (OUTPATIENT)
Dept: GENERAL RADIOLOGY | Facility: HOSPITAL | Age: 88
End: 2023-08-06
Payer: MEDICARE

## 2023-08-06 VITALS
OXYGEN SATURATION: 95 % | WEIGHT: 115 LBS | BODY MASS INDEX: 20.38 KG/M2 | TEMPERATURE: 97.8 F | DIASTOLIC BLOOD PRESSURE: 56 MMHG | HEART RATE: 60 BPM | HEIGHT: 63 IN | SYSTOLIC BLOOD PRESSURE: 140 MMHG | RESPIRATION RATE: 20 BRPM

## 2023-08-06 DIAGNOSIS — Z79.01 CHRONIC ANTICOAGULATION: ICD-10-CM

## 2023-08-06 DIAGNOSIS — S42.034A CLOSED NONDISPLACED FRACTURE OF ACROMIAL END OF RIGHT CLAVICLE, INITIAL ENCOUNTER: ICD-10-CM
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
## 2023-08-06 DIAGNOSIS — S81.819A SKIN TEAR OF LOWER LEG WITHOUT COMPLICATION, INITIAL ENCOUNTER: ICD-10-CM

## 2023-08-06 DIAGNOSIS — S00.03XA CONTUSION OF SCALP, INITIAL ENCOUNTER: ICD-10-CM

## 2023-08-06 PROCEDURE — 99284 EMERGENCY DEPT VISIT MOD MDM: CPT

## 2023-08-06 PROCEDURE — 70450 CT HEAD/BRAIN W/O DYE: CPT

## 2023-08-06 PROCEDURE — 73030 X-RAY EXAM OF SHOULDER: CPT

## 2023-08-06 RX ORDER — ACETAMINOPHEN 500 MG
500 TABLET ORAL EVERY 6 HOURS PRN
Qty: 30 TABLET | Refills: 0 | Status: SHIPPED | OUTPATIENT
Start: 2023-08-06

## 2023-08-06 RX ORDER — NALOXONE HYDROCHLORIDE 4 MG/.1ML
SPRAY NASAL
Qty: 2 EACH | Refills: 0 | Status: SHIPPED | OUTPATIENT
Start: 2023-08-06

## 2023-08-06 RX ORDER — ACETAMINOPHEN 500 MG
500 TABLET ORAL ONCE
Status: COMPLETED | OUTPATIENT
Start: 2023-08-06 | End: 2023-08-06

## 2023-08-06 RX ADMIN — HYDROCODONE BITARTRATE AND ACETAMINOPHEN 5 ML: 7.5; 325 SOLUTION ORAL at 13:28

## 2023-08-06 RX ADMIN — ACETAMINOPHEN 500 MG: 500 TABLET ORAL at 13:27

## 2023-08-06 NOTE — ED PROVIDER NOTES
Subjective   History of Present Illness  Patient is an 88-year-old female who ambulates with the assistance of of a walker who presents to the ER after a mechanical fall on uneven sidewalk.  Patient landed on her right side.  Patient presents with complaint of hematoma to the scalp as well as right shoulder pain.  Patient is on chronic anticoagulation.  No loss of consciousness.  She denies any neck or back pain.  She is able to ambulate with the assistance of a walker still.    History provided by:  Patient and relative    Review of Systems    Past Medical History:   Diagnosis Date    A-fib     on eliquis    Anemia     Arthritis     Diabetes mellitus     checks sugar only when pt wants to     Disease of thyroid gland     History of transfusion     with knee surgery-  no reaction recalled.     [place] (hard of hearing)     no hearing aids    Hypertension     Migraine without aura and without status migrainosus, not intractable 12/30/2016    Myelodysplastic syndrome     Pulmonary emboli 2011    (after knee surgery)    SOBOE (shortness of breath on exertion)     Tremors of nervous system     Vertigo     Wears dentures     upper     Wears glasses        Allergies   Allergen Reactions    Aspirin Unknown - Low Severity     Mouth sores        Past Surgical History:   Procedure Laterality Date    BLADDER SURGERY      CATARACT EXTRACTION      bilat     CHOLECYSTECTOMY      COLONOSCOPY      HIP TROCHANTERIC NAILING WITH INTRAMEDULLARY HIP SCREW Right 6/20/2023    Procedure: HIP TROCHANTERIC NAILING WITH INTRAMEDULLARY HIP SCREW RIGHT;  Surgeon: Augie Hobbs MD;  [facility];  Service: Orthopedics;  Laterality: Right;    HYSTERECTOMY      with bso    KYPHOPLASTY N/A 02/12/2021    Procedure: KYPHOPLASTY T11, T12 AND L4;  Surgeon: Matty Hearn MD;  Location:  [facility];  Service: Orthopedic Spine;  Laterality: N/A;    TONSILLECTOMY         Family History   Problem Relation Age of Onset    Breast cancer Sister 84     Stroke Mother     Heart attack Father     Ovarian cancer Neg Hx        Social History     Socioeconomic History    Marital status:    Tobacco Use    Smoking status: Never     Passive exposure: Never    Smokeless tobacco: Never   Vaping Use    Vaping Use: Never used   Substance and Sexual Activity    Alcohol use: No    Drug use: No    Sexual activity: Defer           Objective   Physical Exam  Vitals and nursing note reviewed.   Constitutional:       General: She is not in acute distress.     Appearance: She is not toxic-appearing.   HENT:      Head: Normocephalic. Contusion present. No raccoon eyes, Benson's sign or laceration.        Comments: 2 cm x 4 cm hematoma of the right lateral frontal scalp.  Cardiovascular:      Rate and Rhythm: Normal rate and regular rhythm.      Pulses: Normal pulses.   Pulmonary:      Effort: Pulmonary effort is normal. No respiratory distress.      Breath sounds: Normal breath sounds.   Musculoskeletal:      Comments: No midline C, T, L-spine tenderness to palpation or step-off.  There is tenderness of the right AC joint region.  No gross deformity.  Neurovascular intact.  There is a 3 cm skin tear of the left lateral aspect of the left lower extremity, several inches above the malleolus.   Skin:     Findings: Wound present.          Neurological:      Mental Status: She is alert and oriented to person, place, and time.   Psychiatric:         Mood and Affect: Mood normal.         Behavior: Behavior normal.       Laceration Repair    Date/Time: 8/6/2023 1:50 PM  Performed by: Josh Lowry MD  Authorized by: Josh Lowry MD     Consent:     Consent obtained:  Verbal    Consent given by:  Patient    Risks discussed:  Infection, need for additional repair, poor cosmetic result and poor wound healing    Alternatives discussed:  No treatment  Universal protocol:     Procedure explained and questions answered to patient or proxy's satisfaction: yes    Anesthesia:      Anesthesia method:  None  Laceration details:     Location:  Leg    Leg location:  L lower leg  Exploration:     Wound exploration: wound explored through full range of motion and entire depth of wound visualized      Contaminated: no    Treatment:     Area cleansed with:  Soap and water and chlorhexidine    Amount of cleaning:  Standard    Irrigation solution:  Sterile saline  Skin repair:     Repair method:  Steri-Strips and tissue adhesive    Number of Steri-Strips:  6  Repair type:     Repair type:  Simple  Post-procedure details:     Dressing:  Open (no dressing)    Procedure completion:  Tolerated well, no immediate complications  Comments:      4 cm skin tear of the left lower leg.  It was cleaned thoroughly.  Edges were aligned.  Tissue adhesive was used for security as well as Mastisol and Steri-Strips.  Patient tolerated well with no observed complications.           ED Course  ED Course as of 08/06/23 1448   Sun Aug 06, 2023   1331 XR Shoulder 2+ View Right  I personally reviewed the 2 views of the right shoulder.  On my interpretation there is a comminuted distal clavicle fracture on the right.  Minimally displaced.  See report for radiology for details. [RS]   1407 CT Head Without Contrast  Personally reviewed the CT scan of the head.  On my interpretation there is a scalp hematoma in the right frontal region.  Otherwise, no hemorrhage or mass effect. [RS]      ED Course User Index  [RS] Josh Lowry MD                                   [facility] reviewed by Josh Lowry MD       Medical Decision Making  Problems Addressed:  Chronic anticoagulation: chronic illness or injury  Closed nondisplaced fracture of acromial end of right clavicle, initial encounter: complicated acute illness or injury  Contusion of scalp, initial encounter: complicated acute illness or injury  Fall, initial encounter: complicated acute illness or injury  Skin tear of lower leg without complication, initial  encounter: complicated acute illness or injury    Amount and/or Complexity of Data Reviewed  Independent Historian: caregiver  Radiology: ordered. Decision-making details documented in ED Course.    Risk  OTC drugs.  Prescription drug management.        Final diagnoses:   Fall, initial encounter   Closed nondisplaced fracture of acromial end of right clavicle, initial encounter   Contusion of scalp, initial encounter   Skin tear of lower leg without complication, initial encounter   Chronic anticoagulation       ED Disposition  ED Disposition       ED Disposition   Discharge    Condition   Stable    Comment   --               Yasmeen Loomis MD  [address]  [address]  [place] [address]  [phone]    In 1 week      [facility] EMERGENCY DEPARTMENT  [address]  [facility] [address] [phone]    As needed, If symptoms worsen or ANY concerns.    Josh Scherer MD  [address] [id number] [phone]    Call            Medication List        New Prescriptions      acetaminophen 500 MG tablet  Commonly known as: TYLENOL  Take 1 tablet by mouth Every 6 (Six) Hours As Needed for Mild Pain or Moderate Pain.     HYDROcodone-acetaminophen 7.5-325 MG/15ML solution  Commonly known as: HYCET  Take 5 mL by mouth Every 6 (Six) Hours As Needed for Moderate Pain.     naloxone 4 MG/0.1ML nasal spray  Commonly known as: NARCAN  Call 911. Don't prime. [place] in 1 nostril for overdose. Repeat in 2-3 minutes in other nostril if no or minimal breathing/responsiveness.               Where to Get Your Medications        These medications were sent to [id number] - [place], [place] - [address] AT [facility] OF [address] - [phone]  - [phone] FX  [address], [facility] [address]      Phone: [phone]   acetaminophen 500 MG tablet  HYDROcodone-acetaminophen 7.5-325 MG/15ML solution  naloxone 4 MG/0.1ML nasal  Josh Barlow MD  08/06/23 9816

## 2023-08-07 ENCOUNTER — OFFICE VISIT (OUTPATIENT)
Dept: ONCOLOGY | Facility: CLINIC | Age: 88
End: 2023-08-07
Payer: MEDICARE

## 2023-08-07 ENCOUNTER — LAB (OUTPATIENT)
Dept: LAB | Facility: HOSPITAL | Age: 88
End: 2023-08-07
Payer: MEDICARE

## 2023-08-07 VITALS
HEIGHT: 63 IN | BODY MASS INDEX: 21.26 KG/M2 | TEMPERATURE: 96.9 F | WEIGHT: 120 LBS | RESPIRATION RATE: 18 BRPM | HEART RATE: 60 BPM | OXYGEN SATURATION: 93 % | SYSTOLIC BLOOD PRESSURE: 152 MMHG | DIASTOLIC BLOOD PRESSURE: 67 MMHG

## 2023-08-07 DIAGNOSIS — D50.0 IRON DEFICIENCY ANEMIA DUE TO CHRONIC BLOOD LOSS: ICD-10-CM

## 2023-08-07 DIAGNOSIS — E53.8 B12 DEFICIENCY: Primary | ICD-10-CM

## 2023-08-07 DIAGNOSIS — D46.9 MYELODYSPLASIA (MYELODYSPLASTIC SYNDROME): Chronic | ICD-10-CM

## 2023-08-07 LAB
BASOPHILS # BLD AUTO: 0.01 10*3/MM3 (ref 0–0.2)
BASOPHILS NFR BLD AUTO: 0.6 % (ref 0–1.5)
DEPRECATED RDW RBC AUTO: 55.3 FL (ref 37–54)
EOSINOPHIL # BLD AUTO: 0 10*3/MM3 (ref 0–0.4)
EOSINOPHIL NFR BLD AUTO: 0 % (ref 0.3–6.2)
ERYTHROCYTE [DISTWIDTH] IN BLOOD BY AUTOMATED COUNT: 16.6 % (ref 12.3–15.4)
FERRITIN SERPL-MCNC: 360.6 NG/ML (ref 13–150)
HCT VFR BLD AUTO: 29.6 % (ref 34–46.6)
HGB BLD-MCNC: 9.6 G/DL (ref 12–15.9)
IMM GRANULOCYTES # BLD AUTO: 0.01 10*3/MM3 (ref 0–0.05)
IMM GRANULOCYTES NFR BLD AUTO: 0.6 % (ref 0–0.5)
IRON 24H UR-MRATE: 45 MCG/DL (ref 37–145)
IRON SATN MFR SERPL: 17 % (ref 20–50)
LYMPHOCYTES # BLD AUTO: 0.46 10*3/MM3 (ref 0.7–3.1)
LYMPHOCYTES NFR BLD AUTO: 26.1 % (ref 19.6–45.3)
MCH RBC QN AUTO: 29.4 PG (ref 26.6–33)
MCHC RBC AUTO-ENTMCNC: 32.4 G/DL (ref 31.5–35.7)
MCV RBC AUTO: 90.5 FL (ref 79–97)
MONOCYTES # BLD AUTO: 0.25 10*3/MM3 (ref 0.1–0.9)
MONOCYTES NFR BLD AUTO: 14.2 % (ref 5–12)
NEUTROPHILS NFR BLD AUTO: 1.03 10*3/MM3 (ref 1.7–7)
NEUTROPHILS NFR BLD AUTO: 58.5 % (ref 42.7–76)
PLATELET # BLD AUTO: 56 10*3/MM3 (ref 140–450)
PMV BLD AUTO: 8.8 FL (ref 6–12)
RBC # BLD AUTO: 3.27 10*6/MM3 (ref 3.77–5.28)
TIBC SERPL-MCNC: 270 MCG/DL (ref 298–536)
TRANSFERRIN SERPL-MCNC: 181 MG/DL (ref 200–360)
VIT B12 BLD-MCNC: 354 PG/ML (ref 211–946)
WBC NRBC COR # BLD: 1.76 10*3/MM3 (ref 3.4–10.8)

## 2023-08-07 PROCEDURE — 83540 ASSAY OF IRON: CPT

## 2023-08-07 PROCEDURE — 1126F AMNT PAIN NOTED NONE PRSNT: CPT | Performed by: NURSE PRACTITIONER

## 2023-08-07 PROCEDURE — 82728 ASSAY OF FERRITIN: CPT

## 2023-08-07 PROCEDURE — 99214 OFFICE O/P EST MOD 30 MIN: CPT | Performed by: NURSE PRACTITIONER

## 2023-08-07 PROCEDURE — 84466 ASSAY OF TRANSFERRIN: CPT

## 2023-08-07 PROCEDURE — 1160F RVW MEDS BY RX/DR IN RCRD: CPT | Performed by: NURSE PRACTITIONER

## 2023-08-07 PROCEDURE — 85025 COMPLETE CBC W/AUTO DIFF WBC: CPT

## 2023-08-07 PROCEDURE — 36415 COLL VENOUS BLD VENIPUNCTURE: CPT

## 2023-08-07 PROCEDURE — 1159F MED LIST DOCD IN RCRD: CPT | Performed by: NURSE PRACTITIONER

## 2023-08-07 PROCEDURE — 82607 VITAMIN B-12: CPT

## 2023-08-07 RX ORDER — GUAIFENESIN 400 MG
TABLET ORAL
COMMUNITY
Start: 2023-06-03

## 2023-08-07 RX ORDER — ONDANSETRON 4 MG/1
TABLET, ORALLY DISINTEGRATING ORAL
COMMUNITY
Start: 2023-06-08

## 2023-08-07 RX ORDER — ZINC SULFATE 50(220)MG
CAPSULE ORAL
COMMUNITY
Start: 2023-06-03

## 2023-08-07 RX ORDER — FUROSEMIDE 40 MG/1
TABLET ORAL
COMMUNITY
Start: 2023-08-03

## 2023-08-07 RX ORDER — ASCORBIC ACID 500 MG
TABLET ORAL
COMMUNITY
Start: 2023-06-03

## 2023-08-07 RX ORDER — INSULIN ASPART 100 [IU]/ML
INJECTION, SOLUTION INTRAVENOUS; SUBCUTANEOUS
COMMUNITY
Start: 2023-06-03

## 2023-08-07 RX ORDER — SUCRALFATE 1 G/1
1 TABLET ORAL EVERY 12 HOURS SCHEDULED
COMMUNITY
Start: 2023-06-08

## 2023-08-07 RX ORDER — POTASSIUM CHLORIDE 750 MG/1
TABLET, FILM COATED, EXTENDED RELEASE ORAL
COMMUNITY
Start: 2023-08-03

## 2023-08-07 NOTE — PROGRESS NOTES
DATE OF VISIT: 8/7/2023    REASON FOR VISIT: Followup for pancytopenia.    PROBLEM LIST:  1.  Pancytopenia:  A.  Incidentally noted blood work done May 24, 2020  B.  CT abdomen pelvis revealed mild hepatosplenomegaly spleen size 15 cm with normal liver enzymes  C.  Bone marrow biopsy done on May 26, 2020 revealed hypercellular marrow 66% cellularity with mild megakaryocytes atypia suspicious for low-grade MDS  D.  Repeat bone marrow biopsy done July 13, 2021 revealed hypercellular marrow with dyserythropoiesis  2.  Vitamin B12 deficiency:  A.  Vitamin B12 level 155 on May 25, 2020  3. Hypothyroidism  4. Diabetes  5. History of pulmonary embolism     HISTORY OF PRESENT ILLNESS: The patient is a very pleasant 88 y.o. female  with past medical history significant for pancytopenia diagnosed May 2020. Her blood work revealed pancytopenia.  CT chest abdomen pelvis revealed mild splenomegaly spleen size 15 cm with mild hepatomegaly as well as pancytopenia.  Bone marrow biopsy done on May 26, 2020 revealed hypercellular marrow with mild dysplastic features could not rule out low-grade MDS.    Repeated bone marrow biopsy done July 13, 2021 revealed similar changes.  Blood work confirmed vitamin B12 deficiency with serum B12 level of 150.  She was started on vitamin B12 replacement monthly. The patient is here today for scheduled follow up visit.     SUBJECTIVE: The patient is here today with her son. She has been doing fair. She has had multiple falls that have resulted in both a hip fracture and a shoulder fracture. She underwent surgical repair of her hip, but her shoulder is healing without surgery. She still has some pain in her shoulder. She has some bruising and notes she bleeds easily when she cuts herself. She has had no nosebleeds, bleeding with urination, or bowel movements. She denies fevers, chills, or night sweats.     Past History:  Medical History: has a past medical history of A-fib, Anemia, Arthritis,  "Diabetes mellitus, Disease of thyroid gland, History of transfusion, [place] (hard of hearing), Hypertension, Migraine without aura and without status migrainosus, not intractable (12/30/2016), Myelodysplastic syndrome, Pulmonary emboli (2011), SOBOE (shortness of breath on exertion), Tremors of nervous system, Vertigo, Wears dentures, and Wears glasses.   Surgical History: has a past surgical history that includes Hysterectomy; Tonsillectomy; Bladder surgery; Cataract extraction; Cholecystectomy; Colonoscopy; Kyphoplasty (N/A, 02/12/2021); and Hip Trochanteric Nailing (Right, 6/20/2023).   Family History: family history includes Breast cancer (age of onset: 84) in her sister; Heart attack in her father; Stroke in her mother.   Social History: reports that she has never smoked. She has never been exposed to tobacco smoke. She has never used smokeless tobacco. She reports that she does not drink alcohol and does not use drugs.    (Not in a hospital admission)     Allergies: Aspirin      Review of Systems   Constitutional:  Positive for fatigue.   Musculoskeletal:  Positive for arthralgias, gait problem and myalgias.   Neurological:  Positive for weakness.   Hematological:  Bruises/bleeds easily.       PHYSICAL EXAMINATION:   /67   Pulse 60   Temp 96.9 øF (36.1 øC) (Infrared)   Resp 18   Ht 160 cm (62.99\")   Wt 54.4 kg (120 lb)   LMP  (LMP Unknown) Comment: mammogram is up to date  SpO2 93%   BMI 21.26 kg/mý    Pain Score    08/07/23 1344   PainSc: 0-No pain         ECOG Performance Status: 2 - Symptomatic, <50% confined to bed  General Appearance:  alert, cooperative, no apparent distress, appears stated age, and frail appearing   Lungs:   Clear to auscultation bilaterally; respirations regular, even, and unlabored bilaterally   Heart:  Regular rate and rhythm, no murmurs appreciated   Abdomen:   Soft, non-tender, non-distended, and no organomegaly     Lab on 08/07/2023   Component Date Value Ref Range " Status    WBC 08/07/2023 1.76 (L)  3.40 - 10.80 10*3/mm3 Final    RBC 08/07/2023 3.27 (L)  3.77 - 5.28 10*6/mm3 Final    Hemoglobin 08/07/2023 9.6 (L)  12.0 - 15.9 g/dL Final    Hematocrit 08/07/2023 29.6 (L)  34.0 - 46.6 % Final    MCV 08/07/2023 90.5  79.0 - 97.0 fL Final    MCH 08/07/2023 29.4  26.6 - 33.0 pg Final    MCHC 08/07/2023 32.4  31.5 - 35.7 g/dL Final    RDW 08/07/2023 16.6 (H)  12.3 - 15.4 % Final    RDW-SD 08/07/2023 55.3 (H)  37.0 - 54.0 fl Final    MPV 08/07/2023 8.8  6.0 - 12.0 fL Final    Platelets 08/07/2023 56 (L)  140 - 450 10*3/mm3 Final    Neutrophil % 08/07/2023 58.5  42.7 - 76.0 % Final    Lymphocyte % 08/07/2023 26.1  19.6 - 45.3 % Final    Monocyte % 08/07/2023 14.2 (H)  5.0 - 12.0 % Final    Eosinophil % 08/07/2023 0.0 (L)  0.3 - 6.2 % Final    Basophil % 08/07/2023 0.6  0.0 - 1.5 % Final    Immature Grans % 08/07/2023 0.6 (H)  0.0 - 0.5 % Final    Neutrophils, Absolute 08/07/2023 1.03 (L)  1.70 - 7.00 10*3/mm3 Final    Lymphocytes, Absolute 08/07/2023 0.46 (L)  0.70 - 3.10 10*3/mm3 Final    Monocytes, Absolute 08/07/2023 0.25  0.10 - 0.90 10*3/mm3 Final    Eosinophils, Absolute 08/07/2023 0.00  0.00 - 0.40 10*3/mm3 Final    Basophils, Absolute 08/07/2023 0.01  0.00 - 0.20 10*3/mm3 Final    Immature Grans, Absolute 08/07/2023 0.01  0.00 - 0.05 10*3/mm3 Final        XR Shoulder 2+ View Right    Result Date: 8/6/2023  Narrative: XR SHOULDER 2+ VW RIGHT Date of Exam: 8/6/2023 1:13 PM EDT Indication: fall w/ right shoulder pain/injury Comparison: None available. Findings: There is diffuse osteopenia. There is an acute impacted fracture of the distal clavicle. Fracture fragments are in near anatomic position. AC joint is intact. There is narrowing of the glenohumeral joint. There is irregularity of the humeral head consistent with degenerative change. Visualized portions of the right ribs are intact.     Impression: Impression: Acute impacted fracture of the distal right clavicle. Fracture  fragments are in anatomic position. AC joint appears intact. Electronically Signed: Karsten Allison  8/6/2023 1:36 PM EDT  Workstation ID: [id number]    CT Head Without Contrast    Result Date: 8/6/2023  Narrative: CT HEAD WO CONTRAST Date of Exam: 8/6/2023 2:03 PM EDT Indication: fall with scalp hematoma and chronic anticoagulation. Comparison: 6/18/2023 Technique: Axial CT images were obtained of the head without contrast administration.  Automated exposure control and iterative construction methods were used. Findings:    There is mild generalized loss. There are areas of encephalomalacia within the left frontal lobe and right occipital lobes compatible with old infarcts. There is an old right thalamic infarct. No definite acute infarct or hemorrhage identified. There are no abnormal extra-axial fluid collections. No mass effect or hydrocephalus. There is soft tissue swelling over the lateral aspect of the right frontal bone. There is no acute skull fracture. No lytic or sclerotic bony lesion identified. There are air-fluid levels within the bilateral sphenoid sinuses consistent with acute sinusitis. Mastoid air cells and middle ear cavities appear clear. Other visualized paranasal sinuses are clear.     Impression: Impression: 1. 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 2. No acute infarct or hemorrhage. 3. Soft tissue swelling over the right frontal bone. No underlying skull fracture. 4. Air-fluid levels within the bilateral sphenoid sinuses consistent with acute sinusitis. Electronically Signed: Karsten Alstonens  8/6/2023 2:24 PM EDT  Workstation ID: [id number]       ASSESSMENT: The patient is a very pleasant 88 y.o. female  with pancytopenia.       PLAN:  1. Pancytopenia:  A.  I reviewed the lab results from today with the patient. Her WBC is 1.76 with ANC of 1.03. her hemoglobin has improved slightly to 9.6 with thrombocytopenia with platelet count of 56,000.   B.  We will repeat a CBC in 3  months, then see her back in 6 months with repeat labs including CBC, iron profile, ferritin, and vitamin B12.     2. Hypothyroidism:  A. She will continue levothyroxine 100 mcg daily for hypothyroidism.     3.  History of iron deficiency:  A.  We will follow up on the iron studies from today as well as repeat them on return.   B. We will arrange for IV iron replacement if needed for iron deficiency with ferritin less than 30 or saturation less than 10%.      4. Type 2 diabetes:  A. She will continue Januiva and Metformin daily. This is being managed by Dr. Loomis.     5. Atrial fibrillation:  A. She is on Eliquis 2.5 mg twice a day. This is reduced dose secondary to bleeding and thrombocytopenia.   B. We will consider stopping/holding anticoagulation in the future if her platelet count gets below 30,000 or if she has evidence of bleeding.     6. Multiple falls:  A. She will follow up with orthopedics regarding shoulder fracture. I encouraged her to wear her sling as prescribed for stabilization.  B. She will continue use of her walker for ambulation. She is now in assisted living facility as well.      FOLLOW UP: 3 month lab check, then follow up 6 months with labs.   Jeanna Grande, APRN  8/7/2023

## 2023-08-16 NOTE — TELEPHONE ENCOUNTER
Caller: [facility] - [place] [id number] - [phone]  - [phone] FX    Relationship: Pharmacy    Best call back number: [phone]     Requested Prescriptions:   Requested Prescriptions     Pending Prescriptions Disp Refills    amiodarone (PACERONE) 200 MG tablet       Sig: Take 1 tablet by mouth Daily.        Pharmacy where request should be sent: [place] [id number] - [phone]  - [phone] FX     Last office visit with prescribing clinician: Visit date not found   Last telemedicine visit with prescribing clinician: Visit date not found   Next office visit with prescribing clinician: Visit date not found       Does the patient have less than a 3 day supply:  [x] Yes  [] No        Demian Duke Rep   08/16/23 11:03 EDT

## 2023-08-17 RX ORDER — AMIODARONE HYDROCHLORIDE 200 MG/1
200 TABLET ORAL DAILY
Start: 2023-08-17

## 2024-01-15 ENCOUNTER — APPOINTMENT (OUTPATIENT)
Dept: GENERAL RADIOLOGY | Facility: HOSPITAL | Age: 89
End: 2024-01-15
Payer: MEDICARE

## 2024-01-15 ENCOUNTER — HOSPITAL ENCOUNTER (EMERGENCY)
Facility: HOSPITAL | Age: 89
Discharge: HOME | End: 2024-01-15
Attending: EMERGENCY MEDICINE | Admitting: EMERGENCY MEDICINE
Payer: MEDICARE

## 2024-01-15 ENCOUNTER — APPOINTMENT (OUTPATIENT)
Dept: CT IMAGING | Facility: HOSPITAL | Age: 89
End: 2024-01-15
Payer: MEDICARE

## 2024-01-15 VITALS
HEART RATE: 52 BPM | SYSTOLIC BLOOD PRESSURE: 158 MMHG | DIASTOLIC BLOOD PRESSURE: 58 MMHG | WEIGHT: 120 LBS | TEMPERATURE: 97.6 F | RESPIRATION RATE: 14 BRPM | OXYGEN SATURATION: 97 % | HEIGHT: 63 IN | BODY MASS INDEX: 21.26 KG/M2

## 2024-01-15 DIAGNOSIS — S09.90XA INJURY OF HEAD, INITIAL ENCOUNTER: ICD-10-CM

## 2024-01-15 DIAGNOSIS — S40.011A CONTUSION OF RIGHT SHOULDER, INITIAL ENCOUNTER: ICD-10-CM

## 2024-01-15 DIAGNOSIS — S00.83XA CONTUSION OF FACE, INITIAL ENCOUNTER: ICD-10-CM

## 2024-01-15 DIAGNOSIS — D61.818 PANCYTOPENIA: ICD-10-CM

## 2024-01-15 DIAGNOSIS — E11.65 HYPERGLYCEMIA DUE TO DIABETES MELLITUS: ICD-10-CM
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
## 2024-01-15 LAB
ALBUMIN SERPL-MCNC: 4.1 G/DL (ref 3.5–5.2)
ALBUMIN/GLOB SERPL: 2.1 G/DL
ALP SERPL-CCNC: 141 U/L (ref 39–117)
ALT SERPL W P-5'-P-CCNC: 7 U/L (ref 1–33)
ANION GAP SERPL CALCULATED.3IONS-SCNC: 8 MMOL/L (ref 5–15)
AST SERPL-CCNC: 10 U/L (ref 1–32)
BASOPHILS # BLD AUTO: 0 10*3/MM3 (ref 0–0.2)
BASOPHILS NFR BLD AUTO: 0 % (ref 0–1.5)
BILIRUB SERPL-MCNC: 0.4 MG/DL (ref 0–1.2)
BILIRUB UR QL STRIP: NEGATIVE
BUN SERPL-MCNC: 23 MG/DL (ref 8–23)
BUN/CREAT SERPL: 24 (ref 7–25)
CALCIUM SPEC-SCNC: 8.9 MG/DL (ref 8.6–10.5)
CHLORIDE SERPL-SCNC: 98 MMOL/L (ref 98–107)
CLARITY UR: CLEAR
CO2 SERPL-SCNC: 31 MMOL/L (ref 22–29)
COLOR UR: YELLOW
CREAT SERPL-MCNC: 0.96 MG/DL (ref 0.57–1)
DEPRECATED RDW RBC AUTO: 48.6 FL (ref 37–54)
EGFRCR SERPLBLD CKD-EPI 2021: 57.4 ML/MIN/1.73
EOSINOPHIL # BLD AUTO: 0 10*3/MM3 (ref 0–0.4)
EOSINOPHIL NFR BLD AUTO: 0 % (ref 0.3–6.2)
ERYTHROCYTE [DISTWIDTH] IN BLOOD BY AUTOMATED COUNT: 14.6 % (ref 12.3–15.4)
GLOBULIN UR ELPH-MCNC: 2 GM/DL
GLUCOSE BLDC GLUCOMTR-MCNC: 281 MG/DL (ref 70–130)
GLUCOSE BLDC GLUCOMTR-MCNC: 440 MG/DL (ref 70–130)
GLUCOSE BLDC GLUCOMTR-MCNC: 507 MG/DL (ref 70–130)
GLUCOSE SERPL-MCNC: 488 MG/DL (ref 65–99)
GLUCOSE UR STRIP-MCNC: ABNORMAL MG/DL
HCT VFR BLD AUTO: 32 % (ref 34–46.6)
HGB BLD-MCNC: 10.7 G/DL (ref 12–15.9)
HGB UR QL STRIP.AUTO: NEGATIVE
HOLD SPECIMEN: NORMAL
IMM GRANULOCYTES # BLD AUTO: 0.01 10*3/MM3 (ref 0–0.05)
IMM GRANULOCYTES NFR BLD AUTO: 0.7 % (ref 0–0.5)
KETONES UR QL STRIP: NEGATIVE
LEUKOCYTE ESTERASE UR QL STRIP.AUTO: NEGATIVE
LYMPHOCYTES # BLD AUTO: 0.25 10*3/MM3 (ref 0.7–3.1)
LYMPHOCYTES NFR BLD AUTO: 17.7 % (ref 19.6–45.3)
MCH RBC QN AUTO: 30.5 PG (ref 26.6–33)
MCHC RBC AUTO-ENTMCNC: 33.4 G/DL (ref 31.5–35.7)
MCV RBC AUTO: 91.2 FL (ref 79–97)
MONOCYTES # BLD AUTO: 0.15 10*3/MM3 (ref 0.1–0.9)
MONOCYTES NFR BLD AUTO: 10.6 % (ref 5–12)
NEUTROPHILS NFR BLD AUTO: 1 10*3/MM3 (ref 1.7–7)
NEUTROPHILS NFR BLD AUTO: 71 % (ref 42.7–76)
NITRITE UR QL STRIP: NEGATIVE
NRBC BLD AUTO-RTO: 0 /100 WBC (ref 0–0.2)
NT-PROBNP SERPL-MCNC: 785.2 PG/ML (ref 0–1800)
PH UR STRIP.AUTO: 6 [PH] (ref 5–8)
PLATELET # BLD AUTO: 38 10*3/MM3 (ref 140–450)
PMV BLD AUTO: 10.8 FL (ref 6–12)
POTASSIUM SERPL-SCNC: 4.5 MMOL/L (ref 3.5–5.2)
PROT SERPL-MCNC: 6.1 G/DL (ref 6–8.5)
PROT UR QL STRIP: NEGATIVE
QT INTERVAL: 504 MS
QTC INTERVAL: 477 MS
RBC # BLD AUTO: 3.51 10*6/MM3 (ref 3.77–5.28)
RBC MORPH BLD: NORMAL
SMALL PLATELETS BLD QL SMEAR: NORMAL
SODIUM SERPL-SCNC: 137 MMOL/L (ref 136–145)
SP GR UR STRIP: 1.02 (ref 1–1.03)
TROPONIN T SERPL HS-MCNC: 15 NG/L
UROBILINOGEN UR QL STRIP: ABNORMAL
WBC MORPH BLD: NORMAL
WBC NRBC COR # BLD AUTO: 1.41 10*3/MM3 (ref 3.4–10.8)
WHOLE BLOOD HOLD COAG: NORMAL
WHOLE BLOOD HOLD SPECIMEN: NORMAL

## 2024-01-15 PROCEDURE — 82948 REAGENT STRIP/BLOOD GLUCOSE: CPT

## 2024-01-15 PROCEDURE — 93005 ELECTROCARDIOGRAM TRACING: CPT | Performed by: PHYSICIAN ASSISTANT

## 2024-01-15 PROCEDURE — 99284 EMERGENCY DEPT VISIT MOD MDM: CPT

## 2024-01-15 PROCEDURE — 36415 COLL VENOUS BLD VENIPUNCTURE: CPT

## 2024-01-15 PROCEDURE — 63710000001 INSULIN REGULAR HUMAN PER 5 UNITS: Performed by: EMERGENCY MEDICINE

## 2024-01-15 PROCEDURE — 81003 URINALYSIS AUTO W/O SCOPE: CPT | Performed by: PHYSICIAN ASSISTANT

## 2024-01-15 PROCEDURE — 85025 COMPLETE CBC W/AUTO DIFF WBC: CPT | Performed by: PHYSICIAN ASSISTANT

## 2024-01-15 PROCEDURE — 70486 CT MAXILLOFACIAL W/O DYE: CPT

## 2024-01-15 PROCEDURE — 80053 COMPREHEN METABOLIC PANEL: CPT | Performed by: PHYSICIAN ASSISTANT

## 2024-01-15 PROCEDURE — 25810000003 SODIUM CHLORIDE 0.9 % SOLUTION: Performed by: EMERGENCY MEDICINE

## 2024-01-15 PROCEDURE — 73030 X-RAY EXAM OF SHOULDER: CPT

## 2024-01-15 PROCEDURE — 71045 X-RAY EXAM CHEST 1 VIEW: CPT

## 2024-01-15 PROCEDURE — 73560 X-RAY EXAM OF KNEE 1 OR 2: CPT

## 2024-01-15 PROCEDURE — 70450 CT HEAD/BRAIN W/O DYE: CPT

## 2024-01-15 PROCEDURE — 83880 ASSAY OF NATRIURETIC PEPTIDE: CPT | Performed by: PHYSICIAN ASSISTANT

## 2024-01-15 PROCEDURE — 85007 BL SMEAR W/DIFF WBC COUNT: CPT | Performed by: PHYSICIAN ASSISTANT

## 2024-01-15 PROCEDURE — 84484 ASSAY OF TROPONIN QUANT: CPT | Performed by: PHYSICIAN ASSISTANT

## 2024-01-15 RX ORDER — POTASSIUM CHLORIDE 1.5 G/1.58G
20 POWDER, FOR SOLUTION ORAL ONCE
Status: COMPLETED | OUTPATIENT
Start: 2024-01-15 | End: 2024-01-15

## 2024-01-15 RX ADMIN — SODIUM CHLORIDE 1000 ML: 9 INJECTION, SOLUTION INTRAVENOUS at 17:52

## 2024-01-15 RX ADMIN — POTASSIUM CHLORIDE 20 MEQ: 1.5 POWDER, FOR SOLUTION ORAL at 17:52

## 2024-01-15 RX ADMIN — INSULIN HUMAN 8 UNITS: 100 INJECTION, SOLUTION PARENTERAL at 17:51

## 2024-01-15 NOTE — ED PROVIDER NOTES
Subjective   History of Present Illness  Pt is a 87 yo female presenting to ED by EMS from [facility] after a fall. PMHx significant for HTN, HLD, Afib, PE, DM, Hypothyroidism, Migraines, Myelodysplasia syndrome. Pt fell just PTA when walking back to her room after a meeting. She was using her walker and dropped some papers and was bending over to pick them up and fell forwards. She did hit her head and has bruising to forehead and nose. She had a nosebleed that resolved. She also complains of pain to right shoulder and right knee. She denies neck or back pain. She denies hip pain. She is on Eliquis. She denies headache, dizziness, N/V, CP, SOB, cough, diarrhea or abdominal pain. EMS reported her blood sugar was  591. Pt states she is currently not on insulin.    History provided by:  Patient, medical records, EMS personnel and nursing home      Review of Systems   Constitutional:  Negative for fever.   HENT:  Positive for facial swelling and nosebleeds. Negative for congestion.    Eyes:  Negative for visual disturbance.   Respiratory:  Negative for cough and shortness of breath.    Cardiovascular:  Negative for chest pain.   Gastrointestinal:  Negative for abdominal pain, diarrhea, nausea and vomiting.   Genitourinary:  Negative for difficulty urinating.   Musculoskeletal:  Positive for arthralgias. Negative for back pain and neck pain.   Skin:  Negative for wound.   Neurological:  Negative for dizziness, syncope, speech difficulty, weakness, numbness and headaches.   Psychiatric/Behavioral:  Negative for confusion.        Past Medical History:   Diagnosis Date    A-fib     on eliquis    Anemia     Arthritis     Diabetes mellitus     checks sugar only when pt wants to     Disease of thyroid gland     History of transfusion     with knee surgery-  no reaction recalled.     [place] (hard of hearing)     no hearing aids    Hypertension     Migraine without aura and without status migrainosus, not intractable 12/30/2016     Myelodysplastic syndrome     Pulmonary emboli 2011    (after knee surgery)    SOBOE (shortness of breath on exertion)     Tremors of nervous system     Vertigo     Wears dentures     upper     Wears glasses        Allergies   Allergen Reactions    Aspirin Unknown - Low Severity     Mouth sores        Past Surgical History:   Procedure Laterality Date    BLADDER SURGERY      CATARACT EXTRACTION      bilat     CHOLECYSTECTOMY      COLONOSCOPY      HIP TROCHANTERIC NAILING WITH INTRAMEDULLARY HIP SCREW Right 6/20/2023    Procedure: HIP TROCHANTERIC NAILING WITH INTRAMEDULLARY HIP SCREW RIGHT;  Surgeon: Augie Hobbs MD;  Location:  [facility];  Service: Orthopedics;  Laterality: Right;    HYSTERECTOMY      with bso    KYPHOPLASTY N/A 02/12/2021    Procedure: KYPHOPLASTY T11, T12 AND L4;  Surgeon: Matty Hearn MD;  Location:  [facility];  Service: Orthopedic Spine;  Laterality: N/A;    TONSILLECTOMY         Family History   Problem Relation Age of Onset    Breast cancer Sister 84    Stroke Mother     Heart attack Father     Ovarian cancer Neg Hx        Social History     Socioeconomic History    Marital status:    Tobacco Use    Smoking status: Never     Passive exposure: Never    Smokeless tobacco: Never   Vaping Use    Vaping Use: Never used   Substance and Sexual Activity    Alcohol use: No    Drug use: No    Sexual activity: Defer           Objective   Physical Exam  Vitals and nursing note reviewed.   Constitutional:       Appearance: She is well-developed.   HENT:      Head: Atraumatic. Contusion (forehead) present.      Nose: Nose normal. Nasal tenderness (contusion) present.      Comments: Dried blood in nostrils, no active epistaxis  Eyes:      General: Lids are normal.      Conjunctiva/sclera: Conjunctivae normal.      Pupils: Pupils are equal, round, and reactive to light.   Cardiovascular:      Rate and Rhythm: Normal rate and regular rhythm.      Heart sounds: Normal heart sounds.   Pulmonary:       Effort: Pulmonary effort is normal.      Breath sounds: Normal breath sounds. No wheezing.   Abdominal:      General: There is no distension.      Palpations: Abdomen is soft.      Tenderness: There is no abdominal tenderness. There is no guarding or rebound.   Musculoskeletal:         General: No tenderness. Normal range of motion.      Right shoulder: Tenderness present. Normal range of motion.      Left shoulder: No tenderness. Normal range of motion.      Cervical back: Normal range of motion and neck supple. No tenderness. Normal range of motion.      Thoracic back: No tenderness. Normal range of motion.      Lumbar back: No tenderness. Normal range of motion.      Right hip: No tenderness. Normal range of motion.      Left hip: No tenderness. Normal range of motion.      Right knee: Swelling present. Normal range of motion. Tenderness present.      Left knee: Normal range of motion. No tenderness.   Skin:     General: Skin is warm and dry.      Findings: No erythema or rash.   Neurological:      Mental Status: She is alert and oriented to person, place, and time.      Sensory: No sensory deficit.   Psychiatric:         Speech: Speech normal.         Behavior: Behavior normal.         Procedures           ED Course  ED Course as of 01/15/24 2048   Mon Shane 15, 2024   1528 Glucose(!!): 507  Noted hyperglycemia. Not currently on insulin and patient states does not check her blood sugar regularly.  [RT]   1540 BP(!): 192/76  Noted elevated BP and will monitor. Other vitals WNL.  [RT]   1630 WBC(!!): 1.41  Chronically low with her myelodysplasia syndrome [RT]   1630 Platelets(!!): 38  Chronically low   [RT]   1742 BP: 141/60  Noted improvement [RT]   1837 Glucose(!!): 440  Will continue to monitor [RT]   1945 Glucose(!): 281  Improved and patient eager to go home. [RT]      ED Course User Index  [RT] Ramona Andrews PA      Recent Results (from the past 24 hour(s))   POC Glucose Once    Collection Time:  01/15/24  3:16 PM    Specimen: Blood   Result Value Ref Range    Glucose 507 (C) 70 - 130 mg/dL   ECG 12 Lead Syncope    Collection Time: 01/15/24  3:32 PM   Result Value Ref Range    QT Interval 504 ms    QTC Interval 477 ms   CBC Auto Differential    Collection Time: 01/15/24  3:54 PM    Specimen: Blood   Result Value Ref Range    WBC 1.41 (C) 3.40 - 10.80 10*3/mm3    RBC 3.51 (L) 3.77 - 5.28 10*6/mm3    Hemoglobin 10.7 (L) 12.0 - 15.9 g/dL    Hematocrit 32.0 (L) 34.0 - 46.6 %    MCV 91.2 79.0 - 97.0 fL    MCH 30.5 26.6 - 33.0 pg    MCHC 33.4 31.5 - 35.7 g/dL    RDW 14.6 12.3 - 15.4 %    RDW-SD 48.6 37.0 - 54.0 fl    MPV 10.8 6.0 - 12.0 fL    Platelets 38 (C) 140 - 450 10*3/mm3    Neutrophil % 71.0 42.7 - 76.0 %    Lymphocyte % 17.7 (L) 19.6 - 45.3 %    Monocyte % 10.6 5.0 - 12.0 %    Eosinophil % 0.0 (L) 0.3 - 6.2 %    Basophil % 0.0 0.0 - 1.5 %    Immature Grans % 0.7 (H) 0.0 - 0.5 %    Neutrophils, Absolute 1.00 (L) 1.70 - 7.00 10*3/mm3    Lymphocytes, Absolute 0.25 (L) 0.70 - 3.10 10*3/mm3    Monocytes, Absolute 0.15 0.10 - 0.90 10*3/mm3    Eosinophils, Absolute 0.00 0.00 - 0.40 10*3/mm3    Basophils, Absolute 0.00 0.00 - 0.20 10*3/mm3    Immature Grans, Absolute 0.01 0.00 - 0.05 10*3/mm3    nRBC 0.0 0.0 - 0.2 /100 WBC   Lavender Top    Collection Time: 01/15/24  3:54 PM   Result Value Ref Range    Extra Tube hold for add-on    Gold Top - SST    Collection Time: 01/15/24  3:54 PM   Result Value Ref Range    Extra Tube Hold for add-ons.    Gray Top    Collection Time: 01/15/24  3:54 PM   Result Value Ref Range    Extra Tube Hold for add-ons.    Light Blue Top    Collection Time: 01/15/24  3:54 PM   Result Value Ref Range    Extra Tube Hold for add-ons.    Scan Slide    Collection Time: 01/15/24  3:54 PM    Specimen: Blood   Result Value Ref Range    RBC Morphology Normal Normal    WBC Morphology Normal Normal    Platelet Estimate Decreased Normal   Comprehensive Metabolic Panel    Collection Time: 01/15/24   4:22 PM    Specimen: Blood   Result Value Ref Range    Glucose 488 (C) 65 - 99 mg/dL    BUN 23 8 - 23 mg/dL    Creatinine 0.96 0.57 - 1.00 mg/dL    Sodium 137 136 - 145 mmol/L    Potassium 4.5 3.5 - 5.2 mmol/L    Chloride 98 98 - 107 mmol/L    CO2 31.0 (H) 22.0 - 29.0 mmol/L    Calcium 8.9 8.6 - 10.5 mg/dL    Total Protein 6.1 6.0 - 8.5 g/dL    Albumin 4.1 3.5 - 5.2 g/dL    ALT (SGPT) 7 1 - 33 U/L    AST (SGOT) 10 1 - 32 U/L    Alkaline Phosphatase 141 (H) 39 - 117 U/L    Total Bilirubin 0.4 0.0 - 1.2 mg/dL    Globulin 2.0 gm/dL    A/G Ratio 2.1 g/dL    BUN/Creatinine Ratio 24.0 7.0 - 25.0    Anion Gap 8.0 5.0 - 15.0 mmol/L    eGFR 57.4 (L) >60.0 mL/min/1.73   BNP    Collection Time: 01/15/24  4:22 PM    Specimen: Blood   Result Value Ref Range    proBNP 785.2 0.0 - 1,800.0 pg/mL   Single High Sensitivity Troponin T    Collection Time: 01/15/24  4:22 PM    Specimen: Blood   Result Value Ref Range    HS Troponin T 15 (H) <14 ng/L   Green Top (Gel)    Collection Time: 01/15/24  4:22 PM   Result Value Ref Range    Extra Tube Hold for add-ons.    Urinalysis With Culture If Indicated - Urine, Clean Catch    Collection Time: 01/15/24  4:24 PM    Specimen: Urine, Clean Catch   Result Value Ref Range    Color, UA Yellow Yellow, Straw    Appearance, UA Clear Clear    pH, UA 6.0 5.0 - 8.0    Specific Gravity, UA 1.018 1.001 - 1.030    Glucose, UA >=1000 mg/dL (3+) (A) Negative    Ketones, UA Negative Negative    Bilirubin, UA Negative Negative    Blood, UA Negative Negative    Protein, UA Negative Negative    Leuk Esterase, UA Negative Negative    Nitrite, UA Negative Negative    Urobilinogen, UA 0.2 E.U./dL 0.2 - 1.0 E.U./dL   POC Glucose Once    Collection Time: 01/15/24  6:33 PM    Specimen: Blood   Result Value Ref Range    Glucose 440 (C) 70 - 130 mg/dL   POC Glucose Once    Collection Time: 01/15/24  7:44 PM    Specimen: Blood   Result Value Ref Range    Glucose 281 (H) 70 - 130 mg/dL     Note: In addition to lab  Delirium "results from this visit, the labs listed above may include labs taken at another facility or during a different encounter within the last 24 hours. Please correlate lab times with ED admission and discharge times for further clarification of the services performed during this visit.    CT Head Without Contrast   Final Result   Impression:   No acute intracranial process evident.      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      Changes of chronic microvessel ischemia.            Electronically Signed: Jacoby Razo MD     1/15/2024 5:06 PM EST     Workstation ID: [id number]      CT Facial Bones Without Contrast   Final Result   Impression:   No evidence of facial bone fracture.            Electronically Signed: Jacoby Razo MD     1/15/2024 5:09 PM EST     Workstation ID: [id number]      XR Knee 1 or 2 View Right   Final Result   Impression:   Bony demineralization without evidence of acute displaced fracture. Tricompartmental osteoarthritic change appearing moderate to severe in the patellofemoral and medial compartments.         Electronically Signed: Josh Lynne MD     1/15/2024 4:04 PM EST     Workstation ID: [id number]      XR Shoulder 2+ View Right   Final Result   Impression:   No radiographic evidence of acute fracture or dislocation of the right shoulder. Moderate to advanced degenerative changes. Osteopenia.         Electronically Signed: Jacoby Razo MD     1/15/2024 4:02 PM EST     Workstation ID: [id number]      XR Chest 1 View   Final Result   Impression:      1. Cardiomegaly.   2. No acute cardiopulmonary disease.         Electronically Signed: Karsten Allison MD     1/15/2024 6:28 PM EST     Workstation ID: [id number]        Vitals:    01/15/24 1511 01/15/24 1519 01/15/24 1740 01/15/24 1800   BP: (!) 192/76  141/60 158/58   Pulse: 56  53 52   Resp: 14      Temp:  97.6 °F (36.4 °C)     TempSrc:  Oral     SpO2: 99%  96% 97%   Weight:  54.4 kg (120 lb)     Height:  160 cm (63\")       Medications   sodium chloride 0.9 % " Cerebrovascular accident (CVA) due to embolism of left middle cerebral artery Dysphagia bolus 1,000 mL (0 mL Intravenous Stopped 1/15/24 2004)   insulin regular (humuLIN R,novoLIN R) injection 8 Units (8 Units Intravenous Given 1/15/24 1751)   potassium chloride (KLOR-CON) packet 20 mEq (20 mEq Oral Given 1/15/24 1752)     ECG/EMG Results (last 24 hours)       Procedure Component Value Units Date/Time    ECG 12 Lead Syncope [[id number]] Collected: 01/15/24 1532     Updated: 01/15/24 1533     QT Interval 504 ms      QTC Interval 477 ms     Narrative:      Test Reason : Syncope  Blood Pressure :   */*   mmHG  Vent. Rate :  54 BPM     Atrial Rate :  54 BPM     P-R Int : 198 ms          QRS Dur :  96 ms      QT Int : 504 ms       P-R-T Axes :  75 -14  23 degrees     QTc Int : 477 ms    Sinus bradycardia  Otherwise normal ECG  When compared with ECG of 20-JUN-2023 08:05,  QRS axis shifted right  Criteria for Lateral infarct are no longer present  Criteria for Inferior infarct are no longer present  T wave inversion no longer evident in Lateral leads    Referred By: KADEN           Confirmed By:           ECG 12 Lead Syncope   Final Result   Test Reason : Syncope   Blood Pressure :   */*   mmHG   Vent. Rate :  54 BPM     Atrial Rate :  54 BPM      P-R Int : 198 ms          QRS Dur :  96 ms       QT Int : 504 ms       P-R-T Axes :  75 -14  23 degrees      QTc Int : 477 ms      Sinus bradycardia   Otherwise normal ECG   Confirmed by MD Kaden, Jones ([id number]) on 1/15/2024 5:49:44 PM      Referred By: KADEN           Confirmed By: Jones Alfonso MD                                                 Medical Decision Making  Pt is a 89 yo female presenting to ED with complaints of pain / head injury after a fall. CT head / facial bones negative for acute findings. Xrays of right shoulder, knee and CXR no acute findings. Labs in ED notable for WBC 1.41, Platelets 38, Cr 0.96, GLucose 488-440-281, K 4.5, HS Trop 15, AG 8 and UA without ketones. Pt resting and in distress in ED. She is eager to go home. Discussed results and  tx plan with patient and son.     Discussed patient with Dr. Alfonso who is agreeable with ED course and tx plan.     DDx  Fracture, Contusion, Sprain, Dislocation, SAH, Subdural, UTI, Electrolyte abnormality, DKA, Dehydration, DISHA    Problems Addressed:  Contusion of face, initial encounter: complicated acute illness or injury  Contusion of right shoulder, initial encounter: complicated acute illness or injury  Fall, initial encounter: complicated acute illness or injury  Hyperglycemia due to diabetes mellitus: complicated acute illness or injury  Injury of head, initial encounter: complicated acute illness or injury  Pancytopenia: chronic illness or injury    Amount and/or Complexity of Data Reviewed  Independent Historian:      Details: SOn  External Data Reviewed: labs and notes.     Details: PCP, Oncology, Cards, Admissions   Labs: ordered. Decision-making details documented in ED Course.  Radiology: ordered. Decision-making details documented in ED Course.  ECG/medicine tests: ordered.    Risk  OTC drugs.  Prescription drug management.        Final diagnoses:   Fall, initial encounter   Injury of head, initial encounter   Contusion of face, initial encounter   Contusion of right shoulder, initial encounter   Hyperglycemia due to diabetes mellitus   Pancytopenia       ED Disposition  ED Disposition       ED Disposition   Discharge    Condition   Stable    Comment   --               Yasmeen Loomis MD  [address] [address]  [phone]    Schedule an appointment as soon as possible for a visit       [facility] EMERGENCY DEPARTMENT  [address] [facility] [address] [phone]    If symptoms worsen         Medication List      No changes were made to your prescriptions during this visit.            Ramona Andrews PA  01/15/24 5234

## 2024-02-12 ENCOUNTER — OFFICE VISIT (OUTPATIENT)
Dept: ONCOLOGY | Facility: CLINIC | Age: 89
End: 2024-02-12
Payer: MEDICARE

## 2024-02-12 ENCOUNTER — LAB (OUTPATIENT)
Dept: LAB | Facility: HOSPITAL | Age: 89
End: 2024-02-12
Payer: MEDICARE

## 2024-02-12 VITALS
TEMPERATURE: 97.4 F | DIASTOLIC BLOOD PRESSURE: 68 MMHG | WEIGHT: 113.9 LBS | RESPIRATION RATE: 18 BRPM | OXYGEN SATURATION: 98 % | HEART RATE: 51 BPM | SYSTOLIC BLOOD PRESSURE: 122 MMHG | BODY MASS INDEX: 20.18 KG/M2

## 2024-02-12 DIAGNOSIS — D50.0 IRON DEFICIENCY ANEMIA DUE TO CHRONIC BLOOD LOSS: Primary | ICD-10-CM

## 2024-02-12 DIAGNOSIS — E53.8 B12 DEFICIENCY: ICD-10-CM

## 2024-02-12 DIAGNOSIS — D46.9 MYELODYSPLASIA (MYELODYSPLASTIC SYNDROME): ICD-10-CM

## 2024-02-12 DIAGNOSIS — D50.0 IRON DEFICIENCY ANEMIA DUE TO CHRONIC BLOOD LOSS: ICD-10-CM

## 2024-02-12 LAB
BASOPHILS # BLD AUTO: 0 10*3/MM3 (ref 0–0.2)
BASOPHILS NFR BLD AUTO: 0 % (ref 0–1.5)
DEPRECATED RDW RBC AUTO: 49.8 FL (ref 37–54)
EOSINOPHIL # BLD AUTO: 0 10*3/MM3 (ref 0–0.4)
EOSINOPHIL NFR BLD AUTO: 0 % (ref 0.3–6.2)
ERYTHROCYTE [DISTWIDTH] IN BLOOD BY AUTOMATED COUNT: 15 % (ref 12.3–15.4)
FERRITIN SERPL-MCNC: 259.8 NG/ML (ref 13–150)
HCT VFR BLD AUTO: 31.7 % (ref 34–46.6)
HGB BLD-MCNC: 10.9 G/DL (ref 12–15.9)
IMM GRANULOCYTES # BLD AUTO: 0.01 10*3/MM3 (ref 0–0.05)
IMM GRANULOCYTES NFR BLD AUTO: 0.8 % (ref 0–0.5)
IRON 24H UR-MRATE: 73 MCG/DL (ref 37–145)
IRON SATN MFR SERPL: 22 % (ref 20–50)
LYMPHOCYTES # BLD AUTO: 0.35 10*3/MM3 (ref 0.7–3.1)
LYMPHOCYTES NFR BLD AUTO: 29.7 % (ref 19.6–45.3)
MCH RBC QN AUTO: 31.1 PG (ref 26.6–33)
MCHC RBC AUTO-ENTMCNC: 34.4 G/DL (ref 31.5–35.7)
MCV RBC AUTO: 90.3 FL (ref 79–97)
MONOCYTES # BLD AUTO: 0.19 10*3/MM3 (ref 0.1–0.9)
MONOCYTES NFR BLD AUTO: 16.1 % (ref 5–12)
NEUTROPHILS NFR BLD AUTO: 0.63 10*3/MM3 (ref 1.7–7)
NEUTROPHILS NFR BLD AUTO: 53.4 % (ref 42.7–76)
PLATELET # BLD AUTO: 41 10*3/MM3 (ref 140–450)
PMV BLD AUTO: 9.1 FL (ref 6–12)
RBC # BLD AUTO: 3.51 10*6/MM3 (ref 3.77–5.28)
TIBC SERPL-MCNC: 328 MCG/DL (ref 298–536)
TRANSFERRIN SERPL-MCNC: 220 MG/DL (ref 200–360)
VIT B12 BLD-MCNC: 397 PG/ML (ref 211–946)
WBC NRBC COR # BLD AUTO: 1.18 10*3/MM3 (ref 3.4–10.8)

## 2024-02-12 PROCEDURE — 82607 VITAMIN B-12: CPT

## 2024-02-12 PROCEDURE — 85025 COMPLETE CBC W/AUTO DIFF WBC: CPT

## 2024-02-12 PROCEDURE — 82728 ASSAY OF FERRITIN: CPT

## 2024-02-12 PROCEDURE — 84466 ASSAY OF TRANSFERRIN: CPT

## 2024-02-12 PROCEDURE — 99214 OFFICE O/P EST MOD 30 MIN: CPT | Performed by: INTERNAL MEDICINE

## 2024-02-12 PROCEDURE — 1126F AMNT PAIN NOTED NONE PRSNT: CPT | Performed by: INTERNAL MEDICINE

## 2024-02-12 PROCEDURE — 83540 ASSAY OF IRON: CPT

## 2024-02-12 PROCEDURE — 36415 COLL VENOUS BLD VENIPUNCTURE: CPT

## 2024-02-13 RX ORDER — APIXABAN 2.5 MG/1
2.5 TABLET, FILM COATED ORAL 2 TIMES DAILY
Qty: 60 TABLET | Refills: 0 | Status: SHIPPED | OUTPATIENT
Start: 2024-02-13

## 2024-03-12 RX ORDER — APIXABAN 2.5 MG/1
2.5 TABLET, FILM COATED ORAL 2 TIMES DAILY
Qty: 60 TABLET | Refills: 0 | OUTPATIENT
Start: 2024-03-12

## 2024-03-15 RX ORDER — APIXABAN 2.5 MG/1
2.5 TABLET, FILM COATED ORAL 2 TIMES DAILY
Qty: 60 TABLET | Refills: 0 | OUTPATIENT
Start: 2024-03-15

## 2024-03-27 RX ORDER — APIXABAN 2.5 MG/1
2.5 TABLET, FILM COATED ORAL 2 TIMES DAILY
Qty: 60 TABLET | Refills: 0 | OUTPATIENT
Start: 2024-03-27

## 2024-07-27 ENCOUNTER — HOSPITAL ENCOUNTER (INPATIENT)
Facility: HOSPITAL | Age: 89
LOS: 10 days | Discharge: NURSING, REHAB OR LONG-TERM CARE | End: 2024-08-06
Attending: EMERGENCY MEDICINE | Admitting: INTERNAL MEDICINE
Payer: MEDICARE

## 2024-07-27 ENCOUNTER — APPOINTMENT (OUTPATIENT)
Dept: GENERAL RADIOLOGY | Facility: HOSPITAL | Age: 89
End: 2024-07-27
Payer: MEDICARE

## 2024-07-27 DIAGNOSIS — R13.13 PHARYNGEAL DYSPHAGIA: ICD-10-CM

## 2024-07-27 DIAGNOSIS — R73.9 HYPERGLYCEMIA: ICD-10-CM

## 2024-07-27 DIAGNOSIS — N17.9 AKI (ACUTE KIDNEY INJURY): Primary | ICD-10-CM

## 2024-07-27 DIAGNOSIS — G89.21 CHRONIC PAIN DUE TO TRAUMA: ICD-10-CM

## 2024-07-27 DIAGNOSIS — R53.1 GENERALIZED WEAKNESS: ICD-10-CM

## 2024-07-27 DIAGNOSIS — Z91.81 AT HIGH RISK FOR FALLS: ICD-10-CM

## 2024-07-27 PROBLEM — R19.7 DIARRHEA: Status: ACTIVE | Noted: 2024-07-27

## 2024-07-27 LAB
ALBUMIN SERPL-MCNC: 4.4 G/DL (ref 3.5–5.2)
ALBUMIN/GLOB SERPL: 1.7 G/DL
ALP SERPL-CCNC: 102 U/L (ref 39–117)
ALT SERPL W P-5'-P-CCNC: 7 U/L (ref 1–33)
ANION GAP SERPL CALCULATED.3IONS-SCNC: 18 MMOL/L (ref 5–15)
AST SERPL-CCNC: 12 U/L (ref 1–32)
BASOPHILS # BLD AUTO: 0 10*3/MM3 (ref 0–0.2)
BASOPHILS NFR BLD AUTO: 0 % (ref 0–1.5)
BILIRUB SERPL-MCNC: 0.6 MG/DL (ref 0–1.2)
BILIRUB UR QL STRIP: NEGATIVE
BUN SERPL-MCNC: 26 MG/DL (ref 8–23)
BUN/CREAT SERPL: 17.4 (ref 7–25)
CALCIUM SPEC-SCNC: 9.4 MG/DL (ref 8.6–10.5)
CHLORIDE SERPL-SCNC: 91 MMOL/L (ref 98–107)
CLARITY UR: CLEAR
CO2 SERPL-SCNC: 26 MMOL/L (ref 22–29)
COLOR UR: YELLOW
CREAT SERPL-MCNC: 1.49 MG/DL (ref 0.57–1)
CRP SERPL-MCNC: <0.3 MG/DL (ref 0–0.5)
DEPRECATED RDW RBC AUTO: 47.5 FL (ref 37–54)
EGFRCR SERPLBLD CKD-EPI 2021: 33.6 ML/MIN/1.73
EOSINOPHIL # BLD AUTO: 0 10*3/MM3 (ref 0–0.4)
EOSINOPHIL NFR BLD AUTO: 0 % (ref 0.3–6.2)
EOSINOPHIL SPEC QL MICRO: 0 % EOS/100 CELLS (ref 0–0)
ERYTHROCYTE [DISTWIDTH] IN BLOOD BY AUTOMATED COUNT: 14.4 % (ref 12.3–15.4)
FLUAV RNA RESP QL NAA+PROBE: NOT DETECTED
FLUBV RNA RESP QL NAA+PROBE: NOT DETECTED
GLOBULIN UR ELPH-MCNC: 2.6 GM/DL
GLUCOSE BLDC GLUCOMTR-MCNC: 311 MG/DL (ref 70–130)
GLUCOSE BLDC GLUCOMTR-MCNC: 383 MG/DL (ref 70–130)
GLUCOSE SERPL-MCNC: 502 MG/DL (ref 65–99)
GLUCOSE UR STRIP-MCNC: ABNORMAL MG/DL
HCT VFR BLD AUTO: 32.1 % (ref 34–46.6)
HGB BLD-MCNC: 10.9 G/DL (ref 12–15.9)
HGB UR QL STRIP.AUTO: NEGATIVE
HOLD SPECIMEN: NORMAL
IMM GRANULOCYTES # BLD AUTO: 0.03 10*3/MM3 (ref 0–0.05)
IMM GRANULOCYTES NFR BLD AUTO: 1.5 % (ref 0–0.5)
KETONES UR QL STRIP: NEGATIVE
LEUKOCYTE ESTERASE UR QL STRIP.AUTO: NEGATIVE
LYMPHOCYTES # BLD AUTO: 0.19 10*3/MM3 (ref 0.7–3.1)
LYMPHOCYTES NFR BLD AUTO: 9.5 % (ref 19.6–45.3)
MAGNESIUM SERPL-MCNC: 1.6 MG/DL (ref 1.6–2.4)
MCH RBC QN AUTO: 30.6 PG (ref 26.6–33)
MCHC RBC AUTO-ENTMCNC: 34 G/DL (ref 31.5–35.7)
MCV RBC AUTO: 90.2 FL (ref 79–97)
MONOCYTES # BLD AUTO: 0.07 10*3/MM3 (ref 0.1–0.9)
MONOCYTES NFR BLD AUTO: 3.5 % (ref 5–12)
NEUTROPHILS NFR BLD AUTO: 1.72 10*3/MM3 (ref 1.7–7)
NEUTROPHILS NFR BLD AUTO: 85.5 % (ref 42.7–76)
NITRITE UR QL STRIP: NEGATIVE
NRBC BLD AUTO-RTO: 0 /100 WBC (ref 0–0.2)
PH UR STRIP.AUTO: <=5 [PH] (ref 5–8)
PLATELET # BLD AUTO: 54 10*3/MM3 (ref 140–450)
PMV BLD AUTO: 9.8 FL (ref 6–12)
POTASSIUM SERPL-SCNC: 4.6 MMOL/L (ref 3.5–5.2)
PROCALCITONIN SERPL-MCNC: <0.02 NG/ML (ref 0–0.25)
PROT SERPL-MCNC: 7 G/DL (ref 6–8.5)
PROT UR QL STRIP: NEGATIVE
RBC # BLD AUTO: 3.56 10*6/MM3 (ref 3.77–5.28)
SARS-COV-2 RNA RESP QL NAA+PROBE: NOT DETECTED
SODIUM SERPL-SCNC: 135 MMOL/L (ref 136–145)
SODIUM UR-SCNC: 76 MMOL/L
SP GR UR STRIP: 1.02 (ref 1–1.03)
TROPONIN T SERPL HS-MCNC: 16 NG/L
TSH SERPL DL<=0.05 MIU/L-ACNC: 1.65 UIU/ML (ref 0.27–4.2)
UROBILINOGEN UR QL STRIP: ABNORMAL
WBC NRBC COR # BLD AUTO: 2.01 10*3/MM3 (ref 3.4–10.8)
WHOLE BLOOD HOLD COAG: NORMAL
WHOLE BLOOD HOLD SPECIMEN: NORMAL

## 2024-07-27 PROCEDURE — 84484 ASSAY OF TROPONIN QUANT: CPT | Performed by: EMERGENCY MEDICINE

## 2024-07-27 PROCEDURE — 81003 URINALYSIS AUTO W/O SCOPE: CPT | Performed by: EMERGENCY MEDICINE

## 2024-07-27 PROCEDURE — 99285 EMERGENCY DEPT VISIT HI MDM: CPT

## 2024-07-27 PROCEDURE — P9612 CATHETERIZE FOR URINE SPEC: HCPCS

## 2024-07-27 PROCEDURE — 82570 ASSAY OF URINE CREATININE: CPT | Performed by: NURSE PRACTITIONER

## 2024-07-27 PROCEDURE — 84443 ASSAY THYROID STIM HORMONE: CPT | Performed by: NURSE PRACTITIONER

## 2024-07-27 PROCEDURE — 63710000001 INSULIN GLARGINE PER 5 UNITS: Performed by: INTERNAL MEDICINE

## 2024-07-27 PROCEDURE — 87205 SMEAR GRAM STAIN: CPT | Performed by: NURSE PRACTITIONER

## 2024-07-27 PROCEDURE — 36415 COLL VENOUS BLD VENIPUNCTURE: CPT

## 2024-07-27 PROCEDURE — 93010 ELECTROCARDIOGRAM REPORT: CPT | Performed by: INTERNAL MEDICINE

## 2024-07-27 PROCEDURE — 25810000003 SODIUM CHLORIDE 0.9 % SOLUTION: Performed by: NURSE PRACTITIONER

## 2024-07-27 PROCEDURE — 85025 COMPLETE CBC W/AUTO DIFF WBC: CPT | Performed by: EMERGENCY MEDICINE

## 2024-07-27 PROCEDURE — 93005 ELECTROCARDIOGRAM TRACING: CPT | Performed by: NURSE PRACTITIONER

## 2024-07-27 PROCEDURE — 84145 PROCALCITONIN (PCT): CPT | Performed by: NURSE PRACTITIONER

## 2024-07-27 PROCEDURE — 86140 C-REACTIVE PROTEIN: CPT | Performed by: NURSE PRACTITIONER

## 2024-07-27 PROCEDURE — 83735 ASSAY OF MAGNESIUM: CPT | Performed by: EMERGENCY MEDICINE

## 2024-07-27 PROCEDURE — 99223 1ST HOSP IP/OBS HIGH 75: CPT | Performed by: INTERNAL MEDICINE

## 2024-07-27 PROCEDURE — 82948 REAGENT STRIP/BLOOD GLUCOSE: CPT

## 2024-07-27 PROCEDURE — 80053 COMPREHEN METABOLIC PANEL: CPT | Performed by: EMERGENCY MEDICINE

## 2024-07-27 PROCEDURE — 25810000003 SODIUM CHLORIDE 0.9 % SOLUTION: Performed by: EMERGENCY MEDICINE

## 2024-07-27 PROCEDURE — 63710000001 INSULIN LISPRO (HUMAN) PER 5 UNITS: Performed by: INTERNAL MEDICINE

## 2024-07-27 PROCEDURE — 87636 SARSCOV2 & INF A&B AMP PRB: CPT | Performed by: NURSE PRACTITIONER

## 2024-07-27 PROCEDURE — 71045 X-RAY EXAM CHEST 1 VIEW: CPT

## 2024-07-27 PROCEDURE — 84300 ASSAY OF URINE SODIUM: CPT | Performed by: NURSE PRACTITIONER

## 2024-07-27 RX ORDER — LEVOTHYROXINE SODIUM 0.1 MG/1
100 TABLET ORAL
Status: DISCONTINUED | OUTPATIENT
Start: 2024-07-28 | End: 2024-08-06 | Stop reason: HOSPADM

## 2024-07-27 RX ORDER — ACETAMINOPHEN 325 MG/1
650 TABLET ORAL EVERY 4 HOURS PRN
Status: DISCONTINUED | OUTPATIENT
Start: 2024-07-27 | End: 2024-08-06 | Stop reason: HOSPADM

## 2024-07-27 RX ORDER — IBUPROFEN 600 MG/1
1 TABLET ORAL
Status: DISCONTINUED | OUTPATIENT
Start: 2024-07-27 | End: 2024-08-06 | Stop reason: HOSPADM

## 2024-07-27 RX ORDER — DEXTROSE MONOHYDRATE 25 G/50ML
25 INJECTION, SOLUTION INTRAVENOUS
Status: DISCONTINUED | OUTPATIENT
Start: 2024-07-27 | End: 2024-07-27

## 2024-07-27 RX ORDER — IBUPROFEN 600 MG/1
1 TABLET ORAL
Status: DISCONTINUED | OUTPATIENT
Start: 2024-07-27 | End: 2024-07-27

## 2024-07-27 RX ORDER — SODIUM CHLORIDE 9 MG/ML
40 INJECTION, SOLUTION INTRAVENOUS AS NEEDED
Status: DISCONTINUED | OUTPATIENT
Start: 2024-07-27 | End: 2024-08-06 | Stop reason: HOSPADM

## 2024-07-27 RX ORDER — SODIUM CHLORIDE 0.9 % (FLUSH) 0.9 %
10 SYRINGE (ML) INJECTION AS NEEDED
Status: DISCONTINUED | OUTPATIENT
Start: 2024-07-27 | End: 2024-08-06 | Stop reason: HOSPADM

## 2024-07-27 RX ORDER — NICOTINE POLACRILEX 4 MG
15 LOZENGE BUCCAL
Status: DISCONTINUED | OUTPATIENT
Start: 2024-07-27 | End: 2024-08-06 | Stop reason: HOSPADM

## 2024-07-27 RX ORDER — METOPROLOL TARTRATE 50 MG/1
50 TABLET, FILM COATED ORAL 2 TIMES DAILY
Status: DISCONTINUED | OUTPATIENT
Start: 2024-07-27 | End: 2024-08-05

## 2024-07-27 RX ORDER — INSULIN LISPRO 100 [IU]/ML
2-7 INJECTION, SOLUTION INTRAVENOUS; SUBCUTANEOUS
Status: DISCONTINUED | OUTPATIENT
Start: 2024-07-27 | End: 2024-07-27

## 2024-07-27 RX ORDER — SODIUM CHLORIDE 0.9 % (FLUSH) 0.9 %
10 SYRINGE (ML) INJECTION EVERY 12 HOURS SCHEDULED
Status: DISCONTINUED | OUTPATIENT
Start: 2024-07-27 | End: 2024-08-06 | Stop reason: HOSPADM

## 2024-07-27 RX ORDER — AMIODARONE HYDROCHLORIDE 200 MG/1
200 TABLET ORAL DAILY
Status: DISCONTINUED | OUTPATIENT
Start: 2024-07-28 | End: 2024-08-06 | Stop reason: HOSPADM

## 2024-07-27 RX ORDER — NICOTINE POLACRILEX 4 MG
15 LOZENGE BUCCAL
Status: DISCONTINUED | OUTPATIENT
Start: 2024-07-27 | End: 2024-07-27

## 2024-07-27 RX ORDER — INSULIN LISPRO 100 [IU]/ML
1-200 INJECTION, SOLUTION INTRAVENOUS; SUBCUTANEOUS
Status: DISCONTINUED | OUTPATIENT
Start: 2024-07-28 | End: 2024-08-06 | Stop reason: HOSPADM

## 2024-07-27 RX ORDER — TRAMADOL HYDROCHLORIDE 50 MG/1
50 TABLET ORAL EVERY 12 HOURS PRN
Status: DISCONTINUED | OUTPATIENT
Start: 2024-07-27 | End: 2024-08-06 | Stop reason: HOSPADM

## 2024-07-27 RX ORDER — INSULIN LISPRO 100 [IU]/ML
1-200 INJECTION, SOLUTION INTRAVENOUS; SUBCUTANEOUS AS NEEDED
Status: DISCONTINUED | OUTPATIENT
Start: 2024-07-27 | End: 2024-08-06 | Stop reason: HOSPADM

## 2024-07-27 RX ORDER — SODIUM CHLORIDE 9 MG/ML
75 INJECTION, SOLUTION INTRAVENOUS CONTINUOUS
Status: ACTIVE | OUTPATIENT
Start: 2024-07-27 | End: 2024-07-28

## 2024-07-27 RX ORDER — L.ACID,PARA/B.BIFIDUM/S.THERM 8B CELL
1 CAPSULE ORAL DAILY
Status: DISCONTINUED | OUTPATIENT
Start: 2024-07-28 | End: 2024-08-06 | Stop reason: HOSPADM

## 2024-07-27 RX ORDER — PANTOPRAZOLE SODIUM 40 MG/1
40 TABLET, DELAYED RELEASE ORAL
Status: DISCONTINUED | OUTPATIENT
Start: 2024-07-28 | End: 2024-07-28

## 2024-07-27 RX ORDER — DEXTROSE MONOHYDRATE 25 G/50ML
10-50 INJECTION, SOLUTION INTRAVENOUS
Status: DISCONTINUED | OUTPATIENT
Start: 2024-07-27 | End: 2024-08-06 | Stop reason: HOSPADM

## 2024-07-27 RX ADMIN — SODIUM CHLORIDE 75 ML/HR: 9 INJECTION, SOLUTION INTRAVENOUS at 23:59

## 2024-07-27 RX ADMIN — INSULIN LISPRO 2 UNITS: 100 INJECTION, SOLUTION INTRAVENOUS; SUBCUTANEOUS at 23:55

## 2024-07-27 RX ADMIN — SODIUM CHLORIDE 500 ML: 9 INJECTION, SOLUTION INTRAVENOUS at 19:03

## 2024-07-27 RX ADMIN — INSULIN GLARGINE 13 UNITS: 100 INJECTION, SOLUTION SUBCUTANEOUS at 23:56

## 2024-07-27 NOTE — Clinical Note
Level of Care: Telemetry [5]   Diagnosis: ARF (acute renal failure) [270355]   Admitting Physician: JIMMY COPE [[id number]]   Attending Physician: JIMMY COPE [[id number]]

## 2024-07-27 NOTE — ED NOTES
Chey Robertson    Nursing Report ED to Floor:  Mental status: A&Ox3 (confused to time)  Ambulatory status: walker/x2 assist  Oxygen Therapy:  RA  Cardiac Rhythm: Sinus Eddi  [facility]  Safety Concerns:  fall risk  Social Issues: none  ED Room #:  30    ED Nurse Phone Extension - [phone] or may call [phone].      HPI:   Chief Complaint   Patient presents with    Blood in Urine       Past Medical History:  Past Medical History:   Diagnosis Date    A-fib     on eliquis    Anemia     Arthritis     Diabetes mellitus     checks sugar only when pt wants to     Disease of thyroid gland     History of transfusion     with knee surgery-  no reaction recalled.     [place] (hard of hearing)     no hearing aids    Hypertension     Migraine without aura and without status migrainosus, not intractable 12/30/2016    Myelodysplastic syndrome     Pulmonary emboli 2011    (after knee surgery)    SOBOE (shortness of breath on exertion)     Tremors of nervous system     Vertigo     Wears dentures     upper     Wears glasses         Past Surgical History:  Past Surgical History:   Procedure Laterality Date    BLADDER SURGERY      CATARACT EXTRACTION      bilat     CHOLECYSTECTOMY      COLONOSCOPY      HIP TROCHANTERIC NAILING WITH INTRAMEDULLARY HIP SCREW Right 6/20/2023    Procedure: HIP TROCHANTERIC NAILING WITH INTRAMEDULLARY HIP SCREW RIGHT;  Surgeon: Augie Hobbs MD;  Location: [facility] OR;  Service: Orthopedics;  Laterality: Right;    HYSTERECTOMY      with bso    KYPHOPLASTY N/A 02/12/2021    Procedure: KYPHOPLASTY T11, T12 AND L4;  Surgeon: Matty Hearn MD;  Location:  [facility];  Service: Orthopedic Spine;  Laterality: N/A;    TONSILLECTOMY          Admitting Doctor:   Chris Nicolas MD    Consulting Provider(s):  Consults       No orders found from 6/28/2024 to 7/28/2024.             Admitting Diagnosis:   The primary encounter diagnosis was DISHA (acute kidney injury). Diagnoses of  Generalized weakness and At high risk for falls were also pertinent to this visit.    Most Recent Vitals:   Vitals:    07/27/24 1603 07/27/24 1717 07/27/24 1730 07/27/24 1745   BP:  119/53 133/58 138/55   BP Location:       Patient Position:       Pulse: 56 56 57 57   Resp:       Temp:       TempSrc:       SpO2: 99% 99% 99% 100%   Weight:       Height:           Active LDAs/IV Access:   Lines, Drains & Airways       Active LDAs       Name Placement date Placement time Site Days    Peripheral IV 07/27/24 1903 Anterior;Proximal;Right Forearm 07/27/24 1903  Forearm  less than 1                    Labs (abnormal labs have a star):   Labs Reviewed   COMPREHENSIVE METABOLIC PANEL - Abnormal; Notable for the following components:       Result Value    Glucose 502 (*)     BUN 26 (*)     Creatinine 1.49 (*)     Sodium 135 (*)     Chloride 91 (*)     Anion Gap 18.0 (*)     eGFR 33.6 (*)     All other components within normal limits    Narrative:     GFR Normal >60  Chronic Kidney Disease <60  Kidney Failure <15    The GFR formula is only valid for adults with stable renal function between ages 18 and 70.   SINGLE HS TROPONIN T - Abnormal; Notable for the following components:    HS Troponin T 16 (*)     All other components within normal limits    Narrative:     High Sensitive Troponin T Reference Range:  <14.0 ng/L- Negative Female for AMI  <22.0 ng/L- Negative Male for AMI  >=14 - Abnormal Female indicating possible myocardial injury.  >=22 - Abnormal Male indicating possible myocardial injury.   Clinicians would have to utilize clinical acumen, EKG, Troponin, and serial changes to determine if it is an Acute Myocardial Infarction or myocardial injury due to an underlying chronic condition.        CBC WITH AUTO DIFFERENTIAL - Abnormal; Notable for the following components:    WBC 2.01 (*)     RBC 3.56 (*)     Hemoglobin 10.9 (*)     Hematocrit 32.1 (*)     Platelets 54 (*)     Neutrophil % 85.5 (*)     Lymphocyte % 9.5 (*)      Monocyte % 3.5 (*)     Eosinophil % 0.0 (*)     Immature Grans % 1.5 (*)     Lymphocytes, Absolute 0.19 (*)     Monocytes, Absolute 0.07 (*)     All other components within normal limits   URINALYSIS W/ CULTURE IF INDICATED - Abnormal; Notable for the following components:    Glucose, UA >=1000 mg/dL (3+) (*)     All other components within normal limits    Narrative:     In absence of clinical symptoms, the presence of pyuria, bacteria, and/or nitrites on the urinalysis result does not correlate with infection.  Urine microscopic not indicated.   MAGNESIUM - Normal   RAINBOW DRAW    Narrative:     The following orders were created for panel order [place] Draw.  Procedure                               Abnormality         Status                     ---------                               -----------         ------                     Green Top (Gel)[[id number]]                                  Final result               Lavender Top[[id number]]                                     Final result               Gold Top - SST[[id number]]                                   Final result               Abdullahi Top[[id number]]                                         Final result               Light Blue Top[[id number]]                                   Final result                 Please view results for these tests on the individual orders.   CBC AND DIFFERENTIAL    Narrative:     The following orders were created for panel order CBC & Differential.  Procedure                               Abnormality         Status                     ---------                               -----------         ------                     CBC Auto Differential[[id number]]        Abnormal            Final result               Scan Slide[[id number]]                                                                    Please view results for these tests on the individual orders.   GREEN TOP   LAVENDER TOP   GOLD TOP - SST   GRAY TOP   LIGHT BLUE TOP        Meds Given in ED:   Medications   sodium chloride 0.9 % flush 10 mL (has no administration in time range)   sodium chloride 0.9 % bolus 500 mL (500 mL Intravenous New Bag 7/27/24 1903)           Last NIH score:                                                          Dysphagia screening results:  Patient Factors Component (Dysphagia:Stroke or Rule-out)  Best Eye Response: 4-->(E4) spontaneous (07/27/24 1602)  Best Motor Response: 6-->(M6) obeys commands (07/27/24 1602)  Best Verbal Response: 4-->(V4) confused (07/27/24 1602)  Lion Coma Scale Score: 14 (07/27/24 1602)     Lion Coma Scale:  No data recorded     CIWA:        Restraint Type:            Isolation Status:  No active isolations

## 2024-07-27 NOTE — ED PROVIDER NOTES
Subjective   History of Present Illness  Patient is a pleasant 89-year-old female, lives at [facility], presents to the emergency department with hematuria and weakness for the past 1 week the patient has been sleeping more than usual and just been generally weak and laying in bed.  Yesterday was noted that she had blood in her urine and initially staff at the facility recommended that she come to the hospital.  Patient pleasantly declined transport to the hospital but went to her primary care provider.  She was diagnosed with a urinary tract infection yesterday and started on oral outpatient antibiotics.  Patient's take the medication as prescribed yesterday and this morning but unfortunately her weakness persists and was actually more pronounced.  Staff at the facility today noted the weakness and was concerned about the patient's ability to care for self including a high fall risk and for this reason spoke with the patient's primary care provider was recommended that she come to the emergency department for further evaluation.  Denies fever, chills, chest pain, shortness of breath, abdominal pain, vomiting, or other acute complaints.    Upon noticing some dried stool on the medial aspect of her left calf she does admit to diarrhea also.  Duration of diarrhea is unclear.        Review of Systems   All other systems reviewed and are negative.      Past Medical History:   Diagnosis Date    A-fib     on eliquis    Anemia     Arthritis     Diabetes mellitus     checks sugar only when pt wants to     Disease of thyroid gland     History of transfusion     with knee surgery-  no reaction recalled.     [place] (hard of hearing)     no hearing aids    Hypertension     Migraine without aura and without status migrainosus, not intractable 12/30/2016    Myelodysplastic syndrome     Pulmonary emboli 2011    (after knee surgery)    SOBOE (shortness of breath on exertion)     Tremors of nervous system      Vertigo     Wears dentures     upper     Wears glasses        Allergies   Allergen Reactions    Aspirin Unknown - Low Severity     Mouth sores        Past Surgical History:   Procedure Laterality Date    BLADDER SURGERY      CATARACT EXTRACTION      bilat     CHOLECYSTECTOMY      COLONOSCOPY      HIP TROCHANTERIC NAILING WITH INTRAMEDULLARY HIP SCREW Right 6/20/2023    Procedure: HIP TROCHANTERIC NAILING WITH INTRAMEDULLARY HIP SCREW RIGHT;  Surgeon: Augie Hobbs MD;  Location:  [facility];  Service: Orthopedics;  Laterality: Right;    HYSTERECTOMY      with bso    KYPHOPLASTY N/A 02/12/2021    Procedure: KYPHOPLASTY T11, T12 AND L4;  Surgeon: Matty Hearn MD;  Location:  [facility];  Service: Orthopedic Spine;  Laterality: N/A;    TONSILLECTOMY         Family History   Problem Relation Age of Onset    Breast cancer Sister 84    Stroke Mother     Heart attack Father     Ovarian cancer Neg Hx        Social History     Socioeconomic History    Marital status:    Tobacco Use    Smoking status: Never     Passive exposure: Never    Smokeless tobacco: Never   Vaping Use    Vaping status: Never Used   Substance and Sexual Activity    Alcohol use: No    Drug use: No    Sexual activity: Defer           Objective   Physical Exam  Vitals and nursing note reviewed.   Constitutional:       General: She is not in acute distress.  HENT:      Head: Normocephalic and atraumatic.      Nose: Nose normal.      Mouth/Throat:      Mouth: Mucous membranes are moist. Mucous membranes are dry.   Eyes:      Conjunctiva/sclera: Conjunctivae normal.      Pupils: Pupils are equal, round, and reactive to light.   Neck:      Thyroid: No thyromegaly.   Cardiovascular:      Rate and Rhythm: Normal rate and regular rhythm.      Heart sounds: Normal heart sounds. No murmur heard.     No friction rub. No gallop.   Pulmonary:      Effort: Pulmonary effort is normal. No respiratory distress.      Breath sounds: Normal breath sounds.    Abdominal:      General: Bowel sounds are normal.      Palpations: Abdomen is soft.      Tenderness: There is no abdominal tenderness.   Musculoskeletal:         General: Normal range of motion.      Cervical back: Normal range of motion and neck supple.   Lymphadenopathy:      Cervical: No cervical adenopathy.   Skin:     General: Skin is warm and dry.      Capillary Refill: Capillary refill takes less than 2 seconds.   Neurological:      General: No focal deficit present.      Mental Status: She is alert.      Comments: Oriented but confused.  Poor historian.  Generalized weakness.  No focal deficit.   Psychiatric:         Behavior: Behavior normal.         Procedures           ED Course       Latest Reference Range & Units 07/27/24 14:49   HS Troponin T <14 ng/L 16 (H)   Sodium 136 - 145 mmol/L 135 (L)   Potassium 3.5 - 5.2 mmol/L 4.6   Chloride 98 - 107 mmol/L 91 (L)   CO2 22.0 - 29.0 mmol/L 26.0   Anion Gap 5.0 - 15.0 mmol/L 18.0 (H)   BUN 8 - 23 mg/dL 26 (H)   Creatinine 0.57 - 1.00 mg/dL 1.49 (H)   BUN/Creatinine Ratio 7.0 - 25.0  17.4   eGFR >60.0 mL/min/1.73 33.6 (L)   Glucose 65 - 99 mg/dL 502 (C)   Calcium 8.6 - 10.5 mg/dL 9.4   Magnesium 1.6 - 2.4 mg/dL 1.6   Alkaline Phosphatase 39 - 117 U/L 102   Total Protein 6.0 - 8.5 g/dL 7.0   Albumin 3.5 - 5.2 g/dL 4.4   Globulin gm/dL 2.6   A/G Ratio g/dL 1.7   AST (SGOT) 1 - 32 U/L 12   ALT (SGPT) 1 - 33 U/L 7   Total Bilirubin 0.0 - 1.2 mg/dL 0.6   TSH Baseline 0.270 - 4.200 uIU/mL 1.650   C-Reactive Protein 0.00 - 0.50 mg/dL <0.30   Procalcitonin 0.00 - 0.25 ng/mL <0.02   WBC 3.40 - 10.80 10*3/mm3 2.01 (L)   RBC 3.77 - 5.28 10*6/mm3 3.56 (L)   Hemoglobin 12.0 - 15.9 g/dL 10.9 (L)   Hematocrit 34.0 - 46.6 % 32.1 (L)   Platelets 140 - 450 10*3/mm3 54 (L)   RDW 12.3 - 15.4 % 14.4   MCV 79.0 - 97.0 fL 90.2   MCH 26.6 - 33.0 pg 30.6   MCHC 31.5 - 35.7 g/dL 34.0   MPV 6.0 - 12.0 fL 9.8   RDW-SD 37.0 - 54.0 fl 47.5   Neutrophil Rel % 42.7 - 76.0 % 85.5 (H)    Lymphocyte Rel % 19.6 - 45.3 % 9.5 (L)   Monocyte Rel % 5.0 - 12.0 % 3.5 (L)   Eosinophil Rel % 0.3 - 6.2 % 0.0 (L)   Basophil Rel % 0.0 - 1.5 % 0.0   Immature Granulocyte Rel % 0.0 - 0.5 % 1.5 (H)   Neutrophils Absolute 1.70 - 7.00 10*3/mm3 1.72   Lymphocytes Absolute 0.70 - 3.10 10*3/mm3 0.19 (L)   Monocytes Absolute 0.10 - 0.90 10*3/mm3 0.07 (L)   Eosinophils Absolute 0.00 - 0.40 10*3/mm3 0.00   Basophils Absolute 0.00 - 0.20 10*3/mm3 0.00   Immature Grans, Absolute 0.00 - 0.05 10*3/mm3 0.03   nRBC 0.0 - 0.2 /100 WBC 0.0   (C): Data is critically high  (H): Data is abnormally high  (L): Data is abnormally low  Note: In addition to lab results from this visit, the labs listed above may include labs taken at another facility or during a different encounter within the last 24 hours. Please correlate lab times with ED admission and discharge times for further clarification of the services performed during this visit.    XR Chest 1 View   Final Result   Impression:   No active disease.         Electronically Signed: Saul Carl MD     7/27/2024 4:03 PM EDT     Workstation ID: [id number]      FL Video Swallow With Speech Single Contrast    (Results Pending)     Vitals:    07/28/24 1400 07/28/24 1600 07/28/24 1800 07/28/24 1901   BP:    119/55   BP Location:    Left arm   Patient Position:    Lying   Pulse: 55 55 59 56   Resp:    17   Temp:    98.2 °F (36.8 °C)   TempSrc:    Oral   SpO2: 97% 97% 97% 97%   Weight:       Height:         Medications   sodium chloride 0.9 % flush 10 mL (has no administration in time range)   amiodarone (PACERONE) tablet 200 mg (200 mg Oral Given 7/28/24 1050)   apixaban (ELIQUIS) tablet 2.5 mg (2.5 mg Oral Given 7/28/24 2040)   lactobacillus acidophilus (RISAQUAD) capsule 1 capsule (1 capsule Oral Given 7/28/24 1051)   levothyroxine (SYNTHROID, LEVOTHROID) tablet 100 mcg ( Oral Canceled Entry 7/28/24 0600)   melatonin tablet 5 mg (has no administration in time range)   metoprolol tartrate  (LOPRESSOR) tablet 50 mg (50 mg Oral Given 7/28/24 2039)   traMADol (ULTRAM) tablet 50 mg (has no administration in time range)   sodium chloride 0.9 % flush 10 mL (10 mL Intravenous Given 7/28/24 2026)   sodium chloride 0.9 % flush 10 mL (has no administration in time range)   sodium chloride 0.9 % infusion 40 mL (has no administration in time range)   sodium chloride 0.9 % infusion (75 mL/hr Intravenous New Bag 7/27/24 2359)   acetaminophen (TYLENOL) tablet 650 mg (has no administration in time range)   insulin glargine (LANTUS, SEMGLEE) injection 1-200 Units (14 Units Subcutaneous Given 7/28/24 2041)   insulin lispro (humaLOG) injection 1-200 Units (3 Units Subcutaneous Given 7/28/24 2040)   insulin lispro (humaLOG) injection 1-200 Units (5 Units Subcutaneous Given 7/28/24 1638)   dextrose (GLUTOSE) oral gel 15 g (has no administration in time range)   dextrose (D50W) (25 g/50 mL) IV injection 10-50 mL (has no administration in time range)   glucagon (GLUCAGEN) injection 1 mg (has no administration in time range)   pantoprazole (PROTONIX) EC tablet 40 mg (has no administration in time range)   sodium chloride 0.9 % infusion (75 mL/hr Intravenous New Bag 7/28/24 2025)   sodium chloride 0.9 % bolus 500 mL (0 mL Intravenous Stopped 7/27/24 2044)     ECG/EMG Results (last 24 hours)       ** No results found for the last 24 hours. **          ECG 12 Lead Altered Mental Status   Final Result   Test Reason : Altered Mental Status   Blood Pressure :   */*   mmHG   Vent. Rate :  58 BPM     Atrial Rate :  58 BPM      P-R Int : 196 ms          QRS Dur :  92 ms       QT Int : 516 ms       P-R-T Axes : -25  58 -34 degrees      QTc Int : 506 ms      Sinus bradycardia   ST & T wave abnormality, consider anterolateral ischemia   Prolonged QT   Abnormal ECG   When compared with ECG of 15-MELYSSA-2024 15:32,   Questionable change in QRS axis   ST now depressed in Anterior leads   Nonspecific T wave abnormality, worse in Inferior leads    T wave inversion now evident in Anterolateral leads   Confirmed by RICKY LONGORIA MD ([id number]) on 7/28/2024 1:22:28 PM      Referred By: EDMD           Confirmed By: RICKY LONGORIA MD      ECG 12 Lead ED Triage Standing Order; Weak / Dizzy / AMS    (Results Pending)                                                Medical Decision Making  Problems Addressed:  DISHA (acute kidney injury): complicated acute illness or injury  At high risk for falls: complicated acute illness or injury  Generalized weakness: complicated acute illness or injury    Amount and/or Complexity of Data Reviewed  Labs: ordered.  Radiology: ordered.  ECG/medicine tests: ordered.    Risk  Prescription drug management.  Decision regarding hospitalization.        Final diagnoses:   DISHA (acute kidney injury)   Generalized weakness   At high risk for falls   Hyperglycemia       ED Disposition  ED Disposition       ED Disposition   Decision to Admit    Condition   --    Comment   Level of Care: Telemetry [5]   Diagnosis: ARF (acute renal failure) [212464]   Admitting Physician: JIMMY COPE [[id number]]   Attending Physician: JIMMY COPE [[id number]]   Certification: I Certify That Inpatient Hospital Services Are Medically Necessary For Greater Than 2 Midnights                      Adam Galvez DO  07/28/24 8855

## 2024-07-28 LAB
ANION GAP SERPL CALCULATED.3IONS-SCNC: 10 MMOL/L (ref 5–15)
BASOPHILS # BLD AUTO: 0 10*3/MM3 (ref 0–0.2)
BASOPHILS NFR BLD AUTO: 0 % (ref 0–1.5)
BUN SERPL-MCNC: 26 MG/DL (ref 8–23)
BUN/CREAT SERPL: 23.2 (ref 7–25)
CALCIUM SPEC-SCNC: 8.7 MG/DL (ref 8.6–10.5)
CHLORIDE SERPL-SCNC: 100 MMOL/L (ref 98–107)
CO2 SERPL-SCNC: 30 MMOL/L (ref 22–29)
CREAT SERPL-MCNC: 1.12 MG/DL (ref 0.57–1)
CREAT UR-MCNC: 24.1 MG/DL
DEPRECATED RDW RBC AUTO: 46.4 FL (ref 37–54)
EGFRCR SERPLBLD CKD-EPI 2021: 47.4 ML/MIN/1.73
EOSINOPHIL # BLD AUTO: 0 10*3/MM3 (ref 0–0.4)
EOSINOPHIL NFR BLD AUTO: 0 % (ref 0.3–6.2)
ERYTHROCYTE [DISTWIDTH] IN BLOOD BY AUTOMATED COUNT: 14.3 % (ref 12.3–15.4)
GLUCOSE BLDC GLUCOMTR-MCNC: 218 MG/DL (ref 70–130)
GLUCOSE BLDC GLUCOMTR-MCNC: 304 MG/DL (ref 70–130)
GLUCOSE BLDC GLUCOMTR-MCNC: 308 MG/DL (ref 70–130)
GLUCOSE BLDC GLUCOMTR-MCNC: 393 MG/DL (ref 70–130)
GLUCOSE SERPL-MCNC: 197 MG/DL (ref 65–99)
HBA1C MFR BLD: 9.8 % (ref 4.8–5.6)
HCT VFR BLD AUTO: 27.1 % (ref 34–46.6)
HGB BLD-MCNC: 9.4 G/DL (ref 12–15.9)
IMM GRANULOCYTES # BLD AUTO: 0.04 10*3/MM3 (ref 0–0.05)
IMM GRANULOCYTES NFR BLD AUTO: 2.7 % (ref 0–0.5)
LYMPHOCYTES # BLD AUTO: 0.47 10*3/MM3 (ref 0.7–3.1)
LYMPHOCYTES NFR BLD AUTO: 31.3 % (ref 19.6–45.3)
MAGNESIUM SERPL-MCNC: 1.7 MG/DL (ref 1.6–2.4)
MCH RBC QN AUTO: 31 PG (ref 26.6–33)
MCHC RBC AUTO-ENTMCNC: 34.7 G/DL (ref 31.5–35.7)
MCV RBC AUTO: 89.4 FL (ref 79–97)
MONOCYTES # BLD AUTO: 0.16 10*3/MM3 (ref 0.1–0.9)
MONOCYTES NFR BLD AUTO: 10.7 % (ref 5–12)
NEUTROPHILS NFR BLD AUTO: 0.83 10*3/MM3 (ref 1.7–7)
NEUTROPHILS NFR BLD AUTO: 55.3 % (ref 42.7–76)
NRBC BLD AUTO-RTO: 0 /100 WBC (ref 0–0.2)
PHOSPHATE SERPL-MCNC: 3.7 MG/DL (ref 2.5–4.5)
PLATELET # BLD AUTO: 56 10*3/MM3 (ref 140–450)
PMV BLD AUTO: 10 FL (ref 6–12)
POTASSIUM SERPL-SCNC: 3.5 MMOL/L (ref 3.5–5.2)
QT INTERVAL: 516 MS
QTC INTERVAL: 506 MS
RBC # BLD AUTO: 3.03 10*6/MM3 (ref 3.77–5.28)
SODIUM SERPL-SCNC: 140 MMOL/L (ref 136–145)
WBC NRBC COR # BLD AUTO: 1.5 10*3/MM3 (ref 3.4–10.8)

## 2024-07-28 PROCEDURE — 25810000003 SODIUM CHLORIDE 0.9 % SOLUTION: Performed by: INTERNAL MEDICINE

## 2024-07-28 PROCEDURE — 82948 REAGENT STRIP/BLOOD GLUCOSE: CPT

## 2024-07-28 PROCEDURE — 99232 SBSQ HOSP IP/OBS MODERATE 35: CPT | Performed by: INTERNAL MEDICINE

## 2024-07-28 PROCEDURE — 83036 HEMOGLOBIN GLYCOSYLATED A1C: CPT | Performed by: NURSE PRACTITIONER

## 2024-07-28 PROCEDURE — 92610 EVALUATE SWALLOWING FUNCTION: CPT

## 2024-07-28 PROCEDURE — 80048 BASIC METABOLIC PNL TOTAL CA: CPT | Performed by: NURSE PRACTITIONER

## 2024-07-28 PROCEDURE — 85025 COMPLETE CBC W/AUTO DIFF WBC: CPT | Performed by: NURSE PRACTITIONER

## 2024-07-28 PROCEDURE — 63710000001 INSULIN GLARGINE PER 5 UNITS: Performed by: INTERNAL MEDICINE

## 2024-07-28 PROCEDURE — 63710000001 INSULIN LISPRO (HUMAN) PER 5 UNITS: Performed by: INTERNAL MEDICINE

## 2024-07-28 PROCEDURE — 84100 ASSAY OF PHOSPHORUS: CPT | Performed by: NURSE PRACTITIONER

## 2024-07-28 PROCEDURE — 83735 ASSAY OF MAGNESIUM: CPT | Performed by: NURSE PRACTITIONER

## 2024-07-28 RX ORDER — SODIUM CHLORIDE 9 MG/ML
75 INJECTION, SOLUTION INTRAVENOUS CONTINUOUS
Status: ACTIVE | OUTPATIENT
Start: 2024-07-28 | End: 2024-07-29

## 2024-07-28 RX ORDER — PANTOPRAZOLE SODIUM 40 MG/1
40 TABLET, DELAYED RELEASE ORAL
Status: DISCONTINUED | OUTPATIENT
Start: 2024-07-29 | End: 2024-08-06 | Stop reason: HOSPADM

## 2024-07-28 RX ADMIN — Medication 10 ML: at 10:52

## 2024-07-28 RX ADMIN — INSULIN GLARGINE 14 UNITS: 100 INJECTION, SOLUTION SUBCUTANEOUS at 20:41

## 2024-07-28 RX ADMIN — Medication 1 CAPSULE: at 10:51

## 2024-07-28 RX ADMIN — APIXABAN 2.5 MG: 2.5 TABLET, FILM COATED ORAL at 10:51

## 2024-07-28 RX ADMIN — APIXABAN 2.5 MG: 2.5 TABLET, FILM COATED ORAL at 20:40

## 2024-07-28 RX ADMIN — INSULIN LISPRO 6 UNITS: 100 INJECTION, SOLUTION INTRAVENOUS; SUBCUTANEOUS at 12:06

## 2024-07-28 RX ADMIN — Medication 10 ML: at 20:26

## 2024-07-28 RX ADMIN — METOPROLOL TARTRATE 50 MG: 50 TABLET, FILM COATED ORAL at 10:51

## 2024-07-28 RX ADMIN — SODIUM CHLORIDE 75 ML/HR: 9 INJECTION, SOLUTION INTRAVENOUS at 20:25

## 2024-07-28 RX ADMIN — METOPROLOL TARTRATE 50 MG: 50 TABLET, FILM COATED ORAL at 20:39

## 2024-07-28 RX ADMIN — Medication 10 ML: at 00:00

## 2024-07-28 RX ADMIN — AMIODARONE HYDROCHLORIDE 200 MG: 200 TABLET ORAL at 10:50

## 2024-07-28 RX ADMIN — INSULIN LISPRO 3 UNITS: 100 INJECTION, SOLUTION INTRAVENOUS; SUBCUTANEOUS at 20:40

## 2024-07-28 RX ADMIN — INSULIN LISPRO 5 UNITS: 100 INJECTION, SOLUTION INTRAVENOUS; SUBCUTANEOUS at 16:38

## 2024-07-28 NOTE — PLAN OF CARE
Problem: Adult Inpatient Plan of Care  Goal: Plan of Care Review  Outcome: Ongoing, Progressing  Flowsheets (Taken 7/28/2024 1836)  Progress: improving  Plan of Care Reviewed With:   patient   family  Goal: Patient-Specific Goal (Individualized)  Outcome: Ongoing, Progressing  Goal: Absence of Hospital-Acquired Illness or Injury  Outcome: Ongoing, Progressing  Intervention: Identify and Manage Fall Risk  Recent Flowsheet Documentation  Taken 7/28/2024 1800 by Tiffany Sargent RN  Safety Promotion/Fall Prevention:   safety round/check completed   toileting scheduled   assistive device/personal items within reach   activity supervised   fall prevention program maintained   clutter free environment maintained   gait belt  Taken 7/28/2024 1600 by Tiffany Sargent RN  Safety Promotion/Fall Prevention:   safety round/check completed   toileting scheduled   room organization consistent   fall prevention program maintained   activity supervised   assistive device/personal items within reach   clutter free environment maintained  Taken 7/28/2024 1400 by Tiffany Sargent RN  Safety Promotion/Fall Prevention:   safety round/check completed   toileting scheduled   room organization consistent   nonskid shoes/slippers when out of bed   fall prevention program maintained   activity supervised   assistive device/personal items within reach   clutter free environment maintained   gait belt  Taken 7/28/2024 1200 by Tiffany Sargent RN  Safety Promotion/Fall Prevention:   safety round/check completed   toileting scheduled   room organization consistent   nonskid shoes/slippers when out of bed   fall prevention program maintained   activity supervised   assistive device/personal items within reach   clutter free environment maintained  Taken 7/28/2024 1000 by Tiffany Sargent RN  Safety Promotion/Fall Prevention:   safety round/check completed   toileting scheduled   room organization consistent   nonskid shoes/slippers when out of bed   activity  supervised   clutter free environment maintained   fall prevention program maintained   gait belt  Taken 7/28/2024 0801 by Tiffany Sargent RN  Safety Promotion/Fall Prevention:   safety round/check completed   toileting scheduled   room organization consistent   nonskid shoes/slippers when out of bed   fall prevention program maintained   activity supervised   assistive device/personal items within reach   clutter free environment maintained  Intervention: Prevent Skin Injury  Recent Flowsheet Documentation  Taken 7/28/2024 1800 by Tiffany Sargent RN  Body Position: sitting up in bed  Skin Protection:   adhesive use limited   incontinence pads utilized   protective footwear used   pulse oximeter probe site changed   skin sealant/moisture barrier applied   skin-to-device areas padded   transparent dressing maintained   tubing/devices free from skin contact  Taken 7/28/2024 1600 by Tiffany Sargent RN  Body Position: sitting up in bed  Skin Protection:   adhesive use limited   incontinence pads utilized   protective footwear used   pulse oximeter probe site changed   skin sealant/moisture barrier applied   skin-to-device areas padded   transparent dressing maintained   tubing/devices free from skin contact  Taken 7/28/2024 1400 by Tiffany Sargent RN  Body Position: supine, legs elevated  Skin Protection:   adhesive use limited   incontinence pads utilized   protective footwear used   skin sealant/moisture barrier applied   skin-to-device areas padded  Taken 7/28/2024 1200 by Tiffany Sargent RN  Body Position: sitting up in bed  Skin Protection:   adhesive use limited   incontinence pads utilized   protective footwear used   pulse oximeter probe site changed   skin sealant/moisture barrier applied   skin-to-device areas padded   transparent dressing maintained   tubing/devices free from skin contact  Taken 7/28/2024 1000 by Tiffany Sargent RN  Body Position:   tilted   knee-chest position   left  Skin Protection:   adhesive use limited    incontinence pads utilized   skin sealant/moisture barrier applied   skin-to-device areas padded   transparent dressing maintained   tubing/devices free from skin contact  Taken 7/28/2024 0801 by Tiffany Sargent RN  Body Position:   tilted   right  Skin Protection:   adhesive use limited   incontinence pads utilized   protective footwear used   pulse oximeter probe site changed   skin sealant/moisture barrier applied   skin-to-device areas padded   transparent dressing maintained   tubing/devices free from skin contact  Intervention: Prevent and Manage VTE (Venous Thromboembolism) Risk  Recent Flowsheet Documentation  Taken 7/28/2024 1800 by Tiffany Sargent RN  Activity Management: activity encouraged  Taken 7/28/2024 1600 by Tiffany Sargent RN  Activity Management: activity encouraged  Taken 7/28/2024 0801 by Tiffayn Sargent RN  Activity Management: activity encouraged  Intervention: Prevent Infection  Recent Flowsheet Documentation  Taken 7/28/2024 1800 by Tiffany Sargent RN  Infection Prevention:   environmental surveillance performed   hand hygiene promoted   personal protective equipment utilized   rest/sleep promoted   single patient room provided  Taken 7/28/2024 1600 by Tiffany Sargent RN  Infection Prevention:   environmental surveillance performed   hand hygiene promoted   personal protective equipment utilized   rest/sleep promoted   single patient room provided  Taken 7/28/2024 1200 by Tiffany Sargent RN  Infection Prevention:   environmental surveillance performed   hand hygiene promoted   personal protective equipment utilized   rest/sleep promoted   single patient room provided  Taken 7/28/2024 1000 by Tiffany Sargent RN  Infection Prevention:   environmental surveillance performed   hand hygiene promoted   personal protective equipment utilized   rest/sleep promoted   single patient room provided  Taken 7/28/2024 0801 by Tiffany Sargent RN  Infection Prevention:   environmental surveillance performed   hand hygiene promoted   personal  protective equipment utilized   rest/sleep promoted   single patient room provided  Goal: Optimal Comfort and Wellbeing  Outcome: Ongoing, Progressing  Intervention: Provide Person-Centered Care  Recent Flowsheet Documentation  Taken 7/28/2024 0801 by Tiffany Sargent RN  Trust Relationship/Rapport:   care explained   choices provided   emotional support provided   empathic listening provided   questions answered  Goal: Readiness for Transition of Care  Outcome: Ongoing, Progressing     Problem: Skin Injury Risk Increased  Goal: Skin Health and Integrity  Outcome: Ongoing, Progressing  Intervention: Optimize Skin Protection  Recent Flowsheet Documentation  Taken 7/28/2024 1800 by Tiffany Sargent RN  Pressure Reduction Techniques:   frequent weight shift encouraged   heels elevated off bed   positioned off wounds   pressure points protected   weight shift assistance provided  Head of Bed (HOB) Positioning: HOB at 60-90 degrees  Pressure Reduction Devices:   pressure-redistributing mattress utilized   positioning supports utilized   heel offloading device utilized  Skin Protection:   adhesive use limited   incontinence pads utilized   protective footwear used   pulse oximeter probe site changed   skin sealant/moisture barrier applied   skin-to-device areas padded   transparent dressing maintained   tubing/devices free from skin contact  Taken 7/28/2024 1600 by Tiffany Sargent RN  Pressure Reduction Techniques:   frequent weight shift encouraged   heels elevated off bed   positioned off wounds   pressure points protected   weight shift assistance provided  Head of Bed (HOB) Positioning: HOB at 60-90 degrees  Pressure Reduction Devices:   pressure-redistributing mattress utilized   positioning supports utilized   heel offloading device utilized  Skin Protection:   adhesive use limited   incontinence pads utilized   protective footwear used   pulse oximeter probe site changed   skin sealant/moisture barrier applied   skin-to-device  areas padded   transparent dressing maintained   tubing/devices free from skin contact  Taken 7/28/2024 1400 by Tiffany Sargent RN  Pressure Reduction Techniques:   frequent weight shift encouraged   heels elevated off bed   positioned off wounds   pressure points protected   weight shift assistance provided  Head of Bed (HOB) Positioning: HOB at 30-45 degrees  Pressure Reduction Devices:   pressure-redistributing mattress utilized   heel offloading device utilized   positioning supports utilized   chair cushion utilized  Skin Protection:   adhesive use limited   incontinence pads utilized   protective footwear used   skin sealant/moisture barrier applied   skin-to-device areas padded  Taken 7/28/2024 1200 by Tiffany Sargent RN  Pressure Reduction Techniques:   frequent weight shift encouraged   heels elevated off bed   positioned off wounds   pressure points protected   weight shift assistance provided  Head of Bed (HOB) Positioning: HOB at 60-90 degrees  Pressure Reduction Devices:   pressure-redistributing mattress utilized   positioning supports utilized   heel offloading device utilized   chair cushion utilized  Skin Protection:   adhesive use limited   incontinence pads utilized   protective footwear used   pulse oximeter probe site changed   skin sealant/moisture barrier applied   skin-to-device areas padded   transparent dressing maintained   tubing/devices free from skin contact  Taken 7/28/2024 1000 by Tiffany Saregnt RN  Pressure Reduction Techniques:   frequent weight shift encouraged   heels elevated off bed   positioned off wounds   pressure points protected   weight shift assistance provided  Head of Bed (HOB) Positioning: HOB at 60-90 degrees  Pressure Reduction Devices:   pressure-redistributing mattress utilized   positioning supports utilized   foam padding utilized   chair cushion utilized  Skin Protection:   adhesive use limited   incontinence pads utilized   skin sealant/moisture barrier applied    skin-to-device areas padded   transparent dressing maintained   tubing/devices free from skin contact  Taken 7/28/2024 0801 by Tiffany Sargent RN  Pressure Reduction Techniques:   frequent weight shift encouraged   heels elevated off bed   positioned off wounds   pressure points protected   weight shift assistance provided  Head of Bed (HOB) Positioning: HOB at 60-90 degrees  Pressure Reduction Devices:   pressure-redistributing mattress utilized   positioning supports utilized   heel offloading device utilized   chair cushion utilized  Skin Protection:   adhesive use limited   incontinence pads utilized   protective footwear used   pulse oximeter probe site changed   skin sealant/moisture barrier applied   skin-to-device areas padded   transparent dressing maintained   tubing/devices free from skin contact     Problem: Fall Injury Risk  Goal: Absence of Fall and Fall-Related Injury  Outcome: Ongoing, Progressing  Intervention: Identify and Manage Contributors  Recent Flowsheet Documentation  Taken 7/28/2024 0801 by Tiffany Sargent RN  Medication Review/Management: medications reviewed  Intervention: Promote Injury-Free Environment  Recent Flowsheet Documentation  Taken 7/28/2024 1800 by Tiffany Sargent RN  Safety Promotion/Fall Prevention:   safety round/check completed   toileting scheduled   assistive device/personal items within reach   activity supervised   fall prevention program maintained   clutter free environment maintained   gait belt  Taken 7/28/2024 1600 by Tiffany Sargent RN  Safety Promotion/Fall Prevention:   safety round/check completed   toileting scheduled   room organization consistent   fall prevention program maintained   activity supervised   assistive device/personal items within reach   clutter free environment maintained  Taken 7/28/2024 1400 by Tiffany Sargent RN  Safety Promotion/Fall Prevention:   safety round/check completed   toileting scheduled   room organization consistent   nonskid shoes/slippers when  out of bed   fall prevention program maintained   activity supervised   assistive device/personal items within reach   clutter free environment maintained   gait belt  Taken 7/28/2024 1200 by Tiffany Sargent RN  Safety Promotion/Fall Prevention:   safety round/check completed   toileting scheduled   room organization consistent   nonskid shoes/slippers when out of bed   fall prevention program maintained   activity supervised   assistive device/personal items within reach   clutter free environment maintained  Taken 7/28/2024 1000 by Tiffany Sargent RN  Safety Promotion/Fall Prevention:   safety round/check completed   toileting scheduled   room organization consistent   nonskid shoes/slippers when out of bed   activity supervised   clutter free environment maintained   fall prevention program maintained   gait belt  Taken 7/28/2024 0801 by Tiffany Sargent RN  Safety Promotion/Fall Prevention:   safety round/check completed   toileting scheduled   room organization consistent   nonskid shoes/slippers when out of bed   fall prevention program maintained   activity supervised   assistive device/personal items within reach   clutter free environment maintained   Goal Outcome Evaluation:  Plan of Care Reviewed With: patient, family        Progress: improving

## 2024-07-28 NOTE — THERAPY EVALUATION
Acute Care - Speech Language Pathology   Swallow Initial Evaluation [facility]  Clinical Swallow Evaluation     Patient Name: Chey Robertson  : 1935  MRN: [id number]  Today's Date: 2024               Admit Date: 2024    Visit Dx:     ICD-10-CM ICD-9-CM   1. DISHA (acute kidney injury)  N17.9 584.9   2. Generalized weakness  R53.1 780.79   3. At high risk for falls  Z91.81 V15.88   4. Dysphagia, unspecified type  R13.10 787.20     Patient Active Problem List   Diagnosis    Benign familial tremor    AMS (altered mental status)    Pancytopenia    Hypothyroidism (acquired)    B12 deficiency    Abnormal intestinal absorption    Iron deficiency anemia due to chronic blood loss    Myelodysplasia (myelodysplastic syndrome)    Personal history of pulmonary embolism    Chronic anticoagulation    Essential hypertension    Type 2 diabetes mellitus without complication, without long-term current use of insulin    Atrial fibrillation    QT prolongation    Pericardial effusion    Severe malnutrition    Closed displaced intertrochanteric fracture of right femur, initial encounter    ARF (acute renal failure)    Diarrhea     Past Medical History:   Diagnosis Date    A-fib     on eliquis    Anemia     Arthritis     Diabetes mellitus     checks sugar only when pt wants to     Disease of thyroid gland     History of transfusion     with knee surgery-  no reaction recalled.     [place] (hard of hearing)     no hearing aids    Hypertension     Migraine without aura and without status migrainosus, not intractable 2016    Myelodysplastic syndrome     Pulmonary emboli     (after knee surgery)    SOBOE (shortness of breath on exertion)     Tremors of nervous system     Vertigo     Wears dentures     upper     Wears glasses      Past Surgical History:   Procedure Laterality Date    BLADDER SURGERY      CATARACT EXTRACTION      bilat     CHOLECYSTECTOMY      COLONOSCOPY      HIP TROCHANTERIC NAILING WITH INTRAMEDULLARY  HIP SCREW Right 6/20/2023    Procedure: HIP TROCHANTERIC NAILING WITH INTRAMEDULLARY HIP SCREW RIGHT;  Surgeon: Augie Hobbs MD;  Location: [facility] OR;  Service: Orthopedics;  Laterality: Right;    HYSTERECTOMY      with bso    KYPHOPLASTY N/A 02/12/2021    Procedure: KYPHOPLASTY T11, T12 AND L4;  Surgeon: Matty Hearn MD;  Location:  [facility] OR;  Service: Orthopedic Spine;  Laterality: N/A;    TONSILLECTOMY         SLP Recommendation and Plan  SLP Swallowing Diagnosis: suspected pharyngeal dysphagia (07/28/24 0820)  SLP Diet Recommendation: soft to chew textures, chopped, nectar thick liquids, ice chips between meals after oral care, with supervision (07/28/24 0820)  Recommended Precautions and Strategies: upright posture during/after eating, general aspiration precautions (07/28/24 0820)  SLP Rec. for Method of Medication Administration: meds whole, with thick liquids, with puree, as tolerated (07/28/24 0820)     Monitor for Signs of Aspiration: yes, notify SLP if any concerns (07/28/24 0820)  Recommended Diagnostics: VFSS ([employer] [facility]) (07/28/24 0820)                 Oral Care Recommendations: Oral Care BID/PRN, Toothbrush (07/28/24 0820)                                        Plan of Care Reviewed With: patient      SWALLOW EVALUATION (Last 72 Hours)       SLP Adult Swallow Evaluation       Row Name 07/28/24 0820                   Rehab Evaluation    Document Type evaluation  -SM        Subjective Information no complaints  -SM        Patient Observations alert;cooperative  -SM        Patient Effort good  -SM           General Information    Patient Profile Reviewed yes  -SM        Pertinent History Of Current Problem From AL with n/v/d. Essential tremor. Chronic cough. [place]. CXR NAD. MBS 6/2023 with recommendations for soft solids, no mixed consistencies, and nectar-thick liquids via small cup sips, no straws.  -SM        Current Method of Nutrition NPO  Confirmed with RN that pt NPO awaiting SLP assessment  -SM         Plans/Goals Discussed with patient;agreed upon  -        Barriers to Rehab none identified  -        Patient's Goals for Discharge return to PO diet  -SM           Pain    Additional Documentation Pain Scale: Numbers Pre/Post-Treatment (Group)  -SM           Pain Scale: Numbers Pre/Post-Treatment    Pretreatment Pain Rating 0/10 - no pain  -SM        Posttreatment Pain Rating 0/10 - no pain  -SM           Oral Musculature and Cranial Nerve Assessment    Oral Motor General Assessment generalized oral motor weakness  -SM        Oral Labial or Buccal Impairment, Detail, Cranial Nerve VII (Facial): left labial droop;other (see comments)  assymmetry at rest only. Notified RN, who was to also assess  -SM        Lingual Impairment, Detail. Cranial Nerves IX, XII (Glossopharyngeal and Hypoglossal) reduced strength;bilaterally;other (see comments)  mild  -SM        Vocal Impairment, Detail. Cranial Nerve X (Vagus) other (see comments)  noted with spasmodic dysphonia characteristics  -SM           General Eating/Swallowing Observations    Respiratory Support Currently in Use room air  -SM           Clinical Swallow Eval    Oral Prep Phase WFL  -SM        Oral Transit WFL  -SM        Oral Residue WFL  -SM        Pharyngeal Phase suspected pharyngeal impairment  -SM        Esophageal Phase unremarkable  -SM           Pharyngeal Phase Concerns    Pharyngeal Phase Concerns throat clear;other (see comments)  -SM        Throat Clear thin;other (see comments)  when used as liquids mix  -SM        Pharyngeal Phase Concerns, Comment General weakness and effort with swallow noted. Pt recalls working with SLP at rehab though no recall of hx thickened liquids. Pt reports no current dietary modifications though some degree decreased recall. CXR with NAD though will adjust to safe diet until can further assess pharyngeal function. Pt in agreement.  -SM           SLP Evaluation Clinical Impression    SLP Swallowing Diagnosis  suspected pharyngeal dysphagia  -        Functional Impact risk of aspiration/pneumonia  -           Recommendations    SLP Diet Recommendation soft to chew textures;chopped;nectar thick liquids;ice chips between meals after oral care, with supervision  -        Recommended Diagnostics VFSS (MBS)  -        Recommended Precautions and Strategies upright posture during/after eating;general aspiration precautions  -        Oral Care Recommendations Oral Care BID/PRN;Toothbrush  -        SLP Rec. for Method of Medication Administration meds whole;with thick liquids;with puree;as tolerated  -        Monitor for Signs of Aspiration yes;notify SLP if any concerns  -                  User Key  (r) = Recorded By, (t) = Taken By, (c) = Cosigned By      Initials Name Effective Dates    Cathie Calles MS CCC-SLP 02/03/23 -                     EDUCATION  The patient has been educated in the following areas:   Dysphagia (Swallowing Impairment) Oral Care/Hydration Modified Diet Instruction.                Time Calculation:    Time Calculation- SLP       Row Name 07/28/24 0947             Time Calculation- SLP    SLP Start Time 0820  -      SLP Received On 07/28/24  -         Untimed Charges    28234-BL Eval Oral Pharyng Swallow Minutes 55  -SM         Total Minutes    Untimed Charges Total Minutes 55  -SM       Total Minutes 55  -SM                User Key  (r) = Recorded By, (t) = Taken By, (c) = Cosigned By      Initials Name Provider Type    Cathie Calles MS CCC-SLP Speech and Language Pathologist                    Therapy Charges for Today       Code Description Service Date Service Provider Modifiers Qty    [id number] HC ST EVAL ORAL PHARYNG SWALLOW 4 7/28/2024 Cathie Patrick MS CCC-SLP GN 1                 Cathie Patrick MS CCC-SLP  7/28/2024

## 2024-07-28 NOTE — PLAN OF CARE
Goal Outcome Evaluation:  Plan of Care Reviewed With: patient            SLP Swallowing Diagnosis: suspected pharyngeal dysphagia (07/28/24 6328)           MBS to follow tomorrow, 7/29.

## 2024-07-28 NOTE — PROGRESS NOTES
[facility] Medicine Services  PROGRESS NOTE    Patient Name: Chey Robertson  : 1935  MRN: [id number]    Date of Admission: 2024  Primary Care Physician: Yasmeen Loomis MD    Subjective   Subjective     CC:  weakness    HPI:  Feels hungry.  Denies diarrhea      Objective   Objective     Vital Signs:   Temp:  [97.5 °F (36.4 °C)-98 °F (36.7 °C)] 98 °F (36.7 °C)  Heart Rate:  [53-63] 59  Resp:  [14-16] 16  BP: (121-149)/(42-75) 129/58     Physical Exam:  Frail, in bed  MM moist  RRR  Breath sounds grossly clear  Normal affect  Awake, speech clear    Results Reviewed:  LAB RESULTS:      Lab 24  0548 24  1449   WBC 1.50* 2.01*   HEMOGLOBIN 9.4* 10.9*   HEMATOCRIT 27.1* 32.1*   PLATELETS 56* 54*   NEUTROS ABS 0.83* 1.72   IMMATURE GRANS (ABS) 0.04 0.03   LYMPHS ABS 0.47* 0.19*   MONOS ABS 0.16 0.07*   EOS ABS 0.00 0.00   MCV 89.4 90.2   CRP  --  <0.30   PROCALCITONIN  --  <0.02         Lab 24  0547 24  1449   SODIUM 140 135*   POTASSIUM 3.5 4.6   CHLORIDE 100 91*   CO2 30.0* 26.0   ANION GAP 10.0 18.0*   BUN 26* 26*   CREATININE 1.12* 1.49*   EGFR 47.4* 33.6*   GLUCOSE 197* 502*   CALCIUM 8.7 9.4   MAGNESIUM 1.7 1.6   PHOSPHORUS 3.7  --    HEMOGLOBIN A1C 9.80*  --    TSH  --  1.650         Lab 24  1449   TOTAL PROTEIN 7.0   ALBUMIN 4.4   GLOBULIN 2.6   ALT (SGPT) 7   AST (SGOT) 12   BILIRUBIN 0.6   ALK PHOS 102         Lab 24  1449   HSTROP T 16*                 Brief Urine Lab Results  (Last result in the past 365 days)        Color   Clarity   Blood   Leuk Est   Nitrite   Protein   CREAT   Urine HCG        24 1613             24.1         24 1613 Yellow   Clear   Negative   Negative   Negative   Negative                   Microbiology Results Abnormal       Procedure Component Value - Date/Time    Eosinophil Smear - Urine, Urine, Clean Catch [[id number]]  (Normal) Collected: 24 3973    Lab Status: Final result Specimen: Urine,  Clean Catch Updated: 07/27/24 2346     Eosinophil Smear 0 % EOS/100 Cells     Narrative:      No eosinophil seen    COVID PRE-OP / PRE-PROCEDURE SCREENING ORDER (NO ISOLATION) - Swab, Nasopharynx [[id number]]  (Normal) Collected: 07/27/24 2044    Lab Status: Final result Specimen: Swab from Nasopharynx Updated: 07/27/24 2116    Narrative:      The following orders were created for panel order COVID PRE-OP / PRE-PROCEDURE SCREENING ORDER (NO ISOLATION) - Swab, Nasopharynx.  Procedure                               Abnormality         Status                     ---------                               -----------         ------                     COVID-19 and FLU A/B PCR...[[id number]]  Normal              Final result                 Please view results for these tests on the individual orders.    COVID-19 and FLU A/B PCR, 1 HR TAT - Swab, Nasopharynx [[id number]]  (Normal) Collected: 07/27/24 2044    Lab Status: Final result Specimen: Swab from Nasopharynx Updated: 07/27/24 2116     COVID19 Not Detected     Influenza A PCR Not Detected     Influenza B PCR Not Detected    Narrative:      Fact sheet for providers: https://www.fda.gov/media/110783/download    Fact sheet for patients: https://www.fda.gov/media/551827/download    Test performed by PCR.            XR Chest 1 View    Result Date: 7/27/2024  XR CHEST 1 VW Date of Exam: 7/27/2024 3:41 PM EDT Indication: Weakness, dizziness, altered mental status Comparison: 1/15/2024. Findings: The heart size is normal. The pulmonary vascular markings are normal. The lungs and pleural spaces are clear of active disease. There are chronic age-related changes involving the bony thorax and thoracic aorta.     Impression: Impression: No active disease. Electronically Signed: Saul Carl MD  7/27/2024 4:03 PM EDT  Workstation ID: [id number]     Results for orders placed during the hospital encounter of 06/05/23    Adult Transthoracic Echo Complete w/ Color, Spectral and Contrast if  necessary per protocol    Interpretation Summary    Left ventricular systolic function is normal. Left ventricular ejection fraction appears to be 56 - 60%.    Left ventricular diastolic function was normal.    Estimated right ventricular systolic pressure from tricuspid regurgitation is normal (<35 mmHg).      Current medications:  Scheduled Meds:amiodarone, 200 mg, Oral, Daily  apixaban, 2.5 mg, Oral, BID  insulin glargine, 1-200 Units, Subcutaneous, Nightly - Glucommander  insulin lispro, 1-200 Units, Subcutaneous, 4x Daily With Meals & Nightly  lactobacillus acidophilus, 1 capsule, Oral, Daily  levothyroxine, 100 mcg, Oral, Q AM  metoprolol tartrate, 50 mg, Oral, BID  [START ON 7/29/2024] pantoprazole, 40 mg, Oral, Daily  sodium chloride, 10 mL, Intravenous, Q12H      Continuous Infusions:   PRN Meds:.  acetaminophen    dextrose    dextrose    glucagon (human recombinant)    insulin lispro    melatonin    sodium chloride    sodium chloride    sodium chloride    traMADol    Assessment & Plan   Assessment & Plan     Active Hospital Problems    Diagnosis  POA    **ARF (acute renal failure) [N17.9]  Yes    Diarrhea [R19.7]  Yes    Atrial fibrillation [I48.91]  Yes    Chronic anticoagulation [Z79.01]  Not Applicable    Essential hypertension [I10]  Yes    Type 2 diabetes mellitus without complication, without long-term current use of insulin [E11.9]  Yes    Personal history of pulmonary embolism [Z86.711]  Yes    Hypothyroidism (acquired) [E03.9]  Yes      Resolved Hospital Problems   No resolved problems to display.        Brief Hospital Course to date:  Chey Robertson is a 89 y.o. female with history of atrial fibrillation on dose adjusted eliquis, DVT/PE, Hypothyroid, HTN, DMII, essential tremor who presents with complaints of generalized weakness, N/V and diarrhea.  Recent increase in home metformin dose    N/V  Diarrhea  - secondary to increased metformin dose?  - improved    DISHA  - baseline creatinine 0.5 to  0.6  - creatinine improving with gentle IV fluids  - bmp am    DMII  - poor control, A1c 9.8  - metformin held  - glucose reviewed, glucommander    Pancytopenia  - follows with Dr Lyman  - platelets 56    Atrial fibrillation  - eliquis    Hypothyroid    H/o DVT/PT    Dysphagia  - speech therapy follows, modified diet    Generalized weakness  - PT/OT      Expected Discharge Location and Transportation:   Expected Discharge   Expected Discharge Date: 7/30/2024; Expected Discharge Time:      VTE Prophylaxis:  Pharmacologic VTE prophylaxis orders are present.         AM-PAC 6 Clicks Score (PT): 15 (07/28/24 0801)    CODE STATUS:   Code Status and Medical Interventions: CPR (Attempt to Resuscitate); Full Support   Ordered at: 07/27/24 2121     Level Of Support Discussed With:    Patient     Code Status (Patient has no pulse and is not breathing):    CPR (Attempt to Resuscitate)     Medical Interventions (Patient has pulse or is breathing):    Full Support       Maksim Freeman MD  07/28/24

## 2024-07-28 NOTE — PLAN OF CARE
Goal Outcome Evaluation:  Plan of Care Reviewed With: patient        Progress: no change  Outcome Evaluation: Pt arrived to floor. Skin interventions in place. Pt pleasant and resting at this time.

## 2024-07-28 NOTE — H&P
[facility] Medicine Services  HISTORY AND PHYSICAL    Patient Name: Chey Robertson  : 1935  MRN: [id number]  Primary Care Physician: Yasmeen Loomis MD  Date of admission: 2024    Subjective   Subjective     Chief Complaint:  Weakness, diarrhea, nausea, vomiting     HPI:  Chey Robertson is a 89 y.o. female resident of [facility] living with PMH significant for A-fib on Eliquis, DVT/PE, hypothyroid, HTN, DM2, essential tremor, who presents to the ED with complaint of weakness, diarrhea, nausea, and vomiting.  Family at bedside helps provide HPI.  Family notes that approximately 3 weeks ago her metformin dose was increased from daily to twice daily.  Since that time, the patient reports  having progressive diarrhea.  Over the past week she has been having increasing weakness and decreased appetite.  Over the past 3 days she has been having nausea with associated vomiting.  She denies fever, chest pain, shortness of breath.  She reports having chronic cough that has not changed.  She denies abdominal pain.  She does admit to having mild dizziness.  Family notes that she was evaluated per PCP yesterday who prescribed her Cipro for presumed UTI.  Upon arrival to the ED, labs are concerning for hyperglycemia, DISHA, dehydration.  She is also noted to have pancytopenia.  UA is negative.  CXR is negative.  She will be admitted to hospital medicine for further evaluation.    Review of Systems   Constitutional:  Positive for activity change, appetite change, fatigue and unexpected weight change. Negative for chills, diaphoresis and fever.   HENT: Negative.  Negative for congestion, sinus pressure, sore throat and trouble swallowing.    Eyes: Negative.  Negative for visual disturbance.   Respiratory:  Positive for cough. Negative for chest tightness, shortness of breath and wheezing.    Cardiovascular: Negative.  Negative for chest pain, palpitations and leg swelling.    Gastrointestinal:  Positive for diarrhea, nausea and vomiting. Negative for abdominal distention, abdominal pain and constipation.   Endocrine: Negative.  Negative for polydipsia and polyphagia.   Genitourinary:  Negative for decreased urine volume, difficulty urinating, dysuria, frequency and urgency.   Musculoskeletal: Negative.  Negative for arthralgias, back pain, gait problem, myalgias and neck pain.   Skin:  Positive for wound. Negative for color change, pallor and rash.   Allergic/Immunologic: Negative.  Negative for immunocompromised state.   Neurological:  Positive for dizziness, tremors and weakness. Negative for seizures, syncope, facial asymmetry, speech difficulty, light-headedness, numbness and headaches.   Hematological: Negative.  Does not bruise/bleed easily.   Psychiatric/Behavioral:  Negative for agitation and confusion. The patient is not nervous/anxious.         Personal History     Past Medical History:   Diagnosis Date    A-fib     on eliquis    Anemia     Arthritis     Diabetes mellitus     checks sugar only when pt wants to     Disease of thyroid gland     History of transfusion     with knee surgery-  no reaction recalled.     Torres Martinez (hard of hearing)     no hearing aids    Hypertension     Migraine without aura and without status migrainosus, not intractable 12/30/2016    Myelodysplastic syndrome     Pulmonary emboli 2011    (after knee surgery)    SOBOE (shortness of breath on exertion)     Tremors of nervous system     Vertigo     Wears dentures     upper     Wears glasses          Oncology Problem List:  Myelodysplasia (myelodysplastic syndrome) (03/14/2023; Status: Active)    Oncology/Hematology History       Past Surgical History:   Procedure Laterality Date    BLADDER SURGERY      CATARACT EXTRACTION      bilat     CHOLECYSTECTOMY      COLONOSCOPY      HIP TROCHANTERIC NAILING WITH INTRAMEDULLARY HIP SCREW Right 6/20/2023    Procedure: HIP TROCHANTERIC NAILING WITH INTRAMEDULLARY HIP  SCREW RIGHT;  Surgeon: Augie Hobbs MD;  Location: [facility] OR;  Service: Orthopedics;  Laterality: Right;    HYSTERECTOMY      with bso    KYPHOPLASTY N/A 02/12/2021    Procedure: KYPHOPLASTY T11, T12 AND L4;  Surgeon: Matty Hearn MD;  Location:  [facility];  Service: Orthopedic Spine;  Laterality: N/A;    TONSILLECTOMY         Family History:  family history includes Breast cancer (age of onset: 84) in her sister; Heart attack in her father; Stroke in her mother.     Social History:  reports that she has never smoked. She has never been exposed to tobacco smoke. She has never used smokeless tobacco. She reports that she does not drink alcohol and does not use drugs.  Social History     Social History Narrative    Not on file       Medications:  Acidophilus Extra Strength, HYDROcodone-acetaminophen, Insulin Aspart, SITagliptin, acetaminophen, amiodarone, apixaban, ascorbic acid, furosemide, insulin NPH-insulin regular, levothyroxine, lisinopril, melatonin, metFORMIN, metoprolol tartrate, naloxone, ondansetron ODT, pantoprazole, polyethylene glycol, potassium chloride, sucralfate, traMADol, and zinc sulfate    Allergies   Allergen Reactions    Aspirin Unknown - Low Severity     Mouth sores        Objective   Objective     Vital Signs:   Temp:  [97.4 °F (36.3 °C)] 97.4 °F (36.3 °C)  Heart Rate:  [56-58] 58  Resp:  [18] 18  BP: (119-155)/() 130/54    Physical Exam   Constitutional: Awake, alert, no acute distress   Eyes: PERRLA, sclerae anicteric, no conjunctival injection  HENT: NCAT, mucous membranes moist  Neck: Supple, no thyromegaly, no lymphadenopathy, trachea midline  Respiratory: Clear to auscultation bilaterally, nonlabored respirations   Cardiovascular: RRR, no murmurs, rubs, or gallops, palpable pedal pulses bilaterally  Gastrointestinal: Positive bowel sounds, soft, nontender, nondistended  Musculoskeletal: No bilateral ankle edema, no clubbing or cyanosis to extremities  Psychiatric:  Appropriate affect, cooperative  Neurologic: Oriented x 3, strength symmetric in all extremities, Cranial Nerves grossly intact to confrontation, speech clear  Skin: No rashes, pressure ulcer left heel, wound right lower shin       Result Review:  I have personally reviewed the results from the time of this admission to 7/27/2024 21:21 EDT and agree with these findings:  [x]  Laboratory list / accordion  [x]  Microbiology  [x]  Radiology  []  EKG/Telemetry   []  Cardiology/Vascular   []  Pathology  [x]  Old records  []  Other:  Most notable findings include:     LAB RESULTS:      Lab 07/27/24  1449   WBC 2.01*   HEMOGLOBIN 10.9*   HEMATOCRIT 32.1*   PLATELETS 54*   NEUTROS ABS 1.72   IMMATURE GRANS (ABS) 0.03   LYMPHS ABS 0.19*   MONOS ABS 0.07*   EOS ABS 0.00   MCV 90.2   CRP <0.30   PROCALCITONIN <0.02         Lab 07/27/24  1449   SODIUM 135*   POTASSIUM 4.6   CHLORIDE 91*   CO2 26.0   ANION GAP 18.0*   BUN 26*   CREATININE 1.49*   EGFR 33.6*   GLUCOSE 502*   CALCIUM 9.4   MAGNESIUM 1.6   TSH 1.650         Lab 07/27/24  1449   TOTAL PROTEIN 7.0   ALBUMIN 4.4   GLOBULIN 2.6   ALT (SGPT) 7   AST (SGOT) 12   BILIRUBIN 0.6   ALK PHOS 102         Lab 07/27/24  1449   HSTROP T 16*                 Brief Urine Lab Results  (Last result in the past 365 days)        Color   Clarity   Blood   Leuk Est   Nitrite   Protein   CREAT   Urine HCG        07/27/24 1613 Yellow   Clear   Negative   Negative   Negative   Negative                 Microbiology Results (last 10 days)       Procedure Component Value - Date/Time    COVID PRE-OP / PRE-PROCEDURE SCREENING ORDER (NO ISOLATION) - Swab, Nasopharynx [[id number]]  (Normal) Collected: 07/27/24 2044    Lab Status: Final result Specimen: Swab from Nasopharynx Updated: 07/27/24 2116    Narrative:      The following orders were created for panel order COVID PRE-OP / PRE-PROCEDURE SCREENING ORDER (NO ISOLATION) - Swab, Nasopharynx.  Procedure                               Abnormality          Status                     ---------                               -----------         ------                     COVID-19 and FLU A/B PCR...[[id number]]  Normal              Final result                 Please view results for these tests on the individual orders.    COVID-19 and FLU A/B PCR, 1 HR TAT - Swab, Nasopharynx [[id number]]  (Normal) Collected: 07/27/24 2044    Lab Status: Final result Specimen: Swab from Nasopharynx Updated: 07/27/24 2116     COVID19 Not Detected     Influenza A PCR Not Detected     Influenza B PCR Not Detected    Narrative:      Fact sheet for providers: https://www.fda.gov/media/730550/download    Fact sheet for patients: https://www.fda.gov/media/233575/download    Test performed by PCR.            XR Chest 1 View    Result Date: 7/27/2024  XR CHEST 1 VW Date of Exam: 7/27/2024 3:41 PM EDT Indication: Weakness, dizziness, altered mental status Comparison: 1/15/2024. Findings: The heart size is normal. The pulmonary vascular markings are normal. The lungs and pleural spaces are clear of active disease. There are chronic age-related changes involving the bony thorax and thoracic aorta.     Impression: Impression: No active disease. Electronically Signed: Saul Carl MD  7/27/2024 4:03 PM EDT  Workstation ID: [id number]     Results for orders placed during the hospital encounter of 06/05/23    Adult Transthoracic Echo Complete w/ Color, Spectral and Contrast if necessary per protocol    Interpretation Summary    Left ventricular systolic function is normal. Left ventricular ejection fraction appears to be 56 - 60%.    Left ventricular diastolic function was normal.    Estimated right ventricular systolic pressure from tricuspid regurgitation is normal (<35 mmHg).      Assessment & Plan   Assessment & Plan       ARF (acute renal failure)    Hypothyroidism (acquired)    Personal history of pulmonary embolism    Chronic anticoagulation    Essential hypertension    Type 2 diabetes mellitus  without complication, without long-term current use of insulin    Atrial fibrillation    Diarrhea    89 y.o. female resident of [facility] assisted living with PMH significant for A-fib on Eliquis, DVT/PE, hypothyroid, HTN, DM2, essential tremor, who presents to the ED with complaint of weakness, diarrhea, nausea, and vomiting who was found to have concern for DISHA.    DISHA  - Baseline creatinine 0.5-0.6, currently 1.49  - suspect related to dehydration  - Gentle IV fluid, recheck in AM    Diarrhea  - GI panel pending  - Possibly related to increasing metformin  - Likely contributing to DISHA  - Probiotic  - Gentle IV fluid  - Monitor electrolytes, BMP, mag in the a.m.    Left heel pressure ulcer  - Wound consult in the a.m.    Hyperglycemia  Diabetes mellitus 2  - Uncontrolled with significant hyperglycemia that is likely contributing to dehydration  - Recently had metformin increased to twice daily  - Hold metformin and Januvia  - Increasing diarrhea since medication change  - start on glucommander  - Hemoglobin A1c    Chronic pancytopenia  - Currently at baseline, follows with [facility]  - CBC in the a.m.    A-fib  History DVT/PE  - Anticoagulated on Eliquis, continue for now  - Rate controlled, continue amiodarone    Hypertension  Dyslipidemia  - Hold lisinopril, furosemide secondary to DISHA  - Continue metoprolol    Hypothyroid  - Continue levothyroxine    DVT prophylaxis: On eliquis     CODE STATUS: Discussed with patient   Level Of Support Discussed With: Patient  Code Status (Patient has no pulse and is not breathing): CPR (Attempt to Resuscitate)  Medical Interventions (Patient has pulse or is breathing): Full Support      Expected Discharge  Expected Discharge Date: 7/30/2024; Expected Discharge Time:     This note has been completed as part of a split-shared workflow.     Signature: Electronically signed by CHANG Tran, 07/27/24, 9:21 PM EDT.        Attending   Admission Attestation       I have  performed an independent face-to-face diagnostic evaluation including performing an independent physical examination.  I approve of the documented plan of care above that was reviewed and developed with the advanced practice clinician (APC) and take responsibility for that plan along with its associated risks.  I have updated the HPI as appropriate.    Brief HPI  Ms. Robertson is an 89-year-old female at assisted living facility with history of A-fib on Eliquis, DVT/PE, hypothyroidism, type 2 diabetes who presented to the emergency room with complaints of weakness, nausea and vomiting.  Recently had her metformin dose increased has had some increasing diarrhea since that time.  Her last 3 days she is having nausea and also some vomiting.  Workup in the emergency room significant for glucose greater than 500, creatinine 1.5 well above her baseline.  Apparently recently prescribed Cipro for possible UTI (data deficit).  Currently she is feeling a bit better after some hydration.    Attending Physical Exam:  Temp:  [97.4 °F (36.3 °C)] 97.4 °F (36.3 °C)  Heart Rate:  [56-58] 57  Resp:  [18] 18  BP: (119-155)/() 125/42    Constitutional: sleeping but arouses, no distress, age appropriate  Eyes: PERRLA, sclerae anicteric, no conjunctival injection  HENT: NCAT, mucous membranes moist  Neck: Supple, no thyromegaly, no lymphadenopathy, trachea midline  Respiratory: Clear to auscultation bilaterally, nonlabored respirations   Cardiovascular: RRR, no murmurs, rubs, or gallops, palpable pedal pulses bilaterally  Gastrointestinal: Positive bowel sounds, soft, nontender, nondistended  Musculoskeletal: No bilateral ankle edema, no clubbing or cyanosis to extremities  Psychiatric: Appropriate affect, cooperative  Neurologic: Oriented x 3, no deficits  Skin: No rashes      Result Review:  I have personally reviewed the results from the time of this admission to 7/27/2024 22:57 EDT and agree with these findings:  [x]  Laboratory  list / accordion  []  Microbiology  [x]  Radiology  []  EKG/Telemetry   []  Cardiology/Vascular   []  Pathology  []  Old records  []  Other:    Assessment and Plan:    See assessment and plan documented by APC above and updated/edited by me as appropriate.    Chris Nicolas MD  07/27/24

## 2024-07-28 NOTE — CONSULTS
I visited this patient to discuss advance care planning. The patient told me that she has a living will at home. I encouraged her to bring it in and that we can easily scan it to her file. I also spoke to the patient's son and encouraged him to bring in the completed document.

## 2024-07-28 NOTE — PLAN OF CARE
Problem: Adult Inpatient Plan of Care  Goal: Readiness for Transition of Care  Intervention: Mutually Develop Transition Plan  Recent Flowsheet Documentation  Taken 7/27/2024 2131 by Karly Gerardo, RN  Transportation Anticipated: family or friend will provide  Patient/Family Anticipated Services at Transition:   rehabilitation services   skilled nursing  Patient/Family Anticipates Transition to: other (see comments)  Taken 7/27/2024 2126 by Karly Gerardo, RN  Equipment Currently Used at Home:   walker, rolling   commode   shower chair   Goal Outcome Evaluation:  Plan of Care Reviewed With: patient        Progress: no change  Outcome Evaluation: Pt arrived to floor. Skin interventions in place. Pt pleasant and resting at this time.

## 2024-07-29 ENCOUNTER — APPOINTMENT (OUTPATIENT)
Dept: GENERAL RADIOLOGY | Facility: HOSPITAL | Age: 89
End: 2024-07-29
Payer: MEDICARE

## 2024-07-29 PROBLEM — E44.0 MODERATE MALNUTRITION: Status: ACTIVE | Noted: 2024-07-29

## 2024-07-29 LAB
ANION GAP SERPL CALCULATED.3IONS-SCNC: 9 MMOL/L (ref 5–15)
BASOPHILS # BLD AUTO: 0 10*3/MM3 (ref 0–0.2)
BASOPHILS NFR BLD AUTO: 0 % (ref 0–1.5)
BUN SERPL-MCNC: 18 MG/DL (ref 8–23)
BUN/CREAT SERPL: 18.6 (ref 7–25)
CALCIUM SPEC-SCNC: 8.3 MG/DL (ref 8.6–10.5)
CHLORIDE SERPL-SCNC: 102 MMOL/L (ref 98–107)
CO2 SERPL-SCNC: 26 MMOL/L (ref 22–29)
CREAT SERPL-MCNC: 0.97 MG/DL (ref 0.57–1)
DEPRECATED RDW RBC AUTO: 46.5 FL (ref 37–54)
EGFRCR SERPLBLD CKD-EPI 2021: 56.3 ML/MIN/1.73
EOSINOPHIL # BLD AUTO: 0 10*3/MM3 (ref 0–0.4)
EOSINOPHIL NFR BLD AUTO: 0 % (ref 0.3–6.2)
ERYTHROCYTE [DISTWIDTH] IN BLOOD BY AUTOMATED COUNT: 14.4 % (ref 12.3–15.4)
GLUCOSE BLDC GLUCOMTR-MCNC: 157 MG/DL (ref 70–130)
GLUCOSE BLDC GLUCOMTR-MCNC: 261 MG/DL (ref 70–130)
GLUCOSE BLDC GLUCOMTR-MCNC: 293 MG/DL (ref 70–130)
GLUCOSE BLDC GLUCOMTR-MCNC: 315 MG/DL (ref 70–130)
GLUCOSE BLDC GLUCOMTR-MCNC: 409 MG/DL (ref 70–130)
GLUCOSE SERPL-MCNC: 143 MG/DL (ref 65–99)
HCT VFR BLD AUTO: 27.2 % (ref 34–46.6)
HGB BLD-MCNC: 9.2 G/DL (ref 12–15.9)
IMM GRANULOCYTES # BLD AUTO: 0.01 10*3/MM3 (ref 0–0.05)
IMM GRANULOCYTES NFR BLD AUTO: 0.6 % (ref 0–0.5)
LYMPHOCYTES # BLD AUTO: 0.45 10*3/MM3 (ref 0.7–3.1)
LYMPHOCYTES NFR BLD AUTO: 28.8 % (ref 19.6–45.3)
MCH RBC QN AUTO: 30.4 PG (ref 26.6–33)
MCHC RBC AUTO-ENTMCNC: 33.8 G/DL (ref 31.5–35.7)
MCV RBC AUTO: 89.8 FL (ref 79–97)
MONOCYTES # BLD AUTO: 0.19 10*3/MM3 (ref 0.1–0.9)
MONOCYTES NFR BLD AUTO: 12.2 % (ref 5–12)
NEUTROPHILS NFR BLD AUTO: 0.91 10*3/MM3 (ref 1.7–7)
NEUTROPHILS NFR BLD AUTO: 58.4 % (ref 42.7–76)
NRBC BLD AUTO-RTO: 0 /100 WBC (ref 0–0.2)
PLATELET # BLD AUTO: 53 10*3/MM3 (ref 140–450)
PMV BLD AUTO: 9.2 FL (ref 6–12)
POTASSIUM SERPL-SCNC: 3.6 MMOL/L (ref 3.5–5.2)
RBC # BLD AUTO: 3.03 10*6/MM3 (ref 3.77–5.28)
SODIUM SERPL-SCNC: 137 MMOL/L (ref 136–145)
WBC NRBC COR # BLD AUTO: 1.56 10*3/MM3 (ref 3.4–10.8)

## 2024-07-29 PROCEDURE — 97161 PT EVAL LOW COMPLEX 20 MIN: CPT

## 2024-07-29 PROCEDURE — 74230 X-RAY XM SWLNG FUNCJ C+: CPT

## 2024-07-29 PROCEDURE — 92611 MOTION FLUOROSCOPY/SWALLOW: CPT | Performed by: SPEECH-LANGUAGE PATHOLOGIST

## 2024-07-29 PROCEDURE — 63710000001 INSULIN LISPRO (HUMAN) PER 5 UNITS: Performed by: INTERNAL MEDICINE

## 2024-07-29 PROCEDURE — 80048 BASIC METABOLIC PNL TOTAL CA: CPT | Performed by: INTERNAL MEDICINE

## 2024-07-29 PROCEDURE — 82948 REAGENT STRIP/BLOOD GLUCOSE: CPT

## 2024-07-29 PROCEDURE — 97165 OT EVAL LOW COMPLEX 30 MIN: CPT

## 2024-07-29 PROCEDURE — 97535 SELF CARE MNGMENT TRAINING: CPT

## 2024-07-29 PROCEDURE — 63710000001 INSULIN GLARGINE PER 5 UNITS: Performed by: INTERNAL MEDICINE

## 2024-07-29 PROCEDURE — 85025 COMPLETE CBC W/AUTO DIFF WBC: CPT | Performed by: INTERNAL MEDICINE

## 2024-07-29 PROCEDURE — 99232 SBSQ HOSP IP/OBS MODERATE 35: CPT | Performed by: INTERNAL MEDICINE

## 2024-07-29 RX ORDER — CASTOR OIL AND BALSAM, PERU 788; 87 MG/G; MG/G
1 OINTMENT TOPICAL EVERY 12 HOURS SCHEDULED
Status: DISCONTINUED | OUTPATIENT
Start: 2024-07-29 | End: 2024-08-06 | Stop reason: HOSPADM

## 2024-07-29 RX ADMIN — Medication 1 APPLICATION: at 14:19

## 2024-07-29 RX ADMIN — LEVOTHYROXINE SODIUM 100 MCG: 0.1 TABLET ORAL at 06:34

## 2024-07-29 RX ADMIN — Medication 10 ML: at 21:06

## 2024-07-29 RX ADMIN — Medication 1 CAPSULE: at 08:00

## 2024-07-29 RX ADMIN — APIXABAN 2.5 MG: 2.5 TABLET, FILM COATED ORAL at 20:50

## 2024-07-29 RX ADMIN — ACETAMINOPHEN 650 MG: 325 TABLET ORAL at 11:05

## 2024-07-29 RX ADMIN — BARIUM SULFATE 100 ML: 0.81 POWDER, FOR SUSPENSION ORAL at 13:44

## 2024-07-29 RX ADMIN — AMIODARONE HYDROCHLORIDE 200 MG: 200 TABLET ORAL at 08:00

## 2024-07-29 RX ADMIN — INSULIN GLARGINE 14 UNITS: 100 INJECTION, SOLUTION SUBCUTANEOUS at 21:05

## 2024-07-29 RX ADMIN — BARIUM SULFATE 20 ML: 400 PASTE ORAL at 13:44

## 2024-07-29 RX ADMIN — INSULIN LISPRO 8 UNITS: 100 INJECTION, SOLUTION INTRAVENOUS; SUBCUTANEOUS at 16:45

## 2024-07-29 RX ADMIN — BARIUM SULFATE 10 ML: 400 SUSPENSION ORAL at 13:44

## 2024-07-29 RX ADMIN — INSULIN LISPRO 3 UNITS: 100 INJECTION, SOLUTION INTRAVENOUS; SUBCUTANEOUS at 21:03

## 2024-07-29 RX ADMIN — APIXABAN 2.5 MG: 2.5 TABLET, FILM COATED ORAL at 08:00

## 2024-07-29 RX ADMIN — METOPROLOL TARTRATE 50 MG: 50 TABLET, FILM COATED ORAL at 08:00

## 2024-07-29 RX ADMIN — INSULIN LISPRO 3 UNITS: 100 INJECTION, SOLUTION INTRAVENOUS; SUBCUTANEOUS at 07:59

## 2024-07-29 RX ADMIN — Medication 1 APPLICATION: at 21:02

## 2024-07-29 RX ADMIN — INSULIN LISPRO 5 UNITS: 100 INJECTION, SOLUTION INTRAVENOUS; SUBCUTANEOUS at 12:11

## 2024-07-29 RX ADMIN — METOPROLOL TARTRATE 50 MG: 50 TABLET, FILM COATED ORAL at 20:46

## 2024-07-29 RX ADMIN — PANTOPRAZOLE SODIUM 40 MG: 40 TABLET, DELAYED RELEASE ORAL at 11:06

## 2024-07-29 RX ADMIN — Medication 10 ML: at 08:00

## 2024-07-29 RX ADMIN — BARIUM SULFATE 50 ML: 400 SUSPENSION ORAL at 13:44

## 2024-07-29 NOTE — PAYOR COMM NOTE
"Elisa Venegas (89 y.o. Female)       Date of Birth   1935    Social Security Number       Address   [address] [place] [address]    Home Phone   [phone]    MRN   [id number]       Yarsani   [employer]    Marital Status                               Admission Date   7/27/24    Admission Type   Emergency    Admitting Provider   Maksim Freeman MD    Attending Provider   Maksim Freeman MD    Department, Room/Bed   [facility] 4G, S448/1       Discharge Date       Discharge Disposition       Discharge Destination                                 Attending Provider: Maksim Freeman MD    Allergies: Aspirin    Isolation: None   Infection: None   Code Status: CPR    Ht: 160 cm (63\")   Wt: 51.9 kg (114 lb 8 oz)    Admission Cmt: None   Principal Problem: ARF (acute renal failure) [N17.9]                   Active Insurance as of 7/27/2024       Primary Coverage       Payor Plan Insurance Group Employer/Plan Group    ANTHEM MEDICARE REPLACEMENT ANTHEM MEDICARE ADVANTAGE [id number]       Payor Plan Address Payor Plan Phone Number Payor Plan Fax Number Effective Dates    [address] [phone]  1/1/2024 - None Entered    [facility] [address]         Subscriber Name Subscriber Birth Date Member ID       VENEGASELISA 1935 [id number]                     Emergency Contacts        (Rel.) Home Phone Work Phone Mobile Phone    Joe Venegas (Son) -- -- [phone]    Albin Venegas (Son) [phone] -- --    DORIS VENEGAS (Relative) -- -- [phone]              [place]: NPI [id number] Tax ID [id number]  Insurance Information                  ANTHEM MEDICARE REPLACEMENT/ANTHEM MEDICARE ADVANTAGE Phone: [phone]    Subscriber: Elisa Venegas Subscriber#: [id number]    Group#: [id number] Precert#: --             History & Physical        Dossett, Chris MAC MD at 07/27/24 2050              [facility] Medicine " Services  HISTORY AND PHYSICAL    Patient Name: Chey Robertson  : 1935  MRN: [id number]  Primary Care Physician: Yasmeen Loomis MD  Date of admission: 2024    Subjective   Subjective     Chief Complaint:  Weakness, diarrhea, nausea, vomiting     HPI:  Chey Robertson is a 89 y.o. female resident of [facility] living with PMH significant for A-fib on Eliquis, DVT/PE, hypothyroid, HTN, DM2, essential tremor, who presents to the ED with complaint of weakness, diarrhea, nausea, and vomiting.  Family at bedside helps provide HPI.  Family notes that approximately 3 weeks ago her metformin dose was increased from daily to twice daily.  Since that time, the patient reports  having progressive diarrhea.  Over the past week she has been having increasing weakness and decreased appetite.  Over the past 3 days she has been having nausea with associated vomiting.  She denies fever, chest pain, shortness of breath.  She reports having chronic cough that has not changed.  She denies abdominal pain.  She does admit to having mild dizziness.  Family notes that she was evaluated per PCP yesterday who prescribed her Cipro for presumed UTI.  Upon arrival to the ED, labs are concerning for hyperglycemia, DISHA, dehydration.  She is also noted to have pancytopenia.  UA is negative.  CXR is negative.  She will be admitted to hospital medicine for further evaluation.    Review of Systems   Constitutional:  Positive for activity change, appetite change, fatigue and unexpected weight change. Negative for chills, diaphoresis and fever.   HENT: Negative.  Negative for congestion, sinus pressure, sore throat and trouble swallowing.    Eyes: Negative.  Negative for visual disturbance.   Respiratory:  Positive for cough. Negative for chest tightness, shortness of breath and wheezing.    Cardiovascular: Negative.  Negative for chest pain, palpitations and leg swelling.   Gastrointestinal:  Positive for diarrhea, nausea  and vomiting. Negative for abdominal distention, abdominal pain and constipation.   Endocrine: Negative.  Negative for polydipsia and polyphagia.   Genitourinary:  Negative for decreased urine volume, difficulty urinating, dysuria, frequency and urgency.   Musculoskeletal: Negative.  Negative for arthralgias, back pain, gait problem, myalgias and neck pain.   Skin:  Positive for wound. Negative for color change, pallor and rash.   Allergic/Immunologic: Negative.  Negative for immunocompromised state.   Neurological:  Positive for dizziness, tremors and weakness. Negative for seizures, syncope, facial asymmetry, speech difficulty, light-headedness, numbness and headaches.   Hematological: Negative.  Does not bruise/bleed easily.   Psychiatric/Behavioral:  Negative for agitation and confusion. The patient is not nervous/anxious.         Personal History     Past Medical History:   Diagnosis Date    A-fib     on eliquis    Anemia     Arthritis     Diabetes mellitus     checks sugar only when pt wants to     Disease of thyroid gland     History of transfusion     with knee surgery-  no reaction recalled.     [place] (hard of hearing)     no hearing aids    Hypertension     Migraine without aura and without status migrainosus, not intractable 12/30/2016    Myelodysplastic syndrome     Pulmonary emboli 2011    (after knee surgery)    SOBOE (shortness of breath on exertion)     Tremors of nervous system     Vertigo     Wears dentures     upper     Wears glasses          Oncology Problem List:  Myelodysplasia (myelodysplastic syndrome) (03/14/2023; Status: Active)    Oncology/Hematology History       Past Surgical History:   Procedure Laterality Date    BLADDER SURGERY      CATARACT EXTRACTION      bilat     CHOLECYSTECTOMY      COLONOSCOPY      HIP TROCHANTERIC NAILING WITH INTRAMEDULLARY HIP SCREW Right 6/20/2023    Procedure: HIP TROCHANTERIC NAILING WITH INTRAMEDULLARY HIP SCREW RIGHT;  Surgeon: Augie Hobbs MD;   Location:  [facility];  Service: Orthopedics;  Laterality: Right;    HYSTERECTOMY      with bso    KYPHOPLASTY N/A 02/12/2021    Procedure: KYPHOPLASTY T11, T12 AND L4;  Surgeon: Matty Hearn MD;  [facility];  Service: Orthopedic Spine;  Laterality: N/A;    TONSILLECTOMY         Family History:  family history includes Breast cancer (age of onset: 84) in her sister; Heart attack in her father; Stroke in her mother.     Social History:  reports that she has never smoked. She has never been exposed to tobacco smoke. She has never used smokeless tobacco. She reports that she does not drink alcohol and does not use drugs.  Social History     Social History Narrative    Not on file       Medications:  Acidophilus Extra Strength, HYDROcodone-acetaminophen, Insulin Aspart, SITagliptin, acetaminophen, amiodarone, apixaban, ascorbic acid, furosemide, insulin NPH-insulin regular, levothyroxine, lisinopril, melatonin, metFORMIN, metoprolol tartrate, naloxone, ondansetron ODT, pantoprazole, polyethylene glycol, potassium chloride, sucralfate, traMADol, and zinc sulfate    Allergies   Allergen Reactions    Aspirin Unknown - Low Severity     Mouth sores        Objective   Objective     Vital Signs:   Temp:  [97.4 °F (36.3 °C)] 97.4 °F (36.3 °C)  Heart Rate:  [56-58] 58  Resp:  [18] 18  BP: (119-155)/() 130/54    Physical Exam   Constitutional: Awake, alert, no acute distress   Eyes: PERRLA, sclerae anicteric, no conjunctival injection  HENT: NCAT, mucous membranes moist  Neck: Supple, no thyromegaly, no lymphadenopathy, trachea midline  Respiratory: Clear to auscultation bilaterally, nonlabored respirations   Cardiovascular: RRR, no murmurs, rubs, or gallops, palpable pedal pulses bilaterally  Gastrointestinal: Positive bowel sounds, soft, nontender, nondistended  Musculoskeletal: No bilateral ankle edema, no clubbing or cyanosis to extremities  Psychiatric: Appropriate affect, cooperative  Neurologic: Oriented x  3, strength symmetric in all extremities, Cranial Nerves grossly intact to confrontation, speech clear  Skin: No rashes, pressure ulcer left heel, wound right lower shin       Result Review:  I have personally reviewed the results from the time of this admission to 7/27/2024 21:21 EDT and agree with these findings:  [x]  Laboratory list / accordion  [x]  Microbiology  [x]  Radiology  []  EKG/Telemetry   []  Cardiology/Vascular   []  Pathology  [x]  Old records  []  Other:  Most notable findings include:     LAB RESULTS:      Lab 07/27/24  1449   WBC 2.01*   HEMOGLOBIN 10.9*   HEMATOCRIT 32.1*   PLATELETS 54*   NEUTROS ABS 1.72   IMMATURE GRANS (ABS) 0.03   LYMPHS ABS 0.19*   MONOS ABS 0.07*   EOS ABS 0.00   MCV 90.2   CRP <0.30   PROCALCITONIN <0.02         Lab 07/27/24  1449   SODIUM 135*   POTASSIUM 4.6   CHLORIDE 91*   CO2 26.0   ANION GAP 18.0*   BUN 26*   CREATININE 1.49*   EGFR 33.6*   GLUCOSE 502*   CALCIUM 9.4   MAGNESIUM 1.6   TSH 1.650         Lab 07/27/24  1449   TOTAL PROTEIN 7.0   ALBUMIN 4.4   GLOBULIN 2.6   ALT (SGPT) 7   AST (SGOT) 12   BILIRUBIN 0.6   ALK PHOS 102         Lab 07/27/24  1449   HSTROP T 16*                 Brief Urine Lab Results  (Last result in the past 365 days)        Color   Clarity   Blood   Leuk Est   Nitrite   Protein   CREAT   Urine HCG        07/27/24 1613 Yellow   Clear   Negative   Negative   Negative   Negative                 Microbiology Results (last 10 days)       Procedure Component Value - Date/Time    COVID PRE-OP / PRE-PROCEDURE SCREENING ORDER (NO ISOLATION) - Swab, Nasopharynx [[id number]]  (Normal) Collected: 07/27/24 2044    Lab Status: Final result Specimen: Swab from Nasopharynx Updated: 07/27/24 2116    Narrative:      The following orders were created for panel order COVID PRE-OP / PRE-PROCEDURE SCREENING ORDER (NO ISOLATION) - Swab, Nasopharynx.  Procedure                               Abnormality         Status                     ---------                                -----------         ------                     COVID-19 and FLU A/B PCR...[[id number]]  Normal              Final result                 Please view results for these tests on the individual orders.    COVID-19 and FLU A/B PCR, 1 HR TAT - Swab, Nasopharynx [[id number]]  (Normal) Collected: 07/27/24 2044    Lab Status: Final result Specimen: Swab from Nasopharynx Updated: 07/27/24 2116     COVID19 Not Detected     Influenza A PCR Not Detected     Influenza B PCR Not Detected    Narrative:      Fact sheet for providers: https://www.fda.gov/media/922558/download    Fact sheet for patients: https://www.fda.gov/media/356309/download    Test performed by PCR.            XR Chest 1 View    Result Date: 7/27/2024  XR CHEST 1 VW Date of Exam: 7/27/2024 3:41 PM EDT Indication: Weakness, dizziness, altered mental status Comparison: 1/15/2024. Findings: The heart size is normal. The pulmonary vascular markings are normal. The lungs and pleural spaces are clear of active disease. There are chronic age-related changes involving the bony thorax and thoracic aorta.     Impression: Impression: No active disease. Electronically Signed: Saul Carl MD  7/27/2024 4:03 PM EDT  Workstation ID: [id number]     Results for orders placed during the hospital encounter of 06/05/23    Adult Transthoracic Echo Complete w/ Color, Spectral and Contrast if necessary per protocol    Interpretation Summary    Left ventricular systolic function is normal. Left ventricular ejection fraction appears to be 56 - 60%.    Left ventricular diastolic function was normal.    Estimated right ventricular systolic pressure from tricuspid regurgitation is normal (<35 mmHg).      Assessment & Plan   Assessment & Plan       ARF (acute renal failure)    Hypothyroidism (acquired)    Personal history of pulmonary embolism    Chronic anticoagulation    Essential hypertension    Type 2 diabetes mellitus without complication, without long-term current use of  insulin    Atrial fibrillation    Diarrhea    89 y.o. female resident of [facility] living with PMH significant for A-fib on Eliquis, DVT/PE, hypothyroid, HTN, DM2, essential tremor, who presents to the ED with complaint of weakness, diarrhea, nausea, and vomiting who was found to have concern for DISHA.    DISHA  - Baseline creatinine 0.5-0.6, currently 1.49  - suspect related to dehydration  - Gentle IV fluid, recheck in AM    Diarrhea  - GI panel pending  - Possibly related to increasing metformin  - Likely contributing to DISHA  - Probiotic  - Gentle IV fluid  - Monitor electrolytes, BMP, mag in the a.m.    Left heel pressure ulcer  - Wound consult in the a.m.    Hyperglycemia  Diabetes mellitus 2  - Uncontrolled with significant hyperglycemia that is likely contributing to dehydration  - Recently had metformin increased to twice daily  - Hold metformin and Januvia  - Increasing diarrhea since medication change  - start on glucommander  - Hemoglobin A1c    Chronic pancytopenia  - Currently at baseline, follows with [facility]  - CBC in the a.m.    A-fib  History DVT/PE  - Anticoagulated on Eliquis, continue for now  - Rate controlled, continue amiodarone    Hypertension  Dyslipidemia  - Hold lisinopril, furosemide secondary to DISHA  - Continue metoprolol    Hypothyroid  - Continue levothyroxine    DVT prophylaxis: On eliquis     CODE STATUS: Discussed with patient   Level Of Support Discussed With: Patient  Code Status (Patient has no pulse and is not breathing): CPR (Attempt to Resuscitate)  Medical Interventions (Patient has pulse or is breathing): Full Support      Expected Discharge  Expected Discharge Date: 7/30/2024; Expected Discharge Time:     This note has been completed as part of a split-shared workflow.     Signature: Electronically signed by CHANG Tran, 07/27/24, 9:21 PM EDT.        Attending   Admission Attestation       I have performed an independent face-to-face diagnostic  evaluation including performing an independent physical examination.  I approve of the documented plan of care above that was reviewed and developed with the advanced practice clinician (APC) and take responsibility for that plan along with its associated risks.  I have updated the HPI as appropriate.    Brief HPI  Ms. Robertson is an 89-year-old female at assisted living facility with history of A-fib on Eliquis, DVT/PE, hypothyroidism, type 2 diabetes who presented to the emergency room with complaints of weakness, nausea and vomiting.  Recently had her metformin dose increased has had some increasing diarrhea since that time.  Her last 3 days she is having nausea and also some vomiting.  Workup in the emergency room significant for glucose greater than 500, creatinine 1.5 well above her baseline.  Apparently recently prescribed Cipro for possible UTI (data deficit).  Currently she is feeling a bit better after some hydration.    Attending Physical Exam:  Temp:  [97.4 °F (36.3 °C)] 97.4 °F (36.3 °C)  Heart Rate:  [56-58] 57  Resp:  [18] 18  BP: (119-155)/() 125/42    Constitutional: sleeping but arouses, no distress, age appropriate  Eyes: PERRLA, sclerae anicteric, no conjunctival injection  HENT: NCAT, mucous membranes moist  Neck: Supple, no thyromegaly, no lymphadenopathy, trachea midline  Respiratory: Clear to auscultation bilaterally, nonlabored respirations   Cardiovascular: RRR, no murmurs, rubs, or gallops, palpable pedal pulses bilaterally  Gastrointestinal: Positive bowel sounds, soft, nontender, nondistended  Musculoskeletal: No bilateral ankle edema, no clubbing or cyanosis to extremities  Psychiatric: Appropriate affect, cooperative  Neurologic: Oriented x 3, no deficits  Skin: No rashes      Result Review:  I have personally reviewed the results from the time of this admission to 7/27/2024 22:57 EDT and agree with these findings:  [x]  Laboratory list / accordion  []  Microbiology  [x]   Radiology  []  EKG/Telemetry   []  Cardiology/Vascular   []  Pathology  []  Old records  []  Other:    Assessment and Plan:    See assessment and plan documented by APC above and updated/edited by me as appropriate.    Chris Nicolas MD  07/27/24                        Electronically signed by Chris Nicolas MD at 07/27/24 6638       Current Facility-Administered Medications   Medication Dose Route Frequency Provider Last Rate Last Admin    acetaminophen (TYLENOL) tablet 650 mg  650 mg Oral Q4H PRN Emily Tamayo APRN   650 mg at 07/29/24 1105    amiodarone (PACERONE) tablet 200 mg  200 mg Oral Daily Emily Tamayo APRN   200 mg at 07/29/24 0800    apixaban (ELIQUIS) tablet 2.5 mg  2.5 mg Oral BID Emily Tamayo APRN   2.5 mg at 07/29/24 0800    dextrose (D50W) (25 g/50 mL) IV injection 10-50 mL  10-50 mL Intravenous Q15 Min PRN Chris Nicolas MD        dextrose (GLUTOSE) oral gel 15 g  15 g Oral Q15 Min PRN Chris Nicolas MD        glucagon (GLUCAGEN) injection 1 mg  1 mg Intramuscular Q15 Min PRN Chris Nicolas MD        insulin glargine (LANTUS, SEMGLEE) injection 1-200 Units  1-200 Units Subcutaneous Nightly - Glucommander Chris Nicolas MD   14 Units at 07/28/24 2041    insulin lispro (humaLOG) injection 1-200 Units  1-200 Units Subcutaneous 4x Daily With Meals & Nightly Chris Nicolas MD   3 Units at 07/29/24 0759    insulin lispro (humaLOG) injection 1-200 Units  1-200 Units Subcutaneous PRN Chris Nicolas MD   5 Units at 07/28/24 1638    lactobacillus acidophilus (RISAQUAD) capsule 1 capsule  1 capsule Oral Daily Emily Tamayo APRN   1 capsule at 07/29/24 0800    levothyroxine (SYNTHROID, LEVOTHROID) tablet 100 mcg  100 mcg Oral Q AM Emily Tamayo APRN   100 mcg at 07/29/24 0634    melatonin tablet 5 mg  5 mg Oral Nightly PRN Emily Tamayo APRN        metoprolol tartrate (LOPRESSOR) tablet 50 mg  50 mg Oral BID Emily Tamayo APRN   50 mg at 07/29/24 0800    pantoprazole  (PROTONIX) EC tablet 40 mg  40 mg Oral Daily Gisella Alcantara, PharmD   40 mg at 07/29/24 1106    sodium chloride 0.9 % flush 10 mL  10 mL Intravenous PRN Adam Galvez DO        sodium chloride 0.9 % flush 10 mL  10 mL Intravenous Q12H Roni, Emily R, APRN   10 mL at 07/29/24 0800    sodium chloride 0.9 % flush 10 mL  10 mL Intravenous PRN Tamayo, Emily R, APRN        sodium chloride 0.9 % infusion 40 mL  40 mL Intravenous PRN Tamayo, Emily R, APRN        traMADol (ULTRAM) tablet 50 mg  50 mg Oral Q12H PRN Tamayo, Emily R, APRN         Lab Results (last 24 hours)       Procedure Component Value Units Date/Time    Basic Metabolic Panel [[id number]]  (Abnormal) Collected: 07/29/24 0708    Specimen: Blood Updated: 07/29/24 0736     Glucose 143 mg/dL      BUN 18 mg/dL      Creatinine 0.97 mg/dL      Sodium 137 mmol/L      Potassium 3.6 mmol/L      Chloride 102 mmol/L      CO2 26.0 mmol/L      Calcium 8.3 mg/dL      BUN/Creatinine Ratio 18.6     Anion Gap 9.0 mmol/L      eGFR 56.3 mL/min/1.73     Narrative:      GFR Normal >60  Chronic Kidney Disease <60  Kidney Failure <15    The GFR formula is only valid for adults with stable renal function between ages 18 and 70.    POC Glucose Once [[id number]]  (Abnormal) Collected: 07/29/24 0713    Specimen: Blood Updated: 07/29/24 0729     Glucose 157 mg/dL     CBC & Differential [[id number]]  (Abnormal) Collected: 07/29/24 0708    Specimen: Blood Updated: 07/29/24 0728    Narrative:      The following orders were created for panel order CBC & Differential.  Procedure                               Abnormality         Status                     ---------                               -----------         ------                     CBC Auto Differential[[id number]]        Abnormal            Final result                 Please view results for these tests on the individual orders.    CBC Auto Differential [[id number]]  (Abnormal) Collected: 07/29/24 0708    Specimen: Blood Updated:  07/29/24 0728     WBC 1.56 10*3/mm3      RBC 3.03 10*6/mm3      Hemoglobin 9.2 g/dL      Hematocrit 27.2 %      MCV 89.8 fL      MCH 30.4 pg      MCHC 33.8 g/dL      RDW 14.4 %      RDW-SD 46.5 fl      MPV 9.2 fL      Platelets 53 10*3/mm3      Neutrophil % 58.4 %      Lymphocyte % 28.8 %      Monocyte % 12.2 %      Eosinophil % 0.0 %      Basophil % 0.0 %      Immature Grans % 0.6 %      Neutrophils, Absolute 0.91 10*3/mm3      Lymphocytes, Absolute 0.45 10*3/mm3      Monocytes, Absolute 0.19 10*3/mm3      Eosinophils, Absolute 0.00 10*3/mm3      Basophils, Absolute 0.00 10*3/mm3      Immature Grans, Absolute 0.01 10*3/mm3      nRBC 0.0 /100 WBC     POC Glucose Once [[id number]]  (Abnormal) Collected: 07/28/24 2019    Specimen: Blood Updated: 07/28/24 2020     Glucose 308 mg/dL     POC Glucose Once [[id number]]  (Abnormal) Collected: 07/28/24 1625    Specimen: Blood Updated: 07/28/24 1628     Glucose 304 mg/dL     Creatinine Urine Random (kidney function) GFR component - Urine, Clean Catch [[id number]] Collected: 07/27/24 1613    Specimen: Urine, Clean Catch Updated: 07/28/24 1234     Creatinine, Urine 24.1 mg/dL     Narrative:      Reference intervals for random urine have not been established.  Clinical usage is dependent upon physician's interpretation in combination with other laboratory tests.             Imaging Results (Last 24 Hours)       ** No results found for the last 24 hours. **          Orders (last 24 hrs)        Start     Ordered    07/29/24 2049  Auto Discontinue GI Panel in 48 Hours if not Collected  ONCE GI PANEL         07/27/24 2048    07/29/24 1100  pantoprazole (PROTONIX) EC tablet 40 mg  Daily at 1100         07/28/24 1203    07/29/24 0730  POC Glucose Once  PROCEDURE ONCE        Comments: Complete no more than 45 minutes prior to patient eating      07/29/24 0713    07/29/24 0600  CBC & Differential  Morning Draw         07/28/24 1906    07/29/24 0600  Basic Metabolic Panel  Morning Draw          07/28/24 1906 07/29/24 0600  CBC Auto Differential  PROCEDURE ONCE         07/28/24 2202 07/29/24 0000  FL Video Swallow With Speech Single Contrast  1 Time Imaging         07/28/24 0929 07/29/24 0000  SLP Video Swallow  Once         07/28/24 0929 07/28/24 2021  POC Glucose Once  PROCEDURE ONCE        Comments: Complete no more than 45 minutes prior to patient eating      07/28/24 2019    07/28/24 2000  sodium chloride 0.9 % infusion  Continuous         07/28/24 1906 07/28/24 1629  POC Glucose Once  PROCEDURE ONCE        Comments: Complete no more than 45 minutes prior to patient eating      07/28/24 1625 07/28/24 1144  Inpatient Nutrition Consult  Once        Provider:  (Not yet assigned)    07/28/24 1144    07/28/24 1144  Inpatient Diabetes Educator Consult  Once        Provider:  (Not yet assigned)    07/28/24 1144    07/28/24 0900  amiodarone (PACERONE) tablet 200 mg  Daily         07/27/24 2145 07/28/24 0900  lactobacillus acidophilus (RISAQUAD) capsule 1 capsule  Daily         07/27/24 2145 07/28/24 0800  Oral Care  2 Times Daily       07/27/24 2145 07/28/24 0800  insulin lispro (humaLOG) injection 1-200 Units  4 Times Daily With Meals & Nightly         07/27/24 2219 07/28/24 0700  POC Glucose 4x Daily Before Meals & at Bedtime  4 Times Daily Before Meals & at Bedtime      Comments: Complete no more than 45 minutes prior to patient eating      07/27/24 2219 07/28/24 0600  levothyroxine (SYNTHROID, LEVOTHROID) tablet 100 mcg  Every Early Morning         07/27/24 2145 07/28/24 0600  pantoprazole (PROTONIX) EC tablet 40 mg  Every Early Morning,   Status:  Discontinued         07/27/24 2145 07/28/24 0000  Vital Signs  Every 4 Hours       07/27/24 2145 07/27/24 2315  insulin glargine (LANTUS, SEMGLEE) injection 1-200 Units  Nightly - Glucommander         07/27/24 2219 07/27/24 2245  apixaban (ELIQUIS) tablet 2.5 mg  2 Times Daily         07/27/24 2145 07/27/24 8940   metoprolol tartrate (LOPRESSOR) tablet 50 mg  2 Times Daily         07/27/24 2145 07/27/24 2245  sodium chloride 0.9 % flush 10 mL  Every 12 Hours Scheduled         07/27/24 2145 07/27/24 2218  insulin lispro (humaLOG) injection 1-200 Units  As Needed         07/27/24 2219 07/27/24 2218  dextrose (GLUTOSE) oral gel 15 g  Every 15 Minutes PRN         07/27/24 2219 07/27/24 2218  dextrose (D50W) (25 g/50 mL) IV injection 10-50 mL  Every 15 Minutes PRN         07/27/24 2219 07/27/24 2218  glucagon (GLUCAGEN) injection 1 mg  Every 15 Minutes PRN         07/27/24 2219 07/27/24 2200  POC Glucose 4x Daily Before Meals & at Bedtime  4 Times Daily Before Meals & at Bedtime,   Status:  Canceled      Comments: Complete no more than 45 minutes prior to patient eating      07/27/24 2145 07/27/24 2200  Strict Intake & Output  Every Hour       07/27/24 2145 07/27/24 2146  Intake & Output  Every Shift       07/27/24 2145 07/27/24 2146  Daily Weights  Daily       07/27/24 2145 07/27/24 2145  traMADol (ULTRAM) tablet 50 mg  Every 12 Hours PRN         07/27/24 2145 07/27/24 2145  sodium chloride 0.9 % flush 10 mL  As Needed         07/27/24 2145 07/27/24 2145  sodium chloride 0.9 % infusion 40 mL  As Needed         07/27/24 2145 07/27/24 2145  acetaminophen (TYLENOL) tablet 650 mg  Every 4 Hours PRN         07/27/24 2145 07/27/24 2145  melatonin tablet 5 mg  Nightly PRN         07/27/24 2145 07/27/24 1357  sodium chloride 0.9 % flush 10 mL  As Needed         07/27/24 1357    Unscheduled  Oxygen Therapy- Nasal Cannula; Titrate 1-6 LPM Per SpO2; 90 - 95%  Continuous PRN       07/27/24 1357    Unscheduled  Up With Assistance  As Needed       07/27/24 2145    Unscheduled  POC Glucose PRN  As Needed      Comments: Complete no more than 45 minutes prior to patient eating      07/27/24 2219    Unscheduled  Treat Hypoglycemia As Recommended By Glucommander™ & Notify Provider of Treatment  As  Needed      Comments: Follow Hypoglycemia Orders As Outlined in Process Instructions (Open Order Report to View Full Instructions)  Notify Provider Any Time Hypoglycemia Treatment is Administered    [id number]                     Physician Progress Notes (last 24 hours)        Maksim Freeman MD at 24 1834              [facility] Medicine Services  PROGRESS NOTE    Patient Name: Chey Robertson  : 1935  MRN: [id number]    Date of Admission: 2024  Primary Care Physician: Yasmeen Loomis MD    Subjective   Subjective     CC:  weakness    HPI:  Feels hungry.  Denies diarrhea      Objective   Objective     Vital Signs:   Temp:  [97.5 °F (36.4 °C)-98 °F (36.7 °C)] 98 °F (36.7 °C)  Heart Rate:  [53-63] 59  Resp:  [14-16] 16  BP: (121-149)/(42-75) 129/58     Physical Exam:  Frail, in bed  MM moist  RRR  Breath sounds grossly clear  Normal affect  Awake, speech clear    Results Reviewed:  LAB RESULTS:      Lab 24  0548 24  1449   WBC 1.50* 2.01*   HEMOGLOBIN 9.4* 10.9*   HEMATOCRIT 27.1* 32.1*   PLATELETS 56* 54*   NEUTROS ABS 0.83* 1.72   IMMATURE GRANS (ABS) 0.04 0.03   LYMPHS ABS 0.47* 0.19*   MONOS ABS 0.16 0.07*   EOS ABS 0.00 0.00   MCV 89.4 90.2   CRP  --  <0.30   PROCALCITONIN  --  <0.02         Lab 24  0547 24  1449   SODIUM 140 135*   POTASSIUM 3.5 4.6   CHLORIDE 100 91*   CO2 30.0* 26.0   ANION GAP 10.0 18.0*   BUN 26* 26*   CREATININE 1.12* 1.49*   EGFR 47.4* 33.6*   GLUCOSE 197* 502*   CALCIUM 8.7 9.4   MAGNESIUM 1.7 1.6   PHOSPHORUS 3.7  --    HEMOGLOBIN A1C 9.80*  --    TSH  --  1.650         Lab 24  1449   TOTAL PROTEIN 7.0   ALBUMIN 4.4   GLOBULIN 2.6   ALT (SGPT) 7   AST (SGOT) 12   BILIRUBIN 0.6   ALK PHOS 102         Lab 24  1449   HSTROP T 16*                 Brief Urine Lab Results  (Last result in the past 365 days)        Color   Clarity   Blood   Leuk Est   Nitrite   Protein   CREAT   Urine HCG        24 1613              24.1         07/27/24 1613 Yellow   Clear   Negative   Negative   Negative   Negative                   Microbiology Results Abnormal       Procedure Component Value - Date/Time    Eosinophil Smear - Urine, Urine, Clean Catch [[id number]]  (Normal) Collected: 07/27/24 1613    Lab Status: Final result Specimen: Urine, Clean Catch Updated: 07/27/24 2346     Eosinophil Smear 0 % EOS/100 Cells     Narrative:      No eosinophil seen    COVID PRE-OP / PRE-PROCEDURE SCREENING ORDER (NO ISOLATION) - Swab, Nasopharynx [[id number]]  (Normal) Collected: 07/27/24 2044    Lab Status: Final result Specimen: Swab from Nasopharynx Updated: 07/27/24 2116    Narrative:      The following orders were created for panel order COVID PRE-OP / PRE-PROCEDURE SCREENING ORDER (NO ISOLATION) - Swab, Nasopharynx.  Procedure                               Abnormality         Status                     ---------                               -----------         ------                     COVID-19 and FLU A/B PCR...[[id number]]  Normal              Final result                 Please view results for these tests on the individual orders.    COVID-19 and FLU A/B PCR, 1 HR TAT - Swab, Nasopharynx [[id number]]  (Normal) Collected: 07/27/24 2044    Lab Status: Final result Specimen: Swab from Nasopharynx Updated: 07/27/24 2116     COVID19 Not Detected     Influenza A PCR Not Detected     Influenza B PCR Not Detected    Narrative:      Fact sheet for providers: https://www.fda.gov/media/242509/download    Fact sheet for patients: https://www.fda.gov/media/006500/download    Test performed by PCR.            XR Chest 1 View    Result Date: 7/27/2024  XR CHEST 1 VW Date of Exam: 7/27/2024 3:41 PM EDT Indication: Weakness, dizziness, altered mental status Comparison: 1/15/2024. Findings: The heart size is normal. The pulmonary vascular markings are normal. The lungs and pleural spaces are clear of active disease. There are chronic age-related changes  involving the bony thorax and thoracic aorta.     Impression: Impression: No active disease. Electronically Signed: Saul Carl MD  7/27/2024 4:03 PM EDT  Workstation ID: [id number]     Results for orders placed during the hospital encounter of 06/05/23    Adult Transthoracic Echo Complete w/ Color, Spectral and Contrast if necessary per protocol    Interpretation Summary    Left ventricular systolic function is normal. Left ventricular ejection fraction appears to be 56 - 60%.    Left ventricular diastolic function was normal.    Estimated right ventricular systolic pressure from tricuspid regurgitation is normal (<35 mmHg).      Current medications:  Scheduled Meds:amiodarone, 200 mg, Oral, Daily  apixaban, 2.5 mg, Oral, BID  insulin glargine, 1-200 Units, Subcutaneous, Nightly - Glucommander  insulin lispro, 1-200 Units, Subcutaneous, 4x Daily With Meals & Nightly  lactobacillus acidophilus, 1 capsule, Oral, Daily  levothyroxine, 100 mcg, Oral, Q AM  metoprolol tartrate, 50 mg, Oral, BID  [START ON 7/29/2024] pantoprazole, 40 mg, Oral, Daily  sodium chloride, 10 mL, Intravenous, Q12H      Continuous Infusions:   PRN Meds:.  acetaminophen    dextrose    dextrose    glucagon (human recombinant)    insulin lispro    melatonin    sodium chloride    sodium chloride    sodium chloride    traMADol    Assessment & Plan   Assessment & Plan     Active Hospital Problems    Diagnosis  POA    **ARF (acute renal failure) [N17.9]  Yes    Diarrhea [R19.7]  Yes    Atrial fibrillation [I48.91]  Yes    Chronic anticoagulation [Z79.01]  Not Applicable    Essential hypertension [I10]  Yes    Type 2 diabetes mellitus without complication, without long-term current use of insulin [E11.9]  Yes    Personal history of pulmonary embolism [Z86.711]  Yes    Hypothyroidism (acquired) [E03.9]  Yes      Resolved Hospital Problems   No resolved problems to display.        Brief Hospital Course to date:  Chey Robertson is a 89 y.o. female with  history of atrial fibrillation on dose adjusted eliquis, DVT/PE, Hypothyroid, HTN, DMII, essential tremor who presents with complaints of generalized weakness, N/V and diarrhea.  Recent increase in home metformin dose    N/V  Diarrhea  - secondary to increased metformin dose?  - improved    DISHA  - baseline creatinine 0.5 to 0.6  - creatinine improving with gentle IV fluids  - bmp am    DMII  - poor control, A1c 9.8  - metformin held  - glucose reviewed, glucommander    Pancytopenia  - follows with Dr Lyman  - platelets 56    Atrial fibrillation  - eliquis    Hypothyroid    H/o DVT/PT    Dysphagia  - speech therapy follows, modified diet    Generalized weakness  - PT/OT      Expected Discharge Location and Transportation:   Expected Discharge   Expected Discharge Date: 7/30/2024; Expected Discharge Time:      VTE Prophylaxis:  Pharmacologic VTE prophylaxis orders are present.         AM-PAC 6 Clicks Score (PT): 15 (07/28/24 0801)    CODE STATUS:   Code Status and Medical Interventions: CPR (Attempt to Resuscitate); Full Support   Ordered at: 07/27/24 2121     Level Of Support Discussed With:    Patient     Code Status (Patient has no pulse and is not breathing):    CPR (Attempt to Resuscitate)     Medical Interventions (Patient has pulse or is breathing):    Full Support       Maksim Freeman MD  07/28/24        Electronically signed by Maksim Freeman MD at 07/28/24 1912       Consult Notes (last 24 hours)  Notes from 07/28/24 1142 through 07/29/24 1142   No notes of this type exist for this encounter.

## 2024-07-29 NOTE — PROGRESS NOTES
[facility] Medicine Services  PROGRESS NOTE    Patient Name: Chey Robertson  : 1935  MRN: [id number]    Date of Admission: 2024  Primary Care Physician: Yasmeen Loomis MD    Subjective   Subjective     CC:  weakness    HPI:  Hungry again today.  Was brought a sandwich for lunch but wants soup as well. No abdominal pain      Objective   Objective     Vital Signs:   Temp:  [97.8 °F (36.6 °C)-98.3 °F (36.8 °C)] 98 °F (36.7 °C)  Heart Rate:  [49-59] 50  Resp:  [17-18] 18  BP: (113-149)/(46-63) 134/48     Physical Exam:  Frail, up in chair  MM moist  RRR  CTAB  Abd soft, NT  Normal affect  Awake, speech clear    Results Reviewed:  LAB RESULTS:      Lab 24  0708 24  0548 24  1449   WBC 1.56* 1.50* 2.01*   HEMOGLOBIN 9.2* 9.4* 10.9*   HEMATOCRIT 27.2* 27.1* 32.1*   PLATELETS 53* 56* 54*   NEUTROS ABS 0.91* 0.83* 1.72   IMMATURE GRANS (ABS) 0.01 0.04 0.03   LYMPHS ABS 0.45* 0.47* 0.19*   MONOS ABS 0.19 0.16 0.07*   EOS ABS 0.00 0.00 0.00   MCV 89.8 89.4 90.2   CRP  --   --  <0.30   PROCALCITONIN  --   --  <0.02         Lab 24  0708 24  0547 24  1449   SODIUM 137 140 135*   POTASSIUM 3.6 3.5 4.6   CHLORIDE 102 100 91*   CO2 26.0 30.0* 26.0   ANION GAP 9.0 10.0 18.0*   BUN 18 26* 26*   CREATININE 0.97 1.12* 1.49*   EGFR 56.3* 47.4* 33.6*   GLUCOSE 143* 197* 502*   CALCIUM 8.3* 8.7 9.4   MAGNESIUM  --  1.7 1.6   PHOSPHORUS  --  3.7  --    HEMOGLOBIN A1C  --  9.80*  --    TSH  --   --  1.650         Lab 24  1449   TOTAL PROTEIN 7.0   ALBUMIN 4.4   GLOBULIN 2.6   ALT (SGPT) 7   AST (SGOT) 12   BILIRUBIN 0.6   ALK PHOS 102         Lab 24  1449   HSTROP T 16*                 Brief Urine Lab Results  (Last result in the past 365 days)        Color   Clarity   Blood   Leuk Est   Nitrite   Protein   CREAT   Urine HCG        24 1613             24.1         24 1613 Yellow   Clear   Negative   Negative   Negative   Negative                    Microbiology Results Abnormal       Procedure Component Value - Date/Time    Eosinophil Smear - Urine, Urine, Clean Catch [[id number]]  (Normal) Collected: 07/27/24 1613    Lab Status: Final result Specimen: Urine, Clean Catch Updated: 07/27/24 2346     Eosinophil Smear 0 % EOS/100 Cells     Narrative:      No eosinophil seen    COVID PRE-OP / PRE-PROCEDURE SCREENING ORDER (NO ISOLATION) - Swab, Nasopharynx [[id number]]  (Normal) Collected: 07/27/24 2044    Lab Status: Final result Specimen: Swab from Nasopharynx Updated: 07/27/24 2116    Narrative:      The following orders were created for panel order COVID PRE-OP / PRE-PROCEDURE SCREENING ORDER (NO ISOLATION) - Swab, Nasopharynx.  Procedure                               Abnormality         Status                     ---------                               -----------         ------                     COVID-19 and FLU A/B PCR...[[id number]]  Normal              Final result                 Please view results for these tests on the individual orders.    COVID-19 and FLU A/B PCR, 1 HR TAT - Swab, Nasopharynx [[id number]]  (Normal) Collected: 07/27/24 2044    Lab Status: Final result Specimen: Swab from Nasopharynx Updated: 07/27/24 2116     COVID19 Not Detected     Influenza A PCR Not Detected     Influenza B PCR Not Detected    Narrative:      Fact sheet for providers: https://www.fda.gov/media/302145/download    Fact sheet for patients: https://www.fda.gov/media/386674/download    Test performed by PCR.            XR Chest 1 View    Result Date: 7/27/2024  XR CHEST 1 VW Date of Exam: 7/27/2024 3:41 PM EDT Indication: Weakness, dizziness, altered mental status Comparison: 1/15/2024. Findings: The heart size is normal. The pulmonary vascular markings are normal. The lungs and pleural spaces are clear of active disease. There are chronic age-related changes involving the bony thorax and thoracic aorta.     Impression: Impression: No active disease.  Electronically Signed: Saul Carl MD  7/27/2024 4:03 PM EDT  Workstation ID: [id number]     Results for orders placed during the hospital encounter of 06/05/23    Adult Transthoracic Echo Complete w/ Color, Spectral and Contrast if necessary per protocol    Interpretation Summary    Left ventricular systolic function is normal. Left ventricular ejection fraction appears to be 56 - 60%.    Left ventricular diastolic function was normal.    Estimated right ventricular systolic pressure from tricuspid regurgitation is normal (<35 mmHg).      Current medications:  Scheduled Meds:amiodarone, 200 mg, Oral, Daily  apixaban, 2.5 mg, Oral, BID  castor oil-balsam peru, 1 Application, Topical, Q12H  insulin glargine, 1-200 Units, Subcutaneous, Nightly - Glucommander  insulin lispro, 1-200 Units, Subcutaneous, 4x Daily With Meals & Nightly  lactobacillus acidophilus, 1 capsule, Oral, Daily  levothyroxine, 100 mcg, Oral, Q AM  metoprolol tartrate, 50 mg, Oral, BID  pantoprazole, 40 mg, Oral, Daily  sodium chloride, 10 mL, Intravenous, Q12H      Continuous Infusions:   PRN Meds:.  acetaminophen    dextrose    dextrose    glucagon (human recombinant)    insulin lispro    melatonin    sodium chloride    sodium chloride    sodium chloride    traMADol    Assessment & Plan   Assessment & Plan     Active Hospital Problems    Diagnosis  POA    **ARF (acute renal failure) [N17.9]  Yes    Diarrhea [R19.7]  Yes    Atrial fibrillation [I48.91]  Yes    Chronic anticoagulation [Z79.01]  Not Applicable    Essential hypertension [I10]  Yes    Type 2 diabetes mellitus without complication, without long-term current use of insulin [E11.9]  Yes    Personal history of pulmonary embolism [Z86.711]  Yes    Hypothyroidism (acquired) [E03.9]  Yes      Resolved Hospital Problems   No resolved problems to display.        Brief Hospital Course to date:  Chey Robertson is a 89 y.o. female with history of atrial fibrillation on dose adjusted eliquis,  DVT/PE, Hypothyroid, HTN, DMII, essential tremor who presents with complaints of generalized weakness, N/V and diarrhea.  Recent increase in home metformin dose    N/V  Diarrhea  - secondary to increased metformin dose?  - resolved    DISHA  - baseline creatinine 0.5 to 0.6  - creatinine improving with gentle IV fluids  - bmp am    HTN  - lisinopril held for DISHA, BP acceptable    DMII  - poor control, A1c 9.8  - metformin held  - glucose reviewed, glucommander    Pancytopenia  - follows with Dr Lyman    Atrial fibrillation  - eliquis    Hypothyroid    H/o DVT/PT    Dysphagia  - instrumented testing today showed aspiration of all consistencies.  Discussed with speech therapist.  Component of aspiration may involve mechanical changes from osteophytes, which may not change as patient strength improves.    - discussed findings with son Joe -- he says Ms Robertson would not want a temporary or permanent feeding tube, and thinks a comfort diet would be appropriate so that she can continue have the pleasure of eating. Reviewed risks of aspiration and pneumonia with son and patient.  - asked speech to place safest possible diet  - can continue Speech Therapy after discharge    Generalized weakness  - PT/OT      Expected Discharge Location and Transportation: assisted living  Expected Discharge   Expected Discharge Date: 7/30/2024; Expected Discharge Time:      VTE Prophylaxis:  Pharmacologic VTE prophylaxis orders are present.         AM-PAC 6 Clicks Score (PT): 16 (07/29/24 0400)    CODE STATUS:   Code Status and Medical Interventions: CPR (Attempt to Resuscitate); Full Support   Ordered at: 07/27/24 5162     Level Of Support Discussed With:    Patient     Code Status (Patient has no pulse and is not breathing):    CPR (Attempt to Resuscitate)     Medical Interventions (Patient has pulse or is breathing):    Full Support       Maksim Freeman MD  07/29/24

## 2024-07-29 NOTE — PLAN OF CARE
Goal Outcome Evaluation:  Plan of Care Reviewed With: patient        Progress: no change  Outcome Evaluation: OT evaluation completed. The pt presents below her functional baseline with generalized weakness, decreased activity tolerance, and mild balance deficits. The pt performed transfer to [facility] using a RWx with CGA. Pt ambulated <HH distance to the bedside chair using a RWx with CGA for safety. The pt will benefit from continued IP OT services to increase the pt's safety and independence during ADLs and functional mobility. Recommend a return to [facility] with HHOT when medically ready.      Anticipated Discharge Disposition (OT): assisted living, home with home health

## 2024-07-29 NOTE — PLAN OF CARE
Goal Outcome Evaluation:  Plan of Care Reviewed With: patient        Progress:  (initial eval)         Anticipated Discharge Disposition (SLP): inpatient rehabilitation facility          SLP Swallowing Diagnosis: mod-severe, pharyngeal dysphagia, suspect acute-on-chronic (07/29/24 4549)

## 2024-07-29 NOTE — PROGRESS NOTES
Malnutrition Severity Assessment    Patient Name:  Chey Robertson  YOB: 1935  MRN: [id number]  Admit Date:  7/27/2024    Patient meets criteria for : Moderate (non-severe) Malnutrition (Chronic moderate malnutrition; severe muscle wasting and fat loss)        Malnutrition Severity Assessment  Malnutrition Type: Chronic Disease - Related Malnutrition  Malnutrition Type (Last 8 Hours)       Malnutrition Severity Assessment       Row Name 07/29/24 [facility]       Malnutrition Severity Assessment    Malnutrition Type Chronic Disease - Related Malnutrition      Row Name 07/29/24 1431       Muscle Loss    Loss of Muscle Mass Findings Severe    [place] Region Moderate - slight depression    Clavicle Bone Region Severe - protruding prominent bone    Acromion Bone Region Moderate - acromion may slightly protrude    Dorsal Hand Region Moderate - slight depression    Patellar Region Severe - prominent bone, square looking, very little muscle definition    Anterior Thigh Region Severe - line/depression along thigh, obviously thin    Posterior Calf Region Severe - thin with very little definition/firmness      Row Name 07/29/24 [facility]       Fat Loss    Subcutaneous Fat Loss Findings Moderate    Orbital Region  Severe - pronounced hollowness/depression, dark circles, loose saggy skin    Upper Arm Region Moderate - some fat tissue, not ample      Row Name 07/29/24 1431       Declining Functional Status    Declining Functional Status Findings N/A      Row Name 07/29/24 [facility]       Criteria Met (Must meet criteria for severity in at least 2 of these categories: M Wasting, Fat Loss, Fluid, Secondary Signs, Wt. Status, Intake)    Patient meets criteria for  Moderate (non-severe) Malnutrition  Chronic moderate malnutrition; severe muscle wasting and fat loss                    Electronically signed by:  Chante Bryan RD eligible  07/29/24 14:40 EDT

## 2024-07-29 NOTE — PLAN OF CARE
Goal Outcome Evaluation:  Plan of Care Reviewed With: patient           Outcome Evaluation: PT initial evaluation completed for pt presenting with generalized weakness, mild balance deficits, situational confusion, and decreased functional mobility. Pt ambulated 10ft with RW and CGA. Pt's decreased independence warrants PT skilled care. Recommend pt return to [facility] with  OT/PT services at discharge.      Anticipated Discharge Disposition (PT): assisted living, home with home health

## 2024-07-29 NOTE — MBS/VFSS/FEES
Acute Care - Speech Language Pathology   Swallow Initial Evaluation  Jorge  Modified Barium Swallow Study (MBS)       Patient Name: Chey Robertson  : 1935  MRN: [id number]  Today's Date: 2024               Admit Date: 2024    Visit Dx:     ICD-10-CM ICD-9-CM   1. DISHA (acute kidney injury)  N17.9 584.9   2. Generalized weakness  R53.1 780.79   3. At high risk for falls  Z91.81 V15.88   4. Pharyngeal dysphagia  R13.13 787.23   5. Hyperglycemia  R73.9 790.29     Patient Active Problem List   Diagnosis    Benign familial tremor    AMS (altered mental status)    Pancytopenia    Hypothyroidism (acquired)    B12 deficiency    Abnormal intestinal absorption    Iron deficiency anemia due to chronic blood loss    Myelodysplasia (myelodysplastic syndrome)    Personal history of pulmonary embolism    Chronic anticoagulation    Essential hypertension    Type 2 diabetes mellitus without complication, without long-term current use of insulin    Atrial fibrillation    QT prolongation    Pericardial effusion    Severe malnutrition    Closed displaced intertrochanteric fracture of right femur, initial encounter    ARF (acute renal failure)    Diarrhea     Past Medical History:   Diagnosis Date    A-fib     on eliquis    Anemia     Arthritis     Diabetes mellitus     checks sugar only when pt wants to     Disease of thyroid gland     History of transfusion     with knee surgery-  no reaction recalled.     [place] (hard of hearing)     no hearing aids    Hypertension     Migraine without aura and without status migrainosus, not intractable 2016    Myelodysplastic syndrome     Pulmonary emboli     (after knee surgery)    SOBOE (shortness of breath on exertion)     Tremors of nervous system     Vertigo     Wears dentures     upper     Wears glasses      Past Surgical History:   Procedure Laterality Date    BLADDER SURGERY      CATARACT EXTRACTION      bilat     CHOLECYSTECTOMY      COLONOSCOPY      HIP  TROCHANTERIC NAILING WITH INTRAMEDULLARY HIP SCREW Right 6/20/2023    Procedure: HIP TROCHANTERIC NAILING WITH INTRAMEDULLARY HIP SCREW RIGHT;  Surgeon: Augie Hobbs MD;  Location:  [facility];  Service: Orthopedics;  Laterality: Right;    HYSTERECTOMY      with bso    KYPHOPLASTY N/A 02/12/2021    Procedure: KYPHOPLASTY T11, T12 AND L4;  Surgeon: Matty Hearn MD;  Location:  [facility];  Service: Orthopedic Spine;  Laterality: N/A;    TONSILLECTOMY         SLP Recommendation and Plan  SLP Swallowing Diagnosis: mod-severe, pharyngeal dysphagia, suspect acute-on-chronic (07/29/24 1315)  SLP Diet Recommendation: other (see comments) (safest is NPO w/ alternative nutrition/hydration. conservative po diet would be soft chopped w/ honey thick liquids) (07/29/24 1315)  Recommended Precautions and Strategies: upright posture during/after eating, small bites of food and sips of liquid, general aspiration precautions (07/29/24 1315)  SLP Rec. for Method of Medication Administration: meds whole, with thick liquids, with puree, as tolerated (07/29/24 1315)     Monitor for Signs of Aspiration: yes, notify SLP if any concerns (07/29/24 1315)     Swallow Criteria for Skilled Therapeutic Interventions Met: demonstrates skilled criteria (07/29/24 1315)  Anticipated Discharge Disposition (SLP): inpatient rehabilitation facility (07/29/24 1315)  Rehab Potential/Prognosis, Swallowing: adequate, monitor progress closely (07/29/24 1315)  Therapy Frequency (Swallow): 5 days per week (07/29/24 1315)  Predicted Duration Therapy Intervention (Days): 2 weeks (07/29/24 1315)  Oral Care Recommendations: Oral Care BID/PRN, Toothbrush (07/29/24 1315)              Plan of Care Reviewed With: patient  Progress:  (initial eval)      SWALLOW EVALUATION (Last 72 Hours)       SLP Adult Swallow Evaluation       Row Name 07/29/24 1315       Rehab Evaluation    Document Type evaluation  -CJ    Subjective Information no complaints  -CJ    Patient  Observations alert;cooperative  -CJ    Patient/Family/Caregiver Comments/Observations no family present  -CJ    Patient Effort good  -CJ    Symptoms Noted During/After Treatment none  -CJ       General Information    Patient Profile Reviewed yes  -CJ    Pertinent History Of Current Problem see initial eval; referred for MBS  -CJ    Current Method of Nutrition soft to chew textures;nectar/syrup-thick liquids  -CJ    Precautions/Limitations, Vision WFL with corrective lenses;for purposes of eval  -CJ    Precautions/Limitations, Hearing WFL;for purposes of eval  -CJ    Prior Level of Function-Communication unknown  -CJ    Prior Level of Function-Swallowing --  prev modified po diet  -CJ    Plans/Goals Discussed with patient;agreed upon  -    Barriers to Rehab none identified  -    Patient's Goals for Discharge patient did not state  -CJ       Pain    Additional Documentation Pain Scale: FACES Pre/Post-Treatment (Group)  -CJ       Pain Scale: Numbers Pre/Post-Treatment    Pretreatment Pain Rating --    Posttreatment Pain Rating --       Pain Scale: FACES Pre/Post-Treatment    Pain: FACES Scale, Pretreatment 0-->no hurt  -CJ    Posttreatment Pain Rating 0-->no hurt  -CJ       Oral Musculature and Cranial Nerve Assessment    Oral Motor General Assessment --    Oral Labial or Buccal Impairment, Detail, Cranial Nerve VII (Facial): --    Lingual Impairment, Detail. Cranial Nerves IX, XII (Glossopharyngeal and Hypoglossal) --    Vocal Impairment, Detail. Cranial Nerve X (Vagus) --       General Eating/Swallowing Observations    Respiratory Support Currently in Use --       Clinical Swallow Eval    Oral Prep Phase --    Oral Transit --    Oral Residue --    Pharyngeal Phase --    Esophageal Phase --       Pharyngeal Phase Concerns    Pharyngeal Phase Concerns --    Throat Clear --    Pharyngeal Phase Concerns, Comment --       MBS/VFSS    Utensils Used spoon;cup;straw  -CJ    Consistencies Trialed regular textures;thin  liquids;nectar/syrup-thick liquids;honey-thick liquids;pudding thick  -CJ       MBS/VFSS Interpretation    Oral Prep Phase impaired oral phase of swallowing  -CJ    Oral Transit Phase impaired  -CJ    Oral Residue WFL  -CJ    VFSS Summary Unfortunately suspect some degree of acute on chronic dysphagia given anatomical component combined w/ generalized weakness. Silent aspiration w/ thin liquids occurring during the swallow. Penetration w/ nectar and honey thick liquids during the swallow that didn't clear upon completion. Moderate diffuse residue w/ all consistencies that pt is unable to clear and continues to squeeze into airway after the swallow. Least amount of aspiration noted w/ honey thick liquids/pudding consistency. Unfortunately pt is felt to be at risk for aspiration w/ all po intake. D/w hospitalist who requested SLP place safest po diet at this time while MD follows up with family/patient for GOC  -CJ       Oral Preparatory Phase    Oral Preparatory Phase prolonged manipulation  -CJ       Oral Transit Phase    Impaired Oral Transit Phase premature spillage of liquids into pharynx  -CJ    Premature Spillage of Liquids into Pharynx thin liquids;nectar-thick liquids;secondary to reduced lingual control;discoordination of lingual movement  -CJ       Initiation of Pharyngeal Swallow    Initiation of Pharyngeal Swallow bolus in pyriform sinuses  -CJ    Pharyngeal Phase impaired pharyngeal phase of swallowing  -CJ    Anatomical abnormalities noted osteophyte/bone spur per radiology report;hyperlordotic c-spine curvature;prominent cricopharyngeous/ cricopharyngeal bar  -CJ    Anatomical abnormalities functional impact functional impact on swallowing  -CJ    Penetration During the Swallow thin liquids;nectar-thick liquids;honey-thick liquids;secondary to delayed swallow initiation or mistiming;secondary to reduced laryngeal elevation;secondary to reduced vestibular closure  -CJ    Aspiration During the Swallow  thin liquids;secondary to delayed swallow initiation or mistiming;secondary to reduced laryngeal elevation;secondary to reduced vestibular closure  -CJ    Penetration After the Swallow pudding/puree;secondary to residue;in pyriform sinuses;in valleculae  -CJ    Aspiration After the Swallow honey-thick liquids;nectar-thick liquids;pudding/puree;secondary to residue;in pyriform sinuses;in laryngeal vestibule  -CJ    Depth of Penetration deep  -CJ    Response to Penetration No  -CJ    No spontaneous response to penetration and non-effective laryngeal clearance with cue (see comments)  -CJ    Response to Aspiration No  -CJ    No spontaneous response to aspiration and non-effective subglottic clearance with cue (see comments)  -CJ    Rosenbek's Scale thin:;nectar:;honey:;pudding/puree:;8--->level 8  -CJ    Pharyngeal Residue all consistencies tested;diffuse within pharynx;secondary to reduced posterior pharyngeal wall stripping;secondary to reduced laryngeal elevation;secondary to reduced hyolaryngeal excursion  -CJ    Response to Residue unable to clear residue  -CJ    Attempted Compensatory Maneuvers bolus size;bolus presentation style;chin tuck;head turn to left;head turn to right;additional subsequent swallow;multiple swallows;alternate liquids/solids;throat clear after swallow  -CJ    Response to Attempted Compensatory Maneuvers did not prevent aspiration;did not reduce residue  -CJ       SLP Evaluation Clinical Impression    SLP Swallowing Diagnosis mod-severe;pharyngeal dysphagia;suspect acute-on-chronic  -    Functional Impact risk of aspiration/pneumonia  -    Rehab Potential/Prognosis, Swallowing adequate, monitor progress closely  -    Swallow Criteria for Skilled Therapeutic Interventions Met demonstrates skilled criteria  -       Recommendations    Therapy Frequency (Swallow) 5 days per week  -    Predicted Duration Therapy Intervention (Days) 2 weeks  -    SLP Diet Recommendation other (see  comments)  safest is NPO w/ alternative nutrition/hydration. conservative po diet would be soft chopped w/ honey thick liquids  -    Recommended Diagnostics --    Recommended Precautions and Strategies upright posture during/after eating;small bites of food and sips of liquid;general aspiration precautions  -    Oral Care Recommendations Oral Care BID/PRN;Toothbrush  -    SLP Rec. for Method of Medication Administration meds whole;with thick liquids;with puree;as tolerated  -CJ    Monitor for Signs of Aspiration yes;notify SLP if any concerns  -    Anticipated Discharge Disposition (SLP) inpatient rehabilitation facility  -              User Key  (r) = Recorded By, (t) = Taken By, (c) = Cosigned By      Initials Name Effective Dates    Cathie Calles, MS CCC-SLP 02/03/23 -     Ashley Olguin, MS CCC-SLP 06/03/24 -                     EDUCATION  The patient has been educated in the following areas:   Dysphagia (Swallowing Impairment) Oral Care/Hydration Modified Diet Instruction.        SLP GOALS       Row Name 07/29/24 1315             (LTG) Patient will demonstrate functional swallow for    Diet Texture (Demonstrate functional swallow) soft to chew (chopped) textures  -CJ      Liquid viscosity (Demonstrate functional swallow) nectar/ mildly thick liquids  -CJ      [place] (Demonstrate functional swallow) with minimal cues (75-90% accuracy)  -CJ      Time Frame (Demonstrate functional swallow) by discharge  -CJ      Progress/Outcomes (Demonstrate functional swallow) new goal  -CJ         (STG) Patient will tolerate trials of    Consistencies Trialed (Tolerate trials) soft to chew (chopped) textures;honey/ moderately thick liquids  -CJ      Desired Outcome (Tolerate trials) without signs/symptoms of aspiration;without signs of distress;with adequate oral prep/transit/clearance  -CJ      [place] (Tolerate trials) with minimal cues (75-90% accuracy)  -CJ      Time Frame (Tolerate  trials) 1 week  -CJ      Progress/Outcomes (Tolerate trials) new goal  -CJ         (STG) Lingual Strengthening Goal 1 (SLP)    Activity (Lingual Strengthening Goal 1, SLP) increase lingual tone/sensation/control/coordination/movement;increase tongue back strength  -CJ      Increase Lingual Tone/Sensation/Control/Coordination/Movement lingual resistance exercises;swallow trials  -CJ      Increase Tongue Back Strength lingual resistance exercises  -CJ      [place]/Accuracy (Lingual Strengthening Goal 1, SLP) with moderate cues (50-74% accuracy)  -CJ      Time Frame (Lingual Strengthening Goal 1, SLP) 1 week  -CJ      Progress/Outcomes (Lingual Strengthening Goal 1, SLP) new goal  -CJ         (STG) Pharyngeal Strengthening Exercise Goal 1 (SLP)    Activity (Pharyngeal Strengthening Goal 1, SLP) increase superior movement of the hyolaryngeal complex;increase anterior movement of the hyolaryngeal complex;increase closure at entrance to airway/closure of airway at glottis;increase squeeze/positive pressure generation  -CJ      Increase Superior Movement of the Hyolaryngeal Complex effortful pitch glide (falsetto + pharyngeal squeeze)  -CJ      Increase Anterior Movement of the Hyolaryngeal Complex chin tuck against resistance (CTAR)  -CJ      Increase Closure at Entrance to Airway/Closure of Airway at Glottis supraglottic swallow;breath hold exercises  -CJ      Increase Squeeze/Positive Pressure Generation hard effortful swallow  -CJ      [place]/Accuracy (Pharyngeal Strengthening Goal 1, SLP) with moderate cues (50-74% accuracy)  -CJ      Time Frame (Pharyngeal Strengthening Goal 1, SLP) 1 week  -CJ      Progress/Outcomes (Pharyngeal Strengthening Goal 1, SLP) new goal  -CJ                User Key  (r) = Recorded By, (t) = Taken By, (c) = Cosigned By      Initials Name Provider Type    Ashley Olguin MS CCC-SLP Speech and Language Pathologist                         Time Calculation:    Time Calculation-  SLP       Row Name 07/29/24 1509             Time Calculation- SLP    SLP Start Time 1315  -      SLP Received On 07/29/24  -         Untimed Charges    19914-RP Motion Fluoro Eval Swallow Minutes 69  -CJ         Total Minutes    Untimed Charges Total Minutes 69  -CJ       Total Minutes 69  -CJ                User Key  (r) = Recorded By, (t) = Taken By, (c) = Cosigned By      Initials Name Provider Type     Ashley Epstein MS CCC-SLP Speech and Language Pathologist                    Therapy Charges for Today       Code Description Service Date Service Provider Modifiers Qty    [id number]  ST MOTION FLUORO EVAL SWALLOW 5 7/29/2024 Ashley Epstein MS CCC-SLP GN 1                 Ashley Epstein MS CCC-SLP  7/29/2024

## 2024-07-29 NOTE — CONSULTS
"          Clinical Nutrition Assessment     Patient Name: Chey Robertson  YOB: 1935  MRN: [id number]  Date of Encounter: 07/29/24 13:51 EDT  Admission date: 7/27/2024  Reason for Visit: MST score 2+, Consult, Unintentional weight loss, Reduced oral intake    Assessment   Nutrition Assessment   Admission Diagnosis:  ARF (acute renal failure) [N17.9]    Problem List:    ARF (acute renal failure)    Hypothyroidism (acquired)    Personal history of pulmonary embolism    Chronic anticoagulation    Essential hypertension    Type 2 diabetes mellitus without complication, without long-term current use of insulin    Atrial fibrillation    Diarrhea      PMH:   She  has a past medical history of A-fib, Anemia, Arthritis, Diabetes mellitus, Disease of thyroid gland, History of transfusion, [place] (hard of hearing), Hypertension, Migraine without aura and without status migrainosus, not intractable (12/30/2016), Myelodysplastic syndrome, Pulmonary emboli (2011), SOBOE (shortness of breath on exertion), Tremors of nervous system, Vertigo, Wears dentures, and Wears glasses.    PSH:  She  has a past surgical history that includes Hysterectomy; Tonsillectomy; Bladder surgery; Cataract extraction; Cholecystectomy; Colonoscopy; Kyphoplasty (N/A, 02/12/2021); and Hip Trochanteric Nailing (Right, 6/20/2023).    Applicable Nutrition History:   6/7/23 Severe malnutrition  7/28/29 SLP Diet Recommendation: soft to chew textures, chopped, nectar thick liquids, ice chips between meals after oral care, with supervision   ---> SLP MBS today 7/29    Anthropometrics     Height: Height: 160 cm (63\")  Last Filed Weight: Weight: 51.9 kg (114 lb 8 oz) (07/28/24 0419)  Method: Weight Method: Bed scale  BMI: BMI (Calculated): 20.3    UBW:    Pt reports wt of 190# x 1 year ago, unverifiable per EMR  Weight change: No significant wt changes x 1 year     Weight       Weight (kg) Weight (lbs) Weight Method Visit Report   6/8/2023 52.617 kg  116 " lb  Stated      55.838 kg  123 lb 1.6 oz  Bed scale     6/18/2023 51.71 kg  114 lb  Stated     6/20/2023 51.71 kg  114 lb      6/29/2023 53.252 kg  117 lb 6.4 oz      8/6/2023 52.164 kg  115 lb  Stated     8/7/2023 54.432 kg  120 lb   --    1/15/2024 54.432 kg  120 lb  Estimated     2/12/2024 51.665 kg  113 lb 14.4 oz   --    7/27/2024 51.256 kg  113 lb  Stated     7/28/2024 51.937 kg  114 lb 8 oz  Bed scale         Nutrition Focused Physical Exam    Date:  7/29     Patient meets criteria for malnutrition diagnosis, see MSA note.     Subjective   Reported/Observed/Food/Nutrition Related History:     Patient denies any changes in appetite, states she's had some nausea/vomiting for a few days, has since resolved. Pt denies any C/S difficulties. Per EMR pt has had diarrhea ~3 wks, noting Metformin was increased at this time. No dietary preferences noted at time of visit. NKFA. Pt accepting of ONS daily with dinner, prefers vanilla.    Current Nutrition Prescription   PO: Diet: Cardiac, Diabetic; Healthy Heart (2-3 Na+); Consistent Carbohydrate; Texture: Soft to Chew (NDD 3); Soft to Chew: Chopped Meat; Fluid Consistency: [place] Thick  Oral Nutrition Supplement: N/A  Intake: 1 Day: 33% x 3 meals  Observed lunch tray, ~75% consumed    Assessment & Plan   Nutrition Diagnosis   Date:  7/29            Updated:    Problem Malnutrition chronic severe   Etiology Energy needs greater than energy intake   Signs/Symptoms Severe muscle wasting and moderate fat loss   Status: Active      Goal:   Nutrition to support treatment and Increase intake      Nutrition Intervention      Follow treatment progress, Care plan reviewed, Advise alternate selection, Advised available snacks, Interview for preferences, Menu provided, Encourage intake, Supplement provided    Boost Plus daily w/ dinner, vanilla preferred  Encourage PO intakes and snacks as desired    Monitoring/Evaluation:   Per protocol, I&O, PO intake, Supplement intake, Symptoms,  POC/GOC, Swallow function    Chante Bryan RD eligible  Time Spent: 30 min

## 2024-07-29 NOTE — NURSING NOTE
WOC consulted for pressure injury to left heel, right shin wound and wound to sacrum, POA    Patient has deep tissue injury to the left medial heel.  Wound areas purple, nonblanchable.  Will order Venelex twice daily.  Please apply Allevyn foam dressing.    There is a traumatic appearing skin tear to the right anterior shin.  Skin does appear to be reattached.  No signs of infection.  To continue to promote wound care, recommend applying a small piece of Xeroform and wrapping with rolled gauze.  See wound care orders.    There is an LDA reflecting a wound of the sacrum.  I was unable to visualize this today as the patient was up in chair.  Her nurse reports that the area is scabbed.  This could be an abrasion versus pressure injury.  For now, cover with an Allevyn dressing and we will follow-up tomorrow to reassess.    Pressure injury prevention protocol.  Will order PUMP 5.    Fermin Gutierrez RN, BSN, CCRN, CWOCN  Wound, Ostomy and Continence (WOC) Department  Psychiatric

## 2024-07-29 NOTE — THERAPY EVALUATION
Patient Name: Chey Robertson  : 1935    MRN: [id number]                              Today's Date: 2024       Admit Date: 2024    Visit Dx:     ICD-10-CM ICD-9-CM   1. DISHA (acute kidney injury)  N17.9 584.9   2. Generalized weakness  R53.1 780.79   3. At high risk for falls  Z91.81 V15.88   4. Pharyngeal dysphagia  R13.13 787.23   5. Hyperglycemia  R73.9 790.29     Patient Active Problem List   Diagnosis    Benign familial tremor    AMS (altered mental status)    Pancytopenia    Hypothyroidism (acquired)    B12 deficiency    Abnormal intestinal absorption    Iron deficiency anemia due to chronic blood loss    Myelodysplasia (myelodysplastic syndrome)    Personal history of pulmonary embolism    Chronic anticoagulation    Essential hypertension    Type 2 diabetes mellitus without complication, without long-term current use of insulin    Atrial fibrillation    QT prolongation    Pericardial effusion    Severe malnutrition    Closed displaced intertrochanteric fracture of right femur, initial encounter    ARF (acute renal failure)    Diarrhea    Moderate malnutrition     Past Medical History:   Diagnosis Date    A-fib     on eliquis    Anemia     Arthritis     Diabetes mellitus     checks sugar only when pt wants to     Disease of thyroid gland     History of transfusion     with knee surgery-  no reaction recalled.     [place] (hard of hearing)     no hearing aids    Hypertension     Migraine without aura and without status migrainosus, not intractable 2016    Myelodysplastic syndrome     Pulmonary emboli     (after knee surgery)    SOBOE (shortness of breath on exertion)     Tremors of nervous system     Vertigo     Wears dentures     upper     Wears glasses      Past Surgical History:   Procedure Laterality Date    BLADDER SURGERY      CATARACT EXTRACTION      bilat     CHOLECYSTECTOMY      COLONOSCOPY      HIP TROCHANTERIC NAILING WITH INTRAMEDULLARY HIP SCREW Right 2023    Procedure:  HIP TROCHANTERIC NAILING WITH INTRAMEDULLARY HIP SCREW RIGHT;  Surgeon: Augie Hobbs MD;  Location:  [facility];  Service: Orthopedics;  Laterality: Right;    HYSTERECTOMY      with bso    KYPHOPLASTY N/A 02/12/2021    Procedure: KYPHOPLASTY T11, T12 AND L4;  Surgeon: Matty Hearn MD;  Location:  [facility];  Service: Orthopedic Spine;  Laterality: N/A;    TONSILLECTOMY        General Information       Row Name 07/29/24 1511          OT Time and Intention    Document Type evaluation  -KF     Mode of Treatment occupational therapy  -       Row Name 07/29/24 1511          General Information    Patient Profile Reviewed yes  -KF     Prior Level of Function independent:;all household mobility;bed mobility;mod assist:;dressing;bathing  [place] with rollator  -KF     Existing Precautions/Restrictions fall;other (see comments)  brief with mobility  -KF     Barriers to Rehab medically complex;previous functional deficit;cognitive status  -       Row Name 07/29/24 1511          Living Environment    People in Home facility resident;other (see comments)  [facility] [place]  -       Row Name 07/29/24 1511          Home Main Entrance    Number of Stairs, Main Entrance none  -       Row Name 07/29/24 1511          Stairs Within Home, Primary    Number of Stairs, Within Home, Primary none  -       Row Name 07/29/24 1511          Cognition    Orientation Status (Cognition) oriented x 3;other (see comments)  conversational confusion  -       Row Name 07/29/24 1511          Safety Issues, Functional Mobility    Safety Issues Affecting Function (Mobility) awareness of need for assistance;insight into deficits/self-awareness;judgment;positioning of assistive device;problem-solving;safety precaution awareness;safety precautions follow-through/compliance;sequencing abilities  -KF     Impairments Affecting Function (Mobility) balance;cognition;coordination;endurance/activity tolerance;postural/trunk control;strength   -KF     Cognitive Impairments, Mobility Safety/Performance awareness, need for assistance;insight into deficits/self-awareness;problem-solving/reasoning;judgment;safety precaution awareness;safety precaution follow-through;sequencing abilities  -               User Key  (r) = Recorded By, (t) = Taken By, (c) = Cosigned By      Initials Name Provider Type    KF Aisha Perea OT Occupational Therapist                     Mobility/ADL's       Row Name 07/29/24 1511          Bed Mobility    Bed Mobility supine-sit  -     Supine-Sit [place] (Bed Mobility) minimum assist (75% patient effort);1 person assist;verbal cues;nonverbal cues (demo/gesture)  -     Comment, (Bed Mobility) Verbal/tactile cues for sequence; increased time and effort needed  -[facility] Name 07/29/24 1511          Transfers    Transfers sit-stand transfer;stand-sit transfer;toilet transfer  -[facility] Name 07/29/24 1511          Sit-Stand Transfer    Sit-Stand [place] (Transfers) contact guard  -     Assistive Device (Sit-Stand Transfers) walker, front-wheeled  -[facility] Name 07/29/24 1511          Stand-Sit Transfer    Stand-Sit [place] (Transfers) contact guard  -     Assistive Device (Stand-Sit Transfers) walker, front-wheeled  -[facility] Name 07/29/24 1511          Toilet Transfer    Type (Toilet Transfer) stand pivot/stand step  -     [place] Level (Toilet Transfer) contact guard  -KF     Assistive Device (Toilet Transfer) walker, front-wheeled;commode, bedside without drop arms  -     Comment, (Toilet Transfer) BSC utilized due to urgency  -[facility] Name 07/29/24 1511          Functional Mobility    Functional Mobility- Ind. Level contact guard assist  -     Functional Mobility- Device walker, front-wheeled  -     Functional Mobility-Distance (Feet) --  <HH distance within the room  -[facility] Name 07/29/24 1511          Activities of Daily Living    BADL Assessment/Intervention lower  body dressing;toileting  -KF       [facility] Name 07/29/24 1511          Lower Body Dressing Assessment/Training    [place] Level (Lower Body Dressing) don;socks;dependent (less than 25% patient effort)  -KF     Position (Lower Body Dressing) edge of bed sitting  -[facility] Name 07/29/24 1511          Toileting Assessment/Training    [place] Level (Toileting) adjust/manage clothing;perform perineal hygiene;contact guard assist  -KF     Assistive Devices (Toileting) commode, bedside without drop arms  -KF     Position (Toileting) unsupported sitting;supported standing  -KF               User Key  (r) = Recorded By, (t) = Taken By, (c) = Cosigned By      Initials Name Provider Type    KF Aisha Perea OT Occupational Therapist                   Obj/Interventions       [facility] Name 07/29/24 1513          Sensory Assessment (Somatosensory)    Sensory Assessment (Somatosensory) UE sensation intact  -KF       Row Name 07/29/24 1513          Range of Motion Comprehensive    General Range of Motion bilateral upper extremity ROM WFL  -KF       Row Name 07/29/24 1513          Strength Comprehensive (MMT)    General Manual Muscle Testing (MMT) Assessment upper extremity strength deficits identified  -KF     Comment, General Manual Muscle Testing (MMT) Assessment BUE grossly 3+/5  -KF       [facility] Name 07/29/24 1513          Balance    Balance Assessment sitting static balance;sitting dynamic balance;sit to stand dynamic balance;standing static balance;standing dynamic balance  -KF     Static Sitting Balance standby assist  -KF     Dynamic Sitting Balance contact guard  -KF     Position, Sitting Balance unsupported;sitting edge of bed;other (see comments)  BSC  -KF     Sit to Stand Dynamic Balance contact guard;verbal cues  -KF     Static Standing Balance contact guard  -KF     Dynamic Standing Balance contact guard  -KF     Position/Device Used, Standing Balance supported;walker, front-wheeled  -KF     Balance Interventions  sitting;standing;sit to stand;supported;static;dynamic;occupation based/functional task  -KF               User Key  (r) = Recorded By, (t) = Taken By, (c) = Cosigned By      Initials Name Provider Type    Aisha Elizondo OT Occupational Therapist                   Goals/Plan       Row Name 07/29/24 1515          Transfer Goal 1 (OT)    Activity/Assistive Device (Transfer Goal 1, OT) commode;bed-to-chair/chair-to-bed  -KF     [place] Level/Cues Needed (Transfer Goal 1, OT) supervision required  -KF     Time Frame (Transfer Goal 1, OT) long term goal (LTG);10 days  -KF     Progress/Outcome (Transfer Goal 1, OT) goal ongoing  -KF       Row Name 07/29/24 1515          Dressing Goal 1 (OT)    Activity/Device (Dressing Goal 1, OT) upper body dressing;lower body dressing  -KF     [place]/Cues Needed (Dressing Goal 1, OT) minimum assist (75% or more patient effort)  -KF     Time Frame (Dressing Goal 1, OT) short term goal (STG);5 days  -KF     Progress/Outcome (Dressing Goal 1, OT) goal ongoing  -KF       Row Name 07/29/24 1515          Grooming Goal 1 (OT)    Activity/Device (Grooming Goal 1, OT) hair care;oral care;wash face, hands  -KF     [place] (Grooming Goal 1, OT) supervision required  -KF     Time Frame (Grooming Goal 1, OT) long term goal (LTG);10 days  -KF     Strategies/Barriers (Grooming Goal 1, OT) sink side  -KF     Progress/Outcome (Grooming Goal 1, OT) goal ongoing  -KF       Row Name 07/29/24 1515          Therapy Assessment/Plan (OT)    Planned Therapy Interventions (OT) activity tolerance training;adaptive equipment training;BADL retraining;functional balance retraining;occupation/activity based interventions;patient/caregiver education/training;ROM/therapeutic exercise;strengthening exercise;transfer/mobility retraining  -KF               User Key  (r) = Recorded By, (t) = Taken By, (c) = Cosigned By      Initials Name Provider Type    Aisha Elizondo OT Occupational Therapist                    Clinical Impression       [facility] Name 07/29/24 1513          Pain Assessment    Pretreatment Pain Rating 0/10 - no pain  -KF     Posttreatment Pain Rating 0/10 - no pain  -KF     Pain Intervention(s) Repositioned;Ambulation/increased activity  -[facility] Name 07/29/24 1513          Plan of Care Review    Plan of Care Reviewed With patient  -KF     Progress no change  -KF     Outcome Evaluation OT evaluation completed. The pt presents below her functional baseline with generalized weakness, decreased activity tolerance, and mild balance deficits. The pt performed transfer to [facility] using a RWx with CGA. Pt ambulated <HH distance to the bedside chair using a RWx with CGA for safety. The pt will benefit from continued IP OT services to increase the pt's safety and independence during ADLs and functional mobility. Recommend a return to [facility] with HHOT when medically ready.  -[facility] Name 07/29/24 1513          Therapy Assessment/Plan (OT)    Rehab Potential (OT) good, to achieve stated therapy goals  -KF     Criteria for Skilled Therapeutic Interventions Met (OT) yes;skilled treatment is necessary  -KF     Therapy Frequency (OT) daily  -KF     Predicted Duration of Therapy Intervention (OT) 5 days  -KF       [facility] Name 07/29/24 1513          Therapy Plan Review/Discharge Plan (OT)    Anticipated Discharge Disposition (OT) assisted living;home with home health  -[facility] Name 07/29/24 1513          Vital Signs    Pre Systolic BP Rehab 149  -KF     Pre Treatment Diastolic BP 63  -KF     Pretreatment Heart Rate (beats/min) 50  -KF     Pre SpO2 (%) 97  -KF     O2 Delivery Pre Treatment room air  -KF     Pre Patient Position Supine  -KF     Intra Patient Position Standing  -KF     Post Patient Position Sitting  -KF       [facility] Name 07/29/24 1513          Positioning and Restraints    Pre-Treatment Position in bed  -KF     Post Treatment Position chair  -KF     In Chair notified nsg;reclined;call light  within reach;encouraged to call for assist;exit alarm on;waffle cushion;legs elevated  -KF               User Key  (r) = Recorded By, (t) = Taken By, (c) = Cosigned By      Initials Name Provider Type    Aisha Elizondo OT Occupational Therapist                   Outcome Measures       Row Name 07/29/24 1515          How much help from another is currently needed...    Putting on and taking off regular lower body clothing? 2  -KF     Bathing (including washing, rinsing, and drying) 2  -KF     Toileting (which includes using toilet bed pan or urinal) 3  -KF     Putting on and taking off regular upper body clothing 3  -KF     Taking care of personal grooming (such as brushing teeth) 3  -KF     Eating meals 3  -KF     AM-PAC 6 Clicks Score (OT) 16  -KF       Row Name 07/29/24 1502 07/29/24 0400       How much help from another person do you currently need...    Turning from your back to your side while in flat bed without using bedrails? 3  -KG 3  -TS    Moving from lying on back to sitting on the side of a flat bed without bedrails? 3  -KG 3  -TS    Moving to and from a bed to a chair (including a wheelchair)? 3  -KG 3  -TS    Standing up from a chair using your arms (e.g., wheelchair, bedside chair)? 3  -KG 2  -TS    Climbing 3-5 steps with a railing? 2  -KG 2  -TS    To walk in hospital room? 3  -KG 3  -TS    AM-PAC 6 Clicks Score (PT) 17  -KG 16  -TS    Highest Level of Mobility Goal 5 --> Static standing  -KG 5 --> Static standing  -TS      Row Name 07/29/24 1515 07/29/24 1502       Functional Assessment    Outcome Measure Options AM-PAC 6 Clicks Daily Activity (OT)  -KF AM-PAC 6 Clicks Basic Mobility (PT)  -KG              User Key  (r) = Recorded By, (t) = Taken By, (c) = Cosigned By      Initials Name Provider Type    Jen Ray, PT Physical Therapist    KF Aisha Perea, OT Occupational Therapist    TS Matty Cedillo, RN Registered Nurse                    Occupational Therapy Education        Title: PT OT SLP Therapies (In Progress)       Topic: Occupational Therapy (In Progress)       Point: ADL training (In Progress)       Description:   Instruct learner(s) on proper safety adaptation and remediation techniques during self care or transfers.   Instruct in proper use of assistive devices.                  Learning Progress Summary             Patient Acceptance, E,TB, NR by  at 7/29/2024 1014                         Point: Home exercise program (Not Started)       Description:   Instruct learner(s) on appropriate technique for monitoring, assisting and/or progressing therapeutic exercises/activities.                  Learner Progress:  Not documented in this visit.              Point: Precautions (In Progress)       Description:   Instruct learner(s) on prescribed precautions during self-care and functional transfers.                  Learning Progress Summary             Patient Acceptance, E,TB, NR by  at 7/29/2024 1014                         Point: Body mechanics (In Progress)       Description:   Instruct learner(s) on proper positioning and spine alignment during self-care, functional mobility activities and/or exercises.                  Learning Progress Summary             Patient Acceptance, E,TB, NR by  at 7/29/2024 1014                                         User Key       Initials Effective Dates Name Provider Type Discipline     08/09/23 -  Aisha Perea, OT Occupational Therapist OT                  OT Recommendation and Plan  Planned Therapy Interventions (OT): activity tolerance training, adaptive equipment training, BADL retraining, functional balance retraining, occupation/activity based interventions, patient/caregiver education/training, ROM/therapeutic exercise, strengthening exercise, transfer/mobility retraining  Therapy Frequency (OT): daily  Plan of Care Review  Plan of Care Reviewed With: patient  Progress: no change  Outcome Evaluation: OT evaluation  completed. The pt presents below her functional baseline with generalized weakness, decreased activity tolerance, and mild balance deficits. The pt performed transfer to [facility] using a RWx with CGA. Pt ambulated <HH distance to the bedside chair using a RWx with CGA for safety. The pt will benefit from continued IP OT services to increase the pt's safety and independence during ADLs and functional mobility. Recommend a return to [facility] with HHOT when medically ready.     Time Calculation:   Evaluation Complexity (OT)  Review Occupational Profile/Medical/Therapy History Complexity: brief/low complexity  Assessment, Occupational Performance/Identification of Deficit Complexity: 1-3 performance deficits  Clinical Decision Making Complexity (OT): problem focused assessment/low complexity  Overall Complexity of Evaluation (OT): low complexity     Time Calculation- OT       Row Name 07/29/24 1516             Time Calculation- OT    OT Start Time 1014  -KF      OT Received On 07/29/24  -KF      OT Goal Re-Cert Due Date 08/08/24  -KF         Timed Charges    66696 - OT Therapeutic Activity Minutes 3  -KF      40671 - OT Self Care/Mgmt Minutes 10  -KF         Untimed Charges    OT Eval/Re-eval Minutes 33  -KF         Total Minutes    Timed Charges Total Minutes 13  -KF      Untimed Charges Total Minutes 33  -KF       Total Minutes 46  -KF                User Key  (r) = Recorded By, (t) = Taken By, (c) = Cosigned By      Initials Name Provider Type    KF Aisha Perea OT Occupational Therapist                  Therapy Charges for Today       Code Description Service Date Service Provider Modifiers Qty    [id number]  OT EVAL LOW COMPLEXITY 3 7/29/2024 Aisha Perea OT GO 1    [id number]  OT SELF CARE/MGMT/TRAIN EA 15 MIN 7/29/2024 Aisha Perea OT GO 1                 Aisha Perea OT  7/29/2024

## 2024-07-29 NOTE — THERAPY EVALUATION
Patient Name: Chey Robertson  : 1935    MRN: [id number]                              Today's Date: 2024       Admit Date: 2024    Visit Dx:     ICD-10-CM ICD-9-CM   1. DISHA (acute kidney injury)  N17.9 584.9   2. Generalized weakness  R53.1 780.79   3. At high risk for falls  Z91.81 V15.88   4. Dysphagia, unspecified type  R13.10 787.20   5. Hyperglycemia  R73.9 790.29     Patient Active Problem List   Diagnosis    Benign familial tremor    AMS (altered mental status)    Pancytopenia    Hypothyroidism (acquired)    B12 deficiency    Abnormal intestinal absorption    Iron deficiency anemia due to chronic blood loss    Myelodysplasia (myelodysplastic syndrome)    Personal history of pulmonary embolism    Chronic anticoagulation    Essential hypertension    Type 2 diabetes mellitus without complication, without long-term current use of insulin    Atrial fibrillation    QT prolongation    Pericardial effusion    Severe malnutrition    Closed displaced intertrochanteric fracture of right femur, initial encounter    ARF (acute renal failure)    Diarrhea     Past Medical History:   Diagnosis Date    A-fib     on eliquis    Anemia     Arthritis     Diabetes mellitus     checks sugar only when pt wants to     Disease of thyroid gland     History of transfusion     with knee surgery-  no reaction recalled.     [place] (hard of hearing)     no hearing aids    Hypertension     Migraine without aura and without status migrainosus, not intractable 2016    Myelodysplastic syndrome     Pulmonary emboli     (after knee surgery)    SOBOE (shortness of breath on exertion)     Tremors of nervous system     Vertigo     Wears dentures     upper     Wears glasses      Past Surgical History:   Procedure Laterality Date    BLADDER SURGERY      CATARACT EXTRACTION      bilat     CHOLECYSTECTOMY      COLONOSCOPY      HIP TROCHANTERIC NAILING WITH INTRAMEDULLARY HIP SCREW Right 2023    Procedure: HIP TROCHANTERIC  NAILING WITH INTRAMEDULLARY HIP SCREW RIGHT;  Surgeon: Augie Hobbs MD;  Location:  [facility];  Service: Orthopedics;  Laterality: Right;    HYSTERECTOMY      with bso    KYPHOPLASTY N/A 02/12/2021    Procedure: KYPHOPLASTY T11, T12 AND L4;  Surgeon: Matty Hearn MD;  Location:  [facility];  Service: Orthopedic Spine;  Laterality: N/A;    TONSILLECTOMY        General Information       Row Name 07/29/24 1457          Physical Therapy Time and Intention    Document Type evaluation  -KG     Mode of Treatment physical therapy  -KG       Row Name 07/29/24 1457          General Information    Patient Profile Reviewed yes  -KG     Prior Level of Function independent:;all household mobility;gait;transfer;min assist:;mod assist:;ADL's;dressing;bathing  -KG     Existing Precautions/Restrictions fall;other (see comments)  confusion  -KG     Barriers to Rehab medically complex;previous functional deficit;cognitive status  -KG       Row Name 07/29/24 1457          Living Environment    People in Home facility resident  -KG       Row Name 07/29/24 1457          Home Main Entrance    Number of Stairs, Main Entrance none  -KG       Row Name 07/29/24 1457          Stairs Within Home, Primary    Number of Stairs, Within Home, Primary none  -KG       Row Name 07/29/24 1457          Cognition    Orientation Status (Cognition) oriented x 3  -KG       Row Name 07/29/24 1457          Safety Issues, Functional Mobility    Safety Issues Affecting Function (Mobility) ability to follow commands;awareness of need for assistance;insight into deficits/self-awareness;safety precaution awareness;safety precautions follow-through/compliance;sequencing abilities  -KG     Impairments Affecting Function (Mobility) balance;cognition;coordination;endurance/activity tolerance;postural/trunk control;strength  -KG     Cognitive Impairments, Mobility Safety/Performance awareness, need for assistance;insight into deficits/self-awareness;safety  precaution awareness;safety precaution follow-through;sequencing abilities  -KG               User Key  (r) = Recorded By, (t) = Taken By, (c) = Cosigned By      Initials Name Provider Type    Jen Ray PT Physical Therapist                   Mobility       Row Name 07/29/24 1458          Bed Mobility    Comment, (Bed Mobility) UIC  -KG       Row Name 07/29/24 1458          Transfers    Comment, (Transfers) VC's for sequencing and safe hand placement.  -KG       Row Name 07/29/24 1458          Sit-Stand Transfer    Sit-Stand [place] (Transfers) standby assist;verbal cues  -KG     Assistive Device (Sit-Stand Transfers) walker, front-wheeled  -KG       Row Name 07/29/24 1458          Gait/Stairs (Locomotion)    [place] Level (Gait) contact guard;verbal cues  -KG     Assistive Device (Gait) walker, front-wheeled  -KG     Distance in Feet (Gait) 10  -KG     Deviations/Abnormal Patterns (Gait) base of support, narrow;[place] decreased;stride length decreased  -KG     Bilateral Gait Deviations forward flexed posture  -KG     Comment, (Gait/Stairs) Pt demonstrated step through gait pattern with slow carlton and decreased step length. VC's for upright posture. Pt demonstrated adequate balance and stability. Declined additional ambulation despite encouragement. Limited by fatigue.  -KG               User Key  (r) = Recorded By, (t) = Taken By, (c) = Cosigned By      Initials Name Provider Type    Jen Ray PT Physical Therapist                   Obj/Interventions       Row Name 07/29/24 1459          Range of Motion Comprehensive    General Range of Motion no range of motion deficits identified  -KG     Comment, General Range of Motion B LE WFL  -KG       Row Name 07/29/24 1459          Strength Comprehensive (MMT)    Comment, General Manual Muscle Testing (MMT) Assessment B LE grossly 3+/5  -KG       Row Name 07/29/24 1459          Balance    Balance Assessment sitting static  balance;standing static balance;standing dynamic balance  -KG     Static Sitting Balance supervision  -KG     Position, Sitting Balance unsupported;sitting in chair  -KG     Static Standing Balance standby assist  -KG     Dynamic Standing Balance contact guard  -KG     Position/Device Used, Standing Balance supported;walker, rolling  -KG       Row Name 07/29/24 1459          Sensory Assessment (Somatosensory)    Sensory Assessment (Somatosensory) LE sensation intact  -KG               User Key  (r) = Recorded By, (t) = Taken By, (c) = Cosigned By      Initials Name Provider Type    KG Jen Leos N, PT Physical Therapist                   Goals/Plan       Row Name 07/29/24 1501          Bed Mobility Goal 1 (PT)    Activity/Assistive Device (Bed Mobility Goal 1, PT) sit to supine;supine to sit  -KG     [place] Level/Cues Needed (Bed Mobility Goal 1, PT) independent  -KG     Time Frame (Bed Mobility Goal 1, PT) short term goal (STG);3 days  -KG     Progress/Outcomes (Bed Mobility Goal 1, PT) goal ongoing  -KG       Row Name 07/29/24 1501          Transfer Goal 1 (PT)    Activity/Assistive Device (Transfer Goal 1, PT) sit-to-stand/stand-to-sit;bed-to-chair/chair-to-bed;walker, rolling  -KG     [place] Level/Cues Needed (Transfer Goal 1, PT) modified independence  -KG     Time Frame (Transfer Goal 1, PT) long term goal (LTG);1 week  -KG     Progress/Outcome (Transfer Goal 1, PT) goal ongoing  -KG       Row Name 07/29/24 1501          Gait Training Goal 1 (PT)    Activity/Assistive Device (Gait Training Goal 1, PT) gait (walking locomotion);assistive device use;walker, rolling  -KG     [place] Level (Gait Training Goal 1, PT) modified independence  -KG     Distance (Gait Training Goal 1, PT) 200 feet  -KG     Time Frame (Gait Training Goal 1, PT) long term goal (LTG);1 week  -KG     Progress/Outcome (Gait Training Goal 1, PT) goal ongoing  -KG       Row Name 07/29/24 1501          Therapy  Assessment/Plan (PT)    Planned Therapy Interventions (PT) balance training;bed mobility training;gait training;strengthening;transfer training  -KG               User Key  (r) = Recorded By, (t) = Taken By, (c) = Cosigned By      Initials Name Provider Type    KG Jen Leos N, PT Physical Therapist                   Clinical Impression       Row Name 07/29/24 1459          Pain    Pretreatment Pain Rating 0/10 - no pain  -KG     Posttreatment Pain Rating 0/10 - no pain  -KG       Row Name 07/29/24 1459          Plan of Care Review    Plan of Care Reviewed With patient  -KG     Outcome Evaluation PT initial evaluation completed for pt presenting with generalized weakness, mild balance deficits, situational confusion, and decreased functional mobility. Pt ambulated 10ft with RW and CGA. Pt's decreased independence warrants PT skilled care. Recommend pt return to [facility] with  OT/PT services at discharge.  -KG       [facility] Name 07/29/24 145          Therapy Assessment/Plan (PT)    Patient/Family Therapy Goals Statement (PT) return to PLOF  -KG     Rehab Potential (PT) good, to achieve stated therapy goals  -KG     Criteria for Skilled Interventions Met (PT) yes;skilled treatment is necessary  -KG     Therapy Frequency (PT) daily  -KG     Predicted Duration of Therapy Intervention (PT) 7 days  -KG       [facility] Name 07/29/24 1453          Vital Signs    Pre Systolic BP Rehab 149  -KG     Pre Treatment Diastolic BP 63  -KG     Pretreatment Heart Rate (beats/min) 50  -KG     Posttreatment Heart Rate (beats/min) 51  -KG     Pre SpO2 (%) 96  -KG     O2 Delivery Pre Treatment room air  -KG     Post SpO2 (%) 99  -KG     O2 Delivery Post Treatment room air  -KG     Pre Patient Position Sitting  -KG     Intra Patient Position Standing  -KG     Post Patient Position Sitting  -KG       [facility] Name 07/29/24 1456          Positioning and Restraints    Pre-Treatment Position bedside commode  -KG     Post Treatment Position chair  -KG      In Chair notified nsg;reclined;call light within reach;encouraged to call for assist;exit alarm on;legs elevated  -KG               User Key  (r) = Recorded By, (t) = Taken By, (c) = Cosigned By      Initials Name Provider Type    Jen Ray PT Physical Therapist                   Outcome Measures       Row Name 07/29/24 1502 07/29/24 0400       How much help from another person do you currently need...    Turning from your back to your side while in flat bed without using bedrails? 3  -KG 3  -TS    Moving from lying on back to sitting on the side of a flat bed without bedrails? 3  -KG 3  -TS    Moving to and from a bed to a chair (including a wheelchair)? 3  -KG 3  -TS    Standing up from a chair using your arms (e.g., wheelchair, bedside chair)? 3  -KG 2  -TS    Climbing 3-5 steps with a railing? 2  -KG 2  -TS    To walk in hospital room? 3  -KG 3  -TS    AM-PAC 6 Clicks Score (PT) 17  -KG 16  -TS    Highest Level of Mobility Goal 5 --> Static standing  -KG 5 --> Static standing  -TS      Row Name 07/29/24 1502          Functional Assessment    Outcome Measure Options AM-PAC 6 Clicks Basic Mobility (PT)  -KG               User Key  (r) = Recorded By, (t) = Taken By, (c) = Cosigned By      Initials Name Provider Type    Jen Ray, PT Physical Therapist    Matty Morrison, RN Registered Nurse                                 Physical Therapy Education       Title: PT OT SLP Therapies (In Progress)       Topic: Physical Therapy (In Progress)       Point: Mobility training (In Progress)       Learning Progress Summary             Patient Acceptance, E, NR by KG at 7/29/2024 1026                         Point: Home exercise program (Not Started)       Learner Progress:  Not documented in this visit.              Point: Body mechanics (In Progress)       Learning Progress Summary             Patient Acceptance, E, NR by KG at 7/29/2024 1026                         Point:  Precautions (In Progress)       Learning Progress Summary             Patient Acceptance, E, NR by KG at 7/29/2024 1026                                         User Key       Initials Effective Dates Name Provider Type Discipline    KG 05/22/20 -  Jen Leos, PT Physical Therapist PT                  PT Recommendation and Plan  Planned Therapy Interventions (PT): balance training, bed mobility training, gait training, strengthening, transfer training  Plan of Care Reviewed With: patient  Outcome Evaluation: PT initial evaluation completed for pt presenting with generalized weakness, mild balance deficits, situational confusion, and decreased functional mobility. Pt ambulated 10ft with RW and CGA. Pt's decreased independence warrants PT skilled care. Recommend pt return to [facility] with  OT/PT services at discharge.     Time Calculation:   PT Evaluation Complexity  History, PT Evaluation Complexity: 1-2 personal factors and/or comorbidities  Examination of Body Systems (PT Eval Complexity): total of 3 or more elements  Clinical Presentation (PT Evaluation Complexity): stable  Clinical Decision Making (PT Evaluation Complexity): low complexity  Overall Complexity (PT Evaluation Complexity): low complexity     PT Charges       Row Name 07/29/24 1026             Time Calculation    Start Time 1026  -KG      PT Received On 07/29/24  -KG      PT Goal Re-Cert Due Date 08/08/24  -KG         Untimed Charges    PT Eval/Re-eval Minutes 46  -KG         Total Minutes    Untimed Charges Total Minutes 46  -KG       Total Minutes 46  -KG                User Key  (r) = Recorded By, (t) = Taken By, (c) = Cosigned By      Initials Name Provider Type    KG Jen Leos, PT Physical Therapist                  Therapy Charges for Today       Code Description Service Date Service Provider Modifiers Qty    [id number]  PT EVAL LOW COMPLEXITY 4 7/29/2024 Jen Leos, PT GP 1            PT G-Codes  Outcome  Measure Options: AM-PAC 6 Clicks Basic Mobility (PT)  AM-PAC 6 Clicks Score (PT): 17  PT Discharge Summary  Anticipated Discharge Disposition (PT): assisted living, home with home health    Joanna Leos, PT  7/29/2024

## 2024-07-29 NOTE — CASE MANAGEMENT/SOCIAL WORK
Discharge Planning Assessment  [facility]     Patient Name: Chey Robertson  MRN: [id number]  Today's Date: 7/29/2024    Admit Date: 7/27/2024    Plan: HOME with HH   Discharge Needs Assessment    No documentation.                  Discharge Plan       Row Name 07/29/24 1559       Plan    Plan HOME with     Patient/Family in Agreement with Plan yes    Plan Comments Met with pt at bedside.  She lives at [facility].  Uses a cane or rollator at baseline.  Therapy recs .  Has used [facility] in the past.  Called referral to [facility].  She's checking to see if they can take an [place] [employer].  Per Tia at [facility], pt may need an on-site assessment prior to returning if she's hospitalized longer that 3-4 days.  CM will arrange with Tia if necessary ([phone]).  CM will cont to follow.    Final Discharge Disposition Code 06 - home with home health care                  Continued Care and Services - Admitted Since 7/27/2024    No active coordination exists for this encounter.       Expected Discharge Date and Time       Expected Discharge Date Expected Discharge Time    Jul 30, 2024            Demographic Summary    No documentation.                  Functional Status    No documentation.                  Psychosocial    No documentation.                  Abuse/Neglect    No documentation.                  Legal    No documentation.                  Substance Abuse    No documentation.                  Patient Forms    No documentation.                     Eloise Sands RN

## 2024-07-30 LAB
ANION GAP SERPL CALCULATED.3IONS-SCNC: 11 MMOL/L (ref 5–15)
BACTERIA UR QL AUTO: ABNORMAL /HPF
BILIRUB UR QL STRIP: NEGATIVE
BUN SERPL-MCNC: 17 MG/DL (ref 8–23)
BUN/CREAT SERPL: 21 (ref 7–25)
CALCIUM SPEC-SCNC: 8.2 MG/DL (ref 8.6–10.5)
CHLORIDE SERPL-SCNC: 104 MMOL/L (ref 98–107)
CLARITY UR: ABNORMAL
CO2 SERPL-SCNC: 26 MMOL/L (ref 22–29)
COLOR UR: YELLOW
CREAT SERPL-MCNC: 0.81 MG/DL (ref 0.57–1)
EGFRCR SERPLBLD CKD-EPI 2021: 69.9 ML/MIN/1.73
GLUCOSE BLDC GLUCOMTR-MCNC: 148 MG/DL (ref 70–130)
GLUCOSE BLDC GLUCOMTR-MCNC: 178 MG/DL (ref 70–130)
GLUCOSE BLDC GLUCOMTR-MCNC: 208 MG/DL (ref 70–130)
GLUCOSE BLDC GLUCOMTR-MCNC: 298 MG/DL (ref 70–130)
GLUCOSE SERPL-MCNC: 121 MG/DL (ref 65–99)
GLUCOSE UR STRIP-MCNC: ABNORMAL MG/DL
HGB UR QL STRIP.AUTO: NEGATIVE
HYALINE CASTS UR QL AUTO: ABNORMAL /LPF
KETONES UR QL STRIP: NEGATIVE
LEUKOCYTE ESTERASE UR QL STRIP.AUTO: ABNORMAL
NITRITE UR QL STRIP: NEGATIVE
PH UR STRIP.AUTO: <=5 [PH] (ref 5–8)
POTASSIUM SERPL-SCNC: 3.3 MMOL/L (ref 3.5–5.2)
POTASSIUM SERPL-SCNC: 3.7 MMOL/L (ref 3.5–5.2)
PROT UR QL STRIP: ABNORMAL
RBC # UR STRIP: ABNORMAL /HPF
REF LAB TEST METHOD: ABNORMAL
SODIUM SERPL-SCNC: 141 MMOL/L (ref 136–145)
SP GR UR STRIP: 1.02 (ref 1–1.03)
SQUAMOUS #/AREA URNS HPF: ABNORMAL /HPF
UROBILINOGEN UR QL STRIP: ABNORMAL
WBC # UR STRIP: ABNORMAL /HPF

## 2024-07-30 PROCEDURE — 99232 SBSQ HOSP IP/OBS MODERATE 35: CPT | Performed by: NURSE PRACTITIONER

## 2024-07-30 PROCEDURE — 25010000002 CEFTRIAXONE PER 250 MG: Performed by: NURSE PRACTITIONER

## 2024-07-30 PROCEDURE — 81001 URINALYSIS AUTO W/SCOPE: CPT | Performed by: NURSE PRACTITIONER

## 2024-07-30 PROCEDURE — 87086 URINE CULTURE/COLONY COUNT: CPT | Performed by: NURSE PRACTITIONER

## 2024-07-30 PROCEDURE — 80048 BASIC METABOLIC PNL TOTAL CA: CPT | Performed by: INTERNAL MEDICINE

## 2024-07-30 PROCEDURE — 63710000001 INSULIN LISPRO (HUMAN) PER 5 UNITS: Performed by: INTERNAL MEDICINE

## 2024-07-30 PROCEDURE — 84132 ASSAY OF SERUM POTASSIUM: CPT | Performed by: INTERNAL MEDICINE

## 2024-07-30 PROCEDURE — 82948 REAGENT STRIP/BLOOD GLUCOSE: CPT

## 2024-07-30 PROCEDURE — 63710000001 INSULIN GLARGINE PER 5 UNITS: Performed by: INTERNAL MEDICINE

## 2024-07-30 RX ADMIN — INSULIN LISPRO 1 UNITS: 100 INJECTION, SOLUTION INTRAVENOUS; SUBCUTANEOUS at 08:40

## 2024-07-30 RX ADMIN — METOPROLOL TARTRATE 50 MG: 50 TABLET, FILM COATED ORAL at 21:27

## 2024-07-30 RX ADMIN — APIXABAN 2.5 MG: 2.5 TABLET, FILM COATED ORAL at 08:42

## 2024-07-30 RX ADMIN — METOPROLOL TARTRATE 50 MG: 50 TABLET, FILM COATED ORAL at 08:42

## 2024-07-30 RX ADMIN — Medication 1 APPLICATION: at 08:50

## 2024-07-30 RX ADMIN — LEVOTHYROXINE SODIUM 100 MCG: 0.1 TABLET ORAL at 05:51

## 2024-07-30 RX ADMIN — Medication 10 ML: at 08:43

## 2024-07-30 RX ADMIN — Medication 1 CAPSULE: at 08:43

## 2024-07-30 RX ADMIN — Medication 10 ML: at 21:28

## 2024-07-30 RX ADMIN — INSULIN LISPRO 6 UNITS: 100 INJECTION, SOLUTION INTRAVENOUS; SUBCUTANEOUS at 17:56

## 2024-07-30 RX ADMIN — INSULIN LISPRO 3 UNITS: 100 INJECTION, SOLUTION INTRAVENOUS; SUBCUTANEOUS at 21:25

## 2024-07-30 RX ADMIN — AMIODARONE HYDROCHLORIDE 200 MG: 200 TABLET ORAL at 08:42

## 2024-07-30 RX ADMIN — APIXABAN 2.5 MG: 2.5 TABLET, FILM COATED ORAL at 21:27

## 2024-07-30 RX ADMIN — INSULIN GLARGINE 13 UNITS: 100 INJECTION, SOLUTION SUBCUTANEOUS at 21:27

## 2024-07-30 RX ADMIN — SODIUM CHLORIDE 1000 MG: 900 INJECTION INTRAVENOUS at 08:43

## 2024-07-30 RX ADMIN — PANTOPRAZOLE SODIUM 40 MG: 40 TABLET, DELAYED RELEASE ORAL at 12:55

## 2024-07-30 RX ADMIN — INSULIN LISPRO 5 UNITS: 100 INJECTION, SOLUTION INTRAVENOUS; SUBCUTANEOUS at 12:55

## 2024-07-30 RX ADMIN — Medication 1 APPLICATION: at 21:28

## 2024-07-30 NOTE — PLAN OF CARE
Pt awake throughout the night. Increased confusion. Provider notified. UA ordered.

## 2024-07-30 NOTE — CONSULTS
Visited Ms. Robertson at bedside for diabetes education. Too drowsy,confused. Inappropriate for education at this time. Diabetes Ed to follow

## 2024-07-30 NOTE — NURSING NOTE
WOC follow-up for assessment of sacral wound.    Patient presents with what appears to be an evolving deep tissue pressure injury to her left sacrum.  Wound measures 1 x 1x0.25 cm.  Wound is directly over the sacrum, is maroonish purple nonblanching.  There is moisture related skin breakdown and excoriation noted to the bilateral gluteals. will order Venelex to be applied to the sacrum and covered with a sacral Allevyn dressing.  Barrier cream to lower gluteals.        She is at very high risk for pressure injury development and should be turned and offloaded every 2 hours.    Isotour mattress with pump 5 being utilized.    Fermin Gutierrez RN, BSN, CCRN, CWOCN  Wound, Ostomy and Continence (WOC) Department  [facility]

## 2024-07-30 NOTE — PROGRESS NOTES
[facility] Medicine Services  PROGRESS NOTE    Patient Name: Chey Robertson  : 1935  MRN: [id number]    Date of Admission: 2024  Primary Care Physician: Yasmeen Loomis MD    Subjective   Subjective     CC:  F/u weakness    HPI:  Patient resting in bed.  Says she is feeling a little better.  Denies further nausea, vomiting, diarrhea.  Discussed that we are waiting for urine culture results.      Objective   Objective     Vital Signs:   Temp:  [97.3 °F (36.3 °C)-98 °F (36.7 °C)] 98 °F (36.7 °C)  Heart Rate:  [50-57] 51  Resp:  [16-18] 16  BP: (120-144)/(46-74) 120/46     Physical Exam:  Constitutional: No acute distress, awake, alert  HENT: NCAT, mucous membranes moist  Respiratory: Clear to auscultation bilaterally, respiratory effort normal, room air  Cardiovascular: RRR, no murmurs, rubs, or gallops  Gastrointestinal: Positive bowel sounds, soft, nontender, nondistended  Musculoskeletal: Trace bilateral ankle edema  Psychiatric: Appropriate affect, cooperative  Neurologic: Oriented x 3, moves all extremities, speech clear  Skin: No rashes, healing laceration RLE      Results Reviewed:  LAB RESULTS:      Lab 24  0708 24  0548 24  1449   WBC 1.56* 1.50* 2.01*   HEMOGLOBIN 9.2* 9.4* 10.9*   HEMATOCRIT 27.2* 27.1* 32.1*   PLATELETS 53* 56* 54*   NEUTROS ABS 0.91* 0.83* 1.72   IMMATURE GRANS (ABS) 0.01 0.04 0.03   LYMPHS ABS 0.45* 0.47* 0.19*   MONOS ABS 0.19 0.16 0.07*   EOS ABS 0.00 0.00 0.00   MCV 89.8 89.4 90.2   CRP  --   --  <0.30   PROCALCITONIN  --   --  <0.02         Lab 24  0453 24  0708 24  0547 24  1449   SODIUM 141 137 140 135*   POTASSIUM 3.3* 3.6 3.5 4.6   CHLORIDE 104 102 100 91*   CO2 26.0 26.0 30.0* 26.0   ANION GAP 11.0 9.0 10.0 18.0*   BUN 17 18 26* 26*   CREATININE 0.81 0.97 1.12* 1.49*   EGFR 69.9 56.3* 47.4* 33.6*   GLUCOSE 121* 143* 197* 502*   CALCIUM 8.2* 8.3* 8.7 9.4   MAGNESIUM  --   --  1.7 1.6   PHOSPHORUS   --   --  3.7  --    HEMOGLOBIN A1C  --   --  9.80*  --    TSH  --   --   --  1.650         Lab 07/27/24  1449   TOTAL PROTEIN 7.0   ALBUMIN 4.4   GLOBULIN 2.6   ALT (SGPT) 7   AST (SGOT) 12   BILIRUBIN 0.6   ALK PHOS 102         Lab 07/27/24  1449   HSTROP T 16*                 Brief Urine Lab Results  (Last result in the past 365 days)        Color   Clarity   Blood   Leuk Est   Nitrite   Protein   CREAT   Urine HCG        07/30/24 0549 Yellow   Cloudy   Negative   Small (1+)   Negative   Trace                   Microbiology Results Abnormal       Procedure Component Value - Date/Time    Eosinophil Smear - Urine, Urine, Clean Catch [[id number]]  (Normal) Collected: 07/27/24 1613    Lab Status: Final result Specimen: Urine, Clean Catch Updated: 07/27/24 2346     Eosinophil Smear 0 % EOS/100 Cells     Narrative:      No eosinophil seen    COVID PRE-OP / PRE-PROCEDURE SCREENING ORDER (NO ISOLATION) - Swab, Nasopharynx [[id number]]  (Normal) Collected: 07/27/24 2044    Lab Status: Final result Specimen: Swab from Nasopharynx Updated: 07/27/24 2116    Narrative:      The following orders were created for panel order COVID PRE-OP / PRE-PROCEDURE SCREENING ORDER (NO ISOLATION) - Swab, Nasopharynx.  Procedure                               Abnormality         Status                     ---------                               -----------         ------                     COVID-19 and FLU A/B PCR...[[id number]]  Normal              Final result                 Please view results for these tests on the individual orders.    COVID-19 and FLU A/B PCR, 1 HR TAT - Swab, Nasopharynx [[id number]]  (Normal) Collected: 07/27/24 2044    Lab Status: Final result Specimen: Swab from Nasopharynx Updated: 07/27/24 2116     COVID19 Not Detected     Influenza A PCR Not Detected     Influenza B PCR Not Detected    Narrative:      Fact sheet for providers: https://www.fda.gov/media/051249/download    Fact sheet for patients:  https://www.fda.gov/media/346868/download    Test performed by PCR.            FL Video Swallow With Speech Single Contrast    Result Date: 7/29/2024  FL VIDEO SWALLOW W SPEECH SINGLE-CONTRAST Date of Exam: 7/29/2024 1:17 PM EDT Indication: dysphagia.   Comparison: None available. Technique:   The speech pathologist administered food and/or liquid mixed with barium to the patient with cine/video imaging.  Imaging assistance was provided to the speech pathologist and an image was saved. Fluoroscopic Time: 1 minute and 12 seconds Number of Images: 9 associated fluoroscopic loops were saved Findings: Aspiration was seen during fluoroscopic guided modified barium swallowing series. Please see speech therapy report for full details and recommendations.     Impression: Impression: Fluoroscopy provided for a modified barium swallow. Aspiration was seen during swallowing evaluation. Please see speech therapy report for full details and recommendations. Report dictated by: Gianna Pavon PA-c  I have personally reviewed this case and agree with the findings above: Electronically Signed: Josh Lynne MD  7/29/2024 4:41 PM EDT  Workstation ID: [id number]     Results for orders placed during the hospital encounter of 06/05/23    Adult Transthoracic Echo Complete w/ Color, Spectral and Contrast if necessary per protocol    Interpretation Summary    Left ventricular systolic function is normal. Left ventricular ejection fraction appears to be 56 - 60%.    Left ventricular diastolic function was normal.    Estimated right ventricular systolic pressure from tricuspid regurgitation is normal (<35 mmHg).      Current medications:  Scheduled Meds:amiodarone, 200 mg, Oral, Daily  apixaban, 2.5 mg, Oral, BID  castor oil-balsam peru, 1 Application, Topical, Q12H  cefTRIAXone, 1,000 mg, Intravenous, Q24H  insulin glargine, 1-200 Units, Subcutaneous, Nightly - Glucommander  insulin lispro, 1-200 Units, Subcutaneous, 4x Daily With Meals &  Nightly  lactobacillus acidophilus, 1 capsule, Oral, Daily  levothyroxine, 100 mcg, Oral, Q AM  metoprolol tartrate, 50 mg, Oral, BID  pantoprazole, 40 mg, Oral, Daily  sodium chloride, 10 mL, Intravenous, Q12H      Continuous Infusions:   PRN Meds:.  acetaminophen    dextrose    dextrose    glucagon (human recombinant)    insulin lispro    melatonin    sodium chloride    sodium chloride    sodium chloride    traMADol    Assessment & Plan   Assessment & Plan     Active Hospital Problems    Diagnosis  POA    **ARF (acute renal failure) [N17.9]  Yes    Moderate malnutrition [E44.0]  Yes    Diarrhea [R19.7]  Yes    Atrial fibrillation [I48.91]  Yes    Chronic anticoagulation [Z79.01]  Not Applicable    Essential hypertension [I10]  Yes    Type 2 diabetes mellitus without complication, without long-term current use of insulin [E11.9]  Yes    Personal history of pulmonary embolism [Z86.711]  Yes    Hypothyroidism (acquired) [E03.9]  Yes      Resolved Hospital Problems   No resolved problems to display.        Brief Hospital Course to date:  Chey Robertson is a 89 y.o. female  with history of atrial fibrillation on dose-adjusted Eliquis, DVT/PE, hypothyroid, HTN, DMII, essential tremor who presented with generalized weakness, N/V and diarrhea.  Recent increase in home metformin dose. Patient is a resident in assisted-living.    This patient's problems and plans were partially entered by my partner and updated as appropriate by me 07/30/24.       UTI  -episode of confusion overnight 7/30  -UA consistent with UTI, culture pending  -Rocephin x 5 days    N/V  Diarrhea  - secondary to increased metformin dose?  - resolved     DISHA  - creatinine 1.49 on admission, likely secondary to volume loss  - baseline creatinine 0.5 to 0.6  - creatinine improved with gentle IV fluids  - AM BMP     Hypokalemia  - in setting of diarrhea, N/V  - electrolyte replacement protocol  - AM BMP, mag    HTN  - lisinopril held for DISHA, BP  acceptable     DMII  - poor control, A1c 9.8  - metformin held  - glucose reviewed, glucommander     Pancytopenia  - follows with Dr Lyman     Atrial fibrillation  H/o DVT/PE  - amiodarone, metoprolol for rate control  - Eliquis for anticoagulation    Prolonged QTc  - EKG 7/27 with QTc 506, sinus pancho  - on amiodarone, avoid other QT-prolong meds  - AM mag    Hypothyroid  - TSH appropriate  - Synthroid    Dysphagia  - MBS 7/29 showed aspiration of all consistencies.  Partner discussed with speech therapist.  Component of aspiration may involve mechanical changes from osteophytes, which may not change as patient strength improves.    - Partner discussed findings with son Joe who says Ms Robertson would not want a temporary or permanent feeding tube and thinks a comfort diet would be appropriate so that she can continue have the pleasure of eating. Reviewed risks of aspiration and pneumonia with son and patient.  - diet modification in place per speech recs  - can continue Speech Therapy after discharge     Generalized weakness  - PT/OT following       Expected Discharge Location and Transportation: Home with   Expected Discharge   Expected Discharge Date: 7/30/2024; Expected Discharge Time:      VTE Prophylaxis:  Pharmacologic VTE prophylaxis orders are present.         AM-PAC 6 Clicks Score (PT): 12 (07/29/24 2102)    CODE STATUS:   Code Status and Medical Interventions: CPR (Attempt to Resuscitate); Full Support   Ordered at: 07/27/24 2121     Level Of Support Discussed With:    Patient     Code Status (Patient has no pulse and is not breathing):    CPR (Attempt to Resuscitate)     Medical Interventions (Patient has pulse or is breathing):    Full Support       Isabel Hernandez, CHANG  07/30/24

## 2024-07-30 NOTE — SIGNIFICANT NOTE
Patient with confusion overnight.  UA consistent with UTI.  Started patient on Rocephin, urine culture pending.

## 2024-07-31 LAB
ANION GAP SERPL CALCULATED.3IONS-SCNC: 7 MMOL/L (ref 5–15)
BACTERIA SPEC AEROBE CULT: ABNORMAL
BUN SERPL-MCNC: 14 MG/DL (ref 8–23)
BUN/CREAT SERPL: 16.1 (ref 7–25)
CALCIUM SPEC-SCNC: 8.2 MG/DL (ref 8.6–10.5)
CHLORIDE SERPL-SCNC: 107 MMOL/L (ref 98–107)
CO2 SERPL-SCNC: 27 MMOL/L (ref 22–29)
CREAT SERPL-MCNC: 0.87 MG/DL (ref 0.57–1)
EGFRCR SERPLBLD CKD-EPI 2021: 64.2 ML/MIN/1.73
GLUCOSE BLDC GLUCOMTR-MCNC: 156 MG/DL (ref 70–130)
GLUCOSE BLDC GLUCOMTR-MCNC: 244 MG/DL (ref 70–130)
GLUCOSE BLDC GLUCOMTR-MCNC: 288 MG/DL (ref 70–130)
GLUCOSE BLDC GLUCOMTR-MCNC: 294 MG/DL (ref 70–130)
GLUCOSE SERPL-MCNC: 145 MG/DL (ref 65–99)
MAGNESIUM SERPL-MCNC: 1.7 MG/DL (ref 1.6–2.4)
POTASSIUM SERPL-SCNC: 3.6 MMOL/L (ref 3.5–5.2)
POTASSIUM SERPL-SCNC: 4.8 MMOL/L (ref 3.5–5.2)
SODIUM SERPL-SCNC: 141 MMOL/L (ref 136–145)

## 2024-07-31 PROCEDURE — 99232 SBSQ HOSP IP/OBS MODERATE 35: CPT | Performed by: NURSE PRACTITIONER

## 2024-07-31 PROCEDURE — 84132 ASSAY OF SERUM POTASSIUM: CPT | Performed by: INTERNAL MEDICINE

## 2024-07-31 PROCEDURE — 97530 THERAPEUTIC ACTIVITIES: CPT

## 2024-07-31 PROCEDURE — 80048 BASIC METABOLIC PNL TOTAL CA: CPT | Performed by: NURSE PRACTITIONER

## 2024-07-31 PROCEDURE — 63710000001 INSULIN GLARGINE PER 5 UNITS: Performed by: INTERNAL MEDICINE

## 2024-07-31 PROCEDURE — 63710000001 INSULIN LISPRO (HUMAN) PER 5 UNITS: Performed by: INTERNAL MEDICINE

## 2024-07-31 PROCEDURE — 83735 ASSAY OF MAGNESIUM: CPT | Performed by: NURSE PRACTITIONER

## 2024-07-31 PROCEDURE — 82948 REAGENT STRIP/BLOOD GLUCOSE: CPT

## 2024-07-31 PROCEDURE — 25010000002 CEFTRIAXONE PER 250 MG: Performed by: NURSE PRACTITIONER

## 2024-07-31 RX ORDER — POTASSIUM CHLORIDE 20 MEQ/1
40 TABLET, EXTENDED RELEASE ORAL EVERY 4 HOURS
Status: COMPLETED | OUTPATIENT
Start: 2024-07-31 | End: 2024-07-31

## 2024-07-31 RX ADMIN — INSULIN LISPRO 3 UNITS: 100 INJECTION, SOLUTION INTRAVENOUS; SUBCUTANEOUS at 20:56

## 2024-07-31 RX ADMIN — ACETAMINOPHEN 650 MG: 325 TABLET ORAL at 20:57

## 2024-07-31 RX ADMIN — ACETAMINOPHEN 650 MG: 325 TABLET ORAL at 10:35

## 2024-07-31 RX ADMIN — INSULIN GLARGINE 13 UNITS: 100 INJECTION, SOLUTION SUBCUTANEOUS at 20:56

## 2024-07-31 RX ADMIN — POTASSIUM CHLORIDE 40 MEQ: 1500 TABLET, EXTENDED RELEASE ORAL at 08:04

## 2024-07-31 RX ADMIN — METOPROLOL TARTRATE 50 MG: 50 TABLET, FILM COATED ORAL at 20:57

## 2024-07-31 RX ADMIN — Medication 1 CAPSULE: at 08:04

## 2024-07-31 RX ADMIN — POTASSIUM CHLORIDE 40 MEQ: 1500 TABLET, EXTENDED RELEASE ORAL at 05:40

## 2024-07-31 RX ADMIN — INSULIN LISPRO 3 UNITS: 100 INJECTION, SOLUTION INTRAVENOUS; SUBCUTANEOUS at 07:55

## 2024-07-31 RX ADMIN — METOPROLOL TARTRATE 50 MG: 50 TABLET, FILM COATED ORAL at 08:05

## 2024-07-31 RX ADMIN — APIXABAN 2.5 MG: 2.5 TABLET, FILM COATED ORAL at 08:05

## 2024-07-31 RX ADMIN — Medication 1 APPLICATION: at 08:05

## 2024-07-31 RX ADMIN — Medication 10 ML: at 20:58

## 2024-07-31 RX ADMIN — PANTOPRAZOLE SODIUM 40 MG: 40 TABLET, DELAYED RELEASE ORAL at 10:35

## 2024-07-31 RX ADMIN — SODIUM CHLORIDE 1000 MG: 900 INJECTION INTRAVENOUS at 08:06

## 2024-07-31 RX ADMIN — INSULIN LISPRO 7 UNITS: 100 INJECTION, SOLUTION INTRAVENOUS; SUBCUTANEOUS at 11:51

## 2024-07-31 RX ADMIN — LEVOTHYROXINE SODIUM 100 MCG: 0.1 TABLET ORAL at 05:33

## 2024-07-31 RX ADMIN — APIXABAN 2.5 MG: 2.5 TABLET, FILM COATED ORAL at 20:58

## 2024-07-31 RX ADMIN — Medication 1 APPLICATION: at 20:58

## 2024-07-31 RX ADMIN — AMIODARONE HYDROCHLORIDE 200 MG: 200 TABLET ORAL at 08:05

## 2024-07-31 RX ADMIN — INSULIN LISPRO 11 UNITS: 100 INJECTION, SOLUTION INTRAVENOUS; SUBCUTANEOUS at 16:40

## 2024-07-31 RX ADMIN — Medication 10 ML: at 08:06

## 2024-07-31 NOTE — PROGRESS NOTES
[facility] Medicine Services  PROGRESS NOTE    Patient Name: Chey Robertson  : 1935  MRN: [id number]    Date of Admission: 2024  Primary Care Physician: Yasmeen Loomis MD    Subjective   Subjective     CC:  F/u weakness    HPI:  Patient up in chair. No overnight issues. Tells me she feels good and wants to return home.      Objective   Objective     Vital Signs:   Temp:  [97.9 °F (36.6 °C)-98.5 °F (36.9 °C)] 98.3 °F (36.8 °C)  Heart Rate:  [48-58] 56  Resp:  [16-18] 18  BP: (126-152)/(54-66) 128/54     Physical Exam:  Constitutional: No acute distress, awake, alert  HENT: NCAT, mucous membranes moist  Respiratory: Clear to auscultation bilaterally, respiratory effort normal   Cardiovascular: RRR  Gastrointestinal: Positive bowel sounds, soft, nontender, nondistended  Musculoskeletal: No bilateral ankle edema  Psychiatric: Appropriate affect, cooperative  Neurologic: Oriented x 3, JOYCE, speech clear  Skin: No rashes        Results Reviewed:  LAB RESULTS:      Lab 24  0708 24  0548 24  1449   WBC 1.56* 1.50* 2.01*   HEMOGLOBIN 9.2* 9.4* 10.9*   HEMATOCRIT 27.2* 27.1* 32.1*   PLATELETS 53* 56* 54*   NEUTROS ABS 0.91* 0.83* 1.72   IMMATURE GRANS (ABS) 0.01 0.04 0.03   LYMPHS ABS 0.45* 0.47* 0.19*   MONOS ABS 0.19 0.16 0.07*   EOS ABS 0.00 0.00 0.00   MCV 89.8 89.4 90.2   CRP  --   --  <0.30   PROCALCITONIN  --   --  <0.02         Lab 24  1402 24  0445 24  1413 24  0453 24  0708 24  0547 24  1449   SODIUM  --  141  --  141 137 140 135*   POTASSIUM 4.8 3.6 3.7 3.3* 3.6 3.5 4.6   CHLORIDE  --  107  --  104 102 100 91*   CO2  --  27.0  --  26.0 26.0 30.0* 26.0   ANION GAP  --  7.0  --  11.0 9.0 10.0 18.0*   BUN  --  14  --  17 18 26* 26*   CREATININE  --  0.87  --  0.81 0.97 1.12* 1.49*   EGFR  --  64.2  --  69.9 56.3* 47.4* 33.6*   GLUCOSE  --  145*  --  121* 143* 197* 502*   CALCIUM  --  8.2*  --  8.2* 8.3* 8.7 9.4    MAGNESIUM  --  1.7  --   --   --  1.7 1.6   PHOSPHORUS  --   --   --   --   --  3.7  --    HEMOGLOBIN A1C  --   --   --   --   --  9.80*  --    TSH  --   --   --   --   --   --  1.650         Lab 07/27/24  1449   TOTAL PROTEIN 7.0   ALBUMIN 4.4   GLOBULIN 2.6   ALT (SGPT) 7   AST (SGOT) 12   BILIRUBIN 0.6   ALK PHOS 102         Lab 07/27/24  1449   HSTROP T 16*                 Brief Urine Lab Results  (Last result in the past 365 days)        Color   Clarity   Blood   Leuk Est   Nitrite   Protein   CREAT   Urine HCG        07/30/24 0549 Yellow   Cloudy   Negative   Small (1+)   Negative   Trace                   Microbiology Results Abnormal       Procedure Component Value - Date/Time    Eosinophil Smear - Urine, Urine, Clean Catch [[id number]]  (Normal) Collected: 07/27/24 1613    Lab Status: Final result Specimen: Urine, Clean Catch Updated: 07/27/24 2346     Eosinophil Smear 0 % EOS/100 Cells     Narrative:      No eosinophil seen    COVID PRE-OP / PRE-PROCEDURE SCREENING ORDER (NO ISOLATION) - Swab, Nasopharynx [[id number]]  (Normal) Collected: 07/27/24 2044    Lab Status: Final result Specimen: Swab from Nasopharynx Updated: 07/27/24 2116    Narrative:      The following orders were created for panel order COVID PRE-OP / PRE-PROCEDURE SCREENING ORDER (NO ISOLATION) - Swab, Nasopharynx.  Procedure                               Abnormality         Status                     ---------                               -----------         ------                     COVID-19 and FLU A/B PCR...[[id number]]  Normal              Final result                 Please view results for these tests on the individual orders.    COVID-19 and FLU A/B PCR, 1 HR TAT - Swab, Nasopharynx [[id number]]  (Normal) Collected: 07/27/24 2044    Lab Status: Final result Specimen: Swab from Nasopharynx Updated: 07/27/24 2116     COVID19 Not Detected     Influenza A PCR Not Detected     Influenza B PCR Not Detected    Narrative:      Fact sheet  for providers: https://www.fda.gov/media/720358/download    Fact sheet for patients: https://www.fda.gov/media/859027/download    Test performed by PCR.            No radiology results from the last 24 hrs    Results for orders placed during the hospital encounter of 06/05/23    Adult Transthoracic Echo Complete w/ Color, Spectral and Contrast if necessary per protocol    Interpretation Summary    Left ventricular systolic function is normal. Left ventricular ejection fraction appears to be 56 - 60%.    Left ventricular diastolic function was normal.    Estimated right ventricular systolic pressure from tricuspid regurgitation is normal (<35 mmHg).      Current medications:  Scheduled Meds:amiodarone, 200 mg, Oral, Daily  apixaban, 2.5 mg, Oral, BID  castor oil-balsam peru, 1 Application, Topical, Q12H  insulin glargine, 1-200 Units, Subcutaneous, Nightly - Glucommander  insulin lispro, 1-200 Units, Subcutaneous, 4x Daily With Meals & Nightly  lactobacillus acidophilus, 1 capsule, Oral, Daily  levothyroxine, 100 mcg, Oral, Q AM  metoprolol tartrate, 50 mg, Oral, BID  pantoprazole, 40 mg, Oral, Daily  sodium chloride, 10 mL, Intravenous, Q12H      Continuous Infusions:   PRN Meds:.  acetaminophen    dextrose    dextrose    glucagon (human recombinant)    insulin lispro    Magnesium Standard Dose Replacement - Follow Nurse / BPA Driven Protocol    melatonin    Potassium Replacement - Follow Nurse / BPA Driven Protocol    sodium chloride    sodium chloride    sodium chloride    traMADol    Assessment & Plan   Assessment & Plan     Active Hospital Problems    Diagnosis  POA    **ARF (acute renal failure) [N17.9]  Yes    Moderate malnutrition [E44.0]  Yes    Diarrhea [R19.7]  Yes    Atrial fibrillation [I48.91]  Yes    Chronic anticoagulation [Z79.01]  Not Applicable    Essential hypertension [I10]  Yes    Type 2 diabetes mellitus without complication, without long-term current use of insulin [E11.9]  Yes    Personal  history of pulmonary embolism [Z86.711]  Yes    Hypothyroidism (acquired) [E03.9]  Yes      Resolved Hospital Problems   No resolved problems to display.        Brief Hospital Course to date:  Chey Robertson is a 89 y.o. female  with history of atrial fibrillation on dose-adjusted Eliquis, DVT/PE, hypothyroid, HTN, DMII, essential tremor who presented with generalized weakness, N/V and diarrhea.  Recent increase in home metformin dose. Patient is a resident in assisted-living.    This patient's problems and plans were partially entered by my partner and updated as appropriate by me 07/31/24.       N/V  Diarrhea  - secondary to increased metformin dose?  - resolved. Now tolerating PO      DISHA- resolved  - creatinine 1.49 on admission, likely secondary to volume loss  - baseline creatinine 0.5 to 0.6- now WNL  - creatinine improved with gentle IV fluids    UTI- ruled out  -episode of confusion overnight 7/30  -UA consistent with UTI, culture with lactobacillus  -Rocephin given x 2- will discontinue for negative culture and no symptoms/ return to baseline     Hypokalemia  - in setting of diarrhea, N/V  - electrolyte replacement protocol    HTN  - lisinopril held for DISHA, BP acceptable. Will continue to hold     DMII  - poor control, A1c 9.8  - metformin held  - glucose reviewed, glucommander     Pancytopenia  - follows with Dr Lyman     Atrial fibrillation  H/o DVT/PE  - amiodarone, metoprolol for rate control  - Eliquis for anticoagulation    Prolonged QTc  - EKG 7/27 with QTc 506, sinus pancho  - on amiodarone, avoid other QT-prolong meds    Hypothyroid  - TSH appropriate  - Synthroid    Dysphagia  - MBS 7/29 showed aspiration of all consistencies.  Partner discussed with speech therapist.  Component of aspiration may involve mechanical changes from osteophytes, which may not change as patient strength improves.    - continued discussion with patient and son Joe- patient does not wish for any type of feeding  tube/ artificial nutrition. Family/ Patient have opted for comfort diet with continued work with SLP. SLP has recommended safest comfort diet of mech soft, NTL     Generalized weakness  - PT/OT to reeval    Dispo: unfortunately patient's CORRINE, William, can not accept patient on thickened liquid diet. Have discussed with family who want to see if she will qualify for acute rehab while they decide if they want to try to find alternate facility vs change back to liquid diet       Expected Discharge Location and Transportation: half-way vs rehab  Expected Discharge   Expected Discharge Date: 8/1/2024; Expected Discharge Time:      VTE Prophylaxis:  Pharmacologic VTE prophylaxis orders are present.         AM-PAC 6 Clicks Score (PT): 12 (07/30/24 2000)    CODE STATUS:   Code Status and Medical Interventions: CPR (Attempt to Resuscitate); Full Support   Ordered at: 07/27/24 2121     Level Of Support Discussed With:    Patient     Code Status (Patient has no pulse and is not breathing):    CPR (Attempt to Resuscitate)     Medical Interventions (Patient has pulse or is breathing):    Full Support       Kimber March, APRN  07/31/24

## 2024-07-31 NOTE — DISCHARGE PLACEMENT REQUEST
"Elisa Venegas (89 y.o. Female)       Date of Birth   1935    Social Security Number       Address   [address] [place] [address]    Home Phone   [phone]    MRN   [id number]       Shinto   Jainism    Marital Status                               Admission Date   24    Admission Type   Emergency    Admitting Provider   Jess Duarte MD    Attending Provider   Jess Duarte MD    Department, Room/Bed   [facility] 4G, S448/1       Discharge Date       Discharge Disposition       Discharge Destination                                 Attending Provider: Jess Duarte MD    Allergies: Aspirin    Isolation: None   Infection: None   Code Status: CPR    Ht: 160 cm (63\")   Wt: 51.9 kg (114 lb 8 oz)    Admission Cmt: None   Principal Problem: ARF (acute renal failure) [N17.9]                   Active Insurance as of 2024       Primary Coverage       Payor Plan Insurance Group Employer/Plan Group    ANTHEM MEDICARE REPLACEMENT ANTHEM MEDICARE ADVANTAGE [id number]       Payor Plan Address Payor Plan Phone Number Payor Plan Fax Number Effective Dates    [address] [phone]  2024 - None Entered    [facility] [id number]         Subscriber Name Subscriber Birth Date Member ID       ELISA VENEGAS 1935 [id number]                     Emergency Contacts        (Rel.) Home Phone Work Phone Mobile Phone    Joe Venegas (Son) -- -- [phone]    Albin Venegas (Son) [phone] -- --    DORIS VENEGAS (Relative) -- -- [phone]                 Physician Progress Notes (most recent note)        Isabel Hernandez APRN at 24 0840              [facility] Medicine Services  PROGRESS NOTE    Patient Name: Elisa Venegas  : 1935  MRN: [id number]    Date of Admission: 2024  Primary Care Physician: Yasmeen Loomis MD    Subjective   Subjective     CC:  F/u weakness    HPI:  Patient " resting in bed.  Says she is feeling a little better.  Denies further nausea, vomiting, diarrhea.  Discussed that we are waiting for urine culture results.      Objective   Objective     Vital Signs:   Temp:  [97.3 °F (36.3 °C)-98 °F (36.7 °C)] 98 °F (36.7 °C)  Heart Rate:  [50-57] 51  Resp:  [16-18] 16  BP: (120-144)/(46-74) 120/46     Physical Exam:  Constitutional: No acute distress, awake, alert  HENT: NCAT, mucous membranes moist  Respiratory: Clear to auscultation bilaterally, respiratory effort normal, room air  Cardiovascular: RRR, no murmurs, rubs, or gallops  Gastrointestinal: Positive bowel sounds, soft, nontender, nondistended  Musculoskeletal: Trace bilateral ankle edema  Psychiatric: Appropriate affect, cooperative  Neurologic: Oriented x 3, moves all extremities, speech clear  Skin: No rashes, healing laceration RLE      Results Reviewed:  LAB RESULTS:      Lab 07/29/24  0708 07/28/24  0548 07/27/24  1449   WBC 1.56* 1.50* 2.01*   HEMOGLOBIN 9.2* 9.4* 10.9*   HEMATOCRIT 27.2* 27.1* 32.1*   PLATELETS 53* 56* 54*   NEUTROS ABS 0.91* 0.83* 1.72   IMMATURE GRANS (ABS) 0.01 0.04 0.03   LYMPHS ABS 0.45* 0.47* 0.19*   MONOS ABS 0.19 0.16 0.07*   EOS ABS 0.00 0.00 0.00   MCV 89.8 89.4 90.2   CRP  --   --  <0.30   PROCALCITONIN  --   --  <0.02         Lab 07/30/24  0453 07/29/24  0708 07/28/24  0547 07/27/24  1449   SODIUM 141 137 140 135*   POTASSIUM 3.3* 3.6 3.5 4.6   CHLORIDE 104 102 100 91*   CO2 26.0 26.0 30.0* 26.0   ANION GAP 11.0 9.0 10.0 18.0*   BUN 17 18 26* 26*   CREATININE 0.81 0.97 1.12* 1.49*   EGFR 69.9 56.3* 47.4* 33.6*   GLUCOSE 121* 143* 197* 502*   CALCIUM 8.2* 8.3* 8.7 9.4   MAGNESIUM  --   --  1.7 1.6   PHOSPHORUS  --   --  3.7  --    HEMOGLOBIN A1C  --   --  9.80*  --    TSH  --   --   --  1.650         Lab 07/27/24  1449   TOTAL PROTEIN 7.0   ALBUMIN 4.4   GLOBULIN 2.6   ALT (SGPT) 7   AST (SGOT) 12   BILIRUBIN 0.6   ALK PHOS 102         Lab 07/27/24  1449   HSTROP T 16*                  Brief Urine Lab Results  (Last result in the past 365 days)        Color   Clarity   Blood   Leuk Est   Nitrite   Protein   CREAT   Urine HCG        07/30/24 0549 Yellow   Cloudy   Negative   Small (1+)   Negative   Trace                   Microbiology Results Abnormal       Procedure Component Value - Date/Time    Eosinophil Smear - Urine, Urine, Clean Catch [[id number]]  (Normal) Collected: 07/27/24 1613    Lab Status: Final result Specimen: Urine, Clean Catch Updated: 07/27/24 2346     Eosinophil Smear 0 % EOS/100 Cells     Narrative:      No eosinophil seen    COVID PRE-OP / PRE-PROCEDURE SCREENING ORDER (NO ISOLATION) - Swab, Nasopharynx [[id number]]  (Normal) Collected: 07/27/24 2044    Lab Status: Final result Specimen: Swab from Nasopharynx Updated: 07/27/24 2116    Narrative:      The following orders were created for panel order COVID PRE-OP / PRE-PROCEDURE SCREENING ORDER (NO ISOLATION) - Swab, Nasopharynx.  Procedure                               Abnormality         Status                     ---------                               -----------         ------                     COVID-19 and FLU A/B PCR...[[id number]]  Normal              Final result                 Please view results for these tests on the individual orders.    COVID-19 and FLU A/B PCR, 1 HR TAT - Swab, Nasopharynx [[id number]]  (Normal) Collected: 07/27/24 2044    Lab Status: Final result Specimen: Swab from Nasopharynx Updated: 07/27/24 2116     COVID19 Not Detected     Influenza A PCR Not Detected     Influenza B PCR Not Detected    Narrative:      Fact sheet for providers: https://www.fda.gov/media/843198/download    Fact sheet for patients: https://www.fda.gov/media/355562/download    Test performed by PCR.            FL Video Swallow With Speech Single Contrast    Result Date: 7/29/2024  FL VIDEO SWALLOW W SPEECH SINGLE-CONTRAST Date of Exam: 7/29/2024 1:17 PM EDT Indication: dysphagia.   Comparison: None available. Technique:    The speech pathologist administered food and/or liquid mixed with barium to the patient with cine/video imaging.  Imaging assistance was provided to the speech pathologist and an image was saved. Fluoroscopic Time: 1 minute and 12 seconds Number of Images: 9 associated fluoroscopic loops were saved Findings: Aspiration was seen during fluoroscopic guided modified barium swallowing series. Please see speech therapy report for full details and recommendations.     Impression: Impression: Fluoroscopy provided for a modified barium swallow. Aspiration was seen during swallowing evaluation. Please see speech therapy report for full details and recommendations. Report dictated by: Gianna Pavon PA-c  I have personally reviewed this case and agree with the findings above: Electronically Signed: Josh Lynne MD  7/29/2024 4:41 PM EDT  Workstation ID: [id number]     Results for orders placed during the hospital encounter of 06/05/23    Adult Transthoracic Echo Complete w/ Color, Spectral and Contrast if necessary per protocol    Interpretation Summary    Left ventricular systolic function is normal. Left ventricular ejection fraction appears to be 56 - 60%.    Left ventricular diastolic function was normal.    Estimated right ventricular systolic pressure from tricuspid regurgitation is normal (<35 mmHg).      Current medications:  Scheduled Meds:amiodarone, 200 mg, Oral, Daily  apixaban, 2.5 mg, Oral, BID  castor oil-balsam peru, 1 Application, Topical, Q12H  cefTRIAXone, 1,000 mg, Intravenous, Q24H  insulin glargine, 1-200 Units, Subcutaneous, Nightly - Glucommander  insulin lispro, 1-200 Units, Subcutaneous, 4x Daily With Meals & Nightly  lactobacillus acidophilus, 1 capsule, Oral, Daily  levothyroxine, 100 mcg, Oral, Q AM  metoprolol tartrate, 50 mg, Oral, BID  pantoprazole, 40 mg, Oral, Daily  sodium chloride, 10 mL, Intravenous, Q12H      Continuous Infusions:   PRN Meds:.  acetaminophen    dextrose    dextrose     glucagon (human recombinant)    insulin lispro    melatonin    sodium chloride    sodium chloride    sodium chloride    traMADol    Assessment & Plan   Assessment & Plan     Active Hospital Problems    Diagnosis  POA    **ARF (acute renal failure) [N17.9]  Yes    Moderate malnutrition [E44.0]  Yes    Diarrhea [R19.7]  Yes    Atrial fibrillation [I48.91]  Yes    Chronic anticoagulation [Z79.01]  Not Applicable    Essential hypertension [I10]  Yes    Type 2 diabetes mellitus without complication, without long-term current use of insulin [E11.9]  Yes    Personal history of pulmonary embolism [Z86.711]  Yes    Hypothyroidism (acquired) [E03.9]  Yes      Resolved Hospital Problems   No resolved problems to display.        Brief Hospital Course to date:  Chey Robertson is a 89 y.o. female  with history of atrial fibrillation on dose-adjusted Eliquis, DVT/PE, hypothyroid, HTN, DMII, essential tremor who presented with generalized weakness, N/V and diarrhea.  Recent increase in home metformin dose. Patient is a resident in assisted-living.    This patient's problems and plans were partially entered by my partner and updated as appropriate by me 07/30/24.       UTI  -episode of confusion overnight 7/30  -UA consistent with UTI, culture pending  -Rocephin x 5 days    N/V  Diarrhea  - secondary to increased metformin dose?  - resolved     DISHA  - creatinine 1.49 on admission, likely secondary to volume loss  - baseline creatinine 0.5 to 0.6  - creatinine improved with gentle IV fluids  - AM BMP     Hypokalemia  - in setting of diarrhea, N/V  - electrolyte replacement protocol  - AM BMP, mag    HTN  - lisinopril held for DISHA, BP acceptable     DMII  - poor control, A1c 9.8  - metformin held  - glucose reviewed, glucommander     Pancytopenia  - follows with Dr Lyman     Atrial fibrillation  H/o DVT/PE  - amiodarone, metoprolol for rate control  - Eliquis for anticoagulation    Prolonged QTc  - EKG 7/27 with QTc 506, sinus  pancho  - on amiodarone, avoid other QT-prolong meds  - AM mag    Hypothyroid  - TSH appropriate  - Synthroid    Dysphagia  - MBS  showed aspiration of all consistencies.  Partner discussed with speech therapist.  Component of aspiration may involve mechanical changes from osteophytes, which may not change as patient strength improves.    - Partner discussed findings with son Joe who says Ms Robertson would not want a temporary or permanent feeding tube and thinks a comfort diet would be appropriate so that she can continue have the pleasure of eating. Reviewed risks of aspiration and pneumonia with son and patient.  - diet modification in place per speech recs  - can continue Speech Therapy after discharge     Generalized weakness  - PT/OT following       Expected Discharge Location and Transportation: Home with   Expected Discharge   Expected Discharge Date: 2024; Expected Discharge Time:      VTE Prophylaxis:  Pharmacologic VTE prophylaxis orders are present.         AM-PAC 6 Clicks Score (PT): 12 (24)    CODE STATUS:   Code Status and Medical Interventions: CPR (Attempt to Resuscitate); Full Support   Ordered at: 24     Level Of Support Discussed With:    Patient     Code Status (Patient has no pulse and is not breathing):    CPR (Attempt to Resuscitate)     Medical Interventions (Patient has pulse or is breathing):    Full Support       CHANG Ramirez  24        Electronically signed by Isabel Hernandez APRN at 24 1438          Physical Therapy Notes (most recent note)        Jen Leos, PT at 24 1026  Version 1 of 1         Patient Name: Chey Robertson  : 1935    MRN: [id number]                              Today's Date: 2024       Admit Date: 2024    Visit Dx:     ICD-10-CM ICD-9-CM   1. DISHA (acute kidney injury)  N17.9 584.9   2. Generalized weakness  R53.1 780.79   3. At high risk for falls  Z91.81 V15.88   4.  Dysphagia, unspecified type  R13.10 787.20   5. Hyperglycemia  R73.9 790.29     Patient Active Problem List   Diagnosis    Benign familial tremor    AMS (altered mental status)    Pancytopenia    Hypothyroidism (acquired)    B12 deficiency    Abnormal intestinal absorption    Iron deficiency anemia due to chronic blood loss    Myelodysplasia (myelodysplastic syndrome)    Personal history of pulmonary embolism    Chronic anticoagulation    Essential hypertension    Type 2 diabetes mellitus without complication, without long-term current use of insulin    Atrial fibrillation    QT prolongation    Pericardial effusion    Severe malnutrition    Closed displaced intertrochanteric fracture of right femur, initial encounter    ARF (acute renal failure)    Diarrhea     Past Medical History:   Diagnosis Date    A-fib     on eliquis    Anemia     Arthritis     Diabetes mellitus     checks sugar only when pt wants to     Disease of thyroid gland     History of transfusion     with knee surgery-  no reaction recalled.     [place] (hard of hearing)     no hearing aids    Hypertension     Migraine without aura and without status migrainosus, not intractable 12/30/2016    Myelodysplastic syndrome     Pulmonary emboli 2011    (after knee surgery)    SOBOE (shortness of breath on exertion)     Tremors of nervous system     Vertigo     Wears dentures     upper     Wears glasses      Past Surgical History:   Procedure Laterality Date    BLADDER SURGERY      CATARACT EXTRACTION      bilat     CHOLECYSTECTOMY      COLONOSCOPY      HIP TROCHANTERIC NAILING WITH INTRAMEDULLARY HIP SCREW Right 6/20/2023    Procedure: HIP TROCHANTERIC NAILING WITH INTRAMEDULLARY HIP SCREW RIGHT;  Surgeon: Augie Hobbs MD;  Location: [facility];  Service: Orthopedics;  Laterality: Right;    HYSTERECTOMY      with bso    KYPHOPLASTY N/A 02/12/2021    Procedure: KYPHOPLASTY T11, T12 AND L4;  Surgeon: Matty Hearn MD;  Location:  [facility] OR;  Service:  Orthopedic Spine;  Laterality: N/A;    TONSILLECTOMY        General Information       Row Name 07/29/24 1457          Physical Therapy Time and Intention    Document Type evaluation  -KG     Mode of Treatment physical therapy  -KG       Row Name 07/29/24 1457          General Information    Patient Profile Reviewed yes  -KG     Prior Level of Function independent:;all household mobility;gait;transfer;min assist:;mod assist:;ADL's;dressing;bathing  -KG     Existing Precautions/Restrictions fall;other (see comments)  confusion  -KG     Barriers to Rehab medically complex;previous functional deficit;cognitive status  -KG       Row Name 07/29/24 1457          Living Environment    People in Home facility resident  -KG       Row Name 07/29/24 1457          Home Main Entrance    Number of Stairs, Main Entrance none  -KG       Row Name 07/29/24 1457          Stairs Within Home, Primary    Number of Stairs, Within Home, Primary none  -KG       Row Name 07/29/24 1457          Cognition    Orientation Status (Cognition) oriented x 3  -KG       Row Name 07/29/24 1457          Safety Issues, Functional Mobility    Safety Issues Affecting Function (Mobility) ability to follow commands;awareness of need for assistance;insight into deficits/self-awareness;safety precaution awareness;safety precautions follow-through/compliance;sequencing abilities  -KG     Impairments Affecting Function (Mobility) balance;cognition;coordination;endurance/activity tolerance;postural/trunk control;strength  -KG     Cognitive Impairments, Mobility Safety/Performance awareness, need for assistance;insight into deficits/self-awareness;safety precaution awareness;safety precaution follow-through;sequencing abilities  -KG               User Key  (r) = Recorded By, (t) = Taken By, (c) = Cosigned By      Initials Name Provider Type    KG Jen Leos, PT Physical Therapist                   Mobility       Row Name 07/29/24 1458          Bed  Mobility    Comment, (Bed Mobility) UIC  -KG       Row Name 07/29/24 1458          Transfers    Comment, (Transfers) VC's for sequencing and safe hand placement.  -KG       Row Name 07/29/24 1458          Sit-Stand Transfer    Sit-Stand [place] (Transfers) standby assist;verbal cues  -KG     Assistive Device (Sit-Stand Transfers) walker, front-wheeled  -KG       Row Name 07/29/24 1458          Gait/Stairs (Locomotion)    [place] Level (Gait) contact guard;verbal cues  -KG     Assistive Device (Gait) walker, front-wheeled  -KG     Distance in Feet (Gait) 10  -KG     Deviations/Abnormal Patterns (Gait) base of support, narrow;[place] decreased;stride length decreased  -KG     Bilateral Gait Deviations forward flexed posture  -KG     Comment, (Gait/Stairs) Pt demonstrated step through gait pattern with slow carlton and decreased step length. VC's for upright posture. Pt demonstrated adequate balance and stability. Declined additional ambulation despite encouragement. Limited by fatigue.  -KG               User Key  (r) = Recorded By, (t) = Taken By, (c) = Cosigned By      Initials Name Provider Type    KG Jen Leos, PT Physical Therapist                   Obj/Interventions       Row Name 07/29/24 1459          Range of Motion Comprehensive    General Range of Motion no range of motion deficits identified  -KG     Comment, General Range of Motion B LE WFL  -KG       Row Name 07/29/24 1459          Strength Comprehensive (MMT)    Comment, General Manual Muscle Testing (MMT) Assessment B LE grossly 3+/5  -KG       Row Name 07/29/24 1459          Balance    Balance Assessment sitting static balance;standing static balance;standing dynamic balance  -KG     Static Sitting Balance supervision  -KG     Position, Sitting Balance unsupported;sitting in chair  -KG     Static Standing Balance standby assist  -KG     Dynamic Standing Balance contact guard  -KG     Position/Device Used, Standing Balance  supported;walker, rolling  -KG       Row Name 07/29/24 1459          Sensory Assessment (Somatosensory)    Sensory Assessment (Somatosensory) LE sensation intact  -KG               User Key  (r) = Recorded By, (t) = Taken By, (c) = Cosigned By      Initials Name Provider Type    Jen Ray, PT Physical Therapist                   Goals/Plan       Row Name 07/29/24 1501          Bed Mobility Goal 1 (PT)    Activity/Assistive Device (Bed Mobility Goal 1, PT) sit to supine;supine to sit  -KG     [place] Level/Cues Needed (Bed Mobility Goal 1, PT) independent  -KG     Time Frame (Bed Mobility Goal 1, PT) short term goal (STG);3 days  -KG     Progress/Outcomes (Bed Mobility Goal 1, PT) goal ongoing  -KG       Row Name 07/29/24 1501          Transfer Goal 1 (PT)    Activity/Assistive Device (Transfer Goal 1, PT) sit-to-stand/stand-to-sit;bed-to-chair/chair-to-bed;walker, rolling  -KG     [place] Level/Cues Needed (Transfer Goal 1, PT) modified independence  -KG     Time Frame (Transfer Goal 1, PT) long term goal (LTG);1 week  -KG     Progress/Outcome (Transfer Goal 1, PT) goal ongoing  -KG       Row Name 07/29/24 1501          Gait Training Goal 1 (PT)    Activity/Assistive Device (Gait Training Goal 1, PT) gait (walking locomotion);assistive device use;walker, rolling  -KG     [place] Level (Gait Training Goal 1, PT) modified independence  -KG     Distance (Gait Training Goal 1, PT) 200 feet  -KG     Time Frame (Gait Training Goal 1, PT) long term goal (LTG);1 week  -KG     Progress/Outcome (Gait Training Goal 1, PT) goal ongoing  -KG       Row Name 07/29/24 1501          Therapy Assessment/Plan (PT)    Planned Therapy Interventions (PT) balance training;bed mobility training;gait training;strengthening;transfer training  -KG               User Key  (r) = Recorded By, (t) = Taken By, (c) = Cosigned By      Initials Name Provider Type    Jen Ray, PT Physical Therapist                    Clinical Impression       [facility] Name 07/29/24 1459          Pain    Pretreatment Pain Rating 0/10 - no pain  -KG     Posttreatment Pain Rating 0/10 - no pain  -KG       Row Name 07/29/24 1459          Plan of Care Review    Plan of Care Reviewed With patient  -KG     Outcome Evaluation PT initial evaluation completed for pt presenting with generalized weakness, mild balance deficits, situational confusion, and decreased functional mobility. Pt ambulated 10ft with RW and CGA. Pt's decreased independence warrants PT skilled care. Recommend pt return to [facility] with  OT/PT services at discharge.  -KG       Row Name 07/29/24 1459          Therapy Assessment/Plan (PT)    Patient/Family Therapy Goals Statement (PT) return to PLOF  -KG     Rehab Potential (PT) good, to achieve stated therapy goals  -KG     Criteria for Skilled Interventions Met (PT) yes;skilled treatment is necessary  -KG     Therapy Frequency (PT) daily  -KG     Predicted Duration of Therapy Intervention (PT) 7 days  -KG       [facility] Name 07/29/24 1459          Vital Signs    Pre Systolic BP Rehab 149  -KG     Pre Treatment Diastolic BP 63  -KG     Pretreatment Heart Rate (beats/min) 50  -KG     Posttreatment Heart Rate (beats/min) 51  -KG     Pre SpO2 (%) 96  -KG     O2 Delivery Pre Treatment room air  -KG     Post SpO2 (%) 99  -KG     O2 Delivery Post Treatment room air  -KG     Pre Patient Position Sitting  -KG     Intra Patient Position Standing  -KG     Post Patient Position Sitting  -KG       [facility] Name 07/29/24 1459          Positioning and Restraints    Pre-Treatment Position bedside commode  -KG     Post Treatment Position chair  -KG     In Chair notified nsg;reclined;call light within reach;encouraged to call for assist;exit alarm on;legs elevated  -KG               User Key  (r) = Recorded By, (t) = Taken By, (c) = Cosigned By      Initials Name Provider Type    KG Jen Leos, PT Physical Therapist                   Outcome Measures        Row Name 07/29/24 1502 07/29/24 0400       How much help from another person do you currently need...    Turning from your back to your side while in flat bed without using bedrails? 3  -KG 3  -TS    Moving from lying on back to sitting on the side of a flat bed without bedrails? 3  -KG 3  -TS    Moving to and from a bed to a chair (including a wheelchair)? 3  -KG 3  -TS    Standing up from a chair using your arms (e.g., wheelchair, bedside chair)? 3  -KG 2  -TS    Climbing 3-5 steps with a railing? 2  -KG 2  -TS    To walk in hospital room? 3  -KG 3  -TS    AM-PAC 6 Clicks Score (PT) 17  -KG 16  -TS    Highest Level of Mobility Goal 5 --> Static standing  -KG 5 --> Static standing  -TS      Row Name 07/29/24 1502          Functional Assessment    Outcome Measure Options AM-PAC 6 Clicks Basic Mobility (PT)  -KG               User Key  (r) = Recorded By, (t) = Taken By, (c) = Cosigned By      Initials Name Provider Type    KG Jen Leos, PT Physical Therapist    Matty Morrison, RN Registered Nurse                                 Physical Therapy Education       Title: PT OT SLP Therapies (In Progress)       Topic: Physical Therapy (In Progress)       Point: Mobility training (In Progress)       Learning Progress Summary             Patient Acceptance, E, NR by KG at 7/29/2024 1026                         Point: Home exercise program (Not Started)       Learner Progress:  Not documented in this visit.              Point: Body mechanics (In Progress)       Learning Progress Summary             Patient Acceptance, E, NR by KG at 7/29/2024 1026                         Point: Precautions (In Progress)       Learning Progress Summary             Patient Acceptance, E, NR by KG at 7/29/2024 1026                                         User Key       Initials Effective Dates Name Provider Type Discipline    KG 05/22/20 -  Jen Leos, PT Physical Therapist PT                  PT  Recommendation and Plan  Planned Therapy Interventions (PT): balance training, bed mobility training, gait training, strengthening, transfer training  Plan of Care Reviewed With: patient  Outcome Evaluation: PT initial evaluation completed for pt presenting with generalized weakness, mild balance deficits, situational confusion, and decreased functional mobility. Pt ambulated 10ft with RW and CGA. Pt's decreased independence warrants PT skilled care. Recommend pt return to [facility] with  OT/PT services at discharge.     Time Calculation:   PT Evaluation Complexity  History, PT Evaluation Complexity: 1-2 personal factors and/or comorbidities  Examination of Body Systems (PT Eval Complexity): total of 3 or more elements  Clinical Presentation (PT Evaluation Complexity): stable  Clinical Decision Making (PT Evaluation Complexity): low complexity  Overall Complexity (PT Evaluation Complexity): low complexity     PT Charges       Row Name 07/29/24 1026             Time Calculation    Start Time 1026  -KG      PT Received On 07/29/24  -KG      PT Goal Re-Cert Due Date 08/08/24  -KG         Untimed Charges    PT Eval/Re-eval Minutes 46  -KG         Total Minutes    Untimed Charges Total Minutes 46  -KG       Total Minutes 46  -KG                User Key  (r) = Recorded By, (t) = Taken By, (c) = Cosigned By      Initials Name Provider Type    KG Jen Leos, PT Physical Therapist                  Therapy Charges for Today       Code Description Service Date Service Provider Modifiers Qty    43945488249 HC PT EVAL LOW COMPLEXITY 4 7/29/2024 Jen Leos, PT GP 1            PT G-Codes  Outcome Measure Options: AM-PAC 6 Clicks Basic Mobility (PT)  AM-PAC 6 Clicks Score (PT): 17  PT Discharge Summary  Anticipated Discharge Disposition (PT): assisted living, home with home health    Joanna Leos PT  7/29/2024      Electronically signed by Jen Leos, PT at 07/29/24 1503          Occupational  Therapy Notes (most recent note)        Aisha Perea, OT at 24 1014          Patient Name: Chey Robertson  : 1935    MRN: [id number]                              Today's Date: 2024       Admit Date: 2024    Visit Dx:     ICD-10-CM ICD-9-CM   1. DISHA (acute kidney injury)  N17.9 584.9   2. Generalized weakness  R53.1 780.79   3. At high risk for falls  Z91.81 V15.88   4. Pharyngeal dysphagia  R13.13 787.23   5. Hyperglycemia  R73.9 790.29     Patient Active Problem List   Diagnosis    Benign familial tremor    AMS (altered mental status)    Pancytopenia    Hypothyroidism (acquired)    B12 deficiency    Abnormal intestinal absorption    Iron deficiency anemia due to chronic blood loss    Myelodysplasia (myelodysplastic syndrome)    Personal history of pulmonary embolism    Chronic anticoagulation    Essential hypertension    Type 2 diabetes mellitus without complication, without long-term current use of insulin    Atrial fibrillation    QT prolongation    Pericardial effusion    Severe malnutrition    Closed displaced intertrochanteric fracture of right femur, initial encounter    ARF (acute renal failure)    Diarrhea    Moderate malnutrition     Past Medical History:   Diagnosis Date    A-fib     on eliquis    Anemia     Arthritis     Diabetes mellitus     checks sugar only when pt wants to     Disease of thyroid gland     History of transfusion     with knee surgery-  no reaction recalled.     [place] (hard of hearing)     no hearing aids    Hypertension     Migraine without aura and without status migrainosus, not intractable 2016    Myelodysplastic syndrome     Pulmonary emboli     (after knee surgery)    SOBOE (shortness of breath on exertion)     Tremors of nervous system     Vertigo     Wears dentures     upper     Wears glasses      Past Surgical History:   Procedure Laterality Date    BLADDER SURGERY      CATARACT EXTRACTION      bilat     CHOLECYSTECTOMY      COLONOSCOPY       HIP TROCHANTERIC NAILING WITH INTRAMEDULLARY HIP SCREW Right 6/20/2023    Procedure: HIP TROCHANTERIC NAILING WITH INTRAMEDULLARY HIP SCREW RIGHT;  Surgeon: Augie Hobbs MD;  Location: [facility] OR;  Service: Orthopedics;  Laterality: Right;    HYSTERECTOMY      with bso    KYPHOPLASTY N/A 02/12/2021    Procedure: KYPHOPLASTY T11, T12 AND L4;  Surgeon: Matty Hearn MD;  Location: [facility] OR;  Service: Orthopedic Spine;  Laterality: N/A;    TONSILLECTOMY        General Information       Row Name 07/29/24 1511          OT Time and Intention    Document Type evaluation  -KF     Mode of Treatment occupational therapy  -       Row Name 07/29/24 1511          General Information    Patient Profile Reviewed yes  -KF     Prior Level of Function independent:;all household mobility;bed mobility;mod assist:;dressing;bathing  [place] with rollator  -KF     Existing Precautions/Restrictions fall;other (see comments)  brief with mobility  -KF     Barriers to Rehab medically complex;previous functional deficit;cognitive status  -KF       Row Name 07/29/24 1511          Living Environment    People in Home facility resident;other (see comments)  [facility]  -       Row Name 07/29/24 1511          Home Main Entrance    Number of Stairs, Main Entrance none  -KF       Row Name 07/29/24 1511          Stairs Within Home, Primary    Number of Stairs, Within Home, Primary none  -       Row Name 07/29/24 1511          Cognition    Orientation Status (Cognition) oriented x 3;other (see comments)  conversational confusion  -       Row Name 07/29/24 1511          Safety Issues, Functional Mobility    Safety Issues Affecting Function (Mobility) awareness of need for assistance;insight into deficits/self-awareness;judgment;positioning of assistive device;problem-solving;safety precaution awareness;safety precautions follow-through/compliance;sequencing abilities  -KF     Impairments Affecting Function (Mobility)  balance;cognition;coordination;endurance/activity tolerance;postural/trunk control;strength  -KF     Cognitive Impairments, Mobility Safety/Performance awareness, need for assistance;insight into deficits/self-awareness;problem-solving/reasoning;judgment;safety precaution awareness;safety precaution follow-through;sequencing abilities  -               User Key  (r) = Recorded By, (t) = Taken By, (c) = Cosigned By      Initials Name Provider Type    KF Aisha Perea OT Occupational Therapist                     Mobility/ADL's       Row Name 07/29/24 1511          Bed Mobility    Bed Mobility supine-sit  -KF     Supine-Sit [place] (Bed Mobility) minimum assist (75% patient effort);1 person assist;verbal cues;nonverbal cues (demo/gesture)  -     Comment, (Bed Mobility) Verbal/tactile cues for sequence; increased time and effort needed  -       Row Name 07/29/24 1511          Transfers    Transfers sit-stand transfer;stand-sit transfer;toilet transfer  -       Row Name 07/29/24 1511          Sit-Stand Transfer    Sit-Stand [place] (Transfers) contact guard  -     Assistive Device (Sit-Stand Transfers) walker, front-wheeled  -       Row Name 07/29/24 1511          Stand-Sit Transfer    Stand-Sit [place] (Transfers) contact guard  -     Assistive Device (Stand-Sit Transfers) walker, front-wheeled  -       Row Name 07/29/24 1511          Toilet Transfer    Type (Toilet Transfer) stand pivot/stand step  -     [place] Level (Toilet Transfer) contact guard  -     Assistive Device (Toilet Transfer) walker, front-wheeled;commode, bedside without drop arms  -     Comment, (Toilet Transfer) BSC utilized due to urgency  -       Row Name 07/29/24 1511          Functional Mobility    Functional Mobility- Ind. Level contact guard assist  -     Functional Mobility- Device walker, front-wheeled  -     Functional Mobility-Distance (Feet) --  <HH distance within the room  -[facility]  Name 07/29/24 1511          Activities of Daily Living    BADL Assessment/Intervention lower body dressing;toileting  -KF       Row Name 07/29/24 1511          Lower Body Dressing Assessment/Training    [place] Level (Lower Body Dressing) don;socks;dependent (less than 25% patient effort)  -KF     Position (Lower Body Dressing) edge of bed sitting  -KF       Row Name 07/29/24 1511          Toileting Assessment/Training    [place] Level (Toileting) adjust/manage clothing;perform perineal hygiene;contact guard assist  -KF     Assistive Devices (Toileting) commode, bedside without drop arms  -KF     Position (Toileting) unsupported sitting;supported standing  -KF               User Key  (r) = Recorded By, (t) = Taken By, (c) = Cosigned By      Initials Name Provider Type    KF Aisha Perea OT Occupational Therapist                   Obj/Interventions       Row Name 07/29/24 1513          Sensory Assessment (Somatosensory)    Sensory Assessment (Somatosensory) UE sensation intact  -       Row Name 07/29/24 1513          Range of Motion Comprehensive    General Range of Motion bilateral upper extremity ROM WFL  -KF       Row Name 07/29/24 1513          Strength Comprehensive (MMT)    General Manual Muscle Testing (MMT) Assessment upper extremity strength deficits identified  -KF     Comment, General Manual Muscle Testing (MMT) Assessment BUE grossly 3+/5  -KF       Row Name 07/29/24 1513          Balance    Balance Assessment sitting static balance;sitting dynamic balance;sit to stand dynamic balance;standing static balance;standing dynamic balance  -KF     Static Sitting Balance standby assist  -KF     Dynamic Sitting Balance contact guard  -KF     Position, Sitting Balance unsupported;sitting edge of bed;other (see comments)  BSC  -KF     Sit to Stand Dynamic Balance contact guard;verbal cues  -KF     Static Standing Balance contact guard  -KF     Dynamic Standing Balance contact guard  -KF      Position/Device Used, Standing Balance supported;walker, front-wheeled  -KF     Balance Interventions sitting;standing;sit to stand;supported;static;dynamic;occupation based/functional task  -KF               User Key  (r) = Recorded By, (t) = Taken By, (c) = Cosigned By      Initials Name Provider Type    Aisha Elizondo, OT Occupational Therapist                   Goals/Plan       Row Name 07/29/24 1515          Transfer Goal 1 (OT)    Activity/Assistive Device (Transfer Goal 1, OT) commode;bed-to-chair/chair-to-bed  -KF     [place] Level/Cues Needed (Transfer Goal 1, OT) supervision required  -KF     Time Frame (Transfer Goal 1, OT) long term goal (LTG);10 days  -KF     Progress/Outcome (Transfer Goal 1, OT) goal ongoing  -       Row Name 07/29/24 1515          Dressing Goal 1 (OT)    Activity/Device (Dressing Goal 1, OT) upper body dressing;lower body dressing  -KF     [place]/Cues Needed (Dressing Goal 1, OT) minimum assist (75% or more patient effort)  -KF     Time Frame (Dressing Goal 1, OT) short term goal (STG);5 days  -KF     Progress/Outcome (Dressing Goal 1, OT) goal ongoing  -       Row Name 07/29/24 1515          Grooming Goal 1 (OT)    Activity/Device (Grooming Goal 1, OT) hair care;oral care;wash face, hands  -KF     [place] (Grooming Goal 1, OT) supervision required  -KF     Time Frame (Grooming Goal 1, OT) long term goal (LTG);10 days  -KF     Strategies/Barriers (Grooming Goal 1, OT) sink side  -KF     Progress/Outcome (Grooming Goal 1, OT) goal ongoing  -KF       Row Name 07/29/24 1515          Therapy Assessment/Plan (OT)    Planned Therapy Interventions (OT) activity tolerance training;adaptive equipment training;BADL retraining;functional balance retraining;occupation/activity based interventions;patient/caregiver education/training;ROM/therapeutic exercise;strengthening exercise;transfer/mobility retraining  -KF               User Key  (r) = Recorded By, (t) = Taken By,  (c) = Cosigned By      Initials Name Provider Type    Aisha Elizondo, JERRI Occupational Therapist                   Clinical Impression       Row Name 07/29/24 1513          Pain Assessment    Pretreatment Pain Rating 0/10 - no pain  -KF     Posttreatment Pain Rating 0/10 - no pain  -KF     Pain Intervention(s) Repositioned;Ambulation/increased activity  -KF       Row Name 07/29/24 1513          Plan of Care Review    Plan of Care Reviewed With patient  -KF     Progress no change  -KF     Outcome Evaluation OT evaluation completed. The pt presents below her functional baseline with generalized weakness, decreased activity tolerance, and mild balance deficits. The pt performed transfer to [facility] using a RWx with CGA. Pt ambulated <HH distance to the bedside chair using a RWx with CGA for safety. The pt will benefit from continued IP OT services to increase the pt's safety and independence during ADLs and functional mobility. Recommend a return to [facility] with HHOT when medically ready.  -       Row Name 07/29/24 1513          Therapy Assessment/Plan (OT)    Rehab Potential (OT) good, to achieve stated therapy goals  -KF     Criteria for Skilled Therapeutic Interventions Met (OT) yes;skilled treatment is necessary  -KF     Therapy Frequency (OT) daily  -KF     Predicted Duration of Therapy Intervention (OT) 5 days  -KF       Row Name 07/29/24 1513          Therapy Plan Review/Discharge Plan (OT)    Anticipated Discharge Disposition (OT) assisted living;home with home health  -[facility] Name 07/29/24 1513          Vital Signs    Pre Systolic BP Rehab 149  -KF     Pre Treatment Diastolic BP 63  -KF     Pretreatment Heart Rate (beats/min) 50  -KF     Pre SpO2 (%) 97  -KF     O2 Delivery Pre Treatment room air  -KF     Pre Patient Position Supine  -KF     Intra Patient Position Standing  -KF     Post Patient Position Sitting  -KF       Row Name 07/29/24 1513          Positioning and Restraints    Pre-Treatment Position in  bed  -KF     Post Treatment Position chair  -KF     In Chair notified nsg;reclined;call light within reach;encouraged to call for assist;exit alarm on;waffle cushion;legs elevated  -KF               User Key  (r) = Recorded By, (t) = Taken By, (c) = Cosigned By      Initials Name Provider Type    KF Aisha Perea, JERRI Occupational Therapist                   Outcome Measures       Row Name 07/29/24 1515          How much help from another is currently needed...    Putting on and taking off regular lower body clothing? 2  -KF     Bathing (including washing, rinsing, and drying) 2  -KF     Toileting (which includes using toilet bed pan or urinal) 3  -KF     Putting on and taking off regular upper body clothing 3  -KF     Taking care of personal grooming (such as brushing teeth) 3  -KF     Eating meals 3  -KF     AM-PAC 6 Clicks Score (OT) 16  -KF       Row Name 07/29/24 1502 07/29/24 0400       How much help from another person do you currently need...    Turning from your back to your side while in flat bed without using bedrails? 3  -KG 3  -TS    Moving from lying on back to sitting on the side of a flat bed without bedrails? 3  -KG 3  -TS    Moving to and from a bed to a chair (including a wheelchair)? 3  -KG 3  -TS    Standing up from a chair using your arms (e.g., wheelchair, bedside chair)? 3  -KG 2  -TS    Climbing 3-5 steps with a railing? 2  -KG 2  -TS    To walk in hospital room? 3  -KG 3  -TS    AM-PAC 6 Clicks Score (PT) 17  -KG 16  -TS    Highest Level of Mobility Goal 5 --> Static standing  -KG 5 --> Static standing  -TS      Row Name 07/29/24 1515 07/29/24 1502       Functional Assessment    Outcome Measure Options AM-PAC 6 Clicks Daily Activity (OT)  -KF AM-PAC 6 Clicks Basic Mobility (PT)  -KG              User Key  (r) = Recorded By, (t) = Taken By, (c) = Cosigned By      Initials Name Provider Type    KG Jen Leos, PT Physical Therapist    Aisha Elizondo, OT Occupational Therapist     Matty Morrison, RN Registered Nurse                    Occupational Therapy Education       Title: PT OT SLP Therapies (In Progress)       Topic: Occupational Therapy (In Progress)       Point: ADL training (In Progress)       Description:   Instruct learner(s) on proper safety adaptation and remediation techniques during self care or transfers.   Instruct in proper use of assistive devices.                  Learning Progress Summary             Patient Acceptance, E,TB, NR by  at 7/29/2024 1014                         Point: Home exercise program (Not Started)       Description:   Instruct learner(s) on appropriate technique for monitoring, assisting and/or progressing therapeutic exercises/activities.                  Learner Progress:  Not documented in this visit.              Point: Precautions (In Progress)       Description:   Instruct learner(s) on prescribed precautions during self-care and functional transfers.                  Learning Progress Summary             Patient Acceptance, E,TB, NR by  at 7/29/2024 1014                         Point: Body mechanics (In Progress)       Description:   Instruct learner(s) on proper positioning and spine alignment during self-care, functional mobility activities and/or exercises.                  Learning Progress Summary             Patient Acceptance, E,TB, NR by  at 7/29/2024 1014                                         User Key       Initials Effective Dates Name Provider Type Discipline     08/09/23 -  Aisha Perea OT Occupational Therapist OT                  OT Recommendation and Plan  Planned Therapy Interventions (OT): activity tolerance training, adaptive equipment training, BADL retraining, functional balance retraining, occupation/activity based interventions, patient/caregiver education/training, ROM/therapeutic exercise, strengthening exercise, transfer/mobility retraining  Therapy Frequency (OT): daily  Plan of Care Review  Plan  of Care Reviewed With: patient  Progress: no change  Outcome Evaluation: OT evaluation completed. The pt presents below her functional baseline with generalized weakness, decreased activity tolerance, and mild balance deficits. The pt performed transfer to [facility] using a RWx with CGA. Pt ambulated <HH distance to the bedside chair using a RWx with CGA for safety. The pt will benefit from continued IP OT services to increase the pt's safety and independence during ADLs and functional mobility. Recommend a return to [facility] with HHOT when medically ready.     Time Calculation:   Evaluation Complexity (OT)  Review Occupational Profile/Medical/Therapy History Complexity: brief/low complexity  Assessment, Occupational Performance/Identification of Deficit Complexity: 1-3 performance deficits  Clinical Decision Making Complexity (OT): problem focused assessment/low complexity  Overall Complexity of Evaluation (OT): low complexity     Time Calculation- OT       Row Name 07/29/24 1516             Time Calculation- OT    OT Start Time 1014  -KF      OT Received On 07/29/24  -KF      OT Goal Re-Cert Due Date 08/08/24  -KF         Timed Charges    70215 - OT Therapeutic Activity Minutes 3  -KF      69071 - OT Self Care/Mgmt Minutes 10  -KF         Untimed Charges    OT Eval/Re-eval Minutes 33  -KF         Total Minutes    Timed Charges Total Minutes 13  -KF      Untimed Charges Total Minutes 33  -KF       Total Minutes 46  -KF                User Key  (r) = Recorded By, (t) = Taken By, (c) = Cosigned By      Initials Name Provider Type    KF Aisha Perea OT Occupational Therapist                  Therapy Charges for Today       Code Description Service Date Service Provider Modifiers Qty    22213734244  OT EVAL LOW COMPLEXITY 3 7/29/2024 Aisha Perea OT GO 1    [id number]  OT SELF CARE/MGMT/TRAIN EA 15 MIN 7/29/2024 Aisha Perea OT GO 1                 Aisha Perea OT  7/29/2024    Electronically signed by  Aisha Perea, OT at 24 1517          Speech Language Pathology Notes (most recent note)        Ashley Epstein MS CCC-SLP at 24 1510          Acute Care - Speech Language Pathology   Swallow Initial Evaluation  Jorge  Modified Barium Swallow Study (MBS)       Patient Name: Chey Robertson  : 1935  MRN: [id number]  Today's Date: 2024               Admit Date: 2024    Visit Dx:     ICD-10-CM ICD-9-CM   1. DISHA (acute kidney injury)  N17.9 584.9   2. Generalized weakness  R53.1 780.79   3. At high risk for falls  Z91.81 V15.88   4. Pharyngeal dysphagia  R13.13 787.23   5. Hyperglycemia  R73.9 790.29     Patient Active Problem List   Diagnosis    Benign familial tremor    AMS (altered mental status)    Pancytopenia    Hypothyroidism (acquired)    B12 deficiency    Abnormal intestinal absorption    Iron deficiency anemia due to chronic blood loss    Myelodysplasia (myelodysplastic syndrome)    Personal history of pulmonary embolism    Chronic anticoagulation    Essential hypertension    Type 2 diabetes mellitus without complication, without long-term current use of insulin    Atrial fibrillation    QT prolongation    Pericardial effusion    Severe malnutrition    Closed displaced intertrochanteric fracture of right femur, initial encounter    ARF (acute renal failure)    Diarrhea     Past Medical History:   Diagnosis Date    A-fib     on eliquis    Anemia     Arthritis     Diabetes mellitus     checks sugar only when pt wants to     Disease of thyroid gland     History of transfusion     with knee surgery-  no reaction recalled.     [place] (hard of hearing)     no hearing aids    Hypertension     Migraine without aura and without status migrainosus, not intractable 2016    Myelodysplastic syndrome     Pulmonary emboli     (after knee surgery)    SOBOE (shortness of breath on exertion)     Tremors of nervous system     Vertigo     Wears dentures     upper     Wears glasses       Past Surgical History:   Procedure Laterality Date    BLADDER SURGERY      CATARACT EXTRACTION      bilat     CHOLECYSTECTOMY      COLONOSCOPY      HIP TROCHANTERIC NAILING WITH INTRAMEDULLARY HIP SCREW Right 6/20/2023    Procedure: HIP TROCHANTERIC NAILING WITH INTRAMEDULLARY HIP SCREW RIGHT;  Surgeon: Augie Hobbs MD;  Location: [facility] OR;  Service: Orthopedics;  Laterality: Right;    HYSTERECTOMY      with bso    KYPHOPLASTY N/A 02/12/2021    Procedure: KYPHOPLASTY T11, T12 AND L4;  Surgeon: Matty Hearn MD;  Location:  [facility] OR;  Service: Orthopedic Spine;  Laterality: N/A;    TONSILLECTOMY         SLP Recommendation and Plan  SLP Swallowing Diagnosis: mod-severe, pharyngeal dysphagia, suspect acute-on-chronic (07/29/24 1315)  SLP Diet Recommendation: other (see comments) (safest is NPO w/ alternative nutrition/hydration. conservative po diet would be soft chopped w/ honey thick liquids) (07/29/24 1315)  Recommended Precautions and Strategies: upright posture during/after eating, small bites of food and sips of liquid, general aspiration precautions (07/29/24 1315)  SLP Rec. for Method of Medication Administration: meds whole, with thick liquids, with puree, as tolerated (07/29/24 1315)     Monitor for Signs of Aspiration: yes, notify SLP if any concerns (07/29/24 1315)     Swallow Criteria for Skilled Therapeutic Interventions Met: demonstrates skilled criteria (07/29/24 1315)  Anticipated Discharge Disposition (SLP): inpatient rehabilitation facility (07/29/24 1315)  Rehab Potential/Prognosis, Swallowing: adequate, monitor progress closely (07/29/24 1315)  Therapy Frequency (Swallow): 5 days per week (07/29/24 1315)  Predicted Duration Therapy Intervention (Days): 2 weeks (07/29/24 1315)  Oral Care Recommendations: Oral Care BID/PRN, Toothbrush (07/29/24 1315)              Plan of Care Reviewed With: patient  Progress:  (initial eval)      SWALLOW EVALUATION (Last 72 Hours)       SLP Adult Swallow  Evaluation       Row Name 07/29/24 1315       Rehab Evaluation    Document Type evaluation  -    Subjective Information no complaints  -CJ    Patient Observations alert;cooperative  -CJ    Patient/Family/Caregiver Comments/Observations no family present  -CJ    Patient Effort good  -CJ    Symptoms Noted During/After Treatment none  -CJ       General Information    Patient Profile Reviewed yes  -CJ    Pertinent History Of Current Problem see initial eval; referred for MBS  -    Current Method of Nutrition soft to chew textures;nectar/syrup-thick liquids  -CJ    Precautions/Limitations, Vision WFL with corrective lenses;for purposes of eval  -CJ    Precautions/Limitations, Hearing WFL;for purposes of eval  -CJ    Prior Level of Function-Communication unknown  -    Prior Level of Function-Swallowing --  prev modified po diet  -    Plans/Goals Discussed with patient;agreed upon  -    Barriers to Rehab none identified  -    Patient's Goals for Discharge patient did not state  -       Pain    Additional Documentation Pain Scale: FACES Pre/Post-Treatment (Group)  -       Pain Scale: Numbers Pre/Post-Treatment    Pretreatment Pain Rating --    Posttreatment Pain Rating --       Pain Scale: FACES Pre/Post-Treatment    Pain: FACES Scale, Pretreatment 0-->no hurt  -CJ    Posttreatment Pain Rating 0-->no hurt  -CJ       Oral Musculature and Cranial Nerve Assessment    Oral Motor General Assessment --    Oral Labial or Buccal Impairment, Detail, Cranial Nerve VII (Facial): --    Lingual Impairment, Detail. Cranial Nerves IX, XII (Glossopharyngeal and Hypoglossal) --    Vocal Impairment, Detail. Cranial Nerve X (Vagus) --       General Eating/Swallowing Observations    Respiratory Support Currently in Use --       Clinical Swallow Eval    Oral Prep Phase --    Oral Transit --    Oral Residue --    Pharyngeal Phase --    Esophageal Phase --       Pharyngeal Phase Concerns    Pharyngeal Phase Concerns --    Throat  Clear --    Pharyngeal Phase Concerns, Comment --       MBS/VFSS    Utensils Used spoon;cup;straw  -CJ    Consistencies Trialed regular textures;thin liquids;nectar/syrup-thick liquids;honey-thick liquids;pudding thick  -CJ       MBS/VFSS Interpretation    Oral Prep Phase impaired oral phase of swallowing  -CJ    Oral Transit Phase impaired  -CJ    Oral Residue WFL  -CJ    VFSS Summary Unfortunately suspect some degree of acute on chronic dysphagia given anatomical component combined w/ generalized weakness. Silent aspiration w/ thin liquids occurring during the swallow. Penetration w/ nectar and honey thick liquids during the swallow that didn't clear upon completion. Moderate diffuse residue w/ all consistencies that pt is unable to clear and continues to squeeze into airway after the swallow. Least amount of aspiration noted w/ honey thick liquids/pudding consistency. Unfortunately pt is felt to be at risk for aspiration w/ all po intake. D/w hospitalist who requested SLP place safest po diet at this time while MD follows up with family/patient for GOC  -CJ       Oral Preparatory Phase    Oral Preparatory Phase prolonged manipulation  -CJ       Oral Transit Phase    Impaired Oral Transit Phase premature spillage of liquids into pharynx  -CJ    Premature Spillage of Liquids into Pharynx thin liquids;nectar-thick liquids;secondary to reduced lingual control;discoordination of lingual movement  -CJ       Initiation of Pharyngeal Swallow    Initiation of Pharyngeal Swallow bolus in pyriform sinuses  -CJ    Pharyngeal Phase impaired pharyngeal phase of swallowing  -CJ    Anatomical abnormalities noted osteophyte/bone spur per radiology report;hyperlordotic c-spine curvature;prominent cricopharyngeous/ cricopharyngeal bar  -CJ    Anatomical abnormalities functional impact functional impact on swallowing  -CJ    Penetration During the Swallow thin liquids;nectar-thick liquids;honey-thick liquids;secondary to delayed  swallow initiation or mistiming;secondary to reduced laryngeal elevation;secondary to reduced vestibular closure  -CJ    Aspiration During the Swallow thin liquids;secondary to delayed swallow initiation or mistiming;secondary to reduced laryngeal elevation;secondary to reduced vestibular closure  -CJ    Penetration After the Swallow pudding/puree;secondary to residue;in pyriform sinuses;in valleculae  -CJ    Aspiration After the Swallow honey-thick liquids;nectar-thick liquids;pudding/puree;secondary to residue;in pyriform sinuses;in laryngeal vestibule  -CJ    Depth of Penetration deep  -CJ    Response to Penetration No  -CJ    No spontaneous response to penetration and non-effective laryngeal clearance with cue (see comments)  -CJ    Response to Aspiration No  -CJ    No spontaneous response to aspiration and non-effective subglottic clearance with cue (see comments)  -CJ    Rosenbek's Scale thin:;nectar:;honey:;pudding/puree:;8--->level 8  -CJ    Pharyngeal Residue all consistencies tested;diffuse within pharynx;secondary to reduced posterior pharyngeal wall stripping;secondary to reduced laryngeal elevation;secondary to reduced hyolaryngeal excursion  -CJ    Response to Residue unable to clear residue  -CJ    Attempted Compensatory Maneuvers bolus size;bolus presentation style;chin tuck;head turn to left;head turn to right;additional subsequent swallow;multiple swallows;alternate liquids/solids;throat clear after swallow  -CJ    Response to Attempted Compensatory Maneuvers did not prevent aspiration;did not reduce residue  -CJ       SLP Evaluation Clinical Impression    SLP Swallowing Diagnosis mod-severe;pharyngeal dysphagia;suspect acute-on-chronic  -    Functional Impact risk of aspiration/pneumonia  -    Rehab Potential/Prognosis, Swallowing adequate, monitor progress closely  -    Swallow Criteria for Skilled Therapeutic Interventions Met demonstrates skilled criteria  -       Recommendations     Therapy Frequency (Swallow) 5 days per week  -CJ    Predicted Duration Therapy Intervention (Days) 2 weeks  -CJ    SLP Diet Recommendation other (see comments)  safest is NPO w/ alternative nutrition/hydration. conservative po diet would be soft chopped w/ honey thick liquids  -    Recommended Diagnostics --    Recommended Precautions and Strategies upright posture during/after eating;small bites of food and sips of liquid;general aspiration precautions  -CJ    Oral Care Recommendations Oral Care BID/PRN;Toothbrush  -    SLP Rec. for Method of Medication Administration meds whole;with thick liquids;with puree;as tolerated  -CJ    Monitor for Signs of Aspiration yes;notify SLP if any concerns  -CJ    Anticipated Discharge Disposition (SLP) inpatient rehabilitation facility  -              User Key  (r) = Recorded By, (t) = Taken By, (c) = Cosigned By      Initials Name Effective Dates    Cathie Calles, MS CCC-SLP 02/03/23 -     Ashley Olguin, MS CCC-SLP 06/03/24 -                     EDUCATION  The patient has been educated in the following areas:   Dysphagia (Swallowing Impairment) Oral Care/Hydration Modified Diet Instruction.        SLP GOALS       Row Name 07/29/24 1315             (LTG) Patient will demonstrate functional swallow for    Diet Texture (Demonstrate functional swallow) soft to chew (chopped) textures  -CJ      Liquid viscosity (Demonstrate functional swallow) nectar/ mildly thick liquids  -CJ      Quay (Demonstrate functional swallow) with minimal cues (75-90% accuracy)  -CJ      Time Frame (Demonstrate functional swallow) by discharge  -CJ      Progress/Outcomes (Demonstrate functional swallow) new goal  -CJ         (STG) Patient will tolerate trials of    Consistencies Trialed (Tolerate trials) soft to chew (chopped) textures;honey/ moderately thick liquids  -CJ      Desired Outcome (Tolerate trials) without signs/symptoms of aspiration;without signs of distress;with  adequate oral prep/transit/clearance  -CJ      [place] (Tolerate trials) with minimal cues (75-90% accuracy)  -CJ      Time Frame (Tolerate trials) 1 week  -CJ      Progress/Outcomes (Tolerate trials) new goal  -CJ         (STG) Lingual Strengthening Goal 1 (SLP)    Activity (Lingual Strengthening Goal 1, SLP) increase lingual tone/sensation/control/coordination/movement;increase tongue back strength  -CJ      Increase Lingual Tone/Sensation/Control/Coordination/Movement lingual resistance exercises;swallow trials  -CJ      Increase Tongue Back Strength lingual resistance exercises  -CJ      [place]/Accuracy (Lingual Strengthening Goal 1, SLP) with moderate cues (50-74% accuracy)  -CJ      Time Frame (Lingual Strengthening Goal 1, SLP) 1 week  -CJ      Progress/Outcomes (Lingual Strengthening Goal 1, SLP) new goal  -CJ         (STG) Pharyngeal Strengthening Exercise Goal 1 (SLP)    Activity (Pharyngeal Strengthening Goal 1, SLP) increase superior movement of the hyolaryngeal complex;increase anterior movement of the hyolaryngeal complex;increase closure at entrance to airway/closure of airway at glottis;increase squeeze/positive pressure generation  -CJ      Increase Superior Movement of the Hyolaryngeal Complex effortful pitch glide (falsetto + pharyngeal squeeze)  -CJ      Increase Anterior Movement of the Hyolaryngeal Complex chin tuck against resistance (CTAR)  -CJ      Increase Closure at Entrance to Airway/Closure of Airway at Glottis supraglottic swallow;breath hold exercises  -CJ      Increase Squeeze/Positive Pressure Generation hard effortful swallow  -CJ      [place]/Accuracy (Pharyngeal Strengthening Goal 1, SLP) with moderate cues (50-74% accuracy)  -CJ      Time Frame (Pharyngeal Strengthening Goal 1, SLP) 1 week  -CJ      Progress/Outcomes (Pharyngeal Strengthening Goal 1, SLP) new goal  -CJ                User Key  (r) = Recorded By, (t) = Taken By, (c) = Cosigned By      Initials Name  Provider Type    Ashley Olguin MS CCC-SLP Speech and Language Pathologist                         Time Calculation:    Time Calculation- SLP       Row Name 07/29/24 1509             Time Calculation- SLP    SLP Start Time 1315  -      SLP Received On 07/29/24  -         Untimed Charges    53561-BW Motion Fluoro Eval Swallow Minutes 69  -         Total Minutes    Untimed Charges Total Minutes 69  -CJ       Total Minutes 69  -CJ                User Key  (r) = Recorded By, (t) = Taken By, (c) = Cosigned By      Initials Name Provider Type    Ashley Olguin MS CCC-SLP Speech and Language Pathologist                    Therapy Charges for Today       Code Description Service Date Service Provider Modifiers Qty    [id number] HC ST MOTION FLUORO EVAL SWALLOW 5 7/29/2024 Ashley Epstein MS CCC-SLP GN 1                 Ashley Epstein MS CCC-ELIAS  7/29/2024    Electronically signed by Ashley Epstein MS CCC-SLP at 07/29/24 0954

## 2024-07-31 NOTE — PLAN OF CARE
Goal Outcome Evaluation:  Plan of Care Reviewed With: patient        Progress: improving  Outcome Evaluation: Pt ambulated 60ft with RW and John, progressing to periods of CGA at times. Pt required frequent cues for upright posture with forward gaze and increased stride length. Intermittent physical assistance required to steer RW to avoid obstacles in joy. Worsening balance and coordination with continued forward ambulation. Pt limited by fatigue and confusion. Continue to recommend PT skilled care and D/C to SNF.      Anticipated Discharge Disposition (PT): skilled nursing facility

## 2024-07-31 NOTE — CASE MANAGEMENT/SOCIAL WORK
"Discharge Planning Assessment  [facility]     Patient Name: Chey Robertson  MRN: [id number]  Today's Date: 7/31/2024    Admit Date: 7/27/2024    Plan: SNF   Discharge Needs Assessment    No documentation.                  Discharge Plan       Row Name 07/31/24 1536       Plan    Plan SNF    Patient/Family in Agreement with Plan yes    Plan Comments Per SLP notes: pt \"safest is NPO w/ alternative nutrition/hydration. conservative po diet would be soft chopped w/ honey thick liquids\".   CM spoke with pt's POA, Albin, via phone and advised him that per CM discussion with Tia at [facility], they (and likely all [facility]) are unable to accommodate pt's need for a diet with thickened liquids.  As such, he is considering SNF vs return to [facility] with comfort diet with thin liquids vs advancing to PCU vs feeding tube.  Will discuss with pt and family today.  CM will f/u in am.    Final Discharge Disposition Code 03 - skilled nursing facility (SNF)                  Continued Care and Services - Admitted Since 7/27/2024    No active coordination exists for this encounter.       Expected Discharge Date and Time       Expected Discharge Date Expected Discharge Time    Aug 1, 2024            Demographic Summary    No documentation.                  Functional Status    No documentation.                  Psychosocial    No documentation.                  Abuse/Neglect    No documentation.                  Legal    No documentation.                  Substance Abuse    No documentation.                  Patient Forms    No documentation.                     Eloise Sands RN    "

## 2024-08-01 LAB
GLUCOSE BLDC GLUCOMTR-MCNC: 151 MG/DL (ref 70–130)
GLUCOSE BLDC GLUCOMTR-MCNC: 253 MG/DL (ref 70–130)
GLUCOSE BLDC GLUCOMTR-MCNC: 292 MG/DL (ref 70–130)
GLUCOSE BLDC GLUCOMTR-MCNC: 320 MG/DL (ref 70–130)

## 2024-08-01 PROCEDURE — 82948 REAGENT STRIP/BLOOD GLUCOSE: CPT

## 2024-08-01 PROCEDURE — 99232 SBSQ HOSP IP/OBS MODERATE 35: CPT | Performed by: NURSE PRACTITIONER

## 2024-08-01 PROCEDURE — 63710000001 INSULIN LISPRO (HUMAN) PER 5 UNITS: Performed by: INTERNAL MEDICINE

## 2024-08-01 PROCEDURE — 63710000001 INSULIN GLARGINE PER 5 UNITS: Performed by: INTERNAL MEDICINE

## 2024-08-01 PROCEDURE — 92526 ORAL FUNCTION THERAPY: CPT

## 2024-08-01 RX ADMIN — Medication 10 ML: at 08:51

## 2024-08-01 RX ADMIN — Medication 10 ML: at 22:06

## 2024-08-01 RX ADMIN — APIXABAN 2.5 MG: 2.5 TABLET, FILM COATED ORAL at 08:51

## 2024-08-01 RX ADMIN — LEVOTHYROXINE SODIUM 100 MCG: 0.1 TABLET ORAL at 05:47

## 2024-08-01 RX ADMIN — INSULIN LISPRO 7 UNITS: 100 INJECTION, SOLUTION INTRAVENOUS; SUBCUTANEOUS at 08:50

## 2024-08-01 RX ADMIN — METOPROLOL TARTRATE 50 MG: 50 TABLET, FILM COATED ORAL at 22:11

## 2024-08-01 RX ADMIN — Medication 1 APPLICATION: at 22:05

## 2024-08-01 RX ADMIN — AMIODARONE HYDROCHLORIDE 200 MG: 200 TABLET ORAL at 08:51

## 2024-08-01 RX ADMIN — INSULIN LISPRO 9 UNITS: 100 INJECTION, SOLUTION INTRAVENOUS; SUBCUTANEOUS at 11:54

## 2024-08-01 RX ADMIN — Medication 1 APPLICATION: at 08:52

## 2024-08-01 RX ADMIN — INSULIN GLARGINE 13 UNITS: 100 INJECTION, SOLUTION SUBCUTANEOUS at 22:03

## 2024-08-01 RX ADMIN — Medication 1 CAPSULE: at 08:51

## 2024-08-01 RX ADMIN — INSULIN LISPRO 16 UNITS: 100 INJECTION, SOLUTION INTRAVENOUS; SUBCUTANEOUS at 17:27

## 2024-08-01 RX ADMIN — PANTOPRAZOLE SODIUM 40 MG: 40 TABLET, DELAYED RELEASE ORAL at 11:53

## 2024-08-01 RX ADMIN — APIXABAN 2.5 MG: 2.5 TABLET, FILM COATED ORAL at 22:04

## 2024-08-01 RX ADMIN — INSULIN LISPRO 3 UNITS: 100 INJECTION, SOLUTION INTRAVENOUS; SUBCUTANEOUS at 22:04

## 2024-08-01 NOTE — PLAN OF CARE
Goal Outcome Evaluation:  Plan of Care Reviewed With: patient        Progress: no change         Anticipated Discharge Disposition (SLP): skilled nursing facility             Treatment Assessment (SLP): continued, moderate-severe, pharyngeal dysphagia (suspect acute-on-chronic) (08/01/24 1325)  Treatment Assessment Comments (SLP): Pt expressed some dislike for thickened liquids when asked, but no obvious aversion. Overt clinical s/sxs aspiration w/ thin and nectar-thick liquids--severe w/ thin via tsp (though pt appeared unaware that was having difficulty). (08/01/24 1325)  Plan for Continued Treatment (SLP): continue treatment per plan of care (08/01/24 1325)

## 2024-08-01 NOTE — CONSULTS
"Diabetes Education    Patient Name:  Chey Robertson  YOB: 1935  MRN: [id number]  Admit Date:  7/27/2024      Consult for diabetes education received per teaching blood glucose monitoring. Chart reviewed. Pt was seen at bedside today. Permission given for visit.     Patient's current A1C is 9.8%. Patient has had diabetes for 25-30 years. Patient is poor historian, unable to perform education with patient. Called her son Albin. He states \"she has been dealing with this for a long time\". She was previously living at [facility], her son Albin does state that they were unable to check her glucose due to not having the correct strips for the meter. They have since been delivered and they have all the supplies now. He denies having any questions or concerns at this time. Verbalizes being aware of signs and symptoms of hypoglycemia and hyperglycemia. States \"she was on insulin before but was not taking the injections\", and her MD has \"doubled the dose of her metformin\".     Left [facility]'s \"What is Diabetes\", \"What is A1C\" and \"Blood glucose Goals\" handouts at bedside.    Total time spent reviewing chart, preparing education/materials, providing education at bedside, and coordinating care approx 30 minutes.    Thank you for this consult.  Electronically signed by:  Iliana Ling RN  08/01/24 15:32 EDT  "

## 2024-08-01 NOTE — CASE MANAGEMENT/SOCIAL WORK
Discharge Planning Assessment  [facility]     Patient Name: Chey Robertson  MRN: [id number]  Today's Date: 8/1/2024    Admit Date: 7/27/2024    Plan: SNF   Discharge Needs Assessment    No documentation.                  Discharge Plan       Row Name 08/01/24 1503       Plan    Plan SNF    Patient/Family in Agreement with Plan yes    Plan Comments Spoke with Jones at [facility].  Pt/son have offered a bed and will initiate insurance precert once updated OT notes are available.  CM will cont to follow.    Final Discharge Disposition Code 03 - skilled nursing facility (SNF)      Row Name 08/01/24 1245       Plan    Plan SNF    Patient/Family in Agreement with Plan yes    Plan Comments Per discussion with APRN, son/POA, Albin, has elected to pursue SNF for STR.  I confirmed plan with pt at bedside.  CM called/faxed to multiple facilities today with current clinical info (requested updated OT notes).  Will need insurance precert.  CM will cont to follow.    Final Discharge Disposition Code 03 - skilled nursing facility (SNF)                  Continued Care and Services - Admitted Since 7/27/2024       Destination Coordination complete.      Service Provider Request Status Selected Services Address Phone Fax Patient Preferred    [facility]  [facility]illed [facility] [address] [facility] [id number] [phone] --    [facility] Pending - Request Sent N/A [address] [place] [id number] [phone] --    [facility] Pending - Request Sent N/A [address] [id number] [phone] --    [facility] Pending - No Request Sent N/A [address] [place] [address] [phone] --    [facility] Pending - No [facility] Sent N/[facility] [address] [id number] [phone] --    [facility] Pending - No  Request Sent N/A [address], [facility] [address] [phone] --    [facility] Pending - No Request Sent N/A [address], [facility] [address] [phone] --    [facility] Pending - No Request Sent N/A [address], [facility] [address] [phone] --    [facility] Declined  Bed not available N/A [address], [facility] [address] [phone] --                  Expected Discharge Date and Time       Expected Discharge Date Expected Discharge Time    Aug 3, 2024            Demographic Summary    No documentation.                  Functional Status    No documentation.                  Psychosocial    No documentation.                  Abuse/Neglect    No documentation.                  Legal    No documentation.                  Substance Abuse    No documentation.                  Patient Forms    No documentation.                     Eloise Sadns, RN

## 2024-08-01 NOTE — CONSULTS
"          Clinical Nutrition Assessment     Patient Name: Chey Robertson  YOB: 1935  MRN: [id number]  Date of Encounter: 08/01/24 10:41 EDT  Admission date: 7/27/2024  Reason for Visit: Follow-up protocol    Assessment   Nutrition Assessment   Admission Diagnosis:  ARF (acute renal failure) [N17.9]    Problem List:    ARF (acute renal failure)    Hypothyroidism (acquired)    Personal history of pulmonary embolism    Chronic anticoagulation    Essential hypertension    Type 2 diabetes mellitus without complication, without long-term current use of insulin    Atrial fibrillation    Diarrhea    Moderate malnutrition      PMH:   She  has a past medical history of A-fib, Anemia, Arthritis, Diabetes mellitus, Disease of thyroid gland, History of transfusion, [place] (hard of hearing), Hypertension, Migraine without aura and without status migrainosus, not intractable (12/30/2016), Myelodysplastic syndrome, Pulmonary emboli (2011), SOBOE (shortness of breath on exertion), Tremors of nervous system, Vertigo, Wears dentures, and Wears glasses.    PSH:  She  has a past surgical history that includes Hysterectomy; Tonsillectomy; Bladder surgery; Cataract extraction; Cholecystectomy; Colonoscopy; Kyphoplasty (N/A, 02/12/2021); and Hip Trochanteric Nailing (Right, 6/20/2023).    Applicable Nutrition History:   6/7/23 Severe malnutrition  7/28/24 SLP Diet Recommendation: soft to chew textures, chopped, nectar thick liquids, ice chips between meals after oral care, with supervision   7/29/24 [facility] - SLP Diet Recommendation: (safest is NPO w/ alternative nutrition/hydration. conservative po diet would be soft chopped w/ honey thick liquids)     Anthropometrics     Height: Height: 160 cm (63\")  Last Filed Weight: Weight: 51.9 kg (114 lb 8 oz) (07/28/24 3389)  Method: Weight Method: Bed scale  BMI: BMI (Calculated): 20.3    UBW:    Pt reports wt of 190# x 1 year ago, unverifiable per EMR  Weight change: No significant wt " changes x 1 year     Weight       Weight (kg) Weight (lbs) Weight Method Visit Report   6/8/2023 52.617 kg  116 lb  Stated      55.838 kg  123 lb 1.6 oz  Bed scale     6/18/2023 51.71 kg  114 lb  Stated     6/20/2023 51.71 kg  114 lb      6/29/2023 53.252 kg  117 lb 6.4 oz      8/6/2023 52.164 kg  115 lb  Stated     8/7/2023 54.432 kg  120 lb   --    1/15/2024 54.432 kg  120 lb  Estimated     2/12/2024 51.665 kg  113 lb 14.4 oz   --    7/27/2024 51.256 kg  113 lb  Stated     7/28/2024 51.937 kg  114 lb 8 oz  Bed scale         Nutrition Focused Physical Exam    Date:  7/29     Patient meets criteria for malnutrition diagnosis, see MSA note.     Subjective   Reported/Observed/Food/Nutrition Related History:     8/1) Patient states she is eating okay, denies and food preferences at this time. Hoping to go home today. Willing to trial Magic Cups TID w/ lunch and dinner if she will still be here.    7/29) Patient denies any changes in appetite, states she's had some nausea/vomiting for a few days, has since resolved. Pt denies any C/S difficulties. Per EMR pt has had diarrhea ~3 wks, noting Metformin was increased at this time. No dietary preferences noted at time of visit. NKFA. Pt accepting of ONS daily with dinner, prefers vanilla.    Current Nutrition Prescription   PO: Diet: Cardiac, Diabetic; Healthy Heart (2-3 Na+); Consistent Carbohydrate; No Mixed Consistencies; Texture: Soft to Chew (NDD 3); Soft to Chew: Chopped Meat; Fluid Consistency: Honey Thick  Oral Nutrition Supplement: N/A  Intake: 2 Days: 42% x 3 meals    Assessment & Plan   Nutrition Diagnosis   Date:  7/29            Updated:  8/1  Problem Malnutrition chronic severe   Etiology Energy needs greater than energy intake   Signs/Symptoms Severe muscle wasting and moderate fat loss   Status: Active    Date: 8/1 Updated:  Problem Inadequate oral intake   Etiology Dysphagia (on modified diet)   Signs/Symptoms PO intakes <50%   Status:    Goal:   Nutrition  to support treatment and Increase intake      Nutrition Intervention      Follow treatment progress, Care plan reviewed, Advise alternate selection, Advised available snacks, Interview for preferences, Menu provided, Encourage intake, Supplement provided    Adjusted ONS, patient now on honey thick liquids  -Magic Cup BID     Monitoring/Evaluation:   Per protocol, I&O, PO intake, Supplement intake, Symptoms, POC/GOC, Swallow function    Chante Bryan RD eligible  Time Spent: 30 min

## 2024-08-01 NOTE — THERAPY TREATMENT NOTE
Acute Care - Speech Language Pathology   Swallow Treatment Note [facility]     Patient Name: Chey Robertson  : 1935  MRN: [id number]  Today's Date: 2024               Admit Date: 2024    Visit Dx:     ICD-10-CM ICD-9-CM   1. DISHA (acute kidney injury)  N17.9 584.9   2. Generalized weakness  R53.1 780.79   3. At high risk for falls  Z91.81 V15.88   4. Pharyngeal dysphagia  R13.13 787.23   5. Hyperglycemia  R73.9 790.29     Patient Active Problem List   Diagnosis    Benign familial tremor    AMS (altered mental status)    Pancytopenia    Hypothyroidism (acquired)    B12 deficiency    Abnormal intestinal absorption    Iron deficiency anemia due to chronic blood loss    Myelodysplasia (myelodysplastic syndrome)    Personal history of pulmonary embolism    Chronic anticoagulation    Essential hypertension    Type 2 diabetes mellitus without complication, without long-term current use of insulin    Atrial fibrillation    QT prolongation    Pericardial effusion    Severe malnutrition    Closed displaced intertrochanteric fracture of right femur, initial encounter    ARF (acute renal failure)    Diarrhea    Moderate malnutrition     Past Medical History:   Diagnosis Date    A-fib     on eliquis    Anemia     Arthritis     Diabetes mellitus     checks sugar only when pt wants to     Disease of thyroid gland     History of transfusion     with knee surgery-  no reaction recalled.     [place] (hard of hearing)     no hearing aids    Hypertension     Migraine without aura and without status migrainosus, not intractable 2016    Myelodysplastic syndrome     Pulmonary emboli     (after knee surgery)    SOBOE (shortness of breath on exertion)     Tremors of nervous system     Vertigo     Wears dentures     upper     Wears glasses      Past Surgical History:   Procedure Laterality Date    BLADDER SURGERY      CATARACT EXTRACTION      bilat     CHOLECYSTECTOMY      COLONOSCOPY      HIP TROCHANTERIC NAILING  WITH INTRAMEDULLARY HIP SCREW Right 6/20/2023    Procedure: HIP TROCHANTERIC NAILING WITH INTRAMEDULLARY HIP SCREW RIGHT;  Surgeon: Augie Hobbs MD;  Location: [facility] OR;  Service: Orthopedics;  Laterality: Right;    HYSTERECTOMY      with bso    KYPHOPLASTY N/A 02/12/2021    Procedure: KYPHOPLASTY T11, T12 AND L4;  Surgeon: Matty Hearn MD;  Location:  [facility];  Service: Orthopedic Spine;  Laterality: N/A;    TONSILLECTOMY         SLP Recommendation and Plan     SLP Diet Recommendation: comfort diet, per physician discretion, other (see comments) (Consider continuing conservative PO diet of soft/chopped, no mixed consistencies, and honey-thick liquids vs. full comfort diet of regular textures and small sips of thin liquid (if able to tolerate comfortably) per physician discretion and pending GOC.) (08/01/24 1325)  Recommended Precautions and Strategies: upright posture during/after eating, small bites of food and sips of liquid, general aspiration precautions, 1:1 supervision, assist with feeding (08/01/24 1325)  SLP Rec. for Method of Medication Administration: meds whole, with thick liquids, with puree, as tolerated (08/01/24 1325)              Anticipated Discharge Disposition (SLP): skilled nursing facility (08/01/24 1325)                    Daily Summary of Progress (SLP): progress toward functional goals as expected (08/01/24 1325)               Treatment Assessment (SLP): continued, moderate-severe, pharyngeal dysphagia (suspect acute-on-chronic) (08/01/24 1325)  Treatment Assessment Comments (SLP): Pt expressed some dislike for thickened liquids when asked, but no obvious aversion. Overt clinical s/sxs aspiration w/ thin and nectar-thick liquids--severe w/ thin via tsp (though pt appeared unaware that was having difficulty). (08/01/24 1325)  Plan for Continued Treatment (SLP): continue treatment per plan of care (08/01/24 1325)         Plan of Care Reviewed With: patient  Progress: no  change      SWALLOW EVALUATION (Last 72 Hours)       SLP Adult Swallow Evaluation       Row Name 08/01/24 2917                   Rehab Evaluation    Document Type therapy note (daily note)  -AC        Subjective Information no complaints  -AC        Patient Observations alert;cooperative  -        Patient/Family/Caregiver Comments/Observations No family present.  -AC        Patient Effort good  -AC           Pain Scale: FACES Pre/Post-Treatment    Pain: FACES Scale, Pretreatment 0-->no hurt  -AC        Posttreatment Pain Rating 0-->no hurt  -AC           SLP Treatment Clinical Impressions    Treatment Assessment (SLP) continued;moderate-severe;pharyngeal dysphagia  suspect acute-on-chronic  -AC        Treatment Assessment Comments (SLP) Pt expressed some dislike for thickened liquids when asked, but no obvious aversion. Overt clinical s/sxs aspiration w/ thin and nectar-thick liquids--severe w/ thin via tsp (though pt appeared unaware that was having difficulty).  -        Daily Summary of Progress (SLP) progress toward functional goals as expected  -        Barriers to Overall Progress (SLP) Baseline deficits;Advanced age;Cognitive status  -AC        Plan for Continued Treatment (SLP) continue treatment per plan of care  -AC        Care Plan Review evaluation/treatment results reviewed;care plan/treatment goals reviewed;risks/benefits reviewed;current/potential barriers reviewed;patient/other agree to care plan  -           Recommendations    SLP Diet Recommendation comfort diet, per physician discretion;other (see comments)  Consider continuing conservative PO diet of soft/chopped, no mixed consistencies, and honey-thick liquids vs. full comfort diet of regular textures and small sips of thin liquid (if able to tolerate comfortably) per physician discretion and pending GOC.  -AC        Recommended Precautions and Strategies upright posture during/after eating;small bites of food and sips of liquid;general  aspiration precautions;1:1 supervision;assist with feeding  -AC        SLP Rec. for Method of Medication Administration meds whole;with thick liquids;with puree;as tolerated  -AC        Anticipated Discharge Disposition (SLP) skilled nursing facility  -AC                  User Key  (r) = Recorded By, (t) = Taken By, (c) = Cosigned By      Initials Name Effective Dates    AC Kristy Pedersen, MS [facility]-SLP 02/03/23 -                     EDUCATION  The patient has been educated in the following areas:   Dysphagia (Swallowing Impairment) Modified Diet Instruction.        SLP GOALS       Row Name 08/01/24 1325             (LTG) Patient will demonstrate functional swallow for    Diet Texture (Demonstrate functional swallow) soft to chew (chopped) textures  -AC      Liquid viscosity (Demonstrate functional swallow) nectar/ mildly thick liquids  -AC      [place] (Demonstrate functional swallow) with minimal cues (75-90% accuracy)  -AC      Time Frame (Demonstrate functional swallow) by discharge  -AC      Progress/Outcomes (Demonstrate functional swallow) continuing progress toward goal  -AC      Comment (Demonstrate functional swallow) Throat clearing noted w/ nectar via cup sips. Hard, excessive coughing & desat to 75% SpO2 noted following thin via tsp. When discussing afterward, but reported she did not feel as though she had difficulty swallowing.  -AC         (STG) Patient will tolerate trials of    Consistencies Trialed (Tolerate trials) soft to chew (chopped) textures;honey/ moderately thick liquids  -AC      Desired Outcome (Tolerate trials) without signs/symptoms of aspiration;without signs of distress;with adequate oral prep/transit/clearance  -AC      [place] (Tolerate trials) with minimal cues (75-90% accuracy)  -AC      Time Frame (Tolerate trials) 1 week  -AC      Progress/Outcomes (Tolerate trials) continuing progress toward goal  -AC      Comment (Tolerate trials) Trialed honey-thick liquid via  cup/straw and soft solids. Increased oral prep time, but adequate mastication. No significant oral residue. No overt clinical s/sxs aspiration w/ honey-thick liquid. Aspiration was silent per MBS. Pt appeared to enjoy thickened apple juice.  -AC         (STG) Lingual Strengthening Goal 1 (SLP)    Increase Lingual Tone/Sensation/Control/Coordination/Movement lingual resistance exercises;swallow trials  -AC      Increase Tongue Back Strength lingual resistance exercises  -AC      [place]/Accuracy (Lingual Strengthening Goal 1, SLP) with moderate cues (50-74% accuracy)  -AC      Time Frame (Lingual Strengthening Goal 1, SLP) 1 week  -AC      Progress/Outcomes (Lingual Strengthening Goal 1, SLP) continuing progress toward goal  -AC      Comment (Lingual Strengthening Goal 1, SLP) Introduced exercises. Pt able approximate w/ max cues/assist.  Provided handout.  -AC         (STG) Pharyngeal Strengthening Exercise Goal 1 (SLP)    Increase Superior Movement of the Hyolaryngeal Complex effortful pitch glide (falsetto + pharyngeal squeeze)  -AC      Increase Anterior Movement of the Hyolaryngeal Complex chin tuck against resistance (CTAR)  -AC      Increase Closure at Entrance to Airway/Closure of Airway at Glottis supraglottic swallow;breath hold exercises  -AC      Increase Squeeze/Positive Pressure Generation hard effortful swallow  -AC      [place]/Accuracy (Pharyngeal Strengthening Goal 1, SLP) with moderate cues (50-74% accuracy)  -AC      Time Frame (Pharyngeal Strengthening Goal 1, SLP) 1 week  -AC      Progress/Outcomes (Pharyngeal Strengthening Goal 1, SLP) continuing progress toward goal  -AC      Comment (Pharyngeal Strengthening Goal 1, SLP) Introduced exercises. Pt able demonstrate CTAR and effortful swallow, and approximate SGS and falsetto w/ max cues/assist. Provided handout.  -AC                User Key  (r) = Recorded By, (t) = Taken By, (c) = Cosigned By      Initials Name Provider Type    AC  Kristy Pedersen MS CCC-SLP Speech and Language Pathologist                         Time Calculation:    Time Calculation- SLP       Row Name 08/01/24 1406             Time Calculation- SLP    SLP Start Time 1305  -AC      SLP Received On 08/01/24  -AC         Untimed Charges    91908-MM Treatment Swallow Minutes 42  -AC         Total Minutes    Untimed Charges Total Minutes 42  -AC       Total Minutes 42  -AC                User Key  (r) = Recorded By, (t) = Taken By, (c) = Cosigned By      Initials Name Provider Type     Kristy Pedersen MS CCC-SLP Speech and Language Pathologist                    Therapy Charges for Today       Code Description Service Date Service Provider Modifiers Qty    [id number] HC ST TREATMENT SWALLOW 3 8/1/2024 Kristy Pedersen MS CCC-SLP GN 1                 Kristy Pedersen MS CCC-ELIAS  8/1/2024

## 2024-08-01 NOTE — CASE MANAGEMENT/SOCIAL WORK
Discharge Planning Assessment  [facility]     Patient Name: Chey Robertson  MRN: [id number]  Today's Date: 8/1/2024    Admit Date: 7/27/2024    Plan: SNF   Discharge Needs Assessment    No documentation.                  Discharge Plan       Row Name 08/01/24 1245       Plan    Plan SNF    Patient/Family in Agreement with Plan yes    Plan Comments Per discussion with APRN, son/POA, Albin, has elected to pursue SNF for STR.  I confirmed plan with pt at bedside.  CM called/faxed to multiple facilities today with current clinical info (requested updated OT notes).  Will need insurance precert.  CM will cont to follow.    Final Discharge Disposition Code 03 - skilled nursing facility (SNF)                  Continued Care and Services - Admitted Since 7/27/2024       Destination       Service Provider Request Status Selected Services Address Phone Fax Patient Preferred    [facility] Pending - Request Sent N/A [address], [facility] [phone] [phone] --    [facility] Pending - Request Sent N/A [address] [place] [id number] [phone] --    [facility] Pending - Request Sent N/A [address] [id number] [phone] --    [facility] Pending - No Request Sent N/A [address] [place] [id number] [phone] --    [facility] Pending - No [facility] Sent N/[facility] [address] [facility] [facility] [id number] [phone] --    [facility] Pending - No Request Sent N/A [address], [facility] [address] [phone] --    [facility] - No [facility] Sent N/A [address] [facility] [phone] --    [facility] Pending - No Request Sent N/A [address], [facility] [address]  [phone] --    [facility] Pending - No Request Sent N/A [address] [facility] [address] [phone] --                  Expected Discharge Date and Time       Expected Discharge Date Expected Discharge Time    Aug 1, 2024            Demographic Summary    No documentation.                  Functional Status    No documentation.                  Psychosocial    No documentation.                  Abuse/Neglect    No documentation.                  Legal    No documentation.                  Substance Abuse    No documentation.                  Patient Forms    No documentation.                     Eloise Sands RN     Fall with Harm Risk

## 2024-08-01 NOTE — DISCHARGE PLACEMENT REQUEST
"JOSE CARLOS MARIN RN    P:  [phone]  F:  [phone]                      Elisa Venegas (89 y.o. Female)       Date of Birth   1935    Social Security Number       Address   [address] [place] [address]    Home Phone   [phone]    MRN   [id number]       Religious   Evangelical    Marital Status                               Admission Date   7/27/24    Admission Type   Emergency    Admitting Provider   Jess Duarte MD    Attending Provider   Jess Duarte MD    Department, Room/Bed   [facility] 4G, S448/1       Discharge Date       Discharge Disposition       Discharge Destination                                 Attending Provider: Jess Duarte MD    Allergies: Aspirin    Isolation: None   Infection: None   Code Status: CPR    Ht: 160 cm (63\")   Wt: 51.9 kg (114 lb 8 oz)    Admission Cmt: None   Principal Problem: ARF (acute renal failure) [N17.9]                   Active Insurance as of 7/27/2024       Primary Coverage       Payor Plan Insurance Group Employer/Plan Group    ANTHEM MEDICARE REPLACEMENT ANTHEM MEDICARE ADVANTAGE [id number]       Payor Plan Address Payor Plan Phone Number Payor Plan Fax Number Effective Dates    [address] [phone]  1/1/2024 - None Entered    [facility] [id number]         Subscriber Name Subscriber Birth Date Member ID       ELISA VENEGAS 1935 [id number]                     Emergency Contacts        (Rel.) Home Phone Work Phone Mobile Phone    Joe Venegas (Son) -- -- [phone]    Albin Venegas (Son) [phone] -- --    DORIS VENEGAS (Relative) -- -- [phone]              Insurance Information                  ANTHEM MEDICARE REPLACEMENT/ANTHEM MEDICARE ADVANTAGE Phone: [phone]    Subscriber: Elisa Venegas Subscriber#: [id number]    Group#: [id number] Precert#: --             History & Physical        Dossett, Chris MAC MD at 07/27/24 2050        "       [facility] Medicine Services  HISTORY AND PHYSICAL    Patient Name: Chey Robertson  : 1935  MRN: [id number]  Primary Care Physician: Yasmeen Loomis MD  Date of admission: 2024    Subjective   Subjective     Chief Complaint:  Weakness, diarrhea, nausea, vomiting     HPI:  Chey Robertson is a 89 y.o. female resident of [facility] living with PMH significant for A-fib on Eliquis, DVT/PE, hypothyroid, HTN, DM2, essential tremor, who presents to the ED with complaint of weakness, diarrhea, nausea, and vomiting.  Family at bedside helps provide HPI.  Family notes that approximately 3 weeks ago her metformin dose was increased from daily to twice daily.  Since that time, the patient reports  having progressive diarrhea.  Over the past week she has been having increasing weakness and decreased appetite.  Over the past 3 days she has been having nausea with associated vomiting.  She denies fever, chest pain, shortness of breath.  She reports having chronic cough that has not changed.  She denies abdominal pain.  She does admit to having mild dizziness.  Family notes that she was evaluated per PCP yesterday who prescribed her Cipro for presumed UTI.  Upon arrival to the ED, labs are concerning for hyperglycemia, DISHA, dehydration.  She is also noted to have pancytopenia.  UA is negative.  CXR is negative.  She will be admitted to hospital medicine for further evaluation.    Review of Systems   Constitutional:  Positive for activity change, appetite change, fatigue and unexpected weight change. Negative for chills, diaphoresis and fever.   HENT: Negative.  Negative for congestion, sinus pressure, sore throat and trouble swallowing.    Eyes: Negative.  Negative for visual disturbance.   Respiratory:  Positive for cough. Negative for chest tightness, shortness of breath and wheezing.    Cardiovascular: Negative.  Negative for chest pain, palpitations and leg swelling.    Gastrointestinal:  Positive for diarrhea, nausea and vomiting. Negative for abdominal distention, abdominal pain and constipation.   Endocrine: Negative.  Negative for polydipsia and polyphagia.   Genitourinary:  Negative for decreased urine volume, difficulty urinating, dysuria, frequency and urgency.   Musculoskeletal: Negative.  Negative for arthralgias, back pain, gait problem, myalgias and neck pain.   Skin:  Positive for wound. Negative for color change, pallor and rash.   Allergic/Immunologic: Negative.  Negative for immunocompromised state.   Neurological:  Positive for dizziness, tremors and weakness. Negative for seizures, syncope, facial asymmetry, speech difficulty, light-headedness, numbness and headaches.   Hematological: Negative.  Does not bruise/bleed easily.   Psychiatric/Behavioral:  Negative for agitation and confusion. The patient is not nervous/anxious.         Personal History     Past Medical History:   Diagnosis Date    A-fib     on eliquis    Anemia     Arthritis     Diabetes mellitus     checks sugar only when pt wants to     Disease of thyroid gland     History of transfusion     with knee surgery-  no reaction recalled.     [place] (hard of hearing)     no hearing aids    Hypertension     Migraine without aura and without status migrainosus, not intractable 12/30/2016    Myelodysplastic syndrome     Pulmonary emboli 2011    (after knee surgery)    SOBOE (shortness of breath on exertion)     Tremors of nervous system     Vertigo     Wears dentures     upper     Wears glasses          Oncology Problem List:  Myelodysplasia (myelodysplastic syndrome) (03/14/2023; Status: Active)    Oncology/Hematology History       Past Surgical History:   Procedure Laterality Date    BLADDER SURGERY      CATARACT EXTRACTION      bilat     CHOLECYSTECTOMY      COLONOSCOPY      HIP TROCHANTERIC NAILING WITH INTRAMEDULLARY HIP SCREW Right 6/20/2023    Procedure: HIP TROCHANTERIC NAILING WITH INTRAMEDULLARY HIP  SCREW RIGHT;  Surgeon: Augie Hobbs MD;  Location: [facility] OR;  Service: Orthopedics;  Laterality: Right;    HYSTERECTOMY      with bso    KYPHOPLASTY N/A 02/12/2021    Procedure: KYPHOPLASTY T11, T12 AND L4;  Surgeon: Matty Hearn MD;  Location:  [facility];  Service: Orthopedic Spine;  Laterality: N/A;    TONSILLECTOMY         Family History:  family history includes Breast cancer (age of onset: 84) in her sister; Heart attack in her father; Stroke in her mother.     Social History:  reports that she has never smoked. She has never been exposed to tobacco smoke. She has never used smokeless tobacco. She reports that she does not drink alcohol and does not use drugs.  Social History     Social History Narrative    Not on file       Medications:  Acidophilus Extra Strength, HYDROcodone-acetaminophen, Insulin Aspart, SITagliptin, acetaminophen, amiodarone, apixaban, ascorbic acid, furosemide, insulin NPH-insulin regular, levothyroxine, lisinopril, melatonin, metFORMIN, metoprolol tartrate, naloxone, ondansetron ODT, pantoprazole, polyethylene glycol, potassium chloride, sucralfate, traMADol, and zinc sulfate    Allergies   Allergen Reactions    Aspirin Unknown - Low Severity     Mouth sores        Objective   Objective     Vital Signs:   Temp:  [97.4 °F (36.3 °C)] 97.4 °F (36.3 °C)  Heart Rate:  [56-58] 58  Resp:  [18] 18  BP: (119-155)/() 130/54    Physical Exam   Constitutional: Awake, alert, no acute distress   Eyes: PERRLA, sclerae anicteric, no conjunctival injection  HENT: NCAT, mucous membranes moist  Neck: Supple, no thyromegaly, no lymphadenopathy, trachea midline  Respiratory: Clear to auscultation bilaterally, nonlabored respirations   Cardiovascular: RRR, no murmurs, rubs, or gallops, palpable pedal pulses bilaterally  Gastrointestinal: Positive bowel sounds, soft, nontender, nondistended  Musculoskeletal: No bilateral ankle edema, no clubbing or cyanosis to extremities  Psychiatric:  Appropriate affect, cooperative  Neurologic: Oriented x 3, strength symmetric in all extremities, Cranial Nerves grossly intact to confrontation, speech clear  Skin: No rashes, pressure ulcer left heel, wound right lower shin       Result Review:  I have personally reviewed the results from the time of this admission to 7/27/2024 21:21 EDT and agree with these findings:  [x]  Laboratory list / accordion  [x]  Microbiology  [x]  Radiology  []  EKG/Telemetry   []  Cardiology/Vascular   []  Pathology  [x]  Old records  []  Other:  Most notable findings include:     LAB RESULTS:      Lab 07/27/24  1449   WBC 2.01*   HEMOGLOBIN 10.9*   HEMATOCRIT 32.1*   PLATELETS 54*   NEUTROS ABS 1.72   IMMATURE GRANS (ABS) 0.03   LYMPHS ABS 0.19*   MONOS ABS 0.07*   EOS ABS 0.00   MCV 90.2   CRP <0.30   PROCALCITONIN <0.02         Lab 07/27/24  1449   SODIUM 135*   POTASSIUM 4.6   CHLORIDE 91*   CO2 26.0   ANION GAP 18.0*   BUN 26*   CREATININE 1.49*   EGFR 33.6*   GLUCOSE 502*   CALCIUM 9.4   MAGNESIUM 1.6   TSH 1.650         Lab 07/27/24  1449   TOTAL PROTEIN 7.0   ALBUMIN 4.4   GLOBULIN 2.6   ALT (SGPT) 7   AST (SGOT) 12   BILIRUBIN 0.6   ALK PHOS 102         Lab 07/27/24  1449   HSTROP T 16*                 Brief Urine Lab Results  (Last result in the past 365 days)        Color   Clarity   Blood   Leuk Est   Nitrite   Protein   CREAT   Urine HCG        07/27/24 1613 Yellow   Clear   Negative   Negative   Negative   Negative                 Microbiology Results (last 10 days)       Procedure Component Value - Date/Time    COVID PRE-OP / PRE-PROCEDURE SCREENING ORDER (NO ISOLATION) - Swab, Nasopharynx [[id number]]  (Normal) Collected: 07/27/24 2044    Lab Status: Final result Specimen: Swab from Nasopharynx Updated: 07/27/24 2116    Narrative:      The following orders were created for panel order COVID PRE-OP / PRE-PROCEDURE SCREENING ORDER (NO ISOLATION) - Swab, Nasopharynx.  Procedure                               Abnormality          Status                     ---------                               -----------         ------                     COVID-19 and FLU A/B PCR...[[id number]]  Normal              Final result                 Please view results for these tests on the individual orders.    COVID-19 and FLU A/B PCR, 1 HR TAT - Swab, Nasopharynx [[id number]]  (Normal) Collected: 07/27/24 2044    Lab Status: Final result Specimen: Swab from Nasopharynx Updated: 07/27/24 2116     COVID19 Not Detected     Influenza A PCR Not Detected     Influenza B PCR Not Detected    Narrative:      Fact sheet for providers: https://www.fda.gov/media/306464/download    Fact sheet for patients: https://www.fda.gov/media/076972/download    Test performed by PCR.            XR Chest 1 View    Result Date: 7/27/2024  XR CHEST 1 VW Date of Exam: 7/27/2024 3:41 PM EDT Indication: Weakness, dizziness, altered mental status Comparison: 1/15/2024. Findings: The heart size is normal. The pulmonary vascular markings are normal. The lungs and pleural spaces are clear of active disease. There are chronic age-related changes involving the bony thorax and thoracic aorta.     Impression: Impression: No active disease. Electronically Signed: Saul Carl MD  7/27/2024 4:03 PM EDT  Workstation ID: [id number]     Results for orders placed during the hospital encounter of 06/05/23    Adult Transthoracic Echo Complete w/ Color, Spectral and Contrast if necessary per protocol    Interpretation Summary    Left ventricular systolic function is normal. Left ventricular ejection fraction appears to be 56 - 60%.    Left ventricular diastolic function was normal.    Estimated right ventricular systolic pressure from tricuspid regurgitation is normal (<35 mmHg).      Assessment & Plan   Assessment & Plan       ARF (acute renal failure)    Hypothyroidism (acquired)    Personal history of pulmonary embolism    Chronic anticoagulation    Essential hypertension    Type 2 diabetes mellitus  without complication, without long-term current use of insulin    Atrial fibrillation    Diarrhea    89 y.o. female resident of [facility] assisted living with PMH significant for A-fib on Eliquis, DVT/PE, hypothyroid, HTN, DM2, essential tremor, who presents to the ED with complaint of weakness, diarrhea, nausea, and vomiting who was found to have concern for DISHA.    DISHA  - Baseline creatinine 0.5-0.6, currently 1.49  - suspect related to dehydration  - Gentle IV fluid, recheck in AM    Diarrhea  - GI panel pending  - Possibly related to increasing metformin  - Likely contributing to DISHA  - Probiotic  - Gentle IV fluid  - Monitor electrolytes, BMP, mag in the a.m.    Left heel pressure ulcer  - Wound consult in the a.m.    Hyperglycemia  Diabetes mellitus 2  - Uncontrolled with significant hyperglycemia that is likely contributing to dehydration  - Recently had metformin increased to twice daily  - Hold metformin and Januvia  - Increasing diarrhea since medication change  - start on glucommander  - Hemoglobin A1c    Chronic pancytopenia  - Currently at baseline, follows with [facility]  - CBC in the a.m.    A-fib  History DVT/PE  - Anticoagulated on Eliquis, continue for now  - Rate controlled, continue amiodarone    Hypertension  Dyslipidemia  - Hold lisinopril, furosemide secondary to DISHA  - Continue metoprolol    Hypothyroid  - Continue levothyroxine    DVT prophylaxis: On eliquis     CODE STATUS: Discussed with patient   Level Of Support Discussed With: Patient  Code Status (Patient has no pulse and is not breathing): CPR (Attempt to Resuscitate)  Medical Interventions (Patient has pulse or is breathing): Full Support      Expected Discharge  Expected Discharge Date: 7/30/2024; Expected Discharge Time:     This note has been completed as part of a split-shared workflow.     Signature: Electronically signed by CHANG Tran, 07/27/24, 9:21 PM EDT.        Attending   Admission Attestation       I have  performed an independent face-to-face diagnostic evaluation including performing an independent physical examination.  I approve of the documented plan of care above that was reviewed and developed with the advanced practice clinician (APC) and take responsibility for that plan along with its associated risks.  I have updated the HPI as appropriate.    Brief HPI  Ms. Robertson is an 89-year-old female at assisted living facility with history of A-fib on Eliquis, DVT/PE, hypothyroidism, type 2 diabetes who presented to the emergency room with complaints of weakness, nausea and vomiting.  Recently had her metformin dose increased has had some increasing diarrhea since that time.  Her last 3 days she is having nausea and also some vomiting.  Workup in the emergency room significant for glucose greater than 500, creatinine 1.5 well above her baseline.  Apparently recently prescribed Cipro for possible UTI (data deficit).  Currently she is feeling a bit better after some hydration.    Attending Physical Exam:  Temp:  [97.4 °F (36.3 °C)] 97.4 °F (36.3 °C)  Heart Rate:  [56-58] 57  Resp:  [18] 18  BP: (119-155)/() 125/42    Constitutional: sleeping but arouses, no distress, age appropriate  Eyes: PERRLA, sclerae anicteric, no conjunctival injection  HENT: NCAT, mucous membranes moist  Neck: Supple, no thyromegaly, no lymphadenopathy, trachea midline  Respiratory: Clear to auscultation bilaterally, nonlabored respirations   Cardiovascular: RRR, no murmurs, rubs, or gallops, palpable pedal pulses bilaterally  Gastrointestinal: Positive bowel sounds, soft, nontender, nondistended  Musculoskeletal: No bilateral ankle edema, no clubbing or cyanosis to extremities  Psychiatric: Appropriate affect, cooperative  Neurologic: Oriented x 3, no deficits  Skin: No rashes      Result Review:  I have personally reviewed the results from the time of this admission to 7/27/2024 22:57 EDT and agree with these findings:  [x]  Laboratory  list / accordion  []  Microbiology  [x]  Radiology  []  EKG/Telemetry   []  Cardiology/Vascular   []  Pathology  []  Old records  []  Other:    Assessment and Plan:    See assessment and plan documented by APC above and updated/edited by me as appropriate.    Chris Nicolas MD  24                        Electronically signed by Chris Nicolas MD at 24 [id number]          Physician Progress Notes (most recent note)        Kimber March, APRN at 24 1248              [facility] Medicine Services  PROGRESS NOTE    Patient Name: Chey Robertson  : 1935  MRN: [id number]    Date of Admission: 2024  Primary Care Physician: Yasmeen Loomis MD    Subjective   Subjective     CC:  F/u weakness    HPI:  Patient up in chair. No overnight issues. Tells me she feels good and wants to return home.      Objective   Objective     Vital Signs:   Temp:  [97.9 °F (36.6 °C)-98.5 °F (36.9 °C)] 98.3 °F (36.8 °C)  Heart Rate:  [48-58] 56  Resp:  [16-18] 18  BP: (126-152)/(54-66) 128/54     Physical Exam:  Constitutional: No acute distress, awake, alert  HENT: NCAT, mucous membranes moist  Respiratory: Clear to auscultation bilaterally, respiratory effort normal   Cardiovascular: RRR  Gastrointestinal: Positive bowel sounds, soft, nontender, nondistended  Musculoskeletal: No bilateral ankle edema  Psychiatric: Appropriate affect, cooperative  Neurologic: Oriented x 3, JOYCE, speech clear  Skin: No rashes        Results Reviewed:  LAB RESULTS:      Lab 24  0708 24  0548 24  1449   WBC 1.56* 1.50* 2.01*   HEMOGLOBIN 9.2* 9.4* 10.9*   HEMATOCRIT 27.2* 27.1* 32.1*   PLATELETS 53* 56* 54*   NEUTROS ABS 0.91* 0.83* 1.72   IMMATURE GRANS (ABS) 0.01 0.04 0.03   LYMPHS ABS 0.45* 0.47* 0.19*   MONOS ABS 0.19 0.16 0.07*   EOS ABS 0.00 0.00 0.00   MCV 89.8 89.4 90.2   CRP  --   --  <0.30   PROCALCITONIN  --   --  <0.02         Lab 24  1402 24  0445 24  1415  07/30/24  0453 07/29/24  0708 07/28/24  0547 07/27/24  1449   SODIUM  --  141  --  141 137 140 135*   POTASSIUM 4.8 3.6 3.7 3.3* 3.6 3.5 4.6   CHLORIDE  --  107  --  104 102 100 91*   CO2  --  27.0  --  26.0 26.0 30.0* 26.0   ANION GAP  --  7.0  --  11.0 9.0 10.0 18.0*   BUN  --  14  --  17 18 26* 26*   CREATININE  --  0.87  --  0.81 0.97 1.12* 1.49*   EGFR  --  64.2  --  69.9 56.3* 47.4* 33.6*   GLUCOSE  --  145*  --  121* 143* 197* 502*   CALCIUM  --  8.2*  --  8.2* 8.3* 8.7 9.4   MAGNESIUM  --  1.7  --   --   --  1.7 1.6   PHOSPHORUS  --   --   --   --   --  3.7  --    HEMOGLOBIN A1C  --   --   --   --   --  9.80*  --    TSH  --   --   --   --   --   --  1.650         Lab 07/27/24  1449   TOTAL PROTEIN 7.0   ALBUMIN 4.4   GLOBULIN 2.6   ALT (SGPT) 7   AST (SGOT) 12   BILIRUBIN 0.6   ALK PHOS 102         Lab 07/27/24  1449   HSTROP T 16*                 Brief Urine Lab Results  (Last result in the past 365 days)        Color   Clarity   Blood   Leuk Est   Nitrite   Protein   CREAT   Urine HCG        07/30/24 0549 Yellow   Cloudy   Negative   Small (1+)   Negative   Trace                   Microbiology Results Abnormal       Procedure Component Value - Date/Time    Eosinophil Smear - Urine, Urine, Clean Catch [[id number]]  (Normal) Collected: 07/27/24 1613    Lab Status: Final result Specimen: Urine, Clean Catch Updated: 07/27/24 2346     Eosinophil Smear 0 % EOS/100 Cells     Narrative:      No eosinophil seen    COVID PRE-OP / PRE-PROCEDURE SCREENING ORDER (NO ISOLATION) - Swab, Nasopharynx [[id number]]  (Normal) Collected: 07/27/24 2044    Lab Status: Final result Specimen: Swab from Nasopharynx Updated: 07/27/24 2116    Narrative:      The following orders were created for panel order COVID PRE-OP / PRE-PROCEDURE SCREENING ORDER (NO ISOLATION) - Swab, Nasopharynx.  Procedure                               Abnormality         Status                     ---------                               -----------          ------                     COVID-19 and FLU A/B PCR...[[id number]]  Normal              Final result                 Please view results for these tests on the individual orders.    COVID-19 and FLU A/B PCR, 1 HR TAT - Swab, Nasopharynx [[id number]]  (Normal) Collected: 07/27/24 2044    Lab Status: Final result Specimen: Swab from Nasopharynx Updated: 07/27/24 2116     COVID19 Not Detected     Influenza A PCR Not Detected     Influenza B PCR Not Detected    Narrative:      Fact sheet for providers: https://www.fda.gov/media/952819/download    Fact sheet for patients: https://www.fda.gov/media/506177/download    Test performed by PCR.            No radiology results from the last 24 hrs    Results for orders placed during the hospital encounter of 06/05/23    Adult Transthoracic Echo Complete w/ Color, Spectral and Contrast if necessary per protocol    Interpretation Summary    Left ventricular systolic function is normal. Left ventricular ejection fraction appears to be 56 - 60%.    Left ventricular diastolic function was normal.    Estimated right ventricular systolic pressure from tricuspid regurgitation is normal (<35 mmHg).      Current medications:  Scheduled Meds:amiodarone, 200 mg, Oral, Daily  apixaban, 2.5 mg, Oral, BID  castor oil-balsam peru, 1 Application, Topical, Q12H  insulin glargine, 1-200 Units, Subcutaneous, Nightly - Glucommander  insulin lispro, 1-200 Units, Subcutaneous, 4x Daily With Meals & Nightly  lactobacillus acidophilus, 1 capsule, Oral, Daily  levothyroxine, 100 mcg, Oral, Q AM  metoprolol tartrate, 50 mg, Oral, BID  pantoprazole, 40 mg, Oral, Daily  sodium chloride, 10 mL, Intravenous, Q12H      Continuous Infusions:   PRN Meds:.  acetaminophen    dextrose    dextrose    glucagon (human recombinant)    insulin lispro    Magnesium Standard Dose Replacement - Follow Nurse / BPA Driven Protocol    melatonin    Potassium Replacement - Follow Nurse / BPA Driven Protocol    sodium chloride     sodium chloride    sodium chloride    traMADol    Assessment & Plan   Assessment & Plan     Active Hospital Problems    Diagnosis  POA    **ARF (acute renal failure) [N17.9]  Yes    Moderate malnutrition [E44.0]  Yes    Diarrhea [R19.7]  Yes    Atrial fibrillation [I48.91]  Yes    Chronic anticoagulation [Z79.01]  Not Applicable    Essential hypertension [I10]  Yes    Type 2 diabetes mellitus without complication, without long-term current use of insulin [E11.9]  Yes    Personal history of pulmonary embolism [Z86.711]  Yes    Hypothyroidism (acquired) [E03.9]  Yes      Resolved Hospital Problems   No resolved problems to display.        Brief Hospital Course to date:  Chey Robertson is a 89 y.o. female  with history of atrial fibrillation on dose-adjusted Eliquis, DVT/PE, hypothyroid, HTN, DMII, essential tremor who presented with generalized weakness, N/V and diarrhea.  Recent increase in home metformin dose. Patient is a resident in assisted-living.    This patient's problems and plans were partially entered by my partner and updated as appropriate by me 07/31/24.       N/V  Diarrhea  - secondary to increased metformin dose?  - resolved. Now tolerating PO      DISHA- resolved  - creatinine 1.49 on admission, likely secondary to volume loss  - baseline creatinine 0.5 to 0.6- now WNL  - creatinine improved with gentle IV fluids    UTI- ruled out  -episode of confusion overnight 7/30  -UA consistent with UTI, culture with lactobacillus  -Rocephin given x 2- will discontinue for negative culture and no symptoms/ return to baseline     Hypokalemia  - in setting of diarrhea, N/V  - electrolyte replacement protocol    HTN  - lisinopril held for DISHA, BP acceptable. Will continue to hold     DMII  - poor control, A1c 9.8  - metformin held  - glucose reviewed, glucommander     Pancytopenia  - follows with Dr Lyman     Atrial fibrillation  H/o DVT/PE  - amiodarone, metoprolol for rate control  - Eliquis for  anticoagulation    Prolonged QTc  - EKG  with QTc 506, sinus pancho  - on amiodarone, avoid other QT-prolong meds    Hypothyroid  - TSH appropriate  - Synthroid    Dysphagia  - MBS  showed aspiration of all consistencies.  Partner discussed with speech therapist.  Component of aspiration may involve mechanical changes from osteophytes, which may not change as patient strength improves.    - continued discussion with patient and son Joe- patient does not wish for any type of feeding tube/ artificial nutrition. Family/ Patient have opted for comfort diet with continued work with SLP. SLP has recommended safest comfort diet of mech soft, NTL     Generalized weakness  - PT/OT to reeval    Dispo: unfortunately patient's [facility]INE, [facility], can not accept patient on thickened liquid diet. Have discussed with family who want to see if she will qualify for acute rehab while they decide if they want to try to find alternate facility vs change back to liquid diet       Expected Discharge Location and Transportation: [facility] vs rehab  Expected Discharge   Expected Discharge Date: 2024; Expected Discharge Time:      VTE Prophylaxis:  Pharmacologic VTE prophylaxis orders are present.         AM-PAC 6 Clicks Score (PT): 12 (24)    CODE STATUS:   Code Status and Medical Interventions: CPR (Attempt to Resuscitate); Full Support   Ordered at: 24 212     Level Of Support Discussed With:    Patient     Code Status (Patient has no pulse and is not breathing):    CPR (Attempt to Resuscitate)     Medical Interventions (Patient has pulse or is breathing):    Full Support       CHANG Richards  24        Electronically signed by Kimber March APRN at 24 1513          Physical Therapy Notes (most recent note)        Jen Leos, PT at 24 1440  Version 1 of 1         Patient Name: Chey Robertson  : 1935    MRN: [id number]                              Today's Date:  7/31/2024       Admit Date: 7/27/2024    Visit Dx:     ICD-10-CM ICD-9-CM   1. DISHA (acute kidney injury)  N17.9 584.9   2. Generalized weakness  R53.1 780.79   3. At high risk for falls  Z91.81 V15.88   4. Pharyngeal dysphagia  R13.13 787.23   5. Hyperglycemia  R73.9 790.29     Patient Active Problem List   Diagnosis    Benign familial tremor    AMS (altered mental status)    Pancytopenia    Hypothyroidism (acquired)    B12 deficiency    Abnormal intestinal absorption    Iron deficiency anemia due to chronic blood loss    Myelodysplasia (myelodysplastic syndrome)    Personal history of pulmonary embolism    Chronic anticoagulation    Essential hypertension    Type 2 diabetes mellitus without complication, without long-term current use of insulin    Atrial fibrillation    QT prolongation    Pericardial effusion    Severe malnutrition    Closed displaced intertrochanteric fracture of right femur, initial encounter    ARF (acute renal failure)    Diarrhea    Moderate malnutrition     Past Medical History:   Diagnosis Date    A-fib     on eliquis    Anemia     Arthritis     Diabetes mellitus     checks sugar only when pt wants to     Disease of thyroid gland     History of transfusion     with knee surgery-  no reaction recalled.     [place] (hard of hearing)     no hearing aids    Hypertension     Migraine without aura and without status migrainosus, not intractable 12/30/2016    Myelodysplastic syndrome     Pulmonary emboli 2011    (after knee surgery)    SOBOE (shortness of breath on exertion)     Tremors of nervous system     Vertigo     Wears dentures     upper     Wears glasses      Past Surgical History:   Procedure Laterality Date    BLADDER SURGERY      CATARACT EXTRACTION      bilat     CHOLECYSTECTOMY      COLONOSCOPY      HIP TROCHANTERIC NAILING WITH INTRAMEDULLARY HIP SCREW Right 6/20/2023    Procedure: HIP TROCHANTERIC NAILING WITH INTRAMEDULLARY HIP SCREW RIGHT;  Surgeon: Augie Hobbs MD;  Location:   [facility];  Service: Orthopedics;  Laterality: Right;    HYSTERECTOMY      with bso    KYPHOPLASTY N/A 02/12/2021    Procedure: KYPHOPLASTY T11, T12 AND L4;  Surgeon: Matty Hearn MD;  Location:  [facility];  Service: Orthopedic Spine;  Laterality: N/A;    TONSILLECTOMY        General Information       Row Name 07/31/24 1537          Physical Therapy Time and Intention    Document Type therapy note (daily note)  -KG     Mode of Treatment physical therapy  -KG       Row Name 07/31/24 1537          General Information    Existing Precautions/Restrictions fall;other (see comments)  situational confusion; incontinent  -KG     Barriers to Rehab medically complex;previous functional deficit;cognitive status  -KG       Row Name 07/31/24 1537          Cognition    Orientation Status (Cognition) oriented x 3  situational confusion  -KG       Row Name 07/31/24 1537          Safety Issues, Functional Mobility    Safety Issues Affecting Function (Mobility) awareness of need for assistance;insight into deficits/self-awareness;safety precaution awareness;safety precautions follow-through/compliance;sequencing abilities  -KG     Impairments Affecting Function (Mobility) balance;cognition;coordination;endurance/activity tolerance;postural/trunk control;strength  -KG     Cognitive Impairments, Mobility Safety/Performance attention;awareness, need for assistance;insight into deficits/self-awareness;safety precaution awareness;safety precaution follow-through;sequencing abilities  -KG               User Key  (r) = Recorded By, (t) = Taken By, (c) = Cosigned By      Initials Name Provider Type    KG Jen Leos, PT Physical Therapist                   Mobility       Row Name 07/31/24 1541          Bed Mobility    Comment, (Bed Mobility) UIC  -KG       Row Name 07/31/24 1541          Transfers    Comment, (Transfers) VC's for sequencing and safe hand placement.  -KG       Row Name 07/31/24 1541          Sit-Stand Transfer     Sit-Stand [place] (Transfers) contact guard;verbal cues  -KG     Assistive Device (Sit-Stand Transfers) walker, front-wheeled  -KG       Row Name 07/31/24 1541          Gait/Stairs (Locomotion)    [place] Level (Gait) minimum assist (75% patient effort);verbal cues  periods of CGA  -KG     Assistive Device (Gait) walker, front-wheeled  -KG     Distance in Feet (Gait) 60  -KG     Deviations/Abnormal Patterns (Gait) base of support, narrow;[place] decreased;stride length decreased  -KG     Bilateral Gait Deviations forward flexed posture  -KG     Comment, (Gait/Stairs) Pt demonstrated step through gait pattern with slow [place] and short, shuffled steps. Frequent cues for upright posture with forward gaze and increased stride length. Pt demonstrated mild instability with continued forward ambulation. Easily distracted with difficulty steering RW to navigate around obstacles. Required intermittent physical assistance to steer RW. Worsening gait mechanics with continued forward ambulation. Limited by fatigue.  -KG               User Key  (r) = Recorded By, (t) = Taken By, (c) = Cosigned By      Initials Name Provider Type    Jen Ray, PT Physical Therapist                   Obj/Interventions       Row Name 07/31/24 1543          Balance    Balance Assessment sitting static balance;standing static balance;standing dynamic balance  -KG     Static Sitting Balance supervision  -KG     Position, Sitting Balance unsupported;sitting in chair  -KG     Static Standing Balance contact guard  -KG     Dynamic Standing Balance minimal assist  -KG     Position/Device Used, Standing Balance supported;walker, rolling  -KG               User Key  (r) = Recorded By, (t) = Taken By, (c) = Cosigned By      Initials Name Provider Type    Jen Ray, PT Physical Therapist                   Goals/Plan    No documentation.                  Clinical Impression       Row Name 07/31/24 1063          Pain     Pretreatment Pain Rating 0/10 - no pain  -KG     Posttreatment Pain Rating 0/10 - no pain  -KG       Row Name 07/31/24 1543          Plan of Care Review    Plan of Care Reviewed With patient  -KG     Progress improving  -KG     Outcome Evaluation Pt ambulated 60ft with RW and John, progressing to periods of CGA at times. Pt required frequent cues for upright posture with forward gaze and increased stride length. Intermittent physical assistance required to steer RW to avoid obstacles in joy. Worsening balance and coordination with continued forward ambulation. Pt limited by fatigue and confusion. Continue to recommend PT skilled care and D/C to SNF.  -KG       Row Name 07/31/24 1543          Vital Signs    Pre Systolic BP Rehab 128  -KG     Pre Treatment Diastolic BP 54  -KG     Pretreatment Heart Rate (beats/min) 54  -KG     Posttreatment Heart Rate (beats/min) 54  -KG     Pre SpO2 (%) 99  -KG     O2 Delivery Pre Treatment room air  -KG     Post SpO2 (%) 100  -KG     O2 Delivery Post Treatment room air  -KG     Pre Patient Position Sitting  -KG     Intra Patient Position Standing  -KG     Post Patient Position Sitting  -KG       Row Name 07/31/24 1543          Positioning and Restraints    Pre-Treatment Position sitting in chair/recliner  -KG     Post Treatment Position chair  -KG     In Chair notified nsg;reclined;call light within reach;encouraged to call for assist;exit alarm on;legs elevated  -KG               User Key  (r) = Recorded By, (t) = Taken By, (c) = Cosigned By      Initials Name Provider Type    KG Jen Leos, PT Physical Therapist                   Outcome Measures       Row Name 07/31/24 1545          How much help from another person do you currently need...    Turning from your back to your side while in flat bed without using bedrails? 3  -KG     Moving from lying on back to sitting on the side of a flat bed without bedrails? 3  -KG     Moving to and from a bed to a chair (including  a wheelchair)? 3  -KG     Standing up from a chair using your arms (e.g., wheelchair, bedside chair)? 3  -KG     Climbing 3-5 steps with a railing? 2  -KG     To walk in hospital room? 3  -KG     AM-PAC 6 Clicks Score (PT) 17  -KG     Highest Level of Mobility Goal 5 --> Static standing  -KG       Row Name 07/31/24 1545          Functional Assessment    Outcome Measure Options AM-PAC 6 Clicks Basic Mobility (PT)  -KG               User Key  (r) = Recorded By, (t) = Taken By, (c) = Cosigned By      Initials Name Provider Type    KG Jen Leos, PT Physical Therapist                                 Physical Therapy Education       Title: PT OT SLP Therapies (In Progress)       Topic: Physical Therapy (In Progress)       Point: Mobility training (In Progress)       Learning Progress Summary             Patient Acceptance, E, NR by KG at 7/31/2024 1440    Acceptance, E, NR by KG at 7/29/2024 1026                         Point: Home exercise program (In Progress)       Learning Progress Summary             Patient Acceptance, E, NR by KG at 7/31/2024 1440                         Point: Body mechanics (In Progress)       Learning Progress Summary             Patient Acceptance, E, NR by KG at 7/31/2024 1440    Acceptance, E, NR by KG at 7/29/2024 1026                         Point: Precautions (In Progress)       Learning Progress Summary             Patient Acceptance, E, NR by KG at 7/31/2024 1440    Acceptance, E, NR by KG at 7/29/2024 1026                                         User Key       Initials Effective Dates Name Provider Type Discipline    KG 05/22/20 -  Jen Leos, PT Physical Therapist PT                  PT Recommendation and Plan  Planned Therapy Interventions (PT): balance training, bed mobility training, gait training, strengthening, transfer training  Plan of Care Reviewed With: patient  Progress: improving  Outcome Evaluation: Pt ambulated 60ft with RW and John, progressing to  periods of CGA at times. Pt required frequent cues for upright posture with forward gaze and increased stride length. Intermittent physical assistance required to steer RW to avoid obstacles in joy. Worsening balance and coordination with continued forward ambulation. Pt limited by fatigue and confusion. Continue to recommend PT skilled care and D/C to SNF.     Time Calculation:   PT Evaluation Complexity  History, PT Evaluation Complexity: 1-2 personal factors and/or comorbidities  Examination of Body Systems (PT Eval Complexity): total of 3 or more elements  Clinical Presentation (PT Evaluation Complexity): stable  Clinical Decision Making (PT Evaluation Complexity): low complexity  Overall Complexity (PT Evaluation Complexity): low complexity     PT Charges       Row Name 07/31/24 1440             Time Calculation    Start Time 1440  -KG      PT Received On 07/31/24  -KG      PT Goal Re-Cert Due Date 08/08/24  -KG         Time Calculation- PT    Total Timed Code Minutes- PT 24 minute(s)  -KG         Timed Charges    71587 - PT Therapeutic Activity Minutes 24  -KG         Total Minutes    Timed Charges Total Minutes 24  -KG       Total Minutes 24  -KG                User Key  (r) = Recorded By, (t) = Taken By, (c) = Cosigned By      Initials Name Provider Type    KG Jen Leos, PT Physical Therapist                  Therapy Charges for Today       Code Description Service Date Service Provider Modifiers Qty    48897434536 HC PT THERAPEUTIC ACT EA 15 MIN 7/31/2024 Jen Leos, PT GP 2            PT G-Codes  Outcome Measure Options: AM-PAC 6 Clicks Basic Mobility (PT)  AM-PAC 6 Clicks Score (PT): 17  AM-PAC 6 Clicks Score (OT): 16  PT Discharge Summary  Anticipated Discharge Disposition (PT): skilled nursing facility    Joanna Leos PT  7/31/2024      Electronically signed by Jen Leos, PT at 07/31/24 9636          Occupational Therapy Notes (most recent note)         Aisha Perea, OT at 24 1014          Patient Name: Chey Robertson  : 1935    MRN: [id number]                              Today's Date: 2024       Admit Date: 2024    Visit Dx:     ICD-10-CM ICD-9-CM   1. DISHA (acute kidney injury)  N17.9 584.9   2. Generalized weakness  R53.1 780.79   3. At high risk for falls  Z91.81 V15.88   4. Pharyngeal dysphagia  R13.13 787.23   5. Hyperglycemia  R73.9 790.29     Patient Active Problem List   Diagnosis    Benign familial tremor    AMS (altered mental status)    Pancytopenia    Hypothyroidism (acquired)    B12 deficiency    Abnormal intestinal absorption    Iron deficiency anemia due to chronic blood loss    Myelodysplasia (myelodysplastic syndrome)    Personal history of pulmonary embolism    Chronic anticoagulation    Essential hypertension    Type 2 diabetes mellitus without complication, without long-term current use of insulin    Atrial fibrillation    QT prolongation    Pericardial effusion    Severe malnutrition    Closed displaced intertrochanteric fracture of right femur, initial encounter    ARF (acute renal failure)    Diarrhea    Moderate malnutrition     Past Medical History:   Diagnosis Date    A-fib     on eliquis    Anemia     Arthritis     Diabetes mellitus     checks sugar only when pt wants to     Disease of thyroid gland     History of transfusion     with knee surgery-  no reaction recalled.     [place] (hard of hearing)     no hearing aids    Hypertension     Migraine without aura and without status migrainosus, not intractable 2016    Myelodysplastic syndrome     Pulmonary emboli     (after knee surgery)    SOBOE (shortness of breath on exertion)     Tremors of nervous system     Vertigo     Wears dentures     upper     Wears glasses      Past Surgical History:   Procedure Laterality Date    BLADDER SURGERY      CATARACT EXTRACTION      bilat     CHOLECYSTECTOMY      COLONOSCOPY      HIP TROCHANTERIC NAILING WITH  INTRAMEDULLARY HIP SCREW Right 6/20/2023    Procedure: HIP TROCHANTERIC NAILING WITH INTRAMEDULLARY HIP SCREW RIGHT;  Surgeon: Augie Hobbs MD;  Location: [facility] OR;  Service: Orthopedics;  Laterality: Right;    HYSTERECTOMY      with bso    KYPHOPLASTY N/A 02/12/2021    Procedure: KYPHOPLASTY T11, T12 AND L4;  Surgeon: Matty Hearn MD;  Location:  [facility];  Service: Orthopedic Spine;  Laterality: N/A;    TONSILLECTOMY        General Information       Row Name 07/29/24 1511          OT Time and Intention    Document Type evaluation  -KF     Mode of Treatment occupational therapy  -       Row Name 07/29/24 1511          General Information    Patient Profile Reviewed yes  -KF     Prior Level of Function independent:;all household mobility;bed mobility;mod assist:;dressing;bathing  [place] with rollator  -KF     Existing Precautions/Restrictions fall;other (see comments)  brief with mobility  -KF     Barriers to Rehab medically complex;previous functional deficit;cognitive status  -KF       Row Name 07/29/24 1511          Living Environment    People in Home facility resident;other (see comments)  [facility]  -       Row Name 07/29/24 1511          Home Main Entrance    Number of Stairs, Main Entrance none  -KF       Row Name 07/29/24 1511          Stairs Within Home, Primary    Number of Stairs, Within Home, Primary none  -KF       Row Name 07/29/24 1511          Cognition    Orientation Status (Cognition) oriented x 3;other (see comments)  conversational confusion  -       Row Name 07/29/24 1511          Safety Issues, Functional Mobility    Safety Issues Affecting Function (Mobility) awareness of need for assistance;insight into deficits/self-awareness;judgment;positioning of assistive device;problem-solving;safety precaution awareness;safety precautions follow-through/compliance;sequencing abilities  -KF     Impairments Affecting Function (Mobility)  balance;cognition;coordination;endurance/activity tolerance;postural/trunk control;strength  -KF     Cognitive Impairments, Mobility Safety/Performance awareness, need for assistance;insight into deficits/self-awareness;problem-solving/reasoning;judgment;safety precaution awareness;safety precaution follow-through;sequencing abilities  -               User Key  (r) = Recorded By, (t) = Taken By, (c) = Cosigned By      Initials Name Provider Type    KF Aisha Perea OT Occupational Therapist                     Mobility/ADL's       Row Name 07/29/24 1511          Bed Mobility    Bed Mobility supine-sit  -KF     Supine-Sit [place] (Bed Mobility) minimum assist (75% patient effort);1 person assist;verbal cues;nonverbal cues (demo/gesture)  -     Comment, (Bed Mobility) Verbal/tactile cues for sequence; increased time and effort needed  -       Row Name 07/29/24 1511          Transfers    Transfers sit-stand transfer;stand-sit transfer;toilet transfer  -       Row Name 07/29/24 1511          Sit-Stand Transfer    Sit-Stand [place] (Transfers) contact guard  -     Assistive Device (Sit-Stand Transfers) walker, front-wheeled  -       Row Name 07/29/24 1511          Stand-Sit Transfer    Stand-Sit [place] (Transfers) contact guard  -     Assistive Device (Stand-Sit Transfers) walker, front-wheeled  -       Row Name 07/29/24 1511          Toilet Transfer    Type (Toilet Transfer) stand pivot/stand step  -     [place] Level (Toilet Transfer) contact guard  -     Assistive Device (Toilet Transfer) walker, front-wheeled;commode, bedside without drop arms  -     Comment, (Toilet Transfer) BSC utilized due to urgency  -       Row Name 07/29/24 1511          Functional Mobility    Functional Mobility- Ind. Level contact guard assist  -     Functional Mobility- Device walker, front-wheeled  -     Functional Mobility-Distance (Feet) --  <HH distance within the room  -[facility]  Name 07/29/24 1511          Activities of Daily Living    BADL Assessment/Intervention lower body dressing;toileting  -KF       Row Name 07/29/24 1511          Lower Body Dressing Assessment/Training    [place] Level (Lower Body Dressing) don;socks;dependent (less than 25% patient effort)  -KF     Position (Lower Body Dressing) edge of bed sitting  -KF       Row Name 07/29/24 1511          Toileting Assessment/Training    [place] Level (Toileting) adjust/manage clothing;perform perineal hygiene;contact guard assist  -KF     Assistive Devices (Toileting) commode, bedside without drop arms  -KF     Position (Toileting) unsupported sitting;supported standing  -KF               User Key  (r) = Recorded By, (t) = Taken By, (c) = Cosigned By      Initials Name Provider Type    KF Aisha Perea OT Occupational Therapist                   Obj/Interventions       Row Name 07/29/24 1513          Sensory Assessment (Somatosensory)    Sensory Assessment (Somatosensory) UE sensation intact  -       Row Name 07/29/24 1513          Range of Motion Comprehensive    General Range of Motion bilateral upper extremity ROM WFL  -KF       Row Name 07/29/24 1513          Strength Comprehensive (MMT)    General Manual Muscle Testing (MMT) Assessment upper extremity strength deficits identified  -KF     Comment, General Manual Muscle Testing (MMT) Assessment BUE grossly 3+/5  -KF       Row Name 07/29/24 1513          Balance    Balance Assessment sitting static balance;sitting dynamic balance;sit to stand dynamic balance;standing static balance;standing dynamic balance  -KF     Static Sitting Balance standby assist  -KF     Dynamic Sitting Balance contact guard  -KF     Position, Sitting Balance unsupported;sitting edge of bed;other (see comments)  BSC  -KF     Sit to Stand Dynamic Balance contact guard;verbal cues  -KF     Static Standing Balance contact guard  -KF     Dynamic Standing Balance contact guard  -KF      Position/Device Used, Standing Balance supported;walker, front-wheeled  -KF     Balance Interventions sitting;standing;sit to stand;supported;static;dynamic;occupation based/functional task  -KF               User Key  (r) = Recorded By, (t) = Taken By, (c) = Cosigned By      Initials Name Provider Type    Aisha Elizondo, OT Occupational Therapist                   Goals/Plan       Row Name 07/29/24 1515          Transfer Goal 1 (OT)    Activity/Assistive Device (Transfer Goal 1, OT) commode;bed-to-chair/chair-to-bed  -KF     [place] Level/Cues Needed (Transfer Goal 1, OT) supervision required  -KF     Time Frame (Transfer Goal 1, OT) long term goal (LTG);10 days  -KF     Progress/Outcome (Transfer Goal 1, OT) goal ongoing  -       Row Name 07/29/24 1515          Dressing Goal 1 (OT)    Activity/Device (Dressing Goal 1, OT) upper body dressing;lower body dressing  -KF     [place]/Cues Needed (Dressing Goal 1, OT) minimum assist (75% or more patient effort)  -KF     Time Frame (Dressing Goal 1, OT) short term goal (STG);5 days  -KF     Progress/Outcome (Dressing Goal 1, OT) goal ongoing  -       Row Name 07/29/24 1515          Grooming Goal 1 (OT)    Activity/Device (Grooming Goal 1, OT) hair care;oral care;wash face, hands  -KF     [place] (Grooming Goal 1, OT) supervision required  -KF     Time Frame (Grooming Goal 1, OT) long term goal (LTG);10 days  -KF     Strategies/Barriers (Grooming Goal 1, OT) sink side  -KF     Progress/Outcome (Grooming Goal 1, OT) goal ongoing  -KF       Row Name 07/29/24 1515          Therapy Assessment/Plan (OT)    Planned Therapy Interventions (OT) activity tolerance training;adaptive equipment training;BADL retraining;functional balance retraining;occupation/activity based interventions;patient/caregiver education/training;ROM/therapeutic exercise;strengthening exercise;transfer/mobility retraining  -KF               User Key  (r) = Recorded By, (t) = Taken By,  (c) = Cosigned By      Initials Name Provider Type    Aisha Elizondo, JERRI Occupational Therapist                   Clinical Impression       Row Name 07/29/24 1513          Pain Assessment    Pretreatment Pain Rating 0/10 - no pain  -KF     Posttreatment Pain Rating 0/10 - no pain  -KF     Pain Intervention(s) Repositioned;Ambulation/increased activity  -KF       Row Name 07/29/24 1513          Plan of Care Review    Plan of Care Reviewed With patient  -KF     Progress no change  -KF     Outcome Evaluation OT evaluation completed. The pt presents below her functional baseline with generalized weakness, decreased activity tolerance, and mild balance deficits. The pt performed transfer to [facility] using a RWx with CGA. Pt ambulated <HH distance to the bedside chair using a RWx with CGA for safety. The pt will benefit from continued IP OT services to increase the pt's safety and independence during ADLs and functional mobility. Recommend a return to [facility] with HHOT when medically ready.  -       Row Name 07/29/24 1513          Therapy Assessment/Plan (OT)    Rehab Potential (OT) good, to achieve stated therapy goals  -KF     Criteria for Skilled Therapeutic Interventions Met (OT) yes;skilled treatment is necessary  -KF     Therapy Frequency (OT) daily  -KF     Predicted Duration of Therapy Intervention (OT) 5 days  -KF       Row Name 07/29/24 1513          Therapy Plan Review/Discharge Plan (OT)    Anticipated Discharge Disposition (OT) assisted living;home with home health  -[facility] Name 07/29/24 1513          Vital Signs    Pre Systolic BP Rehab 149  -KF     Pre Treatment Diastolic BP 63  -KF     Pretreatment Heart Rate (beats/min) 50  -KF     Pre SpO2 (%) 97  -KF     O2 Delivery Pre Treatment room air  -KF     Pre Patient Position Supine  -KF     Intra Patient Position Standing  -KF     Post Patient Position Sitting  -KF       Row Name 07/29/24 1513          Positioning and Restraints    Pre-Treatment Position in  bed  -KF     Post Treatment Position chair  -KF     In Chair notified nsg;reclined;call light within reach;encouraged to call for assist;exit alarm on;waffle cushion;legs elevated  -KF               User Key  (r) = Recorded By, (t) = Taken By, (c) = Cosigned By      Initials Name Provider Type    KF Aisha Perea, JERRI Occupational Therapist                   Outcome Measures       Row Name 07/29/24 1515          How much help from another is currently needed...    Putting on and taking off regular lower body clothing? 2  -KF     Bathing (including washing, rinsing, and drying) 2  -KF     Toileting (which includes using toilet bed pan or urinal) 3  -KF     Putting on and taking off regular upper body clothing 3  -KF     Taking care of personal grooming (such as brushing teeth) 3  -KF     Eating meals 3  -KF     AM-PAC 6 Clicks Score (OT) 16  -KF       Row Name 07/29/24 1502 07/29/24 0400       How much help from another person do you currently need...    Turning from your back to your side while in flat bed without using bedrails? 3  -KG 3  -TS    Moving from lying on back to sitting on the side of a flat bed without bedrails? 3  -KG 3  -TS    Moving to and from a bed to a chair (including a wheelchair)? 3  -KG 3  -TS    Standing up from a chair using your arms (e.g., wheelchair, bedside chair)? 3  -KG 2  -TS    Climbing 3-5 steps with a railing? 2  -KG 2  -TS    To walk in hospital room? 3  -KG 3  -TS    AM-PAC 6 Clicks Score (PT) 17  -KG 16  -TS    Highest Level of Mobility Goal 5 --> Static standing  -KG 5 --> Static standing  -TS      Row Name 07/29/24 1515 07/29/24 1502       Functional Assessment    Outcome Measure Options AM-PAC 6 Clicks Daily Activity (OT)  -KF AM-PAC 6 Clicks Basic Mobility (PT)  -KG              User Key  (r) = Recorded By, (t) = Taken By, (c) = Cosigned By      Initials Name Provider Type    KG Jen Leos, PT Physical Therapist    Aisha Elizondo, OT Occupational Therapist     Matty Morrison, RN Registered Nurse                    Occupational Therapy Education       Title: PT OT SLP Therapies (In Progress)       Topic: Occupational Therapy (In Progress)       Point: ADL training (In Progress)       Description:   Instruct learner(s) on proper safety adaptation and remediation techniques during self care or transfers.   Instruct in proper use of assistive devices.                  Learning Progress Summary             Patient Acceptance, E,TB, NR by  at 7/29/2024 1014                         Point: Home exercise program (Not Started)       Description:   Instruct learner(s) on appropriate technique for monitoring, assisting and/or progressing therapeutic exercises/activities.                  Learner Progress:  Not documented in this visit.              Point: Precautions (In Progress)       Description:   Instruct learner(s) on prescribed precautions during self-care and functional transfers.                  Learning Progress Summary             Patient Acceptance, E,TB, NR by  at 7/29/2024 1014                         Point: Body mechanics (In Progress)       Description:   Instruct learner(s) on proper positioning and spine alignment during self-care, functional mobility activities and/or exercises.                  Learning Progress Summary             Patient Acceptance, E,TB, NR by  at 7/29/2024 1014                                         User Key       Initials Effective Dates Name Provider Type Discipline     08/09/23 -  Aisha Perea OT Occupational Therapist OT                  OT Recommendation and Plan  Planned Therapy Interventions (OT): activity tolerance training, adaptive equipment training, BADL retraining, functional balance retraining, occupation/activity based interventions, patient/caregiver education/training, ROM/therapeutic exercise, strengthening exercise, transfer/mobility retraining  Therapy Frequency (OT): daily  Plan of Care Review  Plan  of Care Reviewed With: patient  Progress: no change  Outcome Evaluation: OT evaluation completed. The pt presents below her functional baseline with generalized weakness, decreased activity tolerance, and mild balance deficits. The pt performed transfer to [facility] using a RWx with CGA. Pt ambulated <HH distance to the bedside chair using a RWx with CGA for safety. The pt will benefit from continued IP OT services to increase the pt's safety and independence during ADLs and functional mobility. Recommend a return to [facility] with HHOT when medically ready.     Time Calculation:   Evaluation Complexity (OT)  Review Occupational Profile/Medical/Therapy History Complexity: brief/low complexity  Assessment, Occupational Performance/Identification of Deficit Complexity: 1-3 performance deficits  Clinical Decision Making Complexity (OT): problem focused assessment/low complexity  Overall Complexity of Evaluation (OT): low complexity     Time Calculation- OT       Row Name 07/29/24 1516             Time Calculation- OT    OT Start Time 1014  -KF      OT Received On 07/29/24  -KF      OT Goal Re-Cert Due Date 08/08/24  -KF         Timed Charges    77503 - OT Therapeutic Activity Minutes 3  -KF      54685 - OT Self Care/Mgmt Minutes 10  -KF         Untimed Charges    OT Eval/Re-eval Minutes 33  -KF         Total Minutes    Timed Charges Total Minutes 13  -KF      Untimed Charges Total Minutes 33  -KF       Total Minutes 46  -KF                User Key  (r) = Recorded By, (t) = Taken By, (c) = Cosigned By      Initials Name Provider Type    KF Aisha Perea OT Occupational Therapist                  Therapy Charges for Today       Code Description Service Date Service Provider Modifiers Qty    02926520902  OT EVAL LOW COMPLEXITY 3 7/29/2024 Aisha Perea OT GO 1    [id number]  OT SELF CARE/MGMT/TRAIN EA 15 MIN 7/29/2024 Aisha Perea OT GO 1                 Aisha Perea OT  7/29/2024    Electronically signed by  Aisha Perea, OT at 07/29/24 1513

## 2024-08-01 NOTE — PLAN OF CARE
Goal Outcome Evaluation:           Progress: improving  Outcome Evaluation: Pt resting with no complaints. VSS, rm air, precautions maintained.

## 2024-08-02 LAB
GLUCOSE BLDC GLUCOMTR-MCNC: 165 MG/DL (ref 70–130)
GLUCOSE BLDC GLUCOMTR-MCNC: 168 MG/DL (ref 70–130)
GLUCOSE BLDC GLUCOMTR-MCNC: 222 MG/DL (ref 70–130)
GLUCOSE BLDC GLUCOMTR-MCNC: 299 MG/DL (ref 70–130)
GLUCOSE BLDC GLUCOMTR-MCNC: 304 MG/DL (ref 70–130)

## 2024-08-02 PROCEDURE — 63710000001 INSULIN GLARGINE PER 5 UNITS: Performed by: INTERNAL MEDICINE

## 2024-08-02 PROCEDURE — 99232 SBSQ HOSP IP/OBS MODERATE 35: CPT | Performed by: NURSE PRACTITIONER

## 2024-08-02 PROCEDURE — 97110 THERAPEUTIC EXERCISES: CPT

## 2024-08-02 PROCEDURE — 82948 REAGENT STRIP/BLOOD GLUCOSE: CPT

## 2024-08-02 PROCEDURE — 97530 THERAPEUTIC ACTIVITIES: CPT

## 2024-08-02 PROCEDURE — 97535 SELF CARE MNGMENT TRAINING: CPT

## 2024-08-02 PROCEDURE — 63710000001 INSULIN LISPRO (HUMAN) PER 5 UNITS: Performed by: INTERNAL MEDICINE

## 2024-08-02 RX ADMIN — PANTOPRAZOLE SODIUM 40 MG: 40 TABLET, DELAYED RELEASE ORAL at 12:18

## 2024-08-02 RX ADMIN — Medication 10 ML: at 20:23

## 2024-08-02 RX ADMIN — LEVOTHYROXINE SODIUM 100 MCG: 0.1 TABLET ORAL at 05:27

## 2024-08-02 RX ADMIN — Medication 1 CAPSULE: at 09:01

## 2024-08-02 RX ADMIN — Medication 1 APPLICATION: at 20:23

## 2024-08-02 RX ADMIN — ACETAMINOPHEN 650 MG: 325 TABLET ORAL at 02:03

## 2024-08-02 RX ADMIN — METOPROLOL TARTRATE 50 MG: 50 TABLET, FILM COATED ORAL at 12:19

## 2024-08-02 RX ADMIN — INSULIN LISPRO 4 UNITS: 100 INJECTION, SOLUTION INTRAVENOUS; SUBCUTANEOUS at 09:00

## 2024-08-02 RX ADMIN — INSULIN LISPRO 18 UNITS: 100 INJECTION, SOLUTION INTRAVENOUS; SUBCUTANEOUS at 17:18

## 2024-08-02 RX ADMIN — APIXABAN 2.5 MG: 2.5 TABLET, FILM COATED ORAL at 09:01

## 2024-08-02 RX ADMIN — INSULIN GLARGINE 13 UNITS: 100 INJECTION, SOLUTION SUBCUTANEOUS at 20:21

## 2024-08-02 RX ADMIN — Medication 1 APPLICATION: at 09:02

## 2024-08-02 RX ADMIN — Medication 10 ML: at 09:02

## 2024-08-02 RX ADMIN — APIXABAN 2.5 MG: 2.5 TABLET, FILM COATED ORAL at 20:21

## 2024-08-02 RX ADMIN — AMIODARONE HYDROCHLORIDE 200 MG: 200 TABLET ORAL at 09:01

## 2024-08-02 RX ADMIN — INSULIN LISPRO 9 UNITS: 100 INJECTION, SOLUTION INTRAVENOUS; SUBCUTANEOUS at 12:18

## 2024-08-02 NOTE — PLAN OF CARE
Goal Outcome Evaluation:  Plan of Care Reviewed With: patient        Progress: declining  Outcome Evaluation: Pt alert and participatory in OT interventions. Pt req'd more A this date in most ADLs and related t/fs compared to PLOF. Pt continues to be limited by decreased activity tolerance, tolerating only ~ 2 mins standing at sinkside for g/h before needing to return to bed, fatigued. Pt req'd SBA for g/h at sinkside. Pt did advance to bathroom for toileting vs using BSC yet limited by incontinence of urine and need for min A to complete toileting t/f and hygiene and clothing mgmt. Pt able to complete UBD seated at EOB with min A and was dep for sock d/d. Pt completed BUE TE for joint mobility prior to other OT interventions w/ no increase in pain noted, will need to progress strengthening d/t pts generalized weakness. Pt req'd CGA to STS and min A in all other fxl t/fs w/ FWW with increased effort and time at toilet. Pt is still below baseline occupational performance and would benefit from IPOT POC and SNF at d/c when medically ready for further therapy.      Anticipated Discharge Disposition (OT): skilled nursing facility

## 2024-08-02 NOTE — CASE MANAGEMENT/SOCIAL WORK
Discharge Planning Assessment  [facility]     Patient Name: Chey Robertson  MRN: [id number]  Today's Date: 8/2/2024    Admit Date: 7/27/2024    Plan: SNF   Discharge Needs Assessment    No documentation.                  Discharge Plan       Row Name 08/02/24 1344       Plan    Plan SNF    Patient/Family in Agreement with Plan yes    Plan Comments Spoke with Jones at [facility].  Insurace precert is still pending at present.  If no decision today by 1600, CM will f/u on Monday as no determination will be rec'd over the weekend.  Pt and son updated/agreeable.     Final Discharge Disposition Code 03 - skilled nursing facility (SNF)                  Continued Care and Services - Admitted Since 7/27/2024       Destination Coordination complete.      Service Provider Request Status Selected Services Address Phone Fax Patient Preferred    [facility]  Selected Skilled Nursing [address] [id number] [phone] --    [facility] Pending - Request Sent N/A [address] [id number] [phone] --    [facility] Pending - Request Sent N/A [address] [id number] [phone] --    [facility] Pending - No Request Sent N/A [address] [id number] [phone] --    [facility] - No [facility] N[facility] [address] [phone] --    [facility] Pending - No Request Sent N/A [address] [address] [phone] --    [facility] - No [facility] Sent N/A [address] [phone] --    [facility] Pending - No Request Sent N/A [address] [address] [phone] --    DERRELL SANDERS  [facility] Declined  Bed not available N/A [address], [facility] [id number] [phone] --                  Expected Discharge Date and Time       Expected Discharge Date Expected Discharge Time    Aug 3, 2024            Demographic Summary    No documentation.                  Functional Status    No documentation.                  Psychosocial    No documentation.                  Abuse/Neglect    No documentation.                  Legal    No documentation.                  Substance Abuse    No documentation.                  Patient Forms    No documentation.                     Eloise Sands RN

## 2024-08-02 NOTE — PAYOR COMM NOTE
"Elisa Venegas (89 y.o. Female)       Date of Birth   1935    Social Security Number       Address   [address] [place] [address]    Home Phone   [phone]    MRN   [id number]       Lutheran   Taoist    Marital Status                               Admission Date   7/27/24    Admission Type   Emergency    Admitting Provider   Diya Garcia DO    Attending Provider   Diya Garcia DO    Department, Room/Bed   [facility] 4G, S448/1       Discharge Date       Discharge Disposition       Discharge Destination                                 Attending Provider: Diya Garcia DO    Allergies: Aspirin    Isolation: None   Infection: None   Code Status: CPR    Ht: 160 cm (63\")   Wt: 60 kg (132 lb 3.2 oz)    Admission Cmt: None   Principal Problem: ARF (acute renal failure) [N17.9]                   Active Insurance as of 7/27/2024       Primary Coverage       Payor Plan Insurance Group Employer/Plan Group    ANTHEM MEDICARE REPLACEMENT ANTHEM MEDICARE ADVANTAGE [id number]       Payor Plan Address Payor Plan Phone Number Payor Plan Fax Number Effective Dates    [address] [phone]  1/1/2024 - None Entered    [facility] [address]         Subscriber Name Subscriber Birth Date Member ID       ELISA VENEGAS 1935 [id number]                     Emergency Contacts        (Rel.) Home Phone Work Phone Mobile Phone    Joe Venegas (Son) -- -- [phone]    Albin Venegas (Son) [phone] -- --    DORIS VENEGAS (Relative) -- -- [phone]              [place]: [facility] [id number] Tax ID [id number]  Insurance Information                  ANTHEM MEDICARE REPLACEMENT/ANTHEM MEDICARE ADVANTAGE Phone: [phone]    Subscriber: Elisa Venegas Subscriber#: [id number]    Group#: [id number] Precert#: --          Current Facility-Administered Medications   Medication Dose Route Frequency Provider Last Rate Last " Admin    acetaminophen (TYLENOL) tablet 650 mg  650 mg Oral Q4H PRN Emily Tamayo APRN   650 mg at 08/02/24 0203    amiodarone (PACERONE) tablet 200 mg  200 mg Oral Daily Emily Tamayo APRN   200 mg at 08/02/24 0901    apixaban (ELIQUIS) tablet 2.5 mg  2.5 mg Oral BID Emily Tamayo APRN   2.5 mg at 08/02/24 0901    castor oil-balsam peru (VENELEX) ointment 1 Application  1 Application Topical Q12H Jess Duarte MD   1 Application at 08/02/24 0902    dextrose (D50W) (25 g/50 mL) IV injection 10-50 mL  10-50 mL Intravenous Q15 Min PRN Chris Nicolas MD        dextrose (GLUTOSE) oral gel 15 g  15 g Oral Q15 Min PRN DosChris mejia MD        glucagon (GLUCAGEN) injection 1 mg  1 mg Intramuscular Q15 Min PRN DosChris mejia MD        insulin glargine (LANTUS, SEMGLEE) injection 1-200 Units  1-200 Units Subcutaneous Nightly - Glucommander DossetChris ruano MD   13 Units at 08/01/24 2203    insulin lispro (humaLOG) injection 1-200 Units  1-200 Units Subcutaneous 4x Daily With Meals & Nightly DossetChris ruano MD   4 Units at 08/02/24 0900    insulin lispro (humaLOG) injection 1-200 Units  1-200 Units Subcutaneous PRN Chris Nicolas MD   5 Units at 07/28/24 1638    lactobacillus acidophilus (RISAQUAD) capsule 1 capsule  1 capsule Oral Daily Emily Tamayo APRN   1 capsule at 08/02/24 0901    levothyroxine (SYNTHROID, LEVOTHROID) tablet 100 mcg  100 mcg Oral Q AM Emily Tamayo APRN   100 mcg at 08/02/24 0527    Magnesium Standard Dose Replacement - Follow Nurse / BPA Driven Protocol   Does not apply PRN Isabel Hernandez APRN        melatonin tablet 5 mg  5 mg Oral Nightly PRN Emily Tamayo APRN        metoprolol tartrate (LOPRESSOR) tablet 50 mg  50 mg Oral BID Emily Tamayo APRN   50 mg at 08/01/24 2211    pantoprazole (PROTONIX) EC tablet 40 mg  40 mg Oral Daily Gisella Alcantara, PharmD   40 mg at 08/01/24 1153    Potassium Replacement - Follow Nurse / BPA Driven Protocol   Does not apply PRN  Isabel Hernandez APRN        sodium chloride 0.9 % flush 10 mL  10 mL Intravenous PRN Adam Galvez,         sodium chloride 0.9 % flush 10 mL  10 mL Intravenous Q12H Emily Tamayo APRN   10 mL at 08/02/24 0902    sodium chloride 0.9 % flush 10 mL  10 mL Intravenous PRN Emily Tamayo APRN        sodium chloride 0.9 % infusion 40 mL  40 mL Intravenous PRN Emily Tamayo APRN        traMADol (ULTRAM) tablet 50 mg  50 mg Oral Q12H PRN Emily Tamayo, APRN         Lab Results (last 24 hours)       Procedure Component Value Units Date/Time    POC Glucose Once [[id number]]  (Abnormal) Collected: 08/02/24 0733    Specimen: Blood Updated: 08/02/24 0735     Glucose 168 mg/dL     POC Glucose Once [[id number]]  (Abnormal) Collected: 08/01/24 2134    Specimen: Blood Updated: 08/01/24 2136     Glucose 292 mg/dL     POC Glucose Once [[id number]]  (Abnormal) Collected: 08/01/24 1701    Specimen: Blood Updated: 08/01/24 1702     Glucose 320 mg/dL     POC Glucose Once [[id number]]  (Abnormal) Collected: 08/01/24 1101    Specimen: Blood Updated: 08/01/24 1102     Glucose 253 mg/dL           Imaging Results (Last 24 Hours)       ** No results found for the last 24 hours. **          Orders (last 24 hrs)        Start     Ordered    08/02/24 0736  POC Glucose Once  PROCEDURE ONCE        Comments: Complete no more than 45 minutes prior to patient eating      08/02/24 0733    08/01/24 2137  POC Glucose Once  PROCEDURE ONCE        Comments: Complete no more than 45 minutes prior to patient eating      08/01/24 2134    08/01/24 1703  POC Glucose Once  PROCEDURE ONCE        Comments: Complete no more than 45 minutes prior to patient eating      08/01/24 1701    08/01/24 1200  Dietary Nutrition Supplements Magic Cup  Daily With Lunch & Dinner       08/01/24 1058    08/01/24 1103  POC Glucose Once  PROCEDURE ONCE        Comments: Complete no more than 45 minutes prior to patient eating      08/01/24 1101    07/30/24 0846   Potassium Replacement - Follow Nurse / BPA Driven Protocol  As Needed         07/30/24 0846    07/30/24 0846  Magnesium Standard Dose Replacement - Follow Nurse / BPA Driven Protocol  As Needed         07/30/24 0846    07/29/24 1800  Dietary Nutrition Supplements Boost Glucose Control; chocolate  Daily With Dinner,   Status:  Canceled       07/29/24 1435    07/29/24 1245  castor oil-balsam peru (VENELEX) ointment 1 Application  Every 12 Hours Scheduled         07/29/24 1151    07/29/24 1100  pantoprazole (PROTONIX) EC tablet 40 mg  Daily at 1100         07/28/24 1203    07/29/24 0000  Ambulatory Referral to Home Health         07/29/24 1557    07/28/24 0900  amiodarone (PACERONE) tablet 200 mg  Daily         07/27/24 2145 07/28/24 0900  lactobacillus acidophilus (RISAQUAD) capsule 1 capsule  Daily         07/27/24 2145 07/28/24 0800  Oral Care  2 Times Daily       07/27/24 2145 07/28/24 0800  insulin lispro (humaLOG) injection 1-200 Units  4 Times Daily With Meals & Nightly         07/27/24 2219 07/28/24 0700  POC Glucose 4x Daily Before Meals & at Bedtime  4 Times Daily Before Meals & at Bedtime      Comments: Complete no more than 45 minutes prior to patient eating      07/27/24 2219 07/28/24 0600  levothyroxine (SYNTHROID, LEVOTHROID) tablet 100 mcg  Every Early Morning         07/27/24 2145 07/28/24 0000  Vital Signs  Every 4 Hours       07/27/24 2145 07/27/24 2315  insulin glargine (LANTUS, SEMGLEE) injection 1-200 Units  Nightly - Glucommander         07/27/24 2219 07/27/24 2245  apixaban (ELIQUIS) tablet 2.5 mg  2 Times Daily         07/27/24 2145 07/27/24 2245  metoprolol tartrate (LOPRESSOR) tablet 50 mg  2 Times Daily         07/27/24 2145 07/27/24 2245  sodium chloride 0.9 % flush 10 mL  Every 12 Hours Scheduled         07/27/24 2145 07/27/24 2218  insulin lispro (humaLOG) injection 1-200 Units  As Needed         07/27/24 2219 07/27/24 2218  dextrose (GLUTOSE) oral gel  15 g  Every 15 Minutes PRN         07/27/24 2219    07/27/24 2218  dextrose (D50W) (25 g/50 mL) IV injection 10-50 mL  Every 15 Minutes PRN         07/27/24 2219    07/27/24 2218  glucagon (GLUCAGEN) injection 1 mg  Every 15 Minutes PRN         07/27/24 2219    07/27/24 2200  Strict Intake & Output  Every Hour       07/27/24 2145 07/27/24 2146  Intake & Output  Every Shift       07/27/24 2145 07/27/24 2146  Daily Weights  Daily       07/27/24 2145 07/27/24 2145  traMADol (ULTRAM) tablet 50 mg  Every 12 Hours PRN         07/27/24 2145 07/27/24 2145  sodium chloride 0.9 % flush 10 mL  As Needed         07/27/24 2145 07/27/24 2145  sodium chloride 0.9 % infusion 40 mL  As Needed         07/27/24 2145 07/27/24 2145  acetaminophen (TYLENOL) tablet 650 mg  Every 4 Hours PRN         07/27/24 2145 07/27/24 2145  melatonin tablet 5 mg  Nightly PRN         07/27/24 2145 07/27/24 1357  sodium chloride 0.9 % flush 10 mL  As Needed         07/27/24 1357    Unscheduled  Oxygen Therapy- Nasal Cannula; Titrate 1-6 LPM Per SpO2; 90 - 95%  Continuous PRN       07/27/24 1357    Unscheduled  Up With Assistance  As Needed       07/27/24 2145    Unscheduled  POC Glucose PRN  As Needed      Comments: Complete no more than 45 minutes prior to patient eating      07/27/24 2219    Unscheduled  Treat Hypoglycemia As Recommended By Glucommander™ & Notify Provider of Treatment  As Needed      Comments: Follow Hypoglycemia Orders As Outlined in Process Instructions (Open Order Report to View Full Instructions)  Notify Provider Any Time Hypoglycemia Treatment is Administered    07/27/24 2219                  Physician Progress Notes (last 24 hours)  Notes from 08/01/24 1029 through 08/02/24 1029   No notes of this type exist for this encounter.       Consult Notes (last 24 hours)  Notes from 08/01/24 1029 through 08/02/24 1029   No notes of this type exist for this encounter.

## 2024-08-02 NOTE — PROGRESS NOTES
[facility] Medicine Services  PROGRESS NOTE    Patient Name: Chey Robertson  : 1935  MRN: [id number]    Date of Admission: 2024  Primary Care Physician: Yasmeen Loomis MD    Subjective   Subjective     CC:  F/u weakness    HPI:  Patient resting in bed. Is asking for water but knows she can't have regular water. Says she is doing okay with thickened liquids but doesn't really like them. Denies concerns.       Objective   Objective     Vital Signs:   Temp:  [97.7 °F (36.5 °C)-98.5 °F (36.9 °C)] 98.3 °F (36.8 °C)  Heart Rate:  [53-71] 58  Resp:  [15-20] 16  BP: (113-168)/(56-97) 114/76     Physical Exam:  Constitutional: No acute distress, awake, alert  HENT: NCAT, mucous membranes moist  Respiratory: Diminished at bases bilaterally, respiratory effort normal, room air  Cardiovascular: RRR, no murmurs, rubs, or gallops  Gastrointestinal: Positive bowel sounds, soft, nontender, nondistended  Musculoskeletal: Trace bilateral ankle edema, bandage RLE  Psychiatric: Appropriate affect, cooperative  Neurologic: Oriented x 3, moves all extremities, speech clear  Skin: No rashes  Results Reviewed:  LAB RESULTS:      Lab 24  0708 24  0548 24  1449   WBC 1.56* 1.50* 2.01*   HEMOGLOBIN 9.2* 9.4* 10.9*   HEMATOCRIT 27.2* 27.1* 32.1*   PLATELETS 53* 56* 54*   NEUTROS ABS 0.91* 0.83* 1.72   IMMATURE GRANS (ABS) 0.01 0.04 0.03   LYMPHS ABS 0.45* 0.47* 0.19*   MONOS ABS 0.19 0.16 0.07*   EOS ABS 0.00 0.00 0.00   MCV 89.8 89.4 90.2   CRP  --   --  <0.30   PROCALCITONIN  --   --  <0.02         Lab 24  1402 24  0445 24  1413 24  0453 24  0708 24  0547 24  1449   SODIUM  --  141  --  141 137 140 135*   POTASSIUM 4.8 3.6 3.7 3.3* 3.6 3.5 4.6   CHLORIDE  --  107  --  104 102 100 91*   CO2  --  27.0  --  26.0 26.0 30.0* 26.0   ANION GAP  --  7.0  --  11.0 9.0 10.0 18.0*   BUN  --  14  --  17 18 26* 26*   CREATININE  --  0.87  --  0.81 0.97  1.12* 1.49*   EGFR  --  64.2  --  69.9 56.3* 47.4* 33.6*   GLUCOSE  --  145*  --  121* 143* 197* 502*   CALCIUM  --  8.2*  --  8.2* 8.3* 8.7 9.4   MAGNESIUM  --  1.7  --   --   --  1.7 1.6   PHOSPHORUS  --   --   --   --   --  3.7  --    HEMOGLOBIN A1C  --   --   --   --   --  9.80*  --    TSH  --   --   --   --   --   --  1.650         Lab 07/27/24  1449   TOTAL PROTEIN 7.0   ALBUMIN 4.4   GLOBULIN 2.6   ALT (SGPT) 7   AST (SGOT) 12   BILIRUBIN 0.6   ALK PHOS 102         Lab 07/27/24  1449   HSTROP T 16*                 Brief Urine Lab Results  (Last result in the past 365 days)        Color   Clarity   Blood   Leuk Est   Nitrite   Protein   CREAT   Urine HCG        07/30/24 0549 Yellow   Cloudy   Negative   Small (1+)   Negative   Trace                   Microbiology Results Abnormal       Procedure Component Value - Date/Time    Eosinophil Smear - Urine, Urine, Clean Catch [[id number]]  (Normal) Collected: 07/27/24 1613    Lab Status: Final result Specimen: Urine, Clean Catch Updated: 07/27/24 2346     Eosinophil Smear 0 % EOS/100 Cells     Narrative:      No eosinophil seen    COVID PRE-OP / PRE-PROCEDURE SCREENING ORDER (NO ISOLATION) - Swab, Nasopharynx [[id number]]  (Normal) Collected: 07/27/24 2044    Lab Status: Final result Specimen: Swab from Nasopharynx Updated: 07/27/24 2116    Narrative:      The following orders were created for panel order COVID PRE-OP / PRE-PROCEDURE SCREENING ORDER (NO ISOLATION) - Swab, Nasopharynx.  Procedure                               Abnormality         Status                     ---------                               -----------         ------                     COVID-19 and FLU A/B PCR...[[id number]]  Normal              Final result                 Please view results for these tests on the individual orders.    COVID-19 and FLU A/B PCR, 1 HR TAT - Swab, Nasopharynx [[id number]]  (Normal) Collected: 07/27/24 2044    Lab Status: Final result Specimen: Swab from Nasopharynx  Updated: 07/27/24 2116     COVID19 Not Detected     Influenza A PCR Not Detected     Influenza B PCR Not Detected    Narrative:      Fact sheet for providers: https://www.fda.gov/media/238027/download    Fact sheet for patients: https://www.fda.gov/media/232627/download    Test performed by PCR.            No radiology results from the last 24 hrs    Results for orders placed during the hospital encounter of 06/05/23    Adult Transthoracic Echo Complete w/ Color, Spectral and Contrast if necessary per protocol    Interpretation Summary    Left ventricular systolic function is normal. Left ventricular ejection fraction appears to be 56 - 60%.    Left ventricular diastolic function was normal.    Estimated right ventricular systolic pressure from tricuspid regurgitation is normal (<35 mmHg).      Current medications:  Scheduled Meds:amiodarone, 200 mg, Oral, Daily  apixaban, 2.5 mg, Oral, BID  castor oil-balsam peru, 1 Application, Topical, Q12H  insulin glargine, 1-200 Units, Subcutaneous, Nightly - Glucommander  insulin lispro, 1-200 Units, Subcutaneous, 4x Daily With Meals & Nightly  lactobacillus acidophilus, 1 capsule, Oral, Daily  levothyroxine, 100 mcg, Oral, Q AM  metoprolol tartrate, 50 mg, Oral, BID  pantoprazole, 40 mg, Oral, Daily  sodium chloride, 10 mL, Intravenous, Q12H      Continuous Infusions:   PRN Meds:.  acetaminophen    dextrose    dextrose    glucagon (human recombinant)    insulin lispro    Magnesium Standard Dose Replacement - Follow Nurse / BPA Driven Protocol    melatonin    Potassium Replacement - Follow Nurse / BPA Driven Protocol    sodium chloride    sodium chloride    sodium chloride    traMADol    Assessment & Plan   Assessment & Plan     Active Hospital Problems    Diagnosis  POA    **ARF (acute renal failure) [N17.9]  Yes    Moderate malnutrition [E44.0]  Yes    Diarrhea [R19.7]  Yes    Atrial fibrillation [I48.91]  Yes    Chronic anticoagulation [Z79.01]  Not Applicable    Essential  hypertension [I10]  Yes    Type 2 diabetes mellitus without complication, without long-term current use of insulin [E11.9]  Yes    Personal history of pulmonary embolism [Z86.711]  Yes    Hypothyroidism (acquired) [E03.9]  Yes      Resolved Hospital Problems   No resolved problems to display.        Brief Hospital Course to date:  Chey Robertson is a 89 y.o. female with history of atrial fibrillation on dose-adjusted Eliquis, DVT/PE, hypothyroid, HTN, DMII, essential tremor who presented with generalized weakness, N/V and diarrhea.  Recent increase in home metformin dose. Patient is a resident in assisted-living.     This patient's problems and plans were partially entered by my partner and updated as appropriate by me 08/02/24.       N/V, resolved  Diarrhea, resolved  - secondary to increased metformin dose?  - resolved. Now tolerating PO but on modified diet as noted below.      DISHA- resolved  - creatinine 1.49 on admission, likely secondary to volume loss  - baseline creatinine 0.5 to 0.6  - creatinine improved with gentle IV fluids, now Cr wnl      UTI- ruled out  -episode of confusion overnight 7/30  -UA consistent with UTI, culture with lactobacillus  -Rocephin given x 2- discontinued due to negative culture and no symptoms/ return to baseline     Hypokalemia  - in setting of diarrhea, N/V  - electrolyte replacement protocol  - AM BMP     HTN  - lisinopril held for DISHA, BP acceptable. Will continue to hold for now.  Monitor closely      DMII  - poor control, A1c 9.8  - metformin held  - glucose reviewed, on glucommander, total insulin requirement 48 units last 24 hours (13 units Lantus)  --still running high post parandial. Last 24 hrs 168-292     Pancytopenia  - follows with Dr Lyman  - AM CBC     Atrial fibrillation  H/o DVT/PE  - amiodarone, metoprolol for rate control  - Eliquis for anticoagulation     Prolonged QTc  - EKG 7/27 with QTc 506, sinus pancho  - on amiodarone, avoid other QT-prolong meds      Hypothyroid  - TSH appropriate at 1.65  - continue home Synthroid     Dysphagia  - MBS 7/29 showed aspiration of all consistencies.  Partner discussed with speech therapist.  Component of aspiration may involve mechanical changes from osteophytes, which may not change as patient strength improves.    - continued discussion with patient and son Joe- patient does not wish for any type of feeding tube/ artificial nutrition. Family/ Patient have opted for comfort diet with continued work with SLP. SLP has recommended safest comfort diet of mech soft, NTL  -- Partner spoke with patient's son Albin via phone today.  Family has decided to pursue short-term rehab and speech therapy to see if her swallowing will improve.  Continue modified diet with thickened liquids for now.     Generalized weakness  - PT/OT following.  -- Patient now awaiting short-term rehab.  CM following.     Expected Discharge Location and Transportation: [facility]  Expected Discharge pending insurance approval  Expected Discharge Date: 8/3/2024; Expected Discharge Time:      VTE Prophylaxis:  Pharmacologic VTE prophylaxis orders are present.         AM-PAC 6 Clicks Score (PT): 17 (08/01/24 2000)    CODE STATUS:   Code Status and Medical Interventions: CPR (Attempt to Resuscitate); Full Support   Ordered at: 07/27/24 2121     Level Of Support Discussed With:    Patient     Code Status (Patient has no pulse and is not breathing):    CPR (Attempt to Resuscitate)     Medical Interventions (Patient has pulse or is breathing):    Full Support       Isabel Hernandez, CHANG  08/02/24

## 2024-08-02 NOTE — THERAPY TREATMENT NOTE
Patient Name: Chey Robertson  : 1935    MRN: [id number]                              Today's Date: 2024       Admit Date: 2024    Visit Dx:     ICD-10-CM ICD-9-CM   1. DISHA (acute kidney injury)  N17.9 584.9   2. Generalized weakness  R53.1 780.79   3. At high risk for falls  Z91.81 V15.88   4. Pharyngeal dysphagia  R13.13 787.23   5. Hyperglycemia  R73.9 790.29     Patient Active Problem List   Diagnosis    Benign familial tremor    AMS (altered mental status)    Pancytopenia    Hypothyroidism (acquired)    B12 deficiency    Abnormal intestinal absorption    Iron deficiency anemia due to chronic blood loss    Myelodysplasia (myelodysplastic syndrome)    Personal history of pulmonary embolism    Chronic anticoagulation    Essential hypertension    Type 2 diabetes mellitus without complication, without long-term current use of insulin    Atrial fibrillation    QT prolongation    Pericardial effusion    Severe malnutrition    Closed displaced intertrochanteric fracture of right femur, initial encounter    ARF (acute renal failure)    Diarrhea    Moderate malnutrition     Past Medical History:   Diagnosis Date    A-fib     on eliquis    Anemia     Arthritis     Diabetes mellitus     checks sugar only when pt wants to     Disease of thyroid gland     History of transfusion     with knee surgery-  no reaction recalled.     [place] (hard of hearing)     no hearing aids    Hypertension     Migraine without aura and without status migrainosus, not intractable 2016    Myelodysplastic syndrome     Pulmonary emboli     (after knee surgery)    SOBOE (shortness of breath on exertion)     Tremors of nervous system     Vertigo     Wears dentures     upper     Wears glasses      Past Surgical History:   Procedure Laterality Date    BLADDER SURGERY      CATARACT EXTRACTION      bilat     CHOLECYSTECTOMY      COLONOSCOPY      HIP TROCHANTERIC NAILING WITH INTRAMEDULLARY HIP SCREW Right 2023    Procedure:  HIP TROCHANTERIC NAILING WITH INTRAMEDULLARY HIP SCREW RIGHT;  Surgeon: Augie Hobbs MD;  Location:  [facility];  Service: Orthopedics;  Laterality: Right;    HYSTERECTOMY      with bso    KYPHOPLASTY N/A 02/12/2021    Procedure: KYPHOPLASTY T11, T12 AND L4;  Surgeon: Matty Hearn MD;  Location:  [facility];  Service: Orthopedic Spine;  Laterality: N/A;    TONSILLECTOMY        General Information       Row Name 08/02/24 0857          OT Time and Intention    Document Type therapy note (daily note)  -JY     Mode of Treatment occupational therapy;individual therapy  -JY       Row Name 08/02/24 0857          General Information    Patient Profile Reviewed yes  -JY     Existing Precautions/Restrictions fall;other (see comments)  situational confusion, incontinent, essential tremors, neutropenic precautions posted at pt room  -JY     Barriers to Rehab medically complex;previous functional deficit;cognitive status  -JY       Row Name 08/02/24 0857          Cognition    Orientation Status (Cognition) oriented x 3  -JY       Row Name 08/02/24 0857          Safety Issues, Functional Mobility    Safety Issues Affecting Function (Mobility) other (see comments)  uncertain about why she's in hospital and presents w/ situational confusion  -JY     Impairments Affecting Function (Mobility) balance;cognition;coordination;endurance/activity tolerance;postural/trunk control;strength  -JY     Cognitive Impairments, Mobility Safety/Performance awareness, need for assistance;insight into deficits/self-awareness;problem-solving/reasoning;safety precaution awareness;safety precaution follow-through;sequencing abilities  -JY     Comment, Safety Issues/Impairments (Mobility) pt alert and able to follow commands, needs safety cues for wx mgmt and to avoid some obstacles  -JY               User Key  (r) = Recorded By, (t) = Taken By, (c) = Cosigned By      Initials Name Provider Type    Nicole Gallagher, OT Occupational Therapist                      Mobility/ADL's       Row Name 08/02/24 0903          Bed Mobility    Bed Mobility supine-sit;scooting/bridging;sit-supine  -JY     Scooting/Bridging [place] (Bed Mobility) contact guard;verbal cues;other (see comments)  limited by pain at buttocks, cued pt to elevate hips and buttocks and move forward vs scooting if too painful  -JY     Supine-Sit [place] (Bed Mobility) contact guard;verbal cues  -JY     Sit-Supine [place] (Bed Mobility) minimum assist (75% patient effort);verbal cues  -JY     Assistive Device (Bed Mobility) bed rails;head of bed elevated  -JY     Comment, (Bed Mobility) skilled cues for optimal hand placement and seq to advance LEs to EOB, reach across midline to use bedrail to bring self forward and upright and to push through UE at bedside to upright trunk; in sup> sitting, pt req'd CGA for lattermost uprighting, in sitting> supine pt req'd min A for elevating LEs up to bed level height  -LING       Row Name 08/02/24 0903          Transfers    Transfers sit-stand transfer;stand-sit transfer;toilet transfer  -JY     Comment, (Transfers) skilled cues for optimal hand placement for controlled ascend, descend specifically to push up from seated surface, reach back prior to sitting once aligned and in close proximity to seated surface, further cues for using AD throughout turning to reach seated destination  -LING       Row Name 08/02/24 0903          Sit-Stand Transfer    Sit-Stand [place] (Transfers) contact guard;verbal cues  -LING     Assistive Device (Sit-Stand Transfers) walker, front-wheeled  -LING       Row Name 08/02/24 0903          Stand-Sit Transfer    Stand-Sit [place] (Transfers) minimum assist (75% patient effort);verbal cues  -LING     Assistive Device (Stand-Sit Transfers) walker, front-wheeled  -LING       Row Name 08/02/24 0903          Toilet Transfer    Type (Toilet Transfer) sit-stand;stand-sit  -LING     [place] Level (Toilet Transfer) minimum assist  (75% patient effort);verbal cues  -J     Assistive Device (Toilet Transfer) commode;walker, front-wheeled;grab bars/safety frame  -     Comment, (Toilet Transfer) increased time and effort to descend and ascend from toilet at lower height; pt cued to use grab bars for support and for UB over LB placement for leverage to stand; urgency present too w/ small incontience at bedside prior to mobility toward bathroom  -Pathways Platform Name 08/02/24 0903          Functional Mobility    Functional Mobility- Ind. Level minimum assist (75% patient effort);verbal cues required  -     Functional Mobility- Device walker, front-wheeled  -     Functional Mobility-Distance (Feet) --  in room ADL related mobility  -     Functional Mobility- Comment defer to PT for specifics however during in room ADL related mobility pt req'd gross min A for fxl mobility with use of FWW, pt req'd cues and A for wx mgmt, to avoid min obstacles in room and to use AD throughout turning for max stability  -     Patient was able to Ambulate yes  -[employer] Platform Name 08/02/24 0903          Activities of Daily Living    BADL Assessment/Intervention upper body dressing;lower body dressing;grooming;toileting  -Path[employer] Platform Name 08/02/24 0903          Lower Body Dressing Assessment/Training    [place] Level (Lower Body Dressing) don;doff;socks;dependent (less than 25% patient effort)  -     Position (Lower Body Dressing) supine  -     Comment, (Lower Body Dressing) did not attempt to d/d socks when ask after soiled from urinary incontinence, indicating need for A  -Path[employer] Platform Name 08/02/24 0903          Toileting Assessment/Training    [place] Level (Toileting) adjust/manage clothing;perform perineal hygiene;minimum assist (75% patient effort);change pad/brief;dependent (less than 25% patient effort)  -     Assistive Devices (Toileting) commode;grab bar/safety frame  -     Position (Toileting) unsupported sitting;supported standing   -JY     Comment, (Toileting) pt attempted hygiene while seated and u/a to sufficently access posterior body and stood w/ A, pt able to wipe minimally and required follow up A for quality and safety, pt req'd min A for posterior gown mgmt while pt sustained UE support at [facility], dep for exchange of external catheter; pt presents with incontinence and urgency related to urine  -       Row Name 08/02/24 0903          Upper Body Dressing Assessment/Training    [place] Level (Upper Body Dressing) doff;don;pajama/robe;minimum assist (75% patient effort);verbal cues  -     Position (Upper Body Dressing) edge of bed sitting  -J     Comment, (Upper Body Dressing) min A for posterior and proximal mgmt of gown  -       Row Name 08/02/24 0903          Grooming Assessment/Training    [place] Level (Grooming) wash face, hands;hair care, combing/brushing;standby assist  -JY     Oral Care patient refused intervention  pt declined to brush teeth at time of standing at sink, indicated preferred completion later in day  -JY     Position (Grooming) supported standing;sitting up in bed  -JY     Comment, (Grooming) pt able to stand at sink side to wash face and hands post toileting, stood for ~ 2 minutes prior to request to return to bed; completed hair grooming while sitting up in bed  -JY               User Key  (r) = Recorded By, (t) = Taken By, (c) = Cosigned By      Initials Name Provider Type    Nicole Gallagher OT Occupational Therapist                   Obj/Interventions       Row Name 08/02/24 0919          Shoulder (Therapeutic Exercise)    Shoulder (Therapeutic Exercise) AAROM (active assistive range of motion)  -     Shoulder AAROM (Therapeutic Exercise) left assist right;right assist left;bilateral;flexion;extension;scapular protraction;scapular retraction;sitting;10 repetitions  -       Row Name 08/02/24 0919          Elbow/Forearm (Therapeutic Exercise)    Elbow/Forearm (Therapeutic Exercise) AAROM  (active assistive range of motion)  -JY     Elbow/Forearm AAROM (Therapeutic Exercise) left assist right;right assist left;bilateral;flexion;extension;10 repetitions  -JY       Row Name 08/02/24 0919          Hand (Therapeutic Exercise)    Hand (Therapeutic Exercise) AROM (active range of motion)  -JY     Hand AROM/AAROM (Therapeutic Exercise) bilateral;AROM (active range of motion);finger flexion;finger extension;10 repetitions  -J       Row Name 08/02/24 0919          Motor Skills    Therapeutic Exercise shoulder;elbow/forearm;hand;other (see comments)  facilitated A/AROM at BUEs to promote joint mobility and integration in ADLs, related t/fs, mobility  -JY       Row Name 08/02/24 0919          Balance    Balance Assessment sitting static balance;sitting dynamic balance;standing static balance;standing dynamic balance  -JY     Static Sitting Balance standby assist  -JY     Dynamic Sitting Balance contact guard  -JY     Position, Sitting Balance unsupported;sitting edge of bed;supported;sitting in chair  -JY     Static Standing Balance contact guard;verbal cues  -JY     Dynamic Standing Balance minimal assist;verbal cues  -JY     Position/Device Used, Standing Balance supported;walker, front-wheeled  -JY     Balance Interventions sitting;standing;static;dynamic;sit to stand;supported;occupation based/functional task  -JY     Comment, Balance no overt LOB, unsteady at times in standing, req'd FWW support throughout standing  -JY               User Key  (r) = Recorded By, (t) = Taken By, (c) = Cosigned By      Initials Name Provider Type    Nicole Gallagher, OT Occupational Therapist                   Goals/Plan    No documentation.                  Clinical Impression       Row Name 08/02/24 0922          Pain Assessment    Pretreatment Pain Rating 0/10 - no pain  -JY     Posttreatment Pain Rating 0/10 - no pain  -JY     Pre/Posttreatment Pain Comment denies any pain and tolerated all OT interventions  -JY      Pain Intervention(s) Repositioned;Ambulation/increased activity;Rest;Nursing Notified  -LING       Row Name 08/02/24 0922          Plan of Care Review    Plan of Care Reviewed With patient  -JY     Progress declining  -JY     Outcome Evaluation Pt alert and participatory in OT interventions. Pt req'd more A this date in most ADLs and related t/fs compared to PLOF. Pt continues to be limited by decreased activity tolerance, tolerating only ~ 2 mins standing at sinkside for g/h before needing to return to bed, fatigued. Pt req'd SBA for g/h at sinkside. Pt did advance to bathroom for toileting vs using BSC yet limited by incontinence of urine and need for min A to complete toileting t/f and hygiene and clothing mgmt. Pt able to complete UBD seated at EOB with min A and was dep for sock d/d. Pt completed BUE TE for joint mobility prior to other OT interventions w/ no increase in pain noted, will need to progress strengthening d/t pts generalized weakness. Pt req'd CGA to STS and min A in all other fxl t/fs w/ FWW with increased effort and time at toilet. Pt is still below baseline occupational performance and would benefit from IPOT POC and SNF at d/c when medically ready for further therapy.  -LING       Row Name 08/02/24 0922          Therapy Assessment/Plan (OT)    Rehab Potential (OT) good, to achieve stated therapy goals  -JY     Criteria for Skilled Therapeutic Interventions Met (OT) yes;skilled treatment is necessary  -JY     Therapy Frequency (OT) daily  -LING       Row Name 08/02/24 0922          Therapy Plan Review/Discharge Plan (OT)    Anticipated Discharge Disposition (OT) skilled nursing facility  -J       Row Name 08/02/24 0922          Vital Signs    Pre Systolic BP Rehab 114  -JY     Pre Treatment Diastolic BP 76  -JY     Pretreatment Heart Rate (beats/min) 59  -JY     Posttreatment Heart Rate (beats/min) 60  -JY     Pre SpO2 (%) 95  -JY     O2 Delivery Pre Treatment room air  -JY     O2 Delivery Intra  Treatment room air  -JY     Post SpO2 (%) 96  -JY     O2 Delivery Post Treatment room air  -JY     Pre Patient Position Supine  -JY     Intra Patient Position Standing  -JY     Post Patient Position Supine  -JY       Row Name 08/02/24 0922          Positioning and Restraints    Pre-Treatment Position in bed  -JY     Post Treatment Position bed  -JY     In Bed notified nsg;fowlers;call light within reach;encouraged to call for assist;exit alarm on;side rails up x3;heels elevated;waffle boots/both  -JY               User Key  (r) = Recorded By, (t) = Taken By, (c) = Cosigned By      Initials Name Provider Type    Nicole Gallagher OT Occupational Therapist                   Outcome Measures       Row Name 08/02/24 0930          How much help from another is currently needed...    Putting on and taking off regular lower body clothing? 1  -JY     Bathing (including washing, rinsing, and drying) 2  -JY     Toileting (which includes using toilet bed pan or urinal) 3  -JY     Putting on and taking off regular upper body clothing 3  -JY     Taking care of personal grooming (such as brushing teeth) 3  -JY     Eating meals 3  -JY     AM-PAC 6 Clicks Score (OT) 15  -JY       Row Name 08/02/24 0930          Functional Assessment    Outcome Measure Options AM-PAC 6 Clicks Daily Activity (OT)  -JY               User Key  (r) = Recorded By, (t) = Taken By, (c) = Cosigned By      Initials Name Provider Type    Nicole Gallagher OT Occupational Therapist                    Occupational Therapy Education       Title: PT OT SLP Therapies (In Progress)       Topic: Occupational Therapy (In Progress)       Point: ADL training (In Progress)       Description:   Instruct learner(s) on proper safety adaptation and remediation techniques during self care or transfers.   Instruct in proper use of assistive devices.                  Learning Progress Summary             Patient Acceptance, E,D, NR by LING at 8/2/2024 0805    Acceptance, E,TB,  NR by KF at 7/29/2024 1014                         Point: Home exercise program (In Progress)       Description:   Instruct learner(s) on appropriate technique for monitoring, assisting and/or progressing therapeutic exercises/activities.                  Learning Progress Summary             Patient Acceptance, E,D, NR by LING at 8/2/2024 0805                         Point: Precautions (In Progress)       Description:   Instruct learner(s) on prescribed precautions during self-care and functional transfers.                  Learning Progress Summary             Patient Acceptance, E,D, NR by LING at 8/2/2024 0805    Acceptance, E,TB, NR by KF at 7/29/2024 1014                         Point: Body mechanics (In Progress)       Description:   Instruct learner(s) on proper positioning and spine alignment during self-care, functional mobility activities and/or exercises.                  Learning Progress Summary             Patient Acceptance, E,D, NR by LING at 8/2/2024 0805    Acceptance, E,TB, NR by KF at 7/29/2024 1014                                         User Key       Initials Effective Dates Name Provider Type Discipline    LING 06/16/21 -  Nicole Arndt OT Occupational Therapist OT     08/09/23 -  Aisha Perea OT Occupational Therapist OT                  OT Recommendation and Plan  Therapy Frequency (OT): daily  Plan of Care Review  Plan of Care Reviewed With: patient  Progress: declining  Outcome Evaluation: Pt alert and participatory in OT interventions. Pt req'd more A this date in most ADLs and related t/fs compared to PLOF. Pt continues to be limited by decreased activity tolerance, tolerating only ~ 2 mins standing at sinkside for g/h before needing to return to bed, fatigued. Pt req'd SBA for g/h at sinkside. Pt did advance to bathroom for toileting vs using BSC yet limited by incontinence of urine and need for min A to complete toileting t/f and hygiene and clothing mgmt. Pt able to complete UBD  seated at EOB with min A and was dep for sock d/d. Pt completed BUE TE for joint mobility prior to other OT interventions w/ no increase in pain noted, will need to progress strengthening d/t pts generalized weakness. Pt req'd CGA to STS and min A in all other fxl t/fs w/ FWW with increased effort and time at toilet. Pt is still below baseline occupational performance and would benefit from IPOT POC and SNF at d/c when medically ready for further therapy.     Time Calculation:         Time Calculation- OT       Row Name 08/02/24 0931             Time Calculation- OT    OT Start Time 0805  -JY      OT Received On 08/02/24  -JY      OT Goal Re-Cert Due Date 08/08/24  -JY         Timed Charges    59966 - OT Therapeutic Exercise Minutes 10  -JY      87793 - OT Therapeutic Activity Minutes 15  -JY      95647 - OT Self Care/Mgmt Minutes 28  -JY         Total Minutes    Timed Charges Total Minutes 53  -JY       Total Minutes 53  -JY                User Key  (r) = Recorded By, (t) = Taken By, (c) = Cosigned By      Initials Name Provider Type    Nicole Gallagher OT Occupational Therapist                  Therapy Charges for Today       Code Description Service Date Service Provider Modifiers Qty    02397431247 HC OT THER PROC EA 15 MIN 8/2/2024 Nicole rAndt OT GO 1    59056182663 HC OT THERAPEUTIC ACT EA 15 MIN 8/2/2024 Nicole Arndt OT GO 1    07558787885 HC OT SELF CARE/MGMT/TRAIN EA 15 MIN 8/2/2024 Nicole Ardnt OT GO 2                 Nicole Arndt OT  8/2/2024

## 2024-08-03 LAB
ANION GAP SERPL CALCULATED.3IONS-SCNC: 6 MMOL/L (ref 5–15)
BUN SERPL-MCNC: 18 MG/DL (ref 8–23)
BUN/CREAT SERPL: 20.2 (ref 7–25)
CALCIUM SPEC-SCNC: 8.6 MG/DL (ref 8.6–10.5)
CHLORIDE SERPL-SCNC: 106 MMOL/L (ref 98–107)
CO2 SERPL-SCNC: 28 MMOL/L (ref 22–29)
CREAT SERPL-MCNC: 0.89 MG/DL (ref 0.57–1)
DEPRECATED RDW RBC AUTO: 47.2 FL (ref 37–54)
EGFRCR SERPLBLD CKD-EPI 2021: 62.4 ML/MIN/1.73
ERYTHROCYTE [DISTWIDTH] IN BLOOD BY AUTOMATED COUNT: 14.6 % (ref 12.3–15.4)
GLUCOSE BLDC GLUCOMTR-MCNC: 150 MG/DL (ref 70–130)
GLUCOSE BLDC GLUCOMTR-MCNC: 152 MG/DL (ref 70–130)
GLUCOSE BLDC GLUCOMTR-MCNC: 185 MG/DL (ref 70–130)
GLUCOSE BLDC GLUCOMTR-MCNC: 236 MG/DL (ref 70–130)
GLUCOSE SERPL-MCNC: 156 MG/DL (ref 65–99)
HCT VFR BLD AUTO: 27.7 % (ref 34–46.6)
HGB BLD-MCNC: 9.3 G/DL (ref 12–15.9)
MCH RBC QN AUTO: 30.3 PG (ref 26.6–33)
MCHC RBC AUTO-ENTMCNC: 33.6 G/DL (ref 31.5–35.7)
MCV RBC AUTO: 90.2 FL (ref 79–97)
PLATELET # BLD AUTO: 41 10*3/MM3 (ref 140–450)
PMV BLD AUTO: 9.3 FL (ref 6–12)
POTASSIUM SERPL-SCNC: 4.7 MMOL/L (ref 3.5–5.2)
RBC # BLD AUTO: 3.07 10*6/MM3 (ref 3.77–5.28)
SODIUM SERPL-SCNC: 140 MMOL/L (ref 136–145)
WBC NRBC COR # BLD AUTO: 1.33 10*3/MM3 (ref 3.4–10.8)

## 2024-08-03 PROCEDURE — 63710000001 INSULIN GLARGINE PER 5 UNITS: Performed by: INTERNAL MEDICINE

## 2024-08-03 PROCEDURE — 85027 COMPLETE CBC AUTOMATED: CPT | Performed by: NURSE PRACTITIONER

## 2024-08-03 PROCEDURE — 80048 BASIC METABOLIC PNL TOTAL CA: CPT | Performed by: NURSE PRACTITIONER

## 2024-08-03 PROCEDURE — 63710000001 INSULIN LISPRO (HUMAN) PER 5 UNITS: Performed by: INTERNAL MEDICINE

## 2024-08-03 PROCEDURE — 82948 REAGENT STRIP/BLOOD GLUCOSE: CPT

## 2024-08-03 PROCEDURE — 99232 SBSQ HOSP IP/OBS MODERATE 35: CPT | Performed by: NURSE PRACTITIONER

## 2024-08-03 RX ADMIN — Medication 10 ML: at 08:31

## 2024-08-03 RX ADMIN — AMIODARONE HYDROCHLORIDE 200 MG: 200 TABLET ORAL at 08:30

## 2024-08-03 RX ADMIN — INSULIN LISPRO 7 UNITS: 100 INJECTION, SOLUTION INTRAVENOUS; SUBCUTANEOUS at 08:30

## 2024-08-03 RX ADMIN — LEVOTHYROXINE SODIUM 100 MCG: 0.1 TABLET ORAL at 05:31

## 2024-08-03 RX ADMIN — INSULIN LISPRO 12 UNITS: 100 INJECTION, SOLUTION INTRAVENOUS; SUBCUTANEOUS at 11:58

## 2024-08-03 RX ADMIN — APIXABAN 2.5 MG: 2.5 TABLET, FILM COATED ORAL at 20:38

## 2024-08-03 RX ADMIN — Medication 1 APPLICATION: at 20:38

## 2024-08-03 RX ADMIN — METOPROLOL TARTRATE 50 MG: 50 TABLET, FILM COATED ORAL at 08:30

## 2024-08-03 RX ADMIN — Medication 10 ML: at 20:38

## 2024-08-03 RX ADMIN — Medication 1 APPLICATION: at 08:31

## 2024-08-03 RX ADMIN — APIXABAN 2.5 MG: 2.5 TABLET, FILM COATED ORAL at 08:30

## 2024-08-03 RX ADMIN — INSULIN LISPRO 2 UNITS: 100 INJECTION, SOLUTION INTRAVENOUS; SUBCUTANEOUS at 20:38

## 2024-08-03 RX ADMIN — INSULIN LISPRO 22 UNITS: 100 INJECTION, SOLUTION INTRAVENOUS; SUBCUTANEOUS at 16:34

## 2024-08-03 RX ADMIN — PANTOPRAZOLE SODIUM 40 MG: 40 TABLET, DELAYED RELEASE ORAL at 11:59

## 2024-08-03 RX ADMIN — INSULIN GLARGINE 13 UNITS: 100 INJECTION, SOLUTION SUBCUTANEOUS at 20:37

## 2024-08-03 RX ADMIN — Medication 1 CAPSULE: at 08:30

## 2024-08-03 NOTE — PROGRESS NOTES
[facility] Medicine Services  PROGRESS NOTE    Patient Name: Chey Robertson  : 1935  MRN: [id number]    Date of Admission: 2024  Primary Care Physician: Yasmeen Loomis MD    Subjective   Subjective     CC:  F/u weakness    HPI:   Patient resting in bed.  NAD.  Patient denies concerns.  Is waiting to hear about rehab.      Objective   Objective     Vital Signs:   Temp:  [97.6 °F (36.4 °C)-98.7 °F (37.1 °C)] 97.6 °F (36.4 °C)  Heart Rate:  [53-64] 63  Resp:  [16-20] 20  BP: (123-148)/(58-82) 140/82     Physical Exam:   Constitutional: No acute distress, awake, alert  HENT: NCAT, mucous membranes moist  Respiratory: Diminished at bases bilaterally, respiratory effort normal, room air  Cardiovascular: RRR, no murmurs, rubs, or gallops  Gastrointestinal: Positive bowel sounds, soft, nontender, nondistended  Musculoskeletal: Trace bilateral ankle edema, bandage RLE  Psychiatric: Appropriate affect, cooperative  Neurologic: Oriented x 3, moves all extremities, speech clear  Skin: No rashes    Results Reviewed:  LAB RESULTS:      Lab 24  0358 24  0708 24  0548 24  1449   WBC 1.33* 1.56* 1.50* 2.01*   HEMOGLOBIN 9.3* 9.2* 9.4* 10.9*   HEMATOCRIT 27.7* 27.2* 27.1* 32.1*   PLATELETS 41* 53* 56* 54*   NEUTROS ABS  --  0.91* 0.83* 1.72   IMMATURE GRANS (ABS)  --  0.01 0.04 0.03   LYMPHS ABS  --  0.45* 0.47* 0.19*   MONOS ABS  --  0.19 0.16 0.07*   EOS ABS  --  0.00 0.00 0.00   MCV 90.2 89.8 89.4 90.2   CRP  --   --   --  <0.30   PROCALCITONIN  --   --   --  <0.02         Lab 24  0357 24  1402 24  0445 24  1413 24  0453 24  0708 24  0547 24  1449   SODIUM 140  --  141  --  141 137 140 135*   POTASSIUM 4.7 4.8 3.6 3.7 3.3* 3.6 3.5 4.6   CHLORIDE 106  --  107  --  104 102 100 91*   CO2 28.0  --  27.0  --  26.0 26.0 30.0* 26.0   ANION GAP 6.0  --  7.0  --  11.0 9.0 10.0 18.0*   BUN 18  --  14  --  17 18 26* 26*    CREATININE 0.89  --  0.87  --  0.81 0.97 1.12* 1.49*   EGFR 62.4  --  64.2  --  69.9 56.3* 47.4* 33.6*   GLUCOSE 156*  --  145*  --  121* 143* 197* 502*   CALCIUM 8.6  --  8.2*  --  8.2* 8.3* 8.7 9.4   MAGNESIUM  --   --  1.7  --   --   --  1.7 1.6   PHOSPHORUS  --   --   --   --   --   --  3.7  --    HEMOGLOBIN A1C  --   --   --   --   --   --  9.80*  --    TSH  --   --   --   --   --   --   --  1.650         Lab 07/27/24  1449   TOTAL PROTEIN 7.0   ALBUMIN 4.4   GLOBULIN 2.6   ALT (SGPT) 7   AST (SGOT) 12   BILIRUBIN 0.6   ALK PHOS 102         Lab 07/27/24  1449   HSTROP T 16*                 Brief Urine Lab Results  (Last result in the past 365 days)        Color   Clarity   Blood   Leuk Est   Nitrite   Protein   CREAT   Urine HCG        07/30/24 0549 Yellow   Cloudy   Negative   Small (1+)   Negative   Trace                   Microbiology Results Abnormal       Procedure Component Value - Date/Time    Eosinophil Smear - Urine, Urine, Clean Catch [[id number]]  (Normal) Collected: 07/27/24 1613    Lab Status: Final result Specimen: Urine, Clean Catch Updated: 07/27/24 2346     Eosinophil Smear 0 % EOS/100 Cells     Narrative:      No eosinophil seen    COVID PRE-OP / PRE-PROCEDURE SCREENING ORDER (NO ISOLATION) - Swab, Nasopharynx [[id number]]  (Normal) Collected: 07/27/24 2044    Lab Status: Final result Specimen: Swab from Nasopharynx Updated: 07/27/24 2116    Narrative:      The following orders were created for panel order COVID PRE-OP / PRE-PROCEDURE SCREENING ORDER (NO ISOLATION) - Swab, Nasopharynx.  Procedure                               Abnormality         Status                     ---------                               -----------         ------                     COVID-19 and FLU A/B PCR...[[id number]]  Normal              Final result                 Please view results for these tests on the individual orders.    COVID-19 and FLU A/B PCR, 1 HR TAT - Swab, Nasopharynx [[id number]]  (Normal)  Collected: 07/27/24 2044    Lab Status: Final result Specimen: Swab from Nasopharynx Updated: 07/27/24 2116     COVID19 Not Detected     Influenza A PCR Not Detected     Influenza B PCR Not Detected    Narrative:      Fact sheet for providers: https://www.fda.gov/media/344040/download    Fact sheet for patients: https://www.fda.gov/media/860768/download    Test performed by PCR.            No radiology results from the last 24 hrs    Results for orders placed during the hospital encounter of 06/05/23    Adult Transthoracic Echo Complete w/ Color, Spectral and Contrast if necessary per protocol    Interpretation Summary    Left ventricular systolic function is normal. Left ventricular ejection fraction appears to be 56 - 60%.    Left ventricular diastolic function was normal.    Estimated right ventricular systolic pressure from tricuspid regurgitation is normal (<35 mmHg).      Current medications:  Scheduled Meds:amiodarone, 200 mg, Oral, Daily  apixaban, 2.5 mg, Oral, BID  castor oil-balsam peru, 1 Application, Topical, Q12H  insulin glargine, 1-200 Units, Subcutaneous, Nightly - Glucommander  insulin lispro, 1-200 Units, Subcutaneous, 4x Daily With Meals & Nightly  lactobacillus acidophilus, 1 capsule, Oral, Daily  levothyroxine, 100 mcg, Oral, Q AM  metoprolol tartrate, 50 mg, Oral, BID  pantoprazole, 40 mg, Oral, Daily  sodium chloride, 10 mL, Intravenous, Q12H      Continuous Infusions:   PRN Meds:.  acetaminophen    dextrose    dextrose    glucagon (human recombinant)    insulin lispro    Magnesium Standard Dose Replacement - Follow Nurse / BPA Driven Protocol    melatonin    Potassium Replacement - Follow Nurse / BPA Driven Protocol    sodium chloride    sodium chloride    sodium chloride    traMADol    Assessment & Plan   Assessment & Plan     Active Hospital Problems    Diagnosis  POA    **ARF (acute renal failure) [N17.9]  Yes    Moderate malnutrition [E44.0]  Yes    Diarrhea [R19.7]  Yes    Atrial  fibrillation [I48.91]  Yes    Chronic anticoagulation [Z79.01]  Not Applicable    Essential hypertension [I10]  Yes    Type 2 diabetes mellitus without complication, without long-term current use of insulin [E11.9]  Yes    Personal history of pulmonary embolism [Z86.711]  Yes    Hypothyroidism (acquired) [E03.9]  Yes      Resolved Hospital Problems   No resolved problems to display.        Brief Hospital Course to date:  Chey Robertson is a 89 y.o. female with history of atrial fibrillation on dose-adjusted Eliquis, DVT/PE, hypothyroid, HTN, DMII, essential tremor who presented with generalized weakness, N/V and diarrhea.  Recent increase in home metformin dose. Patient is a resident in assisted-living.     This patient's problems and plans were partially entered by my partner and updated as appropriate by me 08/03/24.       N/V, resolved  Diarrhea, resolved  - secondary to increased metformin dose?  - resolved. Now tolerating PO but on modified diet as noted below.      DISHA- resolved  - creatinine 1.49 on admission, likely secondary to volume loss  - baseline creatinine 0.5 to 0.6  - creatinine improved with gentle IV fluids, now Cr wnl      UTI- ruled out  -episode of confusion overnight 7/30  -UA consistent with UTI, culture with lactobacillus  -Rocephin given x 2- discontinued due to negative culture and no symptoms/ return to baseline     Hypokalemia  - in setting of diarrhea, N/V  - electrolyte replacement protocol     HTN  - lisinopril held for DISHA, BP acceptable. Will continue to hold for now.  Monitor closely      DMII  - poor control, A1c 9.8  - metformin held  - glucose reviewed, on glucommander, total insulin requirement 54 units last 24 hours (13 units Lantus)    Pancytopenia  - follows with Dr Lyman  - neutropenic precautions     Atrial fibrillation  H/o DVT/PE  - amiodarone, metoprolol for rate control  - Eliquis for anticoagulation     Prolonged QTc  - EKG 7/27 with QTc 506, sinus pancho  - on  amiodarone, avoid other QT-prolong meds     Hypothyroid  - TSH appropriate at 1.65  - continue home Synthroid     Dysphagia  - MBS 7/29 showed aspiration of all consistencies.  Partner discussed with speech therapist.  Component of aspiration may involve mechanical changes from osteophytes, which may not change as patient strength improves.    - partner discussed with patient's son, Joe. Patient does not wish for any type of feeding tube/ artificial nutrition. Family/ Patient have opted for comfort diet with continued work with SLP.   -- Plan is to pursue short-term rehab and speech therapy to see if her swallowing will improve.  Continue modified diet with thickened liquids for now.     Generalized weakness  - PT/OT following.  -- Patient now awaiting short-term rehab.  CM following.     Expected Discharge Location and Transportation: [facility]  Expected Discharge pending insurance approval  Expected Discharge Date: 8/3/2024; Expected Discharge Time:      VTE Prophylaxis:  Pharmacologic VTE prophylaxis orders are present.         AM-PAC 6 Clicks Score (PT): 17 (08/01/24 2000)    CODE STATUS:   Code Status and Medical Interventions: CPR (Attempt to Resuscitate); Full Support   Ordered at: 07/27/24 2121     Level Of Support Discussed With:    Patient     Code Status (Patient has no pulse and is not breathing):    CPR (Attempt to Resuscitate)     Medical Interventions (Patient has pulse or is breathing):    Full Support       Isabel Hernandez, APRN  08/03/24

## 2024-08-04 LAB
GLUCOSE BLDC GLUCOMTR-MCNC: 186 MG/DL (ref 70–130)
GLUCOSE BLDC GLUCOMTR-MCNC: 202 MG/DL (ref 70–130)
GLUCOSE BLDC GLUCOMTR-MCNC: 240 MG/DL (ref 70–130)
GLUCOSE BLDC GLUCOMTR-MCNC: 266 MG/DL (ref 70–130)

## 2024-08-04 PROCEDURE — 63710000001 INSULIN LISPRO (HUMAN) PER 5 UNITS: Performed by: INTERNAL MEDICINE

## 2024-08-04 PROCEDURE — 63710000001 INSULIN GLARGINE PER 5 UNITS: Performed by: INTERNAL MEDICINE

## 2024-08-04 PROCEDURE — 82948 REAGENT STRIP/BLOOD GLUCOSE: CPT

## 2024-08-04 PROCEDURE — 99232 SBSQ HOSP IP/OBS MODERATE 35: CPT | Performed by: INTERNAL MEDICINE

## 2024-08-04 RX ADMIN — Medication 1 APPLICATION: at 21:41

## 2024-08-04 RX ADMIN — METOPROLOL TARTRATE 50 MG: 50 TABLET, FILM COATED ORAL at 08:18

## 2024-08-04 RX ADMIN — Medication 1 APPLICATION: at 10:50

## 2024-08-04 RX ADMIN — AMIODARONE HYDROCHLORIDE 200 MG: 200 TABLET ORAL at 08:18

## 2024-08-04 RX ADMIN — INSULIN LISPRO 1 UNITS: 100 INJECTION, SOLUTION INTRAVENOUS; SUBCUTANEOUS at 21:40

## 2024-08-04 RX ADMIN — LEVOTHYROXINE SODIUM 100 MCG: 0.1 TABLET ORAL at 05:00

## 2024-08-04 RX ADMIN — INSULIN LISPRO 18 UNITS: 100 INJECTION, SOLUTION INTRAVENOUS; SUBCUTANEOUS at 12:03

## 2024-08-04 RX ADMIN — INSULIN LISPRO 10 UNITS: 100 INJECTION, SOLUTION INTRAVENOUS; SUBCUTANEOUS at 08:17

## 2024-08-04 RX ADMIN — Medication 10 ML: at 08:19

## 2024-08-04 RX ADMIN — APIXABAN 2.5 MG: 2.5 TABLET, FILM COATED ORAL at 21:40

## 2024-08-04 RX ADMIN — INSULIN LISPRO 22 UNITS: 100 INJECTION, SOLUTION INTRAVENOUS; SUBCUTANEOUS at 17:14

## 2024-08-04 RX ADMIN — INSULIN GLARGINE 14 UNITS: 100 INJECTION, SOLUTION SUBCUTANEOUS at 21:40

## 2024-08-04 RX ADMIN — PANTOPRAZOLE SODIUM 40 MG: 40 TABLET, DELAYED RELEASE ORAL at 10:50

## 2024-08-04 RX ADMIN — APIXABAN 2.5 MG: 2.5 TABLET, FILM COATED ORAL at 08:18

## 2024-08-04 RX ADMIN — Medication 10 ML: at 23:45

## 2024-08-04 RX ADMIN — Medication 1 CAPSULE: at 08:18

## 2024-08-04 NOTE — PROGRESS NOTES
[facility] Medicine Services  PROGRESS NOTE    Patient Name: Chey Robertson  : 1935  MRN: [id number]    Date of Admission: 2024  Primary Care Physician: Yasmeen Loomis MD    Subjective   Subjective     CC:  Weakness, awaiting placement    HPI:  Watching TV, no complaints.  Waiting insurance approval      Objective   Objective     Vital Signs:   Temp:  [98 °F (36.7 °C)-98.3 °F (36.8 °C)] 98 °F (36.7 °C)  Heart Rate:  [55-65] 55  Resp:  [17-20] 18  BP: (122-155)/(52-63) 129/63     Physical Exam:  Constitutional: No acute distress, awake, alert  HENT: NCAT, mucous membranes moist  Respiratory: Respiratory effort normal   Gastrointestinal: Soft, nontender, nondistended  Musculoskeletal: No bilateral ankle edema  Psychiatric: Appropriate affect, cooperative  Neurologic: Oriented x 2-3, speech clear  Skin: No rashes      Results Reviewed:  LAB RESULTS:      Lab 24  0358 24  0708   WBC 1.33* 1.56*   HEMOGLOBIN 9.3* 9.2*   HEMATOCRIT 27.7* 27.2*   PLATELETS 41* 53*   NEUTROS ABS  --  0.91*   IMMATURE GRANS (ABS)  --  0.01   LYMPHS ABS  --  0.45*   MONOS ABS  --  0.19   EOS ABS  --  0.00   MCV 90.2 89.8         Lab 24  0357 24  1402 24  0445 24  1413 24  0453 24  0708   SODIUM 140  --  141  --  141 137   POTASSIUM 4.7 4.8 3.6 3.7 3.3* 3.6   CHLORIDE 106  --  107  --  104 102   CO2 28.0  --  27.0  --  26.0 26.0   ANION GAP 6.0  --  7.0  --  11.0 9.0   BUN 18  --  14  --  17 18   CREATININE 0.89  --  0.87  --  0.81 0.97   EGFR 62.4  --  64.2  --  69.9 56.3*   GLUCOSE 156*  --  145*  --  121* 143*   CALCIUM 8.6  --  8.2*  --  8.2* 8.3*   MAGNESIUM  --   --  1.7  --   --   --                          Brief Urine Lab Results  (Last result in the past 365 days)        Color   Clarity   Blood   Leuk Est   Nitrite   Protein   CREAT   Urine HCG        24 0549 Yellow   Cloudy   Negative   Small (1+)   Negative   Trace                    Microbiology Results Abnormal       Procedure Component Value - Date/Time    Eosinophil Smear - Urine, Urine, Clean Catch [[id number]]  (Normal) Collected: 07/27/24 1613    Lab Status: Final result Specimen: Urine, Clean Catch Updated: 07/27/24 2346     Eosinophil Smear 0 % EOS/100 Cells     Narrative:      No eosinophil seen    COVID PRE-OP / PRE-PROCEDURE SCREENING ORDER (NO ISOLATION) - Swab, Nasopharynx [[id number]]  (Normal) Collected: 07/27/24 2044    Lab Status: Final result Specimen: Swab from Nasopharynx Updated: 07/27/24 2116    Narrative:      The following orders were created for panel order COVID PRE-OP / PRE-PROCEDURE SCREENING ORDER (NO ISOLATION) - Swab, Nasopharynx.  Procedure                               Abnormality         Status                     ---------                               -----------         ------                     COVID-19 and FLU A/B PCR...[[id number]]  Normal              Final result                 Please view results for these tests on the individual orders.    COVID-19 and FLU A/B PCR, 1 HR TAT - Swab, Nasopharynx [[id number]]  (Normal) Collected: 07/27/24 2044    Lab Status: Final result Specimen: Swab from Nasopharynx Updated: 07/27/24 2116     COVID19 Not Detected     Influenza A PCR Not Detected     Influenza B PCR Not Detected    Narrative:      Fact sheet for providers: https://www.fda.gov/media/708393/download    Fact sheet for patients: https://www.fda.gov/media/925222/download    Test performed by PCR.            No radiology results from the last 24 hrs    Results for orders placed during the hospital encounter of 06/05/23    Adult Transthoracic Echo Complete w/ Color, Spectral and Contrast if necessary per protocol    Interpretation Summary    Left ventricular systolic function is normal. Left ventricular ejection fraction appears to be 56 - 60%.    Left ventricular diastolic function was normal.    Estimated right ventricular systolic pressure from  tricuspid regurgitation is normal (<35 mmHg).      Current medications:  Scheduled Meds:amiodarone, 200 mg, Oral, Daily  apixaban, 2.5 mg, Oral, BID  castor oil-balsam peru, 1 Application, Topical, Q12H  insulin glargine, 1-200 Units, Subcutaneous, Nightly - Glucommander  insulin lispro, 1-200 Units, Subcutaneous, 4x Daily With Meals & Nightly  lactobacillus acidophilus, 1 capsule, Oral, Daily  levothyroxine, 100 mcg, Oral, Q AM  metoprolol tartrate, 50 mg, Oral, BID  pantoprazole, 40 mg, Oral, Daily  sodium chloride, 10 mL, Intravenous, Q12H      Continuous Infusions:   PRN Meds:.  acetaminophen    dextrose    dextrose    glucagon (human recombinant)    insulin lispro    Magnesium Standard Dose Replacement - Follow Nurse / BPA Driven Protocol    melatonin    Potassium Replacement - Follow Nurse / BPA Driven Protocol    sodium chloride    sodium chloride    sodium chloride    traMADol    Assessment & Plan   Assessment & Plan     Active Hospital Problems    Diagnosis  POA    **ARF (acute renal failure) [N17.9]  Yes    Moderate malnutrition [E44.0]  Yes    Diarrhea [R19.7]  Yes    Atrial fibrillation [I48.91]  Yes    Chronic anticoagulation [Z79.01]  Not Applicable    Essential hypertension [I10]  Yes    Type 2 diabetes mellitus without complication, without long-term current use of insulin [E11.9]  Yes    Personal history of pulmonary embolism [Z86.711]  Yes    Hypothyroidism (acquired) [E03.9]  Yes      Resolved Hospital Problems   No resolved problems to display.        Brief Hospital Course to date:  Chey Robertson is a 89 y.o. female  with history of atrial fibrillation on dose-adjusted Eliquis, DVT/PE, hypothyroid, HTN, DMII, essential tremor who presented with generalized weakness, N/V and diarrhea.  Recent increase in home metformin dose. Patient is a resident in assisted-living.     N/V, resolved  Diarrhea, resolved  - secondary to increased metformin dose?  - resolved. Now tolerating PO but on modified diet  per speech     DISHA- resolved  - creatinine 1.49 on admission, likely secondary to volume loss  - now at baseline     UTI- ruled out  -episode of confusion overnight 7/30  -UA consistent with UTI, culture with lactobacillus  -Rocephin given x 2- discontinued due to negative culture and no symptoms/ return to baseline     Hypokalemia  - in setting of diarrhea, N/V  - electrolyte replacement protocol     HTN  - lisinopril held for DISHA, BP acceptable. Will continue to hold for now.  Monitor closely      DMII  - poor control, A1c 9.8  - metformin held  - continue glucocommander     Pancytopenia  - follows with Dr Lyman  - neutropenic precautions     Atrial fibrillation  H/o DVT/PE  - amiodarone, metoprolol for rate control  - Eliquis for anticoagulation     Prolonged QTc  - EKG 7/27 with QTc 506, sinus pancho  - on amiodarone, avoid other QT-prolong meds     Hypothyroid  - TSH appropriate at 1.65  - continue home Synthroid     Dysphagia  - MBS 7/29 showed aspiration of all consistencies.  Partner discussed with speech therapist.  Component of aspiration may involve mechanical changes from osteophytes, which may not change as patient strength improves.    - partner discussed with patient's son, Joe. Patient does not wish for any type of feeding tube/ artificial nutrition. Family/ Patient have opted for comfort diet with continued work with SLP.   -- Plan is to pursue short-term rehab and speech therapy to see if her swallowing will improve.  Continue modified diet with thickened liquids for now.     Generalized weakness  - PT/OT following.  -- Patient now awaiting short-term rehab.  CM following.     Expected Discharge Location and Transportation: SNF  Expected Discharge   Expected Discharge Date: 8/7/2024; Expected Discharge Time:      VTE Prophylaxis:  Pharmacologic VTE prophylaxis orders are present.         AM-PAC 6 Clicks Score (PT): 17 (08/01/24 2000)    CODE STATUS:   Code Status and Medical Interventions: CPR  (Attempt to Resuscitate); Full Support   Ordered at: 07/27/24 2121     Level Of Support Discussed With:    Patient     Code Status (Patient has no pulse and is not breathing):    CPR (Attempt to Resuscitate)     Medical Interventions (Patient has pulse or is breathing):    Full Support       Diya Garcia, DO  08/04/24

## 2024-08-05 LAB
GLUCOSE BLDC GLUCOMTR-MCNC: 166 MG/DL (ref 70–130)
GLUCOSE BLDC GLUCOMTR-MCNC: 234 MG/DL (ref 70–130)
GLUCOSE BLDC GLUCOMTR-MCNC: 236 MG/DL (ref 70–130)
GLUCOSE BLDC GLUCOMTR-MCNC: 293 MG/DL (ref 70–130)

## 2024-08-05 PROCEDURE — 63710000001 INSULIN GLARGINE PER 5 UNITS: Performed by: INTERNAL MEDICINE

## 2024-08-05 PROCEDURE — 63710000001 INSULIN LISPRO (HUMAN) PER 5 UNITS: Performed by: INTERNAL MEDICINE

## 2024-08-05 PROCEDURE — 97116 GAIT TRAINING THERAPY: CPT

## 2024-08-05 PROCEDURE — 82948 REAGENT STRIP/BLOOD GLUCOSE: CPT

## 2024-08-05 PROCEDURE — 97110 THERAPEUTIC EXERCISES: CPT

## 2024-08-05 PROCEDURE — 99232 SBSQ HOSP IP/OBS MODERATE 35: CPT | Performed by: NURSE PRACTITIONER

## 2024-08-05 RX ADMIN — AMIODARONE HYDROCHLORIDE 200 MG: 200 TABLET ORAL at 08:40

## 2024-08-05 RX ADMIN — Medication 10 ML: at 20:35

## 2024-08-05 RX ADMIN — INSULIN GLARGINE 14 UNITS: 100 INJECTION, SOLUTION SUBCUTANEOUS at 20:34

## 2024-08-05 RX ADMIN — PANTOPRAZOLE SODIUM 40 MG: 40 TABLET, DELAYED RELEASE ORAL at 11:46

## 2024-08-05 RX ADMIN — INSULIN LISPRO 10 UNITS: 100 INJECTION, SOLUTION INTRAVENOUS; SUBCUTANEOUS at 16:59

## 2024-08-05 RX ADMIN — Medication 1 CAPSULE: at 08:39

## 2024-08-05 RX ADMIN — Medication 1 APPLICATION: at 20:35

## 2024-08-05 RX ADMIN — INSULIN LISPRO 9 UNITS: 100 INJECTION, SOLUTION INTRAVENOUS; SUBCUTANEOUS at 11:45

## 2024-08-05 RX ADMIN — INSULIN LISPRO 6 UNITS: 100 INJECTION, SOLUTION INTRAVENOUS; SUBCUTANEOUS at 20:35

## 2024-08-05 RX ADMIN — Medication 1 APPLICATION: at 08:41

## 2024-08-05 RX ADMIN — LEVOTHYROXINE SODIUM 100 MCG: 0.1 TABLET ORAL at 05:08

## 2024-08-05 RX ADMIN — METOPROLOL TARTRATE 50 MG: 50 TABLET, FILM COATED ORAL at 08:39

## 2024-08-05 RX ADMIN — APIXABAN 2.5 MG: 2.5 TABLET, FILM COATED ORAL at 08:40

## 2024-08-05 RX ADMIN — APIXABAN 2.5 MG: 2.5 TABLET, FILM COATED ORAL at 20:34

## 2024-08-05 RX ADMIN — Medication 10 ML: at 08:40

## 2024-08-05 RX ADMIN — INSULIN LISPRO 3 UNITS: 100 INJECTION, SOLUTION INTRAVENOUS; SUBCUTANEOUS at 08:47

## 2024-08-05 RX ADMIN — METOPROLOL TARTRATE 25 MG: 25 TABLET, FILM COATED ORAL at 20:34

## 2024-08-05 NOTE — PLAN OF CARE
Goal Outcome Evaluation:  Plan of Care Reviewed With: patient        Progress: no change  Outcome Evaluation: patient ambulated 60' with Min assist x1 and rolling walker for support, verbal cues for upright posture and increased step length. Assist at times to avoid obstacles in room. Further distance limited by weakness and fatigue. Mild unsteadiness, no LOB. Completed B UE/LE exercises with cues for technique. Recommend SNF at d/c for best functional outcome.

## 2024-08-05 NOTE — CASE MANAGEMENT/SOCIAL WORK
Discharge Planning Assessment  [facility]     Patient Name: Chey Robertson  MRN: [id number]  Today's Date: 8/5/2024    Admit Date: 7/27/2024    Plan: ONGOING   Discharge Needs Assessment    No documentation.                  Discharge Plan       Row Name 08/05/24 1136       Plan    Plan ONGOING    Plan Comments Per [facility] at [facility], insurance precert is still pending.  Should precert be denied, pt/family would have to consider moving to a PCU vs LTC private pay vs PEG as not [facility] can accommodate pt's need for thickened liquids per state guidelines.  CM will cont to follow for determination.    Final Discharge Disposition Code 30 - still a patient                  Continued Care and Services - Admitted Since 7/27/2024       Destination Coordination complete.      Service Provider Request Status Selected Services Address Phone Fax Patient Preferred    [facility]  Selected Skilled Nursing [address] [facility] [id number] [phone] --    [facility] Pending - Request Sent N/A [address] [id number] [phone] --    [facility] Pending - Request Sent N/A [address] [id number] [phone] --    [facility] Pending - No Request Sent N/A [address] [place] [id number] [phone] --    [facility] Pending - No Request Sent N/A [address] [facility] [phone] --    [facility] Pending - No Request Sent N/A [address] [facility] [address] [phone] --    [facility] Pending - No Request Sent N/A [address] [facility] [id number] [phone] --    [facility] [place] [facility] Pending - No Request Sent N/A [address], [facility] [phone] --     [facility] Declined  Bed not available N/A [address], [facility] [id number] [phone] --                  Expected Discharge Date and Time       Expected Discharge Date Expected Discharge Time    Aug 7, 2024            Demographic Summary    No documentation.                  Functional Status    No documentation.                  Psychosocial    No documentation.                  Abuse/Neglect    No documentation.                  Legal    No documentation.                  Substance Abuse    No documentation.                  Patient Forms    No documentation.                     Eloise Sands RN

## 2024-08-05 NOTE — PLAN OF CARE
Goal Outcome Evaluation:         Patient resting quietly without complaints. VSS, RA, SR. Ambulated to the bathroom with minimal assist and walker. Awaiting rehab placement

## 2024-08-05 NOTE — PROGRESS NOTES
[facility] Medicine Services  PROGRESS NOTE    Patient Name: Chey Robertson  : 1935  MRN: [id number]    Date of Admission: 2024  Primary Care Physician: Yasmeen Loomis MD    Subjective   Subjective     CC:  Weakness, awaiting placement    HPI:  Patient finished lunch. Tolerating thickened liquids. No overnight issues      Objective   Objective     Vital Signs:   Temp:  [98 °F (36.7 °C)-98.3 °F (36.8 °C)] 98 °F (36.7 °C)  Heart Rate:  [56-73] 64  Resp:  [17-18] 18  BP: (118-151)/(59-95) 144/69     Physical Exam:  Constitutional: No acute distress, awake, alert  HENT: NCAT, mucous membranes moist  Respiratory: Respiratory effort normal, CTAB  CV: RRR  Gastrointestinal: Soft, nontender, nondistended  Musculoskeletal: No bilateral ankle edema  Psychiatric: Appropriate affect, cooperative  Neurologic: Oriented x 2, speech clear  Skin: No rashes      Results Reviewed:  LAB RESULTS:      Lab 24  0358   WBC 1.33*   HEMOGLOBIN 9.3*   HEMATOCRIT 27.7*   PLATELETS 41*   MCV 90.2         Lab 24  0357 24  1402 24  0445 24  1413 24  0453   SODIUM 140  --  141  --  141   POTASSIUM 4.7 4.8 3.6 3.7 3.3*   CHLORIDE 106  --  107  --  104   CO2 28.0  --  27.0  --  26.0   ANION GAP 6.0  --  7.0  --  11.0   BUN 18  --  14  --  17   CREATININE 0.89  --  0.87  --  0.81   EGFR 62.4  --  64.2  --  69.9   GLUCOSE 156*  --  145*  --  121*   CALCIUM 8.6  --  8.2*  --  8.2*   MAGNESIUM  --   --  1.7  --   --                          Brief Urine Lab Results  (Last result in the past 365 days)        Color   Clarity   Blood   Leuk Est   Nitrite   Protein   CREAT   Urine HCG        24 0549 Yellow   Cloudy   Negative   Small (1+)   Negative   Trace                   Microbiology Results Abnormal       Procedure Component Value - Date/Time    Eosinophil Smear - Urine, Urine, Clean Catch [[id number]]  (Normal) Collected: 24 0658    Lab Status: Final result  Specimen: Urine, Clean Catch Updated: 07/27/24 2346     Eosinophil Smear 0 % EOS/100 Cells     Narrative:      No eosinophil seen    COVID PRE-OP / PRE-PROCEDURE SCREENING ORDER (NO ISOLATION) - Swab, Nasopharynx [[id number]]  (Normal) Collected: 07/27/24 2044    Lab Status: Final result Specimen: Swab from Nasopharynx Updated: 07/27/24 2116    Narrative:      The following orders were created for panel order COVID PRE-OP / PRE-PROCEDURE SCREENING ORDER (NO ISOLATION) - Swab, Nasopharynx.  Procedure                               Abnormality         Status                     ---------                               -----------         ------                     COVID-19 and FLU A/B PCR...[[id number]]  Normal              Final result                 Please view results for these tests on the individual orders.    COVID-19 and FLU A/B PCR, 1 HR TAT - Swab, Nasopharynx [[id number]]  (Normal) Collected: 07/27/24 2044    Lab Status: Final result Specimen: Swab from Nasopharynx Updated: 07/27/24 2116     COVID19 Not Detected     Influenza A PCR Not Detected     Influenza B PCR Not Detected    Narrative:      Fact sheet for providers: https://www.fda.gov/media/246173/download    Fact sheet for patients: https://www.fda.gov/media/464599/download    Test performed by PCR.            No radiology results from the last 24 hrs    Results for orders placed during the hospital encounter of 06/05/23    Adult Transthoracic Echo Complete w/ Color, Spectral and Contrast if necessary per protocol    Interpretation Summary    Left ventricular systolic function is normal. Left ventricular ejection fraction appears to be 56 - 60%.    Left ventricular diastolic function was normal.    Estimated right ventricular systolic pressure from tricuspid regurgitation is normal (<35 mmHg).      Current medications:  Scheduled Meds:amiodarone, 200 mg, Oral, Daily  apixaban, 2.5 mg, Oral, BID  castor oil-balsam peru, 1 Application, Topical,  Q12H  insulin glargine, 1-200 Units, Subcutaneous, Nightly - Glucommander  insulin lispro, 1-200 Units, Subcutaneous, 4x Daily With Meals & Nightly  lactobacillus acidophilus, 1 capsule, Oral, Daily  levothyroxine, 100 mcg, Oral, Q AM  metoprolol tartrate, 50 mg, Oral, BID  pantoprazole, 40 mg, Oral, Daily  sodium chloride, 10 mL, Intravenous, Q12H      Continuous Infusions:   PRN Meds:.  acetaminophen    dextrose    dextrose    glucagon (human recombinant)    insulin lispro    Magnesium Standard Dose Replacement - Follow Nurse / BPA Driven Protocol    melatonin    Potassium Replacement - Follow Nurse / BPA Driven Protocol    sodium chloride    sodium chloride    sodium chloride    traMADol    Assessment & Plan   Assessment & Plan     Active Hospital Problems    Diagnosis  POA    **ARF (acute renal failure) [N17.9]  Yes    Moderate malnutrition [E44.0]  Yes    Diarrhea [R19.7]  Yes    Atrial fibrillation [I48.91]  Yes    Chronic anticoagulation [Z79.01]  Not Applicable    Essential hypertension [I10]  Yes    Type 2 diabetes mellitus without complication, without long-term current use of insulin [E11.9]  Yes    Personal history of pulmonary embolism [Z86.711]  Yes    Hypothyroidism (acquired) [E03.9]  Yes      Resolved Hospital Problems   No resolved problems to display.        Brief Hospital Course to date:  Chey Robertson is a 89 y.o. female  with history of atrial fibrillation on dose-adjusted Eliquis, DVT/PE, hypothyroid, HTN, DMII, essential tremor who presented with generalized weakness, N/V and diarrhea thought to be caused by recent increase in home metformin dose. Patient is a resident in assisted-living and now delayed discharge as they are unable to accommodate newly thickened liquids required.     N/V, resolved  Diarrhea, resolved  - secondary to increased metformin dose?  - resolved. Now tolerating PO but on modified diet per speech     DISHA- resolved  - creatinine 1.49 on admission, likely secondary to  volume loss  - now at baseline     UTI- ruled out  -episode of confusion overnight 7/30  -UA consistent with UTI, culture with lactobacillus  -Rocephin given x 2- discontinued due to negative culture and no symptoms/ return to baseline     Hypokalemia  - in setting of diarrhea, N/V  - electrolyte replacement protocol     HTN  - lisinopril held for DISHA, BP acceptable. Will continue to hold for now.  Monitor closely      DMII  - poor control, A1c 9.8  - metformin held  - continue glucocommander     Pancytopenia  - follows with Dr Lyman  - neutropenic precautions     Atrial fibrillation  H/o DVT/PE  - amiodarone, metoprolol for rate control- nighttime doses held 2/2 bradycardia. Will decrease bid dosing  - Eliquis for anticoagulation     Prolonged QTc  - EKG 7/27 with QTc 506, sinus pancho  - on amiodarone, avoid other QT-prolong meds     Hypothyroid  - TSH appropriate at 1.65  - continue home Synthroid     Dysphagia  - MBS 7/29 showed aspiration of all consistencies.  Partner discussed with speech therapist.  Component of aspiration may involve mechanical changes from osteophytes, which may not change as patient strength improves.    - I have discussed with patient's son, Joe. Patient does not wish for any type of feeding tube/ artificial nutrition. Family/ Patient have opted for comfort diet and continued work with SLP.   -- Plan is to pursue short-term rehab and speech therapy to see if her swallowing will improve.  Continue modified diet with thickened liquids for now.     Generalized weakness  - PT/OT following.  -- Patient now awaiting short-term rehab.  CM following.     Expected Discharge Location and Transportation: SNF  Expected Discharge   Expected Discharge Date: 8/7/2024; Expected Discharge Time:      VTE Prophylaxis:  Pharmacologic VTE prophylaxis orders are present.         AM-PAC 6 Clicks Score (PT): 17 (08/05/24 4149)    CODE STATUS:   Code Status and Medical Interventions: CPR (Attempt to  Resuscitate); Full Support   Ordered at: 07/27/24 2121     Level Of Support Discussed With:    Patient     Code Status (Patient has no pulse and is not breathing):    CPR (Attempt to Resuscitate)     Medical Interventions (Patient has pulse or is breathing):    Full Support       Kimber March, CHANG  08/05/24

## 2024-08-05 NOTE — THERAPY TREATMENT NOTE
Patient Name: Chey Robertson  : 1935    MRN: [id number]                              Today's Date: 2024       Admit Date: 2024    Visit Dx:     ICD-10-CM ICD-9-CM   1. DISHA (acute kidney injury)  N17.9 584.9   2. Generalized weakness  R53.1 780.79   3. At high risk for falls  Z91.81 V15.88   4. Pharyngeal dysphagia  R13.13 787.23   5. Hyperglycemia  R73.9 790.29     Patient Active Problem List   Diagnosis    Benign familial tremor    AMS (altered mental status)    Pancytopenia    Hypothyroidism (acquired)    B12 deficiency    Abnormal intestinal absorption    Iron deficiency anemia due to chronic blood loss    Myelodysplasia (myelodysplastic syndrome)    Personal history of pulmonary embolism    Chronic anticoagulation    Essential hypertension    Type 2 diabetes mellitus without complication, without long-term current use of insulin    Atrial fibrillation    QT prolongation    Pericardial effusion    Severe malnutrition    Closed displaced intertrochanteric fracture of right femur, initial encounter    ARF (acute renal failure)    Diarrhea    Moderate malnutrition     Past Medical History:   Diagnosis Date    A-fib     on eliquis    Anemia     Arthritis     Diabetes mellitus     checks sugar only when pt wants to     Disease of thyroid gland     History of transfusion     with knee surgery-  no reaction recalled.     [place] (hard of hearing)     no hearing aids    Hypertension     Migraine without aura and without status migrainosus, not intractable 2016    Myelodysplastic syndrome     Pulmonary emboli     (after knee surgery)    SOBOE (shortness of breath on exertion)     Tremors of nervous system     Vertigo     Wears dentures     upper     Wears glasses      Past Surgical History:   Procedure Laterality Date    BLADDER SURGERY      CATARACT EXTRACTION      bilat     CHOLECYSTECTOMY      COLONOSCOPY      HIP TROCHANTERIC NAILING WITH INTRAMEDULLARY HIP SCREW Right 2023    Procedure:  HIP TROCHANTERIC NAILING WITH INTRAMEDULLARY HIP SCREW RIGHT;  Surgeon: Augie Hobbs MD;  Location: [facility] OR;  Service: Orthopedics;  Laterality: Right;    HYSTERECTOMY      with bso    KYPHOPLASTY N/A 02/12/2021    Procedure: KYPHOPLASTY T11, T12 AND L4;  Surgeon: Matty Hearn MD;  Location: [facility] OR;  Service: Orthopedic Spine;  Laterality: N/A;    TONSILLECTOMY        General Information       Row Name 08/05/24 0816          Physical Therapy Time and Intention    Document Type therapy note (daily note)  -AS     Mode of Treatment physical therapy  -AS       Row Name 08/05/24 0816          General Information    Patient Profile Reviewed yes  -AS     Existing Precautions/Restrictions fall;other (see comments)  R UE tremors, [place], incontinence, neutropenic precautions (posted on door) situational confusion  -AS     Barriers to Rehab medically complex;previous functional deficit;cognitive status  -AS       Row Name 08/05/24 0816          Cognition    Orientation Status (Cognition) oriented x 3  -AS       Row Name 08/05/24 0816          Safety Issues, Functional Mobility    Safety Issues Affecting Function (Mobility) awareness of need for assistance;safety precaution awareness;positioning of assistive device;sequencing abilities  -AS     Impairments Affecting Function (Mobility) balance;cognition;coordination;endurance/activity tolerance;postural/trunk control;strength  -AS     Cognitive Impairments, Mobility Safety/Performance awareness, need for assistance;insight into deficits/self-awareness;safety precaution awareness;safety precaution follow-through;sequencing abilities  -AS     Comment, Safety Issues/Impairments (Mobility) alert and following commands, decreased safety awareness with walker  -AS               User Key  (r) = Recorded By, (t) = Taken By, (c) = Cosigned By      Initials Name Provider Type    AS Lili Mejia PTA Physical Therapist Assistant                   Mobility       Row Name  08/05/24 0818          Bed Mobility    Supine-Sit [place] (Bed Mobility) verbal cues;contact guard;1 person assist  -AS     Assistive Device (Bed Mobility) head of bed elevated;bed rails  -AS     Comment, (Bed Mobility) increased time and effort to reach EOB  -AS       Row Name 08/05/24 0818          Transfers    Comment, (Transfers) cues for safe hand placement  -AS       Row Name 08/05/24 0818          Bed-Chair Transfer    Bed-Chair [place] (Transfers) verbal cues;minimum assist (75% patient effort);1 person assist  -AS     Assistive Device (Bed-Chair Transfers) walker, front-wheeled  -AS       Row Name 08/05/24 0818          Sit-Stand Transfer    Sit-Stand [place] (Transfers) verbal cues;contact guard;1 person assist  -AS     Assistive Device (Sit-Stand Transfers) walker, front-wheeled  -AS     Comment, (Sit-Stand Transfer) cues for hand placement  -AS       Row Name 08/05/24 0818          Gait/Stairs (Locomotion)    [place] Level (Gait) verbal cues;minimum assist (75% patient effort);1 person assist  -AS     Assistive Device (Gait) walker, front-wheeled  -AS     Distance in Feet (Gait) 60  -AS     Deviations/Abnormal Patterns (Gait) base of support, narrow;[place] decreased;stride length decreased  -AS     Bilateral Gait Deviations forward flexed posture  -AS     Comment, (Gait/Stairs) patient ambulated 60' with Min assist x1 and rolling walker for support, verbal cues for upright posture and increased step length. Assist at times to avoid obstacles in room. Further distance limited by weakness and fatigue. Mild unsteadiness, no LOB.  -AS               User Key  (r) = Recorded By, (t) = Taken By, (c) = Cosigned By      Initials Name Provider Type    AS Lili Mejia PTA Physical Therapist Assistant                   Obj/Interventions       Row Name 08/05/24 0820          Motor Skills    Therapeutic Exercise knee;ankle;shoulder  -AS       Row Name 08/05/24 0820          Shoulder  (Therapeutic Exercise)    Shoulder (Therapeutic Exercise) AROM (active range of motion)  -AS     Shoulder AROM (Therapeutic Exercise) bilateral;flexion;extension;aDduction;sitting;aBduction;10 repetitions  bicep curls  -AS       [facility] Name 08/05/24 0820          Knee (Therapeutic Exercise)    Knee (Therapeutic Exercise) strengthening exercise  -AS     Knee Strengthening (Therapeutic Exercise) bilateral;marching while seated;LAQ (long arc quad);sitting;10 repetitions  -AS       Row Name 08/05/24 0820          Ankle (Therapeutic Exercise)    Ankle (Therapeutic Exercise) AROM (active range of motion)  -AS     Ankle AROM (Therapeutic Exercise) bilateral;dorsiflexion;plantarflexion;sitting;10 repetitions  -AS       [facility] Name 08/05/24 0820          Balance    Dynamic Standing Balance verbal cues;minimal assist;1-person assist  -AS     Position/Device Used, Standing Balance supported;walker, front-wheeled  -AS     Comment, Balance mild unsteadiness, no overt LOB  -AS               User Key  (r) = Recorded By, (t) = Taken By, (c) = Cosigned By      Initials Name Provider Type    AS Lili Mejia PTA Physical Therapist Assistant                   Goals/Plan    No documentation.                  Clinical Impression       Row Name 08/05/24 0821          Pain    Pretreatment Pain Rating 3/10  -AS     Posttreatment Pain Rating 3/10  -AS     Pain Location - buttock  -AS     Pain Intervention(s) Repositioned;Ambulation/increased activity  -AS       Row Name 08/05/24 0821          Plan of Care Review    Plan of Care Reviewed With patient  -AS     Progress no change  -AS     Outcome Evaluation patient ambulated 60' with Min assist x1 and rolling walker for support, verbal cues for upright posture and increased step length. Assist at times to avoid obstacles in room. Further distance limited by weakness and fatigue. Mild unsteadiness, no LOB. Completed B UE/LE exercises with cues for technique. Recommend SNF at d/c for best  functional outcome.  -AS       Row Name 08/05/24 0821          Positioning and Restraints    Pre-Treatment Position in bed  -AS     Post Treatment Position chair  -AS     In Chair reclined;call light within reach;encouraged to call for assist;exit alarm on;waffle cushion;legs elevated  -AS               User Key  (r) = Recorded By, (t) = Taken By, (c) = Cosigned By      Initials Name Provider Type    AS Lili Mejia PTA Physical Therapist Assistant                   Outcome Measures       Row Name 08/05/24 0822          How much help from another person do you currently need...    Turning from your back to your side while in flat bed without using bedrails? 3  -AS     Moving from lying on back to sitting on the side of a flat bed without bedrails? 3  -AS     Moving to and from a bed to a chair (including a wheelchair)? 3  -AS     Standing up from a chair using your arms (e.g., wheelchair, bedside chair)? 3  -AS     Climbing 3-5 steps with a railing? 2  -AS     To walk in hospital room? 3  -AS     AM-PAC 6 Clicks Score (PT) 17  -AS     Highest Level of Mobility Goal 5 --> Static standing  -AS       Row Name 08/05/24 0822          Functional Assessment    Outcome Measure Options AM-PAC 6 Clicks Basic Mobility (PT)  -AS               User Key  (r) = Recorded By, (t) = Taken By, (c) = Cosigned By      Initials Name Provider Type    AS Lili Mejia PTA Physical Therapist Assistant                                 Physical Therapy Education       Title: PT OT SLP Therapies (In Progress)       Topic: Physical Therapy (In Progress)       Point: Mobility training (In Progress)       Learning Progress Summary             Patient Acceptance, E, NR by AS at 8/5/2024 0822    Acceptance, E, NR by KG at 7/31/2024 1440    Acceptance, E, NR by KG at 7/29/2024 1026                         Point: Home exercise program (In Progress)       Learning Progress Summary             Patient Acceptance, E, NR by AS at 8/5/2024  0822    Acceptance, E, NR by KG at 7/31/2024 1440                         Point: Body mechanics (In Progress)       Learning Progress Summary             Patient Acceptance, E, NR by AS at 8/5/2024 0822    Acceptance, E, NR by KG at 7/31/2024 1440    Acceptance, E, NR by KG at 7/29/2024 1026                         Point: Precautions (In Progress)       Learning Progress Summary             Patient Acceptance, E, NR by AS at 8/5/2024 0822    Acceptance, E, NR by KG at 7/31/2024 1440    Acceptance, E, NR by KG at 7/29/2024 1026                                         User Key       Initials Effective Dates Name Provider Type Discipline    AS 04/28/23 -  Lili Mejia PTA Physical Therapist Assistant PT    KG 05/22/20 -  Jen Leos PT Physical Therapist PT                  PT Recommendation and Plan     Plan of Care Reviewed With: patient  Progress: no change  Outcome Evaluation: patient ambulated 60' with Min assist x1 and rolling walker for support, verbal cues for upright posture and increased step length. Assist at times to avoid obstacles in room. Further distance limited by weakness and fatigue. Mild unsteadiness, no LOB. Completed B UE/LE exercises with cues for technique. Recommend SNF at d/c for best functional outcome.     Time Calculation:         PT Charges       Row Name 08/05/24 0823             Time Calculation    Start Time 0752  -AS      PT Received On 08/05/24  -AS      PT Goal Re-Cert Due Date 08/08/24  -AS         Timed Charges    24669 - PT Therapeutic Exercise Minutes 10  -AS      23147 - Gait Training Minutes  13  -AS         Total Minutes    Timed Charges Total Minutes 23  -AS       Total Minutes 23  -AS                User Key  (r) = Recorded By, (t) = Taken By, (c) = Cosigned By      Initials Name Provider Type    AS Lili Mejia, RADHA Physical Therapist Assistant                  Therapy Charges for Today       Code Description Service Date Service Provider Modifiers  Qty    22216989416  PT THER PROC EA 15 MIN 8/5/2024 Lili Mejia, PTA GP 1    [id number] HC GAIT TRAINING EA 15 MIN 8/5/2024 Lili Mejia, PTA GP 1            PT G-Codes  Outcome Measure Options: AM-PAC 6 Clicks Basic Mobility (PT)  AM-PAC 6 Clicks Score (PT): 17  AM-PAC 6 Clicks Score (OT): 15       Lili Mejia PTA  8/5/2024     Nasal Turnover Hinge Flap Text: The defect edges were debeveled with a #15 scalpel blade.  Given the size, depth, location of the defect and the defect being full thickness a nasal turnover hinge flap was deemed most appropriate.  Using a sterile surgical marker, an appropriate hinge flap was drawn incorporating the defect. The area thus outlined was incised with a #15 scalpel blade. The flap was designed to recreate the nasal mucosal lining and the alar rim. The skin margins were undermined to an appropriate distance in all directions utilizing iris scissors.

## 2024-08-06 VITALS
HEART RATE: 58 BPM | SYSTOLIC BLOOD PRESSURE: 131 MMHG | DIASTOLIC BLOOD PRESSURE: 54 MMHG | BODY MASS INDEX: 21.19 KG/M2 | RESPIRATION RATE: 18 BRPM | TEMPERATURE: 97.9 F | HEIGHT: 63 IN | OXYGEN SATURATION: 99 % | WEIGHT: 119.6 LBS

## 2024-08-06 PROBLEM — N17.9 ARF (ACUTE RENAL FAILURE): Status: RESOLVED | Noted: 2024-07-27 | Resolved: 2024-08-06

## 2024-08-06 PROBLEM — R19.7 DIARRHEA: Status: RESOLVED | Noted: 2024-07-27 | Resolved: 2024-08-06

## 2024-08-06 LAB
GLUCOSE BLDC GLUCOMTR-MCNC: 237 MG/DL (ref 70–130)
GLUCOSE BLDC GLUCOMTR-MCNC: 278 MG/DL (ref 70–130)

## 2024-08-06 PROCEDURE — 97110 THERAPEUTIC EXERCISES: CPT

## 2024-08-06 PROCEDURE — 97116 GAIT TRAINING THERAPY: CPT

## 2024-08-06 PROCEDURE — 99239 HOSP IP/OBS DSCHRG MGMT >30: CPT | Performed by: NURSE PRACTITIONER

## 2024-08-06 PROCEDURE — 82948 REAGENT STRIP/BLOOD GLUCOSE: CPT

## 2024-08-06 PROCEDURE — 63710000001 INSULIN LISPRO (HUMAN) PER 5 UNITS: Performed by: INTERNAL MEDICINE

## 2024-08-06 RX ORDER — CASTOR OIL AND BALSAM, PERU 788; 87 MG/G; MG/G
1 OINTMENT TOPICAL EVERY 12 HOURS SCHEDULED
Start: 2024-08-06

## 2024-08-06 RX ORDER — PANTOPRAZOLE SODIUM 20 MG/1
20 TABLET, DELAYED RELEASE ORAL DAILY
Start: 2024-08-06

## 2024-08-06 RX ORDER — METOPROLOL TARTRATE 50 MG/1
25 TABLET, FILM COATED ORAL 2 TIMES DAILY
Start: 2024-08-06

## 2024-08-06 RX ORDER — FUROSEMIDE 40 MG/1
40 TABLET ORAL DAILY PRN
Start: 2024-08-06

## 2024-08-06 RX ORDER — TRAMADOL HYDROCHLORIDE 50 MG/1
50 TABLET ORAL 2 TIMES DAILY PRN
Qty: 6 TABLET | Refills: 0 | Status: SHIPPED | OUTPATIENT
Start: 2024-08-06

## 2024-08-06 RX ADMIN — METOPROLOL TARTRATE 25 MG: 25 TABLET, FILM COATED ORAL at 08:08

## 2024-08-06 RX ADMIN — APIXABAN 2.5 MG: 2.5 TABLET, FILM COATED ORAL at 08:08

## 2024-08-06 RX ADMIN — INSULIN LISPRO 13 UNITS: 100 INJECTION, SOLUTION INTRAVENOUS; SUBCUTANEOUS at 08:08

## 2024-08-06 RX ADMIN — Medication 1 CAPSULE: at 08:08

## 2024-08-06 RX ADMIN — PANTOPRAZOLE SODIUM 40 MG: 40 TABLET, DELAYED RELEASE ORAL at 11:55

## 2024-08-06 RX ADMIN — LEVOTHYROXINE SODIUM 100 MCG: 0.1 TABLET ORAL at 05:15

## 2024-08-06 RX ADMIN — AMIODARONE HYDROCHLORIDE 200 MG: 200 TABLET ORAL at 08:08

## 2024-08-06 RX ADMIN — INSULIN LISPRO 13 UNITS: 100 INJECTION, SOLUTION INTRAVENOUS; SUBCUTANEOUS at 12:00

## 2024-08-06 RX ADMIN — Medication 1 APPLICATION: at 08:12

## 2024-08-06 NOTE — THERAPY TREATMENT NOTE
Patient Name: Chey Robertson  : 1935    MRN: [id number]                              Today's Date: 2024       Admit Date: 2024    Visit Dx:     ICD-10-CM ICD-9-CM   1. DISHA (acute kidney injury)  N17.9 584.9   2. Generalized weakness  R53.1 780.79   3. At high risk for falls  Z91.81 V15.88   4. Pharyngeal dysphagia  R13.13 787.23   5. Hyperglycemia  R73.9 790.29     Patient Active Problem List   Diagnosis    Benign familial tremor    AMS (altered mental status)    Pancytopenia    Hypothyroidism (acquired)    B12 deficiency    Abnormal intestinal absorption    Iron deficiency anemia due to chronic blood loss    Myelodysplasia (myelodysplastic syndrome)    Personal history of pulmonary embolism    Chronic anticoagulation    Essential hypertension    Type 2 diabetes mellitus without complication, without long-term current use of insulin    Atrial fibrillation    QT prolongation    Pericardial effusion    Severe malnutrition    Closed displaced intertrochanteric fracture of right femur, initial encounter    ARF (acute renal failure)    Diarrhea    Moderate malnutrition     Past Medical History:   Diagnosis Date    A-fib     on eliquis    Anemia     Arthritis     Diabetes mellitus     checks sugar only when pt wants to     Disease of thyroid gland     History of transfusion     with knee surgery-  no reaction recalled.     [place] (hard of hearing)     no hearing aids    Hypertension     Migraine without aura and without status migrainosus, not intractable 2016    Myelodysplastic syndrome     Pulmonary emboli     (after knee surgery)    SOBOE (shortness of breath on exertion)     Tremors of nervous system     Vertigo     Wears dentures     upper     Wears glasses      Past Surgical History:   Procedure Laterality Date    BLADDER SURGERY      CATARACT EXTRACTION      bilat     CHOLECYSTECTOMY      COLONOSCOPY      HIP TROCHANTERIC NAILING WITH INTRAMEDULLARY HIP SCREW Right 2023    Procedure:  HIP TROCHANTERIC NAILING WITH INTRAMEDULLARY HIP SCREW RIGHT;  Surgeon: Augie Hobbs MD;  Location: [facility] OR;  Service: Orthopedics;  Laterality: Right;    HYSTERECTOMY      with bso    KYPHOPLASTY N/A 02/12/2021    Procedure: KYPHOPLASTY T11, T12 AND L4;  Surgeon: Matty Hearn MD;  Location: [facility] OR;  Service: Orthopedic Spine;  Laterality: N/A;    TONSILLECTOMY        General Information       Row Name 08/06/24 0825          Physical Therapy Time and Intention    Document Type therapy note (daily note)  -AS     Mode of Treatment physical therapy  -AS       Row Name 08/06/24 0825          General Information    Patient Profile Reviewed yes  -AS     Existing Precautions/Restrictions fall;other (see comments)  [place], incontinence, neutropenic precautions(mask required), situational confusion  -AS     Barriers to Rehab medically complex;previous functional deficit;cognitive status  -AS       Row Name 08/06/24 0825          Cognition    Orientation Status (Cognition) oriented x 3  -AS       Row Name 08/06/24 0825          Safety Issues, Functional Mobility    Safety Issues Affecting Function (Mobility) awareness of need for assistance;safety precaution awareness;safety precautions follow-through/compliance;positioning of assistive device  -AS     Impairments Affecting Function (Mobility) balance;cognition;coordination;endurance/activity tolerance;postural/trunk control;strength  -AS     Comment, Safety Issues/Impairments (Mobility) cues for safe use of walker, attempting to  walker  -AS               User Key  (r) = Recorded By, (t) = Taken By, (c) = Cosigned By      Initials Name Provider Type    AS Lili Mejia PTA Physical Therapist Assistant                   Mobility       Row Name 08/06/24 0826          Bed Mobility    Supine-Sit [place] (Bed Mobility) verbal cues;contact guard;1 person assist  -AS     Assistive Device (Bed Mobility) head of bed elevated;bed rails  -AS     Comment,  (Bed Mobility) increased time and effort, patient reaching for therapist hands for support  -AS       Row Name 08/06/24 0826          Transfers    Comment, (Transfers) cues for hand placement  -AS       Row Name 08/06/24 0826          Bed-Chair Transfer    Bed-Chair [place] (Transfers) verbal cues;minimum assist (75% patient effort);1 person assist  -AS     Assistive Device (Bed-Chair Transfers) walker, front-wheeled  -AS       Row Name 08/06/24 0826          Sit-Stand Transfer    Sit-Stand [place] (Transfers) verbal cues;contact guard;1 person assist  -AS     Assistive Device (Sit-Stand Transfers) walker, front-wheeled  -AS     Comment, (Sit-Stand Transfer) cues for hand placement and keeping walker with when turning to sit in recliner  -AS       Row Name 08/06/24 0826          Gait/Stairs (Locomotion)    [place] Level (Gait) verbal cues;minimum assist (75% patient effort);1 person assist  -AS     Assistive Device (Gait) walker, front-wheeled  -AS     Distance in Feet (Gait) 40  -AS     Deviations/Abnormal Patterns (Gait) base of support, narrow;[place] decreased;stride length decreased  -AS     Bilateral Gait Deviations forward flexed posture  -AS     Comment, (Gait/Stairs) patient ambulated 40' with Min assist x1 and rolling walker for support, increased difficulty following commands for safe use of walker, attempting to  each steps, corrected with verbal cues and assist. Further mobility limited by weakness and fatigue.  -AS               User Key  (r) = Recorded By, (t) = Taken By, (c) = Cosigned By      Initials Name Provider Type    AS Lili Mejia PTA Physical Therapist Assistant                   Obj/Interventions       Row Name 08/06/24 0830          Motor Skills    Therapeutic Exercise knee;ankle  -AS       Row Name 08/06/24 0830          Knee (Therapeutic Exercise)    Knee (Therapeutic Exercise) strengthening exercise  -AS     Knee Strengthening (Therapeutic Exercise)  bilateral;marching while seated;LAQ (long arc quad);sitting;10 repetitions  -AS       [facility] Name 08/06/24 0830          Ankle (Therapeutic Exercise)    Ankle (Therapeutic Exercise) AROM (active range of motion)  -AS     Ankle AROM (Therapeutic Exercise) bilateral;dorsiflexion;plantarflexion;sitting;10 repetitions  -AS       Row Name 08/06/24 0830          Balance    Dynamic Standing Balance verbal cues;minimal assist;1-person assist  -AS     Position/Device Used, Standing Balance supported;walker, front-wheeled  -AS     Comment, Balance mild unsteadiness, decreased safety awareness with walker  -AS               User Key  (r) = Recorded By, (t) = Taken By, (c) = Cosigned By      Initials Name Provider Type    AS Lili Mejia, RADHA Physical Therapist Assistant                   Goals/Plan    No documentation.                  Clinical Impression       Row Name 08/06/24 0830          Pain    Pretreatment Pain Rating 3/10  -AS     Posttreatment Pain Rating 3/10  -AS     Pain Location - buttock  -AS     Pain Intervention(s) Repositioned;Ambulation/increased activity  -AS       Row Name 08/06/24 0830          Plan of Care Review    Plan of Care Reviewed With patient  -AS     Progress no change  -AS     Outcome Evaluation patient ambulated 40' with Min assist x1 and rolling walker for support, increased difficulty following commands for safe use of walker, attempting to  each steps, corrected with verbal cues and assist. Further mobility limited by weakness and fatigue. Recommend SNF at D/C for best functional outcome.  -AS       Row Name 08/06/24 0830          Positioning and Restraints    Pre-Treatment Position in bed  -AS     Post Treatment Position chair  -AS     In Chair reclined;call light within reach;encouraged to call for assist;exit alarm on;waffle cushion;legs elevated  -AS               User Key  (r) = Recorded By, (t) = Taken By, (c) = Cosigned By      Initials Name Provider Type    AS Jackie  Lili Patel PTA Physical Therapist Assistant                   Outcome Measures       Row Name 08/06/24 0831 08/06/24 0800       How much help from another person do you currently need...    Turning from your back to your side while in flat bed without using bedrails? 3  -AS 4  -EB    Moving from lying on back to sitting on the side of a flat bed without bedrails? 3  -AS 3  -EB    Moving to and from a bed to a chair (including a wheelchair)? 3  -AS 3  -EB    Standing up from a chair using your arms (e.g., wheelchair, bedside chair)? 3  -AS 3  -EB    Climbing 3-5 steps with a railing? 2  -AS 2  -EB    To walk in hospital room? 3  -AS 3  -EB    AM-PAC 6 Clicks Score (PT) 17  -AS 18  -EB    Highest Level of Mobility Goal 5 --> Static standing  -AS 6 --> Walk 10 steps or more  -EB      Row Name 08/06/24 0831          Functional Assessment    Outcome Measure Options AM-PAC 6 Clicks Basic Mobility (PT)  -AS               User Key  (r) = Recorded By, (t) = Taken By, (c) = Cosigned By      Initials Name Provider Type    AS Lili Mejia PTA Physical Therapist Assistant    Zonia Jimenez RN Registered Nurse                                 Physical Therapy Education       Title: PT OT SLP Therapies (In Progress)       Topic: Physical Therapy (In Progress)       Point: Mobility training (In Progress)       Learning Progress Summary             Patient Acceptance, E, NR by AS at 8/6/2024 0831    Acceptance, E, NR by AS at 8/5/2024 0822    Acceptance, E, NR by KG at 7/31/2024 1440    Acceptance, E, NR by KG at 7/29/2024 1026                         Point: Home exercise program (In Progress)       Learning Progress Summary             Patient Acceptance, E, NR by AS at 8/6/2024 0831    Acceptance, E, NR by AS at 8/5/2024 0822    Acceptance, E, NR by KG at 7/31/2024 1440                         Point: Body mechanics (In Progress)       Learning Progress Summary             Patient Acceptance, E, NR by AS at  8/6/2024 0831    Acceptance, E, NR by AS at 8/5/2024 0822    Acceptance, E, NR by KG at 7/31/2024 1440    Acceptance, E, NR by KG at 7/29/2024 1026                         Point: Precautions (In Progress)       Learning Progress Summary             Patient Acceptance, E, NR by AS at 8/6/2024 0831    Acceptance, E, NR by AS at 8/5/2024 0822    Acceptance, E, NR by KG at 7/31/2024 1440    Acceptance, E, NR by KG at 7/29/2024 1026                                         User Key       Initials Effective Dates Name Provider Type Discipline    AS 04/28/23 -  Lili Mejia PTA Physical Therapist Assistant PT    KG 05/22/20 -  Jen Leos PT Physical Therapist PT                  PT Recommendation and Plan     Plan of Care Reviewed With: patient  Progress: no change  Outcome Evaluation: patient ambulated 40' with Min assist x1 and rolling walker for support, increased difficulty following commands for safe use of walker, attempting to  each steps, corrected with verbal cues and assist. Further mobility limited by weakness and fatigue. Recommend SNF at D/C for best functional outcome.     Time Calculation:         PT Charges       Row Name 08/06/24 0832             Time Calculation    Start Time 0748  -AS      PT Received On 08/06/24  -AS      PT Goal Re-Cert Due Date 08/08/24  -AS         Timed Charges    45338 - PT Therapeutic Exercise Minutes 10  -AS      28481 - Gait Training Minutes  13  -AS         Total Minutes    Timed Charges Total Minutes 23  -AS       Total Minutes 23  -AS                User Key  (r) = Recorded By, (t) = Taken By, (c) = Cosigned By      Initials Name Provider Type    AS Lili Mejia PTA Physical Therapist Assistant                  Therapy Charges for Today       Code Description Service Date Service Provider Modifiers Qty    15866311217 HC PT THER PROC EA 15 MIN 8/5/2024 Lili Mejia PTA GP 1    98722673821 HC GAIT TRAINING EA 15 MIN 8/5/2024 Jackie  Lili Patel, PTA GP 1    43249486405 HC PT THER PROC EA 15 MIN 8/6/2024 Lili Mejia, PTA GP 1    27218081178 HC GAIT TRAINING EA 15 MIN 8/6/2024 Lili Mejia, PTA GP 1            PT G-Codes  Outcome Measure Options: AM-PAC 6 Clicks Basic Mobility (PT)  AM-PAC 6 Clicks Score (PT): 17  AM-PAC 6 Clicks Score (OT): 15       Lili Mejia PTA  8/6/2024

## 2024-08-06 NOTE — PLAN OF CARE
Goal Outcome Evaluation:  Plan of Care Reviewed With: patient        Progress: no change  Outcome Evaluation: patient ambulated 40' with Min assist x1 and rolling walker for support, increased difficulty following commands for safe use of walker, attempting to  each steps, corrected with verbal cues and assist. Further mobility limited by weakness and fatigue. Recommend SNF at D/C for best functional outcome.

## 2024-08-06 NOTE — DISCHARGE PLACEMENT REQUEST
"JOSE CARLOS MARIN, RN    P:  [phone]  F:  [phone]                Elisa Venegas (89 y.o. Female)       Date of Birth   1935    Social Security Number       Address   [address] [place] [address]    Home Phone   [phone]    MRN   [id number]       Yazidism   Taoism    Marital Status                               Admission Date   24    Admission Type   Emergency    Admitting Provider   Veronica Pacheco DO    Attending Provider   Veronica Pacheco DO    Department, Room/Bed   [facility] 4G, S448/1       Discharge Date       Discharge Disposition   Skilled Nursing Facility (DC - External)    Discharge Destination                                 Attending Provider: Veronica Pacheco DO    Allergies: Aspirin    Isolation: None   Infection: None   Code Status: CPR    Ht: 160 cm (63\")   Wt: 54.3 kg (119 lb 9.6 oz)    Admission Cmt: None   Principal Problem: ARF (acute renal failure) [N17.9]                   Active Insurance as of 2024       Primary Coverage       Payor Plan Insurance Group Employer/Plan Group    ANTHEM MEDICARE REPLACEMENT ANTHEM MEDICARE ADVANTAGE [id number]       Payor Plan Address Payor Plan Phone Number Payor Plan Fax Number Effective Dates    [phone]  2024 - None Entered    [facility] [address]         Subscriber Name Subscriber Birth Date Member ID       ELISA VENEGAS 1935 [id number]                     Emergency Contacts        (Rel.) Home Phone Work Phone Mobile Phone    Joe Venegas (Son) -- -- [phone]    Albin Venegas (Son) [phone] -- --    DORIS VENEGAS (Relative) -- -- [phone]                 Discharge Summary        Kimber March APRN at [address] [place] [address] Medicine Services  DISCHARGE SUMMARY    Patient Name: Elisa Venegas  : 1935  MRN: [id number]    Date of Admission: 2024  3:50 " PM  Date of Discharge:  8/6/2024  Primary Care Physician: Yasmeen Loomis MD    Consults       No orders found from 6/28/2024 to 7/28/2024.            Hospital Course     Presenting Problem: n/v/d    Active Hospital Problems    Diagnosis  POA    Moderate malnutrition [E44.0]  Yes    Atrial fibrillation [I48.91]  Yes    Chronic anticoagulation [Z79.01]  Not Applicable    Essential hypertension [I10]  Yes    Type 2 diabetes mellitus without complication, without long-term current use of insulin [E11.9]  Yes    Personal history of pulmonary embolism [Z86.711]  Yes    Hypothyroidism (acquired) [E03.9]  Yes      Resolved Hospital Problems    Diagnosis Date Resolved POA    **ARF (acute renal failure) [N17.9] 08/06/2024 Yes    Diarrhea [R19.7] 08/06/2024 Yes          Hospital Course:  Chey Robertson is a 89 y.o. female  with history of atrial fibrillation on dose-adjusted Eliquis, DVT/PE, hypothyroid, HTN, DMII, essential tremor who presented with generalized weakness, N/V and diarrhea thought to be caused by recent increase in home metformin dose. Patient is a resident in assisted-living and now delayed discharge as they are unable to accommodate newly thickened liquids required.     N/V, resolved  Diarrhea, resolved  - secondary to increased metformin dose?  - resolved. Now tolerating PO but on modified diet per speech     DISHA- resolved  - creatinine 1.49 on admission, likely secondary to volume loss  - now at baseline     UTI- ruled out  -episode of confusion overnight 7/30  -UA consistent with UTI, culture with lactobacillus  -Rocephin given x 2- discontinued due to negative culture and no symptoms/ return to baseline     Hypokalemia  - in setting of diarrhea, N/V  - electrolyte replacement protocol     HTN  - lisinopril held for DISHA, BP acceptable. Will continue to hold for now.  Monitor closely   -metoprolol decreased  - lasix changed to prn     DMII  - poor control, A1c 9.8  - metformin held  - continue  "glucocommander     Pancytopenia  - follows with Dr Lyman  - neutropenic precautions     Atrial fibrillation  H/o DVT/PE  - amiodarone, metoprolol for rate control- nighttime doses held 2/2 bradycardia. Decreased bid dosing to 25mg- improved  - Eliquis for anticoagulation     Prolonged QTc  - EKG 7/27 with QTc 506, sinus pancho  - on amiodarone, avoid other QT-prolong meds     Hypothyroid  - TSH appropriate at 1.65  - continue home Synthroid     Dysphagia  - MBS 7/29 showed aspiration of all consistencies.  Partner discussed with speech therapist.  Component of aspiration may involve mechanical changes from osteophytes, which may not change as patient strength improves.    - I have discussed with patient's sonJoe again today- Patient does not wish for any type of feeding tube/ artificial nutrition. Family/ Patient have opted for \"safest\" comfort diet and continued work with SLP.   -- Plan is to pursue short-term rehab and speech therapy to see if her swallowing will improve.  Continue modified diet with thickened liquids for now.     Generalized weakness  - PT/OT following.  -- Patient to rehab today         Discharge Follow Up Recommendations for outpatient labs/diagnostics:   Pcp follow up one week post dc rehab    Day of Discharge     HPI:   Patient up in chair. Delightfully confused. On phone with sonJoe. Conversation with both. No complaints per patient other than still being in hospital       Review of Systems  Gen- No fevers, chills  CV- No chest pain, palpitations  Resp- No cough, dyspnea  GI- No N/V/D, abd pain      Vital Signs:   Temp:  [97.8 °F (36.6 °C)-98.3 °F (36.8 °C)] 97.9 °F (36.6 °C)  Heart Rate:  [57-71] 58  Resp:  [12-18] 18  BP: (127-149)/(54-76) 131/54      Physical Exam:  Constitutional: No acute distress, awake, alert  HENT: NCAT, mucous membranes moist  Respiratory: Respiratory effort normal, CTAB  CV: RRR  Gastrointestinal: Soft, nontender, nondistended  Musculoskeletal: No " bilateral ankle edema  Psychiatric: Appropriate affect, cooperative  Neurologic: Oriented x 2, speech clear  Skin: No rashes    Pertinent  and/or Most Recent Results     LAB RESULTS:      Lab 08/03/24  0358   WBC 1.33*   HEMOGLOBIN 9.3*   HEMATOCRIT 27.7*   PLATELETS 41*   MCV 90.2         Lab 08/03/24  0357 07/31/24  1402 07/31/24  0445 07/30/24  1413   SODIUM 140  --  141  --    POTASSIUM 4.7 4.8 3.6 3.7   CHLORIDE 106  --  107  --    CO2 28.0  --  27.0  --    ANION GAP 6.0  --  7.0  --    BUN 18  --  14  --    CREATININE 0.89  --  0.87  --    EGFR 62.4  --  64.2  --    GLUCOSE 156*  --  145*  --    CALCIUM 8.6  --  8.2*  --    MAGNESIUM  --   --  1.7  --                          Brief Urine Lab Results  (Last result in the past 365 days)        Color   Clarity   Blood   Leuk Est   Nitrite   Protein   CREAT   Urine HCG        07/30/24 0549 Yellow   Cloudy   Negative   Small (1+)   Negative   Trace                 Microbiology Results (last 10 days)       Procedure Component Value - Date/Time    Urine Culture - Urine, Urine, Clean Catch [[id number]]  (Abnormal) Collected: 07/30/24 0549    Lab Status: Final result Specimen: Urine, Clean Catch Updated: 07/31/24 1213     Urine Culture >100,000 CFU/mL Lactobacillus species     Comment:   Based on laboratory diagnosis criteria, these organisms are common urogenital commensal seun and have not been associated with urinary tract infections; clinical correlation is recommended.       Narrative:      Colonization of the urinary tract without infection is common. Treatment is discouraged unless the patient is symptomatic, pregnant, or undergoing an invasive urologic procedure.    COVID PRE-OP / PRE-PROCEDURE SCREENING ORDER (NO ISOLATION) - Swab, Nasopharynx [[id number]]  (Normal) Collected: 07/27/24 2044    Lab Status: Final result Specimen: Swab from Nasopharynx Updated: 07/27/24 2116    Narrative:      The following orders were created for panel order COVID PRE-OP /  PRE-PROCEDURE SCREENING ORDER (NO ISOLATION) - Swab, Nasopharynx.  Procedure                               Abnormality         Status                     ---------                               -----------         ------                     COVID-19 and FLU A/B PCR...[[id number]]  Normal              Final result                 Please view results for these tests on the individual orders.    COVID-19 and FLU A/B PCR, 1 HR TAT - Swab, Nasopharynx [[id number]]  (Normal) Collected: 07/27/24 2044    Lab Status: Final result Specimen: Swab from Nasopharynx Updated: 07/27/24 2116     COVID19 Not Detected     Influenza A PCR Not Detected     Influenza B PCR Not Detected    Narrative:      Fact sheet for providers: https://www.fda.gov/media/126942/download    Fact sheet for patients: https://www.fda.gov/media/633338/download    Test performed by PCR.    Eosinophil Smear - Urine, Urine, Clean Catch [[id number]]  (Normal) Collected: 07/27/24 1613    Lab Status: Final result Specimen: Urine, Clean Catch Updated: 07/27/24 2346     Eosinophil Smear 0 % EOS/100 Cells     Narrative:      No eosinophil seen            FL Video Swallow With Speech Single Contrast    Result Date: 7/29/2024  FL VIDEO SWALLOW W SPEECH SINGLE-CONTRAST Date of Exam: 7/29/2024 1:17 PM EDT Indication: dysphagia.   Comparison: None available. Technique:   The speech pathologist administered food and/or liquid mixed with barium to the patient with cine/video imaging.  Imaging assistance was provided to the speech pathologist and an image was saved. Fluoroscopic Time: 1 minute and 12 seconds Number of Images: 9 associated fluoroscopic loops were saved Findings: Aspiration was seen during fluoroscopic guided modified barium swallowing series. Please see speech therapy report for full details and recommendations.     Impression: Fluoroscopy provided for a modified barium swallow. Aspiration was seen during swallowing evaluation. Please see speech therapy report  for full details and recommendations. Report dictated by: Gianna Pavon PA-c  I have personally reviewed this case and agree with the findings above: Electronically Signed: Josh Lynne MD  7/29/2024 4:41 PM EDT  Workstation ID: [id number]    XR Chest 1 View    Result Date: 7/27/2024  XR CHEST 1 VW Date of Exam: 7/27/2024 3:41 PM EDT Indication: Weakness, dizziness, altered mental status Comparison: 1/15/2024. Findings: The heart size is normal. The pulmonary vascular markings are normal. The lungs and pleural spaces are clear of active disease. There are chronic age-related changes involving the bony thorax and thoracic aorta.     Impression: No active disease. Electronically Signed: Saul Carl MD  7/27/2024 4:03 PM EDT  Workstation ID: [id number]             Results for orders placed during the hospital encounter of 06/05/23    Adult Transthoracic Echo Complete w/ Color, Spectral and Contrast if necessary per protocol    Interpretation Summary    Left ventricular systolic function is normal. Left ventricular ejection fraction appears to be 56 - 60%.    Left ventricular diastolic function was normal.    Estimated right ventricular systolic pressure from tricuspid regurgitation is normal (<35 mmHg).      Plan for Follow-up of Pending Labs/Results:     Discharge Details        Discharge Medications        New Medications        Instructions Start Date   castor oil-balsam peru ointment   1 Application, Topical, Every 12 Hours Scheduled             Changes to Medications        Instructions Start Date   furosemide 40 MG tablet  Commonly known as: LASIX  What changed:   when to take this  reasons to take this   40 mg, Oral, Daily PRN      metoprolol tartrate 50 MG tablet  Commonly known as: LOPRESSOR  What changed: how much to take   25 mg, Oral, 2 Times Daily      pantoprazole 20 MG EC tablet  Commonly known as: PROTONIX  What changed:   medication strength  how much to take  when to take this   20 mg, Oral, Daily       traMADol 50 MG tablet  Commonly known as: ULTRAM  What changed: reasons to take this   50 mg, Oral, 2 Times Daily PRN             Continue These Medications        Instructions Start Date   acetaminophen 500 MG tablet  Commonly known as: TYLENOL   500 mg, Oral, Every 6 Hours PRN      Acidophilus Extra Strength capsule   Take 1 capsule by mouth Daily.      amiodarone 200 MG tablet  Commonly known as: PACERONE   200 mg, Oral, Daily      ascorbic acid 500 MG tablet  Commonly known as: VITAMIN C   Take 1 tablet by mouth Daily.      Eliquis 2.5 MG tablet tablet  Generic drug: apixaban   2.5 mg, Oral, 2 Times Daily      insulin NPH-insulin regular (70-30) 100 UNIT/ML injection  Commonly known as: humuLIN 70/30,novoLIN 70/30   45 Units, Subcutaneous      levothyroxine 100 MCG tablet  Commonly known as: SYNTHROID, LEVOTHROID   100 mcg, Oral, Every Early Morning      melatonin 5 MG tablet tablet   5 mg, Oral, Nightly PRN      metFORMIN 500 MG tablet  Commonly known as: GLUCOPHAGE   Take 1 tablet by mouth 2 (Two) Times a Day With Meals.      naloxone 4 MG/0.1ML nasal spray  Commonly known as: NARCAN   Call 911. Don't prime. Landis in 1 nostril for overdose. Repeat in 2-3 minutes in other nostril if no or minimal breathing/responsiveness.      NovoLOG 100 UNIT/ML injection  Generic drug: Insulin Aspart       ondansetron ODT 4 MG disintegrating tablet  Commonly known as: ZOFRAN-ODT   DISSOLVE 1 TABLET ON THE TONGUE 2 TO 3 TIMES PER DAY AS NEEDED FOR NAUSEA OR VOMITING      polyethylene glycol 17 g packet  Commonly known as: MIRALAX   17 g, Oral, Daily PRN      SITagliptin 25 MG tablet  Commonly known as: JANUVIA   25 mg, Oral, Daily      zinc sulfate 220 (50 Zn) MG capsule  Commonly known as: ZINCATE              Stop These Medications      HYDROcodone-acetaminophen 7.5-325 MG/15ML solution  Commonly known as: HYCET     lisinopril 5 MG tablet  Commonly known as: PRINIVIL,ZESTRIL     sucralfate 1 g tablet  Commonly known as:  CARAFATE              Allergies   Allergen Reactions    Aspirin Unknown - Low Severity     Mouth sores          Discharge Disposition:  Skilled Nursing Facility (DC - External)    Diet:  Hospital:  Diet Order   Procedures    Diet: Cardiac, Diabetic; Healthy Heart (2-3 Na+); Consistent Carbohydrate; No Mixed Consistencies; Texture: Soft to Chew (NDD 3); Soft to Chew: Chopped Meat; Fluid Consistency: Honey Thick       Diet Instructions       Diet: Cardiac Diets, Diabetic Diets; Healthy Heart (2-3 Na+); Soft to Chew (NDD 3); Chopped Meat; Honey Thick; Consistent Carbohydrate      Discharge Diet:  Cardiac Diets  Diabetic Diets       Cardiac Diet: Healthy Heart (2-3 Na+)    Texture: Soft to Chew (NDD 3)    Soft to Chew: Chopped Meat    Fluid Consistency: Honey Thick    Diabetic Diet: Consistent Carbohydrate             Activity:  Activity Instructions       Activity as Tolerated              Restrictions or Other Recommendations:         CODE STATUS:    Code Status and Medical Interventions: CPR (Attempt to Resuscitate); Full Support   Ordered at: 07/27/24 2121     Level Of Support Discussed With:    Patient     Code Status (Patient has no pulse and is not breathing):    CPR (Attempt to Resuscitate)     Medical Interventions (Patient has pulse or is breathing):    Full Support       Future Appointments   Date Time Provider Department Center   8/13/2024  9:30 AM Nikia Guerra APRN [facility]       Additional Instructions for the Follow-ups that You Need to Schedule       Ambulatory Referral to Home Health   As directed      Face to Face Visit Date: 7/29/2024   Follow-up provider for Plan of Care?: I treated the patient in an acute care facility and will not continue treatment after discharge.   Follow-up provider: MATT GARZA [[id number]]   Reason/Clinical Findings: ARF   Describe mobility limitations that make leaving home difficult: Impaired functional mobility, gait, balance, endurance   Nursing/Therapeutic  Services Requested: Skilled Nursing Physical Therapy Occupational Therapy Speech Therapy   Skilled nursing orders: Cardiopulmonary assessments   PT orders: Therapeutic exercise Gait Training Transfer training Strengthening   Weight Bearing Status: As Tolerated   Occupational orders: Activities of daily living Energy conservation Strengthening   SLP orders: Dysphagia therapy   Frequency: 1 Week 1        Discharge Follow-up with PCP   As directed       Currently Documented PCP:    Yasmeen Loomis MD    PCP Phone Number:    [phone]     Follow Up Details: 1 week post dc rehab                      CHANG Richards  08/06/24      Time Spent on Discharge:  I spent  40  minutes on this discharge activity which included: face-to-face encounter with the patient, reviewing the data in the system, coordination of the care with the nursing staff as well as consultants, documentation, and entering orders.        Electronically signed by CHANG Richards, 08/06/24, 1:06 PM EDT.      Electronically signed by Kimber March APRN at 08/06/24 4129

## 2024-08-06 NOTE — CASE MANAGEMENT/SOCIAL WORK
Case Management Discharge Note      Final Note: [facility] completed P2P today for SNF.  Insurance precert has now been approved for [facility].  Per [facility], a bed is available today.  CM updated son, Albin, via phone and discussed with pt at bedside.  RN to call report to [phone] and fax DC summary to [phone].  Son to transport.    Also discussed with Albin that if pt still requires thickened liquids upon completion of rehab, that he will have to look into other living options that have a personal care unit that can/will accommodate pt's needs (no [facility] can accept pts with thickened liquid diet needs).  He verbalized understanding.         Selected Continued Care - Admitted Since 7/27/2024       Destination Coordination complete.      Service Provider Selected Services Address Phone Fax Patient Preferred    [facility] [address] [place] [phone] [phone] --              Durable Medical Equipment    No services have been selected for the patient.                Dialysis/Infusion    No services have been selected for the patient.                Home Medical Care    No services have been selected for the patient.                Therapy    No services have been selected for the patient.                Community Resources    No services have been selected for the patient.                Community & DME    No services have been selected for the patient.                         Final Discharge Disposition Code: 03 - skilled nursing facility (SNF)

## 2024-08-06 NOTE — DISCHARGE SUMMARY
[facility] Medicine Services  DISCHARGE SUMMARY    Patient Name: Chey Robertson  : 1935  MRN: [id number]    Date of Admission: 2024  3:50 PM  Date of Discharge:  2024  Primary Care Physician: Yasmeen Loomis MD    Consults       No orders found from 2024 to 2024.            Hospital Course     Presenting Problem: n/v/d    Active Hospital Problems    Diagnosis  POA    Moderate malnutrition [E44.0]  Yes    Atrial fibrillation [I48.91]  Yes    Chronic anticoagulation [Z79.01]  Not Applicable    Essential hypertension [I10]  Yes    Type 2 diabetes mellitus without complication, without long-term current use of insulin [E11.9]  Yes    Personal history of pulmonary embolism [Z86.711]  Yes    Hypothyroidism (acquired) [E03.9]  Yes      Resolved Hospital Problems    Diagnosis Date Resolved POA    **ARF (acute renal failure) [N17.9] 2024 Yes    Diarrhea [R19.7] 2024 Yes          Hospital Course:  Chey Robertson is a 89 y.o. female  with history of atrial fibrillation on dose-adjusted Eliquis, DVT/PE, hypothyroid, HTN, DMII, essential tremor who presented with generalized weakness, N/V and diarrhea thought to be caused by recent increase in home metformin dose. Patient is a resident in assisted-living and now delayed discharge as they are unable to accommodate newly thickened liquids required.     N/V, resolved  Diarrhea, resolved  - secondary to increased metformin dose?  - resolved. Now tolerating PO but on modified diet per speech     DISHA- resolved  - creatinine 1.49 on admission, likely secondary to volume loss  - now at baseline     UTI- ruled out  -episode of confusion overnight   -UA consistent with UTI, culture with lactobacillus  -Rocephin given x 2- discontinued due to negative culture and no symptoms/ return to baseline     Hypokalemia  - in setting of diarrhea, N/V  - electrolyte replacement protocol     HTN  - lisinopril held for DISHA, BP  "acceptable. Will continue to hold for now.  Monitor closely   -metoprolol decreased  - lasix changed to prn     DMII  - poor control, A1c 9.8  - metformin held  - continue glucocommander     Pancytopenia  - follows with Dr Lyman  - neutropenic precautions     Atrial fibrillation  H/o DVT/PE  - amiodarone, metoprolol for rate control- nighttime doses held 2/2 bradycardia. Decreased bid dosing to 25mg- improved  - Eliquis for anticoagulation     Prolonged QTc  - EKG 7/27 with QTc 506, sinus pancho  - on amiodarone, avoid other QT-prolong meds     Hypothyroid  - TSH appropriate at 1.65  - continue home Synthroid     Dysphagia  - MBS 7/29 showed aspiration of all consistencies.  Partner discussed with speech therapist.  Component of aspiration may involve mechanical changes from osteophytes, which may not change as patient strength improves.    - I have discussed with patient's sonJoe again today- Patient does not wish for any type of feeding tube/ artificial nutrition. Family/ Patient have opted for \"safest\" comfort diet and continued work with SLP.   -- Plan is to pursue short-term rehab and speech therapy to see if her swallowing will improve.  Continue modified diet with thickened liquids for now.     Generalized weakness  - PT/OT following.  -- Patient to rehab today         Discharge Follow Up Recommendations for outpatient labs/diagnostics:   Pcp follow up one week post dc rehab    Day of Discharge     HPI:   Patient up in chair. Delightfully confused. On phone with sonJoe. Conversation with both. No complaints per patient other than still being in hospital       Review of Systems  Gen- No fevers, chills  CV- No chest pain, palpitations  Resp- No cough, dyspnea  GI- No N/V/D, abd pain      Vital Signs:   Temp:  [97.8 °F (36.6 °C)-98.3 °F (36.8 °C)] 97.9 °F (36.6 °C)  Heart Rate:  [57-71] 58  Resp:  [12-18] 18  BP: (127-149)/(54-76) 131/54      Physical Exam:  Constitutional: No acute distress, awake, " alert  HENT: NCAT, mucous membranes moist  Respiratory: Respiratory effort normal, CTAB  CV: RRR  Gastrointestinal: Soft, nontender, nondistended  Musculoskeletal: No bilateral ankle edema  Psychiatric: Appropriate affect, cooperative  Neurologic: Oriented x 2, speech clear  Skin: No rashes    Pertinent  and/or Most Recent Results     LAB RESULTS:      Lab 08/03/24  0358   WBC 1.33*   HEMOGLOBIN 9.3*   HEMATOCRIT 27.7*   PLATELETS 41*   MCV 90.2         Lab 08/03/24  0357 07/31/24  1402 07/31/24  0445 07/30/24  1413   SODIUM 140  --  141  --    POTASSIUM 4.7 4.8 3.6 3.7   CHLORIDE 106  --  107  --    CO2 28.0  --  27.0  --    ANION GAP 6.0  --  7.0  --    BUN 18  --  14  --    CREATININE 0.89  --  0.87  --    EGFR 62.4  --  64.2  --    GLUCOSE 156*  --  145*  --    CALCIUM 8.6  --  8.2*  --    MAGNESIUM  --   --  1.7  --                          Brief Urine Lab Results  (Last result in the past 365 days)        Color   Clarity   Blood   Leuk Est   Nitrite   Protein   CREAT   Urine HCG        07/30/24 0549 Yellow   Cloudy   Negative   Small (1+)   Negative   Trace                 Microbiology Results (last 10 days)       Procedure Component Value - Date/Time    Urine Culture - Urine, Urine, Clean Catch [[id number]]  (Abnormal) Collected: 07/30/24 0549    Lab Status: Final result Specimen: Urine, Clean Catch Updated: 07/31/24 1213     Urine Culture >100,000 CFU/mL Lactobacillus species     Comment:   Based on laboratory diagnosis criteria, these organisms are common urogenital commensal seun and have not been associated with urinary tract infections; clinical correlation is recommended.       Narrative:      Colonization of the urinary tract without infection is common. Treatment is discouraged unless the patient is symptomatic, pregnant, or undergoing an invasive urologic procedure.    COVID PRE-OP / PRE-PROCEDURE SCREENING ORDER (NO ISOLATION) - Swab, Nasopharynx [[id number]]  (Normal) Collected: 07/27/24 2044    Lab  Status: Final result Specimen: Swab from Nasopharynx Updated: 07/27/24 2116    Narrative:      The following orders were created for panel order COVID PRE-OP / PRE-PROCEDURE SCREENING ORDER (NO ISOLATION) - Swab, Nasopharynx.  Procedure                               Abnormality         Status                     ---------                               -----------         ------                     COVID-19 and FLU A/B PCR...[[id number]]  Normal              Final result                 Please view results for these tests on the individual orders.    COVID-19 and FLU A/B PCR, 1 HR TAT - Swab, Nasopharynx [[id number]]  (Normal) Collected: 07/27/24 2044    Lab Status: Final result Specimen: Swab from Nasopharynx Updated: 07/27/24 2116     COVID19 Not Detected     Influenza A PCR Not Detected     Influenza B PCR Not Detected    Narrative:      Fact sheet for providers: https://www.fda.gov/media/383316/download    Fact sheet for patients: https://www.fda.gov/media/405958/download    Test performed by PCR.    Eosinophil Smear - Urine, Urine, Clean Catch [[id number]]  (Normal) Collected: 07/27/24 1613    Lab Status: Final result Specimen: Urine, Clean Catch Updated: 07/27/24 2346     Eosinophil Smear 0 % EOS/100 Cells     Narrative:      No eosinophil seen            FL Video Swallow With Speech Single Contrast    Result Date: 7/29/2024  FL VIDEO SWALLOW W SPEECH SINGLE-CONTRAST Date of Exam: 7/29/2024 1:17 PM EDT Indication: dysphagia.   Comparison: None available. Technique:   The speech pathologist administered food and/or liquid mixed with barium to the patient with cine/video imaging.  Imaging assistance was provided to the speech pathologist and an image was saved. Fluoroscopic Time: 1 minute and 12 seconds Number of Images: 9 associated fluoroscopic loops were saved Findings: Aspiration was seen during fluoroscopic guided modified barium swallowing series. Please see speech therapy report for full details and  recommendations.     Impression: Fluoroscopy provided for a modified barium swallow. Aspiration was seen during swallowing evaluation. Please see speech therapy report for full details and recommendations. Report dictated by: Gianna Pavon PA-c  I have personally reviewed this case and agree with the findings above: Electronically Signed: Josh Lynne MD  7/29/2024 4:41 PM EDT  Workstation ID: [id number]    XR Chest 1 View    Result Date: 7/27/2024  XR CHEST 1 VW Date of Exam: 7/27/2024 3:41 PM EDT Indication: Weakness, dizziness, altered mental status Comparison: 1/15/2024. Findings: The heart size is normal. The pulmonary vascular markings are normal. The lungs and pleural spaces are clear of active disease. There are chronic age-related changes involving the bony thorax and thoracic aorta.     Impression: No active disease. Electronically Signed: Saul Carl MD  7/27/2024 4:03 PM EDT  Workstation ID: [id number]             Results for orders placed during the hospital encounter of 06/05/23    Adult Transthoracic Echo Complete w/ Color, Spectral and Contrast if necessary per protocol    Interpretation Summary    Left ventricular systolic function is normal. Left ventricular ejection fraction appears to be 56 - 60%.    Left ventricular diastolic function was normal.    Estimated right ventricular systolic pressure from tricuspid regurgitation is normal (<35 mmHg).      Plan for Follow-up of Pending Labs/Results:     Discharge Details        Discharge Medications        New Medications        Instructions Start Date   castor oil-balsam peru ointment   1 Application, Topical, Every 12 Hours Scheduled             Changes to Medications        Instructions Start Date   furosemide 40 MG tablet  Commonly known as: LASIX  What changed:   when to take this  reasons to take this   40 mg, Oral, Daily PRN      metoprolol tartrate 50 MG tablet  Commonly known as: LOPRESSOR  What changed: how much to take   25 mg, Oral, 2 Times  Daily      pantoprazole 20 MG EC tablet  Commonly known as: PROTONIX  What changed:   medication strength  how much to take  when to take this   20 mg, Oral, Daily      traMADol 50 MG tablet  Commonly known as: ULTRAM  What changed: reasons to take this   50 mg, Oral, 2 Times Daily PRN             Continue These Medications        Instructions Start Date   acetaminophen 500 MG tablet  Commonly known as: TYLENOL   500 mg, Oral, Every 6 Hours PRN      Acidophilus Extra Strength capsule   Take 1 capsule by mouth Daily.      amiodarone 200 MG tablet  Commonly known as: PACERONE   200 mg, Oral, Daily      ascorbic acid 500 MG tablet  Commonly known as: VITAMIN C   Take 1 tablet by mouth Daily.      Eliquis 2.5 MG tablet tablet  Generic drug: apixaban   2.5 mg, Oral, 2 Times Daily      insulin NPH-insulin regular (70-30) 100 UNIT/ML injection  Commonly known as: humuLIN 70/30,novoLIN 70/30   45 Units, Subcutaneous      levothyroxine 100 MCG tablet  Commonly known as: SYNTHROID, LEVOTHROID   100 mcg, Oral, Every Early Morning      melatonin 5 MG tablet tablet   5 mg, Oral, Nightly PRN      metFORMIN 500 MG tablet  Commonly known as: GLUCOPHAGE   Take 1 tablet by mouth 2 (Two) Times a Day With Meals.      naloxone 4 MG/0.1ML nasal spray  Commonly known as: NARCAN   Call 911. Don't prime. [place] in 1 nostril for overdose. Repeat in 2-3 minutes in other nostril if no or minimal breathing/responsiveness.      NovoLOG 100 UNIT/ML injection  Generic drug: Insulin Aspart       ondansetron ODT 4 MG disintegrating tablet  Commonly known as: ZOFRAN-ODT   DISSOLVE 1 TABLET ON THE TONGUE 2 TO 3 TIMES PER DAY AS NEEDED FOR NAUSEA OR VOMITING      polyethylene glycol 17 g packet  Commonly known as: MIRALAX   17 g, Oral, Daily PRN      SITagliptin 25 MG tablet  Commonly known as: JANUVIA   25 mg, Oral, Daily      zinc sulfate 220 (50 Zn) MG capsule  Commonly known as: ZINCATE              Stop These Medications       HYDROcodone-acetaminophen 7.5-325 MG/15ML solution  Commonly known as: HYCET     lisinopril 5 MG tablet  Commonly known as: PRINIVIL,ZESTRIL     sucralfate 1 g tablet  Commonly known as: CARAFATE              Allergies   Allergen Reactions    Aspirin Unknown - Low Severity     Mouth sores          Discharge Disposition:  Skilled Nursing Facility (DC - External)    Diet:  Hospital:  Diet Order   Procedures    Diet: Cardiac, Diabetic; Healthy Heart (2-3 Na+); Consistent Carbohydrate; No Mixed Consistencies; Texture: Soft to Chew (NDD 3); Soft to Chew: Chopped Meat; Fluid Consistency: Honey Thick       Diet Instructions       Diet: Cardiac Diets, Diabetic Diets; Healthy Heart (2-3 Na+); Soft to Chew (NDD 3); Chopped Meat; Honey Thick; Consistent Carbohydrate      Discharge Diet:  Cardiac Diets  Diabetic Diets       Cardiac Diet: Healthy Heart (2-3 Na+)    Texture: Soft to Chew (NDD 3)    Soft to Chew: Chopped Meat    Fluid Consistency: Honey Thick    Diabetic Diet: Consistent Carbohydrate             Activity:  Activity Instructions       Activity as Tolerated              Restrictions or Other Recommendations:         CODE STATUS:    Code Status and Medical Interventions: CPR (Attempt to Resuscitate); Full Support   Ordered at: 07/27/24 2121     Level Of Support Discussed With:    Patient     Code Status (Patient has no pulse and is not breathing):    CPR (Attempt to Resuscitate)     Medical Interventions (Patient has pulse or is breathing):    Full Support       Future Appointments   Date Time Provider Department Center   8/13/2024  9:30 AM Nikia Guerra APRN [facility]       Additional Instructions for the Follow-ups that You Need to Schedule       Ambulatory Referral to Home Health   As directed      Face to Face Visit Date: 7/29/2024   Follow-up provider for Plan of Care?: I treated the patient in an acute care facility and will not continue treatment after discharge.   Follow-up provider: KAYLA  YASMEEN ALCANTAR [[id number]]   Reason/Clinical Findings: ARF   Describe mobility limitations that make leaving home difficult: Impaired functional mobility, gait, balance, endurance   Nursing/Therapeutic Services Requested: Skilled Nursing Physical Therapy Occupational Therapy Speech Therapy   Skilled nursing orders: Cardiopulmonary assessments   PT orders: Therapeutic exercise Gait Training Transfer training Strengthening   Weight Bearing Status: As Tolerated   Occupational orders: Activities of daily living Energy conservation Strengthening   SLP orders: Dysphagia therapy   Frequency: 1 Week 1        Discharge Follow-up with PCP   As directed       Currently Documented PCP:    Yasmeen Loomis MD    PCP Phone Number:    [phone]     Follow Up Details: 1 week post dc rehab                      CHANG Richards  08/06/24      Time Spent on Discharge:  I spent  40  minutes on this discharge activity which included: face-to-face encounter with the patient, reviewing the data in the system, coordination of the care with the nursing staff as well as consultants, documentation, and entering orders.        Electronically signed by CHANG Richards, 08/06/24, 1:06 PM EDT.

## 2024-08-06 NOTE — PROGRESS NOTES
Psychiatric Medicine Services  PROGRESS NOTE    Patient Name: Chey Robertson  : 1935  MRN: [id number]    Date of Admission: 2024  Primary Care Physician: Yasmeen Loomis MD    Subjective   Subjective     CC:  Weakness, awaiting placement    HPI:  Patient up in chair. Delightfully confused. On phone with son, Joe. Conversation with both. No complaints per patient other than still being in hospital      Objective   Objective     Vital Signs:   Temp:  [97.8 °F (36.6 °C)-98.3 °F (36.8 °C)] 97.8 °F (36.6 °C)  Heart Rate:  [57-71] 68  Resp:  [12-18] 16  BP: (127-149)/(62-76) 147/62     Physical Exam:  Constitutional: No acute distress, awake, alert  HENT: NCAT, mucous membranes moist  Respiratory: Respiratory effort normal, CTAB  CV: RRR  Gastrointestinal: Soft, nontender, nondistended  Musculoskeletal: No bilateral ankle edema  Psychiatric: Appropriate affect, cooperative  Neurologic: Oriented x 2, speech clear  Skin: No rashes    No change in exam from 24      Results Reviewed:  LAB RESULTS:      Lab 24  0358   WBC 1.33*   HEMOGLOBIN 9.3*   HEMATOCRIT 27.7*   PLATELETS 41*   MCV 90.2         Lab 24  0357 24  1402 24  0445 24  1413   SODIUM 140  --  141  --    POTASSIUM 4.7 4.8 3.6 3.7   CHLORIDE 106  --  107  --    CO2 28.0  --  27.0  --    ANION GAP 6.0  --  7.0  --    BUN 18  --  14  --    CREATININE 0.89  --  0.87  --    EGFR 62.4  --  64.2  --    GLUCOSE 156*  --  145*  --    CALCIUM 8.6  --  8.2*  --    MAGNESIUM  --   --  1.7  --                          Brief Urine Lab Results  (Last result in the past 365 days)        Color   Clarity   Blood   Leuk Est   Nitrite   Protein   CREAT   Urine HCG        24 0549 Yellow   Cloudy   Negative   Small (1+)   Negative   Trace                   Microbiology Results Abnormal       Procedure Component Value - Date/Time    Eosinophil Smear - Urine, Urine, Clean Catch [[id number]]  (Normal)  Collected: 07/27/24 1613    Lab Status: Final result Specimen: Urine, Clean Catch Updated: 07/27/24 2346     Eosinophil Smear 0 % EOS/100 Cells     Narrative:      No eosinophil seen    COVID PRE-OP / PRE-PROCEDURE SCREENING ORDER (NO ISOLATION) - Swab, Nasopharynx [[id number]]  (Normal) Collected: 07/27/24 2044    Lab Status: Final result Specimen: Swab from Nasopharynx Updated: 07/27/24 2116    Narrative:      The following orders were created for panel order COVID PRE-OP / PRE-PROCEDURE SCREENING ORDER (NO ISOLATION) - Swab, Nasopharynx.  Procedure                               Abnormality         Status                     ---------                               -----------         ------                     COVID-19 and FLU A/B PCR...[[id number]]  Normal              Final result                 Please view results for these tests on the individual orders.    COVID-19 and FLU A/B PCR, 1 HR TAT - Swab, Nasopharynx [[id number]]  (Normal) Collected: 07/27/24 2044    Lab Status: Final result Specimen: Swab from Nasopharynx Updated: 07/27/24 2116     COVID19 Not Detected     Influenza A PCR Not Detected     Influenza B PCR Not Detected    Narrative:      Fact sheet for providers: https://www.fda.gov/media/120110/download    Fact sheet for patients: https://www.fda.gov/media/835056/download    Test performed by PCR.            No radiology results from the last 24 hrs    Results for orders placed during the hospital encounter of 06/05/23    Adult Transthoracic Echo Complete w/ Color, Spectral and Contrast if necessary per protocol    Interpretation Summary    Left ventricular systolic function is normal. Left ventricular ejection fraction appears to be 56 - 60%.    Left ventricular diastolic function was normal.    Estimated right ventricular systolic pressure from tricuspid regurgitation is normal (<35 mmHg).      Current medications:  Scheduled Meds:amiodarone, 200 mg, Oral, Daily  apixaban, 2.5 mg, Oral, BID  castor  oil-balsam peru, 1 Application, Topical, Q12H  insulin glargine, 1-200 Units, Subcutaneous, Nightly - Glucommander  insulin lispro, 1-200 Units, Subcutaneous, 4x Daily With Meals & Nightly  lactobacillus acidophilus, 1 capsule, Oral, Daily  levothyroxine, 100 mcg, Oral, Q AM  metoprolol tartrate, 25 mg, Oral, BID  pantoprazole, 40 mg, Oral, Daily  sodium chloride, 10 mL, Intravenous, Q12H      Continuous Infusions:   PRN Meds:.  acetaminophen    dextrose    dextrose    glucagon (human recombinant)    insulin lispro    Magnesium Standard Dose Replacement - Follow Nurse / BPA Driven Protocol    melatonin    Potassium Replacement - Follow Nurse / BPA Driven Protocol    sodium chloride    sodium chloride    sodium chloride    traMADol    Assessment & Plan   Assessment & Plan     Active Hospital Problems    Diagnosis  POA    **ARF (acute renal failure) [N17.9]  Yes    Moderate malnutrition [E44.0]  Yes    Diarrhea [R19.7]  Yes    Atrial fibrillation [I48.91]  Yes    Chronic anticoagulation [Z79.01]  Not Applicable    Essential hypertension [I10]  Yes    Type 2 diabetes mellitus without complication, without long-term current use of insulin [E11.9]  Yes    Personal history of pulmonary embolism [Z86.711]  Yes    Hypothyroidism (acquired) [E03.9]  Yes      Resolved Hospital Problems   No resolved problems to display.        Brief Hospital Course to date:  Chey Robertson is a 89 y.o. female  with history of atrial fibrillation on dose-adjusted Eliquis, DVT/PE, hypothyroid, HTN, DMII, essential tremor who presented with generalized weakness, N/V and diarrhea thought to be caused by recent increase in home metformin dose. Patient is a resident in assisted-living and now delayed discharge as they are unable to accommodate newly thickened liquids required.    This patient's problems and plans were partially entered by my partner and updated as appropriate by me 08/06/24.       N/V, resolved  Diarrhea, resolved  - secondary to  "increased metformin dose?  - resolved. Now tolerating PO but on modified diet per speech     DISHA- resolved  - creatinine 1.49 on admission, likely secondary to volume loss  - now at baseline     UTI- ruled out  -episode of confusion overnight 7/30  -UA consistent with UTI, culture with lactobacillus  -Rocephin given x 2- discontinued due to negative culture and no symptoms/ return to baseline     Hypokalemia  - in setting of diarrhea, N/V  - electrolyte replacement protocol     HTN  - lisinopril held for DISHA, BP acceptable. Will continue to hold for now.  Monitor closely      DMII  - poor control, A1c 9.8  - metformin held  - continue glucocommander     Pancytopenia  - follows with Dr Lyman  - neutropenic precautions     Atrial fibrillation  H/o DVT/PE  - amiodarone, metoprolol for rate control- nighttime doses held 2/2 bradycardia. Decreased bid dosing to 25mg- improved  - Eliquis for anticoagulation     Prolonged QTc  - EKG 7/27 with QTc 506, sinus pancho  - on amiodarone, avoid other QT-prolong meds     Hypothyroid  - TSH appropriate at 1.65  - continue home Synthroid     Dysphagia  - MBS 7/29 showed aspiration of all consistencies.  Partner discussed with speech therapist.  Component of aspiration may involve mechanical changes from osteophytes, which may not change as patient strength improves.    - I have discussed with patient's son, Joe again today- Patient does not wish for any type of feeding tube/ artificial nutrition. Family/ Patient have opted for \"safest\" comfort diet and continued work with SLP.   -- Plan is to pursue short-term rehab and speech therapy to see if her swallowing will improve.  Continue modified diet with thickened liquids for now.     Generalized weakness  - PT/OT following.  -- Patient now awaiting short-term rehab.  CM following.     Expected Discharge Location and Transportation: SNF  Expected Discharge   Expected Discharge Date: 8/7/2024; Expected Discharge Time:      VTE " Prophylaxis:  Pharmacologic VTE prophylaxis orders are present.         AM-PAC 6 Clicks Score (PT): 17 (08/06/24 0831)    CODE STATUS:   Code Status and Medical Interventions: CPR (Attempt to Resuscitate); Full Support   Ordered at: 07/27/24 2121     Level Of Support Discussed With:    Patient     Code Status (Patient has no pulse and is not breathing):    CPR (Attempt to Resuscitate)     Medical Interventions (Patient has pulse or is breathing):    Full Support       Kimber March, CHANG  08/06/24

## 2024-08-06 NOTE — PLAN OF CARE
Goal Outcome Evaluation:         VSS, RA, tolerating nectar thick liquids. Meets criteria for discharge to [facility]

## 2024-08-13 ENCOUNTER — OFFICE VISIT (OUTPATIENT)
Dept: ONCOLOGY | Facility: CLINIC | Age: 89
End: 2024-08-13
Payer: MEDICARE

## 2024-08-13 ENCOUNTER — LAB (OUTPATIENT)
Dept: LAB | Facility: HOSPITAL | Age: 89
End: 2024-08-13
Payer: MEDICARE

## 2024-08-13 VITALS
OXYGEN SATURATION: 98 % | BODY MASS INDEX: 20.38 KG/M2 | TEMPERATURE: 97.3 F | WEIGHT: 115 LBS | SYSTOLIC BLOOD PRESSURE: 145 MMHG | RESPIRATION RATE: 20 BRPM | HEIGHT: 63 IN | HEART RATE: 59 BPM | DIASTOLIC BLOOD PRESSURE: 70 MMHG

## 2024-08-13 DIAGNOSIS — D46.9 MYELODYSPLASIA (MYELODYSPLASTIC SYNDROME): Primary | Chronic | ICD-10-CM

## 2024-08-13 DIAGNOSIS — D50.0 IRON DEFICIENCY ANEMIA DUE TO CHRONIC BLOOD LOSS: ICD-10-CM

## 2024-08-13 LAB
BASOPHILS # BLD AUTO: 0 10*3/MM3 (ref 0–0.2)
BASOPHILS NFR BLD AUTO: 0 % (ref 0–1.5)
DEPRECATED RDW RBC AUTO: 52.3 FL (ref 37–54)
EOSINOPHIL # BLD AUTO: 0 10*3/MM3 (ref 0–0.4)
EOSINOPHIL NFR BLD AUTO: 0 % (ref 0.3–6.2)
ERYTHROCYTE [DISTWIDTH] IN BLOOD BY AUTOMATED COUNT: 15.5 % (ref 12.3–15.4)
FERRITIN SERPL-MCNC: 260.5 NG/ML (ref 13–150)
HCT VFR BLD AUTO: 34.6 % (ref 34–46.6)
HGB BLD-MCNC: 11.6 G/DL (ref 12–15.9)
IMM GRANULOCYTES # BLD AUTO: 0.05 10*3/MM3 (ref 0–0.05)
IMM GRANULOCYTES NFR BLD AUTO: 1.6 % (ref 0–0.5)
IRON 24H UR-MRATE: 83 MCG/DL (ref 37–145)
IRON SATN MFR SERPL: 26 % (ref 20–50)
LYMPHOCYTES # BLD AUTO: 0.56 10*3/MM3 (ref 0.7–3.1)
LYMPHOCYTES NFR BLD AUTO: 17.7 % (ref 19.6–45.3)
MCH RBC QN AUTO: 30.7 PG (ref 26.6–33)
MCHC RBC AUTO-ENTMCNC: 33.5 G/DL (ref 31.5–35.7)
MCV RBC AUTO: 91.5 FL (ref 79–97)
MONOCYTES # BLD AUTO: 0.28 10*3/MM3 (ref 0.1–0.9)
MONOCYTES NFR BLD AUTO: 8.9 % (ref 5–12)
NEUTROPHILS NFR BLD AUTO: 2.27 10*3/MM3 (ref 1.7–7)
NEUTROPHILS NFR BLD AUTO: 71.8 % (ref 42.7–76)
PLATELET # BLD AUTO: 89 10*3/MM3 (ref 140–450)
PMV BLD AUTO: 9 FL (ref 6–12)
RBC # BLD AUTO: 3.78 10*6/MM3 (ref 3.77–5.28)
TIBC SERPL-MCNC: 319 MCG/DL (ref 298–536)
TRANSFERRIN SERPL-MCNC: 214 MG/DL (ref 200–360)
VIT B12 BLD-MCNC: 445 PG/ML (ref 211–946)
WBC NRBC COR # BLD AUTO: 3.16 10*3/MM3 (ref 3.4–10.8)

## 2024-08-13 PROCEDURE — 84466 ASSAY OF TRANSFERRIN: CPT

## 2024-08-13 PROCEDURE — 82607 VITAMIN B-12: CPT

## 2024-08-13 PROCEDURE — 99214 OFFICE O/P EST MOD 30 MIN: CPT | Performed by: NURSE PRACTITIONER

## 2024-08-13 PROCEDURE — 85025 COMPLETE CBC W/AUTO DIFF WBC: CPT

## 2024-08-13 PROCEDURE — 36415 COLL VENOUS BLD VENIPUNCTURE: CPT

## 2024-08-13 PROCEDURE — 82728 ASSAY OF FERRITIN: CPT

## 2024-08-13 PROCEDURE — 1126F AMNT PAIN NOTED NONE PRSNT: CPT | Performed by: NURSE PRACTITIONER

## 2024-08-13 PROCEDURE — 83540 ASSAY OF IRON: CPT

## 2024-08-13 PROCEDURE — 1160F RVW MEDS BY RX/DR IN RCRD: CPT | Performed by: NURSE PRACTITIONER

## 2024-08-13 PROCEDURE — 1159F MED LIST DOCD IN RCRD: CPT | Performed by: NURSE PRACTITIONER

## 2024-08-13 NOTE — PROGRESS NOTES
DATE OF VISIT: 8/13/2024    REASON FOR VISIT: Followup for pancytopenia.    PROBLEM LIST:  1.  Pancytopenia:  A.  Incidentally noted blood work done May 24, 2020  B.  CT abdomen pelvis revealed mild hepatosplenomegaly spleen size 15 cm with normal liver enzymes  C.  Bone marrow biopsy done on May 26, 2020 revealed hypercellular marrow 66% cellularity with mild megakaryocytes atypia suspicious for low-grade MDS  D.  Repeat bone marrow biopsy done July 13, 2021 revealed hypercellular marrow with dyserythropoiesis  2.  Vitamin B12 deficiency:  A.  Vitamin B12 level 155 on May 25, 2020  3. Hypothyroidism  4. Diabetes  5. History of pulmonary embolism     HISTORY OF PRESENT ILLNESS: The patient is a very pleasant 89 y.o. female with past medical history significant for pancytopenia diagnosed May 2020. Her blood work revealed pancytopenia.  CT chest abdomen pelvis revealed mild splenomegaly spleen size 15 cm with mild hepatomegaly as well as pancytopenia.  Bone marrow biopsy done on May 26, 2020 revealed hypercellular marrow with mild dysplastic features could not rule out low-grade MDS.    Repeated bone marrow biopsy done July 13, 2021 revealed similar changes.  Blood work confirmed vitamin B12 deficiency with serum B12 level of 150.  She was started on vitamin B12 replacement monthly. The patient is here today for scheduled follow up visit.     SUBJECTIVE: The patient is here today with her son. She has been doing fair. She was recently hospitalized for dehydration, UTI, and weakness. She is currently in rehab for wound care and dysphagia. She is hoping to get back to her assisted living apartment at [place]. She was given two doses of Rocephin inpatient, and has had no fevers, chills, or night sweats. She is currently on thickened liquids.     Past History:  Medical History: has a past medical history of A-fib, Anemia, Arthritis, Diabetes mellitus, Disease of thyroid gland, History of transfusion, [place] (hard of hearing),  "Hypertension, Migraine without aura and without status migrainosus, not intractable (12/30/2016), Myelodysplastic syndrome, Pulmonary emboli (2011), SOBOE (shortness of breath on exertion), Tremors of nervous system, Vertigo, Wears dentures, and Wears glasses.   Surgical History: has a past surgical history that includes Hysterectomy; Tonsillectomy; Bladder surgery; Cataract extraction; Cholecystectomy; Colonoscopy; Kyphoplasty (N/A, 02/12/2021); and Hip Trochanteric Nailing (Right, 6/20/2023).   Family History: family history includes Breast cancer (age of onset: 84) in her sister; Heart attack in her father; Stroke in her mother.   Social History: reports that she has never smoked. She has never been exposed to tobacco smoke. She has never used smokeless tobacco. She reports that she does not drink alcohol and does not use drugs.    (Not in a hospital admission)     Allergies: Aspirin      Review of Systems   Constitutional:  Positive for fatigue.   HENT:          Dysphagia diagnosed during hospitalization July 2024.    Musculoskeletal:  Positive for gait problem.        Ambulates with walker   Skin:  Positive for wound.        Right lower leg wound   Neurological:  Positive for weakness.         PHYSICAL EXAMINATION:   /70 Comment: RUE  Pulse 59   Temp 97.3 °F (36.3 °C) (Infrared)   Resp 20   Ht 160 cm (63\")   Wt 52.2 kg (115 lb)   LMP  (LMP Unknown) Comment: mammogram is up to date  SpO2 98% Comment: RA  BMI 20.37 kg/m²    Pain Score    08/13/24 0943   PainSc: 0-No pain           ECOG Performance Status: 2 - Symptomatic, <50% confined to bed  General Appearance:  alert, cooperative, no apparent distress, appears stated age, and frail appearing   Lungs:   Clear to auscultation bilaterally; respirations regular, even, and unlabored bilaterally   Heart:  Regular rate and rhythm, no murmurs appreciated   Abdomen:  Extremities:   Soft, non-tender, non-distended, and no organomegaly  Right lower leg with " wrap in place, clean and dry     Lab on 08/13/2024   Component Date Value Ref Range Status    Iron 08/13/2024 83  37 - 145 mcg/dL Final    Iron Saturation (TSAT) 08/13/2024 26  20 - 50 % Final    Transferrin 08/13/2024 214  200 - 360 mg/dL Final    TIBC 08/13/2024 319  298 - 536 mcg/dL Final    Ferritin 08/13/2024 260.50 (H)  13.00 - 150.00 ng/mL Final    WBC 08/13/2024 3.16 (L)  3.40 - 10.80 10*3/mm3 Final    RBC 08/13/2024 3.78  3.77 - 5.28 10*6/mm3 Final    Hemoglobin 08/13/2024 11.6 (L)  12.0 - 15.9 g/dL Final    Hematocrit 08/13/2024 34.6  34.0 - 46.6 % Final    MCV 08/13/2024 91.5  79.0 - 97.0 fL Final    MCH 08/13/2024 30.7  26.6 - 33.0 pg Final    MCHC 08/13/2024 33.5  31.5 - 35.7 g/dL Final    RDW 08/13/2024 15.5 (H)  12.3 - 15.4 % Final    RDW-SD 08/13/2024 52.3  37.0 - 54.0 fl Final    MPV 08/13/2024 9.0  6.0 - 12.0 fL Final    Platelets 08/13/2024 89 (L)  140 - 450 10*3/mm3 Final    Neutrophil % 08/13/2024 71.8  42.7 - 76.0 % Final    Lymphocyte % 08/13/2024 17.7 (L)  19.6 - 45.3 % Final    Monocyte % 08/13/2024 8.9  5.0 - 12.0 % Final    Eosinophil % 08/13/2024 0.0 (L)  0.3 - 6.2 % Final    Basophil % 08/13/2024 0.0  0.0 - 1.5 % Final    Immature Grans % 08/13/2024 1.6 (H)  0.0 - 0.5 % Final    Neutrophils, Absolute 08/13/2024 2.27  1.70 - 7.00 10*3/mm3 Final    Lymphocytes, Absolute 08/13/2024 0.56 (L)  0.70 - 3.10 10*3/mm3 Final    Monocytes, Absolute 08/13/2024 0.28  0.10 - 0.90 10*3/mm3 Final    Eosinophils, Absolute 08/13/2024 0.00  0.00 - 0.40 10*3/mm3 Final    Basophils, Absolute 08/13/2024 0.00  0.00 - 0.20 10*3/mm3 Final    Immature Grans, Absolute 08/13/2024 0.05  0.00 - 0.05 10*3/mm3 Final   No results displayed because visit has over 200 results.           FL Video Swallow With Speech Single Contrast    Result Date: 7/29/2024  Narrative: FL VIDEO SWALLOW W SPEECH SINGLE-CONTRAST Date of Exam: 7/29/2024 1:17 PM EDT Indication: dysphagia.   Comparison: None available. Technique:   The speech  pathologist administered food and/or liquid mixed with barium to the patient with cine/video imaging.  Imaging assistance was provided to the speech pathologist and an image was saved. Fluoroscopic Time: 1 minute and 12 seconds Number of Images: 9 associated fluoroscopic loops were saved Findings: Aspiration was seen during fluoroscopic guided modified barium swallowing series. Please see speech therapy report for full details and recommendations.     Impression: Impression: Fluoroscopy provided for a modified barium swallow. Aspiration was seen during swallowing evaluation. Please see speech therapy report for full details and recommendations. Report dictated by: Gianna Pavon PA-c  I have personally reviewed this case and agree with the findings above: Electronically Signed: Josh Lynne MD  7/29/2024 4:41 PM EDT  Workstation ID: [id number]    XR Chest 1 View    Result Date: 7/27/2024  Narrative: XR CHEST 1 VW Date of Exam: 7/27/2024 3:41 PM EDT Indication: Weakness, dizziness, altered mental status Comparison: 1/15/2024. Findings: The heart size is normal. The pulmonary vascular markings are normal. The lungs and pleural spaces are clear of active disease. There are chronic age-related changes involving the bony thorax and thoracic aorta.     Impression: Impression: No active disease. Electronically Signed: Saul Carl MD  7/27/2024 4:03 PM EDT  Workstation ID: [id number]       ASSESSMENT: The patient is a very pleasant 89 y.o. female  with pancytopenia.       PLAN:  1. Pancytopenia:  A.  I did go over the CBC result the patient from today I reassured her white cells are improved to 3.16 with hemoglobin 11.6 and platelet count up to 89,000.   B.  I will follow-up on her B12 and iron levels.    2. Hypothyroidism:  A. She will continue levothyroxine 100 mcg daily for hypothyroidism.     3.  History of iron deficiency:  A.  We will follow up on the iron studies from today as well as repeat them on return.   B. We  will arrange for IV iron replacement if needed for iron deficiency with ferritin less than 30 or saturation less than 10%.      4. Type 2 diabetes:  A. She will continue Januiva and Metformin daily. This is being managed by Dr. Loomis. Her most recent A1C done 7/28/2024 was 9.8.     5. Atrial fibrillation:  A. She is on Eliquis 2.5 mg twice a day. This is reduced dose secondary to bleeding and thrombocytopenia.   B. We will consider stopping/holding anticoagulation in the future if her platelet count gets below 30,000 or if she has evidence of bleeding.     6. Dysphagia:  A. Diagnosed during her recent hospitalization and she is currently undergoing rehab with speech therapy with current use of thickened liquids.     7. Lower extremity wounds:  A. She is undergoing wound care at her rehab facility. No evidence of cellulitis and she is no longer on antibiotics.   B. I advised she monitor for signs of infections such as increased warmth or redness, purulent drainage, fever or chill. She is aware she is immunosuppressed due to her MDS.     FOLLOW UP: 6 months repeated CBC.     Jeanna Grande, APRN  8/13/2024

## 2024-09-05 ENCOUNTER — TRANSCRIBE ORDERS (OUTPATIENT)
Dept: ADMINISTRATIVE | Facility: HOSPITAL | Age: 89
End: 2024-09-05
Payer: MEDICARE

## 2024-09-05 DIAGNOSIS — R13.10 DYSPHAGIA, UNSPECIFIED TYPE: Primary | ICD-10-CM

## 2024-09-19 ENCOUNTER — HOSPITAL ENCOUNTER (OUTPATIENT)
Dept: GENERAL RADIOLOGY | Facility: HOSPITAL | Age: 89
Discharge: HOME | End: 2024-09-19
Payer: MEDICARE

## 2024-09-19 DIAGNOSIS — R13.12 OROPHARYNGEAL DYSPHAGIA: ICD-10-CM

## 2024-09-19 PROCEDURE — A9270 NON-COVERED ITEM OR SERVICE: HCPCS | Performed by: NURSE PRACTITIONER

## 2024-09-19 PROCEDURE — 63710000001 BARIUM SULFATE 40 % SUSPENSION: Performed by: NURSE PRACTITIONER

## 2024-09-19 PROCEDURE — 63710000001 BARIUM SULFATE 40 % PASTE: Performed by: NURSE PRACTITIONER

## 2024-09-19 PROCEDURE — 92611 MOTION FLUOROSCOPY/SWALLOW: CPT

## 2024-09-19 PROCEDURE — 63710000001 BARIUM SULFATE 40 % RECONSTITUTED SUSPENSION: Performed by: NURSE PRACTITIONER

## 2024-09-19 PROCEDURE — 74230 X-RAY XM SWLNG FUNCJ C+: CPT

## 2024-09-19 RX ADMIN — BARIUM SULFATE 10 ML: 400 SUSPENSION ORAL at 10:37

## 2024-09-19 RX ADMIN — BARIUM SULFATE 20 ML: 400 PASTE ORAL at 10:37

## 2024-09-19 RX ADMIN — BARIUM SULFATE 100 ML: 0.81 POWDER, FOR SUSPENSION ORAL at 10:37

## 2024-09-19 RX ADMIN — BARIUM SULFATE 50 ML: 400 SUSPENSION ORAL at 10:37

## 2024-10-18 ENCOUNTER — APPOINTMENT (OUTPATIENT)
Dept: GENERAL RADIOLOGY | Facility: HOSPITAL | Age: 89
End: 2024-10-18
Payer: MEDICARE

## 2024-10-18 ENCOUNTER — APPOINTMENT (OUTPATIENT)
Dept: CT IMAGING | Facility: HOSPITAL | Age: 89
End: 2024-10-18
Payer: MEDICARE

## 2024-10-18 ENCOUNTER — HOSPITAL ENCOUNTER (OUTPATIENT)
Facility: HOSPITAL | Age: 89
Setting detail: OBSERVATION
Discharge: NURSING, REHAB OR LONG-TERM CARE | End: 2024-10-25
Attending: EMERGENCY MEDICINE | Admitting: STUDENT IN AN ORGANIZED HEALTH CARE EDUCATION/TRAINING PROGRAM
Payer: MEDICARE

## 2024-10-18 DIAGNOSIS — R13.10 DYSPHAGIA, UNSPECIFIED TYPE: ICD-10-CM

## 2024-10-18 DIAGNOSIS — R29.6 MULTIPLE FALLS: ICD-10-CM

## 2024-10-18 DIAGNOSIS — Z78.9 FAILURE OF OUTPATIENT TREATMENT: ICD-10-CM

## 2024-10-18 DIAGNOSIS — N39.0 ACUTE UTI: Primary | ICD-10-CM

## 2024-10-18 PROCEDURE — 87086 URINE CULTURE/COLONY COUNT: CPT | Performed by: EMERGENCY MEDICINE

## 2024-10-18 PROCEDURE — 73502 X-RAY EXAM HIP UNI 2-3 VIEWS: CPT

## 2024-10-18 PROCEDURE — 87077 CULTURE AEROBIC IDENTIFY: CPT | Performed by: EMERGENCY MEDICINE

## 2024-10-18 PROCEDURE — 85025 COMPLETE CBC W/AUTO DIFF WBC: CPT | Performed by: EMERGENCY MEDICINE

## 2024-10-18 PROCEDURE — 99285 EMERGENCY DEPT VISIT HI MDM: CPT

## 2024-10-18 PROCEDURE — P9612 CATHETERIZE FOR URINE SPEC: HCPCS

## 2024-10-18 PROCEDURE — 70450 CT HEAD/BRAIN W/O DYE: CPT

## 2024-10-18 PROCEDURE — 36415 COLL VENOUS BLD VENIPUNCTURE: CPT

## 2024-10-18 PROCEDURE — 81001 URINALYSIS AUTO W/SCOPE: CPT | Performed by: EMERGENCY MEDICINE

## 2024-10-18 PROCEDURE — G0378 HOSPITAL OBSERVATION PER HR: HCPCS

## 2024-10-18 PROCEDURE — 87186 SC STD MICRODIL/AGAR DIL: CPT | Performed by: EMERGENCY MEDICINE

## 2024-10-18 PROCEDURE — 93005 ELECTROCARDIOGRAM TRACING: CPT | Performed by: EMERGENCY MEDICINE

## 2024-10-18 PROCEDURE — 83735 ASSAY OF MAGNESIUM: CPT | Performed by: NURSE PRACTITIONER

## 2024-10-18 PROCEDURE — 80053 COMPREHEN METABOLIC PANEL: CPT | Performed by: EMERGENCY MEDICINE

## 2024-10-18 NOTE — Clinical Note
Level of Care: Telemetry [5]   Diagnosis: Falls [[id number]]   Admitting Physician: SEEMA GUO III [[id number]]   Attending Physician: SEEMA GUO III [[id number]]   Bed Request Comments: tele obs not cdu

## 2024-10-19 ENCOUNTER — APPOINTMENT (OUTPATIENT)
Dept: GENERAL RADIOLOGY | Facility: HOSPITAL | Age: 89
End: 2024-10-19
Payer: MEDICARE

## 2024-10-19 PROBLEM — R29.6 FALLS: Status: ACTIVE | Noted: 2024-10-19

## 2024-10-19 LAB
ALBUMIN SERPL-MCNC: 3.8 G/DL (ref 3.5–5.2)
ALBUMIN/GLOB SERPL: 1.9 G/DL
ALP SERPL-CCNC: 82 U/L (ref 39–117)
ALT SERPL W P-5'-P-CCNC: 6 U/L (ref 1–33)
ANION GAP SERPL CALCULATED.3IONS-SCNC: 11 MMOL/L (ref 5–15)
ANION GAP SERPL CALCULATED.3IONS-SCNC: 16 MMOL/L (ref 5–15)
AST SERPL-CCNC: 10 U/L (ref 1–32)
BACTERIA UR QL AUTO: ABNORMAL /HPF
BASOPHILS # BLD AUTO: 0 10*3/MM3 (ref 0–0.2)
BASOPHILS # BLD MANUAL: 0 10*3/MM3 (ref 0–0.2)
BASOPHILS NFR BLD AUTO: 0 % (ref 0–1.5)
BASOPHILS NFR BLD MANUAL: 0 % (ref 0–1.5)
BILIRUB SERPL-MCNC: 0.4 MG/DL (ref 0–1.2)
BILIRUB UR QL STRIP: NEGATIVE
BUN SERPL-MCNC: 17 MG/DL (ref 8–23)
BUN SERPL-MCNC: 20 MG/DL (ref 8–23)
BUN/CREAT SERPL: 19.4 (ref 7–25)
BUN/CREAT SERPL: 22.7 (ref 7–25)
CALCIUM SPEC-SCNC: 8.4 MG/DL (ref 8.6–10.5)
CALCIUM SPEC-SCNC: 8.5 MG/DL (ref 8.6–10.5)
CHLORIDE SERPL-SCNC: 101 MMOL/L (ref 98–107)
CHLORIDE SERPL-SCNC: 102 MMOL/L (ref 98–107)
CLARITY UR: CLEAR
CO2 SERPL-SCNC: 22 MMOL/L (ref 22–29)
CO2 SERPL-SCNC: 25 MMOL/L (ref 22–29)
COLOR UR: YELLOW
CREAT SERPL-MCNC: 0.75 MG/DL (ref 0.57–1)
CREAT SERPL-MCNC: 1.03 MG/DL (ref 0.57–1)
DEPRECATED RDW RBC AUTO: 47 FL (ref 37–54)
DEPRECATED RDW RBC AUTO: 47.8 FL (ref 37–54)
EGFRCR SERPLBLD CKD-EPI 2021: 52.4 ML/MIN/1.73
EGFRCR SERPLBLD CKD-EPI 2021: 76.7 ML/MIN/1.73
EOSINOPHIL # BLD AUTO: 0 10*3/MM3 (ref 0–0.4)
EOSINOPHIL # BLD MANUAL: 0 10*3/MM3 (ref 0–0.4)
EOSINOPHIL NFR BLD AUTO: 0 % (ref 0.3–6.2)
EOSINOPHIL NFR BLD MANUAL: 0 % (ref 0.3–6.2)
ERYTHROCYTE [DISTWIDTH] IN BLOOD BY AUTOMATED COUNT: 13.9 % (ref 12.3–15.4)
ERYTHROCYTE [DISTWIDTH] IN BLOOD BY AUTOMATED COUNT: 14.2 % (ref 12.3–15.4)
GLOBULIN UR ELPH-MCNC: 2 GM/DL
GLUCOSE BLDC GLUCOMTR-MCNC: 164 MG/DL (ref 70–130)
GLUCOSE BLDC GLUCOMTR-MCNC: 177 MG/DL (ref 70–130)
GLUCOSE BLDC GLUCOMTR-MCNC: 177 MG/DL (ref 70–130)
GLUCOSE BLDC GLUCOMTR-MCNC: 92 MG/DL (ref 70–130)
GLUCOSE SERPL-MCNC: 109 MG/DL (ref 65–99)
GLUCOSE SERPL-MCNC: 159 MG/DL (ref 65–99)
GLUCOSE UR STRIP-MCNC: NEGATIVE MG/DL
HCT VFR BLD AUTO: 26.9 % (ref 34–46.6)
HCT VFR BLD AUTO: 27.6 % (ref 34–46.6)
HGB BLD-MCNC: 9 G/DL (ref 12–15.9)
HGB BLD-MCNC: 9.1 G/DL (ref 12–15.9)
HGB UR QL STRIP.AUTO: NEGATIVE
HYALINE CASTS UR QL AUTO: ABNORMAL /LPF
IMM GRANULOCYTES # BLD AUTO: 0.02 10*3/MM3 (ref 0–0.05)
IMM GRANULOCYTES NFR BLD AUTO: 1.3 % (ref 0–0.5)
KETONES UR QL STRIP: NEGATIVE
LEUKOCYTE ESTERASE UR QL STRIP.AUTO: ABNORMAL
LYMPHOCYTES # BLD AUTO: 0.56 10*3/MM3 (ref 0.7–3.1)
LYMPHOCYTES # BLD MANUAL: 0.49 10*3/MM3 (ref 0.7–3.1)
LYMPHOCYTES NFR BLD AUTO: 35 % (ref 19.6–45.3)
LYMPHOCYTES NFR BLD MANUAL: 11 % (ref 5–12)
MAGNESIUM SERPL-MCNC: 1.4 MG/DL (ref 1.6–2.4)
MAGNESIUM SERPL-MCNC: 1.5 MG/DL (ref 1.6–2.4)
MCH RBC QN AUTO: 30.1 PG (ref 26.6–33)
MCH RBC QN AUTO: 31.1 PG (ref 26.6–33)
MCHC RBC AUTO-ENTMCNC: 32.6 G/DL (ref 31.5–35.7)
MCHC RBC AUTO-ENTMCNC: 33.8 G/DL (ref 31.5–35.7)
MCV RBC AUTO: 91.8 FL (ref 79–97)
MCV RBC AUTO: 92.3 FL (ref 79–97)
MONOCYTES # BLD AUTO: 0.25 10*3/MM3 (ref 0.1–0.9)
MONOCYTES # BLD: 0.13 10*3/MM3 (ref 0.1–0.9)
MONOCYTES NFR BLD AUTO: 15.6 % (ref 5–12)
NEUTROPHILS # BLD AUTO: 0.58 10*3/MM3 (ref 1.7–7)
NEUTROPHILS NFR BLD AUTO: 0.77 10*3/MM3 (ref 1.7–7)
NEUTROPHILS NFR BLD AUTO: 48.1 % (ref 42.7–76)
NEUTROPHILS NFR BLD MANUAL: 47 % (ref 42.7–76)
NEUTS BAND NFR BLD MANUAL: 1 % (ref 0–5)
NITRITE UR QL STRIP: POSITIVE
NRBC BLD AUTO-RTO: 0 /100 WBC (ref 0–0.2)
NRBC SPEC MANUAL: 0 /100 WBC (ref 0–0.2)
PH UR STRIP.AUTO: <=5 [PH] (ref 5–8)
PLAT MORPH BLD: NORMAL
PLATELET # BLD AUTO: 52 10*3/MM3 (ref 140–450)
PLATELET # BLD AUTO: 58 10*3/MM3 (ref 140–450)
PMV BLD AUTO: 10.1 FL (ref 6–12)
PMV BLD AUTO: 9.6 FL (ref 6–12)
POTASSIUM SERPL-SCNC: 3.7 MMOL/L (ref 3.5–5.2)
POTASSIUM SERPL-SCNC: 3.9 MMOL/L (ref 3.5–5.2)
PROT SERPL-MCNC: 5.8 G/DL (ref 6–8.5)
PROT UR QL STRIP: NEGATIVE
QT INTERVAL: 396 MS
QTC INTERVAL: 382 MS
RBC # BLD AUTO: 2.93 10*6/MM3 (ref 3.77–5.28)
RBC # BLD AUTO: 2.99 10*6/MM3 (ref 3.77–5.28)
RBC # UR STRIP: ABNORMAL /HPF
RBC MORPH BLD: NORMAL
REF LAB TEST METHOD: ABNORMAL
SODIUM SERPL-SCNC: 138 MMOL/L (ref 136–145)
SODIUM SERPL-SCNC: 139 MMOL/L (ref 136–145)
SP GR UR STRIP: 1.01 (ref 1–1.03)
SQUAMOUS #/AREA URNS HPF: ABNORMAL /HPF
UROBILINOGEN UR QL STRIP: ABNORMAL
VARIANT LYMPHS NFR BLD MANUAL: 35 % (ref 19.6–45.3)
VARIANT LYMPHS NFR BLD MANUAL: 6 % (ref 0–5)
WBC # UR STRIP: ABNORMAL /HPF
WBC MORPH BLD: NORMAL
WBC NRBC COR # BLD AUTO: 1.2 10*3/MM3 (ref 3.4–10.8)
WBC NRBC COR # BLD AUTO: 1.6 10*3/MM3 (ref 3.4–10.8)

## 2024-10-19 PROCEDURE — 73560 X-RAY EXAM OF KNEE 1 OR 2: CPT

## 2024-10-19 PROCEDURE — 85007 BL SMEAR W/DIFF WBC COUNT: CPT | Performed by: NURSE PRACTITIONER

## 2024-10-19 PROCEDURE — G0378 HOSPITAL OBSERVATION PER HR: HCPCS

## 2024-10-19 PROCEDURE — 82948 REAGENT STRIP/BLOOD GLUCOSE: CPT

## 2024-10-19 PROCEDURE — 96366 THER/PROPH/DIAG IV INF ADDON: CPT

## 2024-10-19 PROCEDURE — 63710000001 INSULIN LISPRO (HUMAN) PER 5 UNITS: Performed by: NURSE PRACTITIONER

## 2024-10-19 PROCEDURE — 99222 1ST HOSP IP/OBS MODERATE 55: CPT | Performed by: NURSE PRACTITIONER

## 2024-10-19 PROCEDURE — 80048 BASIC METABOLIC PNL TOTAL CA: CPT | Performed by: NURSE PRACTITIONER

## 2024-10-19 PROCEDURE — 25810000003 SODIUM CHLORIDE 0.9 % SOLUTION: Performed by: NURSE PRACTITIONER

## 2024-10-19 PROCEDURE — 97110 THERAPEUTIC EXERCISES: CPT

## 2024-10-19 PROCEDURE — 96365 THER/PROPH/DIAG IV INF INIT: CPT

## 2024-10-19 PROCEDURE — 97530 THERAPEUTIC ACTIVITIES: CPT

## 2024-10-19 PROCEDURE — 99233 SBSQ HOSP IP/OBS HIGH 50: CPT | Performed by: FAMILY MEDICINE

## 2024-10-19 PROCEDURE — 25010000002 CEFTRIAXONE PER 250 MG: Performed by: NURSE PRACTITIONER

## 2024-10-19 PROCEDURE — 83735 ASSAY OF MAGNESIUM: CPT | Performed by: FAMILY MEDICINE

## 2024-10-19 PROCEDURE — 92610 EVALUATE SWALLOWING FUNCTION: CPT

## 2024-10-19 PROCEDURE — 25010000002 MAGNESIUM SULFATE 2 GM/50ML SOLUTION: Performed by: FAMILY MEDICINE

## 2024-10-19 PROCEDURE — 25010000002 CEFTRIAXONE PER 250 MG: Performed by: EMERGENCY MEDICINE

## 2024-10-19 PROCEDURE — 87040 BLOOD CULTURE FOR BACTERIA: CPT | Performed by: EMERGENCY MEDICINE

## 2024-10-19 PROCEDURE — 83036 HEMOGLOBIN GLYCOSYLATED A1C: CPT | Performed by: FAMILY MEDICINE

## 2024-10-19 PROCEDURE — 96361 HYDRATE IV INFUSION ADD-ON: CPT

## 2024-10-19 PROCEDURE — 85027 COMPLETE CBC AUTOMATED: CPT | Performed by: NURSE PRACTITIONER

## 2024-10-19 PROCEDURE — 97161 PT EVAL LOW COMPLEX 20 MIN: CPT

## 2024-10-19 RX ORDER — NICOTINE POLACRILEX 4 MG
15 LOZENGE BUCCAL
Status: DISCONTINUED | OUTPATIENT
Start: 2024-10-19 | End: 2024-10-25 | Stop reason: HOSPADM

## 2024-10-19 RX ORDER — SODIUM CHLORIDE 0.9 % (FLUSH) 0.9 %
10 SYRINGE (ML) INJECTION EVERY 12 HOURS SCHEDULED
Status: DISCONTINUED | OUTPATIENT
Start: 2024-10-19 | End: 2024-10-25 | Stop reason: HOSPADM

## 2024-10-19 RX ORDER — ONDANSETRON 2 MG/ML
4 INJECTION INTRAMUSCULAR; INTRAVENOUS EVERY 6 HOURS PRN
Status: DISCONTINUED | OUTPATIENT
Start: 2024-10-19 | End: 2024-10-25 | Stop reason: HOSPADM

## 2024-10-19 RX ORDER — AMIODARONE HYDROCHLORIDE 200 MG/1
200 TABLET ORAL DAILY
Status: DISCONTINUED | OUTPATIENT
Start: 2024-10-19 | End: 2024-10-25 | Stop reason: HOSPADM

## 2024-10-19 RX ORDER — AMOXICILLIN 250 MG
2 CAPSULE ORAL 2 TIMES DAILY PRN
Status: DISCONTINUED | OUTPATIENT
Start: 2024-10-19 | End: 2024-10-25 | Stop reason: HOSPADM

## 2024-10-19 RX ORDER — ACETAMINOPHEN 500 MG
500 TABLET ORAL EVERY 6 HOURS PRN
Status: DISCONTINUED | OUTPATIENT
Start: 2024-10-19 | End: 2024-10-25 | Stop reason: HOSPADM

## 2024-10-19 RX ORDER — BISACODYL 5 MG/1
5 TABLET, DELAYED RELEASE ORAL DAILY PRN
Status: DISCONTINUED | OUTPATIENT
Start: 2024-10-19 | End: 2024-10-25 | Stop reason: HOSPADM

## 2024-10-19 RX ORDER — SODIUM CHLORIDE 0.9 % (FLUSH) 0.9 %
10 SYRINGE (ML) INJECTION AS NEEDED
Status: DISCONTINUED | OUTPATIENT
Start: 2024-10-19 | End: 2024-10-25 | Stop reason: HOSPADM

## 2024-10-19 RX ORDER — BISACODYL 10 MG
10 SUPPOSITORY, RECTAL RECTAL DAILY PRN
Status: DISCONTINUED | OUTPATIENT
Start: 2024-10-19 | End: 2024-10-25 | Stop reason: HOSPADM

## 2024-10-19 RX ORDER — LEVOTHYROXINE SODIUM 100 UG/1
100 TABLET ORAL
Status: DISCONTINUED | OUTPATIENT
Start: 2024-10-19 | End: 2024-10-25 | Stop reason: HOSPADM

## 2024-10-19 RX ORDER — ONDANSETRON 4 MG/1
4 TABLET, ORALLY DISINTEGRATING ORAL EVERY 6 HOURS PRN
Status: DISCONTINUED | OUTPATIENT
Start: 2024-10-19 | End: 2024-10-25 | Stop reason: HOSPADM

## 2024-10-19 RX ORDER — ASCORBIC ACID 500 MG
500 TABLET ORAL DAILY
Status: DISCONTINUED | OUTPATIENT
Start: 2024-10-19 | End: 2024-10-25 | Stop reason: HOSPADM

## 2024-10-19 RX ORDER — POLYETHYLENE GLYCOL 3350 17 G/17G
17 POWDER, FOR SOLUTION ORAL DAILY PRN
Status: DISCONTINUED | OUTPATIENT
Start: 2024-10-19 | End: 2024-10-25 | Stop reason: HOSPADM

## 2024-10-19 RX ORDER — DEXTROSE MONOHYDRATE 25 G/50ML
25 INJECTION, SOLUTION INTRAVENOUS
Status: DISCONTINUED | OUTPATIENT
Start: 2024-10-19 | End: 2024-10-25 | Stop reason: HOSPADM

## 2024-10-19 RX ORDER — PANTOPRAZOLE SODIUM 40 MG/1
40 TABLET, DELAYED RELEASE ORAL DAILY
Status: DISCONTINUED | OUTPATIENT
Start: 2024-10-19 | End: 2024-10-25 | Stop reason: HOSPADM

## 2024-10-19 RX ORDER — MAGNESIUM SULFATE HEPTAHYDRATE 40 MG/ML
2 INJECTION, SOLUTION INTRAVENOUS
Status: COMPLETED | OUTPATIENT
Start: 2024-10-19 | End: 2024-10-20

## 2024-10-19 RX ORDER — METOPROLOL TARTRATE 25 MG/1
25 TABLET, FILM COATED ORAL 2 TIMES DAILY
Status: DISCONTINUED | OUTPATIENT
Start: 2024-10-19 | End: 2024-10-25 | Stop reason: HOSPADM

## 2024-10-19 RX ORDER — IBUPROFEN 600 MG/1
1 TABLET ORAL
Status: DISCONTINUED | OUTPATIENT
Start: 2024-10-19 | End: 2024-10-25 | Stop reason: HOSPADM

## 2024-10-19 RX ORDER — ONDANSETRON 4 MG/1
4 TABLET, ORALLY DISINTEGRATING ORAL EVERY 8 HOURS PRN
Status: DISCONTINUED | OUTPATIENT
Start: 2024-10-19 | End: 2024-10-20 | Stop reason: SDUPTHER

## 2024-10-19 RX ORDER — SODIUM CHLORIDE 9 MG/ML
100 INJECTION, SOLUTION INTRAVENOUS CONTINUOUS
Status: ACTIVE | OUTPATIENT
Start: 2024-10-19 | End: 2024-10-19

## 2024-10-19 RX ORDER — INSULIN LISPRO 100 [IU]/ML
2-7 INJECTION, SOLUTION INTRAVENOUS; SUBCUTANEOUS
Status: DISCONTINUED | OUTPATIENT
Start: 2024-10-19 | End: 2024-10-25 | Stop reason: HOSPADM

## 2024-10-19 RX ORDER — TRAMADOL HYDROCHLORIDE 50 MG/1
50 TABLET ORAL 2 TIMES DAILY PRN
Status: DISCONTINUED | OUTPATIENT
Start: 2024-10-19 | End: 2024-10-25 | Stop reason: HOSPADM

## 2024-10-19 RX ADMIN — AMIODARONE HYDROCHLORIDE 200 MG: 200 TABLET ORAL at 09:50

## 2024-10-19 RX ADMIN — Medication 10 ML: at 09:15

## 2024-10-19 RX ADMIN — Medication 10 ML: at 04:37

## 2024-10-19 RX ADMIN — SODIUM CHLORIDE 1000 MG: 900 INJECTION INTRAVENOUS at 02:43

## 2024-10-19 RX ADMIN — OXYCODONE HYDROCHLORIDE AND ACETAMINOPHEN 500 MG: 500 TABLET ORAL at 09:12

## 2024-10-19 RX ADMIN — INSULIN LISPRO 2 UNITS: 100 INJECTION, SOLUTION INTRAVENOUS; SUBCUTANEOUS at 22:27

## 2024-10-19 RX ADMIN — INSULIN LISPRO 2 UNITS: 100 INJECTION, SOLUTION INTRAVENOUS; SUBCUTANEOUS at 17:36

## 2024-10-19 RX ADMIN — PANTOPRAZOLE SODIUM 40 MG: 40 TABLET, DELAYED RELEASE ORAL at 09:12

## 2024-10-19 RX ADMIN — MAGNESIUM SULFATE HEPTAHYDRATE 2 G: 40 INJECTION, SOLUTION INTRAVENOUS at 18:46

## 2024-10-19 RX ADMIN — MAGNESIUM SULFATE HEPTAHYDRATE 2 G: 40 INJECTION, SOLUTION INTRAVENOUS at 22:28

## 2024-10-19 RX ADMIN — LEVOTHYROXINE SODIUM 100 MCG: 0.1 TABLET ORAL at 05:25

## 2024-10-19 RX ADMIN — INSULIN LISPRO 2 UNITS: 100 INJECTION, SOLUTION INTRAVENOUS; SUBCUTANEOUS at 13:11

## 2024-10-19 RX ADMIN — SODIUM CHLORIDE 100 ML/HR: 9 INJECTION, SOLUTION INTRAVENOUS at 04:37

## 2024-10-19 RX ADMIN — SODIUM CHLORIDE 1000 MG: 900 INJECTION INTRAVENOUS at 20:14

## 2024-10-19 RX ADMIN — Medication 10 ML: at 20:28

## 2024-10-19 NOTE — THERAPY EVALUATION
Patient Name: Chey Robertson  : 1935    MRN: [id number]                              Today's Date: 10/19/2024       Admit Date: 10/18/2024    Visit Dx:     ICD-10-CM ICD-9-CM   1. Acute UTI  N39.0 599.0   2. Multiple falls  R29.6 V15.88   3. Failure of outpatient treatment  Z78.9 V49.89     Patient Active Problem List   Diagnosis    Benign familial tremor    AMS (altered mental status)    Pancytopenia    Hypothyroidism (acquired)    B12 deficiency    Abnormal intestinal absorption    Iron deficiency anemia due to chronic blood loss    Myelodysplasia (myelodysplastic syndrome)    Personal history of pulmonary embolism    Chronic anticoagulation    Essential hypertension    Type 2 diabetes mellitus without complication, without long-term current use of insulin    Atrial fibrillation    QT prolongation    Pericardial effusion    Severe malnutrition    Closed displaced intertrochanteric fracture of right femur, initial encounter    DISHA (acute kidney injury)    Moderate malnutrition    Falls     Past Medical History:   Diagnosis Date    A-fib     on eliquis    Anemia     Arthritis     Diabetes mellitus     checks sugar only when pt wants to     Disease of thyroid gland     History of transfusion     with knee surgery-  no reaction recalled.     [place] (hard of hearing)     no hearing aids    Hypertension     Migraine without aura and without status migrainosus, not intractable 2016    Myelodysplastic syndrome     Pulmonary emboli     (after knee surgery)    SOBOE (shortness of breath on exertion)     Tremors of nervous system     Vertigo     Wears dentures     upper     Wears glasses      Past Surgical History:   Procedure Laterality Date    BLADDER SURGERY      CATARACT EXTRACTION      bilat     CHOLECYSTECTOMY      COLONOSCOPY      HIP TROCHANTERIC NAILING WITH INTRAMEDULLARY HIP SCREW Right 2023    Procedure: HIP TROCHANTERIC NAILING WITH INTRAMEDULLARY HIP SCREW RIGHT;  Surgeon: Augie Hobbs,  MD;  Location:  [facility];  Service: Orthopedics;  Laterality: Right;    HYSTERECTOMY      with bso    KYPHOPLASTY N/A 02/12/2021    Procedure: KYPHOPLASTY T11, T12 AND L4;  Surgeon: Matty Hearn MD;  Location: [facility] OR;  Service: Orthopedic Spine;  Laterality: N/A;    TONSILLECTOMY        General Information       Row Name 10/19/24 1443          Physical Therapy Time and Intention    Document Type evaluation  -CT     Mode of Treatment physical therapy  -CT       Row Name 10/19/24 1443          General Information    Patient Profile Reviewed yes  -CT     Prior Level of Function mod assist:;bathing;dressing;grooming;independent:;gait;transfer;bed mobility;ADL's  -CT     Existing Precautions/Restrictions no known precautions/restrictions  -CT     Barriers to Rehab physical barrier;medically complex;visual deficit  -CT       Row Name 10/19/24 1443          Living Environment    People in Home facility resident  -CT       Row Name 10/19/24 1443          Home Main Entrance    Number of Stairs, Main Entrance none  -CT       Row Name 10/19/24 1443          Stairs Within Home, Primary    Number of Stairs, Within Home, Primary none  -CT       Row Name 10/19/24 1443          Cognition    Orientation Status (Cognition) oriented to;person  -CT       Row Name 10/19/24 1443          Safety Issues/Impairments Affecting Functional Mobility    Safety Issues Affecting Function (Mobility) awareness of need for assistance;insight into deficits/self-awareness;judgment;sequencing abilities  -CT     Impairments Affecting Function (Mobility) balance;coordination;endurance/activity tolerance;motor control  -CT     Comment, Safety Issues/Impairments (Mobility) pt is very tired and sleepy.  Nsg states she was a late admit.  -CT               User Key  (r) = Recorded By, (t) = Taken By, (c) = Cosigned By      Initials Name Provider Type    CT Rony Villalobos, PT Physical Therapist                   Mobility       Row Name 10/19/24  1445          Bed Mobility    Bed Mobility bed mobility (all) activities;rolling left  -CT     All Activities, [place] (Bed Mobility) moderate assist (50% patient effort)  -CT     Rolling Left [place] (Bed Mobility) moderate assist (50% patient effort)  -CT       Row Name 10/19/24 1445          Bed-Chair Transfer    Bed-Chair [place] (Transfers) moderate assist (50% patient effort)  -CT     Comment, (Bed-Chair Transfer) standing with PT and stepping to chair  -CT       Row Name 10/19/24 1445          Sit-Stand Transfer    Sit-Stand [place] (Transfers) moderate assist (50% patient effort)  -CT       Row Name 10/19/24 1445          Gait/Stairs (Locomotion)    [place] Level (Gait) not tested  -CT     Patient was able to Ambulate no, other medical factors prevent ambulation  -CT     Maintains Weight-bearing Status (Gait) physical assist to maintain  -CT               User Key  (r) = Recorded By, (t) = Taken By, (c) = Cosigned By      Initials Name Provider Type    CT Rony Villalobos, PT Physical Therapist                   Obj/Interventions       Row Name 10/19/24 1447          Range of Motion Comprehensive    General Range of Motion no range of motion deficits identified  -CT       Row Name 10/19/24 1447          Strength Comprehensive (MMT)    General Manual Muscle Testing (MMT) Assessment upper extremity strength deficits identified;lower extremity strength deficits identified  -CT     Comment, General Manual Muscle Testing (MMT) Assessment grossly 3/5 with pt fatuigued and sleepy.  Does follow commands slow to process but able to assist appropiately in transfes.  -CT       Row Name 10/19/24 1447          Motor Skills    Motor Skills functional endurance;muscle tone  -CT     Muscle Tone upper extremity(s);lower extremity(s);mild impairment  -CT       Row Name 10/19/24 1447          Balance    Balance Assessment sitting static balance;standing static balance;standing dynamic  balance;sitting dynamic balance  -CT     Static Sitting Balance moderate assist  -CT     Dynamic Sitting Balance moderate assist  -CT     Static Standing Balance moderate assist  -CT     Dynamic Standing Balance moderate assist  -CT     Balance Interventions sitting;standing;sit to stand;weight shifting activity  -CT               User Key  (r) = Recorded By, (t) = Taken By, (c) = Cosigned By      Initials Name Provider Type    CT Rony Villalobos, PT Physical Therapist                   Goals/Plan       Row Name 10/19/24 1450          Bed Mobility Goal 1 (PT)    Activity/Assistive Device (Bed Mobility Goal 1, PT) bed mobility activities, all  -CT     [place] Level/Cues Needed (Bed Mobility Goal 1, PT) independent  -CT     Time Frame (Bed Mobility Goal 1, PT) short term goal (STG);3 days  -CT       Row Name 10/19/24 1450          Transfer Goal 1 (PT)    Activity/Assistive Device (Transfer Goal 1, PT) transfers, all  -CT     [place] Level/Cues Needed (Transfer Goal 1, PT) independent  -CT     Time Frame (Transfer Goal 1, PT) short term goal (STG);3 days  -CT       Row Name 10/19/24 1450          Gait Training Goal 1 (PT)    Activity/Assistive Device (Gait Training Goal 1, PT) gait (walking locomotion)  -CT     [place] Level (Gait Training Goal 1, PT) modified independence;supervision required  -CT     Distance (Gait Training Goal 1, PT) 100  -CT     Time Frame (Gait Training Goal 1, PT) long term goal (LTG);5 days  -CT       Row Name 10/19/24 1450          Balance Goal 1 (PT)    Activity/Assistive Device (Balance Goal) sitting static balance;sitting dynamic balance;standing static balance;standing dynamic balance  -CT     [place] Level/Cues Needed (Balance Goal 1, PT) modified independence  -CT     Time Frame (Balance Goal 1, PT) long-term goal (LTG);5-7 days  -CT       Row Name 10/19/24 1450          Therapy Assessment/Plan (PT)    Planned Therapy Interventions (PT) balance training;bed  mobility training;gait training;stretching;strengthening;transfer training;ROM (range of motion);patient/family education  -CT               User Key  (r) = Recorded By, (t) = Taken By, (c) = Cosigned By      Initials Name Provider Type    CT Rony Villalobos, PT Physical Therapist                   Clinical Impression       Row Name 10/19/24 1449          Pain    Pretreatment Pain Rating 0/10 - no pain  -CT     Posttreatment Pain Rating 0/10 - no pain  -CT       Row Name 10/19/24 1449          Plan of Care Review    Plan of Care Reviewed With patient  -CT     Progress no change  -CT     Outcome Evaluation Damian completed pt sleepy and fatigued at baseline with nsg stating she was a late admit and has not slept much.  Tx bed to chair mod A of PT OOB in chair with bed alarm and nsg notified.  -CT       Row Name 10/19/24 1449          Therapy Assessment/Plan (PT)    Rehab Potential (PT) good  -CT     Criteria for Skilled Interventions Met (PT) yes  -CT     Therapy Frequency (PT) daily  -CT       Row Name 10/19/24 1449          Positioning and Restraints    Pre-Treatment Position in bed  -CT     Post Treatment Position chair  -CT     In Chair notified nsg;reclined;sitting;call light within reach;exit alarm on;legs elevated  -CT               User Key  (r) = Recorded By, (t) = Taken By, (c) = Cosigned By      Initials Name Provider Type    CT Rony Villalobos, PT Physical Therapist                   Outcome Measures       Row Name 10/19/24 1451 10/19/24 0309       How much help from another person do you currently need...    Turning from your back to your side while in flat bed without using bedrails? 3  -CT 3  -KE    Moving from lying on back to sitting on the side of a flat bed without bedrails? 3  -CT 3  -KE    Moving to and from a bed to a chair (including a wheelchair)? 2  -CT 2  -KE    Standing up from a chair using your arms (e.g., wheelchair, bedside chair)? 2  -CT 2  -KE    Climbing 3-5 steps with  a railing? 1  -CT 1  -KE    To walk in hospital room? 1  -CT 1  -KE    AM-PAC 6 Clicks Score (PT) 12  -CT 12  -KE              User Key  (r) = Recorded By, (t) = Taken By, (c) = Cosigned By      Initials Name Provider Type    CT Rony Villalobos, PT Physical Therapist    Lexie Meek, RN Registered Nurse                                 Physical Therapy Education       Title: PT OT SLP Therapies (In Progress)       Topic: Physical Therapy (In Progress)       Point: Mobility training (In Progress)       Learning Progress Summary            Patient Acceptance, E,D, NR by CT at 10/19/2024 1452                      Point: Home exercise program (In Progress)       Learning Progress Summary            Patient Acceptance, E,D, NR by CT at 10/19/2024 1452                      Point: Body mechanics (In Progress)       Learning Progress Summary            Patient Acceptance, E,D, NR by CT at 10/19/2024 1452                      Point: Precautions (In Progress)       Learning Progress Summary            Patient Acceptance, E,D, NR by CT at 10/19/2024 1452                                      User Key       Initials Effective Dates Name Provider Type Discipline    CT 07/07/23 -  Rony Villalobos, PT Physical Therapist PT                  PT Recommendation and Plan  Planned Therapy Interventions (PT): balance training, bed mobility training, gait training, stretching, strengthening, transfer training, ROM (range of motion), patient/family education  Progress: no change  Outcome Evaluation: Damian completed pt sleepy and fatigued at baseline with nsg stating she was a late admit and has not slept much.  Tx bed to chair mod A of PT OOB in chair with bed alarm and nsg notified.     Time Calculation:         PT Charges       Row Name 10/19/24 1453             Time Calculation    Start Time 1425  -CT      PT Received On 10/19/24  -CT      PT Goal Re-Cert Due Date 10/30/24  -CT         Timed Charges    81214 - PT  Therapeutic Exercise Minutes 15  -CT      77634 - PT Therapeutic Activity Minutes 15  -CT         Untimed Charges    PT Eval/Re-eval Minutes 25  -CT         Total Minutes    Timed Charges Total Minutes 30  -CT      Untimed Charges Total Minutes 25  -CT       Total Minutes 55  -CT                User Key  (r) = Recorded By, (t) = Taken By, (c) = Cosigned By      Initials Name Provider Type    CT Rony Villalobos, PT Physical Therapist                  Therapy Charges for Today       Code Description Service Date Service Provider Modifiers Qty    97892075615 HC PT THER PROC EA 15 MIN 10/19/2024 Rony Villalobos, PT GP 1    69739044612 HC PT THERAPEUTIC ACT EA 15 MIN 10/19/2024 Rony Villalobos, PT GP 1    58534018441 HC PT EVAL LOW COMPLEXITY 2 10/19/2024 Rony Villalobos, PT GP 1            PT G-Codes  AM-PAC 6 Clicks Score (PT): 12  PT Discharge Summary  Anticipated Discharge Disposition (PT): [facility] (long-term care hospital)    Rony Villalobos, GURPREET  10/19/2024

## 2024-10-19 NOTE — ED PROVIDER NOTES
Subjective   History of Present Illness  Patient is a pleasant 89-year-old female who presents to the emergency department following a fall at [facility] and right leg pain.  Actually had 2 falls 2 days ago.  Yesterday she was able to ambulate despite the falls but has developed right leg pain today making ambulation difficult.  Patient had an extended recent back story including hospitalization, short-term rehab followed by skilled nursing facility.  She was just discharged back to her assisted living facility [facility] less than 1 week ago.  Since arriving home again she has 2 falls, during both which show that she hit her head on the ground and again injured her right hip and knee.    Denies current medical complaints.  Denies fever, chills, chest pain, shortness of breath, abdominal pain, vomiting, diarrhea, or other acute complaint.            Review of Systems   All other systems reviewed and are negative.      Past Medical History:   Diagnosis Date    A-fib     on eliquis    Anemia     Arthritis     Diabetes mellitus     checks sugar only when pt wants to     Disease of thyroid gland     History of transfusion     with knee surgery-  no reaction recalled.     [place] (hard of hearing)     no hearing aids    Hypertension     Migraine without aura and without status migrainosus, not intractable 12/30/2016    Myelodysplastic syndrome     Pulmonary emboli 2011    (after knee surgery)    SOBOE (shortness of breath on exertion)     Tremors of nervous system     Vertigo     Wears dentures     upper     Wears glasses        Allergies   Allergen Reactions    Aspirin Unknown - Low Severity     Mouth sores        Past Surgical History:   Procedure Laterality Date    BLADDER SURGERY      CATARACT EXTRACTION      bilat     CHOLECYSTECTOMY      COLONOSCOPY      HIP TROCHANTERIC NAILING WITH INTRAMEDULLARY HIP SCREW Right 6/20/2023    Procedure: HIP TROCHANTERIC NAILING WITH INTRAMEDULLARY HIP SCREW  RIGHT;  Surgeon: Augie Hobbs MD;  Location:  [facility];  Service: Orthopedics;  Laterality: Right;    HYSTERECTOMY      with bso    KYPHOPLASTY N/A 02/12/2021    Procedure: KYPHOPLASTY T11, T12 AND L4;  Surgeon: Matty Hearn MD;  Location:  [facility];  Service: Orthopedic Spine;  Laterality: N/A;    TONSILLECTOMY         Family History   Problem Relation Age of Onset    Breast cancer Sister 84    Stroke Mother     Heart attack Father     Ovarian cancer Neg Hx        Social History     Socioeconomic History    Marital status:    Tobacco Use    Smoking status: Never     Passive exposure: Never    Smokeless tobacco: Never   Vaping Use    Vaping status: Never Used   Substance and Sexual Activity    Alcohol use: No    Drug use: No    Sexual activity: Defer           Objective   Physical Exam  Vitals and nursing note reviewed.   Constitutional:       General: She is not in acute distress.     Appearance: Normal appearance.   HENT:      Head: Normocephalic and atraumatic.   Eyes:      Conjunctiva/sclera: Conjunctivae normal.      Pupils: Pupils are equal, round, and reactive to light.   Neck:      Thyroid: No thyromegaly.   Cardiovascular:      Rate and Rhythm: Normal rate and regular rhythm.      Heart sounds: Normal heart sounds. No murmur heard.     No friction rub. No gallop.   Pulmonary:      Effort: Pulmonary effort is normal. No respiratory distress.      Breath sounds: Normal breath sounds.   Abdominal:      General: Bowel sounds are normal.      Palpations: Abdomen is soft.      Tenderness: There is no abdominal tenderness.   Musculoskeletal:         General: Normal range of motion.      Cervical back: Normal range of motion and neck supple.   Lymphadenopathy:      Cervical: No cervical adenopathy.   Skin:     General: Skin is warm and dry.   Neurological:      Mental Status: She is alert and oriented to person, place, and time.      Comments: Generalized weakness. No focal deficit   Psychiatric:          Behavior: Behavior normal.         Procedures           ED Course        Note: In addition to lab results from this visit, the labs listed above may include labs taken at another facility or during a different encounter within the last 24 hours. Please correlate lab times with ED admission and discharge times for further clarification of the services performed during this visit.    XR Knee 1 or 2 View Right   Final Result   Impression:   Severe osteoarthritis and osteopenia without acute osseous abnormality.            Electronically Signed: Hector Rosenberg MD     10/19/2024 12:58 AM EDT     Workstation ID: [id number]      CT Head Without Contrast   Final Result   Impression:   1.No acute intracranial abnormality.   2.Multiple chronic infarcts and mild chronic small vessel ischemic change.   3.Sphenoid sinus mucosal disease.            Electronically Signed: Hector Rosenberg MD     10/19/2024 12:23 AM EDT     Workstation ID: [id number]      XR Hip With or Without Pelvis 2 - 3 View Right   Final Result   Impression:   1.Interval internal fixation of the right hip without complicating features.   2.Mild right and moderate left hip osteoarthritis.            Electronically Signed: Hector Rosenberg MD     10/18/2024 11:30 PM EDT     Workstation ID: [id number]        Vitals:    10/21/24 0730 10/21/24 1110 10/21/24 1539 10/21/24 2032   BP: 121/48 129/75 127/64 138/56   BP Location: Left arm Left arm Right arm Right arm   Patient Position: Lying Lying Sitting Sitting   Pulse: 58 60  72   Resp: 14 14 16 16   Temp: 98.1 °F (36.7 °C) 97.9 °F (36.6 °C) 98.9 °F (37.2 °C) 98.6 °F (37 °C)   TempSrc: Oral Oral Oral Oral   SpO2: 93% 96% 95%    Weight:       Height:         Medications   sodium chloride 0.9 % infusion (0 mL/hr Intravenous Stopped 10/19/24 1500)   acetaminophen (TYLENOL) tablet 500 mg (500 mg Oral Given 10/20/24 2244)   amiodarone (PACERONE) tablet 200 mg (200 mg Oral Given 10/21/24 0906)   ascorbic acid (VITAMIN C) tablet  500 mg (500 mg Oral Given 10/21/24 0906)   levothyroxine (SYNTHROID, LEVOTHROID) tablet 100 mcg (100 mcg Oral Given 10/21/24 0621)   melatonin tablet 5 mg (has no administration in time range)   metoprolol tartrate (LOPRESSOR) tablet 25 mg (25 mg Oral Given 10/21/24 2032)   pantoprazole (PROTONIX) EC tablet 40 mg (40 mg Oral Given 10/21/24 0906)   traMADol (ULTRAM) tablet 50 mg (has no administration in time range)   sodium chloride 0.9 % flush 10 mL (10 mL Intravenous Given 10/21/24 2033)   sodium chloride 0.9 % flush 10 mL (10 mL Intravenous Given 10/19/24 0437)   dextrose (GLUTOSE) oral gel 15 g (has no administration in time range)   dextrose (D50W) (25 g/50 mL) IV injection 25 g (has no administration in time range)   glucagon (GLUCAGEN) injection 1 mg (has no administration in time range)   Insulin Lispro (humaLOG) injection 2-7 Units (4 Units Subcutaneous Given 10/21/24 2033)   sennosides-docusate (PERICOLACE) 8.6-50 MG per tablet 2 tablet (2 tablets Oral Given 10/21/24 0906)     And   polyethylene glycol (MIRALAX) packet 17 g (has no administration in time range)     And   bisacodyl (DULCOLAX) EC tablet 5 mg (has no administration in time range)     And   bisacodyl (DULCOLAX) suppository 10 mg (has no administration in time range)   ondansetron ODT (ZOFRAN-ODT) disintegrating tablet 4 mg (has no administration in time range)     Or   ondansetron (ZOFRAN) injection 4 mg (has no administration in time range)   cefTRIAXone (ROCEPHIN) 1,000 mg in sodium chloride 0.9 % 100 mL MBP (0 mg Intravenous Stopped 10/21/24 2015)   Potassium Replacement - Follow Nurse / BPA Driven Protocol (has no administration in time range)   Magnesium Standard Dose Replacement - Follow Nurse / BPA Driven Protocol (has no administration in time range)   Phosphorus Replacement - Follow Nurse / BPA Driven Protocol (has no administration in time range)   Calcium Replacement - Follow Nurse / BPA Driven Protocol (has no administration in  time range)   apixaban (ELIQUIS) tablet 2.5 mg (2.5 mg Oral Given 10/21/24 2032)   cefTRIAXone (ROCEPHIN) 1,000 mg in sodium chloride 0.9 % 100 mL MBP (1,000 mg Intravenous New Bag 10/19/24 0243)   magnesium sulfate 2g/50 mL (PREMIX) infusion (0 g Intravenous Stopped 10/20/24 0240)     ECG/EMG Results (last 24 hours)       ** No results found for the last 24 hours. **          ECG 12 Lead Other; falls   Preliminary Result   Test Reason : Other~   Blood Pressure :   */*   mmHG   Vent. Rate :  56 BPM     Atrial Rate :  56 BPM      P-R Int : 214 ms          QRS Dur :  90 ms       QT Int : 396 ms       P-R-T Axes :  86   5  99 degrees      QTc Int : 382 ms      Sinus bradycardia with 1st degree AV block   Anteroseptal infarct , age undetermined   Abnormal ECG   When compared with ECG of 27-JUL-2024 20:45,   Anteroseptal infarct is now present   T wave inversion no longer evident in Anterolateral leads   QT has shortened      Referred By: EDMD           Confirmed By:                                                  Medical Decision Making  Patient noted to have 1.6 and WBC count.  Chart review patient has myelodysplastic syndrome and this is close to her baseline.  Patient is nitrite positive with 3+ bacteria.  She does not have WBCs with this lack of WBC response may be related to her myelodysplastic syndrome.  I think with her multiple falls since arrival home less than 1 week ago and 3+ bacteria and nitrite positive urine she is most appropriately treated as an acute UTI at this time.  I will discuss the case with internal medicine attending.  Patient will be admitted for further evaluation and management.    Problems Addressed:  Acute UTI: complicated acute illness or injury  Failure of outpatient treatment: complicated acute illness or injury  Multiple falls: complicated acute illness or injury    Amount and/or Complexity of Data Reviewed  External Data Reviewed: notes.  Labs: ordered. Decision-making details  documented in ED Course.  Radiology: ordered and independent interpretation performed. Decision-making details documented in ED Course.  ECG/medicine tests: ordered and independent interpretation performed. Decision-making details documented in ED Course.    Risk  Decision regarding hospitalization.       Latest Reference Range & Units 10/18/24 23:40 10/18/24 23:48   Sodium 136 - 145 mmol/L 139    Potassium 3.5 - 5.2 mmol/L 3.9    Chloride 98 - 107 mmol/L 101    CO2 22.0 - 29.0 mmol/L 22.0    Anion Gap 5.0 - 15.0 mmol/L 16.0 (H)    BUN 8 - 23 mg/dL 20    Creatinine 0.57 - 1.00 mg/dL 1.03 (H)    BUN/Creatinine Ratio 7.0 - 25.0  19.4    eGFR >60.0 mL/min/1.73 52.4 (L)    Glucose 65 - 99 mg/dL 109 (H)    Calcium 8.6 - 10.5 mg/dL 8.5 (L)    Magnesium 1.6 - 2.4 mg/dL 1.4 (L)    Alkaline Phosphatase 39 - 117 U/L 82    Total Protein 6.0 - 8.5 g/dL 5.8 (L)    Albumin 3.5 - 5.2 g/dL 3.8    Globulin gm/dL 2.0    A/G Ratio g/dL 1.9    AST (SGOT) 1 - 32 U/L 10    ALT (SGPT) 1 - 33 U/L 6    Total Bilirubin 0.0 - 1.2 mg/dL 0.4    WBC 3.40 - 10.80 10*3/mm3 1.60 (C)    RBC 3.77 - 5.28 10*6/mm3 2.99 (L)    Hemoglobin 12.0 - 15.9 g/dL 9.0 (L)    Hematocrit 34.0 - 46.6 % 27.6 (L)    Platelets 140 - 450 10*3/mm3 58 (L)    RDW 12.3 - 15.4 % 13.9    MCV 79.0 - 97.0 fL 92.3    MCH 26.6 - 33.0 pg 30.1    MCHC 31.5 - 35.7 g/dL 32.6    MPV 6.0 - 12.0 fL 9.6    RDW-SD 37.0 - 54.0 fl 47.0    Neutrophil Rel % 42.7 - 76.0 % 48.1    Lymphocyte Rel % 19.6 - 45.3 % 35.0    Monocyte Rel % 5.0 - 12.0 % 15.6 (H)    Eosinophil Rel % 0.3 - 6.2 % 0.0 (L)    Basophil Rel % 0.0 - 1.5 % 0.0    Immature Granulocyte Rel % 0.0 - 0.5 % 1.3 (H)    Neutrophils Absolute 1.70 - 7.00 10*3/mm3 0.77 (L)    Lymphocytes Absolute 0.70 - 3.10 10*3/mm3 0.56 (L)    Monocytes Absolute 0.10 - 0.90 10*3/mm3 0.25    Eosinophils Absolute 0.00 - 0.40 10*3/mm3 0.00    Basophils Absolute 0.00 - 0.20 10*3/mm3 0.00    Immature Grans, Absolute 0.00 - 0.05 10*3/mm3 0.02    nRBC 0.0 -  0.2 /100 WBC 0.0    Color, UA Yellow, Straw   Yellow   Appearance, UA Clear   Clear   Specific Gravity, UA 1.001 - 1.030   1.011   pH, UA 5.0 - 8.0   <=5.0   Glucose Negative   Negative   Ketones, UA Negative   Negative   Blood, UA Negative   Negative   Nitrite, UA Negative   Positive !   Leukocytes, UA Negative   Trace !   Protein, UA Negative   Negative   Bilirubin, UA Negative   Negative   Urobilinogen, UA 0.2 - 1.0 E.U./dL   0.2 E.U./dL   RBC, UA None Seen, 0-2 /HPF  0-2   WBC, UA None Seen, 0-2 /HPF  0-2   Bacteria, UA None Seen, Trace /HPF  3+ !   Squamous Epithelial Cells, UA None Seen, 0-2 /HPF  0-2   Hyaline Casts, UA 0 - 6 /LPF  0-6   Methodology:   Automated Microscopy   (C): Data is critically low  (H): Data is abnormally high  (L): Data is abnormally low  !: Data is abnormal    Final diagnoses:   Acute UTI   Multiple falls   Failure of outpatient treatment       ED Disposition  ED Disposition       ED Disposition   Decision to Admit    Condition   --    Comment   Level of Care: Telemetry [5]   Diagnosis: Falls [500761]   Admitting Physician: SEEMA GUO III [[id number]]   Attending Physician: SEEMA GUO III [[id number]]   Is patient appropriate for Inpatient Observation Unit?: No [0]   Bed Request Comments: tele obs not cdu                  Adam Galvez DO  10/21/24 6109

## 2024-10-19 NOTE — ED NOTES
Chey Robertson    Nursing Report ED to Floor:  Mental status: A&Ox4  Ambulatory status: has not ambulated in ED  Oxygen Therapy:  RA  Cardiac Rhythm: sinus pancho  Admitted from: [facility]  Safety Concerns:  fall risk, [place]  Social Issues: none noted at this time.  ED Room #:  12    ED Nurse Phone Extension - [phone].      HPI:   Chief Complaint   Patient presents with    Fall       Past Medical History:  Past Medical History:   Diagnosis Date    A-fib     on eliquis    Anemia     Arthritis     Diabetes mellitus     checks sugar only when pt wants to     Disease of thyroid gland     History of transfusion     with knee surgery-  no reaction recalled.     [place] (hard of hearing)     no hearing aids    Hypertension     Migraine without aura and without status migrainosus, not intractable 12/30/2016    Myelodysplastic syndrome     Pulmonary emboli 2011    (after knee surgery)    SOBOE (shortness of breath on exertion)     Tremors of nervous system     Vertigo     Wears dentures     upper     Wears glasses         Past Surgical History:  Past Surgical History:   Procedure Laterality Date    BLADDER SURGERY      CATARACT EXTRACTION      bilat     CHOLECYSTECTOMY      COLONOSCOPY      HIP TROCHANTERIC NAILING WITH INTRAMEDULLARY HIP SCREW Right 6/20/2023    Procedure: HIP TROCHANTERIC NAILING WITH INTRAMEDULLARY HIP SCREW RIGHT;  Surgeon: Augie Hobbs MD;  Location:  [facility];  Service: Orthopedics;  Laterality: Right;    HYSTERECTOMY      with bso    KYPHOPLASTY N/A 02/12/2021    Procedure: KYPHOPLASTY T11, T12 AND L4;  Surgeon: Matty Hearn MD;  Location:  [facility];  Service: Orthopedic Spine;  Laterality: N/A;    TONSILLECTOMY          Admitting Doctor:   Kaushal Jones III, DO    Consulting Provider(s):  Consults       No orders found for last 30 day(s).             Admitting Diagnosis:   The primary encounter diagnosis was Acute UTI. Diagnoses of Multiple falls and Failure of outpatient  treatment were also pertinent to this visit.    Most Recent Vitals:   Vitals:    10/19/24 0040 10/19/24 0100 10/19/24 0129 10/19/24 0202   BP: 118/56 123/54 112/51 128/56   Pulse: 56 55 55 57   Resp:       Temp:       TempSrc:       SpO2: 96% 98% 96% 97%   Weight:       Height:           Active LDAs/IV Access:   Lines, Drains & Airways       Active LDAs       Name Placement date Placement time Site Days    External Urinary Catheter 07/28/24  0800  --  82                    Labs (abnormal labs have a star):   Labs Reviewed   COMPREHENSIVE METABOLIC PANEL - Abnormal; Notable for the following components:       Result Value    Glucose 109 (*)     Creatinine 1.03 (*)     Calcium 8.5 (*)     Total Protein 5.8 (*)     Anion Gap 16.0 (*)     eGFR 52.4 (*)     All other components within normal limits    Narrative:     GFR Normal >60  Chronic Kidney Disease <60  Kidney Failure <15    The GFR formula is only valid for adults with stable renal function between ages 18 and 70.   URINALYSIS W/ CULTURE IF INDICATED - Abnormal; Notable for the following components:    Leuk Esterase, UA Trace (*)     Nitrite, UA Positive (*)     All other components within normal limits    Narrative:     In absence of clinical symptoms, the presence of pyuria, bacteria, and/or nitrites on the urinalysis result does not correlate with infection.   CBC WITH AUTO DIFFERENTIAL - Abnormal; Notable for the following components:    WBC 1.60 (*)     RBC 2.99 (*)     Hemoglobin 9.0 (*)     Hematocrit 27.6 (*)     Platelets 58 (*)     Monocyte % 15.6 (*)     Eosinophil % 0.0 (*)     Immature Grans % 1.3 (*)     Neutrophils, Absolute 0.77 (*)     Lymphocytes, Absolute 0.56 (*)     All other components within normal limits    Narrative:     Appended report. These results have been appended to a previously verified report.   URINALYSIS, MICROSCOPIC ONLY - Abnormal; Notable for the following components:    Bacteria, UA 3+ (*)     All other components within  normal limits   BLOOD CULTURE   BLOOD CULTURE   URINALYSIS W/ CULTURE IF INDICATED   CBC AND DIFFERENTIAL    Narrative:     The following orders were created for panel order CBC & Differential.  Procedure                               Abnormality         Status                     ---------                               -----------         ------                     CBC Auto Differential[[id number]]        Abnormal            Final result               Scan Slide[[id number]]                                                                    Please view results for these tests on the individual orders.       Meds Given in ED:   Medications   cefTRIAXone (ROCEPHIN) 1,000 mg in sodium chloride 0.9 % 100 mL MBP (has no administration in time range)           Last NIH score:                                                          Dysphagia screening results:        [place] Coma Scale:  No data recorded     CIWA:        Restraint Type:            Isolation Status:  No active isolations

## 2024-10-19 NOTE — PROGRESS NOTES
[facility] Medicine Services  PROGRESS NOTE    Patient Name: Chey Robertson  : 1935  MRN: [id number]    Date of Admission: 10/18/2024  Primary Care Physician: Yasmeen Loomis MD    Subjective   Subjective     CC:  Falls    HPI:  Labs remain delayed today.  Patient denies any active bleeding. Nursing initially concerned about patient's swallowing.  I initially rounded with patient's family present and then returned later with bedside RN Marie Mendoza. Patient does not have any current swallowing complaints as she feels like she has improved. Patient reports she would not want to have thickened liquids and understand risks of aspiration and death. She was able to express this desire with family present and later with bedside RN present. With bedside RN present, patient confirmed she would want to be a DNR/DNI. Order placed to reflect this. Patient reports otherwise she is doing well. AM labs still pending.      Objective   Objective     Vital Signs:   Temp:  [97.4 °F (36.3 °C)-97.9 °F (36.6 °C)] 97.9 °F (36.6 °C)  Heart Rate:  [54-61] 58  Resp:  [14-16] 16  BP: (109-147)/(50-67) 144/53     Physical Exam:  Constitutional: Elderly  female no acute distress, awake, alert  HENT: NCAT, mucous membranes moist  Respiratory: Clear to auscultation bilaterally, respiratory effort normal   Cardiovascular: Irregular rate rhythm, no murmurs, rubs, or gallops  Gastrointestinal: Positive bowel sounds, soft, nontender, nondistended  Musculoskeletal: No bilateral ankle edema  Psychiatric: Appropriate affect, cooperative  Neurologic: Oriented x 3, speech clear  Skin: No rashes    Results Reviewed:  LAB RESULTS:      Lab 10/18/24  2340   WBC 1.60*   HEMOGLOBIN 9.0*   HEMATOCRIT 27.6*   PLATELETS 58*   NEUTROS ABS 0.77*   IMMATURE GRANS (ABS) 0.02   LYMPHS ABS 0.56*   MONOS ABS 0.25   EOS ABS 0.00   MCV 92.3         Lab 10/18/24  2340   SODIUM 139   POTASSIUM 3.9   CHLORIDE 101   CO2  "22.0   ANION GAP 16.0*   BUN 20   CREATININE 1.03*   EGFR 52.4*   GLUCOSE 109*   CALCIUM 8.5*   MAGNESIUM 1.4*         Lab 10/18/24  2340   TOTAL PROTEIN 5.8*   ALBUMIN 3.8   GLOBULIN 2.0   ALT (SGPT) 6   AST (SGOT) 10   BILIRUBIN 0.4   ALK PHOS 82                     Brief Urine Lab Results  (Last result in the past 365 days)        Color   Clarity   Blood   Leuk Est   Nitrite   Protein   CREAT   Urine HCG        10/18/24 2348 Yellow   Clear   Negative   Trace   Positive   Negative                   Cultures:  No results found for: \"BLOODCX\", \"URINECX\", \"WOUNDCX\", \"MRSACX\", \"RESPCX\", \"STOOLCX\"    Microbiology Results Abnormal       None            XR Knee 1 or 2 View Right    Result Date: 10/19/2024  XR KNEE 1 OR 2 VW RIGHT Date of Exam: 10/19/2024 12:25 AM EDT Indication: trauma Comparison: 1/15/2024. Findings: The bones are demineralized. There is severe tricompartmental osteophyte formation. There is moderate to severe narrowing of the medial compartment and patellofemoral joint spaces. Chondrocalcinosis and meniscus calcifications noted. No acute fracture or  dislocation. No erosions or evidence of knee joint effusion.     Impression: Impression: Severe osteoarthritis and osteopenia without acute osseous abnormality. Electronically Signed: Hector Rosenberg MD  10/19/2024 12:58 AM EDT  Workstation ID: [id number]    CT Head Without Contrast    Result Date: 10/19/2024  CT HEAD WO CONTRAST Date of Exam: 10/18/2024 11:50 PM EDT Indication: trauma. Comparison: 1/15/2024. Technique: Axial CT images were obtained of the head without contrast administration.  Automated exposure control and iterative construction methods were used. Findings: There are chronic bifrontal cortical infarcts. There is a chronic lacunar infarct in the central right thalamus. Similar small chronic lacunar infarct in the right caudate head. Chronic cortical infarct in the right occipital lobe. There are multiple bilateral chronic cerebellar " infarcts. Intracranial vascular calcifications noted. There is mild patchy white matter hypoattenuation. No definite new hypoattenuating lesions in the brain. No acute intracranial hemorrhage. No mass effect, midline shift or  abnormal extra-axial collection. Thinning of the orbital lenses would suggest prior lens replacement. There is frothy secretion in the right sphenoid sinus and moderate mucosal thickening in the left sphenoid sinus with obstruction of the sphenoethmoidal recess. Mastoid air cells appear well aerated. The calvarium is intact. Superficial soft tissues are unremarkable.     Impression: Impression: 1.No acute intracranial abnormality. 2.Multiple chronic infarcts and mild chronic small vessel ischemic change. 3.Sphenoid sinus mucosal disease. Electronically Signed: Hector Rosenberg MD  10/19/2024 12:23 AM EDT  Workstation ID: [id number]    XR Hip With or Without Pelvis 2 - 3 View Right    Result Date: 10/18/2024  XR HIP W OR WO PELVIS 2-3 VIEW RIGHT Date of Exam: 10/18/2024 11:12 PM EDT Indication: pain Comparison: 6/18/2023. CT pelvis 6/18/2023. Findings: Interval internal fixation of the right hip using cephalomedullary nailing. Hardware appears intact. There is no evidence of hardware associated fracture. There is mild osteoarthritis at the right hip with similar moderate degenerative change at the left  hip. 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 The bony pelvis appears intact without fracture.     Impression: Impression: 1.Interval internal fixation of the right hip without complicating features. 2.Mild right and moderate left hip osteoarthritis. Electronically Signed: Hector Rosenberg MD  10/18/2024 11:30 PM EDT  Workstation ID: [id number]     Results for orders placed during the hospital encounter of 06/05/23    Adult Transthoracic Echo Complete w/ Color, Spectral and Contrast if necessary per protocol    Interpretation Summary    Left ventricular systolic function is normal. Left ventricular ejection  fraction appears to be 56 - 60%.    Left ventricular diastolic function was normal.    Estimated right ventricular systolic pressure from tricuspid regurgitation is normal (<35 mmHg).      Current medications:  Scheduled Meds:amiodarone, 200 mg, Oral, Daily  ascorbic acid, 500 mg, Oral, Daily  cefTRIAXone, 1,000 mg, Intravenous, Q24H  insulin lispro, 2-7 Units, Subcutaneous, 4x Daily AC & at Bedtime  levothyroxine, 100 mcg, Oral, Q AM  metoprolol tartrate, 25 mg, Oral, BID  pantoprazole, 40 mg, Oral, Daily  sodium chloride, 10 mL, Intravenous, Q12H      Continuous Infusions:sodium chloride, 100 mL/hr, Last Rate: 100 mL/hr (10/19/24 1146)      PRN Meds:.  acetaminophen    senna-docusate sodium **AND** polyethylene glycol **AND** bisacodyl **AND** bisacodyl    dextrose    dextrose    glucagon (human recombinant)    melatonin    ondansetron ODT **OR** ondansetron    ondansetron ODT    sodium chloride    traMADol    Assessment & Plan   Assessment & Plan     Active Hospital Problems    Diagnosis  POA    **Falls [R29.6]  Not Applicable    DISHA (acute kidney injury) [N17.9]  Yes    Atrial fibrillation [I48.91]  Yes    Chronic anticoagulation [Z79.01]  Not Applicable    Type 2 diabetes mellitus without complication, without long-term current use of insulin [E11.9]  Yes    Essential hypertension [I10]  Yes    Myelodysplasia (myelodysplastic syndrome) [D46.9]  Yes    Personal history of pulmonary embolism [Z86.711]  Yes    Pancytopenia [D61.818]  Yes    Hypothyroidism (acquired) [E03.9]  Yes      Resolved Hospital Problems   No resolved problems to display.        Brief Hospital Course to date:    Falls  - fall precautions  - PT/OT consult  - reports dizziness when standing, may be r/t dehydration vs hypoglycemia vs UTI  -XR R knee: severe OA and osteopenia without acute osseous abnormality      DISHA, mild  - likely dehydration related  - NS @ 100 ml/hr x 10 hours  - avoid nephrotoxic agents  - repeat labs in am     UTI  -  urine cx sent  - continue rocephin     Pancytopenia  Myelodysplastic syndrome  - lower than previous  -Per hematology notes 08/13/2024 will consider discontinuing Eliquis if platelets drop below 30,000 or patient has active bleeding  -Repeat labs pending  - neutropenic precautions / diet     AFIB / Hx DVT/PE  Chronic anticoagulation  - continue amiodarone / metoprolol w/ hold parameters     T2DM - insulin dependent  - A1c 9.8 on 7/28/2024  - repeat A1c in am  - fsbg achs w/ low dose ssi, glucose 109  - hold long acting for now, monitor for hypoglycemia     Hypothyroidism  - continue levothyroxine     Hx of dysphagia  - pt reports improved and now on regular diet  -will repeat SLP eval    GOC: Patient reports she would not want to have thickened liquids and understand risks of aspiration and death if does not adhere to this modified diet (if recommended by SLP) She was able to express this desire with family present and at a later visit with bedside RN present, Marie Mendoza. With bedside RN present, patient confirmed she would want to be a DNR/DNI. Order placed to reflect this.     Total time spent: 40 minutes with greater than 50% of the time in counseling patient    Expected Discharge Location and Transportation: pending PT/OT recommendations   Expected Discharge 10/22/2024  Expected Discharge Date: 10/20/2024; Expected Discharge Time:      VTE Prophylaxis:  Mechanical VTE prophylaxis orders are present.         AM-PAC 6 Clicks Score (PT): 12 (10/19/24 0309)    CODE STATUS:   Code Status and Medical Interventions: No CPR (Do Not Attempt to Resuscitate); Limited Support; No intubation (DNI); witnessed by Marie Mendoza RN   Ordered at: 10/19/24 1111     Medical Intervention Limits:    No intubation (DNI)     Level Of Support Discussed With:    Patient     Code Status (Patient has no pulse and is not breathing):    No CPR (Do Not Attempt to Resuscitate)     Medical Interventions (Patient has pulse or is  breathing):    Limited Support     Comments:    witnessed by Marie Mendoza RN       Leena Guerra MD  10/19/24

## 2024-10-19 NOTE — THERAPY EVALUATION
Acute Care - Speech Language Pathology   Swallow Initial Evaluation [facility]    Clinical Swallow Evaluation       Patient Name: Chey Robertson  : 1935  MRN: [id number]  Today's Date: 10/19/2024               Admit Date: 10/18/2024    Visit Dx:     ICD-10-CM ICD-9-CM   1. Acute UTI  N39.0 599.0   2. Multiple falls  R29.6 V15.88   3. Failure of outpatient treatment  Z78.9 V49.89   4. Dysphagia, unspecified type  R13.10 787.20     Patient Active Problem List   Diagnosis    Benign familial tremor    AMS (altered mental status)    Pancytopenia    Hypothyroidism (acquired)    B12 deficiency    Abnormal intestinal absorption    Iron deficiency anemia due to chronic blood loss    Myelodysplasia (myelodysplastic syndrome)    Personal history of pulmonary embolism    Chronic anticoagulation    Essential hypertension    Type 2 diabetes mellitus without complication, without long-term current use of insulin    Atrial fibrillation    QT prolongation    Pericardial effusion    Severe malnutrition    Closed displaced intertrochanteric fracture of right femur, initial encounter    DISHA (acute kidney injury)    Moderate malnutrition    Falls     Past Medical History:   Diagnosis Date    A-fib     on eliquis    Anemia     Arthritis     Diabetes mellitus     checks sugar only when pt wants to     Disease of thyroid gland     History of transfusion     with knee surgery-  no reaction recalled.     [place] (hard of hearing)     no hearing aids    Hypertension     Migraine without aura and without status migrainosus, not intractable 2016    Myelodysplastic syndrome     Pulmonary emboli     (after knee surgery)    SOBOE (shortness of breath on exertion)     Tremors of nervous system     Vertigo     Wears dentures     upper     Wears glasses      Past Surgical History:   Procedure Laterality Date    BLADDER SURGERY      CATARACT EXTRACTION      bilat     CHOLECYSTECTOMY      COLONOSCOPY      HIP TROCHANTERIC NAILING WITH  INTRAMEDULLARY HIP SCREW Right 6/20/2023    Procedure: HIP TROCHANTERIC NAILING WITH INTRAMEDULLARY HIP SCREW RIGHT;  Surgeon: Augie Hobbs MD;  Location: [facility] OR;  Service: Orthopedics;  Laterality: Right;    HYSTERECTOMY      with bso    KYPHOPLASTY N/A 02/12/2021    Procedure: KYPHOPLASTY T11, T12 AND L4;  Surgeon: Matty Hearn MD;  Location:  [facility] OR;  Service: Orthopedic Spine;  Laterality: N/A;    TONSILLECTOMY         SLP Recommendation and Plan  SLP Swallowing Diagnosis: suspected pharyngeal dysphagia (10/19/24 1500)  SLP Diet Recommendation: comfort diet, per physician discretion, other (see comments) (current comfort diet s regular and thin liquids) (10/19/24 1500)  Recommended Precautions and Strategies: upright posture during/after eating, general aspiration precautions, small bites of food and sips of liquid (10/19/24 1500)  SLP Rec. for Method of Medication Administration: meds whole, with thin liquids, with puree, as tolerated (10/19/24 1500)     Monitor for Signs of Aspiration: notify SLP if any concerns (10/19/24 1500)  Recommended Diagnostics: No further SLP services recommended (10/19/24 1500)  Swallow Criteria for Skilled Therapeutic Interventions Met: baseline status (10/19/24 1500)  Anticipated Discharge Disposition (SLP): home (10/19/24 1500)     Therapy Frequency (Swallow): evaluation only (10/19/24 1500)     Oral Care Recommendations: Oral Care BID/PRN, Toothbrush (10/19/24 1500)                                               SWALLOW EVALUATION (Last 72 Hours)       SLP Adult Swallow Evaluation       Row Name 10/19/24 1500                   Rehab Evaluation    Subjective Information no complaints  -CH        Patient Observations alert;cooperative;agree to therapy  -CH        Patient/Family/Caregiver Comments/Observations none present  -CH        Patient Effort good  -CH        Symptoms Noted During/After Treatment none  -CH        Oral Care lip/mouth moisturizer applied  -CH            General Information    Patient Profile Reviewed yes  -CH        Pertinent History Of Current Problem Pt admitted with UTI, multiple falls. Hx dysphagia with most recent MBS on 9/15 with recommendations for NPO v. comfort diet. Patient has been on a comfort diet of regular and thin liquids. She does not want a feeding tube or altered diet. SLP consulted as pt requesting another evaluation while admitted.  -CH        Current Method of Nutrition regular textures;thin liquids;comfort diet  -CH        Precautions/Limitations, Vision WFL;for purposes of eval  -CH        Precautions/Limitations, Hearing WFL;for purposes of eval  -CH        Prior Level of Function-Communication WFL  -CH        Prior Level of Function-Swallowing comfort diet;regular textures;thin liquids  -CH        Plans/Goals Discussed with patient;agreed upon  -        Barriers to Rehab previous functional deficit  -        Patient's Goals for Discharge patient did not state  -           Pain    Pretreatment Pain Rating 0/10 - no pain  -CH        Posttreatment Pain Rating 0/10 - no pain  -CH           Oral Motor Structure and Function    Secretion Management WNL/WFL  -CH        Mucosal Quality moist, healthy  -CH           Oral Musculature and Cranial Nerve Assessment    Oral Motor General Assessment generalized oral motor weakness  -           General Eating/Swallowing Observations    Respiratory Support Currently in Use room air  -        Eating/Swallowing Skills self-fed;appropriate self-feeding skills observed  -CH        Positioning During Eating upright 90 degree;upright in bed  -        Utensils Used spoon;cup;straw  -        Consistencies Trialed ice chips;thin liquids;pureed;regular textures  -           Clinical Swallow Eval    Oral Prep Phase WFL  -CH        Oral Transit WFL  -CH        Oral Residue WFL  -CH        Pharyngeal Phase no overt signs/symptoms of pharyngeal impairment  however aspiration was silent on prior MBS   -CH        Esophageal Phase unremarkable  -        Clinical Swallow Evaluation Summary Hx dysphagia with most recent MBS on 9/15 with recommendations for NPO v. comfort diet. Patient has been on a comfort diet of regular and thin liquids w/o respiratory compromise since 9/15/24. She does not want a feeding tube or altered diet. SLP spoke to patient regarding MBS. If patient does not want any diet alterations or feeding tube, MBS will not change the overall outcome as she will choose to continue with regular diet and thin liquids for quality of life reasons. Pt appears to be tolerating comfort diet at this time. She is agreeable to continue on this diet without repeat instrumental evaluation.  -           SLP Evaluation Clinical Impression    SLP Swallowing Diagnosis suspected pharyngeal dysphagia  -        Functional Impact risk of aspiration/pneumonia  -        Swallow Criteria for Skilled Therapeutic Interventions Met baseline status  -           Recommendations    Therapy Frequency (Swallow) evaluation only  -        SLP Diet Recommendation comfort diet, per physician discretion;other (see comments)  current comfort diet s regular and thin liquids  -        Recommended Diagnostics No further SLP services recommended  -        Recommended Precautions and Strategies upright posture during/after eating;general aspiration precautions;small bites of food and sips of liquid  -        Oral Care Recommendations Oral Care BID/PRN;Toothbrush  -        SLP Rec. for Method of Medication Administration meds whole;with thin liquids;with puree;as tolerated  -        Monitor for Signs of Aspiration notify SLP if any concerns  -        Anticipated Discharge Disposition (SLP) home  -                  User Key  (r) = Recorded By, (t) = Taken By, (c) = Cosigned By      Initials Name Effective Dates     Debbi Workman, MS CCC-SLP 06/16/21 -                     EDUCATION  The patient has been educated in  the following areas:   Dysphagia (Swallowing Impairment) Oral Care/Hydration.                Time Calculation:    Time Calculation- SLP       Row Name 10/19/24 1559             Time Calculation- SLP    SLP Start Time 1500  -CH      SLP Received On 10/19/24  -CH         Untimed Charges    SLP Eval/Re-eval  ST Eval Oral Pharyng Swallow - [id number]  -CH      [id number] Eval Oral Pharyng Swallow Minutes 40  -CH         Total Minutes    Untimed Charges Total Minutes 40  -CH       Total Minutes 40  -CH                User Key  (r) = Recorded By, (t) = Taken By, (c) = Cosigned By      Initials Name Provider Type    CH Debbi Workman MS CCC-SLP Speech and Language Pathologist                    Therapy Charges for Today       Code Description Service Date Service Provider Modifiers Qty    [id number]  ST EVAL ORAL PHARYNG SWALLOW 3 10/19/2024 Debbi Workman MS CCC-SLP GN 1                 Debbi Workman MS CCC-SLP  10/19/2024   No

## 2024-10-19 NOTE — PLAN OF CARE
Problem: Adult Inpatient Plan of Care  Goal: Plan of Care Review  Outcome: Progressing  Goal: Patient-Specific Goal (Individualized)  Outcome: Progressing  Goal: Absence of Hospital-Acquired Illness or Injury  Outcome: Progressing  Intervention: Identify and Manage Fall Risk  Recent Flowsheet Documentation  Taken 10/19/2024 0300 by Roseanna Celaya RN  Safety Promotion/Fall Prevention:   activity supervised   assistive device/personal items within reach   clutter free environment maintained   fall prevention program maintained   lighting adjusted   nonskid shoes/slippers when out of bed   room organization consistent   safety round/check completed  Intervention: Prevent Skin Injury  Recent Flowsheet Documentation  Taken 10/19/2024 0300 by Roseanna Celaya RN  Body Position:   supine   legs elevated  Skin Protection:   incontinence pads utilized   skin sealant/moisture barrier applied   transparent dressing maintained  Intervention: Prevent and Manage VTE (Venous Thromboembolism) Risk  Recent Flowsheet Documentation  Taken 10/19/2024 0300 by Roseanna Celaya RN  VTE Prevention/Management:   bilateral   SCDs (sequential compression devices) off  Intervention: Prevent Infection  Recent Flowsheet Documentation  Taken 10/19/2024 0300 by Roseanna Celaya RN  Infection Prevention:   environmental surveillance performed   hand hygiene promoted   single patient room provided  Goal: Optimal Comfort and Wellbeing  Outcome: Progressing  Intervention: Provide Person-Centered Care  Recent Flowsheet Documentation  Taken 10/19/2024 0300 by Roseanna Celaya RN  Trust Relationship/Rapport:   care explained   questions answered   questions encouraged   reassurance provided   thoughts/feelings acknowledged  Goal: Readiness for Transition of Care  Outcome: Progressing     Problem: Skin Injury Risk Increased  Goal: Skin Health and Integrity  Outcome: Progressing  Intervention: Optimize Skin Protection  Recent  Flowsheet Documentation  Taken 10/19/2024 0300 by Roseanna Celaya, RN  Activity Management: activity encouraged  Pressure Reduction Techniques:   pressure points protected   weight shift assistance provided  Head of Bed (HOB) Positioning: HOB elevated  Pressure Reduction Devices:   positioning supports utilized   pressure-redistributing mattress utilized   specialty bed utilized  Skin Protection:   incontinence pads utilized   skin sealant/moisture barrier applied   transparent dressing maintained   Goal Outcome Evaluation:

## 2024-10-19 NOTE — H&P
[facility] Medicine Services  HISTORY AND PHYSICAL    Patient Name: Chey Robertson  : 1935  MRN: [id number]  Primary Care Physician: Yasmeen Loomis MD  Date of admission: 10/18/2024    Subjective   Subjective     Chief Complaint:  Right leg pain after falling    HPI:  Chey Robertson is a 89 y.o. female with PMHx of PE/DVT, AFIB, chronic anticoagulation, pancytopenia r/t myelodysplastic syndrome, hypothyroidism, T2DM, who presents to the ED for evaluation of right leg pain after having 2 falls in 2 days at her assisted living facility. She is having difficulty ambulating d/t the pain. Currently her only acute complaint is that she is cold. She denies pain while lying in bed, has R hip and knee pain with movement.    Of note, she was recently hospitalized at [facility] -2024 for weakness w/ n/v/d - thought possible r/t metformin; dysphagia, required thickened liquids which prompted a stay at rehab (unable to go back to assisted living w/ thickened liquids); DISHA improved w/ fluids, held lisinopril; u/a c/w UTI, cx w/ lactobacillus. She has been back at rehab for ~ 1 week. She is no longer on thickened liquids per her report. She has been ambulating with a walker. Says that both time she fell was after bending over and then lost her balance. She does not recall if she hit her head.     CT head / R hip and knee xray in ED w/o acute findings. Pertinent labs: creatinine 1.03, glucose 109; WBC 1.60, Hgb 9.0, platelets 58; urinalysis w/ + nitrites, trace leukocytes and 3+ bacteria. She will be admitted to this hospital medicine service for further evaluation and treatment.      Review of Systems   Musculoskeletal:  Positive for arthralgias and gait problem.   Neurological:  Positive for dizziness.   All other systems reviewed and are negative.               Personal History     Past Medical History:   Diagnosis Date    A-fib     on eliquis    Anemia     Arthritis     Diabetes mellitus      checks sugar only when pt wants to     Disease of thyroid gland     History of transfusion     with knee surgery-  no reaction recalled.     [place] (hard of hearing)     no hearing aids    Hypertension     Migraine without aura and without status migrainosus, not intractable 12/30/2016    Myelodysplastic syndrome     Pulmonary emboli 2011    (after knee surgery)    SOBOE (shortness of breath on exertion)     Tremors of nervous system     Vertigo     Wears dentures     upper     Wears glasses          Oncology Problem List:  Myelodysplasia (myelodysplastic syndrome) (03/14/2023; Status: Active)    Oncology/Hematology History       Past Surgical History:   Procedure Laterality Date    BLADDER SURGERY      CATARACT EXTRACTION      bilat     CHOLECYSTECTOMY      COLONOSCOPY      HIP TROCHANTERIC NAILING WITH INTRAMEDULLARY HIP SCREW Right 6/20/2023    Procedure: HIP TROCHANTERIC NAILING WITH INTRAMEDULLARY HIP SCREW RIGHT;  Surgeon: Augie Hobbs MD;  Location: [facility];  Service: Orthopedics;  Laterality: Right;    HYSTERECTOMY      with bso    KYPHOPLASTY N/A 02/12/2021    Procedure: KYPHOPLASTY T11, T12 AND L4;  Surgeon: Matty Hearn MD;  Location: [facility] OR;  Service: Orthopedic Spine;  Laterality: N/A;    TONSILLECTOMY         Family History:  family history includes Breast cancer (age of onset: 84) in her sister; Heart attack in her father; Stroke in her mother.     Social History:  reports that she has never smoked. She has never been exposed to tobacco smoke. She has never used smokeless tobacco. She reports that she does not drink alcohol and does not use drugs.  Social History     Social History Narrative    Not on file       Medications:  Acidophilus Extra Strength, Insulin Aspart, SITagliptin, acetaminophen, amiodarone, apixaban, ascorbic acid, castor oil-balsam peru, furosemide, insulin NPH-insulin regular, levothyroxine, melatonin, metFORMIN, metoprolol tartrate, naloxone, ondansetron ODT,  pantoprazole, polyethylene glycol, traMADol, and zinc sulfate    Allergies   Allergen Reactions    Aspirin Unknown - Low Severity     Mouth sores        Objective   Objective     Vital Signs:   Temp:  [97.4 °F (36.3 °C)] 97.4 °F (36.3 °C)  Heart Rate:  [54-61] 55  Resp:  [14] 14  BP: (109-147)/(51-67) 119/55    Physical Exam  Constitutional:       General: She is not in acute distress.     Appearance: She is underweight. She is ill-appearing. She is not toxic-appearing.   HENT:      Head: Normocephalic and atraumatic.      Nose: Nose normal.      Mouth/Throat:      Pharynx: Oropharynx is clear.   Eyes:      Extraocular Movements: Extraocular movements intact.      Conjunctiva/sclera: Conjunctivae normal.      Pupils: Pupils are equal, round, and reactive to light.   Cardiovascular:      Rate and Rhythm: Normal rate and regular rhythm.      Pulses: Normal pulses.      Heart sounds: Normal heart sounds.   Pulmonary:      Effort: Pulmonary effort is normal. No respiratory distress.      Breath sounds: Normal breath sounds.   Abdominal:      General: Bowel sounds are normal. There is no distension.      Palpations: Abdomen is soft.      Tenderness: There is no abdominal tenderness.   Musculoskeletal:         General: Tenderness (R hip / knee) present. Normal range of motion.      Cervical back: Normal range of motion and neck supple.   Skin:     General: Skin is warm and dry.      Capillary Refill: Capillary refill takes less than 2 seconds.      Findings: No rash.   Neurological:      Mental Status: She is alert and oriented to person, place, and time.      Cranial Nerves: No cranial nerve deficit.   Psychiatric:         Mood and Affect: Mood normal.         Behavior: Behavior normal.            Result Review:  I have personally reviewed the results from the time of this admission to 10/19/2024 03:35 EDT and agree with these findings:  [x]  Laboratory list / accordion  [x]  Microbiology  [x]  Radiology  [x]   EKG/Telemetry   []  Cardiology/Vascular   []  Pathology  []  Old records  []  Other:  Most notable findings include:      LAB RESULTS:      Lab 10/18/24  2340   WBC 1.60*   HEMOGLOBIN 9.0*   HEMATOCRIT 27.6*   PLATELETS 58*   NEUTROS ABS 0.77*   IMMATURE GRANS (ABS) 0.02   LYMPHS ABS 0.56*   MONOS ABS 0.25   EOS ABS 0.00   MCV 92.3         Lab 10/18/24  2340   SODIUM 139   POTASSIUM 3.9   CHLORIDE 101   CO2 22.0   ANION GAP 16.0*   BUN 20   CREATININE 1.03*   EGFR 52.4*   GLUCOSE 109*   CALCIUM 8.5*         Lab 10/18/24  2340   TOTAL PROTEIN 5.8*   ALBUMIN 3.8   GLOBULIN 2.0   ALT (SGPT) 6   AST (SGOT) 10   BILIRUBIN 0.4   ALK PHOS 82                     Brief Urine Lab Results  (Last result in the past 365 days)        Color   Clarity   Blood   Leuk Est   Nitrite   Protein   CREAT   Urine HCG        10/18/24 2348 Yellow   Clear   Negative   Trace   Positive   Negative                 Microbiology Results (last 10 days)       ** No results found for the last 240 hours. **            XR Knee 1 or 2 View Right    Result Date: 10/19/2024  XR KNEE 1 OR 2 VW RIGHT Date of Exam: 10/19/2024 12:25 AM EDT Indication: trauma Comparison: 1/15/2024. Findings: The bones are demineralized. There is severe tricompartmental osteophyte formation. There is moderate to severe narrowing of the medial compartment and patellofemoral joint spaces. Chondrocalcinosis and meniscus calcifications noted. No acute fracture or  dislocation. No erosions or evidence of knee joint effusion.     Impression: Impression: Severe osteoarthritis and osteopenia without acute osseous abnormality. Electronically Signed: Hector Rosenberg MD  10/19/2024 12:58 AM EDT  Workstation ID: [id number]    CT Head Without Contrast    Result Date: 10/19/2024  CT HEAD WO CONTRAST Date of Exam: 10/18/2024 11:50 PM EDT Indication: trauma. Comparison: 1/15/2024. Technique: Axial CT images were obtained of the head without contrast administration.  Automated exposure control and  iterative construction methods were used. Findings: There are chronic bifrontal cortical infarcts. There is a chronic lacunar infarct in the central right thalamus. Similar small chronic lacunar infarct in the right caudate head. Chronic cortical infarct in the right occipital lobe. There are multiple bilateral chronic cerebellar infarcts. Intracranial vascular calcifications noted. There is mild patchy white matter hypoattenuation. No definite new hypoattenuating lesions in the brain. No acute intracranial hemorrhage. No mass effect, midline shift or  abnormal extra-axial collection. Thinning of the orbital lenses would suggest prior lens replacement. There is frothy secretion in the right sphenoid sinus and moderate mucosal thickening in the left sphenoid sinus with obstruction of the sphenoethmoidal recess. Mastoid air cells appear well aerated. The calvarium is intact. Superficial soft tissues are unremarkable.     Impression: Impression: 1.No acute intracranial abnormality. 2.Multiple chronic infarcts and mild chronic small vessel ischemic change. 3.Sphenoid sinus mucosal disease. Electronically Signed: Hector Rosenberg MD  10/19/2024 12:23 AM EDT  Workstation ID: [id number]    XR Hip With or Without Pelvis 2 - 3 View Right    Result Date: 10/18/2024  XR HIP W OR WO PELVIS 2-3 VIEW RIGHT Date of Exam: 10/18/2024 11:12 PM EDT Indication: pain Comparison: 6/18/2023. CT pelvis 6/18/2023. Findings: Interval internal fixation of the right hip using cephalomedullary nailing. Hardware appears intact. There is no evidence of hardware associated fracture. There is mild osteoarthritis at the right hip with similar moderate degenerative change at the left  hip. 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 The bony pelvis appears intact without fracture.     Impression: Impression: 1.Interval internal fixation of the right hip without complicating features. 2.Mild right and moderate left hip osteoarthritis. Electronically Signed: Hector  MD Chet  10/18/2024 11:30 PM EDT  Workstation ID: [id number]     Results for orders placed during the hospital encounter of 06/05/23    Adult Transthoracic Echo Complete w/ Color, Spectral and Contrast if necessary per protocol    Interpretation Summary    Left ventricular systolic function is normal. Left ventricular ejection fraction appears to be 56 - 60%.    Left ventricular diastolic function was normal.    Estimated right ventricular systolic pressure from tricuspid regurgitation is normal (<35 mmHg).      Assessment & Plan   Assessment & Plan       Falls    Pancytopenia    Hypothyroidism (acquired)    Myelodysplasia (myelodysplastic syndrome)    Personal history of pulmonary embolism    Chronic anticoagulation    Essential hypertension    Type 2 diabetes mellitus without complication, without long-term current use of insulin    Atrial fibrillation    DISHA (acute kidney injury)    Falls  - fall precautions  - PT/OT consult  - reports dizziness when standing, may be r/t dehydration vs hypoglycemia vs UTI  - holding anticoagulation d/t worsening pancytopenia; consider risk vs benefits    DISHA, mild  - likely dehydration related  - NS @ 100 ml/hr x 10 hours  - avoid nephrotoxic agents  - repeat labs in am    UTI  - urine cx sent  - continue rocephin    Pancytopenia  Myelodysplastic syndrome  - lower than previous  - hold Eliquis and monitor for bleeding  - repeat labs in am  - neutropenic precautions / diet    AFIB / Hx DVT/PE  Chronic anticoagulation  - continue amiodarone / metoprolol w/ hold parameters  - hold Eliquis    T2DM - insulin dependent  - A1c 9.8 on 7/28/2024  - repeat A1c in am  - fsbg achs w/ low dose ssi, glucose 109  - hold long acting for now, monitor for hypoglycemia    Hypothyroidism  - continue levothyroxine    Hx of dysphagia  - pt reports improved and now on regular diet      DVT prophylaxis:  SCDS    CODE STATUS:     Code Status (Patient has no pulse and is not breathing): CPR (Attempt to  Resuscitate)  Medical Interventions (Patient has pulse or is breathing): Full Support      Expected Discharge     Expected Discharge Date: 10/20/2024; Expected Discharge Time:       Signature: Electronically signed by CHANG Roy, 10/19/24, 3:27 AM EDT    Total time spent: 45 minutes  Time spent includes time reviewing chart, face-to-face time, counseling patient/family/caregiver, ordering medications/tests/procedures, communicating with other health care professionals, documenting clinical information in the electronic health record, and coordination of care.    --------------------    The patient was seen independently by the APC.  I was available for any questions or concerns.     Electronically signed by Kaushal Jones III, DO, 10/19/24, 5:12 AM EDT.

## 2024-10-19 NOTE — PLAN OF CARE
Goal Outcome Evaluation:  Plan of Care Reviewed With: patient        Progress: no change  Outcome Evaluation: Damian completed pt sleepy and fatigued at baseline with nsg stating she was a late admit and has not slept much.  Tx bed to chair mod A of PT OOB in chair with bed alarm and nsg notified.    Anticipated Discharge Disposition (PT): LTCH (long-term care hospital)

## 2024-10-19 NOTE — PLAN OF CARE
Goal Outcome Evaluation:                   Anticipated Discharge Disposition (SLP): home          SLP Swallowing Diagnosis: suspected pharyngeal dysphagia (10/19/24 1500)

## 2024-10-20 LAB
ANION GAP SERPL CALCULATED.3IONS-SCNC: 9 MMOL/L (ref 5–15)
BUN SERPL-MCNC: 13 MG/DL (ref 8–23)
BUN/CREAT SERPL: 19.7 (ref 7–25)
CA-I SERPL ISE-MCNC: 1.11 MMOL/L (ref 1.15–1.3)
CALCIUM SPEC-SCNC: 8.3 MG/DL (ref 8.6–10.5)
CHLORIDE SERPL-SCNC: 104 MMOL/L (ref 98–107)
CO2 SERPL-SCNC: 26 MMOL/L (ref 22–29)
CREAT SERPL-MCNC: 0.66 MG/DL (ref 0.57–1)
EGFRCR SERPLBLD CKD-EPI 2021: 84.5 ML/MIN/1.73
GLUCOSE BLDC GLUCOMTR-MCNC: 113 MG/DL (ref 70–130)
GLUCOSE BLDC GLUCOMTR-MCNC: 197 MG/DL (ref 70–130)
GLUCOSE BLDC GLUCOMTR-MCNC: 243 MG/DL (ref 70–130)
GLUCOSE BLDC GLUCOMTR-MCNC: 281 MG/DL (ref 70–130)
GLUCOSE SERPL-MCNC: 104 MG/DL (ref 65–99)
HBA1C MFR BLD: 4.9 % (ref 4.8–5.6)
MAGNESIUM SERPL-MCNC: 2.7 MG/DL (ref 1.6–2.4)
PHOSPHATE SERPL-MCNC: 3.3 MG/DL (ref 2.5–4.5)
POTASSIUM SERPL-SCNC: 3.7 MMOL/L (ref 3.5–5.2)
SODIUM SERPL-SCNC: 139 MMOL/L (ref 136–145)

## 2024-10-20 PROCEDURE — 99233 SBSQ HOSP IP/OBS HIGH 50: CPT | Performed by: FAMILY MEDICINE

## 2024-10-20 PROCEDURE — 96367 TX/PROPH/DG ADDL SEQ IV INF: CPT

## 2024-10-20 PROCEDURE — 96366 THER/PROPH/DIAG IV INF ADDON: CPT

## 2024-10-20 PROCEDURE — 82948 REAGENT STRIP/BLOOD GLUCOSE: CPT

## 2024-10-20 PROCEDURE — 25010000002 CEFTRIAXONE PER 250 MG: Performed by: NURSE PRACTITIONER

## 2024-10-20 PROCEDURE — 25010000002 MAGNESIUM SULFATE 2 GM/50ML SOLUTION: Performed by: FAMILY MEDICINE

## 2024-10-20 PROCEDURE — 82330 ASSAY OF CALCIUM: CPT | Performed by: FAMILY MEDICINE

## 2024-10-20 PROCEDURE — 63710000001 INSULIN LISPRO (HUMAN) PER 5 UNITS: Performed by: NURSE PRACTITIONER

## 2024-10-20 PROCEDURE — 84100 ASSAY OF PHOSPHORUS: CPT | Performed by: FAMILY MEDICINE

## 2024-10-20 PROCEDURE — 80048 BASIC METABOLIC PNL TOTAL CA: CPT | Performed by: FAMILY MEDICINE

## 2024-10-20 PROCEDURE — 83735 ASSAY OF MAGNESIUM: CPT | Performed by: FAMILY MEDICINE

## 2024-10-20 PROCEDURE — G0378 HOSPITAL OBSERVATION PER HR: HCPCS

## 2024-10-20 RX ADMIN — PANTOPRAZOLE SODIUM 40 MG: 40 TABLET, DELAYED RELEASE ORAL at 09:28

## 2024-10-20 RX ADMIN — INSULIN LISPRO 2 UNITS: 100 INJECTION, SOLUTION INTRAVENOUS; SUBCUTANEOUS at 17:50

## 2024-10-20 RX ADMIN — ACETAMINOPHEN 500 MG: 500 TABLET ORAL at 22:44

## 2024-10-20 RX ADMIN — APIXABAN 2.5 MG: 2.5 TABLET, FILM COATED ORAL at 09:28

## 2024-10-20 RX ADMIN — INSULIN LISPRO 4 UNITS: 100 INJECTION, SOLUTION INTRAVENOUS; SUBCUTANEOUS at 20:14

## 2024-10-20 RX ADMIN — OXYCODONE HYDROCHLORIDE AND ACETAMINOPHEN 500 MG: 500 TABLET ORAL at 09:28

## 2024-10-20 RX ADMIN — LEVOTHYROXINE SODIUM 100 MCG: 0.1 TABLET ORAL at 05:29

## 2024-10-20 RX ADMIN — INSULIN LISPRO 3 UNITS: 100 INJECTION, SOLUTION INTRAVENOUS; SUBCUTANEOUS at 11:25

## 2024-10-20 RX ADMIN — SODIUM CHLORIDE 1000 MG: 900 INJECTION INTRAVENOUS at 20:14

## 2024-10-20 RX ADMIN — Medication 10 ML: at 20:44

## 2024-10-20 RX ADMIN — METOPROLOL TARTRATE 25 MG: 25 TABLET, FILM COATED ORAL at 09:28

## 2024-10-20 RX ADMIN — MAGNESIUM SULFATE HEPTAHYDRATE 2 G: 40 INJECTION, SOLUTION INTRAVENOUS at 00:39

## 2024-10-20 RX ADMIN — AMIODARONE HYDROCHLORIDE 200 MG: 200 TABLET ORAL at 09:27

## 2024-10-20 RX ADMIN — APIXABAN 2.5 MG: 2.5 TABLET, FILM COATED ORAL at 20:14

## 2024-10-20 NOTE — PROGRESS NOTES
[facility] Medicine Services  PROGRESS NOTE    Patient Name: Chey Robertson  : 1935  MRN: [id number]    Date of Admission: 10/18/2024  Primary Care Physician: Yasmeen Loomis MD    Subjective   Subjective     CC:  Falls    HPI:  Patient denies nausea vomiting fevers chills.  Patient's daughter-in-law at the bedside.  Permission obtained to discuss with patient's daughter-in-law about her medical condition/status.      Objective   Objective     Vital Signs:   Temp:  [97.6 °F (36.4 °C)-98.6 °F (37 °C)] 97.7 °F (36.5 °C)  Heart Rate:  [53-59] 53  Resp:  [16] 16  BP: (107-144)/(42-57) 107/42     Physical Exam:  Constitutional: Elderly  female no acute distress, awake, alert  HENT: NCAT, mucous membranes moist  Respiratory: Clear to auscultation bilaterally, respiratory effort normal   Cardiovascular: Irregular rate rhythm, no murmurs, rubs, or gallops  Gastrointestinal: Positive bowel sounds, soft, nontender, nondistended  Musculoskeletal: No bilateral ankle edema  Psychiatric: Appropriate affect, cooperative  Neurologic: Oriented x 3, speech clear  Skin: No rashes    Results Reviewed:  LAB RESULTS:      Lab 10/19/24  1636 10/18/24  2340   WBC 1.20* 1.60*   HEMOGLOBIN 9.1* 9.0*   HEMATOCRIT 26.9* 27.6*   PLATELETS 52* 58*   NEUTROS ABS 0.58* 0.77*   IMMATURE GRANS (ABS)  --  0.02   LYMPHS ABS  --  0.56*   MONOS ABS  --  0.25   EOS ABS 0.00 0.00   MCV 91.8 92.3         Lab 10/20/24  0627 10/19/24  1636 10/18/24  2340   SODIUM 139 138 139   POTASSIUM 3.7 3.7 3.9   CHLORIDE 104 102 101   CO2 26.0 25.0 22.0   ANION GAP 9.0 11.0 16.0*   BUN 13 17 20   CREATININE 0.66 0.75 1.03*   EGFR 84.5 76.7 52.4*   GLUCOSE 104* 159* 109*   CALCIUM 8.3* 8.4* 8.5*   IONIZED CALCIUM 1.11*  --   --    MAGNESIUM 2.7* 1.5* 1.4*   PHOSPHORUS 3.3  --   --          Lab 10/18/24  2340   TOTAL PROTEIN 5.8*   ALBUMIN 3.8   GLOBULIN 2.0   ALT (SGPT) 6   AST (SGOT) 10   BILIRUBIN 0.4   ALK PHOS 82           "           Brief Urine Lab Results  (Last result in the past 365 days)        Color   Clarity   Blood   Leuk Est   Nitrite   Protein   CREAT   Urine HCG        10/18/24 2348 Yellow   Clear   Negative   Trace   Positive   Negative                   Cultures:  No results found for: \"BLOODCX\", \"URINECX\", \"WOUNDCX\", \"MRSACX\", \"RESPCX\", \"STOOLCX\"    Microbiology Results Abnormal       Procedure Component Value - Date/Time    Blood Culture - Blood, Arm, Left [[id number]]  (Normal) Collected: 10/19/24 0243    Lab Status: Preliminary result Specimen: Blood from Arm, Left Updated: 10/20/24 0700     Blood Culture No growth at 24 hours    Blood Culture - Blood, Arm, Left [[id number]]  (Normal) Collected: 10/19/24 0151    Lab Status: Preliminary result Specimen: Blood from Arm, Left Updated: 10/20/24 0700     Blood Culture No growth at 24 hours    Narrative:      Less than seven (7) mL's of blood was collected.  Insufficient quantity may yield false negative results.            XR Knee 1 or 2 View Right    Result Date: 10/19/2024  XR KNEE 1 OR 2 VW RIGHT Date of Exam: 10/19/2024 12:25 AM EDT Indication: trauma Comparison: 1/15/2024. Findings: The bones are demineralized. There is severe tricompartmental osteophyte formation. There is moderate to severe narrowing of the medial compartment and patellofemoral joint spaces. Chondrocalcinosis and meniscus calcifications noted. No acute fracture or  dislocation. No erosions or evidence of knee joint effusion.     Impression: Impression: Severe osteoarthritis and osteopenia without acute osseous abnormality. Electronically Signed: Hector Rosenberg MD  10/19/2024 12:58 AM EDT  Workstation ID: [id number]    CT Head Without Contrast    Result Date: 10/19/2024  CT HEAD WO CONTRAST Date of Exam: 10/18/2024 11:50 PM EDT Indication: trauma. Comparison: 1/15/2024. Technique: Axial CT images were obtained of the head without contrast administration.  Automated exposure control and iterative " construction methods were used. Findings: There are chronic bifrontal cortical infarcts. There is a chronic lacunar infarct in the central right thalamus. Similar small chronic lacunar infarct in the right caudate head. Chronic cortical infarct in the right occipital lobe. There are multiple bilateral chronic cerebellar infarcts. Intracranial vascular calcifications noted. There is mild patchy white matter hypoattenuation. No definite new hypoattenuating lesions in the brain. No acute intracranial hemorrhage. No mass effect, midline shift or  abnormal extra-axial collection. Thinning of the orbital lenses would suggest prior lens replacement. There is frothy secretion in the right sphenoid sinus and moderate mucosal thickening in the left sphenoid sinus with obstruction of the sphenoethmoidal recess. Mastoid air cells appear well aerated. The calvarium is intact. Superficial soft tissues are unremarkable.     Impression: Impression: 1.No acute intracranial abnormality. 2.Multiple chronic infarcts and mild chronic small vessel ischemic change. 3.Sphenoid sinus mucosal disease. Electronically Signed: Hector Rosenberg MD  10/19/2024 12:23 AM EDT  Workstation ID: [id number]    XR Hip With or Without Pelvis 2 - 3 View Right    Result Date: 10/18/2024  XR HIP W OR WO PELVIS 2-3 VIEW RIGHT Date of Exam: 10/18/2024 11:12 PM EDT Indication: pain Comparison: 6/18/2023. CT pelvis 6/18/2023. Findings: Interval internal fixation of the right hip using cephalomedullary nailing. Hardware appears intact. There is no evidence of hardware associated fracture. There is mild osteoarthritis at the right hip with similar moderate degenerative change at the left  hip. 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 The bony pelvis appears intact without fracture.     Impression: Impression: 1.Interval internal fixation of the right hip without complicating features. 2.Mild right and moderate left hip osteoarthritis. Electronically Signed: Hector Rosenberg MD   10/18/2024 11:30 PM EDT  Workstation ID: [id number]     Results for orders placed during the hospital encounter of 06/05/23    Adult Transthoracic Echo Complete w/ Color, Spectral and Contrast if necessary per protocol    Interpretation Summary    Left ventricular systolic function is normal. Left ventricular ejection fraction appears to be 56 - 60%.    Left ventricular diastolic function was normal.    Estimated right ventricular systolic pressure from tricuspid regurgitation is normal (<35 mmHg).      Current medications:  Scheduled Meds:amiodarone, 200 mg, Oral, Daily  ascorbic acid, 500 mg, Oral, Daily  cefTRIAXone, 1,000 mg, Intravenous, Q24H  insulin lispro, 2-7 Units, Subcutaneous, 4x Daily AC & at Bedtime  levothyroxine, 100 mcg, Oral, Q AM  metoprolol tartrate, 25 mg, Oral, BID  pantoprazole, 40 mg, Oral, Daily  sodium chloride, 10 mL, Intravenous, Q12H      Continuous Infusions:     PRN Meds:.  acetaminophen    senna-docusate sodium **AND** polyethylene glycol **AND** bisacodyl **AND** bisacodyl    Calcium Replacement - Follow Nurse / BPA Driven Protocol    dextrose    dextrose    glucagon (human recombinant)    Magnesium Standard Dose Replacement - Follow Nurse / BPA Driven Protocol    melatonin    ondansetron ODT **OR** ondansetron    ondansetron ODT    Phosphorus Replacement - Follow Nurse / BPA Driven Protocol    Potassium Replacement - Follow Nurse / BPA Driven Protocol    sodium chloride    traMADol    Assessment & Plan   Assessment & Plan     Active Hospital Problems    Diagnosis  POA    **Falls [R29.6]  Not Applicable    DISHA (acute kidney injury) [N17.9]  Yes    Atrial fibrillation [I48.91]  Yes    Chronic anticoagulation [Z79.01]  Not Applicable    Type 2 diabetes mellitus without complication, without long-term current use of insulin [E11.9]  Yes    Essential hypertension [I10]  Yes    Myelodysplasia (myelodysplastic syndrome) [D46.9]  Yes    Personal history of pulmonary embolism [Z86.711]  Yes     Pancytopenia [D61.818]  Yes    Hypothyroidism (acquired) [E03.9]  Yes      Resolved Hospital Problems   No resolved problems to display.        Brief Hospital Course to date:    Falls  - fall precautions  - PT/OT consult  - reports dizziness when standing, may be r/t dehydration vs hypoglycemia vs UTI  -XR R knee: severe OA and osteopenia without acute osseous abnormality   -PT recommending LTC. Notified CM      DISHA, mild -> resolved   - likely dehydration related  - avoid nephrotoxic agents     UTI  - urine cx sent  - continue rocephin     Pancytopenia  Myelodysplastic syndrome  - lower than previous  -Per hematology notes 08/13/2024 will consider discontinuing Eliquis if platelets drop below 30,000 or patient has active bleeding  - neutropenic precautions / diet     AFIB / Hx DVT/PE  Chronic anticoagulation  - continue amiodarone / metoprolol w/ hold parameters  -continue Eliquis 2.5 mg BID (reduced bc of above)     T2DM - insulin dependent  -A1c: 4.9  - fsbg achs w/ low dose ssi, glucose 109  - hold long acting for now, monitor for hypoglycemia     Hypothyroidism  - continue levothyroxine     Hx of dysphagia  - pt reports improved and now on regular diet  - patient wishes to continue on a comfort diet of regular, thin liquids. She is aware of the risks as outlined on 10/19    [facility] 10/19: Patient reports she would not want to have thickened liquids and understand risks of aspiration and death if does not adhere to this modified diet (if recommended by SLP) She was able to express this desire with family present and at a later visit with bedside RN present, Marie Mendoza. With bedside RN present, patient confirmed she would want to be a DNR/DNI. Order placed to reflect this.     Total time spent: 35  minutes with greater than 50% of the time in counseling patient and family     Expected Discharge Location and Transportation: LTC   Expected Discharge 10/22/2024  Expected Discharge Date: 10/20/2024; Expected  Discharge Time:      VTE Prophylaxis:  Mechanical VTE prophylaxis orders are present.         AM-PAC 6 Clicks Score (PT): 12 (10/19/24 2000)    CODE STATUS:   Code Status and Medical Interventions: No CPR (Do Not Attempt to Resuscitate); Limited Support; No intubation (DNI); witnessed by Marie Mendoza RN   Ordered at: 10/19/24 1111     Medical Intervention Limits:    No intubation (DNI)     Level Of Support Discussed With:    Patient     Code Status (Patient has no pulse and is not breathing):    No CPR (Do Not Attempt to Resuscitate)     Medical Interventions (Patient has pulse or is breathing):    Limited Support     Comments:    witnessed by NIMO Call MD  10/20/24

## 2024-10-21 LAB
ANION GAP SERPL CALCULATED.3IONS-SCNC: 12 MMOL/L (ref 5–15)
BACTERIA SPEC AEROBE CULT: ABNORMAL
BUN SERPL-MCNC: 13 MG/DL (ref 8–23)
BUN/CREAT SERPL: 18.1 (ref 7–25)
CA-I SERPL ISE-MCNC: 1.13 MMOL/L (ref 1.15–1.3)
CALCIUM SPEC-SCNC: 8.2 MG/DL (ref 8.6–10.5)
CHLORIDE SERPL-SCNC: 105 MMOL/L (ref 98–107)
CO2 SERPL-SCNC: 24 MMOL/L (ref 22–29)
CREAT SERPL-MCNC: 0.72 MG/DL (ref 0.57–1)
DEPRECATED RDW RBC AUTO: 46.1 FL (ref 37–54)
EGFRCR SERPLBLD CKD-EPI 2021: 80.5 ML/MIN/1.73
ERYTHROCYTE [DISTWIDTH] IN BLOOD BY AUTOMATED COUNT: 13.9 % (ref 12.3–15.4)
GLUCOSE BLDC GLUCOMTR-MCNC: 204 MG/DL (ref 70–130)
GLUCOSE BLDC GLUCOMTR-MCNC: 209 MG/DL (ref 70–130)
GLUCOSE BLDC GLUCOMTR-MCNC: 295 MG/DL (ref 70–130)
GLUCOSE SERPL-MCNC: 172 MG/DL (ref 65–99)
HCT VFR BLD AUTO: 30.1 % (ref 34–46.6)
HGB BLD-MCNC: 10.2 G/DL (ref 12–15.9)
MAGNESIUM SERPL-MCNC: 1.8 MG/DL (ref 1.6–2.4)
MCH RBC QN AUTO: 30.7 PG (ref 26.6–33)
MCHC RBC AUTO-ENTMCNC: 33.9 G/DL (ref 31.5–35.7)
MCV RBC AUTO: 90.7 FL (ref 79–97)
PHOSPHATE SERPL-MCNC: 3 MG/DL (ref 2.5–4.5)
PLATELET # BLD AUTO: 54 10*3/MM3 (ref 140–450)
PMV BLD AUTO: 9.1 FL (ref 6–12)
POTASSIUM SERPL-SCNC: 4.3 MMOL/L (ref 3.5–5.2)
RBC # BLD AUTO: 3.32 10*6/MM3 (ref 3.77–5.28)
SODIUM SERPL-SCNC: 141 MMOL/L (ref 136–145)
WBC NRBC COR # BLD AUTO: 1.25 10*3/MM3 (ref 3.4–10.8)

## 2024-10-21 PROCEDURE — 82330 ASSAY OF CALCIUM: CPT | Performed by: FAMILY MEDICINE

## 2024-10-21 PROCEDURE — 80048 BASIC METABOLIC PNL TOTAL CA: CPT | Performed by: FAMILY MEDICINE

## 2024-10-21 PROCEDURE — G0378 HOSPITAL OBSERVATION PER HR: HCPCS

## 2024-10-21 PROCEDURE — 82948 REAGENT STRIP/BLOOD GLUCOSE: CPT

## 2024-10-21 PROCEDURE — 63710000001 INSULIN LISPRO (HUMAN) PER 5 UNITS: Performed by: NURSE PRACTITIONER

## 2024-10-21 PROCEDURE — 99232 SBSQ HOSP IP/OBS MODERATE 35: CPT | Performed by: FAMILY MEDICINE

## 2024-10-21 PROCEDURE — 25010000002 CEFTRIAXONE PER 250 MG: Performed by: NURSE PRACTITIONER

## 2024-10-21 PROCEDURE — 85027 COMPLETE CBC AUTOMATED: CPT | Performed by: FAMILY MEDICINE

## 2024-10-21 PROCEDURE — 97165 OT EVAL LOW COMPLEX 30 MIN: CPT

## 2024-10-21 PROCEDURE — 84100 ASSAY OF PHOSPHORUS: CPT | Performed by: FAMILY MEDICINE

## 2024-10-21 PROCEDURE — 96366 THER/PROPH/DIAG IV INF ADDON: CPT

## 2024-10-21 PROCEDURE — 83735 ASSAY OF MAGNESIUM: CPT | Performed by: FAMILY MEDICINE

## 2024-10-21 PROCEDURE — 97535 SELF CARE MNGMENT TRAINING: CPT

## 2024-10-21 RX ADMIN — AMIODARONE HYDROCHLORIDE 200 MG: 200 TABLET ORAL at 09:06

## 2024-10-21 RX ADMIN — INSULIN LISPRO 3 UNITS: 100 INJECTION, SOLUTION INTRAVENOUS; SUBCUTANEOUS at 12:56

## 2024-10-21 RX ADMIN — METOPROLOL TARTRATE 25 MG: 25 TABLET, FILM COATED ORAL at 20:32

## 2024-10-21 RX ADMIN — PANTOPRAZOLE SODIUM 40 MG: 40 TABLET, DELAYED RELEASE ORAL at 09:06

## 2024-10-21 RX ADMIN — INSULIN LISPRO 3 UNITS: 100 INJECTION, SOLUTION INTRAVENOUS; SUBCUTANEOUS at 17:10

## 2024-10-21 RX ADMIN — SODIUM CHLORIDE 1000 MG: 900 INJECTION INTRAVENOUS at 19:39

## 2024-10-21 RX ADMIN — INSULIN LISPRO 4 UNITS: 100 INJECTION, SOLUTION INTRAVENOUS; SUBCUTANEOUS at 20:33

## 2024-10-21 RX ADMIN — Medication 10 ML: at 09:06

## 2024-10-21 RX ADMIN — APIXABAN 2.5 MG: 2.5 TABLET, FILM COATED ORAL at 09:06

## 2024-10-21 RX ADMIN — OXYCODONE HYDROCHLORIDE AND ACETAMINOPHEN 500 MG: 500 TABLET ORAL at 09:06

## 2024-10-21 RX ADMIN — LEVOTHYROXINE SODIUM 100 MCG: 0.1 TABLET ORAL at 06:21

## 2024-10-21 RX ADMIN — Medication 10 ML: at 20:33

## 2024-10-21 RX ADMIN — SENNOSIDES AND DOCUSATE SODIUM 2 TABLET: 50; 8.6 TABLET ORAL at 09:06

## 2024-10-21 RX ADMIN — APIXABAN 2.5 MG: 2.5 TABLET, FILM COATED ORAL at 20:32

## 2024-10-21 NOTE — PLAN OF CARE
Goal Outcome Evaluation:  Plan of Care Reviewed With: patient        Progress: no change  Outcome Evaluation: Pt presents w/ strength, endurance, and balance deficits warranting skilled IP OT services to promote return to PLOF. Recent falls and functional decline, OT recommends SNF rehab for best functional outcomes.    Anticipated Discharge Disposition (OT): skilled nursing facility

## 2024-10-21 NOTE — CASE MANAGEMENT/SOCIAL WORK
Discharge Planning Assessment  [facility]     Patient Name: Chey Robertson  MRN: [id number]  Today's Date: 10/21/2024    Admit Date: 10/18/2024    Plan: SNF   Discharge Needs Assessment       Row Name 10/21/24 1221       Living Environment    People in Home alone    Current Living Arrangements assisted living facility    Potentially Unsafe Housing Conditions none    Primary Care Provided by self    Provides Primary Care For no one, unable/limited ability to care for self    Family Caregiver if Needed child(michael), adult;other (see comments)    Quality of Family Relationships involved;supportive    Able to Return to Prior Arrangements no       Resource/Environmental Concerns    Transportation Concerns none       Transition Planning    Patient/Family Anticipates Transition to inpatient rehabilitation facility    Patient/Family Anticipated Services at Transition ;rehabilitation services    Transportation Anticipated family or friend will provide       Discharge Needs Assessment    Readmission Within the Last 30 Days no previous admission in last 30 days    Equipment Currently Used at Home rollator    Concerns to be Addressed discharge planning    Anticipated Changes Related to Illness none    Equipment Needed After Discharge none                   Discharge Plan       Row Name 10/21/24 1222       Plan    Plan SNF    Patient/Family in Agreement with Plan yes    Plan Comments Spoke with patient's son, Joe by phone to initiate discharge planning.  Patient lives alone in assisted living apartment at [facility].  Prior to admission, she ambulated with a rollator and staff assisted her with ADL's.  She has no other DME at home and is not current with home health.  Her PCP is Yasmeen Loomis.  She does not have an advanced directive.  Verified that she has [place] Medicare Replacement.  Ms. Robertson has RX coverage and hs her scripts filled at [facility].  Her plan is SNF at discharge.  Referral  called to [place] at [facility].  Patient Choice list also left at bedside for son to review.  CM will continue to follow.    Final Discharge Disposition Code 03 - skilled nursing facility (SNF)                  Continued Care and Services - Admitted Since 10/18/2024    No active coordination exists for this encounter.       Selected Continued Care - Prior Encounters Includes continued care and service providers with selected services from prior encounters from 7/20/2024 to 10/21/2024      Discharged on 8/6/2024 Admission date: 7/27/2024 - Discharge disposition: Skilled Nursing Facility (DC - External)      Destination       Service Provider Services Address Phone Fax Patient Preferred    [facility] [address] [place] [id number] [phone] --                          Expected Discharge Date and Time       Expected Discharge Date Expected Discharge Time    Oct 22, 2024            Demographic Summary       Row Name 10/21/24 1221       General Information    Admission Type observation    Arrived From emergency department    Referral Source admission list    Reason for Consult discharge planning    Preferred Language English                   Functional Status       Row Name 10/21/24 1221       Functional Status    Usual Activity Tolerance fair    Current Activity Tolerance fair       Functional Status, IADL    Medications completely dependent    Meal Preparation completely dependent    Housekeeping completely dependent    Laundry completely dependent    Shopping completely dependent                   Psychosocial    No documentation.                  Abuse/Neglect    No documentation.                  Legal    No documentation.                  Substance Abuse    No documentation.                  Patient Forms    No documentation.                     Diana Navarro RN

## 2024-10-21 NOTE — PROGRESS NOTES
"          Clinical Nutrition Assessment     Patient Name: Chey Robertson  YOB: 1935  MRN: [id number]  Date of Encounter: 10/21/24 14:16 EDT  Admission date: 10/18/2024  Reason for Visit: Identified at risk by screening criteria, Nonhealing wound or pressure ulcer    Assessment   Nutrition Assessment   Admission Diagnosis:  Falls [R29.6]    Problem List:    Falls    Pancytopenia    Hypothyroidism (acquired)    Myelodysplasia (myelodysplastic syndrome)    Personal history of pulmonary embolism    Chronic anticoagulation    Essential hypertension    Type 2 diabetes mellitus without complication, without long-term current use of insulin    Atrial fibrillation    DISHA (acute kidney injury)      PMH:   She  has a past medical history of A-fib, Anemia, Arthritis, Diabetes mellitus, Disease of thyroid gland, History of transfusion, [place] (hard of hearing), Hypertension, Migraine without aura and without status migrainosus, not intractable (12/30/2016), Myelodysplastic syndrome, Pulmonary emboli (2011), SOBOE (shortness of breath on exertion), Tremors of nervous system, Vertigo, Wears dentures, and Wears glasses.    PSH:  She  has a past surgical history that includes Hysterectomy; Tonsillectomy; Bladder surgery; Cataract extraction; Cholecystectomy; Colonoscopy; Kyphoplasty (N/A, 02/12/2021); and Hip Trochanteric Nailing (Right, 6/20/2023).    Applicable Nutrition History:   Myelodysplastic syndrome  T2DM - insulin dependent   H/o dysphagia    10/19 SLP: comfort diet per physician (comfort: regular and thin liq)  10/21 WOC: PI stage 2 on right sacrum and left gluteal; resolved DTPI left sacrum    Anthropometrics     Height: Height: 160 cm (63\")  Last Filed Weight: Weight: 53.1 kg (117 lb) (10/18/24 2234)  Method: Weight Method: Stated  BMI: BMI (Calculated): 20.7    UBW:     Weight      Weight (kg) Weight (lbs) Weight Method   8/7/2023 54.432 kg  120 lb     1/15/2024 54.432 kg  120 lb  Estimated    2/12/2024 " "51.665 kg  113 lb 14.4 oz     7/27/2024 51.256 kg  113 lb  Stated    7/28/2024 51.937 kg  114 lb 8 oz  Bed scale    8/2/2024 59.966 kg  132 lb 3.2 oz  Bed scale    8/6/2024 54.25 kg  119 lb 9.6 oz  Standing scale    8/13/2024 52.164 kg  115 lb     10/18/2024 53.071 kg  117 lb  Stated      Weight change: No significant changes    Nutrition Focused Physical Exam    Date:  10/21/24        Pt does not meet criteria for malnutrition diagnosis, at this time.  Wasting noted most likely 2/2 age-related sarcopenia    Subjective   Reported/Observed/Food/Nutrition Related History:   10/21/24  Patient endorsed good appetite and PO intake PTA. Said she eats \"alright\" at home with no changes. Endorsed wound on sacrum and gluteal. Discussed nutrition and protein's role in wound healing. Declined boost, but agreeable to trial magic cup once per day to support wound healing. NKFA     Current Nutrition Prescription   PO: Diet: Cardiac, Diabetic; Healthy Heart (2-3 Na+); Consistent Carbohydrate; Fluid Consistency: Thin (IDDSI 0)  Oral Nutrition Supplement: n/a  Intake: Insufficient data    Assessment & Plan   Nutrition Diagnosis   Date:  10/21/24             Updated:    Problem Increased nutrient needs - protein   Etiology Increased demand for nutrient 2/2 wound healing   Signs/Symptoms PI stage 2   Status: New    Goal:   Nutrition to support treatment and Establish PO    Nutrition Intervention      Follow treatment progress, Care plan reviewed, Interview for preferences, Encourage intake, Supplement provided, Education provided    Patient does not meet malnutrition criteria at this time.   Ordering magic cup once per day for patient to trial to support wound healing  Encourage PO intake as able    Monitoring/Evaluation:   Per protocol, PO intake, Supplement intake, Weight, Skin status, Symptoms, POC/GOC    Temi Benavidez, MS,RD,LD  Time Spent: 35min  "

## 2024-10-21 NOTE — PROGRESS NOTES
[facility] Medicine Services  PROGRESS NOTE    Patient Name: Chey Robertson  : 1935  MRN: [id number]    Date of Admission: 10/18/2024  Primary Care Physician: Yasmeen Loomis MD    Subjective   Subjective     CC:  Falls    HPI:  Patient denies nausea vomiting fevers chills.  Tolerating diet.  Case management notified of PTs recommendations.      Objective   Objective     Vital Signs:   Temp:  [97.9 °F (36.6 °C)-98.6 °F (37 °C)] 97.9 °F (36.6 °C)  Heart Rate:  [56-60] 60  Resp:  [14-20] 14  BP: (106-140)/(48-75) 129/75     Physical Exam:  Constitutional: Elderly  female no acute distress, awake, alert  HENT: NCAT, mucous membranes moist  Respiratory: Clear to auscultation bilaterally, respiratory effort normal   Cardiovascular: Irregular rate rhythm, no murmurs, rubs, or gallops  Gastrointestinal: Positive bowel sounds, soft, nontender, nondistended  Musculoskeletal: No bilateral ankle edema  Psychiatric: Appropriate affect, cooperative  Neurologic: Oriented x 3, speech clear  Skin: No rashes    Results Reviewed:  LAB RESULTS:      Lab 10/21/24  0919 10/19/24  1636 10/18/24  2340   WBC 1.25* 1.20* 1.60*   HEMOGLOBIN 10.2* 9.1* 9.0*   HEMATOCRIT 30.1* 26.9* 27.6*   PLATELETS 54* 52* 58*   NEUTROS ABS  --  0.58* 0.77*   IMMATURE GRANS (ABS)  --   --  0.02   LYMPHS ABS  --   --  0.56*   MONOS ABS  --   --  0.25   EOS ABS  --  0.00 0.00   MCV 90.7 91.8 92.3         Lab 10/21/24  0919 10/20/24  0627 10/19/24  1636 10/18/24  2340   SODIUM 141 139 138 139   POTASSIUM 4.3 3.7 3.7 3.9   CHLORIDE 105 104 102 101   CO2 24.0 26.0 25.0 22.0   ANION GAP 12.0 9.0 11.0 16.0*   BUN 13 13 17 20   CREATININE 0.72 0.66 0.75 1.03*   EGFR 80.5 84.5 76.7 52.4*   GLUCOSE 172* 104* 159* 109*   CALCIUM 8.2* 8.3* 8.4* 8.5*   IONIZED CALCIUM 1.13* 1.11*  --   --    MAGNESIUM 1.8 2.7* 1.5* 1.4*   PHOSPHORUS 3.0 3.3  --   --    HEMOGLOBIN A1C  --   --  4.90  --          Lab 10/18/24  7194   TOTAL  "PROTEIN 5.8*   ALBUMIN 3.8   GLOBULIN 2.0   ALT (SGPT) 6   AST (SGOT) 10   BILIRUBIN 0.4   ALK PHOS 82                     Brief Urine Lab Results  (Last result in the past 365 days)        Color   Clarity   Blood   Leuk Est   Nitrite   Protein   CREAT   Urine HCG        10/18/24 2348 Yellow   Clear   Negative   Trace   Positive   Negative                   Cultures:  No results found for: \"BLOODCX\", \"URINECX\", \"WOUNDCX\", \"MRSACX\", \"RESPCX\", \"STOOLCX\"    Microbiology Results Abnormal       Procedure Component Value - Date/Time    Blood Culture - Blood, Arm, Left [[id number]]  (Normal) Collected: 10/19/24 0243    Lab Status: Preliminary result Specimen: Blood from Arm, Left Updated: 10/21/24 0700     Blood Culture No growth at 2 days    Blood Culture - Blood, Arm, Left [[id number]]  (Normal) Collected: 10/19/24 0151    Lab Status: Preliminary result Specimen: Blood from Arm, Left Updated: 10/21/24 0700     Blood Culture No growth at 2 days    Narrative:      Less than seven (7) mL's of blood was collected.  Insufficient quantity may yield false negative results.            No radiology results from the last 24 hrs    Results for orders placed during the hospital encounter of 06/05/23    Adult Transthoracic Echo Complete w/ Color, Spectral and Contrast if necessary per protocol    Interpretation Summary    Left ventricular systolic function is normal. Left ventricular ejection fraction appears to be 56 - 60%.    Left ventricular diastolic function was normal.    Estimated right ventricular systolic pressure from tricuspid regurgitation is normal (<35 mmHg).      Current medications:  Scheduled Meds:amiodarone, 200 mg, Oral, Daily  apixaban, 2.5 mg, Oral, Q12H  ascorbic acid, 500 mg, Oral, Daily  cefTRIAXone, 1,000 mg, Intravenous, Q24H  insulin lispro, 2-7 Units, Subcutaneous, 4x Daily AC & at Bedtime  levothyroxine, 100 mcg, Oral, Q AM  metoprolol tartrate, 25 mg, Oral, BID  pantoprazole, 40 mg, Oral, Daily  sodium " chloride, 10 mL, Intravenous, Q12H      Continuous Infusions:     PRN Meds:.  acetaminophen    senna-docusate sodium **AND** polyethylene glycol **AND** bisacodyl **AND** bisacodyl    Calcium Replacement - Follow Nurse / BPA Driven Protocol    dextrose    dextrose    glucagon (human recombinant)    Magnesium Standard Dose Replacement - Follow Nurse / BPA Driven Protocol    melatonin    ondansetron ODT **OR** ondansetron    Phosphorus Replacement - Follow Nurse / BPA Driven Protocol    Potassium Replacement - Follow Nurse / BPA Driven Protocol    sodium chloride    traMADol    Assessment & Plan   Assessment & Plan     Active Hospital Problems    Diagnosis  POA    **Falls [R29.6]  Not Applicable    DISHA (acute kidney injury) [N17.9]  Yes    Atrial fibrillation [I48.91]  Yes    Chronic anticoagulation [Z79.01]  Not Applicable    Type 2 diabetes mellitus without complication, without long-term current use of insulin [E11.9]  Yes    Essential hypertension [I10]  Yes    Myelodysplasia (myelodysplastic syndrome) [D46.9]  Yes    Personal history of pulmonary embolism [Z86.711]  Yes    Pancytopenia [D61.818]  Yes    Hypothyroidism (acquired) [E03.9]  Yes      Resolved Hospital Problems   No resolved problems to display.        Brief Hospital Course to date:    Falls  - fall precautions  - PT/OT consult  - reports dizziness when standing, may be r/t dehydration vs hypoglycemia vs UTI  -XR R knee: severe OA and osteopenia without acute osseous abnormality   -PT recommending LTC. Notified CM      DISHA, mild -> resolved   - likely dehydration related  - avoid nephrotoxic agents     UTI  - urine cx sent  - continue rocephin     Pancytopenia  Myelodysplastic syndrome  - lower than previous  -Per hematology notes 08/13/2024 will consider discontinuing Eliquis if platelets drop below 30,000 or patient has active bleeding  - neutropenic precautions / diet     AFIB / Hx DVT/PE  Chronic anticoagulation  - continue amiodarone / metoprolol  w/ hold parameters  -continue Eliquis 2.5 mg BID (reduced bc of above)     T2DM - insulin dependent  -A1c: 4.9  - fsbg achs w/ low dose ssi, glucose 109  - hold long acting for now, monitor for hypoglycemia     Hypothyroidism  - continue levothyroxine     Hx of dysphagia  - pt reports improved and now on regular diet  - patient wishes to continue on a comfort diet of regular, thin liquids. She is aware of the risks as outlined on 10/19    [facility] 10/19: Patient reports she would not want to have thickened liquids and understand risks of aspiration and death if does not adhere to this modified diet (if recommended by SLP) She was able to express this desire with family present and at a later visit with bedside RN present, Marie Mendoza. With bedside RN present, patient confirmed she would want to be a DNR/DNI. Order placed to reflect this.       Expected Discharge Location and Transportation: [facility]   Expected Discharge 10/22/2024  Expected Discharge Date: 10/22/2024; Expected Discharge Time:      VTE Prophylaxis:  Pharmacologic & mechanical VTE prophylaxis orders are present.         AM-PAC 6 Clicks Score (PT): 12 (10/21/24 0800)    CODE STATUS:   Code Status and Medical Interventions: No CPR (Do Not Attempt to Resuscitate); Limited Support; No intubation (DNI); witnessed by Marie Mendoza RN   Ordered at: 10/19/24 1111     Medical Intervention Limits:    No intubation (DNI)     Level Of Support Discussed With:    Patient     Code Status (Patient has no pulse and is not breathing):    No CPR (Do Not Attempt to Resuscitate)     Medical Interventions (Patient has pulse or is breathing):    Limited Support     Comments:    witnessed by NIMO Call MD  10/21/24

## 2024-10-21 NOTE — NURSING NOTE
"Reason for Wound, Ostomy and Continence (WOC) Nursing Consultation: \"POA PI to Coccyx\"    Patient lying in bed.  Family/support person not present.   Wound Assessment  Wound Type: Stage 2 to R sacrum and L gluteal. Resolved DTPI to L sacrum  Location: as above  Measurements: R: 0.5cm x 1cm x 0.2cm; L 1cm x 1cm x 0.2cm  Wound Bed: red, black  Periwound Skin: blanchable   Drainage Characteristics/Odor: none  Drainage Amount: none  Pain: No   Care provided: Area cleansed, barrier skin prep applied to sacrum, applied sacral Allevyn.   Notes: Per previous WOC note, pt had had a DTPI to L sacrum, this is resolved, now with stg 2 to R sacrum, small area of eschar noted medially. L gluteal with red stg 2. Resolved DTPI to left heel (present at previous admission per WOC note).   Wound Image:         Recommendation(s) for management of wound:   -Refer to wound care orders for specific instructions on how to treat/manage wound.  -Cleanse with NS, apply skin prep to surrounding skin, allow to dry. Apply 4x4 optifoam to Left gluteal; sacral allevyn Q3 days and prn.   -Practice pressure injury prevention protocol.    Most recent Rahul Scale score:  Sensory Perception: 3-->slightly limited  Moisture: 3-->occasionally moist  Activity: 3-->walks occasionally  Mobility: 3-->slightly limited  Nutrition: 3-->adequate  Friction and Shear: 3-->no apparent problem  Rahul Score: 18 (10/21/24 0800)     Specialty support surface: Foam Mattress with Waffle Overlay       Pressure Injury Prevention Protocol (initiate for Rahul Score of 18 or less):   *Turn q 2 hr, keep heels elevated and offloaded with offloading heel boots.  *Allevn dressings to heels, sacrum/coccyx  *Follow C.A.R.E protocol if medical devices (Bipap, elena, Ng tube, etc) are being used.  *Reduce layers under patient (one sheet as drawsheet and two incontinence pads) to allow waffle or SONALI to improve microclimate   *Raise knee-gatch before elevating HOB to reduce shearing  "     WOC Team will continue to follow.  Please re-consult if the wound(s) worsens.

## 2024-10-21 NOTE — THERAPY EVALUATION
Patient Name: Chey Robertson  : 1935    MRN: [id number]                              Today's Date: 10/21/2024       Admit Date: 10/18/2024    Visit Dx:     ICD-10-CM ICD-9-CM   1. Acute UTI  N39.0 599.0   2. Multiple falls  R29.6 V15.88   3. Failure of outpatient treatment  Z78.9 V49.89   4. Dysphagia, unspecified type  R13.10 787.20     Patient Active Problem List   Diagnosis    Benign familial tremor    AMS (altered mental status)    Pancytopenia    Hypothyroidism (acquired)    B12 deficiency    Abnormal intestinal absorption    Iron deficiency anemia due to chronic blood loss    Myelodysplasia (myelodysplastic syndrome)    Personal history of pulmonary embolism    Chronic anticoagulation    Essential hypertension    Type 2 diabetes mellitus without complication, without long-term current use of insulin    Atrial fibrillation    QT prolongation    Pericardial effusion    Severe malnutrition    Closed displaced intertrochanteric fracture of right femur, initial encounter    DISHA (acute kidney injury)    Moderate malnutrition    Falls     Past Medical History:   Diagnosis Date    A-fib     on eliquis    Anemia     Arthritis     Diabetes mellitus     checks sugar only when pt wants to     Disease of thyroid gland     History of transfusion     with knee surgery-  no reaction recalled.     [place] (hard of hearing)     no hearing aids    Hypertension     Migraine without aura and without status migrainosus, not intractable 2016    Myelodysplastic syndrome     Pulmonary emboli     (after knee surgery)    SOBOE (shortness of breath on exertion)     Tremors of nervous system     Vertigo     Wears dentures     upper     Wears glasses      Past Surgical History:   Procedure Laterality Date    BLADDER SURGERY      CATARACT EXTRACTION      bilat     CHOLECYSTECTOMY      COLONOSCOPY      HIP TROCHANTERIC NAILING WITH INTRAMEDULLARY HIP SCREW Right 2023    Procedure: HIP TROCHANTERIC NAILING WITH  INTRAMEDULLARY HIP SCREW RIGHT;  Surgeon: Augie Hobbs MD;  Location:  [facility];  Service: Orthopedics;  Laterality: Right;    HYSTERECTOMY      with bso    KYPHOPLASTY N/A 02/12/2021    Procedure: KYPHOPLASTY T11, T12 AND L4;  Surgeon: Matty Hearn MD;  Location:  [facility];  Service: Orthopedic Spine;  Laterality: N/A;    TONSILLECTOMY        General Information       Row Name 10/21/24 [facility]          OT Time and Intention    Subjective Information no complaints  -CS     Document Type evaluation  -CS     Mode of Treatment occupational therapy  -CS     Patient Effort good  -CS       Row Name 10/21/24 1432          General Information    Patient Profile Reviewed yes  -CS     Prior Level of Function independent:;feeding;min assist:;transfer;all household mobility;community mobility;w/c or scooter;mod assist:;max assist:;grooming;dressing;bathing  -CS     Existing Precautions/Restrictions fall;other (see comments)  [place]  -CS     Barriers to Rehab medically complex;visual deficit  -CS       Row Name 10/21/24 1432          Living Environment    People in Home facility resident  -CS       Row Name 10/21/24 1432          Home Main Entrance    Number of Stairs, Main Entrance none  -CS       Row Name 10/21/24 1432          Stairs Within Home, Primary    Number of Stairs, Within Home, Primary none  -CS       Row Name 10/21/24 1432          Cognition    Orientation Status (Cognition) oriented to;person;verbal cues/prompts needed for orientation;place  -CS       Row Name 10/21/24 1432          Safety Issues/Impairments Affecting Functional Mobility    Safety Issues Affecting Function (Mobility) awareness of need for assistance;insight into deficits/self-awareness;safety precaution awareness;safety precautions follow-through/compliance  -CS     Impairments Affecting Function (Mobility) balance;endurance/activity tolerance;strength  -CS               User Key  (r) = Recorded By, (t) = Taken By, (c) = Cosigned By       Initials Name Provider Type    CS Shoaib Motta OT Occupational Therapist                     Mobility/ADL's       Row Name 10/21/24 1435          Bed Mobility    Bed Mobility supine-sit;scooting/bridging  -CS     Rolling Left [place] (Bed Mobility) moderate assist (50% patient effort);1 person assist  -CS     Scooting/Bridging [place] (Bed Mobility) moderate assist (50% patient effort);1 person assist  -CS     Assistive Device (Bed Mobility) head of bed elevated;repositioning sheet  -CS     Comment, (Bed Mobility) appropriate sequencing gieven verbal cues for initiation, increased assist at trunk to achieve sitting posture  -CS       Row Name 10/21/24 1435          Bed-Chair Transfer    Bed-Chair [place] (Transfers) moderate assist (50% patient effort);1 person assist;verbal cues;nonverbal cues (demo/gesture)  -CS       Row Name 10/21/24 1435          Sit-Stand Transfer    Sit-Stand [place] (Transfers) minimum assist (75% patient effort);verbal cues;nonverbal cues (demo/gesture)  -CS       Row Name 10/21/24 1435          Functional Mobility    Functional Mobility- Comment defer to PT for specifics  -CS     Patient was able to Ambulate yes  -CS       Row Name 10/21/24 1435          Activities of Daily Living    BADL Assessment/Intervention lower body dressing;grooming;feeding;toileting;bathing  -CS       Row Name 10/21/24 1435          Lower Body Dressing Assessment/Training    [place] Level (Lower Body Dressing) don;socks;dependent (less than 25% patient effort);doff  -CS     Position (Lower Body Dressing) edge of bed sitting  -CS       Row Name 10/21/24 1435          Grooming Assessment/Training    [place] Level (Grooming) wash face, hands;set up;standby assist;hair care, combing/brushing;moderate assist (50% patient effort)  -CS       Row Name 10/21/24 1435          Self-Feeding Assessment/Training    [place] Level (Feeding) liquids to mouth;independent;set up  -      Position (Feeding) supported sitting  -CS       Row Name 10/21/24 1435          Bathing Assessment/Intervention    [place] Level (Bathing) lower body;proximal lower extremities;set up  -CS     Comment, (Bathing) thighs following urinary incontinence  -CS               User Key  (r) = Recorded By, (t) = Taken By, (c) = Cosigned By      Initials Name Provider Type     Shoaib Motta OT Occupational Therapist                   Obj/Interventions       Row Name 10/21/24 1446          Sensory Assessment (Somatosensory)    Sensory Assessment (Somatosensory) UE sensation intact  -CS       Row Name 10/21/24 1446          Vision Assessment/Intervention    Visual Impairment/Limitations WFL;corrective lenses full-time  -     Vision Assessment Comment bifocals  -CS       Row Name 10/21/24 1446          Range of Motion Comprehensive    General Range of Motion bilateral upper extremity ROM WFL  -CS       Row Name 10/21/24 1446          Strength Comprehensive (MMT)    General Manual Muscle Testing (MMT) Assessment upper extremity strength deficits identified  -     Comment, General Manual Muscle Testing (MMT) Assessment BUE grossly 4-/5  -CS       Row Name 10/21/24 1446          Motor Skills    Motor Skills coordination;functional endurance  -     Coordination bimanual skills;WFL  -     Functional Endurance moderate, easily fatigued  -CS       Row Name 10/21/24 1446          Balance    Balance Assessment sitting static balance;sitting dynamic balance;standing static balance;standing dynamic balance  -CS     Static Sitting Balance standby assist  -CS     Dynamic Sitting Balance contact guard  -CS     Position, Sitting Balance unsupported;sitting edge of bed  -CS     Static Standing Balance minimal assist  -     Dynamic Standing Balance moderate assist  -CS     Position/Device Used, Standing Balance supported  -CS     Balance Interventions sitting;standing;sit to stand;occupation based/functional task  -CS                User Key  (r) = Recorded By, (t) = Taken By, (c) = Cosigned By      Initials Name Provider Type    CS Shoaib Motta, OT Occupational Therapist                   Goals/Plan       Row Name 10/21/24 1451          Bed Mobility Goal 1 (OT)    Activity/Assistive Device (Bed Mobility Goal 1, OT) sit to supine;supine to sit;scooting  -CS     [place] Level/Cues Needed (Bed Mobility Goal 1, OT) standby assist  -CS     Time Frame (Bed Mobility Goal 1, OT) short term goal (STG);1 week  -CS       Row Name 10/21/24 1451          Dressing Goal 1 (OT)    Activity/Device (Dressing Goal 1, OT) upper body dressing;lower body dressing  -CS     [place]/Cues Needed (Dressing Goal 1, OT) minimum assist (75% or more patient effort);moderate assist (50-74% patient effort)  -CS     Time Frame (Dressing Goal 1, OT) long term goal (LTG);10 days  -CS       Row Name 10/21/24 1451          Grooming Goal 1 (OT)    Activity/Device (Grooming Goal 1, OT) hair care;oral care;wash face, hands  -CS     [place] (Grooming Goal 1, OT) set-up required  -CS     Time Frame (Grooming Goal 1, OT) short term goal (STG);1 week  -CS     Strategies/Barriers (Grooming Goal 1, OT) unsupported sitting  -CS     Progress/Outcome (Grooming Goal 1, OT) new goal  -CS       Row Name 10/21/24 1451          Strength Goal 1 (OT)    Strength Goal 1 (OT) Increase gross BUE strength 1/2 muscle grade to Ind safety/Ind with ADLs and related transfers  -CS     Time Frame (Strength Goal 1, OT) long term goal (LTG);10 days  -CS     Progress/Outcome (Strength Goal 1, OT) new goal  -CS       Row Name 10/21/24 1451          Therapy Assessment/Plan (OT)    Planned Therapy Interventions (OT) activity tolerance training;functional balance retraining;occupation/activity based interventions;ROM/therapeutic exercise;strengthening exercise;transfer/mobility retraining;neuromuscular control/coordination retraining;patient/caregiver education/training;adaptive equipment  training  -CS               User Key  (r) = Recorded By, (t) = Taken By, (c) = Cosigned By      Initials Name Provider Type    CS Shoaib Motta, OT Occupational Therapist                   Clinical Impression       Row Name 10/21/24 1445          Pain Assessment    Additional Documentation Pain Scale: FACES Pre/Post-Treatment (Group)  -CS       Row Name 10/21/24 1447          Pain Scale: FACES Pre/Post-Treatment    Posttreatment Pain Rating 0-->no hurt  -CS       Row Name 10/21/24 1443          Plan of Care Review    Plan of Care Reviewed With patient  -CS     Progress no change  -CS     Outcome Evaluation Pt presents w/ strength, endurance, and balance deficits warranting skilled IP OT services to promote return to PLOF. Recent falls and functional decline, OT recommends SNF rehab for best functional outcomes.  -CS       Row Name 10/21/24 1444          Therapy Assessment/Plan (OT)    Patient/Family Therapy Goal Statement (OT) regain Ind and strength  -CS     Rehab Potential (OT) good  -CS     Criteria for Skilled Therapeutic Interventions Met (OT) yes;skilled treatment is necessary;meets criteria  -CS     Therapy Frequency (OT) daily  -CS       Row Name 10/21/24 1441          Therapy Plan Review/Discharge Plan (OT)    Anticipated Discharge Disposition (OT) skilled nursing facility  -CS       Row Name 10/21/24 1443          Vital Signs    Pre Systolic BP Rehab --  RN cleared for eval  -CS     O2 Delivery Pre Treatment room air  -CS     O2 Delivery Intra Treatment room air  -CS     O2 Delivery Post Treatment room air  -CS     Pre Patient Position Supine  -CS     Intra Patient Position Standing  -CS     Post Patient Position Sitting  -CS       Row Name 10/21/24 1443          Positioning and Restraints    Pre-Treatment Position in bed  -CS     Post Treatment Position chair  -CS     In Chair notified nsg;reclined;sitting;call light within reach;encouraged to call for assist;exit alarm on;RUE elevated;LUE  elevated;legs elevated;on mechanical lift sling;waffle cushion  -CS               User Key  (r) = Recorded By, (t) = Taken By, (c) = Cosigned By      Initials Name Provider Type    Shoaib Shahid OT Occupational Therapist                   Outcome Measures       Row Name 10/21/24 7233          How much help from another is currently needed...    Putting on and taking off regular lower body clothing? 2  -CS     Bathing (including washing, rinsing, and drying) 2  -CS     Toileting (which includes using toilet bed pan or urinal) 2  -CS     Putting on and taking off regular upper body clothing 3  -CS     Taking care of personal grooming (such as brushing teeth) 3  -CS     Eating meals 3  -CS     AM-PAC 6 Clicks Score (OT) 15  -       Row Name 10/21/24 0800          How much help from another person do you currently need...    Turning from your back to your side while in flat bed without using bedrails? 2  -LH     Moving from lying on back to sitting on the side of a flat bed without bedrails? 2  -LH     Moving to and from a bed to a chair (including a wheelchair)? 2  -LH     Standing up from a chair using your arms (e.g., wheelchair, bedside chair)? 2  -LH     Climbing 3-5 steps with a railing? 2  -LH     To walk in hospital room? 2  -     AM-PAC 6 Clicks Score (PT) 12  -       Row Name 10/21/24 8313          Functional Assessment    Outcome Measure Options AM-PAC 6 Clicks Daily Activity (OT)  -CS               User Key  (r) = Recorded By, (t) = Taken By, (c) = Cosigned By      Initials Name Provider Type    Shoaib Shahid OT Occupational Therapist     Yeny Jeffries, RN Registered Nurse                    Occupational Therapy Education       Title: PT OT SLP Therapies (In Progress)       Topic: Occupational Therapy (In Progress)       Point: ADL training (Done)       Description:   Instruct learner(s) on proper safety adaptation and remediation techniques during self care or transfers.   Instruct in  proper use of assistive devices.                  Learning Progress Summary            Patient Acceptance, E,D, VU,NR by  at 10/21/2024 1453                      Point: Home exercise program (Not Started)       Description:   Instruct learner(s) on appropriate technique for monitoring, assisting and/or progressing therapeutic exercises/activities.                  Learner Progress:  Not documented in this visit.              Point: Precautions (Done)       Description:   Instruct learner(s) on prescribed precautions during self-care and functional transfers.                  Learning Progress Summary            Patient Acceptance, E,D, VU,NR by  at 10/21/2024 1453                      Point: Body mechanics (Done)       Description:   Instruct learner(s) on proper positioning and spine alignment during self-care, functional mobility activities and/or exercises.                  Learning Progress Summary            Patient Acceptance, E,D, VU,NR by  at 10/21/2024 1453                                      User Key       Initials Effective Dates Name Provider Type Discipline     06/16/21 -  Shoaib Motta, OT Occupational Therapist OT                  OT Recommendation and Plan  Planned Therapy Interventions (OT): activity tolerance training, functional balance retraining, occupation/activity based interventions, ROM/therapeutic exercise, strengthening exercise, transfer/mobility retraining, neuromuscular control/coordination retraining, patient/caregiver education/training, adaptive equipment training  Therapy Frequency (OT): daily  Plan of Care Review  Plan of Care Reviewed With: patient  Progress: no change  Outcome Evaluation: Pt presents w/ strength, endurance, and balance deficits warranting skilled IP OT services to promote return to PLOF. Recent falls and functional decline, OT recommends SNF rehab for best functional outcomes.     Time Calculation:   Evaluation Complexity (OT)  Review Occupational  Profile/Medical/Therapy History Complexity: brief/low complexity  Assessment, Occupational Performance/Identification of Deficit Complexity: 3-5 performance deficits  Clinical Decision Making Complexity (OT): problem focused assessment/low complexity  Overall Complexity of Evaluation (OT): low complexity     Time Calculation- OT       Row Name 10/21/24 1454             Time Calculation- OT    OT Start Time 1346  -CS      OT Received On 10/21/24  -CS      OT Goal Re-Cert Due Date 10/31/24  -CS         Timed Charges    23936 - OT Therapeutic Activity Minutes 4  -CS      80358 - OT Self Care/Mgmt Minutes 6  -CS         Untimed Charges    OT Eval/Re-eval Minutes 47  -CS         Total Minutes    Timed Charges Total Minutes 10  -CS      Untimed Charges Total Minutes 47  -CS       Total Minutes 57  -CS                User Key  (r) = Recorded By, (t) = Taken By, (c) = Cosigned By      Initials Name Provider Type    CS Shoaib Motta, OT Occupational Therapist                  Therapy Charges for Today       Code Description Service Date Service Provider Modifiers Qty    99527149576 HC OT SELF CARE/MGMT/TRAIN EA 15 MIN 10/21/2024 Shoaib Motta, OT GO 1    80523852915 HC OT EVAL LOW COMPLEXITY 4 10/21/2024 Shoaib Motta, OT GO 1                 [place] MIMI Motta OT  10/21/2024

## 2024-10-21 NOTE — CONSULTS
"Diabetes Education  Assessment/Teaching    Patient Name:  Chey Robertson  YOB: 1935  MRN: [id number]  Admit Date:  10/18/2024      Assessment Date:  10/21/2024  Flowsheet Row Most Recent Value   General Information     Referral From: Other (comment)  [insulin teaching]   Height 160 cm (63\")   Height Method Stated   Weight 53.1 kg (117 lb)   Weight Method Stated   Are you currently involved in an activity/exercise program?  No   Do you have high blood pressure? yes   Are you currently being treated for high blood pressure? yes   Is patient pregnant? no   Pregnancy Assessment    Diabetes History    What type of diabetes do you have? Type 2   Length of Diabetes Diagnosis 10 + years   Current DM knowledge fair   Have you had diabetes education/teaching in the past? no   Do you test your blood sugar at home? yes   Frequency of checks 1-2 times per day   Meter type unknown   Who performs the test? staff at [facility]   Typical readings 100s   Have you had low blood sugar? (<70mg/dl) no   Have you had high blood sugar? (>140mg/dl) no   How would you rate your diabetes control? good   Education Preferences    What areas of diabetes would you like to learn about? avoiding high blood sugar   Nutrition Information    Are you currently following a special meal plan? never   Do you eat mostly at home or out of the house? at home   Do you use Food Assistance programs (WIC, food stamps, food bank)? no   Do you need information about Food Assistance programs? no   How many meals do you eat each day? 3   How many snacks do you eat each day? 0   What type of support do you currently use to help you with your health issues? Lives at [place] [facility] and meals are prepared and provided, identifies support in her son as well   Assessment Topics    Healthy Eating - Assessment Needs education   Being Active - Assessment N/A- unable to assess   Taking Medication - Assessment Needs education   Problem Solving - Assessment Needs education " "  Reducing Risk - Assessment Competent   Healthy Coping - Assessment Competent   Monitoring - Assessment Competent   DM Goals    Contact Plan Follow-up medical care            Flowsheet Row Most Recent Value   DM Education Needs    Meter Has own   Blood Glucose Target 150   Blood Glucose Target Range    Medication Oral  [pt states insulin was stopped \"earlier this year\"]   Problem Solving Hypoglycemia, Hyperglycemia, Signs, Treatment, Symptoms, Sick days   Reducing Risks A1C testing, Lipids, Blood pressure   Healthy Eating Reviewed meal plan   Motivation Moderate, Engaged   Teaching Method Explanation, Discussion, Handouts   Patient Response Verbalized understanding              Other Comments:  Total time spent reviewing chart, preparing education/materials, providing education at bedside, and coordinating care approx 30 minutes.    Consult for diabetes education received for insulin teaching. Chart reviewed. Pt was seen at bedside today. Permission given for visit.     Ms. Robertson states she has been back at [facility] since \"Thursday night\". There, they monitor her blood glucose \"1 or 2 times per day\" she believes, and administer her medications for her. States she takes metformin, is unsure if she takes januvia or not, and reports \"they stopped the insulin earlier this year\". Eats 3 meals per day, typically does not eat a bedtime snack. States she normally keeps snacks/hypo treatment in her room.    Reviewed with patient current A1c 4.9 and discussed its significance. Signs, symptoms, and treatment of hyperglycemia and hypoglycemia were discussed. Encouraged pt to eat 3 meals and consider bedtime snack to promote stable blood glucose levels overnight. Encouraged follow-up with her provider regarding A1c and medication regimen in the event that adjustments need to be made.     Provided patient with copy of [employer]'s \"What is Diabetes\" handout, \"Blood Glucose Goals\" handout, and \"What is A1c\" handout. " Encouraged her to review at her leisure.    Thank you for this consult.    Electronically signed by:  Alison Bond RN, [facility]  10/21/24 13:10 EDT

## 2024-10-22 LAB
ANION GAP SERPL CALCULATED.3IONS-SCNC: 9 MMOL/L (ref 5–15)
BUN SERPL-MCNC: 11 MG/DL (ref 8–23)
BUN/CREAT SERPL: 16.7 (ref 7–25)
CA-I SERPL ISE-MCNC: 1.16 MMOL/L (ref 1.15–1.3)
CALCIUM SPEC-SCNC: 8.5 MG/DL (ref 8.6–10.5)
CHLORIDE SERPL-SCNC: 104 MMOL/L (ref 98–107)
CO2 SERPL-SCNC: 25 MMOL/L (ref 22–29)
CREAT SERPL-MCNC: 0.66 MG/DL (ref 0.57–1)
DEPRECATED RDW RBC AUTO: 46.4 FL (ref 37–54)
EGFRCR SERPLBLD CKD-EPI 2021: 84.5 ML/MIN/1.73
ERYTHROCYTE [DISTWIDTH] IN BLOOD BY AUTOMATED COUNT: 13.9 % (ref 12.3–15.4)
GLUCOSE BLDC GLUCOMTR-MCNC: 140 MG/DL (ref 70–130)
GLUCOSE BLDC GLUCOMTR-MCNC: 151 MG/DL (ref 70–130)
GLUCOSE BLDC GLUCOMTR-MCNC: 225 MG/DL (ref 70–130)
GLUCOSE BLDC GLUCOMTR-MCNC: 247 MG/DL (ref 70–130)
GLUCOSE BLDC GLUCOMTR-MCNC: 306 MG/DL (ref 70–130)
GLUCOSE SERPL-MCNC: 150 MG/DL (ref 65–99)
HCT VFR BLD AUTO: 28.8 % (ref 34–46.6)
HGB BLD-MCNC: 9.6 G/DL (ref 12–15.9)
MAGNESIUM SERPL-MCNC: 1.8 MG/DL (ref 1.6–2.4)
MCH RBC QN AUTO: 30.3 PG (ref 26.6–33)
MCHC RBC AUTO-ENTMCNC: 33.3 G/DL (ref 31.5–35.7)
MCV RBC AUTO: 90.9 FL (ref 79–97)
PHOSPHATE SERPL-MCNC: 3.1 MG/DL (ref 2.5–4.5)
PLATELET # BLD AUTO: 44 10*3/MM3 (ref 140–450)
PMV BLD AUTO: 9.9 FL (ref 6–12)
POTASSIUM SERPL-SCNC: 4.5 MMOL/L (ref 3.5–5.2)
RBC # BLD AUTO: 3.17 10*6/MM3 (ref 3.77–5.28)
SODIUM SERPL-SCNC: 138 MMOL/L (ref 136–145)
WBC NRBC COR # BLD AUTO: 1.72 10*3/MM3 (ref 3.4–10.8)

## 2024-10-22 PROCEDURE — 99232 SBSQ HOSP IP/OBS MODERATE 35: CPT | Performed by: STUDENT IN AN ORGANIZED HEALTH CARE EDUCATION/TRAINING PROGRAM

## 2024-10-22 PROCEDURE — 83735 ASSAY OF MAGNESIUM: CPT | Performed by: FAMILY MEDICINE

## 2024-10-22 PROCEDURE — 97116 GAIT TRAINING THERAPY: CPT

## 2024-10-22 PROCEDURE — 63710000001 INSULIN LISPRO (HUMAN) PER 5 UNITS: Performed by: NURSE PRACTITIONER

## 2024-10-22 PROCEDURE — G0378 HOSPITAL OBSERVATION PER HR: HCPCS

## 2024-10-22 PROCEDURE — 82948 REAGENT STRIP/BLOOD GLUCOSE: CPT

## 2024-10-22 PROCEDURE — 80048 BASIC METABOLIC PNL TOTAL CA: CPT | Performed by: FAMILY MEDICINE

## 2024-10-22 PROCEDURE — 85027 COMPLETE CBC AUTOMATED: CPT | Performed by: FAMILY MEDICINE

## 2024-10-22 PROCEDURE — 25010000002 CEFTRIAXONE PER 250 MG: Performed by: NURSE PRACTITIONER

## 2024-10-22 PROCEDURE — 82330 ASSAY OF CALCIUM: CPT | Performed by: FAMILY MEDICINE

## 2024-10-22 PROCEDURE — 97530 THERAPEUTIC ACTIVITIES: CPT

## 2024-10-22 PROCEDURE — 84100 ASSAY OF PHOSPHORUS: CPT | Performed by: FAMILY MEDICINE

## 2024-10-22 RX ADMIN — METOPROLOL TARTRATE 25 MG: 25 TABLET, FILM COATED ORAL at 08:51

## 2024-10-22 RX ADMIN — PANTOPRAZOLE SODIUM 40 MG: 40 TABLET, DELAYED RELEASE ORAL at 08:51

## 2024-10-22 RX ADMIN — INSULIN LISPRO 3 UNITS: 100 INJECTION, SOLUTION INTRAVENOUS; SUBCUTANEOUS at 11:31

## 2024-10-22 RX ADMIN — APIXABAN 2.5 MG: 2.5 TABLET, FILM COATED ORAL at 08:51

## 2024-10-22 RX ADMIN — INSULIN LISPRO 5 UNITS: 100 INJECTION, SOLUTION INTRAVENOUS; SUBCUTANEOUS at 21:02

## 2024-10-22 RX ADMIN — AMIODARONE HYDROCHLORIDE 200 MG: 200 TABLET ORAL at 08:51

## 2024-10-22 RX ADMIN — ACETAMINOPHEN 500 MG: 500 TABLET ORAL at 03:45

## 2024-10-22 RX ADMIN — OXYCODONE HYDROCHLORIDE AND ACETAMINOPHEN 500 MG: 500 TABLET ORAL at 08:51

## 2024-10-22 RX ADMIN — INSULIN LISPRO 3 UNITS: 100 INJECTION, SOLUTION INTRAVENOUS; SUBCUTANEOUS at 17:29

## 2024-10-22 RX ADMIN — Medication 10 ML: at 21:02

## 2024-10-22 RX ADMIN — LEVOTHYROXINE SODIUM 100 MCG: 0.1 TABLET ORAL at 06:22

## 2024-10-22 RX ADMIN — SODIUM CHLORIDE 1000 MG: 900 INJECTION INTRAVENOUS at 21:02

## 2024-10-22 RX ADMIN — Medication 10 ML: at 08:52

## 2024-10-22 RX ADMIN — APIXABAN 2.5 MG: 2.5 TABLET, FILM COATED ORAL at 21:01

## 2024-10-22 RX ADMIN — METOPROLOL TARTRATE 25 MG: 25 TABLET, FILM COATED ORAL at 21:01

## 2024-10-22 NOTE — CASE MANAGEMENT/SOCIAL WORK
Continued Stay Note  [facility]     Patient Name: Chey Robertson  MRN: [id number]  Today's Date: 10/22/2024    Admit Date: 10/18/2024    Plan: [facility]   Discharge Plan       Row Name 10/22/24 1504       Plan    Plan [facility] [place]    Plan Comments Spoke with Charlotte at [facility].  Patient has been approved by nursing and she anticipates having an available bed later this week.  Left voicemail to update patient's son, Joe by phone.  CM will continue to follow.                   Discharge Codes    No documentation.                 Expected Discharge Date and Time       Expected Discharge Date Expected Discharge Time    Oct 23, 2024               Diana Navarro RN

## 2024-10-22 NOTE — PLAN OF CARE
Goal Outcome Evaluation:  Plan of Care Reviewed With: patient        Progress: improving  Outcome Evaluation: Pt with good effort and increased ambulation distance to 10' with min Ax2 and RW. She continues to demonstrate decreased weight acceptance onto RLE. No overt LOB. Further IPPT is warrented for addressing deficits related to strength, endurance, and balance. PT is updating d/c rec to SNF.    Anticipated Discharge Disposition (PT): skilled nursing facility

## 2024-10-22 NOTE — PROGRESS NOTES
[facility] Medicine Services  PROGRESS NOTE    Patient Name: Chey Robertson  : 1935  MRN: [id number]    Date of Admission: 10/18/2024  Primary Care Physician: Yasmeen Loomis MD    Subjective   Subjective     CC:  Falls    HPI:  Patient felt well this morning, no new complaints. Ready for discharge back to [facility] once accepted.      Objective   Objective     Vital Signs:   Temp:  [97 °F (36.1 °C)-98.9 °F (37.2 °C)] 97 °F (36.1 °C)  Heart Rate:  [63-72] 63  Resp:  [16-18] 18  BP: (125-138)/(52-64) 125/52     Physical Exam:  Constitutional: Awake, alert, resting comfortably  HENT: NCAT, mucous membranes moist  Respiratory: Clear to auscultation bilaterally, respiratory effort normal   Cardiovascular: RRR, no murmurs, rubs, or gallops  Gastrointestinal: soft, nontender, nondistended  Musculoskeletal: No bilateral ankle edema  Psychiatric: Appropriate affect, cooperative  Neurologic: Alert, oriented x 3, no focal deficits, speech clear, generalized weakness present  Skin: No rashes      Results Reviewed:  LAB RESULTS:      Lab 10/22/24  0824 10/21/24  0919 10/19/24  1636 10/18/24  2340   WBC 1.72* 1.25* 1.20* 1.60*   HEMOGLOBIN 9.6* 10.2* 9.1* 9.0*   HEMATOCRIT 28.8* 30.1* 26.9* 27.6*   PLATELETS 44* 54* 52* 58*   NEUTROS ABS  --   --  0.58* 0.77*   IMMATURE GRANS (ABS)  --   --   --  0.02   LYMPHS ABS  --   --   --  0.56*   MONOS ABS  --   --   --  0.25   EOS ABS  --   --  0.00 0.00   MCV 90.9 90.7 91.8 92.3         Lab 10/22/24  0824 10/21/24  0919 10/20/24  0627 10/19/24  1636 10/18/24  2340   SODIUM 138 141 139 138 139   POTASSIUM 4.5 4.3 3.7 3.7 3.9   CHLORIDE 104 105 104 102 101   CO2 25.0 24.0 26.0 25.0 22.0   ANION GAP 9.0 12.0 9.0 11.0 16.0*   BUN 11 13 13 17 20   CREATININE 0.66 0.72 0.66 0.75 1.03*   EGFR 84.5 80.5 84.5 76.7 52.4*   GLUCOSE 150* 172* 104* 159* 109*   CALCIUM 8.5* 8.2* 8.3* 8.4* 8.5*   IONIZED CALCIUM 1.16 1.13* 1.11*  --   --    MAGNESIUM 1.8 1.8 2.7* 1.5*  "1.4*   PHOSPHORUS 3.1 3.0 3.3  --   --    HEMOGLOBIN A1C  --   --   --  4.90  --          Lab 10/18/24  2340   TOTAL PROTEIN 5.8*   ALBUMIN 3.8   GLOBULIN 2.0   ALT (SGPT) 6   AST (SGOT) 10   BILIRUBIN 0.4   ALK PHOS 82                     Brief Urine Lab Results  (Last result in the past 365 days)        Color   Clarity   Blood   Leuk Est   Nitrite   Protein   CREAT   Urine HCG        10/18/24 2348 Yellow   Clear   Negative   Trace   Positive   Negative                   Cultures:  No results found for: \"BLOODCX\", \"URINECX\", \"WOUNDCX\", \"MRSACX\", \"RESPCX\", \"STOOLCX\"    Microbiology Results Abnormal       Procedure Component Value - Date/Time    Blood Culture - Blood, Arm, Left [[id number]]  (Normal) Collected: 10/19/24 0243    Lab Status: Preliminary result Specimen: Blood from Arm, Left Updated: 10/22/24 0700     Blood Culture No growth at 3 days    Blood Culture - Blood, Arm, Left [[id number]]  (Normal) Collected: 10/19/24 0151    Lab Status: Preliminary result Specimen: Blood from Arm, Left Updated: 10/22/24 0700     Blood Culture No growth at 3 days    Narrative:      Less than seven (7) mL's of blood was collected.  Insufficient quantity may yield false negative results.            No radiology results from the last 24 hrs    Results for orders placed during the hospital encounter of 06/05/23    Adult Transthoracic Echo Complete w/ Color, Spectral and Contrast if necessary per protocol    Interpretation Summary    Left ventricular systolic function is normal. Left ventricular ejection fraction appears to be 56 - 60%.    Left ventricular diastolic function was normal.    Estimated right ventricular systolic pressure from tricuspid regurgitation is normal (<35 mmHg).      Current medications:  Scheduled Meds:amiodarone, 200 mg, Oral, Daily  apixaban, 2.5 mg, Oral, Q12H  ascorbic acid, 500 mg, Oral, Daily  cefTRIAXone, 1,000 mg, Intravenous, Q24H  insulin lispro, 2-7 Units, Subcutaneous, 4x Daily AC & at " Bedtime  levothyroxine, 100 mcg, Oral, Q AM  metoprolol tartrate, 25 mg, Oral, BID  pantoprazole, 40 mg, Oral, Daily  sodium chloride, 10 mL, Intravenous, Q12H      Continuous Infusions:     PRN Meds:.  acetaminophen    senna-docusate sodium **AND** polyethylene glycol **AND** bisacodyl **AND** bisacodyl    Calcium Replacement - Follow Nurse / BPA Driven Protocol    dextrose    dextrose    glucagon (human recombinant)    Magnesium Standard Dose Replacement - Follow Nurse / BPA Driven Protocol    melatonin    ondansetron ODT **OR** ondansetron    Phosphorus Replacement - Follow Nurse / BPA Driven Protocol    Potassium Replacement - Follow Nurse / BPA Driven Protocol    sodium chloride    traMADol    Assessment & Plan   Assessment & Plan     Active Hospital Problems    Diagnosis  POA    **Falls [R29.6]  Not Applicable    DISHA (acute kidney injury) [N17.9]  Yes    Atrial fibrillation [I48.91]  Yes    Chronic anticoagulation [Z79.01]  Not Applicable    Type 2 diabetes mellitus without complication, without long-term current use of insulin [E11.9]  Yes    Essential hypertension [I10]  Yes    Myelodysplasia (myelodysplastic syndrome) [D46.9]  Yes    Personal history of pulmonary embolism [Z86.711]  Yes    Pancytopenia [D61.818]  Yes    Hypothyroidism (acquired) [E03.9]  Yes      Resolved Hospital Problems   No resolved problems to display.        Brief Hospital Course to date:  Chey Robertson is a 89 y.o. female with PMHx of PE/DVT, AFIB, chronic anticoagulation, pancytopenia related to myelodysplastic syndrome, hypothyroidism, T2DM who presented to the ED for evaluation of right leg pain after having 2 falls in 2 days at her assisted living facility. She was having difficulty ambulating due to pain.     This patient's problems and plans were partially entered by my partner and updated as appropriate by me 10/22/24.    Falls  Dizziness with standing  -Dizziness may be r/t dehydration vs hypoglycemia vs UTI  -XR R knee:  severe OA and osteopenia without acute osseous abnormality   -PT/OT recommending SNF to LTC on discharge. Fall precautions in place.    Acute UTI  -Urine culture growing E. Coli  -Continue IV ceftriaxone x 5 doses.     Mild DISHA, resolved   -likely due to dehydration   -resolved with IV fluids.     Pancytopenia  Myelodysplastic syndrome  -Per hematology notes 8/13/2024, will consider discontinuing Eliquis if platelets drop below 30,000 or patient has active bleeding  -Continue neutropenic precautions. Monitoring levels.     Atrial fibrillation   Hx DVT/PE  Chronic anticoagulation  -Continue amiodarone and metoprolol with hold parameters.  -Continue Eliquis 2.5 mg BID (reduced bec of above).     T2DM - insulin dependent  -A1c 4.9% this admission   -Continue low dose ssi, hold long acting for now, monitor for hypoglycemia.     Hypothyroidism  -Continue levothyroxine.     Hx of dysphagia  -pt reports improved, now on regular diet  -patient wishes to continue on comfort diet of regular, thin liquids. She is aware of the risks.     Goals of Care  -My practice partner discussed with patient on 10/19: Patient reported she would not want to have thickened liquids and understood risks of aspiration and death if she does not adhere to modified diet (recommended by SLP). She was able to express this desire with family present and at a later visit with bedside RN present, Marie Mendoza.  -With bedside RN present, patient confirmed she wants to be DNR/DNI. Order placed to reflect this.       Expected Discharge Location and Transportation: LTC   Expected Discharge   Expected Discharge Date: 10/23/2024; Expected Discharge Time:      VTE Prophylaxis:  Pharmacologic & mechanical VTE prophylaxis orders are present.         AM-PAC 6 Clicks Score (PT): 13 (10/22/24 0800)    CODE STATUS:   Code Status and Medical Interventions: No CPR (Do Not Attempt to Resuscitate); Limited Support; No intubation (DNI); witnessed by Marie  NIMO Mendoza   Ordered at: 10/19/24 1111     Medical Intervention Limits:    No intubation (DNI)     Level Of Support Discussed With:    Patient     Code Status (Patient has no pulse and is not breathing):    No CPR (Do Not Attempt to Resuscitate)     Medical Interventions (Patient has pulse or is breathing):    Limited Support     Comments:    witnessed by Marie Mendoza RN       Lisha Argueta, DO  10/22/24

## 2024-10-22 NOTE — THERAPY TREATMENT NOTE
Patient Name: Chey Robertson  : 1935    MRN: [id number]                              Today's Date: 10/22/2024       Admit Date: 10/18/2024    Visit Dx:     ICD-10-CM ICD-9-CM   1. Acute UTI  N39.0 599.0   2. Multiple falls  R29.6 V15.88   3. Failure of outpatient treatment  Z78.9 V49.89   4. Dysphagia, unspecified type  R13.10 787.20     Patient Active Problem List   Diagnosis    Benign familial tremor    AMS (altered mental status)    Pancytopenia    Hypothyroidism (acquired)    B12 deficiency    Abnormal intestinal absorption    Iron deficiency anemia due to chronic blood loss    Myelodysplasia (myelodysplastic syndrome)    Personal history of pulmonary embolism    Chronic anticoagulation    Essential hypertension    Type 2 diabetes mellitus without complication, without long-term current use of insulin    Atrial fibrillation    QT prolongation    Pericardial effusion    Severe malnutrition    Closed displaced intertrochanteric fracture of right femur, initial encounter    DISHA (acute kidney injury)    Moderate malnutrition    Falls     Past Medical History:   Diagnosis Date    A-fib     on eliquis    Anemia     Arthritis     Diabetes mellitus     checks sugar only when pt wants to     Disease of thyroid gland     History of transfusion     with knee surgery-  no reaction recalled.     [place] (hard of hearing)     no hearing aids    Hypertension     Migraine without aura and without status migrainosus, not intractable 2016    Myelodysplastic syndrome     Pulmonary emboli     (after knee surgery)    SOBOE (shortness of breath on exertion)     Tremors of nervous system     Vertigo     Wears dentures     upper     Wears glasses      Past Surgical History:   Procedure Laterality Date    BLADDER SURGERY      CATARACT EXTRACTION      bilat     CHOLECYSTECTOMY      COLONOSCOPY      HIP TROCHANTERIC NAILING WITH INTRAMEDULLARY HIP SCREW Right 2023    Procedure: HIP TROCHANTERIC NAILING WITH  INTRAMEDULLARY HIP SCREW RIGHT;  Surgeon: Augie Hobbs MD;  Location:  [facility];  Service: Orthopedics;  Laterality: Right;    HYSTERECTOMY      with bso    KYPHOPLASTY N/A 02/12/2021    Procedure: KYPHOPLASTY T11, T12 AND L4;  Surgeon: Matty Hearn MD;  Location:  [facility];  Service: Orthopedic Spine;  Laterality: N/A;    TONSILLECTOMY        General Information       Row Name 10/22/24 1546          Physical Therapy Time and Intention    Document Type therapy note (daily note)  -AB     Mode of Treatment physical therapy  -AB       Row Name 10/22/24 1546          General Information    Patient Profile Reviewed yes  -AB     Existing Precautions/Restrictions fall;other (see comments)  [place]  -AB     Barriers to Rehab medically complex;visual deficit  -AB       Row Name 10/22/24 1546          Cognition    Orientation Status (Cognition) oriented x 3  -AB       Row Name 10/22/24 1546          Safety Issues/Impairments Affecting Functional Mobility    Safety Issues Affecting Function (Mobility) awareness of need for assistance;insight into deficits/self-awareness;safety precaution awareness;safety precautions follow-through/compliance;sequencing abilities  -AB     Impairments Affecting Function (Mobility) balance;endurance/activity tolerance;strength;pain;range of motion (ROM)  -AB               User Key  (r) = Recorded By, (t) = Taken By, (c) = Cosigned By      Initials Name Provider Type    AB Lili Kim PT Physical Therapist                   Mobility       Row Name 10/22/24 1547          Bed Mobility    Bed Mobility supine-sit;scooting/bridging  -AB     Scooting/Bridging [place] (Bed Mobility) moderate assist (50% patient effort);2 person assist;verbal cues  -AB     Supine-Sit [place] (Bed Mobility) moderate assist (50% patient effort);2 person assist;verbal cues  -AB     Assistive Device (Bed Mobility) head of bed elevated;repositioning sheet  -AB     Comment, (Bed Mobility) Assist provided at  trunk to sit EOB.  -AB       Row Name 10/22/24 1547          Transfers    Comment, (Transfers) Cues for hand placement and sequencing. Pt transferred from bed>chair>BSC.  -AB       Row Name 10/22/24 1547          Bed-Chair Transfer    Bed-Chair [place] (Transfers) verbal cues;nonverbal cues (demo/gesture);minimum assist (75% patient effort);2 person assist  -AB     Assistive Device (Bed-Chair Transfers) walker, front-wheeled  -AB     Comment, (Bed-Chair Transfer) Decreased weight acceptance onto RLE noted.  -AB       Row Name 10/22/24 1547          Sit-Stand Transfer    Sit-Stand [place] (Transfers) verbal cues;nonverbal cues (demo/gesture);minimum assist (75% patient effort);2 person assist  -AB     Assistive Device (Sit-Stand Transfers) walker, front-wheeled  -AB     Comment, (Sit-Stand Transfer) cues to push up from surface she was standing from. STSx1 from bed, x1 from chair, x2 from BSC.  -AB       Row Name 10/22/24 1547          Gait/Stairs (Locomotion)    [place] Level (Gait) minimum assist (75% patient effort);2 person assist;verbal cues;nonverbal cues (demo/gesture)  -AB     Assistive Device (Gait) walker, front-wheeled  -AB     Patient was able to Ambulate yes  -AB     Distance in Feet (Gait) 10  -AB     Deviations/Abnormal Patterns (Gait) bilateral deviations;[place] decreased;gait speed decreased;antalgic;stride length decreased  -AB     Bilateral Gait Deviations forward flexed posture;heel strike decreased  -AB     Right Sided Gait Deviations weight shift ability decreased  -AB     Comment, (Gait/Stairs) Pt ambulated with step to gait pattern, decreased weight acceptance onto RLE, and forward flexed posture. Cues provided to keep walker closer to CHARLEEN and for increased swing phase for LLE. No overt LOB or knee buckling. Further activity limited by weakness and fatigue.  -AB               User Key  (r) = Recorded By, (t) = Taken By, (c) = Cosigned By      Initials Name Provider Type    AB  Lili Kim, PT Physical Therapist                   Obj/Interventions       Row Name 10/22/24 8400          Balance    Balance Assessment sitting static balance;sitting dynamic balance;standing static balance;standing dynamic balance  -AB     Static Sitting Balance standby assist  -AB     Dynamic Sitting Balance standby assist  -AB     Position, Sitting Balance unsupported;sitting edge of bed  -AB     Static Standing Balance contact guard  -AB     Dynamic Standing Balance minimal assist;2-person assist;non-verbal cues (demo/gesture);verbal cues  -AB     Position/Device Used, Standing Balance supported;walker, front-wheeled  -AB     Balance Interventions sitting;standing;sit to stand;supported;static;dynamic;occupation based/functional task  -AB     Comment, Balance min A to steady  -AB               User Key  (r) = Recorded By, (t) = Taken By, (c) = Cosigned By      Initials Name Provider Type    AB Lili Kim, PT Physical Therapist                   Goals/Plan    No documentation.                  Clinical Impression       Row Name 10/22/24 2301          Pain    Pretreatment Pain Rating 0/10 - no pain  -AB     Posttreatment Pain Rating 0/10 - no pain  -AB     Pain Location extremity  -AB     Pain Side/Orientation right;lower  -AB     Pain Management Interventions exercise or physical activity utilized;positioning techniques utilized;activity modification encouraged  -AB     Pre/Posttreatment Pain Comment Pt reports pain in RLE with WBing  -AB       Row Name 10/22/24 0081          Plan of Care Review    Plan of Care Reviewed With patient  -AB     Progress improving  -AB     Outcome Evaluation Pt with good effort and increased ambulation distance to 10' with min Ax2 and RW. She continues to demonstrate decreased weight acceptance onto RLE. No overt LOB. Further IPPT is warrented for addressing deficits related to strength, endurance, and balance. PT is updating d/c rec to SNF.  -AB       Row Name 10/22/24  1551          Vital Signs    Pre Systolic BP Rehab 128  -AB     Pre Treatment Diastolic BP 46  -AB     Pre SpO2 (%) 96  -AB     O2 Delivery Pre Treatment room air  -AB     O2 Delivery Intra Treatment room air  -AB     Post SpO2 (%) 94  -AB     O2 Delivery Post Treatment room air  -AB     Pre Patient Position Supine  -AB     Intra Patient Position Standing  -AB     Post Patient Position Sitting  -AB       Row Name 10/22/24 1551          Positioning and Restraints    Pre-Treatment Position in bed  -AB     Post Treatment Position chair  -AB     In Chair notified nsg;sitting;reclined;call light within reach;encouraged to call for assist;exit alarm on;legs elevated;waffle cushion  -AB               User Key  (r) = Recorded By, (t) = Taken By, (c) = Cosigned By      Initials Name Provider Type    Lili Nichols, PT Physical Therapist                   Outcome Measures       Row Name 10/22/24 1553 10/22/24 0800       How much help from another person do you currently need...    Turning from your back to your side while in flat bed without using bedrails? 2  -AB 3  -EC    Moving from lying on back to sitting on the side of a flat bed without bedrails? 2  -AB 3  -EC    Moving to and from a bed to a chair (including a wheelchair)? 3  -AB 2  -EC    Standing up from a chair using your arms (e.g., wheelchair, bedside chair)? 3  -AB 2  -EC    Climbing 3-5 steps with a railing? 1  -AB 1  -EC    To walk in hospital room? 3  -AB 2  -EC    AM-PAC 6 Clicks Score (PT) 14  -AB 13  -EC    Highest Level of Mobility Goal 4 --> Transfer to chair/commode  -AB 4 --> Transfer to chair/commode  -EC      Row Name 10/22/24 1553          Functional Assessment    Outcome Measure Options AM-PAC 6 Clicks Basic Mobility (PT)  -AB               User Key  (r) = Recorded By, (t) = Taken By, (c) = Cosigned By      Initials Name Provider Type    Lili Nichols, PT Physical Therapist    EC Luz Maria Parson, RN Registered Nurse                                  Physical Therapy Education       Title: PT OT SLP Therapies (In Progress)       Topic: Physical Therapy (In Progress)       Point: Mobility training (Done)       Learning Progress Summary            Patient Acceptance, E,D, VU,NR by AB at 10/22/2024 1553    Acceptance, E,D, NR by CT at 10/19/2024 1452                      Point: Home exercise program (In Progress)       Learning Progress Summary            Patient Acceptance, E,D, NR by CT at 10/19/2024 1452                      Point: Body mechanics (Done)       Learning Progress Summary            Patient Acceptance, E,D, VU,NR by AB at 10/22/2024 1553    Acceptance, E,D, NR by CT at 10/19/2024 1452                      Point: Precautions (Done)       Learning Progress Summary            Patient Acceptance, E,D, VU,NR by AB at 10/22/2024 1553    Acceptance, E,D, NR by CT at 10/19/2024 1452                                      User Key       Initials Effective Dates Name Provider Type Discipline    CT 07/07/23 -  Rony Villalobos, PT Physical Therapist PT    AB 09/22/22 -  Lili Kim, PT Physical Therapist PT                  PT Recommendation and Plan     Progress: improving  Outcome Evaluation: Pt with good effort and increased ambulation distance to 10' with min Ax2 and RW. She continues to demonstrate decreased weight acceptance onto RLE. No overt LOB. Further IPPT is warrented for addressing deficits related to strength, endurance, and balance. PT is updating d/c rec to SNF.     Time Calculation:         PT Charges       Row Name 10/22/24 1554             Time Calculation    Start Time 1427  -AB      PT Received On 10/22/24  -AB         Timed Charges    01598 - Gait Training Minutes  8  -AB      47462 - PT Therapeutic Activity Minutes 23  -AB         Total Minutes    Timed Charges Total Minutes 31  -AB       Total Minutes 31  -AB                User Key  (r) = Recorded By, (t) = Taken By, (c) = Cosigned By      Initials Name Provider Type     AB Liil Kim, PT Physical Therapist                  Therapy Charges for Today       Code Description Service Date Service Provider Modifiers Qty    48944814654 HC GAIT TRAINING EA 15 MIN 10/22/2024 Lili Kim, PT GP 1    90754647582 HC PT THERAPEUTIC ACT EA 15 MIN 10/22/2024 Lili Kim, PT GP 1    80135783963 HC PT THER SUPP EA 15 MIN 10/22/2024 Lili Kim, PT GP 2            PT G-Codes  Outcome Measure Options: AM-PAC 6 Clicks Basic Mobility (PT)  AM-PAC 6 Clicks Score (PT): 14  AM-PAC 6 Clicks Score (OT): 15  PT Discharge Summary  Anticipated Discharge Disposition (PT): skilled nursing facility    Lili Kim PT  10/22/2024

## 2024-10-23 LAB
DEPRECATED RDW RBC AUTO: 46.2 FL (ref 37–54)
ERYTHROCYTE [DISTWIDTH] IN BLOOD BY AUTOMATED COUNT: 13.9 % (ref 12.3–15.4)
GLUCOSE BLDC GLUCOMTR-MCNC: 171 MG/DL (ref 70–130)
GLUCOSE BLDC GLUCOMTR-MCNC: 198 MG/DL (ref 70–130)
GLUCOSE BLDC GLUCOMTR-MCNC: 222 MG/DL (ref 70–130)
GLUCOSE BLDC GLUCOMTR-MCNC: 254 MG/DL (ref 70–130)
HCT VFR BLD AUTO: 28.4 % (ref 34–46.6)
HGB BLD-MCNC: 9.4 G/DL (ref 12–15.9)
MCH RBC QN AUTO: 30.1 PG (ref 26.6–33)
MCHC RBC AUTO-ENTMCNC: 33.1 G/DL (ref 31.5–35.7)
MCV RBC AUTO: 91 FL (ref 79–97)
PLATELET # BLD AUTO: 41 10*3/MM3 (ref 140–450)
PMV BLD AUTO: 9.8 FL (ref 6–12)
RBC # BLD AUTO: 3.12 10*6/MM3 (ref 3.77–5.28)
WBC NRBC COR # BLD AUTO: 1.14 10*3/MM3 (ref 3.4–10.8)

## 2024-10-23 PROCEDURE — 82948 REAGENT STRIP/BLOOD GLUCOSE: CPT

## 2024-10-23 PROCEDURE — G0378 HOSPITAL OBSERVATION PER HR: HCPCS

## 2024-10-23 PROCEDURE — 99232 SBSQ HOSP IP/OBS MODERATE 35: CPT | Performed by: STUDENT IN AN ORGANIZED HEALTH CARE EDUCATION/TRAINING PROGRAM

## 2024-10-23 PROCEDURE — 63710000001 INSULIN LISPRO (HUMAN) PER 5 UNITS: Performed by: NURSE PRACTITIONER

## 2024-10-23 PROCEDURE — 85027 COMPLETE CBC AUTOMATED: CPT | Performed by: FAMILY MEDICINE

## 2024-10-23 RX ADMIN — INSULIN LISPRO 2 UNITS: 100 INJECTION, SOLUTION INTRAVENOUS; SUBCUTANEOUS at 18:12

## 2024-10-23 RX ADMIN — INSULIN LISPRO 2 UNITS: 100 INJECTION, SOLUTION INTRAVENOUS; SUBCUTANEOUS at 09:29

## 2024-10-23 RX ADMIN — OXYCODONE HYDROCHLORIDE AND ACETAMINOPHEN 500 MG: 500 TABLET ORAL at 09:30

## 2024-10-23 RX ADMIN — APIXABAN 2.5 MG: 2.5 TABLET, FILM COATED ORAL at 20:46

## 2024-10-23 RX ADMIN — PANTOPRAZOLE SODIUM 40 MG: 40 TABLET, DELAYED RELEASE ORAL at 09:30

## 2024-10-23 RX ADMIN — LEVOTHYROXINE SODIUM 100 MCG: 0.1 TABLET ORAL at 06:21

## 2024-10-23 RX ADMIN — INSULIN LISPRO 4 UNITS: 100 INJECTION, SOLUTION INTRAVENOUS; SUBCUTANEOUS at 13:15

## 2024-10-23 RX ADMIN — METOPROLOL TARTRATE 25 MG: 25 TABLET, FILM COATED ORAL at 09:30

## 2024-10-23 RX ADMIN — METOPROLOL TARTRATE 25 MG: 25 TABLET, FILM COATED ORAL at 20:46

## 2024-10-23 RX ADMIN — Medication 10 ML: at 09:33

## 2024-10-23 RX ADMIN — Medication 10 ML: at 20:46

## 2024-10-23 RX ADMIN — AMIODARONE HYDROCHLORIDE 200 MG: 200 TABLET ORAL at 09:30

## 2024-10-23 RX ADMIN — INSULIN LISPRO 4 UNITS: 100 INJECTION, SOLUTION INTRAVENOUS; SUBCUTANEOUS at 20:45

## 2024-10-23 RX ADMIN — APIXABAN 2.5 MG: 2.5 TABLET, FILM COATED ORAL at 10:44

## 2024-10-23 NOTE — PLAN OF CARE
Problem: Adult Inpatient Plan of Care  Goal: Plan of Care Review  Outcome: Progressing  Goal: Patient-Specific Goal (Individualized)  Outcome: Progressing  Goal: Absence of Hospital-Acquired Illness or Injury  Outcome: Progressing  Intervention: Identify and Manage Fall Risk  Recent Flowsheet Documentation  Taken 10/23/2024 0200 by Lorrie Lui RN  Safety Promotion/Fall Prevention:   activity supervised   safety round/check completed  Taken 10/23/2024 0000 by Lorrie Lui RN  Safety Promotion/Fall Prevention:   activity supervised   safety round/check completed  Taken 10/22/2024 2200 by Lorrie Lui RN  Safety Promotion/Fall Prevention:   activity supervised   safety round/check completed  Taken 10/22/2024 2000 by Lorrie Lui RN  Safety Promotion/Fall Prevention:   activity supervised   safety round/check completed  Intervention: Prevent Skin Injury  Recent Flowsheet Documentation  Taken 10/23/2024 0200 by Lorrie Lui RN  Body Position: position changed independently  Taken 10/23/2024 0000 by Lorrie Lui RN  Body Position: position changed independently  Taken 10/22/2024 2200 by Lorrie Lui RN  Body Position: position changed independently  Taken 10/22/2024 2000 by Lorrie Lui RN  Body Position: position changed independently  Intervention: Prevent and Manage VTE (Venous Thromboembolism) Risk  Recent Flowsheet Documentation  Taken 10/22/2024 2000 by Lorrie Lui RN  VTE Prevention/Management:   bilateral   SCDs (sequential compression devices) on  Intervention: Prevent Infection  Recent Flowsheet Documentation  Taken 10/23/2024 0200 by Lorrie Lui RN  Infection Prevention: environmental surveillance performed  Taken 10/23/2024 0000 by Lorrie Lui RN  Infection Prevention: environmental surveillance performed  Taken 10/22/2024 2200 by Lorrie Lui RN  Infection Prevention: environmental surveillance performed  Taken 10/22/2024 2000 by Hu  NIMO Andrew  Infection Prevention: environmental surveillance performed  Goal: Optimal Comfort and Wellbeing  Outcome: Progressing  Goal: Readiness for Transition of Care  Outcome: Progressing     Problem: Skin Injury Risk Increased  Goal: Skin Health and Integrity  Outcome: Progressing  Intervention: Optimize Skin Protection  Recent Flowsheet Documentation  Taken 10/23/2024 0200 by Lorrie Lui RN  Activity Management: activity encouraged  Head of Bed (HOB) Positioning: HOB elevated  Taken 10/23/2024 0000 by Lorrie Lui RN  Activity Management: activity encouraged  Head of Bed (HOB) Positioning: HOB elevated  Taken 10/22/2024 2200 by Lorrie Lui RN  Activity Management: activity encouraged  Head of Bed (HOB) Positioning: HOB elevated  Taken 10/22/2024 2000 by Lorrie Lui RN  Activity Management: activity encouraged  Head of Bed (HOB) Positioning: HOB elevated  Goal: Skin Health and Integrity  Outcome: Progressing  Intervention: Optimize Skin Protection  Recent Flowsheet Documentation  Taken 10/23/2024 0200 by Lorrie Lui RN  Activity Management: activity encouraged  Head of Bed (HOB) Positioning: HOB elevated  Taken 10/23/2024 0000 by Lorrie Lui RN  Activity Management: activity encouraged  Head of Bed (HOB) Positioning: HOB elevated  Taken 10/22/2024 2200 by Lorrie Lui RN  Activity Management: activity encouraged  Head of Bed (HOB) Positioning: HOB elevated  Taken 10/22/2024 2000 by Lorrie Lui RN  Activity Management: activity encouraged  Head of Bed (HOB) Positioning: HOB elevated     Problem: Fall Injury Risk  Goal: Absence of Fall and Fall-Related Injury  Outcome: Progressing  Intervention: Identify and Manage Contributors  Recent Flowsheet Documentation  Taken 10/23/2024 0200 by Lorrie Lui RN  Medication Review/Management: medications reviewed  Taken 10/23/2024 0000 by Lorrie Lui, RN  Medication Review/Management: medications reviewed  Taken  10/22/2024 2200 by Lorrie Lui RN  Medication Review/Management: medications reviewed  Taken 10/22/2024 2000 by Lorrie Lui RN  Medication Review/Management: medications reviewed  Intervention: Promote Injury-Free Environment  Recent Flowsheet Documentation  Taken 10/23/2024 0200 by Lorrie Lui RN  Safety Promotion/Fall Prevention:   activity supervised   safety round/check completed  Taken 10/23/2024 0000 by Lorrie Lui RN  Safety Promotion/Fall Prevention:   activity supervised   safety round/check completed  Taken 10/22/2024 2200 by Lorrie Lui RN  Safety Promotion/Fall Prevention:   activity supervised   safety round/check completed  Taken 10/22/2024 2000 by Lorrie Lui RN  Safety Promotion/Fall Prevention:   activity supervised   safety round/check completed     Problem: UTI (Urinary Tract Infection)  Goal: Improved Infection Symptoms  Outcome: Progressing  Intervention: Facilitate Optimal Urinary Elimination  Recent Flowsheet Documentation  Taken 10/23/2024 0200 by Lorrie Lui RN  Urinary Elimination Promotion:   absorbent pad/diaper use encouraged   catheter patency maintained  Taken 10/23/2024 0000 by Lorrie Lui RN  Urinary Elimination Promotion:   absorbent pad/diaper use encouraged   catheter patency maintained  Taken 10/22/2024 2200 by Lorrie Lui RN  Urinary Elimination Promotion:   absorbent pad/diaper use encouraged   catheter patency maintained  Taken 10/22/2024 2000 by Lorrie Lui RN  Urinary Elimination Promotion:   absorbent pad/diaper use encouraged   catheter patency maintained     Problem: Neutropenia  Goal: Absence of Infection  Outcome: Progressing  Intervention: Prevent Infection and Maximize Resistance  Recent Flowsheet Documentation  Taken 10/23/2024 0200 by Lorrie Lui RN  Perineal Care: perineum cleansed  Infection Prevention: environmental surveillance performed  Taken 10/23/2024 0000 by Lorrie Lui RN  Perineal  Care: perineum cleansed  Infection Prevention: environmental surveillance performed  Taken 10/22/2024 2200 by Lorrie Lui RN  Perineal Care: perineum cleansed  Infection Prevention: environmental surveillance performed  Taken 10/22/2024 2000 by Lorrie Lui RN  Perineal Care: perineum cleansed  Infection Prevention: environmental surveillance performed     Problem: Dysrhythmia  Goal: Normalized Cardiac Rhythm  Outcome: Progressing  Intervention: Monitor and Manage Cardiac Rhythm Effect  Recent Flowsheet Documentation  Taken 10/22/2024 2000 by Lorrie Lui RN  VTE Prevention/Management:   bilateral   SCDs (sequential compression devices) on   Goal Outcome Evaluation:

## 2024-10-23 NOTE — CASE MANAGEMENT/SOCIAL WORK
Continued Stay Note  [facility]     Patient Name: Chey Robertson  MRN: [id number]  Today's Date: 10/23/2024    Admit Date: 10/18/2024    Plan: [facility]   Discharge Plan       Row Name 10/23/24 0928       Plan    Plan [facility]    Plan Comments Patient's plan is a skilled bed at [facility] PENDING insurance.  Spoke with [facility] and they will initiate precert with patient's insurance today.  CM will continue to follow.    Final Discharge Disposition Code 03 - skilled nursing facility (SNF)                   Discharge Codes    No documentation.                 Expected Discharge Date and Time       Expected Discharge Date Expected Discharge Time    Oct 24, 2024               Diana Navarro RN

## 2024-10-23 NOTE — PROGRESS NOTES
[facility] Medicine Services  PROGRESS NOTE    Patient Name: Chey Robertson  : 1935  MRN: [id number]    Date of Admission: 10/18/2024  Primary Care Physician: Yasmeen Loomis MD    Subjective   Subjective     CC:  Falls    HPI:  Patient feels well, eating breakfast, awaiting rehab placement.      Objective   Objective     Vital Signs:   Temp:  [97.7 °F (36.5 °C)-98.6 °F (37 °C)] 97.7 °F (36.5 °C)  Heart Rate:  [55-68] 55  Resp:  [16-18] 16  BP: (125-139)/(49-70) 125/49     Physical Exam:  Constitutional: Awake, alert, resting comfortably  HENT: NCAT, mucous membranes moist  Respiratory: Clear to auscultation bilaterally, respiratory effort normal   Cardiovascular: Bradycardic  Gastrointestinal: soft, nontender, nondistended  Musculoskeletal: No bilateral ankle edema  Psychiatric: Appropriate affect, cooperative  Neurologic: Alert, oriented x 3, no focal deficits, speech clear, generalized weakness present  Skin: No rashes      Results Reviewed:  LAB RESULTS:      Lab 10/23/24  0845 10/22/24  0824 10/21/24  0919 10/19/24  1636 10/18/24  2340   WBC 1.14* 1.72* 1.25* 1.20* 1.60*   HEMOGLOBIN 9.4* 9.6* 10.2* 9.1* 9.0*   HEMATOCRIT 28.4* 28.8* 30.1* 26.9* 27.6*   PLATELETS 41* 44* 54* 52* 58*   NEUTROS ABS  --   --   --  0.58* 0.77*   IMMATURE GRANS (ABS)  --   --   --   --  0.02   LYMPHS ABS  --   --   --   --  0.56*   MONOS ABS  --   --   --   --  0.25   EOS ABS  --   --   --  0.00 0.00   MCV 91.0 90.9 90.7 91.8 92.3         Lab 10/22/24  0824 10/21/24  0919 10/20/24  0627 10/19/24  1636 10/18/24  2340   SODIUM 138 141 139 138 139   POTASSIUM 4.5 4.3 3.7 3.7 3.9   CHLORIDE 104 105 104 102 101   CO2 25.0 24.0 26.0 25.0 22.0   ANION GAP 9.0 12.0 9.0 11.0 16.0*   BUN 11 13 13 17 20   CREATININE 0.66 0.72 0.66 0.75 1.03*   EGFR 84.5 80.5 84.5 76.7 52.4*   GLUCOSE 150* 172* 104* 159* 109*   CALCIUM 8.5* 8.2* 8.3* 8.4* 8.5*   IONIZED CALCIUM 1.16 1.13* 1.11*  --   --    MAGNESIUM 1.8 1.8  "2.7* 1.5* 1.4*   PHOSPHORUS 3.1 3.0 3.3  --   --    HEMOGLOBIN A1C  --   --   --  4.90  --          Lab 10/18/24  2340   TOTAL PROTEIN 5.8*   ALBUMIN 3.8   GLOBULIN 2.0   ALT (SGPT) 6   AST (SGOT) 10   BILIRUBIN 0.4   ALK PHOS 82                     Brief Urine Lab Results  (Last result in the past 365 days)        Color   Clarity   Blood   Leuk Est   Nitrite   Protein   CREAT   Urine HCG        10/18/24 2348 Yellow   Clear   Negative   Trace   Positive   Negative                   Cultures:  No results found for: \"BLOODCX\", \"URINECX\", \"WOUNDCX\", \"MRSACX\", \"RESPCX\", \"STOOLCX\"    Microbiology Results Abnormal       Procedure Component Value - Date/Time    Blood Culture - Blood, Arm, Left [[id number]]  (Normal) Collected: 10/19/24 0243    Lab Status: Preliminary result Specimen: Blood from Arm, Left Updated: 10/23/24 0700     Blood Culture No growth at 4 days    Blood Culture - Blood, Arm, Left [[id number]]  (Normal) Collected: 10/19/24 0151    Lab Status: Preliminary result Specimen: Blood from Arm, Left Updated: 10/23/24 0700     Blood Culture No growth at 4 days    Narrative:      Less than seven (7) mL's of blood was collected.  Insufficient quantity may yield false negative results.            No radiology results from the last 24 hrs    Results for orders placed during the hospital encounter of 06/05/23    Adult Transthoracic Echo Complete w/ Color, Spectral and Contrast if necessary per protocol    Interpretation Summary    Left ventricular systolic function is normal. Left ventricular ejection fraction appears to be 56 - 60%.    Left ventricular diastolic function was normal.    Estimated right ventricular systolic pressure from tricuspid regurgitation is normal (<35 mmHg).      Current medications:  Scheduled Meds:amiodarone, 200 mg, Oral, Daily  apixaban, 2.5 mg, Oral, Q12H  ascorbic acid, 500 mg, Oral, Daily  insulin lispro, 2-7 Units, Subcutaneous, 4x Daily AC & at Bedtime  levothyroxine, 100 mcg, Oral, Q " AM  metoprolol tartrate, 25 mg, Oral, BID  pantoprazole, 40 mg, Oral, Daily  sodium chloride, 10 mL, Intravenous, Q12H      Continuous Infusions:     PRN Meds:.  acetaminophen    senna-docusate sodium **AND** polyethylene glycol **AND** bisacodyl **AND** bisacodyl    Calcium Replacement - Follow Nurse / BPA Driven Protocol    dextrose    dextrose    glucagon (human recombinant)    Magnesium Standard Dose Replacement - Follow Nurse / BPA Driven Protocol    melatonin    ondansetron ODT **OR** ondansetron    Phosphorus Replacement - Follow Nurse / BPA Driven Protocol    Potassium Replacement - Follow Nurse / BPA Driven Protocol    sodium chloride    traMADol    Assessment & Plan   Assessment & Plan     Active Hospital Problems    Diagnosis  POA    **Falls [R29.6]  Not Applicable    DISHA (acute kidney injury) [N17.9]  Yes    Atrial fibrillation [I48.91]  Yes    Chronic anticoagulation [Z79.01]  Not Applicable    Type 2 diabetes mellitus without complication, without long-term current use of insulin [E11.9]  Yes    Essential hypertension [I10]  Yes    Myelodysplasia (myelodysplastic syndrome) [D46.9]  Yes    Personal history of pulmonary embolism [Z86.711]  Yes    Pancytopenia [D61.818]  Yes    Hypothyroidism (acquired) [E03.9]  Yes      Resolved Hospital Problems   No resolved problems to display.        Brief Hospital Course to date:  Chey Robertson is a 89 y.o. female with PMHx of PE/DVT, AFIB, chronic anticoagulation, pancytopenia related to myelodysplastic syndrome, hypothyroidism, T2DM who presented to the ED for evaluation of right leg pain after having 2 falls in 2 days at her assisted living facility. She was having difficulty ambulating due to pain.     This patient's problems and plans were partially entered by my partner and updated as appropriate by me 10/23/24.    Falls  Dizziness with standing  -Dizziness may be r/t dehydration vs hypoglycemia vs UTI  -Orthostatic vital signs negative on 10/23/2024  -XR R  knee: severe OA and osteopenia   -PT/OT recommended SNF to LTC on discharge. Fall precautions in place.     Acute UTI  -Urine culture growing E. Coli  -s/p IV ceftriaxone x 5 doses.     Mild DISHA, resolved   -likely due to dehydration   -resolved with IV fluids.     Pancytopenia  Myelodysplastic syndrome  -Per hematology notes 8/13/2024, will consider discontinuing Eliquis if platelets drop below 30,000 or patient has active bleeding.  -Continue neutropenic precautions. Monitoring blood counts.     Atrial fibrillation   Hx DVT/PE  Chronic anticoagulation  -Continue amiodarone and metoprolol with hold parameters.  -Continue Eliquis 2.5 mg BID (reduced bec of above).     T2DM - insulin dependent  -A1c 4.9% this admission   -Continue low dose SSI, hold long acting for now, monitor for hypoglycemia.     Hypothyroidism  -Continue levothyroxine.     Hx of dysphagia  -pt reports improved, now on regular diet  -patient wishes to continue on comfort diet of regular, thin liquids. She is aware of the risks.     Goals of Care  -My practice partner discussed with patient on 10/19: Patient reported she would not want to have thickened liquids and understood risks of aspiration and death if she does not adhere to modified diet (recommended by SLP). She was able to express this desire with family present and at a later visit with bedside RN present, Marie Mendoza.  -With bedside RN present, patient confirmed she wants to be DNR/DNI. Order placed to reflect this.       Expected Discharge Location and Transportation: LTC   Expected Discharge   Expected Discharge Date: 10/24/2024; Expected Discharge Time:      VTE Prophylaxis:  Pharmacologic & mechanical VTE prophylaxis orders are present.         AM-PAC 6 Clicks Score (PT): 13 (10/22/24 2000)    CODE STATUS:   Code Status and Medical Interventions: No CPR (Do Not Attempt to Resuscitate); Limited Support; No intubation (DNI); witnessed by Marie Mendoza RN   Ordered at:  10/19/24 1111     Medical Intervention Limits:    No intubation (DNI)     Level Of Support Discussed With:    Patient     Code Status (Patient has no pulse and is not breathing):    No CPR (Do Not Attempt to Resuscitate)     Medical Interventions (Patient has pulse or is breathing):    Limited Support     Comments:    witnessed by Marie Mendoza RN       Lisha Argueta, DO  10/23/24

## 2024-10-24 LAB
BACTERIA SPEC AEROBE CULT: NORMAL
BACTERIA SPEC AEROBE CULT: NORMAL
DEPRECATED RDW RBC AUTO: 45.3 FL (ref 37–54)
ERYTHROCYTE [DISTWIDTH] IN BLOOD BY AUTOMATED COUNT: 13.9 % (ref 12.3–15.4)
GLUCOSE BLDC GLUCOMTR-MCNC: 171 MG/DL (ref 70–130)
GLUCOSE BLDC GLUCOMTR-MCNC: 284 MG/DL (ref 70–130)
GLUCOSE BLDC GLUCOMTR-MCNC: 294 MG/DL (ref 70–130)
GLUCOSE BLDC GLUCOMTR-MCNC: 365 MG/DL (ref 70–130)
HCT VFR BLD AUTO: 28 % (ref 34–46.6)
HGB BLD-MCNC: 9.2 G/DL (ref 12–15.9)
MCH RBC QN AUTO: 29.8 PG (ref 26.6–33)
MCHC RBC AUTO-ENTMCNC: 32.9 G/DL (ref 31.5–35.7)
MCV RBC AUTO: 90.6 FL (ref 79–97)
PLATELET # BLD AUTO: 41 10*3/MM3 (ref 140–450)
PMV BLD AUTO: 9 FL (ref 6–12)
RBC # BLD AUTO: 3.09 10*6/MM3 (ref 3.77–5.28)
WBC NRBC COR # BLD AUTO: 1.1 10*3/MM3 (ref 3.4–10.8)

## 2024-10-24 PROCEDURE — 63710000001 INSULIN LISPRO (HUMAN) PER 5 UNITS: Performed by: NURSE PRACTITIONER

## 2024-10-24 PROCEDURE — G0378 HOSPITAL OBSERVATION PER HR: HCPCS

## 2024-10-24 PROCEDURE — 99232 SBSQ HOSP IP/OBS MODERATE 35: CPT | Performed by: STUDENT IN AN ORGANIZED HEALTH CARE EDUCATION/TRAINING PROGRAM

## 2024-10-24 PROCEDURE — 82948 REAGENT STRIP/BLOOD GLUCOSE: CPT

## 2024-10-24 PROCEDURE — 85027 COMPLETE CBC AUTOMATED: CPT | Performed by: FAMILY MEDICINE

## 2024-10-24 RX ADMIN — INSULIN LISPRO 2 UNITS: 100 INJECTION, SOLUTION INTRAVENOUS; SUBCUTANEOUS at 09:03

## 2024-10-24 RX ADMIN — INSULIN LISPRO 4 UNITS: 100 INJECTION, SOLUTION INTRAVENOUS; SUBCUTANEOUS at 17:47

## 2024-10-24 RX ADMIN — INSULIN LISPRO 4 UNITS: 100 INJECTION, SOLUTION INTRAVENOUS; SUBCUTANEOUS at 11:28

## 2024-10-24 RX ADMIN — OXYCODONE HYDROCHLORIDE AND ACETAMINOPHEN 500 MG: 500 TABLET ORAL at 09:02

## 2024-10-24 RX ADMIN — METOPROLOL TARTRATE 25 MG: 25 TABLET, FILM COATED ORAL at 09:02

## 2024-10-24 RX ADMIN — Medication 10 ML: at 09:03

## 2024-10-24 RX ADMIN — AMIODARONE HYDROCHLORIDE 200 MG: 200 TABLET ORAL at 09:02

## 2024-10-24 RX ADMIN — PANTOPRAZOLE SODIUM 40 MG: 40 TABLET, DELAYED RELEASE ORAL at 09:02

## 2024-10-24 RX ADMIN — APIXABAN 2.5 MG: 2.5 TABLET, FILM COATED ORAL at 20:28

## 2024-10-24 RX ADMIN — LEVOTHYROXINE SODIUM 100 MCG: 0.1 TABLET ORAL at 06:23

## 2024-10-24 RX ADMIN — APIXABAN 2.5 MG: 2.5 TABLET, FILM COATED ORAL at 09:02

## 2024-10-24 RX ADMIN — INSULIN LISPRO 6 UNITS: 100 INJECTION, SOLUTION INTRAVENOUS; SUBCUTANEOUS at 20:28

## 2024-10-24 RX ADMIN — Medication 10 ML: at 20:28

## 2024-10-24 RX ADMIN — METOPROLOL TARTRATE 25 MG: 25 TABLET, FILM COATED ORAL at 20:28

## 2024-10-24 NOTE — PROGRESS NOTES
[facility] Medicine Services  PROGRESS NOTE    Patient Name: Chey Robertson  : 1935  MRN: [id number]    Date of Admission: 10/18/2024  Primary Care Physician: Yasmeen Loomis MD    Subjective   Subjective     CC:  Falls    HPI:  Patient feels well, eating breakfast this morning, was able to sit in chair for several hours yesterday. Still awaiting placement.      Objective   Objective     Vital Signs:   Temp:  [97.2 °F (36.2 °C)-98.3 °F (36.8 °C)] 97.9 °F (36.6 °C)  Heart Rate:  [53-62] 62  Resp:  [16] 16  BP: (126-140)/(52-77) 134/70     Physical Exam:  Constitutional: Awake, alert, resting comfortably  HENT: NCAT, mucous membranes moist  Respiratory: Clear to auscultation bilaterally, respiratory effort normal   Cardiovascular: Bradycardic  Gastrointestinal: soft, nontender, nondistended  Musculoskeletal: No bilateral ankle edema  Psychiatric: Appropriate affect, cooperative  Neurologic: Alert, oriented x 3, no focal deficits, speech clear, generalized weakness present  Skin: No rashes    Exam unchanged from 10/23/2024.      Results Reviewed:  LAB RESULTS:      Lab 10/24/24  0800 10/23/24  0845 10/22/24  0824 10/21/24  0919 10/19/24  1636 10/18/24  2340   WBC 1.10* 1.14* 1.72* 1.25* 1.20* 1.60*   HEMOGLOBIN 9.2* 9.4* 9.6* 10.2* 9.1* 9.0*   HEMATOCRIT 28.0* 28.4* 28.8* 30.1* 26.9* 27.6*   PLATELETS 41* 41* 44* 54* 52* 58*   NEUTROS ABS  --   --   --   --  0.58* 0.77*   IMMATURE GRANS (ABS)  --   --   --   --   --  0.02   LYMPHS ABS  --   --   --   --   --  0.56*   MONOS ABS  --   --   --   --   --  0.25   EOS ABS  --   --   --   --  0.00 0.00   MCV 90.6 91.0 90.9 90.7 91.8 92.3         Lab 10/22/24  0824 10/21/24  0919 10/20/24  0627 10/19/24  1636 10/18/24  2340   SODIUM 138 141 139 138 139   POTASSIUM 4.5 4.3 3.7 3.7 3.9   CHLORIDE 104 105 104 102 101   CO2 25.0 24.0 26.0 25.0 22.0   ANION GAP 9.0 12.0 9.0 11.0 16.0*   BUN 11 13 13 17 20   CREATININE 0.66 0.72 0.66 0.75 1.03*  "  EGFR 84.5 80.5 84.5 76.7 52.4*   GLUCOSE 150* 172* 104* 159* 109*   CALCIUM 8.5* 8.2* 8.3* 8.4* 8.5*   IONIZED CALCIUM 1.16 1.13* 1.11*  --   --    MAGNESIUM 1.8 1.8 2.7* 1.5* 1.4*   PHOSPHORUS 3.1 3.0 3.3  --   --    HEMOGLOBIN A1C  --   --   --  4.90  --          Lab 10/18/24  2340   TOTAL PROTEIN 5.8*   ALBUMIN 3.8   GLOBULIN 2.0   ALT (SGPT) 6   AST (SGOT) 10   BILIRUBIN 0.4   ALK PHOS 82                     Brief Urine Lab Results  (Last result in the past 365 days)        Color   Clarity   Blood   Leuk Est   Nitrite   Protein   CREAT   Urine HCG        10/18/24 2348 Yellow   Clear   Negative   Trace   Positive   Negative                   Cultures:  No results found for: \"BLOODCX\", \"URINECX\", \"WOUNDCX\", \"MRSACX\", \"RESPCX\", \"STOOLCX\"    Microbiology Results Abnormal       Procedure Component Value - Date/Time    Blood Culture - Blood, Arm, Left [[id number]]  (Normal) Collected: 10/19/24 0243    Lab Status: Final result Specimen: Blood from Arm, Left Updated: 10/24/24 0700     Blood Culture No growth at 5 days    Blood Culture - Blood, Arm, Left [[id number]]  (Normal) Collected: 10/19/24 0151    Lab Status: Final result Specimen: Blood from Arm, Left Updated: 10/24/24 0700     Blood Culture No growth at 5 days    Narrative:      Less than seven (7) mL's of blood was collected.  Insufficient quantity may yield false negative results.            No radiology results from the last 24 hrs    Results for orders placed during the hospital encounter of 06/05/23    Adult Transthoracic Echo Complete w/ Color, Spectral and Contrast if necessary per protocol    Interpretation Summary    Left ventricular systolic function is normal. Left ventricular ejection fraction appears to be 56 - 60%.    Left ventricular diastolic function was normal.    Estimated right ventricular systolic pressure from tricuspid regurgitation is normal (<35 mmHg).      Current medications:  Scheduled Meds:amiodarone, 200 mg, Oral, Daily  apixaban, " 2.5 mg, Oral, Q12H  ascorbic acid, 500 mg, Oral, Daily  insulin lispro, 2-7 Units, Subcutaneous, 4x Daily AC & at Bedtime  levothyroxine, 100 mcg, Oral, Q AM  metoprolol tartrate, 25 mg, Oral, BID  pantoprazole, 40 mg, Oral, Daily  sodium chloride, 10 mL, Intravenous, Q12H      Continuous Infusions:     PRN Meds:.  acetaminophen    senna-docusate sodium **AND** polyethylene glycol **AND** bisacodyl **AND** bisacodyl    Calcium Replacement - Follow Nurse / BPA Driven Protocol    dextrose    dextrose    glucagon (human recombinant)    Magnesium Standard Dose Replacement - Follow Nurse / BPA Driven Protocol    melatonin    ondansetron ODT **OR** ondansetron    Phosphorus Replacement - Follow Nurse / BPA Driven Protocol    Potassium Replacement - Follow Nurse / BPA Driven Protocol    sodium chloride    traMADol    Assessment & Plan   Assessment & Plan     Active Hospital Problems    Diagnosis  POA    **Falls [R29.6]  Not Applicable    DISHA (acute kidney injury) [N17.9]  Yes    Atrial fibrillation [I48.91]  Yes    Chronic anticoagulation [Z79.01]  Not Applicable    Type 2 diabetes mellitus without complication, without long-term current use of insulin [E11.9]  Yes    Essential hypertension [I10]  Yes    Myelodysplasia (myelodysplastic syndrome) [D46.9]  Yes    Personal history of pulmonary embolism [Z86.711]  Yes    Pancytopenia [D61.818]  Yes    Hypothyroidism (acquired) [E03.9]  Yes      Resolved Hospital Problems   No resolved problems to display.        Brief Hospital Course to date:  Chey Robertson is a 89 y.o. female with PMHx of PE/DVT, AFIB, chronic anticoagulation, pancytopenia related to myelodysplastic syndrome, hypothyroidism, T2DM who presented to the ED for evaluation of right leg pain after having 2 falls in 2 days at her assisted living facility. She was having difficulty ambulating due to pain.     This patient's problems and plans were partially entered by my partner and updated as appropriate by me  10/24/24.    Falls  Dizziness with standing  -Dizziness may be related to dehydration vs hypoglycemia vs UTI  -Orthostatic vital signs negative on 10/23/2024  -XR R knee: severe OA and osteopenia   -PT/OT recommended SNF to LTC on discharge. Skilled bed available at [facility] tomorrow 10/25.    Acute UTI  -Urine culture growing E. Coli  -s/p IV ceftriaxone x 5 doses.     Mild DISHA, resolved   -likely due to dehydration   -resolved with IV fluids.     Pancytopenia  Myelodysplastic syndrome  -Per hematology notes 8/13/2024, will consider discontinuing Eliquis if platelets drop below 30,000 or patient has active bleeding.  -Continue neutropenic precautions. Monitoring temperature and blood counts.     Atrial fibrillation   Hx DVT/PE  Chronic anticoagulation  -Continue amiodarone and metoprolol with hold parameters.  -Continue Eliquis 2.5 mg BID (reduced bec of above).     T2DM - insulin dependent  -A1c 4.9% this admission   -Continue low dose SSI, hold long acting for now, monitor for hypoglycemia.     Hypothyroidism  -Continue levothyroxine.     Hx of dysphagia  -pt reports improved, now on regular diet  -patient wishes to continue on comfort diet of regular, thin liquids. She is aware of the risks.     Goals of Care  -My practice partner discussed with patient on 10/19: Patient reported she would not want to have thickened liquids and understood risks of aspiration and death if she does not adhere to modified diet (recommended by SLP). She was able to express this desire with family present and at a later visit with bedside RN present, Marie Mendoza.  -With bedside RN present, patient confirmed she wants to be DNR/DNI. Order placed to reflect this.       Expected Discharge Location and Transportation: LTC   Expected Discharge   Expected Discharge Date: 10/25/2024; Expected Discharge Time:      VTE Prophylaxis:  Pharmacologic & mechanical VTE prophylaxis orders are present.         AM-PAC 6 Clicks  Score (PT): 14 (10/23/24 2000)    CODE STATUS:   Code Status and Medical Interventions: No CPR (Do Not Attempt to Resuscitate); Limited Support; No intubation (DNI); witnessed by Marie Mendoza RN   Ordered at: 10/19/24 1111     Medical Intervention Limits:    No intubation (DNI)     Level Of Support Discussed With:    Patient     Code Status (Patient has no pulse and is not breathing):    No CPR (Do Not Attempt to Resuscitate)     Medical Interventions (Patient has pulse or is breathing):    Limited Support     Comments:    witnessed by NIMO Call, DO  10/24/24

## 2024-10-24 NOTE — CASE MANAGEMENT/SOCIAL WORK
Case Management Discharge Note      Final Note: Patient's plan is a skilled bed at [facility] tomorrow, 10/25.  Nurse to call report to [phone], [phone].  CM will fax discharge summary when available to [phone].  Family will transport.         Selected Continued Care - Admitted Since 10/18/2024       Destination Coordination complete.      Service Provider Services Address Phone Fax Patient Preferred    [facility] [address], [facility] [place] [address] [phone] --              Durable Medical Equipment    No services have been selected for the patient.                Dialysis/Infusion    No services have been selected for the patient.                Home Medical Care    No services have been selected for the patient.                Therapy    No services have been selected for the patient.                Community Resources    No services have been selected for the patient.                Community & DME    No services have been selected for the patient.                    Selected Continued Care - Prior Encounters Includes continued care and service providers with selected services from prior encounters from 7/20/2024 to 10/24/2024      Discharged on 8/6/2024 Admission date: 7/27/2024 - Discharge disposition: Skilled Nursing Facility ([place] - External)      Destination       Service Provider Services Address Phone Fax Patient Preferred    [facility] [address] [facility] [facility] [address] [phone] --                               Final Discharge Disposition Code: 03 - skilled nursing facility (SNF)

## 2024-10-25 VITALS
RESPIRATION RATE: 18 BRPM | TEMPERATURE: 97.8 F | SYSTOLIC BLOOD PRESSURE: 128 MMHG | HEART RATE: 57 BPM | OXYGEN SATURATION: 97 % | DIASTOLIC BLOOD PRESSURE: 48 MMHG | BODY MASS INDEX: 22.48 KG/M2 | WEIGHT: 126.9 LBS | HEIGHT: 63 IN

## 2024-10-25 LAB
DEPRECATED RDW RBC AUTO: 45.8 FL (ref 37–54)
ERYTHROCYTE [DISTWIDTH] IN BLOOD BY AUTOMATED COUNT: 13.8 % (ref 12.3–15.4)
GLUCOSE BLDC GLUCOMTR-MCNC: 214 MG/DL (ref 70–130)
GLUCOSE BLDC GLUCOMTR-MCNC: 347 MG/DL (ref 70–130)
HCT VFR BLD AUTO: 29.4 % (ref 34–46.6)
HGB BLD-MCNC: 9.8 G/DL (ref 12–15.9)
MCH RBC QN AUTO: 30.2 PG (ref 26.6–33)
MCHC RBC AUTO-ENTMCNC: 33.3 G/DL (ref 31.5–35.7)
MCV RBC AUTO: 90.7 FL (ref 79–97)
PLATELET # BLD AUTO: 48 10*3/MM3 (ref 140–450)
PMV BLD AUTO: 10.1 FL (ref 6–12)
RBC # BLD AUTO: 3.24 10*6/MM3 (ref 3.77–5.28)
WBC NRBC COR # BLD AUTO: 1.2 10*3/MM3 (ref 3.4–10.8)

## 2024-10-25 PROCEDURE — 63710000001 INSULIN LISPRO (HUMAN) PER 5 UNITS: Performed by: NURSE PRACTITIONER

## 2024-10-25 PROCEDURE — 85027 COMPLETE CBC AUTOMATED: CPT | Performed by: FAMILY MEDICINE

## 2024-10-25 PROCEDURE — G0378 HOSPITAL OBSERVATION PER HR: HCPCS

## 2024-10-25 PROCEDURE — 99239 HOSP IP/OBS DSCHRG MGMT >30: CPT | Performed by: NURSE PRACTITIONER

## 2024-10-25 PROCEDURE — 82948 REAGENT STRIP/BLOOD GLUCOSE: CPT

## 2024-10-25 RX ADMIN — LEVOTHYROXINE SODIUM 100 MCG: 0.1 TABLET ORAL at 06:35

## 2024-10-25 RX ADMIN — OXYCODONE HYDROCHLORIDE AND ACETAMINOPHEN 500 MG: 500 TABLET ORAL at 08:02

## 2024-10-25 RX ADMIN — PANTOPRAZOLE SODIUM 40 MG: 40 TABLET, DELAYED RELEASE ORAL at 08:02

## 2024-10-25 RX ADMIN — INSULIN LISPRO 3 UNITS: 100 INJECTION, SOLUTION INTRAVENOUS; SUBCUTANEOUS at 08:02

## 2024-10-25 RX ADMIN — AMIODARONE HYDROCHLORIDE 200 MG: 200 TABLET ORAL at 08:02

## 2024-10-25 RX ADMIN — Medication 10 ML: at 08:02

## 2024-10-25 RX ADMIN — METOPROLOL TARTRATE 25 MG: 25 TABLET, FILM COATED ORAL at 08:02

## 2024-10-25 RX ADMIN — APIXABAN 2.5 MG: 2.5 TABLET, FILM COATED ORAL at 08:02

## 2024-10-25 NOTE — CASE MANAGEMENT/SOCIAL WORK
Continued Stay Note  [facility]     Patient Name: Chey Robertson  MRN: [id number]  Today's Date: 10/25/2024    Admit Date: 10/18/2024    Plan: [facility]   Discharge Plan       Row Name 10/25/24 0919       Plan    Plan [facility]    Patient/Family in Agreement with Plan yes    Plan Comments Discharge summary faxed to [phone].    Final Discharge Disposition Code 03 - skilled nursing facility (SNF)                   Discharge Codes    No documentation.                 Expected Discharge Date and Time       Expected Discharge Date Expected Discharge Time    Oct 25, 2024               Diana Navarro RN

## 2024-10-25 NOTE — DISCHARGE PLACEMENT REQUEST
"Juan R Venegasis KRISTI (89 y.o. Female)     Diana Navarro, RN  [phone]        Date of Birth   1935    Social Security Number       Address   [address] [address]    Home Phone   [phone]    MRN   [id number]       Jainism   Advent    Marital Status                               Admission Date   10/18/24    Admission Type   Emergency    Admitting Provider   Lisha Argueta DO    Attending Provider   Lisha Argueta DO    Department, Room/Bed   [facility] 3E, S334/1       Discharge Date       Discharge Disposition   Skilled Nursing Facility (DC - External)    Discharge Destination                                 Attending Provider: Lisha Argueta DO    Allergies: Aspirin    Isolation: None   Infection: None   Code Status: No CPR    Ht: 160 cm (63\")   Wt: 57.6 kg (126 lb 14.4 oz)    Admission Cmt: None   Principal Problem: Falls [R29.6]                   Active Insurance as of 10/18/2024       Primary Coverage       Payor Plan Insurance Group Employer/Plan Group    ANTH MEDICARE REPLACEMENT ANTH MEDICARE ADVANTAGE [id number]       Payor Plan Address Payor Plan Phone Number Payor Plan Fax Number Effective Dates    [address] [phone]  2024 - None Entered    [facility] [id number]         Subscriber Name Subscriber Birth Date Member ID       ELISA VENEGAS 1935 [id number]                     Emergency Contacts        (Rel.) Home Phone Work Phone Mobile Phone    Joe Venegas (Son) -- -- [phone]    Albin Venegas (Son) [phone] -- --    DORIS VENEGAS (Relative) -- -- [phone]                 Discharge Summary        Po Dias APRN at 10/25/24 0906              [facility] Medicine Services  DISCHARGE SUMMARY    Patient Name: Elisa Venegas  : 1935  MRN: [id number]    Date of Admission: 10/18/2024 10:40 PM  Date of Discharge:  10/25/24    Primary Care " Physician: Yasmeen Loomis MD    Consults       No orders found from 9/19/2024 to 10/19/2024.            Hospital Course     Presenting Problem: Falls    Active Hospital Problems    Diagnosis  POA    **Falls [R29.6]  Not Applicable    DISHA (acute kidney injury) [N17.9]  Yes    Atrial fibrillation [I48.91]  Yes    Chronic anticoagulation [Z79.01]  Not Applicable    Type 2 diabetes mellitus without complication, without long-term current use of insulin [E11.9]  Yes    Essential hypertension [I10]  Yes    Myelodysplasia (myelodysplastic syndrome) [D46.9]  Yes    Personal history of pulmonary embolism [Z86.711]  Yes    Pancytopenia [D61.818]  Yes    Hypothyroidism (acquired) [E03.9]  Yes      Resolved Hospital Problems   No resolved problems to display.          Hospital Course:  Chey Robertson is a 89 y.o. female with PMHx of PE/DVT, AFIB, chronic anticoagulation, pancytopenia related to myelodysplastic syndrome, hypothyroidism, T2DM who presented to the ED for evaluation of right leg pain after having 2 falls in 2 days at her assisted living facility. She was having difficulty ambulating due to pain and dizziness.  Right knee x-ray revealed severe OA and osteopenia.  Orthostatic blood pressure was unremarkable.  She was found to have acute UTI.  He urine culture grew E. coli.  She completed 5 days of IV ceftriaxone.  PT/OT evaluated and recommended SNF.  Case management has arranged for skilled nursing at [facility].  She will be discharged today in stable condition.  Family will provide transportation.    Falls  Dizziness with standing  -Dizziness may be related to dehydration vs hypoglycemia vs UTI  -Orthostatic vital signs negative on 10/23/2024  -XR R knee: severe OA and osteopenia   -PT/OT recommended SNF to LTC on discharge     Acute UTI  -Urine culture growing E. Coli  -s/p IV ceftriaxone x 5 doses.     Mild DISHA, resolved   -likely due to dehydration   -resolved with IV fluids.       Pancytopenia  Myelodysplastic syndrome  -Per hematology notes 8/13/2024, will consider discontinuing Eliquis if platelets drop below 30,000 or patient has active bleeding.  -Continue neutropenic precautions     Atrial fibrillation   Hx DVT/PE  Chronic anticoagulation  -Continue amiodarone and metoprolol with hold parameters.  -Continue Eliquis 2.5 mg BID (reduced bec of above).     T2DM - insulin dependent  -A1c 4.9% this admission   -Resume home regimen      Hypothyroidism  -Continue levothyroxine.     Hx of dysphagia  -pt reports improved, now on regular diet  -patient wishes to continue on comfort diet of regular, thin liquids. She is aware of the risks.      Goals of Care  -My practice partner discussed with patient on 10/19: Patient reported she would not want to have thickened liquids and understood risks of aspiration and death if she does not adhere to modified diet (recommended by SLP). She was able to express this desire with family present and at a later visit with bedside RN present, Marie Mendoza.  -With bedside RN present, patient confirmed she wants to be DNR/DNI. Order placed to reflect this.     Discharge Follow Up Recommendations for outpatient labs/diagnostics:   Follow up with PCP in 1 week.     Day of Discharge     HPI:   Patient was seen resting in bed no apparent distress.  No acute events overnight per nursing.  She is currently feeling well and is aware of plan to discharge to [facility] today.  Updated patient's son via telephone.  Plan is to discharge to [facility] today.  Family will provide transportation.  We discussed the importance of taking all medications as prescribed and keeping all recommended follow-up appointments.  She expressed no additional concerns at this time.    Vital Signs:   Temp:  [97.7 °F (36.5 °C)-98 °F (36.7 °C)] 97.9 °F (36.6 °C)  Heart Rate:  [58-64] 59  Resp:  [16-18] 18  BP: (121-147)/() 121/65    Physical  Exam:  Constitutional: No acute distress, awake, alert, chronically ill-appearing, frail  HENT: NCAT, mucous membranes moist  Respiratory: Clear to auscultation bilaterally, respiratory effort normal   Cardiovascular: Bradycardic, no murmurs, cap refill brisk   Gastrointestinal: Positive bowel sounds, soft, nontender, nondistended  Musculoskeletal: No BLE edema   Psychiatric: Appropriate affect, cooperative  Neurologic: Oriented x 3, moves all extremities, speech clear  Skin: warm, dry, no visible rash       Pertinent  and/or Most Recent Results     LAB RESULTS:      Lab 10/24/24  0800 10/23/24  0845 10/22/24  0824 10/21/24  0919 10/19/24  1636 10/18/24  2340   WBC 1.10* 1.14* 1.72* 1.25* 1.20* 1.60*   HEMOGLOBIN 9.2* 9.4* 9.6* 10.2* 9.1* 9.0*   HEMATOCRIT 28.0* 28.4* 28.8* 30.1* 26.9* 27.6*   PLATELETS 41* 41* 44* 54* 52* 58*   NEUTROS ABS  --   --   --   --  0.58* 0.77*   IMMATURE GRANS (ABS)  --   --   --   --   --  0.02   LYMPHS ABS  --   --   --   --   --  0.56*   MONOS ABS  --   --   --   --   --  0.25   EOS ABS  --   --   --   --  0.00 0.00   MCV 90.6 91.0 90.9 90.7 91.8 92.3         Lab 10/22/24  0824 10/21/24  0919 10/20/24  0627 10/19/24  1636 10/18/24  2340   SODIUM 138 141 139 138 139   POTASSIUM 4.5 4.3 3.7 3.7 3.9   CHLORIDE 104 105 104 102 101   CO2 25.0 24.0 26.0 25.0 22.0   ANION GAP 9.0 12.0 9.0 11.0 16.0*   BUN 11 13 13 17 20   CREATININE 0.66 0.72 0.66 0.75 1.03*   EGFR 84.5 80.5 84.5 76.7 52.4*   GLUCOSE 150* 172* 104* 159* 109*   CALCIUM 8.5* 8.2* 8.3* 8.4* 8.5*   IONIZED CALCIUM 1.16 1.13* 1.11*  --   --    MAGNESIUM 1.8 1.8 2.7* 1.5* 1.4*   PHOSPHORUS 3.1 3.0 3.3  --   --    HEMOGLOBIN A1C  --   --   --  4.90  --          Lab 10/18/24  2340   TOTAL PROTEIN 5.8*   ALBUMIN 3.8   GLOBULIN 2.0   ALT (SGPT) 6   AST (SGOT) 10   BILIRUBIN 0.4   ALK PHOS 82                     Brief Urine Lab Results  (Last result in the past 365 days)        Color   Clarity   Blood   Leuk Est   Nitrite   Protein    CREAT   Urine HCG        10/18/24 2348 Yellow   Clear   Negative   Trace   Positive   Negative                 Microbiology Results (last 10 days)       Procedure Component Value - Date/Time    Blood Culture - Blood, Arm, Left [[id number]]  (Normal) Collected: 10/19/24 0243    Lab Status: Final result Specimen: Blood from Arm, Left Updated: 10/24/24 0700     Blood Culture No growth at 5 days    Blood Culture - Blood, Arm, Left [[id number]]  (Normal) Collected: 10/19/24 0151    Lab Status: Final result Specimen: Blood from Arm, Left Updated: 10/24/24 0700     Blood Culture No growth at 5 days    Narrative:      Less than seven (7) mL's of blood was collected.  Insufficient quantity may yield false negative results.    Urine Culture - Urine, Straight Cath [[id number]]  (Abnormal)  (Susceptibility) Collected: 10/18/24 2348    Lab Status: Final result Specimen: Urine from Straight Cath Updated: 10/21/24 1117     Urine Culture >100,000 CFU/mL Escherichia coli    Narrative:      Colonization of the urinary tract without infection is common. Treatment is discouraged unless the patient is symptomatic, pregnant, or undergoing an invasive urologic procedure.    Susceptibility        Escherichia coli      JUAN F      Amoxicillin + Clavulanate Susceptible      Ampicillin Resistant      Ampicillin + Sulbactam Intermediate      Cefazolin Susceptible      Cefepime Susceptible      Ceftazidime Susceptible      Ceftriaxone Susceptible      Gentamicin Susceptible      Levofloxacin Intermediate      Nitrofurantoin Susceptible      Piperacillin + Tazobactam Susceptible      Trimethoprim + Sulfamethoxazole Resistant                                   XR Knee 1 or 2 View Right    Result Date: 10/19/2024  XR KNEE 1 OR 2 VW RIGHT Date of Exam: 10/19/2024 12:25 AM EDT Indication: trauma Comparison: 1/15/2024. Findings: The bones are demineralized. There is severe tricompartmental osteophyte formation. There is moderate to severe narrowing of the  medial compartment and patellofemoral joint spaces. Chondrocalcinosis and meniscus calcifications noted. No acute fracture or  dislocation. No erosions or evidence of knee joint effusion.     Impression: Severe osteoarthritis and osteopenia without acute osseous abnormality. Electronically Signed: Hector Rosenberg MD  10/19/2024 12:58 AM EDT  Workstation ID: [id number]    CT Head Without Contrast    Result Date: 10/19/2024  CT HEAD WO CONTRAST Date of Exam: 10/18/2024 11:50 PM EDT Indication: trauma. Comparison: 1/15/2024. Technique: Axial CT images were obtained of the head without contrast administration.  Automated exposure control and iterative construction methods were used. Findings: There are chronic bifrontal cortical infarcts. There is a chronic lacunar infarct in the central right thalamus. Similar small chronic lacunar infarct in the right caudate head. Chronic cortical infarct in the right occipital lobe. There are multiple bilateral chronic cerebellar infarcts. Intracranial vascular calcifications noted. There is mild patchy white matter hypoattenuation. No definite new hypoattenuating lesions in the brain. No acute intracranial hemorrhage. No mass effect, midline shift or  abnormal extra-axial collection. Thinning of the orbital lenses would suggest prior lens replacement. There is frothy secretion in the right sphenoid sinus and moderate mucosal thickening in the left sphenoid sinus with obstruction of the sphenoethmoidal recess. Mastoid air cells appear well aerated. The calvarium is intact. Superficial soft tissues are unremarkable.     Impression: 1.No acute intracranial abnormality. 2.Multiple chronic infarcts and mild chronic small vessel ischemic change. 3.Sphenoid sinus mucosal disease. Electronically Signed: Hector Rosenberg MD  10/19/2024 12:23 AM EDT  Workstation ID: [id number]    XR Hip With or Without Pelvis 2 - 3 View Right    Result Date: 10/18/2024  XR HIP W OR WO PELVIS 2-3 VIEW RIGHT Date of  Exam: 10/18/2024 11:12 PM EDT Indication: pain Comparison: 6/18/2023. CT pelvis 6/18/2023. Findings: Interval internal fixation of the right hip using cephalomedullary nailing. Hardware appears intact. There is no evidence of hardware associated fracture. There is mild osteoarthritis at the right hip with similar moderate degenerative change at the left  hip. 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 The bony pelvis appears intact without fracture.     Impression: 1.Interval internal fixation of the right hip without complicating features. 2.Mild right and moderate left hip osteoarthritis. Electronically Signed: Hector Rosenberg MD  10/18/2024 11:30 PM EDT  Workstation ID: [id number]             Results for orders placed during the hospital encounter of 06/05/23    Adult Transthoracic Echo Complete w/ Color, Spectral and Contrast if necessary per protocol    Interpretation Summary    Left ventricular systolic function is normal. Left ventricular ejection fraction appears to be 56 - 60%.    Left ventricular diastolic function was normal.    Estimated right ventricular systolic pressure from tricuspid regurgitation is normal (<35 mmHg).      Plan for Follow-up of Pending Labs/Results: Follow-up as directed.  Pending Labs       Order Current Status    CBC (No Diff) In process          Discharge Details        Discharge Medications        Changes to Medications        Instructions Start Date   metoprolol tartrate 50 MG tablet  Commonly known as: LOPRESSOR  What changed:   how much to take  additional instructions   25 mg, Oral, 2 Times Daily             Continue These Medications        Instructions Start Date   acetaminophen 500 MG tablet  Commonly known as: TYLENOL   500 mg, Oral, Every 6 Hours PRN      Acidophilus Extra Strength capsule   Take 1 capsule by mouth Daily.      amiodarone 200 MG tablet  Commonly known as: PACERONE   200 mg, Oral, Daily      ascorbic acid 500 MG tablet  Commonly known as: VITAMIN C   Take 1 tablet by  mouth Daily.      castor oil-balsam peru ointment   1 Application, Topical, Every 12 Hours Scheduled      Eliquis 2.5 MG tablet tablet  Generic drug: apixaban   2.5 mg, Oral, 2 Times Daily      furosemide 40 MG tablet  Commonly known as: LASIX   40 mg, Oral, Daily PRN      insulin NPH-insulin regular (70-30) 100 UNIT/ML injection  Commonly known as: humuLIN 70/30,novoLIN 70/30   10 Units, Subcutaneous, 2 Times Daily With Meals, Hold for blood sugar less than 115 or not eating      levothyroxine 100 MCG tablet  Commonly known as: SYNTHROID, LEVOTHROID   100 mcg, Oral, Every Early Morning      melatonin 5 MG tablet tablet   5 mg, Oral, Nightly PRN      metFORMIN 500 MG tablet  Commonly known as: GLUCOPHAGE   Take 1 tablet by mouth 2 (Two) Times a Day With Meals.      naloxone 4 MG/0.1ML nasal spray  Commonly known as: NARCAN   Call 911. Don't prime. [place] in 1 nostril for overdose. Repeat in 2-3 minutes in other nostril if no or minimal breathing/responsiveness.      NovoLOG 100 UNIT/ML injection  Generic drug: Insulin Aspart       ondansetron ODT 4 MG disintegrating tablet  Commonly known as: ZOFRAN-ODT   DISSOLVE 1 TABLET ON THE TONGUE 2 TO 3 TIMES PER DAY AS NEEDED FOR NAUSEA OR VOMITING      pantoprazole 20 MG EC tablet  Commonly known as: PROTONIX   20 mg, Oral, Daily      polyethylene glycol 17 g packet  Commonly known as: MIRALAX   17 g, Oral, Daily PRN      SITagliptin 25 MG tablet  Commonly known as: JANUVIA   25 mg, Daily      zinc sulfate 220 (50 Zn) MG capsule  Commonly known as: ZINCATE              Stop These Medications      traMADol 50 MG tablet  Commonly known as: ULTRAM              Allergies   Allergen Reactions    Aspirin Unknown - Low Severity     Mouth sores          Discharge Disposition: [facility] ([place] - External)    Diet:  Hospital:  Diet Order   Procedures    Diet: Cardiac, Diabetic; Healthy Heart (2-3 Na+); Consistent Carbohydrate; Fluid Consistency:  Thin (IDDSI 0)       Diet Instructions       Diet: Regular/House Diet, Cardiac Diets, Diabetic Diets; Healthy Heart (2-3 Na+); Regular (IDDSI 7); Thin (IDDSI 0); Consistent Carbohydrate      Discharge Diet:  Regular/House Diet  Cardiac Diets  Diabetic Diets       Cardiac Diet: Healthy Heart (2-3 Na+)    Texture: Regular (IDDSI 7)    Fluid Consistency: Thin (IDDSI 0)    Diabetic Diet: Consistent Carbohydrate             Activity: As tolerated         CODE STATUS:    Code Status and Medical Interventions: No CPR (Do Not Attempt to Resuscitate); Limited Support; No intubation (DNI); witnessed by Marie Mendoza RN   Ordered at: 10/19/24 1111     Medical Intervention Limits:    No intubation (DNI)     Level Of Support Discussed With:    Patient     Code Status (Patient has no pulse and is not breathing):    No CPR (Do Not Attempt to Resuscitate)     Medical Interventions (Patient has pulse or is breathing):    Limited Support     Comments:    witnessed by Marie Mendoza RN       Future Appointments   Date Time Provider Department Center   2/11/2025  9:15 AM Dinh Lyman MD [facility]       Additional Instructions for the Follow-ups that You Need to Schedule       Discharge Follow-up with PCP   As directed       Currently Documented PCP:    Yasmeen Loomis MD    PCP Phone Number:    [phone]     Follow Up Details: Follow up with PCP in 1 week.                      CHANG Lacy  10/25/24      Time Spent on Discharge:  I spent  42  minutes on this discharge activity which included: face-to-face encounter with the patient, reviewing the data in the system, coordination of the care with the nursing staff as well as consultants, documentation, and entering orders.          Electronically signed by Po Dias APRN at 10/25/24 1170

## 2024-10-25 NOTE — DISCHARGE SUMMARY
[facility] Medicine Services  DISCHARGE SUMMARY    Patient Name: Chey Robertson  : 1935  MRN: [id number]    Date of Admission: 10/18/2024 10:40 PM  Date of Discharge:  10/25/24    Primary Care Physician: Yasmeen Loomis MD    Consults       No orders found from 2024 to 10/19/2024.            Hospital Course     Presenting Problem: Falls    Active Hospital Problems    Diagnosis  POA    **Falls [R29.6]  Not Applicable    DISHA (acute kidney injury) [N17.9]  Yes    Atrial fibrillation [I48.91]  Yes    Chronic anticoagulation [Z79.01]  Not Applicable    Type 2 diabetes mellitus without complication, without long-term current use of insulin [E11.9]  Yes    Essential hypertension [I10]  Yes    Myelodysplasia (myelodysplastic syndrome) [D46.9]  Yes    Personal history of pulmonary embolism [Z86.711]  Yes    Pancytopenia [D61.818]  Yes    Hypothyroidism (acquired) [E03.9]  Yes      Resolved Hospital Problems   No resolved problems to display.          Hospital Course:  Chey Robertson is a 89 y.o. female with PMHx of PE/DVT, AFIB, chronic anticoagulation, pancytopenia related to myelodysplastic syndrome, hypothyroidism, T2DM who presented to the ED for evaluation of right leg pain after having 2 falls in 2 days at her assisted living facility. She was having difficulty ambulating due to pain and dizziness.  Right knee x-ray revealed severe OA and osteopenia.  Orthostatic blood pressure was unremarkable.  She was found to have acute UTI.  He urine culture grew E. coli.  She completed 5 days of IV ceftriaxone.  PT/OT evaluated and recommended SNF.  Case management has arranged for skilled nursing at [facility] [place].  She will be discharged today in stable condition.  Family will provide transportation.    Falls  Dizziness with standing  -Dizziness may be related to dehydration vs hypoglycemia vs UTI  -Orthostatic vital signs negative on 10/23/2024  -XR R knee: severe OA and  osteopenia   -PT/OT recommended SNF to LTC on discharge     Acute UTI  -Urine culture growing E. Coli  -s/p IV ceftriaxone x 5 doses.     Mild DISHA, resolved   -likely due to dehydration   -resolved with IV fluids.      Pancytopenia  Myelodysplastic syndrome  -Per hematology notes 8/13/2024, will consider discontinuing Eliquis if platelets drop below 30,000 or patient has active bleeding.  -Continue neutropenic precautions     Atrial fibrillation   Hx DVT/PE  Chronic anticoagulation  -Continue amiodarone and metoprolol with hold parameters.  -Continue Eliquis 2.5 mg BID (reduced bec of above).     T2DM - insulin dependent  -A1c 4.9% this admission   -Resume home regimen      Hypothyroidism  -Continue levothyroxine.     Hx of dysphagia  -pt reports improved, now on regular diet  -patient wishes to continue on comfort diet of regular, thin liquids. She is aware of the risks.      Goals of Care  -My practice partner discussed with patient on 10/19: Patient reported she would not want to have thickened liquids and understood risks of aspiration and death if she does not adhere to modified diet (recommended by SLP). She was able to express this desire with family present and at a later visit with bedside RN present, Marie Mendoza.  -With bedside RN present, patient confirmed she wants to be DNR/DNI. Order placed to reflect this.     Discharge Follow Up Recommendations for outpatient labs/diagnostics:   Follow up with PCP in 1 week.     Day of Discharge     HPI:   Patient was seen resting in bed no apparent distress.  No acute events overnight per nursing.  She is currently feeling well and is aware of plan to discharge to [facility] today.  Updated patient's son via telephone.  Plan is to discharge to [facility] today.  Family will provide transportation.  We discussed the importance of taking all medications as prescribed and keeping all recommended follow-up appointments.  She expressed no  additional concerns at this time.    Vital Signs:   Temp:  [97.7 °F (36.5 °C)-98 °F (36.7 °C)] 97.9 °F (36.6 °C)  Heart Rate:  [58-64] 59  Resp:  [16-18] 18  BP: (121-147)/() 121/65    Physical Exam:  Constitutional: No acute distress, awake, alert, chronically ill-appearing, frail  HENT: NCAT, mucous membranes moist  Respiratory: Clear to auscultation bilaterally, respiratory effort normal   Cardiovascular: Bradycardic, no murmurs, cap refill brisk   Gastrointestinal: Positive bowel sounds, soft, nontender, nondistended  Musculoskeletal: No BLE edema   Psychiatric: Appropriate affect, cooperative  Neurologic: Oriented x 3, moves all extremities, speech clear  Skin: warm, dry, no visible rash       Pertinent  and/or Most Recent Results     LAB RESULTS:      Lab 10/24/24  0800 10/23/24  0845 10/22/24  0824 10/21/24  0919 10/19/24  1636 10/18/24  2340   WBC 1.10* 1.14* 1.72* 1.25* 1.20* 1.60*   HEMOGLOBIN 9.2* 9.4* 9.6* 10.2* 9.1* 9.0*   HEMATOCRIT 28.0* 28.4* 28.8* 30.1* 26.9* 27.6*   PLATELETS 41* 41* 44* 54* 52* 58*   NEUTROS ABS  --   --   --   --  0.58* 0.77*   IMMATURE GRANS (ABS)  --   --   --   --   --  0.02   LYMPHS ABS  --   --   --   --   --  0.56*   MONOS ABS  --   --   --   --   --  0.25   EOS ABS  --   --   --   --  0.00 0.00   MCV 90.6 91.0 90.9 90.7 91.8 92.3         Lab 10/22/24  0824 10/21/24  0919 10/20/24  0627 10/19/24  1636 10/18/24  2340   SODIUM 138 141 139 138 139   POTASSIUM 4.5 4.3 3.7 3.7 3.9   CHLORIDE 104 105 104 102 101   CO2 25.0 24.0 26.0 25.0 22.0   ANION GAP 9.0 12.0 9.0 11.0 16.0*   BUN 11 13 13 17 20   CREATININE 0.66 0.72 0.66 0.75 1.03*   EGFR 84.5 80.5 84.5 76.7 52.4*   GLUCOSE 150* 172* 104* 159* 109*   CALCIUM 8.5* 8.2* 8.3* 8.4* 8.5*   IONIZED CALCIUM 1.16 1.13* 1.11*  --   --    MAGNESIUM 1.8 1.8 2.7* 1.5* 1.4*   PHOSPHORUS 3.1 3.0 3.3  --   --    HEMOGLOBIN A1C  --   --   --  4.90  --          Lab 10/18/24  2340   TOTAL PROTEIN 5.8*   ALBUMIN 3.8   GLOBULIN 2.0   ALT  (SGPT) 6   AST (SGOT) 10   BILIRUBIN 0.4   ALK PHOS 82                     Brief Urine Lab Results  (Last result in the past 365 days)        Color   Clarity   Blood   Leuk Est   Nitrite   Protein   CREAT   Urine HCG        10/18/24 2348 Yellow   Clear   Negative   Trace   Positive   Negative                 Microbiology Results (last 10 days)       Procedure Component Value - Date/Time    Blood Culture - Blood, Arm, Left [[id number]]  (Normal) Collected: 10/19/24 0243    Lab Status: Final result Specimen: Blood from Arm, Left Updated: 10/24/24 0700     Blood Culture No growth at 5 days    Blood Culture - Blood, Arm, Left [[id number]]  (Normal) Collected: 10/19/24 0151    Lab Status: Final result Specimen: Blood from Arm, Left Updated: 10/24/24 0700     Blood Culture No growth at 5 days    Narrative:      Less than seven (7) mL's of blood was collected.  Insufficient quantity may yield false negative results.    Urine Culture - Urine, Straight Cath [[id number]]  (Abnormal)  (Susceptibility) Collected: 10/18/24 2348    Lab Status: Final result Specimen: Urine from Straight Cath Updated: 10/21/24 1117     Urine Culture >100,000 CFU/mL Escherichia coli    Narrative:      Colonization of the urinary tract without infection is common. Treatment is discouraged unless the patient is symptomatic, pregnant, or undergoing an invasive urologic procedure.    Susceptibility        Escherichia coli      JUAN F      Amoxicillin + Clavulanate Susceptible      Ampicillin Resistant      Ampicillin + Sulbactam Intermediate      Cefazolin Susceptible      Cefepime Susceptible      Ceftazidime Susceptible      Ceftriaxone Susceptible      Gentamicin Susceptible      Levofloxacin Intermediate      Nitrofurantoin Susceptible      Piperacillin + Tazobactam Susceptible      Trimethoprim + Sulfamethoxazole Resistant                                   XR Knee 1 or 2 View Right    Result Date: 10/19/2024  XR KNEE 1 OR 2 VW RIGHT Date of Exam:  10/19/2024 12:25 AM EDT Indication: trauma Comparison: 1/15/2024. Findings: The bones are demineralized. There is severe tricompartmental osteophyte formation. There is moderate to severe narrowing of the medial compartment and patellofemoral joint spaces. Chondrocalcinosis and meniscus calcifications noted. No acute fracture or  dislocation. No erosions or evidence of knee joint effusion.     Impression: Severe osteoarthritis and osteopenia without acute osseous abnormality. Electronically Signed: Hector Rosenberg MD  10/19/2024 12:58 AM EDT  Workstation ID: [id number]    CT Head Without Contrast    Result Date: 10/19/2024  CT HEAD WO CONTRAST Date of Exam: 10/18/2024 11:50 PM EDT Indication: trauma. Comparison: 1/15/2024. Technique: Axial CT images were obtained of the head without contrast administration.  Automated exposure control and iterative construction methods were used. Findings: There are chronic bifrontal cortical infarcts. There is a chronic lacunar infarct in the central right thalamus. Similar small chronic lacunar infarct in the right caudate head. Chronic cortical infarct in the right occipital lobe. There are multiple bilateral chronic cerebellar infarcts. Intracranial vascular calcifications noted. There is mild patchy white matter hypoattenuation. No definite new hypoattenuating lesions in the brain. No acute intracranial hemorrhage. No mass effect, midline shift or  abnormal extra-axial collection. Thinning of the orbital lenses would suggest prior lens replacement. There is frothy secretion in the right sphenoid sinus and moderate mucosal thickening in the left sphenoid sinus with obstruction of the sphenoethmoidal recess. Mastoid air cells appear well aerated. The calvarium is intact. Superficial soft tissues are unremarkable.     Impression: 1.No acute intracranial abnormality. 2.Multiple chronic infarcts and mild chronic small vessel ischemic change. 3.Sphenoid sinus mucosal disease.  Electronically Signed: Hector Rosenberg MD  10/19/2024 12:23 AM EDT  Workstation ID: [id number]    XR Hip With or Without Pelvis 2 - 3 View Right    Result Date: 10/18/2024  XR HIP W OR WO PELVIS 2-3 VIEW RIGHT Date of Exam: 10/18/2024 11:12 PM EDT Indication: pain Comparison: 6/18/2023. CT pelvis 6/18/2023. Findings: Interval internal fixation of the right hip using cephalomedullary nailing. Hardware appears intact. There is no evidence of hardware associated fracture. There is mild osteoarthritis at the right hip with similar moderate degenerative change at the left  hip. 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 The bony pelvis appears intact without fracture.     Impression: 1.Interval internal fixation of the right hip without complicating features. 2.Mild right and moderate left hip osteoarthritis. Electronically Signed: Hector Rosenberg MD  10/18/2024 11:30 PM EDT  Workstation ID: [id number]             Results for orders placed during the hospital encounter of 06/05/23    Adult Transthoracic Echo Complete w/ Color, Spectral and Contrast if necessary per protocol    Interpretation Summary    Left ventricular systolic function is normal. Left ventricular ejection fraction appears to be 56 - 60%.    Left ventricular diastolic function was normal.    Estimated right ventricular systolic pressure from tricuspid regurgitation is normal (<35 mmHg).      Plan for Follow-up of Pending Labs/Results: Follow-up as directed.  Pending Labs       Order Current Status    CBC (No Diff) In process          Discharge Details        Discharge Medications        Changes to Medications        Instructions Start Date   metoprolol tartrate 50 MG tablet  Commonly known as: LOPRESSOR  What changed:   how much to take  additional instructions   25 mg, Oral, 2 Times Daily             Continue These Medications        Instructions Start Date   acetaminophen 500 MG tablet  Commonly known as: TYLENOL   500 mg, Oral, Every 6 Hours PRN      Acidophilus  Extra Strength capsule   Take 1 capsule by mouth Daily.      amiodarone 200 MG tablet  Commonly known as: PACERONE   200 mg, Oral, Daily      ascorbic acid 500 MG tablet  Commonly known as: VITAMIN C   Take 1 tablet by mouth Daily.      castor oil-balsam peru ointment   1 Application, Topical, Every 12 Hours Scheduled      Eliquis 2.5 MG tablet tablet  Generic drug: apixaban   2.5 mg, Oral, 2 Times Daily      furosemide 40 MG tablet  Commonly known as: LASIX   40 mg, Oral, Daily PRN      insulin NPH-insulin regular (70-30) 100 UNIT/ML injection  Commonly known as: humuLIN 70/30,novoLIN 70/30   10 Units, Subcutaneous, 2 Times Daily With Meals, Hold for blood sugar less than 115 or not eating      levothyroxine 100 MCG tablet  Commonly known as: SYNTHROID, LEVOTHROID   100 mcg, Oral, Every Early Morning      melatonin 5 MG tablet tablet   5 mg, Oral, Nightly PRN      metFORMIN 500 MG tablet  Commonly known as: GLUCOPHAGE   Take 1 tablet by mouth 2 (Two) Times a Day With Meals.      naloxone 4 MG/0.1ML nasal spray  Commonly known as: NARCAN   Call 911. Don't prime. [place] in 1 nostril for overdose. Repeat in 2-3 minutes in other nostril if no or minimal breathing/responsiveness.      NovoLOG 100 UNIT/ML injection  Generic drug: Insulin Aspart       ondansetron ODT 4 MG disintegrating tablet  Commonly known as: ZOFRAN-ODT   DISSOLVE 1 TABLET ON THE TONGUE 2 TO 3 TIMES PER DAY AS NEEDED FOR NAUSEA OR VOMITING      pantoprazole 20 MG EC tablet  Commonly known as: PROTONIX   20 mg, Oral, Daily      polyethylene glycol 17 g packet  Commonly known as: MIRALAX   17 g, Oral, Daily PRN      SITagliptin 25 MG tablet  Commonly known as: JANUVIA   25 mg, Daily      zinc sulfate 220 (50 Zn) MG capsule  Commonly known as: ZINCATE              Stop These Medications      traMADol 50 MG tablet  Commonly known as: ULTRAM              Allergies   Allergen Reactions    Aspirin Unknown - Low Severity     Mouth sores          Discharge  Disposition: [facility] (DC - External)    Diet:  Hospital:  Diet Order   Procedures    Diet: Cardiac, Diabetic; Healthy Heart (2-3 Na+); Consistent Carbohydrate; Fluid Consistency: Thin (IDDSI 0)       Diet Instructions       Diet: Regular/House Diet, Cardiac Diets, Diabetic Diets; Healthy Heart (2-3 Na+); Regular (IDDSI 7); Thin (IDDSI 0); Consistent Carbohydrate      Discharge Diet:  Regular/House Diet  Cardiac Diets  Diabetic Diets       Cardiac Diet: Healthy Heart (2-3 Na+)    Texture: Regular (IDDSI 7)    Fluid Consistency: Thin (IDDSI 0)    Diabetic Diet: Consistent Carbohydrate             Activity: As tolerated         CODE STATUS:    Code Status and Medical Interventions: No CPR (Do Not Attempt to Resuscitate); Limited Support; No intubation (DNI); witnessed by Marie Mendoza RN   Ordered at: 10/19/24 1111     Medical Intervention Limits:    No intubation (DNI)     Level Of Support Discussed With:    Patient     Code Status (Patient has no pulse and is not breathing):    No CPR (Do Not Attempt to Resuscitate)     Medical Interventions (Patient has pulse or is breathing):    Limited Support     Comments:    witnessed by Marie Mendoza RN       Future Appointments   Date Time Provider Department Center   2/11/2025  9:15 AM Dinh Lyman MD [facility]       Additional Instructions for the Follow-ups that You Need to Schedule       Discharge Follow-up with PCP   As directed       Currently Documented PCP:    Yasmeen Loomis MD    PCP Phone Number:    [phone]     Follow Up Details: Follow up with PCP in 1 week.                      CHANG Lacy  10/25/24      Time Spent on Discharge:  I spent  42  minutes on this discharge activity which included: face-to-face encounter with the patient, reviewing the data in the system, coordination of the care with the nursing staff as well as consultants, documentation, and entering orders.

## 2024-10-30 LAB
QT INTERVAL: 396 MS
QTC INTERVAL: 382 MS

## 2025-03-03 ENCOUNTER — LAB (OUTPATIENT)
Facility: HOSPITAL | Age: 89
End: 2025-03-03
Payer: MEDICARE

## 2025-03-03 ENCOUNTER — OFFICE VISIT (OUTPATIENT)
Age: 89
End: 2025-03-03
Payer: MEDICARE

## 2025-03-03 VITALS
TEMPERATURE: 97.3 F | WEIGHT: 120 LBS | RESPIRATION RATE: 16 BRPM | SYSTOLIC BLOOD PRESSURE: 137 MMHG | HEART RATE: 54 BPM | BODY MASS INDEX: 21.26 KG/M2 | OXYGEN SATURATION: 97 % | DIASTOLIC BLOOD PRESSURE: 71 MMHG | HEIGHT: 63 IN

## 2025-03-03 DIAGNOSIS — D46.9 MYELODYSPLASIA (MYELODYSPLASTIC SYNDROME): Primary | Chronic | ICD-10-CM

## 2025-03-03 DIAGNOSIS — D46.9 MYELODYSPLASIA (MYELODYSPLASTIC SYNDROME): ICD-10-CM

## 2025-03-03 LAB
BASOPHILS # BLD AUTO: 0 10*3/MM3 (ref 0–0.2)
BASOPHILS NFR BLD AUTO: 0 % (ref 0–1.5)
DEPRECATED RDW RBC AUTO: 46.9 FL (ref 37–54)
EOSINOPHIL # BLD AUTO: 0 10*3/MM3 (ref 0–0.4)
EOSINOPHIL NFR BLD AUTO: 0 % (ref 0.3–6.2)
ERYTHROCYTE [DISTWIDTH] IN BLOOD BY AUTOMATED COUNT: 14.5 % (ref 12.3–15.4)
FERRITIN SERPL-MCNC: 161.3 NG/ML (ref 13–150)
HCT VFR BLD AUTO: 28.7 % (ref 34–46.6)
HGB BLD-MCNC: 9.5 G/DL (ref 12–15.9)
IMM GRANULOCYTES # BLD AUTO: 0.04 10*3/MM3 (ref 0–0.05)
IMM GRANULOCYTES NFR BLD AUTO: 1.6 % (ref 0–0.5)
IRON 24H UR-MRATE: 62 MCG/DL (ref 37–145)
IRON SATN MFR SERPL: 21 % (ref 20–50)
LYMPHOCYTES # BLD AUTO: 0.54 10*3/MM3 (ref 0.7–3.1)
LYMPHOCYTES NFR BLD AUTO: 22 % (ref 19.6–45.3)
MCH RBC QN AUTO: 29.4 PG (ref 26.6–33)
MCHC RBC AUTO-ENTMCNC: 33.1 G/DL (ref 31.5–35.7)
MCV RBC AUTO: 88.9 FL (ref 79–97)
MONOCYTES # BLD AUTO: 0.38 10*3/MM3 (ref 0.1–0.9)
MONOCYTES NFR BLD AUTO: 15.4 % (ref 5–12)
NEUTROPHILS NFR BLD AUTO: 1.5 10*3/MM3 (ref 1.7–7)
NEUTROPHILS NFR BLD AUTO: 61 % (ref 42.7–76)
PLATELET # BLD AUTO: 52 10*3/MM3 (ref 140–450)
PMV BLD AUTO: 9.5 FL (ref 6–12)
RBC # BLD AUTO: 3.23 10*6/MM3 (ref 3.77–5.28)
TIBC SERPL-MCNC: 294 MCG/DL (ref 298–536)
TRANSFERRIN SERPL-MCNC: 197 MG/DL (ref 200–360)
WBC NRBC COR # BLD AUTO: 2.46 10*3/MM3 (ref 3.4–10.8)

## 2025-03-03 PROCEDURE — 83540 ASSAY OF IRON: CPT

## 2025-03-03 PROCEDURE — 82728 ASSAY OF FERRITIN: CPT

## 2025-03-03 PROCEDURE — 36415 COLL VENOUS BLD VENIPUNCTURE: CPT

## 2025-03-03 PROCEDURE — 1126F AMNT PAIN NOTED NONE PRSNT: CPT | Performed by: INTERNAL MEDICINE

## 2025-03-03 PROCEDURE — 99214 OFFICE O/P EST MOD 30 MIN: CPT | Performed by: INTERNAL MEDICINE

## 2025-03-03 PROCEDURE — 85025 COMPLETE CBC W/AUTO DIFF WBC: CPT

## 2025-03-03 PROCEDURE — 84466 ASSAY OF TRANSFERRIN: CPT

## 2025-03-03 RX ORDER — NYSTATIN 100000 U/G
1 OINTMENT TOPICAL 2 TIMES DAILY
COMMUNITY
Start: 2024-12-10

## 2025-03-03 RX ORDER — LISINOPRIL 5 MG/1
5 TABLET ORAL DAILY
COMMUNITY
Start: 2025-01-20

## 2025-03-03 NOTE — PROGRESS NOTES
DATE OF VISIT: 3/3/2025    REASON FOR VISIT: Followup for pancytopenia.    PROBLEM LIST:  1.  Pancytopenia:  A.  Incidentally noted blood work done May 24, 2020  B.  CT abdomen pelvis revealed mild hepatosplenomegaly spleen size 15 cm with normal liver enzymes  C.  Bone marrow biopsy done on May 26, 2020 revealed hypercellular marrow 66% cellularity with mild megakaryocytes atypia suspicious for low-grade MDS  D.  Repeat bone marrow biopsy done July 13, 2021 revealed hypercellular marrow with dyserythropoiesis  2.  Vitamin B12 deficiency:  A.  Vitamin B12 level 155 on May 25, 2020  3. Hypothyroidism  4. Diabetes  5. History of pulmonary embolism     HISTORY OF PRESENT ILLNESS: The patient is a very pleasant 89 y.o. female with past medical history significant for pancytopenia diagnosed May 2020. Her blood work revealed pancytopenia.  CT chest abdomen pelvis revealed mild splenomegaly spleen size 15 cm with mild hepatomegaly as well as pancytopenia.  Bone marrow biopsy done on May 26, 2020 revealed hypercellular marrow with mild dysplastic features could not rule out low-grade MDS.    Repeated bone marrow biopsy done July 13, 2021 revealed similar changes.  Blood work confirmed vitamin B12 deficiency with serum B12 level of 150.  She was started on vitamin B12 replacement monthly. The patient is here today for scheduled follow up visit.     SUBJECTIVE: Chey is here today with her son.  Overall she is doing well.  Denies any bleeding is no fever or chills.  She has not been admitted to the hospital since last visit.    Past History:  Medical History: has a past medical history of A-fib, Anemia, Arthritis, Diabetes mellitus, Disease of thyroid gland, History of transfusion, [place] (hard of hearing), Hypertension, Migraine without aura and without status migrainosus, not intractable (12/30/2016), Myelodysplastic syndrome, Pulmonary emboli (2011), SOBOE (shortness of breath on exertion), Tremors of nervous system, Vertigo,  Wears dentures, and Wears glasses.   Surgical History: has a past surgical history that includes Hysterectomy; Tonsillectomy; Bladder surgery; Cataract extraction; Cholecystectomy; Colonoscopy; Kyphoplasty (N/A, 02/12/2021); and Hip Trochanteric Nailing (Right, 6/20/2023).   Family History: family history includes Breast cancer (age of onset: 84) in her sister; Heart attack in her father; Stroke in her mother.   Social History: reports that she has never smoked. She has never been exposed to tobacco smoke. She has never used smokeless tobacco. She reports that she does not drink alcohol and does not use drugs.    (Not in a hospital admission)     Allergies: Aspirin      Review of Systems   Constitutional:  Positive for fatigue.   HENT:          Dysphagia diagnosed during hospitalization July 2024.    Musculoskeletal:  Positive for gait problem.        Ambulates with walker   Skin:  Positive for wound.        Right lower leg wound   Neurological:  Positive for weakness.         PHYSICAL EXAMINATION:   LMP  (LMP Unknown) Comment: mammogram is up to date   There were no vitals filed for this visit.          ECOG Performance Status: 2 - Symptomatic, <50% confined to bed  General Appearance:  alert, cooperative, no apparent distress, appears stated age, and frail appearing   Lungs:   Clear to auscultation bilaterally; respirations regular, even, and unlabored bilaterally   Heart:  Regular rate and rhythm, no murmurs appreciated   Abdomen:  Extremities:   Soft, non-tender, non-distended, and no organomegaly  Right lower leg with wrap in place, clean and dry     No visits with results within 2 Week(s) from this visit.   Latest known visit with results is:   No results displayed because visit has over 200 results.           No results found.      ASSESSMENT: The patient is a very pleasant 89 y.o. female  with pancytopenia.       PLAN:  1. Pancytopenia:  A.  I did go over the blood results with the patient from today and  reassured white cells improved to 2.5 hemoglobin stable at 9.5 and platelets stable at 52.  B.  I will follow-up on her B12 and iron levels.    2. Hypothyroidism:  A. She will continue levothyroxine 100 mcg daily for hypothyroidism.     3.  History of iron deficiency:  A.  We will follow up on the iron studies from today as well as repeat them on return.   B. We will arrange for IV iron replacement if needed for iron deficiency with ferritin less than 30 or saturation less than 10%.      4. Type 2 diabetes:  A. She will continue Januiva and Metformin daily.    5. Atrial fibrillation:  A. She is on Eliquis 2.5 mg twice a day. This is reduced dose secondary to bleeding and thrombocytopenia.   B. We will consider stopping/holding anticoagulation in the future if her platelet count gets below 30,000 or if she has evidence of bleeding.     FOLLOW UP: 6 months repeated CBC.     Dinh Lyman MD  3/3/2025

## 2025-05-17 ENCOUNTER — APPOINTMENT (OUTPATIENT)
Dept: CT IMAGING | Facility: HOSPITAL | Age: 89
End: 2025-05-17
Payer: MEDICARE

## 2025-05-17 ENCOUNTER — HOSPITAL ENCOUNTER (INPATIENT)
Facility: HOSPITAL | Age: 89
LOS: 11 days | Discharge: NURSING, REHAB OR LONG-TERM CARE | End: 2025-05-29
Attending: EMERGENCY MEDICINE
Payer: MEDICARE

## 2025-05-17 ENCOUNTER — APPOINTMENT (OUTPATIENT)
Dept: GENERAL RADIOLOGY | Facility: HOSPITAL | Age: 89
End: 2025-05-17
Payer: MEDICARE

## 2025-05-17 DIAGNOSIS — M97.11XD PERIPROSTHETIC FRACTURE AROUND INTERNAL PROSTHETIC RIGHT KNEE JOINT, SUBSEQUENT ENCOUNTER: ICD-10-CM

## 2025-05-17 DIAGNOSIS — M97.8XXA PERIPROSTHETIC FRACTURE AROUND INTERNAL PROSTHETIC KNEE JOINT: ICD-10-CM

## 2025-05-17 DIAGNOSIS — S72.402A CLOSED FRACTURE OF DISTAL END OF LEFT FEMUR, UNSPECIFIED FRACTURE MORPHOLOGY, INITIAL ENCOUNTER: ICD-10-CM
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
## 2025-05-17 DIAGNOSIS — Z79.01 ANTICOAGULATED: ICD-10-CM
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
## 2025-05-17 DIAGNOSIS — Z96.659 PERIPROSTHETIC FRACTURE AROUND INTERNAL PROSTHETIC KNEE JOINT: ICD-10-CM

## 2025-05-17 PROBLEM — Z79.4 TYPE 2 DIABETES MELLITUS WITH COMPLICATION, WITH LONG-TERM CURRENT USE OF INSULIN: Status: ACTIVE | Noted: 2025-05-17

## 2025-05-17 PROBLEM — E11.8 TYPE 2 DIABETES MELLITUS WITH COMPLICATION, WITH LONG-TERM CURRENT USE OF INSULIN: Status: ACTIVE | Noted: 2025-05-17

## 2025-05-17 LAB
ALBUMIN SERPL-MCNC: 3.8 G/DL (ref 3.5–5.2)
ALBUMIN/GLOB SERPL: 1.7 G/DL
ALP SERPL-CCNC: 92 U/L (ref 39–117)
ALT SERPL W P-5'-P-CCNC: 10 U/L (ref 1–33)
ANION GAP SERPL CALCULATED.3IONS-SCNC: 14 MMOL/L (ref 5–15)
AST SERPL-CCNC: 10 U/L (ref 1–32)
BACTERIA UR QL AUTO: ABNORMAL /HPF
BASOPHILS # BLD AUTO: 0 10*3/MM3 (ref 0–0.2)
BASOPHILS NFR BLD AUTO: 0 % (ref 0–1.5)
BILIRUB SERPL-MCNC: 0.3 MG/DL (ref 0–1.2)
BILIRUB UR QL STRIP: NEGATIVE
BUN SERPL-MCNC: 30 MG/DL (ref 8–23)
BUN/CREAT SERPL: 24.6 (ref 7–25)
CALCIUM SPEC-SCNC: 8.3 MG/DL (ref 8.6–10.5)
CHLORIDE SERPL-SCNC: 97 MMOL/L (ref 98–107)
CLARITY UR: ABNORMAL
CO2 SERPL-SCNC: 23 MMOL/L (ref 22–29)
COLOR UR: YELLOW
CREAT SERPL-MCNC: 1.22 MG/DL (ref 0.57–1)
DEPRECATED RDW RBC AUTO: 47.7 FL (ref 37–54)
EGFRCR SERPLBLD CKD-EPI 2021: 42.8 ML/MIN/1.73
EOSINOPHIL # BLD AUTO: 0 10*3/MM3 (ref 0–0.4)
EOSINOPHIL NFR BLD AUTO: 0 % (ref 0.3–6.2)
ERYTHROCYTE [DISTWIDTH] IN BLOOD BY AUTOMATED COUNT: 14.8 % (ref 12.3–15.4)
GEN 5 1HR TROPONIN T REFLEX: 21 NG/L
GLOBULIN UR ELPH-MCNC: 2.2 GM/DL
GLUCOSE SERPL-MCNC: 286 MG/DL (ref 65–99)
GLUCOSE UR STRIP-MCNC: ABNORMAL MG/DL
HCT VFR BLD AUTO: 24.3 % (ref 34–46.6)
HGB BLD-MCNC: 8.1 G/DL (ref 12–15.9)
HGB UR QL STRIP.AUTO: NEGATIVE
HYALINE CASTS UR QL AUTO: ABNORMAL /LPF
IMM GRANULOCYTES # BLD AUTO: 0.02 10*3/MM3 (ref 0–0.05)
IMM GRANULOCYTES NFR BLD AUTO: 1.1 % (ref 0–0.5)
KETONES UR QL STRIP: NEGATIVE
LEUKOCYTE ESTERASE UR QL STRIP.AUTO: ABNORMAL
LYMPHOCYTES # BLD AUTO: 0.38 10*3/MM3 (ref 0.7–3.1)
LYMPHOCYTES NFR BLD AUTO: 21.6 % (ref 19.6–45.3)
MCH RBC QN AUTO: 29.6 PG (ref 26.6–33)
MCHC RBC AUTO-ENTMCNC: 33.3 G/DL (ref 31.5–35.7)
MCV RBC AUTO: 88.7 FL (ref 79–97)
MONOCYTES # BLD AUTO: 0.18 10*3/MM3 (ref 0.1–0.9)
MONOCYTES NFR BLD AUTO: 10.2 % (ref 5–12)
NEUTROPHILS NFR BLD AUTO: 1.18 10*3/MM3 (ref 1.7–7)
NEUTROPHILS NFR BLD AUTO: 67.1 % (ref 42.7–76)
NITRITE UR QL STRIP: NEGATIVE
NRBC BLD AUTO-RTO: 0 /100 WBC (ref 0–0.2)
PH UR STRIP.AUTO: 8.5 [PH] (ref 5–8)
PLATELET # BLD AUTO: 57 10*3/MM3 (ref 140–450)
PMV BLD AUTO: 9 FL (ref 6–12)
POTASSIUM SERPL-SCNC: 4.2 MMOL/L (ref 3.5–5.2)
PROT SERPL-MCNC: 6 G/DL (ref 6–8.5)
PROT UR QL STRIP: ABNORMAL
RBC # BLD AUTO: 2.74 10*6/MM3 (ref 3.77–5.28)
RBC # UR STRIP: ABNORMAL /HPF
REF LAB TEST METHOD: ABNORMAL
SODIUM SERPL-SCNC: 134 MMOL/L (ref 136–145)
SP GR UR STRIP: 1.02 (ref 1–1.03)
SQUAMOUS #/AREA URNS HPF: ABNORMAL /HPF
TROPONIN T % DELTA: 0
TROPONIN T NUMERIC DELTA: 0 NG/L
TROPONIN T SERPL HS-MCNC: 21 NG/L
UROBILINOGEN UR QL STRIP: ABNORMAL
WBC # UR STRIP: ABNORMAL /HPF
WBC NRBC COR # BLD AUTO: 1.76 10*3/MM3 (ref 3.4–10.8)

## 2025-05-17 PROCEDURE — 25010000002 MORPHINE PER 10 MG: Performed by: INTERNAL MEDICINE

## 2025-05-17 PROCEDURE — 93005 ELECTROCARDIOGRAM TRACING: CPT | Performed by: EMERGENCY MEDICINE

## 2025-05-17 PROCEDURE — 87086 URINE CULTURE/COLONY COUNT: CPT | Performed by: INTERNAL MEDICINE

## 2025-05-17 PROCEDURE — 87186 SC STD MICRODIL/AGAR DIL: CPT | Performed by: INTERNAL MEDICINE

## 2025-05-17 PROCEDURE — 84466 ASSAY OF TRANSFERRIN: CPT | Performed by: INTERNAL MEDICINE

## 2025-05-17 PROCEDURE — 83540 ASSAY OF IRON: CPT | Performed by: INTERNAL MEDICINE

## 2025-05-17 PROCEDURE — 99223 1ST HOSP IP/OBS HIGH 75: CPT

## 2025-05-17 PROCEDURE — 87077 CULTURE AEROBIC IDENTIFY: CPT | Performed by: INTERNAL MEDICINE

## 2025-05-17 PROCEDURE — 73700 CT LOWER EXTREMITY W/O DYE: CPT

## 2025-05-17 PROCEDURE — 83036 HEMOGLOBIN GLYCOSYLATED A1C: CPT

## 2025-05-17 PROCEDURE — G0378 HOSPITAL OBSERVATION PER HR: HCPCS

## 2025-05-17 PROCEDURE — 85025 COMPLETE CBC W/AUTO DIFF WBC: CPT | Performed by: EMERGENCY MEDICINE

## 2025-05-17 PROCEDURE — P9612 CATHETERIZE FOR URINE SPEC: HCPCS

## 2025-05-17 PROCEDURE — 84484 ASSAY OF TROPONIN QUANT: CPT | Performed by: EMERGENCY MEDICINE

## 2025-05-17 PROCEDURE — 80053 COMPREHEN METABOLIC PANEL: CPT | Performed by: EMERGENCY MEDICINE

## 2025-05-17 PROCEDURE — 36415 COLL VENOUS BLD VENIPUNCTURE: CPT

## 2025-05-17 PROCEDURE — 99285 EMERGENCY DEPT VISIT HI MDM: CPT

## 2025-05-17 PROCEDURE — 81001 URINALYSIS AUTO W/SCOPE: CPT | Performed by: EMERGENCY MEDICINE

## 2025-05-17 PROCEDURE — 73560 X-RAY EXAM OF KNEE 1 OR 2: CPT

## 2025-05-17 RX ORDER — SODIUM CHLORIDE 0.9 % (FLUSH) 0.9 %
10 SYRINGE (ML) INJECTION AS NEEDED
Status: DISCONTINUED | OUTPATIENT
Start: 2025-05-17 | End: 2025-05-29 | Stop reason: HOSPADM

## 2025-05-17 RX ORDER — ACETAMINOPHEN 160 MG/5ML
650 SOLUTION ORAL EVERY 4 HOURS PRN
Status: DISCONTINUED | OUTPATIENT
Start: 2025-05-17 | End: 2025-05-29 | Stop reason: HOSPADM

## 2025-05-17 RX ORDER — HYDROCODONE BITARTRATE AND ACETAMINOPHEN 5; 325 MG/1; MG/1
1 TABLET ORAL EVERY 6 HOURS PRN
Refills: 0 | Status: DISPENSED | OUTPATIENT
Start: 2025-05-17 | End: 2025-05-20

## 2025-05-17 RX ORDER — POLYETHYLENE GLYCOL 3350 17 G/17G
17 POWDER, FOR SOLUTION ORAL DAILY PRN
Status: DISCONTINUED | OUTPATIENT
Start: 2025-05-17 | End: 2025-05-29 | Stop reason: HOSPADM

## 2025-05-17 RX ORDER — BISACODYL 10 MG
10 SUPPOSITORY, RECTAL RECTAL DAILY PRN
Status: DISCONTINUED | OUTPATIENT
Start: 2025-05-17 | End: 2025-05-29 | Stop reason: HOSPADM

## 2025-05-17 RX ORDER — SODIUM CHLORIDE 0.9 % (FLUSH) 0.9 %
10 SYRINGE (ML) INJECTION EVERY 12 HOURS SCHEDULED
Status: DISCONTINUED | OUTPATIENT
Start: 2025-05-18 | End: 2025-05-29 | Stop reason: HOSPADM

## 2025-05-17 RX ORDER — MORPHINE SULFATE 2 MG/ML
2 INJECTION, SOLUTION INTRAMUSCULAR; INTRAVENOUS EVERY 4 HOURS PRN
Status: DISPENSED | OUTPATIENT
Start: 2025-05-17 | End: 2025-05-19

## 2025-05-17 RX ORDER — BISACODYL 5 MG/1
5 TABLET, DELAYED RELEASE ORAL DAILY PRN
Status: DISCONTINUED | OUTPATIENT
Start: 2025-05-17 | End: 2025-05-29 | Stop reason: HOSPADM

## 2025-05-17 RX ORDER — AMOXICILLIN 250 MG
2 CAPSULE ORAL 2 TIMES DAILY PRN
Status: DISCONTINUED | OUTPATIENT
Start: 2025-05-17 | End: 2025-05-29 | Stop reason: HOSPADM

## 2025-05-17 RX ORDER — AMIODARONE HYDROCHLORIDE 200 MG/1
200 TABLET ORAL DAILY
Status: DISCONTINUED | OUTPATIENT
Start: 2025-05-18 | End: 2025-05-29 | Stop reason: HOSPADM

## 2025-05-17 RX ORDER — ACETAMINOPHEN 325 MG/1
650 TABLET ORAL ONCE
Status: COMPLETED | OUTPATIENT
Start: 2025-05-17 | End: 2025-05-17

## 2025-05-17 RX ORDER — ACETAMINOPHEN 325 MG/1
650 TABLET ORAL EVERY 4 HOURS PRN
Status: DISCONTINUED | OUTPATIENT
Start: 2025-05-17 | End: 2025-05-29 | Stop reason: HOSPADM

## 2025-05-17 RX ORDER — NITROGLYCERIN 0.4 MG/1
0.4 TABLET SUBLINGUAL
Status: DISCONTINUED | OUTPATIENT
Start: 2025-05-17 | End: 2025-05-29 | Stop reason: HOSPADM

## 2025-05-17 RX ORDER — ACETAMINOPHEN 650 MG/1
650 SUPPOSITORY RECTAL EVERY 4 HOURS PRN
Status: DISCONTINUED | OUTPATIENT
Start: 2025-05-17 | End: 2025-05-29 | Stop reason: HOSPADM

## 2025-05-17 RX ORDER — LEVOTHYROXINE SODIUM 100 UG/1
100 TABLET ORAL
Status: DISCONTINUED | OUTPATIENT
Start: 2025-05-18 | End: 2025-05-29 | Stop reason: HOSPADM

## 2025-05-17 RX ORDER — METOPROLOL TARTRATE 25 MG/1
25 TABLET, FILM COATED ORAL 2 TIMES DAILY
Status: DISCONTINUED | OUTPATIENT
Start: 2025-05-18 | End: 2025-05-29 | Stop reason: HOSPADM

## 2025-05-17 RX ORDER — LISINOPRIL 5 MG/1
5 TABLET ORAL DAILY
Status: DISCONTINUED | OUTPATIENT
Start: 2025-05-18 | End: 2025-05-26

## 2025-05-17 RX ADMIN — METOPROLOL TARTRATE 25 MG: 25 TABLET, FILM COATED ORAL at 23:50

## 2025-05-17 RX ADMIN — Medication 10 ML: at 23:51

## 2025-05-17 RX ADMIN — DOCUSATE SODIUM 50 MG AND SENNOSIDES 8.6 MG 2 TABLET: 8.6; 5 TABLET, FILM COATED ORAL at 23:50

## 2025-05-17 RX ADMIN — ACETAMINOPHEN 650 MG: 325 TABLET ORAL at 19:20

## 2025-05-17 RX ADMIN — Medication 5 MG: at 23:50

## 2025-05-17 RX ADMIN — MORPHINE SULFATE 2 MG: 2 INJECTION, SOLUTION INTRAMUSCULAR; INTRAVENOUS at 23:51

## 2025-05-17 NOTE — Clinical Note
Level of Care: Telemetry [5]   Diagnosis: Periprosthetic fracture around internal prosthetic right knee joint [[id number]]   Admitting Physician: SEEMA GUO III [[id number]]   Attending Physician: SEEMA GUO III [[id number]]   Bed Request Comments: tele obs

## 2025-05-17 NOTE — ED PROVIDER NOTES
Subjective   History of Present Illness  Mrs. Robertson is brought from her nursing home with left knee pain.  She fell earlier in the day.  She tells me she was going to the dining room and felt shaky.  Reached for a chair to brace herself and fell onto her left knee.  Tells me she hit her head.  Denies headache.  Indicates her main issue is her left knee, cannot bear weight.  Has had prior knee replacement.      Review of Systems    Past Medical History:   Diagnosis Date    A-fib     on eliquis    Anemia     Arthritis     Diabetes mellitus     checks sugar only when pt wants to     Disease of thyroid gland     History of transfusion     with knee surgery-  no reaction recalled.     [place] (hard of hearing)     no hearing aids    Hypertension     Migraine without aura and without status migrainosus, not intractable 12/30/2016    Myelodysplastic syndrome     Pulmonary emboli 2011    (after knee surgery)    SOBOE (shortness of breath on exertion)     Tremors of nervous system     Vertigo     Wears dentures     upper     Wears glasses        Allergies   Allergen Reactions    Aspirin Unknown - Low Severity     Mouth sores        Past Surgical History:   Procedure Laterality Date    BLADDER SURGERY      CATARACT EXTRACTION      bilat     CHOLECYSTECTOMY      COLONOSCOPY      HIP TROCHANTERIC NAILING WITH INTRAMEDULLARY HIP SCREW Right 6/20/2023    Procedure: HIP TROCHANTERIC NAILING WITH INTRAMEDULLARY HIP SCREW RIGHT;  Surgeon: Augie Hobbs MD;  Location:  [facility];  Service: Orthopedics;  Laterality: Right;    HYSTERECTOMY      with bso    KYPHOPLASTY N/A 02/12/2021    Procedure: KYPHOPLASTY T11, T12 AND L4;  Surgeon: Matty Hearn MD;  Location:  [facility];  Service: Orthopedic Spine;  Laterality: N/A;    TONSILLECTOMY         Family History   Problem Relation Age of Onset    Breast cancer Sister 84    Stroke Mother     Heart attack Father     Ovarian cancer Neg Hx        Social History     Socioeconomic History     Marital status:    Tobacco Use    Smoking status: Never     Passive exposure: Never    Smokeless tobacco: Never   Vaping Use    Vaping status: Never Used   Substance and Sexual Activity    Alcohol use: No    Drug use: No    Sexual activity: Defer           Objective   Physical Exam  Vitals and nursing note reviewed.   Constitutional:       General: She is not in acute distress.     Appearance: Normal appearance.   HENT:      Head: Normocephalic and atraumatic.      Nose: Nose normal. No congestion or rhinorrhea.   Eyes:      General: No scleral icterus.     Conjunctiva/sclera: Conjunctivae normal.   Neck:      Comments: No JVD   Cardiovascular:      Rate and Rhythm: Normal rate and regular rhythm.      Heart sounds: No murmur heard.     No friction rub.   Pulmonary:      Effort: Pulmonary effort is normal.      Breath sounds: Normal breath sounds. No wheezing or rales.   Abdominal:      General: Bowel sounds are normal.      Palpations: Abdomen is soft.      Tenderness: There is no abdominal tenderness. There is no guarding or rebound.   Musculoskeletal:         General: Swelling and tenderness present.      Cervical back: Normal range of motion and neck supple.      Right lower leg: No edema.      Left lower leg: No edema.      Comments: Right knee is swollen and diffusely tender.  Patella is intact.  Foot distally is warm and well-perfused   Skin:     General: Skin is warm and dry.      Coloration: Skin is not pale.      Findings: No erythema.   Neurological:      General: No focal deficit present.      Mental Status: She is alert.      Motor: No weakness.      Coordination: Coordination normal.   Psychiatric:         Mood and Affect: Mood normal.         Behavior: Behavior normal.         Thought Content: Thought content normal.         Procedures           ED Course  ED Course as of 05/18/25 0047   Sat May 17, 2025   2045 With Mrs. Robertson and her son about findings and suspicion of periprosthetic  fracture.  Radiology has rendered an opinion and they feel there is indeed a fracture.  She has quite a bit of swelling.  She is anticoagulated with Eliquis.  Will discuss with the orthopedic doctor on-call. [DT]   2046 Discussed with Dr. Scherer who looked at her x-rays.  He asks that we get CT scan and admit to the hospitalist. [DT]      ED Course User Index  [DT] Terry Maguire MD                                                       Medical Decision Making  Problems Addressed:  Anticoagulated: chronic illness or injury  Fall at nursing home, initial encounter: complicated acute illness or injury that poses a threat to life or bodily functions  Periprosthetic fracture around internal prosthetic knee joint: complicated acute illness or injury that poses a threat to life or bodily functions    Amount and/or Complexity of Data Reviewed  Labs: ordered. Decision-making details documented in ED Course.  Radiology: ordered. Decision-making details documented in ED Course.  ECG/medicine tests: ordered. Decision-making details documented in ED Course.    Risk  OTC drugs.  Decision regarding hospitalization.        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       Medication List      No changes were made to your prescriptions during this visit.            Terry Maguire MD  05/18/25 0047

## 2025-05-18 PROBLEM — S72.90XA FEMUR FRACTURE: Status: ACTIVE | Noted: 2025-05-18

## 2025-05-18 LAB
ABO GROUP BLD: NORMAL
ALBUMIN SERPL-MCNC: 3.3 G/DL (ref 3.5–5.2)
ALBUMIN/GLOB SERPL: 1.5 G/DL
ALP SERPL-CCNC: 87 U/L (ref 39–117)
ALT SERPL W P-5'-P-CCNC: 9 U/L (ref 1–33)
ANION GAP SERPL CALCULATED.3IONS-SCNC: 11 MMOL/L (ref 5–15)
AST SERPL-CCNC: 12 U/L (ref 1–32)
BASOPHILS # BLD AUTO: 0 10*3/MM3 (ref 0–0.2)
BASOPHILS NFR BLD AUTO: 0 % (ref 0–1.5)
BILIRUB SERPL-MCNC: 0.3 MG/DL (ref 0–1.2)
BLD GP AB SCN SERPL QL: POSITIVE
BUN SERPL-MCNC: 20 MG/DL (ref 8–23)
BUN/CREAT SERPL: 19.2 (ref 7–25)
CALCIUM SPEC-SCNC: 7.9 MG/DL (ref 8.6–10.5)
CHLORIDE SERPL-SCNC: 99 MMOL/L (ref 98–107)
CO2 SERPL-SCNC: 23 MMOL/L (ref 22–29)
CREAT SERPL-MCNC: 1.04 MG/DL (ref 0.57–1)
DEPRECATED RDW RBC AUTO: 48.4 FL (ref 37–54)
EGFRCR SERPLBLD CKD-EPI 2021: 51.8 ML/MIN/1.73
EOSINOPHIL # BLD AUTO: 0.01 10*3/MM3 (ref 0–0.4)
EOSINOPHIL NFR BLD AUTO: 0.7 % (ref 0.3–6.2)
ERYTHROCYTE [DISTWIDTH] IN BLOOD BY AUTOMATED COUNT: 14.8 % (ref 12.3–15.4)
GLOBULIN UR ELPH-MCNC: 2.2 GM/DL
GLUCOSE BLDC GLUCOMTR-MCNC: 127 MG/DL (ref 70–130)
GLUCOSE BLDC GLUCOMTR-MCNC: 163 MG/DL (ref 70–130)
GLUCOSE BLDC GLUCOMTR-MCNC: 197 MG/DL (ref 70–130)
GLUCOSE BLDC GLUCOMTR-MCNC: 238 MG/DL (ref 70–130)
GLUCOSE SERPL-MCNC: 182 MG/DL (ref 65–99)
HBA1C MFR BLD: 6.2 % (ref 4.8–5.6)
HCT VFR BLD AUTO: 24.1 % (ref 34–46.6)
HGB BLD-MCNC: 7.9 G/DL (ref 12–15.9)
IMM GRANULOCYTES # BLD AUTO: 0.04 10*3/MM3 (ref 0–0.05)
IMM GRANULOCYTES NFR BLD AUTO: 2.6 % (ref 0–0.5)
INR PPP: 1.21 (ref 0.89–1.12)
IRON 24H UR-MRATE: 40 MCG/DL (ref 37–145)
IRON SATN MFR SERPL: 14 % (ref 20–50)
LYMPHOCYTES # BLD AUTO: 0.3 10*3/MM3 (ref 0.7–3.1)
LYMPHOCYTES NFR BLD AUTO: 19.6 % (ref 19.6–45.3)
MAGNESIUM SERPL-MCNC: 1.2 MG/DL (ref 1.6–2.4)
MCH RBC QN AUTO: 29.3 PG (ref 26.6–33)
MCHC RBC AUTO-ENTMCNC: 32.8 G/DL (ref 31.5–35.7)
MCV RBC AUTO: 89.3 FL (ref 79–97)
MONOCYTES # BLD AUTO: 0.23 10*3/MM3 (ref 0.1–0.9)
MONOCYTES NFR BLD AUTO: 15 % (ref 5–12)
NEUTROPHILS NFR BLD AUTO: 0.95 10*3/MM3 (ref 1.7–7)
NEUTROPHILS NFR BLD AUTO: 62.1 % (ref 42.7–76)
NRBC BLD AUTO-RTO: 0 /100 WBC (ref 0–0.2)
PLATELET # BLD AUTO: 48 10*3/MM3 (ref 140–450)
PMV BLD AUTO: 9.6 FL (ref 6–12)
POTASSIUM SERPL-SCNC: 4.2 MMOL/L (ref 3.5–5.2)
PROT SERPL-MCNC: 5.5 G/DL (ref 6–8.5)
PROTHROMBIN TIME: 16 SECONDS (ref 12.2–15.3)
QT INTERVAL: 492 MS
QTC INTERVAL: 478 MS
RBC # BLD AUTO: 2.7 10*6/MM3 (ref 3.77–5.28)
RH BLD: NEGATIVE
SODIUM SERPL-SCNC: 133 MMOL/L (ref 136–145)
T&S EXPIRATION DATE: NORMAL
TIBC SERPL-MCNC: 282 MCG/DL (ref 298–536)
TRANSFERRIN SERPL-MCNC: 189 MG/DL (ref 200–360)
WBC NRBC COR # BLD AUTO: 1.53 10*3/MM3 (ref 3.4–10.8)

## 2025-05-18 PROCEDURE — 25010000002 NA FERRIC GLUC CPLX PER 12.5 MG: Performed by: INTERNAL MEDICINE

## 2025-05-18 PROCEDURE — 86870 RBC ANTIBODY IDENTIFICATION: CPT | Performed by: INTERNAL MEDICINE

## 2025-05-18 PROCEDURE — 85610 PROTHROMBIN TIME: CPT

## 2025-05-18 PROCEDURE — 25010000002 MORPHINE PER 10 MG: Performed by: INTERNAL MEDICINE

## 2025-05-18 PROCEDURE — 63710000001 INSULIN LISPRO (HUMAN) PER 5 UNITS: Performed by: INTERNAL MEDICINE

## 2025-05-18 PROCEDURE — 86901 BLOOD TYPING SEROLOGIC RH(D): CPT | Performed by: INTERNAL MEDICINE

## 2025-05-18 PROCEDURE — 82948 REAGENT STRIP/BLOOD GLUCOSE: CPT

## 2025-05-18 PROCEDURE — 25010000002 MAGNESIUM SULFATE 2 GM/50ML SOLUTION: Performed by: INTERNAL MEDICINE

## 2025-05-18 PROCEDURE — 86922 COMPATIBILITY TEST ANTIGLOB: CPT

## 2025-05-18 PROCEDURE — 85025 COMPLETE CBC W/AUTO DIFF WBC: CPT

## 2025-05-18 PROCEDURE — 86920 COMPATIBILITY TEST SPIN: CPT

## 2025-05-18 PROCEDURE — 86905 BLOOD TYPING RBC ANTIGENS: CPT

## 2025-05-18 PROCEDURE — 86902 BLOOD TYPE ANTIGEN DONOR EA: CPT

## 2025-05-18 PROCEDURE — 83735 ASSAY OF MAGNESIUM: CPT | Performed by: INTERNAL MEDICINE

## 2025-05-18 PROCEDURE — 80053 COMPREHEN METABOLIC PANEL: CPT

## 2025-05-18 PROCEDURE — 25010000002 CEFTRIAXONE PER 250 MG: Performed by: INTERNAL MEDICINE

## 2025-05-18 PROCEDURE — 86900 BLOOD TYPING SEROLOGIC ABO: CPT | Performed by: INTERNAL MEDICINE

## 2025-05-18 PROCEDURE — 99232 SBSQ HOSP IP/OBS MODERATE 35: CPT | Performed by: INTERNAL MEDICINE

## 2025-05-18 PROCEDURE — 86850 RBC ANTIBODY SCREEN: CPT | Performed by: INTERNAL MEDICINE

## 2025-05-18 RX ORDER — IBUPROFEN 600 MG/1
1 TABLET ORAL
Status: DISCONTINUED | OUTPATIENT
Start: 2025-05-18 | End: 2025-05-18

## 2025-05-18 RX ORDER — NICOTINE POLACRILEX 4 MG
15 LOZENGE BUCCAL
Status: DISCONTINUED | OUTPATIENT
Start: 2025-05-18 | End: 2025-05-29 | Stop reason: HOSPADM

## 2025-05-18 RX ORDER — PANTOPRAZOLE SODIUM 40 MG/1
40 TABLET, DELAYED RELEASE ORAL
Status: DISCONTINUED | OUTPATIENT
Start: 2025-05-18 | End: 2025-05-29 | Stop reason: HOSPADM

## 2025-05-18 RX ORDER — MAGNESIUM SULFATE HEPTAHYDRATE 40 MG/ML
2 INJECTION, SOLUTION INTRAVENOUS
Status: COMPLETED | OUTPATIENT
Start: 2025-05-18 | End: 2025-05-19

## 2025-05-18 RX ORDER — INSULIN LISPRO 100 [IU]/ML
2-9 INJECTION, SOLUTION INTRAVENOUS; SUBCUTANEOUS
Status: DISCONTINUED | OUTPATIENT
Start: 2025-05-18 | End: 2025-05-18

## 2025-05-18 RX ORDER — INSULIN LISPRO 100 [IU]/ML
2-7 INJECTION, SOLUTION INTRAVENOUS; SUBCUTANEOUS
Status: DISCONTINUED | OUTPATIENT
Start: 2025-05-18 | End: 2025-05-29 | Stop reason: HOSPADM

## 2025-05-18 RX ORDER — DEXTROSE MONOHYDRATE 25 G/50ML
25 INJECTION, SOLUTION INTRAVENOUS
Status: DISCONTINUED | OUTPATIENT
Start: 2025-05-18 | End: 2025-05-29 | Stop reason: HOSPADM

## 2025-05-18 RX ORDER — NICOTINE POLACRILEX 4 MG
15 LOZENGE BUCCAL
Status: DISCONTINUED | OUTPATIENT
Start: 2025-05-18 | End: 2025-05-18 | Stop reason: SDUPTHER

## 2025-05-18 RX ORDER — DEXTROSE MONOHYDRATE 25 G/50ML
25 INJECTION, SOLUTION INTRAVENOUS
Status: DISCONTINUED | OUTPATIENT
Start: 2025-05-18 | End: 2025-05-18 | Stop reason: SDUPTHER

## 2025-05-18 RX ORDER — IBUPROFEN 600 MG/1
1 TABLET ORAL
Status: DISCONTINUED | OUTPATIENT
Start: 2025-05-18 | End: 2025-05-29 | Stop reason: HOSPADM

## 2025-05-18 RX ADMIN — PANTOPRAZOLE SODIUM 40 MG: 40 TABLET, DELAYED RELEASE ORAL at 11:54

## 2025-05-18 RX ADMIN — SODIUM CHLORIDE 125 MG: 9 INJECTION, SOLUTION INTRAVENOUS at 11:54

## 2025-05-18 RX ADMIN — SODIUM CHLORIDE 1000 MG: 900 INJECTION INTRAVENOUS at 06:15

## 2025-05-18 RX ADMIN — INSULIN LISPRO 3 UNITS: 100 INJECTION, SOLUTION INTRAVENOUS; SUBCUTANEOUS at 20:34

## 2025-05-18 RX ADMIN — INSULIN LISPRO 2 UNITS: 100 INJECTION, SOLUTION INTRAVENOUS; SUBCUTANEOUS at 09:24

## 2025-05-18 RX ADMIN — MAGNESIUM SULFATE HEPTAHYDRATE 2 G: 40 INJECTION, SOLUTION INTRAVENOUS at 19:44

## 2025-05-18 RX ADMIN — INSULIN LISPRO 2 UNITS: 100 INJECTION, SOLUTION INTRAVENOUS; SUBCUTANEOUS at 18:26

## 2025-05-18 RX ADMIN — MORPHINE SULFATE 2 MG: 2 INJECTION, SOLUTION INTRAMUSCULAR; INTRAVENOUS at 09:36

## 2025-05-18 RX ADMIN — AMIODARONE HYDROCHLORIDE 200 MG: 200 TABLET ORAL at 09:23

## 2025-05-18 RX ADMIN — MAGNESIUM SULFATE HEPTAHYDRATE 2 G: 40 INJECTION, SOLUTION INTRAVENOUS at 22:23

## 2025-05-18 RX ADMIN — MORPHINE SULFATE 2 MG: 2 INJECTION, SOLUTION INTRAMUSCULAR; INTRAVENOUS at 17:15

## 2025-05-18 RX ADMIN — HYDROCODONE BITARTRATE AND ACETAMINOPHEN 1 TABLET: 5; 325 TABLET ORAL at 15:23

## 2025-05-18 RX ADMIN — LEVOTHYROXINE SODIUM 100 MCG: 0.1 TABLET ORAL at 06:14

## 2025-05-18 RX ADMIN — MORPHINE SULFATE 2 MG: 2 INJECTION, SOLUTION INTRAMUSCULAR; INTRAVENOUS at 04:11

## 2025-05-18 NOTE — PLAN OF CARE
Goal Outcome Evaluation:  Plan of Care Reviewed With: patient        Progress: no change     Pt is alert and oriented x4. VSS on room air. NSR/sinus pancho with 1st degree AV block. Pt has slept off and on throughout the shift. PRN morphine given as ordered for pain. Waffle mattress and foot pumps in place; turned q2hrs. Purewick in place and voiding spontaneously. NPO since midnight. Anticipating ortho consult with Dr. Scherer.

## 2025-05-18 NOTE — PLAN OF CARE
Goal Outcome Evaluation:  Plan of Care Reviewed With: patient           Outcome Evaluation: Pt is alert and oriented x4 but forgetful. VSS on room air. Voids spontaneously in external catheter. PRN pain medicine given for complaints of moderate to severe pain in LLE. Knee immobilizer in place, NWB status maintained.

## 2025-05-18 NOTE — PAYOR COMM NOTE
"Ref# [id number]    ANGEL Callahan, RN  Utilization Review  Phone [phone]  Fax [phone]    [address] [place] [address]         Chey Venegas (89 y.o. Female)       Date of Birth   1935    Social Security Number       Address   [address] [id number]    Home Phone   [phone]    MRN   [id number]       Mosque   Taoism    Marital Status                               Admission Date   5/17/2025    Admission Type   Emergency    Admitting Provider   Julio Leiva MD    Attending Provider   Julio Leiva MD    Department, Room/Bed   [facility] 3G, S363/1       Discharge Date       Discharge Disposition       Discharge Destination                                 Attending Provider: Julio Leiva MD    Allergies: Aspirin    Isolation: None   Infection: None   Code Status: CPR    Ht: 160 cm (63\")   Wt: 56.9 kg (125 lb 6.4 oz)    Admission Cmt: None   Principal Problem: Femur fracture [S72.90XA]                   Active Insurance as of 5/17/2025       Primary Coverage       Payor Plan Insurance Group Employer/Plan Group    ANTHEM MEDICARE REPLACEMENT Formerly [facility] MEDICARE ADVANTAGE O [id number]       Payor Plan Address Payor Plan Phone Number Payor Plan Fax Number Effective Dates    [address] [phone]  1/1/2025 - None Entered    [facility] [id number]         Subscriber Name Subscriber Birth Date Member ID       CHEY VENEGAS 1935 [id number]                     Emergency Contacts        (Rel.) Home Phone Work Phone Mobile Phone    Joe Venegas (Son) -- -- [phone]    Albin Venegas (Son) [phone] -- --    DORIS VENEGAS (Relative) -- -- [phone]              [facility] [id number] Tax ID [id number]  Insurance Information                  ANTHEM MEDICARE REPLACEMENT/ANTHEM MEDICARE ADVANTAGE HMO Phone: [phone]    Subscriber: Chey Venegas " "Subscriber#: [id number]    Group#: [id number] Precert#: --    Authorization#: -- Effective Date: --             History & Physical        Kaushal Jones III, DO at 25 2150              [facility] Medicine Services  HISTORY AND PHYSICAL    Patient Name: Chey Robertson  : 1935  MRN: [id number]  Primary Care Physician: Yasmeen Loomis MD  Date of admission: 2025    Subjective  Subjective     Chief Complaint:  Left knee pain    HPI:  Chey Robertson is a 89 y.o. female significant medical history for atrial fibrillation on Eliquis, type 2 diabetes, hypothyroidism, HTN, tremor, and vertigo who presents to [facility] ED with complaints of left knee pain after sustaining a fall at SNF.    Patient arrives to [facility] ED via EMS with complaints of left knee pain after sustaining a fall at a SNF.  Patient was in the dining room ambulating around the table.  She started to feel shaky and reached for chair for balance, but her legs gave out from underneath her, causing her to fall onto her left knee.  Patient reports she did hit her head, but did not lose consciousness.  She denies headache or vision changes.  She is unable to stand without assistance.  When assisted patient was unable to bear weight on her left lower extremity, prompting EMS evaluation.  Patient has had prior left knee replacement in .  She is anticoagulated on Eliquis due to atrial fibrillation.  In ED, CT of head is negative for acute intracranial findings.  X-ray of left knee shows periprosthetic fracture of left knee.  CT of LLE pending.    Patient is resting comfortably in bed with no acute complaints.  She rates her left knee pain \"very mild\" after receiving Tylenol in ED.  She denies headache, lightheadedness, or dizziness.  She also denies chest pain or shortness of breath.  No reports of nausea, or vomiting, but does report occasional diarrhea.  Patient is on " metformin.       Review of Systems   Constitutional:  Positive for activity change. Negative for chills, fatigue and fever.   HENT: Negative.     Eyes: Negative.    Respiratory:  Negative for cough, chest tightness and shortness of breath.    Cardiovascular:  Negative for chest pain and leg swelling.   Gastrointestinal:  Positive for diarrhea (occasional. On metformin). Negative for abdominal distention, abdominal pain, nausea and vomiting.   Endocrine: Negative.    Genitourinary: Negative.    Musculoskeletal:  Positive for arthralgias, gait problem and joint swelling (left knee).   Skin: Negative.    Allergic/Immunologic: Negative.    Neurological:  Positive for weakness. Negative for dizziness, syncope, light-headedness and headaches.   Hematological:  Bruises/bleeds easily.   Psychiatric/Behavioral: Negative.              Personal History     Past Medical History:   Diagnosis Date    A-fib     on eliquis    Anemia     Arthritis     Diabetes mellitus     checks sugar only when pt wants to     Disease of thyroid gland     History of transfusion     with knee surgery-  no reaction recalled.     [place] (hard of hearing)     no hearing aids    Hypertension     Migraine without aura and without status migrainosus, not intractable 12/30/2016    Myelodysplastic syndrome     Pulmonary emboli 2011    (after knee surgery)    SOBOE (shortness of breath on exertion)     Tremors of nervous system     Vertigo     Wears dentures     upper     Wears glasses          Oncology Problem List:  Myelodysplasia (myelodysplastic syndrome) (03/14/2023; Status: Active)    Oncology/Hematology History    No history exists.       Past Surgical History:   Procedure Laterality Date    BLADDER SURGERY      CATARACT EXTRACTION      bilat     CHOLECYSTECTOMY      COLONOSCOPY      HIP TROCHANTERIC NAILING WITH INTRAMEDULLARY HIP SCREW Right 6/20/2023    Procedure: HIP TROCHANTERIC NAILING WITH INTRAMEDULLARY HIP SCREW RIGHT;  Surgeon: Augie Hobbs,  MD;  Location: [facility] OR;  Service: Orthopedics;  Laterality: Right;    HYSTERECTOMY      with bso    KYPHOPLASTY N/A 02/12/2021    Procedure: KYPHOPLASTY T11, T12 AND L4;  Surgeon: Matty Hearn MD;  Location: [facility] OR;  Service: Orthopedic Spine;  Laterality: N/A;    TONSILLECTOMY         Family History:  family history includes Breast cancer (age of onset: 84) in her sister; Heart attack in her father; Stroke in her mother.     Social History:  reports that she has never smoked. She has never been exposed to tobacco smoke. She has never used smokeless tobacco. She reports that she does not drink alcohol and does not use drugs.  Social History     Social History Narrative    Not on file       Medications:  Acidophilus Extra Strength, Insulin Aspart, SITagliptin, acetaminophen, amiodarone, apixaban, ascorbic acid, castor oil-balsam peru, furosemide, insulin NPH-insulin regular, levothyroxine, lisinopril, melatonin, metFORMIN, metoprolol tartrate, naloxone, nystatin, ondansetron ODT, pantoprazole, polyethylene glycol, and zinc sulfate    Allergies   Allergen Reactions    Aspirin Unknown - Low Severity     Mouth sores        Objective  Objective     Vital Signs:   Temp:  [98.1 °F (36.7 °C)] 98.1 °F (36.7 °C)  Heart Rate:  [55-59] 55  Resp:  [18] 18  BP: (127-166)/(55-69) 127/59    Physical Exam  Constitutional:       Appearance: Normal appearance.   HENT:      Head: Normocephalic and atraumatic.   Eyes:      Extraocular Movements: Extraocular movements intact.      Pupils: Pupils are equal, round, and reactive to light.   Cardiovascular:      Rate and Rhythm: Regular rhythm. Bradycardia present.      Pulses: Normal pulses.      Heart sounds: Normal heart sounds.   Pulmonary:      Effort: Pulmonary effort is normal.      Breath sounds: Normal breath sounds.   Abdominal:      General: Bowel sounds are normal. There is no distension.      Palpations: Abdomen is soft.      Tenderness: There is no abdominal  tenderness.   Musculoskeletal:      Right lower leg: No edema.      Left lower leg: Tenderness (left knee) present. No edema.   Skin:     General: Skin is warm and dry.   Neurological:      General: No focal deficit present.      Mental Status: She is alert and oriented to person, place, and time.   Psychiatric:         Mood and Affect: Mood normal.         Behavior: Behavior normal.          Result Review:  I have personally reviewed the results from the time of this admission to 5/17/2025 22:50 EDT and agree with these findings:  [x]  Laboratory list / accordion  []  Microbiology  [x]  Radiology  [x]  EKG/Telemetry   []  Cardiology/Vascular   []  Pathology  [x]  Old records  []  Other:      LAB RESULTS:      Lab 05/17/25 2008   WBC 1.76*   HEMOGLOBIN 8.1*   HEMATOCRIT 24.3*   PLATELETS 57*   NEUTROS ABS 1.18*   IMMATURE GRANS (ABS) 0.02   LYMPHS ABS 0.38*   MONOS ABS 0.18   EOS ABS 0.00   MCV 88.7         Lab 05/17/25 2008   SODIUM 134*   POTASSIUM 4.2   CHLORIDE 97*   CO2 23.0   ANION GAP 14.0   BUN 30*   CREATININE 1.22*   EGFR 42.8*   GLUCOSE 286*   CALCIUM 8.3*         Lab 05/17/25 2008   TOTAL PROTEIN 6.0   ALBUMIN 3.8   GLOBULIN 2.2   ALT (SGPT) 10   AST (SGOT) 10   BILIRUBIN 0.3   ALK PHOS 92         Lab 05/17/25  2149 05/17/25 2008   HSTROP T 21* 21*                 Brief Urine Lab Results  (Last result in the past 365 days)        Color   Clarity   Blood   Leuk Est   Nitrite   Protein   CREAT   Urine HCG        05/17/25 2140 Yellow   Turbid   Negative   Large (3+)   Negative   30 mg/dL (1+)                 Microbiology Results (last 10 days)       ** No results found for the last 240 hours. **            CT Lower Extremity Left Without Contrast  Result Date: 5/17/2025  CT LOWER EXTREMITY LEFT WO CONTRAST Date of Exam: 5/17/2025 9:02 PM EDT Indication: Evaluate suspected periprosthetic fracture. Comparison: 5/17/2025 radiographs. Technique: Axial CT images were obtained of the left lower extremity  without contrast administration.  Reconstructed coronal and sagittal images were also obtained. Automated exposure control and iterative construction methods were used. Findings: Motion artifact combined with streak artifact does limit evaluation, particularly the fine anatomic detail. The images do seem to confirm the presence of an impaction fracture at the distal femoral metaphysis. Specific details are precluded by motion artifact and streak artifact, but there does appear to be cortical offset at the junction of the femoral metaphysis and epiphysis both medially and laterally particularly at the posterior aspect of the distal femur. The prosthetic components themselves appear intact. The proximal tibia and fibula appear intact. The patella is resurfaced posteriorly without fracture. There is confirmation of a large volume of lipohemarthrosis primarily in the suprapatellar recess. No dislocation.     Impression: Impression: 1.Limited study due to motion artifact and streak artifact. 2.There is confirmation of a impaction fracture at the distal femoral metaphysis with a large volume of lipohemarthrosis. Electronically Signed: Hector Rosenberg MD  5/17/2025 10:01 PM EDT  Workstation ID: [id number]    XR Knee 1 or 2 View Left  Result Date: 5/17/2025  XR KNEE 1 OR 2 VW LEFT Date of Exam: 5/17/2025 7:12 PM EDT Indication: trauma Comparison: Correlation with right knee radiographs 10/19/2024. Findings: There is a total left knee prosthesis in place. The bones are demineralized. There is a large volume of lipohemarthrosis. There is a suspected impaction fracture in the distal femoral metaphyseal region. Prosthetic components appear intact. The visualized tibia and fibula appear intact. Patella appears intact with posterior resurfacing.     Impression: Impression: 1.Suspected impaction fracture in the distal femoral metaphyseal region with large volume of lipohemarthrosis. 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  3.Osteopenia. Electronically Signed: Hector Rosenberg MD  5/17/2025 7:49 PM EDT  Workstation ID: [id number]      Results for orders placed during the hospital encounter of 06/05/23    Adult Transthoracic Echo Complete w/ Color, Spectral and Contrast if necessary per protocol    Interpretation Summary    Left ventricular systolic function is normal. Left ventricular ejection fraction appears to be 56 - 60%.    Left ventricular diastolic function was normal.    Estimated right ventricular systolic pressure from tricuspid regurgitation is normal (<35 mmHg).      Assessment & Plan  Assessment & Plan       Falls    Periprosthetic fracture around prosthetic knee    Hypothyroidism (acquired)    Chronic anticoagulation    Essential hypertension    Atrial fibrillation    Type 2 diabetes mellitus with complication, with long-term current use of insulin    Chey Robertson is a 89 y.o. female significant medical history for atrial fibrillation on Eliquis, type 2 diabetes, hypothyroidism, HTN, tremor, and vertigo who presents to [facility] ED with complaints of left knee pain after sustaining a fall at [facility].  Ortho consult in AM.  Hold Eliquis for procedure.  Pain management.  N.p.o. after midnight.     Periprosthetic fracture of left knee  History of prosthetic left knee  Fall with injury  - CT of head reveals no acute intracranial abnormality  - X-ray right knee periprosthetic fracture of left knee  - CT of the left knee pending  - Pain management  - Fall risk precautions  - Ortho consult in a.m.  - Case management consult in a.m.  - PT/OT consult  - CBC and CMP in a.m.  - Hold Eliquis  - PT/INR in a.m.  - N.p.o. after midnight      HTN  Atrial fibrillation  - Patient on Eliquis.  Hold for procedure.   - Continue amiodarone, lisinopril, and metoprolol    Hypothyroidism  - Continue Synthroid    Type 2 diabetes  - A1c pending  - Hold metformin  - Consider SSI    DVT prophylaxis: Patient on Eliquis    CODE STATUS: Full  code  Code Status (Patient has no pulse and is not breathing): CPR (Attempt to Resuscitate)  Medical Interventions (Patient has pulse or is breathing): Full Support  Level Of Support Discussed With: Patient      Expected Discharge  Expected discharge date/ time has not been documented.      This note has been completed as part of a split-shared workflow.     Signature: Electronically signed by CHANG Hassan, 05/17/25, 10:51 PM EDT    --------------------------------    The patient was seen independently by the APC.  I was available for any questions or concerns.     Electronically signed by Kaushal Jones III, DO, 05/18/25, 2:21 AM EDT.              Electronically signed by Kaushal Jones III, DO at 05/18/25 0221          Emergency Department Notes        Lore Galeas RN at 05/17/25 2213           Chey Robertson    Nursing Report ED to Floor:  Mental status: A/OX4  Ambulatory status: non ambulatory in ed  Oxygen Therapy:  room air  Cardiac Rhythm: sr  Admitted from: assisted living  Safety Concerns:  fall risk  Precautions: none  Social Issues: family at bedside  ED Room #:  20    ED Nurse Phone Extension - [phone] or may call [phone].      HPI:   Chief Complaint   Patient presents with    Knee Pain       Past Medical History:  Past Medical History:   Diagnosis Date    A-fib     on eliquis    Anemia     Arthritis     Diabetes mellitus     checks sugar only when pt wants to     Disease of thyroid gland     History of transfusion     with knee surgery-  no reaction recalled.     [place] (hard of hearing)     no hearing aids    Hypertension     Migraine without aura and without status migrainosus, not intractable 12/30/2016    Myelodysplastic syndrome     Pulmonary emboli 2011    (after knee surgery)    SOBOE (shortness of breath on exertion)     Tremors of nervous system     Vertigo     Wears dentures     upper     Wears glasses         Past Surgical History:  Past Surgical History:   Procedure  Laterality Date    BLADDER SURGERY      CATARACT EXTRACTION      bilat     CHOLECYSTECTOMY      COLONOSCOPY      HIP TROCHANTERIC NAILING WITH INTRAMEDULLARY HIP SCREW Right 6/20/2023    Procedure: HIP TROCHANTERIC NAILING WITH INTRAMEDULLARY HIP SCREW RIGHT;  Surgeon: Augie Hobbs MD;  Location: [facility] OR;  Service: Orthopedics;  Laterality: Right;    HYSTERECTOMY      with bso    KYPHOPLASTY N/A 02/12/2021    Procedure: KYPHOPLASTY T11, T12 AND L4;  Surgeon: Matty Hearn MD;  Location: [facility] OR;  Service: Orthopedic Spine;  Laterality: N/A;    TONSILLECTOMY          Admitting Doctor:   Kaushal Jones III, DO    Consulting Provider(s):  Consults       No orders found from 4/18/2025 to 5/18/2025.             Admitting Diagnosis:   The primary encounter diagnosis was Fall at nursing home, initial encounter. Diagnoses of Periprosthetic fracture around internal prosthetic knee joint and Anticoagulated were also pertinent to this visit.    Most Recent Vitals:   Vitals:    05/17/25 1930 05/17/25 2000 05/17/25 2030 05/17/25 2200   BP: 155/69 148/59 132/55 140/60   Pulse: 59 57 56 57   Resp:       Temp:       TempSrc:       SpO2: 97% 97% 100% 97%   Weight:       Height:           Active LDAs/IV Access:   Lines, Drains & Airways       Active LDAs       Name Placement date Placement time Site Days    Peripheral IV 05/17/25 2009 18 G Right Antecubital 05/17/25 2009  Antecubital  less than 1    External Urinary Catheter 10/20/24  1320  --  209                    Labs (abnormal labs have a star):   Labs Reviewed   COMPREHENSIVE METABOLIC PANEL - Abnormal; Notable for the following components:       Result Value    Glucose 286 (*)     BUN 30 (*)     Creatinine 1.22 (*)     Sodium 134 (*)     Chloride 97 (*)     Calcium 8.3 (*)     eGFR 42.8 (*)     All other components within normal limits    Narrative:     GFR Categories in Chronic Kidney Disease (CKD)              GFR Category          GFR (mL/min/1.73)     Interpretation  G1                    90 or greater        Normal or high (1)  G2                    60-89                Mild decrease (1)  G3a                   45-59                Mild to moderate decrease  G3b                   30-44                Moderate to severe decrease  G4                    15-29                Severe decrease  G5                    14 or less           Kidney failure    (1)In the absence of evidence of kidney disease, neither GFR category G1 or G2 fulfill the criteria for CKD.    eGFR calculation 2021 CKD-EPI creatinine equation, which does not include race as a factor   TROPONIN - Abnormal; Notable for the following components:    HS Troponin T 21 (*)     All other components within normal limits    Narrative:     High Sensitive Troponin T Reference Range:  <14.0 ng/L- Negative Female for AMI  <22.0 ng/L- Negative Male for AMI  >=14 - Abnormal Female indicating possible myocardial injury.  >=22 - Abnormal Male indicating possible myocardial injury.   Clinicians would have to utilize clinical acumen, EKG, Troponin, and serial changes to determine if it is an Acute Myocardial Infarction or myocardial injury due to an underlying chronic condition.        URINALYSIS W/ CULTURE IF INDICATED - Abnormal; Notable for the following components:    Appearance, UA Turbid (*)     pH, UA 8.5 (*)     Glucose,  mg/dL (Trace) (*)     Protein, UA 30 mg/dL (1+) (*)     Leuk Esterase, UA Large (3+) (*)     All other components within normal limits    Narrative:     In absence of clinical symptoms, the presence of pyuria, bacteria, and/or nitrites on the urinalysis result does not correlate with infection.   CBC WITH AUTO DIFFERENTIAL - Abnormal; Notable for the following components:    WBC 1.76 (*)     RBC 2.74 (*)     Hemoglobin 8.1 (*)     Hematocrit 24.3 (*)     Platelets 57 (*)     Eosinophil % 0.0 (*)     Immature Grans % 1.1 (*)     Neutrophils, Absolute 1.18 (*)     Lymphocytes, Absolute  0.38 (*)     All other components within normal limits   URINALYSIS, MICROSCOPIC ONLY - Abnormal; Notable for the following components:    RBC, UA 6-10 (*)     Bacteria, UA 4+ (*)     All other components within normal limits   HIGH SENSITIVITIY TROPONIN T 1HR   CBC AND DIFFERENTIAL    Narrative:     The following orders were created for panel order CBC & Differential.  Procedure                               Abnormality         Status                     ---------                               -----------         ------                     CBC Auto Differential[[id number]]        Abnormal            Final result               Scan Slide[[id number]]                                                                    Please view results for these tests on the individual orders.       Meds Given in ED:   Medications   acetaminophen (TYLENOL) tablet 650 mg (650 mg Oral Given 5/17/25 1920)     No current facility-administered medications for this encounter.       Last NIH score:                                                          Dysphagia screening results:        [place]ion Coma Scale:  No data recorded     CIWA:        Restraint Type:            Isolation Status:  No active isolations          Electronically signed by Lore Galeas RN at 05/17/25 2216       Terry Maguire MD at 05/17/25 1911          Subjective   History of Present Illness  Mrs. Robertson is brought from her nursing home with left knee pain.  She fell earlier in the day.  She tells me she was going to the dining room and felt shaky.  Reached for a chair to brace herself and fell onto her left knee.  Tells me she hit her head.  Denies headache.  Indicates her main issue is her left knee, cannot bear weight.  Has had prior knee replacement.      Review of Systems    Past Medical History:   Diagnosis Date    A-fib     on eliquis    Anemia     Arthritis     Diabetes mellitus     checks sugar only when pt wants to     Disease of thyroid gland     History  of transfusion     with knee surgery-  no reaction recalled.     [place] (hard of hearing)     no hearing aids    Hypertension     Migraine without aura and without status migrainosus, not intractable 12/30/2016    Myelodysplastic syndrome     Pulmonary emboli 2011    (after knee surgery)    SOBOE (shortness of breath on exertion)     Tremors of nervous system     Vertigo     Wears dentures     upper     Wears glasses        Allergies   Allergen Reactions    Aspirin Unknown - Low Severity     Mouth sores        Past Surgical History:   Procedure Laterality Date    BLADDER SURGERY      CATARACT EXTRACTION      bilat     CHOLECYSTECTOMY      COLONOSCOPY      HIP TROCHANTERIC NAILING WITH INTRAMEDULLARY HIP SCREW Right 6/20/2023    Procedure: HIP TROCHANTERIC NAILING WITH INTRAMEDULLARY HIP SCREW RIGHT;  Surgeon: Augie Hobbs MD;  Location:  [facility] OR;  Service: Orthopedics;  Laterality: Right;    HYSTERECTOMY      with bso    KYPHOPLASTY N/A 02/12/2021    Procedure: KYPHOPLASTY T11, T12 AND L4;  Surgeon: Matty Hearn MD;  Location:  [facility] OR;  Service: Orthopedic Spine;  Laterality: N/A;    TONSILLECTOMY         Family History   Problem Relation Age of Onset    Breast cancer Sister 84    Stroke Mother     Heart attack Father     Ovarian cancer Neg Hx        Social History     Socioeconomic History    Marital status:    Tobacco Use    Smoking status: Never     Passive exposure: Never    Smokeless tobacco: Never   Vaping Use    Vaping status: Never Used   Substance and Sexual Activity    Alcohol use: No    Drug use: No    Sexual activity: Defer           Objective   Physical Exam  Vitals and nursing note reviewed.   Constitutional:       General: She is not in acute distress.     Appearance: Normal appearance.   HENT:      Head: Normocephalic and atraumatic.      Nose: Nose normal. No congestion or rhinorrhea.   Eyes:      General: No scleral icterus.     Conjunctiva/sclera: Conjunctivae normal.   Neck:       Comments: No JVD   Cardiovascular:      Rate and Rhythm: Normal rate and regular rhythm.      Heart sounds: No murmur heard.     No friction rub.   Pulmonary:      Effort: Pulmonary effort is normal.      Breath sounds: Normal breath sounds. No wheezing or rales.   Abdominal:      General: Bowel sounds are normal.      Palpations: Abdomen is soft.      Tenderness: There is no abdominal tenderness. There is no guarding or rebound.   Musculoskeletal:         General: Swelling and tenderness present.      Cervical back: Normal range of motion and neck supple.      Right lower leg: No edema.      Left lower leg: No edema.      Comments: Right knee is swollen and diffusely tender.  Patella is intact.  Foot distally is warm and well-perfused   Skin:     General: Skin is warm and dry.      Coloration: Skin is not pale.      Findings: No erythema.   Neurological:      General: No focal deficit present.      Mental Status: She is alert.      Motor: No weakness.      Coordination: Coordination normal.   Psychiatric:         Mood and Affect: Mood normal.         Behavior: Behavior normal.         Thought Content: Thought content normal.         Procedures          ED Course  ED Course as of 05/18/25 0047   Sat May 17, 2025   2045 With Mrs. Robertson and her son about findings and suspicion of periprosthetic fracture.  Radiology has rendered an opinion and they feel there is indeed a fracture.  She has quite a bit of swelling.  She is anticoagulated with Eliquis.  Will discuss with the orthopedic doctor on-call. [DT]   2046 Discussed with Dr. Scherer who looked at her x-rays.  He asks that we get CT scan and admit to the hospitalist. 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 Decision-making details documented in ED Course.  Radiology: ordered. Decision-making details documented in ED Course.  ECG/medicine tests: ordered. Decision-making details documented in ED Course.    Risk  OTC drugs.  Decision regarding hospitalization.        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       Medication List      No changes were made to your prescriptions during this visit.            Terry Maguire MD  05/18/25 0047      Electronically signed by Terry Maguire MD at 05/18/25 0047       Current Facility-Administered Medications   Medication Dose Route Frequency Provider Last Rate Last Admin    acetaminophen (TYLENOL) tablet 650 mg  650 mg Oral Q4H PRN Queenie Cabezas APRN        Or    acetaminophen (TYLENOL) 160 MG/5ML oral solution 650 mg  650 mg Oral Q4H PRN Queenie Cabezas APRN        Or    acetaminophen (TYLENOL) suppository 650 mg  650 mg Rectal Q4H PRN Queenie Cabezas APRN        amiodarone (PACERONE) tablet 200 mg  200 mg Oral Daily Queenie Cabezas APRN   200 mg at 05/18/25 0923    [Held by provider] apixaban (ELIQUIS) tablet 2.5 mg  2.5 mg Oral BID Queenie Cabezas APRN        sennosides-docusate  (PERICOLACE) 8.6-50 MG per tablet 2 tablet  2 tablet Oral BID PRN Queenie Cabezas APRN   2 tablet at 05/17/25 2350    And    polyethylene glycol (MIRALAX) packet 17 g  17 g Oral Daily PRN Queenie Cabezas APRN        And    bisacodyl (DULCOLAX) EC tablet 5 mg  5 mg Oral Daily PRN Queenie Cabezas APRN        And    bisacodyl (DULCOLAX) suppository 10 mg  10 mg Rectal Daily PRN Queenie Cabezas APRN        Calcium Replacement - Follow Nurse / BPA Driven Protocol   Not Applicable PRN Queenie Cabezas APRN        cefTRIAXone (ROCEPHIN) 1,000 mg in sodium chloride 0.9 % 100 mL MBP  1,000 mg Intravenous Q24H Julio Leiva  mL/hr at 05/18/25 0615 1,000 mg at 05/18/25 0615    dextrose (D50W) (25 g/50 mL) IV injection 25 g  25 g Intravenous Q15 Min PRN Julio Leiva MD        dextrose (GLUTOSE) oral gel 15 g  15 g Oral Q15 Min PRN Julio Leiva MD        ferric gluconate (FERRLECIT)125 MG in sodium chloride 0.9 % 100 mL IVPB  125 mg Intravenous Daily Julio Leiva  mL/hr at 05/18/25 1154 125 mg at 05/18/25 1154    glucagon (GLUCAGEN) injection 1 mg  1 mg Intramuscular Q15 Min PRN Julio Leiva MD        HYDROcodone-acetaminophen (NORCO) 5-325 MG per tablet 1 tablet  1 tablet Oral Q6H PRN Julio Leiva MD        Insulin Lispro (humaLOG) injection 2-7 Units  2-7 Units Subcutaneous 4x Daily AC & at Bedtime Julio Leiva MD   2 Units at 05/18/25 0924    levothyroxine (SYNTHROID, LEVOTHROID) tablet 100 mcg  100 mcg Oral Q AM Queenie Cabezas APRN   100 mcg at 05/18/25 0614    lisinopril (PRINIVIL,ZESTRIL) tablet 5 mg  5 mg Oral Daily Queenie Cabezas APRN        Magnesium Standard Dose Replacement - Follow Nurse / BPA Driven Protocol   Not Applicable PRN Queenie Cabezas APRN        melatonin tablet 5 mg  5 mg Oral Nightly PRN Queenie Cabezas APRN   5 mg at 05/17/25 2350    metoprolol tartrate (LOPRESSOR) tablet 25 mg  25 mg Oral BID Queenie Cabezas APRN   25 mg at 05/17/25 2350    morphine injection 2 mg  2 mg  Intravenous Q4H PRN Kaushla Jones III, DO   2 mg at 05/18/25 0936    nitroglycerin (NITROSTAT) SL tablet 0.4 mg  0.4 mg Sublingual Q5 Min PRN Queenie Cabezas APRN        pantoprazole (PROTONIX) EC tablet 40 mg  40 mg Oral Q AM Julio Leiva MD   40 mg at 05/18/25 1154    Phosphorus Replacement - Follow Nurse / BPA Driven Protocol   Not Applicable PRN Queenie Cabezas APRN        Potassium Replacement - Follow Nurse / BPA Driven Protocol   Not Applicable PRN Queenie Cabezas APRN        sodium chloride 0.9 % flush 10 mL  10 mL Intravenous Q12H Queenie Cabezas APRN   10 mL at 05/17/25 2351    sodium chloride 0.9 % flush 10 mL  10 mL Intravenous PRN Queenie Cabezas APRN         Lab Results (all)       Procedure Component Value Units Date/Time    POC Glucose Once [[id number]]  (Normal) Collected: 05/18/25 1126    Specimen: Blood Updated: 05/18/25 1128     Glucose 127 mg/dL     Urine Culture - Urine, Indwelling Urethral Catheter [[id number]] Collected: 05/17/25 2140    Specimen: Urine from Indwelling Urethral Catheter Updated: 05/18/25 1037    POC Glucose Once [[id number]]  (Abnormal) Collected: 05/18/25 0729    Specimen: Blood Updated: 05/18/25 0730     Glucose 163 mg/dL     Iron Profile [[id number]]  (Abnormal) Collected: 05/17/25 2149    Specimen: Blood Updated: 05/18/25 0728     Iron 40 mcg/dL      Iron Saturation (TSAT) 14 %      Transferrin 189 mg/dL      TIBC 282 mcg/dL     Hemoglobin A1c [[id number]]  (Abnormal) Collected: 05/17/25 2008    Specimen: Blood Updated: 05/18/25 0006     Hemoglobin A1C 6.20 %     Narrative:      Hemoglobin A1C Ranges:    Increased Risk for Diabetes  5.7% to 6.4%  Diabetes                     >= 6.5%  Diabetic Goal                < 7.0%    High Sensitivity Troponin T 1Hr [[id number]]  (Abnormal) Collected: 05/17/25 2149    Specimen: Blood Updated: 05/17/25 2217     HS Troponin T 21 ng/L      Troponin T Numeric Delta 0 ng/L      Troponin T % Delta 0    Narrative:      High Sensitive Troponin T  Reference Range:  <14.0 ng/L- Negative Female for AMI  <22.0 ng/L- Negative Male for AMI  >=14 - Abnormal Female indicating possible myocardial injury.  >=22 - Abnormal Male indicating possible myocardial injury.   Clinicians would have to utilize clinical acumen, EKG, Troponin, and serial changes to determine if it is an Acute Myocardial Infarction or myocardial injury due to an underlying chronic condition.         Urinalysis With Culture If Indicated - Urine, Catheter [[id number]]  (Abnormal) Collected: 05/17/25 2140    Specimen: Urine, Catheter Updated: 05/17/25 2152     Color, UA Yellow     Appearance, UA Turbid     pH, UA 8.5     Specific Gravity, UA 1.017     Glucose,  mg/dL (Trace)     Ketones, UA Negative     Bilirubin, UA Negative     Blood, UA Negative     Protein, UA 30 mg/dL (1+)     Leuk Esterase, UA Large (3+)     Nitrite, UA Negative     Urobilinogen, UA 0.2 E.U./dL    Narrative:      In absence of clinical symptoms, the presence of pyuria, bacteria, and/or nitrites on the urinalysis result does not correlate with infection.    Urinalysis, Microscopic Only - Urine, Catheter [[id number]]  (Abnormal) Collected: 05/17/25 2140    Specimen: Urine, Catheter Updated: 05/17/25 2152     RBC, UA 6-10 /HPF      WBC, UA 0-2 /HPF      Comment: Urine culture not indicated.        Bacteria, UA 4+ /HPF      Squamous Epithelial Cells, UA 0-2 /HPF      Hyaline Casts, UA 7-12 /LPF      Methodology Automated Microscopy    Comprehensive Metabolic Panel [[id number]]  (Abnormal) Collected: 05/17/25 2008    Specimen: Blood Updated: 05/17/25 2041     Glucose 286 mg/dL      BUN 30 mg/dL      Creatinine 1.22 mg/dL      Sodium 134 mmol/L      Potassium 4.2 mmol/L      Chloride 97 mmol/L      CO2 23.0 mmol/L      Calcium 8.3 mg/dL      Total Protein 6.0 g/dL      Albumin 3.8 g/dL      ALT (SGPT) 10 U/L      AST (SGOT) 10 U/L      Alkaline Phosphatase 92 U/L      Total Bilirubin 0.3 mg/dL      Globulin 2.2 gm/dL      Comment:  Calculated Result        A/G Ratio 1.7 g/dL      BUN/Creatinine Ratio 24.6     Anion Gap 14.0 mmol/L      eGFR 42.8 mL/min/1.73     Narrative:      GFR Categories in Chronic Kidney Disease (CKD)              GFR Category          GFR (mL/min/1.73)    Interpretation  G1                    90 or greater        Normal or high (1)  G2                    60-89                Mild decrease (1)  G3a                   45-59                Mild to moderate decrease  G3b                   30-44                Moderate to severe decrease  G4                    15-29                Severe decrease  G5                    14 or less           Kidney failure    (1)In the absence of evidence of kidney disease, neither GFR category G1 or G2 fulfill the criteria for CKD.    eGFR calculation 2021 CKD-EPI creatinine equation, which does not include race as a factor    High Sensitivity Troponin T [[id number]]  (Abnormal) Collected: 05/17/25 2008    Specimen: Blood Updated: 05/17/25 2041     HS Troponin T 21 ng/L     Narrative:      High Sensitive Troponin T Reference Range:  <14.0 ng/L- Negative Female for AMI  <22.0 ng/L- Negative Male for AMI  >=14 - Abnormal Female indicating possible myocardial injury.  >=22 - Abnormal Male indicating possible myocardial injury.   Clinicians would have to utilize clinical acumen, EKG, Troponin, and serial changes to determine if it is an Acute Myocardial Infarction or myocardial injury due to an underlying chronic condition.         CBC & Differential [[id number]]  (Abnormal) Collected: 05/17/25 2008    Specimen: Blood Updated: 05/17/25 2029    Narrative:      The following orders were created for panel order CBC & Differential.  Procedure                               Abnormality         Status                     ---------                               -----------         ------                     CBC Auto Differential[[id number]]        Abnormal            Final result               Scan  Slide[[id number]]                                                                    Please view results for these tests on the individual orders.    CBC Auto Differential [[id number]]  (Abnormal) Collected: 05/17/25 2008    Specimen: Blood Updated: 05/17/25 2029     WBC 1.76 10*3/mm3      RBC 2.74 10*6/mm3      Hemoglobin 8.1 g/dL      Hematocrit 24.3 %      MCV 88.7 fL      MCH 29.6 pg      MCHC 33.3 g/dL      RDW 14.8 %      RDW-SD 47.7 fl      MPV 9.0 fL      Platelets 57 10*3/mm3      Neutrophil % 67.1 %      Lymphocyte % 21.6 %      Monocyte % 10.2 %      Eosinophil % 0.0 %      Basophil % 0.0 %      Immature Grans % 1.1 %      Neutrophils, Absolute 1.18 10*3/mm3      Lymphocytes, Absolute 0.38 10*3/mm3      Monocytes, Absolute 0.18 10*3/mm3      Eosinophils, Absolute 0.00 10*3/mm3      Basophils, Absolute 0.00 10*3/mm3      Immature Grans, Absolute 0.02 10*3/mm3      nRBC 0.0 /100 WBC           Imaging Results (All)       Procedure Component Value Units Date/Time    CT Lower Extremity Left Without Contrast [[id number]] Collected: 05/17/25 2157     Updated: 05/17/25 2204    Narrative:      CT LOWER EXTREMITY LEFT WO CONTRAST    Date of Exam: 5/17/2025 9:02 PM EDT    Indication: Evaluate suspected periprosthetic fracture.    Comparison: 5/17/2025 radiographs.    Technique: Axial CT images were obtained of the left lower extremity without contrast administration.  Reconstructed coronal and sagittal images were also obtained. Automated exposure control and iterative construction methods were used.      Findings:  Motion artifact combined with streak artifact does limit evaluation, particularly the fine anatomic detail. The images do seem to confirm the presence of an impaction fracture at the distal femoral metaphysis. Specific details are precluded by motion   artifact and streak artifact, but there does appear to be cortical offset at the junction of the femoral metaphysis and epiphysis both medially and laterally  particularly at the posterior aspect of the distal femur. The prosthetic components themselves   appear intact. The proximal tibia and fibula appear intact. The patella is resurfaced posteriorly without fracture. There is confirmation of a large volume of lipohemarthrosis primarily in the suprapatellar recess. No dislocation.      Impression:      Impression:  1.Limited study due to motion artifact and streak artifact.  2.There is confirmation of a impaction fracture at the distal femoral metaphysis with a large volume of lipohemarthrosis.            Electronically Signed: Hector Rosenberg MD    2025 10:01 PM EDT    Workstation ID: [id number]    XR Knee 1 or 2 View Left [[id number]] Collected: 25     Updated: 25    Narrative:      XR KNEE 1 OR 2 VW LEFT    Date of Exam: 2025 7:12 PM EDT    Indication: trauma    Comparison: Correlation with right knee radiographs 10/19/2024.    Findings:  There is a total left knee prosthesis in place. The bones are demineralized. There is a large volume of lipohemarthrosis. There is a suspected impaction fracture in the distal femoral metaphyseal region. Prosthetic components appear intact. The   visualized tibia and fibula appear intact. Patella appears intact with posterior resurfacing.      Impression:      Impression:  1.Suspected impaction fracture in the distal femoral metaphyseal region with large volume of lipohemarthrosis.  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  3.Osteopenia.          Electronically Signed: Hector Rosenberg MD    2025 7:49 PM EDT    Workstation ID: [id number]             Physician Progress Notes (all)        Julio Leiva MD at [address] [place] [address] Medicine Services  PROGRESS NOTE    Patient Name: Chey Robertson  : 1935  MRN: [id number]    Date of Admission: 2025  Primary Care Physician: Yasmeen Loomis MD    Subjective   Subjective     CC:  Periprosthetic left distal  femur fracture    HPI:  Pain reasonably controlled; no overt bleeding; no chest pain, no palpitations      Objective   Objective     Vital Signs:   Temp:  [97.8 °F (36.6 °C)-98.1 °F (36.7 °C)] 97.8 °F (36.6 °C)  Heart Rate:  [53-64] 53  Resp:  [18] 18  BP: (118-174)/(49-73) 118/49     Physical Exam:  Constitutional:Alert, oriented x 3, nontoxic appearing, elerly & frail, normal work of breathing  Psych:Normal/appropriate affect  HEENT:NCAT, oropharynx clear  Neck: neck supple, full range of motion  Neuro: Face symmetric, speech clear, equal , moves all extremities  Cardiac: RRR; No pretibial pitting edema  Resp: CTAB, normal effort  GI: abd soft, nontender  Skin: No extremity rash  Musculoskeletal/extremities: no cyanosis of extremities; no significant ankle edema          Results Reviewed:  LAB RESULTS:      Lab 05/17/25 2149 05/17/25 2008   WBC  --  1.76*   HEMOGLOBIN  --  8.1*   HEMATOCRIT  --  24.3*   PLATELETS  --  57*   NEUTROS ABS  --  1.18*   IMMATURE GRANS (ABS)  --  0.02   LYMPHS ABS  --  0.38*   MONOS ABS  --  0.18   EOS ABS  --  0.00   MCV  --  88.7   HSTROP T 21* 21*         Lab 05/17/25 2008   SODIUM 134*   POTASSIUM 4.2   CHLORIDE 97*   CO2 23.0   ANION GAP 14.0   BUN 30*   CREATININE 1.22*   EGFR 42.8*   GLUCOSE 286*   CALCIUM 8.3*   HEMOGLOBIN A1C 6.20*         Lab 05/17/25 2008   TOTAL PROTEIN 6.0   ALBUMIN 3.8   GLOBULIN 2.2   ALT (SGPT) 10   AST (SGOT) 10   BILIRUBIN 0.3   ALK PHOS 92         Lab 05/17/25 2149 05/17/25 2008   HSTROP T 21* 21*             Lab 05/17/25 2149   IRON 40   IRON SATURATION (TSAT) 14*   TIBC 282*   TRANSFERRIN 189*         Brief Urine Lab Results  (Last result in the past 365 days)        Color   Clarity   Blood   Leuk Est   Nitrite   Protein   CREAT   Urine HCG        05/17/25 2140 Yellow   Turbid   Negative   Large (3+)   Negative   30 mg/dL (1+)                   Microbiology Results Abnormal       None            CT Lower Extremity Left Without  Contrast  Result Date: 5/17/2025  CT LOWER EXTREMITY LEFT WO CONTRAST Date of Exam: 5/17/2025 9:02 PM EDT Indication: Evaluate suspected periprosthetic fracture. Comparison: 5/17/2025 radiographs. Technique: Axial CT images were obtained of the left lower extremity without contrast administration.  Reconstructed coronal and sagittal images were also obtained. Automated exposure control and iterative construction methods were used. Findings: Motion artifact combined with streak artifact does limit evaluation, particularly the fine anatomic detail. The images do seem to confirm the presence of an impaction fracture at the distal femoral metaphysis. Specific details are precluded by motion artifact and streak artifact, but there does appear to be cortical offset at the junction of the femoral metaphysis and epiphysis both medially and laterally particularly at the posterior aspect of the distal femur. The prosthetic components themselves appear intact. The proximal tibia and fibula appear intact. The patella is resurfaced posteriorly without fracture. There is confirmation of a large volume of lipohemarthrosis primarily in the suprapatellar recess. No dislocation.     Impression: Impression: 1.Limited study due to motion artifact and streak artifact. 2.There is confirmation of a impaction fracture at the distal femoral metaphysis with a large volume of lipohemarthrosis. Electronically Signed: Hector Rosenberg MD  5/17/2025 10:01 PM EDT  Workstation ID: [id number]    XR Knee 1 or 2 View Left  Result Date: 5/17/2025  XR KNEE 1 OR 2 VW LEFT Date of Exam: 5/17/2025 7:12 PM EDT Indication: trauma Comparison: Correlation with right knee radiographs 10/19/2024. Findings: There is a total left knee prosthesis in place. The bones are demineralized. There is a large volume of lipohemarthrosis. There is a suspected impaction fracture in the distal femoral metaphyseal region. Prosthetic components appear intact. The visualized tibia  and fibula appear intact. Patella appears intact with posterior resurfacing.     Impression: Impression: 1.Suspected impaction fracture in the distal femoral metaphyseal region with large volume of lipohemarthrosis. 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 3.Osteopenia. Electronically Signed: Hector Rosenberg MD  5/17/2025 7:49 PM EDT  Workstation ID: [id number]      Results for orders placed during the hospital encounter of 06/05/23    Adult Transthoracic Echo Complete w/ Color, Spectral and Contrast if necessary per protocol    Interpretation Summary    Left ventricular systolic function is normal. Left ventricular ejection fraction appears to be 56 - 60%.    Left ventricular diastolic function was normal.    Estimated right ventricular systolic pressure from tricuspid regurgitation is normal (<35 mmHg).      Current medications:  Scheduled Meds:amiodarone, 200 mg, Oral, Daily  [Held by provider] apixaban, 2.5 mg, Oral, BID  cefTRIAXone, 1,000 mg, Intravenous, Q24H  ferric gluconate, 125 mg, Intravenous, Daily  insulin lispro, 2-7 Units, Subcutaneous, 4x Daily AC & at Bedtime  levothyroxine, 100 mcg, Oral, Q AM  lisinopril, 5 mg, Oral, Daily  metoprolol tartrate, 25 mg, Oral, BID  sodium chloride, 10 mL, Intravenous, Q12H      Continuous Infusions:   PRN Meds:.  acetaminophen **OR** acetaminophen **OR** acetaminophen    senna-docusate sodium **AND** polyethylene glycol **AND** bisacodyl **AND** bisacodyl    Calcium Replacement - Follow Nurse / BPA Driven Protocol    dextrose    dextrose    glucagon (human recombinant)    HYDROcodone-acetaminophen    Magnesium Standard Dose Replacement - Follow Nurse / BPA Driven Protocol    melatonin    Morphine    nitroglycerin    Phosphorus Replacement - Follow Nurse / BPA Driven Protocol    Potassium Replacement - Follow Nurse / BPA Driven Protocol    sodium chloride    Assessment & Plan   Assessment & Plan     Active Hospital Problems    Diagnosis  POA    Type 2 diabetes mellitus  with complication, with long-term current use of insulin [E11.8, Z79.4]  Not Applicable    Periprosthetic fracture around prosthetic knee [M97.8XXA, Z96.659]  Not Applicable    Falls [R29.6]  Not Applicable    Atrial fibrillation [I48.91]  Yes    Chronic anticoagulation [Z79.01]  Not Applicable    Essential hypertension [I10]  Yes    Hypothyroidism (acquired) [E03.9]  Yes      Resolved Hospital Problems   No resolved problems to display.        Brief Hospital Course to date:  Chey Robertson is a 89 y.o. female w/ hx afib (on eliquis), htn, dm2, hypothyroidism, MDS (w/ pancytopenia, followed by Dr. Lyman), previous PE (on eliquis), previous left knee replacement. Presented to [facility] ED w/ left knee pain after sustaining a fall at her SNF. CT imaging revealed left distal femoral impaction fracture. ED spoke w/ orthopedic surgery (Dr. Scherer) and recommended admission to hospitalist service. Wbc 1.76, hgb 8.1, plts 57,000. Had minimally elevated troponin 21 (repeat also 21), u/a w/ large LE, 6-10 rbc, 4+ bacteria,      Left distal femur/periprosthetic fracture  Hx previous left knee replacement  -Orthopedic surgery consult pending (Dr. Scherer aware)  -pt/ot evals after surgical consultation    Parox afib (currently in sinus)  Chronic HFpEF  HTN  Minimal elevated troponin, chronic  -troponin 21, repeat 21  -EKG sinus bradycardia w/ someo t-wave inversions anterolaterally, similar to 7/2024 EKG  -no chest pain symptoms  -echo 6/2023: LV EF 56-60%, valves ok  -cont. amiodarone & metoprolol, lisinopril  -eliquis on hold (for possible surgery)    Bacteria in urine  -treating w/ rocephin since immunocompromised and has hardware in place  -day #1/5 rocephin, adding urine culture to urine in lab    Hx PE (perioperative after previous knee replacement)  -eliquis on hold, restart when ok w/ orthopedic surgery    MDS  Pancytopenia  Iron deficiency  -follows w/ Dr. Lyman (last seen 3/3/25); planned to replce iv iron if iron sat  <10%; planned to consider holding/stopping anticoagulation if plts dropped below 30,000 or evidence bleeding  -typical plts range 40,000-60,000  -day #1/3 iv iron  -monitor hgb levels and transfuse prn perioperatively  -today's labs pending    DM2  -A1c 6.2  -on januvia and metformin  -ssi    Hypothyroidism  -cont levothyroxine    Goals/limits  -discussed w/ patient and daughter, patient is Full Code (s/p long discussion)    Today's labs still pending    4am labs ordered        Expected Discharge Location and Transportation: TBD  Expected Discharge   Expected Discharge Date: 5/21/2025; Expected Discharge Time:      VTE Prophylaxis:  Pharmacologic & mechanical VTE prophylaxis orders are present.         AM-PAC 6 Clicks Score (PT): 10 (05/17/25 9877)    CODE STATUS:   Code Status and Medical Interventions: CPR (Attempt to Resuscitate); Full Support   Ordered at: 05/17/25 2236     Code Status (Patient has no pulse and is not breathing):    CPR (Attempt to Resuscitate)     Medical Interventions (Patient has pulse or is breathing):    Full Support     Level Of Support Discussed With:    Patient       Julio Leiva MD  05/18/25        Electronically signed by Julio Leiva MD at 05/18/25 1039          Consult Notes (all)        Josh Scherer MD at 05/18/25 0955        Consult Orders    1. Inpatient Orthopedic Surgery Consult [[id number]] ordered by Queenie Cabezas APRN at 05/17/25 2236              Summary:distal femur fracture                    Orthopedic Consult      Patient: Chey Robertson    Date of Admission: 5/17/2025  7:04 PM    YOB: 1935    Medical Record Number: [id number]    Attending Physician: Julio Leiva MD    Consulting Physician: Josh Scherer MD      Chief Complaints: L knee pain      History of Present Illness: 89 y.o. female admitted to [facility] with L knee pain. I was consulted for further evaluation and treatment of L knee pain.     -- 88 yo female  s/p fall at a skilled nursing facility  -- complex medical history which includes: atrial fibrillation on Eliquis, type 2 diabetes, hypothyroidism, HTN, tremor, and vertigo   -- patient in the dining joy and ambulating near table when legs gave out and she fell  --  status post prior total knee arthroplasty in 2011  --  patient had immediate onset of knee pain after fall.    --  patient taken to [facility] emergency room  -- X-rays demonstrated a possible periprosthetic fracture  of the distal femur      --  per report, last dose Eliquis, yesterday    Allergies   Allergen Reactions    Aspirin Unknown - Low Severity     Mouth sores         Home Medications:  Medications Prior to Admission   Medication Sig Dispense Refill Last Dose/Taking    acetaminophen (TYLENOL) 500 MG tablet Take 1 tablet by mouth Every 6 (Six) Hours As Needed for Mild Pain or Moderate Pain. 30 tablet 0     amiodarone (PACERONE) 200 MG tablet Take 1 tablet by mouth Daily.       ascorbic acid (VITAMIN C) 500 MG tablet Take 1 tablet by mouth Daily.       castor oil-balsam peru (VENELEX) ointment Apply 1 Application topically to the appropriate area as directed Every 12 (Twelve) Hours.       Eliquis 2.5 MG tablet tablet TAKE 1 TABLET BY MOUTH TWICE DAILY 60 tablet 0     furosemide (LASIX) 40 MG tablet Take 1 tablet by mouth Daily As Needed (soa/ weight gain > 3lb).       insulin NPH-insulin regular (humuLIN 70/30,novoLIN 70/30) (70-30) 100 UNIT/ML injection Inject 10 Units under the skin into the appropriate area as directed 2 (Two) Times a Day With Meals. Hold for blood sugar less than 115 or not eating       Lactobacillus (Acidophilus Extra Strength) capsule Take 1 capsule by mouth Daily.       levothyroxine (SYNTHROID, LEVOTHROID) 100 MCG tablet Take 1 tablet by mouth Every Morning. 30 tablet 0     lisinopril (PRINIVIL,ZESTRIL) 5 MG tablet Take 1 tablet by mouth Daily.       melatonin 5 MG tablet tablet Take 1 tablet by mouth At Night As Needed  (insomnia). 30 tablet 0     metFORMIN (GLUCOPHAGE) 500 MG tablet Take 1 tablet by mouth 2 (Two) Times a Day With Meals.       metoprolol tartrate (LOPRESSOR) 50 MG tablet Take 0.5 tablets by mouth 2 (Two) Times a Day.       naloxone (NARCAN) 4 MG/0.1ML nasal spray Call 911. Don't prime. [place] in 1 nostril for overdose. Repeat in 2-3 minutes in other nostril if no or minimal breathing/responsiveness. 2 each 0     NovoLOG 100 UNIT/ML injection        nystatin (MYCOSTATIN) 003633 UNIT/GM ointment Apply 1 Application topically to the appropriate area as directed 2 (Two) Times a Day.       ondansetron ODT (ZOFRAN-ODT) 4 MG disintegrating tablet DISSOLVE 1 TABLET ON THE TONGUE 2 TO 3 TIMES PER DAY AS NEEDED FOR NAUSEA OR VOMITING       pantoprazole (PROTONIX) 20 MG EC tablet Take 1 tablet by mouth Daily.       polyethylene glycol (MIRALAX) 17 g packet Take 17 g by mouth Daily As Needed (constipation). 14 packet 0     SITagliptin (JANUVIA) 25 MG tablet Take 1 tablet by mouth Daily.       zinc sulfate (ZINCATE) 220 (50 Zn) MG capsule             Past Medical History:   Diagnosis Date    A-fib     on eliquis    Anemia     Arthritis     Diabetes mellitus     checks sugar only when pt wants to     Disease of thyroid gland     History of transfusion     with knee surgery-  no reaction recalled.     [place] (hard of hearing)     no hearing aids    Hypertension     Migraine without aura and without status migrainosus, not intractable 12/30/2016    Myelodysplastic syndrome     Pulmonary emboli 2011    (after knee surgery)    SOBOE (shortness of breath on exertion)     Tremors of nervous system     Vertigo     Wears dentures     upper     Wears glasses         Past Surgical History:   Procedure Laterality Date    BLADDER SURGERY      CATARACT EXTRACTION      bilat     CHOLECYSTECTOMY      COLONOSCOPY      HIP TROCHANTERIC NAILING WITH INTRAMEDULLARY HIP SCREW Right 6/20/2023    Procedure: HIP TROCHANTERIC NAILING WITH INTRAMEDULLARY  HIP SCREW RIGHT;  Surgeon: Augie Hobbs MD;  Location:  [facility];  Service: Orthopedics;  Laterality: Right;    HYSTERECTOMY      with bso    KYPHOPLASTY N/A 02/12/2021    Procedure: KYPHOPLASTY T11, T12 AND L4;  Surgeon: Matty Hearn MD;  Location:  [facility];  Service: Orthopedic Spine;  Laterality: N/A;    TONSILLECTOMY          Social History     Occupational History    Not on file   Tobacco Use    Smoking status: Never     Passive exposure: Never    Smokeless tobacco: Never   Vaping Use    Vaping status: Never Used   Substance and Sexual Activity    Alcohol use: No    Drug use: No    Sexual activity: Defer      Social History     Social History Narrative    Not on file        Family History   Problem Relation Age of Onset    Breast cancer Sister 84    Stroke Mother     Heart attack Father     Ovarian cancer Neg Hx          Review of Systems:   Review of Systems   Constitutional:  Positive for activity change. Negative for chills, fatigue and fever.   HENT: Negative.     Eyes: Negative.    Respiratory:  Negative for cough, chest tightness and shortness of breath.    Cardiovascular:  Negative for chest pain and leg swelling.   Gastrointestinal:  Positive for diarrhea (occasional. On metformin). Negative for abdominal distention, abdominal pain, nausea and vomiting.   Endocrine: Negative.    Genitourinary: Negative.    Musculoskeletal:  Positive for arthralgias, gait problem and joint swelling (left knee).   Skin: Negative.    Allergic/Immunologic: Negative.    Neurological:  Positive for weakness. Negative for dizziness, syncope, light-headedness and headaches.   Hematological:  Bruises/bleeds easily.   Psychiatric/Behavioral: Negative.          Physical Exam: 89 y.o. female  General Appearance:    Alert, cooperative, in no acute distress                   Vitals:    05/17/25 2350 05/18/25 0338 05/18/25 0731 05/18/25 0923   BP: 164/63 143/73 127/59 118/49   BP Location:  Right arm Right arm    Patient  Position:  Lying Lying    Pulse: 64 56 53 53   Resp:  18 18    Temp:  97.8 °F (36.6 °C) 97.8 °F (36.6 °C)    TempSrc:  Oral Oral    SpO2:  99% 92%    Weight:       Height:            Head:    Normocephalic, without obvious abnormality, atraumatic        Left Lower Extremity:   moderate swelling of the distal femur with moderate sized effusion, painless ROM hip, ankle,--  there significant pain with attempted range of motion of the knee.  There is moderate swelling at the distal femur with a moderate sized effusion. compartments soft, normal distal strength and sensation to light touch, skin intact without cyanosis, clubbing, edema; +2 dorsalis pedis pulse         Diagnostic Tests:    I have reviewed the labs, radiology results and diagnostic studies:    Results from last 7 days   Lab Units 05/17/25 2008   WBC 10*3/mm3 1.76*   HEMOGLOBIN g/dL 8.1*   PLATELETS 10*3/mm3 57*     Results from last 7 days   Lab Units 05/17/25 2008   SODIUM mmol/L 134*   POTASSIUM mmol/L 4.2   CO2 mmol/L 23.0   CREATININE mg/dL 1.22*   GLUCOSE mg/dL 286*     XR KNEE 1 OR 2 VW LEFT     Date of Exam: 5/17/2025 7:12 PM EDT     Indication: trauma     Comparison: Correlation with right knee radiographs 10/19/2024.     Findings:  There is a total left knee prosthesis in place. The bones are demineralized. There is a large volume of lipohemarthrosis. There is a suspected impaction fracture in the distal femoral metaphyseal region. Prosthetic components appear intact. The   visualized tibia and fibula appear intact. Patella appears intact with posterior resurfacing.     IMPRESSION:  Impression:  1.Suspected impaction fracture in the distal femoral metaphyseal region with large volume of lipohemarthrosis.  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  3.Osteopenia.              Electronically Signed: Hector Rosenberg MD    5/17/2025 7:49 PM EDT    Workstation ID: [id number]      CT LOWER EXTREMITY LEFT WO CONTRAST     Date of Exam: 5/17/2025 9:02 PM EDT      Indication: Evaluate suspected periprosthetic fracture.     Comparison: 5/17/2025 radiographs.     Technique: Axial CT images were obtained of the left lower extremity without contrast administration.  Reconstructed coronal and sagittal images were also obtained. Automated exposure control and iterative construction methods were used.        Findings:  Motion artifact combined with streak artifact does limit evaluation, particularly the fine anatomic detail. The images do seem to confirm the presence of an impaction fracture at the distal femoral metaphysis. Specific details are precluded by motion   artifact and streak artifact, but there does appear to be cortical offset at the junction of the femoral metaphysis and epiphysis both medially and laterally particularly at the posterior aspect of the distal femur. The prosthetic components themselves   appear intact. The proximal tibia and fibula appear intact. The patella is resurfaced posteriorly without fracture. There is confirmation of a large volume of lipohemarthrosis primarily in the suprapatellar recess. No dislocation.     IMPRESSION:  Impression:  1.Limited study due to motion artifact and streak artifact.  2.There is confirmation of a impaction fracture at the distal femoral metaphysis with a large volume of lipohemarthrosis.                 Electronically Signed: Hector Rosebnerg MD    5/17/2025 10:01 PM EDT    Workstation ID: [id number]    Assessment:    Hypothyroidism (acquired)    Chronic anticoagulation    Essential hypertension    Atrial fibrillation    Falls    Type 2 diabetes mellitus with complication, with long-term current use of insulin    Periprosthetic fracture around prosthetic knee       Left distal femur periprosthetic fracture in a medically complex 89-year-old female.  She has chronic leukopenia   In addition to the baseline significant medical comorbidities listed above.  Her last dose of Eliquis was less than 24 hours ago per  report    Plan:       I am going to have 1 of our total joint arthroplasty colleagues review this.  The exact morphology of the fracture is difficult to truly understand.      In the meantime, continue nonweightbearing left lower extremity.    - Hold Eliquis.   - Patient may have diet advanced as no surgical intervention is planned for today.    - Straight leg knee immobilizer--  please place padding over bony prominences of the knee while in knee immobilizer to prevent pressure ulcerations          Addendum: Dr Mcclain has reviewed the Xray and CT scan-- of patient's left knee.  Patient with low supracondylar fracture of the distal femur.  Given the age and poor bone quality he recommends distal femoral replacement .  Patient on eliquis and given magnitude of surgery he would like to wait approx 72 hours prior to surgery. Plan for likely this Tuesday for distal femoral replacement            Josh Scherer MD  05/18/25  09:56 EDT            Electronically signed by Josh Scherer MD at 05/18/25 2588

## 2025-05-18 NOTE — ED NOTES
Chey Robertson    Nursing Report ED to Floor:  Mental status: A/OX4  Ambulatory status: non ambulatory in ed  Oxygen Therapy:  room air  Cardiac Rhythm: sr  Admitted from: assisted living  Safety Concerns:  fall risk  Precautions: none  Social Issues: family at bedside  ED Room #:  20    ED Nurse Phone Extension - [phone] or may call [phone].      HPI:   Chief Complaint   Patient presents with    Knee Pain       Past Medical History:  Past Medical History:   Diagnosis Date    A-fib     on eliquis    Anemia     Arthritis     Diabetes mellitus     checks sugar only when pt wants to     Disease of thyroid gland     History of transfusion     with knee surgery-  no reaction recalled.     [place] (hard of hearing)     no hearing aids    Hypertension     Migraine without aura and without status migrainosus, not intractable 12/30/2016    Myelodysplastic syndrome     Pulmonary emboli 2011    (after knee surgery)    SOBOE (shortness of breath on exertion)     Tremors of nervous system     Vertigo     Wears dentures     upper     Wears glasses         Past Surgical History:  Past Surgical History:   Procedure Laterality Date    BLADDER SURGERY      CATARACT EXTRACTION      bilat     CHOLECYSTECTOMY      COLONOSCOPY      HIP TROCHANTERIC NAILING WITH INTRAMEDULLARY HIP SCREW Right 6/20/2023    Procedure: HIP TROCHANTERIC NAILING WITH INTRAMEDULLARY HIP SCREW RIGHT;  Surgeon: Augie Hobbs MD;  Location:  [facility];  Service: Orthopedics;  Laterality: Right;    HYSTERECTOMY      with bso    KYPHOPLASTY N/A 02/12/2021    Procedure: KYPHOPLASTY T11, T12 AND L4;  Surgeon: Matty Hearn MD;  Location:  [facility];  Service: Orthopedic Spine;  Laterality: N/A;    TONSILLECTOMY          Admitting Doctor:   Kaushal Jones III, DO    Consulting Provider(s):  Consults       No orders found from 4/18/2025 to 5/18/2025.             Admitting Diagnosis:   The primary encounter diagnosis was Fall at nursing home, initial encounter.  Diagnoses of Periprosthetic fracture around internal prosthetic knee joint and Anticoagulated were also pertinent to this visit.    Most Recent Vitals:   Vitals:    05/17/25 1930 05/17/25 2000 05/17/25 2030 05/17/25 2200   BP: 155/69 148/59 132/55 140/60   Pulse: 59 57 56 57   Resp:       Temp:       TempSrc:       SpO2: 97% 97% 100% 97%   Weight:       Height:           Active LDAs/IV Access:   Lines, Drains & Airways       Active LDAs       Name Placement date Placement time Site Days    Peripheral IV 05/17/25 2009 18 G Right Antecubital 05/17/25 2009  Antecubital  less than 1    External Urinary Catheter 10/20/24  1320  --  209                    Labs (abnormal labs have a star):   Labs Reviewed   COMPREHENSIVE METABOLIC PANEL - Abnormal; Notable for the following components:       Result Value    Glucose 286 (*)     BUN 30 (*)     Creatinine 1.22 (*)     Sodium 134 (*)     Chloride 97 (*)     Calcium 8.3 (*)     eGFR 42.8 (*)     All other components within normal limits    Narrative:     GFR Categories in Chronic Kidney Disease (CKD)              GFR Category          GFR (mL/min/1.73)    Interpretation  G1                    90 or greater        Normal or high (1)  G2                    60-89                Mild decrease (1)  G3a                   45-59                Mild to moderate decrease  G3b                   30-44                Moderate to severe decrease  G4                    15-29                Severe decrease  G5                    14 or less           Kidney failure    (1)In the absence of evidence of kidney disease, neither GFR category G1 or G2 fulfill the criteria for CKD.    eGFR calculation 2021 CKD-EPI creatinine equation, which does not include race as a factor   TROPONIN - Abnormal; Notable for the following components:    HS Troponin T 21 (*)     All other components within normal limits    Narrative:     High Sensitive Troponin T Reference Range:  <14.0 ng/L- Negative Female for  AMI  <22.0 ng/L- Negative Male for AMI  >=14 - Abnormal Female indicating possible myocardial injury.  >=22 - Abnormal Male indicating possible myocardial injury.   Clinicians would have to utilize clinical acumen, EKG, Troponin, and serial changes to determine if it is an Acute Myocardial Infarction or myocardial injury due to an underlying chronic condition.        URINALYSIS W/ CULTURE IF INDICATED - Abnormal; Notable for the following components:    Appearance, UA Turbid (*)     pH, UA 8.5 (*)     Glucose,  mg/dL (Trace) (*)     Protein, UA 30 mg/dL (1+) (*)     Leuk Esterase, UA Large (3+) (*)     All other components within normal limits    Narrative:     In absence of clinical symptoms, the presence of pyuria, bacteria, and/or nitrites on the urinalysis result does not correlate with infection.   CBC WITH AUTO DIFFERENTIAL - Abnormal; Notable for the following components:    WBC 1.76 (*)     RBC 2.74 (*)     Hemoglobin 8.1 (*)     Hematocrit 24.3 (*)     Platelets 57 (*)     Eosinophil % 0.0 (*)     Immature Grans % 1.1 (*)     Neutrophils, Absolute 1.18 (*)     Lymphocytes, Absolute 0.38 (*)     All other components within normal limits   URINALYSIS, MICROSCOPIC ONLY - Abnormal; Notable for the following components:    RBC, UA 6-10 (*)     Bacteria, UA 4+ (*)     All other components within normal limits   HIGH SENSITIVITIY TROPONIN T 1HR   CBC AND DIFFERENTIAL    Narrative:     The following orders were created for panel order CBC & Differential.  Procedure                               Abnormality         Status                     ---------                               -----------         ------                     CBC Auto Differential[[id number]]        Abnormal            Final result               Scan Slide[[id number]]                                                                    Please view results for these tests on the individual orders.       Meds Given in ED:   Medications    acetaminophen (TYLENOL) tablet 650 mg (650 mg Oral Given 5/17/25 1920)     No current facility-administered medications for this encounter.       Last NIH score:                                                          Dysphagia screening results:        [place] Coma Scale:  No data recorded     CIWA:        Restraint Type:            Isolation Status:  No active isolations

## 2025-05-18 NOTE — CONSULTS
Orthopedic Consult      Patient: Chey Robertson    Date of Admission: 5/17/2025  7:04 PM    YOB: 1935    Medical Record Number: [id number]    Attending Physician: Julio Leiva MD    Consulting Physician: Josh Scherer MD      Chief Complaints: L knee pain      History of Present Illness: 89 y.o. female admitted to [facility] with L knee pain. I was consulted for further evaluation and treatment of L knee pain.     -- 88 yo female s/p fall at a skilled nursing facility  -- complex medical history which includes: atrial fibrillation on Eliquis, type 2 diabetes, hypothyroidism, HTN, tremor, and vertigo   -- patient in the dining joy and ambulating near table when legs gave out and she fell  --  status post prior total knee arthroplasty in 2011  --  patient had immediate onset of knee pain after fall.    --  patient taken to [facility], [place] emergency room  -- X-rays demonstrated a possible periprosthetic fracture  of the distal femur      --  per report, last dose Eliquis, yesterday    Allergies   Allergen Reactions    Aspirin Unknown - Low Severity     Mouth sores         Home Medications:  Medications Prior to Admission   Medication Sig Dispense Refill Last Dose/Taking    acetaminophen (TYLENOL) 500 MG tablet Take 1 tablet by mouth Every 6 (Six) Hours As Needed for Mild Pain or Moderate Pain. 30 tablet 0     amiodarone (PACERONE) 200 MG tablet Take 1 tablet by mouth Daily.       ascorbic acid (VITAMIN C) 500 MG tablet Take 1 tablet by mouth Daily.       castor oil-balsam peru (VENELEX) ointment Apply 1 Application topically to the appropriate area as directed Every 12 (Twelve) Hours.       Eliquis 2.5 MG tablet tablet TAKE 1 TABLET BY MOUTH TWICE DAILY 60 tablet 0     furosemide (LASIX) 40 MG tablet Take 1 tablet by mouth Daily As Needed (soa/ weight gain > 3lb).       insulin NPH-insulin regular (humuLIN 70/30,novoLIN 70/30) (70-30) 100 UNIT/ML injection Inject 10 Units under the skin into  the appropriate area as directed 2 (Two) Times a Day With Meals. Hold for blood sugar less than 115 or not eating       Lactobacillus (Acidophilus Extra Strength) capsule Take 1 capsule by mouth Daily.       levothyroxine (SYNTHROID, LEVOTHROID) 100 MCG tablet Take 1 tablet by mouth Every Morning. 30 tablet 0     lisinopril (PRINIVIL,ZESTRIL) 5 MG tablet Take 1 tablet by mouth Daily.       melatonin 5 MG tablet tablet Take 1 tablet by mouth At Night As Needed (insomnia). 30 tablet 0     metFORMIN (GLUCOPHAGE) 500 MG tablet Take 1 tablet by mouth 2 (Two) Times a Day With Meals.       metoprolol tartrate (LOPRESSOR) 50 MG tablet Take 0.5 tablets by mouth 2 (Two) Times a Day.       naloxone (NARCAN) 4 MG/0.1ML nasal spray Call 911. Don't prime. Two [place] in 1 nostril for overdose. Repeat in 2-3 minutes in other nostril if no or minimal breathing/responsiveness. 2 each 0     NovoLOG 100 UNIT/ML injection        nystatin (MYCOSTATIN) 680926 UNIT/GM ointment Apply 1 Application topically to the appropriate area as directed 2 (Two) Times a Day.       ondansetron ODT (ZOFRAN-ODT) 4 MG disintegrating tablet DISSOLVE 1 TABLET ON THE TONGUE 2 TO 3 TIMES PER DAY AS NEEDED FOR NAUSEA OR VOMITING       pantoprazole (PROTONIX) 20 MG EC tablet Take 1 tablet by mouth Daily.       polyethylene glycol (MIRALAX) 17 g packet Take 17 g by mouth Daily As Needed (constipation). 14 packet 0     SITagliptin (JANUVIA) 25 MG tablet Take 1 tablet by mouth Daily.       zinc sulfate (ZINCATE) 220 (50 Zn) MG capsule             Past Medical History:   Diagnosis Date    A-fib     on eliquis    Anemia     Arthritis     Diabetes mellitus     checks sugar only when pt wants to     Disease of thyroid gland     History of transfusion     with knee surgery-  no reaction recalled.     [place] (hard of hearing)     no hearing aids    Hypertension     Migraine without aura and without status migrainosus, not intractable 12/30/2016    Myelodysplastic syndrome      Pulmonary emboli 2011    (after knee surgery)    SOBOE (shortness of breath on exertion)     Tremors of nervous system     Vertigo     Wears dentures     upper     Wears glasses         Past Surgical History:   Procedure Laterality Date    BLADDER SURGERY      CATARACT EXTRACTION      bilat     CHOLECYSTECTOMY      COLONOSCOPY      HIP TROCHANTERIC NAILING WITH INTRAMEDULLARY HIP SCREW Right 6/20/2023    Procedure: HIP TROCHANTERIC NAILING WITH INTRAMEDULLARY HIP SCREW RIGHT;  Surgeon: Augie Hobbs MD;  Location:  [facility];  Service: Orthopedics;  Laterality: Right;    HYSTERECTOMY      with bso    KYPHOPLASTY N/A 02/12/2021    Procedure: KYPHOPLASTY T11, T12 AND L4;  Surgeon: Matty Hearn MD;  Location:  [facility];  Service: Orthopedic Spine;  Laterality: N/A;    TONSILLECTOMY          Social History     Occupational History    Not on file   Tobacco Use    Smoking status: Never     Passive exposure: Never    Smokeless tobacco: Never   Vaping Use    Vaping status: Never Used   Substance and Sexual Activity    Alcohol use: No    Drug use: No    Sexual activity: Defer      Social History     Social History Narrative    Not on file        Family History   Problem Relation Age of Onset    Breast cancer Sister 84    Stroke Mother     Heart attack Father     Ovarian cancer Neg Hx          Review of Systems:   Review of Systems   Constitutional:  Positive for activity change. Negative for chills, fatigue and fever.   HENT: Negative.     Eyes: Negative.    Respiratory:  Negative for cough, chest tightness and shortness of breath.    Cardiovascular:  Negative for chest pain and leg swelling.   Gastrointestinal:  Positive for diarrhea (occasional. On metformin). Negative for abdominal distention, abdominal pain, nausea and vomiting.   Endocrine: Negative.    Genitourinary: Negative.    Musculoskeletal:  Positive for arthralgias, gait problem and joint swelling (left knee).   Skin: Negative.    Allergic/Immunologic:  Negative.    Neurological:  Positive for weakness. Negative for dizziness, syncope, light-headedness and headaches.   Hematological:  Bruises/bleeds easily.   Psychiatric/Behavioral: Negative.          Physical Exam: 89 y.o. female  General Appearance:    Alert, cooperative, in no acute distress                   Vitals:    05/17/25 2350 05/18/25 0338 05/18/25 0731 05/18/25 0923   BP: 164/63 143/73 127/59 118/49   BP Location:  Right arm Right arm    Patient Position:  Lying Lying    Pulse: 64 56 53 53   Resp:  18 18    Temp:  97.8 °F (36.6 °C) 97.8 °F (36.6 °C)    TempSrc:  Oral Oral    SpO2:  99% 92%    Weight:       Height:            Head:    Normocephalic, without obvious abnormality, atraumatic        Left Lower Extremity:   moderate swelling of the distal femur with moderate sized effusion, painless ROM hip, ankle,--  there significant pain with attempted range of motion of the knee.  There is moderate swelling at the distal femur with a moderate sized effusion. compartments soft, normal distal strength and sensation to light touch, skin intact without cyanosis, clubbing, edema; +2 dorsalis pedis pulse         Diagnostic Tests:    I have reviewed the labs, radiology results and diagnostic studies:    Results from last 7 days   Lab Units 05/17/25 2008   WBC 10*3/mm3 1.76*   HEMOGLOBIN g/dL 8.1*   PLATELETS 10*3/mm3 57*     Results from last 7 days   Lab Units 05/17/25 2008   SODIUM mmol/L 134*   POTASSIUM mmol/L 4.2   CO2 mmol/L 23.0   CREATININE mg/dL 1.22*   GLUCOSE mg/dL 286*     XR KNEE 1 OR 2 VW LEFT     Date of Exam: 5/17/2025 7:12 PM EDT     Indication: trauma     Comparison: Correlation with right knee radiographs 10/19/2024.     Findings:  There is a total left knee prosthesis in place. The bones are demineralized. There is a large volume of lipohemarthrosis. There is a suspected impaction fracture in the distal femoral metaphyseal region. Prosthetic components appear intact. The   visualized tibia  and fibula appear intact. Patella appears intact with posterior resurfacing.     IMPRESSION:  Impression:  1.Suspected impaction fracture in the distal femoral metaphyseal region with large volume of lipohemarthrosis.  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  3.Osteopenia.              Electronically Signed: Hector Rosenberg MD    5/17/2025 7:49 PM EDT    Workstation ID: [id number]      CT LOWER EXTREMITY LEFT WO CONTRAST     Date of Exam: 5/17/2025 9:02 PM EDT     Indication: Evaluate suspected periprosthetic fracture.     Comparison: 5/17/2025 radiographs.     Technique: Axial CT images were obtained of the left lower extremity without contrast administration.  Reconstructed coronal and sagittal images were also obtained. Automated exposure control and iterative construction methods were used.        Findings:  Motion artifact combined with streak artifact does limit evaluation, particularly the fine anatomic detail. The images do seem to confirm the presence of an impaction fracture at the distal femoral metaphysis. Specific details are precluded by motion   artifact and streak artifact, but there does appear to be cortical offset at the junction of the femoral metaphysis and epiphysis both medially and laterally particularly at the posterior aspect of the distal femur. The prosthetic components themselves   appear intact. The proximal tibia and fibula appear intact. The patella is resurfaced posteriorly without fracture. There is confirmation of a large volume of lipohemarthrosis primarily in the suprapatellar recess. No dislocation.     IMPRESSION:  Impression:  1.Limited study due to motion artifact and streak artifact.  2.There is confirmation of a impaction fracture at the distal femoral metaphysis with a large volume of lipohemarthrosis.                 Electronically Signed: Hector Rosenberg MD    5/17/2025 10:01 PM EDT    Workstation ID: [id number]    Assessment:    Hypothyroidism (acquired)    Chronic  anticoagulation    Essential hypertension    Atrial fibrillation    Falls    Type 2 diabetes mellitus with complication, with long-term current use of insulin    Periprosthetic fracture around prosthetic knee       Left distal femur periprosthetic fracture in a medically complex 89-year-old female.  She has chronic leukopenia   In addition to the baseline significant medical comorbidities listed above.  Her last dose of Eliquis was less than 24 hours ago per report    Plan:       I am going to have 1 of our total joint arthroplasty colleagues review this.  The exact morphology of the fracture is difficult to truly understand.      In the meantime, continue nonweightbearing left lower extremity.    - Hold Eliquis.   - Patient may have diet advanced as no surgical intervention is planned for today.    - Straight leg knee immobilizer--  please place padding over bony prominences of the knee while in knee immobilizer to prevent pressure ulcerations          Addendum: Dr Mcclain has reviewed the Xray and CT scan-- of patient's left knee.  Patient with low supracondylar fracture of the distal femur.  Given the age and poor bone quality he recommends distal femoral replacement .  Patient on eliquis and given magnitude of surgery he would like to wait approx 72 hours prior to surgery. Plan for likely this Tuesday for distal femoral replacement            Josh Scherer MD  05/18/25  09:56 EDT

## 2025-05-18 NOTE — PROGRESS NOTES
[facility]  PROGRESS NOTE    Patient Name: Chey Robertson  : 1935  MRN: [id number]    Date of Admission: 2025  Primary Care Physician: Yasmeen Loomis MD    Subjective   Subjective     CC:  Periprosthetic left distal femur fracture    HPI:  Pain reasonably controlled; no overt bleeding; no chest pain, no palpitations      Objective   Objective     Vital Signs:   Temp:  [97.8 °F (36.6 °C)-98.1 °F (36.7 °C)] 97.8 °F (36.6 °C)  Heart Rate:  [53-64] 53  Resp:  [18] 18  BP: (118-174)/(49-73) 118/49     Physical Exam:  Constitutional:Alert, oriented x 3, nontoxic appearing, elerly & frail, normal work of breathing  Psych:Normal/appropriate affect  HEENT:NCAT, oropharynx clear  Neck: neck supple, full range of motion  Neuro: Face symmetric, speech clear, equal , moves all extremities  Cardiac: RRR; No pretibial pitting edema  Resp: CTAB, normal effort  GI: abd soft, nontender  Skin: No extremity rash  Musculoskeletal/extremities: no cyanosis of extremities; no significant ankle edema          Results Reviewed:  LAB RESULTS:      Lab 25   WBC  --  1.76*   HEMOGLOBIN  --  8.1*   HEMATOCRIT  --  24.3*   PLATELETS  --  57*   NEUTROS ABS  --  1.18*   IMMATURE GRANS (ABS)  --  0.02   LYMPHS ABS  --  0.38*   MONOS ABS  --  0.18   EOS ABS  --  0.00   MCV  --  88.7   HSTROP T 21* 21*         Lab 25   SODIUM 134*   POTASSIUM 4.2   CHLORIDE 97*   CO2 23.0   ANION GAP 14.0   BUN 30*   CREATININE 1.22*   EGFR 42.8*   GLUCOSE 286*   CALCIUM 8.3*   HEMOGLOBIN A1C 6.20*         Lab 25   TOTAL PROTEIN 6.0   ALBUMIN 3.8   GLOBULIN 2.2   ALT (SGPT) 10   AST (SGOT) 10   BILIRUBIN 0.3   ALK PHOS 92         Lab 25   HSTROP T 21* 21*             Lab 25   IRON 40   IRON SATURATION (TSAT) 14*   TIBC 282*   TRANSFERRIN 189*         Brief Urine Lab Results  (Last result in the past 365 days)         Color   Clarity   Blood   Leuk Est   Nitrite   Protein   CREAT   Urine HCG        05/17/25 2140 Yellow   Turbid   Negative   Large (3+)   Negative   30 mg/dL (1+)                   Microbiology Results Abnormal       None            CT Lower Extremity Left Without Contrast  Result Date: 5/17/2025  CT LOWER EXTREMITY LEFT WO CONTRAST Date of Exam: 5/17/2025 9:02 PM EDT Indication: Evaluate suspected periprosthetic fracture. Comparison: 5/17/2025 radiographs. Technique: Axial CT images were obtained of the left lower extremity without contrast administration.  Reconstructed coronal and sagittal images were also obtained. Automated exposure control and iterative construction methods were used. Findings: Motion artifact combined with streak artifact does limit evaluation, particularly the fine anatomic detail. The images do seem to confirm the presence of an impaction fracture at the distal femoral metaphysis. Specific details are precluded by motion artifact and streak artifact, but there does appear to be cortical offset at the junction of the femoral metaphysis and epiphysis both medially and laterally particularly at the posterior aspect of the distal femur. The prosthetic components themselves appear intact. The proximal tibia and fibula appear intact. The patella is resurfaced posteriorly without fracture. There is confirmation of a large volume of lipohemarthrosis primarily in the suprapatellar recess. No dislocation.     Impression: Impression: 1.Limited study due to motion artifact and streak artifact. 2.There is confirmation of a impaction fracture at the distal femoral metaphysis with a large volume of lipohemarthrosis. Electronically Signed: Hector Rosenberg MD  5/17/2025 10:01 PM EDT  Workstation ID: [id number]    XR Knee 1 or 2 View Left  Result Date: 5/17/2025  XR KNEE 1 OR 2 VW LEFT Date of Exam: 5/17/2025 7:12 PM EDT Indication: trauma Comparison: Correlation with right knee radiographs 10/19/2024.  Findings: There is a total left knee prosthesis in place. The bones are demineralized. There is a large volume of lipohemarthrosis. There is a suspected impaction fracture in the distal femoral metaphyseal region. Prosthetic components appear intact. The visualized tibia and fibula appear intact. Patella appears intact with posterior resurfacing.     Impression: Impression: 1.Suspected impaction fracture in the distal femoral metaphyseal region with large volume of lipohemarthrosis. 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 3.Osteopenia. Electronically Signed: Hector Rosenberg MD  5/17/2025 7:49 PM EDT  Workstation ID: [id number]      Results for orders placed during the hospital encounter of 06/05/23    Adult Transthoracic Echo Complete w/ Color, Spectral and Contrast if necessary per protocol    Interpretation Summary    Left ventricular systolic function is normal. Left ventricular ejection fraction appears to be 56 - 60%.    Left ventricular diastolic function was normal.    Estimated right ventricular systolic pressure from tricuspid regurgitation is normal (<35 mmHg).      Current medications:  Scheduled Meds:amiodarone, 200 mg, Oral, Daily  [Held by provider] apixaban, 2.5 mg, Oral, BID  cefTRIAXone, 1,000 mg, Intravenous, Q24H  ferric gluconate, 125 mg, Intravenous, Daily  insulin lispro, 2-7 Units, Subcutaneous, 4x Daily AC & at Bedtime  levothyroxine, 100 mcg, Oral, Q AM  lisinopril, 5 mg, Oral, Daily  metoprolol tartrate, 25 mg, Oral, BID  sodium chloride, 10 mL, Intravenous, Q12H      Continuous Infusions:   PRN Meds:.  acetaminophen **OR** acetaminophen **OR** acetaminophen    senna-docusate sodium **AND** polyethylene glycol **AND** bisacodyl **AND** bisacodyl    Calcium Replacement - Follow Nurse / BPA Driven Protocol    dextrose    dextrose    glucagon (human recombinant)    HYDROcodone-acetaminophen    Magnesium Standard Dose Replacement - Follow Nurse / BPA Driven Protocol    melatonin    Morphine     nitroglycerin    Phosphorus Replacement - Follow Nurse / BPA Driven Protocol    Potassium Replacement - Follow Nurse / BPA Driven Protocol    sodium chloride    Assessment & Plan   Assessment & Plan     Active Hospital Problems    Diagnosis  POA    Type 2 diabetes mellitus with complication, with long-term current use of insulin [E11.8, Z79.4]  Not Applicable    Periprosthetic fracture around prosthetic knee [M97.8XXA, Z96.659]  Not Applicable    Falls [R29.6]  Not Applicable    Atrial fibrillation [I48.91]  Yes    Chronic anticoagulation [Z79.01]  Not Applicable    Essential hypertension [I10]  Yes    Hypothyroidism (acquired) [E03.9]  Yes      Resolved Hospital Problems   No resolved problems to display.        Brief Hospital Course to date:  Chey Robertson is a 89 y.o. female w/ hx afib (on eliquis), htn, dm2, hypothyroidism, MDS (w/ pancytopenia, followed by Dr. Lyman), previous PE (on eliquis), previous left knee replacement. Presented to [facility] ED w/ left knee pain after sustaining a fall at her SNF. CT imaging revealed left distal femoral impaction fracture. ED spoke w/ orthopedic surgery (Dr. Scherer) and recommended admission to hospitalist service. Wbc 1.76, hgb 8.1, plts 57,000. Had minimally elevated troponin 21 (repeat also 21), u/a w/ large LE, 6-10 rbc, 4+ bacteria,        Left supracondylar fracture of the distal femur.   Hx previous left knee replacement  --Orthopedic surgery following: requires distal femoral replacement. Due to magnitude of surgery ortho would like to hold eliquis ~72 hours prior to surgery, thus planning tentatively for Tuesday 5/20  -pt/ot evals     Parox afib (currently in sinus)  Chronic HFpEF  HTN  Minimal elevated troponin, chronic  -troponin 21, repeat 21  -EKG sinus bradycardia w/ someo t-wave inversions anterolaterally, similar to 7/2024 EKG  -no chest pain symptoms  -echo 6/2023: LV EF 56-60%, valves ok  -cont. amiodarone & metoprolol, lisinopril  -eliquis on hold for  surgery    Bacteria in urine  -treating w/ rocephin since immunocompromised and has hardware in place  -day #1/5 rocephin, adding urine culture to urine in lab    Hx PE (perioperative after previous knee replacement)  -eliquis on hold, restart when ok w/ orthopedic surgery    MDS  Pancytopenia  Iron deficiency  -follows w/ Dr. Lyman (last seen 3/3/25); planned to replce iv iron if iron sat <10%; planned to consider holding/stopping anticoagulation if plts dropped below 30,000 or evidence bleeding  -typical plts range 40,000-60,000  -day #1/3 iv iron  -monitor hgb levels and transfuse prn perioperatively  -today's labs pending    DM2  -A1c 6.2  -on januvia and metformin  -ssi    Hypothyroidism  -cont levothyroxine    Goals/limits  -discussed w/ patient and daughter, patient is Full Code (s/p long discussion)    Today's labs still pending    4am labs ordered        Expected Discharge Location and Transportation: TBD  Expected Discharge   Expected Discharge Date: 5/21/2025; Expected Discharge Time:      VTE Prophylaxis:  Pharmacologic & mechanical VTE prophylaxis orders are present.         AM-PAC 6 Clicks Score (PT): 10 (05/17/25 7591)    CODE STATUS:   Code Status and Medical Interventions: CPR (Attempt to Resuscitate); Full Support   Ordered at: 05/17/25 2603     Code Status (Patient has no pulse and is not breathing):    CPR (Attempt to Resuscitate)     Medical Interventions (Patient has pulse or is breathing):    Full Support     Level Of Support Discussed With:    Patient       Julio Leiva MD  05/18/25

## 2025-05-18 NOTE — H&P
"    [facility] Medicine Services  HISTORY AND PHYSICAL    Patient Name: Chey Robertson  : 1935  MRN: [id number]  Primary Care Physician: Yasmeen Loomis MD  Date of admission: 2025    Subjective   Subjective     Chief Complaint:  Left knee pain    HPI:  Chey Robertson is a 89 y.o. female significant medical history for atrial fibrillation on Eliquis, type 2 diabetes, hypothyroidism, HTN, tremor, and vertigo who presents to [facility] ED with complaints of left knee pain after sustaining a fall at SNF.    Patient arrives to [facility] ED via EMS with complaints of left knee pain after sustaining a fall at a SNF.  Patient was in the dining room ambulating around the table.  She started to feel shaky and reached for chair for balance, but her legs gave out from underneath her, causing her to fall onto her left knee.  Patient reports she did hit her head, but did not lose consciousness.  She denies headache or vision changes.  She is unable to stand without assistance.  When assisted patient was unable to bear weight on her left lower extremity, prompting EMS evaluation.  Patient has had prior left knee replacement in .  She is anticoagulated on Eliquis due to atrial fibrillation.  In ED, CT of head is negative for acute intracranial findings.  X-ray of left knee shows periprosthetic fracture of left knee.  CT of LLE pending.    Patient is resting comfortably in bed with no acute complaints.  She rates her left knee pain \"very mild\" after receiving Tylenol in ED.  She denies headache, lightheadedness, or dizziness.  She also denies chest pain or shortness of breath.  No reports of nausea, or vomiting, but does report occasional diarrhea.  Patient is on metformin.       Review of Systems   Constitutional:  Positive for activity change. Negative for chills, fatigue and fever.   HENT: Negative.     Eyes: Negative.    Respiratory:  Negative for cough, " chest tightness and shortness of breath.    Cardiovascular:  Negative for chest pain and leg swelling.   Gastrointestinal:  Positive for diarrhea (occasional. On metformin). Negative for abdominal distention, abdominal pain, nausea and vomiting.   Endocrine: Negative.    Genitourinary: Negative.    Musculoskeletal:  Positive for arthralgias, gait problem and joint swelling (left knee).   Skin: Negative.    Allergic/Immunologic: Negative.    Neurological:  Positive for weakness. Negative for dizziness, syncope, light-headedness and headaches.   Hematological:  Bruises/bleeds easily.   Psychiatric/Behavioral: Negative.              Personal History     Past Medical History:   Diagnosis Date    A-fib     on eliquis    Anemia     Arthritis     Diabetes mellitus     checks sugar only when pt wants to     Disease of thyroid gland     History of transfusion     with knee surgery-  no reaction recalled.     [place] (hard of hearing)     no hearing aids    Hypertension     Migraine without aura and without status migrainosus, not intractable 12/30/2016    Myelodysplastic syndrome     Pulmonary emboli 2011    (after knee surgery)    SOBOE (shortness of breath on exertion)     Tremors of nervous system     Vertigo     Wears dentures     upper     Wears glasses          Oncology Problem List:  Myelodysplasia (myelodysplastic syndrome) (03/14/2023; Status: Active)    Oncology/Hematology History    No history exists.       Past Surgical History:   Procedure Laterality Date    BLADDER SURGERY      CATARACT EXTRACTION      bilat     CHOLECYSTECTOMY      COLONOSCOPY      HIP TROCHANTERIC NAILING WITH INTRAMEDULLARY HIP SCREW Right 6/20/2023    Procedure: HIP TROCHANTERIC NAILING WITH INTRAMEDULLARY HIP SCREW RIGHT;  Surgeon: Augie Hobbs MD;  [facility]: [facility];  Service: Orthopedics;  Laterality: Right;    HYSTERECTOMY      with bso    KYPHOPLASTY N/A 02/12/2021    Procedure: KYPHOPLASTY T11, T12 AND L4;  Surgeon: Matty Hearn  MD;  [facility];  Service: Orthopedic Spine;  Laterality: N/A;    TONSILLECTOMY         Family History:  family history includes Breast cancer (age of onset: 84) in her sister; Heart attack in her father; Stroke in her mother.     Social History:  reports that she has never smoked. She has never been exposed to tobacco smoke. She has never used smokeless tobacco. She reports that she does not drink alcohol and does not use drugs.  Social History     Social History Narrative    Not on file       Medications:  Acidophilus Extra Strength, Insulin Aspart, SITagliptin, acetaminophen, amiodarone, apixaban, ascorbic acid, castor oil-balsam peru, furosemide, insulin NPH-insulin regular, levothyroxine, lisinopril, melatonin, metFORMIN, metoprolol tartrate, naloxone, nystatin, ondansetron ODT, pantoprazole, polyethylene glycol, and zinc sulfate    Allergies   Allergen Reactions    Aspirin Unknown - Low Severity     Mouth sores        Objective   Objective     Vital Signs:   Temp:  [98.1 °F (36.7 °C)] 98.1 °F (36.7 °C)  Heart Rate:  [55-59] 55  Resp:  [18] 18  BP: (127-166)/(55-69) 127/59    Physical Exam  Constitutional:       Appearance: Normal appearance.   HENT:      Head: Normocephalic and atraumatic.   Eyes:      Extraocular Movements: Extraocular movements intact.      Pupils: Pupils are equal, round, and reactive to light.   Cardiovascular:      Rate and Rhythm: Regular rhythm. Bradycardia present.      Pulses: Normal pulses.      Heart sounds: Normal heart sounds.   Pulmonary:      Effort: Pulmonary effort is normal.      Breath sounds: Normal breath sounds.   Abdominal:      General: Bowel sounds are normal. There is no distension.      Palpations: Abdomen is soft.      Tenderness: There is no abdominal tenderness.   Musculoskeletal:      Right lower leg: No edema.      Left lower leg: Tenderness (left knee) present. No edema.   Skin:     General: Skin is warm and dry.   Neurological:      General: No focal  deficit present.      Mental Status: She is alert and oriented to person, place, and time.   Psychiatric:         Mood and Affect: Mood normal.         Behavior: Behavior normal.          Result Review:  I have personally reviewed the results from the time of this admission to 5/17/2025 22:50 EDT and agree with these findings:  [x]  Laboratory list / accordion  []  Microbiology  [x]  Radiology  [x]  EKG/Telemetry   []  Cardiology/Vascular   []  Pathology  [x]  Old records  []  Other:      LAB RESULTS:      Lab 05/17/25 2008   WBC 1.76*   HEMOGLOBIN 8.1*   HEMATOCRIT 24.3*   PLATELETS 57*   NEUTROS ABS 1.18*   IMMATURE GRANS (ABS) 0.02   LYMPHS ABS 0.38*   MONOS ABS 0.18   EOS ABS 0.00   MCV 88.7         Lab 05/17/25 2008   SODIUM 134*   POTASSIUM 4.2   CHLORIDE 97*   CO2 23.0   ANION GAP 14.0   BUN 30*   CREATININE 1.22*   EGFR 42.8*   GLUCOSE 286*   CALCIUM 8.3*         Lab 05/17/25 2008   TOTAL PROTEIN 6.0   ALBUMIN 3.8   GLOBULIN 2.2   ALT (SGPT) 10   AST (SGOT) 10   BILIRUBIN 0.3   ALK PHOS 92         Lab 05/17/25  2149 05/17/25 2008   HSTROP T 21* 21*                 Brief Urine Lab Results  (Last result in the past 365 days)        Color   Clarity   Blood   Leuk Est   Nitrite   Protein   CREAT   Urine HCG        05/17/25 2140 Yellow   Turbid   Negative   Large (3+)   Negative   30 mg/dL (1+)                 Microbiology Results (last 10 days)       ** No results found for the last 240 hours. **            CT Lower Extremity Left Without Contrast  Result Date: 5/17/2025  CT LOWER EXTREMITY LEFT WO CONTRAST Date of Exam: 5/17/2025 9:02 PM EDT Indication: Evaluate suspected periprosthetic fracture. Comparison: 5/17/2025 radiographs. Technique: Axial CT images were obtained of the left lower extremity without contrast administration.  Reconstructed coronal and sagittal images were also obtained. Automated exposure control and iterative construction methods were used. Findings: Motion artifact combined with  streak artifact does limit evaluation, particularly the fine anatomic detail. The images do seem to confirm the presence of an impaction fracture at the distal femoral metaphysis. Specific details are precluded by motion artifact and streak artifact, but there does appear to be cortical offset at the junction of the femoral metaphysis and epiphysis both medially and laterally particularly at the posterior aspect of the distal femur. The prosthetic components themselves appear intact. The proximal tibia and fibula appear intact. The patella is resurfaced posteriorly without fracture. There is confirmation of a large volume of lipohemarthrosis primarily in the suprapatellar recess. No dislocation.     Impression: Impression: 1.Limited study due to motion artifact and streak artifact. 2.There is confirmation of a impaction fracture at the distal femoral metaphysis with a large volume of lipohemarthrosis. Electronically Signed: Hector Rosenberg MD  5/17/2025 10:01 PM EDT  Workstation ID: [id number]    XR Knee 1 or 2 View Left  Result Date: 5/17/2025  XR KNEE 1 OR 2 VW LEFT Date of Exam: 5/17/2025 7:12 PM EDT Indication: trauma Comparison: Correlation with right knee radiographs 10/19/2024. Findings: There is a total left knee prosthesis in place. The bones are demineralized. There is a large volume of lipohemarthrosis. There is a suspected impaction fracture in the distal femoral metaphyseal region. Prosthetic components appear intact. The visualized tibia and fibula appear intact. Patella appears intact with posterior resurfacing.     Impression: Impression: 1.Suspected impaction fracture in the distal femoral metaphyseal region with large volume of lipohemarthrosis. 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 3.Osteopenia. Electronically Signed: Hector Rosenberg MD  5/17/2025 7:49 PM EDT  Workstation ID: [id number]      Results for orders placed during the hospital encounter of 06/05/23    Adult Transthoracic Echo Complete w/  Color, Spectral and Contrast if necessary per protocol    Interpretation Summary    Left ventricular systolic function is normal. Left ventricular ejection fraction appears to be 56 - 60%.    Left ventricular diastolic function was normal.    Estimated right ventricular systolic pressure from tricuspid regurgitation is normal (<35 mmHg).      Assessment & Plan   Assessment & Plan       Falls    Periprosthetic fracture around prosthetic knee    Hypothyroidism (acquired)    Chronic anticoagulation    Essential hypertension    Atrial fibrillation    Type 2 diabetes mellitus with complication, with long-term current use of insulin    Chey Robertson is a 89 y.o. female significant medical history for atrial fibrillation on Eliquis, type 2 diabetes, hypothyroidism, HTN, tremor, and vertigo who presents to [facility] ED with complaints of left knee pain after sustaining a fall at [facility].  Ortho consult in AM.  Hold Eliquis for procedure.  Pain management.  N.p.o. after midnight.     Periprosthetic fracture of left knee  History of prosthetic left knee  Fall with injury  - CT of head reveals no acute intracranial abnormality  - X-ray right knee periprosthetic fracture of left knee  - CT of the left knee pending  - Pain management  - Fall risk precautions  - Ortho consult in a.m.  - Case management consult in a.m.  - PT/OT consult  - CBC and CMP in a.m.  - Hold Eliquis  - PT/INR in a.m.  - N.p.o. after midnight      HTN  Atrial fibrillation  - Patient on Eliquis.  Hold for procedure.   - Continue amiodarone, lisinopril, and metoprolol    Hypothyroidism  - Continue Synthroid    Type 2 diabetes  - A1c pending  - Hold metformin  - Consider SSI    DVT prophylaxis: Patient on Eliquis    CODE STATUS: Full code  Code Status (Patient has no pulse and is not breathing): CPR (Attempt to Resuscitate)  Medical Interventions (Patient has pulse or is breathing): Full Support  Level Of Support Discussed With:  Patient      Expected Discharge  Expected discharge date/ time has not been documented.      This note has been completed as part of a split-shared workflow.     Signature: Electronically signed by CHANG Hassan, 05/17/25, 10:51 PM EDT    --------------------------------    The patient was seen independently by the APC.  I was available for any questions or concerns.     Electronically signed by Kaushal Jones III, DO, 05/18/25, 2:21 AM EDT.

## 2025-05-19 LAB
ANION GAP SERPL CALCULATED.3IONS-SCNC: 10 MMOL/L (ref 5–15)
ANTI-K: NORMAL
BUN SERPL-MCNC: 18 MG/DL (ref 8–23)
BUN/CREAT SERPL: 18.2 (ref 7–25)
CALCIUM SPEC-SCNC: 8.3 MG/DL (ref 8.6–10.5)
CHLORIDE SERPL-SCNC: 99 MMOL/L (ref 98–107)
CO2 SERPL-SCNC: 26 MMOL/L (ref 22–29)
CREAT SERPL-MCNC: 0.99 MG/DL (ref 0.57–1)
DEPRECATED RDW RBC AUTO: 48.8 FL (ref 37–54)
EGFRCR SERPLBLD CKD-EPI 2021: 55 ML/MIN/1.73
ERYTHROCYTE [DISTWIDTH] IN BLOOD BY AUTOMATED COUNT: 14.9 % (ref 12.3–15.4)
GLUCOSE BLDC GLUCOMTR-MCNC: 156 MG/DL (ref 70–130)
GLUCOSE BLDC GLUCOMTR-MCNC: 215 MG/DL (ref 70–130)
GLUCOSE BLDC GLUCOMTR-MCNC: 253 MG/DL (ref 70–130)
GLUCOSE BLDC GLUCOMTR-MCNC: 278 MG/DL (ref 70–130)
GLUCOSE SERPL-MCNC: 151 MG/DL (ref 65–99)
HCT VFR BLD AUTO: 24.3 % (ref 34–46.6)
HGB BLD-MCNC: 7.9 G/DL (ref 12–15.9)
MAGNESIUM SERPL-MCNC: 3.2 MG/DL (ref 1.6–2.4)
MCH RBC QN AUTO: 29.3 PG (ref 26.6–33)
MCHC RBC AUTO-ENTMCNC: 32.5 G/DL (ref 31.5–35.7)
MCV RBC AUTO: 90 FL (ref 79–97)
PLATELET # BLD AUTO: 49 10*3/MM3 (ref 140–450)
PMV BLD AUTO: 9.3 FL (ref 6–12)
POTASSIUM SERPL-SCNC: 4.2 MMOL/L (ref 3.5–5.2)
RBC # BLD AUTO: 2.7 10*6/MM3 (ref 3.77–5.28)
SODIUM SERPL-SCNC: 135 MMOL/L (ref 136–145)
WBC NRBC COR # BLD AUTO: 1.52 10*3/MM3 (ref 3.4–10.8)

## 2025-05-19 PROCEDURE — 82948 REAGENT STRIP/BLOOD GLUCOSE: CPT

## 2025-05-19 PROCEDURE — 85027 COMPLETE CBC AUTOMATED: CPT | Performed by: INTERNAL MEDICINE

## 2025-05-19 PROCEDURE — 83735 ASSAY OF MAGNESIUM: CPT | Performed by: INTERNAL MEDICINE

## 2025-05-19 PROCEDURE — 80048 BASIC METABOLIC PNL TOTAL CA: CPT | Performed by: INTERNAL MEDICINE

## 2025-05-19 PROCEDURE — 25010000002 MAGNESIUM SULFATE 2 GM/50ML SOLUTION: Performed by: INTERNAL MEDICINE

## 2025-05-19 PROCEDURE — 25010000002 CEFTRIAXONE PER 250 MG: Performed by: INTERNAL MEDICINE

## 2025-05-19 PROCEDURE — 99232 SBSQ HOSP IP/OBS MODERATE 35: CPT | Performed by: INTERNAL MEDICINE

## 2025-05-19 PROCEDURE — 63710000001 INSULIN LISPRO (HUMAN) PER 5 UNITS: Performed by: INTERNAL MEDICINE

## 2025-05-19 PROCEDURE — 25010000002 NA FERRIC GLUC CPLX PER 12.5 MG: Performed by: INTERNAL MEDICINE

## 2025-05-19 RX ADMIN — INSULIN LISPRO 2 UNITS: 100 INJECTION, SOLUTION INTRAVENOUS; SUBCUTANEOUS at 08:49

## 2025-05-19 RX ADMIN — INSULIN LISPRO 4 UNITS: 100 INJECTION, SOLUTION INTRAVENOUS; SUBCUTANEOUS at 20:33

## 2025-05-19 RX ADMIN — AMIODARONE HYDROCHLORIDE 200 MG: 200 TABLET ORAL at 08:48

## 2025-05-19 RX ADMIN — DOCUSATE SODIUM 50 MG AND SENNOSIDES 8.6 MG 2 TABLET: 8.6; 5 TABLET, FILM COATED ORAL at 05:38

## 2025-05-19 RX ADMIN — SODIUM CHLORIDE 125 MG: 9 INJECTION, SOLUTION INTRAVENOUS at 08:49

## 2025-05-19 RX ADMIN — LEVOTHYROXINE SODIUM 100 MCG: 0.1 TABLET ORAL at 05:38

## 2025-05-19 RX ADMIN — BISACODYL 5 MG: 5 TABLET, COATED ORAL at 08:48

## 2025-05-19 RX ADMIN — MAGNESIUM SULFATE HEPTAHYDRATE 2 G: 40 INJECTION, SOLUTION INTRAVENOUS at 00:58

## 2025-05-19 RX ADMIN — DOCUSATE SODIUM 50 MG AND SENNOSIDES 8.6 MG 2 TABLET: 8.6; 5 TABLET, FILM COATED ORAL at 20:32

## 2025-05-19 RX ADMIN — Medication 10 ML: at 08:51

## 2025-05-19 RX ADMIN — Medication 10 ML: at 20:34

## 2025-05-19 RX ADMIN — POLYETHYLENE GLYCOL 3350 17 G: 17 POWDER, FOR SOLUTION ORAL at 17:57

## 2025-05-19 RX ADMIN — INSULIN LISPRO 4 UNITS: 100 INJECTION, SOLUTION INTRAVENOUS; SUBCUTANEOUS at 17:56

## 2025-05-19 RX ADMIN — SODIUM CHLORIDE 1000 MG: 900 INJECTION INTRAVENOUS at 05:38

## 2025-05-19 RX ADMIN — INSULIN LISPRO 3 UNITS: 100 INJECTION, SOLUTION INTRAVENOUS; SUBCUTANEOUS at 12:07

## 2025-05-19 RX ADMIN — PANTOPRAZOLE SODIUM 40 MG: 40 TABLET, DELAYED RELEASE ORAL at 05:38

## 2025-05-19 NOTE — PLAN OF CARE
Goal Outcome Evaluation:  Plan of Care Reviewed With: patient           Outcome Evaluation: VSS. Alert oriented x4, forgetful. External catheter in place. NPO midnight.

## 2025-05-19 NOTE — NURSING NOTE
"Reason for Wound, Ostomy and Continence (WOC) Nursing Consultation: \"PI POA left heel, coccyx/gluteal\"    Patient lying in bed.  Family/support person present, attentive to patient.     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 Applied HydroFera Ready to wound bed, skin prep periwound. Allevyn. Heel boot.  Notes: Pt has had this wound and receiving wound care from a clinic. HydroFera had been in place, will continue same treatment here. HydroFera placed at bedside on computer for further dressing changes.   Wound Image:     Recommendation(s) for management of wound:   -Refer to wound care orders for specific instructions on how to treat/manage wound.  -Cleanse with NS Q 3 days. Skinprep periwound and allow to dry. HydroFera Ready to wound bed. Allevyn.  -Practice pressure injury prevention protocol.    Wound Assessment  Wound Type: Pressure Injury Stage 2 (?) Resolving Pis to saacrum  Location: Sacrum, coccyx  Pain: Yes   Care provided: Skinprep, reappied Allevyn  Notes: Skin intact with discoloration to coccyx, and right side of coccyx.   Wound Image:     Recommendation(s) for management of wound:   -Refer to wound care orders for specific instructions on how to treat/manage wound.  -Cleanse with green wipes, allow to dry, skin prep allow to dry. Allevyn Q3-5 days and prn.   -offload  -Practice pressure injury prevention protocol.    Most recent Rahul Scale score:  Sensory Perception: 4-->no impairment  Moisture: 3-->occasionally moist  Activity: 1-->bedfast  Mobility: 2-->very limited  Nutrition: 3-->adequate  Friction and Shear: 2-->potential problem  Rahul Score: 15 (05/19/25 0400)     Specialty " support surface: Foam Mattress with Waffle Overlay       Pressure Injury Prevention Protocol (initiate for Rahul Score of 18 or less):   *Turn q 2 hr, keep heels elevated and offloaded with offloading heel boots.  *Allevn dressings to heels, sacrum/coccyx  *Follow C.A.R.E protocol if medical devices (Bipap, elena, Ng tube, etc) are being used.  *Reduce layers under patient (one sheet as drawsheet and two incontinence pads) to allow waffle or SONALI to improve microclimate   *Raise knee-gatch before elevating HOB to reduce shearing      WOC Team will continue to follow.  Please re-consult if the wound(s) worsens.

## 2025-05-19 NOTE — PROGRESS NOTES
[facility] Medicine Services  PROGRESS NOTE    Patient Name: Chey Robertson  : 1935  MRN: [id number]    Date of Admission: 2025  Primary Care Physician: Yasmeen Loomis MD    Subjective   Subjective     CC:  Periprosthetic left distal femur fracture    HPI:  Pain reasonably controlled, no chest pain, no dyspnea. No n/v/d      Objective   Objective     Vital Signs:   Temp:  [97.9 °F (36.6 °C)-98.8 °F (37.1 °C)] 98.8 °F (37.1 °C)  Heart Rate:  [51-63] 61  Resp:  [16-18] 16  BP: (113-150)/(44-56) 143/53     Physical Exam:  Constitutional:Alert, oriented x 3, nontoxic appearing, elerly & frail, normal work of breathing  Psych:Normal/appropriate affect  HEENT:NCAT, oropharynx clear  Neck: neck supple, full range of motion  Neuro: Face symmetric, speech clear, equal , moves all extremities  Cardiac:rrr, murmur  Resp: ctab  GI: abd soft, nontender  Skin: No extremity rash  Musculoskeletal/extremities: no cyanosis of extremities; no significant ankle edema        Results Reviewed:  LAB RESULTS:      Lab 25   WBC 1.52* 1.53*  --  1.76*   HEMOGLOBIN 7.9* 7.9*  --  8.1*   HEMATOCRIT 24.3* 24.1*  --  24.3*   PLATELETS 49* 48*  --  57*   NEUTROS ABS  --  0.95*  --  1.18*   IMMATURE GRANS (ABS)  --  0.04  --  0.02   LYMPHS ABS  --  0.30*  --  0.38*   MONOS ABS  --  0.23  --  0.18   EOS ABS  --  0.01  --  0.00   MCV 90.0 89.3  --  88.7   PROTIME  --  16.0*  --   --    HSTROP T  --   --  21* 21*         Lab 25  0544 25   SODIUM 135* 133* 134*   POTASSIUM 4.2 4.2 4.2   CHLORIDE 99 99 97*   CO2 26.0 23.0 23.0   ANION GAP 10.0 11.0 14.0   BUN 18 20 30*   CREATININE 0.99 1.04* 1.22*   EGFR 55.0* 51.8* 42.8*   GLUCOSE 151* 182* 286*   CALCIUM 8.3* 7.9* 8.3*   MAGNESIUM 3.2* 1.2*  --    HEMOGLOBIN A1C  --   --  6.20*         Lab 25  1711 25   TOTAL PROTEIN 5.5* 6.0   ALBUMIN 3.3* 3.8    GLOBULIN 2.2 2.2   ALT (SGPT) 9 10   AST (SGOT) 12 10   BILIRUBIN 0.3 0.3   ALK PHOS 87 92         Lab 05/18/25  1711 05/17/25 2149 05/17/25 2008   HSTROP T  --  21* 21*   PROTIME 16.0*  --   --    INR 1.21*  --   --              Lab 05/18/25  1944 05/17/25 2149   IRON  --  40   IRON SATURATION (TSAT)  --  14*   TIBC  --  282*   TRANSFERRIN  --  189*   ABO TYPING O  --    RH TYPING Negative  --    ANTIBODY SCREEN Positive  --          Brief Urine Lab Results  (Last result in the past 365 days)        Color   Clarity   Blood   Leuk Est   Nitrite   Protein   CREAT   Urine HCG        05/17/25 2140 Yellow   Turbid   Negative   Large (3+)   Negative   30 mg/dL (1+)                   Microbiology Results Abnormal       Procedure Component Value - Date/Time    Urine Culture - Urine, Indwelling Urethral Catheter [[id number]]  (Abnormal) Collected: 05/17/25 2140    Lab Status: Preliminary result Specimen: Urine from Indwelling Urethral Catheter Updated: 05/19/25 1027     Urine Culture >100,000 CFU/mL Gram Negative Bacilli    Narrative:      Colonization of the urinary tract without infection is common. Treatment is discouraged unless the patient is symptomatic, pregnant, or undergoing an invasive urologic procedure.            CT Lower Extremity Left Without Contrast  Result Date: 5/17/2025  CT LOWER EXTREMITY LEFT WO CONTRAST Date of Exam: 5/17/2025 9:02 PM EDT Indication: Evaluate suspected periprosthetic fracture. Comparison: 5/17/2025 radiographs. Technique: Axial CT images were obtained of the left lower extremity without contrast administration.  Reconstructed coronal and sagittal images were also obtained. Automated exposure control and iterative construction methods were used. Findings: Motion artifact combined with streak artifact does limit evaluation, particularly the fine anatomic detail. The images do seem to confirm the presence of an impaction fracture at the distal femoral metaphysis. Specific details are  precluded by motion artifact and streak artifact, but there does appear to be cortical offset at the junction of the femoral metaphysis and epiphysis both medially and laterally particularly at the posterior aspect of the distal femur. The prosthetic components themselves appear intact. The proximal tibia and fibula appear intact. The patella is resurfaced posteriorly without fracture. There is confirmation of a large volume of lipohemarthrosis primarily in the suprapatellar recess. No dislocation.     Impression: Impression: 1.Limited study due to motion artifact and streak artifact. 2.There is confirmation of a impaction fracture at the distal femoral metaphysis with a large volume of lipohemarthrosis. Electronically Signed: Hector Rosenberg MD  5/17/2025 10:01 PM EDT  Workstation ID: [id number]    XR Knee 1 or 2 View Left  Result Date: 5/17/2025  XR KNEE 1 OR 2 VW LEFT Date of Exam: 5/17/2025 7:12 PM EDT Indication: trauma Comparison: Correlation with right knee radiographs 10/19/2024. Findings: There is a total left knee prosthesis in place. The bones are demineralized. There is a large volume of lipohemarthrosis. There is a suspected impaction fracture in the distal femoral metaphyseal region. Prosthetic components appear intact. The visualized tibia and fibula appear intact. Patella appears intact with posterior resurfacing.     Impression: Impression: 1.Suspected impaction fracture in the distal femoral metaphyseal region with large volume of lipohemarthrosis. 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 3.Osteopenia. Electronically Signed: Hector Rosenberg MD  5/17/2025 7:49 PM EDT  Workstation ID: [id number]      Results for orders placed during the hospital encounter of 06/05/23    Adult Transthoracic Echo Complete w/ Color, Spectral and Contrast if necessary per protocol    Interpretation Summary    Left ventricular systolic function is normal. Left ventricular ejection fraction appears to be 56 - 60%.    Left  ventricular diastolic function was normal.    Estimated right ventricular systolic pressure from tricuspid regurgitation is normal (<35 mmHg).      Current medications:  Scheduled Meds:amiodarone, 200 mg, Oral, Daily  [Held by provider] apixaban, 2.5 mg, Oral, BID  cefTRIAXone, 1,000 mg, Intravenous, Q24H  ferric gluconate, 125 mg, Intravenous, Daily  insulin lispro, 2-7 Units, Subcutaneous, 4x Daily AC & at Bedtime  levothyroxine, 100 mcg, Oral, Q AM  lisinopril, 5 mg, Oral, Daily  metoprolol tartrate, 25 mg, Oral, BID  pantoprazole, 40 mg, Oral, Q AM  sodium chloride, 10 mL, Intravenous, Q12H      Continuous Infusions:   PRN Meds:.  acetaminophen **OR** acetaminophen **OR** acetaminophen    senna-docusate sodium **AND** polyethylene glycol **AND** bisacodyl **AND** bisacodyl    Calcium Replacement - Follow Nurse / BPA Driven Protocol    dextrose    dextrose    glucagon (human recombinant)    HYDROcodone-acetaminophen    Magnesium Standard Dose Replacement - Follow Nurse / BPA Driven Protocol    melatonin    Morphine    nitroglycerin    Phosphorus Replacement - Follow Nurse / BPA Driven Protocol    Potassium Replacement - Follow Nurse / BPA Driven Protocol    sodium chloride    Assessment & Plan   Assessment & Plan     Active Hospital Problems    Diagnosis  POA    **Femur fracture [S72.90XA]  Yes    Type 2 diabetes mellitus with complication, with long-term current use of insulin [E11.8, Z79.4]  Not Applicable    Periprosthetic fracture around prosthetic knee [M97.8XXA, Z96.659]  Not Applicable    Falls [R29.6]  Not Applicable    Atrial fibrillation [I48.91]  Yes    Chronic anticoagulation [Z79.01]  Not Applicable    Essential hypertension [I10]  Yes    Hypothyroidism (acquired) [E03.9]  Yes      Resolved Hospital Problems   No resolved problems to display.        Brief Hospital Course to date:  Chey Robertson is a 89 y.o. female w/ hx afib (on eliquis), htn, dm2, hypothyroidism, MDS (w/ pancytopenia, followed by  Dr. Lyman), previous PE (on eliquis), previous left knee replacement. Presented to [facility] ED w/ left knee pain after sustaining a fall at her SNF. CT imaging revealed left distal femoral impaction fracture. ED spoke w/ orthopedic surgery (Dr. Scherer) and recommended admission to hospitalist service. Wbc 1.76, hgb 8.1, plts 57,000. Had minimally elevated troponin 21 (repeat also 21), u/a w/ large LE, 6-10 rbc, 4+ bacteria,        Left supracondylar fracture of the distal femur.   Hx previous left knee replacement  --Orthopedic surgery following: requires distal femoral replacement. Due to magnitude of surgery ortho would like to hold eliquis ~72 hours prior to surgery, thus planning tentatively for Tuesday 5/20  -pt/ot evals     Parox afib (currently in sinus)  Chronic HFpEF  HTN  Minimal elevated troponin, chronic  -troponin 21, repeat 21  -EKG sinus bradycardia w/ someo t-wave inversions anterolaterally, similar to 7/2024 EKG  -no chest pain symptoms  -echo 6/2023: LV EF 56-60%, valves ok  -cont. amiodarone & metoprolol, lisinopril  -eliquis on hold for surgery    Bacteria in urine  -treating w/ rocephin since immunocompromised and has hardware in place  -day #2/5 rocephin, adding urine culture to urine in lab    Hx PE (perioperative after previous knee replacement)  -eliquis on hold, restart when ok w/ orthopedic surgery    MDS  Pancytopenia  Iron deficiency  -follows w/ Dr. Lyman (last seen 3/3/25); planned to replce iv iron if iron sat <10%; planned to consider holding/stopping anticoagulation if plts dropped below 30,000 or evidence bleeding  -typical plts range 40,000-60,000  -day #2/3 iv iron  -monitor hgb levels and transfuse prn perioperatively    DM2  -A1c 6.2  -on januvia and metformin  -ssi    Hypothyroidism  -cont levothyroxine    Goals/limits  -discussed w/ patient and daughter, patient is Full Code (s/p long discussion)    Am labs ordered        Expected Discharge Location and Transportation: TBD (pt/ot  evals after surgery)  Expected Discharge   Expected Discharge Date: 5/22/2025; Expected Discharge Time:      VTE Prophylaxis:  Pharmacologic & mechanical VTE prophylaxis orders are present.         AM-PAC 6 Clicks Score (PT): 10 (05/18/25 2000)    CODE STATUS:   Code Status and Medical Interventions: CPR (Attempt to Resuscitate); Full Support   Ordered at: 05/17/25 6292     Code Status (Patient has no pulse and is not breathing):    CPR (Attempt to Resuscitate)     Medical Interventions (Patient has pulse or is breathing):    Full Support     Level Of Support Discussed With:    Patient       Julio Leiva MD  05/19/25       07-Mar-2021 08:47

## 2025-05-19 NOTE — PLAN OF CARE
Problem: Adult Inpatient Plan of Care  Goal: Absence of Hospital-Acquired Illness or Injury  Intervention: Identify and Manage Fall Risk  Recent Flowsheet Documentation  Taken 5/19/2025 0400 by Lore Jay RN  Safety Promotion/Fall Prevention:   activity supervised   assistive device/personal items within reach   clutter free environment maintained   fall prevention program maintained   lighting adjusted   gait belt   mobility aid in reach   muscle strengthening facilitated   nonskid shoes/slippers when out of bed   room organization consistent   safety round/check completed  Taken 5/19/2025 0200 by Lore Jay RN  Safety Promotion/Fall Prevention:   activity supervised   assistive device/personal items within reach   clutter free environment maintained   fall prevention program maintained   gait belt   lighting adjusted   mobility aid in reach   muscle strengthening facilitated   nonskid shoes/slippers when out of bed   room organization consistent   safety round/check completed  Taken 5/19/2025 0000 by Lore Jay RN  Safety Promotion/Fall Prevention:   activity supervised   assistive device/personal items within reach   clutter free environment maintained   fall prevention program maintained   gait belt   lighting adjusted   mobility aid in reach   muscle strengthening facilitated   nonskid shoes/slippers when out of bed   room organization consistent   safety round/check completed  Taken 5/18/2025 2200 by Lore Jay RN  Safety Promotion/Fall Prevention:   activity supervised   assistive device/personal items within reach   clutter free environment maintained   fall prevention program maintained   gait belt   lighting adjusted   mobility aid in reach   muscle strengthening facilitated   nonskid shoes/slippers when out of bed   room organization consistent   safety round/check completed  Taken 5/18/2025 2000 by Lore Jay, RN  Safety Promotion/Fall Prevention:   activity supervised   assistive  device/personal items within reach   clutter free environment maintained   fall prevention program maintained   gait belt   lighting adjusted   mobility aid in reach   muscle strengthening facilitated   nonskid shoes/slippers when out of bed   room organization consistent   safety round/check completed  Intervention: Prevent Skin Injury  Recent Flowsheet Documentation  Taken 5/19/2025 0400 by Lore Jay RN  Body Position: position maintained  Skin Protection: incontinence pads utilized  Taken 5/19/2025 0342 by Lore Jay RN  Body Position:   turned   right  Taken 5/19/2025 0200 by Lore Jay RN  Body Position: position maintained  Skin Protection: incontinence pads utilized  Taken 5/19/2025 0000 by Lore Jay RN  Body Position: position maintained  Skin Protection: incontinence pads utilized  Taken 5/18/2025 2300 by Lore Jay RN  Body Position:   turned   supine  Taken 5/18/2025 2200 by Lore Jay RN  Body Position: position maintained  Skin Protection: incontinence pads utilized  Taken 5/18/2025 2000 by Lore Jay RN  Body Position: position maintained  Skin Protection: incontinence pads utilized  Intervention: Prevent and Manage VTE (Venous Thromboembolism) Risk  Recent Flowsheet Documentation  Taken 5/19/2025 0400 by Lore Jay RN  VTE Prevention/Management:   bilateral   foot pump device on  Taken 5/19/2025 0200 by Lore Jay RN  VTE Prevention/Management:   bilateral   foot pump device on  Taken 5/19/2025 0000 by Lore Jay RN  VTE Prevention/Management:   bilateral   foot pump device on  Taken 5/18/2025 2200 by Lore Jay RN  VTE Prevention/Management:   bilateral   foot pump device on  Taken 5/18/2025 2000 by Lore Jay RN  VTE Prevention/Management:   bilateral   foot pump device on  Intervention: Prevent Infection  Recent Flowsheet Documentation  Taken 5/19/2025 0400 by Lore Jay RN  Infection Prevention:   environmental surveillance performed   equipment surfaces disinfected    hand hygiene promoted   rest/sleep promoted   single patient room provided   visitors restricted/screened  Taken 5/19/2025 0200 by Lore Jay RN  Infection Prevention:   equipment surfaces disinfected   environmental surveillance performed   hand hygiene promoted   rest/sleep promoted   single patient room provided   visitors restricted/screened  Taken 5/19/2025 0000 by Lore Jay RN  Infection Prevention:   environmental surveillance performed   equipment surfaces disinfected   hand hygiene promoted   rest/sleep promoted   single patient room provided   visitors restricted/screened  Taken 5/18/2025 2200 by Lore Jay RN  Infection Prevention:   environmental surveillance performed   equipment surfaces disinfected   hand hygiene promoted   rest/sleep promoted   single patient room provided   visitors restricted/screened  Taken 5/18/2025 2000 by Lore Jay RN  Infection Prevention:   environmental surveillance performed   equipment surfaces disinfected   hand hygiene promoted   rest/sleep promoted   single patient room provided   visitors restricted/screened  Goal: Optimal Comfort and Wellbeing  Intervention: Monitor Pain and Promote Comfort  Recent Flowsheet Documentation  Taken 5/19/2025 0400 by Lore Jay RN  Pain Management Interventions:   pillow support provided   position adjusted   quiet environment facilitated   relaxation techniques promoted  Taken 5/19/2025 0200 by Lore Jay RN  Pain Management Interventions:   pillow support provided   position adjusted   quiet environment facilitated   relaxation techniques promoted  Taken 5/19/2025 0000 by Lore Jay RN  Pain Management Interventions:   pillow support provided   quiet environment facilitated   relaxation techniques promoted  Taken 5/18/2025 2200 by Lore Jay RN  Pain Management Interventions:   pillow support provided   quiet environment facilitated   relaxation techniques promoted  Taken 5/18/2025 2000 by Lore Jay RN  Pain  Management Interventions:   pillow support provided   quiet environment facilitated   relaxation techniques promoted  Intervention: Provide Person-Centered Care  Recent Flowsheet Documentation  Taken 5/18/2025 2000 by Lore Jay RN  Trust Relationship/Rapport:   care explained   choices provided   questions encouraged   questions answered     Problem: Skin Injury Risk Increased  Goal: Skin Health and Integrity  Intervention: Optimize Skin Protection  Recent Flowsheet Documentation  Taken 5/19/2025 0400 by Lore Jay RN  Activity Management: activity encouraged  Pressure Reduction Techniques:   frequent weight shift encouraged   weight shift assistance provided  Head of Bed (HOB) Positioning: HOB elevated  Pressure Reduction Devices:   pressure-redistributing mattress utilized   positioning supports utilized  Skin Protection: incontinence pads utilized  Taken 5/19/2025 0342 by Lore Jay RN  Head of Bed (HOB) Positioning: HOB elevated  Taken 5/19/2025 0200 by Lore Jay RN  Activity Management: activity encouraged  Pressure Reduction Techniques: positioned off wounds  Head of Bed (HOB) Positioning: HOB elevated  Pressure Reduction Devices:   positioning supports utilized   pressure-redistributing mattress utilized  Skin Protection: incontinence pads utilized  Taken 5/19/2025 0000 by Lore Jay RN  Activity Management: activity encouraged  Pressure Reduction Techniques:   frequent weight shift encouraged   weight shift assistance provided  Head of Bed (HOB) Positioning: HOB elevated  Pressure Reduction Devices:   pressure-redistributing mattress utilized   positioning supports utilized  Skin Protection: incontinence pads utilized  Taken 5/18/2025 2300 by Lore Jay RN  Head of Bed (HOB) Positioning: HOB elevated  Taken 5/18/2025 2200 by Lore Jay RN  Activity Management: activity encouraged  Pressure Reduction Techniques:   frequent weight shift encouraged   weight shift assistance provided  Head of Bed  ([facility]) Positioning: [facility] elevated  Pressure Reduction Devices:   positioning supports utilized   pressure-redistributing mattress utilized  Skin Protection: incontinence pads utilized  Taken 5/18/2025 2000 by Lore Jay RN  Activity Management: activity encouraged  Pressure Reduction Techniques:   frequent weight shift encouraged   weight shift assistance provided  Head of Bed ([facility]) Positioning: [facility] elevated  Pressure Reduction Devices:   positioning supports utilized   pressure-redistributing mattress utilized  Skin Protection: incontinence pads utilized     Problem: Orthopaedic Fracture  Goal: Absence of Embolism Signs and Symptoms  Intervention: Prevent or Manage Embolism Risk  Recent Flowsheet Documentation  Taken 5/19/2025 0400 by Lore Jay RN  VTE Prevention/Management:   bilateral   foot pump device on  Taken 5/19/2025 0200 by Lore Jay RN  VTE Prevention/Management:   bilateral   foot pump device on  Taken 5/19/2025 0000 by Lore Jya RN  VTE Prevention/Management:   bilateral   foot pump device on  Taken 5/18/2025 2200 by Lore Jay RN  VTE Prevention/Management:   bilateral   foot pump device on  Taken 5/18/2025 2000 by Lore Jay RN  VTE Prevention/Management:   bilateral   foot pump device on  Goal: Optimal Functional Ability  Intervention: Optimize Functional Ability  Recent Flowsheet Documentation  Taken 5/19/2025 0400 by Lore Jay RN  Activity Management: activity encouraged  Positioning/Transfer Devices:   pillows   in use  Taken 5/19/2025 0342 by Lore Jay RN  Positioning/Transfer Devices:   pillows   in use  Taken 5/19/2025 0200 by Lore Jay RN  Activity Management: activity encouraged  Positioning/Transfer Devices:   pillows   in use  Taken 5/19/2025 0000 by Lore Jay RN  Activity Management: activity encouraged  Positioning/Transfer Devices:   pillows   in use  Taken 5/18/2025 2300 by Lore Jay RN  Positioning/Transfer Devices:   pillows   in use  Taken 5/18/2025 2200  by Moudy, Lore, RN  Activity Management: activity encouraged  Positioning/Transfer Devices:   pillows   in use  Taken 5/18/2025 2000 by Lore Jay RN  Activity Management: activity encouraged  Positioning/Transfer Devices:   pillows   in use  Goal: Optimal Pain Control and Function  Intervention: Manage Acute Orthopaedic-Related Pain  Recent Flowsheet Documentation  Taken 5/19/2025 0400 by Lore Jay RN  Pain Management Interventions:   pillow support provided   position adjusted   quiet environment facilitated   relaxation techniques promoted  Taken 5/19/2025 0200 by Lore Jay RN  Pain Management Interventions:   pillow support provided   position adjusted   quiet environment facilitated   relaxation techniques promoted  Taken 5/19/2025 0000 by Lore Jay RN  Pain Management Interventions:   pillow support provided   quiet environment facilitated   relaxation techniques promoted  Taken 5/18/2025 2200 by Lore Jay RN  Pain Management Interventions:   pillow support provided   quiet environment facilitated   relaxation techniques promoted  Taken 5/18/2025 2000 by Lore Jay RN  Pain Management Interventions:   pillow support provided   quiet environment facilitated   relaxation techniques promoted  Goal: Effective Oxygenation and Ventilation  Intervention: Promote Airway Secretion Clearance  Recent Flowsheet Documentation  Taken 5/19/2025 0400 by Lore Jay RN  Activity Management: activity encouraged  Cough And Deep Breathing: done independently per patient  Taken 5/19/2025 0200 by Lore Jay RN  Activity Management: activity encouraged  Cough And Deep Breathing: done independently per patient  Taken 5/19/2025 0000 by Lore Jay RN  Activity Management: activity encouraged  Cough And Deep Breathing: done independently per patient  Taken 5/18/2025 2200 by Lore Jay RN  Activity Management: activity encouraged  Cough And Deep Breathing: done independently per patient  Taken 5/18/2025 2000 by  Moudy, Lore, RN  Activity Management: activity encouraged  Cough And Deep Breathing: done independently per patient  Intervention: Optimize Oxygenation and Ventilation  Recent Flowsheet Documentation  Taken 5/19/2025 0400 by Lore Jay RN  Head of Bed (HOB) Positioning: HOB elevated  Taken 5/19/2025 0342 by Lore Jay RN  Head of Bed (HOB) Positioning: HOB elevated  Taken 5/19/2025 0200 by Lore Jay RN  Head of Bed (HOB) Positioning: HOB elevated  Taken 5/19/2025 0000 by Lore Jay RN  Head of Bed (HOB) Positioning: HOB elevated  Taken 5/18/2025 2300 by Lore Jay RN  Head of Bed (HOB) Positioning: HOB elevated  Taken 5/18/2025 2200 by Lore Jay RN  Head of Bed (HOB) Positioning: HOB elevated  Taken 5/18/2025 2000 by Lore Jay RN  Head of Bed (HOB) Positioning: HOB elevated     Problem: Fall Injury Risk  Goal: Absence of Fall and Fall-Related Injury  Intervention: Promote Injury-Free Environment  Recent Flowsheet Documentation  Taken 5/19/2025 0400 by Lore Jay RN  Safety Promotion/Fall Prevention:   activity supervised   assistive device/personal items within reach   clutter free environment maintained   fall prevention program maintained   lighting adjusted   gait belt   mobility aid in reach   muscle strengthening facilitated   nonskid shoes/slippers when out of bed   room organization consistent   safety round/check completed  Taken 5/19/2025 0200 by Lore Jay RN  Safety Promotion/Fall Prevention:   activity supervised   assistive device/personal items within reach   clutter free environment maintained   fall prevention program maintained   gait belt   lighting adjusted   mobility aid in reach   muscle strengthening facilitated   nonskid shoes/slippers when out of bed   room organization consistent   safety round/check completed  Taken 5/19/2025 0000 by Lore Jay RN  Safety Promotion/Fall Prevention:   activity supervised   assistive device/personal items within reach   clutter free  environment maintained   fall prevention program maintained   gait belt   lighting adjusted   mobility aid in reach   muscle strengthening facilitated   nonskid shoes/slippers when out of bed   room organization consistent   safety round/check completed  Taken 5/18/2025 2200 by Lore Jay, RN  Safety Promotion/Fall Prevention:   activity supervised   assistive device/personal items within reach   clutter free environment maintained   fall prevention program maintained   gait belt   lighting adjusted   mobility aid in reach   muscle strengthening facilitated   nonskid shoes/slippers when out of bed   room organization consistent   safety round/check completed  Taken 5/18/2025 2000 by Lore Jay, RN  Safety Promotion/Fall Prevention:   activity supervised   assistive device/personal items within reach   clutter free environment maintained   fall prevention program maintained   gait belt   lighting adjusted   mobility aid in reach   muscle strengthening facilitated   nonskid shoes/slippers when out of bed   room organization consistent   safety round/check completed   Goal Outcome Evaluation:   Pt alert and oriented x4 with confusion on room air. 18 gauge IV in right ac saline locked. Allyvens in place on both heels and coccyx area. Leg immobilizer in place on left leg. Magnesium replaced. Pt complained of no pain throughout shift. Pt voids spontaneously using external catheter. Call light within reach.

## 2025-05-19 NOTE — CASE MANAGEMENT/SOCIAL WORK
Discharge Planning Assessment  [facility]     Patient Name: Chey Robertson  MRN: [id number]  Today's Date: 5/19/2025    Admit Date: 5/17/2025    Plan: Back to assisted living at [facility]   Discharge Needs Assessment       Row Name 05/19/25 1448       Living Environment    People in Home alone    Current Living Arrangements assisted living facility  [facility]    Potentially Unsafe Housing Conditions none    In the past 12 months has the electric, gas, oil, or water company threatened to shut off services in your home? No    Primary Care Provided by self;other (see comments)  [facility] staff    Provides Primary Care For no one, unable/limited ability to care for self    Family Caregiver if Needed child(michael), adult    Quality of Family Relationships helpful;involved    Able to Return to Prior Arrangements yes       Resource/Environmental Concerns    Resource/Environmental Concerns none    Transportation Concerns none       Transportation Needs    In the past 12 months, has lack of transportation kept you from medical appointments or from getting medications? no    In the past 12 months, has lack of transportation kept you from meetings, work, or from getting things needed for daily living? No       Food Insecurity    Within the past 12 months, you worried that your food would run out before you got the money to buy more. Never true    Within the past 12 months, the food you bought just didn't last and you didn't have money to get more. Never true       Transition Planning    Patient/Family Anticipates Transition to home    Patient/Family Anticipated Services at Transition     Transportation Anticipated family or friend will provide       Discharge Needs Assessment    Readmission Within the Last 30 Days no previous admission in last 30 days    Equipment Currently Used at Home rollator    Concerns to be Addressed no discharge needs identified;denies needs/concerns at this time                    Discharge Plan       Row Name 05/19/25 1449       Plan    Plan Back to assisted living at [facility]    Patient/Family in Agreement with Plan yes    Plan Comments Met with patient at the bedside to initiate discharge planning. Patient lives in assisted living at [facility]. At baseline, patient requires assistance for ADLs and uses a rollator to assist with mobility. Patient denies any home health services or home oxygen use. Patient has [place] [employer] and prefers to fill scripts at [facility]. Patient's plan is to return to [facility] at discharge. Son will transport. CM will continue to follow and assist with discharge planning as recommendations become available.    Final Discharge Disposition Code 01 - home or self-care                    Expected Discharge Date and Time       Expected Discharge Date Expected Discharge Time    May 22, 2025            Demographic Summary       Row Name 05/19/25 1447       General Information    Admission Type inpatient    Arrived From home    Referral Source physician    Reason for Consult discharge planning    Preferred Language English    General Information Comments Yasmeen Loomis       Contact Information    Permission Granted to Share Info With family/designee    Contact Information Comments Joe Robertson (Son)  [phone] (Mobile)                   Functional Status       Row Name 05/19/25 1448       Functional Status    Usual Activity Tolerance good    Current Activity Tolerance moderate       Functional Status, IADL    Medications assistive person    Meal Preparation assistive person    Housekeeping assistive person    Laundry assistive person    Shopping assistive person       Mental Status    General Appearance WDL WDL       Mental Status Summary    Recent Changes in Mental Status/Cognitive Functioning unable to assess       Employment/    Employment Status retired                   Psychosocial    No documentation.                   Abuse/Neglect    No documentation.                  Legal    No documentation.                  Substance Abuse    No documentation.                  Patient Forms    No documentation.                     Augie Cordova RN

## 2025-05-20 ENCOUNTER — APPOINTMENT (OUTPATIENT)
Dept: GENERAL RADIOLOGY | Facility: HOSPITAL | Age: 89
End: 2025-05-20
Payer: MEDICARE

## 2025-05-20 ENCOUNTER — ANESTHESIA EVENT (OUTPATIENT)
Dept: PERIOP | Facility: HOSPITAL | Age: 89
End: 2025-05-20
Payer: MEDICARE

## 2025-05-20 ENCOUNTER — ANESTHESIA EVENT CONVERTED (OUTPATIENT)
Dept: ANESTHESIOLOGY | Facility: HOSPITAL | Age: 89
End: 2025-05-20
Payer: MEDICARE

## 2025-05-20 ENCOUNTER — ANESTHESIA (OUTPATIENT)
Dept: PERIOP | Facility: HOSPITAL | Age: 89
End: 2025-05-20
Payer: MEDICARE

## 2025-05-20 LAB
ANION GAP SERPL CALCULATED.3IONS-SCNC: 11 MMOL/L (ref 5–15)
BUN SERPL-MCNC: 14 MG/DL (ref 8–23)
BUN/CREAT SERPL: 14.6 (ref 7–25)
CALCIUM SPEC-SCNC: 8 MG/DL (ref 8.6–10.5)
CHLORIDE SERPL-SCNC: 101 MMOL/L (ref 98–107)
CO2 SERPL-SCNC: 23 MMOL/L (ref 22–29)
CREAT SERPL-MCNC: 0.96 MG/DL (ref 0.57–1)
DEPRECATED RDW RBC AUTO: 49.3 FL (ref 37–54)
EGFRCR SERPLBLD CKD-EPI 2021: 57 ML/MIN/1.73
ERYTHROCYTE [DISTWIDTH] IN BLOOD BY AUTOMATED COUNT: 14.7 % (ref 12.3–15.4)
GLUCOSE BLDC GLUCOMTR-MCNC: 138 MG/DL (ref 70–130)
GLUCOSE BLDC GLUCOMTR-MCNC: 142 MG/DL (ref 70–130)
GLUCOSE BLDC GLUCOMTR-MCNC: 236 MG/DL (ref 70–130)
GLUCOSE BLDC GLUCOMTR-MCNC: 321 MG/DL (ref 70–130)
GLUCOSE SERPL-MCNC: 131 MG/DL (ref 65–99)
HCT VFR BLD AUTO: 24.1 % (ref 34–46.6)
HGB BLD-MCNC: 7.7 G/DL (ref 12–15.9)
MAGNESIUM SERPL-MCNC: 2.3 MG/DL (ref 1.6–2.4)
MCH RBC QN AUTO: 29.2 PG (ref 26.6–33)
MCHC RBC AUTO-ENTMCNC: 32 G/DL (ref 31.5–35.7)
MCV RBC AUTO: 91.3 FL (ref 79–97)
PLATELET # BLD AUTO: 46 10*3/MM3 (ref 140–450)
PMV BLD AUTO: 9.5 FL (ref 6–12)
POTASSIUM SERPL-SCNC: 4.1 MMOL/L (ref 3.5–5.2)
RBC # BLD AUTO: 2.64 10*6/MM3 (ref 3.77–5.28)
SODIUM SERPL-SCNC: 135 MMOL/L (ref 136–145)
WBC NRBC COR # BLD AUTO: 1.09 10*3/MM3 (ref 3.4–10.8)

## 2025-05-20 PROCEDURE — C1713 ANCHOR/SCREW BN/BN,TIS/BN: HCPCS | Performed by: ORTHOPAEDIC SURGERY

## 2025-05-20 PROCEDURE — 25010000002 SUGAMMADEX 200 MG/2ML SOLUTION: Performed by: ANESTHESIOLOGY

## 2025-05-20 PROCEDURE — C1776 JOINT DEVICE (IMPLANTABLE): HCPCS | Performed by: ORTHOPAEDIC SURGERY

## 2025-05-20 PROCEDURE — 27487 REVISE/REPLACE KNEE JOINT: CPT

## 2025-05-20 PROCEDURE — 25010000002 PHENYLEPHRINE 10 MG/ML SOLUTION 1 ML VIAL: Performed by: ANESTHESIOLOGY

## 2025-05-20 PROCEDURE — 25010000002 ONDANSETRON PER 1 MG: Performed by: ORTHOPAEDIC SURGERY

## 2025-05-20 PROCEDURE — 25010000002 ALBUMIN HUMAN 5% PER 50 ML: Performed by: ANESTHESIOLOGY

## 2025-05-20 PROCEDURE — 25010000002 CEFAZOLIN PER 500 MG: Performed by: ORTHOPAEDIC SURGERY

## 2025-05-20 PROCEDURE — P9016 RBC LEUKOCYTES REDUCED: HCPCS

## 2025-05-20 PROCEDURE — 82948 REAGENT STRIP/BLOOD GLUCOSE: CPT

## 2025-05-20 PROCEDURE — 85027 COMPLETE CBC AUTOMATED: CPT | Performed by: INTERNAL MEDICINE

## 2025-05-20 PROCEDURE — P9041 ALBUMIN (HUMAN),5%, 50ML: HCPCS | Performed by: ANESTHESIOLOGY

## 2025-05-20 PROCEDURE — 25010000002 FENTANYL CITRATE (PF) 50 MCG/ML SOLUTION: Performed by: NURSE ANESTHETIST, CERTIFIED REGISTERED

## 2025-05-20 PROCEDURE — 25010000002 VANCOMYCIN 1 G RECONSTITUTED SOLUTION: Performed by: ORTHOPAEDIC SURGERY

## 2025-05-20 PROCEDURE — 63710000001 INSULIN LISPRO (HUMAN) PER 5 UNITS: Performed by: ORTHOPAEDIC SURGERY

## 2025-05-20 PROCEDURE — 25010000002 MORPHINE PER 10 MG: Performed by: FAMILY MEDICINE

## 2025-05-20 PROCEDURE — 25810000003 LACTATED RINGERS PER 1000 ML: Performed by: ORTHOPAEDIC SURGERY

## 2025-05-20 PROCEDURE — 73560 X-RAY EXAM OF KNEE 1 OR 2: CPT

## 2025-05-20 PROCEDURE — 25810000003 SODIUM CHLORIDE 0.9 % SOLUTION: Performed by: ANESTHESIOLOGY

## 2025-05-20 PROCEDURE — 25810000003 LACTATED RINGERS PER 1000 ML: Performed by: ANESTHESIOLOGY

## 2025-05-20 PROCEDURE — 25010000002 LIDOCAINE 1% - EPINEPHRINE 1:100000 1 %-1:100000 SOLUTION 20 ML VIAL: Performed by: ORTHOPAEDIC SURGERY

## 2025-05-20 PROCEDURE — 25010000002 CLONIDINE PER 1 MG: Performed by: ORTHOPAEDIC SURGERY

## 2025-05-20 PROCEDURE — 25010000002 HYDROMORPHONE 1 MG/ML SOLUTION

## 2025-05-20 PROCEDURE — 25010000002 DEXAMETHASONE PER 1 MG: Performed by: ANESTHESIOLOGY

## 2025-05-20 PROCEDURE — 25010000002 ROPIVACAINE HCL-NACL 0.2-0.9 % SOLUTION: Performed by: NURSE ANESTHETIST, CERTIFIED REGISTERED

## 2025-05-20 PROCEDURE — 25010000002 BUPIVACAINE (PF) 0.25 % SOLUTION 30 ML VIAL: Performed by: ORTHOPAEDIC SURGERY

## 2025-05-20 PROCEDURE — P9100 PATHOGEN TEST FOR PLATELETS: HCPCS

## 2025-05-20 PROCEDURE — 83735 ASSAY OF MAGNESIUM: CPT | Performed by: INTERNAL MEDICINE

## 2025-05-20 PROCEDURE — 80048 BASIC METABOLIC PNL TOTAL CA: CPT | Performed by: INTERNAL MEDICINE

## 2025-05-20 PROCEDURE — 25010000002 BUPIVACAINE (PF) 0.25 % SOLUTION: Performed by: NURSE ANESTHETIST, CERTIFIED REGISTERED

## 2025-05-20 PROCEDURE — 25010000002 LIDOCAINE PF 1% 1 % SOLUTION: Performed by: ANESTHESIOLOGY

## 2025-05-20 PROCEDURE — 99232 SBSQ HOSP IP/OBS MODERATE 35: CPT | Performed by: INTERNAL MEDICINE

## 2025-05-20 PROCEDURE — 25010000002 NA FERRIC GLUC CPLX PER 12.5 MG: Performed by: INTERNAL MEDICINE

## 2025-05-20 PROCEDURE — 25010000002 PROPOFOL 10 MG/ML EMULSION: Performed by: ANESTHESIOLOGY

## 2025-05-20 PROCEDURE — 25010000002 ONDANSETRON PER 1 MG: Performed by: ANESTHESIOLOGY

## 2025-05-20 PROCEDURE — 36430 TRANSFUSION BLD/BLD COMPNT: CPT

## 2025-05-20 PROCEDURE — P9035 PLATELET PHERES LEUKOREDUCED: HCPCS

## 2025-05-20 PROCEDURE — 25010000002 CEFTRIAXONE PER 250 MG: Performed by: INTERNAL MEDICINE

## 2025-05-20 PROCEDURE — 86900 BLOOD TYPING SEROLOGIC ABO: CPT

## 2025-05-20 PROCEDURE — 25010000002 KETOROLAC TROMETHAMINE PER 15 MG: Performed by: ORTHOPAEDIC SURGERY

## 2025-05-20 RX ORDER — SODIUM CHLORIDE, SODIUM LACTATE, POTASSIUM CHLORIDE, CALCIUM CHLORIDE 600; 310; 30; 20 MG/100ML; MG/100ML; MG/100ML; MG/100ML
INJECTION, SOLUTION INTRAVENOUS CONTINUOUS PRN
Status: DISCONTINUED | OUTPATIENT
Start: 2025-05-20 | End: 2025-05-20 | Stop reason: SURG

## 2025-05-20 RX ORDER — ONDANSETRON 2 MG/ML
4 INJECTION INTRAMUSCULAR; INTRAVENOUS ONCE AS NEEDED
Status: DISCONTINUED | OUTPATIENT
Start: 2025-05-20 | End: 2025-05-20 | Stop reason: HOSPADM

## 2025-05-20 RX ORDER — ALBUMIN HUMAN 50 G/1000ML
SOLUTION INTRAVENOUS CONTINUOUS PRN
Status: DISCONTINUED | OUTPATIENT
Start: 2025-05-20 | End: 2025-05-20 | Stop reason: SURG

## 2025-05-20 RX ORDER — FENTANYL CITRATE 50 UG/ML
INJECTION, SOLUTION INTRAMUSCULAR; INTRAVENOUS
Status: COMPLETED | OUTPATIENT
Start: 2025-05-20 | End: 2025-05-20

## 2025-05-20 RX ORDER — TRANEXAMIC ACID 10 MG/ML
1000 INJECTION, SOLUTION INTRAVENOUS ONCE
Status: COMPLETED | OUTPATIENT
Start: 2025-05-20 | End: 2025-05-20

## 2025-05-20 RX ORDER — DEXAMETHASONE SODIUM PHOSPHATE 4 MG/ML
INJECTION, SOLUTION INTRA-ARTICULAR; INTRALESIONAL; INTRAMUSCULAR; INTRAVENOUS; SOFT TISSUE AS NEEDED
Status: DISCONTINUED | OUTPATIENT
Start: 2025-05-20 | End: 2025-05-20 | Stop reason: SURG

## 2025-05-20 RX ORDER — HYDROMORPHONE HYDROCHLORIDE 1 MG/ML
0.5 INJECTION, SOLUTION INTRAMUSCULAR; INTRAVENOUS; SUBCUTANEOUS
Status: COMPLETED | OUTPATIENT
Start: 2025-05-20 | End: 2025-05-20

## 2025-05-20 RX ORDER — PROPOFOL 10 MG/ML
VIAL (ML) INTRAVENOUS AS NEEDED
Status: DISCONTINUED | OUTPATIENT
Start: 2025-05-20 | End: 2025-05-20 | Stop reason: SURG

## 2025-05-20 RX ORDER — NALOXONE HCL 0.4 MG/ML
0.1 VIAL (ML) INJECTION
Status: DISCONTINUED | OUTPATIENT
Start: 2025-05-20 | End: 2025-05-29 | Stop reason: HOSPADM

## 2025-05-20 RX ORDER — TRANEXAMIC ACID 10 MG/ML
1000 INJECTION, SOLUTION INTRAVENOUS ONCE
Status: DISCONTINUED | OUTPATIENT
Start: 2025-05-20 | End: 2025-05-20 | Stop reason: HOSPADM

## 2025-05-20 RX ORDER — LIDOCAINE HYDROCHLORIDE 10 MG/ML
INJECTION, SOLUTION EPIDURAL; INFILTRATION; INTRACAUDAL; PERINEURAL AS NEEDED
Status: DISCONTINUED | OUTPATIENT
Start: 2025-05-20 | End: 2025-05-20 | Stop reason: SURG

## 2025-05-20 RX ORDER — SODIUM CHLORIDE, SODIUM LACTATE, POTASSIUM CHLORIDE, CALCIUM CHLORIDE 600; 310; 30; 20 MG/100ML; MG/100ML; MG/100ML; MG/100ML
100 INJECTION, SOLUTION INTRAVENOUS CONTINUOUS
Status: DISCONTINUED | OUTPATIENT
Start: 2025-05-20 | End: 2025-05-21

## 2025-05-20 RX ORDER — ACETAMINOPHEN 500 MG
1000 TABLET ORAL EVERY 8 HOURS
Status: DISCONTINUED | OUTPATIENT
Start: 2025-05-20 | End: 2025-05-29 | Stop reason: HOSPADM

## 2025-05-20 RX ORDER — BUPIVACAINE HYDROCHLORIDE 2.5 MG/ML
INJECTION, SOLUTION EPIDURAL; INFILTRATION; INTRACAUDAL; PERINEURAL
Status: COMPLETED | OUTPATIENT
Start: 2025-05-20 | End: 2025-05-20

## 2025-05-20 RX ORDER — DIPHENHYDRAMINE HCL 25 MG
25 CAPSULE ORAL EVERY 6 HOURS PRN
Status: DISCONTINUED | OUTPATIENT
Start: 2025-05-20 | End: 2025-05-29 | Stop reason: HOSPADM

## 2025-05-20 RX ORDER — ONDANSETRON 2 MG/ML
INJECTION INTRAMUSCULAR; INTRAVENOUS AS NEEDED
Status: DISCONTINUED | OUTPATIENT
Start: 2025-05-20 | End: 2025-05-20 | Stop reason: SURG

## 2025-05-20 RX ORDER — SODIUM CHLORIDE 9 MG/ML
INJECTION, SOLUTION INTRAVENOUS
Status: DISPENSED
Start: 2025-05-20 | End: 2025-05-21

## 2025-05-20 RX ORDER — FENTANYL CITRATE 50 UG/ML
50 INJECTION, SOLUTION INTRAMUSCULAR; INTRAVENOUS
Status: DISCONTINUED | OUTPATIENT
Start: 2025-05-20 | End: 2025-05-20 | Stop reason: HOSPADM

## 2025-05-20 RX ORDER — VANCOMYCIN HYDROCHLORIDE 1 G/20ML
INJECTION, POWDER, LYOPHILIZED, FOR SOLUTION INTRAVENOUS AS NEEDED
Status: DISCONTINUED | OUTPATIENT
Start: 2025-05-20 | End: 2025-05-20 | Stop reason: HOSPADM

## 2025-05-20 RX ORDER — HYDROMORPHONE HYDROCHLORIDE 1 MG/ML
0.5 INJECTION, SOLUTION INTRAMUSCULAR; INTRAVENOUS; SUBCUTANEOUS
Status: DISCONTINUED | OUTPATIENT
Start: 2025-05-20 | End: 2025-05-29 | Stop reason: HOSPADM

## 2025-05-20 RX ORDER — OXYCODONE HYDROCHLORIDE 5 MG/1
5 TABLET ORAL EVERY 4 HOURS PRN
Status: DISCONTINUED | OUTPATIENT
Start: 2025-05-20 | End: 2025-05-29 | Stop reason: HOSPADM

## 2025-05-20 RX ORDER — DIPHENHYDRAMINE HYDROCHLORIDE 50 MG/ML
25 INJECTION, SOLUTION INTRAMUSCULAR; INTRAVENOUS EVERY 6 HOURS PRN
Status: DISCONTINUED | OUTPATIENT
Start: 2025-05-20 | End: 2025-05-29 | Stop reason: HOSPADM

## 2025-05-20 RX ORDER — OXYCODONE HYDROCHLORIDE 10 MG/1
10 TABLET ORAL EVERY 4 HOURS PRN
Status: DISCONTINUED | OUTPATIENT
Start: 2025-05-20 | End: 2025-05-29 | Stop reason: HOSPADM

## 2025-05-20 RX ORDER — TRAMADOL HYDROCHLORIDE 50 MG/1
50 TABLET ORAL EVERY 8 HOURS PRN
Status: DISPENSED | OUTPATIENT
Start: 2025-05-20 | End: 2025-05-27

## 2025-05-20 RX ORDER — ONDANSETRON 2 MG/ML
4 INJECTION INTRAMUSCULAR; INTRAVENOUS EVERY 6 HOURS PRN
Status: DISCONTINUED | OUTPATIENT
Start: 2025-05-20 | End: 2025-05-29 | Stop reason: HOSPADM

## 2025-05-20 RX ORDER — CEFAZOLIN SODIUM 1 G/3ML
INJECTION, POWDER, FOR SOLUTION INTRAMUSCULAR; INTRAVENOUS
Status: DISPENSED
Start: 2025-05-20 | End: 2025-05-21

## 2025-05-20 RX ORDER — ROPIVACAINE HYDROCHLORIDE 2 MG/ML
INJECTION, SOLUTION EPIDURAL; INFILTRATION; PERINEURAL CONTINUOUS
Status: DISCONTINUED | OUTPATIENT
Start: 2025-05-20 | End: 2025-05-29 | Stop reason: HOSPADM

## 2025-05-20 RX ORDER — ACETAMINOPHEN 160 MG
TABLET,DISINTEGRATING ORAL AS NEEDED
Status: DISCONTINUED | OUTPATIENT
Start: 2025-05-20 | End: 2025-05-20 | Stop reason: HOSPADM

## 2025-05-20 RX ORDER — EPHEDRINE SULFATE 50 MG/ML
INJECTION INTRAVENOUS AS NEEDED
Status: DISCONTINUED | OUTPATIENT
Start: 2025-05-20 | End: 2025-05-20 | Stop reason: SURG

## 2025-05-20 RX ORDER — MAGNESIUM HYDROXIDE 1200 MG/15ML
LIQUID ORAL AS NEEDED
Status: DISCONTINUED | OUTPATIENT
Start: 2025-05-20 | End: 2025-05-20 | Stop reason: HOSPADM

## 2025-05-20 RX ORDER — ROCURONIUM BROMIDE 10 MG/ML
INJECTION, SOLUTION INTRAVENOUS AS NEEDED
Status: DISCONTINUED | OUTPATIENT
Start: 2025-05-20 | End: 2025-05-20 | Stop reason: SURG

## 2025-05-20 RX ORDER — SODIUM CHLORIDE 9 MG/ML
INJECTION, SOLUTION INTRAVENOUS CONTINUOUS PRN
Status: DISCONTINUED | OUTPATIENT
Start: 2025-05-20 | End: 2025-05-20 | Stop reason: SURG

## 2025-05-20 RX ORDER — ONDANSETRON 4 MG/1
4 TABLET, ORALLY DISINTEGRATING ORAL EVERY 6 HOURS PRN
Status: DISCONTINUED | OUTPATIENT
Start: 2025-05-20 | End: 2025-05-29 | Stop reason: HOSPADM

## 2025-05-20 RX ORDER — MORPHINE SULFATE 2 MG/ML
2 INJECTION, SOLUTION INTRAMUSCULAR; INTRAVENOUS EVERY 4 HOURS PRN
Status: DISCONTINUED | OUTPATIENT
Start: 2025-05-20 | End: 2025-05-29 | Stop reason: HOSPADM

## 2025-05-20 RX ADMIN — SODIUM CHLORIDE 1000 MG: 900 INJECTION INTRAVENOUS at 06:07

## 2025-05-20 RX ADMIN — HYDROMORPHONE HYDROCHLORIDE 0.5 MG: 1 INJECTION, SOLUTION INTRAMUSCULAR; INTRAVENOUS; SUBCUTANEOUS at 19:19

## 2025-05-20 RX ADMIN — SUGAMMADEX 200 MG: 100 INJECTION, SOLUTION INTRAVENOUS at 18:41

## 2025-05-20 RX ADMIN — Medication 10 ML: at 09:57

## 2025-05-20 RX ADMIN — HYDROMORPHONE HYDROCHLORIDE 0.5 MG: 1 INJECTION, SOLUTION INTRAMUSCULAR; INTRAVENOUS; SUBCUTANEOUS at 20:07

## 2025-05-20 RX ADMIN — LIDOCAINE HYDROCHLORIDE 50 MG: 10 INJECTION, SOLUTION EPIDURAL; INFILTRATION; INTRACAUDAL; PERINEURAL at 16:51

## 2025-05-20 RX ADMIN — HYDROMORPHONE HYDROCHLORIDE 0.5 MG: 1 INJECTION, SOLUTION INTRAMUSCULAR; INTRAVENOUS; SUBCUTANEOUS at 19:54

## 2025-05-20 RX ADMIN — METOPROLOL TARTRATE 25 MG: 25 TABLET, FILM COATED ORAL at 21:16

## 2025-05-20 RX ADMIN — FENTANYL CITRATE 50 MCG: 50 INJECTION, SOLUTION INTRAMUSCULAR; INTRAVENOUS at 17:36

## 2025-05-20 RX ADMIN — OXYCODONE 5 MG: 5 TABLET ORAL at 21:16

## 2025-05-20 RX ADMIN — SODIUM CHLORIDE 125 MG: 9 INJECTION, SOLUTION INTRAVENOUS at 09:57

## 2025-05-20 RX ADMIN — ALBUMIN (HUMAN): 12.5 INJECTION, SOLUTION INTRAVENOUS at 18:31

## 2025-05-20 RX ADMIN — BUPIVACAINE HYDROCHLORIDE 30 ML: 2.5 INJECTION, SOLUTION EPIDURAL; INFILTRATION; INTRACAUDAL; PERINEURAL at 15:10

## 2025-05-20 RX ADMIN — ONDANSETRON 4 MG: 2 INJECTION INTRAMUSCULAR; INTRAVENOUS at 21:16

## 2025-05-20 RX ADMIN — SODIUM CHLORIDE: 9 INJECTION, SOLUTION INTRAVENOUS at 16:55

## 2025-05-20 RX ADMIN — HYDROMORPHONE HYDROCHLORIDE 0.5 MG: 1 INJECTION, SOLUTION INTRAMUSCULAR; INTRAVENOUS; SUBCUTANEOUS at 19:36

## 2025-05-20 RX ADMIN — ROCURONIUM BROMIDE 50 MG: 10 INJECTION INTRAVENOUS at 16:51

## 2025-05-20 RX ADMIN — PROPOFOL 80 MG: 10 INJECTION, EMULSION INTRAVENOUS at 16:51

## 2025-05-20 RX ADMIN — INSULIN LISPRO 5 UNITS: 100 INJECTION, SOLUTION INTRAVENOUS; SUBCUTANEOUS at 21:17

## 2025-05-20 RX ADMIN — PHENYLEPHRINE HYDROCHLORIDE 20 MCG/MIN: 10 INJECTION INTRAVENOUS at 17:11

## 2025-05-20 RX ADMIN — ACETAMINOPHEN 1000 MG: 500 TABLET ORAL at 21:16

## 2025-05-20 RX ADMIN — TRANEXAMIC ACID 1000 MG: 10 INJECTION, SOLUTION INTRAVENOUS at 18:20

## 2025-05-20 RX ADMIN — EPHEDRINE SULFATE 10 MG: 50 INJECTION INTRAVENOUS at 17:11

## 2025-05-20 RX ADMIN — TRANEXAMIC ACID 1000 MG: 10 INJECTION, SOLUTION INTRAVENOUS at 16:57

## 2025-05-20 RX ADMIN — ONDANSETRON 4 MG: 2 INJECTION INTRAMUSCULAR; INTRAVENOUS at 18:34

## 2025-05-20 RX ADMIN — FENTANYL CITRATE 50 MCG: 50 INJECTION, SOLUTION INTRAMUSCULAR; INTRAVENOUS at 17:31

## 2025-05-20 RX ADMIN — SODIUM CHLORIDE, POTASSIUM CHLORIDE, SODIUM LACTATE AND CALCIUM CHLORIDE 100 ML/HR: 600; 310; 30; 20 INJECTION, SOLUTION INTRAVENOUS at 22:45

## 2025-05-20 RX ADMIN — CEFAZOLIN 1000 MG: 1 INJECTION, POWDER, FOR SOLUTION INTRAMUSCULAR; INTRAVENOUS at 19:41

## 2025-05-20 RX ADMIN — FENTANYL CITRATE 100 MCG: 50 INJECTION, SOLUTION INTRAMUSCULAR; INTRAVENOUS at 15:10

## 2025-05-20 RX ADMIN — DEXAMETHASONE SODIUM PHOSPHATE 4 MG: 4 INJECTION INTRA-ARTICULAR; INTRALESIONAL; INTRAMUSCULAR; INTRAVENOUS; SOFT TISSUE at 16:59

## 2025-05-20 RX ADMIN — SODIUM CHLORIDE, POTASSIUM CHLORIDE, SODIUM LACTATE AND CALCIUM CHLORIDE: 600; 310; 30; 20 INJECTION, SOLUTION INTRAVENOUS at 16:43

## 2025-05-20 RX ADMIN — Medication 10 ML: at 21:17

## 2025-05-20 RX ADMIN — SODIUM CHLORIDE 2000 MG: 900 INJECTION INTRAVENOUS at 17:01

## 2025-05-20 RX ADMIN — Medication 1000 MG: at 19:10

## 2025-05-20 RX ADMIN — MORPHINE SULFATE 2 MG: 2 INJECTION, SOLUTION INTRAMUSCULAR; INTRAVENOUS at 06:02

## 2025-05-20 NOTE — ANESTHESIA PROCEDURE NOTES
Airway  Reason: elective    Date/Time: 5/20/2025 4:53 PM  Airway not difficult    General Information and Staff    Patient location during procedure: OR  CRNA/CAA: Rony Eagle CRNA  SRNA: Faisal Guerin SRNA  Indications and Patient Condition  Indications for airway management: airway protection    Preoxygenated: yes  MILS not maintained throughout    Mask difficulty assessment: 2 - vent by mask + OA or adjuvant +/- NMBA    Final Airway Details    Final airway type: endotracheal airway      Successful airway: ETT  Cuffed: yes   Successful intubation technique: video laryngoscopy  Endotracheal tube insertion site: oral  Blade: Freeman  Blade size: 3  ETT size (mm): 7.0  Cormack-Lehane Classification: grade I - full view of glottis  Placement verified by: chest auscultation and capnometry   Cuff volume (mL): 8  Measured from: lips  ETT/EBT  to lips (cm): 20  Number of attempts at approach: 1  Assessment: lips, teeth, and gum same as pre-op and atraumatic intubation    Additional Comments  Negative epigastric sounds, Breath sound equal bilaterally with symmetric chest rise and fall

## 2025-05-20 NOTE — DISCHARGE INSTRUCTIONS
INCISION CARE incisional wound VAC hip and Knee:  You have a sterile incisional wound VAC dressing in place. Only exchange if it becomes saturated (fluid draining out the sides of dressing) and notify the office. The dressing is water resistant. During the first 7 days after surgery, it is ok to shower. DO NOT scrub on or around the dressing. Do not submerge the dressing.  After 7 days, the incisional wound VAC will turn off and lose its suction you can remove your dressing.  You will have been given a fresh dressing to cover the wound with until your follow-up at 2 weeks. you will have staples or sutures in place on your skin. It is OK to shower with the new dressing in place. DO NOT scrub on or around the incision. DO NOT submerge the incision in pools, baths, or hot tubs for 1 month after surgery. Do not touch or pick at the incision. If you have any drainage from the incision after 7 days, cover the incision with sterile gauze and paper tape and alert the office.   Staples will be removed between 10-14 days postoperation.  This may be done by either the home health nurse, rehabilitation nurse, or during your return visit to Dr. Mcclain's office.  No creams or ointments to the incision for 1 month after surgery. After 1 month, it is recommended to massage the scar with vitamin E cream to help decrease scar formation.  Check incision every day and notify surgeon immediately if any of the following signs or symptoms are noted:  Increase in redness  Increase in swelling around the incision and of the entire extremity  Increase in pain  Drainage oozing from the incision  Pulling apart of the edges of the incision  Increase in overall body temperature (greater than 100.5 degrees)    Anticoagulants: You will be discharged on an anticoagulant. This is a prophylactic medication that helps prevent blood clots during your post-operative period. Most patients will be on *** Aspirin 81 mg Enteric coated every 12 hours orally  for 30 days. Some patients, due to increased risk factors, will need to be on a stronger anticoagulant. Dr. Mcclain will discuss this need with you.   While taking the anticoagulant, you should avoid taking any additional aspirin, and limit ibuprofen (Advil or Motrin), Aleve (Naprosyn) or other non-steroidal anti-inflammatory medications.   Notify your surgeon immediately if any gabe bleeding is noted in the urine, stool, vomit, or from the nose or the incision. Blood in the stool will often appear as black rather than red. Blood in urine may appear as pink. Blood in vomit may appear as brown/black like coffee grounds.  You will need to apply pressure for longer periods of time to any cuts or abrasions to stop bleeding  Avoid alcohol while taking anticoagulants  Sequential Compression Device: You maybe be discharged home with a compression device that helps promote blood flow and prevent clots in your legs. Wear these at all times for the first two weeks.   Mobilization: The best way to avoid a blood clot is to get up and walk. 10 times a day get up and walk for 5 minutes for the first two weeks. Walking for longer periods of time will increase pain and swelling, making therapy more difficult. If taking any long travel (car or plane) in the first 1-2 months, be sure to get up and walk at least every one hour.     Stool Softeners: You will be at greater risk of constipation after surgery because of being less mobile and taking the pain medications.   Take stool softeners as instructed by your surgeon while on pain medications. Use over the counter Colace 100 mg 1-2 capsules twice daily.   If stools become too loose or too frequent, decreases the dosage or stop the stool softener.  If constipation occurs despite use of stool softeners, you are to continue the stool softeners and add a laxative (Milk of Magnesia 1 ounce daily as needed).  Dulcolax oral tabs or suppository or a fleets enema can also be utilized for  constipation and can be obtained over the counter.   If above interventions are unsuccessful in inducing bowel movements, please contact your family physician's office/surgeon's office.  Drink plenty of fluids and eat fruits and vegetables during your recovery time    Pain Medications utilized after surgery are narcotics and the law requires that the following information be given to all patients that are prescribed narcotics:  CLASSIFICATION: Pain medications are called Opioids and are narcotics  LEGALITIES: It is illegal to share narcotics with others and to drive within 24 hours of taking narcotics  POTENTIAL SIDE EFFECTS: Potential side effects of opioids include: nausea, vomiting, itching, dizziness, drowsiness, dry mouth, constipation, and difficulty urinating.  POTENTIAL ADVERSE EFFECTS:   Opioid tolerance can develop with use of pain medications and this simply means that it requires more and more of the medication to control pain; however, this is seen more in patients that use Opioids for longer periods of time.  Opioid dependence can develop with use of Opioids and this simply means that to stop the medication can cause withdrawal symptoms; however, this is seen with patients that use Opioids for longer periods of time.  Opioid addiction can develop with use of Opioids and the incidence of this is very unlikely in patients who take the medications as ordered and stop the medications as instructed.  Opioid overdose can be dangerous, but is unlikely when the medication is taken as ordered and stopped when ordered. It is important not to mix opioids with alcohol or with any type of sedative, such as Benadryl, as this can lead to over sedation and respiratory difficulty.  DOSAGE:   Pain medications may be needed consistently for the first week to decrease pain and promote adequate pain relief and participation in physical therapy.  After the initial surgical pain begins to resolve, you may begin to decrease  the pain medication and only take it as needed. By the end of 6 weeks, you should be off of pain medications.  You can decrease your pain medication consumption by slowly spacing out the time in between the medication, and using 650mg Tylenol when the pain is not as severe. Do not exceed 3500mg of Tylenol in 24 hours.   Refills will not be given by the office during evening hours, on weekends, or after 6 weeks post-op.  To seek refills on pain medications during the initial 6 week post-operative period, you must call the office 48 hours in advance to request the refill. The office will then notify you when to  the prescription. DO NOT wait until you are out of the medication to request a refill.

## 2025-05-20 NOTE — ANESTHESIA POSTPROCEDURE EVALUATION
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        Post Anesthesia Care and Evaluation    Patient location during evaluation: PACU  Patient participation: complete - patient participated  Level of consciousness: awake and alert  Pain management: adequate    Airway patency: patent  Anesthetic complications: No anesthetic complications  PONV Status: none  Cardiovascular status: hemodynamically stable and acceptable  Respiratory status: nonlabored ventilation, acceptable and nasal cannula  Hydration status: acceptable

## 2025-05-20 NOTE — PAYOR COMM NOTE
"Ref# [id number]   Clinical Update    ANGEL Callahan, RN  Utilization Review  Phone [phone]  Fax [phone]    [place] [id number]         Elisa Venegas (89 y.o. Female)       Date of Birth   1935    Social Security Number       Address   [address] [id number]    Home Phone   [phone]    MRN   [id number]       Sikhism   Denominational    Marital Status                               Admission Date   5/17/2025    Admission Type   Emergency    Admitting Provider   Diya Garcia DO    Attending Provider   Diya Garcia DO    Department, Room/Bed   [facility] OR/MAIN OR       Discharge Date       Discharge Disposition       Discharge Destination                                 Attending Provider: Diya Garcia DO    Allergies: Aspirin    Isolation: None   Infection: None   Code Status: CPR    Ht: 160 cm (63\")   Wt: 56.9 kg (125 lb 6.4 oz)    Admission Cmt: None   Principal Problem: Femur fracture [S72.90XA]                   Active Insurance as of 5/17/2025       Primary Coverage       Payor Plan Insurance Group Employer/Plan Group    ANTHEM MEDICARE REPLACEMENT ANTH MEDICARE ADVANTAGE HMO [id number]       Payor Plan Address Payor Plan Phone Number Payor Plan Fax Number Effective Dates    [address] [phone]  1/1/2025 - None Entered    [facility] [address]         Subscriber Name Subscriber Birth Date Member ID       ELISA VENEGAS 1935 [id number]                     Emergency Contacts        (Rel.) Home Phone Work Phone Mobile Phone    Joe Venegas (Son) -- -- [phone]    Albin Venegas (Son) [phone] -- --    DORIS VENEGAS (Relative) -- -- [phone]              Current Facility-Administered Medications   Medication Dose Route Frequency Provider Last Rate Last Admin    [Transfer Hold] acetaminophen (TYLENOL) tablet 650 mg "  650 mg Oral Q4H PRN Queenie Cabezas APRN        Or    [Transfer Hold] acetaminophen (TYLENOL) 160 MG/5ML oral solution 650 mg  650 mg Oral Q4H PRN Queenie Cabezas APRN        Or    [Transfer Hold] acetaminophen (TYLENOL) suppository 650 mg  650 mg Rectal Q4H PRN Queenie Cabezas APRN        amiodarone (PACERONE) tablet 200 mg  200 mg Oral Daily Queenie Cabezas APRN   200 mg at 05/19/25 0848    [Held by provider] apixaban (ELIQUIS) tablet 2.5 mg  2.5 mg Oral BID Queenie Cabezas APRN        [Transfer Hold] sennosides-docusate (PERICOLACE) 8.6-50 MG per tablet 2 tablet  2 tablet Oral BID PRN Queenie Cabezas APRN   2 tablet at 05/19/25 2032    And    [Transfer Hold] polyethylene glycol (MIRALAX) packet 17 g  17 g Oral Daily PRN Queenie Cabezas APRN   17 g at 05/19/25 1757    And    [Transfer Hold] bisacodyl (DULCOLAX) EC tablet 5 mg  5 mg Oral Daily PRN Queenie Cabezas APRN   5 mg at 05/19/25 0848    And    [Transfer Hold] bisacodyl (DULCOLAX) suppository 10 mg  10 mg Rectal Daily PRN Queenie Cabezas APRN        [Transfer Hold] Calcium Replacement - Follow Nurse / BPA Driven Protocol   Not Applicable PRN Queenie Cabezas APRN        ceFAZolin 2000 mg IVPB in 100 mL NS (MBP)  2,000 mg Intravenous Once Albin Mcclain MD        cefTRIAXone (ROCEPHIN) 1,000 mg in sodium chloride 0.9 % 100 mL MBP  1,000 mg Intravenous Q24H Julio Leiva  mL/hr at 05/20/25 0607 1,000 mg at 05/20/25 0607    [Transfer Hold] dextrose (D50W) (25 g/50 mL) IV injection 25 g  25 g Intravenous Q15 Min PRN Julio Leiva MD        [Transfer Hold] dextrose (GLUTOSE) oral gel 15 g  15 g Oral Q15 Min PRN Julio Leiva MD        [Transfer Hold] glucagon (GLUCAGEN) injection 1 mg  1 mg Intramuscular Q15 Min PRN Julio Leiva MD        [Transfer Hold] Insulin Lispro (humaLOG) injection 2-7 Units  2-7 Units Subcutaneous 4x Daily AC & at Bedtime Julio Leiva MD   4 Units at 05/19/25 2033    [Transfer Hold] levothyroxine (SYNTHROID, LEVOTHROID) tablet 100  mcg  100 mcg Oral Q AM Queenie Cabezas APRN   100 mcg at 05/19/25 0538    lisinopril (PRINIVIL,ZESTRIL) tablet 5 mg  5 mg Oral Daily Queenie Cabezas APRN        [Transfer Hold] Magnesium Standard Dose Replacement - Follow Nurse / BPA Driven Protocol   Not Applicable PRN Queenie Cabezas APRN        [Transfer Hold] melatonin tablet 5 mg  5 mg Oral Nightly PRN Queenie Cabezas APRN   5 mg at 05/17/25 2350    metoprolol tartrate (LOPRESSOR) tablet 25 mg  25 mg Oral BID Queenie Cabezas APRN   25 mg at 05/17/25 2350    [Transfer Hold] morphine injection 2 mg  2 mg Intravenous Q4H PRN Tim Guajardo MD   2 mg at 05/20/25 0602    [Transfer Hold] nitroglycerin (NITROSTAT) SL tablet 0.4 mg  0.4 mg Sublingual Q5 Min PRN Queenie Cabezas APRN        [Transfer Hold] pantoprazole (PROTONIX) EC tablet 40 mg  40 mg Oral Q AM Julio Leiva MD   40 mg at 05/19/25 0538    [Transfer Hold] Phosphorus Replacement - Follow Nurse / BPA Driven Protocol   Not Applicable PRN Queenie Cabezas APRN        Potassium Replacement - Follow Nurse / BPA Driven Protocol   Not Applicable PRN Queenie Cabezas APRN        ropivacaine (NAROPIN) 0.2 % infusion (INFUSYSTEM)   Peripheral Nerve Continuous Aniket Keller CRNA        [Transfer Hold] sodium chloride 0.9 % flush 10 mL  10 mL Intravenous Q12H Queenie Cabezas APRN   10 mL at 05/20/25 0957    [Transfer Hold] sodium chloride 0.9 % flush 10 mL  10 mL Intravenous PRN Queenie Cabezas APRN        tranexamic acid 1000 mg in 100 mL 0.7% NaCl infusion (premix)  1,000 mg Intravenous Once Albin Mcclain MD        tranexamic acid 1000 mg in 100 mL 0.7% NaCl infusion (premix)  1,000 mg Intravenous Once Albin Mcclain MD         Lab Results (last 48 hours)       Procedure Component Value Units Date/Time    POC Glucose Once [[id number]]  (Abnormal) Collected: 05/20/25 1055    Specimen: Blood Updated: 05/20/25 1056     Glucose 138 mg/dL     Magnesium [[id number]]  (Normal) Collected: 05/20/25 0824    Specimen: Blood Updated: 05/20/25 1028      Magnesium 2.3 mg/dL     Basic Metabolic Panel [[id number]]  (Abnormal) Collected: 05/20/25 0824    Specimen: Blood Updated: 05/20/25 1024     Glucose 131 mg/dL      BUN 14 mg/dL      Creatinine 0.96 mg/dL      Sodium 135 mmol/L      Potassium 4.1 mmol/L      Chloride 101 mmol/L      CO2 23.0 mmol/L      Calcium 8.0 mg/dL      BUN/Creatinine Ratio 14.6     Anion Gap 11.0 mmol/L      eGFR 57.0 mL/min/1.73     Narrative:      GFR Categories in Chronic Kidney Disease (CKD)              GFR Category          GFR (mL/min/1.73)    Interpretation  G1                    90 or greater        Normal or high (1)  G2                    60-89                Mild decrease (1)  G3a                   45-59                Mild to moderate decrease  G3b                   30-44                Moderate to severe decrease  G4                    15-29                Severe decrease  G5                    14 or less           Kidney failure    (1)In the absence of evidence of kidney disease, neither GFR category G1 or G2 fulfill the criteria for CKD.    eGFR calculation 2021 CKD-EPI creatinine equation, which does not include race as a factor    CBC (No Diff) [[id number]]  (Abnormal) Collected: 05/20/25 0824    Specimen: Blood Updated: 05/20/25 1018     WBC 1.09 10*3/mm3      RBC 2.64 10*6/mm3      Hemoglobin 7.7 g/dL      Hematocrit 24.1 %      MCV 91.3 fL      MCH 29.2 pg      MCHC 32.0 g/dL      RDW 14.7 %      RDW-SD 49.3 fl      MPV 9.5 fL      Platelets 46 10*3/mm3     Urine Culture - Urine, Indwelling Urethral Catheter [[id number]]  (Abnormal) Collected: 05/17/25 2140    Specimen: Urine from Indwelling Urethral Catheter Updated: 05/20/25 0939     Urine Culture >100,000 CFU/mL Gram Negative Bacilli    Narrative:      Colonization of the urinary tract without infection is common. Treatment is discouraged unless the patient is symptomatic, pregnant, or undergoing an invasive urologic procedure.  ID/JUAN F's to follow    POC Glucose Once  [[id number]]  (Abnormal) Collected: 05/20/25 0706    Specimen: Blood Updated: 05/20/25 0707     Glucose 142 mg/dL     POC Glucose Once [[id number]]  (Abnormal) Collected: 05/19/25 2018    Specimen: Blood Updated: 05/19/25 2019     Glucose 278 mg/dL     POC Glucose Once [[id number]]  (Abnormal) Collected: 05/19/25 1629    Specimen: Blood Updated: 05/19/25 1631     Glucose 253 mg/dL     POC Glucose Once [[id number]]  (Abnormal) Collected: 05/19/25 1129    Specimen: Blood Updated: 05/19/25 1138     Glucose 215 mg/dL     POC Glucose Once [[id number]]  (Abnormal) Collected: 05/19/25 0717    Specimen: Blood Updated: 05/19/25 0733     Glucose 156 mg/dL     CBC (No Diff) [[id number]]  (Abnormal) Collected: 05/19/25 0544    Specimen: Blood Updated: 05/19/25 0709     WBC 1.52 10*3/mm3      RBC 2.70 10*6/mm3      Hemoglobin 7.9 g/dL      Hematocrit 24.3 %      MCV 90.0 fL      MCH 29.3 pg      MCHC 32.5 g/dL      RDW 14.9 %      RDW-SD 48.8 fl      MPV 9.3 fL      Platelets 49 10*3/mm3     Magnesium [[id number]]  (Abnormal) Collected: 05/19/25 0544    Specimen: Blood Updated: 05/19/25 0704     Magnesium 3.2 mg/dL     Basic Metabolic Panel [[id number]]  (Abnormal) Collected: 05/19/25 0544    Specimen: Blood Updated: 05/19/25 0704     Glucose 151 mg/dL      BUN 18 mg/dL      Creatinine 0.99 mg/dL      Sodium 135 mmol/L      Potassium 4.2 mmol/L      Chloride 99 mmol/L      CO2 26.0 mmol/L      Calcium 8.3 mg/dL      BUN/Creatinine Ratio 18.2     Anion Gap 10.0 mmol/L      eGFR 55.0 mL/min/1.73     Narrative:      GFR Categories in Chronic Kidney Disease (CKD)              GFR Category          GFR (mL/min/1.73)    Interpretation  G1                    90 or greater        Normal or high (1)  G2                    60-89                Mild decrease (1)  G3a                   45-59                Mild to moderate decrease  G3b                   30-44                Moderate to severe decrease  G4                    15-29                 Severe decrease  G5                    14 or less           Kidney failure    (1)In the absence of evidence of kidney disease, neither GFR category G1 or G2 fulfill the criteria for CKD.    eGFR calculation 2021 CKD-EPI creatinine equation, which does not include race as a factor    POC Glucose Once [[id number]]  (Abnormal) Collected: 05/18/25 2003    Specimen: Blood Updated: 05/18/25 2004     Glucose 238 mg/dL     Comprehensive Metabolic Panel [[id number]]  (Abnormal) Collected: 05/18/25 1711    Specimen: Blood Updated: 05/18/25 1835     Glucose 182 mg/dL      BUN 20 mg/dL      Creatinine 1.04 mg/dL      Sodium 133 mmol/L      Potassium 4.2 mmol/L      Comment: Slight hemolysis detected by analyzer. Result may be falsely elevated.        Chloride 99 mmol/L      CO2 23.0 mmol/L      Calcium 7.9 mg/dL      Total Protein 5.5 g/dL      Albumin 3.3 g/dL      ALT (SGPT) 9 U/L      AST (SGOT) 12 U/L      Alkaline Phosphatase 87 U/L      Total Bilirubin 0.3 mg/dL      Globulin 2.2 gm/dL      Comment: Calculated Result        A/G Ratio 1.5 g/dL      BUN/Creatinine Ratio 19.2     Anion Gap 11.0 mmol/L      eGFR 51.8 mL/min/1.73     Narrative:      GFR Categories in Chronic Kidney Disease (CKD)              GFR Category          GFR (mL/min/1.73)    Interpretation  G1                    90 or greater        Normal or high (1)  G2                    60-89                Mild decrease (1)  G3a                   45-59                Mild to moderate decrease  G3b                   30-44                Moderate to severe decrease  G4                    15-29                Severe decrease  G5                    14 or less           Kidney failure    (1)In the absence of evidence of kidney disease, neither GFR category G1 or G2 fulfill the criteria for CKD.    eGFR calculation 2021 CKD-EPI creatinine equation, which does not include race as a factor    Magnesium [[id number]]  (Abnormal) Collected: 05/18/25 1711    Specimen: Blood  Updated: 25 1835     Magnesium 1.2 mg/dL     Protime-INR [[id number]]  (Abnormal) Collected: 25    Specimen: Blood Updated: 25 1823     Protime 16.0 Seconds      INR 1.21    CBC Auto Differential [[id number]]  (Abnormal) Collected: 25 171    Specimen: Blood Updated: 25 1814     WBC 1.53 10*3/mm3      RBC 2.70 10*6/mm3      Hemoglobin 7.9 g/dL      Hematocrit 24.1 %      MCV 89.3 fL      MCH 29.3 pg      MCHC 32.8 g/dL      RDW 14.8 %      RDW-SD 48.4 fl      MPV 9.6 fL      Platelets 48 10*3/mm3      Neutrophil % 62.1 %      Lymphocyte % 19.6 %      Monocyte % 15.0 %      Eosinophil % 0.7 %      Basophil % 0.0 %      Immature Grans % 2.6 %      Neutrophils, Absolute 0.95 10*3/mm3      Lymphocytes, Absolute 0.30 10*3/mm3      Monocytes, Absolute 0.23 10*3/mm3      Eosinophils, Absolute 0.01 10*3/mm3      Basophils, Absolute 0.00 10*3/mm3      Immature Grans, Absolute 0.04 10*3/mm3      nRBC 0.0 /100 WBC     POC Glucose Once [[id number]]  (Abnormal) Collected: 25 164    Specimen: Blood Updated: 25 1643     Glucose 197 mg/dL           Imaging Results (Last 48 Hours)       ** No results found for the last 48 hours. **             Physician Progress Notes (last 48 hours)        Diya Garcia DO at 25 0737              [facility] Medicine Services  PROGRESS NOTE    Patient Name: Chey Robertson  : 1935  MRN: [id number]    Date of Admission: 2025  Primary Care Physician: Yasmeen Loomis MD    Subjective   Subjective     CC:  Periprosthetic left distal femur fracture     HPI:  Waiting on surgery today, platelets low so transfusion ordered      Objective   Objective     Vital Signs:   Temp:  [98.1 °F (36.7 °C)-98.9 °F (37.2 °C)] 98.8 °F (37.1 °C)  Heart Rate:  [67-75] 69  Resp:  [16] 16  BP: (116-153)/(48-66) 153/66     Physical Exam:  Constitutional: No acute distress, awake, alert  HENT: NCAT, mucous membranes  moist  Respiratory: Respiratory effort normal   Cardiovascular: RRR, no murmurs, rubs, or gallops  Gastrointestinal: Soft, nontender, nondistended  Musculoskeletal: No bilateral ankle edema  Psychiatric: Appropriate affect, cooperative  Neurologic: Oriented x 2-3, speech clear  Skin: No rashes      Results Reviewed:  LAB RESULTS:      Lab 05/20/25  0824 05/19/25  0544 05/18/25  1711 05/17/25 2149 05/17/25 2008   WBC 1.09* 1.52* 1.53*  --  1.76*   HEMOGLOBIN 7.7* 7.9* 7.9*  --  8.1*   HEMATOCRIT 24.1* 24.3* 24.1*  --  24.3*   PLATELETS 46* 49* 48*  --  57*   NEUTROS ABS  --   --  0.95*  --  1.18*   IMMATURE GRANS (ABS)  --   --  0.04  --  0.02   LYMPHS ABS  --   --  0.30*  --  0.38*   MONOS ABS  --   --  0.23  --  0.18   EOS ABS  --   --  0.01  --  0.00   MCV 91.3 90.0 89.3  --  88.7   PROTIME  --   --  16.0*  --   --    HSTROP T  --   --   --  21* 21*         Lab 05/20/25  0824 05/19/25  0544 05/18/25 1711 05/17/25 2008   SODIUM 135* 135* 133* 134*   POTASSIUM 4.1 4.2 4.2 4.2   CHLORIDE 101 99 99 97*   CO2 23.0 26.0 23.0 23.0   ANION GAP 11.0 10.0 11.0 14.0   BUN 14 18 20 30*   CREATININE 0.96 0.99 1.04* 1.22*   EGFR 57.0* 55.0* 51.8* 42.8*   GLUCOSE 131* 151* 182* 286*   CALCIUM 8.0* 8.3* 7.9* 8.3*   MAGNESIUM 2.3 3.2* 1.2*  --    HEMOGLOBIN A1C  --   --   --  6.20*         Lab 05/18/25 1711 05/17/25 2008   TOTAL PROTEIN 5.5* 6.0   ALBUMIN 3.3* 3.8   GLOBULIN 2.2 2.2   ALT (SGPT) 9 10   AST (SGOT) 12 10   BILIRUBIN 0.3 0.3   ALK PHOS 87 92         Lab 05/18/25  1711 05/17/25 2149 05/17/25 2008   HSTROP T  --  21* 21*   PROTIME 16.0*  --   --    INR 1.21*  --   --              Lab 05/18/25 1944 05/17/25 2149   IRON  --  40   IRON SATURATION (TSAT)  --  14*   TIBC  --  282*   TRANSFERRIN  --  189*   ABO TYPING O  --    RH TYPING Negative  --    ANTIBODY SCREEN Positive  --          Brief Urine Lab Results  (Last result in the past 365 days)        Color   Clarity   Blood   Leuk Est   Nitrite   Protein    CREAT   Urine HCG        05/17/25 2140 Yellow   Turbid   Negative   Large (3+)   Negative   30 mg/dL (1+)                   Microbiology Results Abnormal       Procedure Component Value - Date/Time    Urine Culture - Urine, Indwelling Urethral Catheter [[id number]]  (Abnormal) Collected: 05/17/25 2140    Lab Status: Preliminary result Specimen: Urine from Indwelling Urethral Catheter Updated: 05/20/25 0939     Urine Culture >100,000 CFU/mL Gram Negative Bacilli    Narrative:      Colonization of the urinary tract without infection is common. Treatment is discouraged unless the patient is symptomatic, pregnant, or undergoing an invasive urologic procedure.  ID/JUAN F's to follow            No radiology results from the last 24 hrs    Results for orders placed during the hospital encounter of 06/05/23    Adult Transthoracic Echo Complete w/ Color, Spectral and Contrast if necessary per protocol    Interpretation Summary    Left ventricular systolic function is normal. Left ventricular ejection fraction appears to be 56 - 60%.    Left ventricular diastolic function was normal.    Estimated right ventricular systolic pressure from tricuspid regurgitation is normal (<35 mmHg).      Current medications:  Scheduled Meds:amiodarone, 200 mg, Oral, Daily  [Held by provider] apixaban, 2.5 mg, Oral, BID  cefTRIAXone, 1,000 mg, Intravenous, Q24H  insulin lispro, 2-7 Units, Subcutaneous, 4x Daily AC & at Bedtime  levothyroxine, 100 mcg, Oral, Q AM  lisinopril, 5 mg, Oral, Daily  metoprolol tartrate, 25 mg, Oral, BID  pantoprazole, 40 mg, Oral, Q AM  sodium chloride, 10 mL, Intravenous, Q12H      Continuous Infusions:   PRN Meds:.  acetaminophen **OR** acetaminophen **OR** acetaminophen    senna-docusate sodium **AND** polyethylene glycol **AND** bisacodyl **AND** bisacodyl    Calcium Replacement - Follow Nurse / BPA Driven Protocol    dextrose    dextrose    glucagon (human recombinant)    Magnesium Standard Dose Replacement - Follow  Nurse / BPA Driven Protocol    melatonin    Morphine    nitroglycerin    Phosphorus Replacement - Follow Nurse / BPA Driven Protocol    Potassium Replacement - Follow Nurse / BPA Driven Protocol    sodium chloride    Assessment & Plan   Assessment & Plan     Active Hospital Problems    Diagnosis  POA    **Femur fracture [S72.90XA]  Yes    Type 2 diabetes mellitus with complication, with long-term current use of insulin [E11.8, Z79.4]  Not Applicable    Periprosthetic fracture around prosthetic knee [M97.8XXA, Z96.659]  Not Applicable    Falls [R29.6]  Not Applicable    Atrial fibrillation [I48.91]  Yes    Chronic anticoagulation [Z79.01]  Not Applicable    Essential hypertension [I10]  Yes    Hypothyroidism (acquired) [E03.9]  Yes      Resolved Hospital Problems   No resolved problems to display.        Brief Hospital Course to date:  Chey Robertson is a 89 y.o. female with history of afib (on eliquis), htn, dm2, hypothyroidism, MDS (w/ pancytopenia, followed by Dr. Lyman), previous PE (on eliquis), previous left knee replacement.   Presented to [facility] ED w/ left knee pain after sustaining a fall at her SNF.   CT imaging revealed left distal femoral impaction fracture.        Left supracondylar fracture of the distal femur.   Hx previous left knee replacement  -Orthopedic surgery evaluated, requires distal femoral replacement.   Due to magnitude of surgery ortho would like to hold eliquis ~72 hours prior to surgery, plan for 5/20  -pt/ot evals      Parox afib (currently in sinus)  Chronic HFpEF  HTN  -echo 6/2023: LV EF 56-60%, valves ok  - Continue amiodarone & metoprolol, lisinopril  - eliquis on hold for surgery     Bacteria in urine  -treating w/ rocephin since immunocompromised and has hardware in place  - Continue rocephin for 5 days  - Urine culture pending but gram-negative bacilli**         Hx PE (perioperative after previous knee replacement)  -eliquis on hold, restart when ok w/ orthopedic surgery      MDS  Pancytopenia  Iron deficiency  -follows w/ Dr. Lyman (last seen 3/3/25)  - Recommendation continue replace iv iron if iron sat <10%   planned holding/stopping anticoagulation if plts dropped below 30,000 or evidence bleeding    -typical plts range 40,000-60,000    -Transfused platelets   -Completed 3 days of IV iron, last dose        DM2  -A1c 6.2  -on januvia and metformin  - Continue ssi     Hypothyroidism  -cont levothyroxine     Goals/limits  - Partner discussed w/ patient and daughter, patient is Full Code (s/p long discussion)      Expected Discharge Location and Transportation: Pending postop course  Expected Discharge   Expected Discharge Date: 2025; Expected Discharge Time:      VTE Prophylaxis:  Pharmacologic & mechanical VTE prophylaxis orders are present.         AM-PAC 6 Clicks Score (PT): 8 (25)    CODE STATUS:   Code Status and Medical Interventions: CPR (Attempt to Resuscitate); Full Support   Ordered at: 256     Code Status (Patient has no pulse and is not breathing):    CPR (Attempt to Resuscitate)     Medical Interventions (Patient has pulse or is breathing):    Full Support     Level Of Support Discussed With:    Patient       Diya Gilles Garcia DO  25        Electronically signed by Diya Garcia DO at 25 1217       Julio Leiva MD at 25 1300              [facility] Medicine Services  PROGRESS NOTE    Patient Name: Chey Robertson  : 1935  MRN: [id number]    Date of Admission: 2025  Primary Care Physician: Yasmeen Loomis MD    Subjective   Subjective     CC:  Periprosthetic left distal femur fracture    HPI:  Pain reasonably controlled, no chest pain, no dyspnea. No n/v/d      Objective   Objective     Vital Signs:   Temp:  [97.9 °F (36.6 °C)-98.8 °F (37.1 °C)] 98.8 °F (37.1 °C)  Heart Rate:  [51-63] 61  Resp:  [16-18] 16  BP: (113-150)/(44-56) 143/53     Physical  Exam:  Constitutional:Alert, oriented x 3, nontoxic appearing, elerly & frail, normal work of breathing  Psych:Normal/appropriate affect  HEENT:NCAT, oropharynx clear  Neck: neck supple, full range of motion  Neuro: Face symmetric, speech clear, equal , moves all extremities  Cardiac:rrr, murmur  Resp: ctab  GI: abd soft, nontender  Skin: No extremity rash  Musculoskeletal/extremities: no cyanosis of extremities; no significant ankle edema        Results Reviewed:  LAB RESULTS:      Lab 05/19/25  0544 05/18/25 1711 05/17/25 2149 05/17/25 2008   WBC 1.52* 1.53*  --  1.76*   HEMOGLOBIN 7.9* 7.9*  --  8.1*   HEMATOCRIT 24.3* 24.1*  --  24.3*   PLATELETS 49* 48*  --  57*   NEUTROS ABS  --  0.95*  --  1.18*   IMMATURE GRANS (ABS)  --  0.04  --  0.02   LYMPHS ABS  --  0.30*  --  0.38*   MONOS ABS  --  0.23  --  0.18   EOS ABS  --  0.01  --  0.00   MCV 90.0 89.3  --  88.7   PROTIME  --  16.0*  --   --    HSTROP T  --   --  21* 21*         Lab 05/19/25 0544 05/18/25 1711 05/17/25 2008   SODIUM 135* 133* 134*   POTASSIUM 4.2 4.2 4.2   CHLORIDE 99 99 97*   CO2 26.0 23.0 23.0   ANION GAP 10.0 11.0 14.0   BUN 18 20 30*   CREATININE 0.99 1.04* 1.22*   EGFR 55.0* 51.8* 42.8*   GLUCOSE 151* 182* 286*   CALCIUM 8.3* 7.9* 8.3*   MAGNESIUM 3.2* 1.2*  --    HEMOGLOBIN A1C  --   --  6.20*         Lab 05/18/25 1711 05/17/25 2008   TOTAL PROTEIN 5.5* 6.0   ALBUMIN 3.3* 3.8   GLOBULIN 2.2 2.2   ALT (SGPT) 9 10   AST (SGOT) 12 10   BILIRUBIN 0.3 0.3   ALK PHOS 87 92         Lab 05/18/25  1711 05/17/25 2149 05/17/25 2008   HSTROP T  --  21* 21*   PROTIME 16.0*  --   --    INR 1.21*  --   --              Lab 05/18/25 1944 05/17/25 2149   IRON  --  40   IRON SATURATION (TSAT)  --  14*   TIBC  --  282*   TRANSFERRIN  --  189*   ABO TYPING O  --    RH TYPING Negative  --    ANTIBODY SCREEN Positive  --          Brief Urine Lab Results  (Last result in the past 365 days)        Color   Clarity   Blood   Leuk Est   Nitrite    Protein   CREAT   Urine HCG        05/17/25 2140 Yellow   Turbid   Negative   Large (3+)   Negative   30 mg/dL (1+)                   Microbiology Results Abnormal       Procedure Component Value - Date/Time    Urine Culture - Urine, Indwelling Urethral Catheter [[id number]]  (Abnormal) Collected: 05/17/25 2140    Lab Status: Preliminary result Specimen: Urine from Indwelling Urethral Catheter Updated: 05/19/25 1027     Urine Culture >100,000 CFU/mL Gram Negative Bacilli    Narrative:      Colonization of the urinary tract without infection is common. Treatment is discouraged unless the patient is symptomatic, pregnant, or undergoing an invasive urologic procedure.            CT Lower Extremity Left Without Contrast  Result Date: 5/17/2025  CT LOWER EXTREMITY LEFT WO CONTRAST Date of Exam: 5/17/2025 9:02 PM EDT Indication: Evaluate suspected periprosthetic fracture. Comparison: 5/17/2025 radiographs. Technique: Axial CT images were obtained of the left lower extremity without contrast administration.  Reconstructed coronal and sagittal images were also obtained. Automated exposure control and iterative construction methods were used. Findings: Motion artifact combined with streak artifact does limit evaluation, particularly the fine anatomic detail. The images do seem to confirm the presence of an impaction fracture at the distal femoral metaphysis. Specific details are precluded by motion artifact and streak artifact, but there does appear to be cortical offset at the junction of the femoral metaphysis and epiphysis both medially and laterally particularly at the posterior aspect of the distal femur. The prosthetic components themselves appear intact. The proximal tibia and fibula appear intact. The patella is resurfaced posteriorly without fracture. There is confirmation of a large volume of lipohemarthrosis primarily in the suprapatellar recess. No dislocation.     Impression: Impression: 1.Limited study due to  motion artifact and streak artifact. 2.There is confirmation of a impaction fracture at the distal femoral metaphysis with a large volume of lipohemarthrosis. Electronically Signed: Hector Rosenberg MD  5/17/2025 10:01 PM EDT  Workstation ID: [id number]    XR Knee 1 or 2 View Left  Result Date: 5/17/2025  XR KNEE 1 OR 2 VW LEFT Date of Exam: 5/17/2025 7:12 PM EDT Indication: trauma Comparison: Correlation with right knee radiographs 10/19/2024. Findings: There is a total left knee prosthesis in place. The bones are demineralized. There is a large volume of lipohemarthrosis. There is a suspected impaction fracture in the distal femoral metaphyseal region. Prosthetic components appear intact. The visualized tibia and fibula appear intact. Patella appears intact with posterior resurfacing.     Impression: Impression: 1.Suspected impaction fracture in the distal femoral metaphyseal region with large volume of lipohemarthrosis. 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 3.Osteopenia. Electronically Signed: Hector Rosenberg MD  5/17/2025 7:49 PM EDT  Workstation ID: [id number]      Results for orders placed during the hospital encounter of 06/05/23    Adult Transthoracic Echo Complete w/ Color, Spectral and Contrast if necessary per protocol    Interpretation Summary    Left ventricular systolic function is normal. Left ventricular ejection fraction appears to be 56 - 60%.    Left ventricular diastolic function was normal.    Estimated right ventricular systolic pressure from tricuspid regurgitation is normal (<35 mmHg).      Current medications:  Scheduled Meds:amiodarone, 200 mg, Oral, Daily  [Held by provider] apixaban, 2.5 mg, Oral, BID  cefTRIAXone, 1,000 mg, Intravenous, Q24H  ferric gluconate, 125 mg, Intravenous, Daily  insulin lispro, 2-7 Units, Subcutaneous, 4x Daily AC & at Bedtime  levothyroxine, 100 mcg, Oral, Q AM  lisinopril, 5 mg, Oral, Daily  metoprolol tartrate, 25 mg, Oral, BID  pantoprazole, 40 mg, Oral, Q  AM  sodium chloride, 10 mL, Intravenous, Q12H      Continuous Infusions:   PRN Meds:.  acetaminophen **OR** acetaminophen **OR** acetaminophen    senna-docusate sodium **AND** polyethylene glycol **AND** bisacodyl **AND** bisacodyl    Calcium Replacement - Follow Nurse / BPA Driven Protocol    dextrose    dextrose    glucagon (human recombinant)    HYDROcodone-acetaminophen    Magnesium Standard Dose Replacement - Follow Nurse / BPA Driven Protocol    melatonin    Morphine    nitroglycerin    Phosphorus Replacement - Follow Nurse / BPA Driven Protocol    Potassium Replacement - Follow Nurse / BPA Driven Protocol    sodium chloride    Assessment & Plan   Assessment & Plan     Active Hospital Problems    Diagnosis  POA    **Femur fracture [S72.90XA]  Yes    Type 2 diabetes mellitus with complication, with long-term current use of insulin [E11.8, Z79.4]  Not Applicable    Periprosthetic fracture around prosthetic knee [M97.8XXA, Z96.659]  Not Applicable    Falls [R29.6]  Not Applicable    Atrial fibrillation [I48.91]  Yes    Chronic anticoagulation [Z79.01]  Not Applicable    Essential hypertension [I10]  Yes    Hypothyroidism (acquired) [E03.9]  Yes      Resolved Hospital Problems   No resolved problems to display.        Brief Hospital Course to date:  Chey Robertson is a 89 y.o. female w/ hx afib (on eliquis), htn, dm2, hypothyroidism, MDS (w/ pancytopenia, followed by Dr. Lyman), previous PE (on eliquis), previous left knee replacement. Presented to [facility] ED w/ left knee pain after sustaining a fall at her SNF. CT imaging revealed left distal femoral impaction fracture. ED spoke w/ orthopedic surgery (Dr. Scherer) and recommended admission to hospitalist service. Wbc 1.76, hgb 8.1, plts 57,000. Had minimally elevated troponin 21 (repeat also 21), u/a w/ large LE, 6-10 rbc, 4+ bacteria,        Left supracondylar fracture of the distal femur.   Hx previous left knee replacement  --Orthopedic surgery following:  requires distal femoral replacement. Due to magnitude of surgery ortho would like to hold eliquis ~72 hours prior to surgery, thus planning tentatively for Tuesday 5/20  -pt/ot evals     Parox afib (currently in sinus)  Chronic HFpEF  HTN  Minimal elevated troponin, chronic  -troponin 21, repeat 21  -EKG sinus bradycardia w/ someo t-wave inversions anterolaterally, similar to 7/2024 EKG  -no chest pain symptoms  -echo 6/2023: LV EF 56-60%, valves ok  -cont. amiodarone & metoprolol, lisinopril  -eliquis on hold for surgery    Bacteria in urine  -treating w/ rocephin since immunocompromised and has hardware in place  -day #2/5 rocephin, adding urine culture to urine in lab    Hx PE (perioperative after previous knee replacement)  -eliquis on hold, restart when ok w/ orthopedic surgery    MDS  Pancytopenia  Iron deficiency  -follows w/ Dr. Lyman (last seen 3/3/25); planned to replce iv iron if iron sat <10%; planned to consider holding/stopping anticoagulation if plts dropped below 30,000 or evidence bleeding  -typical plts range 40,000-60,000  -day #2/3 iv iron  -monitor hgb levels and transfuse prn perioperatively    DM2  -A1c 6.2  -on januvia and metformin  -ssi    Hypothyroidism  -cont levothyroxine    Goals/limits  -discussed w/ patient and daughter, patient is Full Code (s/p long discussion)    Am labs ordered        Expected Discharge Location and Transportation: TBD (pt/ot evals after surgery)  Expected Discharge   Expected Discharge Date: 5/22/2025; Expected Discharge Time:      VTE Prophylaxis:  Pharmacologic & mechanical VTE prophylaxis orders are present.         AM-PAC 6 Clicks Score (PT): 10 (05/18/25 2000)    CODE STATUS:   Code Status and Medical Interventions: CPR (Attempt to Resuscitate); Full Support   Ordered at: 05/17/25 2236     Code Status (Patient has no pulse and is not breathing):    CPR (Attempt to Resuscitate)     Medical Interventions (Patient has pulse or is breathing):    Full Support      Level Of Support Discussed With:    Patient       Julio Leiva MD  05/19/25        Electronically signed by Julio Leiva MD at 05/19/25 1303       Consult Notes (last 48 hours)  Notes from 05/18/25 1616 through 05/20/25 1616   No notes of this type exist for this encounter.

## 2025-05-20 NOTE — PROGRESS NOTES
"          Clinical Nutrition Assessment   Patient Name: Chey Robertson  YOB: 1935  MRN: [id number]  Date of Encounter: 05/20/25 10:52 EDT  Admission date: 5/17/2025  Reason for Visit: Identified at risk by screening criteria, Nonhealing wound or pressure ulcer    Assessment   Nutrition Assessment   Admission Diagnosis:  Periprosthetic fracture around internal prosthetic right knee joint [M97.11XA]  Femur fracture [S72.90XA]    Problem List:    Femur fracture    Hypothyroidism (acquired)    Chronic anticoagulation    Essential hypertension    Atrial fibrillation    Falls    Type 2 diabetes mellitus with complication, with long-term current use of insulin    Periprosthetic fracture around prosthetic knee    PMH:   She  has a past medical history of A-fib, Anemia, Arthritis, Diabetes mellitus, Disease of thyroid gland, History of transfusion, [place] (hard of hearing), Hypertension, Migraine without aura and without status migrainosus, not intractable (12/30/2016), Myelodysplastic syndrome, Pulmonary emboli (2011), SOBOE (shortness of breath on exertion), Tremors of nervous system, Vertigo, Wears dentures, and Wears glasses.    PSH:  She  has a past surgical history that includes Hysterectomy; Tonsillectomy; Bladder surgery; Cataract extraction; Cholecystectomy; Colonoscopy; Kyphoplasty (N/A, 02/12/2021); and Hip Trochanteric Nailing (Right, 6/20/2023).    Applicable Nutrition History:   (5/19) WOC: stg 2 chronic PO on left heel; stg 2 PI to sacrum and coccyx    Anthropometrics   Height: Height: 160 cm (63\")  Last Filed Weight: Weight: 56.9 kg (125 lb 6.4 oz) (05/17/25 2336)  Method: Weight Method: Bed scale  BMI: BMI (Calculated): 22.2    UBW:   Weight      Weight (kg) Weight (lbs) Weight Method   7/27/2024 51.256 kg  113 lb  Stated    5/17/2025 56.881 kg  125 lb 6.4 oz  Bed scale      Weight change: No significant changes    Nutrition Focused Physical Exam    Date: 05/20/25     Unable to perform due to Pt " unable to participate at time of visit, Defer pending indication     Subjective   Reported/Observed/Food/Nutrition Related History:   05/20/25  Patient screened per nutrition protocol for possible pressure injury of stage 2 or greater and/or non healing wound. Presented with femur fx. Patient gone to OR with ortho at time of visit. No weight changes noted in EMR. No chewing or swallowing difficulties noted. NKFA in EMR. Will order jeremy for patient to trial.    Current Nutrition Prescription   PO: NPO Diet NPO Type: Strict NPO  Oral Nutrition Supplement: n/a  Intake: NPO    Assessment & Plan   Nutrition Diagnosis   Date: 05/20/25           Updated:    Problem Increased nutrient needs - protein   Etiology Increased demand for wound healing   Signs/Symptoms WOC noted PIs   Status: New    Goal:   Nutrition to support treatment and Advance diet as medically feasible/appropriate    Nutrition Intervention      Follow treatment progress, Care plan reviewed, Await begin PO    Nutrition POC  Advance PO diet as medically feasible  Ordering jeremy BID for when diet advanced to support wound healing  Will complete full assessment when able    Monitoring/Evaluation:   Per protocol, I&O, Weight, Skin status, Symptoms, POC/GOC    Temi Benavidez MS,RD,LD  Time Spent: 30min

## 2025-05-20 NOTE — ANESTHESIA PROCEDURE NOTES
Peripheral Block    Pre-sedation assessment completed: 5/20/2025 2:49 PM    Patient reassessed immediately prior to procedure    Start time: 5/20/2025 3:00 PM  Stop time: 5/20/2025 3:10 PM  Reason for block: at surgeon's request and post-op pain management  Performed by  CRNA/CAA: Aniket Keller CRNA  Assisted by: Lili Wong RN  Preanesthetic Checklist  Completed: patient identified, IV checked, site marked, risks and benefits discussed, surgical consent, monitors and equipment checked, pre-op evaluation and timeout performed  Prep:  Pt Position: supine  Sterile barriers:gloves, cap, sterile barriers, mask and washed/disinfected hands  Prep: ChloraPrep  Patient monitoring: blood pressure monitoring, continuous pulse oximetry and EKG  Procedure    Guidance:ultrasound guided    ULTRASOUND INTERPRETATION.  Using ultrasound guidance a 20 G gauge needle was placed in close proximity to the femoral nerve, at which point, under ultrasound guidance anesthetic was injected in the area of the nerve and spread of the anesthesia was seen on ultrasound in close proximity thereto.  There were no abnormalities seen on ultrasound; a digital image was taken; and the patient tolerated the procedure with no complications. Images:still images obtained, printed/placed on chart    Laterality:left  Block Type:femoral  Injection Technique:catheter  Needle Type:echogenic and Tuohy  Needle Gauge:20 G  Resistance on Injection: none  Cath Depth at skin: 10 cm    Medications Used: fentaNYL citrate (PF) (SUBLIMAZE) injection - Intravenous   100 mcg - 5/20/2025 3:10:00 PM  bupivacaine PF (MARCAINE) 0.25 % injection - Injection   30 mL - 5/20/2025 3:10:00 PM      Medications  Preservative Free Saline:10ml    Post Assessment  Injection Assessment: negative aspiration for heme, no paresthesia on injection and incremental injection  Patient Tolerance:comfortable throughout block  Complications:no  Additional Notes  CATHETER  A high-frequency  "linear transducer, with sterile cover, was placed in the inguinal crease to visualize the Femoral Vein, Artery, and Nerve (medial to lateral). The insertion site was prepped in sterile fashion. Skin and cutaneous tissue was infiltrated with 2-5 ml of 1% Lidocaine. Using ultrasound-guidance, an 18-gauge Contiplex Ultra 360 Touhy Needle was then inserted and advanced in-plane from lateral to medial with ultrasound guidance. The Touhy needle was directed below Fascia Iliacus towards the Femoral nerve. Preservative-free normal saline was utilized for hydro-dissection of tissue. Local anesthetic injection spread, in incremental 3-5 ml injections, was visualized lateral to the artery to surround the femoral nerve. Aspiration every 5 ml to prevent intravascular injection. Injection was completed with negative aspiration of blood and negative intravascular injection. Injection pressures were normal with minimal resistance. A 20-gauge Contiplex Echo catheter was placed through the needle and advanced out the tip of the Touhy 1-3 cm. The Touhy needle was then removed, and final catheter position verified lateral to the femoral artery. The catheter was secured in the usual fashion with skin glue, benzoin, steri-strips, CHG tegaderm and Label noting \"Nerve Block Catheter\". Jerk tape applied at yellow connector and catheter connection.   Performed by: Aniket Keller CRNA            "

## 2025-05-20 NOTE — PROGRESS NOTES
[facility] Medicine Services  PROGRESS NOTE    Patient Name: Chey Robertson  : 1935  MRN: [id number]    Date of Admission: 2025  Primary Care Physician: Yasmeen Loomis MD    Subjective   Subjective     CC:  Periprosthetic left distal femur fracture     HPI:  Waiting on surgery today, platelets low so transfusion ordered      Objective   Objective     Vital Signs:   Temp:  [98.1 °F (36.7 °C)-98.9 °F (37.2 °C)] 98.8 °F (37.1 °C)  Heart Rate:  [67-75] 69  Resp:  [16] 16  BP: (116-153)/(48-66) 153/66     Physical Exam:  Constitutional: No acute distress, awake, alert  HENT: NCAT, mucous membranes moist  Respiratory: Respiratory effort normal   Cardiovascular: RRR, no murmurs, rubs, or gallops  Gastrointestinal: Soft, nontender, nondistended  Musculoskeletal: No bilateral ankle edema  Psychiatric: Appropriate affect, cooperative  Neurologic: Oriented x 2-3, speech clear  Skin: No rashes      Results Reviewed:  LAB RESULTS:      Lab 2544 25   WBC 1.09* 1.52* 1.53*  --  1.76*   HEMOGLOBIN 7.7* 7.9* 7.9*  --  8.1*   HEMATOCRIT 24.1* 24.3* 24.1*  --  24.3*   PLATELETS 46* 49* 48*  --  57*   NEUTROS ABS  --   --  0.95*  --  1.18*   IMMATURE GRANS (ABS)  --   --  0.04  --  0.02   LYMPHS ABS  --   --  0.30*  --  0.38*   MONOS ABS  --   --  0.23  --  0.18   EOS ABS  --   --  0.01  --  0.00   MCV 91.3 90.0 89.3  --  88.7   PROTIME  --   --  16.0*  --   --    HSTROP T  --   --   --  21* 21*         Lab 25  0824 25  0544 25   SODIUM 135* 135* 133* 134*   POTASSIUM 4.1 4.2 4.2 4.2   CHLORIDE 101 99 99 97*   CO2 23.0 26.0 23.0 23.0   ANION GAP 11.0 10.0 11.0 14.0   BUN 14 18 20 30*   CREATININE 0.96 0.99 1.04* 1.22*   EGFR 57.0* 55.0* 51.8* 42.8*   GLUCOSE 131* 151* 182* 286*   CALCIUM 8.0* 8.3* 7.9* 8.3*   MAGNESIUM 2.3 3.2* 1.2*  --    HEMOGLOBIN A1C  --   --   --  6.20*         Lab  05/18/25  1711 05/17/25 2008   TOTAL PROTEIN 5.5* 6.0   ALBUMIN 3.3* 3.8   GLOBULIN 2.2 2.2   ALT (SGPT) 9 10   AST (SGOT) 12 10   BILIRUBIN 0.3 0.3   ALK PHOS 87 92         Lab 05/18/25  1711 05/17/25 2149 05/17/25 2008   HSTROP T  --  21* 21*   PROTIME 16.0*  --   --    INR 1.21*  --   --              Lab 05/18/25 1944 05/17/25 2149   IRON  --  40   IRON SATURATION (TSAT)  --  14*   TIBC  --  282*   TRANSFERRIN  --  189*   ABO TYPING O  --    RH TYPING Negative  --    ANTIBODY SCREEN Positive  --          Brief Urine Lab Results  (Last result in the past 365 days)        Color   Clarity   Blood   Leuk Est   Nitrite   Protein   CREAT   Urine HCG        05/17/25 2140 Yellow   Turbid   Negative   Large (3+)   Negative   30 mg/dL (1+)                   Microbiology Results Abnormal       Procedure Component Value - Date/Time    Urine Culture - Urine, Indwelling Urethral Catheter [[id number]]  (Abnormal) Collected: 05/17/25 2140    Lab Status: Preliminary result Specimen: Urine from Indwelling Urethral Catheter Updated: 05/20/25 0939     Urine Culture >100,000 CFU/mL Gram Negative Bacilli    Narrative:      Colonization of the urinary tract without infection is common. Treatment is discouraged unless the patient is symptomatic, pregnant, or undergoing an invasive urologic procedure.  ID/JUAN F's to follow            No radiology results from the last 24 hrs    Results for orders placed during the hospital encounter of 06/05/23    Adult Transthoracic Echo Complete w/ Color, Spectral and Contrast if necessary per protocol    Interpretation Summary    Left ventricular systolic function is normal. Left ventricular ejection fraction appears to be 56 - 60%.    Left ventricular diastolic function was normal.    Estimated right ventricular systolic pressure from tricuspid regurgitation is normal (<35 mmHg).      Current medications:  Scheduled Meds:amiodarone, 200 mg, Oral, Daily  [Held by provider] apixaban, 2.5 mg, Oral,  BID  cefTRIAXone, 1,000 mg, Intravenous, Q24H  insulin lispro, 2-7 Units, Subcutaneous, 4x Daily AC & at Bedtime  levothyroxine, 100 mcg, Oral, Q AM  lisinopril, 5 mg, Oral, Daily  metoprolol tartrate, 25 mg, Oral, BID  pantoprazole, 40 mg, Oral, Q AM  sodium chloride, 10 mL, Intravenous, Q12H      Continuous Infusions:   PRN Meds:.  acetaminophen **OR** acetaminophen **OR** acetaminophen    senna-docusate sodium **AND** polyethylene glycol **AND** bisacodyl **AND** bisacodyl    Calcium Replacement - Follow Nurse / BPA Driven Protocol    dextrose    dextrose    glucagon (human recombinant)    Magnesium Standard Dose Replacement - Follow Nurse / BPA Driven Protocol    melatonin    Morphine    nitroglycerin    Phosphorus Replacement - Follow Nurse / BPA Driven Protocol    Potassium Replacement - Follow Nurse / BPA Driven Protocol    sodium chloride    Assessment & Plan   Assessment & Plan     Active Hospital Problems    Diagnosis  POA    **Femur fracture [S72.90XA]  Yes    Type 2 diabetes mellitus with complication, with long-term current use of insulin [E11.8, Z79.4]  Not Applicable    Periprosthetic fracture around prosthetic knee [M97.8XXA, Z96.659]  Not Applicable    Falls [R29.6]  Not Applicable    Atrial fibrillation [I48.91]  Yes    Chronic anticoagulation [Z79.01]  Not Applicable    Essential hypertension [I10]  Yes    Hypothyroidism (acquired) [E03.9]  Yes      Resolved Hospital Problems   No resolved problems to display.        Brief Hospital Course to date:  Chey Robertson is a 89 y.o. female with history of afib (on eliquis), htn, dm2, hypothyroidism, MDS (w/ pancytopenia, followed by Dr. Lyman), previous PE (on eliquis), previous left knee replacement.   Presented to [facility] ED w/ left knee pain after sustaining a fall at her SNF.   CT imaging revealed left distal femoral impaction fracture.        Left supracondylar fracture of the distal femur.   Hx previous left knee replacement  -Orthopedic surgery  evaluated, requires distal femoral replacement.   Due to magnitude of surgery ortho would like to hold eliquis ~72 hours prior to surgery, plan for 5/20  -pt/ot evals      Parox afib (currently in sinus)  Chronic HFpEF  HTN  -echo 6/2023: LV EF 56-60%, valves ok  - Continue amiodarone & metoprolol, lisinopril  - eliquis on hold for surgery     Bacteria in urine  -treating w/ rocephin since immunocompromised and has hardware in place  - Continue rocephin for 5 days  - Urine culture pending but gram-negative bacilli**         Hx PE (perioperative after previous knee replacement)  -eliquis on hold, restart when ok w/ orthopedic surgery     MDS  Pancytopenia  Iron deficiency  -follows w/ Dr. Lyman (last seen 3/3/25)  - Recommendation continue replace iv iron if iron sat <10%   planned holding/stopping anticoagulation if plts dropped below 30,000 or evidence bleeding    -typical plts range 40,000-60,000    -Transfused platelets 5/20  -Completed 3 days of IV iron, last dose 5/20       DM2  -A1c 6.2  -on januvia and metformin  - Continue ssi     Hypothyroidism  -cont levothyroxine     Goals/limits  - Partner discussed w/ patient and daughter, patient is Full Code (s/p long discussion)      Expected Discharge Location and Transportation: Pending postop course  Expected Discharge   Expected Discharge Date: 5/22/2025; Expected Discharge Time:      VTE Prophylaxis:  Pharmacologic & mechanical VTE prophylaxis orders are present.         AM-PAC 6 Clicks Score (PT): 8 (05/19/25 2000)    CODE STATUS:   Code Status and Medical Interventions: CPR (Attempt to Resuscitate); Full Support   Ordered at: 05/17/25 2689     Code Status (Patient has no pulse and is not breathing):    CPR (Attempt to Resuscitate)     Medical Interventions (Patient has pulse or is breathing):    Full Support     Level Of Support Discussed With:    Patient       Diya Campoverde Jose, DO  05/20/25

## 2025-05-20 NOTE — ANESTHESIA PREPROCEDURE EVALUATION
Anesthesia Evaluation     Patient summary reviewed and Nursing notes reviewed   no history of anesthetic complications:   NPO Solid Status: > 8 hours  NPO Liquid Status: > 2 hours           Airway   Mallampati: III  TM distance: >3 FB  Neck ROM: full  Possible difficult intubation and Anterior  Dental    (+) upper dentures    Pulmonary - normal exam    breath sounds clear to auscultation  (+) pulmonary embolism (Hx of),shortness of breath  Cardiovascular - normal exam    ECG reviewed  PT is on anticoagulation therapy  Patient on routine beta blocker  Rhythm: regular  Rate: normal    (+) hypertension, dysrhythmias Atrial Fib, CHF Diastolic >=55%    ROS comment: EKG 5/17/25:  HR 57  Sinus bradycardia with 1st degree AV block  T wave abnormality, consider anterolateral ischemia  Abnormal ECG    ECHO 06/2023:  ·  Left ventricular systolic function is normal. Left ventricular ejection fraction appears to be 56 - 60%.  ·  Left ventricular diastolic function was normal.  ·  Estimated right ventricular systolic pressure from tricuspid regurgitation is normal (<35 mmHg).      Neuro/Psych  (+) headaches (Migraines), dizziness/light headedness (Vertigo), tremors  GI/Hepatic/Renal/Endo    (+) GERD, diabetes mellitus type 2 using insulin, thyroid problem hypothyroidism    Musculoskeletal     Abdominal    Substance History - negative use     OB/GYN          Other   arthritis, blood dyscrasia (Myelodisplastic syndrome) anemia thrombocytopenia,     ROS/Med Hx Other: Eliquis - 72 hours held    Labs 5/20/25:  Hgb 7.7, Plt 49  Cr 0.96, K 4.1                Anesthesia Plan    ASA 3     general with block and [place]     (Anemia and thrombocytopenia  - received platelets today  - crossed for 2 PRBC    Yoselin for frequent lab draws and hemodynamic monitoring)  intravenous induction     Anesthetic plan, risks, benefits, and alternatives have been provided, discussed and informed consent has been obtained with: patient.    Plan discussed  with CRNA.    CODE STATUS:    Code Status (Patient has no pulse and is not breathing): CPR (Attempt to Resuscitate)  Medical Interventions (Patient has pulse or is breathing): Full Support  Level Of Support Discussed With: Patient

## 2025-05-20 NOTE — PLAN OF CARE
Problem: Adult Inpatient Plan of Care  Goal: Absence of Hospital-Acquired Illness or Injury  Intervention: Identify and Manage Fall Risk  Recent Flowsheet Documentation  Taken 5/20/2025 0400 by Lore Jay RN  Safety Promotion/Fall Prevention:   activity supervised   assistive device/personal items within reach   clutter free environment maintained   fall prevention program maintained   gait belt   lighting adjusted   mobility aid in reach   muscle strengthening facilitated   nonskid shoes/slippers when out of bed   room organization consistent   safety round/check completed  Taken 5/20/2025 0200 by Lore Jay RN  Safety Promotion/Fall Prevention:   activity supervised   assistive device/personal items within reach   clutter free environment maintained   fall prevention program maintained   gait belt   lighting adjusted   mobility aid in reach   muscle strengthening facilitated   nonskid shoes/slippers when out of bed   room organization consistent   safety round/check completed  Taken 5/20/2025 0000 by Lore Jay RN  Safety Promotion/Fall Prevention:   activity supervised   assistive device/personal items within reach   clutter free environment maintained   fall prevention program maintained   gait belt   lighting adjusted   mobility aid in reach   muscle strengthening facilitated   nonskid shoes/slippers when out of bed   room organization consistent   safety round/check completed  Taken 5/19/2025 2200 by Lore Jay RN  Safety Promotion/Fall Prevention:   activity supervised   assistive device/personal items within reach   clutter free environment maintained   fall prevention program maintained   gait belt   lighting adjusted   mobility aid in reach   muscle strengthening facilitated   nonskid shoes/slippers when out of bed   room organization consistent   safety round/check completed  Taken 5/19/2025 2000 by Lore Jay RN  Safety Promotion/Fall Prevention:   activity supervised   assistive  device/personal items within reach   clutter free environment maintained   fall prevention program maintained   gait belt   lighting adjusted   mobility aid in reach   muscle strengthening facilitated   nonskid shoes/slippers when out of bed   room organization consistent   safety round/check completed  Intervention: Prevent Skin Injury  Recent Flowsheet Documentation  Taken 5/20/2025 0400 by Lore Jay RN  Body Position: position maintained  Skin Protection: incontinence pads utilized  Taken 5/20/2025 0300 by Lore Jay RN  Body Position:   turned   right  Taken 5/20/2025 0200 by Lore Jay RN  Body Position: position maintained  Skin Protection: incontinence pads utilized  Taken 5/20/2025 0000 by Lore Jay RN  Body Position: position maintained  Skin Protection: incontinence pads utilized  Taken 5/19/2025 2200 by Lore Jay RN  Body Position: position maintained  Skin Protection: incontinence pads utilized  Taken 5/19/2025 2000 by Lore Jay RN  Body Position: position maintained  Skin Protection: incontinence pads utilized  Intervention: Prevent and Manage VTE (Venous Thromboembolism) Risk  Recent Flowsheet Documentation  Taken 5/20/2025 0400 by Lore Jay RN  VTE Prevention/Management:   bilateral   foot pump device on  Taken 5/20/2025 0200 by Lore Jay RN  VTE Prevention/Management:   bilateral   foot pump device on  Taken 5/20/2025 0000 by Lore Jay RN  VTE Prevention/Management:   bilateral   foot pump device on  Taken 5/19/2025 2200 by Lore Jay RN  VTE Prevention/Management:   bilateral   foot pump device on  Taken 5/19/2025 2000 by Lore Jay RN  VTE Prevention/Management:   bilateral   foot pump device on  Intervention: Prevent Infection  Recent Flowsheet Documentation  Taken 5/20/2025 0400 by Lore Jay RN  Infection Prevention:   equipment surfaces disinfected   hand hygiene promoted   environmental surveillance performed   rest/sleep promoted   single patient room provided    visitors restricted/screened  Taken 5/20/2025 0200 by Lore Jay RN  Infection Prevention:   environmental surveillance performed   equipment surfaces disinfected   hand hygiene promoted   rest/sleep promoted   single patient room provided   visitors restricted/screened  Taken 5/20/2025 0000 by Lore Jay RN  Infection Prevention:   environmental surveillance performed   equipment surfaces disinfected   hand hygiene promoted   rest/sleep promoted   single patient room provided   visitors restricted/screened  Taken 5/19/2025 2200 by Lore Jay RN  Infection Prevention:   environmental surveillance performed   equipment surfaces disinfected   hand hygiene promoted   rest/sleep promoted   single patient room provided   visitors restricted/screened  Taken 5/19/2025 2000 by Lore Jay RN  Infection Prevention:   environmental surveillance performed   equipment surfaces disinfected   hand hygiene promoted   rest/sleep promoted   single patient room provided   visitors restricted/screened  Goal: Optimal Comfort and Wellbeing  Intervention: Monitor Pain and Promote Comfort  Recent Flowsheet Documentation  Taken 5/20/2025 0400 by Lore Jay RN  Pain Management Interventions:   pillow support provided   position adjusted   quiet environment facilitated   relaxation techniques promoted  Taken 5/20/2025 0200 by Lore Jay RN  Pain Management Interventions:   pillow support provided   position adjusted   quiet environment facilitated   relaxation techniques promoted  Taken 5/20/2025 0000 by Lore Jay RN  Pain Management Interventions: (warm blanket applied)   pillow support provided   position adjusted   quiet environment facilitated   relaxation techniques promoted  Taken 5/19/2025 2200 by Lore Jay RN  Pain Management Interventions:   pillow support provided   position adjusted   quiet environment facilitated   relaxation techniques promoted  Taken 5/19/2025 2000 by Lore Jay RN  Pain Management  Interventions:   pillow support provided   quiet environment facilitated   relaxation techniques promoted  Intervention: Provide Person-Centered Care  Recent Flowsheet Documentation  Taken 5/19/2025 2000 by Lore Jay RN  Trust Relationship/Rapport:   care explained   choices provided   questions answered   questions encouraged     Problem: Skin Injury Risk Increased  Goal: Skin Health and Integrity  Intervention: Optimize Skin Protection  Recent Flowsheet Documentation  Taken 5/20/2025 0400 by Lore Jay RN  Activity Management: activity minimized  Pressure Reduction Techniques:   frequent weight shift encouraged   weight shift assistance provided  Head of Bed (HOB) Positioning: HOB elevated  Pressure Reduction Devices:   foam padding utilized   heel offloading device utilized   positioning supports utilized   pressure-redistributing mattress utilized  Skin Protection: incontinence pads utilized  Taken 5/20/2025 0300 by Lore Jay RN  Head of Bed (HOB) Positioning: HOB elevated  Taken 5/20/2025 0200 by Lore Jay RN  Activity Management: activity minimized  Pressure Reduction Techniques:   frequent weight shift encouraged   weight shift assistance provided  Head of Bed (HOB) Positioning: HOB elevated  Pressure Reduction Devices:   foam padding utilized   heel offloading device utilized   positioning supports utilized   pressure-redistributing mattress utilized  Skin Protection: incontinence pads utilized  Taken 5/20/2025 0000 by Lore Jay RN  Activity Management: activity minimized  Pressure Reduction Techniques:   frequent weight shift encouraged   weight shift assistance provided  Head of Bed (HOB) Positioning: HOB elevated  Pressure Reduction Devices:   foam padding utilized   heel offloading device utilized   positioning supports utilized   pressure-redistributing mattress utilized  Skin Protection: incontinence pads utilized  Taken 5/19/2025 2200 by Lore Jay RN  Activity Management: activity  minimized  Pressure Reduction Techniques:   frequent weight shift encouraged   weight shift assistance provided  Head of Bed (HOB) Positioning: HOB elevated  Pressure Reduction Devices:   foam padding utilized   heel offloading device utilized   positioning supports utilized   pressure-redistributing mattress utilized  Skin Protection: incontinence pads utilized  Taken 5/19/2025 2000 by Lore Jay RN  Activity Management: activity minimized  Pressure Reduction Techniques:   frequent weight shift encouraged   weight shift assistance provided  Head of Bed (HOB) Positioning: HOB elevated  Pressure Reduction Devices:   foam padding utilized   heel offloading device utilized   positioning supports utilized   pressure-redistributing mattress utilized  Skin Protection: incontinence pads utilized     Problem: Orthopaedic Fracture  Goal: Bowel Elimination  Intervention: Promote Effective Bowel Elimination  Recent Flowsheet Documentation  Taken 5/19/2025 2000 by Lore Jay RN  Bowel Elimination Management: (see MAR) other (see comments)  Bowel Elimination Promotion: adequate fluid intake promoted  Goal: Absence of Embolism Signs and Symptoms  Intervention: Prevent or Manage Embolism Risk  Recent Flowsheet Documentation  Taken 5/20/2025 0400 by Lore Jay RN  VTE Prevention/Management:   bilateral   foot pump device on  Taken 5/20/2025 0200 by Lore Jay RN  VTE Prevention/Management:   bilateral   foot pump device on  Taken 5/20/2025 0000 by Lore Jay RN  VTE Prevention/Management:   bilateral   foot pump device on  Taken 5/19/2025 2200 by Lore Jay RN  VTE Prevention/Management:   bilateral   foot pump device on  Taken 5/19/2025 2000 by Lore Jay RN  VTE Prevention/Management:   bilateral   foot pump device on  Goal: Optimal Functional Ability  Intervention: Optimize Functional Ability  Recent Flowsheet Documentation  Taken 5/20/2025 0400 by Lore Jay RN  Activity Management: activity  minimized  Positioning/Transfer Devices:   pillows   in use  Taken 5/20/2025 0300 by Lore Jay RN  Positioning/Transfer Devices:   pillows   in use  Taken 5/20/2025 0200 by Lore Jay RN  Activity Management: activity minimized  Positioning/Transfer Devices:   pillows   in use  Taken 5/20/2025 0000 by Lore Jay RN  Activity Management: activity minimized  Positioning/Transfer Devices:   pillows   in use  Taken 5/19/2025 2200 by Lore Jay RN  Activity Management: activity minimized  Positioning/Transfer Devices:   pillows   in use  Taken 5/19/2025 2000 by Lore Jay RN  Activity Management: activity minimized  Positioning/Transfer Devices:   pillows   in use  Goal: Optimal Pain Control and Function  Intervention: Manage Acute Orthopaedic-Related Pain  Recent Flowsheet Documentation  Taken 5/20/2025 0400 by Lore Jay RN  Pain Management Interventions:   pillow support provided   position adjusted   quiet environment facilitated   relaxation techniques promoted  Taken 5/20/2025 0200 by Lore Jay RN  Pain Management Interventions:   pillow support provided   position adjusted   quiet environment facilitated   relaxation techniques promoted  Taken 5/20/2025 0000 by Lore Jay RN  Pain Management Interventions: (warm blanket applied)   pillow support provided   position adjusted   quiet environment facilitated   relaxation techniques promoted  Taken 5/19/2025 2200 by Lore Jay RN  Pain Management Interventions:   pillow support provided   position adjusted   quiet environment facilitated   relaxation techniques promoted  Taken 5/19/2025 2000 by Lore Jay RN  Pain Management Interventions:   pillow support provided   quiet environment facilitated   relaxation techniques promoted  Goal: Effective Oxygenation and Ventilation  Intervention: Promote Airway Secretion Clearance  Recent Flowsheet Documentation  Taken 5/20/2025 0400 by Lore Jay RN  Activity Management: activity minimized  Cough And  Deep Breathing: done independently per patient  Taken 5/20/2025 0200 by Lore Jay RN  Activity Management: activity minimized  Cough And Deep Breathing: done independently per patient  Taken 5/20/2025 0000 by Lore Jay RN  Activity Management: activity minimized  Cough And Deep Breathing: done independently per patient  Taken 5/19/2025 2200 by Lore Jay RN  Activity Management: activity minimized  Cough And Deep Breathing: done independently per patient  Taken 5/19/2025 2000 by Lore Jay RN  Activity Management: activity minimized  Cough And Deep Breathing: done independently per patient  Intervention: Optimize Oxygenation and Ventilation  Recent Flowsheet Documentation  Taken 5/20/2025 0400 by Lore Jay RN  Head of Bed (HOB) Positioning: HOB elevated  Taken 5/20/2025 0300 by Lore Jay RN  Head of Bed (HOB) Positioning: HOB elevated  Taken 5/20/2025 0200 by Lore Jay RN  Head of Bed (HOB) Positioning: HOB elevated  Taken 5/20/2025 0000 by Lore Jay RN  Head of Bed (HOB) Positioning: HOB elevated  Taken 5/19/2025 2200 by Lore Jay RN  Head of Bed (HOB) Positioning: HOB elevated  Taken 5/19/2025 2000 by Lore Jay RN  Head of Bed (HOB) Positioning: HOB elevated     Problem: Fall Injury Risk  Goal: Absence of Fall and Fall-Related Injury  Intervention: Identify and Manage Contributors  Recent Flowsheet Documentation  Taken 5/19/2025 2000 by Lore Jay RN  Medication Review/Management: medications reviewed  Intervention: Promote Injury-Free Environment  Recent Flowsheet Documentation  Taken 5/20/2025 0400 by Lore Jay RN  Safety Promotion/Fall Prevention:   activity supervised   assistive device/personal items within reach   clutter free environment maintained   fall prevention program maintained   gait belt   lighting adjusted   mobility aid in reach   muscle strengthening facilitated   nonskid shoes/slippers when out of bed   room organization consistent   safety round/check  completed  Taken 5/20/2025 0200 by Lore Jay RN  Safety Promotion/Fall Prevention:   activity supervised   assistive device/personal items within reach   clutter free environment maintained   fall prevention program maintained   gait belt   lighting adjusted   mobility aid in reach   muscle strengthening facilitated   nonskid shoes/slippers when out of bed   room organization consistent   safety round/check completed  Taken 5/20/2025 0000 by Lore Jay RN  Safety Promotion/Fall Prevention:   activity supervised   assistive device/personal items within reach   clutter free environment maintained   fall prevention program maintained   gait belt   lighting adjusted   mobility aid in reach   muscle strengthening facilitated   nonskid shoes/slippers when out of bed   room organization consistent   safety round/check completed  Taken 5/19/2025 2200 by Lore Jay RN  Safety Promotion/Fall Prevention:   activity supervised   assistive device/personal items within reach   clutter free environment maintained   fall prevention program maintained   gait belt   lighting adjusted   mobility aid in reach   muscle strengthening facilitated   nonskid shoes/slippers when out of bed   room organization consistent   safety round/check completed  Taken 5/19/2025 2000 by Lore Jay RN  Safety Promotion/Fall Prevention:   activity supervised   assistive device/personal items within reach   clutter free environment maintained   fall prevention program maintained   gait belt   lighting adjusted   mobility aid in reach   muscle strengthening facilitated   nonskid shoes/slippers when out of bed   room organization consistent   safety round/check completed   Goal Outcome Evaluation:   Pt alert and oriented x4 with some confusion on room air. 18 gauge in right ac, saline locked. Left leg immobilizer, heel boots, and foot pumps in place. Pt voids spontaneously using external catheter. Pt has been NPO since midnight. Pt complained of a  little pain, but refused medications. Call light within reach.

## 2025-05-21 ENCOUNTER — ANESTHESIA EVENT CONVERTED (OUTPATIENT)
Dept: ANESTHESIOLOGY | Facility: HOSPITAL | Age: 89
End: 2025-05-21
Payer: MEDICARE

## 2025-05-21 LAB
ALBUMIN SERPL-MCNC: 2.9 G/DL (ref 3.5–5.2)
ANION GAP SERPL CALCULATED.3IONS-SCNC: 14 MMOL/L (ref 5–15)
BACTERIA SPEC AEROBE CULT: ABNORMAL
BASOPHILS # BLD AUTO: 0 10*3/MM3 (ref 0–0.2)
BASOPHILS NFR BLD AUTO: 0 % (ref 0–1.5)
BH BB BLOOD EXPIRATION DATE: NORMAL
BH BB BLOOD TYPE BARCODE: 1700
BH BB BLOOD TYPE BARCODE: 9500
BH BB BLOOD TYPE BARCODE: 9500
BH BB DISPENSE STATUS: NORMAL
BH BB PRODUCT CODE: NORMAL
BH BB UNIT NUMBER: NORMAL
BUN SERPL-MCNC: 21 MG/DL (ref 8–23)
BUN/CREAT SERPL: 18.8 (ref 7–25)
CALCIUM SPEC-SCNC: 7.8 MG/DL (ref 8.6–10.5)
CHLORIDE SERPL-SCNC: 103 MMOL/L (ref 98–107)
CO2 SERPL-SCNC: 16 MMOL/L (ref 22–29)
CREAT SERPL-MCNC: 1.12 MG/DL (ref 0.57–1)
CROSSMATCH INTERPRETATION: NORMAL
CROSSMATCH INTERPRETATION: NORMAL
DEPRECATED RDW RBC AUTO: 53.8 FL (ref 37–54)
EGFRCR SERPLBLD CKD-EPI 2021: 47.4 ML/MIN/1.73
EOSINOPHIL # BLD AUTO: 0.01 10*3/MM3 (ref 0–0.4)
EOSINOPHIL NFR BLD AUTO: 0.4 % (ref 0.3–6.2)
ERYTHROCYTE [DISTWIDTH] IN BLOOD BY AUTOMATED COUNT: 16.2 % (ref 12.3–15.4)
GLUCOSE BLDC GLUCOMTR-MCNC: 240 MG/DL (ref 70–130)
GLUCOSE BLDC GLUCOMTR-MCNC: 265 MG/DL (ref 70–130)
GLUCOSE BLDC GLUCOMTR-MCNC: 293 MG/DL (ref 70–130)
GLUCOSE BLDC GLUCOMTR-MCNC: 323 MG/DL (ref 70–130)
GLUCOSE SERPL-MCNC: 314 MG/DL (ref 65–99)
HCT VFR BLD AUTO: 24.2 % (ref 34–46.6)
HGB BLD-MCNC: 7.6 G/DL (ref 12–15.9)
IMM GRANULOCYTES # BLD AUTO: 0.05 10*3/MM3 (ref 0–0.05)
IMM GRANULOCYTES NFR BLD AUTO: 2.2 % (ref 0–0.5)
LYMPHOCYTES # BLD AUTO: 0.1 10*3/MM3 (ref 0.7–3.1)
LYMPHOCYTES NFR BLD AUTO: 4.3 % (ref 19.6–45.3)
MCH RBC QN AUTO: 28.9 PG (ref 26.6–33)
MCHC RBC AUTO-ENTMCNC: 31.4 G/DL (ref 31.5–35.7)
MCV RBC AUTO: 92 FL (ref 79–97)
MONOCYTES # BLD AUTO: 0.15 10*3/MM3 (ref 0.1–0.9)
MONOCYTES NFR BLD AUTO: 6.5 % (ref 5–12)
NEUTROPHILS NFR BLD AUTO: 2.01 10*3/MM3 (ref 1.7–7)
NEUTROPHILS NFR BLD AUTO: 86.6 % (ref 42.7–76)
NRBC BLD AUTO-RTO: 0 /100 WBC (ref 0–0.2)
PHOSPHATE SERPL-MCNC: 4.3 MG/DL (ref 2.5–4.5)
PLATELET # BLD AUTO: 56 10*3/MM3 (ref 140–450)
PMV BLD AUTO: 10.2 FL (ref 6–12)
POTASSIUM SERPL-SCNC: 5 MMOL/L (ref 3.5–5.2)
RBC # BLD AUTO: 2.63 10*6/MM3 (ref 3.77–5.28)
SODIUM SERPL-SCNC: 133 MMOL/L (ref 136–145)
UNIT  ABO: NORMAL
UNIT  RH: NORMAL
WBC NRBC COR # BLD AUTO: 2.32 10*3/MM3 (ref 3.4–10.8)

## 2025-05-21 PROCEDURE — 0SPD0JZ REMOVAL OF SYNTHETIC SUBSTITUTE FROM LEFT KNEE JOINT, OPEN APPROACH: ICD-10-PCS | Performed by: ORTHOPAEDIC SURGERY

## 2025-05-21 PROCEDURE — 99232 SBSQ HOSP IP/OBS MODERATE 35: CPT | Performed by: INTERNAL MEDICINE

## 2025-05-21 PROCEDURE — 25010000002 CEFTRIAXONE PER 250 MG: Performed by: INTERNAL MEDICINE

## 2025-05-21 PROCEDURE — 0SRD0J9 REPLACEMENT OF LEFT KNEE JOINT WITH SYNTHETIC SUBSTITUTE, CEMENTED, OPEN APPROACH: ICD-10-PCS | Performed by: ORTHOPAEDIC SURGERY

## 2025-05-21 PROCEDURE — 63710000001 INSULIN LISPRO (HUMAN) PER 5 UNITS: Performed by: ORTHOPAEDIC SURGERY

## 2025-05-21 PROCEDURE — 25010000002 CEFAZOLIN PER 500 MG: Performed by: ORTHOPAEDIC SURGERY

## 2025-05-21 PROCEDURE — 97166 OT EVAL MOD COMPLEX 45 MIN: CPT

## 2025-05-21 PROCEDURE — 85025 COMPLETE CBC W/AUTO DIFF WBC: CPT | Performed by: ORTHOPAEDIC SURGERY

## 2025-05-21 PROCEDURE — 80069 RENAL FUNCTION PANEL: CPT | Performed by: ORTHOPAEDIC SURGERY

## 2025-05-21 PROCEDURE — 97530 THERAPEUTIC ACTIVITIES: CPT

## 2025-05-21 PROCEDURE — 25010000002 ONDANSETRON PER 1 MG: Performed by: ORTHOPAEDIC SURGERY

## 2025-05-21 PROCEDURE — 82948 REAGENT STRIP/BLOOD GLUCOSE: CPT

## 2025-05-21 PROCEDURE — 97110 THERAPEUTIC EXERCISES: CPT

## 2025-05-21 PROCEDURE — 97162 PT EVAL MOD COMPLEX 30 MIN: CPT

## 2025-05-21 PROCEDURE — 97116 GAIT TRAINING THERAPY: CPT

## 2025-05-21 RX ORDER — DOXYCYCLINE 100 MG/1
100 CAPSULE ORAL EVERY 12 HOURS SCHEDULED
Status: DISCONTINUED | OUTPATIENT
Start: 2025-05-21 | End: 2025-05-29 | Stop reason: HOSPADM

## 2025-05-21 RX ADMIN — BISACODYL 10 MG: 10 SUPPOSITORY RECTAL at 03:16

## 2025-05-21 RX ADMIN — PANTOPRAZOLE SODIUM 40 MG: 40 TABLET, DELAYED RELEASE ORAL at 06:10

## 2025-05-21 RX ADMIN — SODIUM CHLORIDE 2 G: 900 INJECTION INTRAVENOUS at 09:10

## 2025-05-21 RX ADMIN — ACETAMINOPHEN 1000 MG: 500 TABLET ORAL at 21:20

## 2025-05-21 RX ADMIN — Medication 10 ML: at 21:21

## 2025-05-21 RX ADMIN — ACETAMINOPHEN 1000 MG: 500 TABLET ORAL at 13:36

## 2025-05-21 RX ADMIN — AMIODARONE HYDROCHLORIDE 200 MG: 200 TABLET ORAL at 09:09

## 2025-05-21 RX ADMIN — SODIUM CHLORIDE 2 G: 900 INJECTION INTRAVENOUS at 00:15

## 2025-05-21 RX ADMIN — LISINOPRIL 5 MG: 5 TABLET ORAL at 09:10

## 2025-05-21 RX ADMIN — DOXYCYCLINE 100 MG: 100 CAPSULE ORAL at 21:21

## 2025-05-21 RX ADMIN — INSULIN LISPRO 4 UNITS: 100 INJECTION, SOLUTION INTRAVENOUS; SUBCUTANEOUS at 21:20

## 2025-05-21 RX ADMIN — SODIUM CHLORIDE 1000 MG: 900 INJECTION INTRAVENOUS at 06:11

## 2025-05-21 RX ADMIN — TRAMADOL HYDROCHLORIDE 50 MG: 50 TABLET, COATED ORAL at 00:37

## 2025-05-21 RX ADMIN — INSULIN LISPRO 5 UNITS: 100 INJECTION, SOLUTION INTRAVENOUS; SUBCUTANEOUS at 08:09

## 2025-05-21 RX ADMIN — LEVOTHYROXINE SODIUM 100 MCG: 0.1 TABLET ORAL at 06:10

## 2025-05-21 RX ADMIN — INSULIN LISPRO 3 UNITS: 100 INJECTION, SOLUTION INTRAVENOUS; SUBCUTANEOUS at 17:21

## 2025-05-21 RX ADMIN — ACETAMINOPHEN 1000 MG: 500 TABLET ORAL at 06:10

## 2025-05-21 RX ADMIN — ONDANSETRON 4 MG: 2 INJECTION INTRAMUSCULAR; INTRAVENOUS at 03:16

## 2025-05-21 RX ADMIN — DOCUSATE SODIUM 50 MG AND SENNOSIDES 8.6 MG 2 TABLET: 8.6; 5 TABLET, FILM COATED ORAL at 00:37

## 2025-05-21 RX ADMIN — METOPROLOL TARTRATE 25 MG: 25 TABLET, FILM COATED ORAL at 09:08

## 2025-05-21 RX ADMIN — INSULIN LISPRO 4 UNITS: 100 INJECTION, SOLUTION INTRAVENOUS; SUBCUTANEOUS at 12:10

## 2025-05-21 RX ADMIN — METOPROLOL TARTRATE 25 MG: 25 TABLET, FILM COATED ORAL at 21:25

## 2025-05-21 NOTE — ANESTHESIA PROCEDURE NOTES
Arterial Line      Patient reassessed immediately prior to procedure    Patient location during procedure: pre-op   Line placed for hemodynamic monitoring.  Performed By   Anesthesiologist: Lance Mcgee MD   Preanesthetic Checklist  Completed: patient identified, IV checked, site marked, risks and benefits discussed, surgical consent, monitors and equipment checked, pre-op evaluation and timeout performed  Arterial Line Prep    Sterile Tech: cap, gloves and sterile barriers  Prep: ChloraPrep  Patient monitoring: blood pressure monitoring, continuous pulse oximetry and EKG  Arterial Line Procedure   Laterality:right  Location:  radial artery  Catheter size: 20 G   Guidance: ultrasound guided  PROCEDURE NOTE/ULTRASOUND INTERPRETATION.  Using ultrasound guidance the potential vascular sites for insertion of the catheter were visualized to determine the patency of the vessel to be used for vascular access.  After selecting the appropriate site for insertion, the needle was visualized under ultrasound being inserted into the radial artery, followed by ultrasound confirmation of wire and catheter placement. There were no abnormalities seen on ultrasound; an image was taken; and the patient tolerated the procedure with no complications.   Number of attempts: 1  Successful placement: yes Images: still images not obtained  Post Assessment   Dressing Type: occlusive dressing applied, secured with tape, wrist guard applied and biopatch applied.   Complications no  Circ/Move/Sens Assessment: normal and unchanged.   Patient Tolerance: patient tolerated the procedure well with no apparent complications  Additional Notes  Performed on 5/20/25 at 15:15

## 2025-05-21 NOTE — PROGRESS NOTES
"  Orthopedic Progress Note      Patient: Chey Robertson  YOB: 1935     Date of Admission: 5/17/2025  7:04 PM Medical Record Number: [id number]     Attending Physician: Diya Garcia,*    Status Post:  Procedure(s):  DISTAL FEMUR REPLACEMENT Post Operative Day Number: 1    Subjective : No new orthopaedic complaints     Pain Relief: some relief with present medication.     Systemic Complaints: No Complaints  Vitals:    05/20/25 2339 05/21/25 0329 05/21/25 0726 05/21/25 0908   BP: 139/65 171/71 151/64 164/60   BP Location: Right arm Right arm Right arm    Patient Position: Lying Lying Lying    Pulse: 54 59 58 59   Resp: 16 16 16    Temp: 97.5 °F (36.4 °C) 97.6 °F (36.4 °C) 98 °F (36.7 °C)    TempSrc: Oral Oral Oral    SpO2: 97% 96% 94%    Weight:       Height:           Physical Exam: 89 y.o. female    General Appearance:       Alert, cooperative, in no acute distress                  Extremities:    Dressing Clean, Dry and Intact             No clinical sign of DVT        Able to do good movements of digits    Pulses:   Pulses palpable and equal bilaterally           Diagnostic Tests:     Results from last 7 days   Lab Units 05/21/25  0610 05/20/25  0824 05/19/25  0544   WBC 10*3/mm3 2.32* 1.09* 1.52*   HEMOGLOBIN g/dL 7.6* 7.7* 7.9*   HEMATOCRIT % 24.2* 24.1* 24.3*   PLATELETS 10*3/mm3 56* 46* 49*     Results from last 7 days   Lab Units 05/21/25  0610 05/20/25  0824 05/19/25  0544   SODIUM mmol/L 133* 135* 135*   POTASSIUM mmol/L 5.0 4.1 4.2   CHLORIDE mmol/L 103 101 99   CO2 mmol/L 16.0* 23.0 26.0   BUN mg/dL 21 14 18   CREATININE mg/dL 1.12* 0.96 0.99   GLUCOSE mg/dL 314* 131* 151*   CALCIUM mg/dL 7.8* 8.0* 8.3*     Results from last 7 days   Lab Units 05/18/25  1711   INR  1.21*     No results found for: \"URICACID\"  No results found for: \"CRYSTAL\"  Microbiology Results (last 10 days)       Procedure Component Value - Date/Time    Urine Culture - Urine, Indwelling Urethral Catheter " [[id number]]  (Abnormal)  (Susceptibility) Collected: 05/17/25 2140    Lab Status: Final result Specimen: Urine from Indwelling Urethral Catheter Updated: 05/21/25 0821     Urine Culture >100,000 CFU/mL Klebsiella pneumoniae ssp pneumoniae    Narrative:      Colonization of the urinary tract without infection is common. Treatment is discouraged unless the patient is symptomatic, pregnant, or undergoing an invasive urologic procedure.    Susceptibility        Klebsiella pneumoniae ssp pneumoniae      JUAN F      Amoxicillin + Clavulanate Susceptible      Ampicillin Resistant      Ampicillin + Sulbactam Susceptible      Cefazolin (Urine) Susceptible      Cefepime Susceptible      Ceftazidime Susceptible      Ceftriaxone Susceptible      Ciprofloxacin Resistant      Gentamicin Susceptible      Levofloxacin Resistant      Nitrofurantoin Intermediate      Piperacillin + Tazobactam Susceptible      Trimethoprim + Sulfamethoxazole Susceptible                                 XR Knee 1 or 2 View Left  Result Date: 5/20/2025  Impression: Postoperative changes of left knee arthroplasty without radiographic evidence of complication. Electronically Signed: Zhou Paredes MD  5/20/2025 8:56 PM EDT  Workstation ID: [id number]    CT Lower Extremity Left Without Contrast  Result Date: 5/17/2025  Impression: 1.Limited study due to motion artifact and streak artifact. 2.There is confirmation of a impaction fracture at the distal femoral metaphysis with a large volume of lipohemarthrosis. Electronically Signed: Hector Rosenberg MD  5/17/2025 10:01 PM EDT  Workstation ID: [id number]    XR Knee 1 or 2 View Left  Result Date: 5/17/2025  Impression: 1.Suspected impaction fracture in the distal femoral metaphyseal region with large volume of lipohemarthrosis. 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 3.Osteopenia. Electronically Signed: Hector Rosenberg MD  5/17/2025 7:49 PM EDT  Workstation ID: [id number]        Current Medications:  Scheduled  Meds:acetaminophen, 1,000 mg, Oral, Q8H  amiodarone, 200 mg, Oral, Daily  [Held by provider] apixaban, 2.5 mg, Oral, BID  cefTRIAXone, 1,000 mg, Intravenous, Q24H  insulin lispro, 2-7 Units, Subcutaneous, 4x Daily AC & at Bedtime  levothyroxine, 100 mcg, Oral, Q AM  lisinopril, 5 mg, Oral, Daily  metoprolol tartrate, 25 mg, Oral, BID  pantoprazole, 40 mg, Oral, Q AM  sodium chloride, 10 mL, Intravenous, Q12H      Continuous Infusions:lactated ringers, 100 mL/hr, Last Rate: 100 mL/hr (05/20/25 0563)  ropivacaine,       PRN Meds:.  acetaminophen **OR** acetaminophen **OR** acetaminophen    senna-docusate sodium **AND** polyethylene glycol **AND** bisacodyl **AND** bisacodyl    Calcium Replacement - Follow Nurse / BPA Driven Protocol    dextrose    dextrose    diphenhydrAMINE **OR** diphenhydrAMINE    glucagon (human recombinant)    HYDROmorphone **AND** naloxone    Magnesium Standard Dose Replacement - Follow Nurse / BPA Driven Protocol    melatonin    Morphine    nitroglycerin    ondansetron ODT **OR** ondansetron    oxyCODONE    oxyCODONE    Phosphorus Replacement - Follow Nurse / BPA Driven Protocol    Potassium Replacement - Follow Nurse / BPA Driven Protocol    sodium chloride    traMADol    Assessment: Status post  No admission procedures for hospital encounter.    Patient Active Problem List   Diagnosis    Benign familial tremor    AMS (altered mental status)    Pancytopenia    Hypothyroidism (acquired)    B12 deficiency    Abnormal intestinal absorption    Iron deficiency anemia due to chronic blood loss    Myelodysplasia (myelodysplastic syndrome)    Personal history of pulmonary embolism    Chronic anticoagulation    Essential hypertension    Type 2 diabetes mellitus without complication, without long-term current use of insulin    Atrial fibrillation    QT prolongation    Pericardial effusion    Severe malnutrition    Closed displaced intertrochanteric fracture of right femur, initial encounter    DISHA  (acute kidney injury)    Moderate malnutrition    Falls    Type 2 diabetes mellitus with complication, with long-term current use of insulin    Periprosthetic fracture around prosthetic knee    Femur fracture       PLAN:   Continues current post-op course  Anticoagulation: Okay to start anticoagulation per medicine if can restart Eliquis recommend 2.5 twice daily for 1 week and then resume home dose however with the patient's very low platelet count not sure if the patient needs to be this extremely anticoagulated but would leave that decision up to medicine  Mobilize with PT as tolerated per protocol  2 weeks of oral antibiotics    Weight Bearing: WBAT knee immobilizer for 2 weeks postoperatively in order to let the wound heal no flexion of the knee  Discharge Plan: OK to plan for discharge in  tomorrow to rehab  from orthopaedic perspective.    Albin Mcclain MD    Date: 5/21/2025    Time: 09:51 EDT

## 2025-05-21 NOTE — PLAN OF CARE
Goal Outcome Evaluation:  Plan of Care Reviewed With: patient        Progress: improving  Outcome Evaluation: Patient able to increase ambulation distance to 15' minAx1+1 with FWW, still limited by knee buckling despite KI adjustment. Initiated isometric therex with good effort from patient. IPPT remains indicated to address current deficits. Continue to recommend D/C to SNF for best functional outcomes.    Anticipated Discharge Disposition (PT): skilled nursing facility

## 2025-05-21 NOTE — PLAN OF CARE
Goal Outcome Evaluation:  Plan of Care Reviewed With: patient        Progress: no change     Pt is alert and oriented to self; sometimes place and time. VSS on 3L NC. NSR/sinus pancho with 1st degree AV block on tele. Pt has slept off and on throughout the shift. Ace wrap/prevena wound vac with knee immobilizer and infu block in place/CDI. PRN oxycodone and tramadol given as ordered for pain. Pt had a few episodes of n/v; PRN zofran given. No BM since 15th; PRN suppository given this am; no BM yet. Purewick in place and voiding spontaneously. Bladder scanned around 0450 with 79mL. SCDs in place.

## 2025-05-21 NOTE — PROGRESS NOTES
[facility] Medicine Services  PROGRESS NOTE    Patient Name: Chey Robertson  : 1935  MRN: [id number]    Date of Admission: 2025  Primary Care Physician: Yasmeen Loomis MD    Subjective   Subjective     CC:  Femur fx    HPI:  Complaining of pain in her hip when sitting upright in the chair.  Discussed giving her more blood today      Objective   Objective     Vital Signs:   Temp:  [97.4 °F (36.3 °C)-97.9 °F (36.6 °C)] 97.8 °F (36.6 °C)  Heart Rate:  [52-56] 52  Resp:  [16] 16  BP: (110-130)/(50-60) 119/58  Flow (L/min) (Oxygen Therapy):  [2-3] 2     Physical Exam:  Constitutional: No acute distress, awake, alert  HENT: NCAT, mucous membranes moist  Respiratory: Respiratory effort normal   Cardiovascular: RRR, no murmurs, rubs, or gallops  Gastrointestinal: Soft, nontender, nondistended  Musculoskeletal: No bilateral ankle edema no swelling or discoloration of B/L thighs  Psychiatric: Appropriate affect, cooperative  Neurologic: Oriented x 3, [place], speech clear  Skin: No rashes      Results Reviewed:  LAB RESULTS:      Lab 25  0719 25  0610 25  0824 25  0544 25  1711 25  2149 25   WBC 2.27* 2.32* 1.09* 1.52* 1.53*  --  1.76*   HEMOGLOBIN 6.8*  6.8* 7.6* 7.7* 7.9* 7.9*  --  8.1*   HEMATOCRIT 20.5*  20.5* 24.2* 24.1* 24.3* 24.1*  --  24.3*   PLATELETS 81* 56* 46* 49* 48*  --  57*   NEUTROS ABS  --  2.01  --   --  0.95*  --  1.18*   IMMATURE GRANS (ABS)  --  0.05  --   --  0.04  --  0.02   LYMPHS ABS  --  0.10*  --   --  0.30*  --  0.38*   MONOS ABS  --  0.15  --   --  0.23  --  0.18   EOS ABS  --  0.01  --   --  0.01  --  0.00   MCV 89.1 92.0 91.3 90.0 89.3  --  88.7   PROTIME  --   --   --   --  16.0*  --   --    HSTROP T  --   --   --   --   --  21* 21*         Lab 25  0719 25  0610 25  0824 25  0544 25  1711 25   SODIUM 137 133* 135* 135* 133* 134*   POTASSIUM 4.6 5.0 4.1 4.2 4.2 4.2    CHLORIDE 104 103 101 99 99 97*   CO2 23.0 16.0* 23.0 26.0 23.0 23.0   ANION GAP 10.0 14.0 11.0 10.0 11.0 14.0   BUN 23 21 14 18 20 30*   CREATININE 1.21* 1.12* 0.96 0.99 1.04* 1.22*   EGFR 43.2* 47.4* 57.0* 55.0* 51.8* 42.8*   GLUCOSE 145* 314* 131* 151* 182* 286*   CALCIUM 7.9* 7.8* 8.0* 8.3* 7.9* 8.3*   MAGNESIUM  --   --  2.3 3.2* 1.2*  --    PHOSPHORUS  --  4.3  --   --   --   --    HEMOGLOBIN A1C  --   --   --   --   --  6.20*         Lab 05/21/25  0610 05/18/25 1711 05/17/25 2008   TOTAL PROTEIN  --  5.5* 6.0   ALBUMIN 2.9* 3.3* 3.8   GLOBULIN  --  2.2 2.2   ALT (SGPT)  --  9 10   AST (SGOT)  --  12 10   BILIRUBIN  --  0.3 0.3   ALK PHOS  --  87 92         Lab 05/18/25 1711 05/17/25 2149 05/17/25 2008   HSTROP T  --  21* 21*   PROTIME 16.0*  --   --    INR 1.21*  --   --              Lab 05/22/25  1008 05/18/25 1944 05/18/25 1944 05/17/25 2149   IRON  --   --   --  40   IRON SATURATION (TSAT)  --   --   --  14*   TIBC  --   --   --  282*   TRANSFERRIN  --   --   --  189*   ABO TYPING O   < > O  --    RH TYPING Negative   < > Negative  --    ANTIBODY SCREEN Positive  --  Positive  --     < > = values in this interval not displayed.         Brief Urine Lab Results  (Last result in the past 365 days)        Color   Clarity   Blood   Leuk Est   Nitrite   Protein   CREAT   Urine HCG        05/17/25 2140 Yellow   Turbid   Negative   Large (3+)   Negative   30 mg/dL (1+)                   Microbiology Results Abnormal       Procedure Component Value - Date/Time    Urine Culture - Urine, Indwelling Urethral Catheter [[id number]]  (Abnormal)  (Susceptibility) Collected: 05/17/25 2140    Lab Status: Final result Specimen: Urine from Indwelling Urethral Catheter Updated: 05/21/25 0821     Urine Culture >100,000 CFU/mL Klebsiella pneumoniae ssp pneumoniae    Narrative:      Colonization of the urinary tract without infection is common. Treatment is discouraged unless the patient is symptomatic, pregnant, or  undergoing an invasive urologic procedure.    Susceptibility        Klebsiella pneumoniae ssp pneumoniae      JUAN F      Amoxicillin + Clavulanate Susceptible      Ampicillin Resistant      Ampicillin + Sulbactam Susceptible      Cefazolin (Urine) Susceptible      Cefepime Susceptible      Ceftazidime Susceptible      Ceftriaxone Susceptible      Ciprofloxacin Resistant      Gentamicin Susceptible      Levofloxacin Resistant      Nitrofurantoin Intermediate      Piperacillin + Tazobactam Susceptible      Trimethoprim + Sulfamethoxazole Susceptible                                   Arterial Line  Result Date: 5/21/2025  Lance Mcgee MD     5/21/2025  6:42 AM Arterial Line Patient reassessed immediately prior to procedure Patient location during procedure: pre-op Line placed for hemodynamic monitoring. Performed By Anesthesiologist: Lance Mcgee MD Preanesthetic Checklist Completed: patient identified, IV checked, site marked, risks and benefits discussed, surgical consent, monitors and equipment checked, pre-op evaluation and timeout performed Arterial Line Prep  Sterile Tech: cap, gloves and sterile barriers Prep: ChloraPrep Patient monitoring: blood pressure monitoring, continuous pulse oximetry and EKG Arterial Line Procedure Laterality:right Location:  radial artery Catheter size: 20 G Guidance: ultrasound guided PROCEDURE NOTE/ULTRASOUND INTERPRETATION.  Using ultrasound guidance the potential vascular sites for insertion of the catheter were visualized to determine the patency of the vessel to be used for vascular access.  After selecting the appropriate site for insertion, the needle was visualized under ultrasound being inserted into the radial artery, followed by ultrasound confirmation of wire and catheter placement. There were no abnormalities seen on ultrasound; an image was taken; and the patient tolerated the procedure with no complications. Number of attempts: 1  Successful placement: yes Images: still images not obtained Post Assessment Dressing Type: occlusive dressing applied, secured with tape, wrist guard applied and biopatch applied. Complications no Circ/Move/Sens Assessment: normal and unchanged. Patient Tolerance: patient tolerated the procedure well with no apparent complications Additional Notes Performed on 5/20/25 at 15:15     XR Knee 1 or 2 View Left  Result Date: 5/20/2025  XR KNEE 1 OR 2 VW LEFT Date of Exam: 5/20/2025 7:55 PM EDT Indication: Post-Op Knee Arthoplasty Comparison: None available. Findings: There are postoperative changes of left knee arthroplasty without radiographic evidence of complication. There is surrounding soft tissue edema, favored postoperative. Vascular calcifications.     Impression: Impression: Postoperative changes of left knee arthroplasty without radiographic evidence of complication. Electronically Signed: Zhou Paredes MD  5/20/2025 8:56 PM EDT  Workstation ID: [id number]    Peripheral Block  Result Date: 5/20/2025  Aniket Keller CRNA     5/20/2025  3:22 PM Peripheral Block Pre-sedation assessment completed: 5/20/2025 2:49 PM Patient reassessed immediately prior to procedure Start time: 5/20/2025 3:00 PM Stop time: 5/20/2025 3:10 PM Reason for block: at surgeon's request and post-op pain management Performed by CRNA/CAA: Aniket Keller CRNA Assisted by: Lili Wong RN Preanesthetic Checklist Completed: patient identified, IV checked, site marked, risks and benefits discussed, surgical consent, monitors and equipment checked, pre-op evaluation and timeout performed Prep: Pt Position: supine Sterile barriers:gloves, cap, sterile barriers, mask and washed/disinfected hands Prep: ChloraPrep Patient monitoring: blood pressure monitoring, continuous pulse oximetry and EKG Procedure Guidance:ultrasound guided ULTRASOUND INTERPRETATION.  Using ultrasound guidance a 20 G gauge needle was placed in close proximity to the  femoral nerve, at which point, under ultrasound guidance anesthetic was injected in the area of the nerve and spread of the anesthesia was seen on ultrasound in close proximity thereto.  There were no abnormalities seen on ultrasound; a digital image was taken; and the patient tolerated the procedure with no complications. Images:still images obtained, printed/placed on chart Laterality:left Block Type:femoral Injection Technique:catheter Needle Type:echogenic and Tuohy Needle Gauge:20 G Resistance on Injection: none Cath Depth at skin: 10 cm Medications Used: fentaNYL citrate (PF) (SUBLIMAZE) injection - Intravenous  100 mcg - 5/20/2025 3:10:00 PM bupivacaine PF (MARCAINE) 0.25 % injection - Injection  30 mL - 5/20/2025 3:10:00 PM Medications Preservative Free Saline:10ml Post Assessment Injection Assessment: negative aspiration for heme, no paresthesia on injection and incremental injection Patient Tolerance:comfortable throughout block Complications:no Additional Notes CATHETER A high-frequency linear transducer, with sterile cover, was placed in the inguinal crease to visualize the Femoral Vein, Artery, and Nerve (medial to lateral). The insertion site was prepped in sterile fashion. Skin and cutaneous tissue was infiltrated with 2-5 ml of 1% Lidocaine. Using ultrasound-guidance, an 18-gauge Contiplex Ultra 360 Touhy Needle was then inserted and advanced in-plane from lateral to medial with ultrasound guidance. The Touhy needle was directed below Fascia Iliacus towards the Femoral nerve. Preservative-free normal saline was utilized for hydro-dissection of tissue. Local anesthetic injection spread, in incremental 3-5 ml injections, was visualized lateral to the artery to surround the femoral nerve. Aspiration every 5 ml to prevent intravascular injection. Injection was completed with negative aspiration of blood and negative intravascular injection. Injection pressures were normal with minimal resistance. A  "20-gauge Contiplex Echo catheter was placed through the needle and advanced out the tip of the Touhy 1-3 cm. The Touhy needle was then removed, and final catheter position verified lateral to the femoral artery. The catheter was secured in the usual fashion with skin glue, benzoin, steri-strips, CHG tegaderm and Label noting \"Nerve Block Catheter\". Jerk tape applied at yellow connector and catheter connection. Performed by: Aniket Keller CRNA       Results for orders placed during the hospital encounter of 06/05/23    Adult Transthoracic Echo Complete w/ Color, Spectral and Contrast if necessary per protocol    Interpretation Summary    Left ventricular systolic function is normal. Left ventricular ejection fraction appears to be 56 - 60%.    Left ventricular diastolic function was normal.    Estimated right ventricular systolic pressure from tricuspid regurgitation is normal (<35 mmHg).      Current medications:  Scheduled Meds:acetaminophen, 1,000 mg, Oral, Q8H  amiodarone, 200 mg, Oral, Daily  [Held by provider] apixaban, 2.5 mg, Oral, BID  doxycycline, 100 mg, Oral, Q12H  insulin lispro, 2-7 Units, Subcutaneous, 4x Daily AC & at Bedtime  levothyroxine, 100 mcg, Oral, Q AM  [Held by provider] lisinopril, 5 mg, Oral, Daily  [Held by provider] metoprolol tartrate, 25 mg, Oral, BID  pantoprazole, 40 mg, Oral, Q AM  sodium chloride, 10 mL, Intravenous, Q12H      Continuous Infusions:ropivacaine,       PRN Meds:.  acetaminophen **OR** acetaminophen **OR** acetaminophen    senna-docusate sodium **AND** polyethylene glycol **AND** bisacodyl **AND** bisacodyl    Calcium Replacement - Follow Nurse / BPA Driven Protocol    dextrose    dextrose    diphenhydrAMINE **OR** diphenhydrAMINE    glucagon (human recombinant)    HYDROmorphone **AND** naloxone    Magnesium Standard Dose Replacement - Follow Nurse / BPA Driven Protocol    melatonin    Morphine    nitroglycerin    ondansetron ODT **OR** ondansetron    oxyCODONE    " oxyCODONE    Phosphorus Replacement - Follow Nurse / BPA Driven Protocol    Potassium Replacement - Follow Nurse / BPA Driven Protocol    sodium chloride    traMADol    Assessment & Plan   Assessment & Plan     Active Hospital Problems    Diagnosis  POA    **Femur fracture [S72.90XA]  Yes    Type 2 diabetes mellitus with complication, with long-term current use of insulin [E11.8, Z79.4]  Not Applicable    Periprosthetic fracture around prosthetic knee [M97.8XXA, Z96.659]  Not Applicable    Falls [R29.6]  Not Applicable    Atrial fibrillation [I48.91]  Yes    Chronic anticoagulation [Z79.01]  Not Applicable    Essential hypertension [I10]  Yes    Hypothyroidism (acquired) [E03.9]  Yes      Resolved Hospital Problems   No resolved problems to display.        Brief Hospital Course to date:  Chey Robertson is a 89 y.o. female with history of afib (on eliquis), htn, dm2, hypothyroidism, MDS (w/ pancytopenia, followed by Dr. Lyman), previous PE (on eliquis), previous left knee replacement.   Presented to [facility] ED w/ left knee pain after sustaining a fall at her SNF.   CT imaging revealed left distal femoral impaction fracture.        Left supracondylar fracture of the distal femur.   Hx previous left knee replacement  -Orthopedic surgery evaluated, s/p repair 5/20  Due to magnitude of surgery ortho requested eliquis be held ~72 hours prior to surgery, but Hb dropped to 6.8 on 5/22  -2 units of blood ordered 5/22, repeat CBC in a.m.    -Continue to hold Eliquis until Hb is stable     Parox afib (currently in sinus)  Chronic HFpEF  HTN  -echo 6/2023: LV EF 56-60%, valves ok  - Continue amiodarone & metoprolol, lisinopril  - Eliquis on hold due to ongoing anemia       UTI  - Completed rocephin for 5 days  - Urine culture with Klebsiella, sensitive to Rocephin        Hx PE (perioperative after previous knee replacement)     MDS  Pancytopenia  Iron deficiency  -follows w/ Dr. Lyman (last seen 3/3/25)   -typical plts range  40,000-60,000     -Transfused platelets 5/20  -Completed 3 days of IV iron, last dose 5/20  -2 units of PRBCs ordered 5/22    DM2  -A1c 6.2  -on januvia and metformin  - Continue ssi     Hypothyroidism  -cont levothyroxine     Expected Discharge Location and Transportation: [facility]  Expected Discharge   Expected Discharge Date: 5/22/2025; Expected Discharge Time:      VTE Prophylaxis:  Pharmacologic & mechanical VTE prophylaxis orders are present.         AM-PAC 6 Clicks Score (PT): 15 (05/22/25 1031)    CODE STATUS:   Code Status and Medical Interventions: CPR (Attempt to Resuscitate); Full   Ordered at: 05/20/25 2049     Code Status (Patient has no pulse and is not breathing):    CPR (Attempt to Resuscitate)     Medical Interventions (Patient has pulse or is breathing):    Full     Level Of Support Discussed With:    Patient       Diya Campoverde Jose,   05/22/25

## 2025-05-21 NOTE — CASE MANAGEMENT/SOCIAL WORK
Continued Stay Note   Jorge     Patient Name: Chey Robertson  MRN: [id number]  Today's Date: 5/21/2025    Admit Date: 5/17/2025    Plan: Skilled rehab   Discharge Plan       Row Name 05/21/25 1212       Plan    Plan Skilled rehab    Patient/Family in Agreement with Plan yes    Plan Comments Met with patient at the bedside to discuss discharge plan. Patient is agreeable to therapy's recommendation for inpatient rehab. [facility] given to [facility], who says [facility] have no open rehab beds at this time. Attempted to call patient's son, Joe, to update and discuss other rehab options. Voicemail left. CM will continue to follow and assist with discharge planning.    5/21/2025  Referral given to Dominique Boston.     14:31 EDT  Referral given to Aminta with [facility] Re[facility] at [facility].     Final Discharge Disposition Code 03 - skilled nursing facility (SNF)                   Discharge Codes    No documentation.                 Expected Discharge Date and Time       Expected Discharge Date Expected Discharge Time    May 22, 2025               Augie Cordova RN

## 2025-05-21 NOTE — THERAPY EVALUATION
Patient Name: Chey Robertson  : 1935    MRN: [id number]                              Today's Date: 2025       Admit Date: 2025    Visit Dx:     ICD-10-CM ICD-9-CM   1. Fall at nursing home, initial encounter  W19.XXXA E888.9    Y92.129 E849.7   2. 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 Anticoagulated  Z79.01 V58.61   4. Periprosthetic fracture around internal prosthetic right knee joint, subsequent encounter  M97.11XD V54.89     Patient Active Problem List   Diagnosis    Benign familial tremor    AMS (altered mental status)    Pancytopenia    Hypothyroidism (acquired)    B12 deficiency    Abnormal intestinal absorption    Iron deficiency anemia due to chronic blood loss    Myelodysplasia (myelodysplastic syndrome)    Personal history of pulmonary embolism    Chronic anticoagulation    Essential hypertension    Type 2 diabetes mellitus without complication, without long-term current use of insulin    Atrial fibrillation    QT prolongation    Pericardial effusion    Severe malnutrition    Closed displaced intertrochanteric fracture of right femur, initial encounter    DISHA (acute kidney injury)    Moderate malnutrition    Falls    Type 2 diabetes mellitus with complication, with long-term current use of insulin    Periprosthetic fracture around prosthetic knee    Femur fracture     Past Medical History:   Diagnosis Date    A-fib     on eliquis    Anemia     Arthritis     Diabetes mellitus     checks sugar only when pt wants to     Disease of thyroid gland     History of transfusion     with knee surgery-  no reaction recalled.     [place] (hard of hearing)     no hearing aids    Hypertension     Migraine without aura and without status migrainosus, not intractable 2016    Myelodysplastic syndrome     Pulmonary emboli     (after knee surgery)    SOBOE (shortness of breath on exertion)     Tremors of nervous system     Vertigo      "Wears dentures     upper     Wears glasses      Past Surgical History:   Procedure Laterality Date    BLADDER SURGERY      CATARACT EXTRACTION      bilat     CHOLECYSTECTOMY      COLONOSCOPY      HIP TROCHANTERIC NAILING WITH INTRAMEDULLARY HIP SCREW Right 6/20/2023    Procedure: HIP TROCHANTERIC NAILING WITH INTRAMEDULLARY HIP SCREW RIGHT;  Surgeon: Augie Hobbs MD;  Location:  [facility];  Service: Orthopedics;  Laterality: Right;    HYSTERECTOMY      with bso    KYPHOPLASTY N/A 02/12/2021    Procedure: KYPHOPLASTY T11, T12 AND L4;  Surgeon: Matty Hearn MD;  Location:  [facility];  Service: Orthopedic Spine;  Laterality: N/A;    TONSILLECTOMY        General Information       Row Name 05/21/25 0951          Physical Therapy Time and Intention    Document Type evaluation  -CK     Mode of Treatment co-treatment;physical therapy  -CK       Row Name 05/21/25 0951          General Information    Patient Profile Reviewed yes  -CK     Prior Level of Function independent:;gait;transfer;bed mobility;max assist:;ADL's  No family present to clarify PLOF. Per chart review, pt was up in dining room with rollator. Pt reports that she has assist for \"all of her ADLs.\"  -CK     Existing Precautions/Restrictions fall;brace worn when out of bed;other (see comments)  s/p Revision L TKA, WBAT, KI AAT x2 weeks, Femoral NC, incisional wound vac  -CK     Barriers to Rehab previous functional deficit  -CK       Row Name 05/21/25 0951          Living Environment    Current Living Arrangements assisted living facility  -CK     People in Home facility resident  -CK       Row Name 05/21/25 0951          Home Main Entrance    Number of Stairs, Main Entrance none  -CK       Row Name 05/21/25 0951          Stairs Within Home, Primary    Number of Stairs, Within Home, Primary none  -CK       Row Name 05/21/25 0951          Cognition    Orientation Status (Cognition) oriented x 4;verbal cues/prompts needed for orientation  -CK       Row Name " 05/21/25 0951          Safety Issues/Impairments Affecting Functional Mobility    Safety Issues Affecting Function (Mobility) awareness of need for assistance;insight into deficits/self-awareness;positioning of assistive device;problem-solving;safety precaution awareness;safety precautions follow-through/compliance;sequencing abilities  -CK     Impairments Affecting Function (Mobility) balance;endurance/activity tolerance;strength;sensation/sensory awareness;range of motion (ROM);postural/trunk control;motor control  -CK               User Key  (r) = Recorded By, (t) = Taken By, (c) = Cosigned By      Initials Name Provider Type    CK Keke Cook, PT Physical Therapist                   Mobility       Row Name 05/21/25 0953 05/21/25 0843       Bed Mobility    Bed Mobility --  -CK supine-sit  -CK    Supine-Sit [place] (Bed Mobility) --  -CK moderate assist (50% patient effort);2 person assist;verbal cues  -CK    Assistive Device (Bed Mobility) --  -CK head of bed elevated;repositioning sheet  -CK    Comment, (Bed Mobility) --  -CK cues for sequencing, patient able to advance BLEs towards EOB, required assist at trunk to lift from HOB and scoot hips to EOB  -CK      Row Name 05/21/25 0953 05/21/25 0843       Transfers    Comment, (Transfers) --  -CK KI already donned upon therapist arrival  -CK      Row Name 05/21/25 0953 05/21/25 0843       Sit-Stand Transfer    Sit-Stand Barry (Transfers) --  -CK moderate assist (50% patient effort);2 person assist;verbal cues  -CK    Assistive Device (Sit-Stand Transfers) --  -CK walker, front-wheeled  -CK    Comment, (Sit-Stand Transfer) --  -CK cues for safe hand placement and to bring LLE back under CHARLEEN upon standing, assist required to clear hips and maintain balance  -CK      Row Name 05/21/25 0953 05/21/25 0843       Gait/Stairs (Locomotion)    [place] Level (Gait) --  -CK moderate assist (50% patient effort);2 person assist;1 person to manage  equipment;verbal cues  -CK    Assistive Device (Gait) --  -CK walker, front-wheeled  -CK    Patient was able to Ambulate --  -CK yes  -CK    Distance in Feet (Gait) --  -CK 10  -CK    Deviations/Abnormal Patterns (Gait) --  -CK bilateral deviations;base of support, narrow;[place] decreased;gait speed decreased;stride length decreased  -CK    Left Sided Gait Deviations --  -CK weight shift ability decreased;knee buckling, left side  -CK    Comment, (Gait/Stairs) --  -CK Patient able to take steps away from bed with cues and assist for sequencing with walker. She demo'd L knee buckling inside knee immobilizer as she continued to ambulate limiting her total distance and chair was brought up behind her to rest. She may benefit from KI that covers her knee and can be tightened further for future ambulation.  -CK      Row Name 05/21/25 0953 05/21/25 0843       Mobility    Extremity Weight-bearing Status --  -CK left lower extremity  -CK    Left Lower Extremity (Weight-bearing Status) --  -CK weight-bearing as tolerated (WBAT)  -CK              User Key  (r) = Recorded By, (t) = Taken By, (c) = Cosigned By      Initials Name Provider Type    CK Keke Cook, PT Physical Therapist                   Obj/Interventions       Row Name 05/21/25 0957          Range of Motion Comprehensive    General Range of Motion bilateral lower extremity ROM WFL  -CK     Comment, General Range of Motion BLE WFL aside from L knee in KI  -CK       Row Name 05/21/25 0957          Strength Comprehensive (MMT)    General Manual Muscle Testing (MMT) Assessment lower extremity strength deficits identified  -CK     Comment, General Manual Muscle Testing (MMT) Assessment RLE grossly 4-/5, L hip 3/5, knee not formally assess in KI but buckling noted inside KI with mobility, ankle 3/5  -CK       Row Name 05/21/25 0957          Motor Skills    Therapeutic Exercise other (see comments)  plan to initiate in PM session  -CK       Row Name 05/21/25 0957           Balance    Balance Assessment sitting static balance;standing static balance;standing dynamic balance  -CK     Static Sitting Balance contact guard  -CK     Position, Sitting Balance unsupported;sitting edge of bed  -CK     Static Standing Balance minimal assist  -CK     Dynamic Standing Balance moderate assist  -CK     Position/Device Used, Standing Balance supported;walker, front-wheeled  -CK     Comment, Balance L knee buckling in KI with ambulation  -CK       Row Name 05/21/25 0957          Sensory Assessment (Somatosensory)    Sensory Assessment (Somatosensory) LE sensation intact  -CK               User Key  (r) = Recorded By, (t) = Taken By, (c) = Cosigned By      Initials Name Provider Type    CK Keke Cook, PT Physical Therapist                   Goals/Plan       Row Name 05/21/25 1001          Bed Mobility Goal 1 (PT)    Activity/Assistive Device (Bed Mobility Goal 1, PT) sit to supine/supine to sit  -CK     [place] Level/Cues Needed (Bed Mobility Goal 1, PT) minimum assist (75% or more patient effort)  -CK     Time Frame (Bed Mobility Goal 1, PT) short term goal (STG);3 days  -CK     Progress/Outcomes (Bed Mobility Goal 1, PT) new goal  -CK       Row Name 05/21/25 1001          Transfer Goal 1 (PT)    Activity/Assistive Device (Transfer Goal 1, PT) sit-to-stand/stand-to-sit;bed-to-chair/chair-to-bed  -CK     [place] Level/Cues Needed (Transfer Goal 1, PT) minimum assist (75% or more patient effort)  -CK     Time Frame (Transfer Goal 1, PT) long term goal (LTG);10 days  -CK     Progress/Outcome (Transfer Goal 1, PT) new goal  -CK       Row Name 05/21/25 1001          Gait Training Goal 1 (PT)    Activity/Assistive Device (Gait Training Goal 1, PT) gait (walking locomotion);assistive device use  -CK     [place] Level (Gait Training Goal 1, PT) minimum assist (75% or more patient effort)  -CK     Distance (Gait Training Goal 1, PT) 100'  -CK     Time Frame (Gait Training Goal  1, PT) long term goal (LTG);10 days  -CK     Progress/Outcome (Gait Training Goal 1, PT) new goal  -CK       Row Name 05/21/25 1001          Therapy Assessment/Plan (PT)    Planned Therapy Interventions (PT) balance training;bed mobility training;gait training;home exercise program;neuromuscular re-education;transfer training;stretching;strengthening;stair training;postural re-education;patient/family education  -CK               User Key  (r) = Recorded By, (t) = Taken By, (c) = Cosigned By      Initials Name Provider Type    CK Keke Cook, PT Physical Therapist                   Clinical Impression       Row Name 05/21/25 0958          Pain    Pretreatment Pain Rating 0/10 - no pain  -CK     Posttreatment Pain Rating 0/10 - no pain  -CK       Row Name 05/21/25 0958          Plan of Care Review    Plan of Care Reviewed With patient  -CK     Outcome Evaluation Patient presents with deficits in strength, endurance, balance, and ROM with functional mobility that is below her baseline. She required grossly modAx2 for bed mobility and ambulation 10' with FWW today. KI donned throughout session and buckling inside KI noted with ambulation. IPPT is indicated to address current deficits. Recommend D/C to SNF.  -CK       Row Name 05/21/25 0957          Therapy Assessment/Plan (PT)    Patient/Family Therapy Goals Statement (PT) be able to move again  -CK     Rehab Potential (PT) good  -CK     Criteria for Skilled Interventions Met (PT) yes;meets criteria;skilled treatment is necessary  -CK     Therapy Frequency (PT) 2 times/day  -CK     Predicted Duration of Therapy Intervention (PT) 10 days  -CK       Row Name 05/21/25 0958          Vital Signs    Pre Systolic BP Rehab 151  -CK     Pre Treatment Diastolic BP 64  -CK     Post Systolic BP Rehab 164  -CK     Post Treatment Diastolic BP 60  -CK     Pretreatment Heart Rate (beats/min) 59  -CK     Posttreatment Heart Rate (beats/min) 60  -CK     Pre SpO2 (%) 98  -CK      O2 Delivery Pre Treatment nasal cannula  -CK     Intra SpO2 (%) 94  -CK     O2 Delivery Intra Treatment room air  -CK     Post SpO2 (%) 92  -CK     O2 Delivery Post Treatment room air  -CK     Pre Patient Position Supine  -CK     Post Patient Position Sitting  -CK       Row Name 05/21/25 0958          Positioning and Restraints    Pre-Treatment Position in bed  -CK     Post Treatment Position chair  -CK     In Chair reclined;call light within reach;encouraged to call for assist;exit alarm on;waffle cushion;compression device;L knee immobilizer;notified nsg  -CK               User Key  (r) = Recorded By, (t) = Taken By, (c) = Cosigned By      Initials Name Provider Type    CK Keke Cook, PT Physical Therapist                   Outcome Measures       Row Name 05/21/25 1001          How much help from another person do you currently need...    Turning from your back to your side while in flat bed without using bedrails? 2  -CK     Moving from lying on back to sitting on the side of a flat bed without bedrails? 2  -CK     Moving to and from a bed to a chair (including a wheelchair)? 2  -CK     Standing up from a chair using your arms (e.g., wheelchair, bedside chair)? 2  -CK     Climbing 3-5 steps with a railing? 2  -CK     To walk in hospital room? 2  -CK     AM-PAC 6 Clicks Score (PT) 12  -CK     Highest Level of Mobility Goal Move to Chair/Commode-4  -CK       Row Name 05/21/25 1001          PADD    Diagnosis 1  -CK     Gender 1  -CK     Age Group 0  -CK     Gait Distance 0  -CK     Assist Level 0  -CK     Home Support 3  -CK     PADD Score 5  -CK     Patient Preference extended rehabilitation  -CK     Prediction by PADD Score extended rehabilitation  -CK       Row Name 05/21/25 1001 05/21/25 0933       Functional Assessment    Outcome Measure Options AM-PAC 6 Clicks Basic Mobility (PT);PADD  -CK AM-PAC 6 Clicks Daily Activity (OT)  -TB              User Key  (r) = Recorded By, (t) = Taken By, (c) = Cosigned  By      Initials Name Provider Type    TB Mary Buckley, OT Occupational Therapist    CK Keke Cook, PT Physical Therapist                                 Physical Therapy Education       Title: PT OT SLP Therapies (In Progress)       Topic: Physical Therapy (In Progress)       Point: Mobility training (Done)       Learning Progress Summary            Patient Acceptance, E, NR,VU by CK at 5/21/2025 1006                      Point: Home exercise program (Not Started)       Learner Progress:  Not documented in this visit.              Point: Body mechanics (Done)       Learning Progress Summary            Patient Acceptance, E, NR,VU by CK at 5/21/2025 1006                      Point: Precautions (Done)       Learning Progress Summary            Patient Acceptance, E, NR,VU by CK at 5/21/2025 1006                                      User Key       Initials Effective Dates Name Provider Type Discipline     02/06/24 -  Keke Cook, PT Physical Therapist PT                  PT Recommendation and Plan  Planned Therapy Interventions (PT): balance training, bed mobility training, gait training, home exercise program, neuromuscular re-education, transfer training, stretching, strengthening, stair training, postural re-education, patient/family education  Outcome Evaluation: Patient presents with deficits in strength, endurance, balance, and ROM with functional mobility that is below her baseline. She required grossly modAx2 for bed mobility and ambulation 10' with FWW today. KI donned throughout session and buckling inside KI noted with ambulation. IPPT is indicated to address current deficits. Recommend D/C to SNF.     Time Calculation:   PT Evaluation Complexity  History, PT Evaluation Complexity: 3 or more personal factors and/or comorbidities  Examination of Body Systems (PT Eval Complexity): total of 3 or more elements  Clinical Presentation (PT Evaluation Complexity): evolving  Clinical  Decision Making (PT Evaluation Complexity): moderate complexity  Overall Complexity (PT Evaluation Complexity): moderate complexity     PT Charges       Row Name 05/21/25 1007             Time Calculation    Start Time 0843  -CK      PT Received On 05/21/25  -CK      PT Goal Re-Cert Due Date 05/31/25  -CK         Untimed Charges    PT Eval/Re-eval Minutes 47  -CK         Total Minutes    Untimed Charges Total Minutes 47  -CK       Total Minutes 47  -CK                User Key  (r) = Recorded By, (t) = Taken By, (c) = Cosigned By      Initials Name Provider Type    CK Keke Cook, PT Physical Therapist                  Therapy Charges for Today       Code Description Service Date Service Provider Modifiers Qty    86634800823 HC PT EVAL MOD COMPLEXITY 4 5/21/2025 Keke Cook, PT GP 1            PT G-Codes  Outcome Measure Options: AM-PAC 6 Clicks Basic Mobility (PT), PADD  AM-PAC 6 Clicks Score (PT): 12  AM-PAC 6 Clicks Score (OT): 11  PT Discharge Summary  Anticipated Discharge Disposition (PT): skilled nursing facility    Keke Cook, GURPREET  5/21/2025

## 2025-05-21 NOTE — PROGRESS NOTES
[facility]    Acute pain service Inpatient Progress Note    Patient Name: Chey Robertson  :  1935  MRN:  [id number]        Acute Pain  Service Inpatient Progress Note:    Analgesia:Good  Pain Score:0/10  LOC: alert and awake  Resp Status: room air  Cardiac: VS stable  Side Effects:None  Catheter Site:clean, dressing intact and dry  Cath type: peripheral nerve cath(InfuSystem)  Volume: 1mL,8ml, 8ml InfuSystem Pump.  Catheter Plan:Catheter to remain Insitu and Continue catheter infusion rate unchanged  Comments:

## 2025-05-21 NOTE — THERAPY TREATMENT NOTE
Patient Name: Chye Robertson  : 1935    MRN: [id number]                              Today's Date: 2025       Admit Date: 2025    Visit Dx:     ICD-10-CM ICD-9-CM   1. Fall at nursing home, initial encounter  W19.XXXA E888.9    Y92.129 E849.7   2. 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 Anticoagulated  Z79.01 V58.61   4. Periprosthetic fracture around internal prosthetic right knee joint, subsequent encounter  M97.11XD V54.89     Patient Active Problem List   Diagnosis    Benign familial tremor    AMS (altered mental status)    Pancytopenia    Hypothyroidism (acquired)    B12 deficiency    Abnormal intestinal absorption    Iron deficiency anemia due to chronic blood loss    Myelodysplasia (myelodysplastic syndrome)    Personal history of pulmonary embolism    Chronic anticoagulation    Essential hypertension    Type 2 diabetes mellitus without complication, without long-term current use of insulin    Atrial fibrillation    QT prolongation    Pericardial effusion    Severe malnutrition    Closed displaced intertrochanteric fracture of right femur, initial encounter    DISHA (acute kidney injury)    Moderate malnutrition    Falls    Type 2 diabetes mellitus with complication, with long-term current use of insulin    Periprosthetic fracture around prosthetic knee    Femur fracture     Past Medical History:   Diagnosis Date    A-fib     on eliquis    Anemia     Arthritis     Diabetes mellitus     checks sugar only when pt wants to     Disease of thyroid gland     History of transfusion     with knee surgery-  no reaction recalled.     [place] (hard of hearing)     no hearing aids    Hypertension     Migraine without aura and without status migrainosus, not intractable 2016    Myelodysplastic syndrome     Pulmonary emboli     (after knee surgery)    SOBOE (shortness of breath on exertion)     Tremors of nervous system     Vertigo      "Wears dentures     upper     Wears glasses      Past Surgical History:   Procedure Laterality Date    BLADDER SURGERY      CATARACT EXTRACTION      bilat     CHOLECYSTECTOMY      COLONOSCOPY      HIP TROCHANTERIC NAILING WITH INTRAMEDULLARY HIP SCREW Right 6/20/2023    Procedure: HIP TROCHANTERIC NAILING WITH INTRAMEDULLARY HIP SCREW RIGHT;  Surgeon: Augie Hobbs MD;  Location:  [facility];  Service: Orthopedics;  Laterality: Right;    HYSTERECTOMY      with bso    KYPHOPLASTY N/A 02/12/2021    Procedure: KYPHOPLASTY T11, T12 AND L4;  Surgeon: Matty Hearn MD;  Location:  [facility];  Service: Orthopedic Spine;  Laterality: N/A;    TONSILLECTOMY        General Information       Row Name 05/21/25 1603 05/21/25 0951       Physical Therapy Time and Intention    Document Type therapy note (daily note)  -CK evaluation  -CK    Mode of Treatment physical therapy;individual therapy  -CK co-treatment;physical therapy  -CK      Row Name 05/21/25 1603 05/21/25 0951       General Information    Patient Profile Reviewed yes  -CK yes  -CK    Prior Level of Function -- independent:;gait;transfer;bed mobility;max assist:;ADL's  No family present to clarify PLOF. Per chart review, pt was up in dining room with rollator. Pt reports that she has assist for \"all of her ADLs.\"  -CK    Existing Precautions/Restrictions fall;brace worn when out of bed;other (see comments)  s/p Revision L TKA, WBAT, KI AAT x2 weeks no ROM, Femoral NC, incisional wound vac  -CK fall;brace worn when out of bed;other (see comments)  s/p Revision L TKA, WBAT, KI AAT x2 weeks, Femoral NC, incisional wound vac  -CK    Barriers to Rehab previous functional deficit  -CK previous functional deficit  -CK      Row Name 05/21/25 0951          Living Environment    Current Living Arrangements assisted living facility  -CK     People in Home facility resident  -CK       Row Name 05/21/25 0951          Home Main Entrance    Number of Stairs, Main Entrance none  -CK  "      Row Name 05/21/25 0951          Stairs Within Home, Primary    Number of Stairs, Within Home, Primary none  -CK       Row Name 05/21/25 1603 05/21/25 0951       Cognition    Orientation Status (Cognition) oriented x 3  -CK oriented x 4;verbal cues/prompts needed for orientation  -CK      Row Name 05/21/25 1603 05/21/25 0951       Safety Issues/Impairments Affecting Functional Mobility    Safety Issues Affecting Function (Mobility) awareness of need for assistance;insight into deficits/self-awareness;positioning of assistive device;problem-solving;safety precaution awareness;safety precautions follow-through/compliance;sequencing abilities  -CK awareness of need for assistance;insight into deficits/self-awareness;positioning of assistive device;problem-solving;safety precaution awareness;safety precautions follow-through/compliance;sequencing abilities  -CK    Impairments Affecting Function (Mobility) balance;endurance/activity tolerance;strength;sensation/sensory awareness;range of motion (ROM);postural/trunk control;motor control  -CK balance;endurance/activity tolerance;strength;sensation/sensory awareness;range of motion (ROM);postural/trunk control;motor control  -CK              User Key  (r) = Recorded By, (t) = Taken By, (c) = Cosigned By      Initials Name Provider Type    CK Keke Cook, PT Physical Therapist                   Mobility       Row Name 05/21/25 1604 05/21/25 0953       Bed Mobility    Bed Mobility sit-supine  -CK --  -CK    Supine-Sit [place] (Bed Mobility) -- --  -CK    Sit-Supine [place] (Bed Mobility) maximum assist (25% patient effort)  -CK --    Assistive Device (Bed Mobility) -- --  -CK    Comment, (Bed Mobility) cues for sequencing, patient required assist at BLEs and trunk for sit to sup  -CK --  -CK      Row Name 05/21/25 0843          Bed Mobility    Bed Mobility supine-sit  -CK     Supine-Sit [place] (Bed Mobility) moderate assist (50% patient effort);2  person assist;verbal cues  -CK     Assistive Device (Bed Mobility) head of bed elevated;repositioning sheet  -CK     Comment, (Bed Mobility) cues for sequencing, patient able to advance BLEs towards EOB, required assist at trunk to lift from HOB and scoot hips to EOB  -CK       Row Name 05/21/25 1604 05/21/25 0953       Transfers    Comment, (Transfers) placed new KI at beginning of session in attempt to provide additional support due to previous knee buckling  -CK --  -CK      Row Name 05/21/25 0843          Transfers    Comment, (Transfers) KI already donned upon therapist arrival  -CK       Row Name 05/21/25 1604 05/21/25 0953       Sit-Stand Transfer    Sit-Stand [place] (Transfers) moderate assist (50% patient effort);2 person assist;verbal cues  -CK --  -CK    Assistive Device (Sit-Stand Transfers) walker, front-wheeled  -CK --  -CK    Comment, (Sit-Stand Transfer) cues for safe hand placement, strong boost to clear hips from chair. Multiple cues and demonstration provided to step out LLE prior to sitting, patient requires manual assist for this  -CK --  -CK      Row Name 05/21/25 0843          Sit-Stand Transfer    Sit-Stand [place] (Transfers) moderate assist (50% patient effort);2 person assist;verbal cues  -CK     Assistive Device (Sit-Stand Transfers) walker, front-wheeled  -CK     Comment, (Sit-Stand Transfer) cues for safe hand placement and to bring LLE back under [place] upon standing, assist required to clear hips and maintain balance  -CK       Row Name 05/21/25 1604 05/21/25 0953       Gait/Stairs (Locomotion)    [place] Level (Gait) minimum assist (75% patient effort);1 person assist;1 person to manage equipment;verbal cues  -CK --  -CK    Assistive Device (Gait) walker, front-wheeled  -CK --  -CK    Patient was able to Ambulate -- --  -CK    Distance in Feet (Gait) 15  -CK --  -CK    Deviations/Abnormal Patterns (Gait) bilateral deviations;base of support, narrow;[place] decreased;gait  speed decreased;stride length decreased  -CK --  -CK    Left Sided Gait Deviations weight shift ability decreased;knee buckling, left side  -CK --  -CK    Comment, (Gait/Stairs) Patient ambulated with step to gait pattern, improved support provided from tighter KI this afternoon, however patient continued to demo knee buckling even inside this immobilizer. Cues provided for increased step length and upright posture. Distance limited by fatigue and L knee buckling inside KI. Chair brought up behind her to rest.  -CK --  -CK      Row Name 05/21/25 0843          Gait/Stairs (Locomotion)    [place] Level (Gait) moderate assist (50% patient effort);2 person assist;1 person to manage equipment;verbal cues  -CK     Assistive Device (Gait) walker, front-wheeled  -CK     Patient was able to Ambulate yes  -CK     Distance in Feet (Gait) 10  -CK     Deviations/Abnormal Patterns (Gait) bilateral deviations;base of support, narrow;[place] decreased;gait speed decreased;stride length decreased  -CK     Left Sided Gait Deviations weight shift ability decreased;knee buckling, left side  -CK     Comment, (Gait/Stairs) Patient able to take steps away from bed with cues and assist for sequencing with walker. She demo'd L knee buckling inside knee immobilizer as she continued to ambulate limiting her total distance and chair was brought up behind her to rest. She may benefit from KI that covers her knee and can be tightened further for future ambulation.  -CK       Row Name 05/21/25 1604 05/21/25 0953       Mobility    Extremity Weight-bearing Status left lower extremity  -CK --  -CK    Left Lower Extremity (Weight-bearing Status) weight-bearing as tolerated (WBAT)  -CK --  -CK      Row Name 05/21/25 0843          Mobility    Extremity Weight-bearing Status left lower extremity  -CK     Left Lower Extremity (Weight-bearing Status) weight-bearing as tolerated (WBAT)  -CK               User Key  (r) = Recorded By, (t) = Taken By,  (c) = Cosigned By      Initials Name Provider Type    CK Keke Cook PT Physical Therapist                   Obj/Interventions       Row Name 05/21/25 0957          Range of Motion Comprehensive    General Range of Motion bilateral lower extremity ROM WFL  -CK     Comment, General Range of Motion BLE WFL aside from L knee in KI  -CK       Row Name 05/21/25 0957          Strength Comprehensive (MMT)    General Manual Muscle Testing (MMT) Assessment lower extremity strength deficits identified  -CK     Comment, General Manual Muscle Testing (MMT) Assessment RLE grossly 4-/5, L hip 3/5, knee not formally assess in KI but buckling noted inside KI with mobility, ankle 3/5  -CK       Row Name 05/21/25 1610 05/21/25 0957       Motor Skills    Therapeutic Exercise hip;knee;ankle  -CK other (see comments)  plan to initiate in PM session  -CK      Row Name 05/21/25 1610          Hip (Therapeutic Exercise)    Hip (Therapeutic Exercise) isometric exercises  -CK     Hip Isometrics (Therapeutic Exercise) bilateral;gluteal sets;10 repetitions;3 second hold  -CK       Row Name 05/21/25 1610          Knee (Therapeutic Exercise)    Knee (Therapeutic Exercise) isometric exercises  -CK     Knee Isometrics (Therapeutic Exercise) bilateral;quad sets;10 repetitions;3 second hold  -CK       Row Name 05/21/25 1610          Ankle (Therapeutic Exercise)    Ankle (Therapeutic Exercise) AROM (active range of motion)  -CK     Ankle AROM (Therapeutic Exercise) bilateral;dorsiflexion;plantarflexion;10 repetitions  -CK       Row Name 05/21/25 1610 05/21/25 0957       Balance    Balance Assessment sitting static balance;standing static balance;standing dynamic balance  -CK sitting static balance;standing static balance;standing dynamic balance  -CK    Static Sitting Balance contact guard  -CK contact guard  -CK    Position, Sitting Balance unsupported;sitting edge of bed  -CK unsupported;sitting edge of bed  -CK    Static Standing Balance  minimal assist  -CK minimal assist  -CK    Dynamic Standing Balance minimal assist  -CK moderate assist  -CK    Position/Device Used, Standing Balance supported;walker, front-wheeled  -CK supported;walker, front-wheeled  -CK    Comment, Balance L knee buckling in KI  -CK L knee buckling in KI with ambulation  -CK      Row Name 05/21/25 0957          Sensory Assessment (Somatosensory)    Sensory Assessment (Somatosensory) LE sensation intact  -CK               User Key  (r) = Recorded By, (t) = Taken By, (c) = Cosigned By      Initials Name Provider Type    CK Keke Cook, PT Physical Therapist                   Goals/Plan       Row Name 05/21/25 1001          Bed Mobility Goal 1 (PT)    Activity/Assistive Device (Bed Mobility Goal 1, PT) sit to supine/supine to sit  -CK     [place] Level/Cues Needed (Bed Mobility Goal 1, PT) minimum assist (75% or more patient effort)  -CK     Time Frame (Bed Mobility Goal 1, PT) short term goal (STG);3 days  -CK     Progress/Outcomes (Bed Mobility Goal 1, PT) new goal  -CK       Row Name 05/21/25 1001          Transfer Goal 1 (PT)    Activity/Assistive Device (Transfer Goal 1, PT) sit-to-stand/stand-to-sit;bed-to-chair/chair-to-bed  -CK     [place] Level/Cues Needed (Transfer Goal 1, PT) minimum assist (75% or more patient effort)  -CK     Time Frame (Transfer Goal 1, PT) long term goal (LTG);10 days  -CK     Progress/Outcome (Transfer Goal 1, PT) new goal  -CK       Row Name 05/21/25 1001          Gait Training Goal 1 (PT)    Activity/Assistive Device (Gait Training Goal 1, PT) gait (walking locomotion);assistive device use  -CK     [place] Level (Gait Training Goal 1, PT) minimum assist (75% or more patient effort)  -CK     Distance (Gait Training Goal 1, PT) 100'  -CK     Time Frame (Gait Training Goal 1, PT) long term goal (LTG);10 days  -CK     Progress/Outcome (Gait Training Goal 1, PT) new goal  -CK       Row Name 05/21/25 1001          Therapy  Assessment/Plan (PT)    Planned Therapy Interventions (PT) balance training;bed mobility training;gait training;home exercise program;neuromuscular re-education;transfer training;stretching;strengthening;stair training;postural re-education;patient/family education  -CK               User Key  (r) = Recorded By, (t) = Taken By, (c) = Cosigned By      Initials Name Provider Type    CK Keke Cook, PT Physical Therapist                   Clinical Impression       Row Name 05/21/25 1610 05/21/25 0958       Pain    Pretreatment Pain Rating 0/10 - no pain  -CK 0/10 - no pain  -CK    Posttreatment Pain Rating 0/10 - no pain  -CK 0/10 - no pain  -CK      Row Name 05/21/25 1610 05/21/25 0953       Plan of Care Review    Plan of Care Reviewed With patient  -CK patient  -CK    Progress improving  -CK --    Outcome Evaluation Patient able to increase ambulation distance to 15' minAx1+1 with FWW, still limited by knee buckling despite KI adjustment. Initiated isometric therex with good effort from patient. IPPT remains indicated to address current deficits. Continue to recommend D/C to SNF for best functional outcomes.  -CK Patient presents with deficits in strength, endurance, balance, and ROM with functional mobility that is below her baseline. She required grossly modAx2 for bed mobility and ambulation 10' with FWW today. KI donned throughout session and buckling inside KI noted with ambulation. IPPT is indicated to address current deficits. Recommend D/C to SNF.  -CK      Row Name 05/21/25 0958          Therapy Assessment/Plan (PT)    Patient/Family Therapy Goals Statement (PT) be able to move again  -CK     Rehab Potential (PT) good  -CK     Criteria for Skilled Interventions Met (PT) yes;meets criteria;skilled treatment is necessary  -CK     Therapy Frequency (PT) 2 times/day  -CK     Predicted Duration of Therapy Intervention (PT) 10 days  -CK       Row Name 05/21/25 1610 05/21/25 0996       Vital Signs    Pre  Systolic BP Rehab -- 151  -CK    Pre Treatment Diastolic BP -- 64  -CK    Post Systolic BP Rehab -- 164  -CK    Post Treatment Diastolic BP -- 60  -CK    Pretreatment Heart Rate (beats/min) -- 59  -CK    Posttreatment Heart Rate (beats/min) -- 60  -CK    Pre SpO2 (%) -- 98  -CK    O2 Delivery Pre Treatment nasal cannula  -CK nasal cannula  -CK    Intra SpO2 (%) -- 94  -CK    O2 Delivery Intra Treatment room air  -CK room air  -CK    Post SpO2 (%) -- 92  -CK    O2 Delivery Post Treatment nasal cannula  -CK room air  -CK    Pre Patient Position -- Supine  -CK    Post Patient Position -- Sitting  -CK      Row Name 05/21/25 1610 05/21/25 0958       Positioning and Restraints    Pre-Treatment Position sitting in chair/recliner  -CK in bed  -CK    Post Treatment Position bed  -CK chair  -CK    In Bed fowlers;call light within reach;encouraged to call for assist;exit alarm on;notified nsg;SCD pump applied;L knee immobilizer;heels elevated  -CK --    In Chair -- reclined;call light within reach;encouraged to call for assist;exit alarm on;waffle cushion;compression device;L knee immobilizer;notified nsg  -CK              User Key  (r) = Recorded By, (t) = Taken By, (c) = Cosigned By      Initials Name Provider Type    CK Keke Cook, PT Physical Therapist                   Outcome Measures       Row Name 05/21/25 1613 05/21/25 1001       How much help from another person do you currently need...    Turning from your back to your side while in flat bed without using bedrails? 2  -CK 2  -CK    Moving from lying on back to sitting on the side of a flat bed without bedrails? 2  -CK 2  -CK    Moving to and from a bed to a chair (including a wheelchair)? 3  -CK 2  -CK    Standing up from a chair using your arms (e.g., wheelchair, bedside chair)? 3  -CK 2  -CK    Climbing 3-5 steps with a railing? 2  -CK 2  -CK    To walk in hospital room? 3  -CK 2  -CK    AM-PAC 6 Clicks Score (PT) 15  -CK 12  -CK    Highest Level of  Mobility Goal Move to Chair/Commode-4  -CK Move to Chair/Commode-4  -CK      Row Name 05/21/25 0800          How much help from another person do you currently need...    Turning from your back to your side while in flat bed without using bedrails? 2  -PT     Moving from lying on back to sitting on the side of a flat bed without bedrails? 2  -PT     Moving to and from a bed to a chair (including a wheelchair)? 2  -PT     Standing up from a chair using your arms (e.g., wheelchair, bedside chair)? 2  -PT     Climbing 3-5 steps with a railing? 2  -PT     To walk in hospital room? 2  -PT     AM-PAC 6 Clicks Score (PT) 12  -PT     Highest Level of Mobility Goal Move to Chair/Commode-4  -PT       Row Name 05/21/25 1001          PADD    Diagnosis 1  -CK     Gender 1  -CK     Age Group 0  -CK     Gait Distance 0  -CK     Assist Level 0  -CK     Home Support 3  -CK     PADD Score 5  -CK     Patient Preference extended rehabilitation  -CK     Prediction by PADD Score extended rehabilitation  -CK       Row Name 05/21/25 1613 05/21/25 1001       Functional Assessment    Outcome Measure Options AM-PAC 6 Clicks Basic Mobility (PT)  -CK AM-PAC 6 Clicks Basic Mobility (PT);PADD  -CK      Row Name 05/21/25 0933          Functional Assessment    Outcome Measure Options AM-PAC 6 Clicks Daily Activity (OT)  -TB               User Key  (r) = Recorded By, (t) = Taken By, (c) = Cosigned By      Initials Name Provider Type    TB Mary Buckley, OT Occupational Therapist    CK Keke Cook, PT Physical Therapist    PT Jey Rai, RN Registered Nurse                                 Physical Therapy Education       Title: PT OT SLP Therapies (In Progress)       Topic: Physical Therapy (In Progress)       Point: Mobility training (In Progress)       Learning Progress Summary            Patient Acceptance, E, NR by CK at 5/21/2025 1614    Acceptance, E, NR,VU by CK at 5/21/2025 1006                      Point: Home exercise  program (In Progress)       Learning Progress Summary            Patient Acceptance, E, NR by CK at 5/21/2025 1614                      Point: Body mechanics (In Progress)       Learning Progress Summary            Patient Acceptance, E, NR by CK at 5/21/2025 1614    Acceptance, E, NR,VU by CK at 5/21/2025 1006                      Point: Precautions (In Progress)       Learning Progress Summary            Patient Acceptance, E, NR by CK at 5/21/2025 1614    Acceptance, E, NR,VU by CK at 5/21/2025 1006                                      User Key       Initials Effective Dates Name Provider Type Discipline     02/06/24 -  Keke Cook, PT Physical Therapist PT                  PT Recommendation and Plan  Planned Therapy Interventions (PT): balance training, bed mobility training, gait training, home exercise program, neuromuscular re-education, transfer training, stretching, strengthening, stair training, postural re-education, patient/family education  Progress: improving  Outcome Evaluation: Patient able to increase ambulation distance to 15' minAx1+1 with FWW, still limited by knee buckling despite KI adjustment. Initiated isometric therex with good effort from patient. IPPT remains indicated to address current deficits. Continue to recommend D/C to SNF for best functional outcomes.     Time Calculation:   PT Evaluation Complexity  History, PT Evaluation Complexity: 3 or more personal factors and/or comorbidities  Examination of Body Systems (PT Eval Complexity): total of 3 or more elements  Clinical Presentation (PT Evaluation Complexity): evolving  Clinical Decision Making (PT Evaluation Complexity): moderate complexity  Overall Complexity (PT Evaluation Complexity): moderate complexity     PT Charges       Row Name 05/21/25 1614 05/21/25 1007          Time Calculation    Start Time 1513  -CK 0843  -CK     PT Received On 05/21/25  -CK 05/21/25  -CK     PT Goal Re-Cert Due Date -- 05/31/25  -CK         Timed Charges    88435 - PT Therapeutic Exercise Minutes 10  -CK --     03080 - Gait Training Minutes  15  -CK --     47734 - PT Therapeutic Activity Minutes 13  -CK --        Untimed Charges    PT Eval/Re-eval Minutes -- 47  -CK        Total Minutes    Timed Charges Total Minutes 38  -CK --     Untimed Charges Total Minutes -- 47  -CK      Total Minutes 38  -CK 47  -CK               User Key  (r) = Recorded By, (t) = Taken By, (c) = Cosigned By      Initials Name Provider Type    CK Kkee Cook, PT Physical Therapist                  Therapy Charges for Today       Code Description Service Date Service Provider Modifiers Qty    10121034576 HC PT EVAL MOD COMPLEXITY 4 5/21/2025 Keke Cook, PT GP 1    96178004747 HC PT THER PROC EA 15 MIN 5/21/2025 Keke Cook, PT GP 1    23868187838 HC GAIT TRAINING EA 15 MIN 5/21/2025 Keke Cook, PT GP 1    30828755783 HC PT THERAPEUTIC ACT EA 15 MIN 5/21/2025 Keke Cook, PT GP 1            PT G-Codes  Outcome Measure Options: AM-PAC 6 Clicks Basic Mobility (PT)  AM-PAC 6 Clicks Score (PT): 15  AM-PAC 6 Clicks Score (OT): 11  PT Discharge Summary  Anticipated Discharge Disposition (PT): skilled nursing facility    Keke Cook PT  5/21/2025

## 2025-05-21 NOTE — PROGRESS NOTES
[facility] Medicine Services  PROGRESS NOTE    Patient Name: Chey Robertson  : 1935  MRN: [id number]    Date of Admission: 2025  Primary Care Physician: Yasmeen Loomis MD    Subjective   Subjective     CC:  Left distal femur fx    HPI:  Sitting up in the chair, finished working with PT this morning.      Objective   Objective     Vital Signs:   Temp:  [96.6 °F (35.9 °C)-98.8 °F (37.1 °C)] 98.2 °F (36.8 °C)  Heart Rate:  [54-83] 56  Resp:  [12-18] 16  BP: (126-172)/(55-96) 126/55  Arterial Line BP: (173-175)/(48-58) 175/58  Flow (L/min) (Oxygen Therapy):  [2-5] 3     Physical Exam:  Constitutional: No acute distress, awake, alert  HENT: NCAT, mucous membranes moist  Respiratory: Respiratory effort normal   Gastrointestinal: Soft, nontender, nondistended  Musculoskeletal: knee immobilizer  Psychiatric: Appropriate affect, cooperative  Neurologic: Oriented x 2-3, speech clear  Skin: No rashes      Results Reviewed:  LAB RESULTS:      Lab 25  0610 25  0824 25  0544 25  1711 25  2149 25   WBC 2.32* 1.09* 1.52* 1.53*  --  1.76*   HEMOGLOBIN 7.6* 7.7* 7.9* 7.9*  --  8.1*   HEMATOCRIT 24.2* 24.1* 24.3* 24.1*  --  24.3*   PLATELETS 56* 46* 49* 48*  --  57*   NEUTROS ABS 2.01  --   --  0.95*  --  1.18*   IMMATURE GRANS (ABS) 0.05  --   --  0.04  --  0.02   LYMPHS ABS 0.10*  --   --  0.30*  --  0.38*   MONOS ABS 0.15  --   --  0.23  --  0.18   EOS ABS 0.01  --   --  0.01  --  0.00   MCV 92.0 91.3 90.0 89.3  --  88.7   PROTIME  --   --   --  16.0*  --   --    HSTROP T  --   --   --   --  21* 21*         Lab 25  0610 25  0824 25  0544 25  1711 25   SODIUM 133* 135* 135* 133* 134*   POTASSIUM 5.0 4.1 4.2 4.2 4.2   CHLORIDE 103 101 99 99 97*   CO2 16.0* 23.0 26.0 23.0 23.0   ANION GAP 14.0 11.0 10.0 11.0 14.0   BUN 21 14 18 20 30*   CREATININE 1.12* 0.96 0.99 1.04* 1.22*   EGFR 47.4* 57.0* 55.0* 51.8* 42.8*    GLUCOSE 314* 131* 151* 182* 286*   CALCIUM 7.8* 8.0* 8.3* 7.9* 8.3*   MAGNESIUM  --  2.3 3.2* 1.2*  --    PHOSPHORUS 4.3  --   --   --   --    HEMOGLOBIN A1C  --   --   --   --  6.20*         Lab 05/21/25  0610 05/18/25 1711 05/17/25 2008   TOTAL PROTEIN  --  5.5* 6.0   ALBUMIN 2.9* 3.3* 3.8   GLOBULIN  --  2.2 2.2   ALT (SGPT)  --  9 10   AST (SGOT)  --  12 10   BILIRUBIN  --  0.3 0.3   ALK PHOS  --  87 92         Lab 05/18/25 1711 05/17/25 2149 05/17/25 2008   HSTROP T  --  21* 21*   PROTIME 16.0*  --   --    INR 1.21*  --   --              Lab 05/18/25 1944 05/17/25 2149   IRON  --  40   IRON SATURATION (TSAT)  --  14*   TIBC  --  282*   TRANSFERRIN  --  189*   ABO TYPING O  --    RH TYPING Negative  --    ANTIBODY SCREEN Positive  --          Brief Urine Lab Results  (Last result in the past 365 days)        Color   Clarity   Blood   Leuk Est   Nitrite   Protein   CREAT   Urine HCG        05/17/25 2140 Yellow   Turbid   Negative   Large (3+)   Negative   30 mg/dL (1+)                   Microbiology Results Abnormal       Procedure Component Value - Date/Time    Urine Culture - Urine, Indwelling Urethral Catheter [[id number]]  (Abnormal)  (Susceptibility) Collected: 05/17/25 2140    Lab Status: Final result Specimen: Urine from Indwelling Urethral Catheter Updated: 05/21/25 0821     Urine Culture >100,000 CFU/mL Klebsiella pneumoniae ssp pneumoniae    Narrative:      Colonization of the urinary tract without infection is common. Treatment is discouraged unless the patient is symptomatic, pregnant, or undergoing an invasive urologic procedure.    Susceptibility        Klebsiella pneumoniae ssp pneumoniae      JUAN F      Amoxicillin + Clavulanate Susceptible      Ampicillin Resistant      Ampicillin + Sulbactam Susceptible      Cefazolin (Urine) Susceptible      Cefepime Susceptible      Ceftazidime Susceptible      Ceftriaxone Susceptible      Ciprofloxacin Resistant      Gentamicin Susceptible       Levofloxacin Resistant      Nitrofurantoin Intermediate      Piperacillin + Tazobactam Susceptible      Trimethoprim + Sulfamethoxazole Susceptible                                   Arterial Line  Result Date: 5/21/2025  Lance Mcgee MD     5/21/2025  6:42 AM Arterial Line Patient reassessed immediately prior to procedure Patient location during procedure: pre-op Line placed for hemodynamic monitoring. Performed By Anesthesiologist: Lance Mcgee MD Preanesthetic Checklist Completed: patient identified, IV checked, site marked, risks and benefits discussed, surgical consent, monitors and equipment checked, pre-op evaluation and timeout performed Arterial Line Prep  Sterile Tech: cap, gloves and sterile barriers Prep: ChloraPrep Patient monitoring: blood pressure monitoring, continuous pulse oximetry and EKG Arterial Line Procedure Laterality:right Location:  radial artery Catheter size: 20 G Guidance: ultrasound guided PROCEDURE NOTE/ULTRASOUND INTERPRETATION.  Using ultrasound guidance the potential vascular sites for insertion of the catheter were visualized to determine the patency of the vessel to be used for vascular access.  After selecting the appropriate site for insertion, the needle was visualized under ultrasound being inserted into the radial artery, followed by ultrasound confirmation of wire and catheter placement. There were no abnormalities seen on ultrasound; an image was taken; and the patient tolerated the procedure with no complications. Number of attempts: 1 Successful placement: yes Images: still images not obtained Post Assessment Dressing Type: occlusive dressing applied, secured with tape, wrist guard applied and biopatch applied. Complications no Circ/Move/Sens Assessment: normal and unchanged. Patient Tolerance: patient tolerated the procedure well with no apparent complications Additional Notes Performed on 5/20/25 at 15:15     XR Knee 1 or 2 View  Left  Result Date: 5/20/2025  XR KNEE 1 OR 2 VW LEFT Date of Exam: 5/20/2025 7:55 PM EDT Indication: Post-Op Knee Arthoplasty Comparison: None available. Findings: There are postoperative changes of left knee arthroplasty without radiographic evidence of complication. There is surrounding soft tissue edema, favored postoperative. Vascular calcifications.     Impression: Impression: Postoperative changes of left knee arthroplasty without radiographic evidence of complication. Electronically Signed: Zhou Paredes MD  5/20/2025 8:56 PM EDT  Workstation ID: [id number]    Peripheral Block  Result Date: 5/20/2025  Aniket Keller CRNA     5/20/2025  3:22 PM Peripheral Block Pre-sedation assessment completed: 5/20/2025 2:49 PM Patient reassessed immediately prior to procedure Start time: 5/20/2025 3:00 PM Stop time: 5/20/2025 3:10 PM Reason for block: at surgeon's request and post-op pain management Performed by CRNA/CAA: Aniket Keller CRNA Assisted by: Lili Wong RN Preanesthetic Checklist Completed: patient identified, IV checked, site marked, risks and benefits discussed, surgical consent, monitors and equipment checked, pre-op evaluation and timeout performed Prep: Pt Position: supine Sterile barriers:gloves, cap, sterile barriers, mask and washed/disinfected hands Prep: ChloraPrep Patient monitoring: blood pressure monitoring, continuous pulse oximetry and EKG Procedure Guidance:ultrasound guided ULTRASOUND INTERPRETATION.  Using ultrasound guidance a 20 G gauge needle was placed in close proximity to the femoral nerve, at which point, under ultrasound guidance anesthetic was injected in the area of the nerve and spread of the anesthesia was seen on ultrasound in close proximity thereto.  There were no abnormalities seen on ultrasound; a digital image was taken; and the patient tolerated the procedure with no complications. Images:still images obtained, printed/placed on chart Laterality:left Block  "Type:femoral Injection Technique:catheter Needle Type:echogenic and Tuohy Needle Gauge:20 G Resistance on Injection: none Cath Depth at skin: 10 cm Medications Used: fentaNYL citrate (PF) (SUBLIMAZE) injection - Intravenous  100 mcg - 5/20/2025 3:10:00 PM bupivacaine PF (MARCAINE) 0.25 % injection - Injection  30 mL - 5/20/2025 3:10:00 PM Medications Preservative Free Saline:10ml Post Assessment Injection Assessment: negative aspiration for heme, no paresthesia on injection and incremental injection Patient Tolerance:comfortable throughout block Complications:no Additional Notes CATHETER A high-frequency linear transducer, with sterile cover, was placed in the inguinal crease to visualize the Femoral Vein, Artery, and Nerve (medial to lateral). The insertion site was prepped in sterile fashion. Skin and cutaneous tissue was infiltrated with 2-5 ml of 1% Lidocaine. Using ultrasound-guidance, an 18-gauge Contiplex Ultra 360 Touhy Needle was then inserted and advanced in-plane from lateral to medial with ultrasound guidance. The Touhy needle was directed below Fascia Iliacus towards the Femoral nerve. Preservative-free normal saline was utilized for hydro-dissection of tissue. Local anesthetic injection spread, in incremental 3-5 ml injections, was visualized lateral to the artery to surround the femoral nerve. Aspiration every 5 ml to prevent intravascular injection. Injection was completed with negative aspiration of blood and negative intravascular injection. Injection pressures were normal with minimal resistance. A 20-gauge Contiplex Echo catheter was placed through the needle and advanced out the tip of the Touhy 1-3 cm. The Touhy needle was then removed, and final catheter position verified lateral to the femoral artery. The catheter was secured in the usual fashion with skin glue, benzoin, steri-strips, CHG tegaderm and Label noting \"Nerve Block Catheter\". Jerk tape applied at yellow connector and catheter " connection. Performed by: Aniket Keller CRNA       Results for orders placed during the hospital encounter of 06/05/23    Adult Transthoracic Echo Complete w/ Color, Spectral and Contrast if necessary per protocol    Interpretation Summary    Left ventricular systolic function is normal. Left ventricular ejection fraction appears to be 56 - 60%.    Left ventricular diastolic function was normal.    Estimated right ventricular systolic pressure from tricuspid regurgitation is normal (<35 mmHg).      Current medications:  Scheduled Meds:acetaminophen, 1,000 mg, Oral, Q8H  amiodarone, 200 mg, Oral, Daily  [Held by provider] apixaban, 2.5 mg, Oral, BID  cefTRIAXone, 1,000 mg, Intravenous, Q24H  insulin lispro, 2-7 Units, Subcutaneous, 4x Daily AC & at Bedtime  levothyroxine, 100 mcg, Oral, Q AM  [Held by provider] lisinopril, 5 mg, Oral, Daily  metoprolol tartrate, 25 mg, Oral, BID  pantoprazole, 40 mg, Oral, Q AM  sodium chloride, 10 mL, Intravenous, Q12H      Continuous Infusions:ropivacaine,       PRN Meds:.  acetaminophen **OR** acetaminophen **OR** acetaminophen    senna-docusate sodium **AND** polyethylene glycol **AND** bisacodyl **AND** bisacodyl    Calcium Replacement - Follow Nurse / BPA Driven Protocol    dextrose    dextrose    diphenhydrAMINE **OR** diphenhydrAMINE    glucagon (human recombinant)    HYDROmorphone **AND** naloxone    Magnesium Standard Dose Replacement - Follow Nurse / BPA Driven Protocol    melatonin    Morphine    nitroglycerin    ondansetron ODT **OR** ondansetron    oxyCODONE    oxyCODONE    Phosphorus Replacement - Follow Nurse / BPA Driven Protocol    Potassium Replacement - Follow Nurse / BPA Driven Protocol    sodium chloride    traMADol    Assessment & Plan   Assessment & Plan     Active Hospital Problems    Diagnosis  POA    **Femur fracture [S72.90XA]  Yes    Type 2 diabetes mellitus with complication, with long-term current use of insulin [E11.8, Z79.4]  Not Applicable     Periprosthetic fracture around prosthetic knee [M97.8XXA, Z96.659]  Not Applicable    Falls [R29.6]  Not Applicable    Atrial fibrillation [I48.91]  Yes    Chronic anticoagulation [Z79.01]  Not Applicable    Essential hypertension [I10]  Yes    Hypothyroidism (acquired) [E03.9]  Yes      Resolved Hospital Problems   No resolved problems to display.        Brief Hospital Course to date:  Chey Robertson is a 89 y.o. female with history of afib (on eliquis), htn, dm2, hypothyroidism, MDS (w/ pancytopenia, followed by Dr. Lyman), previous PE (on eliquis), previous left knee replacement.   Presented to [facility] ED w/ left knee pain after sustaining a fall at her SNF.   CT imaging revealed left distal femoral impaction fracture.        Left supracondylar fracture of the distal femur.   Hx previous left knee replacement  -Orthopedic surgery evaluated, s/p repair 5/20  Due to magnitude of surgery ortho requested eliquis be held ~72 hours prior to surgery, plan to restart if H&H remained stable  -pt/ot evals      Parox afib (currently in sinus)  Chronic HFpEF  HTN  -echo 6/2023: LV EF 56-60%, valves ok  - Continue amiodarone & metoprolol, lisinopril  - eliquis on hold, if H&H remains stable we will plan to start Eliquis 5/22     UTI  - Continue rocephin for 5 days  - Urine culture with Klebsiella, sensitive to Rocephin           Hx PE (perioperative after previous knee replacement)  -eliquis on hold, okay to restart per orthopedic surgery but would like to check CBC in a.m. as she did get 2 units of blood postop.     MDS  Pancytopenia  Iron deficiency  -follows w/ Dr. Lyman (last seen 3/3/25)  - Recommendation continue replace iv iron if iron sat <10%   planned holding/stopping anticoagulation if plts dropped below 30,000 or evidence bleeding     -typical plts range 40,000-60,000     -Transfused platelets 5/20  -Completed 3 days of IV iron, last dose 5/20     DM2  -A1c 6.2  -on januvia and metformin  - Continue ssi      Hypothyroidism  -cont levothyroxine     Expected Discharge Location and Transportation: SNF  Expected Discharge   Expected Discharge Date: 5/22/2025; Expected Discharge Time:      VTE Prophylaxis:  Pharmacologic & mechanical VTE prophylaxis orders are present.         AM-PAC 6 Clicks Score (PT): 12 (05/21/25 1001)    CODE STATUS:   Code Status and Medical Interventions: CPR (Attempt to Resuscitate); Full   Ordered at: 05/20/25 2049     Code Status (Patient has no pulse and is not breathing):    CPR (Attempt to Resuscitate)     Medical Interventions (Patient has pulse or is breathing):    Full     Level Of Support Discussed With:    Patient       Diya Campoverde Jose, DO  05/21/25

## 2025-05-21 NOTE — OP NOTE
TOTAL KNEE ARTHROPLASTY REVISION  Procedure Report    Patient Name:  Chey Robertson  YOB: 1935    Date of Surgery:  5/20/2025     Indications:    Patient is a 89 y.o. female noted for fall and a periprosthetic distal femur fracture.  I was not on-call but due to the complex nature of this surgery and the need for distal femur replacement I was asked to help assist in managing this patient's complex care due to the periprosthetic distal femur fracture.  The patient had taken Eliquis and had a very low platelet count's and needed some medical optimization and some time off her Eliquis before be safe to proceed with this complex arthroplasty surgery.  He was impacted and there was a very little bone at the end of the femur the patient was severely osteoporotic and pretty unable to maintain any weightbearing restrictions we discussed operative versus nonoperative treatment as well as ORIF versus distal femur replacement and this severely osteoporotic patient with minimal bone stock attached knee into the femoral component distal femur replacement seemed a better option and we decided to proceed with revision right total knee distal femur replacement.. Possible risk and benefits of the procedure including but not limited to infection, DVT, pulmonary embolism, future loosening of the implants, possible injury to tendons, ligaments, nerves and/or vessels, loss of extensor mechanism and periprosthetic fracture have been discussed in detail. Despite the risks involved, the patient elected to proceed and informed consent was obtained and the patient was scheduled for surgery.     Patient Identification:  Patient was seen in the prep, consent was reviewed, operative procedure was identified, surgical site and thigh marked.      Complexity Modifier:   Due to the distal femur replacement versus just revision total knee arthroplasty the surgery required additional surgical dissection, assistance with retraction,  and increased surgical exposure in order to perform the total joint replacement. The required additional assistance in the operating room, unique instrumentation and techniques, increased time, increased risk, which all made for a more complex and difficult joint replacement procedure. Therefore, a Modifier 22 is being utilized.    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 Item Serial No.  Lot No. LRB No. Used Action   CMT BONE SIMPLEX/P TMYCIN FDOS 10PK - [id number] Implant CMT BONE SIMPLEX/P TMYCIN FDOS 10PK  DINO KAREEM [id number] Left 1 Implanted   CMT BONE SIMPLEX/P TMYCIN FDOS 10PK - [id number] Implant CMT BONE SIMPLEX/P TMYCIN FDOS 10PK  DINO KAREEM [id number] Left 1 Implanted   CMT BONE SIMPLEX/P TMYCIN FDOS 10PK - [id number] Implant CMT BONE SIMPLEX/P TMYCIN FDOS 10PK  DINO KAREEM [id number] Left 1 Implanted   CMT BONE SIMPLEX/P TMYCIN FDOS 10PK - [id number] Implant CMT BONE SIMPLEX/P TMYCIN FDOS 10PK  DINO KAREEM [id number] Left 1 Implanted   DEV CONTRL TISS STRATAFIX SPIRAL PDO BIDIR 1 50G30CS - [id number] Implant DEV CONTRL TISS STRATAFIX SPIRAL PDO BIDIR 1 90C20BP  [employer]  DIV OF J AND J [id number] Left 1 Implanted   SPACR CMT FEM/HIP ACCOLADE DIST UNIV 17MM - [id number] Implant SPACR CMT FEM/HIP ACCOLADE DIST UNIV 17MM  DINO KAREEM [id number] Left 1 Implanted   COMP FEM DIST GMRS 65MM LT STD - [id number] Implant COMP FEM DIST GMRS 65MM LT STD  DINO KAREEM [id number] Left 1 Implanted   STEM FEM/KN GMRS CMT STR  NONPOR 84Q914HX - [id number] Implant STEM FEM/KN GMRS CMT STR NONPOR 13W507KQ  DINO KAREEM [id number] Left 1 Implanted   BEAR TIB TRIATHLON HNG SZ1TO2 - [id number] Implant BEAR TIB TRIATHLON HNG SZ1TO2  DINO KAREEM [id number] Left 1 Implanted   BUMPER HNG TRIATHLON NTRL - [id number] Implant BUMPER HNG TRIATHLON NTRL  DINO KAREEM [id number] Left 1 Implanted   PK ASMBL TRIATHLON STD - [id number] Implant PK ASMBL TRIATHLON STD  DINO KAREEM [id number] Left 1 Implanted   TRIATHLON HINGE INSERT     [id number] Left 1 Implanted   BASEPLT TIB/KN TRIATHLON HNG SZ4 - [id number] Implant BASEPLT TIB/KN TRIATHLON HNG SZ4  DINO KAREEM [id number] Left 1 Implanted   STEM FEM TRIATH CMT 35A73DL - [id number] Implant STEM FEM TRIATH CMT 90G12GV  DINO KAREEM [id number] Left 1 Implanted   PLUG BONE RESTR/CMT W/HNDL UNIV 30MM LG - [id number] Implant PLUG BONE RESTR/CMT W/HNDL UNIV 30MM LG  DINO KAREEM [id number] Left 1 Implanted   PLUG BONE RESTR/CMT W/HNDL UNIV 24MM MD - [id number] Implant PLUG BONE RESTR/CMT W/HNDL UNIV 24MM MD  DINO KAREEM [id number] Left 1 Implanted   CABL SLV ST DM SS 2MM - [id number] Implant CABL SLV ST DM SS 2MM  DINO KAREEM [id number] Left 1 Implanted       Specimen:          None      Findings: Severely comminuted distal femur fracture right at the edge of the implant, severe osteopenia    Complications: None    Description of Procedure:   The patient was transferred to [facility] operating room. Preoperative antibiotics Kefzol 2gm as well as 1 g of tranexamic acid were given IV and infused prior to skin incision and to inflation of the tourniquet according to SCIP protocol. Prior to skin incision, the patient also received general, with femoral nerve block. Surgical timeout was performed with the entire operative team identifying the  correct patient, surgical site, and planned procedure. A well padded tourniquet was placed to the proximal aspect of the operative thigh. The operative leg was then prepped and draped in the usual sterile fashion.  After application of Esmarch, the tourniquet was inflated to 250 mmHg.    A skin incision was made vertically oriented centering over the patella anteriorly. The skin and subcutaneous tissue were incised and a medial parapatellar approach was developed. The patella was subluxed over the knee and there the knee tissue there was a large hemarthrosis.     Using an osteotome, the polyethylene insert was removed. Attention was then turned to the femoral component   There is severe comminuted distal femur fracture right up to the edge of the implant medially maybe 2 to 3 mm of bone laterally but no significant bone stock we carefully dissected the edge of the femoral component and the femur removing all soft tissue up to the level of our predetermined [place] for distal femur resection.  After this was dissected out carefully especially of the posterior aspect of the femur we then placed a single cerclage cable around the distal end of the femur prior to making our bone resection.  Of note the femoral implant came off with just a millimeter of tube bone on it and was not well-fixed at all.  We then made a distal femur resection and measured level and then turned our attention to the tibia.    We then turned our attention to the tibia and again using combination of microsagittal saw and osteotomes, a plane was developed between the cement mantle and implant interface. The tibial implant was then removed with minimal bone loss. Cement was removed from the canal.     Attention was then directed to the proximal tibia. Intramedullary reamer was utilized to ream up to a 12 for the tibia and 17 for the femur. T  We did do a skim cut with a oscillating saw to freshen up the proximal end of the tibia bone. This was done  off intramedullary reamer with 0° of slope and neutral varus valgus alignment and found to be satisfactory.    We then placed our trial components trial reduction was performed and reductions found to be satisfactory with range of motion from 0-110° with a size  16  polyethylene insert.    Attention was then directed to the patella. The patella appeared satisfactory without signs of polyethylene wear or loosening. Decision was made to leave it. Following this, we then injected pain cocktail. We did prep the bone with copious irrigation followed by a peroxide soak to remove any fat marrow.  It was found to be very dry with no extra fluid.  Cement restrictor's were placed prior to cementing. Following this, the tibial component was then cemented into position.  The cement was pressurized into the tibial canal. The cement was applied to both the tibial and femoral components prior to impaction. The femoral component was cemented into place, with care to ensure proper external rotation. Excess cement was removed once and the components were held without any motion. Once the cement had fully hardened, we checked and range of motion was found to be satisfactory from 0-125° with excellent stability throughout the range of motion. Good medial and lateral soft tissue tension and soft tissue balancing. The patella tracked well.  The trial liner was then replaced with a  size 4 thickness  16  mm liner. Having been satisfied with this, the joint was thoroughly irrigated with 3 L of saline. We did use a Betadine wash as well.    The sponge, needle, and instrument counts were found to be correct. The arthrotomy was closed with interrupted 0 Vicryl followed by a running STRATAFIX  #2, 2-0 Vicryl for skin followed by staples and mepilex dressing with the knee held in flexion.  Incisional wound vac was then placed over the incision wrapped with webroll placed overwrapped with Ace wrap. The patient tolerated the procedure well and is  being admitted for postoperative antibiotics according to SCIP protocol with 2 more doses in the first 24 hours. The patient will be on anticoagulation a day starting postoperative day #1. The patient will also be discharged on 2 weeks of oral antibiotics. Patient will need to be mobilized with physical therapy.  Weightbearing as tolerated but maintain a knee immobilizer for the first 2 weeks to allow for wound healing    I discussed the satisfactory performance of the procedure with patient's family and discussed with him the postoperative management     Assistant Participation:  Surgeon(s):  Albin Mcclain MD    Assistant: David Vincent PA-C assisted with proper preoperative positioning, preoperative templating, determining availability of proper implants, prepping and draping of patient, manipulation placement of instruments, protection of ligaments and vital soft tissue structures, assistance in maintaining hemostasis and assistance with closure of the wound. Their skills and knowledge of the steps of operation and the desired outcome of each surgical step was crucial, allowing for efficient choreography of surgical procedure, and closure of the wound which lead to reduced surgical time, less blood loss, and less risk of complications for the patient.         Albin Mcclain MD     Date: 5/21/2025  Time: 07:35 EDT

## 2025-05-21 NOTE — THERAPY EVALUATION
Patient Name: Chey Robertson  : 1935    MRN: [id number]                              Today's Date: 2025       Admit Date: 2025    Visit Dx:     ICD-10-CM ICD-9-CM   1. Fall at nursing home, initial encounter  W19.XXXA E888.9    Y92.129 E849.7   2. 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 Anticoagulated  Z79.01 V58.61   4. Periprosthetic fracture around internal prosthetic right knee joint, subsequent encounter  M97.11XD V54.89     Patient Active Problem List   Diagnosis    Benign familial tremor    AMS (altered mental status)    Pancytopenia    Hypothyroidism (acquired)    B12 deficiency    Abnormal intestinal absorption    Iron deficiency anemia due to chronic blood loss    Myelodysplasia (myelodysplastic syndrome)    Personal history of pulmonary embolism    Chronic anticoagulation    Essential hypertension    Type 2 diabetes mellitus without complication, without long-term current use of insulin    Atrial fibrillation    QT prolongation    Pericardial effusion    Severe malnutrition    Closed displaced intertrochanteric fracture of right femur, initial encounter    DISHA (acute kidney injury)    Moderate malnutrition    Falls    Type 2 diabetes mellitus with complication, with long-term current use of insulin    Periprosthetic fracture around prosthetic knee    Femur fracture     Past Medical History:   Diagnosis Date    A-fib     on eliquis    Anemia     Arthritis     Diabetes mellitus     checks sugar only when pt wants to     Disease of thyroid gland     History of transfusion     with knee surgery-  no reaction recalled.     [place] (hard of hearing)     no hearing aids    Hypertension     Migraine without aura and without status migrainosus, not intractable 2016    Myelodysplastic syndrome     Pulmonary emboli     (after knee surgery)    SOBOE (shortness of breath on exertion)     Tremors of nervous system     Vertigo      "Wears dentures     upper     Wears glasses      Past Surgical History:   Procedure Laterality Date    BLADDER SURGERY      CATARACT EXTRACTION      bilat     CHOLECYSTECTOMY      COLONOSCOPY      HIP TROCHANTERIC NAILING WITH INTRAMEDULLARY HIP SCREW Right 6/20/2023    Procedure: HIP TROCHANTERIC NAILING WITH INTRAMEDULLARY HIP SCREW RIGHT;  Surgeon: Augie Hobbs MD;  Location:  [facility];  Service: Orthopedics;  Laterality: Right;    HYSTERECTOMY      with bso    KYPHOPLASTY N/A 02/12/2021    Procedure: KYPHOPLASTY T11, T12 AND L4;  Surgeon: Matty Hearn MD;  Location:  [facility];  Service: Orthopedic Spine;  Laterality: N/A;    TONSILLECTOMY        General Information       Row Name 05/21/25 0910          OT Time and Intention    Subjective Information no complaints  -TB     Document Type evaluation  -TB     Mode of Treatment occupational therapy;co-treatment  -TB     Patient Effort good  -TB     Symptoms Noted During/After Treatment none  -TB       Row Name 05/21/25 0910          General Information    Patient Profile Reviewed yes  -TB     Prior Level of Function independent:;gait;transfer;max assist:;ADL's  No family present to clarify PLOF. Per chart review, pt was up in dining room with rollator. Pt reports that she has assist for \"all of her ADLs.\"  -TB     Existing Precautions/Restrictions fall;brace worn when out of bed;other (see comments)  s/p Revision L TKA, WBAT, KI when up, Femoral NC  -TB     Barriers to Rehab previous functional deficit  -TB       Row Name 05/21/25 0910          Occupational Profile    Reason for Services/Referral (Occupational Profile) Occupational decline  -TB     Environmental Supports and Barriers (Occupational Profile) Pt lives @ [facility] group home. Rollator. Assist with all ADLs.  -TB       Row Name 05/21/25 0910          Living Environment    Current Living Arrangements assisted living facility  -TB     People in Home alone  -TB       Row Name 05/21/25 0910          Home Main " Entrance    Number of Stairs, Main Entrance none  -TB       Row Name 05/21/25 0910          Stairs Within Home, Primary    Number of Stairs, Within Home, Primary none  -TB       Row Name 05/21/25 0910          Cognition    Orientation Status (Cognition) oriented x 4;verbal cues/prompts needed for orientation  cues for month only  -TB       Row Name 05/21/25 0910          Safety Issues/Impairments Affecting Functional Mobility    Safety Issues Affecting Function (Mobility) insight into deficits/self-awareness;awareness of need for assistance;safety precaution awareness;safety precautions follow-through/compliance;sequencing abilities;positioning of assistive device;problem-solving  -TB     Impairments Affecting Function (Mobility) balance;endurance/activity tolerance;strength;sensation/sensory awareness;range of motion (ROM);postural/trunk control;motor control  -TB     Comment, Safety Issues/Impairments (Mobility) Pt is up in room and transfering with RW support and Mod Ax2. Excellent effort.  -TB               User Key  (r) = Recorded By, (t) = Taken By, (c) = Cosigned By      Initials Name Provider Type    TB Mary Buckley OT Occupational Therapist                     Mobility/ADL's       Row Name 05/21/25 0914          Bed Mobility    Bed Mobility supine-sit;scooting/bridging  -TB     Scooting/Bridging [place] (Bed Mobility) moderate assist (50% patient effort);2 person assist;verbal cues  -TB     Supine-Sit [place] (Bed Mobility) moderate assist (50% patient effort);2 person assist;verbal cues  -TB     Bed Mobility, Safety Issues decreased use of arms for pushing/pulling;decreased use of legs for bridging/pushing;impaired trunk control for bed mobility  -TB     Assistive Device (Bed Mobility) head of bed elevated;repositioning sheet  -TB     Comment, (Bed Mobility) Education, cues, and assist for sequencing. Pt able to initiate advancing BLE toward EOB. Required significant assist to right  her trunk.  -       Row Name 05/21/25 0914          Transfers    Transfers sit-stand transfer;stand-sit transfer  -TB     Comment, (Transfers) Education, cues, and assist for hand placement, sequencing, and safety. Pt requires assist to manage wound vac and nerve catheter. KI in place.  -       Row Name 05/21/25 0914          Sit-Stand Transfer    Sit-Stand [place] (Transfers) moderate assist (50% patient effort);2 person assist;verbal cues  -TB     Assistive Device (Sit-Stand Transfers) walker, front-wheeled  -TB       Row Name 05/21/25 0914          Stand-Sit Transfer    Stand-Sit [place] (Transfers) moderate assist (50% patient effort);2 person assist;verbal cues  -TB     Assistive Device (Stand-Sit Transfers) walker, front-wheeled  -TB       Row Name 05/21/25 0914          Functional Mobility    Functional Mobility- Ind. Level moderate assist (50% patient effort);2 person assist required  and chair follow  -     Functional Mobility- Device walker, front-wheeled  -TB     Functional Mobility- Safety Issues step length decreased;weight-shifting ability decreased;sequencing ability decreased  -     Functional Mobility- Comment Pt is up in room with excellent effort. No c/o dizziness. RA sats stable >90%  -TB     Patient was able to Ambulate yes  -       Row Name 05/21/25 0914          Activities of Daily Living    BADL Assessment/Intervention lower body dressing;toileting;feeding  -       Row Name 05/21/25 0914          Mobility    Extremity Weight-bearing Status left lower extremity  -TB     Left Lower Extremity (Weight-bearing Status) weight-bearing as tolerated (WBAT)  -       Row Name 05/21/25 0914          Lower Body Dressing Assessment/Training    [place] Level (Lower Body Dressing) don;socks;dependent (less than 25% patient effort)  -     Position (Lower Body Dressing) edge of bed sitting  -TB     Comment, (Lower Body Dressing) Pt has assist with LB dressing at baseline. No  family present to clarify how much pt is able to complete.  -TB       Row Name 05/21/25 0914          Toileting Assessment/Training    [place] Level (Toileting) toileting skills;dependent (less than 25% patient effort)  -TB     Comment, (Toileting) Incontinent/purwick  -TB       Row Name 05/21/25 0914          Self-Feeding Assessment/Training    [place] Level (Feeding) independent;liquids to mouth  -TB     Position (Feeding) supported sitting  -TB               User Key  (r) = Recorded By, (t) = Taken By, (c) = Cosigned By      Initials Name Provider Type    TB Mary Buckley, OT Occupational Therapist                   Obj/Interventions       Row Name 05/21/25 0919          Sensory Assessment (Somatosensory)    Sensory Assessment (Somatosensory) UE sensation intact  -TB     Sensory Assessment Pt denies numbness or tingling  -TB       Row Name 05/21/25 0919          Vision Assessment/Intervention    Visual Impairment/Limitations corrective lenses for reading  -TB       Row Name 05/21/25 0919          Range of Motion Comprehensive    General Range of Motion bilateral upper extremity ROM WFL  -TB     Comment, General Range of Motion BUE A/AROM WFL for simple ADL performance  -TB       Row Name 05/21/25 0919          Balance    Balance Assessment sitting static balance;sitting dynamic balance;sit to stand dynamic balance;standing static balance;standing dynamic balance  -TB     Static Sitting Balance standby assist  -TB     Dynamic Sitting Balance contact guard  -TB     Position, Sitting Balance sitting in chair;sitting edge of bed  -TB     Sit to Stand Dynamic Balance moderate assist;2-person assist;verbal cues  -TB     Static Standing Balance minimal assist;2-person assist;verbal cues  -TB     Dynamic Standing Balance moderate assist;2-person assist;verbal cues  -TB     Position/Device Used, Standing Balance supported;walker, front-wheeled  -TB     Balance Interventions sitting;standing;sit to  stand;supported;static;dynamic;dynamic reaching;occupation based/functional task  -TB     Comment, Balance Mod Ax2 to maintain dynamic balance  -TB               User Key  (r) = Recorded By, (t) = Taken By, (c) = Cosigned By      Initials Name Provider Type    TB Mary Buckley OT Occupational Therapist                   Goals/Plan       Row Name 05/21/25 0932          Transfer Goal 1 (OT)    Activity/Assistive Device (Transfer Goal 1, OT) toilet  -TB     [place] Level/Cues Needed (Transfer Goal 1, OT) verbal cues required;moderate assist (50-74% patient effort)  -TB     Time Frame (Transfer Goal 1, OT) long term goal (LTG);1 week  -TB     Strategies/Barriers (Transfers Goal 1, OT) Ambulate to BR for transfers to support HH distances.  -TB     Progress/Outcome (Transfer Goal 1, OT) goal ongoing  -TB       Row Name 05/21/25 0932          Toileting Goal 1 (OT)    Activity/Device (Toileting Goal 1, OT) perform perineal hygiene  -TB     [place] Level/Cues Needed (Toileting Goal 1, OT) minimum assist (75% or more patient effort);verbal cues required  -TB     Time Frame (Toileting Goal 1, OT) long term goal (LTG);1 week  -TB     Progress/Outcome (Toileting Goal 1, OT) goal ongoing  -TB       Row Name 05/21/25 0932          Grooming Goal 1 (OT)    Activity/Device (Grooming Goal 1, OT) oral care  -TB     [place] (Grooming Goal 1, OT) set-up required;standby assist  -TB     Time Frame (Grooming Goal 1, OT) short term goal (STG);3 days  -TB     Progress/Outcome (Grooming Goal 1, OT) goal ongoing  -TB               User Key  (r) = Recorded By, (t) = Taken By, (c) = Cosigned By      Initials Name Provider Type    Mary Conte OT Occupational Therapist                   Clinical Impression       Row Name 05/21/25 0926          Pain Assessment    Pain Location extremity  -TB     Pain Side/Orientation left;lower  -TB     Pain Management Interventions activity modification encouraged;exercise  or physical activity utilized;positioning techniques utilized;other (see comments)  Femoral nerve catheter infusing  -TB     Response to Pain Interventions activity participation with tolerable pain;activity level improved;mobility function improved  -TB     Pre/Posttreatment Pain Comment Pt tolerates EOB/OOB activity well  -TB     Additional Documentation Pain Scale: FACES Pre/Post-Treatment (Group)  -TB       Row Name 05/21/25 0926          Pain Scale: FACES Pre/Post-Treatment    Pain: FACES Scale, Pretreatment 0-->no hurt  -TB     Posttreatment Pain Rating 0-->no hurt  -TB     Pre/Posttreatment Pain Comment No indication of pain with activity  -TB       Row Name 05/21/25 0926          Plan of Care Review    Plan of Care Reviewed With patient  -TB     Progress --  IE  -TB     Outcome Evaluation OT IE completed. Pt is A/Ox4 with cues and participates in therapy with excellent effort. Mod Ax2 for bed mobility. Mod Ax2 STS. Pt is able to ambulate in room with RW support and Mod Ax2, KI donned. Requires assist for wound vac and nerve catheter mgmt to prevent dislodgement. Pt is Dependent for LB ADLs and toileting at this time. OT will follow IP. Recommend SNF at d/c to support return to [facility] apt.  -TB       Row Name 05/21/25 0926          Therapy Assessment/Plan (OT)    Rehab Potential (OT) good  -TB     Criteria for Skilled Therapeutic Interventions Met (OT) yes;meets criteria;skilled treatment is necessary  -TB     Therapy Frequency (OT) daily  -TB       Row Name 05/21/25 0926          Therapy Plan Review/Discharge Plan (OT)    Anticipated Discharge Disposition (OT) skilled nursing facility  -TB       Row Name 05/21/25 0926          Vital Signs    Pre Systolic BP Rehab --  RN cleared OT  -TB     Pre SpO2 (%) 97  -TB     O2 Delivery Pre Treatment nasal cannula  -TB     Intra SpO2 (%) 92  -TB     O2 Delivery Intra Treatment room air  -TB     Post SpO2 (%) 92  -TB     O2 Delivery Post Treatment room air  -TB     Pre  Patient Position Supine  -TB     Intra Patient Position Standing  -TB     Post Patient Position Sitting  -TB       Row Name 05/21/25 0926          Positioning and Restraints    Pre-Treatment Position in bed  -TB     Post Treatment Position chair  -TB     In Chair notified nsg;reclined;call light within reach;encouraged to call for assist;exit alarm on;waffle cushion;legs elevated;with brace  -TB               User Key  (r) = Recorded By, (t) = Taken By, (c) = Cosigned By      Initials Name Provider Type    TB Mary Buckley OT Occupational Therapist                   Outcome Measures       Row Name 05/21/25 0933          How much help from another is currently needed...    Putting on and taking off regular lower body clothing? 1  -TB     Bathing (including washing, rinsing, and drying) 1  -TB     Toileting (which includes using toilet bed pan or urinal) 1  -TB     Putting on and taking off regular upper body clothing 2  -TB     Taking care of personal grooming (such as brushing teeth) 3  -TB     Eating meals 3  -TB     AM-PAC 6 Clicks Score (OT) 11  -TB       Row Name 05/21/25 0933          Functional Assessment    Outcome Measure Options AM-PAC 6 Clicks Daily Activity (OT)  -TB               User Key  (r) = Recorded By, (t) = Taken By, (c) = Cosigned By      Initials Name Provider Type    TB Mary Buckley OT Occupational Therapist                    Occupational Therapy Education       Title: PT OT SLP Therapies (In Progress)       Topic: Occupational Therapy (In Progress)       Point: ADL training (In Progress)       Learning Progress Summary            Patient Acceptance, E, NR by TB at 5/21/2025 0934                                      User Key       Initials Effective Dates Name Provider Type Discipline    TB 07/11/23 -  Mary Buckley OT Occupational Therapist OT                  OT Recommendation and Plan  Therapy Frequency (OT): daily  Plan of Care Review  Plan of Care Reviewed  With: patient  Progress:  (IE)  Outcome Evaluation: OT IE completed. Pt is A/Ox4 with cues and participates in therapy with excellent effort. Mod Ax2 for bed mobility. Mod Ax2 STS. Pt is able to ambulate in room with RW support and Mod Ax2, KI donned. Requires assist for wound vac and nerve catheter mgmt to prevent dislodgement. Pt is Dependent for LB ADLs and toileting at this time. OT will follow IP. Recommend SNF at d/c to support return to [facility] apt.     Time Calculation:   Evaluation Complexity (OT)  Review Occupational Profile/Medical/Therapy History Complexity: expanded/moderate complexity  Assessment, Occupational Performance/Identification of Deficit Complexity: 3-5 performance deficits  Clinical Decision Making Complexity (OT): detailed assessment/moderate complexity  Overall Complexity of Evaluation (OT): moderate complexity     Time Calculation- OT       Row Name 05/21/25 0839             Time Calculation- OT    OT Start Time 0839  -TB      OT Received On 05/21/25  -TB      OT Goal Re-Cert Due Date 05/31/25  -TB         Untimed Charges    OT Eval/Re-eval Minutes 53  -TB         Total Minutes    Untimed Charges Total Minutes 53  -TB       Total Minutes 53  -TB                User Key  (r) = Recorded By, (t) = Taken By, (c) = Cosigned By      Initials Name Provider Type    TB Mary Buckley OT Occupational Therapist                  Therapy Charges for Today       Code Description Service Date Service Provider Modifiers Qty    [id number]  OT EVAL MOD COMPLEXITY 4 5/21/2025 Mary Buckley OT GO 1                 Mary Buckley OT  5/21/2025

## 2025-05-21 NOTE — PLAN OF CARE
Problem: Adult Inpatient Plan of Care  Goal: Plan of Care Review  Recent Flowsheet Documentation  Taken 5/21/2025 6450 by Mary Buckley OT  Progress: (IE) --  Outcome Evaluation: OT IE completed. Pt is A/Ox4 with cues and participates in therapy with excellent effort. Mod Ax2 for bed mobility. Mod Ax2 STS. Pt is able to ambulate in room with RW support and Mod Ax2, KI donned. Requires assist for wound vac and nerve catheter mgmt to prevent dislodgement. Pt is Dependent for LB ADLs and toileting at this time. OT will follow IP. Recommend SNF at d/c to support return to [facility] apt.

## 2025-05-21 NOTE — PLAN OF CARE
Goal Outcome Evaluation:  Plan of Care Reviewed With: patient           Outcome Evaluation: Patient presents with deficits in strength, endurance, balance, and ROM with functional mobility that is below her baseline. She required grossly modAx2 for bed mobility and ambulation 10' with FWW today. KI donned throughout session and buckling inside KI noted with ambulation. IPPT is indicated to address current deficits. Recommend D/C to SNF.    Anticipated Discharge Disposition (PT): skilled nursing facility

## 2025-05-22 LAB
ABO GROUP BLD: NORMAL
ANION GAP SERPL CALCULATED.3IONS-SCNC: 10 MMOL/L (ref 5–15)
ANTI-K: NORMAL
BH BB BLOOD EXPIRATION DATE: NORMAL
BH BB BLOOD EXPIRATION DATE: NORMAL
BH BB BLOOD TYPE BARCODE: 9500
BH BB BLOOD TYPE BARCODE: 9500
BH BB DISPENSE STATUS: NORMAL
BH BB DISPENSE STATUS: NORMAL
BH BB PRODUCT CODE: NORMAL
BH BB PRODUCT CODE: NORMAL
BH BB UNIT NUMBER: NORMAL
BH BB UNIT NUMBER: NORMAL
BLD GP AB SCN SERPL QL: POSITIVE
BUN SERPL-MCNC: 23 MG/DL (ref 8–23)
BUN/CREAT SERPL: 19 (ref 7–25)
CALCIUM SPEC-SCNC: 7.9 MG/DL (ref 8.6–10.5)
CHLORIDE SERPL-SCNC: 104 MMOL/L (ref 98–107)
CO2 SERPL-SCNC: 23 MMOL/L (ref 22–29)
CREAT SERPL-MCNC: 1.21 MG/DL (ref 0.57–1)
CROSSMATCH INTERPRETATION: NORMAL
CROSSMATCH INTERPRETATION: NORMAL
DEPRECATED RDW RBC AUTO: 53 FL (ref 37–54)
EGFRCR SERPLBLD CKD-EPI 2021: 43.2 ML/MIN/1.73
ERYTHROCYTE [DISTWIDTH] IN BLOOD BY AUTOMATED COUNT: 16.6 % (ref 12.3–15.4)
GLUCOSE BLDC GLUCOMTR-MCNC: 175 MG/DL (ref 70–130)
GLUCOSE BLDC GLUCOMTR-MCNC: 183 MG/DL (ref 70–130)
GLUCOSE BLDC GLUCOMTR-MCNC: 197 MG/DL (ref 70–130)
GLUCOSE BLDC GLUCOMTR-MCNC: 199 MG/DL (ref 70–130)
GLUCOSE SERPL-MCNC: 145 MG/DL (ref 65–99)
HCT VFR BLD AUTO: 20.5 % (ref 34–46.6)
HCT VFR BLD AUTO: 20.5 % (ref 34–46.6)
HGB BLD-MCNC: 6.8 G/DL (ref 12–15.9)
HGB BLD-MCNC: 6.8 G/DL (ref 12–15.9)
MCH RBC QN AUTO: 29.6 PG (ref 26.6–33)
MCHC RBC AUTO-ENTMCNC: 33.2 G/DL (ref 31.5–35.7)
MCV RBC AUTO: 89.1 FL (ref 79–97)
PLATELET # BLD AUTO: 81 10*3/MM3 (ref 140–450)
PMV BLD AUTO: 9.8 FL (ref 6–12)
POTASSIUM SERPL-SCNC: 4.6 MMOL/L (ref 3.5–5.2)
RBC # BLD AUTO: 2.3 10*6/MM3 (ref 3.77–5.28)
RH BLD: NEGATIVE
SODIUM SERPL-SCNC: 137 MMOL/L (ref 136–145)
T&S EXPIRATION DATE: NORMAL
UNIT  ABO: NORMAL
UNIT  ABO: NORMAL
UNIT  RH: NORMAL
UNIT  RH: NORMAL
WBC NRBC COR # BLD AUTO: 2.27 10*3/MM3 (ref 3.4–10.8)

## 2025-05-22 PROCEDURE — 25010000002 CEFTRIAXONE PER 250 MG: Performed by: INTERNAL MEDICINE

## 2025-05-22 PROCEDURE — 63710000001 INSULIN LISPRO (HUMAN) PER 5 UNITS: Performed by: ORTHOPAEDIC SURGERY

## 2025-05-22 PROCEDURE — P9016 RBC LEUKOCYTES REDUCED: HCPCS

## 2025-05-22 PROCEDURE — 97110 THERAPEUTIC EXERCISES: CPT

## 2025-05-22 PROCEDURE — 85014 HEMATOCRIT: CPT | Performed by: ORTHOPAEDIC SURGERY

## 2025-05-22 PROCEDURE — 36430 TRANSFUSION BLD/BLD COMPNT: CPT

## 2025-05-22 PROCEDURE — 86920 COMPATIBILITY TEST SPIN: CPT

## 2025-05-22 PROCEDURE — 86850 RBC ANTIBODY SCREEN: CPT | Performed by: INTERNAL MEDICINE

## 2025-05-22 PROCEDURE — 99232 SBSQ HOSP IP/OBS MODERATE 35: CPT | Performed by: INTERNAL MEDICINE

## 2025-05-22 PROCEDURE — 85027 COMPLETE CBC AUTOMATED: CPT | Performed by: INTERNAL MEDICINE

## 2025-05-22 PROCEDURE — 86901 BLOOD TYPING SEROLOGIC RH(D): CPT | Performed by: INTERNAL MEDICINE

## 2025-05-22 PROCEDURE — 97530 THERAPEUTIC ACTIVITIES: CPT

## 2025-05-22 PROCEDURE — 86922 COMPATIBILITY TEST ANTIGLOB: CPT

## 2025-05-22 PROCEDURE — 86902 BLOOD TYPE ANTIGEN DONOR EA: CPT

## 2025-05-22 PROCEDURE — 80048 BASIC METABOLIC PNL TOTAL CA: CPT | Performed by: ORTHOPAEDIC SURGERY

## 2025-05-22 PROCEDURE — 86870 RBC ANTIBODY IDENTIFICATION: CPT | Performed by: INTERNAL MEDICINE

## 2025-05-22 PROCEDURE — 82948 REAGENT STRIP/BLOOD GLUCOSE: CPT

## 2025-05-22 PROCEDURE — 86900 BLOOD TYPING SEROLOGIC ABO: CPT | Performed by: INTERNAL MEDICINE

## 2025-05-22 PROCEDURE — 97116 GAIT TRAINING THERAPY: CPT

## 2025-05-22 PROCEDURE — 85018 HEMOGLOBIN: CPT | Performed by: ORTHOPAEDIC SURGERY

## 2025-05-22 PROCEDURE — 86900 BLOOD TYPING SEROLOGIC ABO: CPT

## 2025-05-22 RX ADMIN — Medication 10 ML: at 09:37

## 2025-05-22 RX ADMIN — INSULIN LISPRO 2 UNITS: 100 INJECTION, SOLUTION INTRAVENOUS; SUBCUTANEOUS at 17:27

## 2025-05-22 RX ADMIN — ACETAMINOPHEN 1000 MG: 500 TABLET ORAL at 20:48

## 2025-05-22 RX ADMIN — AMIODARONE HYDROCHLORIDE 200 MG: 200 TABLET ORAL at 09:24

## 2025-05-22 RX ADMIN — DOXYCYCLINE 100 MG: 100 CAPSULE ORAL at 20:48

## 2025-05-22 RX ADMIN — PANTOPRAZOLE SODIUM 40 MG: 40 TABLET, DELAYED RELEASE ORAL at 05:44

## 2025-05-22 RX ADMIN — DOXYCYCLINE 100 MG: 100 CAPSULE ORAL at 09:24

## 2025-05-22 RX ADMIN — INSULIN LISPRO 2 UNITS: 100 INJECTION, SOLUTION INTRAVENOUS; SUBCUTANEOUS at 12:00

## 2025-05-22 RX ADMIN — INSULIN LISPRO 2 UNITS: 100 INJECTION, SOLUTION INTRAVENOUS; SUBCUTANEOUS at 20:48

## 2025-05-22 RX ADMIN — SODIUM CHLORIDE 1000 MG: 900 INJECTION INTRAVENOUS at 05:44

## 2025-05-22 RX ADMIN — LEVOTHYROXINE SODIUM 100 MCG: 0.1 TABLET ORAL at 05:44

## 2025-05-22 RX ADMIN — Medication 10 ML: at 20:49

## 2025-05-22 RX ADMIN — ACETAMINOPHEN 1000 MG: 500 TABLET ORAL at 05:44

## 2025-05-22 RX ADMIN — ACETAMINOPHEN 1000 MG: 500 TABLET ORAL at 13:55

## 2025-05-22 RX ADMIN — INSULIN LISPRO 2 UNITS: 100 INJECTION, SOLUTION INTRAVENOUS; SUBCUTANEOUS at 08:46

## 2025-05-22 NOTE — PLAN OF CARE
Goal Outcome Evaluation:  Plan of Care Reviewed With: patient        Progress: declining  Outcome Evaluation: Patient with increased fatigue and pending blood transfusion this afternoon. She gave good effort with weightshifting activity and transfer back to bed this afternoon, but did require increased assist due to fatigue. IPPT remains indicated to address current deficits. Continue to recommend D/C to SNF.    Anticipated Discharge Disposition (PT): skilled nursing facility

## 2025-05-22 NOTE — PROGRESS NOTES
[facility]    Acute pain service Inpatient Progress Note    Patient Name: Chey Robertson  :  1935  MRN:  [id number]        Acute Pain  Service Inpatient Progress Note:    Analgesia:Good  Pain Score:0/10  LOC: alert and awake  Resp Status: room air  Cardiac: VS stable  Side Effects:None  Catheter Site:clean, dressing intact and dry  Cath type: peripheral nerve cath(InfuSystem)  Volume: 1mL,8ml, 8ml InfuSystem Pump.  Catheter Plan:Catheter to remain Insitu and Continue catheter infusion rate unchanged  Comments:

## 2025-05-22 NOTE — PROGRESS NOTES
"  Orthopedic Progress Note      Patient: Chey Robertson  YOB: 1935     Date of Admission: 5/17/2025  7:04 PM Medical Record Number: [id number]     Attending Physician: Diya Garcia,*    Status Post:  Procedure(s):  DISTAL FEMUR REPLACEMENT Post Operative Day Number: 2    Subjective : No new orthopaedic complaints     Pain Relief: some relief with present medication.     Systemic Complaints: No Complaints  Vitals:    05/21/25 2125 05/21/25 2150 05/22/25 0349 05/22/25 0653   BP: 113/60  110/54 119/58   BP Location:   Right arm Right arm   Patient Position:   Lying Lying   Pulse: 56  53 52   Resp:   16 16   Temp:   97.9 °F (36.6 °C) 97.8 °F (36.6 °C)   TempSrc:   Oral Oral   SpO2:  98% 96% 98%   Weight:       Height:           Physical Exam: 89 y.o. female    General Appearance:       Alert, cooperative, in no acute distress                  Extremities:    Dressing Clean, Dry and Intact             No clinical sign of DVT        Able to do good movements of digits    Pulses:   Pulses palpable and equal bilaterally           Diagnostic Tests:     Results from last 7 days   Lab Units 05/22/25  0719 05/21/25  0610 05/20/25  0824   WBC 10*3/mm3 2.27* 2.32* 1.09*   HEMOGLOBIN g/dL 6.8*  6.8* 7.6* 7.7*   HEMATOCRIT % 20.5*  20.5* 24.2* 24.1*   PLATELETS 10*3/mm3 81* 56* 46*     Results from last 7 days   Lab Units 05/22/25  0719 05/21/25  0610 05/20/25  0824   SODIUM mmol/L 137 133* 135*   POTASSIUM mmol/L 4.6 5.0 4.1   CHLORIDE mmol/L 104 103 101   CO2 mmol/L 23.0 16.0* 23.0   BUN mg/dL 23 21 14   CREATININE mg/dL 1.21* 1.12* 0.96   GLUCOSE mg/dL 145* 314* 131*   CALCIUM mg/dL 7.9* 7.8* 8.0*     Results from last 7 days   Lab Units 05/18/25  1711   INR  1.21*     No results found for: \"URICACID\"  No results found for: \"CRYSTAL\"  Microbiology Results (last 10 days)       Procedure Component Value - Date/Time    Urine Culture - Urine, Indwelling Urethral Catheter [[id number]]  (Abnormal)  " (Susceptibility) Collected: 05/17/25 2140    Lab Status: Final result Specimen: Urine from Indwelling Urethral Catheter Updated: 05/21/25 0821     Urine Culture >100,000 CFU/mL Klebsiella pneumoniae ssp pneumoniae    Narrative:      Colonization of the urinary tract without infection is common. Treatment is discouraged unless the patient is symptomatic, pregnant, or undergoing an invasive urologic procedure.    Susceptibility        Klebsiella pneumoniae ssp pneumoniae      JUAN F      Amoxicillin + Clavulanate Susceptible      Ampicillin Resistant      Ampicillin + Sulbactam Susceptible      Cefazolin (Urine) Susceptible      Cefepime Susceptible      Ceftazidime Susceptible      Ceftriaxone Susceptible      Ciprofloxacin Resistant      Gentamicin Susceptible      Levofloxacin Resistant      Nitrofurantoin Intermediate      Piperacillin + Tazobactam Susceptible      Trimethoprim + Sulfamethoxazole Susceptible                                 XR Knee 1 or 2 View Left  Result Date: 5/20/2025  Impression: Postoperative changes of left knee arthroplasty without radiographic evidence of complication. Electronically Signed: Zhou Paredes MD  5/20/2025 8:56 PM EDT  Workstation ID: [id number]    CT Lower Extremity Left Without Contrast  Result Date: 5/17/2025  Impression: 1.Limited study due to motion artifact and streak artifact. 2.There is confirmation of a impaction fracture at the distal femoral metaphysis with a large volume of lipohemarthrosis. Electronically Signed: Hector Rosenberg MD  5/17/2025 10:01 PM EDT  Workstation ID: [id number]    XR Knee 1 or 2 View Left  Result Date: 5/17/2025  Impression: 1.Suspected impaction fracture in the distal femoral metaphyseal region with large volume of lipohemarthrosis. 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 3.Osteopenia. Electronically Signed: Hector Rosenberg MD  5/17/2025 7:49 PM EDT  Workstation ID: [id number]        Current Medications:  Scheduled Meds:acetaminophen, 1,000 mg, Oral,  Q8H  amiodarone, 200 mg, Oral, Daily  [Held by provider] apixaban, 2.5 mg, Oral, BID  doxycycline, 100 mg, Oral, Q12H  insulin lispro, 2-7 Units, Subcutaneous, 4x Daily AC & at Bedtime  levothyroxine, 100 mcg, Oral, Q AM  [Held by provider] lisinopril, 5 mg, Oral, Daily  [Held by provider] metoprolol tartrate, 25 mg, Oral, BID  pantoprazole, 40 mg, Oral, Q AM  sodium chloride, 10 mL, Intravenous, Q12H      Continuous Infusions:ropivacaine,       PRN Meds:.  acetaminophen **OR** acetaminophen **OR** acetaminophen    senna-docusate sodium **AND** polyethylene glycol **AND** bisacodyl **AND** bisacodyl    Calcium Replacement - Follow Nurse / BPA Driven Protocol    dextrose    dextrose    diphenhydrAMINE **OR** diphenhydrAMINE    glucagon (human recombinant)    HYDROmorphone **AND** naloxone    Magnesium Standard Dose Replacement - Follow Nurse / BPA Driven Protocol    melatonin    Morphine    nitroglycerin    ondansetron ODT **OR** ondansetron    oxyCODONE    oxyCODONE    Phosphorus Replacement - Follow Nurse / BPA Driven Protocol    Potassium Replacement - Follow Nurse / BPA Driven Protocol    sodium chloride    traMADol    Assessment: Status post  No admission procedures for hospital encounter.    Patient Active Problem List   Diagnosis    Benign familial tremor    AMS (altered mental status)    Pancytopenia    Hypothyroidism (acquired)    B12 deficiency    Abnormal intestinal absorption    Iron deficiency anemia due to chronic blood loss    Myelodysplasia (myelodysplastic syndrome)    Personal history of pulmonary embolism    Chronic anticoagulation    Essential hypertension    Type 2 diabetes mellitus without complication, without long-term current use of insulin    Atrial fibrillation    QT prolongation    Pericardial effusion    Severe malnutrition    Closed displaced intertrochanteric fracture of right femur, initial encounter    DISHA (acute kidney injury)    Moderate malnutrition    Falls    Type 2 diabetes  mellitus with complication, with long-term current use of insulin    Periprosthetic fracture around prosthetic knee    Femur fracture       PLAN:   Continues current post-op course  Anticoagulation: Okay to start anticoagulation per medicine if can restart Eliquis recommend 2.5 twice daily for 1 week and then resume home dose however with the patient's very low platelet count not sure if the patient needs to be this extremely anticoagulated but would leave that decision up to medicine  Mobilize with PT as tolerated per protocol  2 weeks of oral antibiotics    Weight Bearing: WBAT knee immobilizer for 2 weeks postoperatively in order to let the wound heal no flexion of the knee  Discharge Plan: OK to plan for discharge in  tomorrow to rehab  from orthopaedic perspective.    Albin Mcclain MD    Date: 5/22/2025    Time: 10:10 EDT

## 2025-05-22 NOTE — PLAN OF CARE
Goal Outcome Evaluation:  Plan of Care Reviewed With: patient        Progress: improving     Pt is alert and oriented x3; forgetful at times. VSS on room air; 2L NC when asleep. Pt has slept the majority of the shift. No c/o of pain. Ace wrap/prevena wound vac to left leg; knee immobilizer in place. Waffle mattress, SCDs, and heel boots in place; turned q2hrs. Purewick in place and voiding spontaneously.

## 2025-05-22 NOTE — PLAN OF CARE
Goal Outcome Evaluation:  Plan of Care Reviewed With: patient        Progress: no change  Outcome Evaluation: Patient continues to give good effort with therapy interventions. Simple BLE exercises reviewed and ambulation 5' minAx1+1 with FWW completed. She remains limited by quick fatigue and continue L knee buckling inside knee immobilizer. IPPT will continue to follow and address current deficits. Continue to recommend D/C to SNF.    Anticipated Discharge Disposition (PT): skilled nursing facility

## 2025-05-22 NOTE — THERAPY TREATMENT NOTE
Patient Name: Chey Robertson  : 1935    MRN: [id number]                              Today's Date: 2025       Admit Date: 2025    Visit Dx:     ICD-10-CM ICD-9-CM   1. Fall at nursing home, initial encounter  W19.XXXA E888.9    Y92.129 E849.7   2. 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 Anticoagulated  Z79.01 V58.61   4. Periprosthetic fracture around internal prosthetic right knee joint, subsequent encounter  M97.11XD V54.89     Patient Active Problem List   Diagnosis    Benign familial tremor    AMS (altered mental status)    Pancytopenia    Hypothyroidism (acquired)    B12 deficiency    Abnormal intestinal absorption    Iron deficiency anemia due to chronic blood loss    Myelodysplasia (myelodysplastic syndrome)    Personal history of pulmonary embolism    Chronic anticoagulation    Essential hypertension    Type 2 diabetes mellitus without complication, without long-term current use of insulin    Atrial fibrillation    QT prolongation    Pericardial effusion    Severe malnutrition    Closed displaced intertrochanteric fracture of right femur, initial encounter    DISHA (acute kidney injury)    Moderate malnutrition    Falls    Type 2 diabetes mellitus with complication, with long-term current use of insulin    Periprosthetic fracture around prosthetic knee    Femur fracture     Past Medical History:   Diagnosis Date    A-fib     on eliquis    Anemia     Arthritis     Diabetes mellitus     checks sugar only when pt wants to     Disease of thyroid gland     History of transfusion     with knee surgery-  no reaction recalled.     [place] (hard of hearing)     no hearing aids    Hypertension     Migraine without aura and without status migrainosus, not intractable 2016    Myelodysplastic syndrome     Pulmonary emboli     (after knee surgery)    SOBOE (shortness of breath on exertion)     Tremors of nervous system     Vertigo      Wears dentures     upper     Wears glasses      Past Surgical History:   Procedure Laterality Date    BLADDER SURGERY      CATARACT EXTRACTION      bilat     CHOLECYSTECTOMY      COLONOSCOPY      HIP TROCHANTERIC NAILING WITH INTRAMEDULLARY HIP SCREW Right 6/20/2023    Procedure: HIP TROCHANTERIC NAILING WITH INTRAMEDULLARY HIP SCREW RIGHT;  Surgeon: Augie Hobbs MD;  [facility];  Service: Orthopedics;  Laterality: Right;    HYSTERECTOMY      with bso    KYPHOPLASTY N/A 02/12/2021    Procedure: KYPHOPLASTY T11, T12 AND L4;  Surgeon: Matty Hearn MD;  [facility];  Service: Orthopedic Spine;  Laterality: N/A;    TONSILLECTOMY        General Information       Row Name 05/22/25 1336 05/22/25 1019       Physical Therapy Time and Intention    Document Type therapy note (daily note)  -CK therapy note (daily note)  -CK    Mode of Treatment physical therapy;individual therapy  -CK physical therapy;individual therapy  -CK      Row Name 05/22/25 1336 05/22/25 1019       General Information    Patient Profile Reviewed yes  -CK --    Existing Precautions/Restrictions fall;brace worn when out of bed;other (see comments)  s/p Revision L TKA, WBAT, KI AAT x2 weeks no ROM, Femoral NC, incisional wound vac  -CK fall;brace worn when out of bed;other (see comments)  s/p Revision L TKA, WBAT, KI AAT x2 weeks no ROM, Femoral NC, incisional wound vac  -CK    Barriers to Rehab medically complex;previous functional deficit  -CK previous functional deficit;medically complex  -CK      Row Name 05/22/25 1336 05/22/25 1019       Cognition    Orientation Status (Cognition) oriented x 3  -CK oriented x 3  -CK      Row Name 05/22/25 1336 05/22/25 1019       Safety Issues/Impairments Affecting Functional Mobility    Safety Issues Affecting Function (Mobility) insight into deficits/self-awareness;positioning of assistive device;problem-solving;safety precaution awareness;safety precautions  follow-through/compliance;sequencing abilities  -CK awareness of need for assistance;insight into deficits/self-awareness;positioning of assistive device;problem-solving;safety precaution awareness;safety precautions follow-through/compliance;sequencing abilities  -CK    Impairments Affecting Function (Mobility) balance;endurance/activity tolerance;strength;sensation/sensory awareness;range of motion (ROM);postural/trunk control;motor control  -CK balance;endurance/activity tolerance;strength;sensation/sensory awareness;range of motion (ROM);postural/trunk control;motor control  -CK              User Key  (r) = Recorded By, (t) = Taken By, (c) = Cosigned By      Initials Name Provider Type    CK Keke Cook PT Physical Therapist                   Mobility       Row Name 05/22/25 1337 05/22/25 1020       Bed Mobility    Bed Mobility -- supine-sit  -CK    Supine-Sit Irma (Bed Mobility) -- maximum assist (25% patient effort);1 person assist;verbal cues  -CK    Sit-Supine [place] (Bed Mobility) maximum assist (25% patient effort);2 person assist  -CK --    Assistive Device (Bed Mobility) -- head of bed elevated;repositioning sheet  -CK    Comment, (Bed Mobility) -- cues for sequencing, patient gives good effort with bedrail use, requires assist at BLEs and trunk to achieve EOB sitting, denied dizziness at EOB  -CK      Row Name 05/22/25 1337          Bed-Chair Transfer    Bed-Chair Irma (Transfers) moderate assist (50% patient effort);2 person assist;verbal cues  -CK     Assistive Device (Bed-Chair Transfers) walker, front-wheeled  -CK     Comment, (Bed-Chair Transfer) patient took sidesteps from chair to bed, decreased weight acceptance on LLE, initially John but attempting to sit prior to reaching bed requiring modA to pivot hips onto bed  -CK       Row Name 05/22/25 1337 05/22/25 1020       Sit-Stand Transfer    Sit-Stand Irma (Transfers) moderate assist (50% patient effort);2  person assist;verbal cues  -CK moderate assist (50% patient effort);2 person assist;verbal cues  -CK    Assistive Device (Sit-Stand Transfers) -- walker, front-wheeled  -CK    Comment, (Sit-Stand Transfer) x2 from chair, cues for anterior weightshifting and to push up from chair. Performed lateral weightshifting x5 reps standing in walker for improved LLE weight acceptance and motor control. Patient became quite fatigued so returned to sitting and then bed as above  -CK cues for safe hand placement, still strong tegan to clear hips and achieve upright posture. Improved ability to step out LLE prior to sitting today with cues  -CK      Row Name 05/22/25 1337 05/22/25 1020       Gait/Stairs (Locomotion)    [place] Level (Gait) not tested  -CK minimum assist (75% patient effort);1 person assist;1 person to manage equipment;verbal cues  -CK    Assistive Device (Gait) -- walker, front-wheeled  -CK    Patient was able to Ambulate -- yes  -CK    Distance in Feet (Gait) -- 5  -CK    Deviations/Abnormal Patterns (Gait) -- bilateral deviations;base of support, narrow;carlton decreased;gait speed decreased;stride length decreased  -CK    Left Sided Gait Deviations -- weight shift ability decreased;knee buckling, left side  -CK    Comment, (Gait/Stairs) deferred. Patient quite fatigued and low hemoglobin pending blood transfusion. Assisted her back to bed as above  -CK Patient ambulated brief distance in room with step to gait pattern and KI tightly donned on LLE. Patient demo'd mildly improved knee buckling inside KI today, but also limited weight acceptance. Cues provided for upright posture and improved heel strike/knee extension on LLE during stance phase. Patient fatigued quickly and chair was brought up behind her to rest. KI loosened slightly post ambulation for skin integrity purposes.  -CK      Row Name 05/22/25 1337 05/22/25 1020       Mobility    Extremity Weight-bearing Status left lower extremity  -CK left  lower extremity  -CK    Left Lower Extremity (Weight-bearing Status) weight-bearing as tolerated (WBAT);other (see comments)  in KI AAT, no ROM  -CK weight-bearing as tolerated (WBAT);other (see comments)  in KI AAT, no ROM  -CK              User Key  (r) = Recorded By, (t) = Taken By, (c) = Cosigned By      Initials Name Provider Type    CK Keke Cook PT Physical Therapist                   Obj/Interventions       Row Name 05/22/25 1339 05/22/25 1026       Motor Skills    Therapeutic Exercise other (see comments)  patient quite fatigued following transfer and weightshifting activity so deferred  -CK hip;knee;ankle  -CK      Row Name 05/22/25 1026          Hip (Therapeutic Exercise)    Hip (Therapeutic Exercise) strengthening exercise  -CK     Hip Isometrics (Therapeutic Exercise) bilateral;gluteal sets;10 repetitions;3 second hold  -CK     Hip Strengthening (Therapeutic Exercise) bilateral;aBduction;aDduction;other (see comments)  modA  -CK       Row Name 05/22/25 1026          Knee (Therapeutic Exercise)    Knee (Therapeutic Exercise) isometric exercises  -CK     Knee Isometrics (Therapeutic Exercise) bilateral;quad sets;10 repetitions;3 second hold  -CK       Row Name 05/22/25 1026          Ankle (Therapeutic Exercise)    Ankle (Therapeutic Exercise) AROM (active range of motion)  -CK     Ankle AROM (Therapeutic Exercise) bilateral;dorsiflexion;plantarflexion;10 repetitions  -CK       Row Name 05/22/25 1339 05/22/25 1026       Balance    Balance Assessment sitting static balance;standing static balance;standing dynamic balance  -CK sitting static balance;standing static balance;standing dynamic balance  -CK    Static Sitting Balance contact guard  -CK contact guard  -CK    Position, Sitting Balance unsupported;sitting edge of bed;sitting in chair  -CK unsupported;sitting edge of bed  -CK    Static Standing Balance minimal assist  -CK minimal assist  -CK    Dynamic Standing Balance -- minimal assist  -CK     Position/Device Used, Standing Balance supported;walker, front-wheeled  -CK supported;walker, front-wheeled  -CK              User Key  (r) = Recorded By, (t) = Taken By, (c) = Cosigned By      Initials Name Provider Type    CK Keke Cook, PT Physical Therapist                   Goals/Plan    No documentation.                  Clinical Impression       Row Name 05/22/25 1340 05/22/25 1027       Pain    Pretreatment Pain Rating -- 0/10 - no pain  -CK    Posttreatment Pain Rating -- 0/10 - no pain  -CK    Pre/Posttreatment Pain Comment -- L knee pain with mobility, denied pain pre/post treatment  -CK    Additional Documentation Pain Scale: FACES Pre/Post-Treatment (Group)  -CK --      Row Name 05/22/25 1340          Pain Scale: FACES Pre/Post-Treatment    Pain: FACES Scale, Pretreatment 0-->no hurt  -CK     Posttreatment Pain Rating 0-->no hurt  -CK       Row Name 05/22/25 1340 05/22/25 1027       Plan of Care Review    Plan of Care Reviewed With patient  -CK patient  -CK    Progress declining  -CK no change  -CK    Outcome Evaluation Patient with increased fatigue and pending blood transfusion this afternoon. She gave good effort with weightshifting activity and transfer back to bed this afternoon, but did require increased assist due to fatigue. IPPT remains indicated to address current deficits. Continue to recommend D/C to SNF.  -CK Patient continues to give good effort with therapy interventions. Simple BLE exercises reviewed and ambulation 5' minAx1+1 with FWW completed. She remains limited by quick fatigue and continue L knee buckling inside knee immobilizer. IPPT will continue to follow and address current deficits. Continue to recommend D/C to SNF.  -CK      Row Name 05/22/25 1340 05/22/25 1027       Vital Signs    Pre Systolic BP Rehab -- 119  -CK    Pre Treatment Diastolic BP -- 58  -CK    Pretreatment Heart Rate (beats/min) -- 57  -CK    Posttreatment Heart Rate (beats/min) -- 59  -CK    Pre  SpO2 (%) -- 94  -CK    O2 Delivery Pre Treatment nasal cannula  -CK nasal cannula  -CK    O2 Delivery Intra Treatment room air  -CK --    Post SpO2 (%) -- 94  -CK    O2 Delivery Post Treatment nasal cannula  -CK room air  -CK    Pre Patient Position Sitting  -CK Supine  -CK    Post Patient Position Supine  -CK --      Row Name 05/22/25 1340 05/22/25 1027       Positioning and Restraints    Pre-Treatment Position sitting in chair/recliner  -CK in bed  -CK    Post Treatment Position bed  -CK chair  -CK    In Bed fowlers;call light within reach;encouraged to call for assist;exit alarm on;SCD pump applied;waffle boots/both;heels elevated;notified nsg;L knee immobilizer  -CK --    In Chair -- reclined;call light within reach;encouraged to call for assist;exit alarm on;waffle cushion;compression device;L knee immobilizer;notified nsg  -CK              User Key  (r) = Recorded By, (t) = Taken By, (c) = Cosigned By      Initials Name Provider Type    CK Keke Cook, PT Physical Therapist                   Outcome Measures       Row Name 05/22/25 1344 05/22/25 1031       How much help from another person do you currently need...    Turning from your back to your side while in flat bed without using bedrails? 2  -CK 2  -CK    Moving from lying on back to sitting on the side of a flat bed without bedrails? 2  -CK 2  -CK    Moving to and from a bed to a chair (including a wheelchair)? 2  -CK 3  -CK    Standing up from a chair using your arms (e.g., wheelchair, bedside chair)? 2  -CK 3  -CK    Climbing 3-5 steps with a railing? 2  -CK 2  -CK    To walk in hospital room? 2  -CK 3  -CK    AM-PAC 6 Clicks Score (PT) 12  -CK 15  -CK    Highest Level of Mobility Goal Move to Chair/Commode-4  -CK Move to Chair/Commode-4  -CK      Row Name 05/22/25 0800          How much help from another person do you currently need...    Turning from your back to your side while in flat bed without using bedrails? 2  -PT     Moving from lying  on back to sitting on the side of a flat bed without bedrails? 2  -PT     Moving to and from a bed to a chair (including a wheelchair)? 2  -PT     Standing up from a chair using your arms (e.g., wheelchair, bedside chair)? 3  -PT     Climbing 3-5 steps with a railing? 2  -PT     To walk in hospital room? 3  -PT     AM-PAC 6 Clicks Score (PT) 14  -PT     Highest Level of Mobility Goal Move to Chair/Commode-4  -PT       Row Name 05/22/25 1344 05/22/25 1031       Functional Assessment    Outcome Measure Options AM-PAC 6 Clicks Basic Mobility (PT)  -CK AM-PAC 6 Clicks Basic Mobility (PT)  -CK              User Key  (r) = Recorded By, (t) = Taken By, (c) = Cosigned By      Initials Name Provider Type    CK Keke Cook, PT Physical Therapist    PT Jey Rai, RN Registered Nurse                                 Physical Therapy Education       Title: PT OT SLP Therapies (In Progress)       Topic: Physical Therapy (In Progress)       Point: Mobility training (In Progress)       Learning Progress Summary            Patient Acceptance, E, NR by CK at 5/22/2025 1344    Acceptance, E, NR by CK at 5/22/2025 1031    Acceptance, E, NR by CK at 5/21/2025 1614    Acceptance, E, NR,VU by CK at 5/21/2025 1006                      Point: Home exercise program (In Progress)       Learning Progress Summary            Patient Acceptance, E, NR by CK at 5/22/2025 1344    Acceptance, E, NR by CK at 5/22/2025 1031    Acceptance, E, NR by CK at 5/21/2025 1614                      Point: Body mechanics (In Progress)       Learning Progress Summary            Patient Acceptance, E, NR by CK at 5/22/2025 1344    Acceptance, E, NR by CK at 5/22/2025 1031    Acceptance, E, NR by CK at 5/21/2025 1614    Acceptance, E, NR,VU by CK at 5/21/2025 1006                      Point: Precautions (In Progress)       Learning Progress Summary            Patient Acceptance, E, NR by CK at 5/22/2025 1344    Acceptance, E, NR by CK at 5/22/2025 1031     Acceptance, E, NR by CK at 5/21/2025 1614    Acceptance, E, NR,VU by CK at 5/21/2025 1006                                      User Key       Initials Effective Dates Name Provider Type [facility] 02[facility]06[facility]24 -  Keke Cook PT Physical Therapist PT                  PT Recommendation and Plan  Planned Therapy Interventions (PT): balance training, bed mobility training, gait training, home exercise program, neuromuscular re-education, transfer training, stretching, strengthening, stair training, postural re-education, patient/family education  Progress: declining  Outcome Evaluation: Patient with increased fatigue and pending blood transfusion this afternoon. She gave good effort with weightshifting activity and transfer back to bed this afternoon, but did require increased assist due to fatigue. IPPT remains indicated to address current deficits. Continue to recommend D/C to SNF.     Time Calculation:   PT Evaluation Complexity  History, PT Evaluation Complexity: 3 or more personal factors and/or comorbidities  Examination of Body Systems (PT Eval Complexity): total of 3 or more elements  Clinical Presentation (PT Evaluation Complexity): evolving  Clinical Decision Making (PT Evaluation Complexity): moderate complexity  Overall Complexity (PT Evaluation Complexity): moderate complexity     PT Charges       Row Name 05/22/25 1345 05/22/25 1033          Time Calculation    Start Time 1307  -CK 0832  -CK     PT Received On 05/22/25  -CK 05/22/25  -CK        Timed Charges    64931 - PT Therapeutic Exercise Minutes -- 15  -CK     67799 - Gait Training Minutes  -- 10  -CK     09117 - PT Therapeutic Activity Minutes 24  -CK 13  -CK        Total Minutes    Timed Charges Total Minutes 24  -CK 38  -CK      Total Minutes 24  -CK 38  -CK               User Key  (r) = Recorded By, (t) = Taken By, (c) = Cosigned By      Initials Name Provider Type    CK Keke Cook, GURPREET Physical Therapist                   Therapy Charges for Today       Code Description Service Date Service Provider Modifiers Qty    87366386678 HC PT EVAL MOD COMPLEXITY 4 5/21/2025 Keke Cook, PT GP 1    37000336251 HC PT THER PROC EA 15 MIN 5/21/2025 Keke Cook, PT GP 1    30873556078 HC GAIT TRAINING EA 15 MIN 5/21/2025 Keke Cook, PT GP 1    68600426484 HC PT THERAPEUTIC ACT EA 15 MIN 5/21/2025 Keke Cook, PT GP 1    96264070092 HC PT THER PROC EA 15 MIN 5/22/2025 Keke Cook, PT GP 1    04758251668 HC GAIT TRAINING EA 15 MIN 5/22/2025 Keke Cook, PT GP 1    83696816900 HC PT THERAPEUTIC ACT EA 15 MIN 5/22/2025 Keke Cook, PT GP 1    12707528080 HC PT THERAPEUTIC ACT EA 15 MIN 5/22/2025 Keke Cook, PT GP 2            PT G-Codes  Outcome Measure Options: AM-PAC 6 Clicks Basic Mobility (PT)  AM-PAC 6 Clicks Score (PT): 12  AM-PAC 6 Clicks Score (OT): 11  PT Discharge Summary  Anticipated Discharge Disposition (PT): skilled nursing facility    Keke Cook, PT  5/22/2025

## 2025-05-22 NOTE — THERAPY TREATMENT NOTE
Patient Name: Chey Robertson  : 1935    MRN: [id number]                              Today's Date: 2025       Admit Date: 2025    Visit Dx:     ICD-10-CM ICD-9-CM   1. Fall at nursing home, initial encounter  W19.XXXA E888.9    Y92.129 E849.7   2. 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 Anticoagulated  Z79.01 V58.61   4. Periprosthetic fracture around internal prosthetic right knee joint, subsequent encounter  M97.11XD V54.89     Patient Active Problem List   Diagnosis    Benign familial tremor    AMS (altered mental status)    Pancytopenia    Hypothyroidism (acquired)    B12 deficiency    Abnormal intestinal absorption    Iron deficiency anemia due to chronic blood loss    Myelodysplasia (myelodysplastic syndrome)    Personal history of pulmonary embolism    Chronic anticoagulation    Essential hypertension    Type 2 diabetes mellitus without complication, without long-term current use of insulin    Atrial fibrillation    QT prolongation    Pericardial effusion    Severe malnutrition    Closed displaced intertrochanteric fracture of right femur, initial encounter    DISHA (acute kidney injury)    Moderate malnutrition    Falls    Type 2 diabetes mellitus with complication, with long-term current use of insulin    Periprosthetic fracture around prosthetic knee    Femur fracture     Past Medical History:   Diagnosis Date    A-fib     on eliquis    Anemia     Arthritis     Diabetes mellitus     checks sugar only when pt wants to     Disease of thyroid gland     History of transfusion     with knee surgery-  no reaction recalled.     [place] (hard of hearing)     no hearing aids    Hypertension     Migraine without aura and without status migrainosus, not intractable 2016    Myelodysplastic syndrome     Pulmonary emboli     (after knee surgery)    SOBOE (shortness of breath on exertion)     Tremors of nervous system     Vertigo      Wears dentures     upper     Wears glasses      Past Surgical History:   Procedure Laterality Date    BLADDER SURGERY      CATARACT EXTRACTION      bilat     CHOLECYSTECTOMY      COLONOSCOPY      HIP TROCHANTERIC NAILING WITH INTRAMEDULLARY HIP SCREW Right 6/20/2023    Procedure: HIP TROCHANTERIC NAILING WITH INTRAMEDULLARY HIP SCREW RIGHT;  Surgeon: Augie Hobbs MD;  Location: [facility] OR;  Service: Orthopedics;  Laterality: Right;    HYSTERECTOMY      with bso    KYPHOPLASTY N/A 02/12/2021    Procedure: KYPHOPLASTY T11, T12 AND L4;  Surgeon: Matty Hearn MD;  Location: [facility] OR;  Service: Orthopedic Spine;  Laterality: N/A;    TONSILLECTOMY        General Information       Row Name 05/22/25 1019          Physical Therapy Time and Intention    Document Type therapy note (daily note)  -     Mode of Treatment physical therapy;individual therapy  -       Row Name 05/22/25 1019          General Information    Existing Precautions/Restrictions fall;brace worn when out of bed;other (see comments)  s/p Revision L TKA, WBAT, KI AAT x2 weeks no ROM, Femoral NC, incisional wound vac  -CK     Barriers to Rehab previous functional deficit;medically complex  -CK       Row Name 05/22/25 1019          Cognition    Orientation Status (Cognition) oriented x 3  -CK       Row Name 05/22/25 1019          Safety Issues/Impairments Affecting Functional Mobility    Safety Issues Affecting Function (Mobility) awareness of need for assistance;insight into deficits/self-awareness;positioning of assistive device;problem-solving;safety precaution awareness;safety precautions follow-through/compliance;sequencing abilities  -CK     Impairments Affecting Function (Mobility) balance;endurance/activity tolerance;strength;sensation/sensory awareness;range of motion (ROM);postural/trunk control;motor control  -CK               User Key  (r) = Recorded By, (t) = Taken By, (c) = Cosigned By      Initials Name Provider Type    CK Joyce  Keke PT Physical Therapist                   Mobility       Row Name 05/22/25 1020          Bed Mobility    Bed Mobility supine-sit  -CK     Supine-Sit Hubbard (Bed Mobility) maximum assist (25% patient effort);1 person assist;verbal cues  -CK     Assistive Device (Bed Mobility) head of bed elevated;repositioning sheet  -CK     Comment, (Bed Mobility) cues for sequencing, patient gives good effort with bedrail use, requires assist at BLEs and trunk to achieve EOB sitting, denied dizziness at EOB  -CK       Row Name 05/22/25 1020          Sit-Stand Transfer    Sit-Stand [place] (Transfers) moderate assist (50% patient effort);2 person assist;verbal cues  -CK     Assistive Device (Sit-Stand Transfers) walker, front-wheeled  -CK     Comment, (Sit-Stand Transfer) cues for safe hand placement, still strong tegan to clear hips and achieve upright posture. Improved ability to step out LLE prior to sitting today with cues  -CK       Row Name 05/22/25 1020          Gait/Stairs (Locomotion)    Hubbard Level (Gait) minimum assist (75% patient effort);1 person assist;1 person to manage equipment;verbal cues  -CK     Assistive Device (Gait) walker, front-wheeled  -CK     Patient was able to Ambulate yes  -CK     Distance in Feet (Gait) 5  -CK     Deviations/Abnormal Patterns (Gait) bilateral deviations;base of support, narrow;[place] decreased;gait speed decreased;stride length decreased  -CK     Left Sided Gait Deviations weight shift ability decreased;knee buckling, left side  -CK     Comment, (Gait/Stairs) Patient ambulated brief distance in room with step to gait pattern and KI tightly donned on LLE. Patient demo'd mildly improved knee buckling inside KI today, but also limited weight acceptance. Cues provided for upright posture and improved heel strike/knee extension on LLE during stance phase. Patient fatigued quickly and chair was brought up behind her to rest. KI loosened slightly post ambulation  for skin integrity purposes.  -CK       Row Name 05/22/25 1020          Mobility    Extremity Weight-bearing Status left lower extremity  -CK     Left Lower Extremity (Weight-bearing Status) weight-bearing as tolerated (WBAT);other (see comments)  in KI AAT, no ROM  -CK               User Key  (r) = Recorded By, (t) = Taken By, (c) = Cosigned By      Initials Name Provider Type    CK Keke Cook PT Physical Therapist                   Obj/Interventions       Row Name 05/22/25 1026          Motor Skills    Therapeutic Exercise hip;knee;ankle  -CK       Row Name 05/22/25 1026          Hip (Therapeutic Exercise)    Hip (Therapeutic Exercise) strengthening exercise  -CK     Hip Isometrics (Therapeutic Exercise) bilateral;gluteal sets;10 repetitions;3 second hold  -CK     Hip Strengthening (Therapeutic Exercise) bilateral;aBduction;aDduction;other (see comments)  modA  -CK       Row Name 05/22/25 1026          Knee (Therapeutic Exercise)    Knee (Therapeutic Exercise) isometric exercises  -CK     Knee Isometrics (Therapeutic Exercise) bilateral;quad sets;10 repetitions;3 second hold  -CK       Row Name 05/22/25 1026          Ankle (Therapeutic Exercise)    Ankle (Therapeutic Exercise) AROM (active range of motion)  -CK     Ankle AROM (Therapeutic Exercise) bilateral;dorsiflexion;plantarflexion;10 repetitions  -CK       Row Name 05/22/25 1026          Balance    Balance Assessment sitting static balance;standing static balance;standing dynamic balance  -CK     Static Sitting Balance contact guard  -CK     Position, Sitting Balance unsupported;sitting edge of bed  -CK     Static Standing Balance minimal assist  -CK     Dynamic Standing Balance minimal assist  -CK     Position/Device Used, Standing Balance supported;walker, front-wheeled  -CK               User Key  (r) = Recorded By, (t) = Taken By, (c) = Cosigned By      Initials Name Provider Type    CK Keke Cook PT Physical Therapist                    Goals/Plan    No documentation.                  Clinical Impression       Row Name 05/22/25 1027          Pain    Pretreatment Pain Rating 0/10 - no pain  -CK     Posttreatment Pain Rating 0/10 - no pain  -CK     Pre/Posttreatment Pain Comment L knee pain with mobility, denied pain pre/post treatment  -CK       Row Name 05/22/25 1027          Plan of Care Review    Plan of Care Reviewed With patient  -CK     Progress no change  -CK     Outcome Evaluation Patient continues to give good effort with therapy interventions. Simple BLE exercises reviewed and ambulation 5' minAx1+1 with FWW completed. She remains limited by quick fatigue and continue L knee buckling inside knee immobilizer. IPPT will continue to follow and address current deficits. Continue to recommend D/C to SNF.  -CK       Row Name 05/22/25 1027          Vital Signs    Pre Systolic BP Rehab 119  -CK     Pre Treatment Diastolic BP 58  -CK     Pretreatment Heart Rate (beats/min) 57  -CK     Posttreatment Heart Rate (beats/min) 59  -CK     Pre SpO2 (%) 94  -CK     O2 Delivery Pre Treatment nasal cannula  -CK     Post SpO2 (%) 94  -CK     O2 Delivery Post Treatment room air  -CK     Pre Patient Position Supine  -CK       Row Name 05/22/25 1027          Positioning and Restraints    Pre-Treatment Position in bed  -CK     Post Treatment Position chair  -CK     In Chair reclined;call light within reach;encouraged to call for assist;exit alarm on;waffle cushion;compression device;L knee immobilizer;notified nsg  -CK               User Key  (r) = Recorded By, (t) = Taken By, (c) = Cosigned By      Initials Name Provider Type    CK Keke Cook, PT Physical Therapist                   Outcome Measures       Row Name 05/22/25 1031          How much help from another person do you currently need...    Turning from your back to your side while in flat bed without using bedrails? 2  -CK     Moving from lying on back to sitting on the side of a flat bed without  bedrails? 2  -CK     Moving to and from a bed to a chair (including a wheelchair)? 3  -CK     Standing up from a chair using your arms (e.g., wheelchair, bedside chair)? 3  -CK     Climbing 3-5 steps with a railing? 2  -CK     To walk in hospital room? 3  -CK     AM-PAC 6 Clicks Score (PT) 15  -CK     Highest Level of Mobility Goal Move to Chair/Commode-4  -CK       Row Name 05/22/25 1031          Functional Assessment    Outcome Measure Options AM-PAC 6 Clicks Basic Mobility (PT)  -CK               User Key  (r) = Recorded By, (t) = Taken By, (c) = Cosigned By      Initials Name Provider Type    CK Keke Cook PT Physical Therapist                                 Physical Therapy Education       Title: PT OT SLP Therapies (In Progress)       Topic: Physical Therapy (In Progress)       Point: Mobility training (In Progress)       Learning Progress Summary            Patient Acceptance, E, NR by CK at 5/22/2025 1031    Acceptance, E, NR by CK at 5/21/2025 1614    Acceptance, E, NR,VU by CK at 5/21/2025 1006                      Point: Home exercise program (In Progress)       Learning Progress Summary            Patient Acceptance, E, NR by CK at 5/22/2025 1031    Acceptance, E, NR by CK at 5/21/2025 1614                      Point: Body mechanics (In Progress)       Learning Progress Summary            Patient Acceptance, E, NR by CK at 5/22/2025 1031    Acceptance, E, NR by CK at 5/21/2025 1614    Acceptance, E, NR,VU by CK at 5/21/2025 1006                      Point: Precautions (In Progress)       Learning Progress Summary            Patient Acceptance, E, NR by CK at 5/22/2025 1031    Acceptance, E, NR by CK at 5/21/2025 1614    Acceptance, E, NR,VU by CK at 5/21/2025 1006                                      User Key       Initials Effective Dates Name Provider Type Discipline    CK 02/06/24 -  Keek Cook PT Physical Therapist PT                  PT Recommendation and Plan  Planned Therapy  Interventions (PT): balance training, bed mobility training, gait training, home exercise program, neuromuscular re-education, transfer training, stretching, strengthening, stair training, postural re-education, patient/family education  Progress: no change  Outcome Evaluation: Patient continues to give good effort with therapy interventions. Simple BLE exercises reviewed and ambulation 5' minAx1+1 with FWW completed. She remains limited by quick fatigue and continue L knee buckling inside knee immobilizer. IPPT will continue to follow and address current deficits. Continue to recommend D/C to SNF.     Time Calculation:   PT Evaluation Complexity  History, PT Evaluation Complexity: 3 or more personal factors and/or comorbidities  Examination of Body Systems (PT Eval Complexity): total of 3 or more elements  Clinical Presentation (PT Evaluation Complexity): evolving  Clinical Decision Making (PT Evaluation Complexity): moderate complexity  Overall Complexity (PT Evaluation Complexity): moderate complexity     PT Charges       Row Name 05/22/25 1033             Time Calculation    Start Time 0832  -CK      PT Received On 05/22/25  -CK         Timed Charges    79589 - PT Therapeutic Exercise Minutes 15  -CK      42143 - Gait Training Minutes  10  -CK      83749 - PT Therapeutic Activity Minutes 13  -CK         Total Minutes    Timed Charges Total Minutes 38  -CK       Total Minutes 38  -CK                User Key  (r) = Recorded By, (t) = Taken By, (c) = Cosigned By      Initials Name Provider Type    CK Keke Cook PT Physical Therapist                  Therapy Charges for Today       Code Description Service Date Service Provider Modifiers Qty    58348321131 HC PT EVAL MOD COMPLEXITY 4 5/21/2025 Keke Cook, PT GP 1    74311724938  PT THER PROC EA 15 MIN 5/21/2025 Keke Cook, PT GP 1    81273201309 HC GAIT TRAINING EA 15 MIN 5/21/2025 Keke Cook, PT GP 1    83323068805  PT  THERAPEUTIC ACT EA 15 MIN 5/21/2025 Keke Cook, PT GP 1    34217831607 HC PT THER PROC EA 15 MIN 5/22/2025 Keke Cook, PT GP 1    06403439160 HC GAIT TRAINING EA 15 MIN 5/22/2025 Keke Cook, PT GP 1    15508156200 HC PT THERAPEUTIC ACT EA 15 MIN 5/22/2025 Keke Cook, PT GP 1            PT G-Codes  Outcome Measure Options: AM-PAC 6 Clicks Basic Mobility (PT)  AM-PAC 6 Clicks Score (PT): 15  AM-PAC 6 Clicks Score (OT): 11  PT Discharge Summary  Anticipated Discharge Disposition (PT): skilled nursing facility    Keke Cook, PT  5/22/2025

## 2025-05-22 NOTE — PROGRESS NOTES
"          Clinical Nutrition Assessment   Patient Name: Chey Robertson  YOB: 1935  MRN: [id number]  Date of Encounter: 05/22/25 09:52 EDT  Admission date: 5/17/2025  Reason for Visit: Follow-up protocol    Assessment   Nutrition Assessment   Admission Diagnosis:  Periprosthetic fracture around internal prosthetic right knee joint [M97.11XA]  Femur fracture [S72.90XA]    Problem List:    Femur fracture    Hypothyroidism (acquired)    Chronic anticoagulation    Essential hypertension    Atrial fibrillation    Falls    Type 2 diabetes mellitus with complication, with long-term current use of insulin    Periprosthetic fracture around prosthetic knee    PMH:   She  has a past medical history of A-fib, Anemia, Arthritis, Diabetes mellitus, Disease of thyroid gland, History of transfusion, [place] (hard of hearing), Hypertension, Migraine without aura and without status migrainosus, not intractable (12/30/2016), Myelodysplastic syndrome, Pulmonary emboli (2011), SOBOE (shortness of breath on exertion), Tremors of nervous system, Vertigo, Wears dentures, and Wears glasses.    PSH:  She  has a past surgical history that includes Hysterectomy; Tonsillectomy; Bladder surgery; Cataract extraction; Cholecystectomy; Colonoscopy; Kyphoplasty (N/A, 02/12/2021); and Hip Trochanteric Nailing (Right, 6/20/2023).    Applicable Nutrition History:   (5/19) WOC: stg 2 chronic PO on left heel; stg 2 PI to sacrum and coccyx    Anthropometrics   Height: Height: 160 cm (63\")  Last Filed Weight: Weight: 56.9 kg (125 lb 6.4 oz) (05/17/25 2336)  Method: Weight Method: Bed scale  BMI: BMI (Calculated): 22.2    UBW:   Weight      Weight (kg) Weight (lbs) Weight Method   7/27/2024 51.256 kg  113 lb  Stated    5/17/2025 56.881 kg  125 lb 6.4 oz  Bed scale      Weight change: No significant changes    Nutrition Focused Physical Exam    Date: 05/20/25     Unable to perform due to Pt unable to participate at time of visit, Defer pending " indication     Subjective   Reported/Observed/Food/Nutrition Related History:   05/22/25  Patient sleeping soundly and did not awaken to name being spoken.    05/20/25  Patient screened per nutrition protocol for possible pressure injury of stage 2 or greater and/or non healing wound. Presented with femur fx. Patient gone to OR with ortho at time of visit. No weight changes noted in EMR. No chewing or swallowing difficulties noted. NKFA in EMR. Will order jeremy for patient to trial.    Current Nutrition Prescription   PO: Diet: Regular/House; Fluid Consistency: Thin (IDDSI 0)  Oral Nutrition Supplement: jeremy @ B/D  Intake: 5/21 B/L/D 25%; 5/22 B 50% PO intake documented    Assessment & Plan   Nutrition Diagnosis   Date: 05/20/25           Updated:    Problem Increased nutrient needs - protein   Etiology Increased demand for wound healing   Signs/Symptoms WOC noted PIs   Status: New    Goal:   Nutrition to support treatment and Increase intake    Nutrition Intervention      Follow treatment progress, Care plan reviewed, Encourage intake, Supplement provided    Nutrition POC  Continue current ONS regimen  Will complete full assessment when able    Monitoring/Evaluation:   Per protocol, PO intake, Supplement intake, Weight, Skin status, Symptoms, POC/GOC    Temi Benavidez MS,RD,LD  Time Spent: 20min

## 2025-05-22 NOTE — PLAN OF CARE
Goal Outcome Evaluation:   Patient is alert but with intermittent forgetfulness and confusion.On 2L of o2 intermittently.Walked in the room with PT.Hb 6.8, 2 units of blood for transfusion.Check labs tomorrow.

## 2025-05-23 LAB
ANION GAP SERPL CALCULATED.3IONS-SCNC: 12 MMOL/L (ref 5–15)
BASOPHILS # BLD AUTO: 0 10*3/MM3 (ref 0–0.2)
BASOPHILS NFR BLD AUTO: 0 % (ref 0–1.5)
BH BB BLOOD EXPIRATION DATE: NORMAL
BH BB BLOOD EXPIRATION DATE: NORMAL
BH BB BLOOD TYPE BARCODE: 9500
BH BB BLOOD TYPE BARCODE: 9500
BH BB DISPENSE STATUS: NORMAL
BH BB DISPENSE STATUS: NORMAL
BH BB PRODUCT CODE: NORMAL
BH BB PRODUCT CODE: NORMAL
BH BB UNIT NUMBER: NORMAL
BH BB UNIT NUMBER: NORMAL
BUN SERPL-MCNC: 23 MG/DL (ref 8–23)
BUN/CREAT SERPL: 24 (ref 7–25)
CALCIUM SPEC-SCNC: 8.1 MG/DL (ref 8.6–10.5)
CHLORIDE SERPL-SCNC: 101 MMOL/L (ref 98–107)
CO2 SERPL-SCNC: 21 MMOL/L (ref 22–29)
CREAT SERPL-MCNC: 0.96 MG/DL (ref 0.57–1)
CROSSMATCH INTERPRETATION: NORMAL
CROSSMATCH INTERPRETATION: NORMAL
DEPRECATED RDW RBC AUTO: 52.5 FL (ref 37–54)
EGFRCR SERPLBLD CKD-EPI 2021: 57 ML/MIN/1.73
EOSINOPHIL # BLD AUTO: 0 10*3/MM3 (ref 0–0.4)
EOSINOPHIL NFR BLD AUTO: 0 % (ref 0.3–6.2)
ERYTHROCYTE [DISTWIDTH] IN BLOOD BY AUTOMATED COUNT: 16.8 % (ref 12.3–15.4)
GLUCOSE BLDC GLUCOMTR-MCNC: 110 MG/DL (ref 70–130)
GLUCOSE BLDC GLUCOMTR-MCNC: 150 MG/DL (ref 70–130)
GLUCOSE BLDC GLUCOMTR-MCNC: 166 MG/DL (ref 70–130)
GLUCOSE BLDC GLUCOMTR-MCNC: 175 MG/DL (ref 70–130)
GLUCOSE SERPL-MCNC: 192 MG/DL (ref 65–99)
HCT VFR BLD AUTO: 30.4 % (ref 34–46.6)
HGB BLD-MCNC: 9.8 G/DL (ref 12–15.9)
IMM GRANULOCYTES # BLD AUTO: 0.05 10*3/MM3 (ref 0–0.05)
IMM GRANULOCYTES NFR BLD AUTO: 2.9 % (ref 0–0.5)
LYMPHOCYTES # BLD AUTO: 0.16 10*3/MM3 (ref 0.7–3.1)
LYMPHOCYTES NFR BLD AUTO: 9.1 % (ref 19.6–45.3)
MCH RBC QN AUTO: 28.2 PG (ref 26.6–33)
MCHC RBC AUTO-ENTMCNC: 32.2 G/DL (ref 31.5–35.7)
MCV RBC AUTO: 87.6 FL (ref 79–97)
MONOCYTES # BLD AUTO: 0.17 10*3/MM3 (ref 0.1–0.9)
MONOCYTES NFR BLD AUTO: 9.7 % (ref 5–12)
NEUTROPHILS NFR BLD AUTO: 1.37 10*3/MM3 (ref 1.7–7)
NEUTROPHILS NFR BLD AUTO: 78.3 % (ref 42.7–76)
NRBC BLD AUTO-RTO: 0 /100 WBC (ref 0–0.2)
PLATELET # BLD AUTO: 72 10*3/MM3 (ref 140–450)
PMV BLD AUTO: 9.5 FL (ref 6–12)
POTASSIUM SERPL-SCNC: 4.6 MMOL/L (ref 3.5–5.2)
RBC # BLD AUTO: 3.47 10*6/MM3 (ref 3.77–5.28)
SODIUM SERPL-SCNC: 134 MMOL/L (ref 136–145)
UNIT  ABO: NORMAL
UNIT  ABO: NORMAL
UNIT  RH: NORMAL
UNIT  RH: NORMAL
WBC NRBC COR # BLD AUTO: 1.75 10*3/MM3 (ref 3.4–10.8)

## 2025-05-23 PROCEDURE — 80048 BASIC METABOLIC PNL TOTAL CA: CPT | Performed by: INTERNAL MEDICINE

## 2025-05-23 PROCEDURE — 63710000001 INSULIN LISPRO (HUMAN) PER 5 UNITS: Performed by: ORTHOPAEDIC SURGERY

## 2025-05-23 PROCEDURE — 97530 THERAPEUTIC ACTIVITIES: CPT

## 2025-05-23 PROCEDURE — 97116 GAIT TRAINING THERAPY: CPT

## 2025-05-23 PROCEDURE — 85025 COMPLETE CBC W/AUTO DIFF WBC: CPT | Performed by: INTERNAL MEDICINE

## 2025-05-23 PROCEDURE — 82948 REAGENT STRIP/BLOOD GLUCOSE: CPT

## 2025-05-23 PROCEDURE — 99232 SBSQ HOSP IP/OBS MODERATE 35: CPT | Performed by: STUDENT IN AN ORGANIZED HEALTH CARE EDUCATION/TRAINING PROGRAM

## 2025-05-23 RX ADMIN — LEVOTHYROXINE SODIUM 100 MCG: 0.1 TABLET ORAL at 05:10

## 2025-05-23 RX ADMIN — APIXABAN 2.5 MG: 2.5 TABLET, FILM COATED ORAL at 22:23

## 2025-05-23 RX ADMIN — METOPROLOL TARTRATE 25 MG: 25 TABLET, FILM COATED ORAL at 22:23

## 2025-05-23 RX ADMIN — ACETAMINOPHEN 1000 MG: 500 TABLET ORAL at 05:10

## 2025-05-23 RX ADMIN — DOXYCYCLINE 100 MG: 100 CAPSULE ORAL at 08:50

## 2025-05-23 RX ADMIN — INSULIN LISPRO 2 UNITS: 100 INJECTION, SOLUTION INTRAVENOUS; SUBCUTANEOUS at 22:23

## 2025-05-23 RX ADMIN — AMIODARONE HYDROCHLORIDE 200 MG: 200 TABLET ORAL at 08:50

## 2025-05-23 RX ADMIN — Medication 10 ML: at 08:50

## 2025-05-23 RX ADMIN — ACETAMINOPHEN 1000 MG: 500 TABLET ORAL at 13:53

## 2025-05-23 RX ADMIN — DOXYCYCLINE 100 MG: 100 CAPSULE ORAL at 22:23

## 2025-05-23 RX ADMIN — PANTOPRAZOLE SODIUM 40 MG: 40 TABLET, DELAYED RELEASE ORAL at 05:10

## 2025-05-23 RX ADMIN — INSULIN LISPRO 2 UNITS: 100 INJECTION, SOLUTION INTRAVENOUS; SUBCUTANEOUS at 18:13

## 2025-05-23 RX ADMIN — ACETAMINOPHEN 1000 MG: 500 TABLET ORAL at 22:22

## 2025-05-23 NOTE — PAYOR COMM NOTE
"Ref# [id number]   Clinical Update    ANGEL Callahan, RN  Utilization Review  Phone [phone]  Fax [phone]    [facility]  [address] [place] [id number]         Ailyn Elisa BERRY (89 y.o. Female)       Date of Birth   1935    Social Security Number       Address   [address] [id number]    Home Phone   [phone]    MRN   [id number]       Gnosticism   Congregation    Marital Status                               Admission Date   5/17/2025    Admission Type   Emergency    Admitting Provider   Lin Heath MD    Attending Provider   Lin Heath MD    Department, Room/Bed   [facility] 3G, S363/1       Discharge Date       Discharge Disposition       Discharge Destination                                 Attending Provider: Lin Heath MD    Allergies: Aspirin    Isolation: None   Infection: None   Code Status: CPR    Ht: 160 cm (63\")   Wt: 56.9 kg (125 lb 6.4 oz)    Admission Cmt: None   Principal Problem: Femur fracture [S72.90XA]                   Active Insurance as of 5/17/2025       Primary Coverage       Payor Plan Insurance Group Employer/Plan Group    ANTHEM MEDICARE REPLACEMENT ANTHEM MEDICARE ADVANTAGE HMO [id number]       Payor Plan Address Payor Plan Phone Number Payor Plan Fax Number Effective Dates    [address] [phone]  1/1/2025 - None Entered    [facility] [id number]         Subscriber Name Subscriber Birth Date Member ID       ELISA VENEGAS KRISTI 1935 [id number]                     Emergency Contacts        (Rel.) Home Phone Work Phone Mobile Phone    Joe Venegas (Son) -- -- [phone]    Albin Venegas (Son) [phone] -- --    DORIS VENEGAS (Relative) -- -- [phone]              Oxygen Therapy (last day)       Date/Time SpO2 Device (Oxygen Therapy) Flow (L/min) (Oxygen Therapy) Oxygen Concentration (%) ETCO2 (mmHg)    05/23/25 1203 -- room air -- -- --    05/23/25 1146 92 nasal " cannula 2 -- --    05/23/25 0840 -- nasal cannula 2 -- --    05/23/25 0723 95 nasal cannula 2 -- --    05/23/25 0600 -- nasal cannula 2 -- --    05/23/25 0412 93 -- -- -- --    05/23/25 0400 -- nasal cannula 2 -- --    05/23/25 0200 -- nasal cannula 2 -- --    05/23/25 0000 -- nasal cannula 2 -- --    05/22/25 2330 95 -- -- -- --    05/22/25 2200 -- nasal cannula 2 -- --    05/22/25 2000 -- nasal cannula 2 -- --    05/22/25 1900 97 -- -- -- --    05/22/25 1841 96 nasal cannula 2 -- --    05/22/25 1803 -- nasal cannula 2 -- --    05/22/25 1600 -- nasal cannula 2 -- --    05/22/25 1515 98 nasal cannula 2 -- --    05/22/25 1455 97 nasal cannula 2 -- --    05/22/25 1425 97 nasal cannula 2 -- --    05/22/25 1400 -- nasal cannula 2 -- --    05/22/25 1243 91 nasal cannula 2 -- --    05/22/25 1200 -- nasal cannula 2 -- --    05/22/25 1000 -- nasal cannula 2 -- --    05/22/25 0800 -- nasal cannula 2 -- --    05/22/25 0653 98 nasal cannula 2 -- --    05/22/25 0616 -- nasal cannula 2 -- --    05/22/25 0414 -- nasal cannula 2 -- --    05/22/25 0349 96 -- -- -- --    05/22/25 0216 -- nasal cannula 2 -- --    05/22/25 0017 -- nasal cannula 2 -- --          Current Facility-Administered Medications   Medication Dose Route Frequency Provider Last Rate Last Admin    acetaminophen (TYLENOL) tablet 650 mg  650 mg Oral Q4H PRN Albin Mcclain MD        Or    acetaminophen (TYLENOL) 160 MG/5ML oral solution 650 mg  650 mg Oral Q4H PRN Albin Mcclain MD        Or    acetaminophen (TYLENOL) suppository 650 mg  650 mg Rectal Q4H PRN Albin Mcclain MD        acetaminophen (TYLENOL) tablet 1,000 mg  1,000 mg Oral Q8H Albin Mcclain MD   1,000 mg at 05/23/25 0510    amiodarone (PACERONE) tablet 200 mg  200 mg Oral Daily Albin Mcclain MD   200 mg at 05/23/25 0850    [Held by provider] apixaban (ELIQUIS) tablet 2.5 mg  2.5 mg Oral BID Queenie Cabezas APRN        sennosides-docusate (PERICOLACE) 8.6-50 MG per tablet 2 tablet  2 tablet Oral  BID PRN Albin Mcclain MD   2 tablet at 05/21/25 0037    And    polyethylene glycol (MIRALAX) packet 17 g  17 g Oral Daily PRN Albin Mcclain MD   17 g at 05/19/25 1757    And    bisacodyl (DULCOLAX) EC tablet 5 mg  5 mg Oral Daily PRN Albin Mcclain MD   5 mg at 05/19/25 0848    And    bisacodyl (DULCOLAX) suppository 10 mg  10 mg Rectal Daily PRN Albin Mcclain MD   10 mg at 05/21/25 0316    Calcium Replacement - Follow Nurse / BPA Driven Protocol   Not Applicable PRN Albin Mcclain MD        dextrose (D50W) (25 g/50 mL) IV injection 25 g  25 g Intravenous Q15 Min PRAlbin Bilss MD        dextrose (GLUTOSE) oral gel 15 g  15 g Oral Q15 Min PRN Albin Mcclain MD        diphenhydrAMINE (BENADRYL) capsule 25 mg  25 mg Oral Q6H PRN Albin Mcclain MD        Or    diphenhydrAMINE (BENADRYL) injection 25 mg  25 mg Intravenous Q6H PRN Albin Mcclain MD        doxycycline (MONODOX) capsule 100 mg  100 mg Oral Q12H Albin Mcclain MD   100 mg at 05/23/25 0850    glucagon (GLUCAGEN) injection 1 mg  1 mg Intramuscular Q15 Min PRN Albin Mcclain MD        HYDROmorphone (DILAUDID) injection 0.5 mg  0.5 mg Intravenous Q2H PRN Albin Mcclain MD        And    naloxone (NARCAN) injection 0.1 mg  0.1 mg Intravenous Q5 Min PRN Albin Mcclain MD        Insulin Lispro (humaLOG) injection 2-7 Units  2-7 Units Subcutaneous 4x Daily AC & at Bedtime Albin Mcclain MD   2 Units at 05/22/25 2048    levothyroxine (SYNTHROID, LEVOTHROID) tablet 100 mcg  100 mcg Oral Q AM Albin Mcclain MD   100 mcg at 05/23/25 0510    [Held by provider] lisinopril (PRINIVIL,ZESTRIL) tablet 5 mg  5 mg Oral Daily Albin Mcclain MD   5 mg at 05/21/25 0910    Magnesium Standard Dose Replacement - Follow Nurse / BPA Driven Protocol   Not Applicable PRN Albin Mcclain MD        melatonin tablet 5 mg  5 mg Oral Nightly PRN Albin Mcclain MD   5 mg at 05/17/25 2350    [Held by provider] metoprolol tartrate (LOPRESSOR) tablet 25  mg  25 mg Oral BID Albin Mcclain MD   25 mg at 05/21/25 2125    morphine injection 2 mg  2 mg Intravenous Q4H PRN Albin Mcclain MD   2 mg at 05/20/25 0602    nitroglycerin (NITROSTAT) SL tablet 0.4 mg  0.4 mg Sublingual Q5 Min PRN Albin Mcclain MD        ondansetron ODT (ZOFRAN-ODT) disintegrating tablet 4 mg  4 mg Oral Q6H PRN Albin Mcclain MD        Or    ondansetron (ZOFRAN) injection 4 mg  4 mg Intravenous Q6H PRN Albin Mcclain MD   4 mg at 05/21/25 0316    oxyCODONE (ROXICODONE) immediate release tablet 10 mg  10 mg Oral Q4H PRN Albin Mcclain MD        oxyCODONE (ROXICODONE) immediate release tablet 5 mg  5 mg Oral Q4H PRN Albin Mcclain MD   5 mg at 05/20/25 2116    pantoprazole (PROTONIX) EC tablet 40 mg  40 mg Oral Q AM Albin Mcclain MD   40 mg at 05/23/25 0510    Phosphorus Replacement - Follow Nurse / BPA Driven Protocol   Not Applicable PRAlbin Bliss MD        Potassium Replacement - Follow Nurse / BPA Driven Protocol   Not Applicable Albin Hillman MD        ropivacaine (NAROPIN) 0.2 % infusion (INFUSYSTEM)   Peripheral Nerve Continuous Albin Mcclain MD   1,000 mg at 05/20/25 1910    sodium chloride 0.9 % flush 10 mL  10 mL Intravenous Q12H Albin Mcclain MD   10 mL at 05/23/25 0850    sodium chloride 0.9 % flush 10 mL  10 mL Intravenous PRN Albin Mcclain MD        traMADol (ULTRAM) tablet 50 mg  50 mg Oral Q8H PRN Albin Mcclain MD   50 mg at 05/21/25 0037     Lab Results (last 48 hours)       Procedure Component Value Units Date/Time    CBC & Differential [[id number]]  (Abnormal) Collected: 05/23/25 1256    Specimen: Blood Updated: 05/23/25 1345    Narrative:      The following orders were created for panel order CBC & Differential.  Procedure                               Abnormality         Status                     ---------                               -----------         ------                     CBC Auto Differential[[id number]]        Abnormal             Final result                 Please view results for these tests on the individual orders.    CBC Auto Differential [[id number]]  (Abnormal) Collected: 05/23/25 1256    Specimen: Blood Updated: 05/23/25 1345     WBC 1.75 10*3/mm3      RBC 3.47 10*6/mm3      Hemoglobin 9.8 g/dL      Hematocrit 30.4 %      MCV 87.6 fL      MCH 28.2 pg      MCHC 32.2 g/dL      RDW 16.8 %      RDW-SD 52.5 fl      MPV 9.5 fL      Platelets 72 10*3/mm3      Neutrophil % 78.3 %      Lymphocyte % 9.1 %      Monocyte % 9.7 %      Eosinophil % 0.0 %      Basophil % 0.0 %      Immature Grans % 2.9 %      Neutrophils, Absolute 1.37 10*3/mm3      Lymphocytes, Absolute 0.16 10*3/mm3      Monocytes, Absolute 0.17 10*3/mm3      Eosinophils, Absolute 0.00 10*3/mm3      Basophils, Absolute 0.00 10*3/mm3      Immature Grans, Absolute 0.05 10*3/mm3      nRBC 0.0 /100 WBC     Basic Metabolic Panel [[id number]] Collected: 05/23/25 1256    Specimen: Blood Updated: 05/23/25 1328    POC Glucose Once [[id number]]  (Abnormal) Collected: 05/23/25 1110    Specimen: Blood Updated: 05/23/25 1111     Glucose 150 mg/dL     POC Glucose Once [[id number]]  (Normal) Collected: 05/23/25 0724    Specimen: Blood Updated: 05/23/25 0725     Glucose 110 mg/dL     POC Glucose Once [[id number]]  (Abnormal) Collected: 05/22/25 1942    Specimen: Blood Updated: 05/22/25 1943     Glucose 197 mg/dL     POC Glucose Once [[id number]]  (Abnormal) Collected: 05/22/25 1619    Specimen: Blood Updated: 05/22/25 1621     Glucose 199 mg/dL     POC Glucose Once [[id number]]  (Abnormal) Collected: 05/22/25 1105    Specimen: Blood Updated: 05/22/25 1107     Glucose 183 mg/dL     CBC (No Diff) [[id number]]  (Abnormal) Collected: 05/22/25 0719    Specimen: Blood Updated: 05/22/25 0807     WBC 2.27 10*3/mm3      RBC 2.30 10*6/mm3      Hemoglobin 6.8 g/dL      Hematocrit 20.5 %      MCV 89.1 fL      MCH 29.6 pg      MCHC 33.2 g/dL      RDW 16.6 %      RDW-SD 53.0 fl      MPV 9.8 fL      Platelets 81  10*3/mm3     Basic Metabolic Panel [[id number]]  (Abnormal) Collected: 05/22/25 0719    Specimen: Blood Updated: 05/22/25 0757     Glucose 145 mg/dL      BUN 23 mg/dL      Creatinine 1.21 mg/dL      Sodium 137 mmol/L      Potassium 4.6 mmol/L      Chloride 104 mmol/L      CO2 23.0 mmol/L      Calcium 7.9 mg/dL      BUN/Creatinine Ratio 19.0     Anion Gap 10.0 mmol/L      eGFR 43.2 mL/min/1.73     Narrative:      GFR Categories in Chronic Kidney Disease (CKD)              GFR Category          GFR (mL/min/1.73)    Interpretation  G1                    90 or greater        Normal or high (1)  G2                    60-89                Mild decrease (1)  G3a                   45-59                Mild to moderate decrease  G3b                   30-44                Moderate to severe decrease  G4                    15-29                Severe decrease  G5                    14 or less           Kidney failure    (1)In the absence of evidence of kidney disease, neither GFR category G1 or G2 fulfill the criteria for CKD.    eGFR calculation 2021 CKD-EPI creatinine equation, which does not include race as a factor    Hemoglobin & Hematocrit, Blood [[id number]]  (Abnormal) Collected: 05/22/25 0719    Specimen: Blood Updated: 05/22/25 0757     Hemoglobin 6.8 g/dL      Hematocrit 20.5 %     POC Glucose Once [[id number]]  (Abnormal) Collected: 05/22/25 0654    Specimen: Blood Updated: 05/22/25 0656     Glucose 175 mg/dL     POC Glucose Once [[id number]]  (Abnormal) Collected: 05/21/25 1923    Specimen: Blood Updated: 05/21/25 1924     Glucose 265 mg/dL     POC Glucose Once [[id number]]  (Abnormal) Collected: 05/21/25 1618    Specimen: Blood Updated: 05/21/25 1623     Glucose 240 mg/dL           Imaging Results (Last 48 Hours)       ** No results found for the last 48 hours. **             Operative/Procedure Notes (all)        Albin Mcclain MD at 05/20/25 1721  Version 2 of 2         TOTAL KNEE ARTHROPLASTY REVISION  Procedure  Report    Patient Name:  Chey Robertson  YOB: 1935    Date of Surgery:  5/20/2025     Indications:    Patient is a 89 y.o. female noted for fall and a periprosthetic distal femur fracture.  I was not on-call but due to the complex nature of this surgery and the need for distal femur replacement I was asked to help assist in managing this patient's complex care due to the periprosthetic distal femur fracture.  The patient had taken Eliquis and had a very low platelet count's and needed some medical optimization and some time off her Eliquis before be safe to proceed with this complex arthroplasty surgery.  He was impacted and there was a very little bone at the end of the femur the patient was severely osteoporotic and pretty unable to maintain any weightbearing restrictions we discussed operative versus nonoperative treatment as well as ORIF versus distal femur replacement and this severely osteoporotic patient with minimal bone stock attached knee into the femoral component distal femur replacement seemed a better option and we decided to proceed with revision right total knee distal femur replacement.. Possible risk and benefits of the procedure including but not limited to infection, DVT, pulmonary embolism, future loosening of the implants, possible injury to tendons, ligaments, nerves and/or vessels, loss of extensor mechanism and periprosthetic fracture have been discussed in detail. Despite the risks involved, the patient elected to proceed and informed consent was obtained and the patient was scheduled for surgery.     Patient Identification:  Patient was seen in the prep, consent was reviewed, operative procedure was identified, surgical site and thigh marked.      Complexity Modifier:   Due to the distal femur replacement versus just revision total knee arthroplasty the surgery required additional surgical dissection, assistance with retraction, and increased surgical exposure in order to  perform the total joint replacement. The required additional assistance in the operating room, unique instrumentation and techniques, increased time, increased risk, which all made for a more complex and difficult joint replacement procedure. Therefore, a Modifier 22 is being utilized.    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 Item Serial No.  Lot No. LRB No. Used Action   CMT BONE SIMPLEX/P TMYCIN FDOS 10PK - [id number] Implant CMT BONE SIMPLEX/P TMYCIN FDOS 10PK  DINO KAREEM [id number] Left 1 Implanted   CMT BONE SIMPLEX/P TMYCIN FDOS 10PK - [id number] Implant CMT BONE SIMPLEX/P TMYCIN FDOS 10PK  DINO KAREEM [id number] Left 1 Implanted   CMT BONE SIMPLEX/P TMYCIN FDOS 10PK - [id number] Implant CMT BONE SIMPLEX/P TMYCIN FDOS 10PK  DINO KAREEM [id number] Left 1 Implanted   CMT BONE SIMPLEX/P TMYCIN FDOS 10PK - [id number] Implant CMT BONE SIMPLEX/P TMYCIN FDOS 10PK  DINO KAREEM [id number] Left 1 Implanted   DEV CONTRL TISS STRATAFIX SPIRAL PDO BIDIR 1 [id number] - [id number] Implant DEV CONTRL TISS STRATAFIX SPIRAL PDO BIDIR 1 [id number]  [employer]  DIV OF J AND J [id number] Left 1 Implanted   SPACR CMT FEM/HIP ACCOLADE DIST UNIV 17MM - [id number] Implant SPACR CMT FEM/HIP ACCOLADE DIST UNIV 17MM  DINO KAREEM [id number] Left 1 Implanted   COMP FEM DIST GMRS 65MM LT STD - [id number] Implant COMP FEM DIST GMRS 65MM LT STD  DINO KAREEM [id number] Left 1 Implanted   STEM FEM/KN GMRS CMT STR NONPOR [id number] - [id number] Implant STEM  FEM/KN GMRS CMT STR NONPOR [id number]  DINO KAREEM [id number] Left 1 Implanted   BEAR TIB TRIATHLON HNG SZ1TO2 - [id number] Implant BEAR TIB TRIATHLON HNG SZ1TO2  DINO KAREEM [id number] Left 1 Implanted   BUMPER HNG TRIATHLON NTRL - [id number] Implant BUMPER HNG TRIATHLON NTRL  DINO KAREEM [id number] Left 1 Implanted   PK ASMBL TRIATHLON STD - [id number] Implant PK ASMBL TRIATHLON STD  DINO KAREEM [id number] Left 1 Implanted   TRIATHLON HINGE INSERT     [id number] Left 1 Implanted   BASEPLT TIB/KN TRIATHLON HNG SZ4 - [id number] Implant BASEPLT TIB/KN TRIATHLON HNG SZ4  DINO KAREEM [id number] Left 1 Implanted   STEM FEM TRIATH CMT 73D26OS - [id number] Implant STEM FEM TRIATH CMT [id number]  DINO KAREEM [id number] Left 1 Implanted   PLUG BONE RESTR/CMT W/HNDL UNIV 30MM LG - [id number] Implant PLUG BONE RESTR/CMT W/HNDL UNIV 30MM LG  DINO KAREEM [id number] Left 1 Implanted   PLUG BONE RESTR/CMT W/HNDL UNIV 24MM MD - [id number] Implant PLUG BONE RESTR/CMT W/HNDL UNIV 24MM MD  DINO KAREEM [id number] Left 1 Implanted   CABL SLV ST DM SS 2MM - [id number] Implant CABL SLV ST DM SS 2MM  DINO KAREEM [id number] Left 1 Implanted       Specimen:          None      Findings: Severely comminuted distal femur fracture right at the edge of the implant, severe osteopenia    Complications: None    Description of Procedure:   The patient was transferred to [facility] operating room. Preoperative antibiotics Kefzol 2gm as well as 1 g of tranexamic acid were given IV and infused prior to skin incision and to inflation of the tourniquet according to SCIP protocol. Prior to skin incision, the patient also received general, with femoral nerve block. Surgical timeout was performed with the entire operative team identifying the correct patient, surgical site, and planned  procedure. A well padded tourniquet was placed to the proximal aspect of the operative thigh. The operative leg was then prepped and draped in the usual sterile fashion.  After application of Esmarch, the tourniquet was inflated to 250 mmHg.    A skin incision was made vertically oriented centering over the patella anteriorly. The skin and subcutaneous tissue were incised and a medial parapatellar approach was developed. The patella was subluxed over the knee and there the knee tissue there was a large hemarthrosis.     Using an osteotome, the polyethylene insert was removed. Attention was then turned to the femoral component   There is severe comminuted distal femur fracture right up to the edge of the implant medially maybe 2 to 3 mm of bone laterally but no significant bone stock we carefully dissected the edge of the femoral component and the femur removing all soft tissue up to the level of our predetermined [place] for distal femur resection.  After this was dissected out carefully especially of the posterior aspect of the femur we then placed a single cerclage cable around the distal end of the femur prior to making our bone resection.  Of note the femoral implant came off with just a millimeter of tube bone on it and was not well-fixed at all.  We then made a distal femur resection and measured level and then turned our attention to the tibia.    We then turned our attention to the tibia and again using combination of microsagittal saw and osteotomes, a plane was developed between the cement mantle and implant interface. The tibial implant was then removed with minimal bone loss. Cement was removed from the canal.     Attention was then directed to the proximal tibia. Intramedullary reamer was utilized to ream up to a 12 for the tibia and 17 for the femur. T  We did do a skim cut with a oscillating saw to freshen up the proximal end of the tibia bone. This was done off intramedullary reamer with 0° of slope and  neutral varus valgus alignment and found to be satisfactory.    We then placed our trial components trial reduction was performed and reductions found to be satisfactory with range of motion from 0-110° with a size  16  polyethylene insert.    Attention was then directed to the patella. The patella appeared satisfactory without signs of polyethylene wear or loosening. Decision was made to leave it. Following this, we then injected pain cocktail. We did prep the bone with copious irrigation followed by a peroxide soak to remove any fat marrow.  It was found to be very dry with no extra fluid.  Cement restrictor's were placed prior to cementing. Following this, the tibial component was then cemented into position.  The cement was pressurized into the tibial canal. The cement was applied to both the tibial and femoral components prior to impaction. The femoral component was cemented into place, with care to ensure proper external rotation. Excess cement was removed once and the components were held without any motion. Once the cement had fully hardened, we checked and range of motion was found to be satisfactory from 0-125° with excellent stability throughout the range of motion. Good medial and lateral soft tissue tension and soft tissue balancing. The patella tracked well.  The trial liner was then replaced with a  size 4 thickness  16  mm liner. Having been satisfied with this, the joint was thoroughly irrigated with 3 L of saline. We did use a Betadine wash as well.    The sponge, needle, and instrument counts were found to be correct. The arthrotomy was closed with interrupted 0 Vicryl followed by a running STRATAFIX  #2, 2-0 Vicryl for skin followed by staples and mepilex dressing with the knee held in flexion.  Incisional wound vac was then placed over the incision wrapped with webroll placed overwrapped with Ace wrap. The patient tolerated the procedure well and is being admitted for postoperative antibiotics  according to SCIP protocol with 2 more doses in the first 24 hours. The patient will be on anticoagulation a day starting postoperative day #1. The patient will also be discharged on 2 weeks of oral antibiotics. Patient will need to be mobilized with physical therapy.  Weightbearing as tolerated but maintain a knee immobilizer for the first 2 weeks to allow for wound healing    I discussed the satisfactory performance of the procedure with patient's family and discussed with him the postoperative management     Assistant Participation:  Surgeon(s):  Albin Mcclain MD    Assistant: David Vincent PA-C assisted with proper preoperative positioning, preoperative templating, determining availability of proper implants, prepping and draping of patient, manipulation placement of instruments, protection of ligaments and vital soft tissue structures, assistance in maintaining hemostasis and assistance with closure of the wound. Their skills and knowledge of the steps of operation and the desired outcome of each surgical step was crucial, allowing for efficient choreography of surgical procedure, and closure of the wound which lead to reduced surgical time, less blood loss, and less risk of complications for the patient.         Albin Mcclain MD     Date: 5/21/2025  Time: 07:35 EDT    Electronically signed by Albin Mcclain MD at 05/21/25 0746       Albin Mcclain MD at 05/20/25 1721  Version 1 of 2         TOTAL KNEE ARTHROPLASTY REVISION  Procedure Report    Patient Name:  Chey Robertson  YOB: 1935    Date of Surgery:  5/20/2025     Indications:    Patient is a 89 y.o. female noted for fall and a periprosthetic distal femur fracture.  He was impacted and there was a very little bone at the end of the femur the patient was severely osteoporotic and pretty unable to maintain any weightbearing restrictions we discussed operative versus nonoperative treatment as well as ORIF versus distal femur  replacement and this severely osteoporotic patient with minimal bone stock attached knee into the femoral component distal femur replacement seemed a better option and we decided to proceed with revision right total knee distal femur replacement.. Possible risk and benefits of the procedure including but not limited to infection, DVT, pulmonary embolism, future loosening of the implants, possible injury to tendons, ligaments, nerves and/or vessels, loss of extensor mechanism and periprosthetic fracture have been discussed in detail. Despite the risks involved, the patient elected to proceed and informed consent was obtained and the patient was scheduled for surgery.     Patient Identification:  Patient was seen in the prep, consent was reviewed, operative procedure was identified, surgical site and thigh marked.      Complexity Modifier:   Due to the distal femur replacement versus just revision total knee arthroplasty the surgery required additional surgical dissection, assistance with retraction, and increased surgical exposure in order to perform the total joint replacement. The required additional assistance in the operating room, unique instrumentation and techniques, increased time, increased risk, which all made for a more complex and difficult joint replacement procedure. Therefore, a Modifier 22 is being utilized.    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 Item Serial No.  Lot No. LRB No. Used Action   CMT BONE SIMPLEX/P TMYCIN FDOS 10PK -  [id number] Implant CMT BONE SIMPLEX/P TMYCIN FDOS 10PK  DINO KARE[employer] [id number] Left 1 Implanted   CMT BONE SIMPLEX/P TMYCIN FDOS 10PK - [id number] Implant CMT BONE SIMPLEX/P TMYCIN FDOS 10PK  DINO KAREEM [id number] Left 1 Implanted   CMT BONE SIMPLEX/P TMYCIN FDOS 10PK - [id number] Implant CMT BONE SIMPLEX/P TMYCIN FDOS 10PK  DINO KAREEM [id number] Left 1 Implanted   CMT BONE SIMPLEX/P TMYCIN FDOS 10PK - [id number] Implant CMT BONE SIMPLEX/P TMYCIN FDOS 10PK  DINO KAREEM [id number] Left 1 Implanted   DEV CONTRL TISS STRATAFIX SPIRAL PDO BIDIR 1 [id number] - [id number] Implant DEV CONTRL TISS STRATAFIX SPIRAL PDO BIDIR 1 [id number]  [facility]  DIV OF J AND J [id number] Left 1 Implanted   SPACR CMT FEM/HIP ACCOLADE DIST UNIV 17MM - [id number] Implant SPACR CMT FEM/HIP ACCOLADE DIST UNIV 17MM  DINO KAREEM [id number] Left 1 Implanted   COMP FEM DIST GMRS 65MM LT STD - [id number] Implant COMP FEM DIST GMRS 65MM LT STD  DINO KAREEM [id number] Left 1 Implanted   STEM FEM/KN GMRS CMT STR NONPOR [id number] - [id number] Implant STEM FEM/KN GMRS CMT STR NONPOR [id number]  DINO KAREEM [id number] Left 1 Implanted   BEAR TIB TRIATHLON HNG SZ1TO2 - [id number] Implant BEAR TIB TRIATHLON HNG SZ1TO2  DINO KAREEM [id number] Left 1 Implanted   BUMPER HNG TRIATHLON NTRL - [id number] Implant BUMPER HNG TRIATHLON NTRL  DINO KAREEM [id number] Left 1 Implanted   PK ASMBL TRIATHLON STD - [id number] Implant PK ASMBL TRIATHLON STD  DINO KAREEM [id number] Left 1 Implanted   TRIATHLON HINGE INSERT     [id number] Left 1 Implanted   BASEPLT TIB/KN TRIATHLON HNG SZ4 - [id number] Implant BASEPLT TIB/KN TRIATHLON HNG SZ4  DINO KAREEM [id number] Left 1 Implanted   STEM FEM TRIATH CMT [id number] - [id number] Implant STEM FEM TRIATH CMT [id number]  DINO KAREEM [id number] Left 1 Implanted   PLUG BONE RESTR/CMT W/HNDL UNIV 30MM LG  - [id number] Implant PLUG BONE RESTR/CMT W/HNDL UNIV 30MM LG  DINO [employer] [id number] Left 1 Implanted   PLUG BONE RESTR/CMT W/HNDL UNIV 24MM MD - [id number] Implant PLUG BONE RESTR/CMT W/HNDL UNIV 24MM MD  DINO [employer] [id number] Left 1 Implanted   CABL SLV ST DM SS 2MM - [id number] Implant CABL SLV ST DM SS 2MM  DINO [employer] [id number] Left 1 Implanted       Specimen:          None      Findings: Severely comminuted distal femur fracture right at the edge of the implant, severe osteopenia    Complications: None    Description of Procedure:   The patient was transferred to [facility] operating room. Preoperative antibiotics Kefzol 2gm as well as 1 g of tranexamic acid were given IV and infused prior to skin incision and to inflation of the tourniquet according to SCIP protocol. Prior to skin incision, the patient also received general, with femoral nerve block. Surgical timeout was performed with the entire operative team identifying the correct patient, surgical site, and planned procedure. A well padded tourniquet was placed to the proximal aspect of the operative thigh. The operative leg was then prepped and draped in the usual sterile fashion.  After application of Esmarch, the tourniquet was inflated to 250 mmHg.    A skin incision was made vertically oriented centering over the patella anteriorly. The skin and subcutaneous tissue were incised and a medial parapatellar approach was developed. The patella was subluxed over the knee and there the knee tissue there was a large hemarthrosis.     Using an osteotome, the polyethylene insert was removed. Attention was then turned to the femoral component   There is severe comminuted distal femur fracture right up to the edge of the implant medially maybe 2 to 3 mm of bone laterally but no significant bone stock we carefully dissected the edge of the femoral component and the femur removing all soft tissue up  to the level of our predetermined [place] for distal femur resection.  After this was dissected out carefully especially of the posterior aspect of the femur we then placed a single cerclage cable around the distal end of the femur prior to making our bone resection.  Of note the femoral implant came off with just a millimeter of tube bone on it and was not well-fixed at all.  We then made a distal femur resection and measured level and then turned our attention to the tibia.    We then turned our attention to the tibia and again using combination of microsagittal saw and osteotomes, a plane was developed between the cement mantle and implant interface. The tibial implant was then removed with minimal bone loss. Cement was removed from the canal.     Attention was then directed to the proximal tibia. Intramedullary reamer was utilized to ream up to a 12 for the tibia and 17 for the femur. T  We did do a skim cut with a oscillating saw to freshen up the proximal end of the tibia bone. This was done off intramedullary reamer with 0° of slope and neutral varus valgus alignment and found to be satisfactory.    We then placed our trial components trial reduction was performed and reductions found to be satisfactory with range of motion from 0-110° with a size  16  polyethylene insert.    Attention was then directed to the patella. The patella appeared satisfactory without signs of polyethylene wear or loosening. Decision was made to leave it. Following this, we then injected pain cocktail. We did prep the bone with copious irrigation followed by a peroxide soak to remove any fat marrow.  It was found to be very dry with no extra fluid.  Cement restrictor's were placed prior to cementing. Following this, the tibial component was then cemented into position.  The cement was pressurized into the tibial canal. The cement was applied to both the tibial and femoral components prior to impaction. The femoral component was cemented  into place, with care to ensure proper external rotation. Excess cement was removed once and the components were held without any motion. Once the cement had fully hardened, we checked and range of motion was found to be satisfactory from 0-125° with excellent stability throughout the range of motion. Good medial and lateral soft tissue tension and soft tissue balancing. The patella tracked well.  The trial liner was then replaced with a  size 4 thickness  16  mm liner. Having been satisfied with this, the joint was thoroughly irrigated with 3 L of saline. We did use a Betadine wash as well.    The sponge, needle, and instrument counts were found to be correct. The arthrotomy was closed with interrupted 0 Vicryl followed by a running STRATAFIX  #2, 2-0 Vicryl for skin followed by staples and mepilex dressing with the knee held in flexion.  Incisional wound vac was then placed over the incision wrapped with webroll placed overwrapped with Ace wrap. The patient tolerated the procedure well and is being admitted for postoperative antibiotics according to SCIP protocol with 2 more doses in the first 24 hours. The patient will be on anticoagulation a day starting postoperative day #1. The patient will also be discharged on 2 weeks of oral antibiotics. Patient will need to be mobilized with physical therapy.  Weightbearing as tolerated but maintain a knee immobilizer for the first 2 weeks to allow for wound healing    I discussed the satisfactory performance of the procedure with patient's family and discussed with him the postoperative management     Assistant Participation:  Surgeon(s):  Albin Mcclain MD    Assistant: David Vincent PA-C assisted with proper preoperative positioning, preoperative templating, determining availability of proper implants, prepping and draping of patient, manipulation placement of instruments, protection of ligaments and vital soft tissue structures, assistance in maintaining  hemostasis and assistance with closure of the wound. Their skills and knowledge of the steps of operation and the desired outcome of each surgical step was crucial, allowing for efficient choreography of surgical procedure, and closure of the wound which lead to reduced surgical time, less blood loss, and less risk of complications for the patient.         Albin Mcclain MD     Date: 5/21/2025  Time: 07:35 EDT    Electronically signed by Albin Mcclain MD at 05/21/25 0744          Physician Progress Notes (last 72 hours)        Albin Mcclain MD at 05/22/25 1010            Orthopedic Progress Note      Patient: Chey Robertson  YOB: 1935     Date of Admission: 5/17/2025  7:04 PM Medical Record Number: [id number]     Attending Physician: Diya Garcia,*    Status Post:  Procedure(s):  DISTAL FEMUR REPLACEMENT Post Operative Day Number: 2    Subjective : No new orthopaedic complaints     Pain Relief: some relief with present medication.     Systemic Complaints: No Complaints  Vitals:    05/21/25 2125 05/21/25 2150 05/22/25 0349 05/22/25 0653   BP: 113/60  110/54 119/58   BP Location:   Right arm Right arm   Patient Position:   Lying Lying   Pulse: 56  53 52   Resp:   16 16   Temp:   97.9 °F (36.6 °C) 97.8 °F (36.6 °C)   TempSrc:   Oral Oral   SpO2:  98% 96% 98%   Weight:       Height:           Physical Exam: 89 y.o. female    General Appearance:       Alert, cooperative, in no acute distress                  Extremities:    Dressing Clean, Dry and Intact             No clinical sign of DVT        Able to do good movements of digits    Pulses:   Pulses palpable and equal bilaterally           Diagnostic Tests:     Results from last 7 days   Lab Units 05/22/25  0719 05/21/25  0610 05/20/25  0824   WBC 10*3/mm3 2.27* 2.32* 1.09*   HEMOGLOBIN g/dL 6.8*  6.8* 7.6* 7.7*   HEMATOCRIT % 20.5*  20.5* 24.2* 24.1*   PLATELETS 10*3/mm3 81* 56* 46*     Results from last 7 days   Lab Units  "05/22/25  0719 05/21/25  0610 05/20/25  0824   SODIUM mmol/L 137 133* 135*   POTASSIUM mmol/L 4.6 5.0 4.1   CHLORIDE mmol/L 104 103 101   CO2 mmol/L 23.0 16.0* 23.0   BUN mg/dL 23 21 14   CREATININE mg/dL 1.21* 1.12* 0.96   GLUCOSE mg/dL 145* 314* 131*   CALCIUM mg/dL 7.9* 7.8* 8.0*     Results from last 7 days   Lab Units 05/18/25  1711   INR  1.21*     No results found for: \"URICACID\"  No results found for: \"CRYSTAL\"  Microbiology Results (last 10 days)       Procedure Component Value - Date/Time    Urine Culture - Urine, Indwelling Urethral Catheter [[id number]]  (Abnormal)  (Susceptibility) Collected: 05/17/25 2140    Lab Status: Final result Specimen: Urine from Indwelling Urethral Catheter Updated: 05/21/25 0821     Urine Culture >100,000 CFU/mL Klebsiella pneumoniae ssp pneumoniae    Narrative:      Colonization of the urinary tract without infection is common. Treatment is discouraged unless the patient is symptomatic, pregnant, or undergoing an invasive urologic procedure.    Susceptibility        Klebsiella pneumoniae ssp pneumoniae      JUAN F      Amoxicillin + Clavulanate Susceptible      Ampicillin Resistant      Ampicillin + Sulbactam Susceptible      Cefazolin (Urine) Susceptible      Cefepime Susceptible      Ceftazidime Susceptible      Ceftriaxone Susceptible      Ciprofloxacin Resistant      Gentamicin Susceptible      Levofloxacin Resistant      Nitrofurantoin Intermediate      Piperacillin + Tazobactam Susceptible      Trimethoprim + Sulfamethoxazole Susceptible                                 XR Knee 1 or 2 View Left  Result Date: 5/20/2025  Impression: Postoperative changes of left knee arthroplasty without radiographic evidence of complication. Electronically Signed: Zhou Paredes MD  5/20/2025 8:56 PM EDT  Workstation ID: [id number]    CT Lower Extremity Left Without Contrast  Result Date: 5/17/2025  Impression: 1.Limited study due to motion artifact and streak artifact. 2.There is " confirmation of a impaction fracture at the distal femoral metaphysis with a large volume of lipohemarthrosis. Electronically Signed: Hector Rosenberg MD  5/17/2025 10:01 PM EDT  Workstation ID: [id number]    XR Knee 1 or 2 View Left  Result Date: 5/17/2025  Impression: 1.Suspected impaction fracture in the distal femoral metaphyseal region with large volume of lipohemarthrosis. 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 3.Osteopenia. Electronically Signed: Hector Rosenberg MD  5/17/2025 7:49 PM EDT  Workstation ID: [id number]        Current Medications:  Scheduled Meds:acetaminophen, 1,000 mg, Oral, Q8H  amiodarone, 200 mg, Oral, Daily  [Held by provider] apixaban, 2.5 mg, Oral, BID  doxycycline, 100 mg, Oral, Q12H  insulin lispro, 2-7 Units, Subcutaneous, 4x Daily AC & at Bedtime  levothyroxine, 100 mcg, Oral, Q AM  [Held by provider] lisinopril, 5 mg, Oral, Daily  [Held by provider] metoprolol tartrate, 25 mg, Oral, BID  pantoprazole, 40 mg, Oral, Q AM  sodium chloride, 10 mL, Intravenous, Q12H      Continuous Infusions:ropivacaine,       PRN Meds:.  acetaminophen **OR** acetaminophen **OR** acetaminophen    senna-docusate sodium **AND** polyethylene glycol **AND** bisacodyl **AND** bisacodyl    Calcium Replacement - Follow Nurse / BPA Driven Protocol    dextrose    dextrose    diphenhydrAMINE **OR** diphenhydrAMINE    glucagon (human recombinant)    HYDROmorphone **AND** naloxone    Magnesium Standard Dose Replacement - Follow Nurse / BPA Driven Protocol    melatonin    Morphine    nitroglycerin    ondansetron ODT **OR** ondansetron    oxyCODONE    oxyCODONE    Phosphorus Replacement - Follow Nurse / BPA Driven Protocol    Potassium Replacement - Follow Nurse / BPA Driven Protocol    sodium chloride    traMADol    Assessment: Status post  No admission procedures for hospital encounter.    Patient Active Problem List   Diagnosis    Benign familial tremor    AMS (altered mental status)    Pancytopenia    Hypothyroidism  (acquired)    B12 deficiency    Abnormal intestinal absorption    Iron deficiency anemia due to chronic blood loss    Myelodysplasia (myelodysplastic syndrome)    Personal history of pulmonary embolism    Chronic anticoagulation    Essential hypertension    Type 2 diabetes mellitus without complication, without long-term current use of insulin    Atrial fibrillation    QT prolongation    Pericardial effusion    Severe malnutrition    Closed displaced intertrochanteric fracture of right femur, initial encounter    DISHA (acute kidney injury)    Moderate malnutrition    Falls    Type 2 diabetes mellitus with complication, with long-term current use of insulin    Periprosthetic fracture around prosthetic knee    Femur fracture       PLAN:   Continues current post-op course  Anticoagulation: Okay to start anticoagulation per medicine if can restart Eliquis recommend 2.5 twice daily for 1 week and then resume home dose however with the patient's very low platelet count not sure if the patient needs to be this extremely anticoagulated but would leave that decision up to medicine  Mobilize with PT as tolerated per protocol  2 weeks of oral antibiotics    Weight Bearing: WBAT knee immobilizer for 2 weeks postoperatively in order to let the wound heal no flexion of the knee  Discharge Plan: OK to plan for discharge in  tomorrow to rehab  from orthopaedic perspective.    Albin Mcclain MD    Date: 2025    Time: 10:10 EDT    Electronically signed by Albin Mcclain MD at 25 1010       Diya Garcia DO at 25 0603              [facility] Medicine Services  PROGRESS NOTE    Patient Name: Chey Robertson  : 1935  MRN: [id number]    Date of Admission: 2025  Primary Care Physician: Yasmeen Loomis MD    Subjective   Subjective     CC:  Femur fx    HPI:  Complaining of pain in her hip when sitting upright in the chair.  Discussed giving her more blood  today      Objective   Objective     Vital Signs:   Temp:  [97.4 °F (36.3 °C)-97.9 °F (36.6 °C)] 97.8 °F (36.6 °C)  Heart Rate:  [52-56] 52  Resp:  [16] 16  BP: (110-130)/(50-60) 119/58  Flow (L/min) (Oxygen Therapy):  [2-3] 2     Physical Exam:  Constitutional: No acute distress, awake, alert  HENT: NCAT, mucous membranes moist  Respiratory: Respiratory effort normal   Cardiovascular: RRR, no murmurs, rubs, or gallops  Gastrointestinal: Soft, nontender, nondistended  Musculoskeletal: No bilateral ankle edema no swelling or discoloration of B/L thighs  Psychiatric: Appropriate affect, cooperative  Neurologic: Oriented x 3, [place], speech clear  Skin: No rashes      Results Reviewed:  LAB RESULTS:      Lab 05/22/25  0719 05/21/25  0610 05/20/25  0824 05/19/25  0544 05/18/25  1711 05/17/25  2149 05/17/25 2008   WBC 2.27* 2.32* 1.09* 1.52* 1.53*  --  1.76*   HEMOGLOBIN 6.8*  6.8* 7.6* 7.7* 7.9* 7.9*  --  8.1*   HEMATOCRIT 20.5*  20.5* 24.2* 24.1* 24.3* 24.1*  --  24.3*   PLATELETS 81* 56* 46* 49* 48*  --  57*   NEUTROS ABS  --  2.01  --   --  0.95*  --  1.18*   IMMATURE GRANS (ABS)  --  0.05  --   --  0.04  --  0.02   LYMPHS ABS  --  0.10*  --   --  0.30*  --  0.38*   MONOS ABS  --  0.15  --   --  0.23  --  0.18   EOS ABS  --  0.01  --   --  0.01  --  0.00   MCV 89.1 92.0 91.3 90.0 89.3  --  88.7   PROTIME  --   --   --   --  16.0*  --   --    HSTROP T  --   --   --   --   --  21* 21*         Lab 05/22/25  0719 05/21/25  0610 05/20/25  0824 05/19/25  0544 05/18/25  1711 05/17/25 2008   SODIUM 137 133* 135* 135* 133* 134*   POTASSIUM 4.6 5.0 4.1 4.2 4.2 4.2   CHLORIDE 104 103 101 99 99 97*   CO2 23.0 16.0* 23.0 26.0 23.0 23.0   ANION GAP 10.0 14.0 11.0 10.0 11.0 14.0   BUN 23 21 14 18 20 30*   CREATININE 1.21* 1.12* 0.96 0.99 1.04* 1.22*   EGFR 43.2* 47.4* 57.0* 55.0* 51.8* 42.8*   GLUCOSE 145* 314* 131* 151* 182* 286*   CALCIUM 7.9* 7.8* 8.0* 8.3* 7.9* 8.3*   MAGNESIUM  --   --  2.3 3.2* 1.2*  --    PHOSPHORUS  --   4.3  --   --   --   --    HEMOGLOBIN A1C  --   --   --   --   --  6.20*         Lab 05/21/25  0610 05/18/25 1711 05/17/25 2008   TOTAL PROTEIN  --  5.5* 6.0   ALBUMIN 2.9* 3.3* 3.8   GLOBULIN  --  2.2 2.2   ALT (SGPT)  --  9 10   AST (SGOT)  --  12 10   BILIRUBIN  --  0.3 0.3   ALK PHOS  --  87 92         Lab 05/18/25 1711 05/17/25 2149 05/17/25 2008   HSTROP T  --  21* 21*   PROTIME 16.0*  --   --    INR 1.21*  --   --              Lab 05/22/25  1008 05/18/25 1944 05/18/25 1944 05/17/25 2149   IRON  --   --   --  40   IRON SATURATION (TSAT)  --   --   --  14*   TIBC  --   --   --  282*   TRANSFERRIN  --   --   --  189*   ABO TYPING O   < > O  --    RH TYPING Negative   < > Negative  --    ANTIBODY SCREEN Positive  --  Positive  --     < > = values in this interval not displayed.         Brief Urine Lab Results  (Last result in the past 365 days)        Color   Clarity   Blood   Leuk Est   Nitrite   Protein   CREAT   Urine HCG        05/17/25 2140 Yellow   Turbid   Negative   Large (3+)   Negative   30 mg/dL (1+)                   Microbiology Results Abnormal       Procedure Component Value - Date/Time    Urine Culture - Urine, Indwelling Urethral Catheter [[id number]]  (Abnormal)  (Susceptibility) Collected: 05/17/25 2140    Lab Status: Final result Specimen: Urine from Indwelling Urethral Catheter Updated: 05/21/25 0821     Urine Culture >100,000 CFU/mL Klebsiella pneumoniae ssp pneumoniae    Narrative:      Colonization of the urinary tract without infection is common. Treatment is discouraged unless the patient is symptomatic, pregnant, or undergoing an invasive urologic procedure.    Susceptibility        Klebsiella pneumoniae ssp pneumoniae      JUAN F      Amoxicillin + Clavulanate Susceptible      Ampicillin Resistant      Ampicillin + Sulbactam Susceptible      Cefazolin (Urine) Susceptible      Cefepime Susceptible      Ceftazidime Susceptible      Ceftriaxone Susceptible      Ciprofloxacin Resistant       Gentamicin Susceptible      Levofloxacin Resistant      Nitrofurantoin Intermediate      Piperacillin + Tazobactam Susceptible      Trimethoprim + Sulfamethoxazole Susceptible                                   Arterial Line  Result Date: 5/21/2025  Lance Mcgee MD     5/21/2025  6:42 AM Arterial Line Patient reassessed immediately prior to procedure Patient location during procedure: pre-op Line placed for hemodynamic monitoring. Performed By Anesthesiologist: Lance Mcgee MD Preanesthetic Checklist Completed: patient identified, IV checked, site marked, risks and benefits discussed, surgical consent, monitors and equipment checked, pre-op evaluation and timeout performed Arterial Line Prep  Sterile Tech: cap, gloves and sterile barriers Prep: ChloraPrep Patient monitoring: blood pressure monitoring, continuous pulse oximetry and EKG Arterial Line Procedure Laterality:right Location:  radial artery Catheter size: 20 G Guidance: ultrasound guided PROCEDURE NOTE/ULTRASOUND INTERPRETATION.  Using ultrasound guidance the potential vascular sites for insertion of the catheter were visualized to determine the patency of the vessel to be used for vascular access.  After selecting the appropriate site for insertion, the needle was visualized under ultrasound being inserted into the radial artery, followed by ultrasound confirmation of wire and catheter placement. There were no abnormalities seen on ultrasound; an image was taken; and the patient tolerated the procedure with no complications. Number of attempts: 1 Successful placement: yes Images: still images not obtained Post Assessment Dressing Type: occlusive dressing applied, secured with tape, wrist guard applied and biopatch applied. Complications no Circ/Move/Sens Assessment: normal and unchanged. Patient Tolerance: patient tolerated the procedure well with no apparent complications Additional Notes Performed on 5/20/25 at 15:15      XR Knee 1 or 2 View Left  Result Date: 5/20/2025  XR KNEE 1 OR 2 VW LEFT Date of Exam: 5/20/2025 7:55 PM EDT Indication: Post-Op Knee Arthoplasty Comparison: None available. Findings: There are postoperative changes of left knee arthroplasty without radiographic evidence of complication. There is surrounding soft tissue edema, favored postoperative. Vascular calcifications.     Impression: Impression: Postoperative changes of left knee arthroplasty without radiographic evidence of complication. Electronically Signed: Zhou Paredes MD  5/20/2025 8:56 PM EDT  Workstation ID: [id number]    Peripheral Block  Result Date: 5/20/2025  Aniket Keller CRNA     5/20/2025  3:22 PM Peripheral Block Pre-sedation assessment completed: 5/20/2025 2:49 PM Patient reassessed immediately prior to procedure Start time: 5/20/2025 3:00 PM Stop time: 5/20/2025 3:10 PM Reason for block: at surgeon's request and post-op pain management Performed by CRNA/CAA: Aniket Keller, ANASTASIYA Assisted by: Lili Wong RN Preanesthetic Checklist Completed: patient identified, IV checked, site marked, risks and benefits discussed, surgical consent, monitors and equipment checked, pre-op evaluation and timeout performed Prep: Pt Position: supine Sterile barriers:gloves, cap, sterile barriers, mask and washed/disinfected hands Prep: ChloraPrep Patient monitoring: blood pressure monitoring, continuous pulse oximetry and EKG Procedure Guidance:ultrasound guided ULTRASOUND INTERPRETATION.  Using ultrasound guidance a 20 G gauge needle was placed in close proximity to the femoral nerve, at which point, under ultrasound guidance anesthetic was injected in the area of the nerve and spread of the anesthesia was seen on ultrasound in close proximity thereto.  There were no abnormalities seen on ultrasound; a digital image was taken; and the patient tolerated the procedure with no complications. Images:still images obtained, printed/placed on chart  "Laterality:left Block Type:femoral Injection Technique:catheter Needle Type:echogenic and Tuohy Needle Gauge:20 G Resistance on Injection: none Cath Depth at skin: 10 cm Medications Used: fentaNYL citrate (PF) (SUBLIMAZE) injection - Intravenous  100 mcg - 5/20/2025 3:10:00 PM bupivacaine PF (MARCAINE) 0.25 % injection - Injection  30 mL - 5/20/2025 3:10:00 PM Medications Preservative Free Saline:10ml Post Assessment Injection Assessment: negative aspiration for heme, no paresthesia on injection and incremental injection Patient Tolerance:comfortable throughout block Complications:no Additional Notes CATHETER A high-frequency linear transducer, with sterile cover, was placed in the inguinal crease to visualize the Femoral Vein, Artery, and Nerve (medial to lateral). The insertion site was prepped in sterile fashion. Skin and cutaneous tissue was infiltrated with 2-5 ml of 1% Lidocaine. Using ultrasound-guidance, an 18-gauge Contiplex Ultra 360 Touhy Needle was then inserted and advanced in-plane from lateral to medial with ultrasound guidance. The Touhy needle was directed below Fascia Iliacus towards the Femoral nerve. Preservative-free normal saline was utilized for hydro-dissection of tissue. Local anesthetic injection spread, in incremental 3-5 ml injections, was visualized lateral to the artery to surround the femoral nerve. Aspiration every 5 ml to prevent intravascular injection. Injection was completed with negative aspiration of blood and negative intravascular injection. Injection pressures were normal with minimal resistance. A 20-gauge Contiplex Echo catheter was placed through the needle and advanced out the tip of the Touhy 1-3 cm. The Touhy needle was then removed, and final catheter position verified lateral to the femoral artery. The catheter was secured in the usual fashion with skin glue, benzoin, steri-strips, CHG tegaderm and Label noting \"Nerve Block Catheter\". Jerk tape applied at yellow " connector and catheter connection. Performed by: Aniket Keller CRNA       Results for orders placed during the hospital encounter of 06/05/23    Adult Transthoracic Echo Complete w/ Color, Spectral and Contrast if necessary per protocol    Interpretation Summary    Left ventricular systolic function is normal. Left ventricular ejection fraction appears to be 56 - 60%.    Left ventricular diastolic function was normal.    Estimated right ventricular systolic pressure from tricuspid regurgitation is normal (<35 mmHg).      Current medications:  Scheduled Meds:acetaminophen, 1,000 mg, Oral, Q8H  amiodarone, 200 mg, Oral, Daily  [Held by provider] apixaban, 2.5 mg, Oral, BID  doxycycline, 100 mg, Oral, Q12H  insulin lispro, 2-7 Units, Subcutaneous, 4x Daily AC & at Bedtime  levothyroxine, 100 mcg, Oral, Q AM  [Held by provider] lisinopril, 5 mg, Oral, Daily  [Held by provider] metoprolol tartrate, 25 mg, Oral, BID  pantoprazole, 40 mg, Oral, Q AM  sodium chloride, 10 mL, Intravenous, Q12H      Continuous Infusions:ropivacaine,       PRN Meds:.  acetaminophen **OR** acetaminophen **OR** acetaminophen    senna-docusate sodium **AND** polyethylene glycol **AND** bisacodyl **AND** bisacodyl    Calcium Replacement - Follow Nurse / BPA Driven Protocol    dextrose    dextrose    diphenhydrAMINE **OR** diphenhydrAMINE    glucagon (human recombinant)    HYDROmorphone **AND** naloxone    Magnesium Standard Dose Replacement - Follow Nurse / BPA Driven Protocol    melatonin    Morphine    nitroglycerin    ondansetron ODT **OR** ondansetron    oxyCODONE    oxyCODONE    Phosphorus Replacement - Follow Nurse / BPA Driven Protocol    Potassium Replacement - Follow Nurse / BPA Driven Protocol    sodium chloride    traMADol    Assessment & Plan   Assessment & Plan     Active Hospital Problems    Diagnosis  POA    **Femur fracture [S72.90XA]  Yes    Type 2 diabetes mellitus with complication, with long-term current use of insulin  [E11.8, Z79.4]  Not Applicable    Periprosthetic fracture around prosthetic knee [M97.8XXA, Z96.659]  Not Applicable    Falls [R29.6]  Not Applicable    Atrial fibrillation [I48.91]  Yes    Chronic anticoagulation [Z79.01]  Not Applicable    Essential hypertension [I10]  Yes    Hypothyroidism (acquired) [E03.9]  Yes      Resolved Hospital Problems   No resolved problems to display.        Brief Hospital Course to date:  Chey Robertson is a 89 y.o. female with history of afib (on eliquis), htn, dm2, hypothyroidism, MDS (w/ pancytopenia, followed by Dr. Lyman), previous PE (on eliquis), previous left knee replacement.   Presented to [facility] ED w/ left knee pain after sustaining a fall at her SNF.   CT imaging revealed left distal femoral impaction fracture.        Left supracondylar fracture of the distal femur.   Hx previous left knee replacement  -Orthopedic surgery evaluated, s/p repair 5/20  Due to magnitude of surgery ortho requested eliquis be held ~72 hours prior to surgery, but Hb dropped to 6.8 on 5/22  -2 units of blood ordered 5/22, repeat CBC in a.m.    -Continue to hold Eliquis until Hb is stable     Parox afib (currently in sinus)  Chronic HFpEF  HTN  -echo 6/2023: LV EF 56-60%, valves ok  - Continue amiodarone & metoprolol, lisinopril  - Eliquis on hold due to ongoing anemia       UTI  - Completed rocephin for 5 days  - Urine culture with Klebsiella, sensitive to Rocephin        Hx PE (perioperative after previous knee replacement)     MDS  Pancytopenia  Iron deficiency  -follows w/ Dr. Lyman (last seen 3/3/25)   -typical plts range 40,000-60,000     -Transfused platelets 5/20  -Completed 3 days of IV iron, last dose 5/20  -2 units of PRBCs ordered 5/22    DM2  -A1c 6.2  -on januvia and metformin  - Continue ssi     Hypothyroidism  -cont levothyroxine     Expected Discharge Location and Transportation: SNF  Expected Discharge   Expected Discharge Date: 5/22/2025; Expected Discharge Time:      VTE  Prophylaxis:  Pharmacologic & mechanical VTE prophylaxis orders are present.         AM-PAC 6 Clicks Score (PT): 15 (05/22/25 1031)    CODE STATUS:   Code Status and Medical Interventions: CPR (Attempt to Resuscitate); Full   Ordered at: 05/20/25 2049     Code Status (Patient has no pulse and is not breathing):    CPR (Attempt to Resuscitate)     Medical Interventions (Patient has pulse or is breathing):    Full     Level Of Support Discussed With:    Patient       Diya Gilles Garcia DO  05/22/25        Electronically signed by Diya Garcia DO at 05/22/25 1233       Albin Mcclain MD at 05/21/25 0951            Orthopedic Progress Note      Patient: Chey Robertson  YOB: 1935     Date of Admission: 5/17/2025  7:04 PM Medical Record Number: [id number]     Attending Physician: Diya Garcia,*    Status Post:  Procedure(s):  DISTAL FEMUR REPLACEMENT Post Operative Day Number: 1    Subjective : No new orthopaedic complaints     Pain Relief: some relief with present medication.     Systemic Complaints: No Complaints  Vitals:    05/20/25 2339 05/21/25 0329 05/21/25 0726 05/21/25 0908   BP: 139/65 171/71 151/64 164/60   BP Location: Right arm Right arm Right arm    Patient Position: Lying Lying Lying    Pulse: 54 59 58 59   Resp: 16 16 16    Temp: 97.5 °F (36.4 °C) 97.6 °F (36.4 °C) 98 °F (36.7 °C)    TempSrc: Oral Oral Oral    SpO2: 97% 96% 94%    Weight:       Height:           Physical Exam: 89 y.o. female    General Appearance:       Alert, cooperative, in no acute distress                  Extremities:    Dressing Clean, Dry and Intact             No clinical sign of DVT        Able to do good movements of digits    Pulses:   Pulses palpable and equal bilaterally           Diagnostic Tests:     Results from last 7 days   Lab Units 05/21/25  0610 05/20/25  0824 05/19/25  0544   WBC 10*3/mm3 2.32* 1.09* 1.52*   HEMOGLOBIN g/dL 7.6* 7.7* 7.9*   HEMATOCRIT % 24.2* 24.1* 24.3*  "  PLATELETS 10*3/mm3 56* 46* 49*     Results from last 7 days   Lab Units 05/21/25  0610 05/20/25  0824 05/19/25  0544   SODIUM mmol/L 133* 135* 135*   POTASSIUM mmol/L 5.0 4.1 4.2   CHLORIDE mmol/L 103 101 99   CO2 mmol/L 16.0* 23.0 26.0   BUN mg/dL 21 14 18   CREATININE mg/dL 1.12* 0.96 0.99   GLUCOSE mg/dL 314* 131* 151*   CALCIUM mg/dL 7.8* 8.0* 8.3*     Results from last 7 days   Lab Units 05/18/25  1711   INR  1.21*     No results found for: \"URICACID\"  No results found for: \"CRYSTAL\"  Microbiology Results (last 10 days)       Procedure Component Value - Date/Time    Urine Culture - Urine, Indwelling Urethral Catheter [[id number]]  (Abnormal)  (Susceptibility) Collected: 05/17/25 2140    Lab Status: Final result Specimen: Urine from Indwelling Urethral Catheter Updated: 05/21/25 0821     Urine Culture >100,000 CFU/mL Klebsiella pneumoniae ssp pneumoniae    Narrative:      Colonization of the urinary tract without infection is common. Treatment is discouraged unless the patient is symptomatic, pregnant, or undergoing an invasive urologic procedure.    Susceptibility        Klebsiella pneumoniae ssp pneumoniae      JUAN F      Amoxicillin + Clavulanate Susceptible      Ampicillin Resistant      Ampicillin + Sulbactam Susceptible      Cefazolin (Urine) Susceptible      Cefepime Susceptible      Ceftazidime Susceptible      Ceftriaxone Susceptible      Ciprofloxacin Resistant      Gentamicin Susceptible      Levofloxacin Resistant      Nitrofurantoin Intermediate      Piperacillin + Tazobactam Susceptible      Trimethoprim + Sulfamethoxazole Susceptible                                 XR Knee 1 or 2 View Left  Result Date: 5/20/2025  Impression: Postoperative changes of left knee arthroplasty without radiographic evidence of complication. Electronically Signed: Zhou Paredes MD  5/20/2025 8:56 PM EDT  Workstation ID: [id number]    CT Lower Extremity Left Without Contrast  Result Date: 5/17/2025  Impression: 1.Limited " study due to motion artifact and streak artifact. 2.There is confirmation of a impaction fracture at the distal femoral metaphysis with a large volume of lipohemarthrosis. Electronically Signed: Hector Rosenberg MD  5/17/2025 10:01 PM EDT  Workstation ID: [id number]    XR Knee 1 or 2 View Left  Result Date: 5/17/2025  Impression: 1.Suspected impaction fracture in the distal femoral metaphyseal region with large volume of lipohemarthrosis. 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 3.Osteopenia. Electronically Signed: Hector Rosenberg MD  5/17/2025 7:49 PM EDT  Workstation ID: [id number]        Current Medications:  Scheduled Meds:acetaminophen, 1,000 mg, Oral, Q8H  amiodarone, 200 mg, Oral, Daily  [Held by provider] apixaban, 2.5 mg, Oral, BID  cefTRIAXone, 1,000 mg, Intravenous, Q24H  insulin lispro, 2-7 Units, Subcutaneous, 4x Daily AC & at Bedtime  levothyroxine, 100 mcg, Oral, Q AM  lisinopril, 5 mg, Oral, Daily  metoprolol tartrate, 25 mg, Oral, BID  pantoprazole, 40 mg, Oral, Q AM  sodium chloride, 10 mL, Intravenous, Q12H      Continuous Infusions:lactated ringers, 100 mL/hr, Last Rate: 100 mL/hr (05/20/25 6350)  ropivacaine,       PRN Meds:.  acetaminophen **OR** acetaminophen **OR** acetaminophen    senna-docusate sodium **AND** polyethylene glycol **AND** bisacodyl **AND** bisacodyl    Calcium Replacement - Follow Nurse / BPA Driven Protocol    dextrose    dextrose    diphenhydrAMINE **OR** diphenhydrAMINE    glucagon (human recombinant)    HYDROmorphone **AND** naloxone    Magnesium Standard Dose Replacement - Follow Nurse / BPA Driven Protocol    melatonin    Morphine    nitroglycerin    ondansetron ODT **OR** ondansetron    oxyCODONE    oxyCODONE    Phosphorus Replacement - Follow Nurse / BPA Driven Protocol    Potassium Replacement - Follow Nurse / BPA Driven Protocol    sodium chloride    traMADol    Assessment: Status post  No admission procedures for hospital encounter.    Patient Active Problem List    Diagnosis    Benign familial tremor    AMS (altered mental status)    Pancytopenia    Hypothyroidism (acquired)    B12 deficiency    Abnormal intestinal absorption    Iron deficiency anemia due to chronic blood loss    Myelodysplasia (myelodysplastic syndrome)    Personal history of pulmonary embolism    Chronic anticoagulation    Essential hypertension    Type 2 diabetes mellitus without complication, without long-term current use of insulin    Atrial fibrillation    QT prolongation    Pericardial effusion    Severe malnutrition    Closed displaced intertrochanteric fracture of right femur, initial encounter    DISHA (acute kidney injury)    Moderate malnutrition    Falls    Type 2 diabetes mellitus with complication, with long-term current use of insulin    Periprosthetic fracture around prosthetic knee    Femur fracture       PLAN:   Continues current post-op course  Anticoagulation: Okay to start anticoagulation per medicine if can restart Eliquis recommend 2.5 twice daily for 1 week and then resume home dose however with the patient's very low platelet count not sure if the patient needs to be this extremely anticoagulated but would leave that decision up to medicine  Mobilize with PT as tolerated per protocol  2 weeks of oral antibiotics    Weight Bearing: WBAT knee immobilizer for 2 weeks postoperatively in order to let the wound heal no flexion of the knee  Discharge Plan: OK to plan for discharge in  tomorrow to rehab  from orthopaedic perspective.    Albin Mcclain MD    Date: 2025    Time: 09:51 EDT    Electronically signed by Albin Mcclain MD at 25 0957       Diya Garcia DO at 25 0602              [facility] Medicine Services  PROGRESS NOTE    Patient Name: Chey Robertson  : 1935  MRN: [id number]    Date of Admission: 2025  Primary Care Physician: Yasmeen Loomis MD    Subjective   Subjective     CC:  Left distal femur  fx    HPI:  Sitting up in the chair, finished working with PT this morning.      Objective   Objective     Vital Signs:   Temp:  [96.6 °F (35.9 °C)-98.8 °F (37.1 °C)] 98.2 °F (36.8 °C)  Heart Rate:  [54-83] 56  Resp:  [12-18] 16  BP: (126-172)/(55-96) 126/55  Arterial Line BP: (173-175)/(48-58) 175/58  Flow (L/min) (Oxygen Therapy):  [2-5] 3     Physical Exam:  Constitutional: No acute distress, awake, alert  HENT: NCAT, mucous membranes moist  Respiratory: Respiratory effort normal   Gastrointestinal: Soft, nontender, nondistended  Musculoskeletal: knee immobilizer  Psychiatric: Appropriate affect, cooperative  Neurologic: Oriented x 2-3, speech clear  Skin: No rashes      Results Reviewed:  LAB RESULTS:      Lab 05/21/25 0610 05/20/25 0824 05/19/25 0544 05/18/25 1711 05/17/25 2149 05/17/25 2008   WBC 2.32* 1.09* 1.52* 1.53*  --  1.76*   HEMOGLOBIN 7.6* 7.7* 7.9* 7.9*  --  8.1*   HEMATOCRIT 24.2* 24.1* 24.3* 24.1*  --  24.3*   PLATELETS 56* 46* 49* 48*  --  57*   NEUTROS ABS 2.01  --   --  0.95*  --  1.18*   IMMATURE GRANS (ABS) 0.05  --   --  0.04  --  0.02   LYMPHS ABS 0.10*  --   --  0.30*  --  0.38*   MONOS ABS 0.15  --   --  0.23  --  0.18   EOS ABS 0.01  --   --  0.01  --  0.00   MCV 92.0 91.3 90.0 89.3  --  88.7   PROTIME  --   --   --  16.0*  --   --    HSTROP T  --   --   --   --  21* 21*         Lab 05/21/25 0610 05/20/25 0824 05/19/25 0544 05/18/25 1711 05/17/25 2008   SODIUM 133* 135* 135* 133* 134*   POTASSIUM 5.0 4.1 4.2 4.2 4.2   CHLORIDE 103 101 99 99 97*   CO2 16.0* 23.0 26.0 23.0 23.0   ANION GAP 14.0 11.0 10.0 11.0 14.0   BUN 21 14 18 20 30*   CREATININE 1.12* 0.96 0.99 1.04* 1.22*   EGFR 47.4* 57.0* 55.0* 51.8* 42.8*   GLUCOSE 314* 131* 151* 182* 286*   CALCIUM 7.8* 8.0* 8.3* 7.9* 8.3*   MAGNESIUM  --  2.3 3.2* 1.2*  --    PHOSPHORUS 4.3  --   --   --   --    HEMOGLOBIN A1C  --   --   --   --  6.20*         Lab 05/21/25  0610 05/18/25  1711 05/17/25 2008   TOTAL PROTEIN  --  5.5* 6.0    ALBUMIN 2.9* 3.3* 3.8   GLOBULIN  --  2.2 2.2   ALT (SGPT)  --  9 10   AST (SGOT)  --  12 10   BILIRUBIN  --  0.3 0.3   ALK PHOS  --  87 92         Lab 05/18/25  1711 05/17/25 2149 05/17/25 2008   HSTROP T  --  21* 21*   PROTIME 16.0*  --   --    INR 1.21*  --   --              Lab 05/18/25 1944 05/17/25 2149   IRON  --  40   IRON SATURATION (TSAT)  --  14*   TIBC  --  282*   TRANSFERRIN  --  189*   ABO TYPING O  --    RH TYPING Negative  --    ANTIBODY SCREEN Positive  --          Brief Urine Lab Results  (Last result in the past 365 days)        Color   Clarity   Blood   Leuk Est   Nitrite   Protein   CREAT   Urine HCG        05/17/25 2140 Yellow   Turbid   Negative   Large (3+)   Negative   30 mg/dL (1+)                   Microbiology Results Abnormal       Procedure Component Value - Date/Time    Urine Culture - Urine, Indwelling Urethral Catheter [[id number]]  (Abnormal)  (Susceptibility) Collected: 05/17/25 2140    Lab Status: Final result Specimen: Urine from Indwelling Urethral Catheter Updated: 05/21/25 0821     Urine Culture >100,000 CFU/mL Klebsiella pneumoniae ssp pneumoniae    Narrative:      Colonization of the urinary tract without infection is common. Treatment is discouraged unless the patient is symptomatic, pregnant, or undergoing an invasive urologic procedure.    Susceptibility        Klebsiella pneumoniae ssp pneumoniae      JUAN F      Amoxicillin + Clavulanate Susceptible      Ampicillin Resistant      Ampicillin + Sulbactam Susceptible      Cefazolin (Urine) Susceptible      Cefepime Susceptible      Ceftazidime Susceptible      Ceftriaxone Susceptible      Ciprofloxacin Resistant      Gentamicin Susceptible      Levofloxacin Resistant      Nitrofurantoin Intermediate      Piperacillin + Tazobactam Susceptible      Trimethoprim + Sulfamethoxazole Susceptible                                   Arterial Line  Result Date: 5/21/2025  Lance Mcgee MD     5/21/2025  6:42 AM  Arterial Line Patient reassessed immediately prior to procedure Patient location during procedure: pre-op Line placed for hemodynamic monitoring. Performed By Anesthesiologist: Lance Mcgee MD Preanesthetic Checklist Completed: patient identified, IV checked, site marked, risks and benefits discussed, surgical consent, monitors and equipment checked, pre-op evaluation and timeout performed Arterial Line Prep  Sterile Tech: cap, gloves and sterile barriers Prep: ChloraPrep Patient monitoring: blood pressure monitoring, continuous pulse oximetry and EKG Arterial Line Procedure Laterality:right Location:  radial artery Catheter size: 20 G Guidance: ultrasound guided PROCEDURE NOTE/ULTRASOUND INTERPRETATION.  Using ultrasound guidance the potential vascular sites for insertion of the catheter were visualized to determine the patency of the vessel to be used for vascular access.  After selecting the appropriate site for insertion, the needle was visualized under ultrasound being inserted into the radial artery, followed by ultrasound confirmation of wire and catheter placement. There were no abnormalities seen on ultrasound; an image was taken; and the patient tolerated the procedure with no complications. Number of attempts: 1 Successful placement: yes Images: still images not obtained Post Assessment Dressing Type: occlusive dressing applied, secured with tape, wrist guard applied and biopatch applied. Complications no Circ/Move/Sens Assessment: normal and unchanged. Patient Tolerance: patient tolerated the procedure well with no apparent complications Additional Notes Performed on 5/20/25 at 15:15     XR Knee 1 or 2 View Left  Result Date: 5/20/2025  XR KNEE 1 OR 2 VW LEFT Date of Exam: 5/20/2025 7:55 PM EDT Indication: Post-Op Knee Arthoplasty Comparison: None available. Findings: There are postoperative changes of left knee arthroplasty without radiographic evidence of complication. There is  surrounding soft tissue edema, favored postoperative. Vascular calcifications.     Impression: Impression: Postoperative changes of left knee arthroplasty without radiographic evidence of complication. Electronically Signed: Zhou Paredes MD  5/20/2025 8:56 PM EDT  Workstation ID: [id number]    Peripheral Block  Result Date: 5/20/2025  Aniket Keller CRNA     5/20/2025  3:22 PM Peripheral Block Pre-sedation assessment completed: 5/20/2025 2:49 PM Patient reassessed immediately prior to procedure Start time: 5/20/2025 3:00 PM Stop time: 5/20/2025 3:10 PM Reason for block: at surgeon's request and post-op pain management Performed by CRNA/CAA: Aniket Keller CRNA Assisted by: Lili Wong RN Preanesthetic Checklist Completed: patient identified, IV checked, site marked, risks and benefits discussed, surgical consent, monitors and equipment checked, pre-op evaluation and timeout performed Prep: Pt Position: supine Sterile barriers:gloves, cap, sterile barriers, mask and washed/disinfected hands Prep: ChloraPrep Patient monitoring: blood pressure monitoring, continuous pulse oximetry and EKG Procedure Guidance:ultrasound guided ULTRASOUND INTERPRETATION.  Using ultrasound guidance a 20 G gauge needle was placed in close proximity to the femoral nerve, at which point, under ultrasound guidance anesthetic was injected in the area of the nerve and spread of the anesthesia was seen on ultrasound in close proximity thereto.  There were no abnormalities seen on ultrasound; a digital image was taken; and the patient tolerated the procedure with no complications. Images:still images obtained, printed/placed on chart Laterality:left Block Type:femoral Injection Technique:catheter Needle Type:echogenic and Tuohy Needle Gauge:20 G Resistance on Injection: none Cath Depth at skin: 10 cm Medications Used: fentaNYL citrate (PF) (SUBLIMAZE) injection - Intravenous  100 mcg - 5/20/2025 3:10:00 PM bupivacaine PF (MARCAINE)  "0.25 % injection - Injection  30 mL - 5/20/2025 3:10:00 PM Medications Preservative Free Saline:10ml Post Assessment Injection Assessment: negative aspiration for heme, no paresthesia on injection and incremental injection Patient Tolerance:comfortable throughout block Complications:no Additional Notes CATHETER A high-frequency linear transducer, with sterile cover, was placed in the inguinal crease to visualize the Femoral Vein, Artery, and Nerve (medial to lateral). The insertion site was prepped in sterile fashion. Skin and cutaneous tissue was infiltrated with 2-5 ml of 1% Lidocaine. Using ultrasound-guidance, an 18-gauge Contiplex Ultra 360 Touhy Needle was then inserted and advanced in-plane from lateral to medial with ultrasound guidance. The Touhy needle was directed below Fascia Iliacus towards the Femoral nerve. Preservative-free normal saline was utilized for hydro-dissection of tissue. Local anesthetic injection spread, in incremental 3-5 ml injections, was visualized lateral to the artery to surround the femoral nerve. Aspiration every 5 ml to prevent intravascular injection. Injection was completed with negative aspiration of blood and negative intravascular injection. Injection pressures were normal with minimal resistance. A 20-gauge Contiplex Echo catheter was placed through the needle and advanced out the tip of the Touhy 1-3 cm. The Touhy needle was then removed, and final catheter position verified lateral to the femoral artery. The catheter was secured in the usual fashion with skin glue, benzoin, steri-strips, CHG tegaderm and Label noting \"Nerve Block Catheter\". Jerk tape applied at yellow connector and catheter connection. Performed by: Aniket Keller CRNA       Results for orders placed during the hospital encounter of 06/05/23    Adult Transthoracic Echo Complete w/ Color, Spectral and Contrast if necessary per protocol    Interpretation Summary    Left ventricular systolic function is " normal. Left ventricular ejection fraction appears to be 56 - 60%.    Left ventricular diastolic function was normal.    Estimated right ventricular systolic pressure from tricuspid regurgitation is normal (<35 mmHg).      Current medications:  Scheduled Meds:acetaminophen, 1,000 mg, Oral, Q8H  amiodarone, 200 mg, Oral, Daily  [Held by provider] apixaban, 2.5 mg, Oral, BID  cefTRIAXone, 1,000 mg, Intravenous, Q24H  insulin lispro, 2-7 Units, Subcutaneous, 4x Daily AC & at Bedtime  levothyroxine, 100 mcg, Oral, Q AM  [Held by provider] lisinopril, 5 mg, Oral, Daily  metoprolol tartrate, 25 mg, Oral, BID  pantoprazole, 40 mg, Oral, Q AM  sodium chloride, 10 mL, Intravenous, Q12H      Continuous Infusions:ropivacaine,       PRN Meds:.  acetaminophen **OR** acetaminophen **OR** acetaminophen    senna-docusate sodium **AND** polyethylene glycol **AND** bisacodyl **AND** bisacodyl    Calcium Replacement - Follow Nurse / BPA Driven Protocol    dextrose    dextrose    diphenhydrAMINE **OR** diphenhydrAMINE    glucagon (human recombinant)    HYDROmorphone **AND** naloxone    Magnesium Standard Dose Replacement - Follow Nurse / BPA Driven Protocol    melatonin    Morphine    nitroglycerin    ondansetron ODT **OR** ondansetron    oxyCODONE    oxyCODONE    Phosphorus Replacement - Follow Nurse / BPA Driven Protocol    Potassium Replacement - Follow Nurse / BPA Driven Protocol    sodium chloride    traMADol    Assessment & Plan   Assessment & Plan     Active Hospital Problems    Diagnosis  POA    **Femur fracture [S72.90XA]  Yes    Type 2 diabetes mellitus with complication, with long-term current use of insulin [E11.8, Z79.4]  Not Applicable    Periprosthetic fracture around prosthetic knee [M97.8XXA, Z96.659]  Not Applicable    Falls [R29.6]  Not Applicable    Atrial fibrillation [I48.91]  Yes    Chronic anticoagulation [Z79.01]  Not Applicable    Essential hypertension [I10]  Yes    Hypothyroidism (acquired) [E03.9]  Yes       Resolved Hospital Problems   No resolved problems to display.        Brief Hospital Course to date:  Chey Robertson is a 89 y.o. female with history of afib (on eliquis), htn, dm2, hypothyroidism, MDS (w/ pancytopenia, followed by Dr. Lyman), previous PE (on eliquis), previous left knee replacement.   Presented to [facility] ED w/ left knee pain after sustaining a fall at her SNF.   CT imaging revealed left distal femoral impaction fracture.        Left supracondylar fracture of the distal femur.   Hx previous left knee replacement  -Orthopedic surgery evaluated, s/p repair 5/20  Due to magnitude of surgery ortho requested eliquis be held ~72 hours prior to surgery, plan to restart if H&H remained stable  -pt/ot evals      Parox afib (currently in sinus)  Chronic HFpEF  HTN  -echo 6/2023: LV EF 56-60%, valves ok  - Continue amiodarone & metoprolol, lisinopril  - eliquis on hold, if H&H remains stable we will plan to start Eliquis 5/22     UTI  - Continue rocephin for 5 days  - Urine culture with Klebsiella, sensitive to Rocephin           Hx PE (perioperative after previous knee replacement)  -eliquis on hold, okay to restart per orthopedic surgery but would like to check CBC in a.m. as she did get 2 units of blood postop.     MDS  Pancytopenia  Iron deficiency  -follows w/ Dr. Lyman (last seen 3/3/25)  - Recommendation continue replace iv iron if iron sat <10%   planned holding/stopping anticoagulation if plts dropped below 30,000 or evidence bleeding     -typical plts range 40,000-60,000     -Transfused platelets 5/20  -Completed 3 days of IV iron, last dose 5/20     DM2  -A1c 6.2  -on januvia and metformin  - Continue ssi     Hypothyroidism  -cont levothyroxine     Expected Discharge Location and Transportation: SNF  Expected Discharge   Expected Discharge Date: 5/22/2025; Expected Discharge Time:      VTE Prophylaxis:  Pharmacologic & mechanical VTE prophylaxis orders are present.         AM-PAC 6 Clicks Score (PT):  12 (05/21/25 1001)    CODE STATUS:   Code Status and Medical Interventions: CPR (Attempt to Resuscitate); Full   Ordered at: 05/20/25 2049     Code Status (Patient has no pulse and is not breathing):    CPR (Attempt to Resuscitate)     Medical Interventions (Patient has pulse or is breathing):    Full     Level Of Support Discussed With:    Patient       Diya Garcia DO  05/21/25        Electronically signed by Diya Garcia DO at 05/21/25 1208       Consult Notes (last 72 hours)  Notes from 05/20/25 1348 through 05/23/25 1348   No notes of this type exist for this encounter.

## 2025-05-23 NOTE — PLAN OF CARE
Goal Outcome Evaluation:  Plan of Care Reviewed With: patient        Progress: declining  Outcome Evaluation: Patient continues to require increased assist with all bed mobility and transfers. She was able to ambulate this afternoon 7' modAx2, but required significant mechanical assist from ARJO walker to maintain upright posture and advance her BLEs. IPPT remains indicated to address current deficits. Given her continued difficulty tolerating treatments twice a day, will decrease therapy frequency to daily. Continue to recommend D/C to SNF.    Anticipated Discharge Disposition (PT): skilled nursing facility

## 2025-05-23 NOTE — PROGRESS NOTES
[facility]    Acute pain service Inpatient Progress Note    Patient Name: Chey Robertson  :  1935  MRN:  [id number]        Acute Pain  Service Inpatient Progress Note:    Analgesia:Excellent  Pain Score:0/10  LOC: alert and awake  Side Effects:None  Catheter Site:clean, dry and dressing intact  Cath type: peripheral nerve cath(InfuSystem)  Infusion rate: Fem/ Add: Basal: 1ml/hr, PIB: 8ml q 8h, PCA: 8ml q 30 min  Catheter Plan:Catheter to remain Insitu and Continue catheter infusion rate unchanged

## 2025-05-23 NOTE — PROGRESS NOTES
[facility] Medicine Services  PROGRESS NOTE    Patient Name: Chey Robertson  : 1935  MRN: [id number]    Date of Admission: 2025  Primary Care Physician: Yasmeen Loomis MD    Subjective   Subjective     CC:  Femur fx    HPI:  Denied pain. No N/V. Had BM yesterday. Eating okay.      Objective   Objective     Vital Signs:   Temp:  [98 °F (36.7 °C)-98.7 °F (37.1 °C)] 98.7 °F (37.1 °C)  Heart Rate:  [59-72] 72  Resp:  [15-18] 18  BP: (137-174)/(62-84) 171/67  Flow (L/min) (Oxygen Therapy):  [2] 2     Physical Exam:  Constitutional: No acute distress, awake, alert, up in bedside cahir  HENT: NCAT, mucous membranes moist  Respiratory: Respiratory effort normal   Cardiovascular: RRR  Gastrointestinal: Soft, nontender, nondistended, normoactive bowel sounds   Musculoskeletal: LLE in brace and ace wrap  Psychiatric: Appropriate affect, cooperative  Neurologic: Alert, oriented, [place], speech clear  Skin: No rashes on exposed skin    Results Reviewed:  LAB RESULTS:      Lab 25  1256 25  0719 25  0610 25  0824 25  0544 25  1711 25  2149 25   WBC 1.75* 2.27* 2.32* 1.09* 1.52* 1.53*  --  1.76*   HEMOGLOBIN 9.8* 6.8*  6.8* 7.6* 7.7* 7.9* 7.9*  --  8.1*   HEMATOCRIT 30.4* 20.5*  20.5* 24.2* 24.1* 24.3* 24.1*  --  24.3*   PLATELETS 72* 81* 56* 46* 49* 48*  --  57*   NEUTROS ABS 1.37*  --  2.01  --   --  0.95*  --  1.18*   IMMATURE GRANS (ABS) 0.05  --  0.05  --   --  0.04  --  0.02   LYMPHS ABS 0.16*  --  0.10*  --   --  0.30*  --  0.38*   MONOS ABS 0.17  --  0.15  --   --  0.23  --  0.18   EOS ABS 0.00  --  0.01  --   --  0.01  --  0.00   MCV 87.6 89.1 92.0 91.3 90.0 89.3  --  88.7   PROTIME  --   --   --   --   --  16.0*  --   --    HSTROP T  --   --   --   --   --   --  21* 21*         Lab 25  1256 25  0719 25  0610 25  0824 25  0544 25  1711 25  2008   SODIUM 134* 137 133* 135* 135* 133* 134*    POTASSIUM 4.6 4.6 5.0 4.1 4.2 4.2 4.2   CHLORIDE 101 104 103 101 99 99 97*   CO2 21.0* 23.0 16.0* 23.0 26.0 23.0 23.0   ANION GAP 12.0 10.0 14.0 11.0 10.0 11.0 14.0   BUN 23 23 21 14 18 20 30*   CREATININE 0.96 1.21* 1.12* 0.96 0.99 1.04* 1.22*   EGFR 57.0* 43.2* 47.4* 57.0* 55.0* 51.8* 42.8*   GLUCOSE 192* 145* 314* 131* 151* 182* 286*   CALCIUM 8.1* 7.9* 7.8* 8.0* 8.3* 7.9* 8.3*   MAGNESIUM  --   --   --  2.3 3.2* 1.2*  --    PHOSPHORUS  --   --  4.3  --   --   --   --    HEMOGLOBIN A1C  --   --   --   --   --   --  6.20*         Lab 05/21/25  0610 05/18/25 1711 05/17/25 2008   TOTAL PROTEIN  --  5.5* 6.0   ALBUMIN 2.9* 3.3* 3.8   GLOBULIN  --  2.2 2.2   ALT (SGPT)  --  9 10   AST (SGOT)  --  12 10   BILIRUBIN  --  0.3 0.3   ALK PHOS  --  87 92         Lab 05/18/25 1711 05/17/25 2149 05/17/25 2008   HSTROP T  --  21* 21*   PROTIME 16.0*  --   --    INR 1.21*  --   --              Lab 05/22/25  1008 05/18/25 1944 05/18/25 1944 05/17/25 2149   IRON  --   --   --  40   IRON SATURATION (TSAT)  --   --   --  14*   TIBC  --   --   --  282*   TRANSFERRIN  --   --   --  189*   ABO TYPING O   < > O  --    RH TYPING Negative   < > Negative  --    ANTIBODY SCREEN Positive  --  Positive  --     < > = values in this interval not displayed.         Brief Urine Lab Results  (Last result in the past 365 days)        Color   Clarity   Blood   Leuk Est   Nitrite   Protein   CREAT   Urine HCG        05/17/25 2140 Yellow   Turbid   Negative   Large (3+)   Negative   30 mg/dL (1+)                   Microbiology Results Abnormal       Procedure Component Value - Date/Time    Urine Culture - Urine, Indwelling Urethral Catheter [[id number]]  (Abnormal)  (Susceptibility) Collected: 05/17/25 2140    Lab Status: Final result Specimen: Urine from Indwelling Urethral Catheter Updated: 05/21/25 0821     Urine Culture >100,000 CFU/mL Klebsiella pneumoniae ssp pneumoniae    Narrative:      Colonization of the urinary tract without  infection is common. Treatment is discouraged unless the patient is symptomatic, pregnant, or undergoing an invasive urologic procedure.    Susceptibility        Klebsiella pneumoniae ssp pneumoniae      JUAN F      Amoxicillin + Clavulanate Susceptible      Ampicillin Resistant      Ampicillin + Sulbactam Susceptible      Cefazolin (Urine) Susceptible      Cefepime Susceptible      Ceftazidime Susceptible      Ceftriaxone Susceptible      Ciprofloxacin Resistant      Gentamicin Susceptible      Levofloxacin Resistant      Nitrofurantoin Intermediate      Piperacillin + Tazobactam Susceptible      Trimethoprim + Sulfamethoxazole Susceptible                                   No radiology results from the last 24 hrs      Results for orders placed during the hospital encounter of 06/05/23    Adult Transthoracic Echo Complete w/ Color, Spectral and Contrast if necessary per protocol    Interpretation Summary    Left ventricular systolic function is normal. Left ventricular ejection fraction appears to be 56 - 60%.    Left ventricular diastolic function was normal.    Estimated right ventricular systolic pressure from tricuspid regurgitation is normal (<35 mmHg).      Current medications:  Scheduled Meds:acetaminophen, 1,000 mg, Oral, Q8H  amiodarone, 200 mg, Oral, Daily  apixaban, 2.5 mg, Oral, BID  doxycycline, 100 mg, Oral, Q12H  insulin lispro, 2-7 Units, Subcutaneous, 4x Daily AC & at Bedtime  levothyroxine, 100 mcg, Oral, Q AM  lisinopril, 5 mg, Oral, Daily  metoprolol tartrate, 25 mg, Oral, BID  pantoprazole, 40 mg, Oral, Q AM  sodium chloride, 10 mL, Intravenous, Q12H      Continuous Infusions:ropivacaine,       PRN Meds:.  acetaminophen **OR** acetaminophen **OR** acetaminophen    senna-docusate sodium **AND** polyethylene glycol **AND** bisacodyl **AND** bisacodyl    Calcium Replacement - Follow Nurse / BPA Driven Protocol    dextrose    dextrose    diphenhydrAMINE **OR** diphenhydrAMINE    glucagon (human  recombinant)    HYDROmorphone **AND** naloxone    Magnesium Standard Dose Replacement - Follow Nurse / BPA Driven Protocol    melatonin    Morphine    nitroglycerin    ondansetron ODT **OR** ondansetron    oxyCODONE    oxyCODONE    Phosphorus Replacement - Follow Nurse / BPA Driven Protocol    Potassium Replacement - Follow Nurse / BPA Driven Protocol    sodium chloride    traMADol    Assessment & Plan   Assessment & Plan     Active Hospital Problems    Diagnosis  POA    **Femur fracture [S72.90XA]  Yes    Type 2 diabetes mellitus with complication, with long-term current use of insulin [E11.8, Z79.4]  Not Applicable    Periprosthetic fracture around prosthetic knee [M97.8XXA, Z96.659]  Not Applicable    Falls [R29.6]  Not Applicable    Atrial fibrillation [I48.91]  Yes    Chronic anticoagulation [Z79.01]  Not Applicable    Essential hypertension [I10]  Yes    Hypothyroidism (acquired) [E03.9]  Yes      Resolved Hospital Problems   No resolved problems to display.        Brief Hospital Course to date:  Chey Robertson is a 89 y.o. female with history of afib (on eliquis), HTN, DM2, hypothyroidism, MDS (w/ pancytopenia, followed by Dr. Lyman), previous PE (treated w eliquis), previous left knee replacement.   Presented to [facility] ED w/ left knee pain after sustaining a fall at her SNF. CT imaging revealed left distal femoral impaction fracture.    This patient's problems and plans were partially entered by my partner and updated as appropriate by me 05/23/25.      Left supracondylar fracture of the distal femur.   Hx previous left knee replacement  -Orthopedic surgery evaluated, s/p repair 5/20  -Due to magnitude of surgery ortho requested eliquis be held ~72 hours prior to surgery, but Hb dropped to 6.8 on 5/22, okay to resume per ortho at 2.5mg BID x1 week, then resume usual dosing thereafter   -PT/OT    Parox afib (currently in sinus)  Chronic HFpEF  HTN  -echo 6/2023: LV EF 56-60%, valves ok  - Continue amiodarone &  metoprolol, lisinopril  - Eliquis resumed on 5/23; monitor Hgb in the AM       UTI  - Completed rocephin for 5 days  - Urine culture with Klebsiella, sensitive to Rocephin        Hx PE (perioperative after previous knee replacement)     MDS  Pancytopenia  Iron deficiency  -follows w/ Dr. Lyman (last seen 3/3/25)   -typical plts range 40,000-60,000  -Transfused platelets 5/20  -Completed 3 days of IV iron, last dose 5/20  -2 units of PRBCs given 5/22, robust Hgb response    DM2  -A1c 6.2  -on januvia and metformin  - Continue ssi     Hypothyroidism  -cont levothyroxine     Expected Discharge Location and Transportation: [facility]  Expected Discharge   Expected Discharge Date: 5/22/2025; Expected Discharge Time:      VTE Prophylaxis:  Pharmacologic & mechanical VTE prophylaxis orders are present.         AM-PAC 6 Clicks Score (PT): 11 (05/23/25 1142)    CODE STATUS:   Code Status and Medical Interventions: CPR (Attempt to Resuscitate); Full   Ordered at: 05/20/25 2049     Code Status (Patient has no pulse and is not breathing):    CPR (Attempt to Resuscitate)     Medical Interventions (Patient has pulse or is breathing):    Full     Level Of Support Discussed With:    Patient       Lin Heath MD  05/23/25

## 2025-05-23 NOTE — PLAN OF CARE
Problem: Adult Inpatient Plan of Care  Goal: Plan of Care Review  Outcome: Progressing  Flowsheets (Taken 5/23/2025 0414)  Plan of Care Reviewed With: patient  Goal: Optimal Comfort and Wellbeing  Outcome: Progressing  Intervention: Provide Person-Centered Care  Recent Flowsheet Documentation  Taken 5/22/2025 2000 by Mirian Menchaca RN  Trust Relationship/Rapport:   care explained   choices provided   emotional support provided   empathic listening provided   questions answered   questions encouraged   reassurance provided   thoughts/feelings acknowledged  Goal: Readiness for Transition of Care  Outcome: Progressing     Problem: Skin Injury Risk Increased  Goal: Skin Health and Integrity  Outcome: Progressing  Intervention: Optimize Skin Protection  Recent Flowsheet Documentation  Taken 5/23/2025 0400 by Mirian Menchaca RN  Pressure Reduction Techniques:   frequent weight shift encouraged   weight shift assistance provided  Head of Bed (HOB) Positioning: [facility] elevated  Pressure Reduction Devices:   positioning supports utilized   pressure-redistributing mattress utilized  Skin Protection: incontinence pads utilized  Taken 5/23/2025 0200 by Mirian Menchaca RN  Pressure Reduction Techniques:   frequent weight shift encouraged   weight shift assistance provided  Head of Bed (HOB) Positioning: [facility] elevated  Pressure Reduction Devices:   positioning supports utilized   pressure-redistributing mattress utilized  Skin Protection: incontinence pads utilized  Taken 5/23/2025 0000 by Mirian Menchaca RN  Pressure Reduction Techniques:   frequent weight shift encouraged   weight shift assistance provided  Head of Bed (HOB) Positioning: [facility] elevated  Pressure Reduction Devices:   positioning supports utilized   pressure-redistributing mattress utilized  Skin Protection: incontinence pads utilized  Taken 5/22/2025 2200 by Mirian Menchaca RN  Pressure Reduction Techniques:   frequent weight shift encouraged    weight shift assistance provided  Head of Bed (HOB) Positioning: [facility] elevated  Pressure Reduction Devices:   positioning supports utilized   pressure-redistributing mattress utilized  Skin Protection: incontinence pads utilized  Taken 5/22/2025 2000 by Mirian Menchaca RN  Pressure Reduction Techniques:   frequent weight shift encouraged   weight shift assistance provided  Head of Bed (HOB) Positioning: [facility] elevated  Pressure Reduction Devices:   positioning supports utilized   pressure-redistributing mattress utilized  Skin Protection: incontinence pads utilized     Problem: Fall Injury Risk  Goal: Absence of Fall and Fall-Related Injury  Outcome: Progressing  Intervention: Identify and Manage Contributors  Recent Flowsheet Documentation  Taken 5/22/2025 2000 by Mirian Menchaca RN  Medication Review/Management: medications reviewed  Intervention: Promote Injury-Free Environment  Recent Flowsheet Documentation  Taken 5/23/2025 0400 by Mirian Menchaca RN  Safety Promotion/Fall Prevention: safety round/check completed  Taken 5/23/2025 0200 by Mirian Menchaca RN  Safety Promotion/Fall Prevention: safety round/check completed  Taken 5/23/2025 0000 by Mirian Menchaca RN  Safety Promotion/Fall Prevention: safety round/check completed  Taken 5/22/2025 2200 by Mirian Menchaca RN  Safety Promotion/Fall Prevention: safety round/check completed  Taken 5/22/2025 2000 by Mirian Menchaca RN  Safety Promotion/Fall Prevention:   assistive device/personal items within reach   clutter free environment maintained   fall prevention program maintained   lighting adjusted   room organization consistent   safety round/check completed   toileting scheduled   Goal Outcome Evaluation:  Plan of Care Reviewed With: patient

## 2025-05-23 NOTE — THERAPY TREATMENT NOTE
Patient Name: Chey Robertson  : 1935    MRN: [id number]                              Today's Date: 2025       Admit Date: 2025    Visit Dx:     ICD-10-CM ICD-9-CM   1. Fall at nursing home, initial encounter  W19.XXXA E888.9    Y92.129 E849.7   2. 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 Anticoagulated  Z79.01 V58.61   4. Periprosthetic fracture around internal prosthetic right knee joint, subsequent encounter  M97.11XD V54.89     Patient Active Problem List   Diagnosis    Benign familial tremor    AMS (altered mental status)    Pancytopenia    Hypothyroidism (acquired)    B12 deficiency    Abnormal intestinal absorption    Iron deficiency anemia due to chronic blood loss    Myelodysplasia (myelodysplastic syndrome)    Personal history of pulmonary embolism    Chronic anticoagulation    Essential hypertension    Type 2 diabetes mellitus without complication, without long-term current use of insulin    Atrial fibrillation    QT prolongation    Pericardial effusion    Severe malnutrition    Closed displaced intertrochanteric fracture of right femur, initial encounter    DISHA (acute kidney injury)    Moderate malnutrition    Falls    Type 2 diabetes mellitus with complication, with long-term current use of insulin    Periprosthetic fracture around prosthetic knee    Femur fracture     Past Medical History:   Diagnosis Date    A-fib     on eliquis    Anemia     Arthritis     Diabetes mellitus     checks sugar only when pt wants to     Disease of thyroid gland     History of transfusion     with knee surgery-  no reaction recalled.     [place] (hard of hearing)     no hearing aids    Hypertension     Migraine without aura and without status migrainosus, not intractable 2016    Myelodysplastic syndrome     Pulmonary emboli     (after knee surgery)    SOBOE (shortness of breath on exertion)     Tremors of nervous system     Vertigo      Wears dentures     upper     Wears glasses      Past Surgical History:   Procedure Laterality Date    BLADDER SURGERY      CATARACT EXTRACTION      bilat     CHOLECYSTECTOMY      COLONOSCOPY      HIP TROCHANTERIC NAILING WITH INTRAMEDULLARY HIP SCREW Right 6/20/2023    Procedure: HIP TROCHANTERIC NAILING WITH INTRAMEDULLARY HIP SCREW RIGHT;  Surgeon: Augie Hobbs MD;  Location: [facility] OR;  Service: Orthopedics;  Laterality: Right;    HYSTERECTOMY      with bso    KYPHOPLASTY N/A 02/12/2021    Procedure: KYPHOPLASTY T11, T12 AND L4;  Surgeon: Matty Hearn MD;  Location: [facility] OR;  Service: Orthopedic Spine;  Laterality: N/A;    TONSILLECTOMY        General Information       Row Name 05/23/25 1127          Physical Therapy Time and Intention    Document Type therapy note (daily note)  -CK     Mode of Treatment physical therapy;individual therapy  -CK       Row Name 05/23/25 1127          General Information    Patient Profile Reviewed yes  -CK     Existing Precautions/Restrictions fall;brace worn when out of bed;other (see comments)  s/p Revision L TKA, WBAT, KI AAT x2 weeks no ROM, Femoral NC, incisional wound vac  -CK     Barriers to Rehab medically complex;previous functional deficit  -CK       Row Name 05/23/25 1127          Cognition    Orientation Status (Cognition) oriented x 3  -CK       Row Name 05/23/25 1127          Safety Issues/Impairments Affecting Functional Mobility    Safety Issues Affecting Function (Mobility) awareness of need for assistance;insight into deficits/self-awareness;judgment;positioning of assistive device;problem-solving;safety precaution awareness;safety precautions follow-through/compliance;sequencing abilities  -CK     Impairments Affecting Function (Mobility) balance;endurance/activity tolerance;strength;sensation/sensory awareness;range of motion (ROM);postural/trunk control;motor control  -CK               User Key  (r) = Recorded By, (t) = Taken By, (c) = Cosigned By       Initials Name Provider Type    CK Keke Cook, PT Physical Therapist                   Mobility       Row Name 05/23/25 1128          Bed Mobility    Bed Mobility supine-sit  -CK     Supine-Sit [place] (Bed Mobility) maximum assist (25% patient effort);1 person assist;verbal cues  -CK     Assistive Device (Bed Mobility) head of bed elevated;repositioning sheet  -CK     Comment, (Bed Mobility) cues for sequencing, patient able to reach for bedrail, but requires assist with BLEs and trunk to achieve EOB sitting  -CK       Row Name 05/23/25 1128          Bed-Chair Transfer    Bed-Chair [place] (Transfers) moderate assist (50% patient effort);2 person assist;verbal cues  -CK     Assistive Device (Bed-Chair Transfers) walker, front-wheeled  -CK     Comment, (Bed-Chair Transfer) patient attempted to take steps away from bed, but she demonstrated progressively flexed posture  -CK       Row Name 05/23/25 1128          Sit-Stand Transfer    Sit-Stand [place] (Transfers) maximum assist (25% patient effort);1 person assist;verbal cues  -CK     Assistive Device (Sit-Stand Transfers) walker, front-wheeled  -CK     Comment, (Sit-Stand Transfer) cues for optimal hand/foot placement and to push up from bed/chair. Patient required increased boost to clear hips from bed/chair today. Much difficulty maintining upright posture today despite verbal and tactile cues  -CK       Row Name 05/23/25 1128          Gait/Stairs (Locomotion)    [place] Level (Gait) moderate assist (50% patient effort);1 person assist;verbal cues  -CK     Assistive Device (Gait) walker, front-wheeled  -CK     Distance in Feet (Gait) 2  -CK     Deviations/Abnormal Patterns (Gait) bilateral deviations;base of support, narrow;[place] decreased;gait speed decreased;stride length decreased  -CK     Left Sided Gait Deviations weight shift ability decreased  -CK     Comment, (Gait/Stairs) Patient took sidesteps to chair, limited to no  weight acceptance on LLE and progressively forward flexed posture despite verbal/tactile cues limited further ambulation today  -CK       Row Name 05/23/25 1128          Mobility    Extremity Weight-bearing Status left lower extremity  -CK     Left Lower Extremity (Weight-bearing Status) weight-bearing as tolerated (WBAT);other (see comments)  in KI AAT, no ROM  -CK               User Key  (r) = Recorded By, (t) = Taken By, (c) = Cosigned By      Initials Name Provider Type    Keke Paiz PT Physical Therapist                   Obj/Interventions       Row Name 05/23/25 1136          Balance    Balance Assessment sitting static balance;standing static balance;standing dynamic balance  -CK     Static Sitting Balance contact guard  -CK     Dynamic Sitting Balance contact guard  -CK     Position, Sitting Balance unsupported;sitting edge of bed;sitting in chair  -CK     Static Standing Balance minimal assist  -CK     Dynamic Standing Balance moderate assist  -CK     Position/Device Used, Standing Balance supported;walker, front-wheeled  -CK               User Key  (r) = Recorded By, (t) = Taken By, (c) = Cosigned By      Initials Name Provider Type    CK Keke Cook PT Physical Therapist                   Goals/Plan    No documentation.                  Clinical Impression       Row Name 05/23/25 1137          Pain    Pretreatment Pain Rating 0/10 - no pain  -CK     Posttreatment Pain Rating 0/10 - no pain  -CK       Row Name 05/23/25 1137          Plan of Care Review    Plan of Care Reviewed With patient  -CK     Progress declining  -CK     Outcome Evaluation Patient demonstrating increased difficulty with mobility today requiring increased assist for STS transfers and limited ambulation due to difficulty accepting weight on LLE and poor posture. Discussed with RN. RAMIRO will continue to follow to promote independence with mobility. She may benefit from additional mechanical assistance such as ARJO to  assist with ambulation. If she continues to demonstrate difficulty tolerating therapy she may be appropriate for daily frequency.  -CK       Row Name 05/23/25 1137          Vital Signs    O2 Delivery Pre Treatment room air  -CK     O2 Delivery Intra Treatment room air  -CK     O2 Delivery Post Treatment room air  -CK       Row Name 05/23/25 1137          Positioning and Restraints    Pre-Treatment Position in bed  -CK     Post Treatment Position chair  -CK     In Chair reclined;call light within reach;encouraged to call for assist;exit alarm on;waffle cushion;on mechanical lift sling;heels elevated;L knee immobilizer;notified nsg  -CK               User Key  (r) = Recorded By, (t) = Taken By, (c) = Cosigned By      Initials Name Provider Type    Keke Paiz PT Physical Therapist                   Outcome Measures       Row Name 05/23/25 1142          How much help from another person do you currently need...    Turning from your back to your side while in flat bed without using bedrails? 2  -CK     Moving from lying on back to sitting on the side of a flat bed without bedrails? 2  -CK     Moving to and from a bed to a chair (including a wheelchair)? 2  -CK     Standing up from a chair using your arms (e.g., wheelchair, bedside chair)? 2  -CK     Climbing 3-5 steps with a railing? 1  -CK     To walk in hospital room? 2  -CK     AM-PAC 6 Clicks Score (PT) 11  -CK     Highest Level of Mobility Goal Move to Chair/Commode-4  -CK       Row Name 05/23/25 1142          Functional Assessment    Outcome Measure Options AM-PAC 6 Clicks Basic Mobility (PT)  -CK               User Key  (r) = Recorded By, (t) = Taken By, (c) = Cosigned By      Initials Name Provider Type    Keke Paiz PT Physical Therapist                                 Physical Therapy Education       Title: PT OT SLP Therapies (In Progress)       Topic: Physical Therapy (In Progress)       Point: Mobility training (In Progress)        Learning Progress Summary            Patient Acceptance, E, NR by CK at 5/23/2025 1142    Acceptance, E, NR by CK at 5/22/2025 1344    Acceptance, E, NR by CK at 5/22/2025 1031    Acceptance, E, NR by CK at 5/21/2025 1614    Acceptance, E, NR,VU by CK at 5/21/2025 1006                      Point: Home exercise program (In Progress)       Learning Progress Summary            Patient Acceptance, E, NR by CK at 5/22/2025 1344    Acceptance, E, NR by CK at 5/22/2025 1031    Acceptance, E, NR by CK at 5/21/2025 1614                      Point: Body mechanics (In Progress)       Learning Progress Summary            Patient Acceptance, E, NR by CK at 5/23/2025 1142    Acceptance, E, NR by CK at 5/22/2025 1344    Acceptance, E, NR by CK at 5/22/2025 1031    Acceptance, E, NR by CK at 5/21/2025 1614    Acceptance, E, NR,VU by CK at 5/21/2025 1006                      Point: Precautions (In Progress)       Learning Progress Summary            Patient Acceptance, E, NR by CK at 5/23/2025 1142    Acceptance, E, NR by CK at 5/22/2025 1344    Acceptance, E, NR by CK at 5/22/2025 1031    Acceptance, E, NR by CK at 5/21/2025 1614    Acceptance, E, NR,VU by CK at 5/21/2025 1006                                      User Key       Initials Effective Dates Name Provider Type Discipline    CK 02/06/24 -  Keke Cook PT Physical Therapist PT                  PT Recommendation and Plan  Planned Therapy Interventions (PT): balance training, bed mobility training, gait training, home exercise program, neuromuscular re-education, transfer training, stretching, strengthening, stair training, postural re-education, patient/family education  Progress: declining  Outcome Evaluation: Patient demonstrating increased difficulty with mobility today requiring increased assist for STS transfers and limited ambulation due to difficulty accepting weight on LLE and poor posture. Discussed with RN. IPPT will continue to follow to promote  independence with mobility. She may benefit from additional mechanical assistance such as ARJO to assist with ambulation. If she continues to demonstrate difficulty tolerating therapy she may be appropriate for daily frequency.     Time Calculation:   PT Evaluation Complexity  History, PT Evaluation Complexity: 3 or more personal factors and/or comorbidities  Examination of Body Systems (PT Eval Complexity): total of 3 or more elements  Clinical Presentation (PT Evaluation Complexity): evolving  Clinical Decision Making (PT Evaluation Complexity): moderate complexity  Overall Complexity (PT Evaluation Complexity): moderate complexity     PT Charges       Row Name 05/23/25 1143             Time Calculation    Start Time 1045  -CK      PT Received On 05/23/25  -CK         Timed Charges    03092 - PT Therapeutic Activity Minutes 24  -CK         Total Minutes    Timed Charges Total Minutes 24  -CK       Total Minutes 24  -CK                User Key  (r) = Recorded By, (t) = Taken By, (c) = Cosigned By      Initials Name Provider Type    CK Keke Cook, PT Physical Therapist                  Therapy Charges for Today       Code Description Service Date Service Provider Modifiers Qty    28819179303 HC PT THER PROC EA 15 MIN 5/22/2025 Keke Cook, PT GP 1    19725992627 HC GAIT TRAINING EA 15 MIN 5/22/2025 Keke Cook, PT GP 1    90496283726 HC PT THERAPEUTIC ACT EA 15 MIN 5/22/2025 Keke Cook, PT GP 1    71756861555 HC PT THERAPEUTIC ACT EA 15 MIN 5/22/2025 Keke Cook, PT GP 2    11126464868 HC PT THERAPEUTIC ACT EA 15 MIN 5/23/2025 Keke Cook, PT GP 2            PT G-Codes  Outcome Measure Options: AM-PAC 6 Clicks Basic Mobility (PT)  AM-PAC 6 Clicks Score (PT): 11  AM-PAC 6 Clicks Score (OT): 11  PT Discharge Summary  Anticipated Discharge Disposition (PT): skilled nursing facility    Keke Cook PT  5/23/2025

## 2025-05-23 NOTE — DISCHARGE PLACEMENT REQUEST
"Elisa Venegas (89 y.o. Female)      [facility] Case Management [phone]       Date of Birth   1935    Social Security Number       Address   [address] [address]    Home Phone   [phone]    MRN   [id number]       Restorationist   Restorationist    Marital Status                               Admission Date   5/17/2025    Admission Type   Emergency    Admitting Provider   Lin Heath MD    Attending Provider   Lin Heath MD    Department, Room/Bed   [facility] 3G, S363/1       Discharge Date       Discharge Disposition       Discharge Destination                                 Attending Provider: Lin Heath MD    Allergies: Aspirin    Isolation: None   Infection: None   Code Status: CPR    Ht: 160 cm (63\")   Wt: 56.9 kg (125 lb 6.4 oz)    Admission Cmt: None   Principal Problem: Femur fracture [S72.90XA]                   Active Insurance as of 5/17/2025       Primary Coverage       Payor Plan Insurance Group Employer/Plan Group    ANTH MEDICARE REPLACEMENT [facility] MEDICARE ADVANTAGE HMO [id number]       Payor Plan Address Payor Plan Phone Number Payor Plan Fax Number Effective Dates    [address] [phone]  1/1/2025 - None Entered    [facility] [id number]         Subscriber Name Subscriber Birth Date Member ID       ELISA VENEGAS 1935 [id number]                     Emergency Contacts        (Rel.) Home Phone Work Phone Mobile Phone    Joe Venegas (Son) -- -- [phone]    Albin Venegas (Son) [phone] -- --    DORIS VENEGAS (Relative) -- -- [phone]                 Physician Progress Notes (most recent note)        Albin Mcclain MD at 05/22/25 1010            Orthopedic Progress Note      Patient: Elisa Venegas  YOB: 1935     Date of Admission: 5/17/2025  7:04 PM Medical Record Number: [id number]     Attending Physician: Diya Garcia,*    Status Post:  Procedure(s):  DISTAL FEMUR " "REPLACEMENT Post Operative Day Number: 2    Subjective : No new orthopaedic complaints     Pain Relief: some relief with present medication.     Systemic Complaints: No Complaints  Vitals:    05/21/25 2125 05/21/25 2150 05/22/25 0349 05/22/25 0653   BP: 113/60  110/54 119/58   BP Location:   Right arm Right arm   Patient Position:   Lying Lying   Pulse: 56  53 52   Resp:   16 16   Temp:   97.9 °F (36.6 °C) 97.8 °F (36.6 °C)   TempSrc:   Oral Oral   SpO2:  98% 96% 98%   Weight:       Height:           Physical Exam: 89 y.o. female    General Appearance:       Alert, cooperative, in no acute distress                  Extremities:    Dressing Clean, Dry and Intact             No clinical sign of DVT        Able to do good movements of digits    Pulses:   Pulses palpable and equal bilaterally           Diagnostic Tests:     Results from last 7 days   Lab Units 05/22/25  0719 05/21/25  0610 05/20/25  0824   WBC 10*3/mm3 2.27* 2.32* 1.09*   HEMOGLOBIN g/dL 6.8*  6.8* 7.6* 7.7*   HEMATOCRIT % 20.5*  20.5* 24.2* 24.1*   PLATELETS 10*3/mm3 81* 56* 46*     Results from last 7 days   Lab Units 05/22/25  0719 05/21/25  0610 05/20/25  0824   SODIUM mmol/L 137 133* 135*   POTASSIUM mmol/L 4.6 5.0 4.1   CHLORIDE mmol/L 104 103 101   CO2 mmol/L 23.0 16.0* 23.0   BUN mg/dL 23 21 14   CREATININE mg/dL 1.21* 1.12* 0.96   GLUCOSE mg/dL 145* 314* 131*   CALCIUM mg/dL 7.9* 7.8* 8.0*     Results from last 7 days   Lab Units 05/18/25  1711   INR  1.21*     No results found for: \"URICACID\"  No results found for: \"CRYSTAL\"  Microbiology Results (last 10 days)       Procedure Component Value - Date/Time    Urine Culture - Urine, Indwelling Urethral Catheter [[id number]]  (Abnormal)  (Susceptibility) Collected: 05/17/25 2140    Lab Status: Final result Specimen: Urine from Indwelling Urethral Catheter Updated: 05/21/25 0821     Urine Culture >100,000 CFU/mL Klebsiella pneumoniae ssp pneumoniae    Narrative:      Colonization of the urinary " tract without infection is common. Treatment is discouraged unless the patient is symptomatic, pregnant, or undergoing an invasive urologic procedure.    Susceptibility        Klebsiella pneumoniae ssp pneumoniae      JUAN F      Amoxicillin + Clavulanate Susceptible      Ampicillin Resistant      Ampicillin + Sulbactam Susceptible      Cefazolin (Urine) Susceptible      Cefepime Susceptible      Ceftazidime Susceptible      Ceftriaxone Susceptible      Ciprofloxacin Resistant      Gentamicin Susceptible      Levofloxacin Resistant      Nitrofurantoin Intermediate      Piperacillin + Tazobactam Susceptible      Trimethoprim + Sulfamethoxazole Susceptible                                 XR Knee 1 or 2 View Left  Result Date: 5/20/2025  Impression: Postoperative changes of left knee arthroplasty without radiographic evidence of complication. Electronically Signed: Zhou Paredes MD  5/20/2025 8:56 PM EDT  Workstation ID: [id number]    CT Lower Extremity Left Without Contrast  Result Date: 5/17/2025  Impression: 1.Limited study due to motion artifact and streak artifact. 2.There is confirmation of a impaction fracture at the distal femoral metaphysis with a large volume of lipohemarthrosis. Electronically Signed: Hector Rosenberg MD  5/17/2025 10:01 PM EDT  Workstation ID: [id number]    XR Knee 1 or 2 View Left  Result Date: 5/17/2025  Impression: 1.Suspected impaction fracture in the distal femoral metaphyseal region with large volume of lipohemarthrosis. 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 3.Osteopenia. Electronically Signed: Hector Rosenberg MD  5/17/2025 7:49 PM EDT  Workstation ID: [id number]        Current Medications:  Scheduled Meds:acetaminophen, 1,000 mg, Oral, Q8H  amiodarone, 200 mg, Oral, Daily  [Held by provider] apixaban, 2.5 mg, Oral, BID  doxycycline, 100 mg, Oral, Q12H  insulin lispro, 2-7 Units, Subcutaneous, 4x Daily AC & at Bedtime  levothyroxine, 100 mcg, Oral, Q AM  [Held by provider] lisinopril, 5 mg,  Oral, Daily  [Held by provider] metoprolol tartrate, 25 mg, Oral, BID  pantoprazole, 40 mg, Oral, Q AM  sodium chloride, 10 mL, Intravenous, Q12H      Continuous Infusions:ropivacaine,       PRN Meds:.  acetaminophen **OR** acetaminophen **OR** acetaminophen    senna-docusate sodium **AND** polyethylene glycol **AND** bisacodyl **AND** bisacodyl    Calcium Replacement - Follow Nurse / BPA Driven Protocol    dextrose    dextrose    diphenhydrAMINE **OR** diphenhydrAMINE    glucagon (human recombinant)    HYDROmorphone **AND** naloxone    Magnesium Standard Dose Replacement - Follow Nurse / BPA Driven Protocol    melatonin    Morphine    nitroglycerin    ondansetron ODT **OR** ondansetron    oxyCODONE    oxyCODONE    Phosphorus Replacement - Follow Nurse / BPA Driven Protocol    Potassium Replacement - Follow Nurse / BPA Driven Protocol    sodium chloride    traMADol    Assessment: Status post  No admission procedures for hospital encounter.    Patient Active Problem List   Diagnosis    Benign familial tremor    AMS (altered mental status)    Pancytopenia    Hypothyroidism (acquired)    B12 deficiency    Abnormal intestinal absorption    Iron deficiency anemia due to chronic blood loss    Myelodysplasia (myelodysplastic syndrome)    Personal history of pulmonary embolism    Chronic anticoagulation    Essential hypertension    Type 2 diabetes mellitus without complication, without long-term current use of insulin    Atrial fibrillation    QT prolongation    Pericardial effusion    Severe malnutrition    Closed displaced intertrochanteric fracture of right femur, initial encounter    DISHA (acute kidney injury)    Moderate malnutrition    Falls    Type 2 diabetes mellitus with complication, with long-term current use of insulin    Periprosthetic fracture around prosthetic knee    Femur fracture       PLAN:   Continues current post-op course  Anticoagulation: Okay to start anticoagulation per medicine if can restart  Eliquis recommend 2.5 twice daily for 1 week and then resume home dose however with the patient's very low platelet count not sure if the patient needs to be this extremely anticoagulated but would leave that decision up to medicine  Mobilize with PT as tolerated per protocol  2 weeks of oral antibiotics    Weight Bearing: WBAT knee immobilizer for 2 weeks postoperatively in order to let the wound heal no flexion of the knee  Discharge Plan: OK to plan for discharge in  tomorrow to rehab  from orthopaedic perspective.    Albin Mcclain MD    Date: 2025    Time: 10:10 EDT    Electronically signed by Albin Mcclain MD at 25 1010          Physical Therapy Notes (most recent note)        Keke Cook, PT at 25 1307  Version 1 of 1         Patient Name: Chey Robertson  : 1935    MRN: [id number]                              Today's Date: 2025       Admit Date: 2025    Visit Dx:     ICD-10-CM ICD-9-CM   1. Fall at nursing home, initial encounter  W19.XXXA E888.9    Y92.129 E849.7   2. 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 Anticoagulated  Z79.01 V58.61   4. Periprosthetic fracture around internal prosthetic right knee joint, subsequent encounter  M97.11XD V54.89     Patient Active Problem List   Diagnosis    Benign familial tremor    AMS (altered mental status)    Pancytopenia    Hypothyroidism (acquired)    B12 deficiency    Abnormal intestinal absorption    Iron deficiency anemia due to chronic blood loss    Myelodysplasia (myelodysplastic syndrome)    Personal history of pulmonary embolism    Chronic anticoagulation    Essential hypertension    Type 2 diabetes mellitus without complication, without long-term current use of insulin    Atrial fibrillation    QT prolongation    Pericardial effusion    Severe malnutrition    Closed displaced intertrochanteric fracture of right femur, initial encounter    DISHA (acute  kidney injury)    Moderate malnutrition    Falls    Type 2 diabetes mellitus with complication, with long-term current use of insulin    Periprosthetic fracture around prosthetic knee    Femur fracture     Past Medical History:   Diagnosis Date    A-fib     on eliquis    Anemia     Arthritis     Diabetes mellitus     checks sugar only when pt wants to     Disease of thyroid gland     History of transfusion     with knee surgery-  no reaction recalled.     Crow (hard of hearing)     no hearing aids    Hypertension     Migraine without aura and without status migrainosus, not intractable 12/30/2016    Myelodysplastic syndrome     Pulmonary emboli 2011    (after knee surgery)    SOBOE (shortness of breath on exertion)     Tremors of nervous system     Vertigo     Wears dentures     upper     Wears glasses      Past Surgical History:   Procedure Laterality Date    BLADDER SURGERY      CATARACT EXTRACTION      bilat     CHOLECYSTECTOMY      COLONOSCOPY      HIP TROCHANTERIC NAILING WITH INTRAMEDULLARY HIP SCREW Right 6/20/2023    Procedure: HIP TROCHANTERIC NAILING WITH INTRAMEDULLARY HIP SCREW RIGHT;  Surgeon: Augie Hobbs MD;  Location:  [facility];  Service: Orthopedics;  Laterality: Right;    HYSTERECTOMY      with bso    KYPHOPLASTY N/A 02/12/2021    Procedure: KYPHOPLASTY T11, T12 AND L4;  Surgeon: Matty Hearn MD;  Location:  [facility];  Service: Orthopedic Spine;  Laterality: N/A;    TONSILLECTOMY        General Information       Row Name 05/22/25 1336 05/22/25 1019       Physical Therapy Time and Intention    Document Type therapy note (daily note)  -CK therapy note (daily note)  -CK    Mode of Treatment physical therapy;individual therapy  -CK physical therapy;individual therapy  -CK      Row Name 05/22/25 1336 05/22/25 1019       General Information    Patient Profile Reviewed yes  -CK --    Existing Precautions/Restrictions fall;brace worn when out of bed;other (see comments)  s/p Revision L TKA, WBAT, KI  AAT x2 weeks no ROM, Femoral NC, incisional wound vac  -CK fall;brace worn when out of bed;other (see comments)  s/p Revision L TKA, WBAT, KI AAT x2 weeks no ROM, Femoral NC, incisional wound vac  -CK    Barriers to Rehab medically complex;previous functional deficit  -CK previous functional deficit;medically complex  -CK      Row Name 05/22/25 1336 05/22/25 1019       Cognition    Orientation Status (Cognition) oriented x 3  -CK oriented x 3  -CK      Row Name 05/22/25 1336 05/22/25 1019       Safety Issues/Impairments Affecting Functional Mobility    Safety Issues Affecting Function (Mobility) insight into deficits/self-awareness;positioning of assistive device;problem-solving;safety precaution awareness;safety precautions follow-through/compliance;sequencing abilities  -CK awareness of need for assistance;insight into deficits/self-awareness;positioning of assistive device;problem-solving;safety precaution awareness;safety precautions follow-through/compliance;sequencing abilities  -CK    Impairments Affecting Function (Mobility) balance;endurance/activity tolerance;strength;sensation/sensory awareness;range of motion (ROM);postural/trunk control;motor control  -CK balance;endurance/activity tolerance;strength;sensation/sensory awareness;range of motion (ROM);postural/trunk control;motor control  -CK              User Key  (r) = Recorded By, (t) = Taken By, (c) = Cosigned By      Initials Name Provider Type    CK Keke Cook, GURPREET Physical Therapist                   Mobility       Row Name 05/22/25 1337 05/22/25 1020       Bed Mobility    Bed Mobility -- supine-sit  -CK    Supine-Sit Mathews (Bed Mobility) -- maximum assist (25% patient effort);1 person assist;verbal cues  -CK    Sit-Supine Mathews (Bed Mobility) maximum assist (25% patient effort);2 person assist  -CK --    Assistive Device (Bed Mobility) -- head of bed elevated;repositioning sheet  -CK    Comment, (Bed Mobility) -- cues for  sequencing, patient gives good effort with bedrail use, requires assist at BLEs and trunk to achieve EOB sitting, denied dizziness at EOB  -CK      Row Name 05/22/25 1337          Bed-Chair Transfer    Bed-Chair [place] (Transfers) moderate assist (50% patient effort);2 person assist;verbal cues  -CK     Assistive Device (Bed-Chair Transfers) walker, front-wheeled  -CK     Comment, (Bed-Chair Transfer) patient took sidesteps from chair to bed, decreased weight acceptance on LLE, initially John but attempting to sit prior to reaching bed requiring modA to pivot hips onto bed  -CK       Row Name 05/22/25 1337 05/22/25 1020       Sit-Stand Transfer    Sit-Stand [place] (Transfers) moderate assist (50% patient effort);2 person assist;verbal cues  -CK moderate assist (50% patient effort);2 person assist;verbal cues  -CK    Assistive Device (Sit-Stand Transfers) -- walker, front-wheeled  -CK    Comment, (Sit-Stand Transfer) x2 from chair, cues for anterior weightshifting and to push up from chair. Performed lateral weightshifting x5 reps standing in walker for improved LLE weight acceptance and motor control. Patient became quite fatigued so returned to sitting and then bed as above  -CK cues for safe hand placement, still strong tegan to clear hips and achieve upright posture. Improved ability to step out LLE prior to sitting today with cues  -CK      Row Name 05/22/25 1337 05/22/25 1020       Gait/Stairs (Locomotion)    B[place]ry Level (Gait) not tested  -CK minimum assist (75% patient effort);1 person assist;1 person to manage equipment;verbal cues  -CK    Assistive Device (Gait) -- walker, front-wheeled  -CK    Patient was able to Ambulate -- yes  -CK    Distance in Feet (Gait) -- 5  -CK    Deviations/Abnormal Patterns (Gait) -- bilateral deviations;base of support, narrow;carlton decreased;gait speed decreased;stride length decreased  -CK    Left Sided Gait Deviations -- weight shift ability  decreased;knee buckling, left side  -CK    Comment, (Gait/Stairs) deferred. Patient quite fatigued and low hemoglobin pending blood transfusion. Assisted her back to bed as above  -CK Patient ambulated brief distance in room with step to gait pattern and KI tightly donned on LLE. Patient demo'd mildly improved knee buckling inside KI today, but also limited weight acceptance. Cues provided for upright posture and improved heel strike/knee extension on LLE during stance phase. Patient fatigued quickly and chair was brought up behind her to rest. KI loosened slightly post ambulation for skin integrity purposes.  -CK      Row Name 05/22/25 1337 05/22/25 1020       Mobility    Extremity Weight-bearing Status left lower extremity  -CK left lower extremity  -CK    Left Lower Extremity (Weight-bearing Status) weight-bearing as tolerated (WBAT);other (see comments)  in KI AAT, no ROM  -CK weight-bearing as tolerated (WBAT);other (see comments)  in KI AAT, no ROM  -CK              User Key  (r) = Recorded By, (t) = Taken By, (c) = Cosigned By      Initials Name Provider Type    CK Keke Cook, GURPREET Physical Therapist                   Obj/Interventions       Row Name 05/22/25 1339 05/22/25 1026       Motor Skills    Therapeutic Exercise other (see comments)  patient quite fatigued following transfer and weightshifting activity so deferred  -CK hip;knee;ankle  -CK      Row Name 05/22/25 1026          Hip (Therapeutic Exercise)    Hip (Therapeutic Exercise) strengthening exercise  -CK     Hip Isometrics (Therapeutic Exercise) bilateral;gluteal sets;10 repetitions;3 second hold  -CK     Hip Strengthening (Therapeutic Exercise) bilateral;aBduction;aDduction;other (see comments)  modA  -CK       Row Name 05/22/25 1026          Knee (Therapeutic Exercise)    Knee (Therapeutic Exercise) isometric exercises  -CK     Knee Isometrics (Therapeutic Exercise) bilateral;quad sets;10 repetitions;3 second hold  -CK       Row Name  05/22/25 1026          Ankle (Therapeutic Exercise)    Ankle (Therapeutic Exercise) AROM (active range of motion)  -CK     Ankle AROM (Therapeutic Exercise) bilateral;dorsiflexion;plantarflexion;10 repetitions  -CK       Row Name 05/22/25 1339 05/22/25 1026       Balance    Balance Assessment sitting static balance;standing static balance;standing dynamic balance  -CK sitting static balance;standing static balance;standing dynamic balance  -CK    Static Sitting Balance contact guard  -CK contact guard  -CK    Position, Sitting Balance unsupported;sitting edge of bed;sitting in chair  -CK unsupported;sitting edge of bed  -CK    Static Standing Balance minimal assist  -CK minimal assist  -CK    Dynamic Standing Balance -- minimal assist  -CK    Position/Device Used, Standing Balance supported;walker, front-wheeled  -CK supported;walker, front-wheeled  -CK              User Key  (r) = Recorded By, (t) = Taken By, (c) = Cosigned By      Initials Name Provider Type    CK Keke Cook PT Physical Therapist                   Goals/Plan    No documentation.                  Clinical Impression       Row Name 05/22/25 1340 05/22/25 1027       Pain    Pretreatment Pain Rating -- 0/10 - no pain  -CK    Posttreatment Pain Rating -- 0/10 - no pain  -CK    Pre/Posttreatment Pain Comment -- L knee pain with mobility, denied pain pre/post treatment  -CK    Additional Documentation Pain Scale: FACES Pre/Post-Treatment (Group)  -CK --      Row Name 05/22/25 1340          Pain Scale: FACES Pre/Post-Treatment    Pain: FACES Scale, Pretreatment 0-->no hurt  -CK     Posttreatment Pain Rating 0-->no hurt  -CK       Row Name 05/22/25 1340 05/22/25 1027       Plan of Care Review    Plan of Care Reviewed With patient  -CK patient  -CK    Progress declining  -CK no change  -CK    Outcome Evaluation Patient with increased fatigue and pending blood transfusion this afternoon. She gave good effort with weightshifting activity and transfer  back to bed this afternoon, but did require increased assist due to fatigue. IPPT remains indicated to address current deficits. Continue to recommend D/C to SNF.  -CK Patient continues to give good effort with therapy interventions. Simple BLE exercises reviewed and ambulation 5' minAx1+1 with FWW completed. She remains limited by quick fatigue and continue L knee buckling inside knee immobilizer. IPPT will continue to follow and address current deficits. Continue to recommend D/C to SNF.  -CK      Row Name 05/22/25 1340 05/22/25 1027       Vital Signs    Pre Systolic BP Rehab -- 119  -CK    Pre Treatment Diastolic BP -- 58  -CK    Pretreatment Heart Rate (beats/min) -- 57  -CK    Posttreatment Heart Rate (beats/min) -- 59  -CK    Pre SpO2 (%) -- 94  -CK    O2 Delivery Pre Treatment nasal cannula  -CK nasal cannula  -CK    O2 Delivery Intra Treatment room air  -CK --    Post SpO2 (%) -- 94  -CK    O2 Delivery Post Treatment nasal cannula  -CK room air  -CK    Pre Patient Position Sitting  -CK Supine  -CK    Post Patient Position Supine  -CK --      Row Name 05/22/25 1340 05/22/25 1027       Positioning and Restraints    Pre-Treatment Position sitting in chair/recliner  -CK in bed  -CK    Post Treatment Position bed  -CK chair  -CK    In Bed fowlers;call light within reach;encouraged to call for assist;exit alarm on;SCD pump applied;waffle boots/both;heels elevated;notified nsg;L knee immobilizer  -CK --    In Chair -- reclined;call light within reach;encouraged to call for assist;exit alarm on;waffle cushion;compression device;L knee immobilizer;notified nsg  -CK              User Key  (r) = Recorded By, (t) = Taken By, (c) = Cosigned By      Initials Name Provider Type    CK Keke Cook PT Physical Therapist                   Outcome Measures       Row Name 05/22/25 1344 05/22/25 1031       How much help from another person do you currently need...    Turning from your back to your side while in flat bed  without using bedrails? 2  -CK 2  -CK    Moving from lying on back to sitting on the side of a flat bed without bedrails? 2  -CK 2  -CK    Moving to and from a bed to a chair (including a wheelchair)? 2  -CK 3  -CK    Standing up from a chair using your arms (e.g., wheelchair, bedside chair)? 2  -CK 3  -CK    Climbing 3-5 steps with a railing? 2  -CK 2  -CK    To walk in hospital room? 2  -CK 3  -CK    AM-PAC 6 Clicks Score (PT) 12  -CK 15  -CK    Highest Level of Mobility Goal Move to Chair/Commode-4  -CK Move to Chair/Commode-4  -CK      Row Name 05/22/25 0800          How much help from another person do you currently need...    Turning from your back to your side while in flat bed without using bedrails? 2  -PT     Moving from lying on back to sitting on the side of a flat bed without bedrails? 2  -PT     Moving to and from a bed to a chair (including a wheelchair)? 2  -PT     Standing up from a chair using your arms (e.g., wheelchair, bedside chair)? 3  -PT     Climbing 3-5 steps with a railing? 2  -PT     To walk in hospital room? 3  -PT     AM-PAC 6 Clicks Score (PT) 14  -PT     Highest Level of Mobility Goal Move to Chair/Commode-4  -PT       Row Name 05/22/25 1344 05/22/25 1031       Functional Assessment    Outcome Measure Options AM-PAC 6 Clicks Basic Mobility (PT)  -CK AM-PAC 6 Clicks Basic Mobility (PT)  -CK              User Key  (r) = Recorded By, (t) = Taken By, (c) = Cosigned By      Initials Name Provider Type    Keke Paiz PT Physical Therapist    PT Jey Rai, RN Registered Nurse                                 Physical Therapy Education       Title: PT OT SLP Therapies (In Progress)       Topic: Physical Therapy (In Progress)       Point: Mobility training (In Progress)       Learning Progress Summary            Patient Acceptance, E, NR by CK at 5/22/2025 1344    Acceptance, E, NR by CK at 5/22/2025 1031    Acceptance, E, NR by CK at 5/21/2025 1614    Acceptance, E, NR,VU by  CK at 5/21/2025 1006                      Point: Home exercise program (In Progress)       Learning Progress Summary            Patient Acceptance, E, NR by CK at 5/22/2025 1344    Acceptance, E, NR by CK at 5/22/2025 1031    Acceptance, E, NR by CK at 5/21/2025 1614                      Point: Body mechanics (In Progress)       Learning Progress Summary            Patient Acceptance, E, NR by CK at 5/22/2025 1344    Acceptance, E, NR by CK at 5/22/2025 1031    Acceptance, E, NR by CK at 5/21/2025 1614    Acceptance, E, NR,VU by CK at 5/21/2025 1006                      Point: Precautions (In Progress)       Learning Progress Summary            Patient Acceptance, E, NR by CK at 5/22/2025 1344    Acceptance, E, NR by CK at 5/22/2025 1031    Acceptance, E, NR by CK at 5/21/2025 1614    Acceptance, E, NR,VU by CK at 5/21/2025 1006                                      User Key       Initials Effective Dates Name Provider Type Discipline     02/06/24 -  Keke Cook, PT Physical Therapist PT                  PT Recommendation and Plan  Planned Therapy Interventions (PT): balance training, bed mobility training, gait training, home exercise program, neuromuscular re-education, transfer training, stretching, strengthening, stair training, postural re-education, patient/family education  Progress: declining  Outcome Evaluation: Patient with increased fatigue and pending blood transfusion this afternoon. She gave good effort with weightshifting activity and transfer back to bed this afternoon, but did require increased assist due to fatigue. IPPT remains indicated to address current deficits. Continue to recommend D/C to SNF.     Time Calculation:   PT Evaluation Complexity  History, PT Evaluation Complexity: 3 or more personal factors and/or comorbidities  Examination of Body Systems (PT Eval Complexity): total of 3 or more elements  Clinical Presentation (PT Evaluation Complexity): evolving  Clinical Decision  Making (PT Evaluation Complexity): moderate complexity  Overall Complexity (PT Evaluation Complexity): moderate complexity     PT Charges       Row Name 05/22/25 1345 05/22/25 1033          Time Calculation    Start Time 1307  -CK 0832  -CK     PT Received On 05/22/25  -CK 05/22/25  -CK        Timed Charges    14905 - PT Therapeutic Exercise Minutes -- 15  -CK     01548 - Gait Training Minutes  -- 10  -CK     07503 - PT Therapeutic Activity Minutes 24  -CK 13  -CK        Total Minutes    Timed Charges Total Minutes 24  -CK 38  -CK      Total Minutes 24  -CK 38  -CK               User Key  (r) = Recorded By, (t) = Taken By, (c) = Cosigned By      Initials Name Provider Type    CK Keke Cook, PT Physical Therapist                  Therapy Charges for Today       Code Description Service Date Service Provider Modifiers Qty    81281011724 HC PT EVAL MOD COMPLEXITY 4 5/21/2025 Keke Cook, PT GP 1    32526039292 HC PT THER PROC EA 15 MIN 5/21/2025 Keke Cook, PT GP 1    59131961227 HC GAIT TRAINING EA 15 MIN 5/21/2025 Keke Cook, PT GP 1    02486734010 HC PT THERAPEUTIC ACT EA 15 MIN 5/21/2025 Keke Cook, PT GP 1    48042014855 HC PT THER PROC EA 15 MIN 5/22/2025 Keke Cook, PT GP 1    50319096028 HC GAIT TRAINING EA 15 MIN 5/22/2025 Keke Cook, PT GP 1    78773337602 HC PT THERAPEUTIC ACT EA 15 MIN 5/22/2025 Keke Cook, PT GP 1    57994761619 HC PT THERAPEUTIC ACT EA 15 MIN 5/22/2025 Keke Cook, PT GP 2            PT G-Codes  Outcome Measure Options: AM-PAC 6 Clicks Basic Mobility (PT)  AM-PAC 6 Clicks Score (PT): 12  AM-PAC 6 Clicks Score (OT): 11  PT Discharge Summary  Anticipated Discharge Disposition (PT): skilled nursing facility    Keke Cook PT  5/22/2025      Electronically signed by Keke Cook, PT at 05/22/25 1346          Occupational Therapy Notes (most recent note)        Mary Buckley, OT at 05/21/25 0851           Patient Name: Chey Robertson  : 1935    MRN: [id number]                              Today's Date: 2025       Admit Date: 2025    Visit Dx:     ICD-10-CM ICD-9-CM   1. Fall at nursing home, initial encounter  W19.XXXA E888.9    Y92.129 E849.7   2. 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 Anticoagulated  Z79.01 V58.61   4. Periprosthetic fracture around internal prosthetic right knee joint, subsequent encounter  M97.11XD V54.89     Patient Active Problem List   Diagnosis    Benign familial tremor    AMS (altered mental status)    Pancytopenia    Hypothyroidism (acquired)    B12 deficiency    Abnormal intestinal absorption    Iron deficiency anemia due to chronic blood loss    Myelodysplasia (myelodysplastic syndrome)    Personal history of pulmonary embolism    Chronic anticoagulation    Essential hypertension    Type 2 diabetes mellitus without complication, without long-term current use of insulin    Atrial fibrillation    QT prolongation    Pericardial effusion    Severe malnutrition    Closed displaced intertrochanteric fracture of right femur, initial encounter    DISHA (acute kidney injury)    Moderate malnutrition    Falls    Type 2 diabetes mellitus with complication, with long-term current use of insulin    Periprosthetic fracture around prosthetic knee    Femur fracture     Past Medical History:   Diagnosis Date    A-fib     on eliquis    Anemia     Arthritis     Diabetes mellitus     checks sugar only when pt wants to     Disease of thyroid gland     History of transfusion     with knee surgery-  no reaction recalled.     [place] (hard of hearing)     no hearing aids    Hypertension     Migraine without aura and without status migrainosus, not intractable 2016    Myelodysplastic syndrome     Pulmonary emboli     (after knee surgery)    SOBOE (shortness of breath on exertion)     Tremors of nervous system     Vertigo      "Wears dentures     upper     Wears glasses      Past Surgical History:   Procedure Laterality Date    BLADDER SURGERY      CATARACT EXTRACTION      bilat     CHOLECYSTECTOMY      COLONOSCOPY      HIP TROCHANTERIC NAILING WITH INTRAMEDULLARY HIP SCREW Right 6/20/2023    Procedure: HIP TROCHANTERIC NAILING WITH INTRAMEDULLARY HIP SCREW RIGHT;  Surgeon: Augie Hobbs MD;  Location:  [facility];  Service: Orthopedics;  Laterality: Right;    HYSTERECTOMY      with bso    KYPHOPLASTY N/A 02/12/2021    Procedure: KYPHOPLASTY T11, T12 AND L4;  Surgeon: Matty Hearn MD;  Location:  [facility];  Service: Orthopedic Spine;  Laterality: N/A;    TONSILLECTOMY        General Information       Row Name 05/21/25 0910          OT Time and Intention    Subjective Information no complaints  -TB     Document Type evaluation  -TB     Mode of Treatment occupational therapy;co-treatment  -TB     Patient Effort good  -TB     Symptoms Noted During/After Treatment none  -TB       Row Name 05/21/25 0910          General Information    Patient Profile Reviewed yes  -TB     Prior Level of Function independent:;gait;transfer;max assist:;ADL's  No family present to clarify PLOF. Per chart review, pt was up in dining room with rollator. Pt reports that she has assist for \"all of her ADLs.\"  -TB     Existing Precautions/Restrictions fall;brace worn when out of bed;other (see comments)  s/p Revision L TKA, WBAT, KI when up, Femoral NC  -TB     Barriers to Rehab previous functional deficit  -TB       Row Name 05/21/25 0910          Occupational Profile    Reason for Services/Referral (Occupational Profile) Occupational decline  -TB     Environmental Supports and Barriers (Occupational Profile) Pt lives @ [facility] halfway. Rollator. Assist with all ADLs.  -TB       Row Name 05/21/25 0910          Living Environment    Current Living Arrangements assisted living facility  -TB     People in Home alone  -TB       Row Name 05/21/25 0910          Home Main " Entrance    Number of Stairs, Main Entrance none  -TB       Row Name 05/21/25 0910          Stairs Within Home, Primary    Number of Stairs, Within Home, Primary none  -TB       Row Name 05/21/25 0910          Cognition    Orientation Status (Cognition) oriented x 4;verbal cues/prompts needed for orientation  cues for month only  -TB       Row Name 05/21/25 0910          Safety Issues/Impairments Affecting Functional Mobility    Safety Issues Affecting Function (Mobility) insight into deficits/self-awareness;awareness of need for assistance;safety precaution awareness;safety precautions follow-through/compliance;sequencing abilities;positioning of assistive device;problem-solving  -TB     Impairments Affecting Function (Mobility) balance;endurance/activity tolerance;strength;sensation/sensory awareness;range of motion (ROM);postural/trunk control;motor control  -TB     Comment, Safety Issues/Impairments (Mobility) Pt is up in room and transfering with RW support and Mod Ax2. Excellent effort.  -TB               User Key  (r) = Recorded By, (t) = Taken By, (c) = Cosigned By      Initials Name Provider Type    TB Mary Buckley OT Occupational Therapist                     Mobility/ADL's       Row Name 05/21/25 0914          Bed Mobility    Bed Mobility supine-sit;scooting/bridging  -TB     Scooting/Bridging [place] (Bed Mobility) moderate assist (50% patient effort);2 person assist;verbal cues  -TB     Supine-Sit [place] (Bed Mobility) moderate assist (50% patient effort);2 person assist;verbal cues  -TB     Bed Mobility, Safety Issues decreased use of arms for pushing/pulling;decreased use of legs for bridging/pushing;impaired trunk control for bed mobility  -TB     Assistive Device (Bed Mobility) head of bed elevated;repositioning sheet  -TB     Comment, (Bed Mobility) Education, cues, and assist for sequencing. Pt able to initiate advancing BLE toward EOB. Required significant assist to right  her trunk.  -       Row Name 05/21/25 0914          Transfers    Transfers sit-stand transfer;stand-sit transfer  -TB     Comment, (Transfers) Education, cues, and assist for hand placement, sequencing, and safety. Pt requires assist to manage wound vac and nerve catheter. KI in place.  -       Row Name 05/21/25 0914          Sit-Stand Transfer    Sit-Stand [place] (Transfers) moderate assist (50% patient effort);2 person assist;verbal cues  -TB     Assistive Device (Sit-Stand Transfers) walker, front-wheeled  -TB       Row Name 05/21/25 0914          Stand-Sit Transfer    Stand-Sit [place] (Transfers) moderate assist (50% patient effort);2 person assist;verbal cues  -TB     Assistive Device (Stand-Sit Transfers) walker, front-wheeled  -TB       Row Name 05/21/25 0914          Functional Mobility    Functional Mobility- Ind. Level moderate assist (50% patient effort);2 person assist required  and chair follow  -     Functional Mobility- Device walker, front-wheeled  -TB     Functional Mobility- Safety Issues step length decreased;weight-shifting ability decreased;sequencing ability decreased  -     Functional Mobility- Comment Pt is up in room with excellent effort. No c/o dizziness. RA sats stable >90%  -TB     Patient was able to Ambulate yes  -       Row Name 05/21/25 0914          Activities of Daily Living    BADL Assessment/Intervention lower body dressing;toileting;feeding  -       Row Name 05/21/25 0914          Mobility    Extremity Weight-bearing Status left lower extremity  -TB     Left Lower Extremity (Weight-bearing Status) weight-bearing as tolerated (WBAT)  -       Row Name 05/21/25 0914          Lower Body Dressing Assessment/Training    [place] Level (Lower Body Dressing) don;socks;dependent (less than 25% patient effort)  -     Position (Lower Body Dressing) edge of bed sitting  -TB     Comment, (Lower Body Dressing) Pt has assist with LB dressing at baseline. No  family present to clarify how much pt is able to complete.  -TB       Row Name 05/21/25 0914          Toileting Assessment/Training    [place] Level (Toileting) toileting skills;dependent (less than 25% patient effort)  -TB     Comment, (Toileting) Incontinent/purwick  -TB       Row Name 05/21/25 0914          Self-Feeding Assessment/Training    [place] Level (Feeding) independent;liquids to mouth  -TB     Position (Feeding) supported sitting  -TB               User Key  (r) = Recorded By, (t) = Taken By, (c) = Cosigned By      Initials Name Provider Type    TB Mary Buckley, OT Occupational Therapist                   Obj/Interventions       Row Name 05/21/25 0919          Sensory Assessment (Somatosensory)    Sensory Assessment (Somatosensory) UE sensation intact  -TB     Sensory Assessment Pt denies numbness or tingling  -TB       Row Name 05/21/25 0919          Vision Assessment/Intervention    Visual Impairment/Limitations corrective lenses for reading  -TB       Row Name 05/21/25 0919          Range of Motion Comprehensive    General Range of Motion bilateral upper extremity ROM WFL  -TB     Comment, General Range of Motion BUE A/AROM WFL for simple ADL performance  -TB       Row Name 05/21/25 0919          Balance    Balance Assessment sitting static balance;sitting dynamic balance;sit to stand dynamic balance;standing static balance;standing dynamic balance  -TB     Static Sitting Balance standby assist  -TB     Dynamic Sitting Balance contact guard  -TB     Position, Sitting Balance sitting in chair;sitting edge of bed  -TB     Sit to Stand Dynamic Balance moderate assist;2-person assist;verbal cues  -TB     Static Standing Balance minimal assist;2-person assist;verbal cues  -TB     Dynamic Standing Balance moderate assist;2-person assist;verbal cues  -TB     Position/Device Used, Standing Balance supported;walker, front-wheeled  -TB     Balance Interventions sitting;standing;sit to  stand;supported;static;dynamic;dynamic reaching;occupation based/functional task  -TB     Comment, Balance Mod Ax2 to maintain dynamic balance  -TB               User Key  (r) = Recorded By, (t) = Taken By, (c) = Cosigned By      Initials Name Provider Type    TB Mary Buckley OT Occupational Therapist                   Goals/Plan       Row Name 05/21/25 0932          Transfer Goal 1 (OT)    Activity/Assistive Device (Transfer Goal 1, OT) toilet  -TB     [place] Level/Cues Needed (Transfer Goal 1, OT) verbal cues required;moderate assist (50-74% patient effort)  -TB     Time Frame (Transfer Goal 1, OT) long term goal (LTG);1 week  -TB     Strategies/Barriers (Transfers Goal 1, OT) Ambulate to BR for transfers to support HH distances.  -TB     Progress/Outcome (Transfer Goal 1, OT) goal ongoing  -TB       Row Name 05/21/25 0932          Toileting Goal 1 (OT)    Activity/Device (Toileting Goal 1, OT) perform perineal hygiene  -TB     [place] Level/Cues Needed (Toileting Goal 1, OT) minimum assist (75% or more patient effort);verbal cues required  -TB     Time Frame (Toileting Goal 1, OT) long term goal (LTG);1 week  -TB     Progress/Outcome (Toileting Goal 1, OT) goal ongoing  -TB       Row Name 05/21/25 0932          Grooming Goal 1 (OT)    Activity/Device (Grooming Goal 1, OT) oral care  -TB     [place] (Grooming Goal 1, OT) set-up required;standby assist  -TB     Time Frame (Grooming Goal 1, OT) short term goal (STG);3 days  -TB     Progress/Outcome (Grooming Goal 1, OT) goal ongoing  -TB               User Key  (r) = Recorded By, (t) = Taken By, (c) = Cosigned By      Initials Name Provider Type    Mary Conte OT Occupational Therapist                   Clinical Impression       Row Name 05/21/25 0926          Pain Assessment    Pain Location extremity  -TB     Pain Side/Orientation left;lower  -TB     Pain Management Interventions activity modification encouraged;exercise  or physical activity utilized;positioning techniques utilized;other (see comments)  Femoral nerve catheter infusing  -TB     Response to Pain Interventions activity participation with tolerable pain;activity level improved;mobility function improved  -TB     Pre/Posttreatment Pain Comment Pt tolerates EOB/OOB activity well  -TB     Additional Documentation Pain Scale: FACES Pre/Post-Treatment (Group)  -TB       Row Name 05/21/25 0926          Pain Scale: FACES Pre/Post-Treatment    Pain: FACES Scale, Pretreatment 0-->no hurt  -TB     Posttreatment Pain Rating 0-->no hurt  -TB     Pre/Posttreatment Pain Comment No indication of pain with activity  -TB       Row Name 05/21/25 0926          Plan of Care Review    Plan of Care Reviewed With patient  -TB     Progress --  IE  -TB     Outcome Evaluation OT IE completed. Pt is A/Ox4 with cues and participates in therapy with excellent effort. Mod Ax2 for bed mobility. Mod Ax2 STS. Pt is able to ambulate in room with RW support and Mod Ax2, KI donned. Requires assist for wound vac and nerve catheter mgmt to prevent dislodgement. Pt is Dependent for LB ADLs and toileting at this time. OT will follow IP. Recommend SNF at d/c to support return to [facility] apt.  -TB       Row Name 05/21/25 0926          Therapy Assessment/Plan (OT)    Rehab Potential (OT) good  -TB     Criteria for Skilled Therapeutic Interventions Met (OT) yes;meets criteria;skilled treatment is necessary  -TB     Therapy Frequency (OT) daily  -TB       Row Name 05/21/25 0926          Therapy Plan Review/Discharge Plan (OT)    Anticipated Discharge Disposition (OT) skilled nursing facility  -TB       Row Name 05/21/25 0926          Vital Signs    Pre Systolic BP Rehab --  RN cleared OT  -TB     Pre SpO2 (%) 97  -TB     O2 Delivery Pre Treatment nasal cannula  -TB     Intra SpO2 (%) 92  -TB     O2 Delivery Intra Treatment room air  -TB     Post SpO2 (%) 92  -TB     O2 Delivery Post Treatment room air  -TB     Pre  Patient Position Supine  -TB     Intra Patient Position Standing  -TB     Post Patient Position Sitting  -TB       Row Name 05/21/25 0926          Positioning and Restraints    Pre-Treatment Position in bed  -TB     Post Treatment Position chair  -TB     In Chair notified nsg;reclined;call light within reach;encouraged to call for assist;exit alarm on;waffle cushion;legs elevated;with brace  -TB               User Key  (r) = Recorded By, (t) = Taken By, (c) = Cosigned By      Initials Name Provider Type    TB Mary Buckley OT Occupational Therapist                   Outcome Measures       Row Name 05/21/25 0933          How much help from another is currently needed...    Putting on and taking off regular lower body clothing? 1  -TB     Bathing (including washing, rinsing, and drying) 1  -TB     Toileting (which includes using toilet bed pan or urinal) 1  -TB     Putting on and taking off regular upper body clothing 2  -TB     Taking care of personal grooming (such as brushing teeth) 3  -TB     Eating meals 3  -TB     AM-PAC 6 Clicks Score (OT) 11  -TB       Row Name 05/21/25 0933          Functional Assessment    Outcome Measure Options AM-PAC 6 Clicks Daily Activity (OT)  -TB               User Key  (r) = Recorded By, (t) = Taken By, (c) = Cosigned By      Initials Name Provider Type    TB Mary Buckley OT Occupational Therapist                    Occupational Therapy Education       Title: PT OT SLP Therapies (In Progress)       Topic: Occupational Therapy (In Progress)       Point: ADL training (In Progress)       Learning Progress Summary            Patient Acceptance, E, NR by TB at 5/21/2025 0934                                      User Key       Initials Effective Dates Name Provider Type Discipline    TB 07/11/23 -  Mary Buckley OT Occupational Therapist OT                  OT Recommendation and Plan  Therapy Frequency (OT): daily  Plan of Care Review  Plan of Care Reviewed  With: patient  Progress:  (IE)  Outcome Evaluation: OT IE completed. Pt is A/Ox4 with cues and participates in therapy with excellent effort. Mod Ax2 for bed mobility. Mod Ax2 STS. Pt is able to ambulate in room with RW support and Mod Ax2, KI donned. Requires assist for wound vac and nerve catheter mgmt to prevent dislodgement. Pt is Dependent for LB ADLs and toileting at this time. OT will follow IP. Recommend SNF at d/c to support return to [facility] apt.     Time Calculation:   Evaluation Complexity (OT)  Review Occupational Profile/Medical/Therapy History Complexity: expanded/moderate complexity  Assessment, Occupational Performance/Identification of Deficit Complexity: 3-5 performance deficits  Clinical Decision Making Complexity (OT): detailed assessment/moderate complexity  Overall Complexity of Evaluation (OT): moderate complexity     Time Calculation- OT       Row Name 05/21/25 0839             Time Calculation- OT    OT Start Time 0839  -TB      OT Received On 05/21/25  -TB      OT Goal Re-Cert Due Date 05/31/25  -TB         Untimed Charges    OT Eval/Re-eval Minutes 53  -TB         Total Minutes    Untimed Charges Total Minutes 53  -TB       Total Minutes 53  -TB                User Key  (r) = Recorded By, (t) = Taken By, (c) = Cosigned By      Initials Name Provider Type    TB Mary Buckley OT Occupational Therapist                  Therapy Charges for Today       Code Description Service Date Service Provider Modifiers Qty    76230117120  OT EVAL MOD COMPLEXITY 4 5/21/2025 Mary Buckley OT GO 1                 Mary Buckley OT  5/21/2025    Electronically signed by Mary Buckley OT at 05/21/25 0936        Albin Mcclain MD   Physician  Orthopedics     Progress Notes     Signed     Date of Service: 05/22/25 1010  Creation Time: 05/22/25 1010     Signed       Expand All Collapse All      Orthopedic Progress Note        Patient: Chey Robertson  YOB: 1935  "     Date of Admission: 5/17/2025  7:04 PM Medical Record Number: [id number]      Attending Physician: Diya Garcia,*     Status Post:  Procedure(s):  DISTAL FEMUR REPLACEMENT Post Operative Day Number: 2     Subjective :No new orthopaedic complaints                           Pain Relief: some relief with present medication.                           Systemic Complaints: No Complaints  Vitals          Vitals:     05/21/25 2125 05/21/25 2150 05/22/25 0349 05/22/25 0653   BP: 113/60   110/54 119/58   BP Location:     Right arm Right arm   Patient Position:     Lying Lying   Pulse: 56   53 52   Resp:     16 16   Temp:     97.9 °F (36.6 °C) 97.8 °F (36.6 °C)   TempSrc:     Oral Oral   SpO2:   98% 96% 98%   Weight:           Height:                    Physical Exam: 89 y.o. female     General Appearance:       Alert, cooperative, in no acute distress                   Extremities:    Dressing Clean, Dry and Intact             No clinical sign of DVT        Able to do good movements of digits    Pulses:   Pulses palpable and equal bilaterally               Diagnostic Tests:             Results from last 7 days   Lab Units 05/22/25  0719 05/21/25  0610 05/20/25  0824   WBC 10*3/mm3 2.27* 2.32* 1.09*   HEMOGLOBIN g/dL 6.8*  6.8* 7.6* 7.7*   HEMATOCRIT % 20.5*  20.5* 24.2* 24.1*   PLATELETS 10*3/mm3 81* 56* 46*             Results from last 7 days   Lab Units 05/22/25  0719 05/21/25  0610 05/20/25  0824   SODIUM mmol/L 137 133* 135*   POTASSIUM mmol/L 4.6 5.0 4.1   CHLORIDE mmol/L 104 103 101   CO2 mmol/L 23.0 16.0* 23.0   BUN mg/dL 23 21 14   CREATININE mg/dL 1.21* 1.12* 0.96   GLUCOSE mg/dL 145* 314* 131*   CALCIUM mg/dL 7.9* 7.8* 8.0*           Results from last 7 days   Lab Units 05/18/25  1711   INR   1.21*      No results found for: \"URICACID\"  No results found for: \"CRYSTAL\"  Microbiology Results (last 10 days)         Procedure Component Value - Date/Time     Urine Culture - Urine, Indwelling Urethral " Catheter [[id number]]  (Abnormal)  (Susceptibility) Collected: 05/17/25 2140     Lab Status: Final result Specimen: Urine from Indwelling Urethral Catheter Updated: 05/21/25 0821       Urine Culture >100,000 CFU/mL Klebsiella pneumoniae ssp pneumoniae     Narrative:       Colonization of the urinary tract without infection is common. Treatment is discouraged unless the patient is symptomatic, pregnant, or undergoing an invasive urologic procedure.     Susceptibility                   Klebsiella pneumoniae ssp pneumoniae         JUAN F         Amoxicillin + Clavulanate Susceptible         Ampicillin Resistant         Ampicillin + Sulbactam Susceptible         Cefazolin (Urine) Susceptible         Cefepime Susceptible         Ceftazidime Susceptible         Ceftriaxone Susceptible         Ciprofloxacin Resistant         Gentamicin Susceptible         Levofloxacin Resistant         Nitrofurantoin Intermediate         Piperacillin + Tazobactam Susceptible         Trimethoprim + Sulfamethoxazole Susceptible                                             XR Knee 1 or 2 View Left  Result Date: 5/20/2025  Impression: Postoperative changes of left knee arthroplasty without radiographic evidence of complication. Electronically Signed: Zhou Paredes MD  5/20/2025 8:56 PM EDT  Workstation ID: [id number]     CT Lower Extremity Left Without Contrast  Result Date: 5/17/2025  Impression: 1.Limited study due to motion artifact and streak artifact. 2.There is confirmation of a impaction fracture at the distal femoral metaphysis with a large volume of lipohemarthrosis. Electronically Signed: Hector Rosenberg MD  5/17/2025 10:01 PM EDT  Workstation ID: [id number]     XR Knee 1 or 2 View Left  Result Date: 5/17/2025  Impression: 1.Suspected impaction fracture in the distal femoral metaphyseal region with large volume of lipohemarthrosis. 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 3.Osteopenia. Electronically Signed: Hector Rosenberg MD  5/17/2025 7:49  PM EDT  Workstation ID: [id number]           Current Medications:  Scheduled Meds:  Scheduled Medication   acetaminophen, 1,000 mg, Oral, Q8H  amiodarone, 200 mg, Oral, Daily  [Held by provider] apixaban, 2.5 mg, Oral, BID  doxycycline, 100 mg, Oral, Q12H  insulin lispro, 2-7 Units, Subcutaneous, 4x Daily AC & at Bedtime  levothyroxine, 100 mcg, Oral, Q AM  [Held by provider] lisinopril, 5 mg, Oral, Daily  [Held by provider] metoprolol tartrate, 25 mg, Oral, BID  pantoprazole, 40 mg, Oral, Q AM  sodium chloride, 10 mL, Intravenous, Q12H         Continuous Infusions:  Infusion Medications   ropivacaine,          PRN Meds:.  PRN Medication     acetaminophen **OR** acetaminophen **OR** acetaminophen    senna-docusate sodium **AND** polyethylene glycol **AND** bisacodyl **AND** bisacodyl    Calcium Replacement - Follow Nurse / BPA Driven Protocol    dextrose    dextrose    diphenhydrAMINE **OR** diphenhydrAMINE    glucagon (human recombinant)    HYDROmorphone **AND** naloxone    Magnesium Standard Dose Replacement - Follow Nurse / BPA Driven Protocol    melatonin    Morphine    nitroglycerin    ondansetron ODT **OR** ondansetron    oxyCODONE    oxyCODONE    Phosphorus Replacement - Follow Nurse / BPA Driven Protocol    Potassium Replacement - Follow Nurse / BPA Driven Protocol    sodium chloride    traMADol        Assessment: Status post  No admission procedures for hospital encounter.     Problem List       Patient Active Problem List   Diagnosis    Benign familial tremor    AMS (altered mental status)    Pancytopenia    Hypothyroidism (acquired)    B12 deficiency    Abnormal intestinal absorption    Iron deficiency anemia due to chronic blood loss    Myelodysplasia (myelodysplastic syndrome)    Personal history of pulmonary embolism    Chronic anticoagulation    Essential hypertension    Type 2 diabetes mellitus without complication, without long-term current use of insulin    Atrial fibrillation    QT prolongation     Pericardial effusion    Severe malnutrition    Closed displaced intertrochanteric fracture of right femur, initial encounter    DISHA (acute kidney injury)    Moderate malnutrition    Falls    Type 2 diabetes mellitus with complication, with long-term current use of insulin    Periprosthetic fracture around prosthetic knee    Femur fracture            PLAN:   Continues current post-op course  Anticoagulation: Okay to start anticoagulation per medicine if can restart Eliquis recommend 2.5 twice daily for 1 week and then resume home dose however with the patient's very low platelet count not sure if the patient needs to be this extremely anticoagulated but would leave that decision up to medicine  Mobilize with PT as tolerated per protocol  2 weeks of oral antibiotics     Weight Bearing: WBAT knee immobilizer for 2 weeks postoperatively in order to let the wound heal no flexion of the knee  Discharge Plan: OK to plan for discharge in  tomorrow to rehab  from orthopaedic perspective.     Albin Mcclain MD     Date: 5/22/2025                                  Time: 10:10 EDT

## 2025-05-23 NOTE — PLAN OF CARE
Goal Outcome Evaluation:  Plan of Care Reviewed With: patient        Progress: declining  Outcome Evaluation: Patient demonstrating increased difficulty with mobility today requiring increased assist for STS transfers and limited ambulation due to difficulty accepting weight on LLE and poor posture. Discussed with RN. IPPT will continue to follow to promote independence with mobility. She may benefit from additional mechanical assistance such as ARJO to assist with ambulation. If she continues to demonstrate difficulty tolerating therapy she may be appropriate for daily frequency.    Anticipated Discharge Disposition (PT): skilled nursing facility

## 2025-05-23 NOTE — PLAN OF CARE
Goal Outcome Evaluation:         Patient contiues with current POC, referrals made for placement upon d/c when medically ready, critical result WBCs of 1.75 this shift, reported to WILLIAM ZUNIGA, no further needs at this time, dressing on gluteal wound changed this shift, dressing on L heel q 3 days and done today. Turned and respositioned q 2 hours.

## 2025-05-23 NOTE — PROGRESS NOTES
"          Clinical Nutrition Assessment   Patient Name: Chey Robertson  YOB: 1935  MRN: [id number]  Date of Encounter: 05/23/25 09:55 EDT  Admission date: 5/17/2025  Reason for Visit: Follow-up protocol    Assessment   Nutrition Assessment   Admission Diagnosis:  Periprosthetic fracture around internal prosthetic right knee joint [M97.11XA]  Femur fracture [S72.90XA]    Problem List:    Femur fracture    Hypothyroidism (acquired)    Chronic anticoagulation    Essential hypertension    Atrial fibrillation    Falls    Type 2 diabetes mellitus with complication, with long-term current use of insulin    Periprosthetic fracture around prosthetic knee    PMH:   She  has a past medical history of A-fib, Anemia, Arthritis, Diabetes mellitus, Disease of thyroid gland, History of transfusion, [place] (hard of hearing), Hypertension, Migraine without aura and without status migrainosus, not intractable (12/30/2016), Myelodysplastic syndrome, Pulmonary emboli (2011), SOBOE (shortness of breath on exertion), Tremors of nervous system, Vertigo, Wears dentures, and Wears glasses.    PSH:  She  has a past surgical history that includes Hysterectomy; Tonsillectomy; Bladder surgery; Cataract extraction; Cholecystectomy; Colonoscopy; Kyphoplasty (N/A, 02/12/2021); and Hip Trochanteric Nailing (Right, 6/20/2023).    Applicable Nutrition History:   (5/19) WOC: stg 2 chronic PO on left heel; stg 2 PI to sacrum and coccyx    Anthropometrics   Height: Height: 160 cm (63\")  Last Filed Weight: Weight: 56.9 kg (125 lb 6.4 oz) (05/17/25 2336)  Method: Weight Method: Bed scale  BMI: BMI (Calculated): 22.2    UBW:   Weight      Weight (kg) Weight (lbs) Weight Method   7/27/2024 51.256 kg  113 lb  Stated    5/17/2025 56.881 kg  125 lb 6.4 oz  Bed scale      Weight change: No significant changes    Nutrition Focused Physical Exam    Date: 05/23/25     Pt does not meet criteria for malnutrition diagnosis, at this time.      Subjective "   Reported/Observed/Food/Nutrition Related History:   05/23/25  Patient reported decrease in appetite 2/2 pain and sore throat. Has not tried her jeremy yet. Discussed jeremy's role in wound healing. Patient verbalized understanding and in agreeance to trial. She is unsure of what she normally weighs, but does not believe she has lost any weight.    05/22/25  Patient sleeping soundly and did not awaken to name being spoken.    05/20/25  Patient screened per nutrition protocol for possible pressure injury of stage 2 or greater and/or non healing wound. Presented with femur fx. Patient gone to OR with ortho at time of visit. No weight changes noted in EMR. No chewing or swallowing difficulties noted. NKFA in EMR. Will order jeremy for patient to trial.    Current Nutrition Prescription   PO: Diet: Regular/House; Fluid Consistency: Thin (IDDSI 0)  Oral Nutrition Supplement: jeremy @ B/D  Intake: 5/22 B/L 50, D 25% PO intake documented    Assessment & Plan   Nutrition Diagnosis   Date: 05/20/25           Updated:    Problem Increased nutrient needs - protein   Etiology Increased demand for wound healing   Signs/Symptoms WOC noted PIs   Status: New    Goal:   Nutrition to support treatment and Increase intake    Nutrition Intervention      Follow treatment progress, Care plan reviewed, Encourage intake, Supplement provided    Nutrition POC  Continue current ONS regimen  Encourage adequate PO intake    Monitoring/Evaluation:   Per protocol, PO intake, Supplement intake, Weight, Skin status, Symptoms, POC/GOC    Temi Benavidez MS,RD,LD  Time Spent: 30min

## 2025-05-23 NOTE — THERAPY TREATMENT NOTE
Patient Name: Chey Robertson  : 1935    MRN: [id number]                              Today's Date: 2025       Admit Date: 2025    Visit Dx:     ICD-10-CM ICD-9-CM   1. Fall at nursing home, initial encounter  W19.XXXA E888.9    Y92.129 E849.7   2. 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 Anticoagulated  Z79.01 V58.61   4. Periprosthetic fracture around internal prosthetic right knee joint, subsequent encounter  M97.11XD V54.89     Patient Active Problem List   Diagnosis    Benign familial tremor    AMS (altered mental status)    Pancytopenia    Hypothyroidism (acquired)    B12 deficiency    Abnormal intestinal absorption    Iron deficiency anemia due to chronic blood loss    Myelodysplasia (myelodysplastic syndrome)    Personal history of pulmonary embolism    Chronic anticoagulation    Essential hypertension    Type 2 diabetes mellitus without complication, without long-term current use of insulin    Atrial fibrillation    QT prolongation    Pericardial effusion    Severe malnutrition    Closed displaced intertrochanteric fracture of right femur, initial encounter    DISHA (acute kidney injury)    Moderate malnutrition    Falls    Type 2 diabetes mellitus with complication, with long-term current use of insulin    Periprosthetic fracture around prosthetic knee    Femur fracture     Past Medical History:   Diagnosis Date    A-fib     on eliquis    Anemia     Arthritis     Diabetes mellitus     checks sugar only when pt wants to     Disease of thyroid gland     History of transfusion     with knee surgery-  no reaction recalled.     [place] (hard of hearing)     no hearing aids    Hypertension     Migraine without aura and without status migrainosus, not intractable 2016    Myelodysplastic syndrome     Pulmonary emboli     (after knee surgery)    SOBOE (shortness of breath on exertion)     Tremors of nervous system     Vertigo      Wears dentures     upper     Wears glasses      Past Surgical History:   Procedure Laterality Date    BLADDER SURGERY      CATARACT EXTRACTION      bilat     CHOLECYSTECTOMY      COLONOSCOPY      HIP TROCHANTERIC NAILING WITH INTRAMEDULLARY HIP SCREW Right 6/20/2023    Procedure: HIP TROCHANTERIC NAILING WITH INTRAMEDULLARY HIP SCREW RIGHT;  Surgeon: Augie Hobbs MD;  Location: [facility] OR;  Service: Orthopedics;  Laterality: Right;    HYSTERECTOMY      with bso    KYPHOPLASTY N/A 02/12/2021    Procedure: KYPHOPLASTY T11, T12 AND L4;  Surgeon: Matty Hearn MD;  Location: [facility] OR;  Service: Orthopedic Spine;  Laterality: N/A;    TONSILLECTOMY        General Information       Row Name 05/23/25 1436 05/23/25 1127       Physical Therapy Time and Intention    Document Type therapy note (daily note)  -CK therapy note (daily note)  -CK    Mode of Treatment physical therapy;individual therapy  -CK physical therapy;individual therapy  -CK      Row Name 05/23/25 1436 05/23/25 1127       General Information    Patient Profile Reviewed yes  -CK yes  -CK    Existing Precautions/Restrictions fall;brace worn when out of bed;other (see comments)  s/p Revision L TKA, WBAT, KI AAT x2 weeks no ROM, Femoral NC, incisional wound vac  -CK fall;brace worn when out of bed;other (see comments)  s/p Revision L TKA, WBAT, KI AAT x2 weeks no ROM, Femoral NC, incisional wound vac  -CK    Barriers to Rehab medically complex;previous functional deficit  -CK medically complex;previous functional deficit  -CK      Row Name 05/23/25 1436 05/23/25 1127       Cognition    Orientation Status (Cognition) oriented x 3  -CK oriented x 3  -CK      Row Name 05/23/25 1436 05/23/25 1127       Safety Issues/Impairments Affecting Functional Mobility    Safety Issues Affecting Function (Mobility) awareness of need for assistance;insight into deficits/self-awareness;judgment;positioning of assistive device;problem-solving;safety precaution  awareness;safety precautions follow-through/compliance;sequencing abilities  -CK awareness of need for assistance;insight into deficits/self-awareness;judgment;positioning of assistive device;problem-solving;safety precaution awareness;safety precautions follow-through/compliance;sequencing abilities  -CK    Impairments Affecting Function (Mobility) balance;endurance/activity tolerance;strength;sensation/sensory awareness;range of motion (ROM);postural/trunk control;motor control  -CK balance;endurance/activity tolerance;strength;sensation/sensory awareness;range of motion (ROM);postural/trunk control;motor control  -CK              User Key  (r) = Recorded By, (t) = Taken By, (c) = Cosigned By      Initials Name Provider Type    CK Keke Cook, PT Physical Therapist                   Mobility       Row Name 05/23/25 1436 05/23/25 1128       Bed Mobility    Bed Mobility -- supine-sit  -CK    Supine-Sit [place] (Bed Mobility) -- maximum assist (25% patient effort);1 person assist;verbal cues  -CK    Sit-Supine [place] (Bed Mobility) 2 person assist;dependent (less than 25% patient effort)  -CK --    Assistive Device (Bed Mobility) -- head of bed elevated;repositioning sheet  -CK    Comment, (Bed Mobility) patient sat on EOB demonstrating poor posture and limited hips placed on bed requiring quick return to supine  -CK cues for sequencing, patient able to reach for bedrail, but requires assist with BLEs and trunk to achieve EOB sitting  -CK      Row Name 05/23/25 1436 05/23/25 1128       Bed-Chair Transfer    Bed-Chair [place] (Transfers) maximum assist (25% patient effort);2 person assist;verbal cues  -CK moderate assist (50% patient effort);2 person assist;verbal cues  -CK    Assistive Device (Bed-Chair Transfers) walker, front-wheeled  -CK walker, front-wheeled  -CK    Comment, (Bed-Chair Transfer) chair>bed towards R side. Patient initially able to take 1 step toward bed, then assumed very  flexed posture with difficulty taking steps. Required manual assist for foot placement, upright posture, and to turn walker in order to reach bed  -CK patient attempted to take steps away from bed, but she demonstrated progressively flexed posture  -CK      Row Name 05/23/25 1436 05/23/25 1128       Sit-Stand Transfer    Sit-Stand [place] (Transfers) maximum assist (25% patient effort);verbal cues;2 person assist  -CK maximum assist (25% patient effort);1 person assist;verbal cues  -CK    Assistive Device (Sit-Stand Transfers) walker, front-wheeled;other (see comments)  ARJO  -CK walker, front-wheeled  -CK    Comment, (Sit-Stand Transfer) from chair x2, initially with ARJO support and assist to clear hips and pull LLE under CHARLEEN. Then with FWW with assist for the same  -CK cues for optimal hand/foot placement and to push up from bed/chair. Patient required increased boost to clear hips from bed/chair today. Much difficulty maintining upright posture today despite verbal and tactile cues  -CK      Row Name 05/23/25 1436 05/23/25 1128       Gait/Stairs (Locomotion)    [place] Level (Gait) moderate assist (50% patient effort);1 person assist;verbal cues;1 person to manage equipment  -CK moderate assist (50% patient effort);1 person assist;verbal cues  -CK    Assistive Device (Gait) other (see comments)  ARJO  -CK walker, front-wheeled  -CK    Distance in Feet (Gait) 7  -CK 2  -CK    Deviations/Abnormal Patterns (Gait) bilateral deviations;base of support, narrow;[place] decreased;gait speed decreased;stride length decreased  -CK bilateral deviations;base of support, narrow;[place] decreased;gait speed decreased;stride length decreased  -CK    Left Sided Gait Deviations weight shift ability decreased;knee buckling, left side  -CK weight shift ability decreased  -CK    Comment, (Gait/Stairs) Patient able to take a few small steps forward with step to gait pattern, very limited weight acceptance on LLE and  patient was very reliant on BUE support from ARJO. She required assist to advance BLEs at times and became fatigued quickly requiring chair to be brought up behind her and seated rest.  -CK Patient took sidesteps to chair, limited to no weight acceptance on LLE and progressively forward flexed posture despite verbal/tactile cues limited further ambulation today  -CK      Row Name 05/23/25 1436 05/23/25 1128       Mobility    Extremity Weight-bearing Status left lower extremity  -CK left lower extremity  -CK    Left Lower Extremity (Weight-bearing Status) weight-bearing as tolerated (WBAT);other (see comments)  in KI AAT, no ROM  -CK weight-bearing as tolerated (WBAT);other (see comments)  in KI AAT, no ROM  -CK              User Key  (r) = Recorded By, (t) = Taken By, (c) = Cosigned By      Initials Name Provider Type    CK Keke Cook PT Physical Therapist                   Obj/Interventions       Row Name 05/23/25 1449 05/23/25 1136       Balance    Balance Assessment sitting static balance;standing static balance;standing dynamic balance  -CK sitting static balance;standing static balance;standing dynamic balance  -CK    Static Sitting Balance contact guard  -CK contact guard  -CK    Dynamic Sitting Balance minimal assist  -CK contact guard  -CK    Position, Sitting Balance unsupported;sitting edge of bed  -CK unsupported;sitting edge of bed;sitting in chair  -CK    Static Standing Balance moderate assist  -CK minimal assist  -CK    Dynamic Standing Balance maximum assist  -CK moderate assist  -CK    Position/Device Used, Standing Balance supported;walker, front-wheeled;other (see comments)  ARJO  -CK supported;walker, front-wheeled  -CK              User Key  (r) = Recorded By, (t) = Taken By, (c) = Cosigned By      Initials Name Provider Type    CK Keke Cook, GURPREET Physical Therapist                   Goals/Plan    No documentation.                  Clinical Impression       Row Name 05/23/25 4698  05/23/25 1137       Pain    Pretreatment Pain Rating 0/10 - no pain  -CK 0/10 - no pain  -CK    Posttreatment Pain Rating 0/10 - no pain  -CK 0/10 - no pain  -CK      Row Name 05/23/25 1450 05/23/25 1137       Plan of Care Review    Plan of Care Reviewed With patient  -CK patient  -CK    Progress declining  -CK declining  -CK    Outcome Evaluation Patient continues to require increased assist with all bed mobility and transfers. She was able to ambulate this afternoon 7' modAx2, but required significant mechanical assist from ARJO walker to maintain upright posture and advance her BLEs. IPPT remains indicated to address current deficits. Given her continued difficulty tolerating treatments twice a day, will decrease therapy frequency to daily. Continue to recommend D/C to SNF.  -CK Patient demonstrating increased difficulty with mobility today requiring increased assist for STS transfers and limited ambulation due to difficulty accepting weight on LLE and poor posture. Discussed with RN. IPPT will continue to follow to promote independence with mobility. She may benefit from additional mechanical assistance such as ARJO to assist with ambulation. If she continues to demonstrate difficulty tolerating therapy she may be appropriate for daily frequency.  -CK      Row Name 05/23/25 1450          Therapy Assessment/Plan (PT)    Therapy Frequency (PT) daily  -CK       Row Name 05/23/25 1450 05/23/25 1137       Vital Signs    O2 Delivery Pre Treatment room air  -CK room air  -CK    O2 Delivery Intra Treatment room air  -CK room air  -CK    O2 Delivery Post Treatment room air  -CK room air  -CK      Row Name 05/23/25 1450 05/23/25 1137       Positioning and Restraints    Pre-Treatment Position sitting in chair/recliner  -CK in bed  -CK    Post Treatment Position bed  -CK chair  -CK    In Bed fowlers;call light within reach;encouraged to call for assist;exit alarm on;SCD pump applied;heels elevated;L knee  immobilizer;notified nsg  -CK --    In Chair -- reclined;call light within reach;encouraged to call for assist;exit alarm on;waffle cushion;on mechanical lift sling;heels elevated;L knee immobilizer;notified nsg  -CK              User Key  (r) = Recorded By, (t) = Taken By, (c) = Cosigned By      Initials Name Provider Type    Keke Paiz PT Physical Therapist                   Outcome Measures       Row Name 05/23/25 1453 05/23/25 1142       How much help from another person do you currently need...    Turning from your back to your side while in flat bed without using bedrails? 2  -CK 2  -CK    Moving from lying on back to sitting on the side of a flat bed without bedrails? 2  -CK 2  -CK    Moving to and from a bed to a chair (including a wheelchair)? 2  -CK 2  -CK    Standing up from a chair using your arms (e.g., wheelchair, bedside chair)? 2  -CK 2  -CK    Climbing 3-5 steps with a railing? 1  -CK 1  -CK    To walk in hospital room? 2  -CK 2  -CK    AM-PAC 6 Clicks Score (PT) 11  -CK 11  -CK    Highest Level of Mobility Goal Move to Chair/Commode-4  -CK Move to Chair/Commode-4  -CK      Row Name 05/23/25 1453 05/23/25 1142       Functional Assessment    Outcome Measure Options AM-PAC 6 Clicks Basic Mobility (PT)  -CK AM-PAC 6 Clicks Basic Mobility (PT)  -CK              User Key  (r) = Recorded By, (t) = Taken By, (c) = Cosigned By      Initials Name Provider Type    Keke Paiz PT Physical Therapist                                 Physical Therapy Education       Title: PT OT SLP Therapies (In Progress)       Topic: Physical Therapy (In Progress)       Point: Mobility training (In Progress)       Learning Progress Summary            Patient Acceptance, E, NR by CK at 5/23/2025 1454    Acceptance, E, NR by CK at 5/23/2025 1142    Acceptance, E, NR by CK at 5/22/2025 1344    Acceptance, E, NR by CK at 5/22/2025 1031    Acceptance, E, NR by CK at 5/21/2025 1614    Acceptance, E, NR,VU by CK  at 5/21/2025 1006                      Point: Home exercise program (In Progress)       Learning Progress Summary            Patient Acceptance, E, NR by CK at 5/22/2025 1344    Acceptance, E, NR by CK at 5/22/2025 1031    Acceptance, E, NR by CK at 5/21/2025 1614                      Point: Body mechanics (In Progress)       Learning Progress Summary            Patient Acceptance, E, NR by CK at 5/23/2025 1454    Acceptance, E, NR by CK at 5/23/2025 1142    Acceptance, E, NR by CK at 5/22/2025 1344    Acceptance, E, NR by CK at 5/22/2025 1031    Acceptance, E, NR by CK at 5/21/2025 1614    Acceptance, E, NR,VU by CK at 5/21/2025 1006                      Point: Precautions (In Progress)       Learning Progress Summary            Patient Acceptance, E, NR by CK at 5/23/2025 1454    Acceptance, E, NR by CK at 5/23/2025 1142    Acceptance, E, NR by CK at 5/22/2025 1344    Acceptance, E, NR by CK at 5/22/2025 1031    Acceptance, E, NR by CK at 5/21/2025 1614    Acceptance, E, NR,VU by CK at 5/21/2025 1006                                      User Key       Initials Effective Dates Name Provider Type Discipline     02/06/24 -  Keke Cook, GURPREET Physical Therapist PT                  PT Recommendation and Plan  Planned Therapy Interventions (PT): balance training, bed mobility training, gait training, home exercise program, neuromuscular re-education, transfer training, stretching, strengthening, stair training, postural re-education, patient/family education  Progress: declining  Outcome Evaluation: Patient continues to require increased assist with all bed mobility and transfers. She was able to ambulate this afternoon 7' modAx2, but required significant mechanical assist from ARShutl walker to maintain upright posture and advance her BLEs. IPPT remains indicated to address current deficits. Given her continued difficulty tolerating treatments twice a day, will decrease therapy frequency to daily. Continue to  recommend D/C to SNF.     Time Calculation:   PT Evaluation Complexity  History, PT Evaluation Complexity: 3 or more personal factors and/or comorbidities  Examination of Body Systems (PT Eval Complexity): total of 3 or more elements  Clinical Presentation (PT Evaluation Complexity): evolving  Clinical Decision Making (PT Evaluation Complexity): moderate complexity  Overall Complexity (PT Evaluation Complexity): moderate complexity     PT Charges       Row Name 05/23/25 1454 05/23/25 1143          Time Calculation    Start Time 1401  -CK 1045  -CK     PT Received On 05/23/25  -CK 05/23/25  -CK        Timed Charges    28031 - Gait Training Minutes  10  -CK --     95692 - PT Therapeutic Activity Minutes 19  -CK 24  -CK        Total Minutes    Timed Charges Total Minutes 29  -CK 24  -CK      Total Minutes 29  -CK 24  -CK               User Key  (r) = Recorded By, (t) = Taken By, (c) = Cosigned By      Initials Name Provider Type    CK Keke Cook, PT Physical Therapist                  Therapy Charges for Today       Code Description Service Date Service Provider Modifiers Qty    98002877506 HC PT THER PROC EA 15 MIN 5/22/2025 Keke Cook, PT GP 1    51513430590 HC GAIT TRAINING EA 15 MIN 5/22/2025 Keke Cook, PT GP 1    29701203365 HC PT THERAPEUTIC ACT EA 15 MIN 5/22/2025 Keke Cook, PT GP 1    74861960587 HC PT THERAPEUTIC ACT EA 15 MIN 5/22/2025 Keke Cook, PT GP 2    14368200631 HC PT THERAPEUTIC ACT EA 15 MIN 5/23/2025 Keke Cook, PT GP 2    37051293175 HC GAIT TRAINING EA 15 MIN 5/23/2025 Keke Cook, PT GP 1    42165161850 HC PT THERAPEUTIC ACT EA 15 MIN 5/23/2025 Keke Cook, PT GP 1            PT G-Codes  Outcome Measure Options: AM-PAC 6 Clicks Basic Mobility (PT)  AM-PAC 6 Clicks Score (PT): 11  AM-PAC 6 Clicks Score (OT): 11  PT Discharge Summary  Anticipated Discharge Disposition (PT): skilled nursing facility    Keke Cook  PT  5/23/2025

## 2025-05-23 NOTE — CASE MANAGEMENT/SOCIAL WORK
Continued Stay Note  [facility]     Patient Name: Chey Robertson  MRN: [id number]  Today's Date: 5/23/2025    Admit Date: 5/17/2025    Plan: skilled rehab   Discharge Plan       Row Name 05/23/25 1114       Plan    Plan skilled rehab    Patient/Family in Agreement with Plan yes    Plan Comments Met with patient at the bedside to discuss discharge plan. Patient's plan is skilled rehab. Referral to [facility] is pending. [facility] have no beds at this time but will continue to follow. Per MDR, patient is not medically ready for discharge today. PT/OT requested for Monday. CM will continue to follow and assist with discharge planning as recommendations become available.    Final Discharge Disposition Code 03 - skilled nursing facility (SNF)                   Discharge Codes    No documentation.                 Expected Discharge Date and Time       Expected Discharge Date Expected Discharge Time    May 22, 2025               Augie Cordova RN

## 2025-05-24 LAB
ANION GAP SERPL CALCULATED.3IONS-SCNC: 11 MMOL/L (ref 5–15)
BASOPHILS # BLD AUTO: 0 10*3/MM3 (ref 0–0.2)
BASOPHILS NFR BLD AUTO: 0 % (ref 0–1.5)
BUN SERPL-MCNC: 22 MG/DL (ref 8–23)
BUN/CREAT SERPL: 26.8 (ref 7–25)
CALCIUM SPEC-SCNC: 8.3 MG/DL (ref 8.6–10.5)
CHLORIDE SERPL-SCNC: 101 MMOL/L (ref 98–107)
CO2 SERPL-SCNC: 23 MMOL/L (ref 22–29)
CREAT SERPL-MCNC: 0.82 MG/DL (ref 0.57–1)
DEPRECATED RDW RBC AUTO: 51.8 FL (ref 37–54)
EGFRCR SERPLBLD CKD-EPI 2021: 68.9 ML/MIN/1.73
EOSINOPHIL # BLD AUTO: 0 10*3/MM3 (ref 0–0.4)
EOSINOPHIL NFR BLD AUTO: 0 % (ref 0.3–6.2)
ERYTHROCYTE [DISTWIDTH] IN BLOOD BY AUTOMATED COUNT: 16.4 % (ref 12.3–15.4)
GLUCOSE BLDC GLUCOMTR-MCNC: 126 MG/DL (ref 70–130)
GLUCOSE BLDC GLUCOMTR-MCNC: 146 MG/DL (ref 70–130)
GLUCOSE BLDC GLUCOMTR-MCNC: 273 MG/DL (ref 70–130)
GLUCOSE BLDC GLUCOMTR-MCNC: 282 MG/DL (ref 70–130)
GLUCOSE BLDC GLUCOMTR-MCNC: 449 MG/DL (ref 70–130)
GLUCOSE SERPL-MCNC: 115 MG/DL (ref 65–99)
HCT VFR BLD AUTO: 31.4 % (ref 34–46.6)
HGB BLD-MCNC: 10.1 G/DL (ref 12–15.9)
IMM GRANULOCYTES # BLD AUTO: 0.06 10*3/MM3 (ref 0–0.05)
IMM GRANULOCYTES NFR BLD AUTO: 3.6 % (ref 0–0.5)
LYMPHOCYTES # BLD AUTO: 0.27 10*3/MM3 (ref 0.7–3.1)
LYMPHOCYTES NFR BLD AUTO: 16.4 % (ref 19.6–45.3)
MCH RBC QN AUTO: 28.1 PG (ref 26.6–33)
MCHC RBC AUTO-ENTMCNC: 32.2 G/DL (ref 31.5–35.7)
MCV RBC AUTO: 87.5 FL (ref 79–97)
MONOCYTES # BLD AUTO: 0.19 10*3/MM3 (ref 0.1–0.9)
MONOCYTES NFR BLD AUTO: 11.5 % (ref 5–12)
NEUTROPHILS NFR BLD AUTO: 1.13 10*3/MM3 (ref 1.7–7)
NEUTROPHILS NFR BLD AUTO: 68.5 % (ref 42.7–76)
NRBC BLD AUTO-RTO: 0 /100 WBC (ref 0–0.2)
PLATELET # BLD AUTO: 74 10*3/MM3 (ref 140–450)
PMV BLD AUTO: 9.2 FL (ref 6–12)
POTASSIUM SERPL-SCNC: 4.3 MMOL/L (ref 3.5–5.2)
RBC # BLD AUTO: 3.59 10*6/MM3 (ref 3.77–5.28)
SODIUM SERPL-SCNC: 135 MMOL/L (ref 136–145)
WBC NRBC COR # BLD AUTO: 1.65 10*3/MM3 (ref 3.4–10.8)

## 2025-05-24 PROCEDURE — 99232 SBSQ HOSP IP/OBS MODERATE 35: CPT | Performed by: STUDENT IN AN ORGANIZED HEALTH CARE EDUCATION/TRAINING PROGRAM

## 2025-05-24 PROCEDURE — 97530 THERAPEUTIC ACTIVITIES: CPT

## 2025-05-24 PROCEDURE — 80048 BASIC METABOLIC PNL TOTAL CA: CPT | Performed by: STUDENT IN AN ORGANIZED HEALTH CARE EDUCATION/TRAINING PROGRAM

## 2025-05-24 PROCEDURE — 82948 REAGENT STRIP/BLOOD GLUCOSE: CPT

## 2025-05-24 PROCEDURE — 85025 COMPLETE CBC W/AUTO DIFF WBC: CPT | Performed by: STUDENT IN AN ORGANIZED HEALTH CARE EDUCATION/TRAINING PROGRAM

## 2025-05-24 PROCEDURE — 63710000001 INSULIN LISPRO (HUMAN) PER 5 UNITS: Performed by: ORTHOPAEDIC SURGERY

## 2025-05-24 RX ORDER — AMLODIPINE BESYLATE 5 MG/1
5 TABLET ORAL
Status: DISCONTINUED | OUTPATIENT
Start: 2025-05-24 | End: 2025-05-25

## 2025-05-24 RX ADMIN — APIXABAN 2.5 MG: 2.5 TABLET, FILM COATED ORAL at 09:13

## 2025-05-24 RX ADMIN — DOXYCYCLINE 100 MG: 100 CAPSULE ORAL at 09:13

## 2025-05-24 RX ADMIN — ACETAMINOPHEN 1000 MG: 500 TABLET ORAL at 05:46

## 2025-05-24 RX ADMIN — ACETAMINOPHEN 1000 MG: 500 TABLET ORAL at 14:17

## 2025-05-24 RX ADMIN — METOPROLOL TARTRATE 25 MG: 25 TABLET, FILM COATED ORAL at 09:13

## 2025-05-24 RX ADMIN — PANTOPRAZOLE SODIUM 40 MG: 40 TABLET, DELAYED RELEASE ORAL at 05:46

## 2025-05-24 RX ADMIN — Medication 10 ML: at 09:20

## 2025-05-24 RX ADMIN — APIXABAN 2.5 MG: 2.5 TABLET, FILM COATED ORAL at 20:23

## 2025-05-24 RX ADMIN — LISINOPRIL 5 MG: 5 TABLET ORAL at 09:13

## 2025-05-24 RX ADMIN — AMIODARONE HYDROCHLORIDE 200 MG: 200 TABLET ORAL at 09:13

## 2025-05-24 RX ADMIN — ACETAMINOPHEN 1000 MG: 500 TABLET ORAL at 20:30

## 2025-05-24 RX ADMIN — INSULIN LISPRO 4 UNITS: 100 INJECTION, SOLUTION INTRAVENOUS; SUBCUTANEOUS at 20:23

## 2025-05-24 RX ADMIN — AMLODIPINE BESYLATE 5 MG: 5 TABLET ORAL at 11:32

## 2025-05-24 RX ADMIN — Medication 10 ML: at 20:30

## 2025-05-24 RX ADMIN — DOCUSATE SODIUM 50 MG AND SENNOSIDES 8.6 MG 2 TABLET: 8.6; 5 TABLET, FILM COATED ORAL at 05:46

## 2025-05-24 RX ADMIN — LEVOTHYROXINE SODIUM 100 MCG: 0.1 TABLET ORAL at 05:46

## 2025-05-24 RX ADMIN — DOXYCYCLINE 100 MG: 100 CAPSULE ORAL at 20:24

## 2025-05-24 RX ADMIN — INSULIN LISPRO 4 UNITS: 100 INJECTION, SOLUTION INTRAVENOUS; SUBCUTANEOUS at 17:41

## 2025-05-24 RX ADMIN — METOPROLOL TARTRATE 25 MG: 25 TABLET, FILM COATED ORAL at 20:23

## 2025-05-24 NOTE — PLAN OF CARE
Problem: Adult Inpatient Plan of Care  Goal: Plan of Care Review  5/24/2025 0548 by Chey Patton RN  Outcome: Progressing  5/24/2025 0000 by Chey Patton RN  Outcome: Progressing  5/24/2025 0000 by Chey Patton RN  Outcome: Progressing  Goal: Patient-Specific Goal (Individualized)  5/24/2025 0548 by Chey Patton RN  Outcome: Progressing  5/24/2025 0000 by Chey Patton RN  Outcome: Progressing  5/24/2025 0000 by Chey Patton RN  Outcome: Progressing  Goal: Absence of Hospital-Acquired Illness or Injury  5/24/2025 0548 by Chey Patton RN  Outcome: Progressing  5/24/2025 0000 by Chey Patton RN  Outcome: Progressing  5/24/2025 0000 by Chey Patton RN  Outcome: Progressing  Intervention: Identify and Manage Fall Risk  Recent Flowsheet Documentation  Taken 5/24/2025 0200 by Chey Patton RN  Safety Promotion/Fall Prevention: fall prevention program maintained  Taken 5/23/2025 2200 by Chey Patton RN  Safety Promotion/Fall Prevention: fall prevention program maintained  Taken 5/23/2025 2000 by Chey Patton RN  Safety Promotion/Fall Prevention: activity supervised  Intervention: Prevent Infection  Recent Flowsheet Documentation  Taken 5/23/2025 2000 by Chey Patton RN  Infection Prevention: environmental surveillance performed  Goal: Optimal Comfort and Wellbeing  5/24/2025 0548 by Chey Patton RN  Outcome: Progressing  5/24/2025 0000 by Chey Patton RN  Outcome: Progressing  5/24/2025 0000 by hCey Patton RN  Outcome: Progressing  Goal: Readiness for Transition of Care  5/24/2025 0548 by Chey Patton RN  Outcome: Progressing  5/24/2025 0000 by Chey Patton RN  Outcome: Progressing  5/24/2025 0000 by Chey Patton RN  Outcome: Progressing     Problem: Skin Injury Risk Increased  Goal: Skin Health and Integrity  5/24/2025 0548 by Chey Patton RN  Outcome: Progressing  5/24/2025 0000 by Chey Patton RN  Outcome: Progressing  5/24/2025 0000 by Chey Patton RN  Outcome: Progressing      Problem: Orthopaedic Fracture  Goal: Absence of Bleeding  5/24/2025 0548 by Chey Patton RN  Outcome: Progressing  5/24/2025 0000 by Chey Patton, RN  Outcome: Progressing  5/24/2025 0000 by Chey Patton, RN  Outcome: Progressing  Goal: Bowel Elimination  5/24/2025 0548 by Chey Patton, RN  Outcome: Progressing  5/24/2025 0000 by Chey Patton, RN  Outcome: Progressing  5/24/2025 0000 by Chey Patton RN  Outcome: Progressing  Goal: Absence of Embolism Signs and Symptoms  5/24/2025 0548 by Chey Patton, RN  Outcome: Progressing  5/24/2025 0000 by Chey Patton, RN  Outcome: Progressing  5/24/2025 0000 by Chey Patton RN  Outcome: Progressing  Goal: Fracture Stability  5/24/2025 0548 by Chey Patton, RN  Outcome: Progressing  5/24/2025 0000 by Chey Patton, RN  Outcome: Progressing  5/24/2025 0000 by Chey Patton RN  Outcome: Progressing  Goal: Optimal Functional Ability  5/24/2025 0548 by Chey Patton RN  Outcome: Progressing  5/24/2025 0000 by Chey Patton, RN  Outcome: Progressing  5/24/2025 0000 by Chey Patton RN  Outcome: Progressing  Goal: Absence of Infection Signs and Symptoms  5/24/2025 0548 by Chey Patton, RN  Outcome: Progressing  5/24/2025 0000 by Chey Patton, RN  Outcome: Progressing  5/24/2025 0000 by Chey Patton, RN  Outcome: Progressing  Goal: Effective Tissue Perfusion  5/24/2025 0548 by Chey Patton, RN  Outcome: Progressing  5/24/2025 0000 by Chey Patton, RN  Outcome: Progressing  5/24/2025 0000 by Cehy Patton RN  Outcome: Progressing  Goal: Optimal Pain Control and Function  5/24/2025 0548 by Chey Patton, RN  Outcome: Progressing  5/24/2025 0000 by Chey Patton, RN  Outcome: Progressing  5/24/2025 0000 by Chey Patton RN  Outcome: Progressing  Goal: Effective Oxygenation and Ventilation  5/24/2025 0548 by Chey Patton RN  Outcome: Progressing  5/24/2025 0000 by Chey Patton RN  Outcome: Progressing  5/24/2025 0000 by Chey Patton RN  Outcome:  Progressing     Problem: Fall Injury Risk  Goal: Absence of Fall and Fall-Related Injury  5/24/2025 0548 by Chey Patton RN  Outcome: Progressing  5/24/2025 0000 by Chey Patton RN  Outcome: Progressing  5/24/2025 0000 by Chey Patton RN  Outcome: Progressing  Intervention: Identify and Manage Contributors  Recent Flowsheet Documentation  Taken 5/24/2025 0200 by Chey Patton RN  Medication Review/Management: medications reviewed  Taken 5/23/2025 2200 by Chey Patton RN  Medication Review/Management: medications reviewed  Intervention: Promote Injury-Free Environment  Recent Flowsheet Documentation  Taken 5/24/2025 0200 by Chey Patton RN  Safety Promotion/Fall Prevention: fall prevention program maintained  Taken 5/23/2025 2200 by Chey Patton RN  Safety Promotion/Fall Prevention: fall prevention program maintained  Taken 5/23/2025 2000 by Chey Patton RN  Safety Promotion/Fall Prevention: activity supervised   Goal Outcome Evaluation:

## 2025-05-24 NOTE — PROGRESS NOTES
[facility] Medicine Services  PROGRESS NOTE    Patient Name: Chey Robertson  : 1935  MRN: [id number]    Date of Admission: 2025  Primary Care Physician: Yasmeen Loomis MD    Subjective   Subjective     CC:  Femur fx    HPI:  Patient's son visited this morning.  Patient had bowel movement.  Reports pain is controlled.  No nausea or vomiting.  No diarrhea.  No chest pain or shortness of breath.    Objective   Objective     Vital Signs:   Temp:  [98 °F (36.7 °C)-98.7 °F (37.1 °C)] 98 °F (36.7 °C)  Heart Rate:  [57-72] 65  Resp:  [18] 18  BP: (163-188)/(67-84) 175/73  Flow (L/min) (Oxygen Therapy):  [2] 2     Physical Exam:  Constitutional: No acute distress, awake, alert, sitting up in bed  HENT: NCAT, mucous membranes moist  Respiratory: Respiratory effort normal   Cardiovascular: RRR  Gastrointestinal: Soft, nontender, nondistended, normoactive bowel sounds   Musculoskeletal: LLE in brace and ace wrap  Psychiatric: Appropriate affect, cooperative  Neurologic: Alert, oriented, [place], speech clear  Skin: No rashes on exposed skin    Results Reviewed:  LAB RESULTS:      Lab 25  0619 25  1256 25  0719 25  0610 25  0824 25  0544 25  1711 25  2149 25   WBC 1.65* 1.75* 2.27* 2.32* 1.09*   < > 1.53*  --  1.76*   HEMOGLOBIN 10.1* 9.8* 6.8*  6.8* 7.6* 7.7*   < > 7.9*  --  8.1*   HEMATOCRIT 31.4* 30.4* 20.5*  20.5* 24.2* 24.1*   < > 24.1*  --  24.3*   PLATELETS 74* 72* 81* 56* 46*   < > 48*  --  57*   NEUTROS ABS 1.13* 1.37*  --  2.01  --   --  0.95*  --  1.18*   IMMATURE GRANS (ABS) 0.06* 0.05  --  0.05  --   --  0.04  --  0.02   LYMPHS ABS 0.27* 0.16*  --  0.10*  --   --  0.30*  --  0.38*   MONOS ABS 0.19 0.17  --  0.15  --   --  0.23  --  0.18   EOS ABS 0.00 0.00  --  0.01  --   --  0.01  --  0.00   MCV 87.5 87.6 89.1 92.0 91.3   < > 89.3  --  88.7   PROTIME  --   --   --   --   --   --  16.0*  --   --    HSTROP T  --   --    --   --   --   --   --  21* 21*    < > = values in this interval not displayed.         Lab 05/24/25  0619 05/23/25  1256 05/22/25  0719 05/21/25  0610 05/20/25  0824 05/19/25  0544 05/18/25  1711 05/17/25 2008   SODIUM 135* 134* 137 133* 135* 135* 133* 134*   POTASSIUM 4.3 4.6 4.6 5.0 4.1 4.2 4.2 4.2   CHLORIDE 101 101 104 103 101 99 99 97*   CO2 23.0 21.0* 23.0 16.0* 23.0 26.0 23.0 23.0   ANION GAP 11.0 12.0 10.0 14.0 11.0 10.0 11.0 14.0   BUN 22 23 23 21 14 18 20 30*   CREATININE 0.82 0.96 1.21* 1.12* 0.96 0.99 1.04* 1.22*   EGFR 68.9 57.0* 43.2* 47.4* 57.0* 55.0* 51.8* 42.8*   GLUCOSE 115* 192* 145* 314* 131* 151* 182* 286*   CALCIUM 8.3* 8.1* 7.9* 7.8* 8.0* 8.3* 7.9* 8.3*   MAGNESIUM  --   --   --   --  2.3 3.2* 1.2*  --    PHOSPHORUS  --   --   --  4.3  --   --   --   --    HEMOGLOBIN A1C  --   --   --   --   --   --   --  6.20*         Lab 05/21/25  0610 05/18/25 1711 05/17/25 2008   TOTAL PROTEIN  --  5.5* 6.0   ALBUMIN 2.9* 3.3* 3.8   GLOBULIN  --  2.2 2.2   ALT (SGPT)  --  9 10   AST (SGOT)  --  12 10   BILIRUBIN  --  0.3 0.3   ALK PHOS  --  87 92         Lab 05/18/25  1711 05/17/25  2149 05/17/25 2008   HSTROP T  --  21* 21*   PROTIME 16.0*  --   --    INR 1.21*  --   --              Lab 05/22/25  1008 05/18/25 1944 05/18/25 1944 05/17/25 2149   IRON  --   --   --  40   IRON SATURATION (TSAT)  --   --   --  14*   TIBC  --   --   --  282*   TRANSFERRIN  --   --   --  189*   ABO TYPING O   < > O  --    RH TYPING Negative   < > Negative  --    ANTIBODY SCREEN Positive  --  Positive  --     < > = values in this interval not displayed.         Brief Urine Lab Results  (Last result in the past 365 days)        Color   Clarity   Blood   Leuk Est   Nitrite   Protein   CREAT   Urine HCG        05/17/25 2140 Yellow   Turbid   Negative   Large (3+)   Negative   30 mg/dL (1+)                   Microbiology Results Abnormal       Procedure Component Value - Date/Time    Urine Culture - Urine, Indwelling  Urethral Catheter [[id number]]  (Abnormal)  (Susceptibility) Collected: 05/17/25 2140    Lab Status: Final result Specimen: Urine from Indwelling Urethral Catheter Updated: 05/21/25 0821     Urine Culture >100,000 CFU/mL Klebsiella pneumoniae ssp pneumoniae    Narrative:      Colonization of the urinary tract without infection is common. Treatment is discouraged unless the patient is symptomatic, pregnant, or undergoing an invasive urologic procedure.    Susceptibility        Klebsiella pneumoniae ssp pneumoniae      JUAN F      Amoxicillin + Clavulanate Susceptible      Ampicillin Resistant      Ampicillin + Sulbactam Susceptible      Cefazolin (Urine) Susceptible      Cefepime Susceptible      Ceftazidime Susceptible      Ceftriaxone Susceptible      Ciprofloxacin Resistant      Gentamicin Susceptible      Levofloxacin Resistant      Nitrofurantoin Intermediate      Piperacillin + Tazobactam Susceptible      Trimethoprim + Sulfamethoxazole Susceptible                                   No radiology results from the last 24 hrs      Results for orders placed during the hospital encounter of 06/05/23    Adult Transthoracic Echo Complete w/ Color, Spectral and Contrast if necessary per protocol    Interpretation Summary    Left ventricular systolic function is normal. Left ventricular ejection fraction appears to be 56 - 60%.    Left ventricular diastolic function was normal.    Estimated right ventricular systolic pressure from tricuspid regurgitation is normal (<35 mmHg).      Current medications:  Scheduled Meds:acetaminophen, 1,000 mg, Oral, Q8H  amiodarone, 200 mg, Oral, Daily  amLODIPine, 5 mg, Oral, Q24H  apixaban, 2.5 mg, Oral, BID  doxycycline, 100 mg, Oral, Q12H  insulin lispro, 2-7 Units, Subcutaneous, 4x Daily AC & at Bedtime  levothyroxine, 100 mcg, Oral, Q AM  lisinopril, 5 mg, Oral, Daily  metoprolol tartrate, 25 mg, Oral, BID  pantoprazole, 40 mg, Oral, Q AM  sodium chloride, 10 mL, Intravenous,  Q12H      Continuous Infusions:ropivacaine,       PRN Meds:.  acetaminophen **OR** acetaminophen **OR** acetaminophen    senna-docusate sodium **AND** polyethylene glycol **AND** bisacodyl **AND** bisacodyl    Calcium Replacement - Follow Nurse / BPA Driven Protocol    dextrose    dextrose    diphenhydrAMINE **OR** diphenhydrAMINE    glucagon (human recombinant)    HYDROmorphone **AND** naloxone    Magnesium Standard Dose Replacement - Follow Nurse / BPA Driven Protocol    melatonin    Morphine    nitroglycerin    ondansetron ODT **OR** ondansetron    oxyCODONE    oxyCODONE    Phosphorus Replacement - Follow Nurse / BPA Driven Protocol    Potassium Replacement - Follow Nurse / BPA Driven Protocol    sodium chloride    traMADol    Assessment & Plan   Assessment & Plan     Active Hospital Problems    Diagnosis  POA    **Femur fracture [S72.90XA]  Yes    Type 2 diabetes mellitus with complication, with long-term current use of insulin [E11.8, Z79.4]  Not Applicable    Periprosthetic fracture around prosthetic knee [M97.8XXA, Z96.659]  Not Applicable    Falls [R29.6]  Not Applicable    Atrial fibrillation [I48.91]  Yes    Chronic anticoagulation [Z79.01]  Not Applicable    Essential hypertension [I10]  Yes    Hypothyroidism (acquired) [E03.9]  Yes      Resolved Hospital Problems   No resolved problems to display.        Brief Hospital Course to date:  Chey Robertson is a 89 y.o. female with history of afib (on eliquis), HTN, DM2, hypothyroidism, MDS (w/ pancytopenia, followed by Dr. Lyman), previous PE (treated w eliquis), previous left knee replacement.   Presented to [facility] ED w/ left knee pain after sustaining a fall at her SNF. CT imaging revealed left distal femoral impaction fracture.    This patient's problems and plans were partially entered by my partner and updated as appropriate by me 05/24/25.      Left supracondylar fracture of the distal femur.   Hx previous left knee replacement  -Orthopedic surgery  evaluated, s/p repair 5/20  -Due to magnitude of surgery ortho requested eliquis be held ~72 hours prior to surgery, but Hb dropped to 6.8 on 5/22, okay to resume per ortho at 2.5mg BID x1 week, then resume usual dosing thereafter   -PT/OT    Parox afib (currently in sinus)  Chronic HFpEF  HTN  -echo 6/2023: LV EF 56-60%, valves ok  - Continue amiodarone & metoprolol, lisinopril  - Eliquis resumed on 5/23; monitor Hgb, so far okay       UTI  - Completed rocephin for 5 days  - Urine culture with Klebsiella, sensitive to Rocephin        Hx PE (perioperative after previous knee replacement)     MDS  Pancytopenia  Iron deficiency  -follows w/ Dr. Lyman (last seen 3/3/25)   -typical plts range 40,000-60,000  -Transfused platelets 5/20  -Completed 3 days of IV iron, last dose 5/20  -2 units of PRBCs given 5/22, robust Hgb response, monitor    DM2  -A1c 6.2  -on januvia and metformin  - Continue ssi     Hypothyroidism  -cont levothyroxine     HTN-continue lisinopril, metoprolol, added amlodipine    Expected Discharge Location and Transportation: [facility]  Expected Discharge   Expected Discharge Date: 5/22/2025; Expected Discharge Time:      VTE Prophylaxis:  Pharmacologic & mechanical VTE prophylaxis orders are present.         AM-PAC 6 Clicks Score (PT): 12 (05/24/25 0000)    CODE STATUS:   Code Status and Medical Interventions: CPR (Attempt to Resuscitate); Full   Ordered at: 05/20/25 2049     Code Status (Patient has no pulse and is not breathing):    CPR (Attempt to Resuscitate)     Medical Interventions (Patient has pulse or is breathing):    Full     Level Of Support Discussed With:    Patient       Lin Heath MD  05/24/25

## 2025-05-24 NOTE — PLAN OF CARE
Problem: Adult Inpatient Plan of Care  Goal: Plan of Care Review  5/24/2025 0000 by Chey Patton RN  Outcome: Progressing  5/24/2025 0000 by Chey Patton RN  Outcome: Progressing  Goal: Patient-Specific Goal (Individualized)  5/24/2025 0000 by Chey Patton RN  Outcome: Progressing  5/24/2025 0000 by Chey Patton RN  Outcome: Progressing  Goal: Absence of Hospital-Acquired Illness or Injury  5/24/2025 0000 by Chey Patton RN  Outcome: Progressing  5/24/2025 0000 by Chey Patton RN  Outcome: Progressing  Intervention: Identify and Manage Fall Risk  Recent Flowsheet Documentation  Taken 5/23/2025 2000 by Chey Patton RN  Safety Promotion/Fall Prevention: activity supervised  Intervention: Prevent Infection  Recent Flowsheet Documentation  Taken 5/23/2025 2000 by Chey Patton RN  Infection Prevention: environmental surveillance performed  Goal: Optimal Comfort and Wellbeing  5/24/2025 0000 by Chey Patton RN  Outcome: Progressing  5/24/2025 0000 by Chey Patton RN  Outcome: Progressing  Goal: Readiness for Transition of Care  5/24/2025 0000 by Chey Patton RN  Outcome: Progressing  5/24/2025 0000 by Chey Patton RN  Outcome: Progressing     Problem: Skin Injury Risk Increased  Goal: Skin Health and Integrity  5/24/2025 0000 by Chey Patton RN  Outcome: Progressing  5/24/2025 0000 by Chey Patton RN  Outcome: Progressing     Problem: Orthopaedic Fracture  Goal: Absence of Bleeding  5/24/2025 0000 by Chey Patton RN  Outcome: Progressing  5/24/2025 0000 by Chey Patton RN  Outcome: Progressing  Goal: Bowel Elimination  5/24/2025 0000 by Chey Patton RN  Outcome: Progressing  5/24/2025 0000 by Chey Patton RN  Outcome: Progressing  Goal: Absence of Embolism Signs and Symptoms  5/24/2025 0000 by Chey Patton RN  Outcome: Progressing  5/24/2025 0000 by Chey Patton RN  Outcome: Progressing  Goal: Fracture Stability  5/24/2025 0000 by Chey Patton RN  Outcome: Progressing  5/24/2025  0000 by Pooja, Chey, RN  Outcome: Progressing  Goal: Optimal Functional Ability  5/24/2025 0000 by Chey Patton RN  Outcome: Progressing  5/24/2025 0000 by Chey Patton RN  Outcome: Progressing  Goal: Absence of Infection Signs and Symptoms  5/24/2025 0000 by Chey Patton RN  Outcome: Progressing  5/24/2025 0000 by Chey Patton RN  Outcome: Progressing  Goal: Effective Tissue Perfusion  5/24/2025 0000 by Chey Patton RN  Outcome: Progressing  5/24/2025 0000 by Chey Patton RN  Outcome: Progressing  Goal: Optimal Pain Control and Function  5/24/2025 0000 by Chey Patton RN  Outcome: Progressing  5/24/2025 0000 by Chey Patton RN  Outcome: Progressing  Goal: Effective Oxygenation and Ventilation  5/24/2025 0000 by Chey Patton RN  Outcome: Progressing  5/24/2025 0000 by Chey Patton RN  Outcome: Progressing     Problem: Fall Injury Risk  Goal: Absence of Fall and Fall-Related Injury  5/24/2025 0000 by Chey Patton RN  Outcome: Progressing  5/24/2025 0000 by Chey Patton RN  Outcome: Progressing  Intervention: Promote Injury-Free Environment  Recent Flowsheet Documentation  Taken 5/23/2025 2000 by Chey Patton RN  Safety Promotion/Fall Prevention: activity supervised   Goal Outcome Evaluation:

## 2025-05-24 NOTE — THERAPY TREATMENT NOTE
Patient Name: Chey Robertson  : 1935    MRN: [id number]                              Today's Date: 2025       Admit Date: 2025    Visit Dx:     ICD-10-CM ICD-9-CM   1. Fall at nursing home, initial encounter  W19.XXXA E888.9    Y92.129 E849.7   2. 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 Anticoagulated  Z79.01 V58.61   4. Periprosthetic fracture around internal prosthetic right knee joint, subsequent encounter  M97.11XD V54.89     Patient Active Problem List   Diagnosis    Benign familial tremor    AMS (altered mental status)    Pancytopenia    Hypothyroidism (acquired)    B12 deficiency    Abnormal intestinal absorption    Iron deficiency anemia due to chronic blood loss    Myelodysplasia (myelodysplastic syndrome)    Personal history of pulmonary embolism    Chronic anticoagulation    Essential hypertension    Type 2 diabetes mellitus without complication, without long-term current use of insulin    Atrial fibrillation    QT prolongation    Pericardial effusion    Severe malnutrition    Closed displaced intertrochanteric fracture of right femur, initial encounter    DISHA (acute kidney injury)    Moderate malnutrition    Falls    Type 2 diabetes mellitus with complication, with long-term current use of insulin    Periprosthetic fracture around prosthetic knee    Femur fracture     Past Medical History:   Diagnosis Date    A-fib     on eliquis    Anemia     Arthritis     Diabetes mellitus     checks sugar only when pt wants to     Disease of thyroid gland     History of transfusion     with knee surgery-  no reaction recalled.     [place] (hard of hearing)     no hearing aids    Hypertension     Migraine without aura and without status migrainosus, not intractable 2016    Myelodysplastic syndrome     Pulmonary emboli     (after knee surgery)    SOBOE (shortness of breath on exertion)     Tremors of nervous system     Vertigo      Wears dentures     upper     Wears glasses      Past Surgical History:   Procedure Laterality Date    BLADDER SURGERY      CATARACT EXTRACTION      bilat     CHOLECYSTECTOMY      COLONOSCOPY      HIP TROCHANTERIC NAILING WITH INTRAMEDULLARY HIP SCREW Right 6/20/2023    Procedure: HIP TROCHANTERIC NAILING WITH INTRAMEDULLARY HIP SCREW RIGHT;  Surgeon: Augie Hobbs MD;  Location: [facility] OR;  Service: Orthopedics;  Laterality: Right;    HYSTERECTOMY      with bso    KYPHOPLASTY N/A 02/12/2021    Procedure: KYPHOPLASTY T11, T12 AND L4;  Surgeon: Matty Hearn MD;  Location: [facility] OR;  Service: Orthopedic Spine;  Laterality: N/A;    TONSILLECTOMY        General Information       Row Name 05/24/25 1511          Physical Therapy Time and Intention    Document Type therapy note (daily note)  -     Mode of Treatment physical therapy  -       Row Name 05/24/25 1511          General Information    Patient Profile Reviewed yes  -SS     Existing Precautions/Restrictions fall;brace worn when out of bed;other (see comments)  s/p Revision L TKA, WBAT, KI AAT x2 weeks no ROM, incisional wound vac  -     Barriers to Rehab medically complex;previous functional deficit;cognitive status  -       Row Name 05/24/25 1511          Cognition    Orientation Status (Cognition) oriented to;person;situation;verbal cues/prompts needed for orientation;place;time;other (see comments)  situational confusion  -       Row Name 05/24/25 1511          Safety Issues/Impairments Affecting Functional Mobility    Safety Issues Affecting Function (Mobility) awareness of need for assistance;insight into deficits/self-awareness;judgment;positioning of assistive device;problem-solving;safety precaution awareness;safety precautions follow-through/compliance;sequencing abilities  -SS     Impairments Affecting Function (Mobility) balance;endurance/activity tolerance;strength;sensation/sensory awareness;range of motion (ROM);postural/trunk  control;motor control  -     Comment, Safety Issues/Impairments (Mobility) intermittent command following  -               User Key  (r) = Recorded By, (t) = Taken By, (c) = Cosigned By      Initials Name Provider Type     Aminta Rodriguez, PT Physical Therapist                   Mobility       Row Name 05/24/25 1513          Bed Mobility    Bed Mobility scooting/bridging;supine-sit;sit-supine  -SS     Scooting/Bridging [place] (Bed Mobility) maximum assist (25% patient effort);verbal cues;nonverbal cues (demo/gesture)  -     Supine-Sit [place] (Bed Mobility) maximum assist (25% patient effort);verbal cues;nonverbal cues (demo/gesture)  -     Sit-Supine [place] (Bed Mobility) maximum assist (25% patient effort);2 person assist;verbal cues;nonverbal cues (demo/gesture)  -     Assistive Device (Bed Mobility) head of bed elevated;repositioning sheet;bed rails  -     Comment, (Bed Mobility) V/TC for sequencing  -       Row Name 05/24/25 1513          Bed-Chair Transfer    Bed-Chair [place] (Transfers) unable to assess  -     Comment, (Bed-Chair Transfer) pt declined  -       Row Name 05/24/25 1513          Sit-Stand Transfer    Sit-Stand [place] (Transfers) maximum assist (25% patient effort);verbal cues;nonverbal cues (demo/gesture)  -     Assistive Device (Sit-Stand Transfers) walker, front-wheeled  -     Comment, (Sit-Stand Transfer) V/TC for LE set up, hand placement, sequencing; attempted 4x w/limited ability to achieve full upright posture; pt. had bowel movement, therefore, returned to supine to clean up  -       Row Name 05/24/25 1513          Gait/Stairs (Locomotion)    [place] Level (Gait) unable to assess  -     Comment, (Gait/Stairs) unable to achieve upright posture  -       Row Name 05/24/25 1513          Mobility    Extremity Weight-bearing Status left lower extremity  -     Left Lower Extremity (Weight-bearing Status) weight-bearing as tolerated  (WBAT);other (see comments)  in KI AAT, no ROM  -SS               User Key  (r) = Recorded By, (t) = Taken By, (c) = Cosigned By      Initials Name Provider Type     Aminta Rodriguez PT Physical Therapist                   Obj/Interventions       Row Name 05/24/25 1522          Motor Skills    Therapeutic Exercise hip;knee;ankle;other (see comments)  min assist  -       Row Name 05/24/25 1522          Hip (Therapeutic Exercise)    Hip (Therapeutic Exercise) isometric exercises  -     Hip Isometrics (Therapeutic Exercise) bilateral;gluteal sets;10 repetitions  -     Hip Strengthening (Therapeutic Exercise) left;aBduction;aDduction;10 repetitions  -       Row Name 05/24/25 1522          Knee (Therapeutic Exercise)    Knee (Therapeutic Exercise) strengthening exercise  -     Knee Isometrics (Therapeutic Exercise) bilateral;quad sets;10 repetitions  -     Knee Strengthening (Therapeutic Exercise) left;SLR (straight leg raise);10 repetitions  -       Row Name 05/24/25 1522          Ankle (Therapeutic Exercise)    Ankle (Therapeutic Exercise) AROM (active range of motion)  -     Ankle AROM (Therapeutic Exercise) bilateral;dorsiflexion;plantarflexion;10 repetitions  -       Row Name 05/24/25 1522          Balance    Balance Assessment sitting static balance;sitting dynamic balance;standing static balance;standing dynamic balance  -     Static Sitting Balance contact guard  -     Dynamic Sitting Balance minimal assist  -SS     Position, Sitting Balance unsupported;sitting edge of bed  -     Static Standing Balance moderate assist  -SS     Dynamic Standing Balance maximum assist  -SS     Position/Device Used, Standing Balance supported;walker, front-wheeled  -     Balance Interventions sitting;standing;sit to stand;supported;static;dynamic  -     Comment, Balance R lateral lean sitting EOB  -SS               User Key  (r) = Recorded By, (t) = Taken By, (c) = Cosigned By      Initials Name  Provider Type    SS Aminta Rodriguez, GURPREET Physical Therapist                   Goals/Plan       Row Name 05/24/25 1527          Bed Mobility Goal 1 (PT)    Activity/Assistive Device (Bed Mobility Goal 1, PT) sit to supine/supine to sit  -SS     [place] Level/Cues Needed (Bed Mobility Goal 1, PT) minimum assist (75% or more patient effort)  -SS     Time Frame (Bed Mobility Goal 1, PT) short term goal (STG);3 days  -SS     Progress/Outcomes (Bed Mobility Goal 1, PT) progress slower than expected;goal ongoing  -               User Key  (r) = Recorded By, (t) = Taken By, (c) = Cosigned By      Initials Name Provider Type     Aminta Rodriguez, GURPREET Physical Therapist                   Clinical Impression       Row Name 05/24/25 1524          Pain    Pretreatment Pain Rating 0/10 - no pain  -SS     Posttreatment Pain Rating 0/10 - no pain  -       Row Name 05/24/25 1524          Plan of Care Review    Plan of Care Reviewed With patient  -SS     Progress declining  -     Outcome Evaluation Pt. continues to present below baseline function w/generalized weakness, balance deficits and decreased functional endurance affecting her ability to safely participate in functional mobility. She performed bed mobility and transfers w/mod-max assist. Activity limited by fatigue. Pt. tolerated ther-ex well. Continue acute PT POC to progress as tolerated.  -       Row Name 05/24/25 1524          Therapy Assessment/Plan (PT)    Rehab Potential (PT) fair  -     Criteria for Skilled Interventions Met (PT) yes;meets criteria;skilled treatment is necessary  -     Therapy Frequency (PT) daily  -       Row Name 05/24/25 1524          Vital Signs    Pre Systolic BP Rehab 180  -SS     Pre Treatment Diastolic BP 72  -SS     Pretreatment Heart Rate (beats/min) 65  -SS     Pre SpO2 (%) 96  -SS     O2 Delivery Pre Treatment room air  -SS     Pre Patient Position Supine  -SS       Row Name 05/24/25 1524          Positioning and Restraints     Pre-Treatment Position in bed  -SS     Post Treatment Position bed  -SS     In Bed notified nsg;side lying right;with nsg;with other staff;L knee immobilizer  -               User Key  (r) = Recorded By, (t) = Taken By, (c) = Cosigned By      Initials Name Provider Type    Aminta Castaneda PT Physical Therapist                   Outcome Measures       Row Name 05/24/25 1527 05/24/25 0850       How much help from another person do you currently need...    Turning from your back to your side while in flat bed without using bedrails? 2  -SS 2  -BC    Moving from lying on back to sitting on the side of a flat bed without bedrails? 2  -SS 2  -BC    Moving to and from a bed to a chair (including a wheelchair)? 1  -SS 2  -BC    Standing up from a chair using your arms (e.g., wheelchair, bedside chair)? 2  -SS 2  -BC    Climbing 3-5 steps with a railing? 1  -SS 2  -BC    To walk in hospital room? 1  -SS 2  -BC    AM-PAC 6 Clicks Score (PT) 9  -SS 12  -BC    Highest Level of Mobility Goal Sit at Edge of Bed-3  -SS Move to Chair/Commode-4  -BC      Row Name 05/24/25 1527          Functional Assessment    Outcome Measure Options AM-PAC 6 Clicks Basic Mobility (PT)  -               User Key  (r) = Recorded By, (t) = Taken By, (c) = Cosigned By      Initials Name Provider Type    Elisa Cordon RN Registered Nurse    Aminta Castaneda, GURPREET Physical Therapist                                 Physical Therapy Education       Title: PT OT SLP Therapies (In Progress)       Topic: Physical Therapy (Done)       Point: Mobility training (Done)       Learning Progress Summary            Patient Acceptance, E, VU,DU,NR by  at 5/24/2025 1528    Comment: Reviewed safety/technique w/bed mobility, transfers, HEP, PT POC    Acceptance, E, NR by CK at 5/23/2025 1454    Acceptance, E, NR by CK at 5/23/2025 1142    Acceptance, E, NR by CK at 5/22/2025 1344    Acceptance, E, NR by CK at 5/22/2025 1031    Acceptance, E, NR by CK at  5/21/2025 1614    Acceptance, E, NR,VU by CK at 5/21/2025 1006                      Point: Home exercise program (Done)       Learning Progress Summary            Patient Acceptance, E, VU,DU,NR by SS at 5/24/2025 1528    Comment: Reviewed safety/technique w/bed mobility, transfers, HEP, PT POC    Acceptance, E, NR by CK at 5/22/2025 1344    Acceptance, E, NR by CK at 5/22/2025 1031    Acceptance, E, NR by CK at 5/21/2025 1614                      Point: Body mechanics (Done)       Learning Progress Summary            Patient Acceptance, E, VU,DU,NR by SS at 5/24/2025 1528    Comment: Reviewed safety/technique w/bed mobility, transfers, HEP, PT POC    Acceptance, E, NR by CK at 5/23/2025 1454    Acceptance, E, NR by CK at 5/23/2025 1142    Acceptance, E, NR by CK at 5/22/2025 1344    Acceptance, E, NR by CK at 5/22/2025 1031    Acceptance, E, NR by CK at 5/21/2025 1614    Acceptance, E, NR,VU by CK at 5/21/2025 1006                      Point: Precautions (Done)       Learning Progress Summary            Patient Acceptance, E, VU,DU,NR by SS at 5/24/2025 1528    Comment: Reviewed safety/technique w/bed mobility, transfers, HEP, PT POC    Acceptance, E, NR by CK at 5/23/2025 1454    Acceptance, E, NR by CK at 5/23/2025 1142    Acceptance, E, NR by CK at 5/22/2025 1344    Acceptance, E, NR by CK at 5/22/2025 1031    Acceptance, E, NR by CK at 5/21/2025 1614    Acceptance, E, NR,VU by CK at 5/21/2025 1006                                      User Key       Initials Effective Dates Name Provider Type Discipline     06/01/21 -  Aminta Rodriguez, PT Physical Therapist PT     02/06/24 -  Keke Cook, PT Physical Therapist PT                  PT Recommendation and Plan     Progress: declining  Outcome Evaluation: Pt. continues to present below baseline function w/generalized weakness, balance deficits and decreased functional endurance affecting her ability to safely participate in functional mobility. She  performed bed mobility and transfers w/mod-max assist. Activity limited by fatigue. Pt. tolerated ther-ex well. Continue acute PT POC to progress as tolerated.     Time Calculation:         PT Charges       Row Name 05/24/25 1529             Time Calculation    Start Time 1339  -SS      PT Received On 05/24/25  -SS         Timed Charges    51795 - PT Therapeutic Activity Minutes 18  -SS         Total Minutes    Timed Charges Total Minutes 18  -SS       Total Minutes 18  -SS                User Key  (r) = Recorded By, (t) = Taken By, (c) = Cosigned By      Initials Name Provider Type    Aminta Castaneda, PT Physical Therapist                  Therapy Charges for Today       Code Description Service Date Service Provider Modifiers Qty    08187352524 HC PT THERAPEUTIC ACT EA 15 MIN 5/24/2025 Aminta Rodriguez, PT GP 1            PT G-Codes  Outcome Measure Options: AM-PAC 6 Clicks Basic Mobility (PT)  AM-PAC 6 Clicks Score (PT): 9  AM-PAC 6 Clicks Score (OT): 11  PT Discharge Summary  Anticipated Discharge Disposition (PT): skilled nursing facility    Aminta Rodriguez PT  5/24/2025

## 2025-05-24 NOTE — PLAN OF CARE
Goal Outcome Evaluation:  Plan of Care Reviewed With: patient        Progress: declining  Outcome Evaluation: Pt. continues to present below baseline function w/generalized weakness, balance deficits and decreased functional endurance affecting her ability to safely participate in functional mobility. She performed bed mobility and transfers w/mod-max assist. Activity limited by fatigue. Pt. tolerated ther-ex well. Continue acute PT POC to progress as tolerated.    Anticipated Discharge Disposition (PT): skilled nursing facility

## 2025-05-25 LAB
DEPRECATED RDW RBC AUTO: 48.9 FL (ref 37–54)
ERYTHROCYTE [DISTWIDTH] IN BLOOD BY AUTOMATED COUNT: 15.8 % (ref 12.3–15.4)
GLUCOSE BLDC GLUCOMTR-MCNC: 205 MG/DL (ref 70–130)
GLUCOSE BLDC GLUCOMTR-MCNC: 216 MG/DL (ref 70–130)
GLUCOSE BLDC GLUCOMTR-MCNC: 216 MG/DL (ref 70–130)
GLUCOSE BLDC GLUCOMTR-MCNC: 256 MG/DL (ref 70–130)
HCT VFR BLD AUTO: 29.2 % (ref 34–46.6)
HGB BLD-MCNC: 9.6 G/DL (ref 12–15.9)
MCH RBC QN AUTO: 28.6 PG (ref 26.6–33)
MCHC RBC AUTO-ENTMCNC: 32.9 G/DL (ref 31.5–35.7)
MCV RBC AUTO: 86.9 FL (ref 79–97)
PLATELET # BLD AUTO: 71 10*3/MM3 (ref 140–450)
PMV BLD AUTO: 9 FL (ref 6–12)
RBC # BLD AUTO: 3.36 10*6/MM3 (ref 3.77–5.28)
WBC NRBC COR # BLD AUTO: 1.63 10*3/MM3 (ref 3.4–10.8)

## 2025-05-25 PROCEDURE — 82948 REAGENT STRIP/BLOOD GLUCOSE: CPT

## 2025-05-25 PROCEDURE — 63710000001 INSULIN LISPRO (HUMAN) PER 5 UNITS: Performed by: ORTHOPAEDIC SURGERY

## 2025-05-25 PROCEDURE — 97530 THERAPEUTIC ACTIVITIES: CPT

## 2025-05-25 PROCEDURE — 85027 COMPLETE CBC AUTOMATED: CPT | Performed by: STUDENT IN AN ORGANIZED HEALTH CARE EDUCATION/TRAINING PROGRAM

## 2025-05-25 PROCEDURE — 99232 SBSQ HOSP IP/OBS MODERATE 35: CPT | Performed by: STUDENT IN AN ORGANIZED HEALTH CARE EDUCATION/TRAINING PROGRAM

## 2025-05-25 RX ORDER — AMLODIPINE BESYLATE 10 MG/1
10 TABLET ORAL
Status: DISCONTINUED | OUTPATIENT
Start: 2025-05-26 | End: 2025-05-29 | Stop reason: HOSPADM

## 2025-05-25 RX ORDER — AMLODIPINE BESYLATE 5 MG/1
5 TABLET ORAL ONCE
Status: COMPLETED | OUTPATIENT
Start: 2025-05-25 | End: 2025-05-25

## 2025-05-25 RX ADMIN — PANTOPRAZOLE SODIUM 40 MG: 40 TABLET, DELAYED RELEASE ORAL at 05:23

## 2025-05-25 RX ADMIN — ACETAMINOPHEN 1000 MG: 500 TABLET ORAL at 05:23

## 2025-05-25 RX ADMIN — LEVOTHYROXINE SODIUM 100 MCG: 0.1 TABLET ORAL at 05:23

## 2025-05-25 RX ADMIN — DOXYCYCLINE 100 MG: 100 CAPSULE ORAL at 20:49

## 2025-05-25 RX ADMIN — METOPROLOL TARTRATE 25 MG: 25 TABLET, FILM COATED ORAL at 09:18

## 2025-05-25 RX ADMIN — ACETAMINOPHEN 1000 MG: 500 TABLET ORAL at 20:49

## 2025-05-25 RX ADMIN — DOXYCYCLINE 100 MG: 100 CAPSULE ORAL at 09:18

## 2025-05-25 RX ADMIN — APIXABAN 2.5 MG: 2.5 TABLET, FILM COATED ORAL at 20:49

## 2025-05-25 RX ADMIN — INSULIN LISPRO 3 UNITS: 100 INJECTION, SOLUTION INTRAVENOUS; SUBCUTANEOUS at 11:32

## 2025-05-25 RX ADMIN — INSULIN LISPRO 3 UNITS: 100 INJECTION, SOLUTION INTRAVENOUS; SUBCUTANEOUS at 09:18

## 2025-05-25 RX ADMIN — INSULIN LISPRO 3 UNITS: 100 INJECTION, SOLUTION INTRAVENOUS; SUBCUTANEOUS at 16:56

## 2025-05-25 RX ADMIN — ACETAMINOPHEN 1000 MG: 500 TABLET ORAL at 14:21

## 2025-05-25 RX ADMIN — APIXABAN 2.5 MG: 2.5 TABLET, FILM COATED ORAL at 09:18

## 2025-05-25 RX ADMIN — LISINOPRIL 5 MG: 5 TABLET ORAL at 09:18

## 2025-05-25 RX ADMIN — AMLODIPINE BESYLATE 5 MG: 5 TABLET ORAL at 09:18

## 2025-05-25 RX ADMIN — Medication 10 ML: at 20:50

## 2025-05-25 RX ADMIN — AMIODARONE HYDROCHLORIDE 200 MG: 200 TABLET ORAL at 09:18

## 2025-05-25 RX ADMIN — AMLODIPINE BESYLATE 5 MG: 5 TABLET ORAL at 11:32

## 2025-05-25 RX ADMIN — Medication 10 ML: at 09:18

## 2025-05-25 RX ADMIN — METOPROLOL TARTRATE 25 MG: 25 TABLET, FILM COATED ORAL at 20:49

## 2025-05-25 RX ADMIN — INSULIN LISPRO 4 UNITS: 100 INJECTION, SOLUTION INTRAVENOUS; SUBCUTANEOUS at 20:50

## 2025-05-25 NOTE — PROGRESS NOTES
[facility] Medicine Services  PROGRESS NOTE    Patient Name: Chey Robertson  : 1935  MRN: [id number]    Date of Admission: 2025  Primary Care Physician: Yasmeen Loomis MD    Subjective   Subjective     CC:  Femur fx    HPI:  Had several bowel movements.  Denied pain.  No nausea or vomiting.  No chest pain or shortness of breath.  No bleeding.  No nursing concerns.    Objective   Objective     Vital Signs:   Temp:  [98 °F (36.7 °C)-98.5 °F (36.9 °C)] 98.3 °F (36.8 °C)  Heart Rate:  [59-74] 71  Resp:  [16-18] 16  BP: (162-199)/(66-81) 184/81  Flow (L/min) (Oxygen Therapy):  [1.5] 1.5     Physical Exam:  Constitutional: No acute distress, awake, alert, laying in bed  HENT: NCAT, mucous membranes moist  Respiratory: Respiratory effort normal   Cardiovascular: RRR  Gastrointestinal: Soft, nontender, nondistended, normoactive bowel sounds   Musculoskeletal: LLE in brace, dressing in place  Psychiatric: Appropriate affect, cooperative  Neurologic: Alert, oriented, [place], speech clear  Skin: No rashes on exposed skin     Results Reviewed:  LAB RESULTS:      Lab 25  0724 25  0619 25  1256 25  0719 25  0610 25  0544 25  1711   WBC 1.63* 1.65* 1.75* 2.27* 2.32*   < > 1.53*   HEMOGLOBIN 9.6* 10.1* 9.8* 6.8*  6.8* 7.6*   < > 7.9*   HEMATOCRIT 29.2* 31.4* 30.4* 20.5*  20.5* 24.2*   < > 24.1*   PLATELETS 71* 74* 72* 81* 56*   < > 48*   NEUTROS ABS  --  1.13* 1.37*  --  2.01  --  0.95*   IMMATURE GRANS (ABS)  --  0.06* 0.05  --  0.05  --  0.04   LYMPHS ABS  --  0.27* 0.16*  --  0.10*  --  0.30*   MONOS ABS  --  0.19 0.17  --  0.15  --  0.23   EOS ABS  --  0.00 0.00  --  0.01  --  0.01   MCV 86.9 87.5 87.6 89.1 92.0   < > 89.3   PROTIME  --   --   --   --   --   --  16.0*    < > = values in this interval not displayed.         Lab 25  0619 25  1256 25  0719 25  0610 25  0824 25  0544 25  1711   SODIUM 135*  134* 137 133* 135* 135* 133*   POTASSIUM 4.3 4.6 4.6 5.0 4.1 4.2 4.2   CHLORIDE 101 101 104 103 101 99 99   CO2 23.0 21.0* 23.0 16.0* 23.0 26.0 23.0   ANION GAP 11.0 12.0 10.0 14.0 11.0 10.0 11.0   BUN 22 23 23 21 14 18 20   CREATININE 0.82 0.96 1.21* 1.12* 0.96 0.99 1.04*   EGFR 68.9 57.0* 43.2* 47.4* 57.0* 55.0* 51.8*   GLUCOSE 115* 192* 145* 314* 131* 151* 182*   CALCIUM 8.3* 8.1* 7.9* 7.8* 8.0* 8.3* 7.9*   MAGNESIUM  --   --   --   --  2.3 3.2* 1.2*   PHOSPHORUS  --   --   --  4.3  --   --   --          Lab 05/21/25  0610 05/18/25  1711   TOTAL PROTEIN  --  5.5*   ALBUMIN 2.9* 3.3*   GLOBULIN  --  2.2   ALT (SGPT)  --  9   AST (SGOT)  --  12   BILIRUBIN  --  0.3   ALK PHOS  --  87         Lab 05/18/25  1711   PROTIME 16.0*   INR 1.21*             Lab 05/22/25  1008 05/18/25  1944   ABO TYPING O O   RH TYPING Negative Negative   ANTIBODY SCREEN Positive Positive         Brief Urine Lab Results  (Last result in the past 365 days)        Color   Clarity   Blood   Leuk Est   Nitrite   Protein   CREAT   Urine HCG        05/17/25 2140 Yellow   Turbid   Negative   Large (3+)   Negative   30 mg/dL (1+)                   Microbiology Results Abnormal       Procedure Component Value - Date/Time    Urine Culture - Urine, Indwelling Urethral Catheter [[id number]]  (Abnormal)  (Susceptibility) Collected: 05/17/25 2140    Lab Status: Final result Specimen: Urine from Indwelling Urethral Catheter Updated: 05/21/25 0821     Urine Culture >100,000 CFU/mL Klebsiella pneumoniae ssp pneumoniae    Narrative:      Colonization of the urinary tract without infection is common. Treatment is discouraged unless the patient is symptomatic, pregnant, or undergoing an invasive urologic procedure.    Susceptibility        Klebsiella pneumoniae ssp pneumoniae      JUAN F      Amoxicillin + Clavulanate Susceptible      Ampicillin Resistant      Ampicillin + Sulbactam Susceptible      Cefazolin (Urine) Susceptible      Cefepime Susceptible       Ceftazidime Susceptible      Ceftriaxone Susceptible      Ciprofloxacin Resistant      Gentamicin Susceptible      Levofloxacin Resistant      Nitrofurantoin Intermediate      Piperacillin + Tazobactam Susceptible      Trimethoprim + Sulfamethoxazole Susceptible                                   No radiology results from the last 24 hrs      Results for orders placed during the hospital encounter of 06/05/23    Adult Transthoracic Echo Complete w/ Color, Spectral and Contrast if necessary per protocol    Interpretation Summary    Left ventricular systolic function is normal. Left ventricular ejection fraction appears to be 56 - 60%.    Left ventricular diastolic function was normal.    Estimated right ventricular systolic pressure from tricuspid regurgitation is normal (<35 mmHg).      Current medications:  Scheduled Meds:acetaminophen, 1,000 mg, Oral, Q8H  amiodarone, 200 mg, Oral, Daily  [START ON 5/26/2025] amLODIPine, 10 mg, Oral, Q24H  amLODIPine, 5 mg, Oral, Once  apixaban, 2.5 mg, Oral, BID  doxycycline, 100 mg, Oral, Q12H  insulin lispro, 2-7 Units, Subcutaneous, 4x Daily AC & at Bedtime  levothyroxine, 100 mcg, Oral, Q AM  lisinopril, 5 mg, Oral, Daily  metoprolol tartrate, 25 mg, Oral, BID  pantoprazole, 40 mg, Oral, Q AM  sodium chloride, 10 mL, Intravenous, Q12H      Continuous Infusions:ropivacaine,       PRN Meds:.  acetaminophen **OR** acetaminophen **OR** acetaminophen    senna-docusate sodium **AND** polyethylene glycol **AND** bisacodyl **AND** bisacodyl    Calcium Replacement - Follow Nurse / BPA Driven Protocol    dextrose    dextrose    diphenhydrAMINE **OR** diphenhydrAMINE    glucagon (human recombinant)    HYDROmorphone **AND** naloxone    Magnesium Standard Dose Replacement - Follow Nurse / BPA Driven Protocol    melatonin    Morphine    nitroglycerin    ondansetron ODT **OR** ondansetron    oxyCODONE    oxyCODONE    Phosphorus Replacement - Follow Nurse / BPA Driven Protocol    Potassium  Replacement - Follow Nurse / BPA Driven Protocol    sodium chloride    traMADol    Assessment & Plan   Assessment & Plan     Active Hospital Problems    Diagnosis  POA    **Femur fracture [S72.90XA]  Yes    Type 2 diabetes mellitus with complication, with long-term current use of insulin [E11.8, Z79.4]  Not Applicable    Periprosthetic fracture around prosthetic knee [M97.8XXA, Z96.659]  Not Applicable    Falls [R29.6]  Not Applicable    Atrial fibrillation [I48.91]  Yes    Chronic anticoagulation [Z79.01]  Not Applicable    Essential hypertension [I10]  Yes    Hypothyroidism (acquired) [E03.9]  Yes      Resolved Hospital Problems   No resolved problems to display.        Brief Hospital Course to date:  Chey Robertson is a 89 y.o. female with history of afib (on eliquis), HTN, DM2, hypothyroidism, MDS (w/ pancytopenia, followed by Dr. Lyman), previous PE (treated w eliquis), previous left knee replacement. Presented to [facility] ED w/ left knee pain after sustaining a fall at her SNF. CT imaging revealed left distal femoral impaction fracture.    This patient's problems and plans were partially entered by my partner and updated as appropriate by me 05/25/25.      Left supracondylar fracture of the distal femur.   Hx previous left knee replacement  -Orthopedic surgery evaluated, s/p repair 5/20  -Due to magnitude of surgery ortho requested eliquis be held ~72 hours prior to surgery, but Hb dropped to 6.8 on 5/22, okay to resume per ortho at 2.5mg BID x1 week, then resume usual dosing thereafter (usual dosing is 2.5mg BID per outpatient fill hx on this patient)   -PT/OT  -as needed pain control    Parox afib (currently in sinus)  Chronic HFpEF  HTN  -echo 6/2023: LV EF 56-60%, valves ok  - Continue amiodarone & metoprolol, lisinopril  - Eliquis resumed on 5/23; monitor Hgb, so far okay       UTI  - Completed rocephin for 5 days  - Urine culture with Klebsiella, sensitive to Rocephin     Hx PE (perioperative after previous  knee replacement)     MDS  Pancytopenia  Iron deficiency  -follows w/ Dr. Lyman (last seen 3/3/25)   -typical plts range 40,000-60,000  -Transfused platelets 5/20  -Completed 3 days of IV iron, last dose 5/20  -2 units of PRBCs given 5/22, robust Hgb response, monitor    DM2  -A1c 6.2  -on januvia and metformin  - Continue ssi     Hypothyroidism  -cont levothyroxine     HTN-continue lisinopril, metoprolol, added amlodipine, dosing increased on 5/25    Expected Discharge Location and Transportation: [facility]  Expected Discharge   Expected Discharge Date: 5/22/2025; Expected Discharge Time:      VTE Prophylaxis:  Pharmacologic & mechanical VTE prophylaxis orders are present.         AM-PAC 6 Clicks Score (PT): 9 (05/24/25 9387)    CODE STATUS:   Code Status and Medical Interventions: CPR (Attempt to Resuscitate); Full   Ordered at: 05/20/25 2049     Code Status (Patient has no pulse and is not breathing):    CPR (Attempt to Resuscitate)     Medical Interventions (Patient has pulse or is breathing):    Full     Level Of Support Discussed With:    Patient       Lin Heath MD  05/25/25

## 2025-05-25 NOTE — PLAN OF CARE
Goal Outcome Evaluation:  Plan of Care Reviewed With: patient, family        Progress: improving  Outcome Evaluation: Pt. continues to present below baseline function w/generalized weakness, balance deficits and decreased functional endurance affecting her ability to safely participate in functional mobility. She performed bed mobility and transfers w/mod-max assist of 2. Activity limited by fatigue. Pt. tolerated ther-ex well. Continue acute PT POC to progress as tolerated.    Anticipated Discharge Disposition (PT): skilled nursing facility

## 2025-05-25 NOTE — PLAN OF CARE
Goal Outcome Evaluation:  Plan of Care Reviewed With: patient           Outcome Evaluation: VSS. Alert confused at times. No complaints of pain. Ambulated to chair assist x2. Voiding per external catheter. Resting between care.

## 2025-05-25 NOTE — PLAN OF CARE
Problem: Adult Inpatient Plan of Care  Goal: Plan of Care Review  Outcome: Progressing  Goal: Patient-Specific Goal (Individualized)  Outcome: Progressing  Goal: Absence of Hospital-Acquired Illness or Injury  Outcome: Progressing  Intervention: Identify and Manage Fall Risk  Recent Flowsheet Documentation  Taken 5/25/2025 0200 by Chey Patton RN  Safety Promotion/Fall Prevention: fall prevention program maintained  Taken 5/24/2025 2200 by Chey Patton RN  Safety Promotion/Fall Prevention: fall prevention program maintained  Taken 5/24/2025 1928 by Chey Patton RN  Safety Promotion/Fall Prevention: activity supervised  Intervention: Prevent Infection  Recent Flowsheet Documentation  Taken 5/25/2025 0040 by Chey Patton RN  Infection Prevention: environmental surveillance performed  Taken 5/24/2025 1928 by Chey Patton RN  Infection Prevention: environmental surveillance performed  Goal: Optimal Comfort and Wellbeing  Outcome: Progressing  Goal: Readiness for Transition of Care  Outcome: Progressing     Problem: Skin Injury Risk Increased  Goal: Skin Health and Integrity  Outcome: Progressing     Problem: Orthopaedic Fracture  Goal: Absence of Bleeding  Outcome: Progressing  Goal: Bowel Elimination  Outcome: Progressing  Goal: Absence of Embolism Signs and Symptoms  Outcome: Progressing  Goal: Fracture Stability  Outcome: Progressing  Goal: Optimal Functional Ability  Outcome: Progressing  Goal: Absence of Infection Signs and Symptoms  Outcome: Progressing  Goal: Effective Tissue Perfusion  Outcome: Progressing  Goal: Optimal Pain Control and Function  Outcome: Progressing  Goal: Effective Oxygenation and Ventilation  Outcome: Progressing     Problem: Fall Injury Risk  Goal: Absence of Fall and Fall-Related Injury  Outcome: Progressing  Intervention: Identify and Manage Contributors  Recent Flowsheet Documentation  Taken 5/25/2025 0200 by Chey Patton RN  Medication Review/Management: medications  reviewed  Taken 5/24/2025 2200 by Chey Patton, RN  Medication Review/Management: medications reviewed  Intervention: Promote Injury-Free Environment  Recent Flowsheet Documentation  Taken 5/25/2025 0200 by Chey Patton, RN  Safety Promotion/Fall Prevention: fall prevention program maintained  Taken 5/24/2025 2200 by Chey Patton, RN  Safety Promotion/Fall Prevention: fall prevention program maintained  Taken 5/24/2025 1928 by Chey Patton, RN  Safety Promotion/Fall Prevention: activity supervised   Goal Outcome Evaluation:

## 2025-05-25 NOTE — THERAPY TREATMENT NOTE
Patient Name: Chey Robertson  : 1935    MRN: [id number]                              Today's Date: 2025       Admit Date: 2025    Visit Dx:     ICD-10-CM ICD-9-CM   1. Fall at nursing home, initial encounter  W19.XXXA E888.9    Y92.129 E849.7   2. 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 Anticoagulated  Z79.01 V58.61   4. Periprosthetic fracture around internal prosthetic right knee joint, subsequent encounter  M97.11XD V54.89     Patient Active Problem List   Diagnosis    Benign familial tremor    AMS (altered mental status)    Pancytopenia    Hypothyroidism (acquired)    B12 deficiency    Abnormal intestinal absorption    Iron deficiency anemia due to chronic blood loss    Myelodysplasia (myelodysplastic syndrome)    Personal history of pulmonary embolism    Chronic anticoagulation    Essential hypertension    Type 2 diabetes mellitus without complication, without long-term current use of insulin    Atrial fibrillation    QT prolongation    Pericardial effusion    Severe malnutrition    Closed displaced intertrochanteric fracture of right femur, initial encounter    DISHA (acute kidney injury)    Moderate malnutrition    Falls    Type 2 diabetes mellitus with complication, with long-term current use of insulin    Periprosthetic fracture around prosthetic knee    Femur fracture     Past Medical History:   Diagnosis Date    A-fib     on eliquis    Anemia     Arthritis     Diabetes mellitus     checks sugar only when pt wants to     Disease of thyroid gland     History of transfusion     with knee surgery-  no reaction recalled.     [place] IRA (hard of hearing)     no hearing aids    Hypertension     Migraine without aura and without status migrainosus, not intractable 2016    Myelodysplastic syndrome     Pulmonary emboli     (after knee surgery)    SOBOE (shortness of breath on exertion)     Tremors of nervous system     Vertigo      Wears dentures     upper     Wears glasses      Past Surgical History:   Procedure Laterality Date    BLADDER SURGERY      CATARACT EXTRACTION      bilat     CHOLECYSTECTOMY      COLONOSCOPY      HIP TROCHANTERIC NAILING WITH INTRAMEDULLARY HIP SCREW Right 6/20/2023    Procedure: HIP TROCHANTERIC NAILING WITH INTRAMEDULLARY HIP SCREW RIGHT;  Surgeon: Augie Hobbs MD;  Location: [facility] OR;  Service: Orthopedics;  Laterality: Right;    HYSTERECTOMY      with bso    KYPHOPLASTY N/A 02/12/2021    Procedure: KYPHOPLASTY T11, T12 AND L4;  Surgeon: Matty Hearn MD;  Location: [facility] OR;  Service: Orthopedic Spine;  Laterality: N/A;    TONSILLECTOMY        General Information       Row Name 05/25/25 1152          Physical Therapy Time and Intention    Document Type therapy note (daily note)  -     Mode of Treatment physical therapy  -       Row Name 05/25/25 1152          General Information    Patient Profile Reviewed yes  -SS     Existing Precautions/Restrictions fall;brace worn when out of bed;other (see comments)  s/p Revision L TKA, WBAT, KI AAT x2 weeks no ROM, incisional wound vac  -SS     Barriers to Rehab medically complex;previous functional deficit  -SS       Row Name 05/25/25 1152          Cognition    Orientation Status (Cognition) oriented x 3  -SS       Row Name 05/25/25 1152          Safety Issues/Impairments Affecting Functional Mobility    Safety Issues Affecting Function (Mobility) awareness of need for assistance;insight into deficits/self-awareness;judgment;positioning of assistive device;problem-solving;safety precaution awareness;safety precautions follow-through/compliance;sequencing abilities  -SS     Impairments Affecting Function (Mobility) balance;endurance/activity tolerance;strength;sensation/sensory awareness;range of motion (ROM);postural/trunk control;motor control;pain  -SS               User Key  (r) = Recorded By, (t) = Taken By, (c) = Cosigned By      Initials Name Provider  Type    SS Aminta Rodriguez PT Physical Therapist                   Mobility       Row Name 05/25/25 1153          Bed Mobility    Bed Mobility scooting/bridging;supine-sit;sit-supine  -SS     Scooting/Bridging [place] (Bed Mobility) maximum assist (25% patient effort);verbal cues;nonverbal cues (demo/gesture);2 person assist  -SS     Supine-Sit [place] (Bed Mobility) maximum assist (25% patient effort);verbal cues;nonverbal cues (demo/gesture);2 person assist  -SS     Assistive Device (Bed Mobility) head of bed elevated;repositioning sheet;bed rails  -     Comment, (Bed Mobility) V/TC for sequencing  -SS       Row Name 05/25/25 1153          Bed-Chair Transfer    Bed-Chair [place] (Transfers) maximum assist (25% patient effort);2 person assist;verbal cues;nonverbal cues (demo/gesture)  -     Assistive Device (Bed-Chair Transfers) walker, front-wheeled  -SS     Comment, (Bed-Chair Transfer) V/TC for sequencing, AD management; pt fatigues very easily  -       Row Name 05/25/25 1153          Sit-Stand Transfer    Sit-Stand [place] (Transfers) maximum assist (25% patient effort);verbal cues;nonverbal cues (demo/gesture);2 person assist  -SS     Assistive Device (Sit-Stand Transfers) walker, front-wheeled  -SS     Comment, (Sit-Stand Transfer) V/TC for LE set up, hand placement, sequencing  -SS       Row Name 05/25/25 1153          Gait/Stairs (Locomotion)    [place] Level (Gait) not tested  -     Comment, (Gait/Stairs) not appropriate due to L knee buckling through KI, fatigue, pain  -SS       Row Name 05/25/25 1153          Mobility    Extremity Weight-bearing Status left lower extremity  -SS     Left Lower Extremity (Weight-bearing Status) weight-bearing as tolerated (WBAT);other (see comments)  in KI AAT, no ROM  -SS               User Key  (r) = Recorded By, (t) = Taken By, (c) = Cosigned By      Initials Name Provider Type    Aminta Castaneda, PT Physical Therapist                    Obj/Interventions       Row Name 05/25/25 1154          Motor Skills    Therapeutic Exercise other (see comments)  pt declined  -SS       Row Name 05/25/25 1156          Balance    Balance Assessment sitting static balance;sitting dynamic balance;standing static balance;standing dynamic balance  -SS     Static Sitting Balance contact guard  -SS     Dynamic Sitting Balance minimal assist  -SS     Position, Sitting Balance unsupported;sitting edge of bed  -     Static Standing Balance moderate assist;2-person assist  -SS     Dynamic Standing Balance maximum assist;2-person assist  -SS     Position/Device Used, Standing Balance supported;walker, front-wheeled  -     Balance Interventions sitting;standing;sit to stand;supported;static;dynamic  -SS               User Key  (r) = Recorded By, (t) = Taken By, (c) = Cosigned By      Initials Name Provider Type    SS Aminta Rodriguez, GURPREET Physical Therapist                   Goals/Plan    No documentation.                  Clinical Impression       Row Name 05/25/25 1151          Pain    Pretreatment Pain Rating 0/10 - no pain  -     Posttreatment Pain Rating 7/10  -SS     Pain Location extremity  -     Pain Side/Orientation left;lower  -SS     Pain Management Interventions activity modification encouraged;complementary health approaches promoted;diversional activity provided;exercise or physical activity utilized;movement retraining implemented;positioning techniques utilized  -     Response to Pain Interventions activity participation with increased pain  -       Row Name 05/25/25 4864          Plan of Care Review    Plan of Care Reviewed With patient;family  -     Progress improving  -     Outcome Evaluation Pt. continues to present below baseline function w/generalized weakness, balance deficits and decreased functional endurance affecting her ability to safely participate in functional mobility. She performed bed mobility and transfers w/mod-max assist  of 2. Activity limited by fatigue. Pt. tolerated ther-ex well. Continue acute PT POC to progress as tolerated.  -       Row Name 05/25/25 1155          Therapy Assessment/Plan (PT)    Rehab Potential (PT) fair  -     Criteria for Skilled Interventions Met (PT) yes;meets criteria;skilled treatment is necessary  -     Therapy Frequency (PT) daily  -       Row Name 05/25/25 1155          Vital Signs    Pre Systolic BP Rehab 184  -SS     Pre Treatment Diastolic BP 81  -SS     Pretreatment Heart Rate (beats/min) 60  -SS     Pre SpO2 (%) 94  -SS     O2 Delivery Pre Treatment room air  -SS     Pre Patient Position Supine  -SS       Row Name 05/25/25 1155          Positioning and Restraints    Pre-Treatment Position in bed  -SS     Post Treatment Position chair  -SS     In Chair notified nsg;reclined;call light within reach;encouraged to call for assist;exit alarm on;with family/caregiver;waffle cushion;legs elevated;on mechanical lift sling;L knee immobilizer;with nsg;with other staff  -               User Key  (r) = Recorded By, (t) = Taken By, (c) = Cosigned By      Initials Name Provider Type    SS Aminta Rodriguez, PT Physical Therapist                   Outcome Measures       Row Name 05/25/25 1157 05/25/25 0840       How much help from another person do you currently need...    Turning from your back to your side while in flat bed without using bedrails? 2  -SS 2  -BC    Moving from lying on back to sitting on the side of a flat bed without bedrails? 2  -SS 2  -BC    Moving to and from a bed to a chair (including a wheelchair)? 2  -SS 1  -BC    Standing up from a chair using your arms (e.g., wheelchair, bedside chair)? 2  -SS 2  -BC    Climbing 3-5 steps with a railing? 1  -SS 1  -BC    To walk in hospital room? 1  -SS 1  -BC    AM-PAC 6 Clicks Score (PT) 10  -SS 9  -BC    Highest Level of Mobility Goal Move to Chair/Commode-4  -SS Sit at Edge of Bed-3  -BC      Row Name 05/25/25 1157          Functional  Assessment    Outcome Measure Options AM-PAC 6 Clicks Basic Mobility (PT)  -SS               User Key  (r) = Recorded By, (t) = Taken By, (c) = Cosigned By      Initials Name Provider Type    Elisa Cordon, RN Registered Nurse    Aminta Castaneda, PT Physical Therapist                                 Physical Therapy Education       Title: PT OT SLP Therapies (Done)       Topic: Physical Therapy (Done)       Point: Mobility training (Done)       Learning Progress Summary            Patient Eager, E, VU,DU,NR by  at 5/25/2025 1157    Comment: Reviewed safety/technique w/bed mobility, transfers, PT POC    Acceptance, E, VU,NR by BC at 5/25/2025 0830    Acceptance, E, VU,DU,NR by  at 5/24/2025 1528    Comment: Reviewed safety/technique w/bed mobility, transfers, HEP, PT POC    Acceptance, E, NR by  at 5/23/2025 1454    Acceptance, E, NR by  at 5/23/2025 1142    Acceptance, E, NR by CK at 5/22/2025 1344    Acceptance, E, NR by CK at 5/22/2025 1031    Acceptance, E, NR by CK at 5/21/2025 1614    Acceptance, E, NR,VU by CK at 5/21/2025 1006   Family Eager, E, VU,DU,NR by  at 5/25/2025 1157    Comment: Reviewed safety/technique w/bed mobility, transfers, PT POC                      Point: Home exercise program (Done)       Learning Progress Summary            Patient Eager, E, VU,DU,NR by  at 5/25/2025 1157    Comment: Reviewed safety/technique w/bed mobility, transfers, PT POC    Acceptance, E, VU,NR by BC at 5/25/2025 0830    Acceptance, E, VU,DU,NR by  at 5/24/2025 1528    Comment: Reviewed safety/technique w/bed mobility, transfers, HEP, PT POC    Acceptance, E, NR by CK at 5/22/2025 1344    Acceptance, E, NR by CK at 5/22/2025 1031    Acceptance, E, NR by CK at 5/21/2025 1614   Family Eager, E, VU,DU,NR by  at 5/25/2025 1157    Comment: Reviewed safety/technique w/bed mobility, transfers, PT POC                      Point: Body mechanics (Done)       Learning Progress Summary            Patient  Eager, E, VU,DU,NR by  at 5/25/2025 1157    Comment: Reviewed safety/technique w/bed mobility, transfers, PT POC    Acceptance, E, VU,NR by BC at 5/25/2025 0830    Acceptance, E, VU,DU,NR by  at 5/24/2025 1528    Comment: Reviewed safety/technique w/bed mobility, transfers, HEP, PT POC    Acceptance, E, NR by  at 5/23/2025 1454    Acceptance, E, NR by  at 5/23/2025 1142    Acceptance, E, NR by  at 5/22/2025 1344    Acceptance, E, NR by  at 5/22/2025 1031    Acceptance, E, NR by  at 5/21/2025 1614    Acceptance, E, NR,VU by  at 5/21/2025 1006   Family Eager, E, VU,DU,NR by  at 5/25/2025 1157    Comment: Reviewed safety/technique w/bed mobility, transfers, PT POC                      Point: Precautions (Done)       Learning Progress Summary            Patient Eager, E, VU,DU,NR by  at 5/25/2025 1157    Comment: Reviewed safety/technique w/bed mobility, transfers, PT POC    Acceptance, E, VU,NR by BC at 5/25/2025 0830    Acceptance, E, VU,DU,NR by  at 5/24/2025 1528    Comment: Reviewed safety/technique w/bed mobility, transfers, HEP, PT POC    Acceptance, E, NR by  at 5/23/2025 1454    Acceptance, E, NR by  at 5/23/2025 1142    Acceptance, E, NR by  at 5/22/2025 1344    Acceptance, E, NR by  at 5/22/2025 1031    Acceptance, E, NR by  at 5/21/2025 1614    Acceptance, E, NR,VU by  at 5/21/2025 1006   Family Eager, E, VU,DU,NR by  at 5/25/2025 1157    Comment: Reviewed safety/technique w/bed mobility, transfers, PT POC                                      User Key       Initials Effective Dates Name Provider Type Discipline    BC 04/27/21 -  Elisa Lundberg, RN Registered Nurse Nurse     06/01/21 -  Aminta Rodriguez, PT Physical Therapist PT    CK 02/06/24 -  Keke Cook, PT Physical Therapist PT                  PT Recommendation and Plan     Progress: improving  Outcome Evaluation: Pt. continues to present below baseline function w/generalized weakness, balance deficits and  decreased functional endurance affecting her ability to safely participate in functional mobility. She performed bed mobility and transfers w/mod-max assist of 2. Activity limited by fatigue. Pt. tolerated ther-ex well. Continue acute PT POC to progress as tolerated.     Time Calculation:         PT Charges       Row Name 05/25/25 1157             Time Calculation    Start Time 1101  -SS      PT Received On 05/25/25  -SS         Timed Charges    88571 - PT Therapeutic Activity Minutes 23  -SS         Total Minutes    Timed Charges Total Minutes 23  -SS       Total Minutes 23  -SS                User Key  (r) = Recorded By, (t) = Taken By, (c) = Cosigned By      Initials Name Provider Type    SS Aminta Rodriguez, PT Physical Therapist                  Therapy Charges for Today       Code Description Service Date Service Provider Modifiers Qty    45689468046 HC PT THERAPEUTIC ACT EA 15 MIN 5/24/2025 Aminta Rodriguez, PT GP 1    21609351277 HC PT THERAPEUTIC ACT EA 15 MIN 5/25/2025 Aminta Rodriguez, PT GP 2            PT G-Codes  Outcome Measure Options: AM-PAC 6 Clicks Basic Mobility (PT)  AM-PAC 6 Clicks Score (PT): 10  AM-PAC 6 Clicks Score (OT): 11  PT Discharge Summary  Anticipated Discharge Disposition (PT): skilled nursing facility    Aminta Rodriguez PT  5/25/2025

## 2025-05-26 LAB
ALBUMIN SERPL-MCNC: 2.8 G/DL (ref 3.5–5.2)
ALBUMIN/GLOB SERPL: 1.4 G/DL
ALP SERPL-CCNC: 95 U/L (ref 39–117)
ALT SERPL W P-5'-P-CCNC: <5 U/L (ref 1–33)
ANION GAP SERPL CALCULATED.3IONS-SCNC: 11 MMOL/L (ref 5–15)
AST SERPL-CCNC: 8 U/L (ref 1–32)
BILIRUB SERPL-MCNC: 0.5 MG/DL (ref 0–1.2)
BUN SERPL-MCNC: 14 MG/DL (ref 8–23)
BUN/CREAT SERPL: 19.2 (ref 7–25)
CALCIUM SPEC-SCNC: 8.2 MG/DL (ref 8.6–10.5)
CHLORIDE SERPL-SCNC: 105 MMOL/L (ref 98–107)
CO2 SERPL-SCNC: 23 MMOL/L (ref 22–29)
CREAT SERPL-MCNC: 0.73 MG/DL (ref 0.57–1)
DEPRECATED RDW RBC AUTO: 49.6 FL (ref 37–54)
EGFRCR SERPLBLD CKD-EPI 2021: 79.2 ML/MIN/1.73
ERYTHROCYTE [DISTWIDTH] IN BLOOD BY AUTOMATED COUNT: 15.8 % (ref 12.3–15.4)
GLOBULIN UR ELPH-MCNC: 2 GM/DL
GLUCOSE BLDC GLUCOMTR-MCNC: 146 MG/DL (ref 70–130)
GLUCOSE BLDC GLUCOMTR-MCNC: 228 MG/DL (ref 70–130)
GLUCOSE BLDC GLUCOMTR-MCNC: 229 MG/DL (ref 70–130)
GLUCOSE BLDC GLUCOMTR-MCNC: 253 MG/DL (ref 70–130)
GLUCOSE SERPL-MCNC: 134 MG/DL (ref 65–99)
HCT VFR BLD AUTO: 29.4 % (ref 34–46.6)
HGB BLD-MCNC: 9.3 G/DL (ref 12–15.9)
MCH RBC QN AUTO: 27.8 PG (ref 26.6–33)
MCHC RBC AUTO-ENTMCNC: 31.6 G/DL (ref 31.5–35.7)
MCV RBC AUTO: 88 FL (ref 79–97)
PLATELET # BLD AUTO: 83 10*3/MM3 (ref 140–450)
PMV BLD AUTO: 9.1 FL (ref 6–12)
POTASSIUM SERPL-SCNC: 4.1 MMOL/L (ref 3.5–5.2)
PROT SERPL-MCNC: 4.8 G/DL (ref 6–8.5)
RBC # BLD AUTO: 3.34 10*6/MM3 (ref 3.77–5.28)
SODIUM SERPL-SCNC: 139 MMOL/L (ref 136–145)
WBC NRBC COR # BLD AUTO: 1.29 10*3/MM3 (ref 3.4–10.8)

## 2025-05-26 PROCEDURE — 85027 COMPLETE CBC AUTOMATED: CPT | Performed by: STUDENT IN AN ORGANIZED HEALTH CARE EDUCATION/TRAINING PROGRAM

## 2025-05-26 PROCEDURE — 97535 SELF CARE MNGMENT TRAINING: CPT

## 2025-05-26 PROCEDURE — 82948 REAGENT STRIP/BLOOD GLUCOSE: CPT

## 2025-05-26 PROCEDURE — 99232 SBSQ HOSP IP/OBS MODERATE 35: CPT | Performed by: NURSE PRACTITIONER

## 2025-05-26 PROCEDURE — 97530 THERAPEUTIC ACTIVITIES: CPT

## 2025-05-26 PROCEDURE — 97110 THERAPEUTIC EXERCISES: CPT

## 2025-05-26 PROCEDURE — 80053 COMPREHEN METABOLIC PANEL: CPT | Performed by: STUDENT IN AN ORGANIZED HEALTH CARE EDUCATION/TRAINING PROGRAM

## 2025-05-26 PROCEDURE — 63710000001 INSULIN LISPRO (HUMAN) PER 5 UNITS: Performed by: ORTHOPAEDIC SURGERY

## 2025-05-26 RX ORDER — LISINOPRIL 10 MG/1
10 TABLET ORAL DAILY
Status: DISCONTINUED | OUTPATIENT
Start: 2025-05-27 | End: 2025-05-29 | Stop reason: HOSPADM

## 2025-05-26 RX ORDER — LISINOPRIL 5 MG/1
5 TABLET ORAL ONCE
Status: COMPLETED | OUTPATIENT
Start: 2025-05-26 | End: 2025-05-26

## 2025-05-26 RX ADMIN — APIXABAN 2.5 MG: 2.5 TABLET, FILM COATED ORAL at 21:51

## 2025-05-26 RX ADMIN — ACETAMINOPHEN 1000 MG: 500 TABLET ORAL at 20:42

## 2025-05-26 RX ADMIN — PANTOPRAZOLE SODIUM 40 MG: 40 TABLET, DELAYED RELEASE ORAL at 06:21

## 2025-05-26 RX ADMIN — ACETAMINOPHEN 1000 MG: 500 TABLET ORAL at 12:30

## 2025-05-26 RX ADMIN — DOXYCYCLINE 100 MG: 100 CAPSULE ORAL at 20:42

## 2025-05-26 RX ADMIN — Medication 10 ML: at 20:43

## 2025-05-26 RX ADMIN — INSULIN LISPRO 4 UNITS: 100 INJECTION, SOLUTION INTRAVENOUS; SUBCUTANEOUS at 20:43

## 2025-05-26 RX ADMIN — LISINOPRIL 5 MG: 5 TABLET ORAL at 09:47

## 2025-05-26 RX ADMIN — LISINOPRIL 5 MG: 5 TABLET ORAL at 12:29

## 2025-05-26 RX ADMIN — ACETAMINOPHEN 1000 MG: 500 TABLET ORAL at 06:21

## 2025-05-26 RX ADMIN — AMLODIPINE BESYLATE 10 MG: 10 TABLET ORAL at 09:47

## 2025-05-26 RX ADMIN — AMIODARONE HYDROCHLORIDE 200 MG: 200 TABLET ORAL at 09:47

## 2025-05-26 RX ADMIN — LEVOTHYROXINE SODIUM 100 MCG: 0.1 TABLET ORAL at 06:21

## 2025-05-26 RX ADMIN — INSULIN LISPRO 3 UNITS: 100 INJECTION, SOLUTION INTRAVENOUS; SUBCUTANEOUS at 12:14

## 2025-05-26 RX ADMIN — METOPROLOL TARTRATE 25 MG: 25 TABLET, FILM COATED ORAL at 20:42

## 2025-05-26 RX ADMIN — APIXABAN 2.5 MG: 2.5 TABLET, FILM COATED ORAL at 09:47

## 2025-05-26 RX ADMIN — METOPROLOL TARTRATE 25 MG: 25 TABLET, FILM COATED ORAL at 09:47

## 2025-05-26 RX ADMIN — INSULIN LISPRO 3 UNITS: 100 INJECTION, SOLUTION INTRAVENOUS; SUBCUTANEOUS at 17:14

## 2025-05-26 RX ADMIN — DOXYCYCLINE 100 MG: 100 CAPSULE ORAL at 09:47

## 2025-05-26 NOTE — PLAN OF CARE
Goal Outcome Evaluation:  Plan of Care Reviewed With: patient        Progress: improving     Pt. Has been alert and oriented *4. No complaints of pain. She worked with therapy and sat up in the chair for a few hours. VSS, on room air. Lisinopril given due to hypertension. Awaiting rehab placement.

## 2025-05-26 NOTE — PLAN OF CARE
Goal Outcome Evaluation:  Plan of Care Reviewed With: patient        Progress: improving  Outcome Evaluation: performed 2 STS and 1 SPT to chair, max-A x2 and max cues for sequencing, positioning, FWW.  Pt also required a long stand for vega-hygiene and became too fatigued for ambulation

## 2025-05-26 NOTE — THERAPY TREATMENT NOTE
Patient Name: Chey Robertson  : 1935    MRN: [id number]                              Today's Date: 2025       Admit Date: 2025    Visit Dx:     ICD-10-CM ICD-9-CM   1. Fall at nursing home, initial encounter  W19.XXXA E888.9    Y92.129 E849.7   2. 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 Anticoagulated  Z79.01 V58.61   4. Periprosthetic fracture around internal prosthetic right knee joint, subsequent encounter  M97.11XD V54.89     Patient Active Problem List   Diagnosis    Benign familial tremor    AMS (altered mental status)    Pancytopenia    Hypothyroidism (acquired)    B12 deficiency    Abnormal intestinal absorption    Iron deficiency anemia due to chronic blood loss    Myelodysplasia (myelodysplastic syndrome)    Personal history of pulmonary embolism    Chronic anticoagulation    Essential hypertension    Type 2 diabetes mellitus without complication, without long-term current use of insulin    Atrial fibrillation    QT prolongation    Pericardial effusion    Severe malnutrition    Closed displaced intertrochanteric fracture of right femur, initial encounter    DISHA (acute kidney injury)    Moderate malnutrition    Falls    Type 2 diabetes mellitus with complication, with long-term current use of insulin    Periprosthetic fracture around prosthetic knee    Femur fracture     Past Medical History:   Diagnosis Date    A-fib     on eliquis    Anemia     Arthritis     Diabetes mellitus     checks sugar only when pt wants to     Disease of thyroid gland     History of transfusion     with knee surgery-  no reaction recalled.     [place] (hard of hearing)     no hearing aids    Hypertension     Migraine without aura and without status migrainosus, not intractable 2016    Myelodysplastic syndrome     Pulmonary emboli     (after knee surgery)    SOBOE (shortness of breath on exertion)     Tremors of nervous system     Vertigo      Wears dentures     upper     Wears glasses      Past Surgical History:   Procedure Laterality Date    BLADDER SURGERY      CATARACT EXTRACTION      bilat     CHOLECYSTECTOMY      COLONOSCOPY      HIP TROCHANTERIC NAILING WITH INTRAMEDULLARY HIP SCREW Right 6/20/2023    Procedure: HIP TROCHANTERIC NAILING WITH INTRAMEDULLARY HIP SCREW RIGHT;  Surgeon: Augie Hobbs MD;  Location: [facility] OR;  Service: Orthopedics;  Laterality: Right;    HYSTERECTOMY      with bso    KYPHOPLASTY N/A 02/12/2021    Procedure: KYPHOPLASTY T11, T12 AND L4;  Surgeon: Matty Hearn MD;  Location: [facility] OR;  Service: Orthopedic Spine;  Laterality: N/A;    TONSILLECTOMY        General Information       Row Name 05/26/25 1349          OT Time and Intention    Document Type therapy note (daily note)  -SA     Mode of Treatment occupational therapy  -SA       Row Name 05/26/25 1349          General Information    Patient Profile Reviewed yes  -SA     Existing Precautions/Restrictions fall;brace worn when out of bed;other (see comments)  s/p Revision L TKA, WBAT, KI AAT x2 weeks no ROM, incisional wound vac  -SA       Row Name 05/26/25 1349          Cognition    Orientation Status (Cognition) oriented x 3  -SA       Row Name 05/26/25 1349          Safety Issues/Impairments Affecting Functional Mobility    Safety Issues Affecting Function (Mobility) awareness of need for assistance;insight into deficits/self-awareness;positioning of assistive device;problem-solving;safety precaution awareness;safety precautions follow-through/compliance;sequencing abilities  -SA     Impairments Affecting Function (Mobility) balance;endurance/activity tolerance;strength;sensation/sensory awareness;range of motion (ROM);postural/trunk control;motor control;pain  -SA               User Key  (r) = Recorded By, (t) = Taken By, (c) = Cosigned By      Initials Name Provider Type    SA Felix Pat OT Occupational Therapist                     Mobility/ADL's        Row Name 05/26/25 [facility]          Transfers    Transfers sit-stand transfer;stand-sit transfer  -SA     Comment, (Transfers) 1 STS from chair, 1 SPT from bed to chair, Max Ax2 with verbal cues for sequencing and managing AD  -SA       Row Name 05/26/25 [facility]          Bed-Chair Transfer    Bed-Chair [place] (Transfers) maximum assist (25% patient effort);2 person assist;verbal cues;nonverbal cues (demo/gesture)  -SA     Assistive Device (Bed-Chair Transfers) walker, front-wheeled  -SA     Comment, (Bed-Chair Transfer) Verbal and tactile cues for sequencing and managing AD  -SA       Row Name 05/26/25 [facility]          Sit-Stand Transfer    Sit-Stand [place] (Transfers) maximum assist (25% patient effort);verbal cues;nonverbal cues (demo/gesture);2 person assist  -SA     Assistive Device (Sit-Stand Transfers) walker, front-wheeled  -SA     Comment, (Sit-Stand Transfer) Verbal cues for hand placement, BLE positioning, and sequencing  -SA       Row Name 05/26/25 [facility]          Stand-Sit Transfer    Stand-Sit [place] (Transfers) maximum assist (25% patient effort);verbal cues;2 person assist  -SA     Assistive Device (Stand-Sit Transfers) walker, front-wheeled  -SA       Row Name 05/26/25 [facility]          Functional Mobility    Patient was able to Ambulate no, other medical factors prevent ambulation  -       Row Name 05/26/25 [facility] 05/26/25 [facility]       Activities of Daily Living    BADL Assessment/Intervention toileting  -SA grooming  -      Row Name 05/26/25 [facility]          Mobility    Extremity Weight-bearing Status left lower extremity  -SA     Left Lower Extremity (Weight-bearing Status) weight-bearing as tolerated (WBAT);other (see comments)  KI at all times x2 weeks  -SA       Row Name 05/26/25 [facility]          Toileting Assessment/Training    [place] Level (Toileting) toileting skills;dependent (less than 25% patient effort)  -SA     Position (Toileting) supported standing  -SA     Comment, (Toileting)  Dependent for hygiene/vega care while standing supported with RWx and Max A  -SA       Row Name 05/26/25 1350          Grooming Assessment/Training    [place] Level (Grooming) hair care, combing/brushing;wash face, hands;set up  -     Position (Grooming) supported sitting  -               User Key  (r) = Recorded By, (t) = Taken By, (c) = Cosigned By      Initials Name Provider Type     Felix Pat OT Occupational Therapist                   Obj/Interventions       Row Name 05/26/25 1353          Balance    Balance Assessment sitting static balance;sitting dynamic balance;sit to stand dynamic balance;standing static balance;standing dynamic balance  -SA     Static Sitting Balance contact guard  -SA     Dynamic Sitting Balance minimal assist  -SA     Position, Sitting Balance unsupported;sitting edge of bed;supported;sitting in chair  -     Sit to Stand Dynamic Balance maximum assist;2-person assist  -SA     Static Standing Balance maximum assist;2-person assist  -SA     Dynamic Standing Balance maximum assist;2-person assist  -SA     Position/Device Used, Standing Balance walker, front-wheeled  -     Balance Interventions sitting;standing;sit to stand;supported;static;dynamic;minimal challenge;occupation based/functional task  -               User Key  (r) = Recorded By, (t) = Taken By, (c) = Cosigned By      Initials Name Provider Type     Felix Pat OT Occupational Therapist                   Goals/Plan    No documentation.                  Clinical Impression       Row Name 05/26/25 1357          Pain Assessment    Pretreatment Pain Rating 0/10 - no pain  -SA     Posttreatment Pain Rating 0/10 - no pain  -SA       Row Name 05/26/25 1356 05/26/25 1358       Plan of Care Review    Progress -- improving  -    Outcome Evaluation Pt with good participation and effort this date. Pt able to perform SPT from bed to chair with RWx and Max Ax2. Pt incontinent with bowels during  transfer and was dependent for hygiene vega care. Pt too fatigued for further activity mobility after standing for hygiene. Pt performed grooming tasks sitting supported in chair. Pt continues to present below baseline with weakness, decreased activity tolerance, impaired balance, and decreased independence with ADLs and fxnl mobilty and would continue to benefit from skilled IPOT services to improve fxnl status. Continue current POC.  - --      Row Name 05/26/25 1356          Therapy Plan Review/Discharge Plan (OT)    Anticipated Discharge Disposition (OT) skilled nursing facility  -       Row Name 05/26/25 1356          Vital Signs    Pre Systolic BP Rehab 160  -SA     Pre Treatment Diastolic BP 70  -SA     Pre SpO2 (%) 94  -SA     O2 Delivery Pre Treatment room air  -SA     Pre Patient Position Sitting  -SA       Row Name 05/26/25 1356          Positioning and Restraints    Pre-Treatment Position --  Sitting EOB with PTx  -SA     Post Treatment Position chair  -SA     In Chair notified nsg;reclined;call light within reach;encouraged to call for assist;exit alarm on;with nsg;RUE elevated;LUE elevated;waffle cushion;compression device;on mechanical lift sling;legs elevated;R knee immobilizer  -               User Key  (r) = Recorded By, (t) = Taken By, (c) = Cosigned By      Initials Name Provider Type    SA Felix Pat, OT Occupational Therapist                   Outcome Measures       Row Name 05/26/25 1400          How much help from another is currently needed...    Putting on and taking off regular lower body clothing? 1  -SA     Bathing (including washing, rinsing, and drying) 2  -SA     Toileting (which includes using toilet bed pan or urinal) 1  -SA     Putting on and taking off regular upper body clothing 2  -SA     Taking care of personal grooming (such as brushing teeth) 3  -SA     Eating meals 4  -SA     AM-PAC 6 Clicks Score (OT) 13  -SA       Row Name 05/26/25 1134 05/26/25 0830       How  much help from another person do you currently need...    Turning from your back to your side while in flat bed without using bedrails? 2  -KG 2  -OD    Moving from lying on back to sitting on the side of a flat bed without bedrails? 2  -KG 2  -OD    Moving to and from a bed to a chair (including a wheelchair)? 2  -KG 2  -OD    Standing up from a chair using your arms (e.g., wheelchair, bedside chair)? 2  -KG 2  -OD    Climbing 3-5 steps with a railing? 1  -KG 1  -OD    To walk in hospital room? 2  -KG 1  -OD    AM-PAC 6 Clicks Score (PT) 11  -KG 10  -OD    Highest Level of Mobility Goal Move to Chair/Commode-4  -KG Move to Chair/Commode-4  -OD      Row Name 05/26/25 1400 05/26/25 1134       Functional Assessment    Outcome Measure Options AM-PAC 6 Clicks Daily Activity (OT)  - AM-PAC 6 Clicks Basic Mobility (PT)  -KG              User Key  (r) = Recorded By, (t) = Taken By, (c) = Cosigned By      Initials Name Provider Type    OD Bibi Turner, RN Registered Nurse    Clare Maradiaga Physical Therapist     Felix Pat OT Occupational Therapist                    Occupational Therapy Education       Title: PT OT SLP Therapies (In Progress)       Topic: Occupational Therapy (In Progress)       Point: ADL training (In Progress)       Learning Progress Summary            Patient Acceptance, E, NR by  at 5/26/2025 1400                      Point: Body mechanics (In Progress)       Learning Progress Summary            Patient Acceptance, E, NR by  at 5/26/2025 1400                                      User Key       Initials Effective Dates Name Provider Type Discipline     04/16/25 -  Felix Pat OT Occupational Therapist OT                  OT Recommendation and Plan     Plan of Care Review  Progress: improving  Outcome Evaluation: Pt with good participation and effort this date. Pt able to perform SPT from bed to chair with RWx and Max Ax2. Pt incontinent with bowels during transfer  and was dependent for hygiene vega care. Pt too fatigued for further activity mobility after standing for hygiene. Pt performed grooming tasks sitting supported in chair. Pt continues to present below baseline with weakness, decreased activity tolerance, impaired balance, and decreased independence with ADLs and fxnl mobilty and would continue to benefit from skilled IPOT services to improve fxnl status. Continue current POC.     Time Calculation:         Time Calculation- OT       Row Name 05/26/25 1401             Time Calculation- OT    OT Start Time 0935  -SA      OT Received On 05/26/25  -SA      OT Goal Re-Cert Due Date 05/31/25  -SA         Timed Charges    41527 - OT Therapeutic Activity Minutes 5  -SA      85894 - OT Self Care/Mgmt Minutes 15  -SA         Total Minutes    Timed Charges Total Minutes 20  -SA       Total Minutes 20  -SA                User Key  (r) = Recorded By, (t) = Taken By, (c) = Cosigned By      Initials Name Provider Type    SA Felix Pat OT Occupational Therapist                  Therapy Charges for Today       Code Description Service Date Service Provider Modifiers Qty    [id number] HC OT SELF CARE/MGMT/TRAIN EA 15 MIN 5/26/2025 Felix Pat OT GO 1                 Felix Pat OT  5/26/2025

## 2025-05-26 NOTE — PLAN OF CARE
Goal Outcome Evaluation:           Progress: improving  Outcome Evaluation: Pt with good participation and effort this date. Pt able to perform SPT from bed to chair with RWx and Max Ax2. Pt incontinent with bowels during transfer and was dependent for hygiene vega care. Pt too fatigued for further activity mobility after standing for hygiene. Pt performed grooming tasks sitting supported in chair. Pt continues to present below baseline with weakness, decreased activity tolerance, impaired balance, and decreased independence with ADLs and fxnl mobilty and would continue to benefit from skilled IPOT services to improve fxnl status. Continue current POC.    Anticipated Discharge Disposition (OT): skilled nursing facility

## 2025-05-26 NOTE — PROGRESS NOTES
[facility] Medicine Services  PROGRESS NOTE    Patient Name: Chey Robertson  : 1935  MRN: [id number]    Date of Admission: 2025  Primary Care Physician: Yasmeen Loomis MD    Subjective   Subjective     CC:  Femur fx     HPI:  Patient is resting in bed getting ready to work with PT. Having some pain in left leg. Tolerating diet.       Objective   Objective     Vital Signs:   Temp:  [97.6 °F (36.4 °C)-99.2 °F (37.3 °C)] 98.4 °F (36.9 °C)  Heart Rate:  [59-68] 65  Resp:  [16-18] 18  BP: (154-178)/(68-80) 161/80     Physical Exam:  Constitutional: No acute distress, awake, alert  HENT: NCAT, mucous membranes moist  Respiratory: Clear to auscultation bilaterally, respiratory effort normal room air   Cardiovascular: RRR, no murmurs, rubs, or gallops  Gastrointestinal: Positive bowel sounds, soft, nontender, nondistended  Musculoskeletal: No bilateral ankle edema, left leg in immobilizer brace   Psychiatric: Appropriate affect, cooperative  Neurologic: Oriented x 2-3, strength symmetric in all extremities,speech clear  Skin: No rashes, pale       Results Reviewed:  LAB RESULTS:      Lab 25  0654 25  0724 25  0619 25  1256 25  0719 25  0610   WBC 1.29* 1.63* 1.65* 1.75* 2.27* 2.32*   HEMOGLOBIN 9.3* 9.6* 10.1* 9.8* 6.8*  6.8* 7.6*   HEMATOCRIT 29.4* 29.2* 31.4* 30.4* 20.5*  20.5* 24.2*   PLATELETS 83* 71* 74* 72* 81* 56*   NEUTROS ABS  --   --  1.13* 1.37*  --  2.01   IMMATURE GRANS (ABS)  --   --  0.06* 0.05  --  0.05   LYMPHS ABS  --   --  0.27* 0.16*  --  0.10*   MONOS ABS  --   --  0.19 0.17  --  0.15   EOS ABS  --   --  0.00 0.00  --  0.01   MCV 88.0 86.9 87.5 87.6 89.1 92.0         Lab 25  0654 25  0619 25  1256 25  0719 25  0610 25  0824   SODIUM 139 135* 134* 137 133* 135*   POTASSIUM 4.1 4.3 4.6 4.6 5.0 4.1   CHLORIDE 105 101 101 104 103 101   CO2 23.0 23.0 21.0* 23.0 16.0* 23.0   ANION GAP 11.0 11.0  12.0 10.0 14.0 11.0   BUN 14 22 23 23 21 14   CREATININE 0.73 0.82 0.96 1.21* 1.12* 0.96   EGFR 79.2 68.9 57.0* 43.2* 47.4* 57.0*   GLUCOSE 134* 115* 192* 145* 314* 131*   CALCIUM 8.2* 8.3* 8.1* 7.9* 7.8* 8.0*   MAGNESIUM  --   --   --   --   --  2.3   PHOSPHORUS  --   --   --   --  4.3  --          Lab 05/26/25  0654 05/21/25  0610   TOTAL PROTEIN 4.8*  --    ALBUMIN 2.8* 2.9*   GLOBULIN 2.0  --    ALT (SGPT) <5  --    AST (SGOT) 8  --    BILIRUBIN 0.5  --    ALK PHOS 95  --                  Lab 05/22/25  1008   ABO TYPING O   RH TYPING Negative   ANTIBODY SCREEN Positive         Brief Urine Lab Results  (Last result in the past 365 days)        Color   Clarity   Blood   Leuk Est   Nitrite   Protein   CREAT   Urine HCG        05/17/25 2140 Yellow   Turbid   Negative   Large (3+)   Negative   30 mg/dL (1+)                   Microbiology Results Abnormal       Procedure Component Value - Date/Time    Urine Culture - Urine, Indwelling Urethral Catheter [[id number]]  (Abnormal)  (Susceptibility) Collected: 05/17/25 2140    Lab Status: Final result Specimen: Urine from Indwelling Urethral Catheter Updated: 05/21/25 0821     Urine Culture >100,000 CFU/mL Klebsiella pneumoniae ssp pneumoniae    Narrative:      Colonization of the urinary tract without infection is common. Treatment is discouraged unless the patient is symptomatic, pregnant, or undergoing an invasive urologic procedure.    Susceptibility        Klebsiella pneumoniae ssp pneumoniae      JUAN F      Amoxicillin + Clavulanate Susceptible      Ampicillin Resistant      Ampicillin + Sulbactam Susceptible      Cefazolin (Urine) Susceptible      Cefepime Susceptible      Ceftazidime Susceptible      Ceftriaxone Susceptible      Ciprofloxacin Resistant      Gentamicin Susceptible      Levofloxacin Resistant      Nitrofurantoin Intermediate      Piperacillin + Tazobactam Susceptible      Trimethoprim + Sulfamethoxazole Susceptible                                   No  radiology results from the last 24 hrs    Results for orders placed during the hospital encounter of 06/05/23    Adult Transthoracic Echo Complete w/ Color, Spectral and Contrast if necessary per protocol    Interpretation Summary    Left ventricular systolic function is normal. Left ventricular ejection fraction appears to be 56 - 60%.    Left ventricular diastolic function was normal.    Estimated right ventricular systolic pressure from tricuspid regurgitation is normal (<35 mmHg).      Current medications:  Scheduled Meds:acetaminophen, 1,000 mg, Oral, Q8H  amiodarone, 200 mg, Oral, Daily  amLODIPine, 10 mg, Oral, Q24H  apixaban, 2.5 mg, Oral, BID  doxycycline, 100 mg, Oral, Q12H  insulin lispro, 2-7 Units, Subcutaneous, 4x Daily AC & at Bedtime  levothyroxine, 100 mcg, Oral, Q AM  lisinopril, 5 mg, Oral, Daily  metoprolol tartrate, 25 mg, Oral, BID  pantoprazole, 40 mg, Oral, Q AM  sodium chloride, 10 mL, Intravenous, Q12H      Continuous Infusions:ropivacaine,       PRN Meds:.  acetaminophen **OR** acetaminophen **OR** acetaminophen    senna-docusate sodium **AND** polyethylene glycol **AND** bisacodyl **AND** bisacodyl    Calcium Replacement - Follow Nurse / BPA Driven Protocol    dextrose    dextrose    diphenhydrAMINE **OR** diphenhydrAMINE    glucagon (human recombinant)    HYDROmorphone **AND** naloxone    Magnesium Standard Dose Replacement - Follow Nurse / BPA Driven Protocol    melatonin    Morphine    nitroglycerin    ondansetron ODT **OR** ondansetron    oxyCODONE    oxyCODONE    Phosphorus Replacement - Follow Nurse / BPA Driven Protocol    Potassium Replacement - Follow Nurse / BPA Driven Protocol    sodium chloride    traMADol    Assessment & Plan   Assessment & Plan     Active Hospital Problems    Diagnosis  POA    **Femur fracture [S72.90XA]  Yes    Type 2 diabetes mellitus with complication, with long-term current use of insulin [E11.8, Z79.4]  Not Applicable    Periprosthetic fracture around  prosthetic knee [M97.8XXA, Z96.659]  Not Applicable    Falls [R29.6]  Not Applicable    Atrial fibrillation [I48.91]  Yes    Chronic anticoagulation [Z79.01]  Not Applicable    Essential hypertension [I10]  Yes    Hypothyroidism (acquired) [E03.9]  Yes      Resolved Hospital Problems   No resolved problems to display.        Brief Hospital Course to date:  Chey Robertson is a 89 y.o. female with history of afib (on eliquis), HTN, DM2, hypothyroidism, MDS (w/ pancytopenia, followed by Dr. Lyman), previous PE (treated w eliquis), previous left knee replacement. Presented to [facility] ED w/ left knee pain after sustaining a fall at her SNF. CT imaging revealed left distal femoral impaction fracture.     This patient's problems and plans were partially entered by my partner and updated as appropriate by me 05/26/25.      Left supracondylar fracture of the distal femur.   Hx previous left knee replacement  -Orthopedic surgery evaluated, s/p repair 5/20  -- leg immobilizer for two weeks   -Due to magnitude of surgery ortho requested eliquis be held ~72 hours prior to surgery, but Hb dropped to 6.8 on 5/22, okay to resume per ortho at 2.5mg BID x1 week, then resume usual dosing thereafter (usual dosing is 2.5mg BID per outpatient fill hx on this patient)   -PT/OT, plan rehab   -as needed pain control     Parox afib (currently in sinus)  Chronic HFpEF  HTN  -echo 6/2023: LV EF 56-60%, valves ok  - Continue amiodarone & metoprolol, lisinopril  - Eliquis resumed on 5/23; monitor Hgb, so far okay  -- norvasc added, lisinopril dose increased to 10 mg         UTI  - Completed rocephin for 5 days  - Urine culture with Klebsiella, sensitive to Rocephin     Hx PE (perioperative after previous knee replacement)     MDS  Pancytopenia  Iron deficiency  -follows w/ Dr. Lyman (last seen 3/3/25)   -typical plts range 40,000-60,000  -Transfused platelets 5/20  -Completed 3 days of IV iron, last dose 5/20  -2 units of PRBCs given 5/22, robust  Hgb response, monitor     DM2  -A1c 6.2  -on januvia and metformin  - Continue ssi     Hypothyroidism  -cont levothyroxine          Expected Discharge Location and Transportation: rehab   Expected Discharge   Expected Discharge Date: 5/27/2025; Expected Discharge Time:      VTE Prophylaxis:  Pharmacologic & mechanical VTE prophylaxis orders are present.         AM-PAC 6 Clicks Score (PT): 10 (05/25/25 1157)    CODE STATUS:   Code Status and Medical Interventions: CPR (Attempt to Resuscitate); Full   Ordered at: 05/20/25 2049     Code Status (Patient has no pulse and is not breathing):    CPR (Attempt to Resuscitate)     Medical Interventions (Patient has pulse or is breathing):    Full     Level Of Support Discussed With:    Patient       Camelia Guerra, APRN  05/26/25

## 2025-05-26 NOTE — THERAPY TREATMENT NOTE
Patient Name: Chey Robertson  : 1935    MRN: [id number]                              Today's Date: 2025       Admit Date: 2025    Visit Dx:     ICD-10-CM ICD-9-CM   1. Fall at nursing home, initial encounter  W19.XXXA E888.9    Y92.129 E849.7   2. 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 Anticoagulated  Z79.01 V58.61   4. Periprosthetic fracture around internal prosthetic right knee joint, subsequent encounter  M97.11XD V54.89     Patient Active Problem List   Diagnosis    Benign familial tremor    AMS (altered mental status)    Pancytopenia    Hypothyroidism (acquired)    B12 deficiency    Abnormal intestinal absorption    Iron deficiency anemia due to chronic blood loss    Myelodysplasia (myelodysplastic syndrome)    Personal history of pulmonary embolism    Chronic anticoagulation    Essential hypertension    Type 2 diabetes mellitus without complication, without long-term current use of insulin    Atrial fibrillation    QT prolongation    Pericardial effusion    Severe malnutrition    Closed displaced intertrochanteric fracture of right femur, initial encounter    DISHA (acute kidney injury)    Moderate malnutrition    Falls    Type 2 diabetes mellitus with complication, with long-term current use of insulin    Periprosthetic fracture around prosthetic knee    Femur fracture     Past Medical History:   Diagnosis Date    A-fib     on eliquis    Anemia     Arthritis     Diabetes mellitus     checks sugar only when pt wants to     Disease of thyroid gland     History of transfusion     with knee surgery-  no reaction recalled.     [place] (hard of hearing)     no hearing aids    Hypertension     Migraine without aura and without status migrainosus, not intractable 2016    Myelodysplastic syndrome     Pulmonary emboli     (after knee surgery)    SOBOE (shortness of breath on exertion)     Tremors of nervous system     Vertigo      Wears dentures     upper     Wears glasses      Past Surgical History:   Procedure Laterality Date    BLADDER SURGERY      CATARACT EXTRACTION      bilat     CHOLECYSTECTOMY      COLONOSCOPY      HIP TROCHANTERIC NAILING WITH INTRAMEDULLARY HIP SCREW Right 6/20/2023    Procedure: HIP TROCHANTERIC NAILING WITH INTRAMEDULLARY HIP SCREW RIGHT;  Surgeon: Augie Hobbs MD;  Location: [facility] OR;  Service: Orthopedics;  Laterality: Right;    HYSTERECTOMY      with bso    KYPHOPLASTY N/A 02/12/2021    Procedure: KYPHOPLASTY T11, T12 AND L4;  Surgeon: Matty Hearn MD;  Location: [facility] OR;  Service: Orthopedic Spine;  Laterality: N/A;    TONSILLECTOMY        General Information       Row Name 05/26/25 1128          Physical Therapy Time and Intention    Document Type therapy note (daily note)  -KG     Mode of Treatment physical therapy  -KG       Row Name 05/26/25 1128          General Information    Patient Profile Reviewed yes  -KG     Existing Precautions/Restrictions fall;brace worn when out of bed;other (see comments)  s/p Revision L TKA, WBAT, KI AAT x2 weeks no ROM, incisional wound vac  -KG       Row Name 05/26/25 1128          Cognition    Orientation Status (Cognition) oriented x 3  -KG       Row Name 05/26/25 1128          Safety Issues/Impairments Affecting Functional Mobility    Safety Issues Affecting Function (Mobility) awareness of need for assistance;insight into deficits/self-awareness;judgment;positioning of assistive device;problem-solving;safety precaution awareness;safety precautions follow-through/compliance;sequencing abilities  -KG     Impairments Affecting Function (Mobility) balance;endurance/activity tolerance;strength;sensation/sensory awareness;range of motion (ROM);postural/trunk control;motor control;pain  -KG               User Key  (r) = Recorded By, (t) = Taken By, (c) = Cosigned By      Initials Name Provider Type    KG Clare Saravia Physical Therapist                   Mobility        Row Name 05/26/25 1130          Bed Mobility    Bed Mobility scooting/bridging;supine-sit;sit-supine  -KG     Scooting/Bridging [place] (Bed Mobility) maximum assist (25% patient effort);verbal cues;nonverbal cues (demo/gesture);2 person assist  -KG     Supine-Sit [place] (Bed Mobility) maximum assist (25% patient effort);verbal cues;nonverbal cues (demo/gesture);2 person assist  -KG     Assistive Device (Bed Mobility) head of bed elevated;repositioning sheet;bed rails  -KG       Row Name 05/26/25 1130          Transfers    Comment, (Transfers) performed 2 STS and 1 SPT to chair, max-A x2 and max cues for sequencing, positioning, FWW  -KG       Row Name 05/26/25 1130          Bed-Chair Transfer    Bed-Chair [place] (Transfers) maximum assist (25% patient effort);2 person assist;verbal cues;nonverbal cues (demo/gesture)  -KG     Assistive Device (Bed-Chair Transfers) walker, front-wheeled  -KG       Row Name 05/26/25 1130          Sit-Stand Transfer    Sit-Stand [place] (Transfers) maximum assist (25% patient effort);verbal cues;nonverbal cues (demo/gesture);2 person assist  -KG     Assistive Device (Sit-Stand Transfers) walker, front-wheeled  -KG       Row Name 05/26/25 1130          Gait/Stairs (Locomotion)    [place] Level (Gait) unable to assess  -KG       Row Name 05/26/25 1130          Mobility    Extremity Weight-bearing Status left lower extremity  -KG     Left Lower Extremity (Weight-bearing Status) weight-bearing as tolerated (WBAT);other (see comments)  -KG               User Key  (r) = Recorded By, (t) = Taken By, (c) = Cosigned By      Initials Name Provider Type    KG Clare Saravia Physical Therapist                   Obj/Interventions       Row Name 05/26/25 1132          Motor Skills    Therapeutic Exercise other (see comments)  quad sets, ankle pumps, hip abd  -KG       Row Name 05/26/25 1132          Balance    Position/Device Used, Standing Balance  supported;walker, front-wheeled  -KG     Balance Interventions standing;sit to stand  -KG               User Key  (r) = Recorded By, (t) = Taken By, (c) = Cosigned By      Initials Name Provider Type    Clare Maradiaga Physical Therapist                   Goals/Plan    No documentation.                  Clinical Impression       Row Name 05/26/25 1133          Pain    Pretreatment Pain Rating 0/10 - no pain  -KG     Posttreatment Pain Rating 0/10 - no pain  -KG     Pre/Posttreatment Pain Comment pain during mobility  -KG       Row Name 05/26/25 1133          Plan of Care Review    Plan of Care Reviewed With patient  -KG     Progress improving  -KG     Outcome Evaluation performed 2 STS and 1 SPT to chair, max-A x2 and max cues for sequencing, positioning, FWW.  Pt also required a long stand for vega-hygiene and became too fatigued for ambulation  -KG       Row Name 05/26/25 1133          Positioning and Restraints    Pre-Treatment Position in bed  -KG     Post Treatment Position chair  -KG     In Chair notified nsg;call light within reach;encouraged to call for assist;exit alarm on;waffle cushion;legs elevated;on mechanical lift sling  -KG               User Key  (r) = Recorded By, (t) = Taken By, (c) = Cosigned By      Initials Name Provider Type    Clare Maradiaga Physical Therapist                   Outcome Measures       Row Name 05/26/25 1134 05/26/25 0830       How much help from another person do you currently need...    Turning from your back to your side while in flat bed without using bedrails? 2  -KG 2  -OD    Moving from lying on back to sitting on the side of a flat bed without bedrails? 2  -KG 2  -OD    Moving to and from a bed to a chair (including a wheelchair)? 2  -KG 2  -OD    Standing up from a chair using your arms (e.g., wheelchair, bedside chair)? 2  -KG 2  -OD    Climbing 3-5 steps with a railing? 1  -KG 1  -OD    To walk in hospital room? 2  -KG 1  -OD    AM-PAC 6 Clicks Score (PT)  11  -KG 10  -OD    Highest Level of Mobility Goal Move to Chair/Commode-4  -KG Move to Chair/Commode-4  -OD      Row Name 05/26/25 1134          Functional Assessment    Outcome Measure Options AM-PAC 6 Clicks Basic Mobility (PT)  -KG               User Key  (r) = Recorded By, (t) = Taken By, (c) = Cosigned By      Initials Name Provider Type    OD Bibi Turner, RN Registered Nurse    Clare Maradiaga Physical Therapist                                 Physical Therapy Education       Title: PT OT SLP Therapies (Done)       Topic: Physical Therapy (Done)       Point: Mobility training (Done)       Learning Progress Summary            Patient Acceptance, E, VU by KG at 5/26/2025 1135    Eager, E, VU,DU,NR by SS at 5/25/2025 1157    Comment: Reviewed safety/technique w/bed mobility, transfers, PT POC    Acceptance, E, VU,NR by BC at 5/25/2025 0830    Acceptance, E, VU,DU,NR by  at 5/24/2025 1528    Comment: Reviewed safety/technique w/bed mobility, transfers, HEP, PT POC    Acceptance, E, NR by CK at 5/23/2025 1454    Acceptance, E, NR by CK at 5/23/2025 1142    Acceptance, E, NR by CK at 5/22/2025 1344    Acceptance, E, NR by CK at 5/22/2025 1031    Acceptance, E, NR by CK at 5/21/2025 1614    Acceptance, E, NR,VU by CK at 5/21/2025 1006   Family Eager, E, VU,DU,NR by  at 5/25/2025 1157    Comment: Reviewed safety/technique w/bed mobility, transfers, PT POC                      Point: Home exercise program (Done)       Learning Progress Summary            Patient Acceptance, E, VU by KG at 5/26/2025 1135    Eager, E, VU,DU,NR by  at 5/25/2025 1157    Comment: Reviewed safety/technique w/bed mobility, transfers, PT POC    Acceptance, E, VU,NR by BC at 5/25/2025 0830    Acceptance, E, VU,DU,NR by  at 5/24/2025 1528    Comment: Reviewed safety/technique w/bed mobility, transfers, HEP, PT POC    Acceptance, E, NR by CK at 5/22/2025 1344    Acceptance, E, NR by CK at 5/22/2025 1031    Acceptance, E, NR by  CK at 5/21/2025 1614   Family Eager, E, VU,DU,NR by SS at 5/25/2025 1157    Comment: Reviewed safety/technique w/bed mobility, transfers, PT POC                      Point: Body mechanics (Done)       Learning Progress Summary            Patient Acceptance, E, VU by KG at 5/26/2025 1135    Eager, E, VU,DU,NR by SS at 5/25/2025 1157    Comment: Reviewed safety/technique w/bed mobility, transfers, PT POC    Acceptance, E, VU,NR by BC at 5/25/2025 0830    Acceptance, E, VU,DU,NR by SS at 5/24/2025 1528    Comment: Reviewed safety/technique w/bed mobility, transfers, HEP, PT POC    Acceptance, E, NR by CK at 5/23/2025 1454    Acceptance, E, NR by CK at 5/23/2025 1142    Acceptance, E, NR by CK at 5/22/2025 1344    Acceptance, E, NR by CK at 5/22/2025 1031    Acceptance, E, NR by CK at 5/21/2025 1614    Acceptance, E, NR,VU by CK at 5/21/2025 1006   Family Eager, E, VU,DU,NR by SS at 5/25/2025 1157    Comment: Reviewed safety/technique w/bed mobility, transfers, PT POC                      Point: Precautions (Done)       Learning Progress Summary            Patient Acceptance, E, VU by KG at 5/26/2025 1135    Eager, E, VU,DU,NR by SS at 5/25/2025 1157    Comment: Reviewed safety/technique w/bed mobility, transfers, PT POC    Acceptance, E, VU,NR by BC at 5/25/2025 0830    Acceptance, E, VU,DU,NR by SS at 5/24/2025 1528    Comment: Reviewed safety/technique w/bed mobility, transfers, HEP, PT POC    Acceptance, E, NR by CK at 5/23/2025 1454    Acceptance, E, NR by CK at 5/23/2025 1142    Acceptance, E, NR by CK at 5/22/2025 1344    Acceptance, E, NR by CK at 5/22/2025 1031    Acceptance, E, NR by CK at 5/21/2025 1614    Acceptance, E, NR,VU by CK at 5/21/2025 1006   Family MIRELLA Hurtado, FAMILIA,ANTHONY,NR by SS at 5/25/2025 1157    Comment: Reviewed safety/technique w/bed mobility, transfers, PT POC                                      User Key       Initials Effective Dates Name Provider Type Discipline    BC 04/27/21 -  Elisa Lundberg,  RN Registered Nurse Nurse     12/13/24 -  Clare Saravia Physical Therapist PT    SS 06/01/21 -  Aminta Rodriguez, PT Physical Therapist PT    CK 02/06/24 -  Keke Cook, PT Physical Therapist PT                  PT Recommendation and Plan     Progress: improving  Outcome Evaluation: performed 2 STS and 1 SPT to chair, max-A x2 and max cues for sequencing, positioning, FWW.  Pt also required a long stand for vega-hygiene and became too fatigued for ambulation     Time Calculation:         PT Charges       Row Name 05/26/25 1135             Time Calculation    Start Time 0911  -KG      PT Received On 05/26/25  -KG         Timed Charges    91510 - PT Therapeutic Exercise Minutes 10  -KG      80761 - PT Therapeutic Activity Minutes 13  -KG         Total Minutes    Timed Charges Total Minutes 23  -KG       Total Minutes 23  -KG                User Key  (r) = Recorded By, (t) = Taken By, (c) = Cosigned By      Initials Name Provider Type    EARL Clare Saravia Physical Therapist                  Therapy Charges for Today       Code Description Service Date Service Provider Modifiers Qty    16411584164 HC PT THER PROC EA 15 MIN 5/26/2025 Clare Saravia GP 1    74850689872 HC PT THERAPEUTIC ACT EA 15 MIN 5/26/2025 Clare Saravia GP 1            PT G-Codes  Outcome Measure Options: AM-PAC 6 Clicks Basic Mobility (PT)  AM-PAC 6 Clicks Score (PT): 11  AM-PAC 6 Clicks Score (OT): 11       Clare Saravia  5/26/2025

## 2025-05-27 LAB
GLUCOSE BLDC GLUCOMTR-MCNC: 142 MG/DL (ref 70–130)
GLUCOSE BLDC GLUCOMTR-MCNC: 171 MG/DL (ref 70–130)
GLUCOSE BLDC GLUCOMTR-MCNC: 225 MG/DL (ref 70–130)
GLUCOSE BLDC GLUCOMTR-MCNC: 244 MG/DL (ref 70–130)

## 2025-05-27 PROCEDURE — 97535 SELF CARE MNGMENT TRAINING: CPT

## 2025-05-27 PROCEDURE — 97530 THERAPEUTIC ACTIVITIES: CPT

## 2025-05-27 PROCEDURE — 82948 REAGENT STRIP/BLOOD GLUCOSE: CPT

## 2025-05-27 PROCEDURE — 63710000001 INSULIN LISPRO (HUMAN) PER 5 UNITS: Performed by: ORTHOPAEDIC SURGERY

## 2025-05-27 PROCEDURE — 99232 SBSQ HOSP IP/OBS MODERATE 35: CPT | Performed by: NURSE PRACTITIONER

## 2025-05-27 PROCEDURE — 97110 THERAPEUTIC EXERCISES: CPT

## 2025-05-27 PROCEDURE — 63710000001 INSULIN GLARGINE PER 5 UNITS: Performed by: NURSE PRACTITIONER

## 2025-05-27 RX ADMIN — APIXABAN 2.5 MG: 2.5 TABLET, FILM COATED ORAL at 21:47

## 2025-05-27 RX ADMIN — ACETAMINOPHEN 1000 MG: 500 TABLET ORAL at 04:55

## 2025-05-27 RX ADMIN — DOXYCYCLINE 100 MG: 100 CAPSULE ORAL at 21:47

## 2025-05-27 RX ADMIN — DOXYCYCLINE 100 MG: 100 CAPSULE ORAL at 08:37

## 2025-05-27 RX ADMIN — APIXABAN 2.5 MG: 2.5 TABLET, FILM COATED ORAL at 08:37

## 2025-05-27 RX ADMIN — INSULIN LISPRO 3 UNITS: 100 INJECTION, SOLUTION INTRAVENOUS; SUBCUTANEOUS at 17:44

## 2025-05-27 RX ADMIN — PANTOPRAZOLE SODIUM 40 MG: 40 TABLET, DELAYED RELEASE ORAL at 04:55

## 2025-05-27 RX ADMIN — INSULIN GLARGINE 10 UNITS: 100 INJECTION, SOLUTION SUBCUTANEOUS at 08:37

## 2025-05-27 RX ADMIN — INSULIN LISPRO 3 UNITS: 100 INJECTION, SOLUTION INTRAVENOUS; SUBCUTANEOUS at 21:47

## 2025-05-27 RX ADMIN — ACETAMINOPHEN 1000 MG: 500 TABLET ORAL at 15:57

## 2025-05-27 RX ADMIN — LEVOTHYROXINE SODIUM 100 MCG: 0.1 TABLET ORAL at 04:55

## 2025-05-27 RX ADMIN — INSULIN LISPRO 2 UNITS: 100 INJECTION, SOLUTION INTRAVENOUS; SUBCUTANEOUS at 08:37

## 2025-05-27 RX ADMIN — AMIODARONE HYDROCHLORIDE 200 MG: 200 TABLET ORAL at 08:36

## 2025-05-27 RX ADMIN — ACETAMINOPHEN 1000 MG: 500 TABLET ORAL at 21:47

## 2025-05-27 RX ADMIN — BISACODYL 5 MG: 5 TABLET, COATED ORAL at 15:58

## 2025-05-27 RX ADMIN — LISINOPRIL 10 MG: 10 TABLET ORAL at 08:36

## 2025-05-27 RX ADMIN — Medication 10 ML: at 21:48

## 2025-05-27 RX ADMIN — METOPROLOL TARTRATE 25 MG: 25 TABLET, FILM COATED ORAL at 21:47

## 2025-05-27 RX ADMIN — METOPROLOL TARTRATE 25 MG: 25 TABLET, FILM COATED ORAL at 08:36

## 2025-05-27 RX ADMIN — AMLODIPINE BESYLATE 10 MG: 10 TABLET ORAL at 08:36

## 2025-05-27 RX ADMIN — Medication 10 ML: at 08:37

## 2025-05-27 NOTE — THERAPY TREATMENT NOTE
Patient Name: Chey Robertson  : 1935    MRN: [id number]                              Today's Date: 2025       Admit Date: 2025    Visit Dx:     ICD-10-CM ICD-9-CM   1. Fall at nursing home, initial encounter  W19.XXXA E888.9    Y92.129 E849.7   2. 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 Anticoagulated  Z79.01 V58.61   4. Periprosthetic fracture around internal prosthetic right knee joint, subsequent encounter  M97.11XD V54.89     Patient Active Problem List   Diagnosis    Benign familial tremor    AMS (altered mental status)    Pancytopenia    Hypothyroidism (acquired)    B12 deficiency    Abnormal intestinal absorption    Iron deficiency anemia due to chronic blood loss    Myelodysplasia (myelodysplastic syndrome)    Personal history of pulmonary embolism    Chronic anticoagulation    Essential hypertension    Type 2 diabetes mellitus without complication, without long-term current use of insulin    Atrial fibrillation    QT prolongation    Pericardial effusion    Severe malnutrition    Closed displaced intertrochanteric fracture of right femur, initial encounter    DISHA (acute kidney injury)    Moderate malnutrition    Falls    Type 2 diabetes mellitus with complication, with long-term current use of insulin    Periprosthetic fracture around prosthetic knee    Femur fracture     Past Medical History:   Diagnosis Date    A-fib     on eliquis    Anemia     Arthritis     Diabetes mellitus     checks sugar only when pt wants to     Disease of thyroid gland     History of transfusion     with knee surgery-  no reaction recalled.     [place] (hard of hearing)     no hearing aids    Hypertension     Migraine without aura and without status migrainosus, not intractable 2016    Myelodysplastic syndrome     Pulmonary emboli     (after knee surgery)    SOBOE (shortness of breath on exertion)     Tremors of nervous system     Vertigo      Wears dentures     upper     Wears glasses      Past Surgical History:   Procedure Laterality Date    BLADDER SURGERY      CATARACT EXTRACTION      bilat     CHOLECYSTECTOMY      COLONOSCOPY      HIP TROCHANTERIC NAILING WITH INTRAMEDULLARY HIP SCREW Right 6/20/2023    Procedure: HIP TROCHANTERIC NAILING WITH INTRAMEDULLARY HIP SCREW RIGHT;  Surgeon: Augie Hobbs MD;  Location: [facility] OR;  Service: Orthopedics;  Laterality: Right;    HYSTERECTOMY      with bso    KYPHOPLASTY N/A 02/12/2021    Procedure: KYPHOPLASTY T11, T12 AND L4;  Surgeon: Matty Hearn MD;  Location: [facility] OR;  Service: Orthopedic Spine;  Laterality: N/A;    TONSILLECTOMY        General Information       Row Name 05/27/25 1043          Physical Therapy Time and Intention    Document Type therapy note (daily note)  -SC     Mode of Treatment physical therapy  -SC       Row Name 05/27/25 1043          General Information    Patient Profile Reviewed yes  -SC     Existing Precautions/Restrictions fall;brace worn when out of bed;other (see comments)  KI on x 2 weeks, no ROM, WBAT  -SC       Row Name 05/27/25 1043          Cognition    Orientation Status (Cognition) oriented to;person;place;verbal cues/prompts needed for orientation;time  -SC       Row Name 05/27/25 1043          Safety Issues/Impairments Affecting Functional Mobility    Impairments Affecting Function (Mobility) balance;endurance/activity tolerance;strength;sensation/sensory awareness;range of motion (ROM);postural/trunk control;motor control;pain  -SC     Comment, Safety Issues/Impairments (Mobility) alert, following commands, fearful of walking, very weak in standing  -SC               User Key  (r) = Recorded By, (t) = Taken By, (c) = Cosigned By      Initials Name Provider Type    SC Lisa Cortes PT Physical Therapist                   Mobility       Row Name 05/27/25 1044          Bed Mobility    Bed Mobility sit-supine;scooting/bridging  -SC     Scooting/Bridging  [place] (Bed Mobility) maximum assist (25% patient effort);verbal cues;nonverbal cues (demo/gesture);2 person assist  -SC     Supine-Sit [place] (Bed Mobility) maximum assist (25% patient effort);verbal cues;nonverbal cues (demo/gesture);2 person assist  -SC     Assistive Device (Bed Mobility) head of bed elevated;repositioning sheet;bed rails  -SC     Comment, (Bed Mobility) Time taken to work on getting up to EOB with VC for sequencing and using bed railing. Assistance need to move legs and right trunk  -SC       Row Name 05/27/25 1044          Transfers    Comment, (Transfers) STS from EOB with cues for hand placement. Required a raheel from PT. Time taken in standing to work on standing balance with walker and upright posture. Patient required cleaning while in standing and stood for a long time.. good effort hear  -SC       Row Name 05/27/25 1044          Bed-Chair Transfer    Bed-Chair [place] (Transfers) maximum assist (25% patient effort);2 person assist;verbal cues;nonverbal cues (demo/gesture)  -SC     Assistive Device (Bed-Chair Transfers) walker, front-wheeled  -SC     Comment, (Bed-Chair Transfer) Transfered over to [facility] for steping. Patient very fearful. PT having to move walker and legs.  -SC       Row Name 05/27/25 1044          Sit-Stand Transfer    Sit-Stand [place] (Transfers) maximum assist (25% patient effort);verbal cues;nonverbal cues (demo/gesture);2 person assist  -SC     Assistive Device (Sit-Stand Transfers) walker, front-wheeled  -SC       Row Name 05/27/25 1044          Gait/Stairs (Locomotion)    Comment, (Gait/Stairs) tranfsers only  -SC       Row Name 05/27/25 1044          Mobility    Extremity Weight-bearing Status left lower extremity  -SC     Left Lower Extremity (Weight-bearing Status) weight-bearing as tolerated (WBAT);other (see comments)  -SC               User Key  (r) = Recorded By, (t) = Taken By, (c) = Cosigned By      Initials Name Provider  Type    SC Lisa Cortes PT Physical Therapist                   Obj/Interventions       [facility] Name 05/27/25 1047          Motor Skills    Therapeutic Exercise hip;ankle  -SC       Row Name 05/27/25 1047          Hip (Therapeutic Exercise)    Hip (Therapeutic Exercise) AROM (active range of motion);AAROM (active assistive range of motion)  -SC     Hip AROM (Therapeutic Exercise) right;flexion;extension;aBduction;aDduction;10 repetitions;supine  -SC     Hip AAROM (Therapeutic Exercise) left;aBduction;aDduction;10 repetitions;sitting  assisted SLR x10  -[facility] Name 05/27/25 1047          Ankle (Therapeutic Exercise)    Ankle (Therapeutic Exercise) AROM (active range of motion)  -SC     Ankle AROM (Therapeutic Exercise) bilateral;dorsiflexion;plantarflexion;10 repetitions  -SC       Row Name 05/27/25 1047          Balance    Balance Assessment standing static balance  -SC     Static Standing Balance moderate assist;2-person assist;verbal cues  -SC     Dynamic Standing Balance maximum assist;2-person assist;verbal cues  -SC     Position/Device Used, Standing Balance supported;walker, rolling  -SC     Comment, Balance Time taken in standing to work on standing balance with walker and upright posture. Patient required cleaning while in standing and stood for a long time.. good effort hear  -SC               User Key  (r) = Recorded By, (t) = Taken By, (c) = Cosigned By      Initials Name Provider Type    SC Lisa Cortes PT Physical Therapist                   Goals/Plan    No documentation.                  Clinical Impression       [facility] Name 05/27/25 1050          Pain    Pain Location extremity  -SC     Pain Side/Orientation left;lower  -SC     Pain Management Interventions positioning techniques utilized  -SC     Response to Pain Interventions functional ability unchanged  -SC     Additional Documentation Pain Scale: FACES Pre/Post-Treatment (Group)  -[facility] Name 05/27/25 1050          Pain Scale: FACES  Pre/Post-Treatment    Pain: FACES Scale, Pretreatment 4-->hurts little more  -SC     Posttreatment Pain Rating 4-->hurts little more  -SC       Row Name 05/27/25 1050          Plan of Care Review    Plan of Care Reviewed With patient  -SC     Outcome Evaluation Patient able to participate with all therapeutic activities. She is very fearful of mobilizing and is overall very weak. Recommend continued PT and rehab at discharge  -SC       Row Name 05/27/25 1050          Therapy Assessment/Plan (PT)    Patient/Family Therapy Goals Statement (PT) walk  -SC     Rehab Potential (PT) fair  -SC     Criteria for Skilled Interventions Met (PT) yes;meets criteria;skilled treatment is necessary  -SC     Therapy Frequency (PT) daily  -SC       Row Name 05/27/25 1050          Positioning and Restraints    Pre-Treatment Position in bed  -SC     Post Treatment Position chair  -SC     In Chair notified nsg;reclined;sitting;call light within reach;encouraged to call for assist;RUE elevated  -SC               User Key  (r) = Recorded By, (t) = Taken By, (c) = Cosigned By      Initials Name Provider Type    SC Lisa Cortes, PT Physical Therapist                   Outcome Measures       Row Name 05/27/25 1051          How much help from another person do you currently need...    Turning from your back to your side while in flat bed without using bedrails? 2  -SC     Moving from lying on back to sitting on the side of a flat bed without bedrails? 1  -SC     Moving to and from a bed to a chair (including a wheelchair)? 2  -SC     Standing up from a chair using your arms (e.g., wheelchair, bedside chair)? 2  -SC     Climbing 3-5 steps with a railing? 1  -SC     To walk in hospital room? 1  -SC     AM-PAC 6 Clicks Score (PT) 9  -SC     Highest Level of Mobility Goal Sit at Edge of Bed-3  -SC       Row Name 05/27/25 1051          Functional Assessment    Outcome Measure Options AM-PAC 6 Clicks Basic Mobility (PT)  -SC               User  Key  (r) = Recorded By, (t) = Taken By, (c) = Cosigned By      Initials Name Provider Type    Lisa Jordan PT Physical Therapist                                 Physical Therapy Education       Title: PT OT SLP Therapies (In Progress)       Topic: Physical Therapy (Done)       Point: Mobility training (Done)       Learning Progress Summary            Patient Eager, E, VU by SC at 5/27/2025 1052    Comment: reviewed HEP    Acceptance, E, VU by KG at 5/26/2025 1135    Eager, E, VU,DU,NR by SS at 5/25/2025 1157    Comment: Reviewed safety/technique w/bed mobility, transfers, PT POC    Acceptance, E, VU,NR by BC at 5/25/2025 0830    Acceptance, E, VU,DU,NR by SS at 5/24/2025 1528    Comment: Reviewed safety/technique w/bed mobility, transfers, HEP, PT POC    Acceptance, E, NR by CK at 5/23/2025 1454    Acceptance, E, NR by CK at 5/23/2025 1142    Acceptance, E, NR by CK at 5/22/2025 1344    Acceptance, E, NR by CK at 5/22/2025 1031    Acceptance, E, NR by CK at 5/21/2025 1614    Acceptance, E, NR,VU by CK at 5/21/2025 1006   Family Eager, E, VU,DU,NR by SS at 5/25/2025 1157    Comment: Reviewed safety/technique w/bed mobility, transfers, PT POC                      Point: Home exercise program (Done)       Learning Progress Summary            Patient Eager, E, VU by SC at 5/27/2025 1052    Comment: reviewed HEP    Acceptance, E, VU by KG at 5/26/2025 1135    Eager, E, VU,DU,NR by SS at 5/25/2025 1157    Comment: Reviewed safety/technique w/bed mobility, transfers, PT POC    Acceptance, E, VU,NR by BC at 5/25/2025 0830    Acceptance, E, VU,DU,NR by SS at 5/24/2025 1528    Comment: Reviewed safety/technique w/bed mobility, transfers, HEP, PT POC    Acceptance, E, NR by CK at 5/22/2025 1344    Acceptance, E, NR by CK at 5/22/2025 1031    Acceptance, E, NR by CK at 5/21/2025 1614   Family Eagaraseli, MIRELLA, FAMILIA,ANTHONY,NR by SS at 5/25/2025 1157    Comment: Reviewed safety/technique w/bed mobility, transfers, PT POC                       Point: Body mechanics (Done)       Learning Progress Summary            Patient Eager, E, VU by SC at 5/27/2025 1052    Comment: reviewed HEP    Acceptance, E, VU by KG at 5/26/2025 1135    Eager, E, VU,DU,NR by SS at 5/25/2025 1157    Comment: Reviewed safety/technique w/bed mobility, transfers, PT POC    Acceptance, E, VU,NR by BC at 5/25/2025 0830    Acceptance, E, VU,DU,NR by SS at 5/24/2025 1528    Comment: Reviewed safety/technique w/bed mobility, transfers, HEP, PT POC    Acceptance, E, NR by CK at 5/23/2025 1454    Acceptance, E, NR by CK at 5/23/2025 1142    Acceptance, E, NR by CK at 5/22/2025 1344    Acceptance, E, NR by CK at 5/22/2025 1031    Acceptance, E, NR by CK at 5/21/2025 1614    Acceptance, E, NR,VU by CK at 5/21/2025 1006   Family Eager, E, VU,DU,NR by SS at 5/25/2025 1157    Comment: Reviewed safety/technique w/bed mobility, transfers, PT POC                      Point: Precautions (Done)       Learning Progress Summary            Patient Eager, E, VU by SC at 5/27/2025 1052    Comment: reviewed HEP    Acceptance, E, VU by KG at 5/26/2025 1135    Eager, E, VU,DU,NR by SS at 5/25/2025 1157    Comment: Reviewed safety/technique w/bed mobility, transfers, PT POC    Acceptance, E, VU,NR by BC at 5/25/2025 0830    Acceptance, E, VU,DU,NR by SS at 5/24/2025 1528    Comment: Reviewed safety/technique w/bed mobility, transfers, HEP, PT POC    Acceptance, E, NR by CK at 5/23/2025 1454    Acceptance, E, NR by CK at 5/23/2025 1142    Acceptance, E, NR by CK at 5/22/2025 1344    Acceptance, E, NR by CK at 5/22/2025 1031    Acceptance, E, NR by CK at 5/21/2025 1614    Acceptance, E, NR,VU by CK at 5/21/2025 1006   Family MIRELLA Hurtado, FAMILIA,ANTHONY,NR by SS at 5/25/2025 1157    Comment: Reviewed safety/technique w/bed mobility, transfers, PT POC                                      User Key       Initials Effective Dates Name Provider Type Discipline    SC 02/03/23 -  Lisa Cortes, PT Physical Therapist PT     BC 04/27/21 -  Elisa Lundberg, RN Registered Nurse Nurse    KG 12/13/24 -  Clare Saravia Physical Therapist PT    SS 06/01/21 -  Aminta Rodriguez, GURPREET Physical Therapist PT    CK 02/06/24 -  Keke Cook, PT Physical Therapist PT                  PT Recommendation and Plan     Outcome Evaluation: Patient able to participate with all therapeutic activities. She is very fearful of mobilizing and is overall very weak. Recommend continued PT and rehab at discharge     Time Calculation:         PT Charges       Row Name 05/27/25 1010             Time Calculation    Start Time 1010  -SC      PT Received On 05/27/25  -SC      PT Goal Re-Cert Due Date 05/31/25  -SC         Timed Charges    11698 - PT Therapeutic Exercise Minutes 8  -SC      80968 - Gait Training Minutes  5  -SC      29533 - PT Therapeutic Activity Minutes 25  -SC         Total Minutes    Timed Charges Total Minutes 38  -SC       Total Minutes 38  -SC                User Key  (r) = Recorded By, (t) = Taken By, (c) = Cosigned By      Initials Name Provider Type    SC Sebastian, Lisa MANLEY, PT Physical Therapist                  Therapy Charges for Today       Code Description Service Date Service Provider Modifiers Qty    23260588553 HC PT THER SUPP EA 15 MIN 5/27/2025 Lisa Cortes, PT GP 2    94293922827 HC PT THER PROC EA 15 MIN 5/27/2025 Lisa Cortes, PT GP 1    54210331846 HC PT THERAPEUTIC ACT EA 15 MIN 5/27/2025 Lisa Cortes, PT GP 2            PT G-Codes  Outcome Measure Options: AM-PAC 6 Clicks Basic Mobility (PT)  AM-PAC 6 Clicks Score (PT): 9  AM-PAC 6 Clicks Score (OT): 13  PT Discharge Summary  Anticipated Discharge Disposition (PT): skilled nursing facility    Lisa Cortes, PT  5/27/2025

## 2025-05-27 NOTE — PLAN OF CARE
Goal Outcome Evaluation:       Patient has been alert and oriented x3-4 at times this shift,  family at bedside for different intervals, up in chair, working with therapy. Awaiting rehab placement. Barrier replaced on coccyx, heel dressing due for changing tomorrow. No further needs at this time

## 2025-05-27 NOTE — PROGRESS NOTES
[facility] Medicine Services  PROGRESS NOTE    Patient Name: Chey Robertson  : 1935  MRN: [id number]    Date of Admission: 2025  Primary Care Physician: Yasmeen Loomis MD    Subjective   Subjective     CC:  Femur fx     HPI:  Patient is resting in bed in NAD. No acute events overnight per nursing. Plan for rehab     Objective   Objective     Vital Signs:   Temp:  [97.4 °F (36.3 °C)-98.5 °F (36.9 °C)] 98.5 °F (36.9 °C)  Heart Rate:  [58-70] 64  Resp:  [16-18] 18  BP: (137-172)/(63-75) 156/69     Physical Exam:  Constitutional: No acute distress, awake, alert  HENT: NCAT, mucous membranes moist  Respiratory: Clear to auscultation bilaterally, respiratory effort normal room air 94%  Cardiovascular: RRR, no murmurs, rubs, or gallops  Gastrointestinal: Positive bowel sounds, soft, nontender, nondistended  Musculoskeletal: No bilateral ankle edema, left leg in immobilizer brace   Psychiatric: Appropriate affect, cooperative  Neurologic: Oriented x 2-3, strength symmetric in all extremities,speech clear  Skin: No rashes, pale       Results Reviewed:  LAB RESULTS:      Lab 25  0654 25  0724 25  0619 25  1256 25  0719 25  0610   WBC 1.29* 1.63* 1.65* 1.75* 2.27* 2.32*   HEMOGLOBIN 9.3* 9.6* 10.1* 9.8* 6.8*  6.8* 7.6*   HEMATOCRIT 29.4* 29.2* 31.4* 30.4* 20.5*  20.5* 24.2*   PLATELETS 83* 71* 74* 72* 81* 56*   NEUTROS ABS  --   --  1.13* 1.37*  --  2.01   IMMATURE GRANS (ABS)  --   --  0.06* 0.05  --  0.05   LYMPHS ABS  --   --  0.27* 0.16*  --  0.10*   MONOS ABS  --   --  0.19 0.17  --  0.15   EOS ABS  --   --  0.00 0.00  --  0.01   MCV 88.0 86.9 87.5 87.6 89.1 92.0         Lab 25  0654 25  0619 25  1256 25  0719 25  0610   SODIUM 139 135* 134* 137 133*   POTASSIUM 4.1 4.3 4.6 4.6 5.0   CHLORIDE 105 101 101 104 103   CO2 23.0 23.0 21.0* 23.0 16.0*   ANION GAP 11.0 11.0 12.0 10.0 14.0   BUN 14 22 23 23 21    CREATININE 0.73 0.82 0.96 1.21* 1.12*   EGFR 79.2 68.9 57.0* 43.2* 47.4*   GLUCOSE 134* 115* 192* 145* 314*   CALCIUM 8.2* 8.3* 8.1* 7.9* 7.8*   PHOSPHORUS  --   --   --   --  4.3         Lab 05/26/25  0654 05/21/25  0610   TOTAL PROTEIN 4.8*  --    ALBUMIN 2.8* 2.9*   GLOBULIN 2.0  --    ALT (SGPT) <5  --    AST (SGOT) 8  --    BILIRUBIN 0.5  --    ALK PHOS 95  --                  Lab 05/22/25  1008   ABO TYPING O   RH TYPING Negative   ANTIBODY SCREEN Positive         Brief Urine Lab Results  (Last result in the past 365 days)        Color   Clarity   Blood   Leuk Est   Nitrite   Protein   CREAT   Urine HCG        05/17/25 2140 Yellow   Turbid   Negative   Large (3+)   Negative   30 mg/dL (1+)                   Microbiology Results Abnormal       Procedure Component Value - Date/Time    Urine Culture - Urine, Indwelling Urethral Catheter [[id number]]  (Abnormal)  (Susceptibility) Collected: 05/17/25 2140    Lab Status: Final result Specimen: Urine from Indwelling Urethral Catheter Updated: 05/21/25 0821     Urine Culture >100,000 CFU/mL Klebsiella pneumoniae ssp pneumoniae    Narrative:      Colonization of the urinary tract without infection is common. Treatment is discouraged unless the patient is symptomatic, pregnant, or undergoing an invasive urologic procedure.    Susceptibility        Klebsiella pneumoniae ssp pneumoniae      JUAN F      Amoxicillin + Clavulanate Susceptible      Ampicillin Resistant      Ampicillin + Sulbactam Susceptible      Cefazolin (Urine) Susceptible      Cefepime Susceptible      Ceftazidime Susceptible      Ceftriaxone Susceptible      Ciprofloxacin Resistant      Gentamicin Susceptible      Levofloxacin Resistant      Nitrofurantoin Intermediate      Piperacillin + Tazobactam Susceptible      Trimethoprim + Sulfamethoxazole Susceptible                                   No radiology results from the last 24 hrs    Results for orders placed during the hospital encounter of  06/05/23    Adult Transthoracic Echo Complete w/ Color, Spectral and Contrast if necessary per protocol    Interpretation Summary    Left ventricular systolic function is normal. Left ventricular ejection fraction appears to be 56 - 60%.    Left ventricular diastolic function was normal.    Estimated right ventricular systolic pressure from tricuspid regurgitation is normal (<35 mmHg).      Current medications:  Scheduled Meds:acetaminophen, 1,000 mg, Oral, Q8H  amiodarone, 200 mg, Oral, Daily  amLODIPine, 10 mg, Oral, Q24H  apixaban, 2.5 mg, Oral, BID  doxycycline, 100 mg, Oral, Q12H  insulin glargine, 10 Units, Subcutaneous, Daily  insulin lispro, 2-7 Units, Subcutaneous, 4x Daily AC & at Bedtime  levothyroxine, 100 mcg, Oral, Q AM  lisinopril, 10 mg, Oral, Daily  metoprolol tartrate, 25 mg, Oral, BID  pantoprazole, 40 mg, Oral, Q AM  sodium chloride, 10 mL, Intravenous, Q12H      Continuous Infusions:ropivacaine,       PRN Meds:.  acetaminophen **OR** acetaminophen **OR** acetaminophen    senna-docusate sodium **AND** polyethylene glycol **AND** bisacodyl **AND** bisacodyl    Calcium Replacement - Follow Nurse / BPA Driven Protocol    dextrose    dextrose    diphenhydrAMINE **OR** diphenhydrAMINE    glucagon (human recombinant)    HYDROmorphone **AND** naloxone    Magnesium Standard Dose Replacement - Follow Nurse / BPA Driven Protocol    melatonin    Morphine    nitroglycerin    ondansetron ODT **OR** ondansetron    oxyCODONE    oxyCODONE    Phosphorus Replacement - Follow Nurse / BPA Driven Protocol    Potassium Replacement - Follow Nurse / BPA Driven Protocol    sodium chloride    traMADol    Assessment & Plan   Assessment & Plan     Active Hospital Problems    Diagnosis  POA    **Femur fracture [S72.90XA]  Yes    Type 2 diabetes mellitus with complication, with long-term current use of insulin [E11.8, Z79.4]  Not Applicable    Periprosthetic fracture around prosthetic knee [M97.8XXA, Z96.369]  Not Applicable     Falls [R29.6]  Not Applicable    Atrial fibrillation [I48.91]  Yes    Chronic anticoagulation [Z79.01]  Not Applicable    Essential hypertension [I10]  Yes    Hypothyroidism (acquired) [E03.9]  Yes      Resolved Hospital Problems   No resolved problems to display.        Brief Hospital Course to date:  Chey Robertson is a 89 y.o. female with history of afib (on eliquis), HTN, DM2, hypothyroidism, MDS (w/ pancytopenia, followed by Dr. Lyman), previous PE (treated w eliquis), previous left knee replacement. Presented to [facility] ED w/ left knee pain after sustaining a fall at her SNF. CT imaging revealed left distal femoral impaction fracture.     This patient's problems and plans were partially entered by my partner and updated as appropriate by me 05/27/25.      Left supracondylar fracture of the distal femur.   Hx previous left knee replacement  -Orthopedic surgery evaluated, s/p repair 5/20  -- leg immobilizer for two weeks   -Due to magnitude of surgery ortho requested eliquis be held ~72 hours prior to surgery, but Hb dropped to 6.8 on 5/22, okay to resume per ortho at 2.5mg BID x1 week, then resume usual dosing thereafter (usual dosing is 2.5mg BID per outpatient fill hx on this patient)   -PT/OT, plan rehab   -as needed pain control     Parox afib (currently in sinus)  Chronic HFpEF  HTN  -echo 6/2023: LV EF 56-60%, valves ok  - Continue amiodarone & metoprolol, lisinopril  - Eliquis resumed on 5/23; monitor Hgb, so far okay  -- norvasc added, lisinopril dose increased to 10 mg         UTI  - Completed rocephin for 5 days  - Urine culture with Klebsiella, sensitive to Rocephin     Hx PE (perioperative after previous knee replacement)     MDS  Pancytopenia  Iron deficiency  -follows w/ Dr. Lyman (last seen 3/3/25)   -typical plts range 40,000-60,000  -Transfused platelets 5/20  -Completed 3 days of IV iron, last dose 5/20  -2 units of PRBCs given 5/22, robust Hgb response, monitor     DM2  -A1c 6.2  -on januvia  and metformin  - Continue ssi, lantus 10 units am     Hypothyroidism  -cont levothyroxine          Expected Discharge Location and Transportation: rehab   Expected Discharge   Expected Discharge Date: 5/27/2025; Expected Discharge Time:      VTE Prophylaxis:  Pharmacologic & mechanical VTE prophylaxis orders are present.         AM-PAC 6 Clicks Score (PT): 11 (05/26/25 2040)    CODE STATUS:   Code Status and Medical Interventions: CPR (Attempt to Resuscitate); Full   Ordered at: 05/20/25 2049     Code Status (Patient has no pulse and is not breathing):    CPR (Attempt to Resuscitate)     Medical Interventions (Patient has pulse or is breathing):    Full     Level Of Support Discussed With:    Patient       Camelia Guerra, APRN  05/27/25

## 2025-05-27 NOTE — THERAPY TREATMENT NOTE
Patient Name: Chey Robertson  : 1935    MRN: [id number]                              Today's Date: 2025       Admit Date: 2025    Visit Dx:     ICD-10-CM ICD-9-CM   1. Fall at nursing home, initial encounter  W19.XXXA E888.9    Y92.129 E849.7   2. 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 Anticoagulated  Z79.01 V58.61   4. Periprosthetic fracture around internal prosthetic right knee joint, subsequent encounter  M97.11XD V54.89     Patient Active Problem List   Diagnosis    Benign familial tremor    AMS (altered mental status)    Pancytopenia    Hypothyroidism (acquired)    B12 deficiency    Abnormal intestinal absorption    Iron deficiency anemia due to chronic blood loss    Myelodysplasia (myelodysplastic syndrome)    Personal history of pulmonary embolism    Chronic anticoagulation    Essential hypertension    Type 2 diabetes mellitus without complication, without long-term current use of insulin    Atrial fibrillation    QT prolongation    Pericardial effusion    Severe malnutrition    Closed displaced intertrochanteric fracture of right femur, initial encounter    DISHA (acute kidney injury)    Moderate malnutrition    Falls    Type 2 diabetes mellitus with complication, with long-term current use of insulin    Periprosthetic fracture around prosthetic knee    Femur fracture     Past Medical History:   Diagnosis Date    A-fib     on eliquis    Anemia     Arthritis     Diabetes mellitus     checks sugar only when pt wants to     Disease of thyroid gland     History of transfusion     with knee surgery-  no reaction recalled.     [place] (hard of hearing)     no hearing aids    Hypertension     Migraine without aura and without status migrainosus, not intractable 2016    Myelodysplastic syndrome     Pulmonary emboli     (after knee surgery)    SOBOE (shortness of breath on exertion)     Tremors of nervous system     Vertigo      Wears dentures     upper     Wears glasses      Past Surgical History:   Procedure Laterality Date    BLADDER SURGERY      CATARACT EXTRACTION      bilat     CHOLECYSTECTOMY      COLONOSCOPY      HIP TROCHANTERIC NAILING WITH INTRAMEDULLARY HIP SCREW Right 6/20/2023    Procedure: HIP TROCHANTERIC NAILING WITH INTRAMEDULLARY HIP SCREW RIGHT;  Surgeon: Augie Hobbs MD;  Location: [facility] OR;  Service: Orthopedics;  Laterality: Right;    HYSTERECTOMY      with bso    KYPHOPLASTY N/A 02/12/2021    Procedure: KYPHOPLASTY T11, T12 AND L4;  Surgeon: Matty Hearn MD;  Location: [facility] OR;  Service: Orthopedic Spine;  Laterality: N/A;    TONSILLECTOMY        General Information       Row Name 05/27/25 1311          OT Time and Intention    Document Type therapy note (daily note)  -TB     Mode of Treatment occupational therapy;individual therapy  -TB     Patient Effort good  -TB     Symptoms Noted During/After Treatment none  -TB       Row Name 05/27/25 1311          General Information    Patient Profile Reviewed yes  -TB     Existing Precautions/Restrictions fall;brace on at all times;other (see comments)  KI on x 2 weeks, no ROM, WBAT  -TB     Barriers to Rehab medically complex;previous functional deficit;cognitive status  -TB       Row Name 05/27/25 1311          Cognition    Orientation Status (Cognition) oriented to;person;place;verbal cues/prompts needed for orientation  -TB       Row Name 05/27/25 1311          Safety Issues/Impairments Affecting Functional Mobility    Safety Issues Affecting Function (Mobility) insight into deficits/self-awareness;awareness of need for assistance;safety precaution awareness;safety precautions follow-through/compliance;sequencing abilities;problem-solving  -TB     Impairments Affecting Function (Mobility) pain;strength;range of motion (ROM);postural/trunk control;endurance/activity tolerance;balance  -TB               User Key  (r) = Recorded By, (t) = Taken By, (c) = Cosigned  By      Initials Name Provider Type    TB Mary Buckley, OT Occupational Therapist                     Mobility/ADL's       Row Name 05/27/25 1313          Bed Mobility    Comment, (Bed Mobility) UIC  -       Row Name 05/27/25 1313          Transfers    Comment, (Transfers) Max A to reposition in chair for comfort and activity. Presents with posterior lean in sitting. Strong assist to come to stand x2 reps. KI in place and education reinforced for KI AAT x2 weeks.  -TB       Row Name 05/27/25 1313          Sit-Stand Transfer    Sit-Stand [place] (Transfers) maximum assist (25% patient effort);verbal cues  -TB     Assistive Device (Sit-Stand Transfers) walker, front-wheeled  -TB     Comment, (Sit-Stand Transfer) Education, cues, and assist for hand placement and to bring LLE under pt in standing.  -       Row Name 05/27/25 1313          Stand-Sit Transfer    Stand-Sit [place] (Transfers) maximum assist (25% patient effort);verbal cues  -TB     Assistive Device (Stand-Sit Transfers) walker, front-wheeled  -TB     Comment, (Stand-Sit Transfer) Assist to advance LLE and lower with control  -       Row Name 05/27/25 1313          Functional Mobility    Patient was able to Ambulate no, other medical factors prevent ambulation  -TB     Reason Patient was unable to Ambulate Excessive Weakness  -       Row Name 05/27/25 1313          Activities of Daily Living    BADL Assessment/Intervention upper body dressing;bathing;grooming;feeding  -       Row Name 05/27/25 1313          Mobility    Extremity Weight-bearing Status left lower extremity  -TB     Left Lower Extremity (Weight-bearing Status) weight-bearing as tolerated (WBAT)  -       Row Name 05/27/25 1313          Grooming Assessment/Training    [place] Level (Grooming) set up;wash face, hands;hair care, combing/brushing  -     Position (Grooming) supported sitting  -       Row Name 05/27/25 1313          Upper Body Dressing  Assessment/Training    [place] Level (Upper Body Dressing) doff;don;pajama/robe;minimum assist (75% patient effort);verbal cues  -TB     Position (Upper Body Dressing) supported sitting  -TB       Row Name 05/27/25 1313          Bathing Assessment/Intervention    [place] Level (Bathing) upper body;bathing skills;minimum assist (75% patient effort);verbal cues  -TB     Position (Bathing) supported sitting  -TB               User Key  (r) = Recorded By, (t) = Taken By, (c) = Cosigned By      Initials Name Provider Type    TB Mary Buckley OT Occupational Therapist                   Obj/Interventions       Row Name 05/27/25 1319          Balance    Balance Assessment sitting static balance;sitting dynamic balance;sit to stand dynamic balance;standing static balance  -TB     Static Sitting Balance contact guard  -TB     Dynamic Sitting Balance minimal assist  -TB     Position, Sitting Balance supported;sitting in chair  -TB     Sit to Stand Dynamic Balance maximum assist;verbal cues  -TB     Static Standing Balance maximum assist;verbal cues  -TB     Position/Device Used, Standing Balance supported;walker, front-wheeled  -TB     Balance Interventions sitting;standing;sit to stand;supported;static;occupation based/functional task  -TB     Comment, Balance Pt presents with posterior lean in sit and stand requiring cues and assist to achieve and maintain midline.  -TB               User Key  (r) = Recorded By, (t) = Taken By, (c) = Cosigned By      Initials Name Provider Type    TB Mary Buckley OT Occupational Therapist                   Goals/Plan    No documentation.                  Clinical Impression       Row Name 05/27/25 1321          Pain Assessment    Response to Pain Interventions activity participation with tolerable pain;activity level improved  -     Additional Documentation Pain Scale: FACES Pre/Post-Treatment (Group)  -TB       Row Name 05/27/25 1321          Pain Scale:  FACES Pre/Post-Treatment    Pain: FACES Scale, Pretreatment 2-->hurts little bit  -TB     Posttreatment Pain Rating 4-->hurts little more  -TB       Row Name 05/27/25 1321          Plan of Care Review    Plan of Care Reviewed With patient  -TB     Progress no change  -TB     Outcome Evaluation Pt remains below her baseline with strength, balance, and LLE ROM deficits (in KI AAT x2 weeks). Max A to stand and reposition in chair for comfort and activity. Min A UB bathing and dressing. Set-up simple grooming. Pt is Dependent for LB ADLs and toileting at this time. OT will continue to follow IP. Recommend SNF when pt is medically ready.  -TB       Row Name 05/27/25 1321          Therapy Plan Review/Discharge Plan (OT)    Anticipated Discharge Disposition (OT) skilled nursing facility  -TB       Row Name 05/27/25 1321          Vital Signs    Pre Systolic BP Rehab --  RN cleared OT  -TB     Pre SpO2 (%) 94  -TB     O2 Delivery Pre Treatment room air  -TB     Pre Patient Position Sitting  -TB     Post Patient Position Sitting  -TB       Row Name 05/27/25 1321          Positioning and Restraints    Pre-Treatment Position sitting in chair/recliner  -TB     Post Treatment Position chair  -TB     In Chair notified nsg;reclined;call light within reach;encouraged to call for assist;exit alarm on;on mechanical lift sling;waffle cushion;legs elevated;with brace  -TB               User Key  (r) = Recorded By, (t) = Taken By, (c) = Cosigned By      Initials Name Provider Type    TB Mary Buckley, OT Occupational Therapist                   Outcome Measures       Row Name 05/27/25 1051          How much help from another person do you currently need...    Turning from your back to your side while in flat bed without using bedrails? 2  -SC     Moving from lying on back to sitting on the side of a flat bed without bedrails? 1  -SC     Moving to and from a bed to a chair (including a wheelchair)? 2  -SC     Standing up from a  chair using your arms (e.g., wheelchair, bedside chair)? 2  -SC     Climbing 3-5 steps with a railing? 1  -SC     To walk in hospital room? 1  -SC     AM-PAC 6 Clicks Score (PT) 9  -SC     Highest Level of Mobility Goal Sit at Edge of Bed-3  -SC       Row Name 05/27/25 1051          Functional Assessment    Outcome Measure Options AM-PAC 6 Clicks Basic Mobility (PT)  -SC               User Key  (r) = Recorded By, (t) = Taken By, (c) = Cosigned By      Initials Name Provider Type    SC Lisa Cortes, PT Physical Therapist                    Occupational Therapy Education       Title: PT OT SLP Therapies (In Progress)       Topic: Occupational Therapy (In Progress)       Point: ADL training (Done)       Learning Progress Summary            Patient Acceptance, E,D, VU,NR by  at 5/27/2025 1324    Acceptance, E, NR by  at 5/26/2025 1400                      Point: Precautions (Done)       Learning Progress Summary            Patient Acceptance, E,D, VU,NR by  at 5/27/2025 1324                      Point: Body mechanics (In Progress)       Learning Progress Summary            Patient Acceptance, E, NR by  at 5/26/2025 1400                                      User Key       Initials Effective Dates Name Provider Type Discipline     07/11/23 -  Mary Buckley, OT Occupational Therapist OT     04/16/25 -  Felix Pat OT Occupational Therapist OT                  OT Recommendation and Plan  Therapy Frequency (OT): daily  Plan of Care Review  Plan of Care Reviewed With: patient  Progress: no change  Outcome Evaluation: Pt remains below her baseline with strength, balance, and LLE ROM deficits (in KI AAT x2 weeks). Max A to stand and reposition in chair for comfort and activity. Min A UB bathing and dressing. Set-up simple grooming. Pt is Dependent for LB ADLs and toileting at this time. OT will continue to follow IP. Recommend SNF when pt is medically ready.     Time Calculation:   Evaluation  Complexity (OT)  Review Occupational Profile/Medical/Therapy History Complexity: expanded/moderate complexity  Assessment, Occupational Performance/Identification of Deficit Complexity: 3-5 performance deficits  Clinical Decision Making Complexity (OT): detailed assessment/moderate complexity  Overall Complexity of Evaluation (OT): moderate complexity     Time Calculation- OT       Row Name 05/27/25 1108 05/27/25 1010          Time Calculation- OT    OT Start Time 1108  -TB --     OT Received On 05/27/25  -TB --     OT Goal Re-Cert Due Date 05/31/25  -TB --        Timed Charges    44828 - Gait Training Minutes  -- 5  -SC     10115 - OT Self Care/Mgmt Minutes 24  -TB --        Total Minutes    Timed Charges Total Minutes 24  -TB 5  -SC      Total Minutes 24  -TB 5  -SC               User Key  (r) = Recorded By, (t) = Taken By, (c) = Cosigned By      Initials Name Provider Type    SC Lisa Cortes, PT Physical Therapist    TB Mary Buckley, OT Occupational Therapist                  Therapy Charges for Today       Code Description Service Date Service Provider Modifiers Qty    [id number] HC OT SELF CARE/MGMT/TRAIN EA 15 MIN 5/27/2025 Mary Buckley OT GO 2                 Mary Buckley OT  5/27/2025

## 2025-05-27 NOTE — PLAN OF CARE
Goal Outcome Evaluation:  Plan of Care Reviewed With: patient           Outcome Evaluation: Patient able to participate with all therapeutic activities. She is very fearful of mobilizing and is overall very weak. Recommend continued PT and rehab at discharge    Anticipated Discharge Disposition (PT): skilled nursing facility

## 2025-05-27 NOTE — PLAN OF CARE
"Disoriented to time ( year is \" 1984\" ), patient is confused. Conversant / follows commands. Present with PI on left heel ( silicon border foam in place ) / left gluteal with non blanchable purple area. Area cleansed / silicon border foam applied. Heels elevated in boots. Coarse foam dressing to NPWV insertion site CDI. Immobilizer in place. On RA. SBP remains elevated. Denies pain / discomfort at this time. Q2T&R in place.                                             "

## 2025-05-27 NOTE — PLAN OF CARE
Problem: Adult Inpatient Plan of Care  Goal: Plan of Care Review  Recent Flowsheet Documentation  Taken 5/27/2025 1321 by Mary Buckley OT  Progress: no change  Outcome Evaluation: Pt remains below her baseline with strength, balance, and LLE ROM deficits (in KI AAT x2 weeks). Max A to stand and reposition in chair for comfort and activity. Min A UB bathing and dressing. Set-up simple grooming. Pt is Dependent for LB ADLs and toileting at this time. OT will continue to follow IP. Recommend SNF when pt is medically ready.

## 2025-05-27 NOTE — CASE MANAGEMENT/SOCIAL WORK
Continued Stay Note  [facility]     Patient Name: Chey Robertson  MRN: [id number]  Today's Date: 5/27/2025    Admit Date: 5/17/2025    Plan: Skilled rehab   Discharge Plan       Row Name 05/27/25 1403       Plan    Plan Skilled rehab    Patient/Family in Agreement with Plan yes    Plan Comments Spoke with patient's son, Joe, over the phone to discuss discharge plan. Patient's plan is skilled rehab. [facility] offer patient a rehab bed. [facility] can also offer patient a rehab bed at [facility]. Joe plans to tour facilities today before making a decision. Patient will need precertification for insurance approval. CM will continue to follow and assist with discharge planning.    Final Discharge Disposition Code 03 - skilled nursing facility (SNF)                   Discharge Codes    No documentation.                 Expected Discharge Date and Time       Expected Discharge Date Expected Discharge Time    May 27, 2025               Augie Cordova RN

## 2025-05-28 LAB
GLUCOSE BLDC GLUCOMTR-MCNC: 115 MG/DL (ref 70–130)
GLUCOSE BLDC GLUCOMTR-MCNC: 184 MG/DL (ref 70–130)
GLUCOSE BLDC GLUCOMTR-MCNC: 205 MG/DL (ref 70–130)
GLUCOSE BLDC GLUCOMTR-MCNC: 230 MG/DL (ref 70–130)
GLUCOSE BLDC GLUCOMTR-MCNC: 254 MG/DL (ref 70–130)

## 2025-05-28 PROCEDURE — 25010000002 NA FERRIC GLUC CPLX PER 12.5 MG

## 2025-05-28 PROCEDURE — 82948 REAGENT STRIP/BLOOD GLUCOSE: CPT

## 2025-05-28 PROCEDURE — 99232 SBSQ HOSP IP/OBS MODERATE 35: CPT

## 2025-05-28 PROCEDURE — 63710000001 INSULIN GLARGINE PER 5 UNITS: Performed by: NURSE PRACTITIONER

## 2025-05-28 PROCEDURE — 97530 THERAPEUTIC ACTIVITIES: CPT

## 2025-05-28 PROCEDURE — 63710000001 INSULIN LISPRO (HUMAN) PER 5 UNITS: Performed by: ORTHOPAEDIC SURGERY

## 2025-05-28 PROCEDURE — 25810000003 SODIUM CHLORIDE 0.9 % SOLUTION 250 ML FLEX CONT

## 2025-05-28 RX ADMIN — INSULIN LISPRO 3 UNITS: 100 INJECTION, SOLUTION INTRAVENOUS; SUBCUTANEOUS at 12:00

## 2025-05-28 RX ADMIN — ACETAMINOPHEN 1000 MG: 500 TABLET ORAL at 05:54

## 2025-05-28 RX ADMIN — ACETAMINOPHEN 1000 MG: 500 TABLET ORAL at 22:21

## 2025-05-28 RX ADMIN — POLYETHYLENE GLYCOL 3350 17 G: 17 POWDER, FOR SOLUTION ORAL at 08:42

## 2025-05-28 RX ADMIN — DOXYCYCLINE 100 MG: 100 CAPSULE ORAL at 22:22

## 2025-05-28 RX ADMIN — Medication 10 ML: at 22:22

## 2025-05-28 RX ADMIN — DOXYCYCLINE 100 MG: 100 CAPSULE ORAL at 08:42

## 2025-05-28 RX ADMIN — ACETAMINOPHEN 1000 MG: 500 TABLET ORAL at 14:22

## 2025-05-28 RX ADMIN — METOPROLOL TARTRATE 25 MG: 25 TABLET, FILM COATED ORAL at 08:42

## 2025-05-28 RX ADMIN — APIXABAN 2.5 MG: 2.5 TABLET, FILM COATED ORAL at 22:21

## 2025-05-28 RX ADMIN — LEVOTHYROXINE SODIUM 100 MCG: 0.1 TABLET ORAL at 05:54

## 2025-05-28 RX ADMIN — METOPROLOL TARTRATE 25 MG: 25 TABLET, FILM COATED ORAL at 22:22

## 2025-05-28 RX ADMIN — PANTOPRAZOLE SODIUM 40 MG: 40 TABLET, DELAYED RELEASE ORAL at 05:54

## 2025-05-28 RX ADMIN — LISINOPRIL 10 MG: 10 TABLET ORAL at 08:42

## 2025-05-28 RX ADMIN — AMLODIPINE BESYLATE 10 MG: 10 TABLET ORAL at 08:42

## 2025-05-28 RX ADMIN — SODIUM CHLORIDE 250 MG: 900 INJECTION, SOLUTION INTRAVENOUS at 14:22

## 2025-05-28 RX ADMIN — INSULIN GLARGINE 10 UNITS: 100 INJECTION, SOLUTION SUBCUTANEOUS at 08:42

## 2025-05-28 RX ADMIN — APIXABAN 2.5 MG: 2.5 TABLET, FILM COATED ORAL at 08:42

## 2025-05-28 RX ADMIN — INSULIN LISPRO 3 UNITS: 100 INJECTION, SOLUTION INTRAVENOUS; SUBCUTANEOUS at 22:33

## 2025-05-28 RX ADMIN — Medication 10 ML: at 08:42

## 2025-05-28 RX ADMIN — AMIODARONE HYDROCHLORIDE 200 MG: 200 TABLET ORAL at 08:42

## 2025-05-28 NOTE — PROGRESS NOTES
[facility] Medicine Services  PROGRESS NOTE    Patient Name: Chey Robertson  : 1935  MRN: [id number]    Date of Admission: 2025  Primary Care Physician: Yasmeen Loomis MD    Subjective   Subjective     CC:  Femur fx     HPI:  No significant overnight events, patient doing well this a.m., no complaints otherwise.  Tolerating diet well.  Endorsing mild left knee pain.    Objective   Objective     Vital Signs:   Temp:  [97.9 °F (36.6 °C)-98.1 °F (36.7 °C)] 98.1 °F (36.7 °C)  Heart Rate:  [57-71] 61  Resp:  [18] 18  BP: (155-158)/(70-79) 158/79     Physical Exam:  Constitutional: No acute distress, awake, alert  HENT: NCAT, mucous membranes moist  Respiratory: Clear to auscultation bilaterally, respiratory effort normal room air 94%  Cardiovascular: RRR, no murmurs, rubs, or gallops  Gastrointestinal: Positive bowel sounds, soft, nontender, nondistended  Musculoskeletal: No bilateral ankle edema, left leg in immobilizer brace   Psychiatric: Appropriate affect, cooperative  Neurologic: Oriented x 2-3, strength symmetric in all extremities,speech clear  Skin: No rashes, pale       Results Reviewed:  LAB RESULTS:      Lab 25  0654 25  0724 25  0619 25  1256 25  0719   WBC 1.29* 1.63* 1.65* 1.75* 2.27*   HEMOGLOBIN 9.3* 9.6* 10.1* 9.8* 6.8*  6.8*   HEMATOCRIT 29.4* 29.2* 31.4* 30.4* 20.5*  20.5*   PLATELETS 83* 71* 74* 72* 81*   NEUTROS ABS  --   --  1.13* 1.37*  --    IMMATURE GRANS (ABS)  --   --  0.06* 0.05  --    LYMPHS ABS  --   --  0.27* 0.16*  --    MONOS ABS  --   --  0.19 0.17  --    EOS ABS  --   --  0.00 0.00  --    MCV 88.0 86.9 87.5 87.6 89.1         Lab 25  0654 25  0619 25  1256 25  0719   SODIUM 139 135* 134* 137   POTASSIUM 4.1 4.3 4.6 4.6   CHLORIDE 105 101 101 104   CO2 23.0 23.0 21.0* 23.0   ANION GAP 11.0 11.0 12.0 10.0   BUN 14 22 23 23   CREATININE 0.73 0.82 0.96 1.21*   EGFR 79.2 68.9 57.0* 43.2*    GLUCOSE 134* 115* 192* 145*   CALCIUM 8.2* 8.3* 8.1* 7.9*         Lab 05/26/25  0654   TOTAL PROTEIN 4.8*   ALBUMIN 2.8*   GLOBULIN 2.0   ALT (SGPT) <5   AST (SGOT) 8   BILIRUBIN 0.5   ALK PHOS 95                 Lab 05/22/25  1008   ABO TYPING O   RH TYPING Negative   ANTIBODY SCREEN Positive         Brief Urine Lab Results  (Last result in the past 365 days)        Color   Clarity   Blood   Leuk Est   Nitrite   Protein   CREAT   Urine HCG        05/17/25 2140 Yellow   Turbid   Negative   Large (3+)   Negative   30 mg/dL (1+)                   Microbiology Results Abnormal       Procedure Component Value - Date/Time    Urine Culture - Urine, Indwelling Urethral Catheter [[id number]]  (Abnormal)  (Susceptibility) Collected: 05/17/25 2140    Lab Status: Final result Specimen: Urine from Indwelling Urethral Catheter Updated: 05/21/25 0821     Urine Culture >100,000 CFU/mL Klebsiella pneumoniae ssp pneumoniae    Narrative:      Colonization of the urinary tract without infection is common. Treatment is discouraged unless the patient is symptomatic, pregnant, or undergoing an invasive urologic procedure.    Susceptibility        Klebsiella pneumoniae ssp pneumoniae      JUAN F      Amoxicillin + Clavulanate Susceptible      Ampicillin Resistant      Ampicillin + Sulbactam Susceptible      Cefazolin (Urine) Susceptible      Cefepime Susceptible      Ceftazidime Susceptible      Ceftriaxone Susceptible      Ciprofloxacin Resistant      Gentamicin Susceptible      Levofloxacin Resistant      Nitrofurantoin Intermediate      Piperacillin + Tazobactam Susceptible      Trimethoprim + Sulfamethoxazole Susceptible                                   No radiology results from the last 24 hrs    Results for orders placed during the hospital encounter of 06/05/23    Adult Transthoracic Echo Complete w/ Color, Spectral and Contrast if necessary per protocol    Interpretation Summary    Left ventricular systolic function is normal. Left  ventricular ejection fraction appears to be 56 - 60%.    Left ventricular diastolic function was normal.    Estimated right ventricular systolic pressure from tricuspid regurgitation is normal (<35 mmHg).      Current medications:  Scheduled Meds:acetaminophen, 1,000 mg, Oral, Q8H  amiodarone, 200 mg, Oral, Daily  amLODIPine, 10 mg, Oral, Q24H  apixaban, 2.5 mg, Oral, BID  doxycycline, 100 mg, Oral, Q12H  insulin glargine, 10 Units, Subcutaneous, Daily  insulin lispro, 2-7 Units, Subcutaneous, 4x Daily AC & at Bedtime  levothyroxine, 100 mcg, Oral, Q AM  lisinopril, 10 mg, Oral, Daily  metoprolol tartrate, 25 mg, Oral, BID  pantoprazole, 40 mg, Oral, Q AM  sodium chloride, 10 mL, Intravenous, Q12H      Continuous Infusions:ropivacaine,       PRN Meds:.  acetaminophen **OR** acetaminophen **OR** acetaminophen    senna-docusate sodium **AND** polyethylene glycol **AND** bisacodyl **AND** bisacodyl    Calcium Replacement - Follow Nurse / BPA Driven Protocol    dextrose    dextrose    diphenhydrAMINE **OR** diphenhydrAMINE    glucagon (human recombinant)    HYDROmorphone **AND** naloxone    Magnesium Standard Dose Replacement - Follow Nurse / BPA Driven Protocol    melatonin    Morphine    nitroglycerin    ondansetron ODT **OR** ondansetron    oxyCODONE    oxyCODONE    Phosphorus Replacement - Follow Nurse / BPA Driven Protocol    Potassium Replacement - Follow Nurse / BPA Driven Protocol    sodium chloride    Assessment & Plan   Assessment & Plan     Active Hospital Problems    Diagnosis  POA    **Femur fracture [S72.90XA]  Yes    Type 2 diabetes mellitus with complication, with long-term current use of insulin [E11.8, Z79.4]  Not Applicable    Periprosthetic fracture around prosthetic knee [M97.8XXA, Z96.659]  Not Applicable    Falls [R29.6]  Not Applicable    Atrial fibrillation [I48.91]  Yes    Chronic anticoagulation [Z79.01]  Not Applicable    Essential hypertension [I10]  Yes    Hypothyroidism (acquired) [E03.9]   Yes      Resolved Hospital Problems   No resolved problems to display.        Brief Hospital Course to date:  Chey Robertson is a 89 y.o. female with history of afib (on eliquis), HTN, DM2, hypothyroidism, MDS (w/ pancytopenia, followed by Dr. Lyman), previous PE (treated w eliquis), previous left knee replacement. Presented to [facility] ED w/ left knee pain after sustaining a fall at her SNF. CT imaging revealed left distal femoral impaction fracture.     This patient's problems and plans were partially entered by my partner and updated as appropriate by me 05/27/25.      Left supracondylar fracture of the distal femur.   Hx previous left knee replacement  Orthopedic surgery evaluated, s/p repair 5/20  Due to magnitude of surgery ortho requested eliquis be held ~72 hours prior to surgery, but Hb dropped to 6.8 on 5/22, okay to resume per ortho at 2.5mg BID x1 week, then resume usual dosing thereafter (usual dosing is 2.5mg BID per outpatient fill hx on this patient)   PT/OT, plan rehab   as needed pain control  Okay for discharge tomorrow from orthopedic perspective, recommending knee immobilizer for 2 weeks postoperatively to allow for wound healing with no flexion of the knee  Also recommending continuation of 2 weeks of oral antibiotics     Parox afib (currently in sinus)  Chronic HFpEF  HTN  echo 6/2023: LV EF 56-60%, valves ok  Continue amiodarone & metoprolol, lisinopril  Eliquis resumed on 5/23; monitor Hgb, so far okay  norvasc added, lisinopril dose increased to 10 mg, blood pressure is tolerable at this time, would not opt to aggressively manage her blood pressure given high fall risk as she is elderly     UTI  Completed rocephin for 5 days  Urine culture with Klebsiella, sensitive to Rocephin     Hx PE (perioperative after previous knee replacement)     MDS  Pancytopenia  Iron deficiency  follows w/ Dr. Lyman (last seen 3/3/25)   typical plts range 40,000-60,000  Transfused platelets 5/20  Completed 3 days of  IV iron, last dose 5/20  2 units of PRBCs given 5/22, robust Hgb response, monitor  Will give an additional dose of Ferrlecit 250 x 1 (5/28/2025) as she still has a total iron deficit of 850 mg per Ganzoni equation     DM2  A1c 6.2  on januvia and metformin  Continue ssi, lantus 10 units am     Hypothyroidism  cont levothyroxine          Expected Discharge Location and Transportation: rehab   Expected Discharge   Expected Discharge Date: 5/27/2025; Expected Discharge Time:      VTE Prophylaxis:  Pharmacologic & mechanical VTE prophylaxis orders are present.     Total time spent: Time Spent: Time Spent: 40 minutes  Time spent includes time reviewing chart, face-to-face time, counseling patient/family/caregiver, ordering medications/tests/procedures, communicating with other health care professionals, documenting clinical information in the electronic health record, and coordination of care.      AM-PAC 6 Clicks Score (PT): 9 (05/27/25 2010)    CODE STATUS:   Code Status and Medical Interventions: CPR (Attempt to Resuscitate); Full   Ordered at: 05/20/25 2049     Code Status (Patient has no pulse and is not breathing):    CPR (Attempt to Resuscitate)     Medical Interventions (Patient has pulse or is breathing):    Full     Level Of Support Discussed With:    Patient       Amador Power MD  05/28/25

## 2025-05-28 NOTE — THERAPY TREATMENT NOTE
Patient Name: Chey Robertson  : 1935    MRN: [id number]                              Today's Date: 2025       Admit Date: 2025    Visit Dx:     ICD-10-CM ICD-9-CM   1. Fall at nursing home, initial encounter  W19.XXXA E888.9    Y92.129 E849.7   2. 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 Anticoagulated  Z79.01 V58.61   4. Periprosthetic fracture around internal prosthetic right knee joint, subsequent encounter  M97.11XD V54.89     Patient Active Problem List   Diagnosis    Benign familial tremor    AMS (altered mental status)    Pancytopenia    Hypothyroidism (acquired)    B12 deficiency    Abnormal intestinal absorption    Iron deficiency anemia due to chronic blood loss    Myelodysplasia (myelodysplastic syndrome)    Personal history of pulmonary embolism    Chronic anticoagulation    Essential hypertension    Type 2 diabetes mellitus without complication, without long-term current use of insulin    Atrial fibrillation    QT prolongation    Pericardial effusion    Severe malnutrition    Closed displaced intertrochanteric fracture of right femur, initial encounter    DISHA (acute kidney injury)    Moderate malnutrition    Falls    Type 2 diabetes mellitus with complication, with long-term current use of insulin    Periprosthetic fracture around prosthetic knee    Femur fracture     Past Medical History:   Diagnosis Date    A-fib     on eliquis    Anemia     Arthritis     Diabetes mellitus     checks sugar only when pt wants to     Disease of thyroid gland     History of transfusion     with knee surgery-  no reaction recalled.     [place] (hard of hearing)     no hearing aids    Hypertension     Migraine without aura and without status migrainosus, not intractable 2016    Myelodysplastic syndrome     Pulmonary emboli     (after knee surgery)    SOBOE (shortness of breath on exertion)     Tremors of nervous system     Vertigo      Wears dentures     upper     Wears glasses      Past Surgical History:   Procedure Laterality Date    BLADDER SURGERY      CATARACT EXTRACTION      bilat     CHOLECYSTECTOMY      COLONOSCOPY      HIP TROCHANTERIC NAILING WITH INTRAMEDULLARY HIP SCREW Right 6/20/2023    Procedure: HIP TROCHANTERIC NAILING WITH INTRAMEDULLARY HIP SCREW RIGHT;  Surgeon: Augie Hobbs MD;  Location: [facility] OR;  Service: Orthopedics;  Laterality: Right;    HYSTERECTOMY      with bso    KYPHOPLASTY N/A 02/12/2021    Procedure: KYPHOPLASTY T11, T12 AND L4;  Surgeon: Matty Hearn MD;  Location: [facility] OR;  Service: Orthopedic Spine;  Laterality: N/A;    TONSILLECTOMY        General Information       Row Name 05/28/25 1305          Physical Therapy Time and Intention    Document Type therapy note (daily note)  -CK     Mode of Treatment individual therapy;physical therapy  -       Row Name 05/28/25 1305          General Information    Patient Profile Reviewed yes  -CK     Existing Precautions/Restrictions fall;brace on at all times;other (see comments)  KI on x 2 weeks, no ROM, WBAT  -CK     Barriers to Rehab medically complex;previous functional deficit;cognitive status  -CK       Row Name 05/28/25 1305          Cognition    Orientation Status (Cognition) oriented to;person;place  -CK       Row Name 05/28/25 1303          Safety Issues/Impairments Affecting Functional Mobility    Safety Issues Affecting Function (Mobility) awareness of need for assistance;insight into deficits/self-awareness;safety precaution awareness;safety precautions follow-through/compliance;sequencing abilities;positioning of assistive device;judgment;problem-solving  -CK     Impairments Affecting Function (Mobility) pain;strength;range of motion (ROM);postural/trunk control;endurance/activity tolerance;balance  -CK               User Key  (r) = Recorded By, (t) = Taken By, (c) = Cosigned By      Initials Name Provider Type    CK Keke Cook, PT  Physical Therapist                   Mobility       Row Name 05/28/25 1306          Bed Mobility    Bed Mobility supine-sit  -CK     Supine-Sit Cecil (Bed Mobility) maximum assist (25% patient effort);verbal cues;nonverbal cues (demo/gesture);2 person assist  -CK     Assistive Device (Bed Mobility) head of bed elevated;repositioning sheet;bed rails  -CK     Comment, (Bed Mobility) Cues for sequencing, patient requires assist at BLEs and trunk to achieve EOB sitting. R and posterior lean at EOB, able to correct with cues and repositioning  -CK       Row Name 05/28/25 1306          Bed-Chair Transfer    Bed-Chair Cecil (Transfers) maximum assist (25% patient effort);2 person assist;verbal cues;nonverbal cues (demo/gesture)  -CK     Assistive Device (Bed-Chair Transfers) other (see comments)  BUE support  -CK     Comment, (Bed-Chair Transfer) stand pivot bed > chair > BSC > chair. Patient able to take sidesteps with significant support for initial transfer to chair. She was then incontinent of bowel requiring stand pivot transfer to/from bedside commode. Cues for sequencing, upright posture, and knee extension  -CK       Row Name 05/28/25 1306          Sit-Stand Transfer    Sit-Stand [place] (Transfers) maximum assist (25% patient effort);verbal cues  -CK     Assistive Device (Sit-Stand Transfers) walker, front-wheeled;other (see comments)  BUE support  -CK     Comment, (Sit-Stand Transfer) stood from EOB, chair, and BSC, cues for anterior weightshifting and optimal foot/hand placement each time. Did stand once from chair with FWW to work on posture/weightbearing, but was limited by bowel incontinence requiring stand pivot transfers as above  -CK       Row Name 05/28/25 1306          Gait/Stairs (Locomotion)    [place] Level (Gait) unable to assess  -CK     Comment, (Gait/Stairs) patient too fatigued following multiple stand pivot transfers due to bowel incontinence  -CK       Row Name 05/28/25  1306          Mobility    Extremity Weight-bearing Status left lower extremity  -CK     Left Lower Extremity (Weight-bearing Status) weight-bearing as tolerated (WBAT)  -CK               User Key  (r) = Recorded By, (t) = Taken By, (c) = Cosigned By      Initials Name Provider Type    CK Keke Cook PT Physical Therapist                   Obj/Interventions       Row Name 05/28/25 1310          Motor Skills    Therapeutic Exercise hip;knee;ankle  -CK       Row Name 05/28/25 1310          Hip (Therapeutic Exercise)    Hip Strengthening (Therapeutic Exercise) bilateral;aBduction;aDduction;5 repetitions  -CK       Row Name 05/28/25 1310          Knee (Therapeutic Exercise)    Knee (Therapeutic Exercise) strengthening exercise  -CK     Knee Strengthening (Therapeutic Exercise) bilateral;SLR (straight leg raise);5 repetitions;other (see comments)  modA LLE  -CK       Row Name 05/28/25 1310          Ankle (Therapeutic Exercise)    Ankle (Therapeutic Exercise) AROM (active range of motion)  -CK     Ankle AROM (Therapeutic Exercise) bilateral;dorsiflexion;plantarflexion;10 repetitions  -CK       Row Name 05/28/25 1310          Balance    Balance Assessment sitting static balance;standing static balance;standing dynamic balance  -CK     Static Sitting Balance minimal assist  -CK     Position, Sitting Balance unsupported;sitting edge of bed;sitting in chair  -CK     Static Standing Balance maximum assist  -CK     Dynamic Standing Balance maximum assist  -CK     Position/Device Used, Standing Balance supported;walker, front-wheeled;other (see comments)  BUE support  -CK     Comment, Balance posterior lean in sitting/standing  -CK               User Key  (r) = Recorded By, (t) = Taken By, (c) = Cosigned By      Initials Name Provider Type    Keke Paiz PT Physical Therapist                   Goals/Plan    No documentation.                  Clinical Impression       Row Name 05/28/25 1311          Pain    Pain  Location extremity  -CK     Pain Side/Orientation left;lower  -CK     Pain Management Interventions exercise or physical activity utilized;positioning techniques utilized;nursing notified  -CK     Response to Pain Interventions activity participation with tolerable pain  -CK     Additional Documentation Pain Scale: FACES Pre/Post-Treatment (Group)  -CK       Row Name 05/28/25 1311          Pain Scale: FACES Pre/Post-Treatment    Pain: FACES Scale, Pretreatment 2-->hurts little bit  -CK     Posttreatment Pain Rating 2-->hurts little bit  -CK       Row Name 05/28/25 1311          Plan of Care Review    Plan of Care Reviewed With patient  -CK     Progress no change  -CK     Outcome Evaluation Patient continues to be limited by deficits in strength, endurance, and balance requiring maxAx2 for all bed mobility and transfers. Gait training limited by bowel incontinence today. BLE therex completed with assistance. IPPT will continue to address deficits while admitted. Continue to recommend D/C to SNF.  -CK       Row Name 05/28/25 1311          Vital Signs    Pre Systolic BP Rehab 158  -CK     Pre Treatment Diastolic BP 79  -CK     Pretreatment Heart Rate (beats/min) 64  -CK     Posttreatment Heart Rate (beats/min) 68  -CK     Pre SpO2 (%) 92  -CK     O2 Delivery Pre Treatment room air  -CK     O2 Delivery Intra Treatment room air  -CK     Post SpO2 (%) 92  -CK     O2 Delivery Post Treatment room air  -CK     Pre Patient Position Supine  -CK     Post Patient Position Sitting  -CK       Row Name 05/28/25 1311          Positioning and Restraints    Pre-Treatment Position in bed  -CK     Post Treatment Position chair  -CK     In Chair reclined;call light within reach;encouraged to call for assist;exit alarm on;waffle cushion;on mechanical lift sling;heels elevated;compression device;notified nsg  -CK               User Key  (r) = Recorded By, (t) = Taken By, (c) = Cosigned By      Initials Name Provider Type    JOHNNIE Cook  Keke, GURPREET Physical Therapist                   Outcome Measures       Row Name 05/28/25 1313          How much help from another person do you currently need...    Turning from your back to your side while in flat bed without using bedrails? 2  -CK     Moving from lying on back to sitting on the side of a flat bed without bedrails? 2  -CK     Moving to and from a bed to a chair (including a wheelchair)? 2  -CK     Standing up from a chair using your arms (e.g., wheelchair, bedside chair)? 2  -CK     Climbing 3-5 steps with a railing? 1  -CK     To walk in hospital room? 1  -CK     AM-PAC 6 Clicks Score (PT) 10  -CK     Highest Level of Mobility Goal Move to Chair/Commode-4  -CK       Row Name 05/28/25 1313          Functional Assessment    Outcome Measure Options AM-PAC 6 Clicks Basic Mobility (PT)  -CK               User Key  (r) = Recorded By, (t) = Taken By, (c) = Cosigned By      Initials Name Provider Type    Keke Paiz, GURPREET Physical Therapist                                 Physical Therapy Education       Title: PT OT SLP Therapies (In Progress)       Topic: Physical Therapy (In Progress)       Point: Mobility training (In Progress)       Learning Progress Summary            Patient Acceptance, E, NR by CK at 5/28/2025 1314    Eager, E, VU by SC at 5/27/2025 1052    Comment: reviewed HEP    Acceptance, E, VU by KG at 5/26/2025 1135    Eager, E, VU,DU,NR by SS at 5/25/2025 1157    Comment: Reviewed safety/technique w/bed mobility, transfers, PT POC    Acceptance, E, VU,NR by BC at 5/25/2025 0830    Acceptance, E, VU,DU,NR by SS at 5/24/2025 1528    Comment: Reviewed safety/technique w/bed mobility, transfers, HEP, PT POC    Acceptance, E, NR by CK at 5/23/2025 1454    Acceptance, E, NR by CK at 5/23/2025 1142    Acceptance, E, NR by CK at 5/22/2025 1344    Acceptance, E, NR by CK at 5/22/2025 1031    Acceptance, E, NR by CK at 5/21/2025 1614    Acceptance, E, NR,VU by CK at 5/21/2025 1006    Family Eager, E, VU,DU,NR by SS at 5/25/2025 1157    Comment: Reviewed safety/technique w/bed mobility, transfers, PT POC                      Point: Home exercise program (In Progress)       Learning Progress Summary            Patient Acceptance, E, NR by CK at 5/28/2025 1314    Eager, E, VU by SC at 5/27/2025 1052    Comment: reviewed HEP    Acceptance, E, VU by KG at 5/26/2025 1135    Eager, E, VU,DU,NR by SS at 5/25/2025 1157    Comment: Reviewed safety/technique w/bed mobility, transfers, PT POC    Acceptance, E, VU,NR by BC at 5/25/2025 0830    Acceptance, E, VU,DU,NR by SS at 5/24/2025 1528    Comment: Reviewed safety/technique w/bed mobility, transfers, HEP, PT POC    Acceptance, E, NR by CK at 5/22/2025 1344    Acceptance, E, NR by CK at 5/22/2025 1031    Acceptance, E, NR by CK at 5/21/2025 1614   Family Eager, E, VU,DU,NR by SS at 5/25/2025 1157    Comment: Reviewed safety/technique w/bed mobility, transfers, PT POC                      Point: Body mechanics (In Progress)       Learning Progress Summary            Patient Acceptance, E, NR by CK at 5/28/2025 1314    Eager, E, VU by SC at 5/27/2025 1052    Comment: reviewed HEP    Acceptance, E, VU by KG at 5/26/2025 1135    Eager, E, VU,DU,NR by SS at 5/25/2025 1157    Comment: Reviewed safety/technique w/bed mobility, transfers, PT POC    Acceptance, E, VU,NR by BC at 5/25/2025 0830    Acceptance, E, VU,DU,NR by SS at 5/24/2025 1528    Comment: Reviewed safety/technique w/bed mobility, transfers, HEP, PT POC    Acceptance, E, NR by CK at 5/23/2025 1454    Acceptance, E, NR by CK at 5/23/2025 1142    Acceptance, E, NR by CK at 5/22/2025 1344    Acceptance, E, NR by CK at 5/22/2025 1031    Acceptance, E, NR by CK at 5/21/2025 1614    Acceptance, SAMMY VU,VU by CK at 5/21/2025 1006   Family EagerMIRELLA VU,ANTHONY,NR by  at 5/25/2025 1157    Comment: Reviewed safety/technique w/bed mobility, transfers, PT POC                      Point: Precautions (In Progress)        Learning Progress Summary            Patient Acceptance, E, NR by  at 5/28/2025 1314    Eager, E, VU by SC at 5/27/2025 1052    Comment: reviewed HEP    Acceptance, E, VU by  at 5/26/2025 1135    Eager, E, VU,DU,NR by  at 5/25/2025 1157    Comment: Reviewed safety/technique w/bed mobility, transfers, PT POC    Acceptance, E, VU,NR by BC at 5/25/2025 0830    Acceptance, E, VU,DU,NR by  at 5/24/2025 1528    Comment: Reviewed safety/technique w/bed mobility, transfers, HEP, PT POC    Acceptance, E, NR by  at 5/23/2025 1454    Acceptance, E, NR by  at 5/23/2025 1142    Acceptance, E, NR by  at 5/22/2025 1344    Acceptance, E, NR by  at 5/22/2025 1031    Acceptance, E, NR by  at 5/21/2025 1614    Acceptance, E, NR,VU by  at 5/21/2025 1006   Family Eager, E, VU,DU,NR by  at 5/25/2025 1157    Comment: Reviewed safety/technique w/bed mobility, transfers, PT POC                                      User Key       Initials Effective Dates Name Provider Type Discipline    SC 02/03/23 -  Lisa Cortes, PT Physical Therapist PT    BC 04/27/21 -  Elisa Lundberg, RN Registered Nurse Nurse     12/13/24 -  Clare Saravia Physical Therapist PT     06/01/21 -  Aminta Rodriguez PT Physical Therapist PT     02/06/24 -  Keke Cook, PT Physical Therapist PT                  PT Recommendation and Plan  Planned Therapy Interventions (PT): balance training, bed mobility training, gait training, home exercise program, neuromuscular re-education, transfer training, stretching, strengthening, stair training, postural re-education, patient/family education  Progress: no change  Outcome Evaluation: Patient continues to be limited by deficits in strength, endurance, and balance requiring maxAx2 for all bed mobility and transfers. Gait training limited by bowel incontinence today. BLE therex completed with assistance. IPPT will continue to address deficits while admitted. Continue to recommend D/C to  SNF.     Time Calculation:   PT Evaluation Complexity  History, PT Evaluation Complexity: 3 or more personal factors and/or comorbidities  Examination of Body Systems (PT Eval Complexity): total of 3 or more elements  Clinical Presentation (PT Evaluation Complexity): evolving  Clinical Decision Making (PT Evaluation Complexity): moderate complexity  Overall Complexity (PT Evaluation Complexity): moderate complexity     PT Charges       Row Name 05/28/25 1314             Time Calculation    Start Time 1129  -CK      PT Received On 05/28/25  -CK         Timed Charges    26427 - PT Therapeutic Exercise Minutes 6  -CK      67621 - PT Therapeutic Activity Minutes 25  -CK         Total Minutes    Timed Charges Total Minutes 31  -CK       Total Minutes 31  -CK                User Key  (r) = Recorded By, (t) = Taken By, (c) = Cosigned By      Initials Name Provider Type    CK Keke Cook, PT Physical Therapist                  Therapy Charges for Today       Code Description Service Date Service Provider Modifiers Qty    13879164349 HC PT THERAPEUTIC ACT EA 15 MIN 5/28/2025 Keke Cook, PT GP 2            PT G-Codes  Outcome Measure Options: AM-PAC 6 Clicks Basic Mobility (PT)  AM-PAC 6 Clicks Score (PT): 10  AM-PAC 6 Clicks Score (OT): 13  PT Discharge Summary  Anticipated Discharge Disposition (PT): skilled nursing facility    Keke Cook PT  5/28/2025

## 2025-05-28 NOTE — PROGRESS NOTES
"          Clinical Nutrition Assessment   Patient Name: Chey Robertson  YOB: 1935  MRN: [id number]  Date of Encounter: 05/28/25 10:25 EDT  Admission date: 5/17/2025  Reason for Visit: Follow-up protocol    Assessment   Nutrition Assessment   Admission Diagnosis:  Periprosthetic fracture around internal prosthetic right knee joint [M97.11XA]  Femur fracture [S72.90XA]    Problem List:    Femur fracture    Hypothyroidism (acquired)    Chronic anticoagulation    Essential hypertension    Atrial fibrillation    Falls    Type 2 diabetes mellitus with complication, with long-term current use of insulin    Periprosthetic fracture around prosthetic knee    PMH:   She  has a past medical history of A-fib, Anemia, Arthritis, Diabetes mellitus, Disease of thyroid gland, History of transfusion, [place] (hard of hearing), Hypertension, Migraine without aura and without status migrainosus, not intractable (12/30/2016), Myelodysplastic syndrome, Pulmonary emboli (2011), SOBOE (shortness of breath on exertion), Tremors of nervous system, Vertigo, Wears dentures, and Wears glasses.    PSH:  She  has a past surgical history that includes Hysterectomy; Tonsillectomy; Bladder surgery; Cataract extraction; Cholecystectomy; Colonoscopy; Kyphoplasty (N/A, 02/12/2021); and Hip Trochanteric Nailing (Right, 6/20/2023).    Applicable Nutrition History:   (5/19) WOC: stg 2 chronic PO on left heel; stg 2 PI to sacrum and coccyx    Anthropometrics   Height: Height: 160 cm (63\")  Last Filed Weight: Weight: 56.9 kg (125 lb 6.4 oz) (05/17/25 2336)  Method: Weight Method: Bed scale  BMI: BMI (Calculated): 22.2    UBW:   Weight      Weight (kg) Weight (lbs) Weight Method   7/27/2024 51.256 kg  113 lb  Stated    5/17/2025 56.881 kg  125 lb 6.4 oz  Bed scale      Weight change: No significant changes    Nutrition Focused Physical Exam    Date: 05/23/25     Pt does not meet criteria for malnutrition diagnosis, at this time.      Subjective "   Reported/Observed/Food/Nutrition Related History:   05/28/25  Stated she has been eating well. Endorsed drinking some of her jeremy. Would like to continue to receive as ordered. Declined food preferences. Denied any new nutrition-related concerns.    05/23/25  Patient reported decrease in appetite 2/2 pain and sore throat. Has not tried her jeremy yet. Discussed jeremy's role in wound healing. Patient verbalized understanding and in agreeance to trial. She is unsure of what she normally weighs, but does not believe she has lost any weight.    05/22/25  Patient sleeping soundly and did not awaken to name being spoken.    05/20/25  Patient screened per nutrition protocol for possible pressure injury of stage 2 or greater and/or non healing wound. Presented with femur fx. Patient gone to OR with ortho at time of visit. No weight changes noted in EMR. No chewing or swallowing difficulties noted. NKFA in EMR. Will order jeremy for patient to trial.    Current Nutrition Prescription   PO: Diet: Regular/House, Diabetic; Consistent Carbohydrate; Texture: Regular (IDDSI 7); Fluid Consistency: Thin (IDDSI 0)  Oral Nutrition Supplement: jeremy @ B/D  Intake: 5/27 B 100, L 75, D 50; 5/28 B 75% PO intake documented    Assessment & Plan   Nutrition Diagnosis   Date: 05/20/25           Updated:    Problem Increased nutrient needs - protein   Etiology Increased demand for wound healing   Signs/Symptoms WOC noted PIs   Status: New    Goal:   Nutrition to support treatment and Increase intake    Nutrition Intervention      Follow treatment progress, Care plan reviewed, Encourage intake, Supplement provided    Nutrition POC  Continue current ONS regimen  Encourage adequate PO intake    Monitoring/Evaluation:   Per protocol, PO intake, Supplement intake, Weight, Skin status, Symptoms, POC/GOC    Temi Benavidez, MS,RD,LD  Time Spent: 25min

## 2025-05-28 NOTE — NURSING NOTE
"Reason for Wound, Ostomy and Continence (WOC) Nursing Consultation: \"PI POA left heel, coccyx/gluteal\"     Patient lying in bed.       Wound Assessment  Wound Type: Pressure Injury Stage 2 CHRONIC  Location: left heel  Measurements: approx 1cm x 1cm x 0   Wound Bed: red  Periwound Skin: blanchable and pink , brownish and dry  Drainage Characteristics/Odor: none  Drainage Amount: none  Pain: No  Care provided: Cleansed with green wipe. Applied HydroFera Ready to wound bed, skin prep periwound. Allevyn. Heel boot.  Notes: Wound decreasing in size, filled in with 0 depth. Photo appears more brown and dark to periwound than in person. Will continue same treatment, keep offloaded.  HydroFera placed at bedside on computer for further dressing changes.   Wound Image:     Recommendation(s) for management of wound:   -Refer to wound care orders for specific instructions on how to treat/manage wound.  -Cleanse with NS Q 3 days. Skinprep periwound and allow to dry. HydroFera Ready to wound bed. Allevyn.  -Practice pressure injury prevention protocol.     Wound Assessment  Wound Type: Pressure Injury Stage 2  Resolving Pis to saacrum  Location: Sacrum, coccyx  Pain: no  Care provided: Cleansed with green wipe, skin prep, changed Allevyn.  Notes: Blanchable pink/dusky purple, dry layer of skin at center. Continue to offload and use allevyn with skin prep to protect skin.   Wound Image:     Recommendation(s) for management of wound:   -Refer to wound care orders for specific instructions on how to treat/manage wound.  -Cleanse with green wipes, allow to dry, skin prep allow to dry. Allevyn Q3-5 days and prn.   -offload  -Practice pressure injury prevention protocol.     Most recent Rahul Scale score:  Sensory Perception: 3-->slightly limited  Moisture: 3-->occasionally moist  Activity: 3-->walks occasionally  Mobility: 2-->very limited  Nutrition: 2-->probably inadequate  Friction and Shear: 2-->potential problem  Rahul Score: " 15 (05/28/25 0140)     Specialty support surface: Foam Mattress with Waffle Overlay       Pressure Injury Prevention Protocol (initiate for Rahul Score of 18 or less):   *Turn q 2 hr, keep heels elevated and offloaded with offloading heel boots.  *Allevn dressings to heels, sacrum/coccyx  *Follow C.A.R.E protocol if medical devices (Bipap, elena, Ng tube, etc) are being used.  *Reduce layers under patient (one sheet as drawsheet and two incontinence pads) to allow waffle or SONALI to improve microclimate   *Raise knee-gatch before elevating HOB to reduce shearing      WOC Team will sign off.  Please re-consult if the wound(s) worsens.

## 2025-05-28 NOTE — PLAN OF CARE
Goal Outcome Evaluation:  Plan of Care Reviewed With: patient        Progress: no change  Outcome Evaluation: Patient continues to be limited by deficits in strength, endurance, and balance requiring maxAx2 for all bed mobility and transfers. Gait training limited by bowel incontinence today. BLE therex completed with assistance. IPPT will continue to address deficits while admitted. Continue to recommend D/C to SNF.    Anticipated Discharge Disposition (PT): skilled nursing facility

## 2025-05-28 NOTE — PLAN OF CARE
Goal Outcome Evaluation:            VSS. A/Ox2. Sleeping well between nursing care. No complaints of pain this shift. Plans to d/c to SNF for rehabilitation. L Heel has remained elevated off of bed. Questions and concerns answered.

## 2025-05-28 NOTE — PROGRESS NOTES
"  Orthopedic Progress Note      Patient: Chey Robertson  YOB: 1935     Date of Admission: 5/17/2025  7:04 PM Medical Record Number: [id number]     Attending Physician: Amador Power MD    Status Post:  Procedure(s):  DISTAL FEMUR REPLACEMENT Post Operative Day Number: 9    Subjective : No new orthopaedic complaints     Pain Relief: some relief with present medication.     Systemic Complaints: No Complaints  Vitals:    05/27/25 1441 05/27/25 1900 05/28/25 0330 05/28/25 0700   BP:  155/70 158/79    BP Location:  Right arm Right arm    Patient Position:  Lying Lying    Pulse: 71 68 57 61   Resp: 18 18  Comment: 18 18    Temp: 97.9 °F (36.6 °C) 98 °F (36.7 °C) 98.1 °F (36.7 °C)    TempSrc: Oral Oral Oral    SpO2:  94% 94% 93%   Weight:       Height:           Physical Exam: 89 y.o. female    General Appearance:       Alert, cooperative, in no acute distress                  Extremities:    Dressing Clean, Dry and Intact             No clinical sign of DVT        Able to do good movements of digits    Pulses:   Pulses palpable and equal bilaterally           Diagnostic Tests:     Results from last 7 days   Lab Units 05/26/25  0654 05/25/25  0724 05/24/25  0619   WBC 10*3/mm3 1.29* 1.63* 1.65*   HEMOGLOBIN g/dL 9.3* 9.6* 10.1*   HEMATOCRIT % 29.4* 29.2* 31.4*   PLATELETS 10*3/mm3 83* 71* 74*     Results from last 7 days   Lab Units 05/26/25  0654 05/24/25  0619 05/23/25  1256   SODIUM mmol/L 139 135* 134*   POTASSIUM mmol/L 4.1 4.3 4.6   CHLORIDE mmol/L 105 101 101   CO2 mmol/L 23.0 23.0 21.0*   BUN mg/dL 14 22 23   CREATININE mg/dL 0.73 0.82 0.96   GLUCOSE mg/dL 134* 115* 192*   CALCIUM mg/dL 8.2* 8.3* 8.1*           No results found for: \"URICACID\"  No results found for: \"CRYSTAL\"  Microbiology Results (last 10 days)       ** No results found for the last 240 hours. **          XR Knee 1 or 2 View Left  Result Date: 5/20/2025  Impression: Postoperative changes of left knee arthroplasty without " radiographic evidence of complication. Electronically Signed: Zhou Paredes MD  5/20/2025 8:56 PM EDT  Workstation ID: [id number]        Current Medications:  Scheduled Meds:acetaminophen, 1,000 mg, Oral, Q8H  amiodarone, 200 mg, Oral, Daily  amLODIPine, 10 mg, Oral, Q24H  apixaban, 2.5 mg, Oral, BID  doxycycline, 100 mg, Oral, Q12H  insulin glargine, 10 Units, Subcutaneous, Daily  insulin lispro, 2-7 Units, Subcutaneous, 4x Daily AC & at Bedtime  levothyroxine, 100 mcg, Oral, Q AM  lisinopril, 10 mg, Oral, Daily  metoprolol tartrate, 25 mg, Oral, BID  pantoprazole, 40 mg, Oral, Q AM  sodium chloride, 10 mL, Intravenous, Q12H      Continuous Infusions:ropivacaine,       PRN Meds:.  acetaminophen **OR** acetaminophen **OR** acetaminophen    senna-docusate sodium **AND** polyethylene glycol **AND** bisacodyl **AND** bisacodyl    Calcium Replacement - Follow Nurse / BPA Driven Protocol    dextrose    dextrose    diphenhydrAMINE **OR** diphenhydrAMINE    glucagon (human recombinant)    HYDROmorphone **AND** naloxone    Magnesium Standard Dose Replacement - Follow Nurse / BPA Driven Protocol    melatonin    Morphine    nitroglycerin    ondansetron ODT **OR** ondansetron    oxyCODONE    oxyCODONE    Phosphorus Replacement - Follow Nurse / BPA Driven Protocol    Potassium Replacement - Follow Nurse / BPA Driven Protocol    sodium chloride    Assessment: Status post  No admission procedures for hospital encounter.    Patient Active Problem List   Diagnosis    Benign familial tremor    AMS (altered mental status)    Pancytopenia    Hypothyroidism (acquired)    B12 deficiency    Abnormal intestinal absorption    Iron deficiency anemia due to chronic blood loss    Myelodysplasia (myelodysplastic syndrome)    Personal history of pulmonary embolism    Chronic anticoagulation    Essential hypertension    Type 2 diabetes mellitus without complication, without long-term current use of insulin    Atrial fibrillation    QT  prolongation    Pericardial effusion    Severe malnutrition    Closed displaced intertrochanteric fracture of right femur, initial encounter    DISHA (acute kidney injury)    Moderate malnutrition    Falls    Type 2 diabetes mellitus with complication, with long-term current use of insulin    Periprosthetic fracture around prosthetic knee    Femur fracture       PLAN:   Continues current post-op course  Anticoagulation: Okay to start anticoagulation per medicine if can restart Eliquis recommend 2.5 twice daily for 1 week and then resume home dose however with the patient's very low platelet count not sure if the patient needs to be this extremely anticoagulated but would leave that decision up to medicine  Mobilize with PT as tolerated per protocol  2 weeks of oral antibiotics    Weight Bearing: WBAT knee immobilizer for 2 weeks postoperatively in order to let the wound heal no flexion of the knee  Discharge Plan: OK to plan for discharge in  tomorrow to rehab  from orthopaedic perspective.    Albin Mcclain MD    Date: 5/28/2025    Time: 09:48 EDT

## 2025-05-28 NOTE — CASE MANAGEMENT/SOCIAL WORK
Continued Stay Note  [facility]     Patient Name: Chey Robertson  MRN: [id number]  Today's Date: 5/28/2025    Admit Date: 5/17/2025    Plan: Skilled rehab at [place], pending insurance approval   Discharge Plan       Row Name 05/28/25 1402       Plan    Plan Skilled rehab at [place] [facility], pending insurance approval    Patient/Family in Agreement with Plan yes    Plan Comments Spoke with patient's son, Joe, over the phone to discuss discharge plan. Patient's plan is skilled rehab at [facility], pending insurance approval. Per William Porter initiated precertification for insurance approval today. CM will continue to follow and assist with discharge planning.    Final Discharge Disposition Code 03 - skilled nursing facility (SNF)                   Discharge Codes    No documentation.                 Expected Discharge Date and Time       Expected Discharge Date Expected Discharge Time    May 27, 2025               Augie Cordova RN

## 2025-05-29 VITALS
DIASTOLIC BLOOD PRESSURE: 62 MMHG | TEMPERATURE: 97.8 F | HEART RATE: 61 BPM | WEIGHT: 125.4 LBS | HEIGHT: 63 IN | RESPIRATION RATE: 16 BRPM | BODY MASS INDEX: 22.22 KG/M2 | OXYGEN SATURATION: 91 % | SYSTOLIC BLOOD PRESSURE: 134 MMHG

## 2025-05-29 LAB
GLUCOSE BLDC GLUCOMTR-MCNC: 116 MG/DL (ref 70–130)
GLUCOSE BLDC GLUCOMTR-MCNC: 154 MG/DL (ref 70–130)

## 2025-05-29 PROCEDURE — 82948 REAGENT STRIP/BLOOD GLUCOSE: CPT

## 2025-05-29 PROCEDURE — 63710000001 INSULIN LISPRO (HUMAN) PER 5 UNITS: Performed by: ORTHOPAEDIC SURGERY

## 2025-05-29 PROCEDURE — 99239 HOSP IP/OBS DSCHRG MGMT >30: CPT

## 2025-05-29 PROCEDURE — 63710000001 INSULIN GLARGINE PER 5 UNITS: Performed by: NURSE PRACTITIONER

## 2025-05-29 RX ORDER — DOXYCYCLINE 100 MG/1
100 CAPSULE ORAL EVERY 12 HOURS SCHEDULED
Qty: 12 CAPSULE | Refills: 0 | Status: SHIPPED | OUTPATIENT
Start: 2025-05-29 | End: 2025-06-04

## 2025-05-29 RX ORDER — LISINOPRIL 10 MG/1
10 TABLET ORAL DAILY
Qty: 30 TABLET | Refills: 0 | Status: SHIPPED | OUTPATIENT
Start: 2025-05-30 | End: 2025-06-29

## 2025-05-29 RX ORDER — AMLODIPINE BESYLATE 10 MG/1
10 TABLET ORAL
Qty: 30 TABLET | Refills: 0 | Status: SHIPPED | OUTPATIENT
Start: 2025-05-30 | End: 2025-06-29

## 2025-05-29 RX ORDER — OXYCODONE HYDROCHLORIDE 5 MG/1
5 TABLET ORAL EVERY 8 HOURS PRN
Qty: 9 TABLET | Refills: 0 | Status: SHIPPED | OUTPATIENT
Start: 2025-05-29 | End: 2025-06-01

## 2025-05-29 RX ADMIN — APIXABAN 2.5 MG: 2.5 TABLET, FILM COATED ORAL at 08:56

## 2025-05-29 RX ADMIN — DOXYCYCLINE 100 MG: 100 CAPSULE ORAL at 08:57

## 2025-05-29 RX ADMIN — ACETAMINOPHEN 650 MG: 325 TABLET ORAL at 08:55

## 2025-05-29 RX ADMIN — INSULIN LISPRO 2 UNITS: 100 INJECTION, SOLUTION INTRAVENOUS; SUBCUTANEOUS at 13:42

## 2025-05-29 RX ADMIN — LISINOPRIL 10 MG: 10 TABLET ORAL at 08:55

## 2025-05-29 RX ADMIN — ACETAMINOPHEN 1000 MG: 500 TABLET ORAL at 06:01

## 2025-05-29 RX ADMIN — AMIODARONE HYDROCHLORIDE 200 MG: 200 TABLET ORAL at 08:55

## 2025-05-29 RX ADMIN — AMLODIPINE BESYLATE 10 MG: 10 TABLET ORAL at 08:55

## 2025-05-29 RX ADMIN — Medication 10 ML: at 08:57

## 2025-05-29 RX ADMIN — LEVOTHYROXINE SODIUM 100 MCG: 0.1 TABLET ORAL at 06:01

## 2025-05-29 RX ADMIN — OXYCODONE 5 MG: 5 TABLET ORAL at 02:07

## 2025-05-29 RX ADMIN — PANTOPRAZOLE SODIUM 40 MG: 40 TABLET, DELAYED RELEASE ORAL at 06:01

## 2025-05-29 RX ADMIN — INSULIN GLARGINE 10 UNITS: 100 INJECTION, SOLUTION SUBCUTANEOUS at 08:56

## 2025-05-29 RX ADMIN — METOPROLOL TARTRATE 25 MG: 25 TABLET, FILM COATED ORAL at 08:55

## 2025-05-29 NOTE — PLAN OF CARE
Goal Outcome Evaluation:  Plan of Care Reviewed With: patient        Progress: no change     The patient is alert and oriented x3-4. Intermittent confusion at times. VSS on room air. Voiding spontaneously via purewick.Scheduled acetaminophen and PRN 5 mg oxycodone administered for pain.The patient rested well throughout the shift. Knee immobilizer and prevena wound vac in place. Skin interventions in place. The patient is currently awaiting discharge to skilled rehab at [place] pending insurance approval.

## 2025-05-29 NOTE — DISCHARGE SUMMARY
[facility] Medicine Services  DISCHARGE SUMMARY    Patient Name: Chey Robertson  : 1935  MRN: [id number]    Date of Admission: 2025  7:04 PM  Date of Discharge: 2025  Primary Care Physician: Yasmeen Loomis MD    Consults       Date and Time Order Name Status Description    2025 11:35 PM Inpatient Orthopedic Surgery Consult Completed             Hospital Course     Presenting Problem: Femur fracture    Active Hospital Problems    Diagnosis  POA   • **Femur fracture [S72.90XA]  Yes   • Type 2 diabetes mellitus with complication, with long-term current use of insulin [E11.8, Z79.4]  Not Applicable   • Periprosthetic fracture around prosthetic knee [M97.8XXA, Z96.659]  Not Applicable   • Falls [R29.6]  Not Applicable   • Atrial fibrillation [I48.91]  Yes   • Chronic anticoagulation [Z79.01]  Not Applicable   • Essential hypertension [I10]  Yes   • Hypothyroidism (acquired) [E03.9]  Yes      Resolved Hospital Problems   No resolved problems to display.          Hospital Course:  Chey Robertson is a 89 y.o. female with history of afib (on eliquis), HTN, DM2, hypothyroidism, MDS (w/ pancytopenia, followed by Dr. Lyman), previous PE (treated w eliquis), previous left knee replacement. Presented to [facility] ED w/ left knee pain after sustaining a fall at her SNF. CT imaging revealed left distal femoral impaction fracture.     Left supracondylar fracture of the distal femur.   Hx previous left knee replacement  Orthopedic surgery evaluated, s/p repair   Due to magnitude of surgery ortho requested eliquis be held ~72 hours prior to surgery, but Hb dropped to 6.8 on , okay to resume per ortho at 2.5mg BID x1 week, then resume usual dosing thereafter (usual dosing is 2.5mg BID per outpatient fill hx on this patient)   PT/OT, plan rehab   as needed pain control  Okay for discharge from orthopedic perspective, recommending knee immobilizer for 2 weeks postoperatively to  allow for wound healing with no flexion of the knee  Also recommending continuation of 2 weeks of oral antibiotics     Parox afib (currently in sinus)  Chronic HFpEF  HTN  echo 6/2023: LV EF 56-60%, valves ok  Continue amiodarone & metoprolol, lisinopril  Eliquis resumed on 5/23; monitor Hgb, so far katrin vazquezc added, lisinopril dose increased to 10 mg, blood pressure is tolerable at this time, would not opt to aggressively manage her blood pressure given high fall risk as she is elderly     UTI  Completed rocephin for 5 days  Urine culture with Klebsiella, sensitive to Rocephin     Hx PE (perioperative after previous knee replacement)     MDS  Pancytopenia  Iron deficiency  follows w/ Dr. Lyman (last seen 3/3/25)   typical plts range 40,000-60,000  Transfused platelets 5/20  Completed 3 days of IV iron, last dose 5/20  2 units of PRBCs given 5/22, robust Hgb response, monitor  Will give an additional dose of Ferrlecit 250 x 1 (5/28/2025) as she still has a total iron deficit of 850 mg per Ganzoni equation     DM2  A1c 6.2  On metformin at home, has not refilled Januvia in quite some time  Continue ssi, lantus 10 units am  Blood sugars have been fairly appropriate while inpatient, will resume home metformin and discontinue Lantus and SSI on discharge     Hypothyroidism  cont levothyroxine      Discharge Follow Up Recommendations for outpatient labs/diagnostics:  Follow-up with PCP in 1 week  Follow-up with orthopedic surgery office in 2 weeks    Day of Discharge     HPI:   Patient doing well this a.m., no complaints otherwise, had a bowel movement, tolerating diet well.    Review of Systems  Mild hip pain    Vital Signs:   Temp:  [97.7 °F (36.5 °C)-97.9 °F (36.6 °C)] 97.8 °F (36.6 °C)  Heart Rate:  [61-80] 61  Resp:  [16-18] 16  BP: (134-161)/(62-76) 134/62      Physical Exam:  Constitutional: No acute distress, awake, alert  HENT: NCAT, mucous membranes moist  Respiratory: Clear to auscultation bilaterally,  respiratory effort normal room air 94%  Cardiovascular: RRR, no murmurs, rubs, or gallops  Gastrointestinal: Positive bowel sounds, soft, nontender, nondistended  Musculoskeletal: No bilateral ankle edema, left leg in immobilizer brace   Psychiatric: Appropriate affect, cooperative  Neurologic: Oriented x 2-3, strength symmetric in all extremities,speech clear  Skin: No rashes, pale    Pertinent  and/or Most Recent Results     LAB RESULTS:      Lab 05/26/25  0654 05/25/25  0724 05/24/25  0619 05/23/25  1256   WBC 1.29* 1.63* 1.65* 1.75*   HEMOGLOBIN 9.3* 9.6* 10.1* 9.8*   HEMATOCRIT 29.4* 29.2* 31.4* 30.4*   PLATELETS 83* 71* 74* 72*   NEUTROS ABS  --   --  1.13* 1.37*   IMMATURE GRANS (ABS)  --   --  0.06* 0.05   LYMPHS ABS  --   --  0.27* 0.16*   MONOS ABS  --   --  0.19 0.17   EOS ABS  --   --  0.00 0.00   MCV 88.0 86.9 87.5 87.6         Lab 05/26/25  0654 05/24/25  0619 05/23/25  1256   SODIUM 139 135* 134*   POTASSIUM 4.1 4.3 4.6   CHLORIDE 105 101 101   CO2 23.0 23.0 21.0*   ANION GAP 11.0 11.0 12.0   BUN 14 22 23   CREATININE 0.73 0.82 0.96   EGFR 79.2 68.9 57.0*   GLUCOSE 134* 115* 192*   CALCIUM 8.2* 8.3* 8.1*         Lab 05/26/25  0654   TOTAL PROTEIN 4.8*   ALBUMIN 2.8*   GLOBULIN 2.0   ALT (SGPT) <5   AST (SGOT) 8   BILIRUBIN 0.5   ALK PHOS 95                     Brief Urine Lab Results  (Last result in the past 365 days)        Color   Clarity   Blood   Leuk Est   Nitrite   Protein   CREAT   Urine HCG        05/17/25 2140 Yellow   Turbid   Negative   Large (3+)   Negative   30 mg/dL (1+)                 Microbiology Results (last 10 days)       ** No results found for the last 240 hours. **            Arterial Line  Result Date: 5/21/2025  Lance Mcgee MD     5/21/2025  6:42 AM Arterial Line Patient reassessed immediately prior to procedure Patient location during procedure: pre-op Line placed for hemodynamic monitoring. Performed By Anesthesiologist: Lance Mcgee MD  Preanesthetic Checklist Completed: patient identified, IV checked, site marked, risks and benefits discussed, surgical consent, monitors and equipment checked, pre-op evaluation and timeout performed Arterial Line Prep  Sterile Tech: cap, gloves and sterile barriers Prep: ChloraPrep Patient monitoring: blood pressure monitoring, continuous pulse oximetry and EKG Arterial Line Procedure Laterality:right Location:  radial artery Catheter size: 20 G Guidance: ultrasound guided PROCEDURE NOTE/ULTRASOUND INTERPRETATION.  Using ultrasound guidance the potential vascular sites for insertion of the catheter were visualized to determine the patency of the vessel to be used for vascular access.  After selecting the appropriate site for insertion, the needle was visualized under ultrasound being inserted into the radial artery, followed by ultrasound confirmation of wire and catheter placement. There were no abnormalities seen on ultrasound; an image was taken; and the patient tolerated the procedure with no complications. Number of attempts: 1 Successful placement: yes Images: still images not obtained Post Assessment Dressing Type: occlusive dressing applied, secured with tape, wrist guard applied and biopatch applied. Complications no Circ/Move/Sens Assessment: normal and unchanged. Patient Tolerance: patient tolerated the procedure well with no apparent complications Additional Notes Performed on 5/20/25 at 15:15     XR Knee 1 or 2 View Left  Result Date: 5/20/2025  XR KNEE 1 OR 2 VW LEFT Date of Exam: 5/20/2025 7:55 PM EDT Indication: Post-Op Knee Arthoplasty Comparison: None available. Findings: There are postoperative changes of left knee arthroplasty without radiographic evidence of complication. There is surrounding soft tissue edema, favored postoperative. Vascular calcifications.     Impression: Postoperative changes of left knee arthroplasty without radiographic evidence of complication. Electronically Signed:  Zhou Paredes MD  5/20/2025 8:56 PM EDT  Workstation ID: [id number]    Peripheral Block  Result Date: 5/20/2025  Aniket Keller CRNA     5/20/2025  3:22 PM Peripheral Block Pre-sedation assessment completed: 5/20/2025 2:49 PM Patient reassessed immediately prior to procedure Start time: 5/20/2025 3:00 PM Stop time: 5/20/2025 3:10 PM Reason for block: at surgeon's request and post-op pain management Performed by CRNA/CAA: Aniket Keller CRNA Assisted by: Lili Wong RN Preanesthetic Checklist Completed: patient identified, IV checked, site marked, risks and benefits discussed, surgical consent, monitors and equipment checked, pre-op evaluation and timeout performed Prep: Pt Position: supine Sterile barriers:gloves, cap, sterile barriers, mask and washed/disinfected hands Prep: ChloraPrep Patient monitoring: blood pressure monitoring, continuous pulse oximetry and EKG Procedure Guidance:ultrasound guided ULTRASOUND INTERPRETATION.  Using ultrasound guidance a 20 G gauge needle was placed in close proximity to the femoral nerve, at which point, under ultrasound guidance anesthetic was injected in the area of the nerve and spread of the anesthesia was seen on ultrasound in close proximity thereto.  There were no abnormalities seen on ultrasound; a digital image was taken; and the patient tolerated the procedure with no complications. Images:still images obtained, printed/placed on chart Laterality:left Block Type:femoral Injection Technique:catheter Needle Type:echogenic and Tuohy Needle Gauge:20 G Resistance on Injection: none Cath Depth at skin: 10 cm Medications Used: fentaNYL citrate (PF) (SUBLIMAZE) injection - Intravenous  100 mcg - 5/20/2025 3:10:00 PM bupivacaine PF (MARCAINE) 0.25 % injection - Injection  30 mL - 5/20/2025 3:10:00 PM Medications Preservative Free Saline:10ml Post Assessment Injection Assessment: negative aspiration for heme, no paresthesia on injection and incremental injection  "Patient Tolerance:comfortable throughout block Complications:no Additional Notes CATHETER A high-frequency linear transducer, with sterile cover, was placed in the inguinal crease to visualize the Femoral Vein, Artery, and Nerve (medial to lateral). The insertion site was prepped in sterile fashion. Skin and cutaneous tissue was infiltrated with 2-5 ml of 1% Lidocaine. Using ultrasound-guidance, an 18-gauge Contiplex Ultra 360 Touhy Needle was then inserted and advanced in-plane from lateral to medial with ultrasound guidance. The Touhy needle was directed below Fascia Iliacus towards the Femoral nerve. Preservative-free normal saline was utilized for hydro-dissection of tissue. Local anesthetic injection spread, in incremental 3-5 ml injections, was visualized lateral to the artery to surround the femoral nerve. Aspiration every 5 ml to prevent intravascular injection. Injection was completed with negative aspiration of blood and negative intravascular injection. Injection pressures were normal with minimal resistance. A 20-gauge Contiplex Echo catheter was placed through the needle and advanced out the tip of the Touhy 1-3 cm. The Touhy needle was then removed, and final catheter position verified lateral to the femoral artery. The catheter was secured in the usual fashion with skin glue, benzoin, steri-strips, CHG tegaderm and Label noting \"Nerve Block Catheter\". Jerk tape applied at yellow connector and catheter connection. Performed by: Aniket Keller CRNA               Results for orders placed during the hospital encounter of 06/05/23    Adult Transthoracic Echo Complete w/ Color, Spectral and Contrast if necessary per protocol    Interpretation Summary  •  Left ventricular systolic function is normal. Left ventricular ejection fraction appears to be 56 - 60%.  •  Left ventricular diastolic function was normal.  •  Estimated right ventricular systolic pressure from tricuspid regurgitation is normal (<35 " mmHg).      Plan for Follow-up of Pending Labs/Results:     Discharge Details        Discharge Medications        New Medications        Instructions Start Date   amLODIPine 10 MG tablet  Commonly known as: NORVASC   10 mg, Oral, Every 24 Hours Scheduled   Start Date: May 30, 2025     doxycycline 100 MG capsule  Commonly known as: MONODOX   100 mg, Oral, Every 12 Hours Scheduled      oxyCODONE 5 MG immediate release tablet  Commonly known as: ROXICODONE   5 mg, Oral, Every 8 Hours PRN             Changes to Medications        Instructions Start Date   lisinopril 10 MG tablet  Commonly known as: PRINIVIL,ZESTRIL  What changed:   medication strength  how much to take   10 mg, Oral, Daily   Start Date: May 30, 2025            Continue These Medications        Instructions Start Date   acetaminophen 500 MG tablet  Commonly known as: TYLENOL   500 mg, Oral, Every 6 Hours PRN      Acidophilus Extra Strength capsule   Take 1 capsule by mouth Daily.      amiodarone 200 MG tablet  Commonly known as: PACERONE   200 mg, Oral, Daily      ascorbic acid 500 MG tablet  Commonly known as: VITAMIN C   Take 1 tablet by mouth Daily.      castor oil-balsam peru ointment   1 Application, Topical, Every 12 Hours Scheduled      Eliquis 2.5 MG tablet tablet  Generic drug: apixaban   2.5 mg, Oral, 2 Times Daily      furosemide 40 MG tablet  Commonly known as: LASIX   40 mg, Oral, Daily PRN      levothyroxine 100 MCG tablet  Commonly known as: SYNTHROID, LEVOTHROID   100 mcg, Oral, Every Early Morning      melatonin 5 MG tablet tablet   5 mg, Oral, Nightly PRN      metFORMIN 500 MG tablet  Commonly known as: GLUCOPHAGE   Take 1 tablet by mouth 2 (Two) Times a Day With Meals.      metoprolol tartrate 50 MG tablet  Commonly known as: LOPRESSOR   25 mg, Oral, 2 Times Daily      naloxone 4 MG/0.1ML nasal spray  Commonly known as: NARCAN   Call 911. Don't prime. [place] in 1 nostril for overdose. Repeat in 2-3 minutes in other nostril if no or  minimal breathing/responsiveness.      nystatin 441319 UNIT/GM ointment  Commonly known as: MYCOSTATIN   1 Application, 2 Times Daily      ondansetron ODT 4 MG disintegrating tablet  Commonly known as: ZOFRAN-ODT   DISSOLVE 1 TABLET ON THE TONGUE 2 TO 3 TIMES PER DAY AS NEEDED FOR NAUSEA OR VOMITING      pantoprazole 20 MG EC tablet  Commonly known as: PROTONIX   20 mg, Oral, Daily      polyethylene glycol 17 g packet  Commonly known as: MIRALAX   17 g, Oral, Daily PRN      zinc sulfate 220 (50 Zn) MG capsule  Commonly known as: ZINCATE              Stop These Medications      insulin NPH-insulin regular (70-30) 100 UNIT/ML injection  Commonly known as: humuLIN 70/30,novoLIN 70/30     NovoLOG 100 UNIT/ML injection  Generic drug: Insulin Aspart     SITagliptin 25 MG tablet  Commonly known as: JANUVIA              Allergies   Allergen Reactions   • Aspirin Unknown - Low Severity     Mouth sores          Discharge Disposition:  Skilled Nursing Facility (DC - External)    Diet:  Hospital:  Diet Order   Procedures   • Diet: Regular/House, Diabetic; Consistent Carbohydrate; Texture: Regular (IDDSI 7); Fluid Consistency: Thin (IDDSI 0)            Activity:      Restrictions or Other Recommendations:         CODE STATUS:    Code Status and Medical Interventions: CPR (Attempt to Resuscitate); Full   Ordered at: 05/20/25 2049     Code Status (Patient has no pulse and is not breathing):    CPR (Attempt to Resuscitate)     Medical Interventions (Patient has pulse or is breathing):    Full     Level Of Support Discussed With:    Patient       Future Appointments   Date Time Provider Department Center   9/8/2025 10:30 AM Jeanna Grande APRN [facility] JAKUB Power MD  05/29/25      Time Spent on Discharge:  I spent  45  minutes on this discharge activity which included: face-to-face encounter with the patient, reviewing the data in the system, coordination of the care with the nursing staff as well as  consultants, documentation, and entering orders.

## 2025-05-29 NOTE — CASE MANAGEMENT/SOCIAL WORK
Case Management Discharge Note      Final Note: Patient's plan is skilled rehab at [facility] today 5/29.  ambulance will transport. PCS faxed to [employer]. Nurse to call report to [phone]. Facility liaison will pull discharge summary from [facility]. Patient is agreeable to plan. Patient's son, Joe, updated by phone and is agreeable to plan. No further discharge needs identified.         Selected Continued Care - Admitted Since 5/17/2025       Destination Coordination complete.      Service Provider Services Address Phone Fax Patient Preferred    [facility] [phone] [facility] [address] [phone] --              Durable Medical Equipment    No services have been selected for the patient.                Dialysis/Infusion    No services have been selected for the patient.                Home Medical Care    No services have been selected for the patient.                Therapy    No services have been selected for the patient.                Community Resources    No services have been selected for the patient.                Community & DME    No services have been selected for the patient.                    Transportation Services  Ambulance: [facility]  [facility] Ambulance Status: Accepted    Final Discharge Disposition Code: 03 - skilled nursing facility (SNF)

## 2025-06-12 ENCOUNTER — HOSPITAL ENCOUNTER (INPATIENT)
Facility: HOSPITAL | Age: 89
LOS: 2 days | DRG: 683 | End: 2025-06-14
Attending: EMERGENCY MEDICINE | Admitting: STUDENT IN AN ORGANIZED HEALTH CARE EDUCATION/TRAINING PROGRAM
Payer: MEDICARE

## 2025-06-12 ENCOUNTER — APPOINTMENT (OUTPATIENT)
Dept: CT IMAGING | Facility: HOSPITAL | Age: 89
DRG: 683 | End: 2025-06-12
Payer: MEDICARE

## 2025-06-12 DIAGNOSIS — N17.9 ACUTE RENAL FAILURE, UNSPECIFIED ACUTE RENAL FAILURE TYPE: Primary | ICD-10-CM

## 2025-06-12 DIAGNOSIS — E87.5 HYPERKALEMIA: ICD-10-CM

## 2025-06-12 DIAGNOSIS — I48.91 ATRIAL FIBRILLATION WITH RAPID VENTRICULAR RESPONSE: ICD-10-CM

## 2025-06-12 LAB
ALBUMIN SERPL-MCNC: 3.3 G/DL (ref 3.5–5.2)
ALBUMIN/GLOB SERPL: 1.2 G/DL
ALP SERPL-CCNC: 117 U/L (ref 39–117)
ALT SERPL W P-5'-P-CCNC: 9 U/L (ref 1–33)
ANION GAP SERPL CALCULATED.3IONS-SCNC: 17 MMOL/L (ref 5–15)
ANION GAP SERPL CALCULATED.3IONS-SCNC: 25 MMOL/L (ref 5–15)
ARTERIAL PATENCY WRIST A: ABNORMAL
AST SERPL-CCNC: 13 U/L (ref 1–32)
ATMOSPHERIC PRESS: ABNORMAL MM[HG]
BACTERIA UR QL AUTO: ABNORMAL /HPF
BASE EXCESS BLDA CALC-SCNC: -8.2 MMOL/L (ref 0–2)
BASOPHILS # BLD AUTO: 0 10*3/MM3 (ref 0–0.2)
BASOPHILS NFR BLD AUTO: 0 % (ref 0–1.5)
BDY SITE: ABNORMAL
BILIRUB SERPL-MCNC: 0.3 MG/DL (ref 0–1.2)
BILIRUB UR QL STRIP: ABNORMAL
BODY TEMPERATURE: 37
BUN SERPL-MCNC: 69.4 MG/DL (ref 8–23)
BUN SERPL-MCNC: 72.1 MG/DL (ref 8–23)
BUN/CREAT SERPL: 19.6 (ref 7–25)
BUN/CREAT SERPL: 22.3 (ref 7–25)
CALCIUM SPEC-SCNC: 8.9 MG/DL (ref 8.6–10.5)
CALCIUM SPEC-SCNC: 9.7 MG/DL (ref 8.6–10.5)
CHLORIDE SERPL-SCNC: 95 MMOL/L (ref 98–107)
CHLORIDE SERPL-SCNC: 98 MMOL/L (ref 98–107)
CLARITY UR: ABNORMAL
CO2 BLDA-SCNC: 16.9 MMOL/L (ref 22–33)
CO2 SERPL-SCNC: 14 MMOL/L (ref 22–29)
CO2 SERPL-SCNC: 17 MMOL/L (ref 22–29)
COD CRY URNS QL: ABNORMAL /HPF
COHGB MFR BLD: 1.3 % (ref 0–2)
COLOR UR: ABNORMAL
CREAT SERPL-MCNC: 3.24 MG/DL (ref 0.57–1)
CREAT SERPL-MCNC: 3.54 MG/DL (ref 0.57–1)
D-LACTATE SERPL-SCNC: 9.7 MMOL/L (ref 0.5–2)
DEPRECATED RDW RBC AUTO: 52.5 FL (ref 37–54)
EGFRCR SERPLBLD CKD-EPI 2021: 11.9 ML/MIN/1.73
EGFRCR SERPLBLD CKD-EPI 2021: 13.2 ML/MIN/1.73
EOSINOPHIL # BLD AUTO: 0 10*3/MM3 (ref 0–0.4)
EOSINOPHIL NFR BLD AUTO: 0 % (ref 0.3–6.2)
EPAP: 0
ERYTHROCYTE [DISTWIDTH] IN BLOOD BY AUTOMATED COUNT: 16.4 % (ref 12.3–15.4)
GLOBULIN UR ELPH-MCNC: 2.7 GM/DL
GLUCOSE BLDC GLUCOMTR-MCNC: 140 MG/DL (ref 70–130)
GLUCOSE BLDC GLUCOMTR-MCNC: 207 MG/DL (ref 70–130)
GLUCOSE SERPL-MCNC: 123 MG/DL (ref 65–99)
GLUCOSE SERPL-MCNC: 296 MG/DL (ref 65–99)
GLUCOSE UR STRIP-MCNC: NEGATIVE MG/DL
HCO3 BLDA-SCNC: 16 MMOL/L (ref 20–26)
HCT VFR BLD AUTO: 25.7 % (ref 34–46.6)
HCT VFR BLD CALC: 25.2 % (ref 38–51)
HGB BLD-MCNC: 8.1 G/DL (ref 12–15.9)
HGB BLDA-MCNC: 8.2 G/DL (ref 14–18)
HGB UR QL STRIP.AUTO: ABNORMAL
IMM GRANULOCYTES # BLD AUTO: 0.06 10*3/MM3 (ref 0–0.05)
IMM GRANULOCYTES NFR BLD AUTO: 1.9 % (ref 0–0.5)
INHALED O2 CONCENTRATION: 21 %
IPAP: 0
KETONES UR QL STRIP: NEGATIVE
LEUKOCYTE ESTERASE UR QL STRIP.AUTO: ABNORMAL
LYMPHOCYTES # BLD AUTO: 0.49 10*3/MM3 (ref 0.7–3.1)
LYMPHOCYTES NFR BLD AUTO: 15.6 % (ref 19.6–45.3)
MCH RBC QN AUTO: 27.6 PG (ref 26.6–33)
MCHC RBC AUTO-ENTMCNC: 31.5 G/DL (ref 31.5–35.7)
MCV RBC AUTO: 87.4 FL (ref 79–97)
METHGB BLD QL: 0.5 % (ref 0–1.5)
MODALITY: ABNORMAL
MONOCYTES # BLD AUTO: 0.25 10*3/MM3 (ref 0.1–0.9)
MONOCYTES NFR BLD AUTO: 8 % (ref 5–12)
NEUTROPHILS NFR BLD AUTO: 2.34 10*3/MM3 (ref 1.7–7)
NEUTROPHILS NFR BLD AUTO: 74.5 % (ref 42.7–76)
NITRITE UR QL STRIP: POSITIVE
NRBC BLD AUTO-RTO: 0 /100 WBC (ref 0–0.2)
OXYHGB MFR BLDV: 94.1 % (ref 94–99)
PAW @ PEAK INSP FLOW SETTING VENT: 0 CMH2O
PCO2 BLDA: 27.4 MM HG (ref 35–45)
PCO2 TEMP ADJ BLD: 27.4 MM HG (ref 35–45)
PH BLDA: 7.37 PH UNITS (ref 7.35–7.45)
PH UR STRIP.AUTO: <=5 [PH] (ref 5–8)
PH, TEMP CORRECTED: 7.37 PH UNITS
PLATELET # BLD AUTO: 136 10*3/MM3 (ref 140–450)
PMV BLD AUTO: 9.5 FL (ref 6–12)
PO2 BLDA: 74.9 MM HG (ref 83–108)
PO2 TEMP ADJ BLD: 74.9 MM HG (ref 83–108)
POTASSIUM SERPL-SCNC: 5.9 MMOL/L (ref 3.5–5.2)
POTASSIUM SERPL-SCNC: 6.8 MMOL/L (ref 3.5–5.2)
PROT SERPL-MCNC: 6 G/DL (ref 6–8.5)
PROT UR QL STRIP: ABNORMAL
RBC # BLD AUTO: 2.94 10*6/MM3 (ref 3.77–5.28)
RBC # UR STRIP: ABNORMAL /HPF
REF LAB TEST METHOD: ABNORMAL
SODIUM SERPL-SCNC: 132 MMOL/L (ref 136–145)
SODIUM SERPL-SCNC: 134 MMOL/L (ref 136–145)
SODIUM UR-SCNC: 43 MMOL/L
SP GR UR STRIP: 1.02 (ref 1–1.03)
SQUAMOUS #/AREA URNS HPF: ABNORMAL /HPF
TOTAL RATE: 0 BREATHS/MINUTE
TROPONIN T SERPL HS-MCNC: 39 NG/L
TROPONIN T SERPL HS-MCNC: 41 NG/L
TSH SERPL DL<=0.05 MIU/L-ACNC: 1.31 UIU/ML (ref 0.27–4.2)
UROBILINOGEN UR QL STRIP: ABNORMAL
WBC # UR STRIP: ABNORMAL /HPF
WBC NRBC COR # BLD AUTO: 3.14 10*3/MM3 (ref 3.4–10.8)

## 2025-06-12 PROCEDURE — 86900 BLOOD TYPING SEROLOGIC ABO: CPT | Performed by: NURSE PRACTITIONER

## 2025-06-12 PROCEDURE — 82805 BLOOD GASES W/O2 SATURATION: CPT

## 2025-06-12 PROCEDURE — 36600 WITHDRAWAL OF ARTERIAL BLOOD: CPT

## 2025-06-12 PROCEDURE — 84540 ASSAY OF URINE/UREA-N: CPT | Performed by: NURSE PRACTITIONER

## 2025-06-12 PROCEDURE — 74176 CT ABD & PELVIS W/O CONTRAST: CPT

## 2025-06-12 PROCEDURE — 84300 ASSAY OF URINE SODIUM: CPT | Performed by: NURSE PRACTITIONER

## 2025-06-12 PROCEDURE — 99285 EMERGENCY DEPT VISIT HI MDM: CPT

## 2025-06-12 PROCEDURE — 82948 REAGENT STRIP/BLOOD GLUCOSE: CPT

## 2025-06-12 PROCEDURE — 63710000001 INSULIN REGULAR HUMAN PER 5 UNITS: Performed by: EMERGENCY MEDICINE

## 2025-06-12 PROCEDURE — 25010000003 DEXTROSE 5 % SOLUTION: Performed by: EMERGENCY MEDICINE

## 2025-06-12 PROCEDURE — 84484 ASSAY OF TROPONIN QUANT: CPT | Performed by: STUDENT IN AN ORGANIZED HEALTH CARE EDUCATION/TRAINING PROGRAM

## 2025-06-12 PROCEDURE — 25810000003 LACTATED RINGERS SOLUTION: Performed by: EMERGENCY MEDICINE

## 2025-06-12 PROCEDURE — 86870 RBC ANTIBODY IDENTIFICATION: CPT | Performed by: NURSE PRACTITIONER

## 2025-06-12 PROCEDURE — 25010000002 MEROPENEM PER 100 MG

## 2025-06-12 PROCEDURE — 82570 ASSAY OF URINE CREATININE: CPT | Performed by: NURSE PRACTITIONER

## 2025-06-12 PROCEDURE — 84443 ASSAY THYROID STIM HORMONE: CPT | Performed by: NURSE PRACTITIONER

## 2025-06-12 PROCEDURE — 87186 SC STD MICRODIL/AGAR DIL: CPT | Performed by: NURSE PRACTITIONER

## 2025-06-12 PROCEDURE — 83605 ASSAY OF LACTIC ACID: CPT | Performed by: STUDENT IN AN ORGANIZED HEALTH CARE EDUCATION/TRAINING PROGRAM

## 2025-06-12 PROCEDURE — 83050 HGB METHEMOGLOBIN QUAN: CPT

## 2025-06-12 PROCEDURE — 99291 CRITICAL CARE FIRST HOUR: CPT

## 2025-06-12 PROCEDURE — 85025 COMPLETE CBC W/AUTO DIFF WBC: CPT | Performed by: EMERGENCY MEDICINE

## 2025-06-12 PROCEDURE — 87641 MR-STAPH DNA AMP PROBE: CPT | Performed by: STUDENT IN AN ORGANIZED HEALTH CARE EDUCATION/TRAINING PROGRAM

## 2025-06-12 PROCEDURE — 87077 CULTURE AEROBIC IDENTIFY: CPT | Performed by: NURSE PRACTITIONER

## 2025-06-12 PROCEDURE — 80053 COMPREHEN METABOLIC PANEL: CPT | Performed by: EMERGENCY MEDICINE

## 2025-06-12 PROCEDURE — 25010000002 AMIODARONE IN DEXTROSE 5% 360-4.14 MG/200ML-% SOLUTION: Performed by: EMERGENCY MEDICINE

## 2025-06-12 PROCEDURE — 93005 ELECTROCARDIOGRAM TRACING: CPT | Performed by: STUDENT IN AN ORGANIZED HEALTH CARE EDUCATION/TRAINING PROGRAM

## 2025-06-12 PROCEDURE — 51702 INSERT TEMP BLADDER CATH: CPT

## 2025-06-12 PROCEDURE — 87040 BLOOD CULTURE FOR BACTERIA: CPT | Performed by: STUDENT IN AN ORGANIZED HEALTH CARE EDUCATION/TRAINING PROGRAM

## 2025-06-12 PROCEDURE — 99291 CRITICAL CARE FIRST HOUR: CPT | Performed by: STUDENT IN AN ORGANIZED HEALTH CARE EDUCATION/TRAINING PROGRAM

## 2025-06-12 PROCEDURE — 82375 ASSAY CARBOXYHB QUANT: CPT

## 2025-06-12 PROCEDURE — 87086 URINE CULTURE/COLONY COUNT: CPT | Performed by: NURSE PRACTITIONER

## 2025-06-12 PROCEDURE — 86850 RBC ANTIBODY SCREEN: CPT | Performed by: NURSE PRACTITIONER

## 2025-06-12 PROCEDURE — 86901 BLOOD TYPING SEROLOGIC RH(D): CPT | Performed by: NURSE PRACTITIONER

## 2025-06-12 PROCEDURE — 93010 ELECTROCARDIOGRAM REPORT: CPT | Performed by: INTERNAL MEDICINE

## 2025-06-12 PROCEDURE — 25810000003 SODIUM CHLORIDE 0.9 % SOLUTION 250 ML FLEX CONT: Performed by: EMERGENCY MEDICINE

## 2025-06-12 PROCEDURE — 25010000002 PHENYLEPHRINE 10 MG/ML SOLUTION 5 ML VIAL: Performed by: EMERGENCY MEDICINE

## 2025-06-12 PROCEDURE — 25810000003 SODIUM CHLORIDE 0.9 % SOLUTION: Performed by: STUDENT IN AN ORGANIZED HEALTH CARE EDUCATION/TRAINING PROGRAM

## 2025-06-12 PROCEDURE — 94640 AIRWAY INHALATION TREATMENT: CPT

## 2025-06-12 PROCEDURE — 93005 ELECTROCARDIOGRAM TRACING: CPT | Performed by: EMERGENCY MEDICINE

## 2025-06-12 PROCEDURE — 81001 URINALYSIS AUTO W/SCOPE: CPT | Performed by: NURSE PRACTITIONER

## 2025-06-12 PROCEDURE — 25010000002 AMIODARONE IN DEXTROSE 5% 150-4.21 MG/100ML-% SOLUTION: Performed by: EMERGENCY MEDICINE

## 2025-06-12 RX ORDER — ALBUTEROL SULFATE 0.83 MG/ML
SOLUTION RESPIRATORY (INHALATION)
Status: COMPLETED
Start: 2025-06-12 | End: 2025-06-12

## 2025-06-12 RX ORDER — SODIUM CHLORIDE 0.9 % (FLUSH) 0.9 %
10 SYRINGE (ML) INJECTION AS NEEDED
Status: DISCONTINUED | OUTPATIENT
Start: 2025-06-12 | End: 2025-06-14 | Stop reason: HOSPADM

## 2025-06-12 RX ORDER — SULFAMETHOXAZOLE AND TRIMETHOPRIM 800; 160 MG/1; MG/1
1 TABLET ORAL 2 TIMES DAILY
COMMUNITY
End: 2025-06-20 | Stop reason: HOSPADM

## 2025-06-12 RX ORDER — IBUPROFEN 600 MG/1
1 TABLET ORAL
Status: DISCONTINUED | OUTPATIENT
Start: 2025-06-12 | End: 2025-06-13

## 2025-06-12 RX ORDER — CALCIUM CHLORIDE 100 MG/ML
1 INJECTION INTRAVENOUS; INTRAVENTRICULAR ONCE
Status: COMPLETED | OUTPATIENT
Start: 2025-06-12 | End: 2025-06-12

## 2025-06-12 RX ORDER — OMEPRAZOLE 20 MG/1
20 CAPSULE, DELAYED RELEASE ORAL DAILY
COMMUNITY
End: 2025-06-20 | Stop reason: HOSPADM

## 2025-06-12 RX ORDER — NITROGLYCERIN 0.4 MG/1
0.4 TABLET SUBLINGUAL
Status: DISCONTINUED | OUTPATIENT
Start: 2025-06-12 | End: 2025-06-13

## 2025-06-12 RX ORDER — CALCIUM GLUCONATE 20 MG/ML
1000 INJECTION, SOLUTION INTRAVENOUS ONCE
Status: DISCONTINUED | OUTPATIENT
Start: 2025-06-12 | End: 2025-06-12

## 2025-06-12 RX ORDER — TAMSULOSIN HYDROCHLORIDE 0.4 MG/1
1 CAPSULE ORAL DAILY
COMMUNITY
End: 2025-06-20 | Stop reason: HOSPADM

## 2025-06-12 RX ORDER — PANTOPRAZOLE SODIUM 40 MG/1
40 TABLET, DELAYED RELEASE ORAL DAILY
Status: DISCONTINUED | OUTPATIENT
Start: 2025-06-13 | End: 2025-06-13

## 2025-06-12 RX ORDER — DEXTROSE MONOHYDRATE 25 G/50ML
25 INJECTION, SOLUTION INTRAVENOUS ONCE
Status: COMPLETED | OUTPATIENT
Start: 2025-06-12 | End: 2025-06-12

## 2025-06-12 RX ORDER — ALBUTEROL SULFATE 0.83 MG/ML
10 SOLUTION RESPIRATORY (INHALATION) ONCE
Status: COMPLETED | OUTPATIENT
Start: 2025-06-12 | End: 2025-06-12

## 2025-06-12 RX ORDER — INDOMETHACIN 25 MG/1
100 CAPSULE ORAL ONCE
Status: COMPLETED | OUTPATIENT
Start: 2025-06-13 | End: 2025-06-13

## 2025-06-12 RX ORDER — LEVOTHYROXINE SODIUM 100 UG/1
100 TABLET ORAL
Status: DISCONTINUED | OUTPATIENT
Start: 2025-06-13 | End: 2025-06-14 | Stop reason: HOSPADM

## 2025-06-12 RX ORDER — MULTIPLE VITAMINS W/ MINERALS TAB 9MG-400MCG
1 TAB ORAL DAILY
COMMUNITY
End: 2025-06-20 | Stop reason: HOSPADM

## 2025-06-12 RX ORDER — DEXTROSE MONOHYDRATE 25 G/50ML
25 INJECTION, SOLUTION INTRAVENOUS
Status: DISCONTINUED | OUTPATIENT
Start: 2025-06-12 | End: 2025-06-13 | Stop reason: SDUPTHER

## 2025-06-12 RX ORDER — BISACODYL 10 MG
10 SUPPOSITORY, RECTAL RECTAL DAILY PRN
Status: DISCONTINUED | OUTPATIENT
Start: 2025-06-12 | End: 2025-06-13

## 2025-06-12 RX ORDER — AMOXICILLIN 250 MG
2 CAPSULE ORAL 2 TIMES DAILY
Status: DISCONTINUED | OUTPATIENT
Start: 2025-06-12 | End: 2025-06-14 | Stop reason: HOSPADM

## 2025-06-12 RX ORDER — BISACODYL 5 MG/1
5 TABLET, DELAYED RELEASE ORAL DAILY PRN
Status: DISCONTINUED | OUTPATIENT
Start: 2025-06-12 | End: 2025-06-13

## 2025-06-12 RX ORDER — CALCIUM GLUCONATE 20 MG/ML
1000 INJECTION, SOLUTION INTRAVENOUS ONCE
Status: COMPLETED | OUTPATIENT
Start: 2025-06-13 | End: 2025-06-13

## 2025-06-12 RX ORDER — POLYETHYLENE GLYCOL 3350 17 G/17G
17 POWDER, FOR SOLUTION ORAL DAILY PRN
Status: DISCONTINUED | OUTPATIENT
Start: 2025-06-12 | End: 2025-06-13

## 2025-06-12 RX ORDER — SODIUM CHLORIDE 0.9 % (FLUSH) 0.9 %
10 SYRINGE (ML) INJECTION EVERY 12 HOURS SCHEDULED
Status: DISCONTINUED | OUTPATIENT
Start: 2025-06-12 | End: 2025-06-14 | Stop reason: HOSPADM

## 2025-06-12 RX ORDER — DEXTROSE MONOHYDRATE 25 G/50ML
25 INJECTION, SOLUTION INTRAVENOUS ONCE
Status: COMPLETED | OUTPATIENT
Start: 2025-06-13 | End: 2025-06-13

## 2025-06-12 RX ORDER — NICOTINE POLACRILEX 4 MG
15 LOZENGE BUCCAL
Status: DISCONTINUED | OUTPATIENT
Start: 2025-06-12 | End: 2025-06-13 | Stop reason: SDUPTHER

## 2025-06-12 RX ORDER — SODIUM CHLORIDE 9 MG/ML
40 INJECTION, SOLUTION INTRAVENOUS AS NEEDED
Status: DISCONTINUED | OUTPATIENT
Start: 2025-06-12 | End: 2025-06-14 | Stop reason: HOSPADM

## 2025-06-12 RX ORDER — INDOMETHACIN 25 MG/1
50 CAPSULE ORAL ONCE
Status: COMPLETED | OUTPATIENT
Start: 2025-06-12 | End: 2025-06-12

## 2025-06-12 RX ORDER — OXYCODONE HYDROCHLORIDE 5 MG/1
5 TABLET ORAL EVERY 8 HOURS PRN
COMMUNITY
End: 2025-06-20 | Stop reason: HOSPADM

## 2025-06-12 RX ADMIN — SODIUM CHLORIDE, POTASSIUM CHLORIDE, SODIUM LACTATE AND CALCIUM CHLORIDE 1000 ML: 600; 310; 30; 20 INJECTION, SOLUTION INTRAVENOUS at 17:23

## 2025-06-12 RX ADMIN — SODIUM BICARBONATE 50 MEQ: 84 INJECTION INTRAVENOUS at 17:22

## 2025-06-12 RX ADMIN — AMIODARONE HYDROCHLORIDE 1 MG/MIN: 1.8 INJECTION, SOLUTION INTRAVENOUS at 19:33

## 2025-06-12 RX ADMIN — SODIUM ZIRCONIUM CYCLOSILICATE 10 G: 10 POWDER, FOR SUSPENSION ORAL at 17:26

## 2025-06-12 RX ADMIN — CALCIUM CHLORIDE 1 G: 100 INJECTION INTRAVENOUS; INTRAVENTRICULAR at 17:23

## 2025-06-12 RX ADMIN — MEROPENEM 1000 MG: 1 INJECTION, POWDER, FOR SOLUTION INTRAVENOUS at 23:39

## 2025-06-12 RX ADMIN — Medication 10 ML: at 21:04

## 2025-06-12 RX ADMIN — AMIODARONE HYDROCHLORIDE 150 MG: 1.5 INJECTION, SOLUTION INTRAVENOUS at 19:18

## 2025-06-12 RX ADMIN — DEXTROSE MONOHYDRATE 25 G: 25 INJECTION, SOLUTION INTRAVENOUS at 17:22

## 2025-06-12 RX ADMIN — SODIUM CHLORIDE 1000 ML: 9 INJECTION, SOLUTION INTRAVENOUS at 23:58

## 2025-06-12 RX ADMIN — ALBUTEROL SULFATE 2.5 MG: 2.5 SOLUTION RESPIRATORY (INHALATION) at 17:19

## 2025-06-12 RX ADMIN — INSULIN HUMAN 10 UNITS: 100 INJECTION, SOLUTION PARENTERAL at 17:20

## 2025-06-12 RX ADMIN — MUPIROCIN 1 APPLICATION: 20 OINTMENT TOPICAL at 21:43

## 2025-06-12 RX ADMIN — SODIUM BICARBONATE 100 ML/HR: 84 INJECTION, SOLUTION INTRAVENOUS at 19:51

## 2025-06-12 RX ADMIN — PHENYLEPHRINE HYDROCHLORIDE 0.5 MCG/KG/MIN: 10 INJECTION INTRAVENOUS at 19:15

## 2025-06-12 RX ADMIN — ALBUTEROL SULFATE 10 MG: 2.5 SOLUTION RESPIRATORY (INHALATION) at 17:31

## 2025-06-12 NOTE — LETTER
[facility]  [address] [facility] [address]  [phone]        June 14, 2025      Patient: Chey Robertson  YOB: 1935  Date of Visit: 6/12/2025      Referring: Dr. Oswaldo Weber  Certifying: Dr. Flores  Attending: Fernanda Raygoza RN

## 2025-06-12 NOTE — Clinical Note
Level of Care: Critical Care [6]   Admitting Physician: ONOFRE HANSON [[id number]]   Attending Physician: ONOFRE HANSON [[id number]]

## 2025-06-12 NOTE — H&P
INTENSIVIST   PROGRESS NOTE          SUBJECTIVE     Chey 89 y.o. female is followed for:Abnormal Lab       DISHA (acute kidney injury)    Pancytopenia    Hypothyroidism (acquired)    Myelodysplasia (myelodysplastic syndrome)    Personal history of pulmonary embolism    Chronic anticoagulation    Essential hypertension    Type 2 diabetes mellitus without complication, without long-term current use of insulin    Atrial fibrillation    Periprosthetic fracture around prosthetic knee    Femur fracture    As an Intensivist, we provide an integrated approach to the ICU patient and family, medical management of comorbid conditions, including but not limited to electrolytes, glycemic control, organ dysfunction, lead interdisciplinary rounds and coordinate the care with all other services, including those from other specialists.     HPI:  Chey is a 89 y.o. female with PMH A-fib on Eliquis, hypertension, type 2 diabetes, hypothyroidism, MDS with pancytopenia followed by Dr. Lyman, history PE, status post left knee replacement who presented to this Hospital on 6/12/2025 because of generalized weakness.  Of note patient was recently admitted to [facility] from 5/17 to 5/29 post ground-level fall at skilled nursing facility with subsequent left supracondylar fracture of the distal femur, status postrepair 5/20.  Hospital course was also complicated by Klebsiella UTI which was treated with Rocephin for 5 days.  Today patient presents to [facility] and found to have acute renal failure with BUN 72, creatinine 3.24, potassium 6.8, bicarb 17.  ABG 7.3 7/27/1965 on room air.  She was given 1 g calcium, 10 units insulin/dextrose, 50 mill equivalents sodium bicarb, 1 L LR, Lokelma, albuterol nebulizer.  Patient was also noted to be in A-fib with RVR and started on Amio drip.  Patient subsequently admitted to the ICU for further care.    Patient alert but unable to elicit much further history.  Pleasantly  confused.  States she has not been feeling well the past couple of days. Can't really describe exact symptoms. Denies chest pain, dyspnea, cough, abd pain, N/V, diarrhea. States she wants to go to sleep.  Patient son is at bedside.  States she has been nauseous for the past couple days per report from the rehab.     PMH: She  has a past medical history of A-fib, Anemia, Arthritis, Diabetes mellitus, Disease of thyroid gland, History of transfusion, [place] (hard of hearing), Hypertension, Migraine without aura and without status migrainosus, not intractable (12/30/2016), Myelodysplastic syndrome, Pulmonary emboli (2011), SOBOE (shortness of breath on exertion), Tremors of nervous system, Vertigo, Wears dentures, and Wears glasses.   PSxH: She  has a past surgical history that includes Hysterectomy; Tonsillectomy; Bladder surgery; Cataract extraction; Cholecystectomy; Colonoscopy; Kyphoplasty (N/A, 02/12/2021); and Hip Trochanteric Nailing (Right, 6/20/2023).     Medications:  No current facility-administered medications on file prior to encounter.     Current Outpatient Medications on File Prior to Encounter   Medication Sig    acetaminophen (TYLENOL) 500 MG tablet Take 1 tablet by mouth Every 6 (Six) Hours As Needed for Mild Pain or Moderate Pain.    amiodarone (PACERONE) 200 MG tablet Take 1 tablet by mouth Daily.    amLODIPine (NORVASC) 10 MG tablet Take 1 tablet by mouth Daily for 30 days.    ascorbic acid (VITAMIN C) 500 MG tablet Take 1 tablet by mouth Daily.    castor oil-balsam peru (VENELEX) ointment Apply 1 Application topically to the appropriate area as directed Every 12 (Twelve) Hours.    Eliquis 2.5 MG tablet tablet TAKE 1 TABLET BY MOUTH TWICE DAILY    furosemide (LASIX) 40 MG tablet Take 1 tablet by mouth Daily As Needed (soa/ weight gain > 3lb).    Lactobacillus (Acidophilus Extra Strength) capsule Take 1 capsule by mouth Daily.    levothyroxine (SYNTHROID, LEVOTHROID) 100 MCG tablet Take 1 tablet by  mouth Every Morning.    lisinopril (PRINIVIL,ZESTRIL) 10 MG tablet Take 1 tablet by mouth Daily for 30 days.    melatonin 5 MG tablet tablet Take 1 tablet by mouth At Night As Needed (insomnia).    metFORMIN (GLUCOPHAGE) 500 MG tablet Take 1 tablet by mouth 2 (Two) Times a Day With Meals.    metoprolol tartrate (LOPRESSOR) 50 MG tablet Take 0.5 tablets by mouth 2 (Two) Times a Day.    naloxone (NARCAN) 4 MG/0.1ML nasal spray Call 911. Don't prime. [place] in 1 nostril for overdose. Repeat in 2-3 minutes in other nostril if no or minimal breathing/responsiveness.    nystatin (MYCOSTATIN) 304255 UNIT/GM ointment Apply 1 Application topically to the appropriate area as directed 2 (Two) Times a Day.    ondansetron ODT (ZOFRAN-ODT) 4 MG disintegrating tablet DISSOLVE 1 TABLET ON THE TONGUE 2 TO 3 TIMES PER DAY AS NEEDED FOR NAUSEA OR VOMITING    pantoprazole (PROTONIX) 20 MG EC tablet Take 1 tablet by mouth Daily.    polyethylene glycol (MIRALAX) 17 g packet Take 17 g by mouth Daily As Needed (constipation).    zinc sulfate (ZINCATE) 220 (50 Zn) MG capsule         Allergies: She is allergic to aspirin.   FH: Her family history includes Breast cancer (age of onset: 84) in her sister; Heart attack in her father; Stroke in her mother.   SH: She  reports that she has never smoked. She has never been exposed to tobacco smoke. She has never used smokeless tobacco. She reports that she does not drink alcohol and does not use drugs.     The patient's relevant past medical, surgical and social history were reviewed and updated in Epic as appropriate.                 Review of Systems  As described in the HPI.       OBJECTIVE     Vitals:  Temp: 98.2 °F (36.8 °C) (25 1616) Temp  Min: 98.2 °F (36.8 °C)  Max: 98.2 °F (36.8 °C)   Temp core:      BP: (!) 84/47 (25) BP  Min: 84/47  Max: 130/54   MAP (non-invasive) Noninvasive MAP (mmHg): 59 (25) Noninvasive MAP (mmHg)  Av.8  Min: 59  Max: 141   Pulse: (!)  124 (06/12/25 1915) Pulse  Min: 51  Max: 141   Resp: 16 (06/12/25 1731) Resp  Min: 14  Max: 16   SpO2: 95 % (06/12/25 1900) SpO2  Min: 95 %  Max: 100 %   Device: room air (06/12/25 1731)    Flow Rate:   No data recorded         06/12/25  1616 06/12/25  1845   Weight: 58.5 kg (129 lb) 50.8 kg (112 lb)        Intake/Ouptut 24 hrs (7:00AM - 6:59 AM)  Intake & Output (last 2 days)       None            Medications (drips):  amiodarone   Followed by  [START ON 6/13/2025] amiodarone  phenylephrine, Last Rate: 0.5 mcg/kg/min (06/12/25 1915)  sodium bicarbonate 150 mEq in D5W         Physical Examination  Telemetry:  Rhythm: atrial rhythm (06/12/25 1632)  Atrial Rhythm: atrial fibrillation (06/12/25 1632)      Constitutional:  No acute distress.   Cardiovascular: IRR.    Respiratory: Normal breath sounds  No adventitious sounds   Abdominal:  Soft with no tenderness.   Extremities: No Edema, well-healing incision left lower extremity   Neurological:   Alert, pleasantly confused  Best Eye Response: 4-->(E4) spontaneous (06/12/25 1619)  Best Motor Response: 6-->(M6) obeys commands (06/12/25 1619)  Best Verbal Response: 4-->(V4) confused (06/12/25 1619)  [place] Coma Scale Score: 14 (06/12/25 1619)          Lines, Drains & Airways       Active LDAs       Name Placement date Placement time Site Days    Peripheral IV 06/12/25 1625 20 G Left Antecubital 06/12/25  1625  Antecubital  less than 1    Peripheral IV 06/12/25 1858 18 G Anterior;Right Forearm 06/12/25 1858  Forearm  less than 1    Peripheral IV 06/12/25 1859 20 G Anterior;Left Forearm 06/12/25 1859  Forearm  less than 1    Urethral Catheter Silicone;Straight-tip 16 Fr. 06/12/25 1828  -- less than 1                    Results Reviewed:  Laboratory  Microbiology  Radiology  Pathology    Hematology:  Results from last 7 days   Lab Units 06/12/25  1625   WBC 10*3/mm3 3.14*   HEMOGLOBIN g/dL 8.1*   MCV fL 87.4   PLATELETS 10*3/mm3 136*     Results from last 7 days   Lab  Units 06/12/25  1625   NEUTROS ABS 10*3/mm3 2.34   LYMPHS ABS 10*3/mm3 0.49*   EOS ABS 10*3/mm3 0.00     Chemistry:  Estimated Creatinine Clearance: 9.6 mL/min (A) (by C-G formula based on SCr of 3.24 mg/dL (H)).    Results from last 7 days   Lab Units 06/12/25  1625   SODIUM mmol/L 132*   POTASSIUM mmol/L 6.8*   CHLORIDE mmol/L 98   CO2 mmol/L 17.0*   BUN mg/dL 72.1*   CREATININE mg/dL 3.24*   GLUCOSE mg/dL 123*     Results from last 7 days   Lab Units 06/12/25  1625   CALCIUM mg/dL 8.9       Hepatic Panel:  Results from last 7 days   Lab Units 06/12/25  1625   ALBUMIN g/dL 3.3*   TOTAL PROTEIN g/dL 6.0   BILIRUBIN mg/dL 0.3   AST (SGOT) U/L 13   ALT (SGPT) U/L 9   ALK PHOS U/L 117        Coagulation Labs:         Cardiac Labs:        Biomarkers:        U/A              COVID-19  Lab Results   Component Value Date    COVID19 Not Detected 07/27/2024    COVID19 Presumptive Negative 05/18/2023    COVID19 Detected (C) 05/08/2023    COVID19 Not Detected 05/05/2023       Arterial Blood Gases:  Results from last 7 days   Lab Units 06/12/25  1725   PH, ARTERIAL pH units 7.374   PCO2, ARTERIAL mm Hg 27.4*   PO2 ART mm Hg 74.9*   FIO2 % 21       Images:  No radiology results for the last day    Echo:  Results for orders placed during the hospital encounter of 06/05/23    Adult Transthoracic Echo Complete w/ Color, Spectral and Contrast if necessary per protocol    Interpretation Summary    Left ventricular systolic function is normal. Left ventricular ejection fraction appears to be 56 - 60%.    Left ventricular diastolic function was normal.    Estimated right ventricular systolic pressure from tricuspid regurgitation is normal (<35 mmHg).      Results: Reviewed.  I reviewed the patient's new laboratory and imaging results.  I independently reviewed the patient's new images.    Medications: Reviewed.    Assessment    A/P     Hospital:  LOS: 0 days   ICU: Patient does not have an ICU stay during this admission.     Active  Hospital Problems    Diagnosis  POA    **DISHA (acute kidney injury) [N17.9]  Yes    Femur fracture [S72.90XA]  Yes    Periprosthetic fracture around prosthetic knee [M97.8XXA, Z96.659]  Not Applicable    Atrial fibrillation [I48.91]  Yes    Chronic anticoagulation [Z79.01]  Not Applicable    Essential hypertension [I10]  Yes    Type 2 diabetes mellitus without complication, without long-term current use of insulin [E11.9]  Yes    Myelodysplasia (myelodysplastic syndrome) [D46.9]  Yes    Personal history of pulmonary embolism [Z86.711]  Yes    Pancytopenia [D61.818]  Yes    Hypothyroidism (acquired) [E03.9]  Yes     Chey is a 89 y.o. female admitted on 6/12/2025 with DISHA (acute kidney injury) [N17.9]    Assessment/Management/Treatment Plan:    Acute renal failure with hyperkalemia  Complicated UTI in setting of recent Klebsiella UTI  A-fib with RVR in setting of known A-fib on home Eliquis  PMH: hypertension, type 2 diabetes, hypothyroidism, MDS with pancytopenia followed by Dr. Lyman, history PE, recent left supracondylar fracture distal femur status post repair 5/20 in setting of previous left knee replacement    Pulmonary   Room air. No respiratory symptoms.   Cardiovascular  Afib with RVR in setting of known afib. Start amio gtt. Of note patient is on home PO amio. [place] for MAP goal >65. LA pending.   Initial EKG on arrival, without ST changes and rate 59. Will attempt to capture afib on EKG. Trop 39, repeat pending. No chest pain or dyspnea.   Neuro  No acute issues   GI/Hepatology  NPO, ADAT  Renal  On admission creatinine 3.34 and potassium 6.8.  5/26 creatinine 0.73.  Status post hyperkalemia protocol.  Bicarb drip at 100/hr. Monitor urine output closely, elena in place.  Repeat BMP to ensure improvement. Stat CTAP without hydronephrosis or stones, concern for emphysematous cystitis.  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  Will empirically start meropenem  plus vancomycin.  Follow urine and blood cultures.  MRSA nares pending.  Hematology  Home eliquis. No signs or symptoms of bleeding.   Endocrine  Accuchecks q6hr. Glargine 10u qhs + SSI.   Home Synthroid    Lab Results   Lab Value Date/Time    HGBA1C 6.20 (H) 05/17/2025 2008    HGBA1C 4.90 10/19/2024 1636     Results from last 7 days   Lab Units 06/12/25  1813 06/12/25  1657   GLUCOSE mg/dL 207* 140*       Diet: NPO Diet NPO Type: Strict NPO  No active supplement orders      Advance Directives: Code Status and Medical Interventions: CPR (Attempt to Resuscitate); Full Support   Ordered at: 06/12/25 1917     Code Status (Patient has no pulse and is not breathing):    CPR (Attempt to Resuscitate)     Medical Interventions (Patient has pulse or is breathing):    Full Support        VTE Prophylaxis:  Pharmacologic VTE prophylaxis orders are present.         Disposition: Admit to ICU    Plan of care and goals reviewed during interdisciplinary rounds.  I discussed the patient's findings and my recommendations with patient and nursing staff and son    Time: 40 minutes of critical care provided. This time excludes other billable procedures. Time does include preparation of documents, medical consultations, review of old records, and direct bedside care. Patient is at high risk for life-threatening deterioration due to Acute .    She has a high risk of imminent or life-threatening  deterioration, which requires the highest level of physician preparedness to intervene urgently. I devoted my full attention to the direct care of this patient for the amount of time indicated above.     Time spent with family or surrogate(s) is included only if the patient was incapable of providing the necessary information or participating in medical decision making.        Blank Bella MD  Pulmonary Critical Care Medicine

## 2025-06-13 ENCOUNTER — APPOINTMENT (OUTPATIENT)
Dept: GENERAL RADIOLOGY | Facility: HOSPITAL | Age: 89
DRG: 683 | End: 2025-06-13
Payer: MEDICARE

## 2025-06-13 LAB
ABO GROUP BLD: NORMAL
ALBUMIN SERPL-MCNC: 3 G/DL (ref 3.5–5.2)
ALBUMIN/GLOB SERPL: 1.7 G/DL
ALP SERPL-CCNC: 90 U/L (ref 39–117)
ALT SERPL W P-5'-P-CCNC: 10 U/L (ref 1–33)
ANION GAP SERPL CALCULATED.3IONS-SCNC: 13 MMOL/L (ref 5–15)
ANION GAP SERPL CALCULATED.3IONS-SCNC: 16 MMOL/L (ref 5–15)
ANION GAP SERPL CALCULATED.3IONS-SCNC: 18 MMOL/L (ref 5–15)
ANION GAP SERPL CALCULATED.3IONS-SCNC: 23 MMOL/L (ref 5–15)
ANTI-K: NORMAL
AST SERPL-CCNC: 12 U/L (ref 1–32)
BASOPHILS # BLD AUTO: 0 10*3/MM3 (ref 0–0.2)
BASOPHILS NFR BLD AUTO: 0 % (ref 0–1.5)
BILIRUB SERPL-MCNC: 0.2 MG/DL (ref 0–1.2)
BLD GP AB SCN SERPL QL: POSITIVE
BUN SERPL-MCNC: 57.5 MG/DL (ref 8–23)
BUN SERPL-MCNC: 59.5 MG/DL (ref 8–23)
BUN SERPL-MCNC: 64.8 MG/DL (ref 8–23)
BUN SERPL-MCNC: 68.2 MG/DL (ref 8–23)
BUN/CREAT SERPL: 19.9 (ref 7–25)
BUN/CREAT SERPL: 20.7 (ref 7–25)
BUN/CREAT SERPL: 20.8 (ref 7–25)
BUN/CREAT SERPL: 21 (ref 7–25)
CALCIUM SPEC-SCNC: 8.5 MG/DL (ref 8.6–10.5)
CALCIUM SPEC-SCNC: 8.7 MG/DL (ref 8.6–10.5)
CALCIUM SPEC-SCNC: 8.7 MG/DL (ref 8.6–10.5)
CALCIUM SPEC-SCNC: 8.9 MG/DL (ref 8.6–10.5)
CHLORIDE SERPL-SCNC: 95 MMOL/L (ref 98–107)
CHLORIDE SERPL-SCNC: 96 MMOL/L (ref 98–107)
CHLORIDE SERPL-SCNC: 97 MMOL/L (ref 98–107)
CHLORIDE SERPL-SCNC: 98 MMOL/L (ref 98–107)
CO2 SERPL-SCNC: 15 MMOL/L (ref 22–29)
CO2 SERPL-SCNC: 24 MMOL/L (ref 22–29)
CO2 SERPL-SCNC: 27 MMOL/L (ref 22–29)
CO2 SERPL-SCNC: 29 MMOL/L (ref 22–29)
CREAT SERPL-MCNC: 2.74 MG/DL (ref 0.57–1)
CREAT SERPL-MCNC: 2.87 MG/DL (ref 0.57–1)
CREAT SERPL-MCNC: 3.25 MG/DL (ref 0.57–1)
CREAT SERPL-MCNC: 3.28 MG/DL (ref 0.57–1)
CREAT UR-MCNC: 79.2 MG/DL
D-LACTATE SERPL-SCNC: 3.2 MMOL/L (ref 0.5–2)
D-LACTATE SERPL-SCNC: 7.4 MMOL/L (ref 0.5–2)
D-LACTATE SERPL-SCNC: 9.4 MMOL/L (ref 0.5–2)
DEPRECATED RDW RBC AUTO: 53 FL (ref 37–54)
DEPRECATED RDW RBC AUTO: 53.7 FL (ref 37–54)
EGFRCR SERPLBLD CKD-EPI 2021: 13.1 ML/MIN/1.73
EGFRCR SERPLBLD CKD-EPI 2021: 13.2 ML/MIN/1.73
EGFRCR SERPLBLD CKD-EPI 2021: 15.3 ML/MIN/1.73
EGFRCR SERPLBLD CKD-EPI 2021: 16.2 ML/MIN/1.73
EOSINOPHIL # BLD AUTO: 0 10*3/MM3 (ref 0–0.4)
EOSINOPHIL NFR BLD AUTO: 0 % (ref 0.3–6.2)
ERYTHROCYTE [DISTWIDTH] IN BLOOD BY AUTOMATED COUNT: 16.6 % (ref 12.3–15.4)
ERYTHROCYTE [DISTWIDTH] IN BLOOD BY AUTOMATED COUNT: 16.6 % (ref 12.3–15.4)
GEN 5 1HR TROPONIN T REFLEX: 50 NG/L
GLOBULIN UR ELPH-MCNC: 1.8 GM/DL
GLUCOSE BLDC GLUCOMTR-MCNC: 101 MG/DL (ref 70–130)
GLUCOSE BLDC GLUCOMTR-MCNC: 103 MG/DL (ref 70–130)
GLUCOSE BLDC GLUCOMTR-MCNC: 119 MG/DL (ref 70–130)
GLUCOSE BLDC GLUCOMTR-MCNC: 146 MG/DL (ref 70–130)
GLUCOSE BLDC GLUCOMTR-MCNC: 211 MG/DL (ref 70–130)
GLUCOSE BLDC GLUCOMTR-MCNC: 270 MG/DL (ref 70–130)
GLUCOSE BLDC GLUCOMTR-MCNC: 340 MG/DL (ref 70–130)
GLUCOSE BLDC GLUCOMTR-MCNC: 387 MG/DL (ref 70–130)
GLUCOSE BLDC GLUCOMTR-MCNC: 390 MG/DL (ref 70–130)
GLUCOSE BLDC GLUCOMTR-MCNC: 398 MG/DL (ref 70–130)
GLUCOSE BLDC GLUCOMTR-MCNC: 403 MG/DL (ref 70–130)
GLUCOSE BLDC GLUCOMTR-MCNC: 83 MG/DL (ref 70–130)
GLUCOSE BLDC GLUCOMTR-MCNC: 89 MG/DL (ref 70–130)
GLUCOSE BLDC GLUCOMTR-MCNC: 93 MG/DL (ref 70–130)
GLUCOSE SERPL-MCNC: 203 MG/DL (ref 65–99)
GLUCOSE SERPL-MCNC: 292 MG/DL (ref 65–99)
GLUCOSE SERPL-MCNC: 357 MG/DL (ref 65–99)
GLUCOSE SERPL-MCNC: 90 MG/DL (ref 65–99)
HBA1C MFR BLD: 5.7 % (ref 4.8–5.6)
HCT VFR BLD AUTO: 21.6 % (ref 34–46.6)
HCT VFR BLD AUTO: 23.3 % (ref 34–46.6)
HGB BLD-MCNC: 6.9 G/DL (ref 12–15.9)
HGB BLD-MCNC: 7.4 G/DL (ref 12–15.9)
IMM GRANULOCYTES # BLD AUTO: 0.11 10*3/MM3 (ref 0–0.05)
IMM GRANULOCYTES NFR BLD AUTO: 2.4 % (ref 0–0.5)
LYMPHOCYTES # BLD AUTO: 0.43 10*3/MM3 (ref 0.7–3.1)
LYMPHOCYTES NFR BLD AUTO: 9.3 % (ref 19.6–45.3)
MAGNESIUM SERPL-MCNC: 1.3 MG/DL (ref 1.6–2.4)
MCH RBC QN AUTO: 27.9 PG (ref 26.6–33)
MCH RBC QN AUTO: 28 PG (ref 26.6–33)
MCHC RBC AUTO-ENTMCNC: 31.8 G/DL (ref 31.5–35.7)
MCHC RBC AUTO-ENTMCNC: 31.9 G/DL (ref 31.5–35.7)
MCV RBC AUTO: 87.4 FL (ref 79–97)
MCV RBC AUTO: 88.3 FL (ref 79–97)
MONOCYTES # BLD AUTO: 0.62 10*3/MM3 (ref 0.1–0.9)
MONOCYTES NFR BLD AUTO: 13.4 % (ref 5–12)
MRSA DNA SPEC QL NAA+PROBE: NEGATIVE
NEUTROPHILS NFR BLD AUTO: 3.47 10*3/MM3 (ref 1.7–7)
NEUTROPHILS NFR BLD AUTO: 74.9 % (ref 42.7–76)
NRBC BLD AUTO-RTO: 0 /100 WBC (ref 0–0.2)
PHOSPHATE SERPL-MCNC: 3.1 MG/DL (ref 2.5–4.5)
PLATELET # BLD AUTO: 144 10*3/MM3 (ref 140–450)
PLATELET # BLD AUTO: 187 10*3/MM3 (ref 140–450)
PMV BLD AUTO: 9.5 FL (ref 6–12)
PMV BLD AUTO: 9.9 FL (ref 6–12)
POTASSIUM SERPL-SCNC: 4.4 MMOL/L (ref 3.5–5.2)
POTASSIUM SERPL-SCNC: 4.6 MMOL/L (ref 3.5–5.2)
POTASSIUM SERPL-SCNC: 4.8 MMOL/L (ref 3.5–5.2)
POTASSIUM SERPL-SCNC: 5 MMOL/L (ref 3.5–5.2)
PROT SERPL-MCNC: 4.8 G/DL (ref 6–8.5)
QT INTERVAL: 504 MS
QTC INTERVAL: 498 MS
RBC # BLD AUTO: 2.47 10*6/MM3 (ref 3.77–5.28)
RBC # BLD AUTO: 2.64 10*6/MM3 (ref 3.77–5.28)
RH BLD: NEGATIVE
SODIUM SERPL-SCNC: 134 MMOL/L (ref 136–145)
SODIUM SERPL-SCNC: 137 MMOL/L (ref 136–145)
SODIUM SERPL-SCNC: 140 MMOL/L (ref 136–145)
SODIUM SERPL-SCNC: 140 MMOL/L (ref 136–145)
T&S EXPIRATION DATE: NORMAL
TROPONIN T % DELTA: 22
TROPONIN T NUMERIC DELTA: 9 NG/L
TROPONIN T SERPL HS-MCNC: 43 NG/L
TROPONIN T SERPL HS-MCNC: 57 NG/L
UUN 24H UR-MCNC: 321 MG/DL
WBC NRBC COR # BLD AUTO: 4.63 10*3/MM3 (ref 3.4–10.8)
WBC NRBC COR # BLD AUTO: 6.84 10*3/MM3 (ref 3.4–10.8)

## 2025-06-13 PROCEDURE — 25010000002 ALBUMIN HUMAN 5% PER 50 ML: Performed by: NURSE PRACTITIONER

## 2025-06-13 PROCEDURE — 25010000002 ONDANSETRON PER 1 MG: Performed by: INTERNAL MEDICINE

## 2025-06-13 PROCEDURE — 25010000002 ONDANSETRON PER 1 MG: Performed by: NURSE PRACTITIONER

## 2025-06-13 PROCEDURE — 25010000002 THIAMINE PER 100 MG: Performed by: NURSE PRACTITIONER

## 2025-06-13 PROCEDURE — 82948 REAGENT STRIP/BLOOD GLUCOSE: CPT

## 2025-06-13 PROCEDURE — 25810000003 SODIUM CHLORIDE 0.9 % SOLUTION 250 ML FLEX CONT: Performed by: EMERGENCY MEDICINE

## 2025-06-13 PROCEDURE — 25810000003 DEXTROSE 5 % AND SODIUM CHLORIDE 0.9 % 5-0.9 % SOLUTION: Performed by: INTERNAL MEDICINE

## 2025-06-13 PROCEDURE — 25010000002 MIDAZOLAM PER 1 MG: Performed by: NURSE PRACTITIONER

## 2025-06-13 PROCEDURE — 93005 ELECTROCARDIOGRAM TRACING: CPT | Performed by: INTERNAL MEDICINE

## 2025-06-13 PROCEDURE — 99232 SBSQ HOSP IP/OBS MODERATE 35: CPT | Performed by: INTERNAL MEDICINE

## 2025-06-13 PROCEDURE — 83605 ASSAY OF LACTIC ACID: CPT | Performed by: STUDENT IN AN ORGANIZED HEALTH CARE EDUCATION/TRAINING PROGRAM

## 2025-06-13 PROCEDURE — 83036 HEMOGLOBIN GLYCOSYLATED A1C: CPT | Performed by: NURSE PRACTITIONER

## 2025-06-13 PROCEDURE — 02HV33Z INSERTION OF INFUSION DEVICE INTO SUPERIOR VENA CAVA, PERCUTANEOUS APPROACH: ICD-10-PCS | Performed by: NURSE PRACTITIONER

## 2025-06-13 PROCEDURE — 71045 X-RAY EXAM CHEST 1 VIEW: CPT

## 2025-06-13 PROCEDURE — 83735 ASSAY OF MAGNESIUM: CPT | Performed by: NURSE PRACTITIONER

## 2025-06-13 PROCEDURE — 25010000002 PROCHLORPERAZINE 10 MG/2ML SOLUTION

## 2025-06-13 PROCEDURE — 63710000001 INSULIN REGULAR HUMAN PER 5 UNITS: Performed by: STUDENT IN AN ORGANIZED HEALTH CARE EDUCATION/TRAINING PROGRAM

## 2025-06-13 PROCEDURE — 87481 CANDIDA DNA AMP PROBE: CPT | Performed by: STUDENT IN AN ORGANIZED HEALTH CARE EDUCATION/TRAINING PROGRAM

## 2025-06-13 PROCEDURE — P9041 ALBUMIN (HUMAN),5%, 50ML: HCPCS | Performed by: NURSE PRACTITIONER

## 2025-06-13 PROCEDURE — 63710000001 INSULIN GLARGINE PER 5 UNITS: Performed by: INTERNAL MEDICINE

## 2025-06-13 PROCEDURE — 80053 COMPREHEN METABOLIC PANEL: CPT | Performed by: STUDENT IN AN ORGANIZED HEALTH CARE EDUCATION/TRAINING PROGRAM

## 2025-06-13 PROCEDURE — 25010000002 MAGNESIUM SULFATE 4 GM/100ML SOLUTION: Performed by: STUDENT IN AN ORGANIZED HEALTH CARE EDUCATION/TRAINING PROGRAM

## 2025-06-13 PROCEDURE — 25010000002 MEROPENEM PER 100 MG

## 2025-06-13 PROCEDURE — 25010000003 DEXTROSE 5 % SOLUTION: Performed by: STUDENT IN AN ORGANIZED HEALTH CARE EDUCATION/TRAINING PROGRAM

## 2025-06-13 PROCEDURE — 25010000002 AMIODARONE IN DEXTROSE 5% 360-4.14 MG/200ML-% SOLUTION: Performed by: EMERGENCY MEDICINE

## 2025-06-13 PROCEDURE — 93010 ELECTROCARDIOGRAM REPORT: CPT | Performed by: INTERNAL MEDICINE

## 2025-06-13 PROCEDURE — 76937 US GUIDE VASCULAR ACCESS: CPT | Performed by: NURSE PRACTITIONER

## 2025-06-13 PROCEDURE — 25010000002 PHENYLEPHRINE 10 MG/ML SOLUTION 5 ML VIAL: Performed by: EMERGENCY MEDICINE

## 2025-06-13 PROCEDURE — 84100 ASSAY OF PHOSPHORUS: CPT | Performed by: NURSE PRACTITIONER

## 2025-06-13 PROCEDURE — 25010000002 VANCOMYCIN 1 G RECONSTITUTED SOLUTION 1 EACH VIAL

## 2025-06-13 PROCEDURE — 36556 INSERT NON-TUNNEL CV CATH: CPT | Performed by: NURSE PRACTITIONER

## 2025-06-13 PROCEDURE — 25010000002 CALCIUM GLUCONATE-NACL 1-0.675 GM/50ML-% SOLUTION: Performed by: STUDENT IN AN ORGANIZED HEALTH CARE EDUCATION/TRAINING PROGRAM

## 2025-06-13 PROCEDURE — 85025 COMPLETE CBC W/AUTO DIFF WBC: CPT | Performed by: INTERNAL MEDICINE

## 2025-06-13 PROCEDURE — 84484 ASSAY OF TROPONIN QUANT: CPT | Performed by: STUDENT IN AN ORGANIZED HEALTH CARE EDUCATION/TRAINING PROGRAM

## 2025-06-13 PROCEDURE — 63710000001 INSULIN LISPRO (HUMAN) PER 5 UNITS: Performed by: NURSE PRACTITIONER

## 2025-06-13 PROCEDURE — 85027 COMPLETE CBC AUTOMATED: CPT | Performed by: NURSE PRACTITIONER

## 2025-06-13 PROCEDURE — 25810000003 SODIUM CHLORIDE 0.9 % SOLUTION 250 ML FLEX CONT

## 2025-06-13 PROCEDURE — 83605 ASSAY OF LACTIC ACID: CPT | Performed by: INTERNAL MEDICINE

## 2025-06-13 RX ORDER — ALBUMIN HUMAN 50 G/1000ML
250 SOLUTION INTRAVENOUS
Status: COMPLETED | OUTPATIENT
Start: 2025-06-13 | End: 2025-06-13

## 2025-06-13 RX ORDER — DEXTROSE MONOHYDRATE AND SODIUM CHLORIDE 5; .9 G/100ML; G/100ML
50 INJECTION, SOLUTION INTRAVENOUS CONTINUOUS
Status: DISCONTINUED | OUTPATIENT
Start: 2025-06-13 | End: 2025-06-14

## 2025-06-13 RX ORDER — ONDANSETRON 2 MG/ML
4 INJECTION INTRAMUSCULAR; INTRAVENOUS EVERY 6 HOURS PRN
Status: DISCONTINUED | OUTPATIENT
Start: 2025-06-13 | End: 2025-06-14 | Stop reason: HOSPADM

## 2025-06-13 RX ORDER — SODIUM CHLORIDE 0.9 % (FLUSH) 0.9 %
10 SYRINGE (ML) INJECTION AS NEEDED
Status: DISCONTINUED | OUTPATIENT
Start: 2025-06-13 | End: 2025-06-13

## 2025-06-13 RX ORDER — DEXTROSE MONOHYDRATE 25 G/50ML
25 INJECTION, SOLUTION INTRAVENOUS
Status: DISCONTINUED | OUTPATIENT
Start: 2025-06-13 | End: 2025-06-14

## 2025-06-13 RX ORDER — CASTOR OIL AND BALSAM, PERU 788; 87 MG/G; MG/G
1 OINTMENT TOPICAL EVERY 12 HOURS SCHEDULED
Status: DISCONTINUED | OUTPATIENT
Start: 2025-06-13 | End: 2025-06-14 | Stop reason: HOSPADM

## 2025-06-13 RX ORDER — DEXTROSE MONOHYDRATE AND SODIUM CHLORIDE 5; .9 G/100ML; G/100ML
50 INJECTION, SOLUTION INTRAVENOUS CONTINUOUS
Status: DISCONTINUED | OUTPATIENT
Start: 2025-06-13 | End: 2025-06-13

## 2025-06-13 RX ORDER — PANTOPRAZOLE SODIUM 40 MG/10ML
40 INJECTION, POWDER, LYOPHILIZED, FOR SOLUTION INTRAVENOUS
Status: DISCONTINUED | OUTPATIENT
Start: 2025-06-13 | End: 2025-06-14

## 2025-06-13 RX ORDER — IBUPROFEN 600 MG/1
1 TABLET ORAL
Status: DISCONTINUED | OUTPATIENT
Start: 2025-06-13 | End: 2025-06-13

## 2025-06-13 RX ORDER — MAGNESIUM SULFATE HEPTAHYDRATE 40 MG/ML
4 INJECTION, SOLUTION INTRAVENOUS ONCE
Status: COMPLETED | OUTPATIENT
Start: 2025-06-13 | End: 2025-06-13

## 2025-06-13 RX ORDER — ONDANSETRON 2 MG/ML
4 INJECTION INTRAMUSCULAR; INTRAVENOUS ONCE
Status: COMPLETED | OUTPATIENT
Start: 2025-06-13 | End: 2025-06-13

## 2025-06-13 RX ORDER — PROCHLORPERAZINE EDISYLATE 5 MG/ML
5 INJECTION INTRAMUSCULAR; INTRAVENOUS EVERY 6 HOURS PRN
Status: DISCONTINUED | OUTPATIENT
Start: 2025-06-13 | End: 2025-06-14 | Stop reason: HOSPADM

## 2025-06-13 RX ORDER — MIDAZOLAM HYDROCHLORIDE 1 MG/ML
1 INJECTION, SOLUTION INTRAMUSCULAR; INTRAVENOUS ONCE
Status: COMPLETED | OUTPATIENT
Start: 2025-06-13 | End: 2025-06-13

## 2025-06-13 RX ORDER — INSULIN LISPRO 100 [IU]/ML
8 INJECTION, SOLUTION INTRAVENOUS; SUBCUTANEOUS ONCE
Status: COMPLETED | OUTPATIENT
Start: 2025-06-13 | End: 2025-06-13

## 2025-06-13 RX ORDER — NICOTINE POLACRILEX 4 MG
15 LOZENGE BUCCAL
Status: DISCONTINUED | OUTPATIENT
Start: 2025-06-13 | End: 2025-06-14

## 2025-06-13 RX ORDER — NICOTINE POLACRILEX 4 MG
15 LOZENGE BUCCAL
Status: DISCONTINUED | OUTPATIENT
Start: 2025-06-13 | End: 2025-06-13

## 2025-06-13 RX ORDER — SODIUM CHLORIDE 9 MG/ML
40 INJECTION, SOLUTION INTRAVENOUS AS NEEDED
Status: DISCONTINUED | OUTPATIENT
Start: 2025-06-13 | End: 2025-06-13

## 2025-06-13 RX ORDER — DEXTROSE MONOHYDRATE 25 G/50ML
10-50 INJECTION, SOLUTION INTRAVENOUS
Status: DISCONTINUED | OUTPATIENT
Start: 2025-06-13 | End: 2025-06-13

## 2025-06-13 RX ORDER — IBUPROFEN 600 MG/1
1 TABLET ORAL
Status: DISCONTINUED | OUTPATIENT
Start: 2025-06-13 | End: 2025-06-14

## 2025-06-13 RX ORDER — SODIUM CHLORIDE 0.9 % (FLUSH) 0.9 %
10 SYRINGE (ML) INJECTION EVERY 12 HOURS SCHEDULED
Status: DISCONTINUED | OUTPATIENT
Start: 2025-06-13 | End: 2025-06-13

## 2025-06-13 RX ADMIN — THIAMINE HYDROCHLORIDE 500 MG: 100 INJECTION, SOLUTION INTRAMUSCULAR; INTRAVENOUS at 02:09

## 2025-06-13 RX ADMIN — INSULIN GLARGINE 10 UNITS: 100 INJECTION, SOLUTION SUBCUTANEOUS at 14:02

## 2025-06-13 RX ADMIN — ONDANSETRON 4 MG: 2 INJECTION INTRAMUSCULAR; INTRAVENOUS at 10:12

## 2025-06-13 RX ADMIN — Medication 10 ML: at 20:52

## 2025-06-13 RX ADMIN — THIAMINE HYDROCHLORIDE 500 MG: 100 INJECTION, SOLUTION INTRAMUSCULAR; INTRAVENOUS at 08:38

## 2025-06-13 RX ADMIN — PHENYLEPHRINE HYDROCHLORIDE 2 MCG/KG/MIN: 10 INJECTION INTRAVENOUS at 06:47

## 2025-06-13 RX ADMIN — Medication 1 APPLICATION: at 10:15

## 2025-06-13 RX ADMIN — PHENYLEPHRINE HYDROCHLORIDE 0.7 MCG/KG/MIN: 10 INJECTION INTRAVENOUS at 19:38

## 2025-06-13 RX ADMIN — MUPIROCIN 1 APPLICATION: 20 OINTMENT TOPICAL at 20:35

## 2025-06-13 RX ADMIN — INSULIN LISPRO 8 UNITS: 100 INJECTION, SOLUTION INTRAVENOUS; SUBCUTANEOUS at 02:09

## 2025-06-13 RX ADMIN — INSULIN HUMAN 5.6 UNITS/HR: 1 INJECTION, SOLUTION INTRAVENOUS at 06:20

## 2025-06-13 RX ADMIN — PROCHLORPERAZINE EDISYLATE 5 MG: 5 INJECTION INTRAMUSCULAR; INTRAVENOUS at 22:19

## 2025-06-13 RX ADMIN — MIDAZOLAM HYDROCHLORIDE 1 MG: 1 INJECTION, SOLUTION INTRAMUSCULAR; INTRAVENOUS at 04:55

## 2025-06-13 RX ADMIN — DEXTROSE AND SODIUM CHLORIDE 50 ML/HR: 5; 900 INJECTION, SOLUTION INTRAVENOUS at 14:05

## 2025-06-13 RX ADMIN — MEROPENEM 1000 MG: 1 INJECTION, POWDER, FOR SOLUTION INTRAVENOUS at 08:36

## 2025-06-13 RX ADMIN — Medication 10 ML: at 08:39

## 2025-06-13 RX ADMIN — VASOPRESSIN 0.03 UNITS/MIN: 0.2 SOLUTION INTRAVENOUS at 04:45

## 2025-06-13 RX ADMIN — CALCIUM GLUCONATE 1000 MG: 20 INJECTION, SOLUTION INTRAVENOUS at 00:10

## 2025-06-13 RX ADMIN — PANTOPRAZOLE SODIUM 40 MG: 40 INJECTION, POWDER, FOR SOLUTION INTRAVENOUS at 12:15

## 2025-06-13 RX ADMIN — Medication 1 APPLICATION: at 20:36

## 2025-06-13 RX ADMIN — ONDANSETRON 4 MG: 2 INJECTION INTRAMUSCULAR; INTRAVENOUS at 15:48

## 2025-06-13 RX ADMIN — INSULIN HUMAN 5 UNITS: 100 INJECTION, SOLUTION PARENTERAL at 00:05

## 2025-06-13 RX ADMIN — SODIUM BICARBONATE 100 MEQ: 84 INJECTION INTRAVENOUS at 00:05

## 2025-06-13 RX ADMIN — ONDANSETRON 4 MG: 2 INJECTION INTRAMUSCULAR; INTRAVENOUS at 04:55

## 2025-06-13 RX ADMIN — MEROPENEM 1000 MG: 1 INJECTION, POWDER, FOR SOLUTION INTRAVENOUS at 20:36

## 2025-06-13 RX ADMIN — MUPIROCIN 1 APPLICATION: 20 OINTMENT TOPICAL at 08:38

## 2025-06-13 RX ADMIN — AMIODARONE HYDROCHLORIDE 0.5 MG/MIN: 1.8 INJECTION, SOLUTION INTRAVENOUS at 00:55

## 2025-06-13 RX ADMIN — AMIODARONE HYDROCHLORIDE 0.5 MG/MIN: 1.8 INJECTION, SOLUTION INTRAVENOUS at 12:27

## 2025-06-13 RX ADMIN — SODIUM BICARBONATE 150 ML/HR: 84 INJECTION, SOLUTION INTRAVENOUS at 03:45

## 2025-06-13 RX ADMIN — DEXTROSE MONOHYDRATE 25 G: 25 INJECTION, SOLUTION INTRAVENOUS at 00:05

## 2025-06-13 RX ADMIN — MAGNESIUM SULFATE IN WATER FOR 4 G: 40 INJECTION INTRAVENOUS at 08:39

## 2025-06-13 RX ADMIN — ALBUMIN (HUMAN) 250 ML: 12.5 INJECTION, SOLUTION INTRAVENOUS at 06:25

## 2025-06-13 RX ADMIN — ALBUMIN (HUMAN) 250 ML: 12.5 INJECTION, SOLUTION INTRAVENOUS at 05:57

## 2025-06-13 RX ADMIN — Medication 10 ML: at 08:46

## 2025-06-13 RX ADMIN — THIAMINE HYDROCHLORIDE 500 MG: 100 INJECTION, SOLUTION INTRAMUSCULAR; INTRAVENOUS at 15:48

## 2025-06-13 RX ADMIN — VANCOMYCIN HYDROCHLORIDE 1000 MG: 1 INJECTION, POWDER, LYOPHILIZED, FOR SOLUTION INTRAVENOUS at 01:05

## 2025-06-13 NOTE — NURSING NOTE
"                             Wound, Ostomy and Continence (WOC) Note    Reason for WOC Consultation: Pressure injury to left gluteal    Patient awake.  Family/support person at bedside.     Skin/Wound Assessment:     Wound Type: Pressure Injury Deep Tissue Pressure Injury (DTPI)-evolving  Location: Sacrococcygeal  Measurements: 4.8 x 3.5 x 0.25 cm  Wound Bed: non-blanchable, dermis, and maroon/purple  Wound Edges: Irregular and Open  Periwound Skin: excoriated   Drainage Characteristics/Odor: none  Drainage Amount: none  Pain: Yes   Care provided: The wound was cleansed with pH foam cleanser and pH balance wipes.  Incontinence care provided.  Image:       Wound Type: Pressure Injury Deep Tissue Pressure Injury (DTPI)  Location: Left heel  Measurements: 2 x 3 x 0 cm  Wound Bed: non-blanchable and maroon/purple  Wound Edges: Irregular and Open  Periwound Skin: intact   Drainage Characteristics/Odor: none  Drainage Amount: none  Pain: No   Image:       Right heel      Summary and Recommendation(s):     Venelex ordered for all pressure injuries.  Patient will need offloading heel boots for duration of hospital stay.  2.   May consider mist therapy once patient is stabilized.  3.   When appropriate, protein intake should be encouraged to help facilitate and promote wound healing.  4.   Refer to wound care instructions in the \"Orders\" tab  5.   Specialty support surface in place: Isolibrium         Pressure Injury Prevention Protocol (initiate for a Rahul Scale Score of <18):     Most recent Rahul Scale score:  Sensory Perception: 3-->slightly limited  Moisture: 2-->very moist  Activity: 1-->bedfast  Mobility: 2-->very limited  Nutrition: 2-->probably inadequate  Friction and Shear: 1-->problem  Rahul Score: 11 (06/13/25 0800)       -Apply Allevyn foam dressing to sacrum/coccyx and heels.     -Turn q 2 hr. using an offloading foam wedge/pillow.    -Apply moisture barrier cream to bottom BID & PRN, if " incontinent.      Thank you for consulting the WOC Nurse.  WOC Team will continue to follow.  Please re consult if the wound(s) worsens or new issues arise.     Fermin Gutierrez RN, BSN, CCRN, CWOCN  Wound, Ostomy and Continence (WOC) Department  [facility]

## 2025-06-13 NOTE — PROGRESS NOTES
"Pharmacy Consult-Vancomycin Dosing  Chey Robertson is a  89 y.o. female receiving vancomycin therapy.     Indication: pyelonephritis; UTI  Consulting Provider: intensivist  ID Consult: No    Goal Trough: 15-20    Current Antimicrobial Therapy  Anti-Infectives (From admission, onward)      Ordered     Dose/Rate Route Frequency Start Stop    06/12/25 2122  meropenem (MERREM) 1,000 mg in sodium chloride 0.9 % 100 mL MBP        Ordering Provider: Horacio Alvarez PharmD    1,000 mg  over 3 Hours Intravenous Every 12 Hours 06/13/25 0900 06/19/25 2059 06/12/25 2149  vancomycin (dosing per levels)        Ordering Provider: Horacio Alvarez PharmD   Placed in \"And\" Linked Group     Not Applicable Daily 06/13/25 0900 06/19/25 0859 06/12/25 2145  vancomycin (VANCOCIN) 1,000 mg in sodium chloride 0.9 % 250 mL IVPB-VTB        Ordering Provider: Horacio Alvarez PharmD    20 mg/kg × 53.3 kg  250 mL/hr over 60 Minutes Intravenous Once 06/12/25 2245 06/12/25 2122  meropenem (MERREM) 1,000 mg in sodium chloride 0.9 % 100 mL MBP        Ordering Provider: Horacio Alvarez PharmD    1,000 mg  over 30 Minutes Intravenous Once 06/12/25 2215 06/12/25 2106  Pharmacy to dose vancomycin        Ordering Provider: Blank Bella MD     Not Applicable Continuous PRN 06/12/25 2106 06/19/25 2105 06/12/25 2106  Pharmacy To Dose: Meropenem (Merrem)        Ordering Provider: Blank Bella MD     Not Applicable Continuous PRN 06/12/25 2106 06/19/25 2105            Allergies  Allergies as of 06/12/2025 - Reviewed 06/12/2025   Allergen Reaction Noted    Aspirin Unknown - Low Severity 12/05/2016       Labs  Results from last 7 days   Lab Units 06/12/25  1625   BUN mg/dL 72.1*   CREATININE mg/dL 3.24*     Results from last 7 days   Lab Units 06/12/25  1625   WBC 10*3/mm3 3.14*       Evaluation of Dosing  Last Dose Received in the ED/Outside Facility:        N/A  Is Patient on Dialysis or Renal Replacement:        No " "- DISHA on admission    Ht - 160 cm (63\")  Wt - 53.3 kg (117 lb 8.1 oz)    Estimated Creatinine Clearance: 10.1 mL/min (A) (by C-G formula based on SCr of 3.24 mg/dL (H)).  Intake & Output (last 3 days)       None            Microbiology and Radiology  Microbiology Results (last 10 days)       ** No results found for the last 240 hours. **            Vancomycin Levels:                Assessment/Plan:  Will initiate dose at 1000 mg IV once       followed by  Vancomycin Random level 6/14 with morning labs    Pharmacy will order additional vancomycin doses based on vancomycin random level result.    Horacio Alvarez, PharmD, BCPS  6/12/2025  21:52 EDT  "

## 2025-06-13 NOTE — CONSULTS
Palliative Care Initial Consult   Attending Physician: Oswaldo Weber MD  Referring Provider: Dr. Oswaldo Weber    Reason for Referral:  assistance with clarification of goals of care    Code Status:   Code Status and Medical Interventions: No CPR (Do Not Attempt to Resuscitate); Limited Support; No intubation (DNI), No dialysis   Ordered at: 06/13/25 1351     Code Status (Patient has no pulse and is not breathing):    No CPR (Do Not Attempt to Resuscitate)     Medical Interventions (Patient has pulse or is breathing):    Limited Support     Medical Intervention Limits:    No intubation (DNI)       No dialysis     Level Of Support Discussed With:    Next of Kin (If No Surrogate)      Advanced Directives: Advance Directive Status: Patient does not have advance directive   Family/Support: Joe Robertson (son), Albin Ailyn (son), Alaina Robertson (dtr-in-law)  Goals of Care: TBD.    HPI: Chey Robertson is a 89 y.o. female with PMH significant for A-fib on Eliquis, HTN, T2DM, hypothyroidism, MDS with pancytopenia followed by Dr. Lyman, history of PE, s/p left knee replacement, recent admission for fracture of distal femur and repair and Klebsiella UTI, discharged to [facility]. Patient presented to [facility] ED from [facility] at [facility] on [place]12 due to weakness. Work up revealed acute renal failure, hyperkalemia, A-fib with RVR, complicated UTI. Patient requiring insulin drip, amiodarone drip and vasopressor support. Palliative Care consulted for GO in the context of complex medical decision making and symptom management.    Patient lives at [facility]. No family at bedside. Patient able to state she is at hospital and denies pain, endorses nausea. Patient very weak.       ROS: +nausea. +debility. Denies pain.        Past Medical History:   Diagnosis Date    A-fib     on eliquis    Anemia     Arthritis     Diabetes mellitus     checks sugar only when pt wants to     Disease of thyroid gland     History of  transfusion     with knee surgery-  no reaction recalled.     [place] (hard of hearing)     no hearing aids    Hypertension     Migraine without aura and without status migrainosus, not intractable 12/30/2016    Myelodysplastic syndrome     Pulmonary emboli 2011    (after knee surgery)    SOBOE (shortness of breath on exertion)     Tremors of nervous system     Vertigo     Wears dentures     upper     Wears glasses      Past Surgical History:   Procedure Laterality Date    BLADDER SURGERY      CATARACT EXTRACTION      bilat     CHOLECYSTECTOMY      COLONOSCOPY      HIP TROCHANTERIC NAILING WITH INTRAMEDULLARY HIP SCREW Right 6/20/2023    Procedure: HIP TROCHANTERIC NAILING WITH INTRAMEDULLARY HIP SCREW RIGHT;  Surgeon: Augie Hobbs MD;  Location:  [facility];  Service: Orthopedics;  Laterality: Right;    HYSTERECTOMY      with bso    KYPHOPLASTY N/A 02/12/2021    Procedure: KYPHOPLASTY T11, T12 AND L4;  Surgeon: Matty Hearn MD;  Location:  [facility];  Service: Orthopedic Spine;  Laterality: N/A;    TONSILLECTOMY      TOTAL KNEE ARTHROPLASTY REVISION Left 5/20/2025    Procedure: DISTAL FEMUR REPLACEMENT;  Surgeon: Albin Mcclain MD;  Location:  [facility];  Service: Orthopedics;  Laterality: Left;     Social History     Socioeconomic History    Marital status:    Tobacco Use    Smoking status: Never     Passive exposure: Never    Smokeless tobacco: Never   Vaping Use    Vaping status: Never Used   Substance and Sexual Activity    Alcohol use: No    Drug use: No    Sexual activity: Defer     Family History   Problem Relation Age of Onset    Breast cancer Sister 84    Stroke Mother     Heart attack Father     Ovarian cancer Neg Hx        Allergies   Allergen Reactions    Aspirin Unknown - Low Severity     Mouth sores        Current medication reviewed for route, type, dose and frequency and are current per MAR at time of dictation.    Palliative Performance Scale Score:  30%    /57   Pulse 82   Temp  "98.1 °F (36.7 °C) (Axillary)   Resp 22   Ht 160 cm (63\")   Wt 53.3 kg (117 lb 8.1 oz)   LMP  (LMP Unknown) Comment: mammogram is up to date  SpO2 92%   BMI 20.82 kg/m²     Intake/Output Summary (Last 24 hours) at 6/13/2025 1530  Last data filed at 6/13/2025 1200  Gross per 24 hour   Intake 4822.9 ml   Output 325 ml   Net 4497.9 ml       Physical Exam:    General Appearance:    Patient laying in bed, awake, alert, A/C ill appearing, frail, cooperative, NAD   HEENT:    NC/AT, EOMI, anicteric, MMM, face relaxed   Neck:   supple, trachea midline, no JVD   Lungs:     CTA bilat, diminished in bases; respirations regular, even and unlabored; RR 16-18 on exam, 2LNC    Heart:    RRR, normal S1 and S2, no M/R/G, HR 82   Abdomen:     Normal bowel sounds, soft, nontender, nondistended   G/U:   Deferred   MSK/Extremities:   Wasting, no edema   Pulses:   Pulses palpable and equal bilaterally   Skin:   Warm, dry   Neurologic:   A/Ox3, cooperative, JOYCE   Psych:   Calm, appropriate         Labs:   Results from last 7 days   Lab Units 06/13/25  0435   WBC 10*3/mm3 6.84   HEMOGLOBIN g/dL 7.4*   HEMATOCRIT % 23.3*   PLATELETS 10*3/mm3 187     Results from last 7 days   Lab Units 06/13/25  0801   SODIUM mmol/L 140   POTASSIUM mmol/L 4.4   CHLORIDE mmol/L 97*   CO2 mmol/L 27.0   BUN mg/dL 59.5*   CREATININE mg/dL 2.87*   GLUCOSE mg/dL 203*   CALCIUM mg/dL 8.7     Results from last 7 days   Lab Units 06/13/25  0801 06/13/25  0435   SODIUM mmol/L 140 137   POTASSIUM mmol/L 4.4 4.8   CHLORIDE mmol/L 97* 95*   CO2 mmol/L 27.0 24.0   BUN mg/dL 59.5* 64.8*   CREATININE mg/dL 2.87* 3.25*   CALCIUM mg/dL 8.7 8.7   BILIRUBIN mg/dL  --  0.2   ALK PHOS U/L  --  90   ALT (SGPT) U/L  --  10   AST (SGOT) U/L  --  12   GLUCOSE mg/dL 203* 292*     Imaging Results (Last 72 Hours)       Procedure Component Value Units Date/Time    XR Chest 1 View [[id number]] Collected: 06/13/25 0612     Updated: 06/13/25 0616    Narrative:      XR CHEST 1 VW    Date " of Exam: 6/13/2025 6:00 AM EDT    Indication: line placeent    Comparison: 7/27/2024.    Findings:  There is a new left-sided central venous catheter, which terminates in the mid SVC. The patient's neck partially obscures the right lung apex. There is no visible pneumothorax. No pleural effusion or focal airspace consolidation. Lung volumes are low.   Limited kyphotic view. Bones are demineralized. No acute osseous abnormality. There is evidence of multilevel thoracolumbar kyphoplasty.      Impression:      Impression:  1.New left-sided central venous catheter terminates in the mid SVC. No visible pneumothorax.  2.Low lung volumes.          Electronically Signed: Hector Rosenberg MD    6/13/2025 6:13 AM EDT    Workstation ID: [id number]    CT Abdomen Pelvis Without Contrast [[id number]] Collected: 06/12/25 2008     Updated: 06/12/25 2018    Narrative:      CT ABDOMEN PELVIS WO CONTRAST    Date of Exam: 6/12/2025 7:51 PM EDT    Indication: acute renal failure, rule out hydro.    Comparison: 6/8/2023    Technique: Axial CT images were obtained of the abdomen and pelvis without the administration of contrast. Reconstructed coronal and sagittal images were also obtained. Automated exposure control and iterative construction methods were used.    FINDINGS:    Lung bases: Atheromatous disease of the coronary vessels. Bibasilar atelectatic changes.    Liver:No masses. No intrahepatic biliary ductal dilatation.    Spleen:No masses. No perisplenic hematoma.    Pancreas:No pancreatic masses. No evidence of pancreatitis.    Gallbladder and common bile duct: Prior cholecystectomy    Adrenal glands: 2.9 cm left adrenal nodule    Kidneys and ureters:No kidney stones. No renal masses.No calculi present within the ureters. Normal caliber ureters.    Urinary bladder: Gas noted involving the left urinary bladder wall with locules of gas within the urinary bladder concerning for emphysematous cystitis. Hurd catheter within the urinary  bladder.    Small bowel:Normal caliber small bowel.    Large bowel: Colonic diverticulosis without evidence of diverticulitis.    Appendix: Not seen however there is no evidence of appendicitis    GENITOURINARY: Prior hysterectomy    Ascites or pneumoperitoneum:None.    Adenopathy:None present    Osseous structures: The pubic bones are intact. Degenerative changes of the hips. Old healed right pubic bone fractures. Prior gamma nailing of a right hip fracture. The sacrum and sacroiliac joints are normal. Degenerative changes of the lumbar spine.   L4 vertebroplasty. Prior T11 and T12 vertebroplasties.    Other findings: None      Impression:      Examination limited without IV contrast. 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              Electronically Signed: Devon Mcgrath MD    6/12/2025 8:15 PM EDT    Workstation ID: [id number]              Lab 06/13/25  0435   HEMOGLOBIN A1C 5.70*         Diagnostics: Reviewed    A:   DISHA (acute kidney injury)    Pancytopenia    Hypothyroidism (acquired)    Myelodysplasia (myelodysplastic syndrome)    Personal history of pulmonary embolism    Chronic anticoagulation    Essential hypertension    Type 2 diabetes mellitus without complication, without long-term current use of insulin    Atrial fibrillation    Periprosthetic fracture around prosthetic knee    Femur fracture     89 y.o. female with recent femur fracture, complicated UTI, DISHA, A-fib with RVR, T2DM, hyperkalemia, nausea, debility.   S/S:   Nausea -continue Zofran 4mg IV q 6 hours prn N/V  -started Compazine 5mg IV q 6 hours prn N/V    2. Debility -PT/OT when appropriate    3. GOC -DNR/DNI/no dialysis -per review of chart  -called sonJoe, at 1530 with no answer, voicemail left with Palliative Office number  -will continue to follow for support    P:  No family at bedside. Called son with no answer, left Palliative Office Number. Will continue to follow and support as patient at risk for decline.   Thank you for this consult  and allowing us to participate in patient's plan of care. Palliative Care Team will continue to follow patient. Please do not hesitate to contact us regarding further symptom management or goals of care needs.  Time: 45 minutes spent reviewing medical and medication records, assessing and examining patient, discussing with family, answering questions, providing some guidance about a plan and documentation of care, and coordinating care with other healthcare members, with > 50% time spent face to face.         Charlotte Fan, APRN  6/13/2025

## 2025-06-13 NOTE — PLAN OF CARE
Problem: Adult Inpatient Plan of Care  Goal: Plan of Care Review  Outcome: Not Progressing  Flowsheets (Taken 6/13/2025 5387)  Outcome Evaluation: patient Aox2-3, JOYCE, follows commands, hypotensive on Galen @2.5mcg/kg/hr and Vaso @0.03units/hr, HR controlled with Amio @ 0.5mg/hr, 2L NC, afebrile, contact for rule out candida auris, insulin gtt running, denies pain, family updated overnight,

## 2025-06-13 NOTE — PLAN OF CARE
Goal Outcome Evaluation:  Plan of Care Reviewed With: patient        Progress: no change  Outcome Evaluation: H&H trending down. Insulin, bicarb, and vasopressin gtts d/c'd. Titrating cookie gtt down as tolerated. Pt confused most of shift. C/o nausea frequently. Zofran given x2. GOC discussion had, code status changed. Palliative consulted. on 4L NC. Mag replaced.

## 2025-06-13 NOTE — PROGRESS NOTES
Pharmacokinetic Dose Adjustment- Meropenem Extended Infusion Dosing  Pharmacy has adjusted the dose of meropenem for Chey Robertson to match new extended infusion (3 hour infusion) protocol dosing (see chart below for dosing recommendations).    Diagnosis: pyelonpehritis/complicated UTI    Estimated Creatinine Clearance: 10.1 mL/min (A) (by C-G formula based on SCr of 3.24 mg/dL (H)).  53.3 kg (117 lb 8.1 oz)    Meropenem (3-hour infusion)  Calculated Creatinine Clearance (ml/min) Intra-abdominal infection, pyelonephritis, SSTI Obesity**, Febrile neutropenia*, osteomyelitis, ICU/CCU patients CNS, Cystic fibrosis   >/=50  500 mg IV q6h  1000 mg IV q8h  2000 mg IV q8h    26-50  500 mg IV q8h  1000 mg IV q12h  1000 mg IV q8h    10-25  500 mg IV q12h  500 mg IV q12h  1000 mg IV q12h    <10  500 mg IV q24h  500 mg IV q24h  1000 mg IV q24h    Hemodialysis   (3x weekly)  500 mg IV q24h   (dose after dialysis)  500 mg IV q24h   (dose after dialysis)  1000 mg IV q24h   (dose after dialysis)    CRRT/SLED  1000 mg IV q12h  1000 mg IV q12h  1000 mg IV q8h        Assessment/Plan  1. meropenem 1000mg q12h (infused over 3 hours). Ms. Robertson's current CrCl would warrant a lower dose but I will dose her meropenem based on her baseline renal function for the first 24-48 hours  2. Pharmacy will continue to monitor patient's renal function for further potential dose adjustments.    Horacio Alvarez, PharmD  6/12/2025  21:26 EDT

## 2025-06-13 NOTE — PROCEDURES
"Insert Central Line At Bedside    Date/Time: 6/13/2025 5:11 AM    Performed by: Alejandro Choudhary APRN  Authorized by: Alejandro Choudhary APRN  Consent: Verbal consent obtained  Risks and benefits: risks, benefits and alternatives were discussed  Consent given by: son.  Patient understanding: patient states understanding of the procedure being performed  Patient consent: the patient's understanding of the procedure matches consent given  Procedure consent: procedure consent matches procedure scheduled  Relevant documents: relevant documents present and verified  Test results: test results available and properly labeled  Site marked: the operative site was marked  Required items: required blood products, implants, devices, and special equipment available  Patient identity confirmed: verbally with patient, arm band and hospital-assigned identification number  Time out: Immediately prior to procedure a \"time out\" was called to verify the correct patient, procedure, equipment, support staff and site/side marked as required.  Indications: vascular access  Anesthesia: local infiltration    Anesthesia:  Local Anesthetic: lidocaine 2% without epinephrine  Anesthetic total: 5 mL    Sedation:  Patient sedated: no    Preparation: skin prepped with 2% chlorhexidine  Skin prep agent dried: skin prep agent completely dried prior to procedure  Sterile barriers: all five maximum sterile barriers used - cap, mask, sterile gown, sterile gloves, and large sterile sheet  Hand hygiene: hand hygiene performed prior to central venous catheter insertion  Location details: left internal jugular  Patient position: flat  Catheter type: triple lumen  Catheter size: 7 Fr  Pre-procedure: landmarks identified  Ultrasound guidance: yes  Sterile ultrasound techniques: sterile gel and sterile probe covers were used  Number of attempts: 1  Successful placement: yes  Post-procedure: line sutured and dressing applied  Assessment: blood return " through all ports, free fluid flow and placement verified by x-ray  Patient tolerance: Patient tolerated the procedure well with no immediate complications

## 2025-06-13 NOTE — CASE MANAGEMENT/SOCIAL WORK
Discharge Planning Assessment  [facility]     Patient Name: Chey Robertson  MRN: [id number]  Today's Date: 6/13/2025    Admit Date: 6/12/2025    Plan: [facility]   Discharge Needs Assessment       Row Name 06/13/25 1454       Living Environment    People in Home facility resident  [facility]    Current Living Arrangements assisted living facility    Primary Care Provided by self  [facility]    Provides Primary Care For no one, unable/limited ability to care for self    Family Caregiver if Needed child(michael), adult    Family Caregiver Names Albin Robertson (Son) [phone]    Quality of Family Relationships supportive    Able to Return to Prior Arrangements yes       Resource/Environmental Concerns    Transportation Concerns none       Transition Planning    Patient/Family Anticipates Transition to inpatient rehabilitation facility    Patient/Family Anticipated Services at Transition rehabilitation services       Discharge Needs Assessment    Readmission Within the Last 30 Days current reason for admission unrelated to previous admission    Equipment Currently Used at Home walker, rolling;shower chair                   Discharge Plan       Row Name 06/13/25 1457       Plan    Plan [facility]    Patient/Family in Agreement with Plan yes    Plan Comments Spoke with Ms. Robertson's Son and daughter in law at the bedside. Ms. Robertson was sleeping. She lives at [facility] in [facility], but she is currently in rehab at [facility] from a previous hospital admission. She needs assistance with ADL's and uses a rolling walker and shower chair. She does not use oxygen. She used home health before for wound care, but unsure which agency. Her Son Albin is her POA. Her PCP is Yasmeen Loomis and she has [employer] insurance. Discharge plan is to return to [facility] to complete rehab once medically ready. CM will continue to follow up.    Final Discharge Disposition Code 03 - skilled nursing facility (SNF)                   Selected Continued Care - Prior Encounters Includes continued care and service providers with selected services from prior encounters from 3/14/2025 to 6/13/2025      Discharged on 5/29/2025 Admission date: 5/17/2025 - Discharge disposition: Skilled Nursing Facility (DC - External)      Destination       Service Provider Services Address Phone Fax Patient Preferred    [facility] [address] [facility] [id number] [phone] --                             Demographic Summary       Row Name 06/13/25 1454       General Information    Admission Type inpatient    Arrived From --  [place]hab at [facility]    Referral Source admission list    Reason for Consult discharge planning    Preferred Language English       Contact Information    Permission Granted to Share Info With                    Functional Status       Row Name 06/13/25 1454       Mental Status    General Appearance WDL WDL                   Psychosocial    No documentation.                  Abuse/Neglect    No documentation.                  Legal    No documentation.                  Substance Abuse    No documentation.                  Patient Forms    No documentation.                     Quyen Mukherjee, RN

## 2025-06-13 NOTE — PROGRESS NOTES
Clinical Nutrition     Patient Name: Chey Robertson  YOB: 1935  MRN: [id number]  Date of Encounter: 06/13/25 16:25 EDT  Admission date: 6/12/2025  Reason for Visit: MDR, Identified at risk by screening criteria, Nonhealing wound or pressure ulcer    Assessment   Nutrition Assessment   Admission Diagnosis:  DISHA (acute kidney injury) [N17.9]    Problem List:    DISHA (acute kidney injury)    Pancytopenia    Hypothyroidism (acquired)    Myelodysplasia (myelodysplastic syndrome)    Personal history of pulmonary embolism    Chronic anticoagulation    Essential hypertension    Type 2 diabetes mellitus without complication, without long-term current use of insulin    Atrial fibrillation    Periprosthetic fracture around prosthetic knee    Femur fracture      PMH:   She  has a past medical history of A-fib, Anemia, Arthritis, Diabetes mellitus, Disease of thyroid gland, History of transfusion, [place] (hard of hearing), Hypertension, Migraine without aura and without status migrainosus, not intractable (12/30/2016), Myelodysplastic syndrome, Pulmonary emboli (2011), SOBOE (shortness of breath on exertion), Tremors of nervous system, Vertigo, Wears dentures, and Wears glasses.    PSH:  She  has a past surgical history that includes Hysterectomy; Tonsillectomy; Bladder surgery; Cataract extraction; Cholecystectomy; Colonoscopy; Kyphoplasty (N/A, 02/12/2021); Hip Trochanteric Nailing (Right, 6/20/2023); and Total Knee Arthroplasty Revision (Left, 5/20/2025).    Applicable Nutrition History:     Pt with multiple admissions, ER visits past 3 years    s/p recent admission for femur fracture 5-17-25--->5-29-25    h/o severe chronic malnutrition, dysphagia    SKIN:L gluteal ulcer, B heel ulcers    Labs    Labs Reviewed: Yes    Results from last 7 days   Lab Units 06/13/25  0801 06/13/25  0435 06/13/25  0147 06/12/25  2236 06/12/25  1625   GLUCOSE mg/dL 203* 292* 357* 296* 123*   BUN mg/dL 59.5* 64.8*  68.2* 69.4* 72.1*   CREATININE mg/dL 2.87* 3.25* 3.28* 3.54* 3.24*   SODIUM mmol/L 140 137 134* 134* 132*   CHLORIDE mmol/L 97* 95* 96* 95* 98   POTASSIUM mmol/L 4.4 4.8 5.0 5.9* 6.8*   PHOSPHORUS mg/dL  --  3.1  --   --   --    MAGNESIUM mg/dL  --  1.3*  --   --   --    ALT (SGPT) U/L  --  10  --   --  9   LACTATE mmol/L  --  7.4* 9.4* 9.7*  --        Results from last 7 days   Lab Units 06/13/25  0435 06/12/25  1625   ALBUMIN g/dL 3.0* 3.3*       Results from last 7 days   Lab Units 06/13/25  1414 06/13/25  1322 06/13/25  1217 06/13/25  1123 06/13/25  1059 06/13/25  1001   GLUCOSE mg/dL 101 93 83 89 103 146*     Lab Results   Lab Value Date/Time    HGBA1C 5.70 (H) 06/13/2025 0435    HGBA1C 6.20 (H) 05/17/2025 2008               Medications    Medications Reviewed: yes    Scheduled Meds:apixaban, 2.5 mg, Oral, Q12H  castor oil-balsam peru, 1 Application, Topical, Q12H  insulin regular, 3-14 Units, Subcutaneous, Q6H  levothyroxine, 100 mcg, Oral, Q AM  meropenem, 1,000 mg, Intravenous, Q12H  mupirocin, 1 Application, Each Nare, BID  pantoprazole, 40 mg, Intravenous, Q AM  senna-docusate sodium, 2 tablet, Oral, BID  sodium chloride, 10 mL, Intravenous, Q12H  thiamine (B-1) IV, 500 mg, Intravenous, Q8H      Continuous Infusions:amiodarone, 0.5 mg/min, Last Rate: 0.5 mg/min (06/13/25 1227)  Pharmacy To Dose:,   phenylephrine, 0.5-3 mcg/kg/min, Last Rate: 0.7 mcg/kg/min (06/13/25 1600)      PRN Meds:.  dextrose    dextrose    glucagon (human recombinant)    melatonin    ondansetron    Pharmacy To Dose:    prochlorperazine    [COMPLETED] Insert Peripheral IV **AND** sodium chloride    sodium chloride    sodium chloride    Intake/Ouptut 24 hrs (0701 - 0700)   I&O's Reviewed: yes    Intake & Output (last day)         06/12 0701 06/13 0700 06/13 0701 06/14 0700    I.V. (mL/kg) 3284 (61.6) 1052.7 (19.8)    IV Piggyback 500 300    Total Intake(mL/kg) 3784 (71) 1352.7 (25.4)    Urine (mL/kg/hr) 100 265 (0.5)    Stool 0 0     "Total Output 100 265    Net +3684 +1087.7          Stool Unmeasured Occurrence 1 x 1 x             Anthropometrics     Height: Height: 160 cm (63\")  Last Filed Weight: Weight: 53.3 kg (117 lb 8.1 oz) (06/12/25 2115)  Method: Weight Method: Bed scale  BMI: BMI (Calculated): 20.8    UBW: ~117lb per EMR    Weight change: 72lb wt loss noted 2 years ago, pt has never regained her weight      Weight       Weight (kg) Weight (lbs) Weight Method Visit Report   5/27/2020 85.73 kg  189 lb  Bed scale      86.183 kg  190 lb      6/2/2020 85.73 kg  189 lb      8/31/2020 85.73 kg  189 lb   --    9/9/2020 85.276 kg  188 lb      9/16/2020 85.73 kg  189 lb      12/23/2020 81.3 kg  179 lb 3.7 oz  Standing scale     2/10/2021 77.8 kg  171 lb 8.3 oz  Standing scale     2/12/2021 77.565 kg  171 lb  Standing scale     2/19/2021 77.111 kg  170 lb  Stated     4/26/2021 77.565 kg  171 lb   --    7/13/2021 70.852 kg  156 lb 3.2 oz      7/20/2021 70.761 kg  156 lb   --    8/23/2021 68.493 kg  151 lb   --    10/22/2021 68.493 kg  151 lb   --    11/29/2021 67.132 kg  148 lb   --    6/14/2022 62.143 kg  137 lb   --    2/13/2023 61.689 kg  136 lb   --    3/16/2023 60.782 kg  134 lb      3/28/2023 52.617 kg  116 lb  Stated     5/5/2023 53.978 kg  119 lb  Stated      55.974 kg  123 lb 6.4 oz  Bed scale     5/17/2023 56.155 kg  123 lb 12.8 oz      5/25/2023 53.252 kg  117 lb 6.4 oz      6/5/2023 51.71 kg  114 lb  Stated      52.708 kg  116 lb 3.2 oz  Bed scale     6/8/2023 52.617 kg  116 lb  Stated      55.838 kg  123 lb 1.6 oz  Bed scale     6/18/2023 51.71 kg  114 lb  Stated     6/20/2023 51.71 kg  114 lb      6/29/2023 53.252 kg  117 lb 6.4 oz      8/6/2023 52.164 kg  115 lb  Stated     8/7/2023 54.432 kg  120 lb   --    1/15/2024 54.432 kg  120 lb  Estimated     2/12/2024 51.665 kg  113 lb 14.4 oz   --    7/27/2024 51.256 kg  113 lb  Stated     7/28/2024 51.937 kg  114 lb 8 oz  Bed scale     8/2/2024 59.966 kg  132 lb 3.2 oz  Bed scale   "   8/6/2024 54.25 kg  119 lb 9.6 oz  Standing scale     8/13/2024 52.164 kg  115 lb   --    10/18/2024 53.071 kg  117 lb  Stated     10/24/2024 57.561 kg  126 lb 14.4 oz  Bed scale     3/3/2025 54.432 kg  120 lb   --    5/17/2025 54.4 kg  119 lb 14.9 oz  Estimated      56.881 kg  125 lb 6.4 oz  Bed scale     6/12/2025 58.514 kg  129 lb  Bed scale      50.803 kg  112 lb       53.3 kg  117 lb 8.1 oz          Nutrition Focused Physical Exam     Date:     Unable to perform due to Potential for patient discomfort     Subjective   Reported/Observed/Food/Nutrition Related History:     Per  RN: pt has been drowsy, but will follow commands, attempted to do bedside dysphagia eval but pt vomited    Pt resting in bed, ill appearing, on nasal cannula, reports poor appetite, stomach feels a little better than it did  + [place] 0.7mcg, amio, D5%NS@50ml    Current Nutrition Prescription     PO: NPO Diet NPO Type: Strict NPO  Oral Nutrition Supplement:  Intake: N/A    Assessment & Plan   Nutrition Diagnosis   Date: 6-13-25 Updated:   Problem Increased nutrient needs    Etiology Poor Skin Integrity   Signs/Symptoms SKIN:L gluteal ulcer, B heel ulcers   Status: New      Goal:   Nutrition to support treatment    Palliative consulted for GOC    Nutrition Intervention      Follow treatment progress, Care plan reviewed    Suggest SLP eval when appropriate, as pt has h/o dysphagia, appears very frail, has had a decline in status    Will send oral supplements once pt cleared for a diet    Monitoring/Evaluation:   Per protocol, I&O, PO intake, Weight, Skin status, GI status, Symptoms, POC/GOC, Swallow function, Hemodynamic stability    Anayeli Dalton, NEHA  Time Spent: 30min

## 2025-06-13 NOTE — PROGRESS NOTES
INTENSIVIST   PROGRESS NOTE        SUBJECTIVE     Chey 89 y.o. female is followed for: Abnormal Lab       DISHA (acute kidney injury)    Pancytopenia    Hypothyroidism (acquired)    Myelodysplasia (myelodysplastic syndrome)    Personal history of pulmonary embolism    Chronic anticoagulation    Essential hypertension    Type 2 diabetes mellitus without complication, without long-term current use of insulin    Atrial fibrillation    Periprosthetic fracture around prosthetic knee    Femur fracture    As an Intensivist, we have completed an accredited Critical Care program and maintain Board Certification and dedicate most of our professional work to critical/intensive care. We serve as the  or member of an interprofessional team, coordinating and guiding healthcare professionals to provide specialized care for critically ill patients. We manage and resuscitate patients with, or at risk for, critical illness and organ failure and initiate interventions to prevent imminent deterioration. (2024 Consensus Statement from the Society of Critical Care Medicine Defining Intensivist Task Force. [facility] 2025. DOI: 10.1097/[facility].[email or web address]     Interval History:  Not feeling well.    Weaning pressors.    On amiodarone continuous intravenous infusion    Last Bowel Movement: 06/13/25 (06/13/25 0800)    Temp  Min: 97.7 °F (36.5 °C)  Max: 98.1 °F (36.7 °C)       History     Last Reviewed by Oswaldo Weber MD on 6/13/2025 at  7:48 AM    Sections Reviewed    Medical, Surgical, Family, Tobacco, Alcohol, Drug Use, Sexual Activity,   Social Documentation    Problem list reviewed by Oswaldo Weber MD on 6/13/2025 at  7:48 AM  Medicines reviewed by Oswaldo Weber MD on 6/13/2025 at  7:48 AM  Allergies reviewed by Oswaldo Weber MD on 6/13/2025 at  7:48 AM       The patient's relevant past medical, surgical and social history were reviewed and updated in Epic as appropriate.        OBJECTIVE     Physical  Examination    Vitals:  Temp: 97.8 °F (36.6 °C) (25) Temp  Min: 97.7 °F (36.5 °C)  Max: 98.1 °F (36.7 °C)   Temp core:      BP: 90/56 (25 164) BP  Min: 84/47  Max: 129/77   MAP (non-invasive) Noninvasive MAP (mmHg): 70 (25) Noninvasive MAP (mmHg)  Av.5  Min: 54  Max: 141   Pulse: 81 (25) Pulse  Min: 67  Max: 144   Resp: 16 (25) Resp  Min: 16  Max: 22   SpO2: 99 % (25) SpO2  Min: 88 %  Max: 100 %   Device: nasal cannula (25)    Flow Rate: 6 (25) Flow (L/min) (Oxygen Therapy)  Min: 2  Max: 6     Intake/Ouptut 24 hrs (7:00AM - 6:59 AM)  Intake & Output (last 2 days)          07 0700  0701   0700  07 0700    I.V. (mL/kg)  3284 (61.6) 1052.7 (19.8)    IV Piggyback  500 300    Total Intake(mL/kg)  3784 (71) 1352.7 (25.4)    Urine (mL/kg/hr)  100 265 (0.5)    Stool  0 0    Total Output  100 265    Net  +3684 +1087.7           Stool Unmeasured Occurrence  1 x 1 x            Medications (drips):  amiodarone, Last Rate: 0.5 mg/min (25 1227)  dextrose 5 % and sodium chloride 0.9 %, Last Rate: 50 mL/hr (25 1632)  Pharmacy To Dose:  phenylephrine, Last Rate: 0.7 mcg/kg/min (25)            25  1845 25   Weight: 50.8 kg (112 lb) 53.3 kg (117 lb 8.1 oz)        Telemetry:  Rhythm: junctional rhythm (25)  Atrial Rhythm: atrial fibrillation (25 1000)      Constitutional:  No acute distress.   Cardiovascular: IRR.    Respiratory: Normal breath sounds  No adventitious sounds   Abdominal:  Soft with no tenderness.   Extremities: Trace Edema   Neurological:   Awake.  Best Eye Response: 3-->(E3) to speech (25 1600)  Best Motor Response: 6-->(M6) obeys commands (25 1600)  Best Verbal Response: 4-->(V4) confused (25 1600)  [place] Coma Scale Score: 13 (25)       Results  Reviewed:  Laboratory  Microbiology  Radiology  Pathology    Hematology:  Results from last 7 days   Lab Units 06/13/25  1552 06/13/25  0435 06/12/25  1625   WBC 10*3/mm3 4.63 6.84 3.14*   HEMOGLOBIN g/dL 6.9* 7.4* 8.1*   MCV fL 87.4 88.3 87.4   PLATELETS 10*3/mm3 144 187 136*     Results from last 7 days   Lab Units 06/13/25  1552 06/12/25  1625   NEUTROS ABS 10*3/mm3 3.47 2.34   LYMPHS ABS 10*3/mm3 0.43* 0.49*   EOS ABS 10*3/mm3 0.00 0.00     Chemistry:  Estimated Creatinine Clearance: 11.9 mL/min (A) (by C-G formula based on SCr of 2.74 mg/dL (H)).    Results from last 7 days   Lab Units 06/13/25  1552 06/13/25  0801   SODIUM mmol/L 140 140   POTASSIUM mmol/L 4.6 4.4   CHLORIDE mmol/L 98 97*   CO2 mmol/L 29.0 27.0   BUN mg/dL 57.5* 59.5*   CREATININE mg/dL 2.74* 2.87*   GLUCOSE mg/dL 90 203*     Results from last 7 days   Lab Units 06/13/25  1552 06/13/25  0801 06/13/25  0435   CALCIUM mg/dL 8.5* 8.7 8.7   MAGNESIUM mg/dL  --   --  1.3*   PHOSPHORUS mg/dL  --   --  3.1     Hepatic Panel:  Results from last 7 days   Lab Units 06/13/25  0435 06/12/25  1625   ALBUMIN g/dL 3.0* 3.3*   TOTAL PROTEIN g/dL 4.8* 6.0   BILIRUBIN mg/dL 0.2 0.3   AST (SGOT) U/L 12 13   ALT (SGPT) U/L 10 9   ALK PHOS U/L 90 117     Cardiac Labs:  Results from last 7 days   Lab Units 06/13/25  0801 06/13/25  0435 06/12/25  2329   HSTROP T ng/L 43* 57* 50*     Biomarkers:    Results from last 7 days   Lab Units 06/13/25  1552 06/13/25  0435 06/13/25  0147 06/12/25  2236   LACTATE mmol/L 3.2* 7.4* 9.4* 9.7*       U/A  Results from last 7 days   Lab Units 06/12/25 2059   COLOR UA  [place]*   CLARITY UA  Turbid*   PH, URINE  <=5.0   SPECIFIC GRAVITY, URINE  1.018   GLUCOSE UA  Negative   KETONES UA  Negative   BILIRUBIN UA  Small (1+)*   PROTEIN UA  100 mg/dL (2+)*   BLOOD UA  Large (3+)*   LEUKOCYTES UA  Moderate (2+)*   NITRITE UA  Positive*   UROBILINOGEN UA  1.0 E.U./dL     Results from last 7 days   Lab Units 06/12/25 2059   RBC UA /HPF Too  Numerous to Count*   WBC UA /HPF Too Numerous to Count*   BACTERIA UA /HPF 4+*   SQUAM EPITHEL UA /HPF 13-20*     COVID-19  Lab Results   Component Value Date    COVID19 Not Detected 07/27/2024    COVID19 Presumptive Negative 05/18/2023    COVID19 Detected (C) 05/08/2023    COVID19 Not Detected 05/05/2023       Arterial Blood Gases:  Results from last 7 days   Lab Units 06/12/25  1725   PH, ARTERIAL pH units 7.374   PCO2, ARTERIAL mm Hg 27.4*   PO2 ART mm Hg 74.9*   FIO2 % 21       Images:  XR Chest 1 View  Result Date: 6/13/2025  Impression: 1.New left-sided central venous catheter terminates in the mid SVC. No visible pneumothorax. 2.Low lung volumes. Electronically Signed: Hector Rosenberg MD  6/13/2025 6:13 AM EDT  Workstation ID: [id number]    CT Abdomen Pelvis Without Contrast  Result Date: 6/12/2025  Examination limited without IV contrast. 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 Electronically Signed: Devon Mcgrath MD  6/12/2025 8:15 PM EDT  Workstation ID: [id number]      Echo:  Results for orders placed during the hospital encounter of 06/05/23    Adult Transthoracic Echo Complete w/ Color, Spectral and Contrast if necessary per protocol    Interpretation Summary    Left ventricular systolic function is normal. Left ventricular ejection fraction appears to be 56 - 60%.    Left ventricular diastolic function was normal.    Estimated right ventricular systolic pressure from tricuspid regurgitation is normal (<35 mmHg).      Results: Reviewed.  I reviewed the patient's new laboratory and imaging results.  I independently reviewed the patient's new images.    Medications: Reviewed.    Assessment   A/P     Assessment/Management/Treatment Plan:    Hospital:  LOS: 1 day   ICU: 21h     Active Hospital Problems    Diagnosis  POA    **DISHA (acute kidney injury) [N17.9]  Yes    Femur fracture [S72.90XA]  Yes    Periprosthetic fracture around prosthetic knee [M97.8XXA, Z96.659]  Not Applicable    Atrial fibrillation [I48.91]  Yes     Chronic anticoagulation [Z79.01]  Not Applicable    Essential hypertension [I10]  Yes    Type 2 diabetes mellitus without complication, without long-term current use of insulin [E11.9]  Yes    Myelodysplasia (myelodysplastic syndrome) [D46.9]  Yes    Personal history of pulmonary embolism [Z86.711]  Yes    Pancytopenia [D61.818]  Yes    Hypothyroidism (acquired) [E03.9]  Yes     Chey is a 89 y.o. female admitted on 6/12/2025 with generalized weakness, and DISHA (acute kidney injury) [N17.9]    BUN 72, creatinine 3.24, potassium 6.8.    And she was also in A-fib with RVR and started on Amio drip.      Comorbidities:  A-fib on Eliquis, hypertension, type 2 diabetes, hypothyroidism, MDS with pancytopenia followed by Dr. Lyman, history PE, status post left knee replacement.    [facility] from 5/17 to 5/29 post ground-level fall at skilled nursing facility with subsequent left supracondylar fracture of the distal femur, status postrepair 5/20.  Hospital course was also complicated by Klebsiella UTI.     DISHA and hyperkalemia on admission  Urinary Output only 100 mL on the first day here. (06/12-06/13, about 12 h)  UTI  Cardiovascular  A Fib with RVR. Chronic anticoagulation. APIxaban.  HTN  Hematology  Myelodysplasia (She sees Dr. Lyman)  Endocrine   Body mass index is 20.82 kg/m². Normal: 18.5-24.9kg/m2  Hypothyroidism    Lab Results   Component Value Date    TSH 1.310 06/12/2025    FREET4 1.32 05/24/2020     Type 2 diabetes.    Lab Results   Lab Value Date/Time    HGBA1C 5.70 (H) 06/13/2025 0435    HGBA1C 6.20 (H) 05/17/2025 2008     Results from last 7 days   Lab Units 06/13/25  1632 06/13/25  1414 06/13/25  1322 06/13/25  1217 06/13/25  1123 06/13/25  1059 06/13/25  1001 06/13/25  0853   GLUCOSE mg/dL 119 101 93 83 89 103 146* 211*       Diet: NPO Diet NPO Type: Strict NPO   Advance Directives: Code Status and Medical Interventions: No CPR (Do Not Attempt to Resuscitate); Limited Support; No intubation (DNI), No dialysis, No  blood products, No vasopressors, No cardioversion, No artificial nutrition   Ordered at: 06/13/25 1759     Code Status (Patient has no pulse and is not breathing):    No CPR (Do Not Attempt to Resuscitate)     Medical Interventions (Patient has pulse or is breathing):    Limited Support     Medical Intervention Limits:    No intubation (DNI)       No dialysis       No blood products       No vasopressors       No cardioversion       No artificial nutrition     Level Of Support Discussed With:    Next of Kin (If No Surrogate)        VTE Prophylaxis:  Pharmacologic VTE prophylaxis orders are present.         In brief:  Urinary output seems to be improving this morning.  Discussed with family. She has a living will but they need to check it.  They want to proceed with changing code status to DNACPR, DNI, No dialysis.  They are agreeable with Palliative Consult.  Palliative Team consult. Family support. They know she would not want life support.  Goal: Glucose < 180 mg/dL.   Continue antibiotics ILENE. Discontinue Vanco as MRSA PCR screen was negative.  C auris screen  IVF  Palliative Consult  Disposition: Keep in ICU.    Addendum: 18:02 EDT   Discussed with son.  Focus on comfort care.  Re-assess labs in AM. They will consider Hospice tomorrow, based on her clinical course.  No blood transfusion.    Plan of care and goals reviewed during interdisciplinary rounds.  I discussed the patient's findings and my recommendations with patient, family, and nursing staff    MDM:    Problem(s) High due to: Acute or Chronic illness or injury that may poses a threat to life or bodily function  Data: Moderate due to: Review of prior external records from each unique source, Review or results of each unique test, and Ordering of each unique test    Moderate   [x] Primary Attending Intensive Care Medicine - Nutrition Support   [] Consultant    Copied text in this note has been reviewed and is accurate as of 06/13/25

## 2025-06-13 NOTE — PROGRESS NOTES
INTENSIVIST   PROGRESS NOTE        SUBJECTIVE     Chey 89 y.o. female is followed for: Abnormal Lab       DISHA (acute kidney injury)    Pancytopenia    Hypothyroidism (acquired)    Myelodysplasia (myelodysplastic syndrome)    Personal history of pulmonary embolism    Chronic anticoagulation    Essential hypertension    Type 2 diabetes mellitus without complication, without long-term current use of insulin    Atrial fibrillation    Periprosthetic fracture around prosthetic knee    Femur fracture    As an Intensivist, we have completed an accredited Critical Care program and maintain Board Certification and dedicate most of our professional work to critical/intensive care. We serve as the  or member of an interprofessional team, coordinating and guiding healthcare professionals to provide specialized care for critically ill patients. We manage and resuscitate patients with, or at risk for, critical illness and organ failure and initiate interventions to prevent imminent deterioration. (2024 Consensus Statement from the Society of Critical Care Medicine Defining Intensivist Task Force. [facility] 2025. DOI: 10.1097/[facility].[id number]     Interval History:  Not feeling well.    Weaning pressors.    On amiodarone continuous intravenous infusion    Last Bowel Movement: 06/13/25 (06/13/25 0200)    Temp  Min: 97.7 °F (36.5 °C)  Max: 98.2 °F (36.8 °C)       History     Last Reviewed by Oswaldo Weber MD on 6/13/2025 at  7:48 AM    Sections Reviewed    Medical, Surgical, Family, Tobacco, Alcohol, Drug Use, Sexual Activity,   Social Documentation    Problem list reviewed by Oswaldo Weber MD on 6/13/2025 at  7:48 AM  Medicines reviewed by Oswaldo Weber MD on 6/13/2025 at  7:48 AM  Allergies reviewed by Oswaldo Weber MD on 6/13/2025 at  7:48 AM       The patient's relevant past medical, surgical and social history were reviewed and updated in Epic as appropriate.        OBJECTIVE     Physical  Examination    Vitals:  Temp: 97.8 °F (36.6 °C) (25 0400) Temp  Min: 97.7 °F (36.5 °C)  Max: 98.2 °F (36.8 °C)   Temp core:      BP: 112/58 (25 0715) BP  Min: 84/47  Max: 130/54   MAP (non-invasive) Noninvasive MAP (mmHg): 78 (25) Noninvasive MAP (mmHg)  Av.9  Min: 54  Max: 141   Pulse: 95 (25) Pulse  Min: 51  Max: 144   Resp: 20 (25 0400) Resp  Min: 14  Max: 20   SpO2: 100 % (25) SpO2  Min: 88 %  Max: 100 %   Device: nasal cannula (25)    Flow Rate: 2 (25) Flow (L/min) (Oxygen Therapy)  Min: 2  Max: 2     Intake/Ouptut 24 hrs (7:00AM - 6:59 AM)  Intake & Output (last 2 days)          07 07 07 07 0700    I.V. (mL/kg)  3284 (61.6)     IV Piggyback  500     Total Intake(mL/kg)  3784 (71)     Urine (mL/kg/hr)  100     Stool  0     Total Output  100     Net  +3684            Stool Unmeasured Occurrence  1 x             Medications (drips):  amiodarone, Last Rate: 0.5 mg/min (25 1227)  insulin, Last Rate: 0.2 Units/hr (25 1218)  Pharmacy To Dose:  phenylephrine, Last Rate: 1.5 mcg/kg/min (25 1015)  vasopressin, Last Rate: 0.03 Units/min (25 0835)            25  1845 25   Weight: 50.8 kg (112 lb) 53.3 kg (117 lb 8.1 oz)        Telemetry:  Rhythm: atrial rhythm (25)  Atrial Rhythm: atrial fibrillation (25)      Constitutional:  No acute distress.   Cardiovascular: IRR.    Respiratory: Normal breath sounds  No adventitious sounds   Abdominal:  Soft with no tenderness.   Extremities: Trace Edema   Neurological:   Awake.  Best Eye Response: 4-->(E4) spontaneous (25)  Best Motor Response: 6-->(M6) obeys commands (25)  Best Verbal Response: 4-->(V4) confused (25)  Lion Coma Scale Score: 14 (25)       Results Reviewed:  Laboratory  Microbiology  Radiology  Pathology    Hematology:  Results  from last 7 days   Lab Units 06/13/25  0435 06/12/25  1625   WBC 10*3/mm3 6.84 3.14*   HEMOGLOBIN g/dL 7.4* 8.1*   MCV fL 88.3 87.4   PLATELETS 10*3/mm3 187 136*     Results from last 7 days   Lab Units 06/12/25  1625   NEUTROS ABS 10*3/mm3 2.34   LYMPHS ABS 10*3/mm3 0.49*   EOS ABS 10*3/mm3 0.00     Chemistry:  Estimated Creatinine Clearance: 10.1 mL/min (A) (by C-G formula based on SCr of 3.25 mg/dL (H)).    Results from last 7 days   Lab Units 06/13/25 0435 06/13/25  0147   SODIUM mmol/L 137 134*   POTASSIUM mmol/L 4.8 5.0   CHLORIDE mmol/L 95* 96*   CO2 mmol/L 24.0 15.0*   BUN mg/dL 64.8* 68.2*   CREATININE mg/dL 3.25* 3.28*   GLUCOSE mg/dL 292* 357*     Results from last 7 days   Lab Units 06/13/25  0435 06/13/25  0147   CALCIUM mg/dL 8.7 8.9   MAGNESIUM mg/dL 1.3*  --    PHOSPHORUS mg/dL 3.1  --      Hepatic Panel:  Results from last 7 days   Lab Units 06/13/25  0435 06/12/25  1625   ALBUMIN g/dL 3.0* 3.3*   TOTAL PROTEIN g/dL 4.8* 6.0   BILIRUBIN mg/dL 0.2 0.3   AST (SGOT) U/L 12 13   ALT (SGPT) U/L 10 9   ALK PHOS U/L 90 117     Cardiac Labs:  Results from last 7 days   Lab Units 06/13/25  0435 06/12/25  2329 06/12/25  2236   HSTROP T ng/L 57* 50* 41*     Biomarkers:    Results from last 7 days   Lab Units 06/13/25  0435 06/13/25  0147 06/12/25  2236   LACTATE mmol/L 7.4* 9.4* 9.7*       U/A  Results from last 7 days   Lab Units 06/12/25 2059   COLOR UA  [place]*   CLARITY UA  Turbid*   PH, URINE  <=5.0   SPECIFIC GRAVITY, URINE  1.018   GLUCOSE UA  Negative   KETONES UA  Negative   BILIRUBIN UA  Small (1+)*   PROTEIN UA  100 mg/dL (2+)*   BLOOD UA  Large (3+)*   LEUKOCYTES UA  Moderate (2+)*   NITRITE UA  Positive*   UROBILINOGEN UA  1.0 E.U./dL     Results from last 7 days   Lab Units 06/12/25 2059   RBC UA /HPF Too Numerous to Count*   WBC UA /HPF Too Numerous to Count*   BACTERIA UA /HPF 4+*   SQUAM EPITHEL UA /HPF 13-20*     COVID-19  Lab Results   Component Value Date    COVID19 Not Detected  07/27/2024    COVID19 Presumptive Negative 05/18/2023    COVID19 Detected (C) 05/08/2023    COVID19 Not Detected 05/05/2023       Arterial Blood Gases:  Results from last 7 days   Lab Units 06/12/25  1725   PH, ARTERIAL pH units 7.374   PCO2, ARTERIAL mm Hg 27.4*   PO2 ART mm Hg 74.9*   FIO2 % 21       Images:  XR Chest 1 View  Result Date: 6/13/2025  Impression: 1.New left-sided central venous catheter terminates in the mid SVC. No visible pneumothorax. 2.Low lung volumes. Electronically Signed: Hector Rosenberg MD  6/13/2025 6:13 AM EDT  Workstation ID: [id number]    CT Abdomen Pelvis Without Contrast  Result Date: 6/12/2025  Examination limited without IV contrast. 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 Electronically Signed: Devon Mcgrath MD  6/12/2025 8:15 PM EDT  Workstation ID: [id number]      Echo:  Results for orders placed during the hospital encounter of 06/05/23    Adult Transthoracic Echo Complete w/ Color, Spectral and Contrast if necessary per protocol    Interpretation Summary    Left ventricular systolic function is normal. Left ventricular ejection fraction appears to be 56 - 60%.    Left ventricular diastolic function was normal.    Estimated right ventricular systolic pressure from tricuspid regurgitation is normal (<35 mmHg).      Results: Reviewed.  I reviewed the patient's new laboratory and imaging results.  I independently reviewed the patient's new images.    Medications: Reviewed.    Assessment   A/P     Assessment/Management/Treatment Plan:    Hospital:  LOS: 1 day   ICU: 11h     Active Hospital Problems    Diagnosis  POA    **DISHA (acute kidney injury) [N17.9]  Yes    Femur fracture [S72.90XA]  Yes    Periprosthetic fracture around prosthetic knee [M97.8XXA, Z96.659]  Not Applicable    Atrial fibrillation [I48.91]  Yes    Chronic anticoagulation [Z79.01]  Not Applicable    Essential hypertension [I10]  Yes    Type 2 diabetes mellitus without complication, without long-term current use of insulin  [E11.9]  Yes    Myelodysplasia (myelodysplastic syndrome) [D46.9]  Yes    Personal history of pulmonary embolism [Z86.711]  Yes    Pancytopenia [D61.818]  Yes    Hypothyroidism (acquired) [E03.9]  Yes     Chey is a 89 y.o. female admitted on 6/12/2025 with generalized weakness, and DISHA (acute kidney injury) [N17.9]    BUN 72, creatinine 3.24, potassium 6.8.    And she was also in A-fib with RVR and started on Amio drip.      Comorbidities:  A-fib on Eliquis, hypertension, type 2 diabetes, hypothyroidism, MDS with pancytopenia followed by Dr. Lyman, history PE, status post left knee replacement.    [facility] from 5/17 to 5/29 post ground-level fall at skilled nursing facility with subsequent left supracondylar fracture of the distal femur, status postrepair 5/20.  Hospital course was also complicated by Klebsiella UTI.     DISHA and hyperkalemia on admission  Urinary Output only 100 mL on the first day here. (06/12-06/13, about 12 h)  UTI  Cardiovascular  A Fib with RVR. Chronic anticoagulation. APIxaban.  HTN  Hematology  Myelodysplasia (She sees Dr. Lyman)  Endocrine   Body mass index is 20.82 kg/m². Normal: 18.5-24.9kg/m2  Hypothyroidism    Lab Results   Component Value Date    TSH 1.310 06/12/2025    FREET4 1.32 05/24/2020     Type 2 diabetes.    Lab Results   Lab Value Date/Time    HGBA1C 5.70 (H) 06/13/2025 0435    HGBA1C 6.20 (H) 05/17/2025 2008     Results from last 7 days   Lab Units 06/13/25  0739 06/13/25  0617 06/13/25  0400 06/13/25  0303 06/13/25  0152 06/13/25  0149 06/12/25  1813 06/12/25  1657   GLUCOSE mg/dL 270* 340* 398* 390* 387* 403* 207* 140*       Diet: NPO Diet NPO Type: Strict NPO   Advance Directives: Code Status and Medical Interventions: CPR (Attempt to Resuscitate); Full Support   Ordered at: 06/12/25 1917     Code Status (Patient has no pulse and is not breathing):    CPR (Attempt to Resuscitate)     Medical Interventions (Patient has pulse or is breathing):    Full Support        VTE  Prophylaxis:  Pharmacologic VTE prophylaxis orders are present.         In brief:  Urinary output seems to be improving this morning.  Discussed with family. She has a living will but they need to check it.  They want to proceed with changing code status to DNACPR, DNI, No dialysis.  They are agreeable with Palliative Consult.  Palliative Team consult. Family support. They know she would not want life support.  Goal: Glucose < 180 mg/dL.   Continue antibiotics ILENE. Discontinue Vanco as MRSA PCR screen was negative.  C auris screen  IVF  Palliative Consult  Disposition: Keep in ICU.    Plan of care and goals reviewed during interdisciplinary rounds.  I discussed the patient's findings and my recommendations with patient, family, and nursing staff    MDM:    Problem(s) High due to: Acute or Chronic illness or injury that may poses a threat to life or bodily function  Data: Moderate due to: Review of prior external records from each unique source, Review or results of each unique test, and Ordering of each unique test    Moderate   [x] Primary Attending Intensive Care Medicine - Nutrition Support   [] Consultant    Copied text in this note has been reviewed and is accurate as of 06/13/25

## 2025-06-14 ENCOUNTER — HOSPITAL ENCOUNTER (INPATIENT)
Facility: HOSPITAL | Age: 89
LOS: 6 days | Discharge: NURSING, REHAB OR LONG-TERM CARE | End: 2025-06-20
Attending: INTERNAL MEDICINE | Admitting: INTERNAL MEDICINE
Payer: COMMERCIAL

## 2025-06-14 VITALS
HEART RATE: 63 BPM | RESPIRATION RATE: 14 BRPM | OXYGEN SATURATION: 92 % | BODY MASS INDEX: 20.82 KG/M2 | SYSTOLIC BLOOD PRESSURE: 92 MMHG | DIASTOLIC BLOOD PRESSURE: 51 MMHG | HEIGHT: 63 IN | TEMPERATURE: 97.5 F | WEIGHT: 117.5 LBS

## 2025-06-14 VITALS — HEART RATE: 67 BPM | DIASTOLIC BLOOD PRESSURE: 40 MMHG | SYSTOLIC BLOOD PRESSURE: 90 MMHG | OXYGEN SATURATION: 92 %

## 2025-06-14 DIAGNOSIS — E43 SEVERE MALNUTRITION: Primary | ICD-10-CM

## 2025-06-14 PROBLEM — N17.9 ACUTE RENAL FAILURE: Status: ACTIVE | Noted: 2025-06-14

## 2025-06-14 LAB
ANION GAP SERPL CALCULATED.3IONS-SCNC: 13 MMOL/L (ref 5–15)
BASOPHILS # BLD AUTO: 0 10*3/MM3 (ref 0–0.2)
BASOPHILS NFR BLD AUTO: 0 % (ref 0–1.5)
BUN SERPL-MCNC: 53.4 MG/DL (ref 8–23)
BUN/CREAT SERPL: 18.2 (ref 7–25)
CALCIUM SPEC-SCNC: 8.4 MG/DL (ref 8.6–10.5)
CANDIDA AURIS PCR (MAKO): NOT DETECTED
CHLORIDE SERPL-SCNC: 100 MMOL/L (ref 98–107)
CO2 SERPL-SCNC: 25 MMOL/L (ref 22–29)
CREAT SERPL-MCNC: 2.93 MG/DL (ref 0.57–1)
D-LACTATE SERPL-SCNC: 2.6 MMOL/L (ref 0.5–2)
DEPRECATED RDW RBC AUTO: 54.2 FL (ref 37–54)
EGFRCR SERPLBLD CKD-EPI 2021: 14.9 ML/MIN/1.73
EOSINOPHIL # BLD AUTO: 0 10*3/MM3 (ref 0–0.4)
EOSINOPHIL NFR BLD AUTO: 0 % (ref 0.3–6.2)
ERYTHROCYTE [DISTWIDTH] IN BLOOD BY AUTOMATED COUNT: 16.7 % (ref 12.3–15.4)
GLUCOSE BLDC GLUCOMTR-MCNC: 129 MG/DL (ref 70–130)
GLUCOSE BLDC GLUCOMTR-MCNC: 140 MG/DL (ref 70–130)
GLUCOSE BLDC GLUCOMTR-MCNC: 153 MG/DL (ref 70–130)
GLUCOSE SERPL-MCNC: 121 MG/DL (ref 65–99)
HCT VFR BLD AUTO: 23.4 % (ref 34–46.6)
HGB BLD-MCNC: 7.4 G/DL (ref 12–15.9)
IMM GRANULOCYTES # BLD AUTO: 0.11 10*3/MM3 (ref 0–0.05)
IMM GRANULOCYTES NFR BLD AUTO: 3 % (ref 0–0.5)
LYMPHOCYTES # BLD AUTO: 0.34 10*3/MM3 (ref 0.7–3.1)
LYMPHOCYTES NFR BLD AUTO: 9.3 % (ref 19.6–45.3)
MAGNESIUM SERPL-MCNC: 2.5 MG/DL (ref 1.6–2.4)
MCH RBC QN AUTO: 28 PG (ref 26.6–33)
MCHC RBC AUTO-ENTMCNC: 31.6 G/DL (ref 31.5–35.7)
MCV RBC AUTO: 88.6 FL (ref 79–97)
MONOCYTES # BLD AUTO: 0.42 10*3/MM3 (ref 0.1–0.9)
MONOCYTES NFR BLD AUTO: 11.4 % (ref 5–12)
NEUTROPHILS NFR BLD AUTO: 2.8 10*3/MM3 (ref 1.7–7)
NEUTROPHILS NFR BLD AUTO: 76.3 % (ref 42.7–76)
NRBC BLD AUTO-RTO: 0 /100 WBC (ref 0–0.2)
PHOSPHATE SERPL-MCNC: 3.9 MG/DL (ref 2.5–4.5)
PLATELET # BLD AUTO: 127 10*3/MM3 (ref 140–450)
PMV BLD AUTO: 9.3 FL (ref 6–12)
POTASSIUM SERPL-SCNC: 4.5 MMOL/L (ref 3.5–5.2)
PROCALCITONIN SERPL-MCNC: 0.24 NG/ML (ref 0–0.25)
QT INTERVAL: 390 MS
QT INTERVAL: 486 MS
QTC INTERVAL: 460 MS
QTC INTERVAL: 516 MS
RBC # BLD AUTO: 2.64 10*6/MM3 (ref 3.77–5.28)
SODIUM SERPL-SCNC: 138 MMOL/L (ref 136–145)
WBC NRBC COR # BLD AUTO: 3.67 10*3/MM3 (ref 3.4–10.8)

## 2025-06-14 PROCEDURE — 84100 ASSAY OF PHOSPHORUS: CPT | Performed by: INTERNAL MEDICINE

## 2025-06-14 PROCEDURE — 25010000002 PROCHLORPERAZINE 10 MG/2ML SOLUTION

## 2025-06-14 PROCEDURE — 83605 ASSAY OF LACTIC ACID: CPT | Performed by: INTERNAL MEDICINE

## 2025-06-14 PROCEDURE — 25010000002 THIAMINE PER 100 MG: Performed by: NURSE PRACTITIONER

## 2025-06-14 PROCEDURE — 25810000003 DEXTROSE 5 % AND SODIUM CHLORIDE 0.9 % 5-0.9 % SOLUTION: Performed by: INTERNAL MEDICINE

## 2025-06-14 PROCEDURE — 82948 REAGENT STRIP/BLOOD GLUCOSE: CPT

## 2025-06-14 PROCEDURE — 25010000002 AMIODARONE IN DEXTROSE 5% 360-4.14 MG/200ML-% SOLUTION: Performed by: EMERGENCY MEDICINE

## 2025-06-14 PROCEDURE — 84145 PROCALCITONIN (PCT): CPT | Performed by: INTERNAL MEDICINE

## 2025-06-14 PROCEDURE — 25010000002 HYDROMORPHONE PER 4 MG: Performed by: INTERNAL MEDICINE

## 2025-06-14 PROCEDURE — 25010000002 ONDANSETRON PER 1 MG: Performed by: INTERNAL MEDICINE

## 2025-06-14 PROCEDURE — 25010000002 MEROPENEM PER 100 MG

## 2025-06-14 PROCEDURE — 99238 HOSP IP/OBS DSCHRG MGMT 30/<: CPT | Performed by: NURSE PRACTITIONER

## 2025-06-14 PROCEDURE — 80048 BASIC METABOLIC PNL TOTAL CA: CPT | Performed by: INTERNAL MEDICINE

## 2025-06-14 PROCEDURE — 85025 COMPLETE CBC W/AUTO DIFF WBC: CPT | Performed by: INTERNAL MEDICINE

## 2025-06-14 PROCEDURE — 83735 ASSAY OF MAGNESIUM: CPT | Performed by: INTERNAL MEDICINE

## 2025-06-14 RX ORDER — PROCHLORPERAZINE EDISYLATE 5 MG/ML
5 INJECTION INTRAMUSCULAR; INTRAVENOUS EVERY 6 HOURS PRN
Status: CANCELLED | OUTPATIENT
Start: 2025-06-14

## 2025-06-14 RX ORDER — HALOPERIDOL 5 MG/ML
1 INJECTION INTRAMUSCULAR EVERY 4 HOURS PRN
Status: DISCONTINUED | OUTPATIENT
Start: 2025-06-14 | End: 2025-06-15

## 2025-06-14 RX ORDER — HYDROMORPHONE HYDROCHLORIDE 1 MG/ML
0.25 INJECTION, SOLUTION INTRAMUSCULAR; INTRAVENOUS; SUBCUTANEOUS
Status: DISCONTINUED | OUTPATIENT
Start: 2025-06-14 | End: 2025-06-14 | Stop reason: HOSPADM

## 2025-06-14 RX ORDER — ACETAMINOPHEN 650 MG/1
650 SUPPOSITORY RECTAL EVERY 4 HOURS PRN
Status: DISCONTINUED | OUTPATIENT
Start: 2025-06-14 | End: 2025-06-20 | Stop reason: HOSPADM

## 2025-06-14 RX ORDER — SODIUM CHLORIDE 9 MG/ML
40 INJECTION, SOLUTION INTRAVENOUS AS NEEDED
Status: CANCELLED | OUTPATIENT
Start: 2025-06-14

## 2025-06-14 RX ORDER — HYDROMORPHONE HYDROCHLORIDE 1 MG/ML
0.25 INJECTION, SOLUTION INTRAMUSCULAR; INTRAVENOUS; SUBCUTANEOUS
Status: DISCONTINUED | OUTPATIENT
Start: 2025-06-14 | End: 2025-06-18

## 2025-06-14 RX ORDER — BISACODYL 10 MG
10 SUPPOSITORY, RECTAL RECTAL DAILY PRN
Status: DISCONTINUED | OUTPATIENT
Start: 2025-06-14 | End: 2025-06-20 | Stop reason: HOSPADM

## 2025-06-14 RX ORDER — AMOXICILLIN 250 MG
2 CAPSULE ORAL 2 TIMES DAILY
Status: CANCELLED | OUTPATIENT
Start: 2025-06-14

## 2025-06-14 RX ORDER — CASTOR OIL AND BALSAM, PERU 788; 87 MG/G; MG/G
1 OINTMENT TOPICAL EVERY 12 HOURS SCHEDULED
Status: CANCELLED | OUTPATIENT
Start: 2025-06-14

## 2025-06-14 RX ORDER — MIDAZOLAM HYDROCHLORIDE 1 MG/ML
0.5 INJECTION, SOLUTION INTRAMUSCULAR; INTRAVENOUS
Status: DISCONTINUED | OUTPATIENT
Start: 2025-06-14 | End: 2025-06-18

## 2025-06-14 RX ORDER — GLYCOPYRROLATE 0.2 MG/ML
0.4 INJECTION INTRAMUSCULAR; INTRAVENOUS
Status: DISCONTINUED | OUTPATIENT
Start: 2025-06-14 | End: 2025-06-18

## 2025-06-14 RX ORDER — HYDROMORPHONE HYDROCHLORIDE 1 MG/ML
0.25 INJECTION, SOLUTION INTRAMUSCULAR; INTRAVENOUS; SUBCUTANEOUS
Refills: 0 | Status: CANCELLED | OUTPATIENT
Start: 2025-06-14 | End: 2025-06-19

## 2025-06-14 RX ORDER — ONDANSETRON 2 MG/ML
4 INJECTION INTRAMUSCULAR; INTRAVENOUS EVERY 6 HOURS PRN
Status: CANCELLED | OUTPATIENT
Start: 2025-06-14

## 2025-06-14 RX ORDER — SCOPOLAMINE 1 MG/3D
1 PATCH, EXTENDED RELEASE TRANSDERMAL
Status: DISCONTINUED | OUTPATIENT
Start: 2025-06-14 | End: 2025-06-20 | Stop reason: HOSPADM

## 2025-06-14 RX ORDER — ONDANSETRON 2 MG/ML
4 INJECTION INTRAMUSCULAR; INTRAVENOUS EVERY 6 HOURS PRN
Status: DISCONTINUED | OUTPATIENT
Start: 2025-06-14 | End: 2025-06-18

## 2025-06-14 RX ORDER — SODIUM CHLORIDE 0.9 % (FLUSH) 0.9 %
10 SYRINGE (ML) INJECTION EVERY 12 HOURS SCHEDULED
Status: CANCELLED | OUTPATIENT
Start: 2025-06-14

## 2025-06-14 RX ORDER — SODIUM CHLORIDE 0.9 % (FLUSH) 0.9 %
10 SYRINGE (ML) INJECTION AS NEEDED
Status: CANCELLED | OUTPATIENT
Start: 2025-06-14

## 2025-06-14 RX ORDER — IPRATROPIUM BROMIDE AND ALBUTEROL SULFATE 2.5; .5 MG/3ML; MG/3ML
3 SOLUTION RESPIRATORY (INHALATION) EVERY 4 HOURS PRN
Status: DISCONTINUED | OUTPATIENT
Start: 2025-06-14 | End: 2025-06-20 | Stop reason: HOSPADM

## 2025-06-14 RX ORDER — HALOPERIDOL 5 MG/ML
0.5 INJECTION INTRAMUSCULAR EVERY 4 HOURS PRN
Status: DISCONTINUED | OUTPATIENT
Start: 2025-06-14 | End: 2025-06-15

## 2025-06-14 RX ORDER — LEVOTHYROXINE SODIUM 100 UG/1
100 TABLET ORAL
Status: CANCELLED | OUTPATIENT
Start: 2025-06-15

## 2025-06-14 RX ADMIN — PROCHLORPERAZINE EDISYLATE 5 MG: 5 INJECTION INTRAMUSCULAR; INTRAVENOUS at 12:25

## 2025-06-14 RX ADMIN — THIAMINE HYDROCHLORIDE 500 MG: 100 INJECTION, SOLUTION INTRAMUSCULAR; INTRAVENOUS at 09:06

## 2025-06-14 RX ADMIN — MEROPENEM 1000 MG: 1 INJECTION, POWDER, FOR SOLUTION INTRAVENOUS at 09:10

## 2025-06-14 RX ADMIN — HYDROMORPHONE HYDROCHLORIDE 0.25 MG: 1 INJECTION, SOLUTION INTRAMUSCULAR; INTRAVENOUS; SUBCUTANEOUS at 22:06

## 2025-06-14 RX ADMIN — SCOPOLAMINE 1 PATCH: 1.5 PATCH, EXTENDED RELEASE TRANSDERMAL at 14:41

## 2025-06-14 RX ADMIN — MUPIROCIN 1 APPLICATION: 20 OINTMENT TOPICAL at 09:10

## 2025-06-14 RX ADMIN — Medication 1 APPLICATION: at 09:04

## 2025-06-14 RX ADMIN — AMIODARONE HYDROCHLORIDE 0.5 MG/MIN: 1.8 INJECTION, SOLUTION INTRAVENOUS at 01:04

## 2025-06-14 RX ADMIN — Medication 10 ML: at 12:25

## 2025-06-14 RX ADMIN — DEXTROSE AND SODIUM CHLORIDE 50 ML/HR: 5; 900 INJECTION, SOLUTION INTRAVENOUS at 09:07

## 2025-06-14 RX ADMIN — HYDROMORPHONE HYDROCHLORIDE 0.25 MG: 1 INJECTION, SOLUTION INTRAMUSCULAR; INTRAVENOUS; SUBCUTANEOUS at 18:51

## 2025-06-14 RX ADMIN — ONDANSETRON 4 MG: 2 INJECTION INTRAMUSCULAR; INTRAVENOUS at 09:07

## 2025-06-14 RX ADMIN — HYDROMORPHONE HYDROCHLORIDE 0.25 MG: 1 INJECTION, SOLUTION INTRAMUSCULAR; INTRAVENOUS; SUBCUTANEOUS at 16:59

## 2025-06-14 RX ADMIN — PANTOPRAZOLE SODIUM 40 MG: 40 INJECTION, POWDER, FOR SOLUTION INTRAVENOUS at 05:51

## 2025-06-14 RX ADMIN — THIAMINE HYDROCHLORIDE 500 MG: 100 INJECTION, SOLUTION INTRAMUSCULAR; INTRAVENOUS at 01:04

## 2025-06-14 RX ADMIN — ONDANSETRON 4 MG: 2 INJECTION INTRAMUSCULAR; INTRAVENOUS at 19:34

## 2025-06-14 NOTE — DISCHARGE PLACEMENT REQUEST
"Chey Robertson (89 y.o. Female)       Date of Birth   1935    Social Security Number       Address   [address] [address]    Home Phone   [phone]    MRN   [id number]       Adventist   Adventist    Marital Status                               Admission Date   6/12/2025    Admission Type   Emergency    Admitting Provider   Blank Bella MD    Attending Provider   Oswaldo Weber MD    Department, Room/Bed   [facility] 2A ICU, N204/1       Discharge Date       Discharge Disposition       Discharge Destination                                 Attending Provider: Oswaldo Weber MD    Allergies: Aspirin    Isolation: Contact   Infection: Candida Auris (rule out) (06/13/25)   Code Status: No CPR    Ht: 160 cm (63\")   Wt: 53.3 kg (117 lb 8.1 oz)    Admission Cmt: None   Principal Problem: DISHA (acute kidney injury) [N17.9]                   Active Insurance as of 6/12/2025       Primary Coverage       Payor Plan Insurance Group Employer/Plan Group    ANTHEM MEDICARE REPLACEMENT [facility] MEDICARE ADVANTAGE [facility] [id number]       Payor Plan Address Payor Plan Phone Number Payor Plan Fax Number Effective Dates    [address] [phone]  1/1/2025 - None Entered    [facility] [phone]         Subscriber Name Subscriber Birth Date Member ID       CHEY ROBERTSON 1935 [id number]                     Emergency Contacts        (Rel.) Home Phone Work Phone Mobile Phone    Joe Robertson (Son) -- -- [phone]    Albin Robertson (Son) [phone] -- --    DORIS ROBERTSON (Relative) -- -- [phone]              Emergency Contact Information       Name Relation Home Work Mobile    AilynJoe Son   [phone]    Albin Robertson Son [phone]      DORIS ROBERTSON Relative   [phone]          Other Contacts    None on File       Insurance Information                  ANTH MEDICARE REPLACEMENT/ANTHEM MEDICARE ADVANTAGE [employer] Phone: [phone]    " Subscriber: Chey Robertson Subscriber#: [id number]    Group#: [id number] Precert#: [id number]    Authorization#: -- Effective Date: --             History & Physical        Blank Bella MD at 06/12/25 1913            INTENSIVIST   PROGRESS NOTE          SUBJECTIVE     Chey 89 y.o. female is followed for:Abnormal Lab       DISHA (acute kidney injury)    Pancytopenia    Hypothyroidism (acquired)    Myelodysplasia (myelodysplastic syndrome)    Personal history of pulmonary embolism    Chronic anticoagulation    Essential hypertension    Type 2 diabetes mellitus without complication, without long-term current use of insulin    Atrial fibrillation    Periprosthetic fracture around prosthetic knee    Femur fracture    As an Intensivist, we provide an integrated approach to the ICU patient and family, medical management of comorbid conditions, including but not limited to electrolytes, glycemic control, organ dysfunction, lead interdisciplinary rounds and coordinate the care with all other services, including those from other specialists.     HPI:  Chey is a 89 y.o. female with PMH A-fib on Eliquis, hypertension, type 2 diabetes, hypothyroidism, MDS with pancytopenia followed by Dr. Lyman, history PE, status post left knee replacement who presented to this Hospital on 6/12/2025 because of generalized weakness.  Of note patient was recently admitted to [facility] from 5/17 to 5/29 post ground-level fall at skilled nursing facility with subsequent left supracondylar fracture of the distal femur, status postrepair 5/20.  Hospital course was also complicated by Klebsiella UTI which was treated with Rocephin for 5 days.  Today patient presents to [facility] and found to have acute renal failure with BUN 72, creatinine 3.24, potassium 6.8, bicarb 17.  ABG 7.3 7/27/1964 on room air.  She was given 1 g calcium, 10 units insulin/dextrose, 50 mill equivalents sodium bicarb, 1 L LR, Lokelma, albuterol  nebulizer.  Patient was also noted to be in A-fib with RVR and started on Amio drip.  Patient subsequently admitted to the ICU for further care.    Patient alert but unable to elicit much further history.  Pleasantly confused.  States she has not been feeling well the past couple of days. Can't really describe exact symptoms. Denies chest pain, dyspnea, cough, abd pain, N/V, diarrhea. States she wants to go to sleep.  Patient son is at bedside.  States she has been nauseous for the past couple days per report from the rehab.     PMH: She  has a past medical history of A-fib, Anemia, Arthritis, Diabetes mellitus, Disease of thyroid gland, History of transfusion, [place] (hard of hearing), Hypertension, Migraine without aura and without status migrainosus, not intractable (12/30/2016), Myelodysplastic syndrome, Pulmonary emboli (2011), SOBOE (shortness of breath on exertion), Tremors of nervous system, Vertigo, Wears dentures, and Wears glasses.   PSxH: She  has a past surgical history that includes Hysterectomy; Tonsillectomy; Bladder surgery; Cataract extraction; Cholecystectomy; Colonoscopy; Kyphoplasty (N/A, 02/12/2021); and Hip Trochanteric Nailing (Right, 6/20/2023).     Medications:  No current facility-administered medications on file prior to encounter.     Current Outpatient Medications on File Prior to Encounter   Medication Sig    acetaminophen (TYLENOL) 500 MG tablet Take 1 tablet by mouth Every 6 (Six) Hours As Needed for Mild Pain or Moderate Pain.    amiodarone (PACERONE) 200 MG tablet Take 1 tablet by mouth Daily.    amLODIPine (NORVASC) 10 MG tablet Take 1 tablet by mouth Daily for 30 days.    ascorbic acid (VITAMIN C) 500 MG tablet Take 1 tablet by mouth Daily.    castor oil-balsam peru (VENELEX) ointment Apply 1 Application topically to the appropriate area as directed Every 12 (Twelve) Hours.    Eliquis 2.5 MG tablet tablet TAKE 1 TABLET BY MOUTH TWICE DAILY    furosemide (LASIX) 40 MG tablet Take 1  tablet by mouth Daily As Needed (soa/ weight gain > 3lb).    Lactobacillus (Acidophilus Extra Strength) capsule Take 1 capsule by mouth Daily.    levothyroxine (SYNTHROID, LEVOTHROID) 100 MCG tablet Take 1 tablet by mouth Every Morning.    lisinopril (PRINIVIL,ZESTRIL) 10 MG tablet Take 1 tablet by mouth Daily for 30 days.    melatonin 5 MG tablet tablet Take 1 tablet by mouth At Night As Needed (insomnia).    metFORMIN (GLUCOPHAGE) 500 MG tablet Take 1 tablet by mouth 2 (Two) Times a Day With Meals.    metoprolol tartrate (LOPRESSOR) 50 MG tablet Take 0.5 tablets by mouth 2 (Two) Times a Day.    naloxone (NARCAN) 4 MG/0.1ML nasal spray Call 911. Don't prime. [place] in 1 nostril for overdose. Repeat in 2-3 minutes in other nostril if no or minimal breathing/responsiveness.    nystatin (MYCOSTATIN) 230075 UNIT/GM ointment Apply 1 Application topically to the appropriate area as directed 2 (Two) Times a Day.    ondansetron ODT (ZOFRAN-ODT) 4 MG disintegrating tablet DISSOLVE 1 TABLET ON THE TONGUE 2 TO 3 TIMES PER DAY AS NEEDED FOR NAUSEA OR VOMITING    pantoprazole (PROTONIX) 20 MG EC tablet Take 1 tablet by mouth Daily.    polyethylene glycol (MIRALAX) 17 g packet Take 17 g by mouth Daily As Needed (constipation).    zinc sulfate (ZINCATE) 220 (50 Zn) MG capsule         Allergies: She is allergic to aspirin.   FH: Her family history includes Breast cancer (age of onset: 84) in her sister; Heart attack in her father; Stroke in her mother.   SH: She  reports that she has never smoked. She has never been exposed to tobacco smoke. She has never used smokeless tobacco. She reports that she does not drink alcohol and does not use drugs.     The patient's relevant past medical, surgical and social history were reviewed and updated in Epic as appropriate.                 Review of Systems  As described in the HPI.       OBJECTIVE     Vitals:  Temp: 98.2 °F (36.8 °C) (06/12/25 1616) Temp  Min: 98.2 °F (36.8 °C)  Max: 98.2 °F  (36.8 °C)   Temp core:      BP: (!) 84/47 (25) BP  Min: 84/47  Max: 130/54   MAP (non-invasive) Noninvasive MAP (mmHg): 59 (25) Noninvasive MAP (mmHg)  Av.8  Min: 59  Max: 141   Pulse: (!) 124 (25) Pulse  Min: 51  Max: 141   Resp: 16 (25) Resp  Min: 14  Max: 16   SpO2: 95 % (25) SpO2  Min: 95 %  Max: 100 %   Device: room air (25)    Flow Rate:   No data recorded         25   Weight: 58.5 kg (129 lb) 50.8 kg (112 lb)        Intake/Ouptut 24 hrs (7:00AM - 6:59 AM)  Intake & Output (last 2 days)       None            Medications (drips):  amiodarone   Followed by  [START ON 2025] amiodarone  phenylephrine, Last Rate: 0.5 mcg/kg/min (25)  sodium bicarbonate 150 mEq in D5W         Physical Examination  Telemetry:  Rhythm: atrial rhythm (25)  Atrial Rhythm: atrial fibrillation (25)      Constitutional:  No acute distress.   Cardiovascular: IRR.    Respiratory: Normal breath sounds  No adventitious sounds   Abdominal:  Soft with no tenderness.   Extremities: No Edema, well-healing incision left lower extremity   Neurological:   Alert, pleasantly confused  Best Eye Response: 4-->(E4) spontaneous (25)  Best Motor Response: 6-->(M6) obeys commands (25)  Best Verbal Response: 4-->(V4) confused (25)  [place] Coma Scale Score: 14 (25)          Lines, Drains & Airways       Active LDAs       Name Placement date Placement time Site Days    Peripheral IV 25 20 G Left Antecubital 25  Antecubital  less than 1    Peripheral IV 25 18 G Anterior;Right Forearm 25  Forearm  less than 1    Peripheral IV 25 20 G Anterior;Left Forearm 25  Forearm  less than 1    Urethral Catheter Silicone;Straight-tip 16 Fr. 25  -- less than 1                    Results  Reviewed:  Laboratory  Microbiology  Radiology  Pathology    Hematology:  Results from last 7 days   Lab Units 06/12/25  1625   WBC 10*3/mm3 3.14*   HEMOGLOBIN g/dL 8.1*   MCV fL 87.4   PLATELETS 10*3/mm3 136*     Results from last 7 days   Lab Units 06/12/25  1625   NEUTROS ABS 10*3/mm3 2.34   LYMPHS ABS 10*3/mm3 0.49*   EOS ABS 10*3/mm3 0.00     Chemistry:  Estimated Creatinine Clearance: 9.6 mL/min (A) (by C-G formula based on SCr of 3.24 mg/dL (H)).    Results from last 7 days   Lab Units 06/12/25  1625   SODIUM mmol/L 132*   POTASSIUM mmol/L 6.8*   CHLORIDE mmol/L 98   CO2 mmol/L 17.0*   BUN mg/dL 72.1*   CREATININE mg/dL 3.24*   GLUCOSE mg/dL 123*     Results from last 7 days   Lab Units 06/12/25  1625   CALCIUM mg/dL 8.9       Hepatic Panel:  Results from last 7 days   Lab Units 06/12/25  1625   ALBUMIN g/dL 3.3*   TOTAL PROTEIN g/dL 6.0   BILIRUBIN mg/dL 0.3   AST (SGOT) U/L 13   ALT (SGPT) U/L 9   ALK PHOS U/L 117        Coagulation Labs:         Cardiac Labs:        Biomarkers:        U/A              COVID-19  Lab Results   Component Value Date    COVID19 Not Detected 07/27/2024    COVID19 Presumptive Negative 05/18/2023    COVID19 Detected (C) 05/08/2023    COVID19 Not Detected 05/05/2023       Arterial Blood Gases:  Results from last 7 days   Lab Units 06/12/25  1725   PH, ARTERIAL pH units 7.374   PCO2, ARTERIAL mm Hg 27.4*   PO2 ART mm Hg 74.9*   FIO2 % 21       Images:  No radiology results for the last day    Echo:  Results for orders placed during the hospital encounter of 06/05/23    Adult Transthoracic Echo Complete w/ Color, Spectral and Contrast if necessary per protocol    Interpretation Summary    Left ventricular systolic function is normal. Left ventricular ejection fraction appears to be 56 - 60%.    Left ventricular diastolic function was normal.    Estimated right ventricular systolic pressure from tricuspid regurgitation is normal (<35 mmHg).      Results: Reviewed.  I reviewed the  patient's new laboratory and imaging results.  I independently reviewed the patient's new images.    Medications: Reviewed.    Assessment   A/P     Hospital:  LOS: 0 days   ICU: Patient does not have an ICU stay during this admission.     Active Hospital Problems    Diagnosis  POA    **DISHA (acute kidney injury) [N17.9]  Yes    Femur fracture [S72.90XA]  Yes    Periprosthetic fracture around prosthetic knee [M97.8XXA, Z96.659]  Not Applicable    Atrial fibrillation [I48.91]  Yes    Chronic anticoagulation [Z79.01]  Not Applicable    Essential hypertension [I10]  Yes    Type 2 diabetes mellitus without complication, without long-term current use of insulin [E11.9]  Yes    Myelodysplasia (myelodysplastic syndrome) [D46.9]  Yes    Personal history of pulmonary embolism [Z86.711]  Yes    Pancytopenia [D61.818]  Yes    Hypothyroidism (acquired) [E03.9]  Yes     Chey is a 89 y.o. female admitted on 6/12/2025 with DISHA (acute kidney injury) [N17.9]    Assessment/Management/Treatment Plan:    Acute renal failure with hyperkalemia  Complicated UTI in setting of recent Klebsiella UTI  A-fib with RVR in setting of known A-fib on home Eliquis  PMH: hypertension, type 2 diabetes, hypothyroidism, MDS with pancytopenia followed by Dr. Lyman, history PE, recent left supracondylar fracture distal femur status post repair 5/20 in setting of previous left knee replacement    Pulmonary   Room air. No respiratory symptoms.   Cardiovascular  Afib with RVR in setting of known afib. Start amio gtt. Of note patient is on home PO amio. [place] for MAP goal >65. LA pending.   Initial EKG on arrival, without ST changes and rate 59. Will attempt to capture afib on EKG. Trop 39, repeat pending. No chest pain or dyspnea.   Neuro  No acute issues   GI/Hepatology  NPO, ADAT  Renal  On admission creatinine 3.34 and potassium 6.8.  5/26 creatinine 0.73.  Status post hyperkalemia protocol.  Bicarb drip at 100/hr. Monitor urine output closely, elena in  place.  Repeat BMP to ensure improvement. Stat CTAP without hydronephrosis or stones, concern for emphysematous cystitis.  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  Will empirically start meropenem plus vancomycin.  Follow urine and blood cultures.  MRSA nares pending.  Hematology  Home eliquis. No signs or symptoms of bleeding.   Endocrine  Accuchecks q6hr. Glargine 10u qhs + SSI.   Home Synthroid    Lab Results   Lab Value Date/Time    HGBA1C 6.20 (H) 05/17/2025 2008    HGBA1C 4.90 10/19/2024 1636     Results from last 7 days   Lab Units 06/12/25  1813 06/12/25  1657   GLUCOSE mg/dL 207* 140*       Diet: NPO Diet NPO Type: Strict NPO  No active supplement orders      Advance Directives: Code Status and Medical Interventions: CPR (Attempt to Resuscitate); Full Support   Ordered at: 06/12/25 1917     Code Status (Patient has no pulse and is not breathing):    CPR (Attempt to Resuscitate)     Medical Interventions (Patient has pulse or is breathing):    Full Support        VTE Prophylaxis:  Pharmacologic VTE prophylaxis orders are present.         Disposition: Admit to ICU    Plan of care and goals reviewed during interdisciplinary rounds.  I discussed the patient's findings and my recommendations with patient and nursing staff and son    Time: 40 minutes of critical care provided. This time excludes other billable procedures. Time does include preparation of documents, medical consultations, review of old records, and direct bedside care. Patient is at high risk for life-threatening deterioration due to Acute .    She has a high risk of imminent or life-threatening  deterioration, which requires the highest level of physician preparedness to intervene urgently. I devoted my full attention to the direct care of this patient for the amount of time indicated above.     Time spent with family or surrogate(s) is included only if the patient was incapable of providing the  necessary information or participating in medical decision making.        Blank Bella MD  Pulmonary Critical Care Medicine     Electronically signed by Blank Bella MD at 06/12/25 7687

## 2025-06-14 NOTE — LETTER
"    EMS Transport Request  For use at [facility], [place], [place], [place], and [place] only   Patient Name: Chey Robertson : 1935   Weight:  Pick-up Location: N542-1 BLS/ALS: {BLS/ALS:07729}   Insurance: [facility] Auth End Date: ***   Pre-Cert #: D/C Summary complete:    Destination: {Transport Locations:26352}   Contact Precautions: {Precautions:13323}   Equipment (O2, Fluids, etc.): {Equipment:50106}   Arrive By Date/Time: *** Stretcher/WC: {Stretcher/WC:58351}   CM Requesting: Luz Maria Mast RN Ext: ***   Notes/Medical Necessity: ***     ______________________________________________________________________    *Only 2 patient bags OR 1 carry-on size bag are permitted.  Wheelchairs and walkers CANNOT transported with the patient. Acknowledge: {Acknowledge:92880::\"Yes\"}    "

## 2025-06-14 NOTE — SIGNIFICANT NOTE
06/14/25 1204   SLP Deferred Reason   SLP Deferred Reason Patient unavailable for evaluation     RN rec'd deferring swallow assessment until tomorrow 2' pt lethargic today. Also awaiting more information regarding comfort measures being taken.

## 2025-06-14 NOTE — PROGRESS NOTES
"Palliative Care Daily Progress Note     Referring: Oswaldo Weber    C/C: hurt all over, + nausea    S: Medical record reviewed. Events noted.  Intensivist able to speak with son late yesterday.  Reports intention to focus on comfort care with f/u labs in AM.  Updated code status for no escalation but not yet comfort care.  DIL and grandson bedside.  Though not decision maker, reports all still in agreement.      ROS: Pt c/o all over pain and nausea though did not qualify or quantify.      O: Code Status:   Code Status and Medical Interventions: No CPR (Do Not Attempt to Resuscitate); Limited Support; No intubation (DNI), No dialysis, No blood products, No vasopressors, No cardioversion, No artificial nutrition   Ordered at: 06/13/25 3139     Code Status (Patient has no pulse and is not breathing):    No CPR (Do Not Attempt to Resuscitate)     Medical Interventions (Patient has pulse or is breathing):    Limited Support     Medical Intervention Limits:    No intubation (DNI)       No dialysis       No blood products       No vasopressors       No cardioversion       No artificial nutrition     Level Of Support Discussed With:    Next of Kin (If No Surrogate)      Advanced Directives: Advance Directive Status: Patient does not have advance directive   Goals of Care: Ongoing.   Palliative Performance Scale Score:   20    BP 94/45   Pulse 89   Temp 97.5 °F (36.4 °C) (Axillary)   Resp 16   Ht 160 cm (63\")   Wt 53.3 kg (117 lb 8.1 oz)   LMP  (LMP Unknown) Comment: mammogram is up to date  SpO2 97%   BMI 20.82 kg/m²     Intake/Output Summary (Last 24 hours) at 6/14/2025 1228  Last data filed at 6/14/2025 0910  Gross per 24 hour   Intake 2410.8 ml   Output 400 ml   Net 2010.8 ml       Physical Exam:    General Appearance:    Awakens easily   HEENT:    EOMI, anicteric, MMM, face relaxed   Neck:   supple, trachea midline, no JVD   Lungs:     CTA bilat, diminished in bases; respirations regular, even     and unlabored "    Heart:    RRR, normal S1 and S2, no M/R/G   Abdomen:     Normal bowel sounds, soft, nontender, nondistended   G/U:   Deferred   MSK/Extremities:   No clubbing , cyanosis or edema, No wasting   Pulses:   Pulses palpable and equal bilaterally   Skin:   Warm, dry, no mottling   Neurologic:   A/Ox2, cooperative, moves extremities x 4 weakly, no tremor, nl     tone   Psych:   Calm       Current Medications:      Current Facility-Administered Medications:     [COMPLETED] amiodarone 150 mg in 100 mL D5W (loading dose), 150 mg, Intravenous, Once, Stopped at 25 **FOLLOWED BY** [] amiodarone 360 mg in 200 mL D5W infusion, 1 mg/min, Intravenous, Continuous, Stopped at 255 **FOLLOWED BY** amiodarone 360 mg in 200 mL D5W infusion, 0.5 mg/min, Intravenous, Continuous, Alejandro Burger MD, Last Rate: 16.67 mL/hr at 25 0104, 0.5 mg/min at 25 0104    apixaban (ELIQUIS) tablet 2.5 mg, 2.5 mg, Oral, Q12H, Alejandro Choudhary, CHANG    castor oil-balsam peru (VENELEX) ointment 1 Application, 1 Application, Topical, Q12H, Oswaldo Weber MD, 1 Application at 25 0904    dextrose (D50W) (25 g/50 mL) IV injection 25 g, 25 g, Intravenous, Q15 Min PRN, Oswaldo Weber MD    dextrose (GLUTOSE) oral gel 15 g, 15 g, Oral, Q15 Min PRN, Oswaldo Weber MD    dextrose 5 % and sodium chloride 0.9 % infusion, 50 mL/hr, Intravenous, Continuous, Oswaldo Weber MD, Last Rate: 50 mL/hr at 25 0907, 50 mL/hr at 25 0907    glucagon (GLUCAGEN) injection 1 mg, 1 mg, Intramuscular, Q15 Min PRN, Oswaldo Weber MD    HYDROmorphone (DILAUDID) injection 0.25 mg, 0.25 mg, Intravenous, Q2H PRN, Marian Cabello MD    insulin regular (humuLIN R,novoLIN R) injection 3-14 Units, 3-14 Units, Subcutaneous, Q6H, Oswaldo Weber MD    levothyroxine (SYNTHROID, LEVOTHROID) tablet 100 mcg, 100 mcg, Oral, Q AM, Alejandro Choudhary, APRAUDREY    melatonin tablet 5 mg, 5 mg, Oral, Nightly PRN, Alejandro Choudhary,  APRN    meropenem (MERREM) 1,000 mg in sodium chloride 0.9 % 100 mL MBP, 1,000 mg, Intravenous, Q12H, Horacio Alvarez, PharmD, 1,000 mg at 06/14/25 0910    mupirocin (BACTROBAN) 2 % nasal ointment 1 Application, 1 Application, Each Nare, BID, Alejandro Choudhary APRN, 1 Application at 06/14/25 0910    ondansetron (ZOFRAN) injection 4 mg, 4 mg, Intravenous, Q6H PRN, Oswaldo Weber MD, 4 mg at 06/14/25 0907    pantoprazole (PROTONIX) injection 40 mg, 40 mg, Intravenous, Q AM, Oswaldo Weber MD, 40 mg at 06/14/25 0551    phenylephrine (SUSIE-SYNEPHRINE) 50 mg in sodium chloride 0.9 % 250 mL infusion, 0.5-3 mcg/kg/min, Intravenous, Titrated, Alejandro Burger MD, Held at 06/14/25 0915    prochlorperazine (COMPAZINE) injection 5 mg, 5 mg, Intravenous, Q6H PRN, Charlotte Fan APRN, 5 mg at 06/14/25 1225    sennosides-docusate (PERICOLACE) 8.6-50 MG per tablet 2 tablet, 2 tablet, Oral, BID **AND** [DISCONTINUED] polyethylene glycol (MIRALAX) packet 17 g, 17 g, Oral, Daily PRN **AND** [DISCONTINUED] bisacodyl (DULCOLAX) EC tablet 5 mg, 5 mg, Oral, Daily PRN **AND** [DISCONTINUED] bisacodyl (DULCOLAX) suppository 10 mg, 10 mg, Rectal, Daily PRN, Alejandro Choudhary APRN    [COMPLETED] Insert Peripheral IV, , , Once **AND** sodium chloride 0.9 % flush 10 mL, 10 mL, Intravenous, PRN, Alejandro Burger MD    sodium chloride 0.9 % flush 10 mL, 10 mL, Intravenous, Q12H, Alejandro Choudhary APRN, 10 mL at 06/14/25 1225    sodium chloride 0.9 % flush 10 mL, 10 mL, Intravenous, PRN, Alejandro Choudhary APRN    sodium chloride 0.9 % infusion 40 mL, 40 mL, Intravenous, PRN, Alejandro Choudhary APRN    thiamine (B-1) 500 mg in sodium chloride 0.9 % 100 mL IVPB, 500 mg, Intravenous, Q8H, Alejandro Choudhary APRN, Last Rate: 200 mL/hr at 06/14/25 0906, 500 mg at 06/14/25 0906     Labs:   Results from last 7 days   Lab Units 06/14/25  0433   WBC 10*3/mm3 3.67   HEMOGLOBIN g/dL 7.4*   HEMATOCRIT % 23.4*   PLATELETS 10*3/mm3 127*      Results from last 7 days   Lab Units 06/14/25  0433 06/13/25  0801 06/13/25  0435   SODIUM mmol/L 138   < > 137   POTASSIUM mmol/L 4.5   < > 4.8   CHLORIDE mmol/L 100   < > 95*   CO2 mmol/L 25.0   < > 24.0   BUN mg/dL 53.4*   < > 64.8*   CREATININE mg/dL 2.93*   < > 3.25*   CALCIUM mg/dL 8.4*   < > 8.7   BILIRUBIN mg/dL  --   --  0.2   ALK PHOS U/L  --   --  90   ALT (SGPT) U/L  --   --  10   AST (SGOT) U/L  --   --  12   GLUCOSE mg/dL 121*   < > 292*    < > = values in this interval not displayed.     Imaging Results (Last 72 Hours)       Procedure Component Value Units Date/Time    XR Chest 1 View [[id number]] Collected: 06/13/25 0612     Updated: 06/13/25 0616    Narrative:      XR CHEST 1 VW    Date of Exam: 6/13/2025 6:00 AM EDT    Indication: line placeent    Comparison: 7/27/2024.    Findings:  There is a new left-sided central venous catheter, which terminates in the mid SVC. The patient's neck partially obscures the right lung apex. There is no visible pneumothorax. No pleural effusion or focal airspace consolidation. Lung volumes are low.   Limited kyphotic view. Bones are demineralized. No acute osseous abnormality. There is evidence of multilevel thoracolumbar kyphoplasty.      Impression:      Impression:  1.New left-sided central venous catheter terminates in the mid SVC. No visible pneumothorax.  2.Low lung volumes.          Electronically Signed: Hector Rosenberg MD    6/13/2025 6:13 AM EDT    Workstation ID: [id number]    CT Abdomen Pelvis Without Contrast [[id number]] Collected: 06/12/25 2008     Updated: 06/12/25 2018    Narrative:      CT ABDOMEN PELVIS WO CONTRAST    Date of Exam: 6/12/2025 7:51 PM EDT    Indication: acute renal failure, rule out hydro.    Comparison: 6/8/2023    Technique: Axial CT images were obtained of the abdomen and pelvis without the administration of contrast. Reconstructed coronal and sagittal images were also obtained. Automated exposure control and iterative  construction methods were used.    FINDINGS:    Lung bases: Atheromatous disease of the coronary vessels. Bibasilar atelectatic changes.    Liver:No masses. No intrahepatic biliary ductal dilatation.    Spleen:No masses. No perisplenic hematoma.    Pancreas:No pancreatic masses. No evidence of pancreatitis.    Gallbladder and common bile duct: Prior cholecystectomy    Adrenal glands: 2.9 cm left adrenal nodule    Kidneys and ureters:No kidney stones. No renal masses.No calculi present within the ureters. Normal caliber ureters.    Urinary bladder: Gas noted involving the left urinary bladder wall with locules of gas within the urinary bladder concerning for emphysematous cystitis. Hurd catheter within the urinary bladder.    Small bowel:Normal caliber small bowel.    Large bowel: Colonic diverticulosis without evidence of diverticulitis.    Appendix: Not seen however there is no evidence of appendicitis    GENITOURINARY: Prior hysterectomy    Ascites or pneumoperitoneum:None.    Adenopathy:None present    Osseous structures: The pubic bones are intact. Degenerative changes of the hips. Old healed right pubic bone fractures. Prior gamma nailing of a right hip fracture. The sacrum and sacroiliac joints are normal. Degenerative changes of the lumbar spine.   L4 vertebroplasty. Prior T11 and T12 vertebroplasties.    Other findings: None      Impression:      Examination limited without IV contrast. 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              Electronically Signed: Devon Mcgrath MD    6/12/2025 8:15 PM EDT    Workstation ID: [id number]              Lab 06/13/25  0435   HEMOGLOBIN A1C 5.70*         Diagnostics: Reviewed    A:     DISHA (acute kidney injury)    Pancytopenia    Hypothyroidism (acquired)    Myelodysplasia (myelodysplastic syndrome)    Personal history of pulmonary embolism    Chronic anticoagulation    Essential hypertension    Type 2 diabetes mellitus without complication, without long-term current use of  insulin    Atrial fibrillation    Periprosthetic fracture around prosthetic knee    Femur fracture       Impression:  DISHA - Cr 2.93 - worse  Pancytopenia  MDS  DM 2  Afib      Symptoms:   MS pain  Debility  Nausea    P:   Family awaiting further f/u with intensivist.  Do want her to have meds for discomfort and added low dose dilaudid in presence of DISHA.  Monitor use and needs.  If requiring prns will discuss referral to hospice in AM. Palliative Care Team will continue to follow patient.     Marian Cabello MD, 6/14/2025, 12:28 EDT

## 2025-06-14 NOTE — LETTER
"    EMS Transport Request  For use at [facility], [place], [place], [place], and [place] only   Patient Name: Chey Robertson : 1935   Weight:  Pick-up Location: N542-1 BLS/ALS: {BLS/ALS:14128}   Insurance: [facility] Auth End Date: ***   Pre-Cert #: D/C Summary complete:    Destination: {Transport Locations:11900}   Contact Precautions: {Precautions:19210}   Equipment (O2, Fluids, etc.): {Equipment:76535}   Arrive By Date/Time: *** Stretcher/WC: {Stretcher/WC:00860}   CM Requesting: Raissa Zamora Ext: ***   Notes/Medical Necessity: ***     ______________________________________________________________________    *Only 2 patient bags OR 1 carry-on size bag are permitted.  Wheelchairs and walkers CANNOT transported with the patient. Acknowledge: {Acknowledge:89642::\"Yes\"}    "

## 2025-06-14 NOTE — CONSULTS
Consult received and chart reviewed.  Hospice RN met with Joe and sunita Langley in patient room. Reviewed hospice plan of care and goals of care being comfort and that hospice would not continue IV fluids/antibiotics, tube feeding, drips for blood pressure or blood products. Explained that inpatient hospice was short term for symptom management.  Reviewed patient with Dr. Flores who approved for scatter bed hospice admission with diagnosis of acute renal failure and symptom management of pain and nausea.    Maryellen Raygoza RN

## 2025-06-14 NOTE — PLAN OF CARE
Goal Outcome Evaluation:  Plan of Care Reviewed With: patient, family        Progress: no change  Outcome Evaluation: Pt transferred to 542 after transitioning to comfort measures only.  Arrived with son, oriented to room & unit.  CVC & PIV in place, flushed with NS without difficulty.  PRN medications given as needed to promote comfort with signs of relief.

## 2025-06-14 NOTE — PROGRESS NOTES
INTENSIVIST   PROGRESS NOTE        SUBJECTIVE     Chey 89 y.o. female is followed for: Abnormal Lab       DISHA (acute kidney injury)    Pancytopenia    Hypothyroidism (acquired)    Myelodysplasia (myelodysplastic syndrome)    Personal history of pulmonary embolism    Chronic anticoagulation    Essential hypertension    Type 2 diabetes mellitus without complication, without long-term current use of insulin    Atrial fibrillation    Periprosthetic fracture around prosthetic knee    Femur fracture    As an Intensivist, we have completed an accredited Critical Care program and maintain Board Certification and dedicate most of our professional work to critical/intensive care. We serve as the  or member of an interprofessional team, coordinating and guiding healthcare professionals to provide specialized care for critically ill patients. We manage and resuscitate patients with, or at risk for, critical illness and organ failure and initiate interventions to prevent imminent deterioration. (2024 Consensus Statement from the Society of Critical Care Medicine Defining Intensivist Task Force. [facility] 2025. DOI: 10.1097/[facility].9350591291275496     Interval History:    She denies pain. Except for her mouth being dry.    No distress.    Urinary output decreasing.    Last Bowel Movement: 06/13/25 (06/14/25 0600)    Temp  Min: 96.9 °F (36.1 °C)  Max: 98.1 °F (36.7 °C)       History     Last Reviewed by Oswaldo Weber MD on 6/13/2025 at  7:48 AM    Sections Reviewed    Medical, Surgical, Family, Tobacco, Alcohol, Drug Use, Sexual Activity,   Social Documentation    Problem list reviewed by Oswaldo Weber MD on 6/13/2025 at  7:48 AM  Medicines reviewed by Oswaldo Weber MD on 6/13/2025 at  7:48 AM  Allergies reviewed by Oswaldo Weber MD on 6/13/2025 at  7:48 AM       The patient's relevant past medical, surgical and social history were reviewed and updated in Epic as appropriate.        OBJECTIVE     Physical  Examination    Vitals:  Temp: 96.9 °F (36.1 °C) (25 0400) Temp  Min: 96.9 °F (36.1 °C)  Max: 98.1 °F (36.7 °C)   Temp core:      BP: 98/51 (25 0745) BP  Min: 84/52  Max: 123/56   MAP (non-invasive) Noninvasive MAP (mmHg): 74 (25 0745) Noninvasive MAP (mmHg)  Av.8  Min: 51  Max: 141   Pulse: 84 (2545) Pulse  Min: 72  Max: 100   Resp: 18 (25) Resp  Min: 16  Max: 22   SpO2: 91 % (25) SpO2  Min: 87 %  Max: 100 %   Device: nasal cannula (25)    Flow Rate: 6 (25) Flow (L/min) (Oxygen Therapy)  Min: 2  Max: 6     Intake/Ouptut 24 hrs (7:00AM - 6:59 AM)  Intake & Output (last 2 days)          07 07 0701  06/15 0700    I.V. (mL/kg) 3284 (61.6) 2458.3 (46.1)     IV Piggyback 500 500     Total Intake(mL/kg) 3784 (71) 2958.3 (55.5)     Urine (mL/kg/hr) 100 565 (0.4)     Stool 0 0     Total Output 100 565     Net +3684 +2393.3            Stool Unmeasured Occurrence 1 x 1 x             Medications (drips):  amiodarone, Last Rate: 0.5 mg/min (25 0104)  dextrose 5 % and sodium chloride 0.9 %, Last Rate: 50 mL/hr (25 1632)  phenylephrine, Last Rate: 0.6 mcg/kg/min (25 2257)            25  1845 25   Weight: 50.8 kg (112 lb) 53.3 kg (117 lb 8.1 oz)        Telemetry:  Rhythm: sinus arrhythmia (25 0600)  Atrial Rhythm: atrial fibrillation (25 1000)      Constitutional:  No acute distress.   Cardiovascular: IRR.    Respiratory: Normal breath sounds  No adventitious sounds   Abdominal:  Soft with no tenderness.   Extremities: Trace Edema   Neurological:   Awake. Confused.  Best Eye Response: 3-->(E3) to speech (25 06)  Best Motor Response: 6-->(M6) obeys commands (25 06)  Best Verbal Response: 4-->(V4) confused (25 06)  [place] Coma Scale Score: 13 (25 06)       Results  Reviewed:  Laboratory  Microbiology  Radiology  Pathology    Hematology:  Results from last 7 days   Lab Units 06/14/25  0433 06/13/25  1552 06/13/25  0435   WBC 10*3/mm3 3.67 4.63 6.84   HEMOGLOBIN g/dL 7.4* 6.9* 7.4*   MCV fL 88.6 87.4 88.3   PLATELETS 10*3/mm3 127* 144 187     Results from last 7 days   Lab Units 06/14/25  0433 06/13/25  1552 06/12/25  1625   NEUTROS ABS 10*3/mm3 2.80 3.47 2.34   LYMPHS ABS 10*3/mm3 0.34* 0.43* 0.49*   EOS ABS 10*3/mm3 0.00 0.00 0.00     Chemistry:  Estimated Creatinine Clearance: 11.2 mL/min (A) (by C-G formula based on SCr of 2.93 mg/dL (H)).    Results from last 7 days   Lab Units 06/14/25 0433 06/13/25  1552   SODIUM mmol/L 138 140   POTASSIUM mmol/L 4.5 4.6   CHLORIDE mmol/L 100 98   CO2 mmol/L 25.0 29.0   BUN mg/dL 53.4* 57.5*   CREATININE mg/dL 2.93* 2.74*   GLUCOSE mg/dL 121* 90     Results from last 7 days   Lab Units 06/14/25 0433 06/13/25  1552 06/13/25  0801 06/13/25  0435   CALCIUM mg/dL 8.4* 8.5*   < > 8.7   MAGNESIUM mg/dL 2.5*  --   --  1.3*   PHOSPHORUS mg/dL 3.9  --   --  3.1    < > = values in this interval not displayed.     Hepatic Panel:  Results from last 7 days   Lab Units 06/13/25  0435 06/12/25  1625   ALBUMIN g/dL 3.0* 3.3*   TOTAL PROTEIN g/dL 4.8* 6.0   BILIRUBIN mg/dL 0.2 0.3   AST (SGOT) U/L 12 13   ALT (SGPT) U/L 10 9   ALK PHOS U/L 90 117     Cardiac Labs:  Results from last 7 days   Lab Units 06/13/25  0801 06/13/25  0435 06/12/25  2329   HSTROP T ng/L 43* 57* 50*     Biomarkers:    Results from last 7 days   Lab Units 06/14/25  0433 06/13/25  1552 06/13/25  0435 06/13/25  0147 06/12/25  2236   LACTATE mmol/L 2.6* 3.2* 7.4* 9.4* 9.7*   PROCALCITONIN ng/mL 0.24  --   --   --   --        U/A  Results from last 7 days   Lab Units 06/12/25 2059   COLOR UA  [place]*   CLARITY UA  Turbid*   PH, URINE  <=5.0   SPECIFIC GRAVITY, URINE  1.018   GLUCOSE UA  Negative   KETONES UA  Negative   BILIRUBIN UA  Small (1+)*   PROTEIN UA  100 mg/dL (2+)*   BLOOD  UA  Large (3+)*   LEUKOCYTES UA  Moderate (2+)*   NITRITE UA  Positive*   UROBILINOGEN UA  1.0 E.U./dL     Results from last 7 days   Lab Units 06/12/25 2059   RBC UA /HPF Too Numerous to Count*   WBC UA /HPF Too Numerous to Count*   BACTERIA UA /HPF 4+*   SQUAM EPITHEL UA /HPF 13-20*     COVID-19  Lab Results   Component Value Date    COVID19 Not Detected 07/27/2024    COVID19 Presumptive Negative 05/18/2023    COVID19 Detected (C) 05/08/2023    COVID19 Not Detected 05/05/2023       Arterial Blood Gases:  Results from last 7 days   Lab Units 06/12/25  1725   PH, ARTERIAL pH units 7.374   PCO2, ARTERIAL mm Hg 27.4*   PO2 ART mm Hg 74.9*   FIO2 % 21       Images:  XR Chest 1 View  Result Date: 6/13/2025  Impression: 1.New left-sided central venous catheter terminates in the mid SVC. No visible pneumothorax. 2.Low lung volumes. Electronically Signed: Hector Rosenberg MD  6/13/2025 6:13 AM EDT  Workstation ID: [id number]    CT Abdomen Pelvis Without Contrast  Result Date: 6/12/2025  Examination limited without IV contrast. 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 Electronically Signed: Devon Mcgrath MD  6/12/2025 8:15 PM EDT  Workstation ID: [id number]      Echo:  Results for orders placed during the hospital encounter of 06/05/23    Adult Transthoracic Echo Complete w/ Color, Spectral and Contrast if necessary per protocol    Interpretation Summary    Left ventricular systolic function is normal. Left ventricular ejection fraction appears to be 56 - 60%.    Left ventricular diastolic function was normal.    Estimated right ventricular systolic pressure from tricuspid regurgitation is normal (<35 mmHg).      Results: Reviewed.  I reviewed the patient's new laboratory and imaging results.  I independently reviewed the patient's new images.    Medications: Reviewed.    Assessment   A/P     Assessment/Management/Treatment Plan:    Hospital:  LOS: 2 days   ICU: 1d 12h     Active Hospital Problems    Diagnosis  POA    **DISHA (acute kidney  injury) [N17.9]  Yes    Femur fracture [S72.90XA]  Yes    Periprosthetic fracture around prosthetic knee [M97.8XXA, Z96.659]  Not Applicable    Atrial fibrillation [I48.91]  Yes    Chronic anticoagulation [Z79.01]  Not Applicable    Essential hypertension [I10]  Yes    Type 2 diabetes mellitus without complication, without long-term current use of insulin [E11.9]  Yes    Myelodysplasia (myelodysplastic syndrome) [D46.9]  Yes    Personal history of pulmonary embolism [Z86.711]  Yes    Pancytopenia [D61.818]  Yes    Hypothyroidism (acquired) [E03.9]  Yes     Chey is a 89 y.o. female admitted on 6/12/2025 with generalized weakness, and DISHA (acute kidney injury) [N17.9]    BUN 72, creatinine 3.24, potassium 6.8.    And she was also in A-fib with RVR and started on Amio drip.      Comorbidities:  A-fib on Eliquis, hypertension, type 2 diabetes, hypothyroidism, MDS with pancytopenia followed by Dr. Lyman, history PE, status post left knee replacement.    [facility] from 5/17 to 5/29 post ground-level fall at skilled nursing facility with subsequent left supracondylar fracture of the distal femur, status postrepair 5/20.  Hospital course was also complicated by Klebsiella UTI.     DISHA and hyperkalemia on admission  Urinary Output only 100 mL on the first day here. (06/12-06/13, about 12 h)  UTI  Cardiovascular  A Fib with RVR. Chronic anticoagulation. APIxaban.  HTN  Hematology  Myelodysplasia (She sees Dr. Lyman)  Endocrine   Body mass index is 20.82 kg/m². Normal: 18.5-24.9kg/m2  Hypothyroidism    Lab Results   Component Value Date    TSH 1.310 06/12/2025    FREET4 1.32 05/24/2020     Type 2 diabetes.    Lab Results   Lab Value Date/Time    HGBA1C 5.70 (H) 06/13/2025 0435    HGBA1C 6.20 (H) 05/17/2025 2008     Results from last 7 days   Lab Units 06/14/25  0535 06/14/25  0005 06/13/25  1632 06/13/25  1414 06/13/25  1322 06/13/25  1217 06/13/25  1123 06/13/25  1059   GLUCOSE mg/dL 140* 129 119 101 93 83 89 103       Diet: NPO  Diet NPO Type: Strict NPO   Advance Directives: Code Status and Medical Interventions: No CPR (Do Not Attempt to Resuscitate); Limited Support; No intubation (DNI), No dialysis, No blood products, No vasopressors, No cardioversion, No artificial nutrition   Ordered at: 06/13/25 1759     Code Status (Patient has no pulse and is not breathing):    No CPR (Do Not Attempt to Resuscitate)     Medical Interventions (Patient has pulse or is breathing):    Limited Support     Medical Intervention Limits:    No intubation (DNI)       No dialysis       No blood products       No vasopressors       No cardioversion       No artificial nutrition     Level Of Support Discussed With:    Next of Kin (If No Surrogate)        VTE Prophylaxis:  Pharmacologic VTE prophylaxis orders are present.         In brief:  Urine culture: negative so far.  Worsening renal function and urinary output.  Discussed with son  They want to proceed with Comfort Care Only and transition to Hospice Care.  Disposition: Transfer to Hospice  Hospice Team evaluating her and transfer to inpatient Hospice if appropriate.      Plan of care and goals reviewed during interdisciplinary rounds.  I discussed the patient's findings and my recommendations with patient, family, and nursing staff    MDM:    Problem(s) High due to: Acute or Chronic illness or injury that may poses a threat to life or bodily function  Data: Moderate due to: Review of prior external records from each unique source, Review or results of each unique test, and Ordering of each unique test    Moderate   [x] Primary Attending Intensive Care Medicine - Nutrition Support   [] Consultant    Copied text in this note has been reviewed and is accurate as of 06/14/25

## 2025-06-14 NOTE — PLAN OF CARE
Goal Outcome Evaluation:              Outcome Evaluation: .     No acute events. Patient confused most of shift but oriented x4 @ 0400.  H&H increasing. Lactate trending down. Plt decreased to 127. C/o nausea, compazine given x1. Patient on 6L nc throughout the night.

## 2025-06-15 LAB — BACTERIA SPEC AEROBE CULT: ABNORMAL

## 2025-06-15 PROCEDURE — 25010000002 HALOPERIDOL LACTATE PER 5 MG: Performed by: INTERNAL MEDICINE

## 2025-06-15 PROCEDURE — 25010000002 ONDANSETRON PER 1 MG: Performed by: INTERNAL MEDICINE

## 2025-06-15 PROCEDURE — 25010000002 MIDAZOLAM PER 1 MG: Performed by: INTERNAL MEDICINE

## 2025-06-15 PROCEDURE — 25010000002 METOCLOPRAMIDE PER 10 MG: Performed by: INTERNAL MEDICINE

## 2025-06-15 PROCEDURE — 25010000002 HYDROMORPHONE PER 4 MG: Performed by: INTERNAL MEDICINE

## 2025-06-15 RX ORDER — METOCLOPRAMIDE HYDROCHLORIDE 5 MG/ML
2.5 INJECTION INTRAMUSCULAR; INTRAVENOUS EVERY 6 HOURS SCHEDULED
Status: DISCONTINUED | OUTPATIENT
Start: 2025-06-15 | End: 2025-06-18

## 2025-06-15 RX ORDER — ONDANSETRON 2 MG/ML
4 INJECTION INTRAMUSCULAR; INTRAVENOUS EVERY 6 HOURS SCHEDULED
Status: DISCONTINUED | OUTPATIENT
Start: 2025-06-15 | End: 2025-06-15

## 2025-06-15 RX ORDER — HYDROMORPHONE HYDROCHLORIDE 1 MG/ML
0.25 INJECTION, SOLUTION INTRAMUSCULAR; INTRAVENOUS; SUBCUTANEOUS EVERY 6 HOURS SCHEDULED
Status: DISCONTINUED | OUTPATIENT
Start: 2025-06-15 | End: 2025-06-18

## 2025-06-15 RX ORDER — ONDANSETRON 2 MG/ML
4 INJECTION INTRAMUSCULAR; INTRAVENOUS EVERY 8 HOURS SCHEDULED
Status: DISCONTINUED | OUTPATIENT
Start: 2025-06-15 | End: 2025-06-18

## 2025-06-15 RX ADMIN — MIDAZOLAM HYDROCHLORIDE 0.5 MG: 1 INJECTION, SOLUTION INTRAMUSCULAR; INTRAVENOUS at 14:41

## 2025-06-15 RX ADMIN — METOCLOPRAMIDE 2.5 MG: 5 INJECTION, SOLUTION INTRAMUSCULAR; INTRAVENOUS at 13:16

## 2025-06-15 RX ADMIN — HYDROMORPHONE HYDROCHLORIDE 0.25 MG: 1 INJECTION, SOLUTION INTRAMUSCULAR; INTRAVENOUS; SUBCUTANEOUS at 13:17

## 2025-06-15 RX ADMIN — HYDROMORPHONE HYDROCHLORIDE 0.25 MG: 1 INJECTION, SOLUTION INTRAMUSCULAR; INTRAVENOUS; SUBCUTANEOUS at 06:20

## 2025-06-15 RX ADMIN — ONDANSETRON 4 MG: 2 INJECTION INTRAMUSCULAR; INTRAVENOUS at 04:42

## 2025-06-15 RX ADMIN — ONDANSETRON 4 MG: 2 INJECTION INTRAMUSCULAR; INTRAVENOUS at 22:42

## 2025-06-15 RX ADMIN — HYDROMORPHONE HYDROCHLORIDE 0.25 MG: 1 INJECTION, SOLUTION INTRAMUSCULAR; INTRAVENOUS; SUBCUTANEOUS at 17:50

## 2025-06-15 RX ADMIN — ONDANSETRON 4 MG: 2 INJECTION INTRAMUSCULAR; INTRAVENOUS at 13:17

## 2025-06-15 RX ADMIN — HALOPERIDOL LACTATE 0.5 MG: 5 INJECTION, SOLUTION INTRAMUSCULAR at 08:44

## 2025-06-15 RX ADMIN — METOCLOPRAMIDE 2.5 MG: 5 INJECTION, SOLUTION INTRAMUSCULAR; INTRAVENOUS at 17:50

## 2025-06-15 NOTE — PLAN OF CARE
Goal Outcome Evaluation:  Plan of Care Reviewed With: patient        Progress: improving  Outcome Evaluation: pt stable on room air, only complaint of abd pain and nausea during shift, PRN medications given, productive congested cough with tichk clear sputum, elena in place for comfort measures, skin very thin and tears easily, care ongoing, will continue to monitor

## 2025-06-15 NOTE — H&P
"Hospice H&P     Attending Provider: Joyce Flores, *    Code Status:   Code Status and Medical Interventions: No CPR (Do Not Attempt to Resuscitate); Comfort Measures   Ordered at: 06/14/25 1418     Code Status (Patient has no pulse and is not breathing):    No CPR (Do Not Attempt to Resuscitate)     Medical Interventions (Patient has pulse or is breathing):    Comfort Measures     Level Of Support Discussed With:    Health Care Surrogate        Subjective   HPI:   Chey Robertson is a 89 y.o. female LTC resident admitted with acute renal failure. Her medical history is notable for MDS with pancytopenia, DM2, HTN, afib, and recent hospitalization for a fall resulting in distal femur fracture requiring surgical repair and Klebsiella UTI. She has since been hospitalized again from her rehab facility with acute renal failure. Her course has been further c/b afib with RVR, hyperkalemia, hyperglycemia requiring insulin gtt, hypotension requiring vasopressors, complicated UTI, and progressive anuric renal failure. Goals of care conversations have resulted in the decision to transition to a comfort plan of care instead of pursuing hemodialysis. She is being admitted to inpatient hospice for symptom management of worsening symptom burden of nausea/vomiting and pain.   Since admission yesterday she has received the following PRN medications:  -hydromorphone 0.25mg IV x 4  -ondansetron 4mg IV x 2  -scopolamine patch placed 6/14    No urine output today, yesterday was 360mL    This morning when hospice RN visited patient she was actively vomiting and it was not able to use another dose of ondansetron yet, so patient received a dose of haldol 0.5mg IV x 1.     Visit finds 3 family members at the bedside including son, daughter, and grandson. She says she's \"miserable.\"  She says the haldol hasn't helped her. She has been tolerating some sips of soda without vomiting but is almost constantly spitting up. Family says she " keeps moaning.    ROS:   Negative except as above in HPI.       Past Medical History:   Diagnosis Date    A-fib     on eliquis    Anemia     Arthritis     Diabetes mellitus     checks sugar only when pt wants to     Disease of thyroid gland     History of transfusion     with knee surgery-  no reaction recalled.     [place] (hard of hearing)     no hearing aids    Hypertension     Migraine without aura and without status migrainosus, not intractable 12/30/2016    Myelodysplastic syndrome     Pulmonary emboli 2011    (after knee surgery)    SOBOE (shortness of breath on exertion)     Tremors of nervous system     Vertigo     Wears dentures     upper     Wears glasses      Past Surgical History:   Procedure Laterality Date    BLADDER SURGERY      CATARACT EXTRACTION      bilat     CHOLECYSTECTOMY      COLONOSCOPY      HIP TROCHANTERIC NAILING WITH INTRAMEDULLARY HIP SCREW Right 6/20/2023    Procedure: HIP TROCHANTERIC NAILING WITH INTRAMEDULLARY HIP SCREW RIGHT;  Surgeon: Augie Hobbs MD;  Location:  [facility];  Service: Orthopedics;  Laterality: Right;    HYSTERECTOMY      with bso    KYPHOPLASTY N/A 02/12/2021    Procedure: KYPHOPLASTY T11, T12 AND L4;  Surgeon: Matty Hearn MD;  Location:  [facility];  Service: Orthopedic Spine;  Laterality: N/A;    TONSILLECTOMY      TOTAL KNEE ARTHROPLASTY REVISION Left 5/20/2025    Procedure: DISTAL FEMUR REPLACEMENT;  Surgeon: Albin Mcclain MD;  Location:  [facility];  Service: Orthopedics;  Laterality: Left;     Social History     Socioeconomic History    Marital status:    Tobacco Use    Smoking status: Never     Passive exposure: Never    Smokeless tobacco: Never   Vaping Use    Vaping status: Never Used   Substance and Sexual Activity    Alcohol use: No    Drug use: No    Sexual activity: Defer     Family History   Problem Relation Age of Onset    Breast cancer Sister 84    Stroke Mother     Heart attack Father     Ovarian cancer Neg Hx        Allergies   Allergen  Reactions    Aspirin Unknown - Low Severity     Mouth sores        Current Facility-Administered Medications   Medication Dose Route Frequency Provider Last Rate Last Admin    acetaminophen (TYLENOL) suppository 650 mg  650 mg Rectal Q4H PRN Joyce Flores, DO        bisacodyl (DULCOLAX) suppository 10 mg  10 mg Rectal Daily PRN Joyce Flores, DO        camphor-menthol (SARNA) 0.5-0.5 % lotion   Topical 4x Daily PRN Joyce Flores, DO        glycopyrrolate (ROBINUL) injection 0.4 mg  0.4 mg Intravenous Q2H PRN Joyce Flores, DO        haloperidol lactate (HALDOL) injection 0.5 mg  0.5 mg Intravenous Q4H PRN Joyce Flores, DO   0.5 mg at 06/15/25 0844    haloperidol lactate (HALDOL) injection 1 mg  1 mg Intravenous Q4H PRN Joyce Flores, DO        HYDROmorphone (DILAUDID) injection 0.25 mg  0.25 mg Intravenous Q1H PRN Joyce Flores, DO   0.25 mg at 06/15/25 0620    ipratropium-albuterol (DUO-NEB) nebulizer solution 3 mL  3 mL Nebulization Q4H PRN Joyce Flores, DO        midazolam (VERSED) injection 0.5 mg  0.5 mg Intravenous Q2H PRN Joyce Flores, DO        ondansetron (ZOFRAN) injection 4 mg  4 mg Intravenous Q6H PRN Joyce Flores, DO   4 mg at 06/15/25 0442    ondansetron (ZOFRAN) injection 4 mg  4 mg Intravenous Q6H Joyce Flores, DO        palliative care oral rinse   Mouth/Throat PRN Joyce Flores, DO        Polyvinyl Alcohol-Povidone PF (ARTIFICIAL TEARS) 1.4-0.6 % ophthalmic solution 1 drop  1 drop Both Eyes Q30 Min PRN Joyce Flores, DO        scopolamine patch 1 mg/72 hr  1 patch Transdermal Q72H PRN Joyce Flores, DO   1 patch at 06/14/25 1441          acetaminophen    bisacodyl    camphor-menthol    glycopyrrolate    haloperidol lactate    haloperidol lactate    HYDROmorphone    ipratropium-albuterol    midazolam    ondansetron    palliative care oral rinse    Polyvinyl  Alcohol-Povidone PF    Scopolamine    Current medication reviewed for route, type, dose and frequency and are current per MAR at time of dictation.    Objective     BP 90/40   Pulse 67   LMP  (LMP Unknown) Comment: mammogram is up to date  SpO2 92%     Intake/Output Summary (Last 24 hours) at 6/15/2025 1051  Last data filed at 6/15/2025 1012  Gross per 24 hour   Intake 100 ml   Output 300 ml   Net -200 ml       PPS: 20%  Physical Examination:   Gen: NAD, acute on chronically ill appearing elderly female resting in bed, moans almost constantly throughout my visit, lethargic, family at bedside  HEENT: NC/A, oral mucosa dry, eomi, anicteric, spits into a napkin that's on her chest intermittently  CV: tachycardic  Resp: respirations even and unlabored  GI: abdomen soft, non-distended, non-tender  Skin: WDI, extremities warm to touch, no cyanosis or mottling   MSK/Ext: diffuse muscle wasting  Neuro: arouses to my visit initially and answers some questions appropriately initially but is lethargic and goes back to sleep after a few questions, no myoclonus  Psych: calm    Labs:   Results from last 7 days   Lab Units 06/14/25  0433   WBC 10*3/mm3 3.67   HEMOGLOBIN g/dL 7.4*   HEMATOCRIT % 23.4*   PLATELETS 10*3/mm3 127*     Results from last 7 days   Lab Units 06/14/25  0433   SODIUM mmol/L 138   POTASSIUM mmol/L 4.5   CHLORIDE mmol/L 100   CO2 mmol/L 25.0   BUN mg/dL 53.4*   CREATININE mg/dL 2.93*   GLUCOSE mg/dL 121*   CALCIUM mg/dL 8.4*     Results from last 7 days   Lab Units 06/14/25  0433 06/13/25  0801 06/13/25  0435   SODIUM mmol/L 138   < > 137   POTASSIUM mmol/L 4.5   < > 4.8   CHLORIDE mmol/L 100   < > 95*   CO2 mmol/L 25.0   < > 24.0   BUN mg/dL 53.4*   < > 64.8*   CREATININE mg/dL 2.93*   < > 3.25*   CALCIUM mg/dL 8.4*   < > 8.7   BILIRUBIN mg/dL  --   --  0.2   ALK PHOS U/L  --   --  90   ALT (SGPT) U/L  --   --  10   AST (SGOT) U/L  --   --  12   GLUCOSE mg/dL 121*   < > 292*    < > = values in this  interval not displayed.     Imaging Results (Last 72 Hours)       ** No results found for the last 72 hours. **            Diagnostics/Clinical Data: Reviewed    Assessment & Plan      Acute renal failure       89 yo female admitted for management of severe complications of acute renal failure. She requires use of IV medications to manage complications, along with monitoring for side effects/toxicity of treatment.      Nausea / vomiting  -schedule ondansetron 4mg IV q8h ATC  -schedule metoclopramide 2.5mg IV q6h ATC to address gastroparesis component typical in this scenario, renally dosed (last eGFR 14.9)  -dc haldol (not effective and interaction with metoclopramide)  -scopolamine patch was placed yesterday, will consider discontinuing in the future if not effective  -advance diet as tolerated, encouraged very small amounts frequently throughout the day of liquids/full liquids  Generalized MSK Pain   -schedule hydromorphone 0.25mg IV q6h ATC  -hydromorphone 0.25mg IV q1h PRN  -turn q2h and PRN for comfort  Anxiety / Restlessness   -midzolam 0.5mg IV q2h PRN  -haldol 1mg IV q4h PRN  4. Upper Airway Secretions / Respiratory Congestion   -glycopyrrolate 0.4mg IV q4h PRN  -scopolamine patch TD q72h PRN - placed 6/14  -keep HOB elevated  -avoid additional IVF  5. Risk for OIC  -unable to take oral bowel regimen due to nausea/vomiting  -Bisacodyl suppository daily PRN  6. GOC  -comfort  -DNR/DNI  -Dispo: return to [facility] with hospice services when symptoms are managed.     Care was coordinated with hospice IDT and [facility] bedside RN. Met with patient and her family at the bedside and reviewed the plan of care with them, to which they are agreeable. Encouraged use of RN call button for signs of discomfort. Questions were answered to their satisfaction.     She meets criteria for [facility] level of care for management of uncontrolled symptoms that requires titration of medications, frequent nursing assessment, and  frequent intervention with ATC and PRN IV medications to achieve comfort.        Joyce Flores,   6/15/2025

## 2025-06-15 NOTE — PROGRESS NOTES
Continued Stay Note  [facility]     Patient Name: Chey Robertson  MRN: [id number]  Today's Date: 6/15/2025    Admit Date: 6/14/2025    Plan: Inpatient hospice   Discharge Plan       Row Name 06/15/25 0830       Plan    Plan Inpatient hospice    Plan Comments 20% PPS. Patient sitting up in bed. She is actively retching and spitting up thick phlegm in an emesis bag. She denies pain, stating it's just the nausea that is so bad right now. Nurse to give prn Haldol for nausea. Patient has required 3 prns of Zofran 4mg and 4 prns of Dilaudid 0.25mg in the past 24 hours. She continues to require inpatient hospice for skilled nursing assessment, medication titration, and injectable medication administration for palliation of pain and nausea. Called sonJoe with an update. Left a  with callback number.                    Discharge Codes    No documentation.                       Lona Villanueva RN

## 2025-06-15 NOTE — DISCHARGE SUMMARY
DISCHARGE SUMMARY       Patient name: Chey Robertson  CSN: [id number]  MRN: [id number]  : 1935    Date of Admission: 2025  Date of Discharge: 2025  2:05 PM     Admitting Physician:   Blank Bella MD   Primary Care Provider:  Yasmeen Loomis MD  Consultations:   Charlotte Fan APRN Palliative Care     Admission Diagnosis: DISHA      Discharge diagnosis/problems:     Active Hospital Problems    Diagnosis  POA    **DISHA (acute kidney injury) [N17.9]  Yes    Femur fracture [S72.90XA]  Yes    Periprosthetic fracture around prosthetic knee [M97.8XXA, Z96.659]  Not Applicable    Atrial fibrillation [I48.91]  Yes    Chronic anticoagulation [Z79.01]  Not Applicable    Essential hypertension [I10]  Yes    Type 2 diabetes mellitus without complication, without long-term current use of insulin [E11.9]  Yes    Myelodysplasia (myelodysplastic syndrome) [D46.9]  Yes    Personal history of pulmonary embolism [Z86.711]  Yes    Pancytopenia [D61.818]  Yes    Hypothyroidism (acquired) [E03.9]  Yes      Resolved Hospital Problems   No resolved problems to display.      Procedures:  25: Central Line Insertion     Imaging:  XR Chest 1 View  Result Date: 2025  Impression: 1.New left-sided central venous catheter terminates in the mid SVC. No visible pneumothorax. 2.Low lung volumes. Electronically Signed: Hector Rosenberg MD  2025 6:13 AM EDT  Workstation ID: [id number]    CT Abdomen Pelvis Without Contrast  Result Date: 2025  Examination limited without IV contrast. 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 Electronically Signed: Devon Mcgrath MD  2025 8:15 PM EDT  Workstation ID: [id number]    XR Knee 1 or 2 View Left  Result Date: 2025  Impression: Postoperative changes of left knee arthroplasty without radiographic evidence of complication. Electronically Signed: Zhou Paredes MD  2025 8:56 PM EDT  Workstation ID: [id number]    CT Lower Extremity Left Without Contrast  Result Date:  5/17/2025  Impression: 1.Limited study due to motion artifact and streak artifact. 2.There is confirmation of a impaction fracture at the distal femoral metaphysis with a large volume of lipohemarthrosis. Electronically Signed: Hector Rosenberg MD  5/17/2025 10:01 PM EDT  Workstation ID: [id number]    XR Knee 1 or 2 View Left  Result Date: 5/17/2025  Impression: 1.Suspected impaction fracture in the distal femoral metaphyseal region with large volume of lipohemarthrosis. 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 3.Osteopenia. Electronically Signed: Hector Rosenberg MD  5/17/2025 7:49 PM EDT  Workstation ID: [id number]    History of Present Illness (copied from H&P note on 6/12/25):  Chey is a 89 y.o. female with PMH A-fib on Eliquis, hypertension, type 2 diabetes, hypothyroidism, MDS with pancytopenia followed by Dr. Lyman, history PE, status post left knee replacement who presented to this Hospital on 6/12/2025 because of generalized weakness.  Of note patient was recently admitted to [facility] from 5/17 to 5/29 post ground-level fall at skilled nursing facility with subsequent left supracondylar fracture of the distal femur, status postrepair 5/20.  Hospital course was also complicated by Klebsiella UTI which was treated with Rocephin for 5 days.  Today patient presents to [facility] and found to have acute renal failure with BUN 72, creatinine 3.24, potassium 6.8, bicarb 17.  ABG 7.3 7/27/1965 on room air.  She was given 1 g calcium, 10 units insulin/dextrose, 50 mill equivalents sodium bicarb, 1 L LR, Lokelma, albuterol nebulizer.  Patient was also noted to be in A-fib with RVR and started on Amio drip.  Patient subsequently admitted to the ICU for further care.     Patient alert but unable to elicit much further history.  Pleasantly confused.  States she has not been feeling well the past couple of days. Can't really describe exact symptoms. Denies chest pain, dyspnea, cough, abd pain, N/V,  "diarrhea. States she wants to go to sleep.  Patient son is at bedside.  States she has been nauseous for the past couple days per report from the rehab (end of copied text).    Patient additionally became hypotensive in the ED requiring initiation of pressors.     Hospital Course:  Patient was admitted to ICU 2* issues discussed in the above HPI continued on IV Amiodarone protocol and pressors. Additionally placed on empiric antibiotics and bicarbonate infusion. Palliative Care was consulted to assist in GOC discussions in the setting of patient's critical status with multiple medical issues and they ultimately made the decision to transition care to comfort measures. Hospice services were subsequently consulted and patient was accepted into inpatient hospice on 6/14.     Vitals:  BP 92/51 (BP Location: Right arm, Patient Position: Lying)   Pulse 63   Temp 97.5 °F (36.4 °C) (Axillary)   Resp 14   Ht 160 cm (63\")   Wt 53.3 kg (117 lb 8.1 oz)   LMP  (LMP Unknown) Comment: mammogram is up to date  SpO2 92%   BMI 20.82 kg/m²     Discharge Instructions:  D/C to inpatient Hospice     Current Code Status       Date Active Code Status Order ID Comments User Context       6/14/2025 1418 No CPR (Do Not Attempt to Resuscitate) [id number]  Joyce Flores, DO Inpatient        Question Answer    Code Status (Patient has no pulse and is not breathing) No CPR (Do Not Attempt to Resuscitate)    Medical Interventions (Patient has pulse or is breathing) Comfort Measures    Level Of Support Discussed With Health Care Surrogate             Yasmeen Elizabeth, HARLAN, APRN, AGACNP-BC  Pulmonary and Critical Care Medicine    Time: Discharge 6 min    CC: Yasmeen Loomis MD    Please note that portions of this note were completed with a voice recognition program.   "

## 2025-06-16 PROCEDURE — 25010000002 HYDROMORPHONE PER 4 MG: Performed by: INTERNAL MEDICINE

## 2025-06-16 PROCEDURE — 25010000002 ONDANSETRON PER 1 MG: Performed by: INTERNAL MEDICINE

## 2025-06-16 PROCEDURE — 25010000002 METOCLOPRAMIDE PER 10 MG: Performed by: INTERNAL MEDICINE

## 2025-06-16 RX ADMIN — METOCLOPRAMIDE 2.5 MG: 5 INJECTION, SOLUTION INTRAMUSCULAR; INTRAVENOUS at 06:20

## 2025-06-16 RX ADMIN — METOCLOPRAMIDE 2.5 MG: 5 INJECTION, SOLUTION INTRAMUSCULAR; INTRAVENOUS at 00:21

## 2025-06-16 RX ADMIN — HYDROMORPHONE HYDROCHLORIDE 0.25 MG: 1 INJECTION, SOLUTION INTRAMUSCULAR; INTRAVENOUS; SUBCUTANEOUS at 17:10

## 2025-06-16 RX ADMIN — METOCLOPRAMIDE 2.5 MG: 5 INJECTION, SOLUTION INTRAMUSCULAR; INTRAVENOUS at 11:30

## 2025-06-16 RX ADMIN — HYDROMORPHONE HYDROCHLORIDE 0.25 MG: 1 INJECTION, SOLUTION INTRAMUSCULAR; INTRAVENOUS; SUBCUTANEOUS at 00:21

## 2025-06-16 RX ADMIN — HYDROMORPHONE HYDROCHLORIDE 0.25 MG: 1 INJECTION, SOLUTION INTRAMUSCULAR; INTRAVENOUS; SUBCUTANEOUS at 06:20

## 2025-06-16 RX ADMIN — ONDANSETRON 4 MG: 2 INJECTION INTRAMUSCULAR; INTRAVENOUS at 06:20

## 2025-06-16 RX ADMIN — ONDANSETRON 4 MG: 2 INJECTION INTRAMUSCULAR; INTRAVENOUS at 23:23

## 2025-06-16 RX ADMIN — METOCLOPRAMIDE 2.5 MG: 5 INJECTION, SOLUTION INTRAMUSCULAR; INTRAVENOUS at 23:23

## 2025-06-16 RX ADMIN — HYDROMORPHONE HYDROCHLORIDE 0.25 MG: 1 INJECTION, SOLUTION INTRAMUSCULAR; INTRAVENOUS; SUBCUTANEOUS at 11:30

## 2025-06-16 RX ADMIN — ONDANSETRON 4 MG: 2 INJECTION INTRAMUSCULAR; INTRAVENOUS at 13:53

## 2025-06-16 RX ADMIN — HYDROMORPHONE HYDROCHLORIDE 0.25 MG: 1 INJECTION, SOLUTION INTRAMUSCULAR; INTRAVENOUS; SUBCUTANEOUS at 23:23

## 2025-06-16 RX ADMIN — METOCLOPRAMIDE 2.5 MG: 5 INJECTION, SOLUTION INTRAMUSCULAR; INTRAVENOUS at 17:09

## 2025-06-16 NOTE — PROGRESS NOTES
Nutrition Services    Patient Name:  Chey Robertson  YOB: 1935  MRN: [id number]  Admit Date:  6/14/2025    RD notes pt now in [facility] care, CMO. Will sign off, please consult RD for specific needs or in the event that GOC should change, thank you.     Electronically signed by:  Zonia Cui RD, [facility]  06/16/25 10:21 EDT

## 2025-06-16 NOTE — PLAN OF CARE
Goal Outcome Evaluation:               Patient has IJ triple lumen to Left neck, dressing and caps all changed and flushed today.She has ESBL to urine, Hospice and Md notified and aware. Precautions done. Room Air, eats very little only soft foods and thin liquids. She has had multiple visitors most of the day today. Will continue to monitor.

## 2025-06-16 NOTE — PLAN OF CARE
Goal Outcome Evaluation:  Plan of Care Reviewed With: patient        Progress: no change  Outcome Evaluation: Pt slept well. no PRN's needed. UOP-WNL. now c/o nausea. F/C patent. Will continue to monitor.

## 2025-06-16 NOTE — PROGRESS NOTES
Continued Stay Note  [facility]     Patient Name: Chey Robertson  MRN: [id number]  Today's Date: 6/16/2025    Admit Date: 6/14/2025    Plan: IPU Assessment   Discharge Plan       Row Name 06/16/25 0909       Plan    Plan IPU Assessment    Plan Comments   6/16 PPS 20 % Patient lying in bed awake. Patient denies pain or SOA. The patient asked for orange juice and coffee. The diet changed to soft as the patient did not like the previous food. No Nausea or vomiting noted. F/C drained 625 cc ralf urine,  cc, BM 6/16. PRN Haldol 0.5 mg x 1, versed 0.5 mg x 1 / 24 hrs. Given anxiety. No family at bedside, son Joe updated. Patient continues to require inpatient hospice for skilled nursing assessment, medication titration, and injectable medication administration for palliation of pain, anxiety, and wound care.                     Discharge Codes    No documentation.                       Marjorie Hunt RN

## 2025-06-16 NOTE — PROGRESS NOTES
Hospice Progress Note    Patient Name: Chey Robertson   : 1935  Gender: female    Code Status: comfort measures    Date of Admission: 2025    Subjective:  Chey Robertson is a 89 y.o. female admitted to inpatient hospice 2025 with diagnosis of Acute renal failure [N17.9]  for symptom management.     No family present today.  Pt awake and alert, denies pain on exam.  She asks for food and drink, lunch tray in the room, untouched.  Pt refused when food presented to her, turns her head away.  Pt endorses nausea.  Able to tolerate gingerale PO for me.     - Scheduled:  Dilaudid 0.25 mg q 6 hrs   Reglan 2.5 mg q 6 hrs   Zofran 4 mg q 8 hrs     - PRNs:  Haldol 0.5 mg x 1   Dilaudid 0.25 mg x 4   Versed 0.5 mg x 1   Zofran 4 mg x 2   Scopolamine patch x 1     - Intake/Output  Intake & Output (last 3 days)          0701   0700  0701  06/15 0700 06/15 0701   0700  0701   0700    P.O.   150     I.V. (mL/kg) 2458.3 (46.1) 291.4 (5.5)      IV Piggyback 500 200      Total Intake(mL/kg) 2958.3 (55.5) 491.4 (9.2) 150 (2.8)     Urine (mL/kg/hr) 565 (0.4) 360 (0.3) 625 (0.5)     Stool 0       Total Output 565 360 625     Net +2393.3 +131.4 -475             Stool Unmeasured Occurrence 1 x                  ROS:  Review of Systems   Constitutional:  Positive for activity change and appetite change.   Respiratory:  Negative for shortness of breath.    Cardiovascular:  Negative for chest pain.   Gastrointestinal:  Positive for nausea. Negative for abdominal pain and vomiting.       Reviewed current scheduled and prn medications for route, type, dose and frequency.       acetaminophen    bisacodyl    camphor-menthol    glycopyrrolate    HYDROmorphone    ipratropium-albuterol    midazolam    ondansetron    palliative care oral rinse    Polyvinyl Alcohol-Povidone PF    Scopolamine    Objective:   BP 90/40   Pulse 67   LMP  (LMP Unknown) Comment: mammogram is up to date  SpO2 92%      PPS:  Palliative Performance Scale score as of 6/16/2025, 15:43 EDT is 20% based on the following measures:   Ambulation: Totally bed bound  Activity and Evidence of Disease: Unable to do any work, extensive evidence of disease  Self-Care: Total care  Intake:Minimal sips  LOC: Full, drowsy or confusion     Physical Exam:  Physical Exam  Nursing note reviewed.   Constitutional:       General: She is not in acute distress.     Appearance: She is ill-appearing.   Cardiovascular:      Rate and Rhythm: Normal rate and regular rhythm.   Pulmonary:      Effort: Pulmonary effort is normal.   Abdominal:      General: Bowel sounds are normal.      Tenderness: There is no abdominal tenderness. There is no guarding.   Musculoskeletal:         General: Swelling present.   Skin:     General: Skin is warm.   Neurological:      Mental Status: She is disoriented.   Psychiatric:         Mood and Affect: Mood is anxious.             Acute renal failure      Assessment/Plan:   Chey Robertson is a 89 y.o. female admitted to inpatient hospice 06/14/2025 with diagnosis of Acute renal failure [N17.9]  for symptom management.     Symptoms:  Pain   Anxiety   Nausea   Terminal secretions     Discharge Disposition: home with hospice when symptoms are managed; however, she is unlikely to survive to hospital discharge.     Pain   -Dilaudid 0.25 mg q 6 hrs   -Dilaudid 0.25 mg q 1 hr PRN     Anxiety   -Versed 0.5 mg q 4 hrs PRN     Nausea   -Reglan 2.5 mg q 6 hrs   -Zofran 4 mg q 8 hrs   -Zofran 4 mg q 4 hrs PRN   -Scopolamine patch PRN     Terminal secretions   -Robinul PRN  -Scopolamine PRN     Patient comfort   -Palliative oral rinse   -Hurd   -Oxygen PRN   -Turn and reposition q 2 hrs     Goals of care   -DNR/DNI  -Comfort care       Total Visit Time: 20 min   Face to Face Time: 10 min     Justification for care:  Patient meets criteria for acute in-patient care due to need for frequent skilled nursing assessments to determine patient comfort and  medication effectiveness at end of life.  Frequent adjustments to medications and interventions for symptom management, including injectable medications.      Fernanda Cesar, MSN, APRN  [facility] Care [facility]  Hospice and Palliative Care Nurse Practitioner  06/16/25  13:21 EDT

## 2025-06-17 LAB — CANDIDA AURIS PCR (MAKO): NOT DETECTED

## 2025-06-17 PROCEDURE — 25010000002 MIDAZOLAM PER 1 MG: Performed by: INTERNAL MEDICINE

## 2025-06-17 PROCEDURE — 25010000002 METOCLOPRAMIDE PER 10 MG: Performed by: INTERNAL MEDICINE

## 2025-06-17 PROCEDURE — 25010000002 ONDANSETRON PER 1 MG: Performed by: INTERNAL MEDICINE

## 2025-06-17 PROCEDURE — 25010000002 HYDROMORPHONE PER 4 MG: Performed by: INTERNAL MEDICINE

## 2025-06-17 PROCEDURE — 25010000002 HYDROMORPHONE PER 4 MG: Performed by: NURSE PRACTITIONER

## 2025-06-17 RX ADMIN — HYDROMORPHONE HYDROCHLORIDE 0.25 MG: 1 INJECTION, SOLUTION INTRAMUSCULAR; INTRAVENOUS; SUBCUTANEOUS at 20:28

## 2025-06-17 RX ADMIN — HYDROMORPHONE HYDROCHLORIDE 0.25 MG: 1 INJECTION, SOLUTION INTRAMUSCULAR; INTRAVENOUS; SUBCUTANEOUS at 12:01

## 2025-06-17 RX ADMIN — ONDANSETRON 4 MG: 2 INJECTION INTRAMUSCULAR; INTRAVENOUS at 13:52

## 2025-06-17 RX ADMIN — METOCLOPRAMIDE 2.5 MG: 5 INJECTION, SOLUTION INTRAMUSCULAR; INTRAVENOUS at 18:53

## 2025-06-17 RX ADMIN — METOCLOPRAMIDE 2.5 MG: 5 INJECTION, SOLUTION INTRAMUSCULAR; INTRAVENOUS at 23:31

## 2025-06-17 RX ADMIN — HYDROMORPHONE HYDROCHLORIDE 0.25 MG: 1 INJECTION, SOLUTION INTRAMUSCULAR; INTRAVENOUS; SUBCUTANEOUS at 05:42

## 2025-06-17 RX ADMIN — ONDANSETRON 4 MG: 2 INJECTION INTRAMUSCULAR; INTRAVENOUS at 23:31

## 2025-06-17 RX ADMIN — HYDROMORPHONE HYDROCHLORIDE 0.25 MG: 1 INJECTION, SOLUTION INTRAMUSCULAR; INTRAVENOUS; SUBCUTANEOUS at 23:31

## 2025-06-17 RX ADMIN — METOCLOPRAMIDE 2.5 MG: 5 INJECTION, SOLUTION INTRAMUSCULAR; INTRAVENOUS at 12:02

## 2025-06-17 RX ADMIN — MIDAZOLAM HYDROCHLORIDE 0.5 MG: 1 INJECTION, SOLUTION INTRAMUSCULAR; INTRAVENOUS at 20:28

## 2025-06-17 RX ADMIN — METOCLOPRAMIDE 2.5 MG: 5 INJECTION, SOLUTION INTRAMUSCULAR; INTRAVENOUS at 05:43

## 2025-06-17 RX ADMIN — ONDANSETRON 4 MG: 2 INJECTION INTRAMUSCULAR; INTRAVENOUS at 05:43

## 2025-06-17 RX ADMIN — HYDROMORPHONE HYDROCHLORIDE 0.25 MG: 1 INJECTION, SOLUTION INTRAMUSCULAR; INTRAVENOUS; SUBCUTANEOUS at 18:53

## 2025-06-17 NOTE — PLAN OF CARE
"Goal Outcome Evaluation:  Plan of Care Reviewed With: patient, child        Progress: declining  Outcome Evaluation: Comfort measures ongoing. Patient denies pain, always states she feels \"the same.\" Minimal appetite. Gluteal PI dressing changed. Family at bedside, attentive to patient. Care ongoing, continue plan of care.                             "

## 2025-06-17 NOTE — PLAN OF CARE
Goal Outcome Evaluation:  Plan of Care Reviewed With: patient        Progress: declining  Outcome Evaluation: Pt appears comfortable. No PRN's needed. UOP decreased. Pt slept well. Will continue to monitor.

## 2025-06-17 NOTE — PROGRESS NOTES
"Hospice Progress Note    Patient Name: Chey Robertson   : 1935  Gender: female    Code Status: comfort measures    Date of Admission: 2025    Subjective:  Chey Robertson is a 89 y.o. female admitted to inpatient hospice 2025 with diagnosis of Acute renal failure [N17.9]  for symptom management.     No family present today.  Pt awake and alert, denies pain on exam.  Pt reports to me that she \"feels better today\", she was able to eat some soup for lunch today.     - Scheduled:  Dilaudid 0.25 mg q 6 hrs   Reglan 2.5 mg q 6 hrs   Zofran 4 mg q 8 hrs     - PRNs:  Scopolamine patch x 1     - Intake/Output  Intake & Output (last 3 days)          0701  06/15 0700 06/15 0701   07 0701   07 0701   0700    P.O.  150      I.V. (mL/kg) 291.4 (5.5)       IV Piggyback 200       Total Intake(mL/kg) 491.4 (9.2) 150 (2.8)      Urine (mL/kg/hr) 360 (0.3) 625 (0.5) 350 (0.3)     Stool        Total Output 360 625 350     Net +131.4 -475 -350                        ROS:  Review of Systems   Constitutional:  Positive for activity change and appetite change.   Respiratory:  Negative for shortness of breath.    Cardiovascular:  Negative for chest pain.   Gastrointestinal:  Positive for nausea. Negative for abdominal pain and vomiting.       Reviewed current scheduled and prn medications for route, type, dose and frequency.       acetaminophen    bisacodyl    camphor-menthol    glycopyrrolate    HYDROmorphone    ipratropium-albuterol    midazolam    ondansetron    palliative care oral rinse    Polyvinyl Alcohol-Povidone PF    Scopolamine    Objective:   BP 90/40   Pulse 67   LMP  (LMP Unknown) Comment: mammogram is up to date  SpO2 92%      PPS: Palliative Performance Scale score as of 2025, 13:54 EDT is 20% based on the following measures:   Ambulation: Totally bed bound  Activity and Evidence of Disease: Unable to do any work, extensive evidence of disease  Self-Care: Total " care  Intake:Minimal sips  LOC: Full, drowsy or confusion     Physical Exam:  Physical Exam  Nursing note reviewed.   Constitutional:       General: She is not in acute distress.     Appearance: She is ill-appearing.   Cardiovascular:      Rate and Rhythm: Normal rate and regular rhythm.   Pulmonary:      Effort: Pulmonary effort is normal.   Abdominal:      General: Bowel sounds are normal.      Tenderness: There is no abdominal tenderness. There is no guarding.   Musculoskeletal:         General: Swelling present.   Skin:     General: Skin is warm.   Neurological:      Mental Status: She is disoriented.   Psychiatric:         Mood and Affect: Mood is anxious.             Acute renal failure      Assessment/Plan:   Chey Robertson is a 89 y.o. female admitted to inpatient hospice 06/14/2025 with diagnosis of Acute renal failure [N17.9]  for symptom management.     Symptoms:  Pain   Anxiety   Nausea   Terminal secretions     Discharge Disposition: home with hospice when symptoms are managed; however, she is unlikely to survive to hospital discharge.     Pain   -Dilaudid 0.25 mg q 6 hrs   -Dilaudid 0.25 mg q 1 hr PRN     Anxiety   -Versed 0.5 mg q 4 hrs PRN     Nausea   -Reglan 2.5 mg q 6 hrs   -Zofran 4 mg q 8 hrs   -Zofran 4 mg q 4 hrs PRN   -Scopolamine patch PRN     Terminal secretions   -Robinul PRN  -Scopolamine PRN     Patient comfort   -Palliative oral rinse   -Hurd   -Oxygen PRN   -Turn and reposition q 2 hrs     Goals of care   -DNR/DNI  -Comfort care     -No medication adjustments required today.       Total Visit Time: 20 min   Face to Face Time: 10 min     Justification for care:  Patient meets criteria for acute in-patient care due to need for frequent skilled nursing assessments to determine patient comfort and medication effectiveness at end of life.  Frequent adjustments to medications and interventions for symptom management, including injectable medications.      Fernanda Cesar, MSN,  APRN  [facility]  Hospice and Palliative Care Nurse Practitioner  06/17/25  13:54 EDT

## 2025-06-17 NOTE — PROGRESS NOTES
Continued Stay Note  [facility]     Patient Name: Chey Robertson  MRN: [id number]  Today's Date: 6/17/2025    Admit Date: 6/14/2025    Plan: Inpatient hospice   Discharge Plan       Row Name 06/17/25 0815       Plan    Plan Inpatient hospice    Plan Comments PPS 10%. Inpatient hospice nurse to bedside. Ms. Robertson was resting in bed, eyes closed. She woke up to name. She reports that she is feeling okay today. She has required a scope patch in the last 24 hours. RN sat her up and set up her breakfast tray, she was eating when RN left the room. She requires GIP at this time for medication titration for symptoms of pain, dyspnea, and anxiety and she requires skilled nursing assessments. Discharge plan pending pt survival of this admission. Called and spoke with pt son, Joe and provided an update.                    Discharge Codes    No documentation.                       Lindsay Hu

## 2025-06-18 LAB
BACTERIA SPEC AEROBE CULT: NORMAL
BACTERIA SPEC AEROBE CULT: NORMAL

## 2025-06-18 PROCEDURE — 63710000001 ONDANSETRON ODT 4 MG TABLET DISPERSIBLE: Performed by: NURSE PRACTITIONER

## 2025-06-18 PROCEDURE — 25010000002 METOCLOPRAMIDE PER 10 MG: Performed by: INTERNAL MEDICINE

## 2025-06-18 PROCEDURE — 25010000002 ONDANSETRON PER 1 MG: Performed by: INTERNAL MEDICINE

## 2025-06-18 PROCEDURE — 25010000002 HYDROMORPHONE PER 4 MG: Performed by: INTERNAL MEDICINE

## 2025-06-18 RX ORDER — ONDANSETRON 4 MG/1
4 TABLET, ORALLY DISINTEGRATING ORAL EVERY 6 HOURS PRN
Status: DISCONTINUED | OUTPATIENT
Start: 2025-06-18 | End: 2025-06-20 | Stop reason: HOSPADM

## 2025-06-18 RX ORDER — OXYCODONE HYDROCHLORIDE 5 MG/1
5 TABLET ORAL EVERY 4 HOURS PRN
Refills: 0 | Status: DISCONTINUED | OUTPATIENT
Start: 2025-06-18 | End: 2025-06-20 | Stop reason: HOSPADM

## 2025-06-18 RX ORDER — OXYCODONE HYDROCHLORIDE 5 MG/1
5 TABLET ORAL EVERY 8 HOURS
Refills: 0 | Status: DISCONTINUED | OUTPATIENT
Start: 2025-06-18 | End: 2025-06-20 | Stop reason: HOSPADM

## 2025-06-18 RX ORDER — LORAZEPAM 0.5 MG/1
0.5 TABLET ORAL EVERY 6 HOURS PRN
Status: DISCONTINUED | OUTPATIENT
Start: 2025-06-18 | End: 2025-06-20 | Stop reason: HOSPADM

## 2025-06-18 RX ORDER — METOCLOPRAMIDE 5 MG/1
5 TABLET ORAL
Status: DISCONTINUED | OUTPATIENT
Start: 2025-06-18 | End: 2025-06-20 | Stop reason: HOSPADM

## 2025-06-18 RX ORDER — ONDANSETRON 4 MG/1
4 TABLET, ORALLY DISINTEGRATING ORAL EVERY 8 HOURS
Status: DISCONTINUED | OUTPATIENT
Start: 2025-06-18 | End: 2025-06-20 | Stop reason: HOSPADM

## 2025-06-18 RX ORDER — AMOXICILLIN 250 MG
2 CAPSULE ORAL NIGHTLY
Status: DISCONTINUED | OUTPATIENT
Start: 2025-06-18 | End: 2025-06-20 | Stop reason: HOSPADM

## 2025-06-18 RX ADMIN — METOCLOPRAMIDE 5 MG: 5 TABLET ORAL at 13:19

## 2025-06-18 RX ADMIN — METOCLOPRAMIDE 2.5 MG: 5 INJECTION, SOLUTION INTRAMUSCULAR; INTRAVENOUS at 05:51

## 2025-06-18 RX ADMIN — OXYCODONE 5 MG: 5 TABLET ORAL at 13:19

## 2025-06-18 RX ADMIN — METOCLOPRAMIDE 5 MG: 5 TABLET ORAL at 18:14

## 2025-06-18 RX ADMIN — ONDANSETRON 4 MG: 2 INJECTION INTRAMUSCULAR; INTRAVENOUS at 05:51

## 2025-06-18 RX ADMIN — ONDANSETRON 4 MG: 4 TABLET, ORALLY DISINTEGRATING ORAL at 20:25

## 2025-06-18 RX ADMIN — HYDROMORPHONE HYDROCHLORIDE 0.25 MG: 1 INJECTION, SOLUTION INTRAMUSCULAR; INTRAVENOUS; SUBCUTANEOUS at 05:51

## 2025-06-18 RX ADMIN — ONDANSETRON 4 MG: 4 TABLET, ORALLY DISINTEGRATING ORAL at 13:19

## 2025-06-18 RX ADMIN — STANDARDIZED SENNA CONCENTRATE AND DOCUSATE SODIUM 2 TABLET: 8.6; 5 TABLET, FILM COATED ORAL at 20:25

## 2025-06-18 RX ADMIN — OXYCODONE 5 MG: 5 TABLET ORAL at 20:25

## 2025-06-18 RX ADMIN — OXYCODONE 5 MG: 5 TABLET ORAL at 14:22

## 2025-06-18 NOTE — PLAN OF CARE
Goal Outcome Evaluation:  Plan of Care Reviewed With: patient        Progress: declining  Outcome Evaluation: PRN pain and anxiety meds given. F/c patent. Minimal P.O. intake. Pt slept well. Will continue to monitor.                              NSICU Progress Note    24 HOUR EVENTS:   tolerating PT  for PEG today    REVIEW OF SYSTEMS: [] Unable to Assess due to neurologic exam   [ x] All ROS addressed below are non-contributory, except:  Neuro: [ ] Headache [ ] Back pain [ ] Numbness [ ] Weakness [ ] Ataxia [ ] Dizziness [ ] Aphasia [ ] Dysarthria [ ] Visual disturbance  Resp: [ ] Shortness of breath/dyspnea [ ] Orthopnea [ ] Cough  CV: [ ] Chest pain [ ] Palpitation [ ] Lightheadedness [ ] Syncope  Renal: [ ] Thirst [ ] Edema  GI: [ ] Nausea [ ] Emesis [ ] Abdominal pain [ ] Constipation [ ] Diarrhea  Hem: [ ] Hematemesis [ ] bBright red blood per rectum  ID: [ ] Fever [ ] Chills [ ] Dysuria  ENT: [ ] Rhinorrhea    VITALS: [x] Reviewed  IMAGING/DATA: [x] Reviewed  IVF FLUIDS/MEDICATIONS: [x] Reviewed  ALLERGIES: No Known Allergies    General: diffuse facial edema improving, off c collar  CVS: RRR  Pulm: CTAB, trach in place  GI: Soft, NTND, hypoactive BS  Extremities: No LE Edema  Neuro: Trach, AOx3, following commands x4, antigravity in all 4 extremities , pupils 4 mm and bilaterally reactive                   NSICU Progress Note    24 HOUR EVENTS:   tolerating PT  for PEG today    REVIEW OF SYSTEMS: [] Unable to Assess due to neurologic exam   [ x] All ROS addressed below are non-contributory, except: R lower flank pain worse if coughing but not with ambulation or breathing   Neuro: [ ] Headache [ ] Back pain [ ] Numbness [ ] Weakness [ ] Ataxia [ ] Dizziness [ ] Aphasia [ ] Dysarthria [ ] Visual disturbance  Resp: [ ] Shortness of breath/dyspnea [ ] Orthopnea [ ] Cough  CV: [ ] Chest pain [ ] Palpitation [ ] Lightheadedness [ ] Syncope  Renal: [ ] Thirst [ ] Edema  GI: [ ] Nausea [ ] Emesis [ ] Abdominal pain [ ] Constipation [ ] Diarrhea  Hem: [ ] Hematemesis [ ] bBright red blood per rectum  ID: [ ] Fever [ ] Chills [ ] Dysuria  ENT: [ ] Rhinorrhea    VITALS: [x] Reviewed  IMAGING/DATA: [x] Reviewed  IVF FLUIDS/MEDICATIONS: [x] Reviewed  ALLERGIES: No Known Allergies    General: diffuse facial edema improving, off c collar  CVS: RRR  Pulm: CTAB, trach in place  GI: Soft, NTND, hypoactive BS  Extremities: No LE Edema  Neuro: Trach, AOx3, following commands x4, antigravity in all 4 extremities , pupils 4 mm and bilaterally reactive

## 2025-06-18 NOTE — PROGRESS NOTES
Continued Stay Note  [facility]     Patient Name: Chey Robertson  MRN: [id number]  Today's Date: 6/18/2025    Admit Date: 6/14/2025    Plan: Inpatient hospice   Discharge Plan       Row Name 06/18/25 0830       Plan    Plan Inpatient hospice    Plan Comments PPS 10%. Inpatient hospice nurse to bedside. Ms. Robertson was resting in bed, sitting up and eating breakfast. She reports that she is feeling okay today. She denied any pain and dyspnea at time of assessment. She has required the following PRN medications in the last 24 hours: Dilaudid x1, Versed x1. She requires GIP at this time for medication titration for symptoms of pain, dyspnea, and anxiety and she requires skilled nursing assessments. Discharge plan is to assess pt to possibly return to LTC with hospice. Called and spoke with pt son, Joe and provided an update.                   Discharge Codes    No documentation.                       Lindsay Hu

## 2025-06-18 NOTE — PROGRESS NOTES
Hospice Progress Note    Patient Name: Chey Robretson   : 1935  Gender: female    Code Status: comfort measures    Date of Admission: 2025    Subjective:  Chey Robertson is a 89 y.o. female admitted to inpatient hospice 2025 with diagnosis of Acute renal failure [N17.9]  for symptom management.     Son Joe at bedside this afternoon.  Pt appears to be sleeping on exam, she wakes easily.  Denies pain at this time.  Denies nausea.  Tolerating PO medications at this time.     - Scheduled:  Oxycodone 5 mg q 8 hrs   Zofran 4 mg q 8 hrs   Reglan 5 mg q 8 hrs     - PRNs:  Dilaudid x 1 (overnight)   Oxycodone 5 mg x 1   Versed x 1 (overnight)     - Intake/Output  Intake & Output (last 3 days)         06/15 0701   0700  0701   0700  0701   0700  0701   0700    P.O. 150       I.V. (mL/kg)        IV Piggyback        Total Intake(mL/kg) 150 (2.8)       Urine (mL/kg/hr) 625 (0.5) 350 (0.3) 900 (0.7)     Total Output 625 350 900     Net -475 -350 -900                        ROS:  Review of Systems   Constitutional:  Positive for activity change and appetite change.   Respiratory:  Negative for shortness of breath.    Cardiovascular:  Negative for chest pain.   Gastrointestinal:  Positive for nausea. Negative for abdominal pain and vomiting.       Reviewed current scheduled and prn medications for route, type, dose and frequency.       acetaminophen    bisacodyl    camphor-menthol    ipratropium-albuterol    LORazepam    ondansetron ODT    oxyCODONE    palliative care oral rinse    Polyvinyl Alcohol-Povidone PF    Scopolamine    Objective:   BP 90/40   Pulse 67   LMP  (LMP Unknown) Comment: mammogram is up to date  SpO2 92%      PPS: Palliative Performance Scale score as of 2025, 16:18 EDT is 30% based on the following measures:   Ambulation: Totally bed bound  Activity and Evidence of Disease: Unable to do any work, extensive evidence of disease  Self-Care: Total  care  Intake: Reduced   LOC: Full, drowsy or confusion     Physical Exam:  Physical Exam  Nursing note reviewed.   Constitutional:       General: She is not in acute distress.     Appearance: She is ill-appearing.   Cardiovascular:      Rate and Rhythm: Normal rate and regular rhythm.   Pulmonary:      Effort: Pulmonary effort is normal.   Abdominal:      General: Bowel sounds are normal.      Tenderness: There is no abdominal tenderness. There is no guarding.   Musculoskeletal:         General: Swelling present.   Skin:     General: Skin is warm.   Neurological:      Mental Status: She is disoriented.   Psychiatric:         Mood and Affect: Mood is anxious.             Acute renal failure      Assessment/Plan:   Chey Robertson is a 89 y.o. female admitted to inpatient hospice 06/14/2025 with diagnosis of Acute renal failure [N17.9]  for symptom management.     Symptoms:  Pain   Anxiety   Nausea   Terminal secretions     Discharge Disposition: home with hospice when symptoms are managed; however, she is unlikely to survive to hospital discharge.     Pain   -Dilaudid discontinued   -Add oxycodone 5 mg q 8 hrs atc   -Oxycodone 5 mg q 4 hrs PRN     Anxiety   -Versed discontinued today   -Ativan 0.5 mg q 6 hrs PRN     Nausea   -IV reglan changed to PO   -IV zofran changed to PO     Patient comfort   -Palliative oral rinse   -Hurd   -Oxygen PRN   -Turn and reposition q 2 hrs     Goals of care   -DNR/DNI  -Comfort care     -Awaiting PT evaluation   -Discharge back to [place] possible at the end of the week   -IV medications changed to PO today, monitoring for 24-48 hrs to ensure efficacy with dose.       Total Visit Time: 20 min   Face to Face Time: 10 min     Justification for care:  Patient meets criteria for acute in-patient care due to need for frequent skilled nursing assessments to determine patient comfort and medication effectiveness at end of life.  Frequent adjustments to medications and interventions for  symptom management, including injectable medications.      Fernanda Cesar, MSN, APRN  [facility]  Hospice and Palliative Care Nurse Practitioner  06/18/25  11:21 EDT

## 2025-06-19 PROCEDURE — 63710000001 ONDANSETRON ODT 4 MG TABLET DISPERSIBLE: Performed by: NURSE PRACTITIONER

## 2025-06-19 PROCEDURE — 97162 PT EVAL MOD COMPLEX 30 MIN: CPT

## 2025-06-19 RX ADMIN — OXYCODONE 5 MG: 5 TABLET ORAL at 10:40

## 2025-06-19 RX ADMIN — OXYCODONE 5 MG: 5 TABLET ORAL at 19:43

## 2025-06-19 RX ADMIN — ONDANSETRON 4 MG: 4 TABLET, ORALLY DISINTEGRATING ORAL at 03:27

## 2025-06-19 RX ADMIN — STANDARDIZED SENNA CONCENTRATE AND DOCUSATE SODIUM 2 TABLET: 8.6; 5 TABLET, FILM COATED ORAL at 20:33

## 2025-06-19 RX ADMIN — ONDANSETRON 4 MG: 4 TABLET, ORALLY DISINTEGRATING ORAL at 12:08

## 2025-06-19 RX ADMIN — METOCLOPRAMIDE 5 MG: 5 TABLET ORAL at 12:09

## 2025-06-19 RX ADMIN — ONDANSETRON 4 MG: 4 TABLET, ORALLY DISINTEGRATING ORAL at 19:42

## 2025-06-19 RX ADMIN — OXYCODONE 5 MG: 5 TABLET ORAL at 03:26

## 2025-06-19 RX ADMIN — METOCLOPRAMIDE 5 MG: 5 TABLET ORAL at 07:38

## 2025-06-19 RX ADMIN — OXYCODONE 5 MG: 5 TABLET ORAL at 12:08

## 2025-06-19 NOTE — PROGRESS NOTES
Hospice Progress Note    Patient Name: Chey Robertson   : 1935  Gender: female    Code Status: comfort measures    Date of Admission: 2025    Subjective:  Chey Robertson is a 89 y.o. female admitted to inpatient hospice 2025 with diagnosis of Acute renal failure [N17.9]  for symptom management.     No family present.  Pt appears to be resting, awakens easily.  She denies pain or nausea today.  SW assisting sons with placement.      - Scheduled:  Oxycodone 5 mg q 8 hrs   Zofran 4 mg q 8 hrs   Reglan 5 mg q 8 hrs     - PRNs:  Oxycodone 5 mg x 1     - Intake/Output  Intake & Output (last 3 days)          0701   07 07 07 0701   07 0701   0700    P.O.   60     Total Intake(mL/kg)   60 (1.1)     Urine (mL/kg/hr) 350 (0.3) 900 (0.7) 1250 (1)     Total Output      Net -350 900 -1190                        ROS:  Review of Systems   Constitutional:  Positive for activity change and appetite change.   Respiratory:  Negative for shortness of breath.    Cardiovascular:  Negative for chest pain.   Gastrointestinal:  Positive for nausea. Negative for abdominal pain and vomiting.       Reviewed current scheduled and prn medications for route, type, dose and frequency.       acetaminophen    bisacodyl    camphor-menthol    ipratropium-albuterol    LORazepam    ondansetron ODT    oxyCODONE    palliative care oral rinse    Polyvinyl Alcohol-Povidone PF    Scopolamine    Objective:   BP 90/40   Pulse 67   LMP  (LMP Unknown) Comment: mammogram is up to date  SpO2 92%      PPS: Palliative Performance Scale score as of 2025, 12:57 EDT is 30% based on the following measures:   Ambulation: Totally bed bound  Activity and Evidence of Disease: Unable to do any work, extensive evidence of disease  Self-Care: Total care  Intake: Reduced   LOC: Full, drowsy or confusion     Physical Exam:  Physical Exam  Nursing note reviewed.   Constitutional:        General: She is not in acute distress.     Appearance: She is ill-appearing.   Cardiovascular:      Rate and Rhythm: Normal rate and regular rhythm.   Pulmonary:      Effort: Pulmonary effort is normal.   Abdominal:      General: Bowel sounds are normal.      Tenderness: There is no abdominal tenderness. There is no guarding.   Musculoskeletal:         General: Swelling present.   Skin:     General: Skin is warm.   Neurological:      Mental Status: She is disoriented.   Psychiatric:         Mood and Affect: Mood is anxious.             Acute renal failure      Assessment/Plan:   Chey Robertson is a 89 y.o. female admitted to inpatient hospice 06/14/2025 with diagnosis of Acute renal failure [N17.9]  for symptom management.     Symptoms:  Pain   Anxiety   Nausea   Terminal secretions     Discharge Disposition: home with hospice when symptoms are managed; however, she is unlikely to survive to hospital discharge.     Pain   -Add oxycodone 5 mg q 8 hrs atc   -Oxycodone 5 mg q 4 hrs PRN     Anxiety   -Versed discontinued today   -Ativan 0.5 mg q 6 hrs PRN     Nausea   -Reglan PO   -Zofran PO     Patient comfort   -Palliative oral rinse   -Hurd   -Oxygen PRN   -Turn and reposition q 2 hrs     Goals of care   -DNR/DNI  -Comfort care     -PT evaluation rec SNF, pt not a candidate for aggressive PT, she is EOLC with minimal PO intake.   -Discharge back to [place] possible at the end of the week     Total Visit Time: 20 min   Face to Face Time: 10 min     Justification for care:  Patient meets criteria for acute in-patient care due to need for frequent skilled nursing assessments to determine patient comfort and medication effectiveness at end of life.  Frequent adjustments to medications and interventions for symptom management, including injectable medications.      Fernanda Cesar, MSN, APRN  [facility]  Hospice and Palliative Care Nurse Practitioner  06/19/25  12:57 EDT

## 2025-06-19 NOTE — PROGRESS NOTES
Continued Stay Note  [facility]     Patient Name: Chey Robertson  MRN: [id number]  Today's Date: 6/19/2025    Admit Date: 6/14/2025    Plan: Inpatient hospice   Discharge Plan       Row Name 06/19/25 0745       Plan    Plan Inpatient hospice    Plan Comments PPS 10%. Inpatient hospice nurse to bedside. Ms. Robertson was resting in bed, sitting up watching TV. She reports that she is feeling okay today. She denied any pain and dyspnea at time of assessment. She has required the following PRN medications in the last 24 hours: Oxycodone 5mg x1. Pt has been tolerating PO medications well crushed in apple sauce per report. She requires GIP at this time for medication titration for symptoms of pain, dyspnea, and anxiety and she requires skilled nursing assessments. Discharge plan is to assess pt to possibly return to LTC with hospice. Called and spoke with pt son, Joe and provided an update.                   Discharge Codes    No documentation.                       Lindsay Hu

## 2025-06-19 NOTE — PLAN OF CARE
Goal Outcome Evaluation:           Progress: no change  Outcome Evaluation: Pt up in chiar for two hours this morning, afterwards pt slept much of shift. One PRN oxy given this am after PT worked with pt. Pt refused most meals this shift with little oral liquid intake. Patent elena cath in place to standard drainage bag. Patent IF in place, saline locked. No BM yet this shift, if not this evening, will give PRN suppository in the AM on 6/20. Q2 hr turns conducted. Continuing POC and reporting as needed.

## 2025-06-19 NOTE — THERAPY EVALUATION
Patient Name: Chey Robertson  : 1935    MRN: [id number]                              Today's Date: 2025       Admit Date: 2025    Visit Dx: No diagnosis found.  Patient Active Problem List   Diagnosis    Benign familial tremor    AMS (altered mental status)    Pancytopenia    Hypothyroidism (acquired)    B12 deficiency    Abnormal intestinal absorption    Iron deficiency anemia due to chronic blood loss    Myelodysplasia (myelodysplastic syndrome)    Personal history of pulmonary embolism    Chronic anticoagulation    Essential hypertension    Type 2 diabetes mellitus without complication, without long-term current use of insulin    Atrial fibrillation    QT prolongation    Pericardial effusion    Severe malnutrition    Closed displaced intertrochanteric fracture of right femur, initial encounter    DISHA (acute kidney injury)    Moderate malnutrition    Falls    Type 2 diabetes mellitus with complication, with long-term current use of insulin    Periprosthetic fracture around prosthetic knee    Femur fracture    Acute renal failure     Past Medical History:   Diagnosis Date    A-fib     on eliquis    Anemia     Arthritis     Diabetes mellitus     checks sugar only when pt wants to     Disease of thyroid gland     History of transfusion     with knee surgery-  no reaction recalled.     [place] (hard of hearing)     no hearing aids    Hypertension     Migraine without aura and without status migrainosus, not intractable 2016    Myelodysplastic syndrome     Pulmonary emboli     (after knee surgery)    SOBOE (shortness of breath on exertion)     Tremors of nervous system     Vertigo     Wears dentures     upper     Wears glasses      Past Surgical History:   Procedure Laterality Date    BLADDER SURGERY      CATARACT EXTRACTION      bilat     CHOLECYSTECTOMY      COLONOSCOPY      HIP TROCHANTERIC NAILING WITH INTRAMEDULLARY HIP SCREW Right 2023    Procedure: HIP TROCHANTERIC NAILING WITH  INTRAMEDULLARY HIP SCREW RIGHT;  Surgeon: Augie Hobbs MD;  Location:  [facility];  Service: Orthopedics;  Laterality: Right;    HYSTERECTOMY      with bso    KYPHOPLASTY N/A 02/12/2021    Procedure: KYPHOPLASTY T11, T12 AND L4;  Surgeon: Matty Hearn MD;  Location:  [facility];  Service: Orthopedic Spine;  Laterality: N/A;    TONSILLECTOMY      TOTAL KNEE ARTHROPLASTY REVISION Left 5/20/2025    Procedure: DISTAL FEMUR REPLACEMENT;  Surgeon: Albin Mcclain MD;  Location:  [facility];  Service: Orthopedics;  Laterality: Left;      General Information       Row Name 06/19/25 1003          Physical Therapy Time and Intention    Document Type evaluation  -     Mode of Treatment physical therapy  -       Row Name 06/19/25 1003          General Information    Patient Profile Reviewed yes  -ML     Prior Level of Function independent:;all household mobility;transfer;bed mobility;feeding;grooming;mod assist:;max assist:;dressing;bathing  patient reports ambulating with RW, patient is a questionable historian  -     Existing Precautions/Restrictions fall;other (see comments)  decreased L knee flexion to approx 70 degrees  -     Barriers to Rehab medically complex;previous functional deficit;cognitive status  -       Row Name 06/19/25 1003          Living Environment    Current Living Arrangements assisted living facility  -     People in Home facility resident  -       Row Name 06/19/25 1003          Cognition    Orientation Status (Cognition) oriented to;person;place;disoriented to;time  -       Row Name 06/19/25 1003          Safety Issues/Impairments Affecting Functional Mobility    Safety Issues Affecting Function (Mobility) awareness of need for assistance;insight into deficits/self-awareness;judgment;problem-solving;safety precaution awareness;safety precautions follow-through/compliance;sequencing abilities  -     Impairments Affecting Function (Mobility) balance;endurance/activity  tolerance;strength;range of motion (ROM);cognition  -ML     Cognitive Impairments, Mobility Safety/Performance awareness, need for assistance;insight into deficits/self-awareness;safety precaution awareness;safety precaution follow-through;sequencing abilities;judgment;problem-solving/reasoning  -ML               User Key  (r) = Recorded By, (t) = Taken By, (c) = Cosigned By      Initials Name Provider Type    Marybeth Nichols Physical Therapist                   Mobility       Row Name 06/19/25 1005          Bed Mobility    Bed Mobility supine-sit  -ML     Supine-Sit [place] (Bed Mobility) verbal cues;nonverbal cues (demo/gesture);moderate assist (50% patient effort);1 person assist  -ML     Assistive Device (Bed Mobility) bed rails;head of bed elevated  -ML     Comment, (Bed Mobility) additional time and effort, verbal cues for sequencing  -ML       Row Name 06/19/25 1005          Bed-Chair Transfer    Bed-Chair [place] (Transfers) verbal cues;nonverbal cues (demo/gesture);moderate assist (50% patient effort);1 person assist  -ML     Assistive Device (Bed-Chair Transfers) other (see comments)  UE support  -ML     Comment, (Bed-Chair Transfer) squat pivot transfer from EOB to chair towards right side due to decreased L knee flexion  -ML       Row Name 06/19/25 1005          Sit-Stand Transfer    Sit-Stand [place] (Transfers) verbal cues;nonverbal cues (demo/gesture);maximum assist (25% patient effort);1 person assist  -ML     Assistive Device (Sit-Stand Transfers) walker, front-wheeled  -ML     Comment, (Sit-Stand Transfer) multiple attempts to complete sit to stand transfer from EOB, unable to transfer to upright standing  -ML       Row Name 06/19/25 1005          Gait/Stairs (Locomotion)    [place] Level (Gait) unable to assess  -ML               User Key  (r) = Recorded By, (t) = Taken By, (c) = Cosigned By      Initials Name Provider Type    Marybeth Nichols Physical Therapist                    Obj/Interventions       Row Name 06/19/25 1010          Range of Motion Comprehensive    General Range of Motion lower extremity range of motion deficits identified  -ML     Comment, General Range of Motion RLE WFL, L knee flexion to approx 70 degrees  -ML       Row Name 06/19/25 1010          Strength Comprehensive (MMT)    General Manual Muscle Testing (MMT) Assessment lower extremity strength deficits identified  -ML     Comment, General Manual Muscle Testing (MMT) Assessment RLE grossly 3+/5, L knee flexion 2-/5  -ML       Row Name 06/19/25 1010          Balance    Balance Assessment sitting static balance;sitting dynamic balance;standing static balance;standing dynamic balance  -ML     Static Sitting Balance standby assist  -ML     Dynamic Sitting Balance contact guard  -ML     Position, Sitting Balance unsupported;sitting edge of bed  -ML     Static Standing Balance verbal cues;non-verbal cues (demo/gesture);moderate assist;1-person assist  -ML     Dynamic Standing Balance verbal cues;non-verbal cues (demo/gesture);moderate assist;1-person assist  -ML     Position/Device Used, Standing Balance supported  -ML     Balance Interventions sitting;standing;sit to stand;supported;occupation based/functional task  -ML               User Key  (r) = Recorded By, (t) = Taken By, (c) = Cosigned By      Initials Name Provider Type    ML Marybeth Arias Physical Therapist                   Goals/Plan       Row Name 06/19/25 1015          Bed Mobility Goal 1 (PT)    Activity/Assistive Device (Bed Mobility Goal 1, PT) sit to supine;supine to sit  -ML     [place] Level/Cues Needed (Bed Mobility Goal 1, PT) standby assist  -ML     Time Frame (Bed Mobility Goal 1, PT) short term goal (STG);5 days  -ML       Row Name 06/19/25 1015          Transfer Goal 1 (PT)    Activity/Assistive Device (Transfer Goal 1, PT) sit-to-stand/stand-to-sit;bed-to-chair/chair-to-bed;walker, rolling  -ML     [place] Level/Cues Needed  (Transfer Goal 1, PT) minimum assist (75% or more patient effort)  -ML     Time Frame (Transfer Goal 1, PT) long term goal (LTG);10 days  -ML       Row Name 06/19/25 1015          Gait Training Goal 1 (PT)    Activity/Assistive Device (Gait Training Goal 1, PT) gait (walking locomotion);walker, rolling;decrease fall risk  -ML     [place] Level (Gait Training Goal 1, PT) moderate assist (50-74% patient effort)  -ML     Distance (Gait Training Goal 1, PT) 10  -ML     Time Frame (Gait Training Goal 1, PT) long term goal (LTG);10 days  -ML       Row Name 06/19/25 1015          Therapy Assessment/Plan (PT)    Planned Therapy Interventions (PT) balance training;bed mobility training;gait training;home exercise program;patient/family education;postural re-education;ROM (range of motion);strengthening;transfer training  -ML               User Key  (r) = Recorded By, (t) = Taken By, (c) = Cosigned By      Initials Name Provider Type    ML Marybeth Arias Physical Therapist                   Clinical Impression       Row Name 06/19/25 1011          Pain    Pretreatment Pain Rating 0/10 - no pain  -ML     Posttreatment Pain Rating 0/10 - no pain  -ML       Row Name 06/19/25 1011          Plan of Care Review    Plan of Care Reviewed With patient  -     Outcome Evaluation Physical therapy evaluation complete. The patient oriented to self and place, able to follow commnands to participate in PT evaluation. Patient required modA to compete transfer from EOB to chair, unable to ambulate. Patient with decreased L knee flexion requiring increased assistance to complete transfers. Patient would continue to benefit from skilled PT to address strength, balance and activity tolerance deficits.  -       Row Name 06/19/25 1011          Therapy Assessment/Plan (PT)    Patient/Family Therapy Goals Statement (PT) no PT goals stated  -ML     Rehab Potential (PT) fair  -ML     Criteria for Skilled Interventions Met (PT) yes;meets criteria   -ML     Therapy Frequency (PT) daily  -ML     Predicted Duration of Therapy Intervention (PT) 10 days  -ML       Row Name 06/19/25 1011          Positioning and Restraints    Pre-Treatment Position in bed  -ML     Post Treatment Position chair  -ML     In Chair notified nsg;reclined;call light within reach;encouraged to call for assist;exit alarm on;waffle cushion;legs elevated;heels elevated  -ML               User Key  (r) = Recorded By, (t) = Taken By, (c) = Cosigned By      Initials Name Provider Type    Marybeth Nichols Physical Therapist                   Outcome Measures       Row Name 06/19/25 1016          How much help from another person do you currently need...    Turning from your back to your side while in flat bed without using bedrails? 3  -ML     Moving from lying on back to sitting on the side of a flat bed without bedrails? 2  -ML     Moving to and from a bed to a chair (including a wheelchair)? 2  -ML     Standing up from a chair using your arms (e.g., wheelchair, bedside chair)? 2  -ML     Climbing 3-5 steps with a railing? 1  -ML     To walk in hospital room? 1  -ML     AM-PAC 6 Clicks Score (PT) 11  -ML     Highest Level of Mobility Goal Move to Chair/Commode-4  -ML       Row Name 06/19/25 1016          Functional Assessment    Outcome Measure Options AM-PAC 6 Clicks Basic Mobility (PT)  -ML               User Key  (r) = Recorded By, (t) = Taken By, (c) = Cosigned By      Initials Name Provider Type    Marybeth Nichols Physical Therapist                                 Physical Therapy Education       Title: PT OT SLP Therapies (In Progress)       Topic: Physical Therapy (In Progress)       Point: Mobility training (In Progress)       Learning Progress Summary            Patient Acceptance, E, NR by [facility] at 6/19/2025 1017                      Point: Home exercise program (Not Started)       Learner Progress:  Not documented in this visit.              Point: Body mechanics (In Progress)        Learning Progress Summary            Patient Acceptance, E, NR by  at 6/19/2025 1017                      Point: Precautions (In Progress)       Learning Progress Summary            Patient Acceptance, E, NR by  at 6/19/2025 1017                                      User Key       Initials Effective Dates Name Provider Type Discipline     04/22/21 -  Marybeth Arias Physical Therapist PT                  PT Recommendation and Plan  Planned Therapy Interventions (PT): balance training, bed mobility training, gait training, home exercise program, patient/family education, postural re-education, ROM (range of motion), strengthening, transfer training  Outcome Evaluation: Physical therapy evaluation complete. The patient oriented to self and place, able to follow commnands to participate in PT evaluation. Patient required modA to compete transfer from EOB to chair, unable to ambulate. Patient with decreased L knee flexion requiring increased assistance to complete transfers. Patient would continue to benefit from skilled PT to address strength, balance and activity tolerance deficits.     Time Calculation:   PT Evaluation Complexity  History, PT Evaluation Complexity: 3 or more personal factors and/or comorbidities  Examination of Body Systems (PT Eval Complexity): total of 3 or more elements  Clinical Presentation (PT Evaluation Complexity): evolving  Clinical Decision Making (PT Evaluation Complexity): moderate complexity  Overall Complexity (PT Evaluation Complexity): moderate complexity     PT Charges       Row Name 06/19/25 1018             Time Calculation    Start Time 0758  -ML      PT Received On 06/19/25  -ML      PT Goal Re-Cert Due Date 06/29/25  -ML         Untimed Charges    PT Eval/Re-eval Minutes 50  -ML         Total Minutes    Untimed Charges Total Minutes 50  -ML       Total Minutes 50  -ML                User Key  (r) = Recorded By, (t) = Taken By, (c) = Cosigned By      Initials Name Provider  Type    ML Marybeth Arias Physical Therapist                  Therapy Charges for Today       Code Description Service Date Service Provider Modifiers Qty    [id number] HC PT EVAL MOD COMPLEXITY 4 6/19/2025 Marybeth Arias GP 1            PT G-Codes  Outcome Measure Options: AM-PAC 6 Clicks Basic Mobility (PT)  AM-PAC 6 Clicks Score (PT): 11  PT Discharge Summary  Anticipated Discharge Disposition (PT): skilled nursing facility    Marybeth Arias  6/19/2025

## 2025-06-19 NOTE — PLAN OF CARE
Goal Outcome Evaluation:  Plan of Care Reviewed With: patient        Progress: declining  Outcome Evaluation: Pt rested well throughout shift. Oriented to self only. Hurd in place and patent. Minimal PO intake noted. No PRNs indicated. No other concerns noted at this time.

## 2025-06-19 NOTE — PLAN OF CARE
Goal Outcome Evaluation:  Plan of Care Reviewed With: patient           Outcome Evaluation: Physical therapy evaluation complete. The patient oriented to self and place, able to follow commands to participate in PT evaluation. Patient required modA to compete transfer from EOB to chair, unable to ambulate. Patient with decreased L knee flexion requiring increased assistance to complete transfers. Patient would continue to benefit from skilled PT to address strength, balance and activity tolerance deficits.    Anticipated Discharge Disposition (PT): skilled nursing facility

## 2025-06-20 LAB
ALBUMIN SERPL-MCNC: 2.5 G/DL (ref 3.5–5.2)
ALBUMIN/GLOB SERPL: 1.1 G/DL
ALP SERPL-CCNC: 143 U/L (ref 39–117)
ALT SERPL W P-5'-P-CCNC: 119 U/L (ref 1–33)
ANION GAP SERPL CALCULATED.3IONS-SCNC: 7 MMOL/L (ref 5–15)
AST SERPL-CCNC: 170 U/L (ref 1–32)
BILIRUB SERPL-MCNC: 0.4 MG/DL (ref 0–1.2)
BUN SERPL-MCNC: 35 MG/DL (ref 8–23)
BUN/CREAT SERPL: 19.7 (ref 7–25)
CALCIUM SPEC-SCNC: 7.7 MG/DL (ref 8.6–10.5)
CHLORIDE SERPL-SCNC: 107 MMOL/L (ref 98–107)
CO2 SERPL-SCNC: 23 MMOL/L (ref 22–29)
CREAT SERPL-MCNC: 1.78 MG/DL (ref 0.57–1)
EGFRCR SERPLBLD CKD-EPI 2021: 27.2 ML/MIN/1.73
GLOBULIN UR ELPH-MCNC: 2.2 GM/DL
GLUCOSE SERPL-MCNC: 133 MG/DL (ref 65–99)
POTASSIUM SERPL-SCNC: 5.1 MMOL/L (ref 3.5–5.2)
PROT SERPL-MCNC: 4.7 G/DL (ref 6–8.5)
SODIUM SERPL-SCNC: 137 MMOL/L (ref 136–145)

## 2025-06-20 PROCEDURE — 63710000001 ONDANSETRON ODT 4 MG TABLET DISPERSIBLE: Performed by: NURSE PRACTITIONER

## 2025-06-20 PROCEDURE — 80053 COMPREHEN METABOLIC PANEL: CPT | Performed by: NURSE PRACTITIONER

## 2025-06-20 RX ORDER — LORAZEPAM 0.5 MG/1
0.5 TABLET ORAL EVERY 6 HOURS PRN
Start: 2025-06-20 | End: 2025-06-23

## 2025-06-20 RX ORDER — METOCLOPRAMIDE 5 MG/1
5 TABLET ORAL
Start: 2025-06-20

## 2025-06-20 RX ORDER — ONDANSETRON 4 MG/1
4 TABLET, ORALLY DISINTEGRATING ORAL EVERY 8 HOURS
Start: 2025-06-20

## 2025-06-20 RX ORDER — ONDANSETRON 4 MG/1
4 TABLET, ORALLY DISINTEGRATING ORAL EVERY 6 HOURS PRN
Start: 2025-06-20

## 2025-06-20 RX ORDER — BISACODYL 10 MG
10 SUPPOSITORY, RECTAL RECTAL ONCE
Status: DISCONTINUED | OUTPATIENT
Start: 2025-06-20 | End: 2025-06-20

## 2025-06-20 RX ORDER — AMOXICILLIN 250 MG
2 CAPSULE ORAL NIGHTLY
Start: 2025-06-20

## 2025-06-20 RX ORDER — OXYCODONE HYDROCHLORIDE 5 MG/1
5 TABLET ORAL EVERY 4 HOURS PRN
Start: 2025-06-20 | End: 2025-06-23

## 2025-06-20 RX ORDER — OXYCODONE HYDROCHLORIDE 5 MG/1
5 TABLET ORAL EVERY 8 HOURS
Start: 2025-06-20 | End: 2025-06-23

## 2025-06-20 RX ADMIN — ONDANSETRON 4 MG: 4 TABLET, ORALLY DISINTEGRATING ORAL at 03:56

## 2025-06-20 RX ADMIN — OXYCODONE 5 MG: 5 TABLET ORAL at 12:26

## 2025-06-20 RX ADMIN — METOCLOPRAMIDE 5 MG: 5 TABLET ORAL at 08:10

## 2025-06-20 RX ADMIN — ONDANSETRON 4 MG: 4 TABLET, ORALLY DISINTEGRATING ORAL at 12:26

## 2025-06-20 RX ADMIN — OXYCODONE 5 MG: 5 TABLET ORAL at 03:56

## 2025-06-20 RX ADMIN — METOCLOPRAMIDE 5 MG: 5 TABLET ORAL at 12:26

## 2025-06-20 NOTE — PROGRESS NOTES
Pt to dc to [facility] today via EMS. Please call [phone], fax dc summary to [phone].  [facility]. PCS in chart.

## 2025-06-20 NOTE — PLAN OF CARE
Goal Outcome Evaluation:              Outcome Evaluation: Pt on room air. Takes meds crushed in applesauce. Hurd remains patent. No PRNs given at this time. Q2hr turns conducted. Pt resting well in between care. Plan of care ongoing.

## 2025-06-20 NOTE — NURSING NOTE
Prior to central line removal, order for the removal of catheter was verified, patient was assessed, necessary materials were gathered and patient was unable to be educated due to patient condition .    Patient was positioned supine to ensure that the insertion site was at or below the level of the heart.    Hands were washed, clean gloves were applied and central line dressing was gently removed. Catheter exit site was not cultured.     A new pair of clean gloves were then applied. Insertion site was cleansed with 2% Chlorhexidine swab using a circular motion beginning at the insertion site and moving outward for 30 seconds and allowed to dry.     Clamp on line was not present.     Patient unable to perform Valsalva maneuver or hold breath during central line removal due to current condition.     The central line was grasped at the insertion site and slowly pulled outward parallel to the skin. Resistance was not met.    After central line was completely removed, a sterile 4x4 gauze pad was used to apply light pressure until bleeding stopped. At that time, petroleum-based gauze and a sterile occlusive dressing was applied to exit site.     Patient was instructed to keep dressing in place for at least 24 hours and to remain in a flat or reclining position for 30 minutes post-removal.     Catheter was inspected after removal and was intact. Tip of central line was not sent for culture. Patient tolerated procedure.

## 2025-06-20 NOTE — DISCHARGE SUMMARY
Date of Discharge:  06/20/25    Discharge Diagnosis: Renal failure     Presenting Problem/History of Present Illness:  Acute renal failure [N17.9]     Hospital Course:  Chey Robertson is a 89 y.o. female LTC resident admitted with acute renal failure. Her medical history is notable for MDS with pancytopenia, DM2, HTN, afib, and recent hospitalization for a fall resulting in distal femur fracture requiring surgical repair and Klebsiella UTI. She has since been hospitalized again from her rehab facility with acute renal failure. Her course has been further c/b afib with RVR, hyperkalemia, hyperglycemia requiring insulin gtt, hypotension requiring vasopressors, complicated UTI, and progressive anuric renal failure. Goals of care conversations have resulted in the decision to transition to a comfort plan of care instead of pursuing hemodialysis. She was admitted to inpatient hospice for symptom management of worsening symptom burden of nausea/vomiting and pain.     Following admission to Inpatient hospice, patient condition has stabilized.  She has been tolerating minimal PO intake, taking sips and bites.  Nausea and vomiting resolved with addition of reglan and zofran to medication regimen.     Medications changed to PO on Wednesday 6/18.  She has been able to tolerate this change with no issues.  Symptoms presently managed with scheduled medication and occasional PRNs.      Procedures Performed: None          Consults:   Consults       Date and Time Order Name Status Description    6/13/2025  1:37 PM Inpatient Palliative Care MD Consult Completed     5/17/2025 11:35 PM Inpatient Orthopedic Surgery Consult Completed             Pertinent Test Results:   Results from last 7 days   Lab Units 06/14/25  0433   WBC 10*3/mm3 3.67   HEMOGLOBIN g/dL 7.4*   HEMATOCRIT % 23.4*   PLATELETS 10*3/mm3 127*     Results from last 7 days   Lab Units 06/14/25  0433   SODIUM mmol/L 138   POTASSIUM mmol/L 4.5   CHLORIDE mmol/L 100    CO2 mmol/L 25.0   BUN mg/dL 53.4*   CREATININE mg/dL 2.93*   GLUCOSE mg/dL 121*   CALCIUM mg/dL 8.4*     Results from last 7 days   Lab Units 06/14/25  0433   SODIUM mmol/L 138   POTASSIUM mmol/L 4.5   CHLORIDE mmol/L 100   CO2 mmol/L 25.0   BUN mg/dL 53.4*   CREATININE mg/dL 2.93*   CALCIUM mg/dL 8.4*   GLUCOSE mg/dL 121*     Imaging Results (Last 72 Hours)       ** No results found for the last 72 hours. **            Condition on Discharge:  Stable    Vital Signs       Physical Exam:  Physical Exam  Nursing note reviewed.   Constitutional:       General: She is not in acute distress.     Appearance: She is ill-appearing.   Cardiovascular:      Rate and Rhythm: Normal rate and regular rhythm.   Pulmonary:      Effort: Pulmonary effort is normal.   Musculoskeletal:         General: Swelling present.   Skin:     General: Skin is warm.      Coloration: Skin is pale.         Discharge Disposition:  Skilled Nursing Facility (DC - External)    Discharge Medications:     Your medication list        START taking these medications        Instructions Last Dose Given Next Dose Due   camphor-menthol 0.5-0.5 % lotion  Commonly known as: SARNA      Apply  topically to the appropriate area as directed 4 (Four) Times a Day As Needed for Itching.       LORazepam 0.5 MG tablet  Commonly known as: ATIVAN      Take 1 tablet by mouth Every 6 (Six) Hours As Needed for Anxiety for up to 3 days.       metoclopramide 5 MG tablet  Commonly known as: REGLAN      Take 1 tablet by mouth 3 (Three) Times a Day Before Meals.       sennosides-docusate 8.6-50 MG per tablet  Commonly known as: PERICOLACE      Take 2 tablets by mouth Every Night.              CHANGE how you take these medications        Instructions Last Dose Given Next Dose Due   ondansetron ODT 4 MG disintegrating tablet  Commonly known as: ZOFRAN-ODT  What changed:   how to take this  when to take this      Place 1 tablet on the tongue Every 6 (Six) Hours As Needed for Nausea  or Vomiting.       ondansetron ODT 4 MG disintegrating tablet  Commonly known as: ZOFRAN-ODT  What changed: You were already taking a medication with the same name, and this prescription was added. Make sure you understand how and when to take each.      Place 1 tablet on the tongue Every 8 (Eight) Hours.       oxyCODONE 5 MG immediate release tablet  Commonly known as: ROXICODONE  What changed:   when to take this  reasons to take this      Take 1 tablet by mouth Every 8 (Eight) Hours for 9 doses.       oxyCODONE 5 MG immediate release tablet  Commonly known as: ROXICODONE  What changed: You were already taking a medication with the same name, and this prescription was added. Make sure you understand how and when to take each.      Take 1 tablet by mouth Every 4 (Four) Hours As Needed for Moderate Pain or Severe Pain (dyspnea) for up to 3 days.              CONTINUE taking these medications        Instructions Last Dose Given Next Dose Due   acetaminophen 500 MG tablet  Commonly known as: TYLENOL      Take 1 tablet by mouth Every 6 (Six) Hours As Needed for Mild Pain or Moderate Pain.       amiodarone 200 MG tablet  Commonly known as: PACERONE      Take 1 tablet by mouth Daily.       levothyroxine 100 MCG tablet  Commonly known as: SYNTHROID, LEVOTHROID      Take 1 tablet by mouth Every Morning.       metFORMIN 500 MG tablet  Commonly known as: GLUCOPHAGE      Take 1 tablet by mouth 2 (Two) Times a Day With Meals.              STOP taking these medications      Acidophilus Extra Strength capsule        amLODIPine 10 MG tablet  Commonly known as: NORVASC        ascorbic acid 500 MG tablet  Commonly known as: VITAMIN C        castor oil-balsam peru ointment        Eliquis 2.5 MG tablet tablet  Generic drug: apixaban        furosemide 40 MG tablet  Commonly known as: LASIX        lisinopril 10 MG tablet  Commonly known as: PRINIVIL,ZESTRIL        melatonin 5 MG tablet tablet        metoprolol tartrate 50 MG  tablet  Commonly known as: LOPRESSOR        multivitamin with minerals tablet tablet        naloxone 4 MG/0.1ML nasal spray  Commonly known as: NARCAN        nystatin 279292 UNIT/GM ointment  Commonly known as: MYCOSTATIN        omeprazole 20 MG capsule  Commonly known as: priLOSEC        polyethylene glycol 17 g packet  Commonly known as: MIRALAX        sulfamethoxazole-trimethoprim 800-160 MG per tablet  Commonly known as: BACTRIM DS,SEPTRA DS        tamsulosin 0.4 MG capsule 24 hr capsule  Commonly known as: FLOMAX        zinc sulfate 220 (50 Zn) MG capsule  Commonly known as: ZINCATE                  Where to Get Your Medications        Information about where to get these medications is not yet available    Ask your nurse or doctor about these medications  camphor-menthol 0.5-0.5 % lotion  LORazepam 0.5 MG tablet  metoclopramide 5 MG tablet  ondansetron ODT 4 MG disintegrating tablet  ondansetron ODT 4 MG disintegrating tablet  oxyCODONE 5 MG immediate release tablet  oxyCODONE 5 MG immediate release tablet  sennosides-docusate 8.6-50 MG per tablet          Discharge Diet:   Diet Instructions       Diet: Regular/House Diet; Regular (IDDSI 7); Thin (IDDSI 0)      Discharge Diet: Regular/House Diet    Texture: Regular (IDDSI 7)    Fluid Consistency: Thin (IDDSI 0)            Activity at Discharge: Bedrest     Follow-up Appointments:  Future Appointments   Date Time Provider Department Center   9/8/2025 10:30 AM Jeanna Grande APRN [facility]         Test Results Pending at Discharge: None        Time Spent: 40 min    Fernanda Cesar, MSN, APRN, ACHPN  [facility]  Hospice and Palliative Care Nurse Practitioner  06/20/25  09:41 EDT

## 2025-06-28 LAB
QT INTERVAL: 504 MS
QTC INTERVAL: 498 MS

## (undated) DEVICE — PK KYPHOPLASTY 10

## (undated) DEVICE — GLV SURG SENSICARE PI MIC PF SZ8 LF STRL

## (undated) DEVICE — HDRST INTUB GENTLETOUCH SLOT 7IN RT

## (undated) DEVICE — SYS VBS INFLAT 1/PU STRL

## (undated) DEVICE — BALN SYNFLATE VERTREBRAL 5ML 15X23.3MM MD

## (undated) DEVICE — GOWN,REINF,POLY,ECL,PP SLV,3XL,XLONG: Brand: MEDLINE

## (undated) DEVICE — SYR LL TP 10ML STRL

## (undated) DEVICE — DRAPE,REIN 53X77,STERILE: Brand: MEDLINE

## (undated) DEVICE — DRLL ACC 10G STRL

## (undated) DEVICE — GLV SURG SIGNATURE TOUCH PF LTX 8 STRL BX/50

## (undated) DEVICE — STRAP POSTN KN/BDY FM 5X72IN DISP

## (undated) DEVICE — KT ACC DIA TP OPNEND 10G STRL

## (undated) DEVICE — KT BIOP10G STRL